# Patient Record
Sex: MALE | Race: WHITE | Employment: FULL TIME | URBAN - METROPOLITAN AREA
[De-identification: names, ages, dates, MRNs, and addresses within clinical notes are randomized per-mention and may not be internally consistent; named-entity substitution may affect disease eponyms.]

---

## 2017-10-04 ENCOUNTER — HOSPITAL ENCOUNTER (OUTPATIENT)
Dept: GENERAL RADIOLOGY | Age: 32
Discharge: HOME | End: 2017-10-04
Payer: COMMERCIAL

## 2017-10-04 ENCOUNTER — HOSPITAL ENCOUNTER (OUTPATIENT)
Age: 32
Discharge: HOME | End: 2017-10-04
Payer: COMMERCIAL

## 2017-10-04 DIAGNOSIS — M25.562 LEFT KNEE PAIN, UNSPECIFIED CHRONICITY: ICD-10-CM

## 2017-10-04 PROCEDURE — 73562 X-RAY EXAM OF KNEE 3: CPT

## 2018-04-06 ENCOUNTER — APPOINTMENT (OUTPATIENT)
Dept: GENERAL RADIOLOGY | Age: 33
End: 2018-04-06
Payer: MEDICARE

## 2018-04-06 ENCOUNTER — HOSPITAL ENCOUNTER (EMERGENCY)
Age: 33
Discharge: HOME | End: 2018-04-06
Attending: EMERGENCY MEDICINE
Payer: MEDICARE

## 2018-04-06 VITALS
HEIGHT: 68 IN | WEIGHT: 180 LBS | TEMPERATURE: 97.7 F | OXYGEN SATURATION: 95 % | RESPIRATION RATE: 18 BRPM | SYSTOLIC BLOOD PRESSURE: 160 MMHG | BODY MASS INDEX: 27.28 KG/M2 | DIASTOLIC BLOOD PRESSURE: 99 MMHG | HEART RATE: 95 BPM

## 2018-04-06 DIAGNOSIS — J06.9 VIRAL URI: Primary | ICD-10-CM

## 2018-04-06 LAB
EKG ATRIAL RATE: 84 BPM
EKG P AXIS: 48 DEGREES
EKG P-R INTERVAL: 122 MS
EKG Q-T INTERVAL: 326 MS
EKG QRS DURATION: 90 MS
EKG QTC CALCULATION (BAZETT): 385 MS
EKG R AXIS: 25 DEGREES
EKG T AXIS: 59 DEGREES
EKG VENTRICULAR RATE: 84 BPM

## 2018-04-06 PROCEDURE — 93005 ELECTROCARDIOGRAM TRACING: CPT

## 2018-04-06 PROCEDURE — 99285 EMERGENCY DEPT VISIT HI MDM: CPT

## 2018-04-06 PROCEDURE — 71046 X-RAY EXAM CHEST 2 VIEWS: CPT

## 2018-04-06 PROCEDURE — 6370000000 HC RX 637 (ALT 250 FOR IP): Performed by: EMERGENCY MEDICINE

## 2018-04-06 RX ORDER — GUAIFENESIN DEXTROMETHORPHAN HYDROBROMIDE ORAL SOLUTION 10; 100 MG/5ML; MG/5ML
10 SOLUTION ORAL EVERY 4 HOURS PRN
Status: DISCONTINUED | OUTPATIENT
Start: 2018-04-06 | End: 2018-04-06 | Stop reason: HOSPADM

## 2018-04-06 RX ORDER — BENZONATATE 100 MG/1
100 CAPSULE ORAL 3 TIMES DAILY PRN
Qty: 30 CAPSULE | Refills: 0 | Status: SHIPPED | OUTPATIENT
Start: 2018-04-06 | End: 2018-04-13

## 2018-04-06 RX ORDER — GUAIFENESIN/DEXTROMETHORPHAN 100-10MG/5
5 SYRUP ORAL 3 TIMES DAILY PRN
Qty: 120 ML | Refills: 0 | Status: SHIPPED | OUTPATIENT
Start: 2018-04-06 | End: 2018-04-16

## 2018-04-06 RX ORDER — BENZONATATE 100 MG/1
100 CAPSULE ORAL ONCE
Status: COMPLETED | OUTPATIENT
Start: 2018-04-06 | End: 2018-04-06

## 2018-04-06 RX ADMIN — BENZOCAINE, MENTHOL 1 LOZENGE: 15; 3.6 LOZENGE ORAL at 12:23

## 2018-04-06 RX ADMIN — BENZONATATE 100 MG: 100 CAPSULE ORAL at 12:23

## 2018-04-06 RX ADMIN — Medication 10 ML: at 12:24

## 2018-04-06 ASSESSMENT — ENCOUNTER SYMPTOMS
EYE DISCHARGE: 0
SORE THROAT: 1
VOMITING: 0
SINUS PRESSURE: 1
ABDOMINAL PAIN: 0
COUGH: 1
DIARRHEA: 0
SHORTNESS OF BREATH: 1
NAUSEA: 0
EYE PAIN: 0

## 2018-05-07 ENCOUNTER — APPOINTMENT (OUTPATIENT)
Dept: GENERAL RADIOLOGY | Age: 33
End: 2018-05-07
Payer: MEDICARE

## 2018-05-07 ENCOUNTER — HOSPITAL ENCOUNTER (EMERGENCY)
Age: 33
Discharge: HOME | End: 2018-05-07
Attending: EMERGENCY MEDICINE
Payer: MEDICARE

## 2018-05-07 VITALS
HEIGHT: 68 IN | OXYGEN SATURATION: 99 % | TEMPERATURE: 97.5 F | BODY MASS INDEX: 27.28 KG/M2 | DIASTOLIC BLOOD PRESSURE: 99 MMHG | RESPIRATION RATE: 16 BRPM | SYSTOLIC BLOOD PRESSURE: 152 MMHG | WEIGHT: 180 LBS | HEART RATE: 89 BPM

## 2018-05-07 DIAGNOSIS — K92.0 HEMATEMESIS WITHOUT NAUSEA: Primary | ICD-10-CM

## 2018-05-07 LAB
ABSOLUTE EOS #: 0.19 K/UL (ref 0–0.44)
ABSOLUTE IMMATURE GRANULOCYTE: 0.03 K/UL (ref 0–0.3)
ABSOLUTE LYMPH #: 1.27 K/UL (ref 1.1–3.7)
ABSOLUTE MONO #: 1.09 K/UL (ref 0.1–1.2)
ALBUMIN SERPL-MCNC: 4.1 G/DL (ref 3.5–5.2)
ALBUMIN/GLOBULIN RATIO: 0.9 (ref 1–2.5)
ALP BLD-CCNC: 83 U/L (ref 40–129)
ALT SERPL-CCNC: 23 U/L (ref 5–41)
ANION GAP SERPL CALCULATED.3IONS-SCNC: 11 MMOL/L (ref 9–17)
AST SERPL-CCNC: 37 U/L
BASOPHILS # BLD: 1 % (ref 0–2)
BASOPHILS ABSOLUTE: 0.11 K/UL (ref 0–0.2)
BILIRUB SERPL-MCNC: 0.36 MG/DL (ref 0.3–1.2)
BUN BLDV-MCNC: 12 MG/DL (ref 6–20)
BUN/CREAT BLD: ABNORMAL (ref 9–20)
CALCIUM SERPL-MCNC: 9 MG/DL (ref 8.6–10.4)
CHLORIDE BLD-SCNC: 104 MMOL/L (ref 98–107)
CO2: 25 MMOL/L (ref 20–31)
CREAT SERPL-MCNC: 0.68 MG/DL (ref 0.7–1.2)
DIFFERENTIAL TYPE: ABNORMAL
EOSINOPHILS RELATIVE PERCENT: 2 % (ref 1–4)
ETHANOL PERCENT: <0.01 %
ETHANOL: <10 MG/DL
GFR AFRICAN AMERICAN: >60 ML/MIN
GFR NON-AFRICAN AMERICAN: >60 ML/MIN
GFR SERPL CREATININE-BSD FRML MDRD: ABNORMAL ML/MIN/{1.73_M2}
GFR SERPL CREATININE-BSD FRML MDRD: ABNORMAL ML/MIN/{1.73_M2}
GLUCOSE BLD-MCNC: 89 MG/DL (ref 70–99)
HCT VFR BLD CALC: 45.1 % (ref 40.7–50.3)
HEMOGLOBIN: 14.8 G/DL (ref 13–17)
IMMATURE GRANULOCYTES: 0 %
INR BLD: 0.9
LIPASE: 37 U/L (ref 13–60)
LYMPHOCYTES # BLD: 16 % (ref 24–43)
MCH RBC QN AUTO: 33.8 PG (ref 25.2–33.5)
MCHC RBC AUTO-ENTMCNC: 32.8 G/DL (ref 28.4–34.8)
MCV RBC AUTO: 103 FL (ref 82.6–102.9)
MONOCYTES # BLD: 13 % (ref 3–12)
NRBC AUTOMATED: 0 PER 100 WBC
PARTIAL THROMBOPLASTIN TIME: 25.9 SEC (ref 20.5–30.5)
PDW BLD-RTO: 13.7 % (ref 11.8–14.4)
PLATELET # BLD: 253 K/UL (ref 138–453)
PLATELET ESTIMATE: ABNORMAL
PMV BLD AUTO: 10.2 FL (ref 8.1–13.5)
POTASSIUM SERPL-SCNC: 4.1 MMOL/L (ref 3.7–5.3)
PROTHROMBIN TIME: 9.7 SEC (ref 9–12)
RBC # BLD: 4.38 M/UL (ref 4.21–5.77)
RBC # BLD: ABNORMAL 10*6/UL
SEG NEUTROPHILS: 68 % (ref 36–65)
SEGMENTED NEUTROPHILS ABSOLUTE COUNT: 5.43 K/UL (ref 1.5–8.1)
SODIUM BLD-SCNC: 140 MMOL/L (ref 135–144)
TOTAL PROTEIN: 8.7 G/DL (ref 6.4–8.3)
WBC # BLD: 8.1 K/UL (ref 3.5–11.3)
WBC # BLD: ABNORMAL 10*3/UL

## 2018-05-07 PROCEDURE — 99284 EMERGENCY DEPT VISIT MOD MDM: CPT

## 2018-05-07 PROCEDURE — G0480 DRUG TEST DEF 1-7 CLASSES: HCPCS

## 2018-05-07 PROCEDURE — 2580000003 HC RX 258: Performed by: EMERGENCY MEDICINE

## 2018-05-07 PROCEDURE — 80053 COMPREHEN METABOLIC PANEL: CPT

## 2018-05-07 PROCEDURE — 85610 PROTHROMBIN TIME: CPT

## 2018-05-07 PROCEDURE — 85730 THROMBOPLASTIN TIME PARTIAL: CPT

## 2018-05-07 PROCEDURE — 6360000002 HC RX W HCPCS: Performed by: EMERGENCY MEDICINE

## 2018-05-07 PROCEDURE — 71046 X-RAY EXAM CHEST 2 VIEWS: CPT

## 2018-05-07 PROCEDURE — 85025 COMPLETE CBC W/AUTO DIFF WBC: CPT

## 2018-05-07 PROCEDURE — 83690 ASSAY OF LIPASE: CPT

## 2018-05-07 RX ORDER — 0.9 % SODIUM CHLORIDE 0.9 %
1000 INTRAVENOUS SOLUTION INTRAVENOUS ONCE
Status: COMPLETED | OUTPATIENT
Start: 2018-05-07 | End: 2018-05-07

## 2018-05-07 RX ORDER — ONDANSETRON 4 MG/1
4 TABLET, ORALLY DISINTEGRATING ORAL ONCE
Status: COMPLETED | OUTPATIENT
Start: 2018-05-07 | End: 2018-05-07

## 2018-05-07 RX ORDER — PANTOPRAZOLE SODIUM 20 MG/1
40 TABLET, DELAYED RELEASE ORAL DAILY
Qty: 30 TABLET | Refills: 0 | Status: SHIPPED | OUTPATIENT
Start: 2018-05-07 | End: 2020-04-30

## 2018-05-07 RX ORDER — ACETAMINOPHEN 500 MG
1000 TABLET ORAL EVERY 8 HOURS PRN
Qty: 30 TABLET | Refills: 0 | Status: SHIPPED | OUTPATIENT
Start: 2018-05-07 | End: 2020-05-26

## 2018-05-07 RX ORDER — ONDANSETRON 4 MG/1
4 TABLET, ORALLY DISINTEGRATING ORAL EVERY 8 HOURS PRN
Qty: 20 TABLET | Refills: 0 | Status: SHIPPED | OUTPATIENT
Start: 2018-05-07 | End: 2018-08-27

## 2018-05-07 RX ADMIN — SODIUM CHLORIDE 1000 ML: 9 INJECTION, SOLUTION INTRAVENOUS at 07:53

## 2018-05-07 RX ADMIN — ONDANSETRON 4 MG: 4 TABLET, ORALLY DISINTEGRATING ORAL at 07:53

## 2018-05-07 ASSESSMENT — ENCOUNTER SYMPTOMS
SINUS PRESSURE: 0
ABDOMINAL PAIN: 0
STRIDOR: 0
RHINORRHEA: 0
WHEEZING: 0
PHOTOPHOBIA: 0
SHORTNESS OF BREATH: 0
SINUS PAIN: 0
COUGH: 0
VOMITING: 1

## 2018-08-27 ENCOUNTER — HOSPITAL ENCOUNTER (EMERGENCY)
Age: 33
Discharge: HOME | End: 2018-08-27
Attending: EMERGENCY MEDICINE
Payer: MEDICARE

## 2018-08-27 ENCOUNTER — APPOINTMENT (OUTPATIENT)
Dept: GENERAL RADIOLOGY | Age: 33
End: 2018-08-27
Payer: MEDICARE

## 2018-08-27 VITALS
WEIGHT: 190 LBS | HEIGHT: 68 IN | SYSTOLIC BLOOD PRESSURE: 148 MMHG | OXYGEN SATURATION: 98 % | BODY MASS INDEX: 28.79 KG/M2 | DIASTOLIC BLOOD PRESSURE: 92 MMHG | RESPIRATION RATE: 16 BRPM | TEMPERATURE: 97.2 F | HEART RATE: 99 BPM

## 2018-08-27 DIAGNOSIS — M62.838 SPASM OF MUSCLE: Primary | ICD-10-CM

## 2018-08-27 PROCEDURE — 6360000002 HC RX W HCPCS: Performed by: PHYSICIAN ASSISTANT

## 2018-08-27 PROCEDURE — 99283 EMERGENCY DEPT VISIT LOW MDM: CPT

## 2018-08-27 PROCEDURE — 72100 X-RAY EXAM L-S SPINE 2/3 VWS: CPT

## 2018-08-27 PROCEDURE — 96372 THER/PROPH/DIAG INJ SC/IM: CPT

## 2018-08-27 RX ORDER — KETOROLAC TROMETHAMINE 30 MG/ML
30 INJECTION, SOLUTION INTRAMUSCULAR; INTRAVENOUS ONCE
Status: COMPLETED | OUTPATIENT
Start: 2018-08-27 | End: 2018-08-27

## 2018-08-27 RX ORDER — IBUPROFEN 800 MG/1
800 TABLET ORAL EVERY 8 HOURS PRN
Qty: 20 TABLET | Refills: 0 | Status: SHIPPED | OUTPATIENT
Start: 2018-08-27 | End: 2020-05-26 | Stop reason: ALTCHOICE

## 2018-08-27 RX ORDER — DIAZEPAM 5 MG/1
5 TABLET ORAL EVERY 8 HOURS PRN
Qty: 5 TABLET | Refills: 0 | Status: SHIPPED | OUTPATIENT
Start: 2018-08-27 | End: 2018-08-30

## 2018-08-27 RX ADMIN — KETOROLAC TROMETHAMINE 30 MG: 30 INJECTION, SOLUTION INTRAMUSCULAR at 14:47

## 2018-08-27 ASSESSMENT — ENCOUNTER SYMPTOMS
SHORTNESS OF BREATH: 0
VOMITING: 0
BACK PAIN: 1
DIARRHEA: 0
NAUSEA: 0
ABDOMINAL PAIN: 0
COLOR CHANGE: 0
SORE THROAT: 0

## 2018-08-27 ASSESSMENT — PAIN DESCRIPTION - LOCATION: LOCATION: BACK

## 2018-08-27 ASSESSMENT — PAIN DESCRIPTION - DESCRIPTORS: DESCRIPTORS: ACHING;SHOOTING

## 2018-08-27 ASSESSMENT — PAIN DESCRIPTION - FREQUENCY: FREQUENCY: CONTINUOUS

## 2018-08-27 ASSESSMENT — PAIN SCALES - GENERAL
PAINLEVEL_OUTOF10: 8
PAINLEVEL_OUTOF10: 8

## 2018-08-27 ASSESSMENT — PAIN DESCRIPTION - ORIENTATION: ORIENTATION: LOWER

## 2018-08-27 NOTE — ED NOTES
rec'd report from [facility]. Pt resting on cot with no distress noted. Awaiting discharge papers.       Giovana Gilliland RN  08/27/18 2262

## 2018-08-27 NOTE — ED PROVIDER NOTES
For allergic reaction/itching skin. EPINEPHRINE (EPIPEN 2-LIZETTE) 0.3 MG/0.3ML SOAJ INJECTION    Inject 0.3 mLs into the muscle once for 1 dose Use as directed for allergic reaction    FAMOTIDINE (PEPCID) 20 MG TABLET    Take 1 tablet by mouth 2 times daily    PANTOPRAZOLE (PROTONIX) 20 MG TABLET    Take 2 tablets by mouth daily       ALLERGIES     Pcn [penicillins]  Reviewed  FAMILY HISTORY     History reviewed. No pertinent family history. No family status information on file. SOCIAL HISTORY      reports that he has been smoking Cigarettes. He has a 10.00 pack-year smoking history. He has never used smokeless tobacco. He reports that he drinks alcohol. He reports that he does not use drugs. PHYSICAL EXAM    (up to 7 for level 4, 8 or more for level 5)     Vitals:    08/27/18 1400   BP: (!) 148/92   Pulse: 99   Resp: 16   Temp: 97.2 °F (36.2 °C)   TempSrc: Oral   SpO2: 98%   Weight: 190 lb (86.2 kg)   Height: 5' 8\" (1.727 m)       Physical Exam   Constitutional: He is oriented to person, place, and time. He appears well-developed and well-nourished. No distress. HENT:   Head: Normocephalic and atraumatic. Eyes: Pupils are equal, round, and reactive to light. Conjunctivae and EOM are normal.   Neck: Normal range of motion. Neck supple. Cardiovascular: Normal rate, regular rhythm, normal heart sounds and intact distal pulses. Exam reveals no gallop and no friction rub. No murmur heard. Pulmonary/Chest: Effort normal and breath sounds normal. No respiratory distress. He has no wheezes. He has no rales. Abdominal: Soft. Bowel sounds are normal. He exhibits no distension and no mass. There is no tenderness. There is no rebound and no guarding. Musculoskeletal: Normal range of motion. Back:    No saddle anesthesia. No bowel or bladder dysfunction. No urinary retention. No IV drug use. Low suspicion for cauda equina syndrome or epidural abscess.  Motor function is 5 out of 5 bilaterally in all extremities. Light sensation is intact. Proprioception is within normal limits. Cap refills less than 2 seconds in all extremities. All compartments are soft compressible. No septic joints noted. Distal pulses and deep tendon reflexes are within normal limits. Patient is ambulatory. No ataxia. Neurological: He is alert and oriented to person, place, and time. He has normal reflexes. No cranial nerve deficit. Skin: Skin is warm and dry. He is not diaphoretic. Psychiatric: He has a normal mood and affect. His behavior is normal. Judgment and thought content normal.         DIAGNOSTIC RESULTS       RADIOLOGY:   Non-plain film images such as CT, Ultrasound and MRI are read by the radiologist. Plain radiographic images are visualized and preliminarily interpreted by Ana Hector PA-C with the below findings:    See below. Interpretation per the Radiologist below, if available at the time of this note:    XR LUMBAR SPINE (2-3 VIEWS)   Final Result   No acute osseous abnormality identified. Slight lumbar curvature to the   right is noted which may be positional or related to muscle spasm. LABS:  Labs Reviewed - No data to display        EMERGENCY DEPARTMENT COURSE and DIFFERENTIAL DIAGNOSIS/MDM:   Vitals:    Vitals:    08/27/18 1400   BP: (!) 148/92   Pulse: 99   Resp: 16   Temp: 97.2 °F (36.2 °C)   TempSrc: Oral   SpO2: 98%   Weight: 190 lb (86.2 kg)   Height: 5' 8\" (1.727 m)       This is a 81-year-old male presenting to the emergency department after falling playing with his children yesterday complaining of right-sided low back pain. We will obtain imaging of his lumbar spine although he has no midline lumbar tenderness to rule out fracture although my suspicion is low for this. We will give him it an injection of Toradol here in the ED for pain control. He is again ambulatory and the x-rays are unremarkable.  I do believe this to be a muscle spasm at this time and we will send him home on Valium and ibuprofen to take as needed. Patient was told to follow-up with his primary care physician in 3 days. Patient understood and will comply. Patient is satisfied. All questions answered. Attending physician, myself, and patient agree no further workup is necessary at this time. Patient was given very strict return protocols and is completely agreeable with this plan. This patient was seen by the attending physician and they agreed with the assessment and plan. CONSULTS:  None    PROCEDURES:  Procedures    FINAL IMPRESSION      1. Spasm of muscle          DISPOSITION/PLAN   DISPOSITION Decision To Discharge 08/27/2018 03:05:20 PM      PATIENT REFERRED TO:  [facility]  [address] [facility] [address]  [phone]  Go to   As needed, If symptoms worsen    [address]  [phone]  Schedule an appointment as soon as possible for a visit in 3 days  For Re-check      DISCHARGE MEDICATIONS:  New Prescriptions    DIAZEPAM (VALIUM) 5 MG TABLET    Take 1 tablet by mouth every 8 hours as needed for Anxiety (Pain/Spasm) for up to 3 doses. .       (Please note that portions of this note were completed with a voice recognition program.  Efforts were made to edit the dictations but occasionally words are mis-transcribed.)    Marilu Cho 1137, TERENCE Flanagan PA-C  08/27/18 4255

## 2018-09-11 ENCOUNTER — HOSPITAL ENCOUNTER (EMERGENCY)
Age: 33
Discharge: HOME | End: 2018-09-11
Attending: EMERGENCY MEDICINE
Payer: MEDICARE

## 2018-09-11 VITALS
RESPIRATION RATE: 18 BRPM | HEART RATE: 84 BPM | DIASTOLIC BLOOD PRESSURE: 87 MMHG | OXYGEN SATURATION: 100 % | SYSTOLIC BLOOD PRESSURE: 149 MMHG | TEMPERATURE: 98.4 F

## 2018-09-11 DIAGNOSIS — R21 RASH AND OTHER NONSPECIFIC SKIN ERUPTION: Primary | ICD-10-CM

## 2018-09-11 PROCEDURE — 99283 EMERGENCY DEPT VISIT LOW MDM: CPT

## 2018-09-11 PROCEDURE — 6370000000 HC RX 637 (ALT 250 FOR IP): Performed by: EMERGENCY MEDICINE

## 2018-09-11 RX ORDER — FAMOTIDINE 20 MG/1
20 TABLET, FILM COATED ORAL 2 TIMES DAILY
Qty: 12 TABLET | Refills: 0 | Status: SHIPPED | OUTPATIENT
Start: 2018-09-11 | End: 2020-04-30

## 2018-09-11 RX ORDER — PREDNISONE 20 MG/1
60 TABLET ORAL ONCE
Status: COMPLETED | OUTPATIENT
Start: 2018-09-11 | End: 2018-09-11

## 2018-09-11 RX ORDER — PREDNISONE 50 MG/1
50 TABLET ORAL DAILY
Qty: 4 TABLET | Refills: 0 | Status: SHIPPED | OUTPATIENT
Start: 2018-09-11 | End: 2020-04-30

## 2018-09-11 RX ORDER — DIPHENHYDRAMINE HCL 25 MG
25 CAPSULE ORAL EVERY 4 HOURS PRN
Qty: 8 CAPSULE | Refills: 0 | Status: SHIPPED | OUTPATIENT
Start: 2018-09-11 | End: 2018-09-21

## 2018-09-11 RX ORDER — DIPHENHYDRAMINE HCL 25 MG
25 TABLET ORAL ONCE
Status: COMPLETED | OUTPATIENT
Start: 2018-09-11 | End: 2018-09-11

## 2018-09-11 RX ORDER — FAMOTIDINE 20 MG/1
20 TABLET, FILM COATED ORAL ONCE
Status: COMPLETED | OUTPATIENT
Start: 2018-09-11 | End: 2018-09-11

## 2018-09-11 RX ADMIN — DIPHENHYDRAMINE HCL 25 MG: 25 TABLET ORAL at 22:01

## 2018-09-11 RX ADMIN — FAMOTIDINE 20 MG: 20 TABLET, FILM COATED ORAL at 22:01

## 2018-09-11 RX ADMIN — PREDNISONE 60 MG: 20 TABLET ORAL at 22:01

## 2018-09-12 ASSESSMENT — ENCOUNTER SYMPTOMS
ABDOMINAL PAIN: 0
WHEEZING: 0
VOMITING: 0
BACK PAIN: 0
SORE THROAT: 0
SHORTNESS OF BREATH: 0

## 2018-09-12 NOTE — ED PROVIDER NOTES
Authorizing Provider   diphenhydrAMINE (BENADRYL) 25 MG capsule Take 1 capsule by mouth every 4 hours as needed for Itching 9/11/18 9/21/18 Yes Gayle Alicia MD   famotidine (PEPCID) 20 MG tablet Take 1 tablet by mouth 2 times daily 9/11/18  Yes Gayle Alicia MD   predniSONE (DELTASONE) 50 MG tablet Take 1 tablet by mouth daily 9/11/18  Yes Gayle Alicia MD   cetirizine (ZYRTEC ALLERGY) 10 MG tablet Take 1 tablet by mouth daily For allergic reaction/itching skin. 11/12/16  Yes Citlalli Dotson PA-C   ibuprofen (ADVIL;MOTRIN) 800 MG tablet Take 1 tablet by mouth every 8 hours as needed for Pain 8/27/18   BENJY Soares PA-C   acetaminophen (APAP EXTRA STRENGTH) 500 MG tablet Take 2 tablets by mouth every 8 hours as needed for Pain 5/7/18   Lakia Burnett MD   pantoprazole (PROTONIX) 20 MG tablet Take 2 tablets by mouth daily 5/7/18   Lakia Burnett MD   famotidine (PEPCID) 20 MG tablet Take 1 tablet by mouth 2 times daily 11/17/16   Darrel Palacios MD   EPINEPHrine (EPIPEN 2-LIZETTE) 0.3 MG/0.3ML SOAJ injection Inject 0.3 mLs into the muscle once for 1 dose Use as directed for allergic reaction 11/17/16 11/17/16  Darrel Palacios MD       REVIEW OF SYSTEMS    (2-9 systems for level 4, 10 or more for level 5)      Review of Systems   Constitutional: Negative for chills and fever. HENT: Negative for sore throat. Eyes: Negative for visual disturbance. Respiratory: Negative for shortness of breath and wheezing. Cardiovascular: Negative for chest pain. Gastrointestinal: Negative for abdominal pain and vomiting. Genitourinary: Negative for dysuria. Musculoskeletal: Negative for back pain. Skin: Positive for rash. Neurological: Negative for headaches. Psychiatric/Behavioral: Negative for confusion.        PHYSICAL EXAM   (up to 7 for level 4, 8 or more for level 5)      INITIAL VITALS:   BP (!) 149/87   Pulse 84   Temp 98.4 °F (36.9 °C) (Oral)   Resp 18   SpO2 100%     Physical Exam

## 2018-09-12 NOTE — ED NOTES
Pt to the ED with complaints of right elbow swelling. He was outside with his dog and came in when he noticed that his elbow was hot. He has swelling on the skin of the right elbow that is slightly red and warm.  Pt has severe skin allergies so this type of reaction is not new     Jacinta Whitmore RN  09/11/18 2370

## 2018-09-12 NOTE — ED PROVIDER NOTES
I performed a history and physical examination of the patient and discussed management with the resident. I reviewed the residents note and agree with the documented findings and plan of care. Any areas of disagreement are noted on the chart. I was personally present for the key portions of any procedures. I have documented in the chart those procedures where I was not present during the key portions. I have reviewed the emergency nurses triage note. I agree with the chief complaint, past medical history, past surgical history, allergies, medications, social and family history as documented unless otherwise noted below. Documentation of the HPI, Physical Exam and Medical Decision Making performed by medical students or scribes is based on my personal performance of the HPI, PE and MDM. For Phys Assistant/ Nurse Practitioner cases/documentation I have personally evaluated this patient and have completed at least one if not all key elements of the E/M (history, physical exam, and MDM). I find the patient's history and physical exam are consistent with the NP/PA documentation. I agree with the care provided, treatment rendered, disposition and followup plan. Additional findings are as noted. Davion Waters. Sweta Dixon MD  Attending Emergency  Physician    C/O [address] OF RIGHT ELBOW SHORTLY AFTER BRUSHING UP AGAINST [employer]GRE[employer] AT HOME. REPORTS HX OF PREV CONTACT WITH SAME PLANT WITHOUT REACTION. NO DIFF BREATHING/SPEAKING/SWALLOWING, ABD PAIN, NAUSEA, VOMITING. AWAKE, ALERT, COOP, RESP. LUNGS CLEAR ADAL. NO RALES, RHONCHI, WHEEZES, STRIDOR, RETRACTIONS. CARDIAC-S1S2, RRR, NO MRG. ABD SOFT, NONDISTENDED, NONTENDER. NORMAL BOWEL SOUNDS. RIGHT ELBOW-MILD ERYTHEMATOUS PATCH OVER OLECRANON. NO JOINT EFFUSION, SWELLING, WARMTH. NORMAL AROM. NO LYMPHANGITIS OR LYMPHADENOPATHY. IMP-RASH RIGHT ELBOW. PLAN-DISCHARGE, TOPICAL STEROIDS, ANTIPRURITIC RX'S. RETURN IF SX WORSEN OR PROGRESS.             [facility] Victorina Louie MD  09/11/18 2493

## 2018-09-12 NOTE — ED PROVIDER NOTES
HPI 28 yo presents with burning, itching, swelling, redness, and warmth to his right elbow and swelling in his digits after rubbing up against a yew plant in his backyard. He is unsure if he is allergic to evergreens, or if he was bitten by an insect. He denies any shortness of breath,wheezing, drooling, difficulty swallowing, or hives. He has an extensive history of allergic reactions and has been on multiple antihistamines and steroids in the past, and is prescribed an epi pen for anaphylaxis     Review of Systems    Physical Exam    Procedures    MDM         Labs      Radiology      EKG Interpretation.

## 2020-04-30 ENCOUNTER — HOSPITAL ENCOUNTER (EMERGENCY)
Age: 35
Discharge: HOME | End: 2020-04-30
Attending: EMERGENCY MEDICINE

## 2020-04-30 ENCOUNTER — APPOINTMENT (OUTPATIENT)
Dept: GENERAL RADIOLOGY | Age: 35
End: 2020-04-30

## 2020-04-30 VITALS
HEIGHT: 68 IN | HEART RATE: 102 BPM | RESPIRATION RATE: 16 BRPM | SYSTOLIC BLOOD PRESSURE: 134 MMHG | WEIGHT: 185 LBS | TEMPERATURE: 97.9 F | BODY MASS INDEX: 28.04 KG/M2 | DIASTOLIC BLOOD PRESSURE: 94 MMHG | OXYGEN SATURATION: 97 %

## 2020-04-30 PROCEDURE — 6370000000 HC RX 637 (ALT 250 FOR IP): Performed by: EMERGENCY MEDICINE

## 2020-04-30 PROCEDURE — 99283 EMERGENCY DEPT VISIT LOW MDM: CPT

## 2020-04-30 PROCEDURE — 73110 X-RAY EXAM OF WRIST: CPT

## 2020-04-30 RX ORDER — HYDROCODONE BITARTRATE AND ACETAMINOPHEN 5; 325 MG/1; MG/1
2 TABLET ORAL ONCE
Status: COMPLETED | OUTPATIENT
Start: 2020-04-30 | End: 2020-04-30

## 2020-04-30 RX ORDER — NAPROXEN 375 MG/1
375 TABLET ORAL 2 TIMES DAILY WITH MEALS
Qty: 20 TABLET | Refills: 0 | Status: SHIPPED | OUTPATIENT
Start: 2020-04-30 | End: 2020-05-26

## 2020-04-30 RX ADMIN — HYDROCODONE BITARTRATE AND ACETAMINOPHEN 2 TABLET: 5; 325 TABLET ORAL at 05:55

## 2020-04-30 ASSESSMENT — PAIN SCALES - GENERAL
PAINLEVEL_OUTOF10: 10
PAINLEVEL_OUTOF10: 10
PAINLEVEL_OUTOF10: 8

## 2020-04-30 ASSESSMENT — ENCOUNTER SYMPTOMS
SHORTNESS OF BREATH: 0
VOMITING: 0
DIARRHEA: 0
BACK PAIN: 0
ABDOMINAL PAIN: 0
COUGH: 0

## 2020-04-30 ASSESSMENT — PAIN DESCRIPTION - LOCATION
LOCATION: WRIST
LOCATION: WRIST

## 2020-04-30 ASSESSMENT — PAIN DESCRIPTION - ORIENTATION
ORIENTATION: RIGHT
ORIENTATION: RIGHT

## 2020-04-30 ASSESSMENT — PAIN DESCRIPTION - PAIN TYPE: TYPE: ACUTE PAIN

## 2020-04-30 ASSESSMENT — PAIN DESCRIPTION - FREQUENCY: FREQUENCY: CONTINUOUS

## 2020-04-30 NOTE — ED PROVIDER NOTES
radiologist, see reports below, unless otherwisenoted in MDM or here. XR WRIST RIGHT (MIN 3 VIEWS)   Final Result   Addendum 1 of 1   ADDENDUM:   The carpal bones all appear intact. There is no evidence for fracture   dislocation. The soft tissues are diffusely edematous over the dorsal   aspect. .  The joint spaces are well preserved. There is no evidence for   foreign body. Final        LABS: All lab results were reviewed by myself, and all abnormals are listed below. Labs Reviewed - No data to display    EMERGENCY DEPARTMENTCOURSE:   Differential diagnosis includes fracture, dislocation, ligamentous injury    6:49 AM EDT  X-rays negative for fracture dislocation. Suspect sprain. The patient will be placed in a removable volar wrist splint with outpatient follow-up. Vitals:    Vitals:    04/30/20 0535   BP: (!) 134/94   Pulse: 102   Resp: 16   Temp: 97.9 °F (36.6 °C)   TempSrc: Oral   SpO2: 97%   Weight: 185 lb (83.9 kg)   Height: 5' 8\" (1.727 m)       The patient was given the following medications while in the emergency department:  Orders Placed This Encounter   Medications    HYDROcodone-acetaminophen (NORCO) 5-325 MG per tablet 2 tablet    naproxen (NAPROSYN) 375 MG tablet     Sig: Take 1 tablet by mouth 2 times daily (with meals)     Dispense:  20 tablet     Refill:  0         FINAL IMPRESSION      1. Right wrist pain    2.  Fall, initial encounter         DISPOSITION/PLAN   DISPOSITION Decision To Discharge 04/30/2020 06:49:30 AM      PATIENT REFERRED TO:  Sarbjit Stevenson MD  [place] [address], [address] [place] [address]  [phone]    In 1 week      DISCHARGE MEDICATIONS:  New Prescriptions    NAPROXEN (NAPROSYN) 375 MG TABLET    Take 1 tablet by mouth 2 times daily (with meals)     Parris Fofana MD  Attending Emergency Physician    This charting supersedes any ED resident or staff charting and was written using speech recognition software        Parris Fofana

## 2020-05-16 ENCOUNTER — APPOINTMENT (OUTPATIENT)
Dept: GENERAL RADIOLOGY | Age: 35
DRG: 286 | End: 2020-05-16

## 2020-05-16 ENCOUNTER — APPOINTMENT (OUTPATIENT)
Dept: CT IMAGING | Age: 35
DRG: 286 | End: 2020-05-16

## 2020-05-16 ENCOUNTER — HOSPITAL ENCOUNTER (INPATIENT)
Age: 35
LOS: 4 days | Discharge: HOME | DRG: 286 | End: 2020-05-20
Attending: EMERGENCY MEDICINE | Admitting: INTERNAL MEDICINE

## 2020-05-16 PROBLEM — I26.99 PULMONARY EMBOLISM ON RIGHT (HCC): Status: ACTIVE | Noted: 2020-05-16

## 2020-05-16 LAB
ABSOLUTE EOS #: 0.1 K/UL (ref 0–0.4)
ABSOLUTE IMMATURE GRANULOCYTE: ABNORMAL K/UL (ref 0–0.3)
ABSOLUTE LYMPH #: 1.1 K/UL (ref 1–4.8)
ABSOLUTE MONO #: 0.5 K/UL (ref 0.1–1.3)
ALBUMIN SERPL-MCNC: 4.4 G/DL (ref 3.5–5.2)
ALBUMIN/GLOBULIN RATIO: ABNORMAL (ref 1–2.5)
ALP BLD-CCNC: 93 U/L (ref 40–129)
ALT SERPL-CCNC: 134 U/L (ref 5–41)
ANION GAP SERPL CALCULATED.3IONS-SCNC: 17 MMOL/L (ref 9–17)
ANTI-XA UNFRAC HEPARIN: 0.53 IU/L (ref 0.3–0.7)
AST SERPL-CCNC: 231 U/L
BASOPHILS # BLD: 2 % (ref 0–2)
BASOPHILS ABSOLUTE: 0.1 K/UL (ref 0–0.2)
BILIRUB SERPL-MCNC: 0.43 MG/DL (ref 0.3–1.2)
BUN BLDV-MCNC: 16 MG/DL (ref 6–20)
BUN/CREAT BLD: ABNORMAL (ref 9–20)
CALCIUM SERPL-MCNC: 9.1 MG/DL (ref 8.6–10.4)
CHLORIDE BLD-SCNC: 100 MMOL/L (ref 98–107)
CO2: 23 MMOL/L (ref 20–31)
CREAT SERPL-MCNC: 0.57 MG/DL (ref 0.7–1.2)
D-DIMER QUANTITATIVE: 0.88 MG/L FEU (ref 0–0.59)
DIFFERENTIAL TYPE: ABNORMAL
EOSINOPHILS RELATIVE PERCENT: 3 % (ref 0–4)
GFR AFRICAN AMERICAN: >60 ML/MIN
GFR NON-AFRICAN AMERICAN: >60 ML/MIN
GFR SERPL CREATININE-BSD FRML MDRD: ABNORMAL ML/MIN/{1.73_M2}
GFR SERPL CREATININE-BSD FRML MDRD: ABNORMAL ML/MIN/{1.73_M2}
GLUCOSE BLD-MCNC: 96 MG/DL (ref 70–99)
HCT VFR BLD CALC: 43.2 % (ref 41–53)
HEMOGLOBIN: 14.7 G/DL (ref 13.5–17.5)
IMMATURE GRANULOCYTES: ABNORMAL %
LYMPHOCYTES # BLD: 24 % (ref 24–44)
MCH RBC QN AUTO: 35.7 PG (ref 26–34)
MCHC RBC AUTO-ENTMCNC: 34 G/DL (ref 31–37)
MCV RBC AUTO: 104.8 FL (ref 80–100)
MONOCYTES # BLD: 12 % (ref 1–7)
NRBC AUTOMATED: ABNORMAL PER 100 WBC
PARTIAL THROMBOPLASTIN TIME: 26.2 SEC (ref 24–36)
PDW BLD-RTO: 14.1 % (ref 11.5–14.9)
PLATELET # BLD: 157 K/UL (ref 150–450)
PLATELET ESTIMATE: ABNORMAL
PMV BLD AUTO: 8.6 FL (ref 6–12)
POTASSIUM SERPL-SCNC: 3.7 MMOL/L (ref 3.7–5.3)
RBC # BLD: 4.12 M/UL (ref 4.5–5.9)
RBC # BLD: ABNORMAL 10*6/UL
SEG NEUTROPHILS: 59 % (ref 36–66)
SEGMENTED NEUTROPHILS ABSOLUTE COUNT: 2.6 K/UL (ref 1.3–9.1)
SODIUM BLD-SCNC: 140 MMOL/L (ref 135–144)
TOTAL PROTEIN: 8.8 G/DL (ref 6.4–8.3)
TROPONIN INTERP: NORMAL
TROPONIN INTERP: NORMAL
TROPONIN T: NORMAL NG/ML
TROPONIN T: NORMAL NG/ML
TROPONIN, HIGH SENSITIVITY: 10 NG/L (ref 0–22)
TROPONIN, HIGH SENSITIVITY: 11 NG/L (ref 0–22)
WBC # BLD: 4.5 K/UL (ref 3.5–11)
WBC # BLD: ABNORMAL 10*3/UL

## 2020-05-16 PROCEDURE — 85379 FIBRIN DEGRADATION QUANT: CPT

## 2020-05-16 PROCEDURE — 80053 COMPREHEN METABOLIC PANEL: CPT

## 2020-05-16 PROCEDURE — 84484 ASSAY OF TROPONIN QUANT: CPT

## 2020-05-16 PROCEDURE — 2060000000 HC ICU INTERMEDIATE R&B

## 2020-05-16 PROCEDURE — 93005 ELECTROCARDIOGRAM TRACING: CPT | Performed by: EMERGENCY MEDICINE

## 2020-05-16 PROCEDURE — 96375 TX/PRO/DX INJ NEW DRUG ADDON: CPT

## 2020-05-16 PROCEDURE — 85025 COMPLETE CBC W/AUTO DIFF WBC: CPT

## 2020-05-16 PROCEDURE — 71260 CT THORAX DX C+: CPT

## 2020-05-16 PROCEDURE — 6360000002 HC RX W HCPCS: Performed by: EMERGENCY MEDICINE

## 2020-05-16 PROCEDURE — 85520 HEPARIN ASSAY: CPT

## 2020-05-16 PROCEDURE — 96374 THER/PROPH/DIAG INJ IV PUSH: CPT

## 2020-05-16 PROCEDURE — 36415 COLL VENOUS BLD VENIPUNCTURE: CPT

## 2020-05-16 PROCEDURE — 71045 X-RAY EXAM CHEST 1 VIEW: CPT

## 2020-05-16 PROCEDURE — 2580000003 HC RX 258: Performed by: EMERGENCY MEDICINE

## 2020-05-16 PROCEDURE — 6360000004 HC RX CONTRAST MEDICATION: Performed by: EMERGENCY MEDICINE

## 2020-05-16 PROCEDURE — 85730 THROMBOPLASTIN TIME PARTIAL: CPT

## 2020-05-16 PROCEDURE — 99223 1ST HOSP IP/OBS HIGH 75: CPT | Performed by: INTERNAL MEDICINE

## 2020-05-16 PROCEDURE — 6370000000 HC RX 637 (ALT 250 FOR IP): Performed by: STUDENT IN AN ORGANIZED HEALTH CARE EDUCATION/TRAINING PROGRAM

## 2020-05-16 PROCEDURE — 99285 EMERGENCY DEPT VISIT HI MDM: CPT

## 2020-05-16 RX ORDER — LORAZEPAM 1 MG/1
2 TABLET ORAL
Status: DISCONTINUED | OUTPATIENT
Start: 2020-05-16 | End: 2020-05-20 | Stop reason: HOSPADM

## 2020-05-16 RX ORDER — HEPARIN SODIUM 5000 [USP'U]/ML
40 INJECTION, SOLUTION INTRAVENOUS; SUBCUTANEOUS PRN
Status: DISCONTINUED | OUTPATIENT
Start: 2020-05-16 | End: 2020-05-16 | Stop reason: SDUPTHER

## 2020-05-16 RX ORDER — HEPARIN SODIUM 10000 [USP'U]/100ML
18 INJECTION, SOLUTION INTRAVENOUS CONTINUOUS
Status: DISCONTINUED | OUTPATIENT
Start: 2020-05-16 | End: 2020-05-17

## 2020-05-16 RX ORDER — SODIUM CHLORIDE 0.9 % (FLUSH) 0.9 %
10 SYRINGE (ML) INJECTION ONCE
Status: COMPLETED | OUTPATIENT
Start: 2020-05-16 | End: 2020-05-16

## 2020-05-16 RX ORDER — HEPARIN SODIUM 5000 [USP'U]/ML
40 INJECTION, SOLUTION INTRAVENOUS; SUBCUTANEOUS PRN
Status: DISCONTINUED | OUTPATIENT
Start: 2020-05-16 | End: 2020-05-17

## 2020-05-16 RX ORDER — ACETAMINOPHEN 650 MG/1
650 SUPPOSITORY RECTAL EVERY 6 HOURS PRN
Status: DISCONTINUED | OUTPATIENT
Start: 2020-05-16 | End: 2020-05-20 | Stop reason: HOSPADM

## 2020-05-16 RX ORDER — LORAZEPAM 2 MG/ML
1 INJECTION INTRAMUSCULAR
Status: DISCONTINUED | OUTPATIENT
Start: 2020-05-16 | End: 2020-05-20 | Stop reason: HOSPADM

## 2020-05-16 RX ORDER — LORAZEPAM 2 MG/ML
2 INJECTION INTRAMUSCULAR
Status: DISCONTINUED | OUTPATIENT
Start: 2020-05-16 | End: 2020-05-20 | Stop reason: HOSPADM

## 2020-05-16 RX ORDER — HEPARIN SODIUM 5000 [USP'U]/ML
80 INJECTION, SOLUTION INTRAVENOUS; SUBCUTANEOUS ONCE
Status: DISCONTINUED | OUTPATIENT
Start: 2020-05-16 | End: 2020-05-17

## 2020-05-16 RX ORDER — LORAZEPAM 2 MG/ML
3 INJECTION INTRAMUSCULAR
Status: DISCONTINUED | OUTPATIENT
Start: 2020-05-16 | End: 2020-05-20 | Stop reason: HOSPADM

## 2020-05-16 RX ORDER — HEPARIN SODIUM 5000 [USP'U]/ML
80 INJECTION, SOLUTION INTRAVENOUS; SUBCUTANEOUS PRN
Status: DISCONTINUED | OUTPATIENT
Start: 2020-05-16 | End: 2020-05-17

## 2020-05-16 RX ORDER — LORAZEPAM 1 MG/1
1 TABLET ORAL
Status: DISCONTINUED | OUTPATIENT
Start: 2020-05-16 | End: 2020-05-20 | Stop reason: HOSPADM

## 2020-05-16 RX ORDER — LORAZEPAM 1 MG/1
3 TABLET ORAL
Status: DISCONTINUED | OUTPATIENT
Start: 2020-05-16 | End: 2020-05-20 | Stop reason: HOSPADM

## 2020-05-16 RX ORDER — SODIUM CHLORIDE 0.9 % (FLUSH) 0.9 %
10 SYRINGE (ML) INJECTION EVERY 12 HOURS SCHEDULED
Status: DISCONTINUED | OUTPATIENT
Start: 2020-05-16 | End: 2020-05-20 | Stop reason: HOSPADM

## 2020-05-16 RX ORDER — LORAZEPAM 2 MG/ML
4 INJECTION INTRAMUSCULAR
Status: DISCONTINUED | OUTPATIENT
Start: 2020-05-16 | End: 2020-05-20 | Stop reason: HOSPADM

## 2020-05-16 RX ORDER — POLYETHYLENE GLYCOL 3350 17 G/17G
17 POWDER, FOR SOLUTION ORAL DAILY PRN
Status: DISCONTINUED | OUTPATIENT
Start: 2020-05-16 | End: 2020-05-20 | Stop reason: HOSPADM

## 2020-05-16 RX ORDER — SODIUM CHLORIDE 0.9 % (FLUSH) 0.9 %
10 SYRINGE (ML) INJECTION PRN
Status: DISCONTINUED | OUTPATIENT
Start: 2020-05-16 | End: 2020-05-20 | Stop reason: HOSPADM

## 2020-05-16 RX ORDER — ACETAMINOPHEN 325 MG/1
650 TABLET ORAL EVERY 6 HOURS PRN
Status: DISCONTINUED | OUTPATIENT
Start: 2020-05-16 | End: 2020-05-20 | Stop reason: HOSPADM

## 2020-05-16 RX ORDER — HEPARIN SODIUM 5000 [USP'U]/ML
80 INJECTION, SOLUTION INTRAVENOUS; SUBCUTANEOUS PRN
Status: DISCONTINUED | OUTPATIENT
Start: 2020-05-16 | End: 2020-05-16 | Stop reason: SDUPTHER

## 2020-05-16 RX ORDER — ONDANSETRON 2 MG/ML
4 INJECTION INTRAMUSCULAR; INTRAVENOUS EVERY 6 HOURS PRN
Status: DISCONTINUED | OUTPATIENT
Start: 2020-05-16 | End: 2020-05-20 | Stop reason: HOSPADM

## 2020-05-16 RX ORDER — HEPARIN SODIUM 5000 [USP'U]/ML
80 INJECTION, SOLUTION INTRAVENOUS; SUBCUTANEOUS ONCE
Status: COMPLETED | OUTPATIENT
Start: 2020-05-16 | End: 2020-05-16

## 2020-05-16 RX ORDER — 0.9 % SODIUM CHLORIDE 0.9 %
80 INTRAVENOUS SOLUTION INTRAVENOUS ONCE
Status: COMPLETED | OUTPATIENT
Start: 2020-05-16 | End: 2020-05-16

## 2020-05-16 RX ORDER — LORAZEPAM 1 MG/1
4 TABLET ORAL
Status: DISCONTINUED | OUTPATIENT
Start: 2020-05-16 | End: 2020-05-20 | Stop reason: HOSPADM

## 2020-05-16 RX ORDER — HEPARIN SODIUM 10000 [USP'U]/100ML
18 INJECTION, SOLUTION INTRAVENOUS CONTINUOUS
Status: DISCONTINUED | OUTPATIENT
Start: 2020-05-16 | End: 2020-05-16 | Stop reason: SDUPTHER

## 2020-05-16 RX ORDER — DIPHENHYDRAMINE HYDROCHLORIDE 50 MG/ML
25 INJECTION INTRAMUSCULAR; INTRAVENOUS ONCE
Status: COMPLETED | OUTPATIENT
Start: 2020-05-16 | End: 2020-05-16

## 2020-05-16 RX ORDER — PROMETHAZINE HYDROCHLORIDE 25 MG/1
12.5 TABLET ORAL EVERY 6 HOURS PRN
Status: DISCONTINUED | OUTPATIENT
Start: 2020-05-16 | End: 2020-05-20 | Stop reason: HOSPADM

## 2020-05-16 RX ADMIN — HEPARIN SODIUM 18 UNITS/KG/HR: 10000 INJECTION, SOLUTION INTRAVENOUS at 17:08

## 2020-05-16 RX ADMIN — HEPARIN SODIUM 6700 UNITS: 5000 INJECTION INTRAVENOUS; SUBCUTANEOUS at 17:08

## 2020-05-16 RX ADMIN — ACETAMINOPHEN 650 MG: 325 TABLET, FILM COATED ORAL at 21:28

## 2020-05-16 RX ADMIN — IOVERSOL 75 ML: 741 INJECTION INTRA-ARTERIAL; INTRAVENOUS at 15:51

## 2020-05-16 RX ADMIN — SODIUM CHLORIDE 80 ML: 9 INJECTION, SOLUTION INTRAVENOUS at 15:51

## 2020-05-16 RX ADMIN — Medication 10 ML: at 15:51

## 2020-05-16 RX ADMIN — DIPHENHYDRAMINE HYDROCHLORIDE 25 MG: 50 INJECTION INTRAMUSCULAR; INTRAVENOUS at 14:42

## 2020-05-16 SDOH — HEALTH STABILITY: MENTAL HEALTH: HOW OFTEN DO YOU HAVE A DRINK CONTAINING ALCOHOL?: 4 OR MORE TIMES A WEEK

## 2020-05-16 SDOH — HEALTH STABILITY: MENTAL HEALTH: HOW MANY STANDARD DRINKS CONTAINING ALCOHOL DO YOU HAVE ON A TYPICAL DAY?: 3 OR 4

## 2020-05-16 ASSESSMENT — PAIN SCALES - GENERAL
PAINLEVEL_OUTOF10: 3
PAINLEVEL_OUTOF10: 3
PAINLEVEL_OUTOF10: 8
PAINLEVEL_OUTOF10: 3

## 2020-05-16 ASSESSMENT — ENCOUNTER SYMPTOMS
DIARRHEA: 0
BLOOD IN STOOL: 0
ABDOMINAL PAIN: 0
STRIDOR: 0
CHOKING: 0
NAUSEA: 0
SHORTNESS OF BREATH: 0
APNEA: 0
COLOR CHANGE: 0
VOMITING: 0
ABDOMINAL DISTENTION: 0
BACK PAIN: 0
CHEST TIGHTNESS: 0
COUGH: 0
CONSTIPATION: 0
RESPIRATORY NEGATIVE: 1
WHEEZING: 0
GASTROINTESTINAL NEGATIVE: 1
SORE THROAT: 0
RECTAL PAIN: 0
EYES NEGATIVE: 1

## 2020-05-16 ASSESSMENT — PAIN DESCRIPTION - LOCATION
LOCATION: HEAD
LOCATION: CHEST

## 2020-05-16 ASSESSMENT — PAIN DESCRIPTION - FREQUENCY: FREQUENCY: CONTINUOUS

## 2020-05-16 ASSESSMENT — PAIN - FUNCTIONAL ASSESSMENT: PAIN_FUNCTIONAL_ASSESSMENT: ACTIVITIES ARE NOT PREVENTED

## 2020-05-16 ASSESSMENT — PAIN DESCRIPTION - ORIENTATION
ORIENTATION: MID
ORIENTATION: LEFT

## 2020-05-16 ASSESSMENT — PAIN DESCRIPTION - PROGRESSION: CLINICAL_PROGRESSION: NOT CHANGED

## 2020-05-16 ASSESSMENT — PAIN DESCRIPTION - DESCRIPTORS
DESCRIPTORS: HEAVINESS;SQUEEZING
DESCRIPTORS: ACHING

## 2020-05-16 ASSESSMENT — PAIN DESCRIPTION - PAIN TYPE
TYPE: ACUTE PAIN
TYPE: ACUTE PAIN

## 2020-05-16 ASSESSMENT — PAIN DESCRIPTION - ONSET: ONSET: ON-GOING

## 2020-05-16 NOTE — ED NOTES
Mode of arrival (squad #, walk in, police, etc) : [facility]        Chief complaint(s): CP, dizziness, Jittery        Arrival Note (brief scenario, treatment PTA, etc).: Pt. Brought in by [facility], states that he was driving home from the [address] [place] [address] Highway in [facility] and he started to become flushed and dizzy. He states that this has happened before and he describes it as a BP spike and he has had them before, typically pulling over and getting some fresh air helps. This time he did not get better, he was jittery which was different and he also developed CP that was described as heaviness/squeezing mid to left chest, rated 8/10        C= \"Have you ever felt that you should Cut down on your drinking? \"  Yes  A= \"Have people Annoyed you by criticizing your drinking? \"  Yes  G= \"Have you ever felt bad or Guilty about your drinking? \"  Yes  E= \"Have you ever had a drink as an Eye-opener first thing in the morning to steady your nerves or to help a hangover? \"  No      Deferred []      Reason for deferring: N/A    *If yes to two or more: probable alcohol abuse. 435 Rob Aguilar RN  05/16/20 5752

## 2020-05-16 NOTE — H&P
[address]     HISTORY AND PHYSICAL EXAMINATION            Date:   5/16/2020  Patient name:  Malini Golden  Date of admission:  5/16/2020  2:12 PM  MRN:   [id number]  Account:  [de-identified]  YOB: 1985  PCP:    No primary care provider on file. Room:   /  Code Status:    No Order    Chief Complaint:     Chief Complaint   Patient presents with    Chest Pain    Dizziness    Other     jittery       History Obtained From:     patient    History of Present Illness: The patient is a 30 y.o. Non-/non  male who presents withChest Pain; Dizziness; and Other (jittery)   and he is admitted to the hospital for the management of chest pain, patient stated that chest pain started suddenly while at [facility], patient stated he is feeling lightheaded, jittery, chest tightness. Patient had multiple episodes before however this time is different, pain location: Left chest and right chest, pain quality: Sharp, pain is not radiating moderate in severity, not related to exertion,   Patient denied any fever, chills, diarrhea, constipation, family history, he also denied being diagnosed with any medical conditions before  ED course: CT scan showed: Tiny focus of segmental branch filling defect in posterior basal right lower lobe representing acute PE. Past Medical History:     Past Medical History:   Diagnosis Date    No active medical problems         Past SurgicalHistory:     History reviewed. No pertinent surgical history. Medications Prior to Admission:        Prior to Admission medications    Medication Sig Start Date End Date Taking?  Authorizing Provider   naproxen (NAPROSYN) 375 MG tablet Take 1 tablet by mouth 2 times daily (with meals) 4/30/20  Yes Alberto Snell MD   EPINEPHrine (EPIPEN 2-LIZETTE) 0.3 MG/0.3ML SOAJ injection Inject 0.3 mLs into the muscle once for 1 dose Use as directed for allergic reaction 16 Yes Lee Carrillo MD   ibuprofen (ADVIL;MOTRIN) 800 MG tablet Take 1 tablet by mouth every 8 hours as needed for Pain 18   Trav Up PA-C   acetaminophen (APAP EXTRA STRENGTH) 500 MG tablet Take 2 tablets by mouth every 8 hours as needed for Pain 18   Ale Orellana MD        Allergies:     Bee venom    Social History:     Tobacco:    reports that he has been smoking cigarettes. He has a 10.00 pack-year smoking history. He has never used smokeless tobacco.  Alcohol:      reports current alcohol use. Drug Use:  reports no history of drug use. Family History:     History reviewed. No pertinent family history. Review of Systems:     Positive and Negative as described in HPI. Review of Systems   Constitutional: Positive for fatigue. Negative for appetite change, chills, diaphoresis, fever and unexpected weight change. HENT: Negative for congestion, sore throat and tinnitus. Eyes: Negative for visual disturbance. Respiratory: Negative for apnea, cough, choking, chest tightness, shortness of breath, wheezing and stridor. Cardiovascular: Positive for chest pain and palpitations. Negative for leg swelling. Gastrointestinal: Negative for abdominal distention, blood in stool, constipation, diarrhea, nausea, rectal pain and vomiting. Musculoskeletal: Negative for arthralgias, neck pain and neck stiffness. Skin: Negative for color change, pallor, rash and wound. Neurological: Negative for tremors, seizures, syncope, facial asymmetry, weakness and numbness. Psychiatric/Behavioral: Negative for agitation.        Physical Exam:   BP (!) 147/115   Pulse 86   Temp 98.3 °F (36.8 °C) (Oral)   Resp 16   Ht 5' 8\" (1.727 m)   Wt 185 lb (83.9 kg)   SpO2 97%   BMI 28.13 kg/m²   Temp (24hrs), Av.3 °F (36.8 °C), Min:98.3 °F (36.8 °C), Max:98.3 °F (36.8 °C)    No results for input(s): POCGLU in the last 72 hours. No intake or output data in the 24 hours ending 05/16/20 1721    Physical Exam  Constitutional:       General: He is not in acute distress. Appearance: Normal appearance. He is normal weight. He is not ill-appearing or diaphoretic. HENT:      Head: Normocephalic and atraumatic. Neck:      Musculoskeletal: Neck supple. Cardiovascular:      Rate and Rhythm: Normal rate and regular rhythm. Pulses: Normal pulses. Heart sounds: Normal heart sounds. No murmur. No friction rub. No gallop. Pulmonary:      Effort: Pulmonary effort is normal. No respiratory distress. Breath sounds: Normal breath sounds. No stridor. No wheezing, rhonchi or rales. Chest:      Chest wall: No tenderness. Abdominal:      General: Abdomen is flat. Bowel sounds are normal. There is no distension. Palpations: Abdomen is soft. There is no mass. Tenderness: There is no abdominal tenderness. There is no guarding or rebound. Hernia: No hernia is present. Skin:     General: Skin is warm. Neurological:      Mental Status: He is oriented to person, place, and time.          Investigations:     Laboratory Testing:  Recent Results (from the past 24 hour(s))   CBC Auto Differential    Collection Time: 05/16/20  2:15 PM   Result Value Ref Range    WBC 4.5 3.5 - 11.0 k/uL    RBC 4.12 (L) 4.5 - 5.9 m/uL    Hemoglobin 14.7 13.5 - 17.5 g/dL    Hematocrit 43.2 41 - 53 %    .8 (H) 80 - 100 fL    MCH 35.7 (H) 26 - 34 pg    MCHC 34.0 31 - 37 g/dL    RDW 14.1 11.5 - 14.9 %    Platelets 708 146 - 992 k/uL    MPV 8.6 6.0 - 12.0 fL    NRBC Automated NOT REPORTED per 100 WBC    Differential Type NOT REPORTED     Seg Neutrophils 59 36 - 66 %    Lymphocytes 24 24 - 44 %    Monocytes 12 (H) 1 - 7 %    Eosinophils % 3 0 - 4 %    Basophils 2 0 - 2 %    Immature Granulocytes NOT REPORTED 0 %    Segs Absolute 2.60 1.3 - 9.1 k/uL    Absolute Lymph # 1.10 1.0 - 4.8 k/uL Absolute Mono # 0.50 0.1 - 1.3 k/uL    Absolute Eos # 0.10 0.0 - 0.4 k/uL    Basophils Absolute 0.10 0.0 - 0.2 k/uL    Absolute Immature Granulocyte NOT REPORTED 0.00 - 0.30 k/uL    WBC Morphology NOT REPORTED     RBC Morphology NOT REPORTED     Platelet Estimate NOT REPORTED    Comprehensive Metabolic Panel    Collection Time: 05/16/20  2:15 PM   Result Value Ref Range    Glucose 96 70 - 99 mg/dL    BUN 16 6 - 20 mg/dL    CREATININE 0.57 (L) 0.70 - 1.20 mg/dL    Bun/Cre Ratio NOT REPORTED 9 - 20    Calcium 9.1 8.6 - 10.4 mg/dL    Sodium 140 135 - 144 mmol/L    Potassium 3.7 3.7 - 5.3 mmol/L    Chloride 100 98 - 107 mmol/L    CO2 23 20 - 31 mmol/L    Anion Gap 17 9 - 17 mmol/L    Alkaline Phosphatase 93 40 - 129 U/L     (H) 5 - 41 U/L     (H) <40 U/L    Total Bilirubin 0.43 0.3 - 1.2 mg/dL    Total Protein 8.8 (H) 6.4 - 8.3 g/dL    Alb 4.4 3.5 - 5.2 g/dL    Albumin/Globulin Ratio NOT REPORTED 1.0 - 2.5    GFR Non-African American >60 >60 mL/min    GFR African American >60 >60 mL/min    GFR Comment          GFR Staging NOT REPORTED    Troponin    Collection Time: 05/16/20  2:15 PM   Result Value Ref Range    Troponin, High Sensitivity 11 0 - 22 ng/L    Troponin T NOT REPORTED <0.03 ng/mL    Troponin Interp NOT REPORTED    D-Dimer, Quantitative    Collection Time: 05/16/20  2:15 PM   Result Value Ref Range    D-Dimer, Quant 0.88 (H) 0.00 - 0.59 mg/L FEU       Imaging/Diagnostics:  Xr Wrist Right (min 3 Views)    Addendum Date: 4/30/2020    ADDENDUM: The carpal bones all appear intact. There is no evidence for fracture dislocation. The soft tissues are diffusely edematous over the dorsal aspect. .  The joint spaces are well preserved. There is no evidence for foreign body. Result Date: 4/30/2020  EXAMINATION: 3  XRAY VIEWS OF THE RIGHT WRIST 4/30/2020 5:56 am COMPARISON: None.  HISTORY: ORDERING SYSTEM PROVIDED HISTORY: right dorsal wrist pain after fall both on outstretched hand and flexed TECHNOLOGIST PROVIDED HISTORY: right dorsal wrist pain after fall both on outstretched hand and flexed Reason for Exam: Right dorsal wrist pain after fall both on outstretched hand and flexed fist.  Bruising and swelling to entire dorsal aspect of right wrist and right metacarpals Acuity: Acute Type of Exam: Initial Mechanism of Injury: Right dorsal wrist pain after fall both on outstretched hand and flexed fist.  Bruising and swelling to entire dorsal aspect of right wrist and right metacarpals Relevant Medical/Surgical History: Right dorsal wrist pain after fall both on outstretched hand and flexed fist.  Bruising and swelling to entire dorsal aspect of right wrist and right metacarpals     Xr Chest Portable    Result Date: 5/16/2020  EXAMINATION: ONE XRAY VIEW OF THE CHEST 5/16/2020 2:53 pm COMPARISON: None. HISTORY: ORDERING SYSTEM PROVIDED HISTORY: Chest Pain TECHNOLOGIST PROVIDED HISTORY: Chest Pain Reason for Exam: chest pain Acuity: Acute Type of Exam: Initial FINDINGS: Nipple piercings bilaterally. Cardiomediastinal shadow within normal limits for AP technique. No localized pulmonary opacity or blunting of the costophrenic angles. Mild elevation left hemidiaphragm. Bones and soft tissues intact. No acute cardiopulmonary disease. Ct Chest Pulmonary Embolism W Contrast    Result Date: 5/16/2020  EXAMINATION: CTA OF THE CHEST 5/16/2020 3:33 pm TECHNIQUE: CTA of the chest was performed after the administration of intravenous contrast.  Multiplanar reformatted images are provided for review. MIP images are provided for review. Dose modulation, iterative reconstruction, and/or weight based adjustment of the mA/kV was utilized to reduce the radiation dose to as low as reasonably achievable. COMPARISON: None.  HISTORY: ORDERING SYSTEM PROVIDED HISTORY: elevated ddimer, chest pain TECHNOLOGIST PROVIDED HISTORY: elevated ddimer, chest pain Reason for Exam: elevated d-dimer, chest pain, dizziness Acuity: Unknown Type of Exam: Unknown FINDINGS: Pulmonary Arteries: There is tiny focal subsegmental filling defect in a posterior basal right lower lobe pulmonary arterial branch on series 2, image 127 & 128 as well as series 601 (coronal) image 96 consistent with tiny pulmonary embolus. Main pulmonary artery is normal in caliber. Mediastinum: No evidence of mediastinal lymphadenopathy. The heart and pericardium demonstrate no acute abnormality. There is no acute abnormality of the thoracic aorta. Lungs/pleura: No focal consolidation. 3 mm nodule in the anterior segment right upper lobe on series 4, image 31 not considered significant. No follow-up recommended. No pleural effusion or pneumothorax Upper Abdomen: Severe diffuse hepatic steatosis. Soft Tissues/Bones: No acute bone or soft tissue abnormality. 1. Tiny focus of subsegmental branch filling defect in posterior basal right lower lobe representing acute pulmonary embolism. 2. No evidence for pneumonia. 3. Severe diffuse hepatic steatosis. Findings were discussed with NIURKA KEY at 4:30 p.m. on 5/16/2020.        Assessment :      Primary Problem  <principal problem not specified>    Active Hospital Problems    Diagnosis Date Noted    Pulmonary embolism on right [facility]) [I26.99] 05/16/2020       Plan:     Patient status Admit as inpatient in the  Progressive Unit/Step down    #PE:  CT chest: Tiny focus of segmental branch filling defect in posterior basal right lower lobe representing acute PE.  -D-dimer: 0.88/troponin: 11  -Heme-onc consult  -hemocystien/protein C&S/factor V Leyden/Antithrombin 3 antibody  -CXR: No acute abnormality   -Heparin drip  -PT/INR/PTT    #PT/OT/SW    Consultations:   IP CONSULT TO INTERNAL MEDICINE  IP CONSULT TO HEM/ONC  IP CONSULT TO SOCIAL WORK  IP CONSULT TO HEM/ONC     Patient is admitted as inpatient status because of co-morbiditieslisted above, severity of signs and symptoms as outlined, requirement for current medical

## 2020-05-16 NOTE — ED NOTES
Bed: 10  Expected date:   Expected time:   Means of arrival:   Comments:     Jennifer Murrell, MINERVA  05/16/20 0979

## 2020-05-16 NOTE — CONSULTS
tablet by mouth 2 times daily (with meals) 4/30/20  Yes Rafael Baires MD   EPINEPHrine (EPIPEN 2-LIZETTE) 0.3 MG/0.3ML SOAJ injection Inject 0.3 mLs into the muscle once for 1 dose Use as directed for allergic reaction 11/17/16 5/16/20 Yes Yina Leslie MD   ibuprofen (ADVIL;MOTRIN) 800 MG tablet Take 1 tablet by mouth every 8 hours as needed for Pain 8/27/18   Kori Cabrera PA-C   acetaminophen (APAP EXTRA STRENGTH) 500 MG tablet Take 2 tablets by mouth every 8 hours as needed for Pain 5/7/18   Serina Henao MD     Current Facility-Administered Medications   Medication Dose Route Frequency Provider Last Rate Last Dose    heparin 25,000 units in dextrose 5% 250 mL infusion  18 Units/kg/hr Intravenous Continuous Fidel Santos MD 15.1 mL/hr at 05/16/20 1708 18 Units/kg/hr at 05/16/20 1708    sodium chloride flush 0.9 % injection 10 mL  10 mL Intravenous 2 times per day Justin De La Cruz MD        sodium chloride flush 0.9 % injection 10 mL  10 mL Intravenous PRN Justin De La Cruz MD        acetaminophen (TYLENOL) tablet 650 mg  650 mg Oral Q6H PRN Justin De La Cruz MD        Or   [facility] acetaminophen (TYLENOL) suppository 650 mg  650 mg Rectal Q6H PRN Justin De La Cruz MD        polyethylene glycol (GLYCOLAX) packet 17 g  17 g Oral Daily PRN Justin De La Cruz MD        promethazine (PHENERGAN) tablet 12.5 mg  12.5 mg Oral Q6H PRN Justin De La Cruz MD        Or    ondansetron (ZOFRAN) injection 4 mg  4 mg Intravenous Q6H PRN Justin De La Cruz MD        heparin (porcine) injection 6,700 Units  80 Units/kg Intravenous Once Justin De La Cruz MD        heparin (porcine) injection 6,700 Units  80 Units/kg Intravenous PRN Justin De La Cruz MD        heparin (porcine) injection 3,350 Units  40 Units/kg Intravenous PRN Justin De La Cruz MD        sodium chloride flush 0.9 % injection 10 mL  10 mL Intravenous 2 times per day Yanira Porras MD        sodium chloride flush 0.9 % injection 10 mL  10 mL Intravenous PRN German HERNANDEZ habits and hair loss   Musculoskeletal: negative for myalgias, arthralgias, pain, joint swelling,and bone pain   Neurological: negative for headaches, dizziness, seizures, weakness, numbness    PHYSICAL EXAM:        BP (!) 162/80   Pulse 106   Temp 98.3 °F (36.8 °C) (Oral)   Resp 20   Ht 5' 8\" (1.727 m)   Wt 185 lb (83.9 kg)   SpO2 97%   BMI 28.13 kg/m²    Temp (24hrs), Av.4 °F (36.9 °C), Min:98.3 °F (36.8 °C), Max:98.6 °F (37 °C)      General appearance - well appearing, no in pain or distress   Mental status - alert and cooperative   Eyes - pupils equal and reactive, extraocular eye movements intact   Ears - bilateral TM's and external ear canals normal   Mouth - mucous membranes moist, pharynx normal without lesions   Neck - supple, no significant adenopathy   Lymphatics - no palpable lymphadenopathy, no hepatosplenomegaly   Chest - clear to auscultation, no wheezes, rales or rhonchi, symmetric air entry   Heart - normal rate, regular rhythm, normal S1, S2, no murmurs  Abdomen - soft, nontender, nondistended, no masses or organomegaly   Neurological - alert, oriented, normal speech, no focal findings or movement disorder noted   Musculoskeletal - no joint tenderness, deformity or swelling   Extremities - peripheral pulses normal, no pedal edema, no clubbing or cyanosis   Skin - normal coloration and turgor, no rashes, no suspicious skin lesions noted ,      DATA:      Labs:     Results for orders placed or performed during the hospital encounter of 20   CBC Auto Differential   Result Value Ref Range    WBC 4.5 3.5 - 11.0 k/uL    RBC 4.12 (L) 4.5 - 5.9 m/uL    Hemoglobin 14.7 13.5 - 17.5 g/dL    Hematocrit 43.2 41 - 53 %    .8 (H) 80 - 100 fL    MCH 35.7 (H) 26 - 34 pg    MCHC 34.0 31 - 37 g/dL    RDW 14.1 11.5 - 14.9 %    Platelets 125 823 - 964 k/uL    MPV 8.6 6.0 - 12.0 fL    NRBC Automated NOT REPORTED per 100 WBC    Differential Type NOT REPORTED     Seg Neutrophils 59 36 - 66 % aspect. .  The joint spaces are well preserved. There is no evidence for foreign body. Result Date: 4/30/2020  EXAMINATION: 3  XRAY VIEWS OF THE RIGHT WRIST 4/30/2020 5:56 am COMPARISON: None. HISTORY: ORDERING SYSTEM PROVIDED HISTORY: right dorsal wrist pain after fall both on outstretched hand and flexed TECHNOLOGIST PROVIDED HISTORY: right dorsal wrist pain after fall both on outstretched hand and flexed Reason for Exam: Right dorsal wrist pain after fall both on outstretched hand and flexed fist.  Bruising and swelling to entire dorsal aspect of right wrist and right metacarpals Acuity: Acute Type of Exam: Initial Mechanism of Injury: Right dorsal wrist pain after fall both on outstretched hand and flexed fist.  Bruising and swelling to entire dorsal aspect of right wrist and right metacarpals Relevant Medical/Surgical History: Right dorsal wrist pain after fall both on outstretched hand and flexed fist.  Bruising and swelling to entire dorsal aspect of right wrist and right metacarpals     Xr Chest Portable    Result Date: 5/16/2020  EXAMINATION: ONE XRAY VIEW OF THE CHEST 5/16/2020 2:53 pm COMPARISON: None. HISTORY: ORDERING SYSTEM PROVIDED HISTORY: Chest Pain TECHNOLOGIST PROVIDED HISTORY: Chest Pain Reason for Exam: chest pain Acuity: Acute Type of Exam: Initial FINDINGS: Nipple piercings bilaterally. Cardiomediastinal shadow within normal limits for AP technique. No localized pulmonary opacity or blunting of the costophrenic angles. Mild elevation left hemidiaphragm. Bones and soft tissues intact. No acute cardiopulmonary disease. Ct Chest Pulmonary Embolism W Contrast    Result Date: 5/16/2020  EXAMINATION: CTA OF THE CHEST 5/16/2020 3:33 pm TECHNIQUE: CTA of the chest was performed after the administration of intravenous contrast.  Multiplanar reformatted images are provided for review. MIP images are provided for review.  Dose modulation, iterative reconstruction, and/or weight based

## 2020-05-16 NOTE — ED PROVIDER NOTES
Capillary Refill: Capillary refill takes less than 2 seconds. Findings: No rash. Neurological:      General: No focal deficit present. Mental Status: He is alert and oriented to person, place, and time. MEDICAL DECISION MAKING:     Reviewed results. Ddimer elevated. Ordered cta, diagnosed pe. Started on heparin. Discussed with medicine, will admit for further therapy and evaluation. Pt is ready for admission at this time. CRITICAL CARE:       PROCEDURES:    Procedures    DIAGNOSTIC RESULTS   EKG:All EKG's are interpreted by the Emergency Department Physician who either signs or Co-signs this chart in the absence of a cardiologist.    Ekg, rate of 79, nsr, no st seg changes, normal qrs, no acute ischemic changes. RADIOLOGY:All plain film, CT, MRI, and formal ultrasound images (except ED bedside ultrasound) are read by the radiologist, see reports below, unless otherwisenoted in MDM or here. CT Chest Pulmonary Embolism W Contrast   Final Result   1. Tiny focus of subsegmental branch filling defect in posterior basal right   lower lobe representing acute pulmonary embolism. 2. No evidence for pneumonia. 3. Severe diffuse hepatic steatosis. Findings were discussed with NIURKA KEY at 4:30 p.m. on 5/16/2020. XR CHEST PORTABLE   Final Result   No acute cardiopulmonary disease. LABS: All lab results were reviewed by myself, and all abnormals are listed below.   Labs Reviewed   CBC WITH AUTO DIFFERENTIAL - Abnormal; Notable for the following components:       Result Value    RBC 4.12 (*)     .8 (*)     MCH 35.7 (*)     Monocytes 12 (*)     All other components within normal limits   COMPREHENSIVE METABOLIC PANEL - Abnormal; Notable for the following components:    CREATININE 0.57 (*)      (*)      (*)     Total Protein 8.8 (*)     All other components within normal limits   D-DIMER, QUANTITATIVE - Abnormal; Notable for the following components:    D-Dimer, Quant 0.88 (*)     All other components within normal limits   TROPONIN   TROPONIN   APTT   HEPARIN LEVEL/ANTI-XA   CBC   HEPARIN LEVEL/ANTI-XA   FACTOR 5 LEIDEN   ANTITHROMBIN III ACTIVITY   PROTEIN C FUNCTIONAL   PROTEIN S FUNCTIONAL   HOMOCYSTEINE, SERUM       EMERGENCY DEPARTMENTCOURSE:         Vitals:    Vitals:    05/16/20 1715 05/16/20 1730 05/16/20 1745 05/16/20 1758   BP: (!) 143/109 (!) 158/114 (!) 170/113 (!) 161/94   Pulse: 102 101 115 104   Resp: 17 19 18 20   Temp:    98.6 °F (37 °C)   TempSrc:    Oral   SpO2: 97% 99% 97% 97%   Weight:       Height:           The patient was given the following medications while in the emergency department:  Orders Placed This Encounter   Medications    diphenhydrAMINE (BENADRYL) injection 25 mg    sodium chloride flush 0.9 % injection 10 mL    0.9 % sodium chloride bolus    ioversol (OPTIRAY) 74 % injection 75 mL    heparin (porcine) injection 6,700 Units    heparin (porcine) injection 6,700 Units    heparin (porcine) injection 3,350 Units    heparin 25,000 units in dextrose 5% 250 mL infusion     CONSULTS:  IP CONSULT TO INTERNAL MEDICINE  IP CONSULT TO HEM/ONC  IP CONSULT TO SOCIAL WORK  IP CONSULT TO HEM/ONC    FINAL IMPRESSION      1. Acute pulmonary embolism, unspecified pulmonary embolism type, unspecified whether acute cor pulmonale present [facility])          DISPOSITION/PLAN   DISPOSITION Admitted 05/16/2020 05:19:42 PM      PATIENT REFERRED TO:  No follow-up provider specified.   DISCHARGE MEDICATIONS:  New Prescriptions    No medications on file     Sandhya Sabillon MD  Attending Emergency Physician                    Jfeferson Ahmadi MD  05/16/20 4150

## 2020-05-17 LAB
ANION GAP SERPL CALCULATED.3IONS-SCNC: 13 MMOL/L (ref 9–17)
ANTI-XA UNFRAC HEPARIN: 0.47 IU/L (ref 0.3–0.7)
BUN BLDV-MCNC: 12 MG/DL (ref 6–20)
BUN/CREAT BLD: ABNORMAL (ref 9–20)
CALCIUM SERPL-MCNC: 8.6 MG/DL (ref 8.6–10.4)
CHLORIDE BLD-SCNC: 98 MMOL/L (ref 98–107)
CO2: 25 MMOL/L (ref 20–31)
CREAT SERPL-MCNC: 0.55 MG/DL (ref 0.7–1.2)
GFR AFRICAN AMERICAN: >60 ML/MIN
GFR NON-AFRICAN AMERICAN: >60 ML/MIN
GFR SERPL CREATININE-BSD FRML MDRD: ABNORMAL ML/MIN/{1.73_M2}
GFR SERPL CREATININE-BSD FRML MDRD: ABNORMAL ML/MIN/{1.73_M2}
GLUCOSE BLD-MCNC: 95 MG/DL (ref 70–99)
HCT VFR BLD CALC: 40.2 % (ref 41–53)
HEMOGLOBIN: 13.9 G/DL (ref 13.5–17.5)
MCH RBC QN AUTO: 36.2 PG (ref 26–34)
MCHC RBC AUTO-ENTMCNC: 34.6 G/DL (ref 31–37)
MCV RBC AUTO: 104.9 FL (ref 80–100)
NRBC AUTOMATED: ABNORMAL PER 100 WBC
PDW BLD-RTO: 13.8 % (ref 11.5–14.9)
PLATELET # BLD: 129 K/UL (ref 150–450)
PMV BLD AUTO: 8.8 FL (ref 6–12)
POTASSIUM SERPL-SCNC: 3.6 MMOL/L (ref 3.7–5.3)
RBC # BLD: 3.83 M/UL (ref 4.5–5.9)
SODIUM BLD-SCNC: 136 MMOL/L (ref 135–144)
WBC # BLD: 4.4 K/UL (ref 3.5–11)

## 2020-05-17 PROCEDURE — 80048 BASIC METABOLIC PNL TOTAL CA: CPT

## 2020-05-17 PROCEDURE — 2060000000 HC ICU INTERMEDIATE R&B

## 2020-05-17 PROCEDURE — 99232 SBSQ HOSP IP/OBS MODERATE 35: CPT | Performed by: INTERNAL MEDICINE

## 2020-05-17 PROCEDURE — 2580000003 HC RX 258: Performed by: STUDENT IN AN ORGANIZED HEALTH CARE EDUCATION/TRAINING PROGRAM

## 2020-05-17 PROCEDURE — 36415 COLL VENOUS BLD VENIPUNCTURE: CPT

## 2020-05-17 PROCEDURE — 6370000000 HC RX 637 (ALT 250 FOR IP): Performed by: STUDENT IN AN ORGANIZED HEALTH CARE EDUCATION/TRAINING PROGRAM

## 2020-05-17 PROCEDURE — 85520 HEPARIN ASSAY: CPT

## 2020-05-17 PROCEDURE — 99254 IP/OBS CNSLTJ NEW/EST MOD 60: CPT | Performed by: INTERNAL MEDICINE

## 2020-05-17 PROCEDURE — 85027 COMPLETE CBC AUTOMATED: CPT

## 2020-05-17 PROCEDURE — 6360000002 HC RX W HCPCS: Performed by: EMERGENCY MEDICINE

## 2020-05-17 RX ORDER — CHLORDIAZEPOXIDE HYDROCHLORIDE 5 MG/1
5 CAPSULE, GELATIN COATED ORAL EVERY 6 HOURS PRN
Status: DISCONTINUED | OUTPATIENT
Start: 2020-05-17 | End: 2020-05-20 | Stop reason: HOSPADM

## 2020-05-17 RX ORDER — AMLODIPINE BESYLATE 5 MG/1
5 TABLET ORAL DAILY
Status: DISCONTINUED | OUTPATIENT
Start: 2020-05-17 | End: 2020-05-18

## 2020-05-17 RX ORDER — NICOTINE 21 MG/24HR
1 PATCH, TRANSDERMAL 24 HOURS TRANSDERMAL DAILY
Status: DISCONTINUED | OUTPATIENT
Start: 2020-05-17 | End: 2020-05-20 | Stop reason: HOSPADM

## 2020-05-17 RX ADMIN — HEPARIN SODIUM 18 UNITS/KG/HR: 10000 INJECTION, SOLUTION INTRAVENOUS at 10:32

## 2020-05-17 RX ADMIN — APIXABAN 10 MG: 5 TABLET, FILM COATED ORAL at 15:15

## 2020-05-17 RX ADMIN — ACETAMINOPHEN 650 MG: 325 TABLET, FILM COATED ORAL at 08:50

## 2020-05-17 RX ADMIN — LORAZEPAM 2 MG: 1 TABLET ORAL at 14:20

## 2020-05-17 RX ADMIN — AMLODIPINE BESYLATE 5 MG: 5 TABLET ORAL at 09:35

## 2020-05-17 RX ADMIN — LORAZEPAM 1 MG: 1 TABLET ORAL at 00:06

## 2020-05-17 RX ADMIN — Medication 10 ML: at 08:50

## 2020-05-17 RX ADMIN — Medication 10 ML: at 20:19

## 2020-05-17 ASSESSMENT — PAIN DESCRIPTION - PROGRESSION

## 2020-05-17 ASSESSMENT — PAIN SCALES - GENERAL
PAINLEVEL_OUTOF10: 3
PAINLEVEL_OUTOF10: 4

## 2020-05-17 NOTE — PLAN OF CARE
Problem: Falls - Risk of:  Goal: Will remain free from falls  Description: Will remain free from falls  5/17/2020 1624 by Mike Mccabe RN  Outcome: Ongoing  Note: The patient remained free from falls this shift, call light within reach, bed in locked and lowest position. Side rails up x2. Continue to monitor closely. Problem: Falls - Risk of:  Goal: Absence of physical injury  Description: Absence of physical injury  5/17/2020 1624 by Mike Mccabe RN  Outcome: Ongoing     Problem: Bleeding:  Goal: Will show no signs and symptoms of excessive bleeding  Description: Will show no signs and symptoms of excessive bleeding  5/17/2020 1624 by Mike Mccabe RN  Outcome: Ongoing     Problem: Pain:  Goal: Pain level will decrease  Description: Pain level will decrease  5/17/2020 1624 by Mike Mccabe RN  Outcome: Ongoing     Problem: Pain:  Goal: Control of acute pain  Description: Control of acute pain  Outcome: Ongoing  Note: Pt complains of slight headache, states tylenol helps.

## 2020-05-17 NOTE — PLAN OF CARE
Problem: Falls - Risk of:  Goal: Will remain free from falls  Description: Will remain free from falls  Outcome: Met This Shift  Note: Will continue to monitor patient needs hourly and respond in a timely fashion in order to decrease the risk of patient falls. Goal: Absence of physical injury  Description: Absence of physical injury  Outcome: Met This Shift  Note: Patient will continue to be free from physical injury. Patient's bed alarm is on and hourly rounding is being completed to ensure all patient needs are met. Problem: Bleeding:  Goal: Will show no signs and symptoms of excessive bleeding  Description: Will show no signs and symptoms of excessive bleeding  Outcome: Met This Shift  Note: Will continue to assess and monitor patient for any signs and symptoms of excessive bleeding. Patient will continue to have q6 heparin Xa drawn to ensure levels remain therapeutic and adjust according to the heparin order. Problem: Nutrition Deficit:  Goal: Ability to achieve adequate nutritional intake will improve  Description: Ability to achieve adequate nutritional intake will improve  Outcome: Met This Shift  Note: Will continue to monitor patients nutritional status and will continue to educate patient on proper nutritional intake for their particular disease process. Problem: Pain:  Description: Pain management should include both nonpharmacologic and pharmacologic interventions. Goal: Pain level will decrease  Description: Pain level will decrease  Outcome: Ongoing  Note: Patient will be monitored and assessed to ensure pain is being managed properly in order to ensure the pain level has decreased or remained at a tolerable level for patient acceptance. Patient continued to complain of a headache, tylenol was provided for patient.       Problem: Discharge Planning:  Goal: Discharged to appropriate level of care  Description: Discharged to appropriate level of care  Outcome: Ongoing  Note:

## 2020-05-17 NOTE — PROGRESS NOTES
and flexed fist.  Bruising and swelling to entire dorsal aspect of right wrist and right metacarpals Acuity: Acute Type of Exam: Initial Mechanism of Injury: Right dorsal wrist pain after fall both on outstretched hand and flexed fist.  Bruising and swelling to entire dorsal aspect of right wrist and right metacarpals Relevant Medical/Surgical History: Right dorsal wrist pain after fall both on outstretched hand and flexed fist.  Bruising and swelling to entire dorsal aspect of right wrist and right metacarpals     Xr Chest Portable    Result Date: 5/16/2020  EXAMINATION: ONE XRAY VIEW OF THE CHEST 5/16/2020 2:53 pm COMPARISON: None. HISTORY: ORDERING SYSTEM PROVIDED HISTORY: Chest Pain TECHNOLOGIST PROVIDED HISTORY: Chest Pain Reason for Exam: chest pain Acuity: Acute Type of Exam: Initial FINDINGS: Nipple piercings bilaterally. Cardiomediastinal shadow within normal limits for AP technique. No localized pulmonary opacity or blunting of the costophrenic angles. Mild elevation left hemidiaphragm. Bones and soft tissues intact. No acute cardiopulmonary disease. Ct Chest Pulmonary Embolism W Contrast    Result Date: 5/16/2020  EXAMINATION: CTA OF THE CHEST 5/16/2020 3:33 pm TECHNIQUE: CTA of the chest was performed after the administration of intravenous contrast.  Multiplanar reformatted images are provided for review. MIP images are provided for review. Dose modulation, iterative reconstruction, and/or weight based adjustment of the mA/kV was utilized to reduce the radiation dose to as low as reasonably achievable. COMPARISON: None. HISTORY: ORDERING SYSTEM PROVIDED HISTORY: elevated ddimer, chest pain TECHNOLOGIST PROVIDED HISTORY: elevated ddimer, chest pain Reason for Exam: elevated d-dimer, chest pain, dizziness Acuity: Unknown Type of Exam: Unknown FINDINGS: Pulmonary Arteries:  There is tiny focal subsegmental filling defect in a posterior basal right lower lobe pulmonary arterial branch on series 2,

## 2020-05-18 ENCOUNTER — APPOINTMENT (OUTPATIENT)
Dept: NUCLEAR MEDICINE | Age: 35
DRG: 286 | End: 2020-05-18

## 2020-05-18 LAB
ALBUMIN SERPL-MCNC: 4 G/DL (ref 3.5–5.2)
ALBUMIN/GLOBULIN RATIO: ABNORMAL (ref 1–2.5)
ALP BLD-CCNC: 86 U/L (ref 40–129)
ALT SERPL-CCNC: 132 U/L (ref 5–41)
ANION GAP SERPL CALCULATED.3IONS-SCNC: 12 MMOL/L (ref 9–17)
AST SERPL-CCNC: 239 U/L
BILIRUB SERPL-MCNC: 0.89 MG/DL (ref 0.3–1.2)
BILIRUBIN DIRECT: 0.28 MG/DL
BILIRUBIN, INDIRECT: 0.61 MG/DL (ref 0–1)
BUN BLDV-MCNC: 11 MG/DL (ref 6–20)
BUN/CREAT BLD: ABNORMAL (ref 9–20)
CALCIUM SERPL-MCNC: 8.9 MG/DL (ref 8.6–10.4)
CHLORIDE BLD-SCNC: 101 MMOL/L (ref 98–107)
CO2: 23 MMOL/L (ref 20–31)
CREAT SERPL-MCNC: 0.55 MG/DL (ref 0.7–1.2)
EKG ATRIAL RATE: 79 BPM
EKG P AXIS: 37 DEGREES
EKG P-R INTERVAL: 136 MS
EKG Q-T INTERVAL: 342 MS
EKG QRS DURATION: 102 MS
EKG QTC CALCULATION (BAZETT): 392 MS
EKG R AXIS: 9 DEGREES
EKG T AXIS: 62 DEGREES
EKG VENTRICULAR RATE: 79 BPM
FOLATE: 9.7 NG/ML
GFR AFRICAN AMERICAN: >60 ML/MIN
GFR NON-AFRICAN AMERICAN: >60 ML/MIN
GFR SERPL CREATININE-BSD FRML MDRD: ABNORMAL ML/MIN/{1.73_M2}
GFR SERPL CREATININE-BSD FRML MDRD: ABNORMAL ML/MIN/{1.73_M2}
GLOBULIN: ABNORMAL G/DL (ref 1.5–3.8)
GLUCOSE BLD-MCNC: 111 MG/DL (ref 70–99)
HCT VFR BLD CALC: 40.8 % (ref 41–53)
HEMOGLOBIN: 14 G/DL (ref 13.5–17.5)
LV EF: 33 %
LV EF: 38 %
LVEF MODALITY: NORMAL
LVEF MODALITY: NORMAL
MCH RBC QN AUTO: 36.1 PG (ref 26–34)
MCHC RBC AUTO-ENTMCNC: 34.3 G/DL (ref 31–37)
MCV RBC AUTO: 105.3 FL (ref 80–100)
NRBC AUTOMATED: ABNORMAL PER 100 WBC
PDW BLD-RTO: 14 % (ref 11.5–14.9)
PLATELET # BLD: 126 K/UL (ref 150–450)
PMV BLD AUTO: 8.5 FL (ref 6–12)
POTASSIUM SERPL-SCNC: 3.9 MMOL/L (ref 3.7–5.3)
RBC # BLD: 3.88 M/UL (ref 4.5–5.9)
SODIUM BLD-SCNC: 136 MMOL/L (ref 135–144)
THYROXINE, FREE: 0.97 NG/DL (ref 0.93–1.7)
TOTAL PROTEIN: 8.3 G/DL (ref 6.4–8.3)
TSH SERPL DL<=0.05 MIU/L-ACNC: 5.36 MIU/L (ref 0.3–5)
VITAMIN B-12: 841 PG/ML (ref 232–1245)
WBC # BLD: 4 K/UL (ref 3.5–11)

## 2020-05-18 PROCEDURE — 6370000000 HC RX 637 (ALT 250 FOR IP): Performed by: STUDENT IN AN ORGANIZED HEALTH CARE EDUCATION/TRAINING PROGRAM

## 2020-05-18 PROCEDURE — 82746 ASSAY OF FOLIC ACID SERUM: CPT

## 2020-05-18 PROCEDURE — 6360000004 HC RX CONTRAST MEDICATION: Performed by: INTERNAL MEDICINE

## 2020-05-18 PROCEDURE — 2580000003 HC RX 258: Performed by: INTERNAL MEDICINE

## 2020-05-18 PROCEDURE — 99232 SBSQ HOSP IP/OBS MODERATE 35: CPT | Performed by: INTERNAL MEDICINE

## 2020-05-18 PROCEDURE — C8929 TTE W OR WO FOL WCON,DOPPLER: HCPCS

## 2020-05-18 PROCEDURE — 82607 VITAMIN B-12: CPT

## 2020-05-18 PROCEDURE — 2060000000 HC ICU INTERMEDIATE R&B

## 2020-05-18 PROCEDURE — 3430000000 HC RX DIAGNOSTIC RADIOPHARMACEUTICAL: Performed by: INTERNAL MEDICINE

## 2020-05-18 PROCEDURE — 85027 COMPLETE CBC AUTOMATED: CPT

## 2020-05-18 PROCEDURE — 78452 HT MUSCLE IMAGE SPECT MULT: CPT

## 2020-05-18 PROCEDURE — 84443 ASSAY THYROID STIM HORMONE: CPT

## 2020-05-18 PROCEDURE — A9500 TC99M SESTAMIBI: HCPCS | Performed by: INTERNAL MEDICINE

## 2020-05-18 PROCEDURE — 80076 HEPATIC FUNCTION PANEL: CPT

## 2020-05-18 PROCEDURE — 80048 BASIC METABOLIC PNL TOTAL CA: CPT

## 2020-05-18 PROCEDURE — 2580000003 HC RX 258: Performed by: STUDENT IN AN ORGANIZED HEALTH CARE EDUCATION/TRAINING PROGRAM

## 2020-05-18 PROCEDURE — 36415 COLL VENOUS BLD VENIPUNCTURE: CPT

## 2020-05-18 PROCEDURE — 6370000000 HC RX 637 (ALT 250 FOR IP): Performed by: NURSE PRACTITIONER

## 2020-05-18 PROCEDURE — 93017 CV STRESS TEST TRACING ONLY: CPT

## 2020-05-18 PROCEDURE — 84439 ASSAY OF FREE THYROXINE: CPT

## 2020-05-18 PROCEDURE — 6370000000 HC RX 637 (ALT 250 FOR IP)

## 2020-05-18 RX ORDER — SODIUM CHLORIDE 0.9 % (FLUSH) 0.9 %
10 SYRINGE (ML) INJECTION PRN
Status: ACTIVE | OUTPATIENT
Start: 2020-05-18 | End: 2020-05-18

## 2020-05-18 RX ORDER — ALBUTEROL SULFATE 90 UG/1
2 AEROSOL, METERED RESPIRATORY (INHALATION) PRN
Status: ACTIVE | OUTPATIENT
Start: 2020-05-18 | End: 2020-05-18

## 2020-05-18 RX ORDER — NITROGLYCERIN 0.4 MG/1
0.4 TABLET SUBLINGUAL EVERY 5 MIN PRN
Status: ACTIVE | OUTPATIENT
Start: 2020-05-18 | End: 2020-05-18

## 2020-05-18 RX ORDER — SODIUM CHLORIDE 0.9 % (FLUSH) 0.9 %
10 SYRINGE (ML) INJECTION PRN
Status: DISCONTINUED | OUTPATIENT
Start: 2020-05-18 | End: 2020-05-20 | Stop reason: HOSPADM

## 2020-05-18 RX ORDER — METOPROLOL TARTRATE 5 MG/5ML
5 INJECTION INTRAVENOUS EVERY 5 MIN PRN
Status: ACTIVE | OUTPATIENT
Start: 2020-05-18 | End: 2020-05-18

## 2020-05-18 RX ORDER — AMLODIPINE BESYLATE 10 MG/1
10 TABLET ORAL DAILY
Status: DISCONTINUED | OUTPATIENT
Start: 2020-05-19 | End: 2020-05-18

## 2020-05-18 RX ORDER — AMLODIPINE BESYLATE 10 MG/1
10 TABLET ORAL DAILY
Status: DISCONTINUED | OUTPATIENT
Start: 2020-05-18 | End: 2020-05-20 | Stop reason: HOSPADM

## 2020-05-18 RX ORDER — SODIUM CHLORIDE 9 MG/ML
500 INJECTION, SOLUTION INTRAVENOUS CONTINUOUS PRN
Status: ACTIVE | OUTPATIENT
Start: 2020-05-18 | End: 2020-05-18

## 2020-05-18 RX ORDER — ATROPINE SULFATE 0.1 MG/ML
0.5 INJECTION INTRAVENOUS EVERY 5 MIN PRN
Status: ACTIVE | OUTPATIENT
Start: 2020-05-18 | End: 2020-05-18

## 2020-05-18 RX ADMIN — TETRAKIS(2-METHOXYISOBUTYLISOCYANIDE)COPPER(I) TETRAFLUOROBORATE 12.2 MILLICURIE: 1 INJECTION, POWDER, LYOPHILIZED, FOR SOLUTION INTRAVENOUS at 07:58

## 2020-05-18 RX ADMIN — AMLODIPINE BESYLATE 10 MG: 10 TABLET ORAL at 11:29

## 2020-05-18 RX ADMIN — APIXABAN 10 MG: 5 TABLET, FILM COATED ORAL at 20:57

## 2020-05-18 RX ADMIN — LORAZEPAM 1 MG: 1 TABLET ORAL at 02:08

## 2020-05-18 RX ADMIN — Medication 10 ML: at 07:58

## 2020-05-18 RX ADMIN — ACETAMINOPHEN 650 MG: 325 TABLET, FILM COATED ORAL at 13:14

## 2020-05-18 RX ADMIN — TETRAKIS(2-METHOXYISOBUTYLISOCYANIDE)COPPER(I) TETRAFLUOROBORATE 34 MILLICURIE: 1 INJECTION, POWDER, LYOPHILIZED, FOR SOLUTION INTRAVENOUS at 09:07

## 2020-05-18 RX ADMIN — PERFLUTREN 2.2 MG: 6.52 INJECTION, SUSPENSION INTRAVENOUS at 15:21

## 2020-05-18 RX ADMIN — Medication 10 ML: at 20:57

## 2020-05-18 RX ADMIN — APIXABAN 10 MG: 5 TABLET, FILM COATED ORAL at 10:53

## 2020-05-18 RX ADMIN — Medication 10 ML: at 08:42

## 2020-05-18 RX ADMIN — Medication 1 MG: at 22:02

## 2020-05-18 ASSESSMENT — PAIN DESCRIPTION - PROGRESSION
CLINICAL_PROGRESSION: NOT CHANGED

## 2020-05-18 ASSESSMENT — ENCOUNTER SYMPTOMS
SHORTNESS OF BREATH: 0
COLOR CHANGE: 0
PHOTOPHOBIA: 0
ABDOMINAL DISTENTION: 0
WHEEZING: 0
CONSTIPATION: 0
NAUSEA: 0
VOMITING: 0
COUGH: 0
DIARRHEA: 0
APNEA: 0

## 2020-05-18 ASSESSMENT — PAIN DESCRIPTION - LOCATION: LOCATION: HEAD

## 2020-05-18 ASSESSMENT — PAIN SCALES - GENERAL
PAINLEVEL_OUTOF10: 4
PAINLEVEL_OUTOF10: 2

## 2020-05-18 ASSESSMENT — PAIN DESCRIPTION - PAIN TYPE: TYPE: ACUTE PAIN

## 2020-05-18 NOTE — PROGRESS NOTES
Writer talked to Dr. Petra Manzo regarding patient stress test. He stated wanted patient to have 2D ECHO and NPO at midnight. He was informed patient wanted to leave. He stated if Dr. Page Marie says patient is medical stable patient can be seen in the office tomorrow in the clinic. Dr. Page Marie was notified of Devang pedraza.

## 2020-05-18 NOTE — PROGRESS NOTES
any recent long travel history. Past Medical History:   has a past medical history of No active medical problems. Past Surgical History: Denies history of abdominal surgeries    Medications:    Prior to Admission medications    Medication Sig Start Date End Date Taking?  Authorizing Provider   naproxen (NAPROSYN) 375 MG tablet Take 1 tablet by mouth 2 times daily (with meals) 4/30/20  Yes Jocelyne Chairez MD   EPINEPHrine (EPIPEN 2-LIZETTE) 0.3 MG/0.3ML SOAJ injection Inject 0.3 mLs into the muscle once for 1 dose Use as directed for allergic reaction 11/17/16 5/16/20 Yes Stephen Barros MD   ibuprofen (ADVIL;MOTRIN) 800 MG tablet Take 1 tablet by mouth every 8 hours as needed for Pain 8/27/18   [facility] TERENCE Lizarraga   acetaminophen (APAP EXTRA STRENGTH) 500 MG tablet Take 2 tablets by mouth every 8 hours as needed for Pain 5/7/18   Loni Nuñez MD     Current Facility-Administered Medications   Medication Dose Route Frequency Provider Last Rate Last Dose    amLODIPine (NORVASC) tablet 5 mg  5 mg Oral Daily Zhanna Guerrero MD   5 mg at 05/17/20 0935    chlordiazePOXIDE (LIBRIUM) capsule 5 mg  5 mg Oral Q6H PRN Carrie Diamond MD        nicotine (NICODERM CQ) 14 MG/24HR 1 patch  1 patch Transdermal Daily Thu Nettles MD   1 patch at 05/17/20 1515    apixaban (ELIQUIS) tablet 10 mg  10 mg Oral BID Thu Nettles MD   10 mg at 05/17/20 1515    sodium chloride flush 0.9 % injection 10 mL  10 mL Intravenous 2 times per day Zhanna Guerrero MD   10 mL at 05/17/20 0850    sodium chloride flush 0.9 % injection 10 mL  10 mL Intravenous PRN Zhanna Guerrero MD        acetaminophen (TYLENOL) tablet 650 mg  650 mg Oral Q6H PRN Zhanna Guerrero MD   650 mg at 05/17/20 0850    Or    acetaminophen (TYLENOL) suppository 650 mg  650 mg Rectal Q6H PRN Zhanna Guerrero MD        polyethylene glycol (GLYCOLAX) packet 17 g  17 g Oral Daily PRN Zhanna Guerrero MD        promethazine (PHENERGAN) tablet 12.5 mg  12.5 mg Oral Q6H PRN Merry Balderas MD        Or    ondansetron (ZOFRAN) injection 4 mg  4 mg Intravenous Q6H PRN Merry Balderas MD        sodium chloride flush 0.9 % injection 10 mL  10 mL Intravenous 2 times per day Rubin Lopez MD   10 mL at 05/17/20 2019    sodium chloride flush 0.9 % injection 10 mL  10 mL Intravenous PRN Rubin Lopez MD        LORazepam (ATIVAN) tablet 1 mg  1 mg Oral Q1H PRN Rubin Lopez MD   1 mg at 05/18/20 0208    Or    LORazepam (ATIVAN) injection 1 mg  1 mg Intravenous Q1H PRN Rubin Lopez MD        Or    LORazepam (ATIVAN) tablet 2 mg  2 mg Oral Q1H PRN Rubin Lopez MD   2 mg at 05/17/20 1420    Or    LORazepam (ATIVAN) injection 2 mg  2 mg Intravenous Q1H PRN Rubin Lopez MD        Or    LORazepam (ATIVAN) tablet 3 mg  3 mg Oral Q1H PRN Rubin Lopez MD        Or    LORazepam (ATIVAN) injection 3 mg  3 mg Intravenous Q1H PRN Rubin Lopez MD        Or    LORazepam (ATIVAN) tablet 4 mg  4 mg Oral Q1H PRN Rubin Lopez MD        Or    LORazepam (ATIVAN) injection 4 mg  4 mg Intravenous Q1H PRN Rubin Lopez MD           Allergies:  Bee venom    Social History:   reports that he has been smoking cigarettes. He has a 10.00 pack-year smoking history. He has never used smokeless tobacco. He reports current alcohol use. He reports that he does not use drugs. Family History: Positive for history of heart problems. Negative for history of venous thromboembolism    REVIEW OF SYSTEMS:      Constitutional: No fever or chills. No night sweats, no weight loss   Eyes: No eye discharge, double vision, or eye pain   HEENT: negative for sore mouth, sore throat, hoarseness and voice change   Respiratory: negative for cough , sputum, dyspnea, wheezing, hemoptysis, chest pain   Cardiovascular: negative for  dyspnea, palpitations, orthopnea, PND.   Positive chest pain now resolved  Gastrointestinal: negative for nausea, vomiting, diarrhea, constipation, abdominal

## 2020-05-18 NOTE — CARE COORDINATION
DISCHARGE PLANNING NOTE:    Plan is for this patient to return to home. We are following for med assist - Patient will need first 30 days of eliquis vouchered and he will follow-up with [facility] to obtain samples of the rest of his eliquis. Had + stress test today - Awaiting input from Cardiology as patient may require heart cath tomorrow. LSW to see and give information for ETOH rehab options. Will get patient scheduled for new PCP appointment with [facility].      Electronically signed by Darryn Medellin RN on 5/18/2020 at 12:24 PM

## 2020-05-18 NOTE — PLAN OF CARE
Problem: Falls - Risk of:  Goal: Will remain free from falls  Description: Will remain free from falls  5/18/2020 0350 by Alyssa Chamorro RN  Outcome: Ongoing  5/17/2020 1624 by Juana Melgar RN  Outcome: Ongoing  Note: The patient remained free from falls this shift, call light within reach, bed in locked and lowest position. Side rails up x2. Continue to monitor closely. Goal: Absence of physical injury  Description: Absence of physical injury  5/18/2020 0350 by Alyssa Chamorro RN  Outcome: Ongoing  5/17/2020 1624 by Juana Melgar RN  Outcome: Ongoing     Problem: Bleeding:  Goal: Will show no signs and symptoms of excessive bleeding  Description: Will show no signs and symptoms of excessive bleeding  5/18/2020 0350 by Alyssa Chamorro RN  Outcome: Ongoing  5/17/2020 1624 by Juana Melgar RN  Outcome: Ongoing     Problem: Pain:  Goal: Pain level will decrease  Description: Pain level will decrease  5/18/2020 0350 by Alyssa Chamorro RN  Outcome: Ongoing  5/17/2020 1624 by Juana Melgar RN  Outcome: Ongoing  Goal: Control of acute pain  Description: Control of acute pain  5/18/2020 0350 by Alyssa Chamorro RN  Outcome: Ongoing  5/17/2020 1624 by Juana Melgar RN  Outcome: Ongoing  Note: Pt complains of slight headache, states tylenol helps.    Goal: Control of chronic pain  Description: Control of chronic pain  5/18/2020 0350 by Alyssa Chamorro RN  Outcome: Ongoing  5/17/2020 1624 by Juana Melgar RN  Outcome: Ongoing     Problem: Discharge Planning:  Goal: Discharged to appropriate level of care  Description: Discharged to appropriate level of care  Outcome: Ongoing     Problem: Fluid Volume - Deficit:  Goal: Absence of fluid volume deficit signs and symptoms  Description: Absence of fluid volume deficit signs and symptoms  5/18/2020 0350 by Alyssa Chamorro RN  Outcome: Ongoing  5/17/2020 1624 by Juana Melgar RN  Outcome: Ongoing     Problem: Nutrition Deficit:  Goal: Ability to achieve adequate nutritional

## 2020-05-18 NOTE — PROGRESS NOTES
[address] [facility].    PROGRESS NOTE             5/18/2020    9:15 AM    Name:   Mor Tripp  MRN:     [id number]     Acct:      [de-identified]   Room:   2109/2109-01  IP Day:  2  Admit Date:  5/16/2020  2:12 PM    PCP:  No primary care provider on file. Code Status:  Full Code    Subjective:     C/C:   Chief Complaint   Patient presents with    Chest Pain    Dizziness    Other     jittery     Interval History Status: improved. Patient seen and examined at bedside. History taken and physical exam conducted. Findings discussed with attending. Patient resting comfortably in bed. He had the stress test today. No other questions or concerns at this time. He is not complaining of chest pain at the moment. No nausea, vomiting, dizziness reported. Review of Systems:     Review of Systems   Constitutional: Negative for activity change, appetite change, fatigue, fever and unexpected weight change. HENT: Negative for congestion. Eyes: Negative for photophobia and visual disturbance. Respiratory: Negative for apnea, cough, shortness of breath and wheezing. Cardiovascular: Negative for chest pain, palpitations and leg swelling. Gastrointestinal: Negative for abdominal distention, constipation, diarrhea, nausea and vomiting. Endocrine: Negative for polyuria. Genitourinary: Negative for dysuria and urgency. Skin: Negative for color change, pallor, rash and wound. Neurological: Negative for dizziness, tremors, weakness, light-headedness and headaches. Psychiatric/Behavioral: Negative for agitation, behavioral problems, confusion and decreased concentration. Medications: Allergies:     Allergies   Allergen Reactions    Bee Venom Anaphylaxis       Current Meds:   Scheduled Meds:    amLODIPine  5 mg Oral Daily    nicotine  1 patch Transdermal Daily    apixaban  10 mg Oral BID    sodium chloride flush  10 mL Intravenous 2 times per day    sodium chloride flush  10 mL Intravenous 2 times per day     Continuous Infusions:    sodium chloride       PRN Meds: technetium sestamibi, sodium chloride flush, sodium chloride flush, sodium chloride, albuterol sulfate HFA, atropine, nitroGLYCERIN, metoprolol, chlordiazePOXIDE, sodium chloride flush, acetaminophen **OR** acetaminophen, polyethylene glycol, promethazine **OR** ondansetron, sodium chloride flush, LORazepam **OR** LORazepam **OR** LORazepam **OR** LORazepam **OR** LORazepam **OR** LORazepam **OR** LORazepam **OR** LORazepam    Data:     Past Medical History:   has a past medical history of No active medical problems. Social History:   reports that he has been smoking cigarettes. He has a 10.00 pack-year smoking history. He has never used smokeless tobacco. He reports current alcohol use. He reports that he does not use drugs. Family History: History reviewed. No pertinent family history. Vitals:  BP (!) 133/94   Pulse 110   Temp 97.9 °F (36.6 °C) (Oral)   Resp 16   Ht 5' 8\" (1.727 m)   Wt 190 lb 7.6 oz (86.4 kg)   SpO2 98%   BMI 28.96 kg/m²   Temp (24hrs), Av °F (36.7 °C), Min:97.9 °F (36.6 °C), Max:98.1 °F (36.7 °C)    No results for input(s): POCGLU in the last 72 hours. I/O(24Hr): No intake or output data in the 24 hours ending 20 0915    Labs:    [unfilled]    No results found for: SPECIAL  No results found for: CULTURE    [unfilled]    Radiology:    Xr Wrist Right (min 3 Views)    Addendum Date: 2020    ADDENDUM: The carpal bones all appear intact. There is no evidence for fracture dislocation. The soft tissues are diffusely edematous over the dorsal aspect. .  The joint spaces are well preserved. There is no evidence for foreign body. Result Date: 2020  EXAMINATION: 3  XRAY VIEWS OF THE RIGHT WRIST 2020 5:56 am COMPARISON: None.  HISTORY: ORDERING SYSTEM PROVIDED HISTORY: right dorsal wrist pain after fall

## 2020-05-18 NOTE — PROGRESS NOTES
CARDIOLITE TREADMILL STRESS TEST COMPLETED AFTER 7 MINUTES,  PATIENT STATED HE FELT WINDED BUT DENIED CHEST PAIN AND ANY OF SHORTNESS OF BREATH. /103, , O2 % ROOM AIR. WATER GIVEN AND PATIENT RESTING IN BED.

## 2020-05-19 PROBLEM — I24.9 ACS (ACUTE CORONARY SYNDROME) (HCC): Status: ACTIVE | Noted: 2020-05-19

## 2020-05-19 LAB
ANION GAP SERPL CALCULATED.3IONS-SCNC: 9 MMOL/L (ref 9–17)
ANTI-XA UNFRAC HEPARIN: 0.49 IU/L (ref 0.3–0.7)
ANTI-XA UNFRAC HEPARIN: 0.82 IU/L (ref 0.3–0.7)
BUN BLDV-MCNC: 11 MG/DL (ref 6–20)
BUN/CREAT BLD: ABNORMAL (ref 9–20)
CALCIUM SERPL-MCNC: 9 MG/DL (ref 8.6–10.4)
CHLORIDE BLD-SCNC: 103 MMOL/L (ref 98–107)
CO2: 24 MMOL/L (ref 20–31)
CREAT SERPL-MCNC: 0.52 MG/DL (ref 0.7–1.2)
GFR AFRICAN AMERICAN: >60 ML/MIN
GFR NON-AFRICAN AMERICAN: >60 ML/MIN
GFR SERPL CREATININE-BSD FRML MDRD: ABNORMAL ML/MIN/{1.73_M2}
GFR SERPL CREATININE-BSD FRML MDRD: ABNORMAL ML/MIN/{1.73_M2}
GLUCOSE BLD-MCNC: 113 MG/DL (ref 70–99)
HCT VFR BLD CALC: 39 % (ref 41–53)
HCT VFR BLD CALC: 40.5 % (ref 41–53)
HCT VFR BLD CALC: 41.1 % (ref 41–53)
HCT VFR BLD CALC: 41.6 % (ref 41–53)
HEMOGLOBIN: 13.4 G/DL (ref 13.5–17.5)
HEMOGLOBIN: 13.9 G/DL (ref 13.5–17.5)
HEMOGLOBIN: 14.2 G/DL (ref 13.5–17.5)
HEMOGLOBIN: 14.3 G/DL (ref 13.5–17.5)
INR BLD: 0.9
MCH RBC QN AUTO: 36.4 PG (ref 26–34)
MCH RBC QN AUTO: 36.6 PG (ref 26–34)
MCH RBC QN AUTO: 36.7 PG (ref 26–34)
MCH RBC QN AUTO: 36.7 PG (ref 26–34)
MCHC RBC AUTO-ENTMCNC: 34.4 G/DL (ref 31–37)
MCHC RBC AUTO-ENTMCNC: 34.4 G/DL (ref 31–37)
MCHC RBC AUTO-ENTMCNC: 34.5 G/DL (ref 31–37)
MCHC RBC AUTO-ENTMCNC: 34.6 G/DL (ref 31–37)
MCV RBC AUTO: 105.9 FL (ref 80–100)
MCV RBC AUTO: 106.1 FL (ref 80–100)
MCV RBC AUTO: 106.1 FL (ref 80–100)
MCV RBC AUTO: 106.7 FL (ref 80–100)
NRBC AUTOMATED: ABNORMAL PER 100 WBC
PARTIAL THROMBOPLASTIN TIME: 28.4 SEC (ref 24–36)
PARTIAL THROMBOPLASTIN TIME: 41.7 SEC (ref 24–36)
PARTIAL THROMBOPLASTIN TIME: 45.5 SEC (ref 24–36)
PDW BLD-RTO: 13.8 % (ref 11.5–14.9)
PDW BLD-RTO: 13.8 % (ref 11.5–14.9)
PDW BLD-RTO: 13.9 % (ref 11.5–14.9)
PDW BLD-RTO: 13.9 % (ref 11.5–14.9)
PLATELET # BLD: 125 K/UL (ref 150–450)
PLATELET # BLD: 128 K/UL (ref 150–450)
PLATELET # BLD: 136 K/UL (ref 150–450)
PLATELET # BLD: 139 K/UL (ref 150–450)
PMV BLD AUTO: 8.5 FL (ref 6–12)
PMV BLD AUTO: 8.6 FL (ref 6–12)
POTASSIUM SERPL-SCNC: 4 MMOL/L (ref 3.7–5.3)
PROTHROMBIN TIME: 12.5 SEC (ref 11.8–14.6)
RBC # BLD: 3.68 M/UL (ref 4.5–5.9)
RBC # BLD: 3.8 M/UL (ref 4.5–5.9)
RBC # BLD: 3.88 M/UL (ref 4.5–5.9)
RBC # BLD: 3.92 M/UL (ref 4.5–5.9)
SODIUM BLD-SCNC: 136 MMOL/L (ref 135–144)
TROPONIN INTERP: NORMAL
TROPONIN T: NORMAL NG/ML
TROPONIN, HIGH SENSITIVITY: 8 NG/L (ref 0–22)
WBC # BLD: 4.3 K/UL (ref 3.5–11)
WBC # BLD: 4.3 K/UL (ref 3.5–11)
WBC # BLD: 4.9 K/UL (ref 3.5–11)
WBC # BLD: 4.9 K/UL (ref 3.5–11)

## 2020-05-19 PROCEDURE — 2060000000 HC ICU INTERMEDIATE R&B

## 2020-05-19 PROCEDURE — 6370000000 HC RX 637 (ALT 250 FOR IP): Performed by: NURSE PRACTITIONER

## 2020-05-19 PROCEDURE — 85730 THROMBOPLASTIN TIME PARTIAL: CPT

## 2020-05-19 PROCEDURE — 80048 BASIC METABOLIC PNL TOTAL CA: CPT

## 2020-05-19 PROCEDURE — 6370000000 HC RX 637 (ALT 250 FOR IP)

## 2020-05-19 PROCEDURE — U0003 INFECTIOUS AGENT DETECTION BY NUCLEIC ACID (DNA OR RNA); SEVERE ACUTE RESPIRATORY SYNDROME CORONAVIRUS 2 (SARS-COV-2) (CORONAVIRUS DISEASE [COVID-19]), AMPLIFIED PROBE TECHNIQUE, MAKING USE OF HIGH THROUGHPUT TECHNOLOGIES AS DESCRIBED BY CMS-2020-01-R: HCPCS

## 2020-05-19 PROCEDURE — 6370000000 HC RX 637 (ALT 250 FOR IP): Performed by: STUDENT IN AN ORGANIZED HEALTH CARE EDUCATION/TRAINING PROGRAM

## 2020-05-19 PROCEDURE — 2580000003 HC RX 258: Performed by: STUDENT IN AN ORGANIZED HEALTH CARE EDUCATION/TRAINING PROGRAM

## 2020-05-19 PROCEDURE — 93970 EXTREMITY STUDY: CPT

## 2020-05-19 PROCEDURE — 84484 ASSAY OF TROPONIN QUANT: CPT

## 2020-05-19 PROCEDURE — 99232 SBSQ HOSP IP/OBS MODERATE 35: CPT | Performed by: INTERNAL MEDICINE

## 2020-05-19 PROCEDURE — 6360000002 HC RX W HCPCS: Performed by: INTERNAL MEDICINE

## 2020-05-19 PROCEDURE — 85610 PROTHROMBIN TIME: CPT

## 2020-05-19 PROCEDURE — 85520 HEPARIN ASSAY: CPT

## 2020-05-19 PROCEDURE — 85027 COMPLETE CBC AUTOMATED: CPT

## 2020-05-19 PROCEDURE — 36415 COLL VENOUS BLD VENIPUNCTURE: CPT

## 2020-05-19 RX ORDER — HEPARIN SODIUM 5000 [USP'U]/ML
4000 INJECTION, SOLUTION INTRAVENOUS; SUBCUTANEOUS PRN
Status: DISCONTINUED | OUTPATIENT
Start: 2020-05-19 | End: 2020-05-20 | Stop reason: HOSPADM

## 2020-05-19 RX ORDER — HEPARIN SODIUM 5000 [USP'U]/ML
4000 INJECTION, SOLUTION INTRAVENOUS; SUBCUTANEOUS ONCE
Status: COMPLETED | OUTPATIENT
Start: 2020-05-19 | End: 2020-05-19

## 2020-05-19 RX ORDER — HEPARIN SODIUM 10000 [USP'U]/100ML
12 INJECTION, SOLUTION INTRAVENOUS CONTINUOUS
Status: DISCONTINUED | OUTPATIENT
Start: 2020-05-19 | End: 2020-05-20 | Stop reason: HOSPADM

## 2020-05-19 RX ORDER — HEPARIN SODIUM 5000 [USP'U]/ML
2000 INJECTION, SOLUTION INTRAVENOUS; SUBCUTANEOUS PRN
Status: DISCONTINUED | OUTPATIENT
Start: 2020-05-19 | End: 2020-05-20 | Stop reason: HOSPADM

## 2020-05-19 RX ADMIN — LORAZEPAM 1 MG: 1 TABLET ORAL at 17:56

## 2020-05-19 RX ADMIN — Medication 10 ML: at 20:17

## 2020-05-19 RX ADMIN — ACETAMINOPHEN 650 MG: 325 TABLET, FILM COATED ORAL at 17:56

## 2020-05-19 RX ADMIN — AMLODIPINE BESYLATE 10 MG: 10 TABLET ORAL at 07:58

## 2020-05-19 RX ADMIN — Medication 1 MG: at 23:24

## 2020-05-19 RX ADMIN — HEPARIN SODIUM 12 UNITS/KG/HR: 10000 INJECTION, SOLUTION INTRAVENOUS at 11:14

## 2020-05-19 RX ADMIN — HEPARIN SODIUM 2000 UNITS: 5000 INJECTION INTRAVENOUS; SUBCUTANEOUS at 17:36

## 2020-05-19 RX ADMIN — LORAZEPAM 1 MG: 1 TABLET ORAL at 09:23

## 2020-05-19 RX ADMIN — HEPARIN SODIUM 4000 UNITS: 5000 INJECTION INTRAVENOUS; SUBCUTANEOUS at 11:14

## 2020-05-19 RX ADMIN — Medication 10 ML: at 07:59

## 2020-05-19 ASSESSMENT — ENCOUNTER SYMPTOMS
COLOR CHANGE: 0
SHORTNESS OF BREATH: 0
VOMITING: 0
CONSTIPATION: 0
NAUSEA: 0
PHOTOPHOBIA: 0
DIARRHEA: 0
COUGH: 0
ABDOMINAL DISTENTION: 0
APNEA: 0
WHEEZING: 0

## 2020-05-19 ASSESSMENT — PAIN SCALES - GENERAL
PAINLEVEL_OUTOF10: 0
PAINLEVEL_OUTOF10: 5
PAINLEVEL_OUTOF10: 4

## 2020-05-19 NOTE — CONSULTS
Pain 5/7/18   Trae Ralph MD       Allergies:  Bee venom    Social History:   reports that he has been smoking cigarettes. He has a 10.00 pack-year smoking history. He has never used smokeless tobacco. He reports current alcohol use. He reports that he does not use drugs. Family History:   + for early CAD    REVIEW OF SYSTEMS:    · Constitutional: there has been no unanticipated weight loss. There's been No change in energy level, No change in activity level. · Eyes: No visual changes or diplopia. No scleral icterus. · ENT: No Headaches, hearing loss or vertigo. No mouth sores or sore throat. · Cardiovascular: As HPI  · Respiratory: As HPI  · Gastrointestinal: No abdominal pain, appetite loss, blood in stools. No change in bowel or bladder habits. · Genitourinary: No dysuria, trouble voiding, or hematuria. · Musculoskeletal:  No gait disturbance, No weakness or joint complaints. · Integumentary: No rash or pruritis. · Neurological: No headache, diplopia, change in muscle strength, numbness or tingling. No change in gait, balance, coordination, mood, affect, memory, mentation, behavior. · Psychiatric: No anxiety, or depression. · Endocrine: No temperature intolerance. No excessive thirst, fluid intake, or urination. No tremor. · Hematologic/Lymphatic: No abnormal bruising or bleeding, blood clots or swollen lymph nodes. · Allergic/Immunologic: No nasal congestion or hives. PHYSICAL EXAM:    Physical Examination:    /75   Pulse 83   Temp 98.1 °F (36.7 °C) (Oral)   Resp 16   Ht 5' 8\" (1.727 m)   Wt 190 lb 7.6 oz (86.4 kg)   SpO2 98%   BMI 28.96 kg/m²    Constitutional and General Appearance: alert, cooperative, no distress and appears stated age  HEENT: PERRL, no cervical lymphadenopathy. No masses palpable. Normal oral mucosa  Respiratory:  · Normal excursion and expansion without use of accessory muscles  · Resp Auscultation: Good respiratory effort.  No for increased work of associated ejection fraction of   33%.  Given the patient's age, underlying cardiomyopathy cannot be excluded. Risk stratification:  High         Labs:     CBC:   Recent Labs     05/18/20  0520 05/19/20  0541   WBC 4.0 4.3   HGB 14.0 13.9   HCT 40.8* 40.5*   * 125*     BMP:   Recent Labs     05/18/20  0520 05/19/20  0541    136   K 3.9 4.0   CO2 23 24   BUN 11 11   CREATININE 0.55* 0.52*   LABGLOM >60 >60   GLUCOSE 111* 113*     BNP: No results for input(s): BNP in the last 72 hours. PT/INR: No results for input(s): PROTIME, INR in the last 72 hours. 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 Unfortunately already on eliquis, and therefore cath cannot be done until 2 days from now. - given possible RA mass- will check LE venous dopplers to rule out DVT (thrombus in transit), if LE venous are negative will follow RA mass as oupatient, if LE venous is positive plan for VIANEY tomorrow during his cath. - stopping eliquis today (didn't get todays dose), starting heparin drip, plan for Cath tomorrow via radial approach. Discussed with patient and nursing.     Thank you for allowing me to participate in the care of this patient, please do not hesitate to call if you have any

## 2020-05-19 NOTE — PROGRESS NOTES
[facility] [facility]    PROGRESS NOTE             5/19/2020    9:37 AM    Name:   Gregorio Merchant  MRN:     [id number]     Acct:      [de-identified]   Room:   [facility] Day:  3  Admit Date:  5/16/2020  2:12 PM    PCP:  Piotr Ngo PA-C  Code Status:  Full Code    Subjective:     C/C:   Chief Complaint   Patient presents with    Chest Pain    Dizziness    Other     jittery     Interval History Status: improved. Patient seen and examined at bedside. History taken and physical exam conducted. Findings discussed with attending. No acute events overnight patient resting comfortably in the bed he does not complain of any chest pain shortness of breath dizziness or any other associated symptoms. Review of Systems:     Review of Systems   Constitutional: Negative for activity change, appetite change, fatigue, fever and unexpected weight change. HENT: Negative for congestion. Eyes: Negative for photophobia and visual disturbance. Respiratory: Negative for apnea, cough, shortness of breath and wheezing. Cardiovascular: Negative for chest pain, palpitations and leg swelling. Gastrointestinal: Negative for abdominal distention, constipation, diarrhea, nausea and vomiting. Endocrine: Negative for polyuria. Genitourinary: Negative for dysuria and urgency. Skin: Negative for color change, pallor, rash and wound. Neurological: Negative for dizziness, tremors, weakness, light-headedness and headaches. Psychiatric/Behavioral: Negative for agitation, behavioral problems, confusion and decreased concentration. Medications: Allergies:     Allergies   Allergen Reactions    Bee Venom Anaphylaxis       Current Meds:   Scheduled Meds:    amLODIPine  10 mg Oral Daily    nicotine  1 patch Transdermal Daily    apixaban  10 mg Oral BID    sodium chloride flush  10 mL Intravenous 2 times per day    sodium chloride flush  10 mL Intravenous 2 times per day     Continuous Infusions:   PRN Meds: sodium chloride flush, melatonin ER, chlordiazePOXIDE, sodium chloride flush, acetaminophen **OR** acetaminophen, polyethylene glycol, promethazine **OR** ondansetron, sodium chloride flush, LORazepam **OR** LORazepam **OR** LORazepam **OR** LORazepam **OR** LORazepam **OR** LORazepam **OR** LORazepam **OR** LORazepam    Data:     Past Medical History:   has a past medical history of No active medical problems. Social History:   reports that he has been smoking cigarettes. He has a 10.00 pack-year smoking history. He has never used smokeless tobacco. He reports current alcohol use. He reports that he does not use drugs. Family History: History reviewed. No pertinent family history. Vitals:  /75   Pulse 86   Temp 98.1 °F (36.7 °C) (Oral)   Resp 16   Ht 5' 8\" (1.727 m)   Wt 190 lb 7.6 oz (86.4 kg)   SpO2 98%   BMI 28.96 kg/m²   Temp (24hrs), Av.3 °F (36.8 °C), Min:98.1 °F (36.7 °C), Max:98.5 °F (36.9 °C)    No results for input(s): POCGLU in the last 72 hours. I/O(24Hr): Intake/Output Summary (Last 24 hours) at 2020 0937  Last data filed at 2020 1754  Gross per 24 hour   Intake 720 ml   Output --   Net 720 ml       Labs:    [unfilled]    No results found for: SPECIAL  No results found for: CULTURE    [unfilled]    Radiology:    Xr Wrist Right (min 3 Views)    Addendum Date: 2020    ADDENDUM: The carpal bones all appear intact. There is no evidence for fracture dislocation. The soft tissues are diffusely edematous over the dorsal aspect. .  The joint spaces are well preserved. There is no evidence for foreign body. Result Date: 2020  EXAMINATION: 3  XRAY VIEWS OF THE RIGHT WRIST 2020 5:56 am COMPARISON: None.  HISTORY: ORDERING SYSTEM PROVIDED HISTORY: right dorsal wrist pain after fall both on outstretched hand and flexed TECHNOLOGIST PROVIDED HISTORY: right dorsal wrist pain antibody pending       HTN  Norvasc 5 mg po daily     PT/OT    Rabia Langston MD  PGY I Family Medicine Resident  5/19/2020 9:37 AM     Attending Physician Statement  I have discussed the care of [facility] and I have examined the patient myselft and taken ros and hpi , including pertinent history and exam findings,  with the resident. I have reviewed the key elements of all parts of the encounter with the resident. I agree with the assessment, plan and orders as documented by the resident.     Plan for cath in am  Echo noted atrial mass  New diagnosis of chf 33 perent  Rule otu ischemic  Reversible ischemia noted    Electronically signed by Evangelista Emery MD

## 2020-05-19 NOTE — PROGRESS NOTES
Makenzie Lundberg MD        acetaminophen (TYLENOL) tablet 650 mg  650 mg Oral Q6H PRN Makenzie Lundberg MD   650 mg at 05/18/20 1314    Or    acetaminophen (TYLENOL) suppository 650 mg  650 mg Rectal Q6H PRN Makenzie Lundberg MD        polyethylene glycol (GLYCOLAX) packet 17 g  17 g Oral Daily PRN Makenzie Lundberg MD        promethazine (PHENERGAN) tablet 12.5 mg  12.5 mg Oral Q6H PRN Makenzie Lundberg MD        Or    ondansetron (ZOFRAN) injection 4 mg  4 mg Intravenous Q6H PRN Makenzie Lundberg MD        sodium chloride flush 0.9 % injection 10 mL  10 mL Intravenous 2 times per day Peggy Howell MD   10 mL at 05/18/20 2057    sodium chloride flush 0.9 % injection 10 mL  10 mL Intravenous PRN Peggy Howell MD        LORazepam (ATIVAN) tablet 1 mg  1 mg Oral Q1H PRROZ Howell MD   1 mg at 05/19/20 8314    Or    LORazepam (ATIVAN) injection 1 mg  1 mg Intravenous Q1H PRROZ Howell MD        Or    LORazepam (ATIVAN) tablet 2 mg  2 mg Oral Q1H MELISSA Howell MD   2 mg at 05/17/20 1420    Or    LORazepam (ATIVAN) injection 2 mg  2 mg Intravenous Q1H PRROZ Howell MD        Or    LORazepam (ATIVAN) tablet 3 mg  3 mg Oral Q1H PRROZ Howell MD        Or    LORazepam (ATIVAN) injection 3 mg  3 mg Intravenous Q1H PRROZ Howell MD        Or    LORazepam (ATIVAN) tablet 4 mg  4 mg Oral Q1H PRROZ Howell MD        Or    LORazepam (ATIVAN) injection 4 mg  4 mg Intravenous Q1H MELISSA Howell MD           Allergies:  Bee venom    Social History:   reports that he has been smoking cigarettes. He has a 10.00 pack-year smoking history. He has never used smokeless tobacco. He reports current alcohol use. He reports that he does not use drugs. Family History: Positive for history of heart problems. Negative for history of venous thromboembolism    REVIEW OF SYSTEMS:      Constitutional: No fever or chills.  No night sweats, no weight loss   Eyes: No eye discharge, mL/min    GFR Comment          GFR Staging NOT REPORTED    Troponin   Result Value Ref Range    Troponin, High Sensitivity 11 0 - 22 ng/L    Troponin T NOT REPORTED <0.03 ng/mL    Troponin Interp NOT REPORTED    Troponin   Result Value Ref Range    Troponin, High Sensitivity 10 0 - 22 ng/L    Troponin T NOT REPORTED <0.03 ng/mL    Troponin Interp NOT REPORTED    D-Dimer, Quantitative   Result Value Ref Range    D-Dimer, Quant 0.88 (H) 0.00 - 0.59 mg/L FEU   APTT   Result Value Ref Range    PTT 26.2 24.0 - 36.0 sec   Basic Metabolic Panel w/ Reflex to MG   Result Value Ref Range    Glucose 95 70 - 99 mg/dL    BUN 12 6 - 20 mg/dL    CREATININE 0.55 (L) 0.70 - 1.20 mg/dL    Bun/Cre Ratio NOT REPORTED 9 - 20    Calcium 8.6 8.6 - 10.4 mg/dL    Sodium 136 135 - 144 mmol/L    Potassium 3.6 (L) 3.7 - 5.3 mmol/L    Chloride 98 98 - 107 mmol/L    CO2 25 20 - 31 mmol/L    Anion Gap 13 9 - 17 mmol/L    GFR Non-African American >60 >60 mL/min    GFR African American >60 >60 mL/min    GFR Comment          GFR Staging NOT REPORTED    CBC   Result Value Ref Range    WBC 4.4 3.5 - 11.0 k/uL    RBC 3.83 (L) 4.5 - 5.9 m/uL    Hemoglobin 13.9 13.5 - 17.5 g/dL    Hematocrit 40.2 (L) 41 - 53 %    .9 (H) 80 - 100 fL    MCH 36.2 (H) 26 - 34 pg    MCHC 34.6 31 - 37 g/dL    RDW 13.8 11.5 - 14.9 %    Platelets 056 (L) 164 - 450 k/uL    MPV 8.8 6.0 - 12.0 fL    NRBC Automated NOT REPORTED per 100 WBC   HEPARIN LEVEL/ANTI-XA   Result Value Ref Range    Anti-XA Unfrac Heparin 0.53 0.30 - 0.70 IU/L   HEPARIN LEVEL/ANTI-XA   Result Value Ref Range    Anti-XA Unfrac Heparin 0.47 0.30 - 0.70 IU/L   Basic Metabolic Panel w/ Reflex to MG   Result Value Ref Range    Glucose 111 (H) 70 - 99 mg/dL    BUN 11 6 - 20 mg/dL    CREATININE 0.55 (L) 0.70 - 1.20 mg/dL    Bun/Cre Ratio NOT REPORTED 9 - 20    Calcium 8.9 8.6 - 10.4 mg/dL    Sodium 136 135 - 144 mmol/L    Potassium 3.9 3.7 - 5.3 mmol/L    Chloride 101 98 - 107 mmol/L    CO2 23 20 - 31 mmol/L Mediastinum: No evidence of mediastinal lymphadenopathy. The heart and pericardium demonstrate no acute abnormality. There is no acute abnormality of the thoracic aorta. Lungs/pleura: No focal consolidation. 3 mm nodule in the anterior segment right upper lobe on series 4, image 31 not considered significant. No follow-up recommended. No pleural effusion or pneumothorax Upper Abdomen: Severe diffuse hepatic steatosis. Soft Tissues/Bones: No acute bone or soft tissue abnormality. 1. Tiny focus of subsegmental branch filling defect in posterior basal right lower lobe representing acute pulmonary embolism. 2. No evidence for pneumonia. 3. Severe diffuse hepatic steatosis. Findings were discussed with NUIRKA KEY at 4:30 p.m. on 5/16/2020. IMPRESSION:   Primary Problem  ACS (acute coronary syndrome) [facility])    Active Hospital Problems    Diagnosis Date Noted    ACS (acute coronary syndrome) ([facility] [address] [I24.9] 05/19/2020    Acute pulmonary embolism ([facility] [address] [I26.99] 05/16/2020     Acute pulmonary embolism, segmental, right lower lobe  Tobacco dependence  Alcohol dependence  Hepatic steatosis  Macrocytosis. RECOMMENDATIONS:  1. I personally reviewed results of lab work-up imaging studies and other relevant clinical data. 2. Continue anticoagulation. 3. Discussed results of echocardiogram which shows possible mass versus thickened valve leaflet. Cardiology on board. 4. Even though patient has risk factor including smoking however given patient's young age male sex and index event of pulmonary embolism I believe patient will need work-up for thrombophilia to determine duration of anticoagulation. This can be done as an outpatient. 5. Agree with heart catheterization  6. Patient counseled on tobacco cessation. 7. Patient counseled on decreasing alcohol consumption. 8. Patient had multiple questions which answered to the best my ability. Discussed with patient and Nurse.       Thank you for asking us to see this patient. Wilfredo Corona MD          This note is created with the assistance of a speech recognition program.  While intending to generate a document that actually reflects the content of the visit, the document can still have some errors including those of syntax and sound a like substitutions which may escape proof reading. It such instances, actual meaning can be extrapolated by contextual diversion.

## 2020-05-19 NOTE — FLOWSHEET NOTE
05/19/20 1140   Encounter Summary   Services provided to: Patient   Referral/Consult From: [facility]   Continue Visiting   (5/19/20)   Complexity of Encounter Low   Length of Encounter 15 minutes   Spiritual Assessment Completed Yes   Spiritual/Islam   Type Spiritual support   Assessment Calm; Approachable   Intervention Active listening;Provided reading materials/devotional materials   Outcome Receptive; Expressed gratitude

## 2020-05-19 NOTE — PLAN OF CARE
Problem: Falls - Risk of:  Goal: Will remain free from falls  Description: Will remain free from falls  5/19/2020 0506 by Jens Sosa RN  Outcome: Ongoing     Problem: Falls - Risk of:  Goal: Absence of physical injury  Description: Absence of physical injury  5/19/2020 0506 by Jens Sosa RN  Outcome: Ongoing     Problem: Bleeding:  Goal: Will show no signs and symptoms of excessive bleeding  Description: Will show no signs and symptoms of excessive bleeding  5/19/2020 0506 by Jens Sosa RN  Outcome: Ongoing     Problem: Pain:  Goal: Pain level will decrease  Description: Pain level will decrease  5/19/2020 0506 by Jens Sosa RN  Outcome: Ongoing     Problem: Pain:  Goal: Control of acute pain  Description: Control of acute pain  Outcome: Ongoing     Problem: Discharge Planning:  Goal: Discharged to appropriate level of care  Description: Discharged to appropriate level of care  5/19/2020 0506 by Jens Sosa RN  Outcome: Ongoing     Problem: Fluid Volume - Deficit:  Goal: Absence of fluid volume deficit signs and symptoms  Description: Absence of fluid volume deficit signs and symptoms  5/19/2020 0506 by Jens Sosa RN  Outcome: Ongoing     Problem: Nutrition Deficit:  Goal: Ability to achieve adequate nutritional intake will improve  Description: Ability to achieve adequate nutritional intake will improve  5/19/2020 0506 by Jens Sosa RN  Outcome: Ongoing

## 2020-05-20 ENCOUNTER — HOSPITAL ENCOUNTER (OUTPATIENT)
Dept: CARDIAC CATH/INVASIVE PROCEDURES | Age: 35
Discharge: HOME | End: 2020-05-20

## 2020-05-20 VITALS
WEIGHT: 190.48 LBS | TEMPERATURE: 98 F | RESPIRATION RATE: 16 BRPM | BODY MASS INDEX: 28.87 KG/M2 | DIASTOLIC BLOOD PRESSURE: 89 MMHG | SYSTOLIC BLOOD PRESSURE: 126 MMHG | HEART RATE: 82 BPM | OXYGEN SATURATION: 97 % | HEIGHT: 68 IN

## 2020-05-20 VITALS
HEART RATE: 86 BPM | RESPIRATION RATE: 14 BRPM | OXYGEN SATURATION: 99 % | SYSTOLIC BLOOD PRESSURE: 151 MMHG | DIASTOLIC BLOOD PRESSURE: 98 MMHG

## 2020-05-20 PROBLEM — I50.22 CHRONIC SYSTOLIC CONGESTIVE HEART FAILURE (HCC): Status: ACTIVE | Noted: 2020-05-20

## 2020-05-20 LAB
ANION GAP SERPL CALCULATED.3IONS-SCNC: 12 MMOL/L (ref 9–17)
ANTI-XA UNFRAC HEPARIN: 0.65 IU/L (ref 0.3–0.7)
BUN BLDV-MCNC: 12 MG/DL (ref 6–20)
BUN/CREAT BLD: ABNORMAL (ref 9–20)
CALCIUM SERPL-MCNC: 9.2 MG/DL (ref 8.6–10.4)
CHLORIDE BLD-SCNC: 104 MMOL/L (ref 98–107)
CO2: 23 MMOL/L (ref 20–31)
CREAT SERPL-MCNC: 0.56 MG/DL (ref 0.7–1.2)
GFR AFRICAN AMERICAN: >60 ML/MIN
GFR NON-AFRICAN AMERICAN: >60 ML/MIN
GFR SERPL CREATININE-BSD FRML MDRD: ABNORMAL ML/MIN/{1.73_M2}
GFR SERPL CREATININE-BSD FRML MDRD: ABNORMAL ML/MIN/{1.73_M2}
GLUCOSE BLD-MCNC: 118 MG/DL (ref 70–99)
HCT VFR BLD CALC: 40.3 % (ref 41–53)
HEMOGLOBIN: 13.9 G/DL (ref 13.5–17.5)
MCH RBC QN AUTO: 37 PG (ref 26–34)
MCHC RBC AUTO-ENTMCNC: 34.6 G/DL (ref 31–37)
MCV RBC AUTO: 106.8 FL (ref 80–100)
NRBC AUTOMATED: ABNORMAL PER 100 WBC
PDW BLD-RTO: 13.7 % (ref 11.5–14.9)
PLATELET # BLD: 138 K/UL (ref 150–450)
PMV BLD AUTO: 8.5 FL (ref 6–12)
POTASSIUM SERPL-SCNC: 4.3 MMOL/L (ref 3.7–5.3)
RBC # BLD: 3.77 M/UL (ref 4.5–5.9)
SARS-COV-2, PCR: NORMAL
SARS-COV-2, RAPID: NORMAL
SARS-COV-2: NOT DETECTED
SODIUM BLD-SCNC: 139 MMOL/L (ref 135–144)
SOURCE: NORMAL
WBC # BLD: 4.9 K/UL (ref 3.5–11)

## 2020-05-20 PROCEDURE — 6370000000 HC RX 637 (ALT 250 FOR IP): Performed by: STUDENT IN AN ORGANIZED HEALTH CARE EDUCATION/TRAINING PROGRAM

## 2020-05-20 PROCEDURE — 99232 SBSQ HOSP IP/OBS MODERATE 35: CPT | Performed by: INTERNAL MEDICINE

## 2020-05-20 PROCEDURE — 85520 HEPARIN ASSAY: CPT

## 2020-05-20 PROCEDURE — 7100000000 HC PACU RECOVERY - FIRST 15 MIN

## 2020-05-20 PROCEDURE — 6360000004 HC RX CONTRAST MEDICATION

## 2020-05-20 PROCEDURE — 80048 BASIC METABOLIC PNL TOTAL CA: CPT

## 2020-05-20 PROCEDURE — 2709999900 HC NON-CHARGEABLE SUPPLY

## 2020-05-20 PROCEDURE — 36415 COLL VENOUS BLD VENIPUNCTURE: CPT

## 2020-05-20 PROCEDURE — 93454 CORONARY ARTERY ANGIO S&I: CPT | Performed by: INTERNAL MEDICINE

## 2020-05-20 PROCEDURE — 2500000003 HC RX 250 WO HCPCS

## 2020-05-20 PROCEDURE — B2161ZZ FLUOROSCOPY OF RIGHT AND LEFT HEART USING LOW OSMOLAR CONTRAST: ICD-10-PCS | Performed by: INTERNAL MEDICINE

## 2020-05-20 PROCEDURE — 6360000002 HC RX W HCPCS

## 2020-05-20 PROCEDURE — 6360000002 HC RX W HCPCS: Performed by: INTERNAL MEDICINE

## 2020-05-20 PROCEDURE — C1894 INTRO/SHEATH, NON-LASER: HCPCS

## 2020-05-20 PROCEDURE — 7100000001 HC PACU RECOVERY - ADDTL 15 MIN

## 2020-05-20 PROCEDURE — C1769 GUIDE WIRE: HCPCS

## 2020-05-20 PROCEDURE — 85027 COMPLETE CBC AUTOMATED: CPT

## 2020-05-20 PROCEDURE — C1725 CATH, TRANSLUMIN NON-LASER: HCPCS

## 2020-05-20 PROCEDURE — 2580000003 HC RX 258: Performed by: STUDENT IN AN ORGANIZED HEALTH CARE EDUCATION/TRAINING PROGRAM

## 2020-05-20 RX ORDER — AMLODIPINE BESYLATE 10 MG/1
10 TABLET ORAL DAILY
Qty: 30 TABLET | Refills: 3 | Status: SHIPPED | OUTPATIENT
Start: 2020-05-21 | End: 2021-03-16 | Stop reason: SDUPTHER

## 2020-05-20 RX ORDER — SODIUM CHLORIDE 0.9 % (FLUSH) 0.9 %
10 SYRINGE (ML) INJECTION EVERY 12 HOURS SCHEDULED
Status: DISCONTINUED | OUTPATIENT
Start: 2020-05-20 | End: 2020-05-21 | Stop reason: HOSPADM

## 2020-05-20 RX ORDER — NICOTINE 21 MG/24HR
1 PATCH, TRANSDERMAL 24 HOURS TRANSDERMAL DAILY
Qty: 30 PATCH | Refills: 3 | Status: SHIPPED | OUTPATIENT
Start: 2020-05-21 | End: 2020-05-26

## 2020-05-20 RX ORDER — SODIUM CHLORIDE 0.9 % (FLUSH) 0.9 %
10 SYRINGE (ML) INJECTION PRN
Status: DISCONTINUED | OUTPATIENT
Start: 2020-05-20 | End: 2020-05-21 | Stop reason: HOSPADM

## 2020-05-20 RX ORDER — ACETAMINOPHEN 325 MG/1
650 TABLET ORAL EVERY 4 HOURS PRN
Status: DISCONTINUED | OUTPATIENT
Start: 2020-05-20 | End: 2020-05-21 | Stop reason: HOSPADM

## 2020-05-20 RX ADMIN — HEPARIN SODIUM 2000 UNITS: 5000 INJECTION INTRAVENOUS; SUBCUTANEOUS at 02:06

## 2020-05-20 RX ADMIN — Medication 10 ML: at 09:07

## 2020-05-20 RX ADMIN — HEPARIN SODIUM 16 UNITS/KG/HR: 10000 INJECTION, SOLUTION INTRAVENOUS at 06:57

## 2020-05-20 RX ADMIN — Medication 10 ML: at 09:08

## 2020-05-20 ASSESSMENT — ENCOUNTER SYMPTOMS
DIARRHEA: 0
WHEEZING: 0
APNEA: 0
VOMITING: 0
SHORTNESS OF BREATH: 0
PHOTOPHOBIA: 0
ABDOMINAL DISTENTION: 0
NAUSEA: 0
COUGH: 0
CONSTIPATION: 0
COLOR CHANGE: 0

## 2020-05-20 NOTE — DISCHARGE SUMMARY
symptoms. Patient also had high blood pressure and was treated with Norvasc. Significant therapeutic interventions: Heparin high dose bolus and drip, Apixaban, Norvasc. CTPE, ECHO, Cardiac cath. Significant Diagnostic Studies:   Labs / Micro:  CBC:   Lab Results   Component Value Date    WBC 4.9 05/20/2020    RBC 3.77 05/20/2020    HGB 13.9 05/20/2020    HCT 40.3 05/20/2020    .8 05/20/2020    MCH 37.0 05/20/2020    MCHC 34.6 05/20/2020    RDW 13.7 05/20/2020     05/20/2020     BMP:    Lab Results   Component Value Date    GLUCOSE 118 05/20/2020     05/20/2020    K 4.3 05/20/2020     05/20/2020    CO2 23 05/20/2020    ANIONGAP 12 05/20/2020    BUN 12 05/20/2020    CREATININE 0.56 05/20/2020    BUNCRER NOT REPORTED 05/20/2020    CALCIUM 9.2 05/20/2020    LABGLOM >60 05/20/2020    GFRAA >60 05/20/2020    GFR      05/20/2020    GFR NOT REPORTED 05/20/2020       Radiology:  Xr Wrist Right (min 3 Views)    Addendum Date: 4/30/2020    ADDENDUM: The carpal bones all appear intact. There is no evidence for fracture dislocation. The soft tissues are diffusely edematous over the dorsal aspect. .  The joint spaces are well preserved. There is no evidence for foreign body. Result Date: 4/30/2020  EXAMINATION: 3  XRAY VIEWS OF THE RIGHT WRIST 4/30/2020 5:56 am COMPARISON: None.  HISTORY: ORDERING SYSTEM PROVIDED HISTORY: right dorsal wrist pain after fall both on outstretched hand and flexed TECHNOLOGIST PROVIDED HISTORY: right dorsal wrist pain after fall both on outstretched hand and flexed Reason for Exam: Right dorsal wrist pain after fall both on outstretched hand and flexed fist.  Bruising and swelling to entire dorsal aspect of right wrist and right metacarpals Acuity: Acute Type of Exam: Initial Mechanism of Injury: Right dorsal wrist pain after fall both on outstretched hand and flexed fist.  Bruising and swelling to entire dorsal aspect of right wrist and right metacarpals Relevant Medical/Surgical History: Right dorsal wrist pain after fall both on outstretched hand and flexed fist.  Bruising and swelling to entire dorsal aspect of right wrist and right metacarpals     Xr Chest Portable    Result Date: 5/16/2020  EXAMINATION: ONE XRAY VIEW OF THE CHEST 5/16/2020 2:53 pm COMPARISON: None. HISTORY: ORDERING SYSTEM PROVIDED HISTORY: Chest Pain TECHNOLOGIST PROVIDED HISTORY: Chest Pain Reason for Exam: chest pain Acuity: Acute Type of Exam: Initial FINDINGS: Nipple piercings bilaterally. Cardiomediastinal shadow within normal limits for AP technique. No localized pulmonary opacity or blunting of the costophrenic angles. Mild elevation left hemidiaphragm. Bones and soft tissues intact. No acute cardiopulmonary disease. Ct Chest Pulmonary Embolism W Contrast    Result Date: 5/16/2020  EXAMINATION: CTA OF THE CHEST 5/16/2020 3:33 pm TECHNIQUE: CTA of the chest was performed after the administration of intravenous contrast.  Multiplanar reformatted images are provided for review. MIP images are provided for review. Dose modulation, iterative reconstruction, and/or weight based adjustment of the mA/kV was utilized to reduce the radiation dose to as low as reasonably achievable. COMPARISON: None. HISTORY: ORDERING SYSTEM PROVIDED HISTORY: elevated ddimer, chest pain TECHNOLOGIST PROVIDED HISTORY: elevated ddimer, chest pain Reason for Exam: elevated d-dimer, chest pain, dizziness Acuity: Unknown Type of Exam: Unknown FINDINGS: Pulmonary Arteries: There is tiny focal subsegmental filling defect in a posterior basal right lower lobe pulmonary arterial branch on series 2, image 127 & 128 as well as series 601 (coronal) image 96 consistent with tiny pulmonary embolus. Main pulmonary artery is normal in caliber. Mediastinum: No evidence of mediastinal lymphadenopathy. The heart and pericardium demonstrate no acute abnormality. There is no acute abnormality of the thoracic aorta. Lungs/pleura: No focal consolidation. 3 mm nodule in the anterior segment right upper lobe on series 4, image 31 not considered significant. No follow-up recommended. No pleural effusion or pneumothorax Upper Abdomen: Severe diffuse hepatic steatosis. Soft Tissues/Bones: No acute bone or soft tissue abnormality. 1. Tiny focus of subsegmental branch filling defect in posterior basal right lower lobe representing acute pulmonary embolism. 2. No evidence for pneumonia. 3. Severe diffuse hepatic steatosis. Findings were discussed with NIURKA KEY at 4:30 p.m. on 5/16/2020. Vl Dup Lower Extremity Venous Bilateral    Result Date: 5/19/2020    [facility]  Vascular Lower Extremities DVT Study Procedure   Patient Name   [facility]    Date of Study           05/19/2020                 Carmen Camronon   Date of Birth  1985  Gender                  Male   Age            30 year(s)  Race                       Room Number    2109        Height:                 67.72 inch, 172 cm   Corporate ID # [id number]    Weight:                 190 pounds, 86 kg   Patient Acct # [de-identified]   BSA:        1.99 m^2    BMI:      29.07 kg/m^2   MR #           [id number]      Sonographer             Brenda Romero, T   Accession #    [id number]   Interpreting Physician  Georgia Quinones   Referring                  Referring Physician     [address] [place] [address]  Nurse  Practitioner  Procedure Type of Study:   Veins: Lower Extremities DVT Study, Venous Scan Lower Bilateral.  Patient Status: In Patient. Technical Quality:Limited visualization. Comments:Indication: Possible cardiac thrombus  Conclusions   Summary   Simultaneous real time imaging utilizing B-Mode, color doppler and  spectral waveform analysis was performed on the bilateral lower  extremities for venous examination of the deep and superficial systems. Findings are:   Right:  No evidence of deep or superficial venous thrombosis.    Left:  No evidence of deep or superficial venous thrombosis. Signature   ----------------------------------------------------------------  Electronically signed by Tish Bobby RVT(Sonographer) on  05/19/2020 02:19 PM  ----------------------------------------------------------------   ----------------------------------------------------------------  Electronically signed by Ihsan Alves(Interpreting  physician) on 05/19/2020 11:03 PM  ----------------------------------------------------------------  Findings:   Right Impression:                    Left Impression:  The common femoral, femoral,         The common femoral, femoral,  popliteal and tibial veins           popliteal and tibial veins  demonstrate normal compressibility   demonstrate normal compressibility  and augmentation. and augmentation. Normal compressibility of the great  Limited visualization of the  saphenous vein. posterior tibial and peroneal veins. Normal compressibility of the small  Normal compressibility of the great  saphenous vein. saphenous vein. Normal compressibility of the small                                       saphenous vein. Velocities are measured in cm/s ; Diameters are measured in cm Right Lower Extremities DVT Study Measurements Right 2D Measurements +------------------------------------+----------+---------------+----------+ ! Location                            ! Visualized! Compressibility! Thrombosis! +------------------------------------+----------+---------------+----------+ ! Common Femoral                      !Yes       ! Yes            ! None      ! +------------------------------------+----------+---------------+----------+ ! Prox Femoral                        !Yes       ! Yes            ! None      ! +------------------------------------+----------+---------------+----------+ ! Mid Femoral                         !Yes       ! Yes            ! None      !

## 2020-05-20 NOTE — PROGRESS NOTES
[facility]        Date:   5/20/2020  Patient name: James Barakat  Date of admission:  5/16/2020  2:12 PM  MRN:   [id number]  YOB: 1985  PCP: Flavia Espinal PA-C    Reason for Consult:       Subjective: No new cardiac issues. No overnight events. Chart reviewed. Physical Exam:   Vitals: /89   Pulse 82   Temp 98 °F (36.7 °C) (Oral)   Resp 16   Ht 5' 8\" (1.727 m)   Wt 190 lb 7.6 oz (86.4 kg)   SpO2 97%   BMI 28.96 kg/m²   General appearance: alert and cooperative with exam  HEENT: Head: Normocephalic, no lesions, without obvious abnormality. Neck: no adenopathy, no carotid bruit, no JVD, supple, symmetrical, trachea midline and thyroid not enlarged, symmetric, no tenderness/mass/nodules  Lungs: clear to auscultation bilaterally  Heart: regular rate and rhythm, S1, S2 normal, no murmur, click, rub or gallop  Abdomen: soft, non-tender; bowel sounds normal; no masses,  no organomegaly  Extremities: extremities normal, atraumatic, no cyanosis or edema  Neurologic: Mental status: Alert, oriented, thought content appropriate      Lab work, imaging, nursing, and chart reviewed extensively.     Medications:   Scheduled Meds:   amLODIPine  10 mg Oral Daily    nicotine  1 patch Transdermal Daily    sodium chloride flush  10 mL Intravenous 2 times per day    sodium chloride flush  10 mL Intravenous 2 times per day     Continuous Infusions:   heparin (porcine) Stopped (05/20/20 0910)         Labs:     CBC:   Recent Labs     05/19/20  1627 05/19/20  2310 05/20/20  0729   WBC 4.9 4.9 4.9   HGB 14.2 13.4* 13.9   * 128* 138*     BMP:    Recent Labs     05/18/20  0520 05/19/20  0541 05/20/20  0729    136 139   K 3.9 4.0 4.3    103 104   CO2 23 24 23   BUN 11 11 12   CREATININE 0.55* 0.52* 0.56*   GLUCOSE 111* 113* 118*     Hepatic:   Recent Labs     05/18/20  0520   *   *   BILITOT 0.89   ALKPHOS 86     Troponin: No results for input(s): 5/20/2020 at 2:29 PM    Feliciano Ruiz, [id number] [facility]  [facility] [employer]  [phone]

## 2020-05-20 NOTE — PROGRESS NOTES
Pt IV removed and dressing taken off from cardiac cath. Pt observed for bleeding. Medications brought to pt from out patient pharmacy. Discharge instructions explained to pt and his wife at length, stated understanding. Pt discharged with wife.

## 2020-05-20 NOTE — PROGRESS NOTES
[address]    PROGRESS NOTE             5/20/2020    8:10 AM    Name:   Barry Musa  MRN:     [id number]     Acct:      [de-identified]   Room:   [facility]/[address] [place] [address] Day:  4  Admit Date:  5/16/2020  2:12 PM    PCP:  Satinder Medina PA-C  Code Status:  Full Code    Subjective:     C/C:   Chief Complaint   Patient presents with    Chest Pain    Dizziness    Other     jittery     Interval History Status: improved. Patient seen and examined at bedside. History taken and physical exam conducted. Findings discussed with attending. No acute events overnight patient is resting comfortably in the bed he denies chest pain shortness of breath nausea vomiting diarrhea dizziness headaches blurry vision. No palpitations reported patient will likely go for his cardiac cath today no other questions or concerns at this time thrombophilia work-up still pending. Lower leg Doppler was negative for clots      Review of Systems:     Review of Systems   Constitutional: Negative for activity change, appetite change, fatigue, fever and unexpected weight change. HENT: Negative for congestion. Eyes: Negative for photophobia and visual disturbance. Respiratory: Negative for apnea, cough, shortness of breath and wheezing. Cardiovascular: Negative for chest pain, palpitations and leg swelling. Gastrointestinal: Negative for abdominal distention, constipation, diarrhea, nausea and vomiting. Endocrine: Negative for polyuria. Genitourinary: Negative for dysuria and urgency. Skin: Negative for color change, pallor, rash and wound. Neurological: Negative for dizziness, tremors, weakness, light-headedness and headaches. Psychiatric/Behavioral: Negative for agitation, behavioral problems, confusion and decreased concentration. Medications: Allergies:     Allergies   Allergen Reactions    Bee Venom Anaphylaxis       Current Meds: HISTORY: ORDERING SYSTEM PROVIDED HISTORY: right dorsal wrist pain after fall both on outstretched hand and flexed TECHNOLOGIST PROVIDED HISTORY: right dorsal wrist pain after fall both on outstretched hand and flexed Reason for Exam: Right dorsal wrist pain after fall both on outstretched hand and flexed fist.  Bruising and swelling to entire dorsal aspect of right wrist and right metacarpals Acuity: Acute Type of Exam: Initial Mechanism of Injury: Right dorsal wrist pain after fall both on outstretched hand and flexed fist.  Bruising and swelling to entire dorsal aspect of right wrist and right metacarpals Relevant Medical/Surgical History: Right dorsal wrist pain after fall both on outstretched hand and flexed fist.  Bruising and swelling to entire dorsal aspect of right wrist and right metacarpals     Xr Chest Portable    Result Date: 5/16/2020  EXAMINATION: ONE XRAY VIEW OF THE CHEST 5/16/2020 2:53 pm COMPARISON: None. HISTORY: ORDERING SYSTEM PROVIDED HISTORY: Chest Pain TECHNOLOGIST PROVIDED HISTORY: Chest Pain Reason for Exam: chest pain Acuity: Acute Type of Exam: Initial FINDINGS: Nipple piercings bilaterally. Cardiomediastinal shadow within normal limits for AP technique. No localized pulmonary opacity or blunting of the costophrenic angles. Mild elevation left hemidiaphragm. Bones and soft tissues intact. No acute cardiopulmonary disease. Ct Chest Pulmonary Embolism W Contrast    Result Date: 5/16/2020  EXAMINATION: CTA OF THE CHEST 5/16/2020 3:33 pm TECHNIQUE: CTA of the chest was performed after the administration of intravenous contrast.  Multiplanar reformatted images are provided for review. MIP images are provided for review. Dose modulation, iterative reconstruction, and/or weight based adjustment of the mA/kV was utilized to reduce the radiation dose to as low as reasonably achievable. COMPARISON: None.  HISTORY: ORDERING SYSTEM PROVIDED HISTORY: elevated ddimer, chest pain TECHNOLOGIST PROVIDED HISTORY: elevated ddimer, chest pain Reason for Exam: elevated d-dimer, chest pain, dizziness Acuity: Unknown Type of Exam: Unknown FINDINGS: Pulmonary Arteries: There is tiny focal subsegmental filling defect in a posterior basal right lower lobe pulmonary arterial branch on series 2, image 127 & 128 as well as series 601 (coronal) image 96 consistent with tiny pulmonary embolus. Main pulmonary artery is normal in caliber. Mediastinum: No evidence of mediastinal lymphadenopathy. The heart and pericardium demonstrate no acute abnormality. There is no acute abnormality of the thoracic aorta. Lungs/pleura: No focal consolidation. 3 mm nodule in the anterior segment right upper lobe on series 4, image 31 not considered significant. No follow-up recommended. No pleural effusion or pneumothorax Upper Abdomen: Severe diffuse hepatic steatosis. Soft Tissues/Bones: No acute bone or soft tissue abnormality. 1. Tiny focus of subsegmental branch filling defect in posterior basal right lower lobe representing acute pulmonary embolism. 2. No evidence for pneumonia. 3. Severe diffuse hepatic steatosis. Findings were discussed with NIURKA KEY at 4:30 p.m. on 5/16/2020.      Vl Dup Lower Extremity Venous Bilateral    Result Date: 5/19/2020    [facility]  Vascular Lower Extremities DVT Study Procedure   Patient Name   [facility]    Date of Study           05/19/2020                 Alfonso Stevensonn   Date of Birth  1985  Gender                  Male   Age            30 year(s)  Race                       Room Number    2109        Height:                 67.72 inch, 172 cm   Corporate ID # [id number]    Weight:                 190 pounds, 86 kg   Patient Acct # [de-identified]   BSA:        1.99 m^2    BMI:      29.07 kg/m^2   MR #           [id number]      Sonographer             Elizabeth Chatman, T   Accession #    [id number]   Interpreting Physician  Oli Fall   Referring !Yes       !Yes            ! None      ! +------------------------------------+----------+---------------+----------+ ! GSV Ankle                           ! Yes       ! Yes            ! None      ! +------------------------------------+----------+---------------+----------+ ! SSV                                 ! Yes       ! Yes            ! None      ! +------------------------------------+----------+---------------+----------+ Left Lower Extremities DVT Study Measurements Left 2D Measurements +------------------------------------+----------+---------------+----------+ ! Location                            ! Visualized! Compressibility! Thrombosis! +------------------------------------+----------+---------------+----------+ ! Common Femoral                      !Yes       ! Yes            ! None      ! +------------------------------------+----------+---------------+----------+ ! Prox Femoral                        !Yes       ! Yes            ! None      ! +------------------------------------+----------+---------------+----------+ ! Mid Femoral                         !Yes       ! Yes            ! None      ! +------------------------------------+----------+---------------+----------+ ! Dist Femoral                        !Yes       ! Yes            ! None      ! +------------------------------------+----------+---------------+----------+ ! Deep Femoral                        !Yes       ! Yes            ! None      ! +------------------------------------+----------+---------------+----------+ ! Popliteal                           !Yes       ! Yes            ! None      ! +------------------------------------+----------+---------------+----------+ ! Sapheno Femoral Junction            ! Yes       ! Yes            ! None      ! +------------------------------------+----------+---------------+----------+ ! PTV                                 ! Partial   !Yes            ! None      ! Eyes:      General: No scleral icterus. Right eye: No discharge. Left eye: No discharge. Extraocular Movements: Extraocular movements intact. Conjunctiva/sclera: Conjunctivae normal.      Pupils: Pupils are equal, round, and reactive to light. Cardiovascular:      Rate and Rhythm: Normal rate and regular rhythm. Pulses: Normal pulses. Heart sounds: No murmur. Pulmonary:      Effort: Pulmonary effort is normal.      Breath sounds: Normal breath sounds. No wheezing. Abdominal:      General: There is no distension. Tenderness: There is no abdominal tenderness. Musculoskeletal: Normal range of motion. General: No swelling or tenderness. Right lower leg: No edema. Left lower leg: No edema. Skin:     General: Skin is warm. Coloration: Skin is not jaundiced. Findings: No erythema. Neurological:      Mental Status: He is alert and oriented to person, place, and time. Motor: No weakness. Psychiatric:         Mood and Affect: Mood normal.         Behavior: Behavior normal.         Thought Content:  Thought content normal.         Judgment: Judgment normal.           Assessment:        Primary Problem  ACS (acute coronary syndrome) [facility])    Active Hospital Problems    Diagnosis Date Noted    ACS (acute coronary syndrome) ([facility] [address] [I24.9] 05/19/2020    [facility] [I26.99] 05/16/2020       Plan:          ACS  Troponin 8<--11  Stress test positive for ejection fraction 33% and global ventricular dyskinesia  EKG normal  Cardiology consulted  Echo EF 35-40%, LA echogenicity   Cardiac cath today  Chest x-ray no acute abnormality     PE unprovoked   CT chest shows tiny focus of segmental branch filling defect in posterior basal right lower lobe  Started on heparin high-dose in the ED switched to Eliquis   Eliquis 10 mg po daily (ON HOLD)  Patient back on Heparin gtt for cardiac cath   Heme-Onc on board   D-dimer

## 2020-05-20 NOTE — FLOWSHEET NOTE
05/20/20 1558   Provider Notification   Reason for Communication Evaluate  (plan?)   Provider Name Dr. Natali Kimball   Provider Notification Physician   Method of Communication Page   Response Waiting for response   Rn spoke with Dr. Natali Kimball about the plan for the pt since his cath was negative. He stated to monitor the pt for bleeding for a couple hours and the pt can be discharged home, and to follow up with cardiology in 1-2 weeks. Clin roel Trujillo, updated the residents on this.

## 2020-05-20 NOTE — PROGRESS NOTES
Constitutional: No fever or chills. No night sweats, no weight loss   Eyes: No eye discharge, double vision, or eye pain   HEENT: negative for sore mouth, sore throat, hoarseness and voice change   Respiratory: negative for cough , sputum, dyspnea, wheezing, hemoptysis, chest pain   Cardiovascular: negative for  dyspnea, palpitations, orthopnea, PND. Positive chest pain now resolved  Gastrointestinal: negative for nausea, vomiting, diarrhea, constipation, abdominal pain, Dysphagia, hematemesis and hematochezia   Genitourinary: negative for frequency, dysuria, nocturia, urinary incontinence, and hematuria   Integument: negative for rash, skin lesions, bruises.    Hematologic/Lymphatic: negative for easy bruising, bleeding, lymphadenopathy, or petechiae   Endocrine: negative for heat or cold intolerance,weight changes, change in bowel habits and hair loss   Musculoskeletal: negative for myalgias, arthralgias, pain, joint swelling,and bone pain   Neurological: negative for headaches, dizziness, seizures, weakness, numbness    PHYSICAL EXAM:        /89   Pulse 82   Temp 98 °F (36.7 °C) (Oral)   Resp 16   Ht 5' 8\" (1.727 m)   Wt 190 lb 7.6 oz (86.4 kg)   SpO2 97%   BMI 28.96 kg/m²    Temp (24hrs), Av °F (36.7 °C), Min:97.8 °F (36.6 °C), Max:98.3 °F (36.8 °C)      General appearance - well appearing, no in pain or distress   Mental status - alert and cooperative   Eyes - pupils equal and reactive, extraocular eye movements intact   Ears - bilateral TM's and external ear canals normal   Mouth - mucous membranes moist, pharynx normal without lesions   Neck - supple, no significant adenopathy   Lymphatics - no palpable lymphadenopathy, no hepatosplenomegaly   Chest - clear to auscultation, no wheezes, rales or rhonchi, symmetric air entry   Heart - normal rate, regular rhythm, normal S1, S2, no murmurs  Abdomen - soft, nontender, nondistended, no masses or organomegaly   Neurological - alert, oriented, Hematocrit 40.5 (L) 41 - 53 %    .7 (H) 80 - 100 fL    MCH 36.7 (H) 26 - 34 pg    MCHC 34.4 31 - 37 g/dL    RDW 13.9 11.5 - 14.9 %    Platelets 247 (L) 699 - 450 k/uL    MPV 8.6 6.0 - 12.0 fL    NRBC Automated NOT REPORTED per 100 WBC   CBC   Result Value Ref Range    WBC 4.9 3.5 - 11.0 k/uL    RBC 3.88 (L) 4.5 - 5.9 m/uL    Hemoglobin 14.2 13.5 - 17.5 g/dL    Hematocrit 41.1 41 - 53 %    .1 (H) 80 - 100 fL    MCH 36.7 (H) 26 - 34 pg    MCHC 34.6 31 - 37 g/dL    RDW 13.8 11.5 - 14.9 %    Platelets 076 (L) 737 - 450 k/uL    MPV 8.5 6.0 - 12.0 fL    NRBC Automated NOT REPORTED per 100 WBC   Troponin   Result Value Ref Range    Troponin, High Sensitivity 8 0 - 22 ng/L    Troponin T NOT REPORTED <0.03 ng/mL    Troponin Interp NOT REPORTED    CBC   Result Value Ref Range    WBC 4.3 3.5 - 11.0 k/uL    RBC 3.92 (L) 4.5 - 5.9 m/uL    Hemoglobin 14.3 13.5 - 17.5 g/dL    Hematocrit 41.6 41 - 53 %    .1 (H) 80 - 100 fL    MCH 36.6 (H) 26 - 34 pg    MCHC 34.5 31 - 37 g/dL    RDW 13.9 11.5 - 14.9 %    Platelets 915 (L) 611 - 450 k/uL    MPV 8.5 6.0 - 12.0 fL    NRBC Automated NOT REPORTED per 100 WBC   APTT   Result Value Ref Range    PTT 28.4 24.0 - 36.0 sec   Protime-INR   Result Value Ref Range    Protime 12.5 11.8 - 14.6 sec    INR 0.9    HEPARIN LEVEL/ANTI-XA   Result Value Ref Range    Anti-XA Unfrac Heparin 0.82 (HH) 0.30 - 0.70 IU/L   HEPARIN LEVEL/ANTI-XA   Result Value Ref Range    Anti-XA Unfrac Heparin 0.49 0.30 - 0.70 IU/L   APTT   Result Value Ref Range    PTT 41.7 (H) 24.0 - 36.0 sec   CBC   Result Value Ref Range    WBC 4.9 3.5 - 11.0 k/uL    RBC 3.68 (L) 4.5 - 5.9 m/uL    Hemoglobin 13.4 (L) 13.5 - 17.5 g/dL    Hematocrit 39.0 (L) 41 - 53 %    .9 (H) 80 - 100 fL    MCH 36.4 (H) 26 - 34 pg    MCHC 34.4 31 - 37 g/dL    RDW 13.8 11.5 - 14.9 %    Platelets 474 (L) 383 - 450 k/uL    MPV 8.5 6.0 - 12.0 fL    NRBC Automated NOT REPORTED per 100 WBC   APTT   Result Value Ref Range    PTT pain after fall both on outstretched hand and flexed fist.  Bruising and swelling to entire dorsal aspect of right wrist and right metacarpals Acuity: Acute Type of Exam: Initial Mechanism of Injury: Right dorsal wrist pain after fall both on outstretched hand and flexed fist.  Bruising and swelling to entire dorsal aspect of right wrist and right metacarpals Relevant Medical/Surgical History: Right dorsal wrist pain after fall both on outstretched hand and flexed fist.  Bruising and swelling to entire dorsal aspect of right wrist and right metacarpals     Xr Chest Portable    Result Date: 5/16/2020  EXAMINATION: ONE XRAY VIEW OF THE CHEST 5/16/2020 2:53 pm COMPARISON: None. HISTORY: ORDERING SYSTEM PROVIDED HISTORY: Chest Pain TECHNOLOGIST PROVIDED HISTORY: Chest Pain Reason for Exam: chest pain Acuity: Acute Type of Exam: Initial FINDINGS: Nipple piercings bilaterally. Cardiomediastinal shadow within normal limits for AP technique. No localized pulmonary opacity or blunting of the costophrenic angles. Mild elevation left hemidiaphragm. Bones and soft tissues intact. No acute cardiopulmonary disease. Ct Chest Pulmonary Embolism W Contrast    Result Date: 5/16/2020  EXAMINATION: CTA OF THE CHEST 5/16/2020 3:33 pm TECHNIQUE: CTA of the chest was performed after the administration of intravenous contrast.  Multiplanar reformatted images are provided for review. MIP images are provided for review. Dose modulation, iterative reconstruction, and/or weight based adjustment of the mA/kV was utilized to reduce the radiation dose to as low as reasonably achievable. COMPARISON: None. HISTORY: ORDERING SYSTEM PROVIDED HISTORY: elevated ddimer, chest pain TECHNOLOGIST PROVIDED HISTORY: elevated ddimer, chest pain Reason for Exam: elevated d-dimer, chest pain, dizziness Acuity: Unknown Type of Exam: Unknown FINDINGS: Pulmonary Arteries:  There is tiny focal subsegmental filling defect in a posterior basal right lower

## 2020-05-20 NOTE — PLAN OF CARE
Problem: Falls - Risk of:  Goal: Will remain free from falls  Description: Will remain free from falls  5/20/2020 0331 by Shania Key RN  Outcome: Met This Shift  5/20/2020 0331 by Shania Key RN  Outcome: Ongoing  5/19/2020 1831 by Acosta Barber RN  Outcome: Ongoing  Goal: Absence of physical injury  Description: Absence of physical injury  5/20/2020 0331 by Shania Key RN  Outcome: Met This Shift  5/20/2020 0331 by Shania Key RN  Outcome: Ongoing  5/19/2020 1831 by Acosta Barber RN  Outcome: Ongoing     Problem: Bleeding:  Goal: Will show no signs and symptoms of excessive bleeding  Description: Will show no signs and symptoms of excessive bleeding  5/20/2020 0331 by Shania Key RN  Outcome: Met This Shift  Note: Patient on heparin drip, not within therapeutic range this shift. No signs of bleeding, hematomas, or drainage. Mild bruising on arms r/t lab draws. Will continue to monitor. 5/20/2020 0331 by Shania Key RN  Outcome: Ongoing  5/19/2020 1831 by Acosta Barber RN  Outcome: Ongoing     Problem: Pain:  Goal: Pain level will decrease  Description: Pain level will decrease  5/20/2020 0331 by Shania Key RN  Outcome: Met This Shift  Note: Denies pain this shift.    5/20/2020 0331 by Shania Key RN  Outcome: Ongoing  5/19/2020 1831 by Acosta Barber RN  Outcome: Ongoing  Goal: Control of acute pain  Description: Control of acute pain  5/20/2020 0331 by Shania Key RN  Outcome: Met This Shift  5/20/2020 0331 by Shania Key RN  Outcome: Ongoing  5/19/2020 1831 by Acosta Barber RN  Outcome: Ongoing  Goal: Control of chronic pain  Description: Control of chronic pain  5/20/2020 0331 by Shania Key RN  Outcome: Met This Shift  5/20/2020 0331 by Shania Key RN  Outcome: Ongoing  5/19/2020 1831 by Acosta Barber RN  Outcome: Ongoing     Problem: Pain:  Goal: Control of acute pain  Description: Control of acute pain  5/20/2020 0331 by Shania Key RN  Outcome: Met This Shift  5/20/2020 0331 by Valentina Jovel RN  Outcome: Ongoing  5/19/2020 1831 by Jarvis Wong RN  Outcome: Ongoing     Problem: Pain:  Goal: Control of chronic pain  Description: Control of chronic pain  5/20/2020 0331 by Valentina Jovel RN  Outcome: Met This Shift  5/20/2020 0331 by Valentina Jovel RN  Outcome: Ongoing  5/19/2020 1831 by Jarvis Wong RN  Outcome: Ongoing     Problem: Discharge Planning:  Goal: Discharged to appropriate level of care  Description: Discharged to appropriate level of care  5/20/2020 0331 by Valentina Jovel RN  Outcome: Ongoing  5/20/2020 0331 by Valentina Jovel RN  Outcome: Ongoing  5/19/2020 1831 by Jarvis Wong RN  Outcome: Ongoing     Problem: Fluid Volume - Deficit:  Goal: Absence of fluid volume deficit signs and symptoms  Description: Absence of fluid volume deficit signs and symptoms  5/20/2020 0331 by Vlaentina Jovel RN  Outcome: Ongoing  5/20/2020 0331 by Valentina Jovel RN  Outcome: Ongoing  5/19/2020 1831 by Jarvis Wong RN  Outcome: Ongoing     Problem: Nutrition Deficit:  Goal: Ability to achieve adequate nutritional intake will improve  Description: Ability to achieve adequate nutritional intake will improve  5/20/2020 0331 by Valentina Jovel RN  Outcome: Ongoing  5/20/2020 0331 by Valentina Jovel RN  Outcome: Ongoing  5/19/2020 1831 by Jarvis Wong RN  Outcome: Ongoing

## 2020-05-21 ENCOUNTER — CARE COORDINATION (OUTPATIENT)
Dept: CASE MANAGEMENT | Age: 35
End: 2020-05-21

## 2020-05-26 ENCOUNTER — OFFICE VISIT (OUTPATIENT)
Dept: INTERNAL MEDICINE CLINIC | Age: 35
End: 2020-05-26

## 2020-05-26 VITALS
SYSTOLIC BLOOD PRESSURE: 136 MMHG | HEIGHT: 68 IN | OXYGEN SATURATION: 96 % | HEART RATE: 101 BPM | WEIGHT: 192.6 LBS | DIASTOLIC BLOOD PRESSURE: 84 MMHG | TEMPERATURE: 98.9 F | BODY MASS INDEX: 29.19 KG/M2

## 2020-05-26 PROBLEM — I10 ESSENTIAL HYPERTENSION: Status: ACTIVE | Noted: 2020-05-26

## 2020-05-26 PROCEDURE — 99406 BEHAV CHNG SMOKING 3-10 MIN: CPT | Performed by: PHYSICIAN ASSISTANT

## 2020-05-26 PROCEDURE — 1111F DSCHRG MED/CURRENT MED MERGE: CPT | Performed by: PHYSICIAN ASSISTANT

## 2020-05-26 PROCEDURE — 99496 TRANSJ CARE MGMT HIGH F2F 7D: CPT | Performed by: PHYSICIAN ASSISTANT

## 2020-05-26 RX ORDER — AMOXICILLIN 875 MG/1
875 TABLET, COATED ORAL 2 TIMES DAILY
COMMUNITY
Start: 2019-02-28 | End: 2020-05-29 | Stop reason: ALTCHOICE

## 2020-05-26 RX ORDER — CHLORDIAZEPOXIDE HYDROCHLORIDE 5 MG/1
5 CAPSULE, GELATIN COATED ORAL
COMMUNITY
Start: 2020-05-17 | End: 2020-05-26 | Stop reason: ALTCHOICE

## 2020-05-26 RX ORDER — THIAMINE MONONITRATE (VIT B1) 100 MG
100 TABLET ORAL DAILY
Qty: 30 TABLET | Refills: 3 | Status: SHIPPED | OUTPATIENT
Start: 2020-05-26 | End: 2020-06-02 | Stop reason: SDUPTHER

## 2020-05-26 RX ORDER — POLYETHYLENE GLYCOL 3350 17 G/17G
17 POWDER, FOR SOLUTION ORAL
COMMUNITY
Start: 2020-05-16 | End: 2020-05-26 | Stop reason: ALTCHOICE

## 2020-05-26 ASSESSMENT — PATIENT HEALTH QUESTIONNAIRE - PHQ9
SUM OF ALL RESPONSES TO PHQ9 QUESTIONS 1 & 2: 0
1. LITTLE INTEREST OR PLEASURE IN DOING THINGS: 0
2. FEELING DOWN, DEPRESSED OR HOPELESS: 0
SUM OF ALL RESPONSES TO PHQ QUESTIONS 1-9: 0
SUM OF ALL RESPONSES TO PHQ QUESTIONS 1-9: 0

## 2020-05-26 NOTE — PROGRESS NOTES
[facility] due to pulmonary embolism. He underwent a nuclear stress test that revealed a low LVEF with inferior wall ischemia. Echocardiogram confirmed moderately reduced LV function with Estimated LV EF 35-40 %. Status post cardiac cath which revealed no stenosis coronary arteries. Patient started on Eliquis, has follow up with hematologist to complete hypercoag workup. Patient started on Norvasc due to uncontrolled hypertension. Continues to experience intermittent chest pressure, dyspnea with exertion. Episode of vomiting this morning he attributes to spoiled food, has resolved, no abdominal pain. No pain/swelling in lower extremities. Currently without chest pain, dyspnea. Patient does report significant history of alcohol abuse. Daily drinking greater than a pint of liquor daily for many years. Tobacco abuse, smoking 1.0 ppd x 10 years. A comprehensive review of systems was negative except for what was noted in the HPI. Vitals:    05/26/20 1033   BP: 136/84   Pulse: 101   Temp: 98.9 °F (37.2 °C)   SpO2: 96%   Weight: 192 lb 9.6 oz (87.4 kg)   Height: 5' 7.99\" (1.727 m)     Body mass index is 29.29 kg/m².    Wt Readings from Last 3 Encounters:   05/26/20 192 lb 9.6 oz (87.4 kg)   05/17/20 190 lb 7.6 oz (86.4 kg)   04/30/20 185 lb (83.9 kg)     BP Readings from Last 3 Encounters:   05/26/20 136/84   05/20/20 (!) 151/98   05/20/20 126/89      Physical Exam:  General - alert, well appearing, and in no distress  Skin - normal coloration and turgor, no rash, non diaphoretic  Eyes - pupils equal and reactive, extraocular eye movements intact, no nystagmus, no icterus   Ears - bilateral TM's and external ear canals normal  Nose - normal and patent, no erythema, discharge or polyps  Mouth - mucous membranes moist, pharynx normal without lesions  Neck - supple, nosignificant adenopathy, no palpable masses, no carotid bruit bilaterally   Lymphatics - no palpable lymphadenopathy, no hepatosplenomegaly  Chest - clear to auscultation, no wheezes, rales or rhonchi, symmetric air entry  Heart - normal rate, regular rhythm, no murmurs, rubs, clicks or gallops  Abdomen - soft, nontender, nondistended, no masses or organomegaly  Back - full range of motion, no tenderness, palpable spasm or pain on motion  Neurological -alert, oriented, normal speech, no focal sensory or motor deficit, cranial nerve exam without deficit  Musculoskeletal - no joint tenderness, deformity or swelling, strength 5/5 in upper and lower extremities   Extremities - peripheral pulses normal, no pedal edema    Assessment/Plan:  1. Encounter to establish care  2. Hospital discharge follow-up  [facility] discharge summary reviewed, medications reconciled   - NH DISCHARGE MEDS RECONCILED W/ CURRENT OUTPATIENT MED LIST    3. Essential hypertension  - Controlled, on Norvasc, continue present management    4. Cardiomyopathy, unspecified type ([facility] [address]  5. 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 Pulmonary embolism on right ([facility] [address]  - Stable, continue Kp Harper MD, Hematology/Oncology, [place]    7. Uncomplicated alcohol dependence ([facility] [address]  - Advised to quit drinking alcohol, risks of continued use, advised to gradually discontinue use to avoid withdrawal  - vitamin B-1 (THIAMINE) 100 MG tablet; Take 1 tablet by mouth daily  Dispense: 30 tablet; Refill: 3    8.  Tobacco abuse  - Greater than 5 minutes spent discussing smoking cessation  - Discussed importance of cessation, risks of continued use  - Discussed previous attempts to quit, methods and skills for cessation, medication management  - Patient not ready to make attempt at this time  - Continue to assess at each visit    Medical

## 2020-05-29 ENCOUNTER — TELEPHONE (OUTPATIENT)
Dept: ONCOLOGY | Age: 35
End: 2020-05-29

## 2020-05-29 ENCOUNTER — OFFICE VISIT (OUTPATIENT)
Dept: ONCOLOGY | Age: 35
End: 2020-05-29

## 2020-05-29 ENCOUNTER — HOSPITAL ENCOUNTER (OUTPATIENT)
Age: 35
Discharge: HOME | End: 2020-05-29

## 2020-05-29 VITALS
BODY MASS INDEX: 29.19 KG/M2 | HEIGHT: 68 IN | SYSTOLIC BLOOD PRESSURE: 135 MMHG | DIASTOLIC BLOOD PRESSURE: 94 MMHG | TEMPERATURE: 98.5 F | WEIGHT: 192.6 LBS | HEART RATE: 56 BPM

## 2020-05-29 PROCEDURE — 99214 OFFICE O/P EST MOD 30 MIN: CPT | Performed by: INTERNAL MEDICINE

## 2020-05-29 PROCEDURE — 99211 OFF/OP EST MAY X REQ PHY/QHP: CPT | Performed by: INTERNAL MEDICINE

## 2020-05-29 RX ORDER — NICOTINE 21-14-7MG
14 KIT TRANSDERMAL NIGHTLY
COMMUNITY

## 2020-05-29 NOTE — PROGRESS NOTES
Neutrophils 59 36 - 66 %    Lymphocytes 24 24 - 44 %    Monocytes 12 (H) 1 - 7 %    Eosinophils % 3 0 - 4 %    Basophils 2 0 - 2 %    Immature Granulocytes NOT REPORTED 0 %    Segs Absolute 2.60 1.3 - 9.1 k/uL    Absolute Lymph # 1.10 1.0 - 4.8 k/uL    Absolute Mono # 0.50 0.1 - 1.3 k/uL    Absolute Eos # 0.10 0.0 - 0.4 k/uL    Basophils Absolute 0.10 0.0 - 0.2 k/uL    Absolute Immature Granulocyte NOT REPORTED 0.00 - 0.30 k/uL    WBC Morphology NOT REPORTED     RBC Morphology NOT REPORTED     Platelet Estimate NOT REPORTED    Comprehensive Metabolic Panel   Result Value Ref Range    Glucose 96 70 - 99 mg/dL    BUN 16 6 - 20 mg/dL    CREATININE 0.57 (L) 0.70 - 1.20 mg/dL    Bun/Cre Ratio NOT REPORTED 9 - 20    Calcium 9.1 8.6 - 10.4 mg/dL    Sodium 140 135 - 144 mmol/L    Potassium 3.7 3.7 - 5.3 mmol/L    Chloride 100 98 - 107 mmol/L    CO2 23 20 - 31 mmol/L    Anion Gap 17 9 - 17 mmol/L    Alkaline Phosphatase 93 40 - 129 U/L     (H) 5 - 41 U/L     (H) <40 U/L    Total Bilirubin 0.43 0.3 - 1.2 mg/dL    Total Protein 8.8 (H) 6.4 - 8.3 g/dL    Alb 4.4 3.5 - 5.2 g/dL    Albumin/Globulin Ratio NOT REPORTED 1.0 - 2.5    GFR Non-African American >60 >60 mL/min    GFR African American >60 >60 mL/min    GFR Comment          GFR Staging NOT REPORTED    Troponin   Result Value Ref Range    Troponin, High Sensitivity 11 0 - 22 ng/L    Troponin T NOT REPORTED <0.03 ng/mL    Troponin Interp NOT REPORTED    Troponin   Result Value Ref Range    Troponin, High Sensitivity 10 0 - 22 ng/L    Troponin T NOT REPORTED <0.03 ng/mL    Troponin Interp NOT REPORTED    D-Dimer, Quantitative   Result Value Ref Range    D-Dimer, Quant 0.88 (H) 0.00 - 0.59 mg/L FEU   APTT   Result Value Ref Range    PTT 26.2 24.0 - 36.0 sec   Basic Metabolic Panel w/ Reflex to MG   Result Value Ref Range    Glucose 95 70 - 99 mg/dL    BUN 12 6 - 20 mg/dL    CREATININE 0.55 (L) 0.70 - 1.20 mg/dL    Bun/Cre Ratio NOT REPORTED 9 - 20    Calcium 8.6 8.6 - 10.4 mg/dL    Sodium 136 135 - 144 mmol/L    Potassium 3.6 (L) 3.7 - 5.3 mmol/L    Chloride 98 98 - 107 mmol/L    CO2 25 20 - 31 mmol/L    Anion Gap 13 9 - 17 mmol/L    GFR Non-African American >60 >60 mL/min    GFR African American >60 >60 mL/min    GFR Comment          GFR Staging NOT REPORTED    CBC   Result Value Ref Range    WBC 4.4 3.5 - 11.0 k/uL    RBC 3.83 (L) 4.5 - 5.9 m/uL    Hemoglobin 13.9 13.5 - 17.5 g/dL    Hematocrit 40.2 (L) 41 - 53 %    .9 (H) 80 - 100 fL    MCH 36.2 (H) 26 - 34 pg    MCHC 34.6 31 - 37 g/dL    RDW 13.8 11.5 - 14.9 %    Platelets 532 (L) 894 - 450 k/uL    MPV 8.8 6.0 - 12.0 fL    NRBC Automated NOT REPORTED per 100 WBC   HEPARIN LEVEL/ANTI-XA   Result Value Ref Range    Anti-XA Unfrac Heparin 0.53 0.30 - 0.70 IU/L   HEPARIN LEVEL/ANTI-XA   Result Value Ref Range    Anti-XA Unfrac Heparin 0.47 0.30 - 0.70 IU/L   Basic Metabolic Panel w/ Reflex to MG   Result Value Ref Range    Glucose 111 (H) 70 - 99 mg/dL    BUN 11 6 - 20 mg/dL    CREATININE 0.55 (L) 0.70 - 1.20 mg/dL    Bun/Cre Ratio NOT REPORTED 9 - 20    Calcium 8.9 8.6 - 10.4 mg/dL    Sodium 136 135 - 144 mmol/L    Potassium 3.9 3.7 - 5.3 mmol/L    Chloride 101 98 - 107 mmol/L    CO2 23 20 - 31 mmol/L    Anion Gap 12 9 - 17 mmol/L    GFR Non-African American >60 >60 mL/min    GFR African American >60 >60 mL/min    GFR Comment          GFR Staging NOT REPORTED    CBC   Result Value Ref Range    WBC 4.0 3.5 - 11.0 k/uL    RBC 3.88 (L) 4.5 - 5.9 m/uL    Hemoglobin 14.0 13.5 - 17.5 g/dL    Hematocrit 40.8 (L) 41 - 53 %    .3 (H) 80 - 100 fL    MCH 36.1 (H) 26 - 34 pg    MCHC 34.3 31 - 37 g/dL    RDW 14.0 11.5 - 14.9 %    Platelets 709 (L) 790 - 450 k/uL    MPV 8.5 6.0 - 12.0 fL    NRBC Automated NOT REPORTED per 100 WBC   Vitamin B12 & Folate   Result Value Ref Range    Vitamin B-12 841 232 - 1245 pg/mL    Folate 9.7 >4.8 ng/mL   TSH WITH REFLEX   Result Value Ref Range    TSH 5.36 (H) 0.30 - 5.00 mIU/L   T4, 5/16/2020  EXAMINATION: ONE XRAY VIEW OF THE CHEST 5/16/2020 2:53 pm COMPARISON: None. HISTORY: ORDERING SYSTEM PROVIDED HISTORY: Chest Pain TECHNOLOGIST PROVIDED HISTORY: Chest Pain Reason for Exam: chest pain Acuity: Acute Type of Exam: Initial FINDINGS: Nipple piercings bilaterally. Cardiomediastinal shadow within normal limits for AP technique. No localized pulmonary opacity or blunting of the costophrenic angles. Mild elevation left hemidiaphragm. Bones and soft tissues intact. No acute cardiopulmonary disease. Ct Chest Pulmonary Embolism W Contrast    Result Date: 5/16/2020  EXAMINATION: CTA OF THE CHEST 5/16/2020 3:33 pm TECHNIQUE: CTA of the chest was performed after the administration of intravenous contrast.  Multiplanar reformatted images are provided for review. MIP images are provided for review. Dose modulation, iterative reconstruction, and/or weight based adjustment of the mA/kV was utilized to reduce the radiation dose to as low as reasonably achievable. COMPARISON: None. HISTORY: ORDERING SYSTEM PROVIDED HISTORY: elevated ddimer, chest pain TECHNOLOGIST PROVIDED HISTORY: elevated ddimer, chest pain Reason for Exam: elevated d-dimer, chest pain, dizziness Acuity: Unknown Type of Exam: Unknown FINDINGS: Pulmonary Arteries: There is tiny focal subsegmental filling defect in a posterior basal right lower lobe pulmonary arterial branch on series 2, image 127 & 128 as well as series 601 (coronal) image 96 consistent with tiny pulmonary embolus. Main pulmonary artery is normal in caliber. Mediastinum: No evidence of mediastinal lymphadenopathy. The heart and pericardium demonstrate no acute abnormality. There is no acute abnormality of the thoracic aorta. Lungs/pleura: No focal consolidation. 3 mm nodule in the anterior segment right upper lobe on series 4, image 31 not considered significant. No follow-up recommended.   No pleural effusion or pneumothorax Upper Abdomen: Severe diffuse !Yes       !Yes            ! None      ! +------------------------------------+----------+---------------+----------+ ! Prox Femoral                        !Yes       ! Yes            ! None      ! +------------------------------------+----------+---------------+----------+ ! Mid Femoral                         !Yes       ! Yes            ! None      ! +------------------------------------+----------+---------------+----------+ ! Dist Femoral                        !Yes       ! Yes            ! None      ! +------------------------------------+----------+---------------+----------+ ! Deep Femoral                        !Yes       ! Yes            ! None      ! +------------------------------------+----------+---------------+----------+ ! Popliteal                           !Yes       ! Yes            ! None      ! +------------------------------------+----------+---------------+----------+ ! Sapheno Femoral Junction            ! Yes       ! Yes            ! None      ! +------------------------------------+----------+---------------+----------+ ! PTV                                 ! Partial   !Yes            ! None      ! +------------------------------------+----------+---------------+----------+ ! Peroneal                            !Partial   !Yes            ! None      ! +------------------------------------+----------+---------------+----------+ ! Gastroc                             ! Yes       ! Yes            ! None      ! +------------------------------------+----------+---------------+----------+ ! GSV Thigh                           ! Yes       ! Yes            ! None      ! +------------------------------------+----------+---------------+----------+ ! GSV Knee                            ! Yes       ! Yes            ! None      ! +------------------------------------+----------+---------------+----------+ ! GSV Ankle                           ! Yes       ! Yes            ! None      !

## 2020-06-02 ENCOUNTER — HOSPITAL ENCOUNTER (EMERGENCY)
Age: 35
Discharge: LEFT BEFORE DISCHARGE | End: 2020-06-02
Attending: EMERGENCY MEDICINE

## 2020-06-02 ENCOUNTER — APPOINTMENT (OUTPATIENT)
Dept: GENERAL RADIOLOGY | Age: 35
End: 2020-06-02

## 2020-06-02 VITALS
OXYGEN SATURATION: 98 % | WEIGHT: 193 LBS | DIASTOLIC BLOOD PRESSURE: 96 MMHG | RESPIRATION RATE: 13 BRPM | HEART RATE: 105 BPM | BODY MASS INDEX: 29.35 KG/M2 | SYSTOLIC BLOOD PRESSURE: 140 MMHG | TEMPERATURE: 98.4 F

## 2020-06-02 LAB
ABSOLUTE EOS #: 0.1 K/UL (ref 0–0.4)
ABSOLUTE IMMATURE GRANULOCYTE: ABNORMAL K/UL (ref 0–0.3)
ABSOLUTE LYMPH #: 1.1 K/UL (ref 1–4.8)
ABSOLUTE MONO #: 0.6 K/UL (ref 0.1–1.3)
ACETAMINOPHEN LEVEL: <5 UG/ML (ref 10–30)
ALBUMIN SERPL-MCNC: 4.4 G/DL (ref 3.5–5.2)
ALBUMIN/GLOBULIN RATIO: ABNORMAL (ref 1–2.5)
ALP BLD-CCNC: 108 U/L (ref 40–129)
ALT SERPL-CCNC: 129 U/L (ref 5–41)
ANION GAP SERPL CALCULATED.3IONS-SCNC: 18 MMOL/L (ref 9–17)
AST SERPL-CCNC: 160 U/L
BASOPHILS # BLD: 1 % (ref 0–2)
BASOPHILS ABSOLUTE: 0.1 K/UL (ref 0–0.2)
BILIRUB SERPL-MCNC: 0.66 MG/DL (ref 0.3–1.2)
BUN BLDV-MCNC: 18 MG/DL (ref 6–20)
BUN/CREAT BLD: ABNORMAL (ref 9–20)
CALCIUM SERPL-MCNC: 9 MG/DL (ref 8.6–10.4)
CHLORIDE BLD-SCNC: 98 MMOL/L (ref 98–107)
CO2: 21 MMOL/L (ref 20–31)
CREAT SERPL-MCNC: 0.66 MG/DL (ref 0.7–1.2)
DIFFERENTIAL TYPE: ABNORMAL
EOSINOPHILS RELATIVE PERCENT: 1 % (ref 0–4)
ETHANOL PERCENT: 0.03 %
ETHANOL: 30 MG/DL
GFR AFRICAN AMERICAN: >60 ML/MIN
GFR NON-AFRICAN AMERICAN: >60 ML/MIN
GFR SERPL CREATININE-BSD FRML MDRD: ABNORMAL ML/MIN/{1.73_M2}
GFR SERPL CREATININE-BSD FRML MDRD: ABNORMAL ML/MIN/{1.73_M2}
GLUCOSE BLD-MCNC: 102 MG/DL (ref 70–99)
HCT VFR BLD CALC: 44.1 % (ref 41–53)
HEMOGLOBIN: 15.1 G/DL (ref 13.5–17.5)
IMMATURE GRANULOCYTES: ABNORMAL %
LYMPHOCYTES # BLD: 16 % (ref 24–44)
MAGNESIUM: 1.7 MG/DL (ref 1.6–2.6)
MCH RBC QN AUTO: 36.1 PG (ref 26–34)
MCHC RBC AUTO-ENTMCNC: 34.3 G/DL (ref 31–37)
MCV RBC AUTO: 105 FL (ref 80–100)
MONOCYTES # BLD: 9 % (ref 1–7)
NRBC AUTOMATED: ABNORMAL PER 100 WBC
PDW BLD-RTO: 13.2 % (ref 11.5–14.9)
PLATELET # BLD: 203 K/UL (ref 150–450)
PLATELET ESTIMATE: ABNORMAL
PMV BLD AUTO: 7.7 FL (ref 6–12)
POTASSIUM SERPL-SCNC: 3.7 MMOL/L (ref 3.7–5.3)
RBC # BLD: 4.2 M/UL (ref 4.5–5.9)
RBC # BLD: ABNORMAL 10*6/UL
SALICYLATE LEVEL: <1 MG/DL (ref 3–10)
SEG NEUTROPHILS: 73 % (ref 36–66)
SEGMENTED NEUTROPHILS ABSOLUTE COUNT: 5.3 K/UL (ref 1.3–9.1)
SODIUM BLD-SCNC: 137 MMOL/L (ref 135–144)
TOTAL PROTEIN: 8.9 G/DL (ref 6.4–8.3)
TOXIC TRICYCLIC SC,BLOOD: ABNORMAL
TROPONIN INTERP: NORMAL
TROPONIN INTERP: NORMAL
TROPONIN T: NORMAL NG/ML
TROPONIN T: NORMAL NG/ML
TROPONIN, HIGH SENSITIVITY: 10 NG/L (ref 0–22)
TROPONIN, HIGH SENSITIVITY: 10 NG/L (ref 0–22)
TSH SERPL DL<=0.05 MIU/L-ACNC: 2.89 MIU/L (ref 0.3–5)
WBC # BLD: 7.2 K/UL (ref 3.5–11)
WBC # BLD: ABNORMAL 10*3/UL

## 2020-06-02 PROCEDURE — G0480 DRUG TEST DEF 1-7 CLASSES: HCPCS

## 2020-06-02 PROCEDURE — 80053 COMPREHEN METABOLIC PANEL: CPT

## 2020-06-02 PROCEDURE — 84484 ASSAY OF TROPONIN QUANT: CPT

## 2020-06-02 PROCEDURE — 93005 ELECTROCARDIOGRAM TRACING: CPT | Performed by: STUDENT IN AN ORGANIZED HEALTH CARE EDUCATION/TRAINING PROGRAM

## 2020-06-02 PROCEDURE — 36415 COLL VENOUS BLD VENIPUNCTURE: CPT

## 2020-06-02 PROCEDURE — 84443 ASSAY THYROID STIM HORMONE: CPT

## 2020-06-02 PROCEDURE — 83735 ASSAY OF MAGNESIUM: CPT

## 2020-06-02 PROCEDURE — 99285 EMERGENCY DEPT VISIT HI MDM: CPT

## 2020-06-02 PROCEDURE — 71045 X-RAY EXAM CHEST 1 VIEW: CPT

## 2020-06-02 PROCEDURE — 2580000003 HC RX 258: Performed by: STUDENT IN AN ORGANIZED HEALTH CARE EDUCATION/TRAINING PROGRAM

## 2020-06-02 PROCEDURE — 2500000003 HC RX 250 WO HCPCS: Performed by: STUDENT IN AN ORGANIZED HEALTH CARE EDUCATION/TRAINING PROGRAM

## 2020-06-02 PROCEDURE — 96375 TX/PRO/DX INJ NEW DRUG ADDON: CPT

## 2020-06-02 PROCEDURE — 80307 DRUG TEST PRSMV CHEM ANLYZR: CPT

## 2020-06-02 PROCEDURE — 6360000002 HC RX W HCPCS: Performed by: STUDENT IN AN ORGANIZED HEALTH CARE EDUCATION/TRAINING PROGRAM

## 2020-06-02 PROCEDURE — 85025 COMPLETE CBC W/AUTO DIFF WBC: CPT

## 2020-06-02 PROCEDURE — 6370000000 HC RX 637 (ALT 250 FOR IP): Performed by: STUDENT IN AN ORGANIZED HEALTH CARE EDUCATION/TRAINING PROGRAM

## 2020-06-02 PROCEDURE — 96365 THER/PROPH/DIAG IV INF INIT: CPT

## 2020-06-02 RX ORDER — 0.9 % SODIUM CHLORIDE 0.9 %
1000 INTRAVENOUS SOLUTION INTRAVENOUS ONCE
Status: COMPLETED | OUTPATIENT
Start: 2020-06-02 | End: 2020-06-02

## 2020-06-02 RX ORDER — ONDANSETRON 2 MG/ML
4 INJECTION INTRAMUSCULAR; INTRAVENOUS ONCE
Status: COMPLETED | OUTPATIENT
Start: 2020-06-02 | End: 2020-06-02

## 2020-06-02 RX ORDER — FOLIC ACID 1 MG/1
1 TABLET ORAL DAILY
Qty: 30 TABLET | Refills: 3 | Status: SHIPPED | OUTPATIENT
Start: 2020-06-02 | End: 2021-10-14

## 2020-06-02 RX ORDER — LORAZEPAM 1 MG/1
1 TABLET ORAL ONCE
Status: COMPLETED | OUTPATIENT
Start: 2020-06-02 | End: 2020-06-02

## 2020-06-02 RX ORDER — LORAZEPAM 2 MG/ML
1 INJECTION INTRAMUSCULAR ONCE
Status: COMPLETED | OUTPATIENT
Start: 2020-06-02 | End: 2020-06-02

## 2020-06-02 RX ORDER — THIAMINE MONONITRATE (VIT B1) 100 MG
100 TABLET ORAL DAILY
Qty: 30 TABLET | Refills: 3 | Status: SHIPPED | OUTPATIENT
Start: 2020-06-02 | End: 2021-10-14

## 2020-06-02 RX ADMIN — FOLIC ACID: 5 INJECTION, SOLUTION INTRAMUSCULAR; INTRAVENOUS; SUBCUTANEOUS at 18:22

## 2020-06-02 RX ADMIN — ONDANSETRON 4 MG: 2 INJECTION INTRAMUSCULAR; INTRAVENOUS at 17:18

## 2020-06-02 RX ADMIN — LORAZEPAM 1 MG: 1 TABLET ORAL at 18:33

## 2020-06-02 RX ADMIN — SODIUM CHLORIDE 1000 ML: 9 INJECTION, SOLUTION INTRAVENOUS at 17:02

## 2020-06-02 RX ADMIN — LORAZEPAM 1 MG: 2 INJECTION INTRAMUSCULAR; INTRAVENOUS at 17:19

## 2020-06-02 NOTE — PROGRESS NOTES
Medication History completed:    No changes to medication list at this encounter. Medications confirmed with AVS from 5/20/20.      Thank you,  Remy Serrano, PharmD, BCPS  [phone]

## 2020-06-02 NOTE — ED NOTES
Report given to Americo Madden from [address]. Report method in person   The following was reviewed with receiving RN:   Current vital signs:  BP (!) 155/90   Pulse 94   Temp 98.4 °F (36.9 °C) (Oral)   Resp 14   Wt 193 lb (87.5 kg)   SpO2 98%   BMI 29.35 kg/m²                      Any medication or safety alerts were reviewed. Any pending diagnostics and notifications were also reviewed, as well as any safety concerns or issues, abnormal labs, abnormal imaging, and abnormal assessment findings. Questions were answered.             Sue Vera RN  06/02/20 9387

## 2020-06-03 LAB
EKG ATRIAL RATE: 117 BPM
EKG P AXIS: 67 DEGREES
EKG P-R INTERVAL: 130 MS
EKG Q-T INTERVAL: 304 MS
EKG QRS DURATION: 84 MS
EKG QTC CALCULATION (BAZETT): 424 MS
EKG R AXIS: 26 DEGREES
EKG T AXIS: 74 DEGREES
EKG VENTRICULAR RATE: 117 BPM

## 2020-06-03 PROCEDURE — 93010 ELECTROCARDIOGRAM REPORT: CPT | Performed by: INTERNAL MEDICINE

## 2020-06-03 ASSESSMENT — ENCOUNTER SYMPTOMS
CHEST TIGHTNESS: 1
EYE DISCHARGE: 0
SHORTNESS OF BREATH: 1
ABDOMINAL PAIN: 0
EYE REDNESS: 0

## 2020-06-03 NOTE — ED PROVIDER NOTES
Review of Systems   Constitutional: Negative for chills and fever. Eyes: Negative for discharge and redness. Respiratory: Positive for chest tightness and shortness of breath. Cardiovascular: Negative for chest pain. Gastrointestinal: Negative for abdominal pain. Genitourinary: Negative for dysuria and flank pain. Musculoskeletal: Negative for myalgias. Skin: Negative for rash. Allergic/Immunologic: Negative for environmental allergies. Neurological: Positive for tremors. Negative for headaches. Psychiatric/Behavioral: Negative for agitation and confusion. The patient is nervous/anxious and is hyperactive. PHYSICAL EXAM   (up to 7 for level 4, 8 or more for level 5)     INITIAL VITALS:    weight is 193 lb (87.5 kg). His oral temperature is 98.4 °F (36.9 °C). His blood pressure is 140/96 (abnormal) and his pulse is 105. His respiration is 13 and oxygen saturation is 98%. Physical Exam  Vitals signs and nursing note reviewed. Constitutional:       Appearance: He is well-developed. HENT:      Head: Normocephalic and atraumatic. Nose: Nose normal.      Mouth/Throat:      Mouth: Mucous membranes are moist.   Eyes:      General: No scleral icterus. Conjunctiva/sclera: Conjunctivae normal.      Pupils: Pupils are equal, round, and reactive to light. Neck:      Musculoskeletal: Neck supple. Trachea: No tracheal deviation. Cardiovascular:      Rate and Rhythm: Regular rhythm. Tachycardia present. Heart sounds: Normal heart sounds. No murmur. No friction rub. No gallop. Pulmonary:      Effort: Pulmonary effort is normal. No respiratory distress. Breath sounds: Normal breath sounds. No wheezing or rales. Abdominal:      General: Bowel sounds are normal. There is no distension. Palpations: Abdomen is soft. There is no mass. Tenderness: There is no abdominal tenderness. There is no guarding or rebound. Musculoskeletal: Normal range of motion. Skin:     General: Skin is warm and dry. Findings: No erythema or rash. Neurological:      Mental Status: He is alert and oriented to person, place, and time. Comments: No facial asymmetry, tongue midline on protrusion, no aphasia, no dysarthria, EOMI, PERRLA; 5/5 strength in upper and lower extremities b/l; no changes in sensation; very tremulous during exam.  No intention tremor. Tremulous even at rest.   Psychiatric:         Behavior: Behavior normal.         DIFFERENTIAL  DIAGNOSIS     PLAN (LABS / IMAGING / EKG):  Orders Placed This Encounter   Procedures    XR CHEST PORTABLE    CBC Auto Differential    Comprehensive Metabolic Panel    Magnesium    Troponin    TSH w/reflex to FT4    TOX SCR, BLD, ED    Inpatient consult to Primary Care Provider    EKG 12 Lead       MEDICATIONS ORDERED:  Orders Placed This Encounter   Medications    0.9 % sodium chloride bolus    LORazepam (ATIVAN) injection 1 mg    ondansetron (ZOFRAN) injection 4 mg    thiamine 838 mg, folic acid 1 mg in sodium chloride 0.9 % 50 mL infusion    LORazepam (ATIVAN) tablet 1 mg    vitamin B-1 (THIAMINE) 100 MG tablet     Sig: Take 1 tablet by mouth daily     Dispense:  30 tablet     Refill:  3    folic acid (FOLVITE) 1 MG tablet     Sig: Take 1 tablet by mouth daily     Dispense:  30 tablet     Refill:  3       DDX: Alcohol withdrawal versus worsening PE versus electrolyte abnormality versus Wernicke's encephalopathy    Initial MDM/Plan: 30 y.o. male who presents with known PE on Eliquis. Patient states he is compliant with his medication. Concern is high for alcohol withdrawal.  Will give Ativan and get basic laboratory studies. Anticipate admission.     DIAGNOSTIC RESULTS / EMERGENCY DEPARTMENT COURSE / MDM     LABS:  Labs Reviewed   CBC WITH AUTO DIFFERENTIAL - Abnormal; Notable for the following components:       Result Value    RBC 4.20 (*)     .0 (*)     MCH 36.1 (*)     Seg Neutrophils 73 (*) Ethanol level is 0.03. Patient feeling much improved after Ativan. Will give oral dose of Ativan. Labs negative including troponin. Giving dose of IV folic acid and thiamine. Recommended patient be admitted for alcohol withdrawal however patient does not feel ready to stop drinking. Requesting to leave.   had extensive discussion with patient that alcohol withdrawal can be lethal and cause serious disability however patient understands the risks and is alert and oriented clinically sober still requesting to leave. Encouraged him to return to emergency department if he changes his mind. PROCEDURES:  None    CONSULTS:  IP CONSULT TO PRIMARY CARE PROVIDER    CRITICAL CARE:  Please see attending note    FINAL IMPRESSION      1. Alcohol withdrawal syndrome with complication ([facility]    2. Pulmonary embolism, other, unspecified chronicity, unspecified whether acute [facility] pul[facility]ale present [facility]    3.  Uncomplicated alcohol dependence ([facility]          DISPOSITION / PLAN     DISPOSITION [place] 06/02/2020 07:26:09 PM        PATIENTREFERRED TO:  TERENCE [address] [place] [phone]    Schedule an appointment as soon as possible for a visit         DISCHARGE MEDICATIONS:  Discharge Medication List as of 6/2/2020  7:32 PM      START taking these medications    Details   folic acid (FOLVITE) 1 MG tablet Take 1 tablet by mouth daily, Disp-30 tablet, R-3Print             Silas Carcamo DO  EmergencyMedicine Resident    (Please note that portions of this note were completed with a voice recognition program.  Efforts were made to edit the dictations but occasionally words are mis-transcribed.)       Silas Carcamo DO  Resident  06/03/20 4165

## 2020-06-04 ENCOUNTER — APPOINTMENT (OUTPATIENT)
Dept: GENERAL RADIOLOGY | Age: 35
End: 2020-06-04

## 2020-06-04 ENCOUNTER — TELEPHONE (OUTPATIENT)
Dept: OTHER | Facility: CLINIC | Age: 35
End: 2020-06-04

## 2020-06-04 ENCOUNTER — HOSPITAL ENCOUNTER (EMERGENCY)
Age: 35
Discharge: HOME | End: 2020-06-04
Attending: EMERGENCY MEDICINE

## 2020-06-04 VITALS
OXYGEN SATURATION: 98 % | WEIGHT: 190 LBS | DIASTOLIC BLOOD PRESSURE: 99 MMHG | HEART RATE: 75 BPM | HEIGHT: 68 IN | TEMPERATURE: 98.6 F | RESPIRATION RATE: 16 BRPM | BODY MASS INDEX: 28.79 KG/M2 | SYSTOLIC BLOOD PRESSURE: 139 MMHG

## 2020-06-04 LAB
ABSOLUTE EOS #: 0.2 K/UL (ref 0–0.4)
ABSOLUTE IMMATURE GRANULOCYTE: ABNORMAL K/UL (ref 0–0.3)
ABSOLUTE LYMPH #: 1.1 K/UL (ref 1–4.8)
ABSOLUTE MONO #: 0.7 K/UL (ref 0.1–1.3)
ACETAMINOPHEN LEVEL: <5 UG/ML (ref 10–30)
ALBUMIN SERPL-MCNC: 4.1 G/DL (ref 3.5–5.2)
ALBUMIN/GLOBULIN RATIO: ABNORMAL (ref 1–2.5)
ALP BLD-CCNC: 98 U/L (ref 40–129)
ALT SERPL-CCNC: 120 U/L (ref 5–41)
AMPHETAMINE SCREEN URINE: NEGATIVE
ANION GAP SERPL CALCULATED.3IONS-SCNC: 15 MMOL/L (ref 9–17)
AST SERPL-CCNC: 203 U/L
BARBITURATE SCREEN URINE: NEGATIVE
BASOPHILS # BLD: 2 % (ref 0–2)
BASOPHILS ABSOLUTE: 0.1 K/UL (ref 0–0.2)
BENZODIAZEPINE SCREEN, URINE: NEGATIVE
BILIRUB SERPL-MCNC: 0.59 MG/DL (ref 0.3–1.2)
BILIRUBIN URINE: NEGATIVE
BUN BLDV-MCNC: 14 MG/DL (ref 6–20)
BUN/CREAT BLD: ABNORMAL (ref 9–20)
BUPRENORPHINE URINE: NORMAL
CALCIUM SERPL-MCNC: 9 MG/DL (ref 8.6–10.4)
CANNABINOID SCREEN URINE: NEGATIVE
CHLORIDE BLD-SCNC: 101 MMOL/L (ref 98–107)
CO2: 23 MMOL/L (ref 20–31)
COCAINE METABOLITE, URINE: NEGATIVE
COLOR: YELLOW
COMMENT UA: NORMAL
CREAT SERPL-MCNC: 0.74 MG/DL (ref 0.7–1.2)
DIFFERENTIAL TYPE: ABNORMAL
EOSINOPHILS RELATIVE PERCENT: 3 % (ref 0–4)
ETHANOL PERCENT: 0.26 %
ETHANOL: 256 MG/DL
GFR AFRICAN AMERICAN: >60 ML/MIN
GFR NON-AFRICAN AMERICAN: >60 ML/MIN
GFR SERPL CREATININE-BSD FRML MDRD: ABNORMAL ML/MIN/{1.73_M2}
GFR SERPL CREATININE-BSD FRML MDRD: ABNORMAL ML/MIN/{1.73_M2}
GLUCOSE BLD-MCNC: 82 MG/DL (ref 70–99)
GLUCOSE URINE: NEGATIVE
HCT VFR BLD CALC: 40.7 % (ref 41–53)
HEMOGLOBIN: 13.9 G/DL (ref 13.5–17.5)
IMMATURE GRANULOCYTES: ABNORMAL %
KETONES, URINE: NEGATIVE
LEUKOCYTE ESTERASE, URINE: NEGATIVE
LYMPHOCYTES # BLD: 22 % (ref 24–44)
MCH RBC QN AUTO: 35.9 PG (ref 26–34)
MCHC RBC AUTO-ENTMCNC: 34.1 G/DL (ref 31–37)
MCV RBC AUTO: 105.1 FL (ref 80–100)
MDMA URINE: NORMAL
METHADONE SCREEN, URINE: NEGATIVE
METHAMPHETAMINE, URINE: NORMAL
MONOCYTES # BLD: 13 % (ref 1–7)
NITRITE, URINE: NEGATIVE
NRBC AUTOMATED: ABNORMAL PER 100 WBC
OPIATES, URINE: NEGATIVE
OXYCODONE SCREEN URINE: NEGATIVE
PDW BLD-RTO: 13.2 % (ref 11.5–14.9)
PH UA: 6 (ref 5–8)
PHENCYCLIDINE, URINE: NEGATIVE
PLATELET # BLD: 194 K/UL (ref 150–450)
PLATELET ESTIMATE: ABNORMAL
PMV BLD AUTO: 7.5 FL (ref 6–12)
POTASSIUM SERPL-SCNC: 3.8 MMOL/L (ref 3.7–5.3)
PROPOXYPHENE, URINE: NORMAL
PROTEIN UA: NEGATIVE
RBC # BLD: 3.87 M/UL (ref 4.5–5.9)
RBC # BLD: ABNORMAL 10*6/UL
SALICYLATE LEVEL: <1 MG/DL (ref 3–10)
SEG NEUTROPHILS: 60 % (ref 36–66)
SEGMENTED NEUTROPHILS ABSOLUTE COUNT: 3 K/UL (ref 1.3–9.1)
SODIUM BLD-SCNC: 139 MMOL/L (ref 135–144)
SPECIFIC GRAVITY UA: 1 (ref 1–1.03)
TEST INFORMATION: NORMAL
TOTAL PROTEIN: 8.3 G/DL (ref 6.4–8.3)
TOXIC TRICYCLIC SC,BLOOD: ABNORMAL
TRICYCLIC ANTIDEP,URINE: NEGATIVE
TRICYCLIC ANTIDEPRESSANTS, UR: NORMAL
TROPONIN INTERP: NORMAL
TROPONIN INTERP: NORMAL
TROPONIN T: NORMAL NG/ML
TROPONIN T: NORMAL NG/ML
TROPONIN, HIGH SENSITIVITY: 10 NG/L (ref 0–22)
TROPONIN, HIGH SENSITIVITY: 10 NG/L (ref 0–22)
TURBIDITY: CLEAR
URINE HGB: NEGATIVE
UROBILINOGEN, URINE: NORMAL
WBC # BLD: 5 K/UL (ref 3.5–11)
WBC # BLD: ABNORMAL 10*3/UL

## 2020-06-04 PROCEDURE — G0480 DRUG TEST DEF 1-7 CLASSES: HCPCS

## 2020-06-04 PROCEDURE — 96374 THER/PROPH/DIAG INJ IV PUSH: CPT

## 2020-06-04 PROCEDURE — 84484 ASSAY OF TROPONIN QUANT: CPT

## 2020-06-04 PROCEDURE — 36415 COLL VENOUS BLD VENIPUNCTURE: CPT

## 2020-06-04 PROCEDURE — 80307 DRUG TEST PRSMV CHEM ANLYZR: CPT

## 2020-06-04 PROCEDURE — 6360000002 HC RX W HCPCS: Performed by: EMERGENCY MEDICINE

## 2020-06-04 PROCEDURE — 2580000003 HC RX 258: Performed by: EMERGENCY MEDICINE

## 2020-06-04 PROCEDURE — 81003 URINALYSIS AUTO W/O SCOPE: CPT

## 2020-06-04 PROCEDURE — 99285 EMERGENCY DEPT VISIT HI MDM: CPT

## 2020-06-04 PROCEDURE — 85025 COMPLETE CBC W/AUTO DIFF WBC: CPT

## 2020-06-04 PROCEDURE — 93005 ELECTROCARDIOGRAM TRACING: CPT | Performed by: EMERGENCY MEDICINE

## 2020-06-04 PROCEDURE — 71045 X-RAY EXAM CHEST 1 VIEW: CPT

## 2020-06-04 PROCEDURE — 80053 COMPREHEN METABOLIC PANEL: CPT

## 2020-06-04 RX ORDER — 0.9 % SODIUM CHLORIDE 0.9 %
1000 INTRAVENOUS SOLUTION INTRAVENOUS ONCE
Status: COMPLETED | OUTPATIENT
Start: 2020-06-04 | End: 2020-06-04

## 2020-06-04 RX ORDER — ONDANSETRON 2 MG/ML
4 INJECTION INTRAMUSCULAR; INTRAVENOUS ONCE
Status: COMPLETED | OUTPATIENT
Start: 2020-06-04 | End: 2020-06-04

## 2020-06-04 RX ORDER — LORAZEPAM 2 MG/ML
1 INJECTION INTRAMUSCULAR ONCE
Status: COMPLETED | OUTPATIENT
Start: 2020-06-04 | End: 2020-06-04

## 2020-06-04 RX ADMIN — SODIUM CHLORIDE 1000 ML: 9 INJECTION, SOLUTION INTRAVENOUS at 16:03

## 2020-06-04 RX ADMIN — LORAZEPAM 1 MG: 2 INJECTION INTRAMUSCULAR; INTRAVENOUS at 16:03

## 2020-06-04 RX ADMIN — ONDANSETRON 4 MG: 2 INJECTION INTRAMUSCULAR; INTRAVENOUS at 16:03

## 2020-06-04 ASSESSMENT — PAIN DESCRIPTION - LOCATION: LOCATION: CHEST

## 2020-06-04 ASSESSMENT — ENCOUNTER SYMPTOMS
SHORTNESS OF BREATH: 1
NAUSEA: 1
BLOOD IN STOOL: 0
TROUBLE SWALLOWING: 0
VOMITING: 0
ABDOMINAL PAIN: 0
COUGH: 0
SORE THROAT: 0
DIARRHEA: 0
BACK PAIN: 0
CONSTIPATION: 0
COLOR CHANGE: 0

## 2020-06-04 ASSESSMENT — PAIN DESCRIPTION - PAIN TYPE: TYPE: ACUTE PAIN

## 2020-06-04 ASSESSMENT — PAIN SCALES - GENERAL: PAINLEVEL_OUTOF10: 8

## 2020-06-04 ASSESSMENT — PAIN DESCRIPTION - DESCRIPTORS: DESCRIPTORS: SQUEEZING

## 2020-06-04 ASSESSMENT — PAIN DESCRIPTION - ONSET: ONSET: ON-GOING

## 2020-06-04 NOTE — ED PROVIDER NOTES
[address] ED  eMERGENCY dEPARTMENT eNCOUnter    Pt Name: Swati Hair  MRN: [id number]  YOB: 1985  Date of evaluation:6/4/20  PCP: Sharan Robles Dr       Chief Complaint   Patient presents with    Chest Pain    Alcohol Intoxication    Dizziness       HISTORY OF PRESENT ILLNESS    Swati Hair is a 30 y.o. male who presents with a chief complaint of chest pain, shortness of breath and problems with alcohol. Patient states he was here last month and diagnosed with a pulmonary embolism. He was admitted for 5 days. He was started on Eliquis. He states he is had chest pain ever since he was discharged from the hospital.  Is not new today. States he was drinking alcohol today. He drank about half of a pint of whiskey and then felt like his heart rate was slowing down and he was concerned that this meant that he was withdrawing from the alcohol. Denies any other drug use. No fevers, chills other illnesses. He does feel slightly nauseous but no vomiting. No abdominal pain. No changes in bowel or bladder habits. He states he was here several days ago when he was supposed to be admitted for alcohol withdrawal but he declined admission at that time. Symptoms are acute on chronic. Symptoms are moderate. Nothing make symptoms better or worse. Patient has no other complaints at this time. REVIEW OF SYSTEMS       Review of Systems   Constitutional: Negative for chills, fatigue and fever. HENT: Negative for congestion, ear pain, sore throat and trouble swallowing. Eyes: Negative for visual disturbance. Respiratory: Positive for shortness of breath. Negative for cough. Cardiovascular: Positive for chest pain and palpitations. Negative for leg swelling. Gastrointestinal: Positive for nausea. Negative for abdominal pain, blood in stool, constipation, diarrhea and vomiting. Genitourinary: Negative for dysuria and flank pain.    Musculoskeletal: Negative for arthralgias, back pain, myalgias and neck pain. Skin: Negative for color change, rash and wound. Neurological: Negative for dizziness, weakness, light-headedness, numbness and headaches. Psychiatric/Behavioral: Negative for confusion. All other systems reviewed and are negative. Negativein 10 essential Systems except as mentioned above and in the HPI. PAST MEDICAL HISTORY     Past Medical History:   Diagnosis Date    No active medical problems     Pulmonary embolism ([facility]          SURGICAL HISTORY      has no past surgical history on file. None    CURRENT MEDICATIONS       Current Discharge Medication List      CONTINUE these medications which have NOT CHANGED    Details   vitamin B-1 (THIAMINE) 100 MG tablet Take 1 tablet by mouth daily  Qty: 30 tablet, Refills: 3    Associated Diagnoses: Uncomplicated alcohol dependence (HCC)      folic acid (FOLVITE) 1 MG tablet Take 1 tablet by mouth daily  Qty: 30 tablet, Refills: 3      Nicotine 21-14-7 MG/24HR KIT Place onto the skin      amLODIPine (NORVASC) 10 MG tablet Take 1 tablet by mouth daily  Qty: 30 tablet, Refills: 3      apixaban (ELIQUIS DVT/PE STARTER PACK) 5 MG TABS tablet Take 10 mg (2 tablets) orally twice daily for 7 days, then take 5 mg (1 tablet) orally twice daily thereafter. Qty: 74 tablet, Refills: 0      EPINEPHrine (EPIPEN 2-LIZETTE) 0.3 MG/0.3ML SOAJ injection Inject 0.3 mLs into the muscle once for 1 dose Use as directed for allergic reaction  Qty: 2 each, Refills: 0             ALLERGIES     is allergic to bee venom. FAMILY HISTORY     He indicated that his mother is . He indicated that his father is alive. family history includes Heart Attack in his mother; Heart Disease in his father and mother. SOCIAL HISTORY      reports that he has been smoking cigarettes. He has a 10.00 pack-year smoking history. He has never used smokeless tobacco. He reports current alcohol use. He reports previous drug use.     PHYSICAL EXAM     INITIAL VITALS:  height is 5' 8\" (1.727 m) and weight is 190 lb (86.2 kg). His oral temperature is 98.6 °F (37 °C). His blood pressure is 139/99 (abnormal) and his pulse is 75. His respiration is 16 and oxygen saturation is 98%. Physical Exam  Vitals signs and nursing note reviewed. Constitutional:       General: He is not in acute distress. HENT:      Head: Normocephalic and atraumatic. Eyes:      Conjunctiva/sclera: Conjunctivae normal.      Pupils: Pupils are equal, round, and reactive to light. Neck:      Musculoskeletal: Neck supple. Cardiovascular:      Rate and Rhythm: Regular rhythm. Tachycardia present. Pulmonary:      Effort: Pulmonary effort is normal. No respiratory distress. Breath sounds: Normal breath sounds. Abdominal:      General: Bowel sounds are normal. There is no distension. Palpations: Abdomen is soft. Tenderness: There is no abdominal tenderness. Musculoskeletal:         General: No tenderness. Lymphadenopathy:      Cervical: No cervical adenopathy. Skin:     General: Skin is warm and dry. Findings: No rash. Neurological:      Mental Status: He is alert and oriented to person, place, and time. Psychiatric:         Judgment: Judgment normal.           DIFFERENTIAL DIAGNOSIS/MDM:   78-year-old male with history of alcohol abuse presents with continued chest pain, difficulty breathing and feeling like he is going into alcohol withdrawal.    He is afebrile, nontoxic, normal vital signs other than mild hypertension and tachycardia in the 120s. Admits drinking alcohol today however is clinically sober right now. He is alert and orient x4 answering all questions appropriately. We will get cardiac work-up, will give IV fluids, antiemetics, Ativan at this time.     DIAGNOSTIC RESULTS     EKG: All EKG's are interpreted by the Emergency Department Physician who either signs or Co-signs this chart in the absence of a cardiologist.    EKG Interpretation    Interpreted by me    Rhythm: normal sinus   Rate: Tachycardia 104  Axis: normal  Ectopy: none  Conduction: normal  ST Segments: no acute change  T Waves: no acute change  Q Waves: none    Clinical Impression: Nonspecific EKG, sinus tachycardia at a rate of 104    RADIOLOGY:   I directly visualized the following  images and reviewed the radiologist interpretations:  XR CHEST PORTABLE   Final Result   No acute process. ED BEDSIDE ULTRASOUND:      LABS:  Labs Reviewed   CBC WITH AUTO DIFFERENTIAL - Abnormal; Notable for the following components:       Result Value    RBC 3.87 (*)     Hematocrit 40.7 (*)     .1 (*)     MCH 35.9 (*)     Lymphocytes 22 (*)     Monocytes 13 (*)     All other components within normal limits   COMPREHENSIVE METABOLIC PANEL - Abnormal; Notable for the following components:     (*)      (*)     All other components within normal limits   TOX SCR, BLD, ED - Abnormal; Notable for the following components:    Acetaminophen Level <5 (*)     Ethanol 443 (*)     Salicylate Lvl <1 (*)     All other components within normal limits   TROPONIN   TROPONIN   URINE RT REFLEX TO CULTURE   URINE DRUG SCREEN   DRUG SCREEN TRICYCLIC URINE         EMERGENCY DEPARTMENT COURSE:   Vitals:    Vitals:    06/04/20 1509 06/04/20 1615 06/04/20 2010   BP: (!) 148/102 (!) 133/103 (!) 139/99   Pulse: 122 106 75   Resp: 16 11 16   Temp: 98.6 °F (37 °C)     TempSrc: Oral     SpO2: 94% 95% 98%   Weight: 190 lb (86.2 kg)     Height: 5' 8\" (1.727 m)       8:22 PM EDT  Work-up was unremarkable here. 2 cardiac enzymes are negative. His heart rate has improved to the 70s after IV fluids.  has evaluated the patient. Options are very limited for alcohol rehab because patient does not have insurance. Patient not willing to stay for possible admission to rescue crisis. He is asking to leave. He is alert and oriented x4 at this time. He has a GCS of 15.   He is ambulating with a steady gait. Clinically he is sober at this time. Family number has come to pick him up. As far as his alcohol intoxication I do not think he is going into withdrawal or DTs at this time. I strongly advised him to follow-up with alcohol rehab as an outpatient with the resources that were given to him. Advised him to keep taking his blood thinners for his PE. Advised return if any symptoms worsen. Patient agreeable plan will be discharged home today. CRITICALCARE:      CONSULTS:  None      PROCEDURES:      FINAL IMPRESSION      1.  Acute alcoholic intoxication without complication [facility])            DISPOSITION/PLAN   DISPOSITION Decision To Discharge 06/04/2020 07:49:18 PM        PATIENT [facility]ED [facility]:  [facility] [address] [place] [address]  [phone]    Schedule an appointment as soon as possible for a visit       [facility] ED  [facility] [address] [phone]    As needed, If symptoms worsen      DISCHARGE MEDICATIONS:  Current Discharge Medication List          (Please note that portions ofthis note were completed with a voice recognition program.  Efforts were made to edit the dictations but occasionally words are mis-transcribed.)    Clifford Walker DO  Attending Emergency Physician          Clifford Walker DO  06/04/20 2022

## 2020-06-05 LAB
EKG ATRIAL RATE: 104 BPM
EKG P AXIS: 54 DEGREES
EKG P-R INTERVAL: 136 MS
EKG Q-T INTERVAL: 314 MS
EKG QRS DURATION: 96 MS
EKG QTC CALCULATION (BAZETT): 412 MS
EKG R AXIS: 3 DEGREES
EKG T AXIS: 68 DEGREES
EKG VENTRICULAR RATE: 104 BPM

## 2020-06-05 PROCEDURE — 93010 ELECTROCARDIOGRAM REPORT: CPT | Performed by: INTERNAL MEDICINE

## 2020-07-07 ENCOUNTER — OFFICE VISIT (OUTPATIENT)
Dept: INTERNAL MEDICINE CLINIC | Age: 35
End: 2020-07-07

## 2020-07-07 VITALS
HEIGHT: 68 IN | DIASTOLIC BLOOD PRESSURE: 74 MMHG | WEIGHT: 196 LBS | OXYGEN SATURATION: 96 % | HEART RATE: 91 BPM | TEMPERATURE: 99.5 F | BODY MASS INDEX: 29.7 KG/M2 | SYSTOLIC BLOOD PRESSURE: 120 MMHG

## 2020-07-07 PROBLEM — K90.9 DIARRHEA DUE TO MALABSORPTION: Status: ACTIVE | Noted: 2020-07-07

## 2020-07-07 PROBLEM — F10.20 UNCOMPLICATED ALCOHOL DEPENDENCE (HCC): Status: ACTIVE | Noted: 2020-07-07

## 2020-07-07 PROBLEM — R19.7 DIARRHEA DUE TO MALABSORPTION: Status: ACTIVE | Noted: 2020-07-07

## 2020-07-07 PROBLEM — I42.6 ALCOHOLIC CARDIOMYOPATHY (HCC): Status: ACTIVE | Noted: 2020-07-07

## 2020-07-07 PROCEDURE — 99214 OFFICE O/P EST MOD 30 MIN: CPT | Performed by: INTERNAL MEDICINE

## 2020-07-07 RX ORDER — LOPERAMIDE HYDROCHLORIDE 2 MG/1
2 CAPSULE ORAL 4 TIMES DAILY PRN
Qty: 90 CAPSULE | Refills: 1 | Status: SHIPPED | OUTPATIENT
Start: 2020-07-07 | End: 2020-08-06

## 2020-07-07 ASSESSMENT — ENCOUNTER SYMPTOMS
COUGH: 0
WHEEZING: 0
EYE DISCHARGE: 0
ABDOMINAL DISTENTION: 0
COLOR CHANGE: 0
TROUBLE SWALLOWING: 0
EYE PAIN: 0
SHORTNESS OF BREATH: 0
DIARRHEA: 1
BLOOD IN STOOL: 0

## 2020-07-07 ASSESSMENT — PATIENT HEALTH QUESTIONNAIRE - PHQ9
1. LITTLE INTEREST OR PLEASURE IN DOING THINGS: 0
SUM OF ALL RESPONSES TO PHQ QUESTIONS 1-9: 0
SUM OF ALL RESPONSES TO PHQ9 QUESTIONS 1 & 2: 0
2. FEELING DOWN, DEPRESSED OR HOPELESS: 0
SUM OF ALL RESPONSES TO PHQ QUESTIONS 1-9: 0

## 2020-07-07 NOTE — PROGRESS NOTES
Visit Information    Have you changed or started any medications since your last visit including any over-the-counter medicines, vitamins, or herbal medicines? no   Are you having any side effects from any of your medications? -  no  Have you stopped taking any of your medications? Is so, why? -  no    Have you seen any other physician or provider since your last visit? Yes - Records Obtained  Have you had any other diagnostic tests since your last visit? No  Have you been seen in the emergency room and/or had an admission to a hospital since we last saw you? Yes - Records Obtained  Have you had your routine dental cleaning in the past 6 months? no    Have you activated your [employer] account? If not, what are your barriers?  Yes     Patient Care Team:  Haresh Mondragon PA-C as PCP - General (Physician Assistant)  Haresh Mondragon PA-C as PCP - [facility]    Medical History Review  Past Medical, Family, and Social History reviewed and does not contribute to the patient presenting condition    Health Maintenance   Topic Date Due    Varicella vaccine (1 of 2 - 2-dose childhood series) 01/06/1987    Pneumococcal 0-64 years Vaccine (1 of 1 - PPSV23) 01/06/1992    HIV screen  01/06/2001    DTaP/Tdap/Td vaccine (1 - Tdap) 01/06/2005    Flu vaccine (1) 09/01/2020    Potassium monitoring  06/04/2021    Creatinine monitoring  06/04/2021    Hepatitis A vaccine  Aged Out    Hepatitis B vaccine  Aged Out    Hib vaccine  Aged Out    Meningococcal (ACWY) vaccine  Aged Out

## 2020-07-07 NOTE — PROGRESS NOTES
[address] [address]  Dept: [phone]  Dept Fax: [phone]     Juanita Naik is a 30 y.o. male who presents today for his medical conditions/complaintsas noted below. Juanita Naik is c/o of   Chief Complaint   Patient presents with    Follow-up     cardiology and hematology, work note to go back to work     Emesis     in the morning    Nausea    Diarrhea         HPI:      Alcohol dependence  diarreha        No results found for: LABA1C          ( goal A1Cis < 7)   No results found for: LABMICR  No results found for: LDLCHOLESTEROL, [address]    (goal LDL is <100)   AST (U/L)   Date Value   06/04/2020 203 (H)     ALT (U/L)   Date Value   06/04/2020 120 (H)     BUN (mg/dL)   Date Value   06/04/2020 14     BP Readings from Last 3 Encounters:   07/07/20 120/74   06/04/20 (!) 139/99   06/02/20 (!) 140/96          (goal 120/80)    Past Medical History:   Diagnosis Date    No active medical problems     Pulmonary embolism ([facility] [address]       No past surgical history on file.     Family History   Problem Relation Age of Onset    Heart Disease Mother     Heart Attack Mother     Heart Disease Father           Social History     Tobacco Use    Smoking status: Current Every Day Smoker     Packs/day: 1.00     Years: 10.00     Pack years: 10.00     Types: Cigarettes    Smokeless tobacco: Never Used    Tobacco comment: using nicotine patches at night   Substance Use Topics    Alcohol use: Yes     Frequency: 4 or more times a week     Drinks per session: 3 or 4     Binge frequency: Daily or almost daily     Comment: 4 beers and pint of whiskey daily         Current Outpatient Medications   Medication Sig Dispense Refill    Multiple Vitamins-Minerals (MULTIVITAMIN ADULT PO) Take by mouth      loperamide (RA ANTI-DIARRHEAL) 2 MG capsule Take 1 capsule by mouth 4 times daily as needed for Diarrhea 90 capsule 1    Nicotine 21-14-7 MG/24HR KIT Place onto the skin      amLODIPine (NORVASC) 10 MG tablet Take 1 tablet by mouth daily 30 tablet 3    apixaban (ELIQUIS DVT/PE STARTER PACK) 5 MG TABS tablet Take 10 mg (2 tablets) orally twice daily for 7 days, then take 5 mg (1 tablet) orally twice daily thereafter. 74 tablet 0    vitamin B-1 (THIAMINE) 100 MG tablet Take 1 tablet by mouth daily (Patient not taking: Reported on 7/7/2020) 30 tablet 3    folic acid (FOLVITE) 1 MG tablet Take 1 tablet by mouth daily (Patient not taking: Reported on 7/7/2020) 30 tablet 3    EPINEPHrine (EPIPEN 2-LIZETTE) 0.3 MG/0.3ML SOAJ injection Inject 0.3 mLs into the muscle once for 1 dose Use as directed for allergic reaction 2 each 0     No current facility-administered medications for this visit. Allergies   Allergen Reactions    Bee Venom Anaphylaxis       Health Maintenance   Topic Date Due    Varicella vaccine (1 of 2 - 2-dose childhood series) 01/06/1987    Pneumococcal 0-64 years Vaccine (1 of 1 - PPSV23) 01/06/1992    HIV screen  01/06/2001    DTaP/Tdap/Td vaccine (1 - Tdap) 01/06/2005    Flu vaccine (1) 09/01/2020    Potassium monitoring  06/04/2021    Creatinine monitoring  06/04/2021    Hepatitis A vaccine  Aged Out    Hepatitis B vaccine  Aged Out    Hib vaccine  Aged Out    Meningococcal (ACWY) vaccine  Aged Out       Subjective:     Review of Systems   Constitutional: Negative for appetite change, diaphoresis and fatigue. HENT: Negative for ear discharge and trouble swallowing. Eyes: Negative for pain and discharge. Respiratory: Negative for cough, shortness of breath and wheezing. Cardiovascular: Negative for chest pain and palpitations. Gastrointestinal: Positive for diarrhea. Negative for abdominal distention and blood in stool. Endocrine: Negative for polydipsia and polyphagia. Genitourinary: Negative for difficulty urinating and frequency. Musculoskeletal: Negative for gait problem, myalgias and neck pain.    Skin: Negative for color change and rash. Allergic/Immunologic: Negative for environmental allergies and food allergies. Neurological: Negative for dizziness and headaches. Hematological: Negative for adenopathy. Does not bruise/bleed easily. Psychiatric/Behavioral: Negative for behavioral problems and sleep disturbance. The patient is nervous/anxious. Objective:     Physical Exam  Constitutional:       Appearance: He is well-developed. He is not diaphoretic. HENT:      Head: Normocephalic and atraumatic. Eyes:      General:         Right eye: No discharge. Left eye: No discharge. Extraocular Movements:      Right eye: Normal extraocular motion. Left eye: Normal extraocular motion. Conjunctiva/sclera: Conjunctivae normal.      Right eye: Right conjunctiva is not injected. Left eye: Left conjunctiva is not injected. Neck:      Musculoskeletal: Normal range of motion and neck supple. No edema or erythema. Thyroid: No thyroid mass or thyromegaly. Vascular: No JVD. Cardiovascular:      Rate and Rhythm: Normal rate and regular rhythm. Heart sounds: No murmur. No friction rub. Pulmonary:      Effort: Pulmonary effort is normal. No tachypnea, bradypnea, accessory muscle usage or respiratory distress. Breath sounds: Normal breath sounds. No wheezing or rales. Abdominal:      General: Bowel sounds are normal. There is no distension. Palpations: Abdomen is soft. Tenderness: There is no abdominal tenderness. There is no rebound. Musculoskeletal: Normal range of motion. General: No tenderness. Lymphadenopathy:      Head:      Right side of head: No submental or submandibular adenopathy. Left side of head: No submental or submandibular adenopathy. Cervical: No cervical adenopathy. Skin:     General: Skin is warm. Coloration: Skin is not pale. Findings: No bruising, ecchymosis or rash.    Neurological:      Mental Status: He is alert and oriented to person, place, and time. Cranial Nerves: No cranial nerve deficit. Sensory: No sensory deficit. Motor: No atrophy or abnormal muscle tone. Coordination: Coordination normal.   Psychiatric:         Mood and Affect: Mood is not anxious. Affect is not angry. Speech: Speech is not slurred. Behavior: Behavior normal. Behavior is not aggressive. Thought Content: Thought content does not include homicidal ideation. Cognition and Memory: Memory is not impaired. multiple tattoos    /74   Pulse 91   Temp 99.5 °F (37.5 °C)   Ht 5' 7.99\" (1.727 m)   Wt 196 lb (88.9 kg)   SpO2 96%   BMI 29.81 kg/m²     Assessment:          Diagnosis Orders   1. Acute pulmonary embolism without acute cor pulmonale, unspecified pulmonary embolism type ([facility] [address]     2. Chronic systolic congestive heart failure ([facility] [address]     3. Essential hypertension     4. Uncomplicated alcohol dependence ([facility] [address]     5. Alcoholic cardiomyopathy ([facility] [address]     6. Diarrhea due to malabsorption               Plan:      No follow-ups on file. No orders of the defined types were placed in this encounter. Orders Placed This Encounter   Medications    loperamide (RA ANTI-DIARRHEAL) 2 MG capsule     Sig: Take 1 capsule by mouth 4 times daily as needed for Diarrhea     Dispense:  90 capsule     Refill:  1    stil driking alchol but only few  counceld to quit  Cath n egative  Ef is  35 to 40  Controlled chf systolic  Seen cardio     Patient given educational materials - see patient instructions. Discussed use, benefit, and side effects of prescribed medications. All patientquestions answered. Pt voiced understanding. Reviewed health maintenance. Instructedto continue current medications, diet and exercise. Patient agreed with treatmentplan. Follow up as directed.      Electronically signed by Sal Koch MD on 7/7/2020 at 9:32 AM

## 2020-08-19 ENCOUNTER — TELEPHONE (OUTPATIENT)
Dept: ONCOLOGY | Age: 35
End: 2020-08-19

## 2020-08-19 NOTE — TELEPHONE ENCOUNTER
Tried calling patient and wife's  Number, either number does not work. If patient comes in phone numbers to be updated.

## 2020-08-26 ENCOUNTER — HOSPITAL ENCOUNTER (OUTPATIENT)
Age: 35
Discharge: HOME | End: 2020-08-26

## 2020-09-22 ENCOUNTER — HOSPITAL ENCOUNTER (EMERGENCY)
Age: 35
Discharge: HOME | End: 2020-09-22
Attending: EMERGENCY MEDICINE

## 2020-09-22 VITALS
DIASTOLIC BLOOD PRESSURE: 97 MMHG | WEIGHT: 200 LBS | BODY MASS INDEX: 30.31 KG/M2 | HEART RATE: 102 BPM | SYSTOLIC BLOOD PRESSURE: 145 MMHG | HEIGHT: 68 IN | OXYGEN SATURATION: 96 % | RESPIRATION RATE: 17 BRPM | TEMPERATURE: 98.3 F

## 2020-09-22 PROCEDURE — 99282 EMERGENCY DEPT VISIT SF MDM: CPT

## 2020-09-22 PROCEDURE — 6370000000 HC RX 637 (ALT 250 FOR IP): Performed by: EMERGENCY MEDICINE

## 2020-09-22 RX ORDER — HYDROCODONE BITARTRATE AND ACETAMINOPHEN 5; 325 MG/1; MG/1
1 TABLET ORAL ONCE
Status: COMPLETED | OUTPATIENT
Start: 2020-09-22 | End: 2020-09-22

## 2020-09-22 RX ORDER — PENICILLIN V POTASSIUM 500 MG/1
500 TABLET ORAL 4 TIMES DAILY
Qty: 40 TABLET | Refills: 0 | Status: SHIPPED | OUTPATIENT
Start: 2020-09-22 | End: 2020-10-02

## 2020-09-22 RX ORDER — PENICILLIN V POTASSIUM 250 MG/1
500 TABLET ORAL ONCE
Status: COMPLETED | OUTPATIENT
Start: 2020-09-22 | End: 2020-09-22

## 2020-09-22 RX ADMIN — HYDROCODONE BITARTRATE AND ACETAMINOPHEN 1 TABLET: 5; 325 TABLET ORAL at 01:41

## 2020-09-22 RX ADMIN — PENICILLIN V POTASIUM 500 MG: 250 TABLET ORAL at 01:42

## 2020-09-22 ASSESSMENT — PAIN SCALES - GENERAL
PAINLEVEL_OUTOF10: 10
PAINLEVEL_OUTOF10: 10

## 2020-09-22 ASSESSMENT — ENCOUNTER SYMPTOMS
DIARRHEA: 0
ABDOMINAL PAIN: 0
COUGH: 0
VOMITING: 0
SHORTNESS OF BREATH: 0
BACK PAIN: 0

## 2020-10-07 ENCOUNTER — TELEPHONE (OUTPATIENT)
Dept: INTERNAL MEDICINE CLINIC | Age: 35
End: 2020-10-07

## 2020-10-14 ENCOUNTER — APPOINTMENT (OUTPATIENT)
Dept: GENERAL RADIOLOGY | Age: 35
End: 2020-10-14

## 2020-10-14 ENCOUNTER — APPOINTMENT (OUTPATIENT)
Dept: CT IMAGING | Age: 35
End: 2020-10-14

## 2020-10-14 ENCOUNTER — HOSPITAL ENCOUNTER (EMERGENCY)
Age: 35
Discharge: HOME | End: 2020-10-14
Attending: EMERGENCY MEDICINE

## 2020-10-14 VITALS
WEIGHT: 200 LBS | TEMPERATURE: 97.6 F | HEIGHT: 68 IN | DIASTOLIC BLOOD PRESSURE: 109 MMHG | RESPIRATION RATE: 17 BRPM | OXYGEN SATURATION: 98 % | BODY MASS INDEX: 30.31 KG/M2 | HEART RATE: 98 BPM | SYSTOLIC BLOOD PRESSURE: 136 MMHG

## 2020-10-14 LAB
ABSOLUTE EOS #: 0.1 K/UL (ref 0–0.4)
ABSOLUTE IMMATURE GRANULOCYTE: ABNORMAL K/UL (ref 0–0.3)
ABSOLUTE LYMPH #: 1.3 K/UL (ref 1–4.8)
ABSOLUTE MONO #: 0.7 K/UL (ref 0.1–1.3)
ANION GAP SERPL CALCULATED.3IONS-SCNC: 17 MMOL/L (ref 9–17)
BASOPHILS # BLD: 1 % (ref 0–2)
BASOPHILS ABSOLUTE: 0.1 K/UL (ref 0–0.2)
BNP INTERPRETATION: NORMAL
BUN BLDV-MCNC: 17 MG/DL (ref 6–20)
BUN/CREAT BLD: ABNORMAL (ref 9–20)
CALCIUM SERPL-MCNC: 8.9 MG/DL (ref 8.6–10.4)
CHLORIDE BLD-SCNC: 94 MMOL/L (ref 98–107)
CO2: 21 MMOL/L (ref 20–31)
CREAT SERPL-MCNC: 0.68 MG/DL (ref 0.7–1.2)
DIFFERENTIAL TYPE: ABNORMAL
EOSINOPHILS RELATIVE PERCENT: 1 % (ref 0–4)
GFR AFRICAN AMERICAN: >60 ML/MIN
GFR NON-AFRICAN AMERICAN: >60 ML/MIN
GFR SERPL CREATININE-BSD FRML MDRD: ABNORMAL ML/MIN/{1.73_M2}
GFR SERPL CREATININE-BSD FRML MDRD: ABNORMAL ML/MIN/{1.73_M2}
GLUCOSE BLD-MCNC: 108 MG/DL (ref 70–99)
HCT VFR BLD CALC: 45.5 % (ref 41–53)
HEMOGLOBIN: 16.2 G/DL (ref 13.5–17.5)
IMMATURE GRANULOCYTES: ABNORMAL %
LIPASE: 32 U/L (ref 13–60)
LYMPHOCYTES # BLD: 20 % (ref 24–44)
MCH RBC QN AUTO: 36.2 PG (ref 26–34)
MCHC RBC AUTO-ENTMCNC: 35.5 G/DL (ref 31–37)
MCV RBC AUTO: 101.8 FL (ref 80–100)
MONOCYTES # BLD: 12 % (ref 1–7)
NRBC AUTOMATED: ABNORMAL PER 100 WBC
PDW BLD-RTO: 13.6 % (ref 11.5–14.9)
PLATELET # BLD: 240 K/UL (ref 150–450)
PLATELET ESTIMATE: ABNORMAL
PMV BLD AUTO: 8.4 FL (ref 6–12)
POTASSIUM SERPL-SCNC: 4 MMOL/L (ref 3.7–5.3)
PRO-BNP: 40 PG/ML
RBC # BLD: 4.47 M/UL (ref 4.5–5.9)
RBC # BLD: ABNORMAL 10*6/UL
SEG NEUTROPHILS: 66 % (ref 36–66)
SEGMENTED NEUTROPHILS ABSOLUTE COUNT: 4.3 K/UL (ref 1.3–9.1)
SODIUM BLD-SCNC: 132 MMOL/L (ref 135–144)
TROPONIN INTERP: NORMAL
TROPONIN INTERP: NORMAL
TROPONIN T: NORMAL NG/ML
TROPONIN T: NORMAL NG/ML
TROPONIN, HIGH SENSITIVITY: 10 NG/L (ref 0–22)
TROPONIN, HIGH SENSITIVITY: 11 NG/L (ref 0–22)
WBC # BLD: 6.5 K/UL (ref 3.5–11)
WBC # BLD: ABNORMAL 10*3/UL

## 2020-10-14 PROCEDURE — 85025 COMPLETE CBC W/AUTO DIFF WBC: CPT

## 2020-10-14 PROCEDURE — 83690 ASSAY OF LIPASE: CPT

## 2020-10-14 PROCEDURE — 84484 ASSAY OF TROPONIN QUANT: CPT

## 2020-10-14 PROCEDURE — 2580000003 HC RX 258: Performed by: STUDENT IN AN ORGANIZED HEALTH CARE EDUCATION/TRAINING PROGRAM

## 2020-10-14 PROCEDURE — 71260 CT THORAX DX C+: CPT

## 2020-10-14 PROCEDURE — 36415 COLL VENOUS BLD VENIPUNCTURE: CPT

## 2020-10-14 PROCEDURE — 93005 ELECTROCARDIOGRAM TRACING: CPT

## 2020-10-14 PROCEDURE — 99283 EMERGENCY DEPT VISIT LOW MDM: CPT

## 2020-10-14 PROCEDURE — 96374 THER/PROPH/DIAG INJ IV PUSH: CPT

## 2020-10-14 PROCEDURE — 71045 X-RAY EXAM CHEST 1 VIEW: CPT

## 2020-10-14 PROCEDURE — 83880 ASSAY OF NATRIURETIC PEPTIDE: CPT

## 2020-10-14 PROCEDURE — 80048 BASIC METABOLIC PNL TOTAL CA: CPT

## 2020-10-14 PROCEDURE — 99284 EMERGENCY DEPT VISIT MOD MDM: CPT

## 2020-10-14 PROCEDURE — 6360000004 HC RX CONTRAST MEDICATION: Performed by: STUDENT IN AN ORGANIZED HEALTH CARE EDUCATION/TRAINING PROGRAM

## 2020-10-14 RX ORDER — AZITHROMYCIN 250 MG/1
TABLET, FILM COATED ORAL
Qty: 1 PACKET | Refills: 0 | Status: SHIPPED | OUTPATIENT
Start: 2020-10-14 | End: 2020-10-18

## 2020-10-14 RX ORDER — SODIUM CHLORIDE 0.9 % (FLUSH) 0.9 %
10 SYRINGE (ML) INJECTION PRN
Status: DISCONTINUED | OUTPATIENT
Start: 2020-10-14 | End: 2020-10-14 | Stop reason: HOSPADM

## 2020-10-14 RX ORDER — 0.9 % SODIUM CHLORIDE 0.9 %
80 INTRAVENOUS SOLUTION INTRAVENOUS ONCE
Status: COMPLETED | OUTPATIENT
Start: 2020-10-14 | End: 2020-10-14

## 2020-10-14 RX ADMIN — Medication 10 ML: at 20:12

## 2020-10-14 RX ADMIN — IOVERSOL 75 ML: 741 INJECTION INTRA-ARTERIAL; INTRAVENOUS at 20:12

## 2020-10-14 RX ADMIN — SODIUM CHLORIDE 80 ML: 9 INJECTION, SOLUTION INTRAVENOUS at 20:12

## 2020-10-14 ASSESSMENT — PAIN DESCRIPTION - PAIN TYPE: TYPE: ACUTE PAIN

## 2020-10-14 ASSESSMENT — PAIN DESCRIPTION - LOCATION: LOCATION: CHEST

## 2020-10-14 ASSESSMENT — PAIN SCALES - GENERAL: PAINLEVEL_OUTOF10: 8

## 2020-10-14 ASSESSMENT — PAIN DESCRIPTION - ORIENTATION: ORIENTATION: LEFT

## 2020-10-14 NOTE — ED NOTES
Mode of arrival (squad #, walk in, police, etc) : walk in        Chief complaint(s): CP/blood in stool        Arrival Note (brief scenario, treatment PTA, etc). : Pt reports onset of symptoms 2 hours PTA. Pt c/o left sided CP, blood in stool. Pt AOx4. RR easy, unlabored        C= \"Have you ever felt that you should Cut down on your drinking? \"  No  A= \"Have people Annoyed you by criticizing your drinking? \"  No  G= \"Have you ever felt bad or Guilty about your drinking? \"  No  E= \"Have you ever had a drink as an Eye-opener first thing in the morning to steady your nerves or to help a hangover? \"  No      Deferred []      Reason for deferring: N/A    *If yes to two or more: probable alcohol abuse. Kyler Bradford RN  10/14/20 [id number]

## 2020-10-14 NOTE — ED PROVIDER NOTES
[address] ED  eMERGENCY dEPARTMENT eNCOUnter   Attending Attestation     Pt Name: Gerardo Johnson  MRN: [id number]  [place] 1985  Date of evaluation: 10/14/20       Gerardo Johnson is a 30 y.o. male who presents with Chest Pain and Rectal Bleeding      History:   Patient is complaining of chest pain that feels similar to when he had his PE. Patient stopped taking his medication about a month and a half ago. Exam: Vitals:   Vitals:    10/14/20 1759 10/14/20 1900   BP: (!) 151/106 (!) 136/101   Pulse: 110 113   Resp: 18 15   Temp: 97.6 °F (36.4 °C)    TempSrc: Oral    SpO2: 98% 98%   Weight: 200 lb (90.7 kg)    Height: 5' 8\" (1.727 m)      Heart tachycardic but regular no murmurs. Lungs clear to auscultation bilaterally. I performed a history and physical examination of the patient and discussed management with the resident. I reviewed the residents note and agree with the documented findings and plan of care. Any areas of disagreement are noted on the chart. I was personally present for the key portions of any procedures. I have documented in the chart those procedures where I was not present during the key portions. I have personally reviewed all images and agree with the resident's interpretation. I have reviewed the emergency nurses triage note. I agree with the chief complaint, past medical history, past surgical history, allergies, medications, social and family history as documented unless otherwise noted below. Documentation of the HPI, Physical Exam and Medical Decision Making performed by medical students or scribes is based on my personal performance of the HPI, PE and MDM. I personally evaluated and examined the patient in conjunction with the APC and agree with the assessment, treatment plan, and disposition of the patient as recorded by the APC. Additional findings are as noted.     Obi Epps MD  Attending Emergency  Physician             Karyle Smalls, MD  10/14/20 0670

## 2020-10-14 NOTE — ED NOTES
Pt states that he has had chest pain for longer than today. Pt admits to having abdominal pain, nausea, emesis, and SOB recently. Pt states that previous symptoms are similar to how he felt when he had a PE in May. Pt states that he has been off eliquis since September d/t unable to afford medication. Pt does have visible tremors in BUE. Pt states that he has had some light blood after BM only after cleaning on tissue. Pt states that bleeding today has been greater and in the toilet. Pt is A&Ox4 and in no visible distress at this time.        Messi Proctor, RN  10/14/20 7433

## 2020-10-15 ASSESSMENT — ENCOUNTER SYMPTOMS
NAUSEA: 0
SHORTNESS OF BREATH: 1
BLOOD IN STOOL: 1
BACK PAIN: 0
SORE THROAT: 0
COUGH: 1
ABDOMINAL PAIN: 0
VOMITING: 0

## 2020-10-15 NOTE — ED PROVIDER NOTES
[address]  Emergency Department Encounter  EmergencyMedicine Resident     Pt Manuela Nicholson  MRN: [id number]  [place] 1985  Date of evaluation: 10/15/20  PCP:  Serena Brunner PA-C    CHIEF COMPLAINT       Chief Complaint   Patient presents with    Chest Pain    Rectal Bleeding       HISTORY OF PRESENT ILLNESS  (Location/Symptom, Timing/Onset, Context/Setting, Quality, Duration, Modifying Factors, Severity.)      Breanna Osborne is a 30 y.o. male who presents with multiple complaints. Patient had several days of helping cough, chest pain, shortness of breath. Patient has a past medical history of pulmonary embolism that was diagnosed in May due to unclear reasons. Patient was placed on Eliquis as an outpatient for a 3-month course howeve he was unable to complete his Eliquis. Currently he states his symptoms feel like his previous pulmonary embolism. Chest pain is nonradiating, no associated vomiting, no associated diaphoresis. Patient has a cardiac history of dilated cardiomyopathy secondary to alcoholism. Patient denies any drinking since May. He denies any fevers, chills, sick contacts, anosmia, diarrhea. He also has a secondary complaint of a couple days of bright red blood per rectum, painful hard bowel movements. No previous history of hemorrhoids. No black or tarry stools. PAST MEDICAL / SURGICAL / SOCIAL / FAMILY HISTORY      has a past medical history of No active medical problems and Pulmonary embolism ([facility] [address] has no past surgical history on file.     Social History     Socioeconomic History    Marital status:      Spouse name: Not on file    Number of children: Not on file    Years of education: Not on file    Highest education level: Not on file   Occupational History    Not on file   Social Needs    Financial resource strain: Not on file    Food insecurity     Worry: Not on file     Inability: Not on file    Transportation needs     Medical: Not on file     Non-medical: Not on file   Tobacco Use    Smoking status: Current Every Day Smoker     Packs/day: 1.00     Years: 10.00     Pack years: 10.00     Types: Cigarettes    Smokeless tobacco: Never Used    Tobacco comment: using nicotine patches at night   Substance and Sexual Activity    Alcohol use: Yes     Frequency: 4 or more times a week     Drinks per session: 3 or 4     Binge frequency: Daily or almost daily     Comment: occ    Drug use: Not Currently     Comment: used cocaine in early 19's    Sexual activity: Not on file   Lifestyle    Physical activity     Days per week: Not on file     Minutes per session: Not on file    Stress: Not on file   Relationships    Social connections     Talks on phone: Not on file     Gets together: Not on file     Attends Restoration service: Not on file     Active member of club or organization: Not on file     Attends meetings of clubs or organizations: Not on file     Relationship status: Not on file    Intimate partner violence     Fear of current or ex partner: Not on file     Emotionally abused: Not on file     Physically abused: Not on file     Forced sexual activity: Not on file   Other Topics Concern    Not on file   Social History Narrative    Not on file       Family History   Problem Relation Age of Onset    Heart Disease Mother     Heart Attack Mother     Heart Disease Father        Allergies:  Bee venom    Home Medications:  Prior to Admission medications    Medication Sig Start Date End Date Taking?  Authorizing Provider   azithromycin (ZITHROMAX Z-LIZETTE) 250 MG tablet Take 2 tablets (500 mg) on Day 1, and then take 1 tablet (250 mg) on days 2 through 5. 10/14/20 10/18/20 Yes Abdi Basilio,    metoprolol succinate (TOPROL XL) 25 MG extended release tablet take 1 tablet by mouth EVERY NIGHT 8/14/20   Historical Provider, MD   Multiple Vitamins-Minerals (MULTIVITAMIN ADULT PO) Take by mouth    Historical Provider, MD   vitamin B-1 (THIAMINE) 100 MG tablet Take 1 tablet by mouth daily 6/2/20   Sandi Dixon DO   folic acid (FOLVITE) 1 MG tablet Take 1 tablet by mouth daily 6/2/20   Sandi Dixon DO   Nicotine 21-14-7 MG/24HR KIT Place onto the skin    Historical Provider, MD   amLODIPine (NORVASC) 10 MG tablet Take 1 tablet by mouth daily 5/21/20   Lor Silva MD   apixaban Niya Mince DVT/PE STARTER PACK) 5 MG TABS tablet Take 10 mg (2 tablets) orally twice daily for 7 days, then take 5 mg (1 tablet) orally twice daily thereafter. 5/20/20   Lor Silva MD   EPINEPHrine (EPIPEN 2-LIZETTE) 0.3 MG/0.3ML SOAJ injection Inject 0.3 mLs into the muscle once for 1 dose Use as directed for allergic reaction 11/17/16 6/2/20  Lola Sawyer MD       REVIEW OF SYSTEMS    (2-9 systems for level 4, 10 or more for level 5)      Review of Systems   Constitutional: Negative for chills and fever. HENT: Negative for congestion and sore throat. Respiratory: Positive for cough and shortness of breath. Cardiovascular: Positive for chest pain. Negative for leg swelling. Gastrointestinal: Positive for blood in stool. Negative for abdominal pain, nausea and vomiting. Genitourinary: Negative for dysuria and frequency. Musculoskeletal: Negative for back pain and neck stiffness. Skin: Negative for rash. Allergic/Immunologic: Negative for immunocompromised state. Neurological: Negative for numbness and headaches. PHYSICAL EXAM   (up to 7 for level 4, 8 or more for level 5)      INITIAL VITALS:   BP (!) 136/109   Pulse 98   Temp 97.6 °F (36.4 °C) (Oral)   Resp 17   Ht 5' 8\" (1.727 m)   Wt 200 lb (90.7 kg)   SpO2 98%   BMI 30.41 kg/m²      Vitals:    10/14/20 1900 10/14/20 1915 10/14/20 1930 10/14/20 2100   BP: (!) 136/101 (!) 138/110 (!) 140/103 (!) 136/109   Pulse: 113 101 105 98   Resp: 15 19 19 17   Temp:       TempSrc:       SpO2: 98%   98%   Weight:       Height:            Physical Exam  Vitals signs reviewed.    Constitutional: General: He is not in acute distress. Appearance: Normal appearance. He is well-developed. He is not ill-appearing or toxic-appearing. HENT:      Head: Normocephalic and atraumatic. Eyes:      Extraocular Movements: Extraocular movements intact. Pupils: Pupils are equal, round, and reactive to light. Neck:      Musculoskeletal: Normal range of motion and neck supple. Cardiovascular:      Rate and Rhythm: Regular rhythm. Tachycardia present. Pulses: Normal pulses. Pulmonary:      Effort: Pulmonary effort is normal.      Breath sounds: Normal breath sounds. No wheezing or rhonchi. Abdominal:      General: There is no distension. Palpations: Abdomen is soft. Tenderness: There is no abdominal tenderness. Genitourinary:     Comments: Anal fissure at 12 o'clock position. Nurse at bedside for chaperone. Musculoskeletal: Normal range of motion. Skin:     General: Skin is warm and dry. Neurological:      General: No focal deficit present. Mental Status: He is alert and oriented to person, place, and time. DIFFERENTIAL  DIAGNOSIS     PLAN (LABS / IMAGING / EKG):  Orders Placed This Encounter   Procedures    XR CHEST PORTABLE    CT CHEST PULMONARY EMBOLISM W CONTRAST    CBC Auto Differential    Basic Metabolic Panel w/ Reflex to MG    Troponin    Brain Natriuretic Peptide    Lipase    EKG 12 Lead       MEDICATIONS ORDERED:  Orders Placed This Encounter   Medications    0.9 % sodium chloride bolus    DISCONTD: sodium chloride flush 0.9 % injection 10 mL    ioversol (OPTIRAY) 74 % injection 75 mL    azithromycin (ZITHROMAX Z-LIZETTE) 250 MG tablet     Sig: Take 2 tablets (500 mg) on Day 1, and then take 1 tablet (250 mg) on days 2 through 5.      Dispense:  1 packet     Refill:  0       DIAGNOSTIC RESULTS / EMERGENCY DEPARTMENT COURSE / MDM   LAB RESULTS:  Results for orders placed or performed during the hospital encounter of 10/14/20   CBC Auto Differential Result Value Ref Range    WBC 6.5 3.5 - 11.0 k/uL    RBC 4.47 (L) 4.5 - 5.9 m/uL    Hemoglobin 16.2 13.5 - 17.5 g/dL    Hematocrit 45.5 41 - 53 %    .8 (H) 80 - 100 fL    MCH 36.2 (H) 26 - 34 pg    MCHC 35.5 31 - 37 g/dL    RDW 13.6 11.5 - 14.9 %    Platelets 727 813 - 141 k/uL    MPV 8.4 6.0 - 12.0 fL    NRBC Automated NOT REPORTED per 100 WBC    Differential Type NOT REPORTED     Immature Granulocytes NOT REPORTED 0 %    Absolute Immature Granulocyte NOT REPORTED 0.00 - 0.30 k/uL    WBC Morphology NOT REPORTED     RBC Morphology NOT REPORTED     Platelet Estimate NOT REPORTED     Seg Neutrophils 66 36 - 66 %    Lymphocytes 20 (L) 24 - 44 %    Monocytes 12 (H) 1 - 7 %    Eosinophils % 1 0 - 4 %    Basophils 1 0 - 2 %    Segs Absolute 4.30 1.3 - 9.1 k/uL    Absolute Lymph # 1.30 1.0 - 4.8 k/uL    Absolute Mono # 0.70 0.1 - 1.3 k/uL    Absolute Eos # 0.10 0.0 - 0.4 k/uL    Basophils Absolute 0.10 0.0 - 0.2 k/uL   Basic Metabolic Panel w/ Reflex to MG   Result Value Ref Range    Glucose 108 (H) 70 - 99 mg/dL    BUN 17 6 - 20 mg/dL    CREATININE 0.68 (L) 0.70 - 1.20 mg/dL    Bun/Cre Ratio NOT REPORTED 9 - 20    Calcium 8.9 8.6 - 10.4 mg/dL    Sodium 132 (L) 135 - 144 mmol/L    Potassium 4.0 3.7 - 5.3 mmol/L    Chloride 94 (L) 98 - 107 mmol/L    CO2 21 20 - 31 mmol/L    Anion Gap 17 9 - 17 mmol/L    GFR Non-African American >60 >60 mL/min    GFR African American >60 >60 mL/min    GFR Comment          GFR Staging NOT REPORTED    Troponin   Result Value Ref Range    Troponin, High Sensitivity 11 0 - 22 ng/L    Troponin T NOT REPORTED <0.03 ng/mL    Troponin Interp NOT REPORTED    Troponin   Result Value Ref Range    Troponin, High Sensitivity 10 0 - 22 ng/L    Troponin T NOT REPORTED <0.03 ng/mL    Troponin Interp NOT REPORTED    Brain Natriuretic Peptide   Result Value Ref Range    Pro-BNP 40 <300 pg/mL    BNP Interpretation Pro-BNP Reference Range:    Lipase   Result Value Ref Range    Lipase 32 13 - 60 U/L IMPRESSION: Chest pain, shortness of breath, pneumonia    RADIOLOGY:  CT CHEST PULMONARY EMBOLISM W CONTRAST   Final Result   1. No evidence of pulmonary embolus. The previously noted right lower lobe   embolus has resolved   2. Faint opacity now seen within the right middle lobe, and may represent   atelectasis versus early pneumonia   3. Hepatic steatosis         XR CHEST PORTABLE   Final Result   Unremarkable chest.              EKG  Sinus tachycardia, no ST elevation or ST depression. All EKG's are interpreted by the Emergency Department Physician who either signs or Co-signs this chart in the absence of a cardiologist.    [facility]:    Patient arrives for evaluation of chest pain and rectal pain. He is diagnosed with anal fissure instructed to increase fiber in diet. He is sitting in bed, appears anxious, shaky, able to speak in full sentences, normal SPO2 on room air, physical exam is otherwise unremarkable. Plan for cardiac work-up as well as CT PE study as we cannot PERC him out and he was unable to afford his Eliquis and therefore did not complete his therapy for pulmonary embolism earlier this year. ED Course as of Oct 15 0125   Wed Oct 14, 2020   2012 Troponin, High Sensitivity: 11 [CS]   2012 Pro-BNP: 40 [CS]   2012 WBC: 6.5 [CS]   2047 IMPRESSION:  1. No evidence of pulmonary embolus. The previously noted right lower lobe  embolus has resolved  2. Faint opacity now seen within the right middle lobe, and may represent  atelectasis versus early pneumonia  3. Hepatic steatosis   CT CHEST PULMONARY EMBOLISM W CONTRAST [CS]   2134 Patient was instructed that he may have a pneumonia although this is a soft call. He was given a prescription of azithromycin and instructed to fill it tomorrow or the next day if he is still not feeling well. Follow-up to PCP in 3 days. ACS, pulmonary embolism and life-threatening disease, infection have essentially been ruled out. [CS]      ED Course User Index  [CS] Vita Lee DO       PROCEDURES:    CONSULTS:  None    CRITICAL CARE:  Please see attending note    FINAL IMPRESSION      1. Chest pain, unspecified type    2. Anal fissure    3.  Cough          DISPOSITION / PLAN     DISPOSITION Decision To Discharge 10/14/2020 09:30:29 PM      PATIENT REFERRED TO:  [address] [place] [phone]    In 3 days        DISCHARGE MEDICATIONS:  Discharge Medication List as of 10/14/2020  9:34 PM      START taking these medications    Details   azithromycin (ZITHROMAX Z-LIZETTE) 250 MG tablet Take 2 tablets (500 mg) on Day 1, and then take 1 tablet (250 mg) on days 2 through 5., [address] [place] [address],   Emergency Medicine Resident    (Please note that portions of thisnote were completed with a voice recognition program.  Efforts were made to edit the dictations but occasionally words are mis-transcribed.)        Vita Lee DO  Resident  10/15/20 [id number]

## 2020-10-18 LAB
EKG ATRIAL RATE: 108 BPM
EKG P AXIS: 54 DEGREES
EKG P-R INTERVAL: 130 MS
EKG Q-T INTERVAL: 314 MS
EKG QRS DURATION: 90 MS
EKG QTC CALCULATION (BAZETT): 420 MS
EKG R AXIS: 16 DEGREES
EKG T AXIS: 66 DEGREES
EKG VENTRICULAR RATE: 108 BPM

## 2021-01-15 ENCOUNTER — APPOINTMENT (OUTPATIENT)
Dept: GENERAL RADIOLOGY | Age: 36
End: 2021-01-15
Payer: COMMERCIAL

## 2021-01-15 ENCOUNTER — HOSPITAL ENCOUNTER (EMERGENCY)
Age: 36
Discharge: HOME | End: 2021-01-15
Attending: EMERGENCY MEDICINE
Payer: COMMERCIAL

## 2021-01-15 VITALS
RESPIRATION RATE: 14 BRPM | OXYGEN SATURATION: 95 % | WEIGHT: 200 LBS | HEIGHT: 68 IN | SYSTOLIC BLOOD PRESSURE: 156 MMHG | HEART RATE: 109 BPM | TEMPERATURE: 98.2 F | DIASTOLIC BLOOD PRESSURE: 112 MMHG | BODY MASS INDEX: 30.31 KG/M2

## 2021-01-15 DIAGNOSIS — S39.012A BACK STRAIN, INITIAL ENCOUNTER: Primary | ICD-10-CM

## 2021-01-15 PROCEDURE — 99284 EMERGENCY DEPT VISIT MOD MDM: CPT

## 2021-01-15 PROCEDURE — 6370000000 HC RX 637 (ALT 250 FOR IP): Performed by: STUDENT IN AN ORGANIZED HEALTH CARE EDUCATION/TRAINING PROGRAM

## 2021-01-15 PROCEDURE — 72100 X-RAY EXAM L-S SPINE 2/3 VWS: CPT

## 2021-01-15 RX ORDER — ORPHENADRINE CITRATE 100 MG/1
100 TABLET, EXTENDED RELEASE ORAL 2 TIMES DAILY
Qty: 20 TABLET | Refills: 0 | Status: SHIPPED | OUTPATIENT
Start: 2021-01-15 | End: 2021-01-25

## 2021-01-15 RX ORDER — IBUPROFEN 800 MG/1
800 TABLET ORAL ONCE
Status: COMPLETED | OUTPATIENT
Start: 2021-01-15 | End: 2021-01-15

## 2021-01-15 RX ORDER — NAPROXEN 500 MG/1
500 TABLET ORAL 2 TIMES DAILY PRN
Qty: 20 TABLET | Refills: 0 | Status: SHIPPED | OUTPATIENT
Start: 2021-01-15 | End: 2021-06-02

## 2021-01-15 RX ORDER — ACETAMINOPHEN 500 MG
1000 TABLET ORAL ONCE
Status: COMPLETED | OUTPATIENT
Start: 2021-01-15 | End: 2021-01-15

## 2021-01-15 RX ORDER — CYCLOBENZAPRINE HCL 10 MG
10 TABLET ORAL ONCE
Status: COMPLETED | OUTPATIENT
Start: 2021-01-15 | End: 2021-01-15

## 2021-01-15 RX ORDER — LIDOCAINE 4 G/G
1 PATCH TOPICAL ONCE
Status: DISCONTINUED | OUTPATIENT
Start: 2021-01-15 | End: 2021-01-15 | Stop reason: HOSPADM

## 2021-01-15 RX ORDER — PREDNISONE 20 MG/1
20 TABLET ORAL ONCE
Status: COMPLETED | OUTPATIENT
Start: 2021-01-15 | End: 2021-01-15

## 2021-01-15 RX ADMIN — PREDNISONE 20 MG: 20 TABLET ORAL at 16:05

## 2021-01-15 RX ADMIN — CYCLOBENZAPRINE 10 MG: 10 TABLET, FILM COATED ORAL at 16:05

## 2021-01-15 RX ADMIN — ACETAMINOPHEN 1000 MG: 500 TABLET ORAL at 16:05

## 2021-01-15 RX ADMIN — IBUPROFEN 800 MG: 800 TABLET, FILM COATED ORAL at 16:05

## 2021-01-15 ASSESSMENT — PAIN SCALES - GENERAL: PAINLEVEL_OUTOF10: 8

## 2021-01-15 ASSESSMENT — PAIN DESCRIPTION - LOCATION: LOCATION: BACK

## 2021-01-15 NOTE — ED PROVIDER NOTES
[address]  Emergency Department Encounter  EmergencyMedicine Resident     Pt Nicholas Vanegas  MRN: [id number]  [place] 1985  Date of evaluation: 1/15/21  PCP:  Pascual Dukes PA-C    CHIEF COMPLAINT       Chief Complaint   Patient presents with    Back Pain     post lifting recliner       HISTORY OF PRESENT ILLNESS  (Location/Symptom, Timing/Onset, Context/Setting, Quality, Duration, Modifying Factors, Severity.)      Twyla Ko is a 29 y.o. male who presents with chief complaint low back pain, sharp radiating down the left leg to his heel. He was sitting in a recliner smoking cigarette, dropped cigarette and then got up to lift recliner when he lifted the recliner he felt a pop and had sudden onset of symptoms. Took ibuprofen at home, continuation of symptoms persist try to rest a few hours and then with no change in symptom control decided come to emergency department for evaluation. Patient denies previous traumatic injury, MSK surgery, broken bone. Has had similar symptoms 2 years ago under similar circumstances and again even earlier in his history. Denies taking any other medications, no allergies. Smokes about a pack a day, drinks 6 pack of beer per day, denies street drugs    PAST MEDICAL / SURGICAL / SOCIAL / FAMILY HISTORY      has a past medical history of No active medical problems and Pulmonary embolism ([facility] [address] has no past surgical history on file.   Denies    Social History     Socioeconomic History    Marital status:      Spouse name: Not on file    Number of children: Not on file    Years of education: Not on file    Highest education level: Not on file   Occupational History    Not on file   Social Needs    Financial resource strain: Not on file    Food insecurity     Worry: Not on file     Inability: Not on file    Transportation needs     Medical: Not on file     Non-medical: Not on file   Tobacco Use    Smoking status: Current Every Day Smoker     Packs/day: 1.00     Years: 10.00     Pack years: 10.00     Types: Cigarettes    Smokeless tobacco: Never Used    Tobacco comment: using nicotine patches at night   Substance and Sexual Activity    Alcohol use: Yes     Frequency: 4 or more times a week     Drinks per session: 3 or 4     Binge frequency: Daily or almost daily     Comment: occ    Drug use: Not Currently     Comment: used cocaine in early 20's    Sexual activity: Not on file   Lifestyle    Physical activity     Days per week: Not on file     Minutes per session: Not on file    Stress: Not on file   Relationships    Social connections     Talks on phone: Not on file     Gets together: Not on file     Attends Judaism service: Not on file     Active member of club or organization: Not on file     Attends meetings of clubs or organizations: Not on file     Relationship status: Not on file    Intimate partner violence     Fear of current or ex partner: Not on file     Emotionally abused: Not on file     Physically abused: Not on file     Forced sexual activity: Not on file   Other Topics Concern    Not on file   Social History Narrative    Not on file       Family History   Problem Relation Age of Onset    Heart Disease Mother     Heart Attack Mother     Heart Disease Father        Allergies:  Bee venom    Home Medications:  Prior to Admission medications    Medication Sig Start Date End Date Taking?  Authorizing Provider   naproxen (NAPROSYN) 500 MG tablet Take 1 tablet by mouth 2 times daily as needed for Pain 1/15/21  Yes Roge Hatfield DO   orphenadrine (NORFLEX) 100 MG extended release tablet Take 1 tablet by mouth 2 times daily for 10 days 1/15/21 1/25/21 Yes Roge Hatfield DO   metoprolol succinate (TOPROL XL) 25 MG extended release tablet take 1 tablet by mouth EVERY NIGHT 8/14/20   Historical Provider, MD   Multiple Vitamins-Minerals (MULTIVITAMIN ADULT PO) Take by mouth    Historical Provider, MD   vitamin B-1 (THIAMINE) 100 MG tablet Take 1 tablet by mouth daily 6/2/20   Rosie Channel, DO   folic acid (FOLVITE) 1 MG tablet Take 1 tablet by mouth daily 6/2/20   Rosie Channel, DO   Nicotine 21-14-7 MG/24HR KIT Place onto the skin    Historical Provider, MD   amLODIPine (NORVASC) 10 MG tablet Take 1 tablet by mouth daily 5/21/20   Hollie House MD   apixaban Rowe Sport DVT/PE STARTER PACK) 5 MG TABS tablet Take 10 mg (2 tablets) orally twice daily for 7 days, then take 5 mg (1 tablet) orally twice daily thereafter. 5/20/20   Hollie House MD   EPINEPHrine (EPIPEN 2-LIZETTE) 0.3 MG/0.3ML SOAJ injection Inject 0.3 mLs into the muscle once for 1 dose Use as directed for allergic reaction 11/17/16 6/2/20  Romario Miles MD       REVIEW OF SYSTEMS    (2-9 systems for level 4, 10 or more for level 5)      Review of Systems   Constitutional: Negative for fever. HENT: Negative for congestion. Eyes: Negative for photophobia. Respiratory: Negative for shortness of breath. Cardiovascular: Negative for chest pain. Gastrointestinal: Negative for abdominal pain and vomiting. Endocrine: Negative for polyuria. Genitourinary: Negative for dysuria. Musculoskeletal: Positive for back pain. Skin: Negative for color change. Allergic/Immunologic: Negative for immunocompromised state. Neurological: Negative for dizziness. Hematological: Does not bruise/bleed easily. Psychiatric/Behavioral: Negative for agitation. PHYSICAL EXAM   (up to 7 for level 4, 8 or more for level 5)      INITIAL VITALS:   BP (!) 156/112   Pulse 109   Temp 98.2 °F (36.8 °C) (Oral)   Resp 14   Ht 5' 8\" (1.727 m)   Wt 200 lb (90.7 kg)   SpO2 95%   BMI 30.41 kg/m²     Physical Exam  Constitutional:       General: He/She is not in acute distress. Appearance: Normal appearance. He/She is normal weight. He/She is not toxic-appearing. HENT:      Head: Normocephalic and atraumatic.       Nose: Nose normal.      Mouth/Throat: Mucous membranes are moist.  Uvula midline no oropharyngeal edema. Pharynx: Oropharynx is clear. Eyes:      Extraocular Movements: Extraocular movements intact. Conjunctiva/sclera: Conjunctivae normal.      Pupils: Pupils are equal, round, and reactive to light. Neck:      Musculoskeletal: Normal range of motion and neck supple. No neck rigidity. Cardiovascular:      Rate and Rhythm: Normal rate and regular rhythm. Pulses: Normal pulses. Heart sounds: Normal heart sounds. No murmur. Pulmonary:      Effort: Pulmonary effort is normal.      Breath sounds: Normal breath sounds. No wheezing. Abdominal:      General: Abdomen is flat. Bowel sounds are normal.      Tenderness: There is no abdominal tenderness. Musculoskeletal: Normal range of motion. General: Lumbar spine tenderness at midline and left of midline. No deformity, step-off, swelling  Skin:     General: Skin is warm. Capillary Refill: Capillary refill takes less than 2 seconds. Coloration: Skin is not jaundiced. Neurological:      General: No focal deficit present. Mental Status: He is alert and oriented to person, place, and time. Mental status is at baseline. Strength 5/5, sensation grossly intact, proprioception intact. Reflexes normal.     Motor: No weakness.        DIFFERENTIAL  DIAGNOSIS     PLAN (LABS / IMAGING / EKG):  Orders Placed This Encounter   Procedures    XR LUMBAR SPINE (2-3 VIEWS)       MEDICATIONS ORDERED:  Orders Placed This Encounter   Medications    ibuprofen (ADVIL;MOTRIN) tablet 800 mg    acetaminophen (TYLENOL) tablet 1,000 mg    predniSONE (DELTASONE) tablet 20 mg    lidocaine 4 % external patch 1 patch    cyclobenzaprine (FLEXERIL) tablet 10 mg    naproxen (NAPROSYN) 500 MG tablet     Sig: Take 1 tablet by mouth 2 times daily as needed for Pain     Dispense:  20 tablet     Refill:  0    orphenadrine (NORFLEX) 100 MG extended release tablet     Sig: Take 1 tablet by mouth 2 times daily for 10 days     Dispense:  20 tablet     Refill:  0           DIAGNOSTIC RESULTS / EMERGENCY DEPARTMENT COURSE / [facility]     LABS:  No results found for this visit on 01/15/21. RADIOLOGY:  Xr Lumbar Spine (2-3 Views)    Result Date: 1/15/2021  EXAMINATION: THREE XRAY VIEWS OF THE LUMBAR SPINE 1/15/2021 1:30 pm COMPARISON: 08/27/2018 HISTORY: ORDERING SYSTEM PROVIDED HISTORY: midline ttp and left of midline after lifting a recliner, heard/felt a pop TECHNOLOGIST PROVIDED HISTORY: midline ttp and left of midline after lifting a recliner, heard/felt a pop Reason for Exam: PT states felt pop in lower back and shooting pain on left side. Acuity: Unknown Type of Exam: Unknown Additional signs and symptoms: PT states felt pop in lower back and shooting pain on left side. FINDINGS: Lumbar vertebral bodies are normal in height . No evidence of fracture. Visualized sacrum is unremarkable. Stable mild levoscoliosis of the thoracolumbar junction. No significant degenerative changes. No acute bony abnormalities are noted       EKG  None    All EKG's are interpreted by the Emergency Department Physician who either signs or Co-signs this chart in the absence of a cardiologist.    EMERGENCY DEPARTMENT COURSE:  Patient is alert and oriented x3, breathing quietly and unlabored on room air. Speech is normal and speaking in full sentences without requiring to pause to take a breath. Patient appears mildly uncomfortable in bed, no alarm symptoms, denies loss of bowel or bladder, perianal anesthesia. Physical exam showed reflexes and strength intact throughout, proprioception normal.  Some tenderness to palpation left of midline and on midline. Due to midline tenderness we will do plain film. Treat his symptoms with anti-inflammatories ibuprofen, Tylenol, Lidoderm patch, prednisone, Flexeril. Presents with significant other, she is driving today.     PROCEDURES:  None    CONSULTS:  None    CRITICAL CARE:  None    FINAL IMPRESSION      1.  Back strain, initial encounter          DISPOSITION / PLAN     DISPOSITION Decision To Discharge 01/15/2021 05:30:01 PM      PATIENT REFERRED TO:  Sara Tiwari [address] [place] [address]  [phone]    Schedule an appointment as soon as possible for a visit       [facility] ED  [place] [id number] [phone]    As needed, If symptoms worsen      DISCHARGE MEDICATIONS:  New Prescriptions    NAPROXEN (NAPROSYN) 500 MG TABLET    Take 1 tablet by mouth 2 times daily as needed for Pain    ORPHENADRINE (NORFLEX) 100 MG EXTENDED RELEASE TABLET    Take 1 tablet by mouth 2 times daily for 10 days       Michael Merritt MD  Emergency Medicine Resident    (Please note that portions of thisnote were completed with a voice recognition program.  Efforts were made to edit the dictations but occasionally words are mis-transcribed.)       Michael Merritt MD  Resident  01/15/21 0994

## 2021-01-15 NOTE — ED PROVIDER NOTES
EMERGENCY DEPARTMENT ENCOUNTER   ATTENDING ATTESTATION     Pt Name: Elizabeth Her  MRN: [id number]  [place] 1985  Date of evaluation: 1/15/21       Elizabeth Her is a 29 y.o. male who presents with Back Pain (post lifting recliner)      MDM: 55-year-old male presents with complaint of low back pain. Patient states he lifted up a reclining chair earlier today to pick something up underneath it and felt a pop in his left lower back. Patient states he been having pain since then. Patient states pain is worse with movement, states he took Tylenol for pain without any relief. Patient denies any fevers, chills, chest pain or shortness of breath, denies any numbness or tingling in the lower extremities, denies any bowel or bladder incontinence, denies any recent steroid use, injection drug use, or malignancy. On initial exam patient in no acute distress noted to be mildly hypertensive, does have history of hypertension, states his blood pressure is usually high due to pain. Patient with tenderness in the left paraspinal muscles, neurovascularly intact, 5 out of 5 strength in the lower extremities. Will obtain plain film provide patient with pain medication and reevaluate    X-ray was negative for acute process, discussed results with the patient, discussed continued pain medication and supportive care. Discussed need for follow-up with PCP. Discussed return precautions. Patient voiced understanding is comfortable discharge home. Patient/Guardian was informed of their diagnosis and told to follow up with PCP in 1-3 days. Patient demonstrates understanding and agreement with the plan. They were given the opportunity to ask questions and those questions were answered to the best of our ability with the available information. Patient/Guardian told to return to the ED for any new, worsening, changing or persistent symptoms. This dictation was prepared using [employer] voice recognition software.  As a result, errors may have occurred. When identified, these errors have been corrected. While every attempt is made to correct errors in dictation, errors may still exist.       Vitals:   Vitals:    01/15/21 1532 01/15/21 1535   BP:  (!) 156/112   Pulse: 109    Resp: 14    Temp:  98.2 °F (36.8 °C)   TempSrc: Oral Oral   SpO2: 95%    Weight: 200 lb (90.7 kg)    Height: 5' 8\" (1.727 m)          I personally evaluated and examined the patient in conjunction with the resident and agree with the assessment, treatment plan, and disposition of the patient as recorded by the resident. I performed a history and physical examination of the patient and discussed management with the resident. I reviewed the residents note and agree with the documented findings and plan of care. Any areas of disagreement are noted on the chart. I was personally present for the key portions of any procedures. I have documented in the chart those procedures where I was not present during the key portions. I have personally reviewed all images and agree with the resident's interpretation. I have reviewed the emergency nurses triage note. I agree with the chief complaint, past medical history, past surgical history, allergies, medications, social and family history as documented unless otherwise noted.     Gayle Adams DO  Attending Emergency Physician          Gayle Adams DO  01/15/21 2025

## 2021-02-27 ENCOUNTER — APPOINTMENT (OUTPATIENT)
Dept: CT IMAGING | Age: 36
End: 2021-02-27
Payer: COMMERCIAL

## 2021-02-27 ENCOUNTER — HOSPITAL ENCOUNTER (EMERGENCY)
Age: 36
Discharge: HOME | End: 2021-02-27
Attending: EMERGENCY MEDICINE
Payer: COMMERCIAL

## 2021-02-27 VITALS
BODY MASS INDEX: 30.31 KG/M2 | DIASTOLIC BLOOD PRESSURE: 97 MMHG | WEIGHT: 200 LBS | OXYGEN SATURATION: 96 % | SYSTOLIC BLOOD PRESSURE: 135 MMHG | HEART RATE: 79 BPM | TEMPERATURE: 98.4 F | HEIGHT: 68 IN | RESPIRATION RATE: 15 BRPM

## 2021-02-27 DIAGNOSIS — R07.9 CHEST PAIN, UNSPECIFIED TYPE: Primary | ICD-10-CM

## 2021-02-27 LAB
ABSOLUTE EOS #: 0.2 K/UL (ref 0–0.4)
ABSOLUTE IMMATURE GRANULOCYTE: ABNORMAL K/UL (ref 0–0.3)
ABSOLUTE LYMPH #: 1.4 K/UL (ref 1–4.8)
ABSOLUTE MONO #: 0.7 K/UL (ref 0.1–1.3)
ANION GAP SERPL CALCULATED.3IONS-SCNC: 11 MMOL/L (ref 9–17)
BASOPHILS # BLD: 1 % (ref 0–2)
BASOPHILS ABSOLUTE: 0.1 K/UL (ref 0–0.2)
BUN BLDV-MCNC: 13 MG/DL (ref 6–20)
BUN/CREAT BLD: ABNORMAL (ref 9–20)
CALCIUM SERPL-MCNC: 9.7 MG/DL (ref 8.6–10.4)
CHLORIDE BLD-SCNC: 99 MMOL/L (ref 98–107)
CO2: 27 MMOL/L (ref 20–31)
CREAT SERPL-MCNC: 0.67 MG/DL (ref 0.7–1.2)
DIFFERENTIAL TYPE: ABNORMAL
EOSINOPHILS RELATIVE PERCENT: 2 % (ref 0–4)
GFR AFRICAN AMERICAN: >60 ML/MIN
GFR NON-AFRICAN AMERICAN: >60 ML/MIN
GFR SERPL CREATININE-BSD FRML MDRD: ABNORMAL ML/MIN/{1.73_M2}
GFR SERPL CREATININE-BSD FRML MDRD: ABNORMAL ML/MIN/{1.73_M2}
GLUCOSE BLD-MCNC: 102 MG/DL (ref 70–99)
HCT VFR BLD CALC: 45.4 % (ref 41–53)
HEMOGLOBIN: 15.7 G/DL (ref 13.5–17.5)
IMMATURE GRANULOCYTES: ABNORMAL %
INR BLD: 0.9
LYMPHOCYTES # BLD: 21 % (ref 24–44)
MCH RBC QN AUTO: 36.2 PG (ref 26–34)
MCHC RBC AUTO-ENTMCNC: 34.5 G/DL (ref 31–37)
MCV RBC AUTO: 104.8 FL (ref 80–100)
MONOCYTES # BLD: 10 % (ref 1–7)
NRBC AUTOMATED: ABNORMAL PER 100 WBC
PARTIAL THROMBOPLASTIN TIME: 28 SEC (ref 24–36)
PDW BLD-RTO: 13.8 % (ref 11.5–14.9)
PLATELET # BLD: 196 K/UL (ref 150–450)
PLATELET ESTIMATE: ABNORMAL
PMV BLD AUTO: 8.2 FL (ref 6–12)
POTASSIUM SERPL-SCNC: 3.9 MMOL/L (ref 3.7–5.3)
PROTHROMBIN TIME: 12.3 SEC (ref 11.8–14.6)
RBC # BLD: 4.33 M/UL (ref 4.5–5.9)
RBC # BLD: ABNORMAL 10*6/UL
SEG NEUTROPHILS: 66 % (ref 36–66)
SEGMENTED NEUTROPHILS ABSOLUTE COUNT: 4.6 K/UL (ref 1.3–9.1)
SODIUM BLD-SCNC: 137 MMOL/L (ref 135–144)
TROPONIN INTERP: NORMAL
TROPONIN INTERP: NORMAL
TROPONIN T: NORMAL NG/ML
TROPONIN T: NORMAL NG/ML
TROPONIN, HIGH SENSITIVITY: 11 NG/L (ref 0–22)
TROPONIN, HIGH SENSITIVITY: 11 NG/L (ref 0–22)
WBC # BLD: 7.1 K/UL (ref 3.5–11)
WBC # BLD: ABNORMAL 10*3/UL

## 2021-02-27 PROCEDURE — 71260 CT THORAX DX C+: CPT

## 2021-02-27 PROCEDURE — 2580000003 HC RX 258: Performed by: EMERGENCY MEDICINE

## 2021-02-27 PROCEDURE — 36415 COLL VENOUS BLD VENIPUNCTURE: CPT

## 2021-02-27 PROCEDURE — 99285 EMERGENCY DEPT VISIT HI MDM: CPT

## 2021-02-27 PROCEDURE — 6360000004 HC RX CONTRAST MEDICATION: Performed by: EMERGENCY MEDICINE

## 2021-02-27 PROCEDURE — 85610 PROTHROMBIN TIME: CPT

## 2021-02-27 PROCEDURE — 84484 ASSAY OF TROPONIN QUANT: CPT

## 2021-02-27 PROCEDURE — 85730 THROMBOPLASTIN TIME PARTIAL: CPT

## 2021-02-27 PROCEDURE — 93005 ELECTROCARDIOGRAM TRACING: CPT | Performed by: EMERGENCY MEDICINE

## 2021-02-27 PROCEDURE — 85025 COMPLETE CBC W/AUTO DIFF WBC: CPT

## 2021-02-27 PROCEDURE — 80048 BASIC METABOLIC PNL TOTAL CA: CPT

## 2021-02-27 RX ORDER — 0.9 % SODIUM CHLORIDE 0.9 %
80 INTRAVENOUS SOLUTION INTRAVENOUS ONCE
Status: COMPLETED | OUTPATIENT
Start: 2021-02-27 | End: 2021-02-27

## 2021-02-27 RX ORDER — SODIUM CHLORIDE 0.9 % (FLUSH) 0.9 %
10 SYRINGE (ML) INJECTION PRN
Status: DISCONTINUED | OUTPATIENT
Start: 2021-02-27 | End: 2021-02-27 | Stop reason: HOSPADM

## 2021-02-27 RX ADMIN — SODIUM CHLORIDE 80 ML: 9 INJECTION, SOLUTION INTRAVENOUS at 02:30

## 2021-02-27 RX ADMIN — Medication 10 ML: at 02:30

## 2021-02-27 RX ADMIN — IOPAMIDOL 75 ML: 755 INJECTION, SOLUTION INTRAVENOUS at 02:30

## 2021-02-27 ASSESSMENT — ENCOUNTER SYMPTOMS
BACK PAIN: 0
VOMITING: 0
TROUBLE SWALLOWING: 0
ABDOMINAL PAIN: 0
NAUSEA: 0
COUGH: 1
SHORTNESS OF BREATH: 1
BLOOD IN STOOL: 0
CONSTIPATION: 0
SORE THROAT: 0
DIARRHEA: 0
COLOR CHANGE: 0

## 2021-02-27 ASSESSMENT — PAIN SCALES - GENERAL: PAINLEVEL_OUTOF10: 3

## 2021-02-27 ASSESSMENT — PAIN DESCRIPTION - PAIN TYPE: TYPE: ACUTE PAIN

## 2021-02-27 ASSESSMENT — PAIN DESCRIPTION - LOCATION: LOCATION: CHEST

## 2021-02-27 ASSESSMENT — PAIN DESCRIPTION - FREQUENCY: FREQUENCY: CONTINUOUS

## 2021-02-27 NOTE — ED NOTES
Mode of arrival (squad #, walk in, police, etc) : pt arrived by [facility] complaint(s): mid sternal chest pressure and tightness        Arrival Note (brief scenario, treatment PTA, etc). : pt treated by squad with 325 of ASA, and 2 nitro tabs prior to arrival        C= \"Have you ever felt that you should Cut down on your drinking? \"  No  A= \"Have people Annoyed you by criticizing your drinking? \"  No  G= \"Have you ever felt bad or Guilty about your drinking? \"  No  E= \"Have you ever had a drink as an Eye-opener first thing in the morning to steady your nerves or to help a hangover? \"  No      Deferred []      Reason for deferring: N/A    *If yes to two or more: probable alcohol abuse. Nenita Jackson RN  02/27/21 0815

## 2021-02-27 NOTE — ED PROVIDER NOTES
[address]  EMERGENCY DEPARTMENT ENCOUNTER    Pt Name: Olayinka Harp  MRN: [id number]  YOB: 1985  Date of evaluation:2/27/21  PCP: No primary care provider on file. CHIEF COMPLAINT       Chief Complaint   Patient presents with    Chest Pain       HISTORY OF PRESENT ILLNESS    Olayinka Harp is a 29 y.o. male who presents with a chief complaint of chest pain. Patient states this evening at work he developed chest pain. Pain is mostly in the center of his chest.  It feels like a tightening. He states he recently had a pulmonary embolism less than a year ago. He was on 6 months of Eliquis but is not on blood thinners right now. He states that he deals with some chronic pain on the left side of his chest but this is worse and more in the middle of his chest.  It sharp and does not radiate. Nothing makes it better but inspiration makes it worse. Reports some mild shortness of breath right now as well. Also initially felt like he was going to pass out. No recent fevers, chills other illnesses. He is complained of a cough but states this is chronic for him. Symptoms are acute. Symptoms are moderate. Nothing else make symptoms better or worse. Patient has no other complaints at this time. REVIEW OF SYSTEMS       Review of Systems   Constitutional: Negative for chills, fatigue and fever. HENT: Negative for congestion, ear pain, sore throat and trouble swallowing. Eyes: Negative for visual disturbance. Respiratory: Positive for cough and shortness of breath. Cardiovascular: Positive for chest pain. Negative for palpitations and leg swelling. Gastrointestinal: Negative for abdominal pain, blood in stool, constipation, diarrhea, nausea and vomiting. Genitourinary: Negative for dysuria and flank pain. Musculoskeletal: Negative for arthralgias, back pain, myalgias and neck pain. Skin: Negative for color change, rash and wound.    Neurological: Positive for dizziness and light-headedness. Negative for weakness, numbness and headaches. Psychiatric/Behavioral: Negative for confusion. All other systems reviewed and are negative. Negative in 10 essential Systems except as mentioned above and in the HPI. PAST MEDICAL HISTORY     Past Medical History:   Diagnosis Date    No active medical problems     Pulmonary embolism ([facility] [address]          SURGICAL HISTORY      has no past surgical history on file. None    CURRENT MEDICATIONS       Current Discharge Medication List      CONTINUE these medications which have NOT CHANGED    Details   naproxen (NAPROSYN) 500 MG tablet Take 1 tablet by mouth 2 times daily as needed for Pain  Qty: 20 tablet, Refills: 0      metoprolol succinate (TOPROL XL) 25 MG extended release tablet take 1 tablet by mouth EVERY NIGHT      Multiple Vitamins-Minerals (MULTIVITAMIN ADULT PO) Take by mouth      vitamin B-1 (THIAMINE) 100 MG tablet Take 1 tablet by mouth daily  Qty: 30 tablet, Refills: 3    Associated Diagnoses: Uncomplicated alcohol dependence (HCC)      folic acid (FOLVITE) 1 MG tablet Take 1 tablet by mouth daily  Qty: 30 tablet, Refills: 3      Nicotine 21-14-7 MG/24HR KIT Place onto the skin      amLODIPine (NORVASC) 10 MG tablet Take 1 tablet by mouth daily  Qty: 30 tablet, Refills: 3      apixaban (ELIQUIS DVT/PE STARTER PACK) 5 MG TABS tablet Take 10 mg (2 tablets) orally twice daily for 7 days, then take 5 mg (1 tablet) orally twice daily thereafter. Qty: 74 tablet, Refills: 0      EPINEPHrine (EPIPEN 2-LIZETTE) 0.3 MG/0.3ML SOAJ injection Inject 0.3 mLs into the muscle once for 1 dose Use as directed for allergic reaction  Qty: 2 each, Refills: 0             ALLERGIES     is allergic to bee venom. FAMILY HISTORY     He indicated that his mother is . He indicated that his father is alive. family history includes Heart Attack in his mother; Heart Disease in his father and mother.     SOCIAL HISTORY      reports that he has been smoking cigarettes. He has a 10.00 pack-year smoking history. He has never used smokeless tobacco. He reports current alcohol use. He reports previous drug use. PHYSICAL EXAM     INITIAL VITALS:  height is 5' 8\" (1.727 m) and weight is 200 lb (90.7 kg). His temperature is 98.4 °F (36.9 °C). His blood pressure is 141/94 (abnormal) and his pulse is 90. His respiration is 16 and oxygen saturation is 97%. Physical Exam  Vitals signs and nursing note reviewed. Constitutional:       General: He is not in acute distress. HENT:      Head: Normocephalic and atraumatic. Eyes:      Conjunctiva/sclera: Conjunctivae normal.      Pupils: Pupils are equal, round, and reactive to light. Neck:      Musculoskeletal: Neck supple. Cardiovascular:      Rate and Rhythm: Normal rate and regular rhythm. Heart sounds: Normal heart sounds. No murmur. Pulmonary:      Effort: Pulmonary effort is normal. No respiratory distress. Breath sounds: Normal breath sounds. Abdominal:      General: Bowel sounds are normal. There is no distension. Palpations: Abdomen is soft. Tenderness: There is no abdominal tenderness. Musculoskeletal:         General: No tenderness. Lymphadenopathy:      Cervical: No cervical adenopathy. Skin:     General: Skin is warm and dry. Findings: No rash. Neurological:      Mental Status: He is alert and oriented to person, place, and time. Psychiatric:         Judgment: Judgment normal.           DIFFERENTIAL DIAGNOSIS/MDM:   41-year-old male with history of pulmonary embolism presents with chest pain difficulty breathing and presyncope. He is afebrile, nontoxic, mildly hypertensive but otherwise vital signs normal.  No acute distress. Differential includes ACS, stable present and available angina, PE, pneumonia, pneumothorax, dissection.     We will get cardiac work-up, and a CT scan of the chest.    DIAGNOSTIC RESULTS     EKG: All EKG's are interpreted by the [facility] 469  [phone]    As needed, If symptoms worsen      DISCHARGE MEDICATIONS:  Current Discharge Medication List          (Please note that portions ofthis note were completed with a voice recognition program.  Efforts were made to edit the dictations but occasionally words are mis-transcribed.)    Brandon Alejandro DO  Attending Emergency Physician          Brandon Alejandro DO  02/27/21 9307

## 2021-02-28 LAB
EKG ATRIAL RATE: 87 BPM
EKG P AXIS: 43 DEGREES
EKG P-R INTERVAL: 134 MS
EKG Q-T INTERVAL: 340 MS
EKG QRS DURATION: 92 MS
EKG QTC CALCULATION (BAZETT): 409 MS
EKG R AXIS: 3 DEGREES
EKG T AXIS: 49 DEGREES
EKG VENTRICULAR RATE: 87 BPM

## 2021-02-28 PROCEDURE — 93010 ELECTROCARDIOGRAM REPORT: CPT | Performed by: INTERNAL MEDICINE

## 2021-03-04 ENCOUNTER — APPOINTMENT (OUTPATIENT)
Dept: GENERAL RADIOLOGY | Age: 36
End: 2021-03-04
Payer: COMMERCIAL

## 2021-03-04 ENCOUNTER — HOSPITAL ENCOUNTER (EMERGENCY)
Age: 36
Discharge: HOME | End: 2021-03-04
Attending: EMERGENCY MEDICINE
Payer: COMMERCIAL

## 2021-03-04 VITALS
SYSTOLIC BLOOD PRESSURE: 156 MMHG | TEMPERATURE: 98.3 F | HEART RATE: 86 BPM | DIASTOLIC BLOOD PRESSURE: 106 MMHG | RESPIRATION RATE: 16 BRPM | BODY MASS INDEX: 30.31 KG/M2 | HEIGHT: 68 IN | WEIGHT: 200 LBS | OXYGEN SATURATION: 97 %

## 2021-03-04 DIAGNOSIS — R07.9 CHEST PAIN, UNSPECIFIED TYPE: Primary | ICD-10-CM

## 2021-03-04 LAB
ABSOLUTE EOS #: 0.1 K/UL (ref 0–0.4)
ABSOLUTE IMMATURE GRANULOCYTE: ABNORMAL K/UL (ref 0–0.3)
ABSOLUTE LYMPH #: 1.4 K/UL (ref 1–4.8)
ABSOLUTE MONO #: 0.6 K/UL (ref 0.1–1.3)
ALBUMIN SERPL-MCNC: 4 G/DL (ref 3.5–5.2)
ALBUMIN/GLOBULIN RATIO: ABNORMAL (ref 1–2.5)
ALP BLD-CCNC: 94 U/L (ref 40–129)
ALT SERPL-CCNC: 44 U/L (ref 5–41)
ANION GAP SERPL CALCULATED.3IONS-SCNC: 15 MMOL/L (ref 9–17)
AST SERPL-CCNC: 61 U/L
BASOPHILS # BLD: 1 % (ref 0–2)
BASOPHILS ABSOLUTE: 0.1 K/UL (ref 0–0.2)
BILIRUB SERPL-MCNC: 0.31 MG/DL (ref 0.3–1.2)
BUN BLDV-MCNC: 12 MG/DL (ref 6–20)
BUN/CREAT BLD: ABNORMAL (ref 9–20)
CALCIUM SERPL-MCNC: 9.2 MG/DL (ref 8.6–10.4)
CHLORIDE BLD-SCNC: 97 MMOL/L (ref 98–107)
CO2: 26 MMOL/L (ref 20–31)
CREAT SERPL-MCNC: 0.61 MG/DL (ref 0.7–1.2)
DIFFERENTIAL TYPE: ABNORMAL
EOSINOPHILS RELATIVE PERCENT: 2 % (ref 0–4)
GFR AFRICAN AMERICAN: >60 ML/MIN
GFR NON-AFRICAN AMERICAN: >60 ML/MIN
GFR SERPL CREATININE-BSD FRML MDRD: ABNORMAL ML/MIN/{1.73_M2}
GFR SERPL CREATININE-BSD FRML MDRD: ABNORMAL ML/MIN/{1.73_M2}
GLUCOSE BLD-MCNC: 111 MG/DL (ref 70–99)
HCT VFR BLD CALC: 43.6 % (ref 41–53)
HEMOGLOBIN: 15.1 G/DL (ref 13.5–17.5)
IMMATURE GRANULOCYTES: ABNORMAL %
INR BLD: 0.9
LYMPHOCYTES # BLD: 20 % (ref 24–44)
MAGNESIUM: 1.6 MG/DL (ref 1.6–2.6)
MCH RBC QN AUTO: 36.2 PG (ref 26–34)
MCHC RBC AUTO-ENTMCNC: 34.6 G/DL (ref 31–37)
MCV RBC AUTO: 104.7 FL (ref 80–100)
MONOCYTES # BLD: 9 % (ref 1–7)
NRBC AUTOMATED: ABNORMAL PER 100 WBC
PDW BLD-RTO: 13.9 % (ref 11.5–14.9)
PLATELET # BLD: 214 K/UL (ref 150–450)
PLATELET ESTIMATE: ABNORMAL
PMV BLD AUTO: 8.3 FL (ref 6–12)
POTASSIUM SERPL-SCNC: 3.4 MMOL/L (ref 3.7–5.3)
PROTHROMBIN TIME: 12.2 SEC (ref 11.8–14.6)
RBC # BLD: 4.16 M/UL (ref 4.5–5.9)
RBC # BLD: ABNORMAL 10*6/UL
SARS-COV-2, RAPID: NOT DETECTED
SEG NEUTROPHILS: 68 % (ref 36–66)
SEGMENTED NEUTROPHILS ABSOLUTE COUNT: 4.8 K/UL (ref 1.3–9.1)
SODIUM BLD-SCNC: 138 MMOL/L (ref 135–144)
SPECIMEN DESCRIPTION: NORMAL
TOTAL PROTEIN: 8.1 G/DL (ref 6.4–8.3)
TROPONIN INTERP: NORMAL
TROPONIN T: NORMAL NG/ML
TROPONIN, HIGH SENSITIVITY: 11 NG/L (ref 0–22)
WBC # BLD: 7.1 K/UL (ref 3.5–11)
WBC # BLD: ABNORMAL 10*3/UL

## 2021-03-04 PROCEDURE — 36415 COLL VENOUS BLD VENIPUNCTURE: CPT

## 2021-03-04 PROCEDURE — 71045 X-RAY EXAM CHEST 1 VIEW: CPT

## 2021-03-04 PROCEDURE — 83735 ASSAY OF MAGNESIUM: CPT

## 2021-03-04 PROCEDURE — 93005 ELECTROCARDIOGRAM TRACING: CPT | Performed by: EMERGENCY MEDICINE

## 2021-03-04 PROCEDURE — 96374 THER/PROPH/DIAG INJ IV PUSH: CPT

## 2021-03-04 PROCEDURE — U0002 COVID-19 LAB TEST NON-CDC: HCPCS

## 2021-03-04 PROCEDURE — 6360000002 HC RX W HCPCS: Performed by: EMERGENCY MEDICINE

## 2021-03-04 PROCEDURE — 85025 COMPLETE CBC W/AUTO DIFF WBC: CPT

## 2021-03-04 PROCEDURE — 80053 COMPREHEN METABOLIC PANEL: CPT

## 2021-03-04 PROCEDURE — 6370000000 HC RX 637 (ALT 250 FOR IP): Performed by: EMERGENCY MEDICINE

## 2021-03-04 PROCEDURE — 85610 PROTHROMBIN TIME: CPT

## 2021-03-04 PROCEDURE — 96375 TX/PRO/DX INJ NEW DRUG ADDON: CPT

## 2021-03-04 PROCEDURE — 99285 EMERGENCY DEPT VISIT HI MDM: CPT

## 2021-03-04 PROCEDURE — 84484 ASSAY OF TROPONIN QUANT: CPT

## 2021-03-04 RX ORDER — MORPHINE SULFATE 4 MG/ML
4 INJECTION, SOLUTION INTRAMUSCULAR; INTRAVENOUS ONCE
Status: COMPLETED | OUTPATIENT
Start: 2021-03-04 | End: 2021-03-04

## 2021-03-04 RX ORDER — ONDANSETRON 2 MG/ML
8 INJECTION INTRAMUSCULAR; INTRAVENOUS ONCE
Status: COMPLETED | OUTPATIENT
Start: 2021-03-04 | End: 2021-03-04

## 2021-03-04 RX ORDER — GUAIFENESIN/DEXTROMETHORPHAN 100-10MG/5
5 SYRUP ORAL 3 TIMES DAILY PRN
Qty: 120 ML | Refills: 0 | Status: SHIPPED | OUTPATIENT
Start: 2021-03-04 | End: 2021-03-14

## 2021-03-04 RX ORDER — ACETAMINOPHEN 500 MG
1000 TABLET ORAL ONCE
Status: COMPLETED | OUTPATIENT
Start: 2021-03-04 | End: 2021-03-04

## 2021-03-04 RX ORDER — ACETAMINOPHEN 500 MG
1000 TABLET ORAL EVERY 6 HOURS PRN
Qty: 60 TABLET | Refills: 0 | Status: SHIPPED | OUTPATIENT
Start: 2021-03-04 | End: 2021-08-13

## 2021-03-04 RX ADMIN — MORPHINE SULFATE 4 MG: 4 INJECTION, SOLUTION INTRAMUSCULAR; INTRAVENOUS at 20:01

## 2021-03-04 RX ADMIN — ONDANSETRON 8 MG: 2 INJECTION INTRAMUSCULAR; INTRAVENOUS at 20:01

## 2021-03-04 RX ADMIN — ACETAMINOPHEN 1000 MG: 500 TABLET ORAL at 20:01

## 2021-03-04 ASSESSMENT — PAIN DESCRIPTION - DESCRIPTORS: DESCRIPTORS: PRESSURE

## 2021-03-04 ASSESSMENT — ENCOUNTER SYMPTOMS: COUGH: 1

## 2021-03-04 ASSESSMENT — PAIN DESCRIPTION - LOCATION: LOCATION: CHEST

## 2021-03-05 LAB
EKG ATRIAL RATE: 102 BPM
EKG P AXIS: 47 DEGREES
EKG P-R INTERVAL: 140 MS
EKG Q-T INTERVAL: 318 MS
EKG QRS DURATION: 90 MS
EKG QTC CALCULATION (BAZETT): 414 MS
EKG R AXIS: 15 DEGREES
EKG T AXIS: 58 DEGREES
EKG VENTRICULAR RATE: 102 BPM

## 2021-03-05 NOTE — ED NOTES
Mode of arrival (squad #, walk in, police, etc) : walk in        Chief complaint(s): c/o chest pain and cough        Arrival Note (brief scenario, treatment PTA, etc). : Pt presents to ED from home c/o left sided chest pain radiating into the mid sternal chest starting today at 3pm. Pt stated he has had a productive cough x 3 weeks producing green/brown thick sputum. Pt stated he has hx of blood clots in his lungs. Lung sounds are clear. Pt stated he gets weekly covid tests at work and all have been negative. Pt is A&Ox4, eupneic, PWD. GCS=15. Call light in reach. C= \"Have you ever felt that you should Cut down on your drinking? \"  No  A= \"Have people Annoyed you by criticizing your drinking? \"  No  G= \"Have you ever felt bad or Guilty about your drinking? \"  No  E= \"Have you ever had a drink as an Eye-opener first thing in the morning to steady your nerves or to help a hangover? \"  No      Deferred []      Reason for deferring: N/A    *If yes to two or more: probable alcohol abuse. Lakesha Orozco RN  03/04/21 2024

## 2021-03-05 NOTE — ED PROVIDER NOTES
I did not see or evaluate this patient chart entered in error they were being seen by another provider.     Sharon Horta MS, RD  PGY-2 Emergency Medicine           Lata Nagy,   Resident  03/04/21 5134

## 2021-03-05 NOTE — ED PROVIDER NOTES
EMERGENCY DEPARTMENT ENCOUNTER    Pt Name: Allean Schirmer  MRN: [id number]  [place] 1985  Date of evaluation: 3/4/21  CHIEF COMPLAINT       Chief Complaint   Patient presents with    Chest Pain     since 3pm today    Cough     x 3 weeks     HISTORY OF PRESENT ILLNESS     Chest Pain  Pain location:  Substernal area  Pain quality: pressure    Pain radiates to:  Does not radiate  Pain severity:  Moderate  Onset quality:  Gradual  Duration:  1 week  Timing:  Constant  Progression:  Unchanged  Chronicity:  New  Relieved by:  Nothing  Worsened by:  Nothing  Ineffective treatments:  None tried  Associated symptoms: cough    Associated symptoms comment:  Yellow phelgm  Risk factors: smoking      Was here last Friday, had ct chest, not feeling better  Works at [employer]  Not exertional  No NV  Had cardiac cath in past    REVIEW OF SYSTEMS     Review of Systems   Respiratory: Positive for cough. Cardiovascular: Positive for chest pain. All other systems reviewed and are negative. 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 No pertinent surgical history.   CURRENT MEDICATIONS       Discharge Medication List as of 3/4/2021  8:50 PM      CONTINUE these medications which have NOT CHANGED    Details   naproxen (NAPROSYN) 500 MG tablet Take 1 tablet by mouth 2 times daily as needed for Pain, Disp-20 tablet, R-0Print      metoprolol succinate (TOPROL XL) 25 MG extended release tablet take 1 tablet by mouth EVERY NIGHTHistorical Med      Multiple Vitamins-Minerals (MULTIVITAMIN ADULT PO) Take by mouthHistorical Med      vitamin B-1 (THIAMINE) 100 MG tablet Take 1 tablet by mouth daily, Disp-30 tablet, U-2SDONR      folic acid (FOLVITE) 1 MG tablet Take 1 tablet by mouth daily, Disp-30 tablet, R-3Print      Nicotine 21-14-7 MG/24HR KIT Place onto the skinHistorical Med      amLODIPine (NORVASC) 10 MG tablet Take 1 tablet by mouth daily, Disp-30 tablet, R-3Normal      EPINEPHrine (EPIPEN 2-LIZETTE) 0.3 MG/0.3ML SOAJ injection Inject 0.3 mLs into the muscle once for 1 dose Use as directed for allergic reaction, Disp-2 each, R-0Print           ALLERGIES     is allergic to bee venom. FAMILY HISTORY     He indicated that his mother is . He indicated that his father is alive. SOCIAL HISTORY       Social History     Tobacco Use    Smoking status: Current Every Day Smoker     Packs/day: 1.00     Years: 10.00     Pack years: 10.00     Types: Cigarettes    Smokeless tobacco: Never Used    Tobacco comment: using nicotine patches at night   Substance Use Topics    Alcohol use: Yes     Frequency: 4 or more times a week     Drinks per session: 3 or 4     Binge frequency: Daily or almost daily     Comment: occ    Drug use: Not Currently     Comment: used cocaine in early 20's     PHYSICAL EXAM     INITIAL VITALS: BP (!) 156/106   Pulse 86   Temp 98.3 °F (36.8 °C) (Oral)   Resp 16   Ht 5' 8\" (1.727 m)   Wt 200 lb (90.7 kg)   SpO2 97%   BMI 30.41 kg/m²    Physical Exam  Constitutional:       General: He is not in acute distress. Appearance: Normal appearance. He is well-developed. He is not diaphoretic. HENT:      Head: Normocephalic and atraumatic. Right Ear: External ear normal.      Left Ear: External ear normal.      Nose: Nose normal. No congestion. Mouth/Throat:      Mouth: Mucous membranes are moist.      Pharynx: Oropharynx is clear. Eyes:      General:         Right eye: No discharge. Left eye: No discharge. Conjunctiva/sclera: Conjunctivae normal.      Pupils: Pupils are equal, round, and reactive to light. Neck:      Musculoskeletal: Normal range of motion and neck supple. Trachea: No tracheal deviation. Cardiovascular:      Rate and Rhythm: Normal rate and regular rhythm. Pulses: Normal pulses. Heart sounds: Normal heart sounds. Comments: Radial pulses and dp 2+ BL  Pulmonary:      Effort: Pulmonary effort is normal. No respiratory distress. Breath sounds: Normal breath sounds. No stridor. No wheezing or rales. Chest:      Chest wall: Tenderness present. Abdominal:      Palpations: Abdomen is soft. Tenderness: There is no abdominal tenderness. There is no guarding or rebound. Musculoskeletal: Normal range of motion. General: No tenderness or deformity. Right lower leg: No edema. Left lower leg: No edema. Comments: No calf tenderness   Skin:     General: Skin is warm and dry. Capillary Refill: Capillary refill takes less than 2 seconds. Findings: No erythema or rash. Neurological:      General: No focal deficit present. Mental Status: He is alert and oriented to person, place, and time. Cranial Nerves: No cranial nerve deficit. Coordination: Coordination normal.   Psychiatric:         Mood and Affect: Mood normal.         Behavior: Behavior normal.         Thought Content: Thought content normal.         Judgment: Judgment normal.         MEDICAL DECISION MAKING:   Reviewed past med records    ED Course as of Mar 04 2112   Thu Mar 04, 2021   1952 Cardiac cath done in 2020:    on 05/20/2020 15:52   ----------------------------------------------------------------      Angiographic Findings      Cardiac Arteries and Lesion Findings     LMCA: Normal 0% stenosis.     LAD: Normal 0% stenosis.     LCx: Normal 0% stenosis.     RCA: Normal 0% stenosis.     [WM]      ED Course User Index  [WM] Shelly Coates MD       Had recent ct chest, no PE  Do not suspect AD or PE or AMI  Suspect chest wall pain and pleurisy  rx tylenol and cough medicine  Discussed with patient anticipatory guidance, discharge instructions, follow up [facility] PCP 24 hours'  HEART score 2    Procedures    DIAGNOSTIC RESULTS   EKG:All EKG's are interpreted by the Emergency Department Physician who either signs or Co-signs this chart in the absence of a cardiologist.  ST, nonspecific changes, some artifact, no acute ischemic changes on ST segments, no change compared to old, normal intervals        RADIOLOGY:All plain film, CT, MRI, and formal ultrasound images (except ED bedside ultrasound) are read by the radiologist, see reports below, unless otherwisenoted in MDM or here. XR CHEST PORTABLE   Final Result   Clear lungs. No acute cardiopulmonary abnormality. LABS: All lab results were reviewed by myself, and all abnormals are listed below.   Labs Reviewed   CBC WITH AUTO DIFFERENTIAL - Abnormal; Notable for the following components:       Result Value    RBC 4.16 (*)     .7 (*)     MCH 36.2 (*)     Seg Neutrophils 68 (*)     Lymphocytes 20 (*)     Monocytes 9 (*)     All other components within normal limits   COMPREHENSIVE METABOLIC PANEL - Abnormal; Notable for the following components:    Glucose 111 (*)     CREATININE 0.61 (*)     Potassium 3.4 (*)     Chloride 97 (*)     ALT 44 (*)     AST 61 (*)     All other components within normal limits   COVID-19, RAPID   TROPONIN   MAGNESIUM   PROTIME-INR       EMERGENCY DEPARTMENTCOURSE:         Vitals:    Vitals:    03/04/21 1942 03/04/21 2015 03/04/21 2030 03/04/21 2045   BP: (!) 156/106      Pulse: 110 104 105 86   Resp: 17 16 15 16   Temp: 98.3 °F (36.8 °C)      TempSrc: Oral      SpO2: 97%      Weight: 200 lb (90.7 kg)      Height: 5' 8\" (1.727 m)          The patient was given the following medications while in the emergency department:  Orders Placed This Encounter   Medications    acetaminophen (TYLENOL) tablet 1,000 mg    morphine sulfate (PF) injection 4 mg    ondansetron (ZOFRAN) injection 8 mg    famotidine (PEPCID) injection 20 mg    acetaminophen (TYLENOL) 500 MG tablet     Sig: Take 2 tablets by mouth every 6 hours as needed for Pain     Dispense:  60 tablet     Refill:  0    guaiFENesin-dextromethorphan (ROBITUSSIN DM) 100-10 MG/5ML syrup     Sig: Take 5 mLs by mouth 3 times daily as needed for Cough     Dispense:  120 mL     Refill:  0     FINAL IMPRESSION      1.  Chest pain, unspecified type          DISPOSITION/PLAN   DISPOSITION        PATIENT REFERRED TO:  [facility]  [id number] [id number]  Schedule an appointment as soon as possible for a visit in 1 day      DISCHARGE MEDICATIONS:  Discharge Medication List as of 3/4/2021  8:50 PM      START taking these medications    Details   acetaminophen (TYLENOL) 500 MG tablet Take 2 tablets by mouth every 6 hours as needed for Pain, Disp-60 tablet, R-0Print      guaiFENesin-dextromethorphan (ROBITUSSIN DM) 100-10 MG/5ML syrup Take 5 mLs by mouth 3 times daily as needed for Cough, Disp-120 mL, R-0Print           Trinidad Stevens MD  Attending Emergency Physician                    Trinidad Stevens MD  03/04/21 5561

## 2021-03-16 ENCOUNTER — OFFICE VISIT (OUTPATIENT)
Dept: INTERNAL MEDICINE CLINIC | Age: 36
End: 2021-03-16
Payer: COMMERCIAL

## 2021-03-16 VITALS
RESPIRATION RATE: 14 BRPM | TEMPERATURE: 99 F | HEIGHT: 68 IN | DIASTOLIC BLOOD PRESSURE: 110 MMHG | WEIGHT: 208 LBS | BODY MASS INDEX: 31.52 KG/M2 | SYSTOLIC BLOOD PRESSURE: 150 MMHG

## 2021-03-16 DIAGNOSIS — Z00.00 HEALTHCARE MAINTENANCE: ICD-10-CM

## 2021-03-16 DIAGNOSIS — F10.20 UNCOMPLICATED ALCOHOL DEPENDENCE (HCC): ICD-10-CM

## 2021-03-16 DIAGNOSIS — I26.99 ACUTE PULMONARY EMBOLISM, UNSPECIFIED PULMONARY EMBOLISM TYPE, UNSPECIFIED WHETHER ACUTE COR PULMONALE PRESENT (HCC): ICD-10-CM

## 2021-03-16 DIAGNOSIS — R05.3 CHRONIC COUGH: ICD-10-CM

## 2021-03-16 DIAGNOSIS — I50.22 CHRONIC SYSTOLIC HEART FAILURE (HCC): ICD-10-CM

## 2021-03-16 DIAGNOSIS — I10 ESSENTIAL HYPERTENSION: Primary | ICD-10-CM

## 2021-03-16 DIAGNOSIS — I42.6 ALCOHOLIC CARDIOMYOPATHY (HCC): ICD-10-CM

## 2021-03-16 PROCEDURE — 99214 OFFICE O/P EST MOD 30 MIN: CPT | Performed by: INTERNAL MEDICINE

## 2021-03-16 RX ORDER — AMLODIPINE BESYLATE 10 MG/1
10 TABLET ORAL DAILY
Qty: 30 TABLET | Refills: 3 | Status: SHIPPED | OUTPATIENT
Start: 2021-03-16 | End: 2021-06-02 | Stop reason: ALTCHOICE

## 2021-03-16 RX ORDER — FLUTICASONE PROPIONATE 50 MCG
1 SPRAY, SUSPENSION (ML) NASAL DAILY
Qty: 2 BOTTLE | Refills: 1 | Status: SHIPPED | OUTPATIENT
Start: 2021-03-16 | End: 2021-05-10

## 2021-03-16 RX ORDER — METOPROLOL SUCCINATE 25 MG/1
25 TABLET, EXTENDED RELEASE ORAL DAILY
Qty: 30 TABLET | Refills: 2 | Status: SHIPPED | OUTPATIENT
Start: 2021-03-16 | End: 2021-06-02 | Stop reason: SDUPTHER

## 2021-03-16 ASSESSMENT — PATIENT HEALTH QUESTIONNAIRE - PHQ9
SUM OF ALL RESPONSES TO PHQ QUESTIONS 1-9: 0
SUM OF ALL RESPONSES TO PHQ QUESTIONS 1-9: 0
SUM OF ALL RESPONSES TO PHQ9 QUESTIONS 1 & 2: 0
2. FEELING DOWN, DEPRESSED OR HOPELESS: 0

## 2021-03-16 NOTE — PROGRESS NOTES
Visit Information    Have you changed or started any medications since your last visit including any over-the-counter medicines, vitamins, or herbal medicines? no   Are you having any side effects from any of your medications? -  no  Have you stopped taking any of your medications? Is so, why? -  no    Have you seen any other physician or provider since your last visit? No  Have you had any other diagnostic tests since your last visit? No  Have you been seen in the emergency room and/or had an admission to a hospital since we last saw you? Yes - Records Obtained  Have you had your routine dental cleaning in the past 6 months? no    Have you activated your [employer] account? If not, what are your barriers? Yes     Patient Care Team:  Malaika Sam MD as PCP - General (Internal Medicine)    Medical History Review  Past Medical, Family, and Social History reviewed and does contribute to the patient presenting condition    Health Maintenance   Topic Date Due    Hepatitis C screen  Never done    Varicella vaccine (1 of 2 - 2-dose childhood series) Never done    Pneumococcal 0-64 years Vaccine (1 of 1 - PPSV23) Never done    HIV screen  Never done    DTaP/Tdap/Td vaccine (1 - Tdap) Never done    Flu vaccine (1) Never done    Potassium monitoring  03/04/2022    Creatinine monitoring  03/04/2022    Hepatitis A vaccine  Aged Out    Hepatitis B vaccine  Aged Out    Hib vaccine  Aged Out    Meningococcal (ACWY) vaccine  Aged Out     Chief Complaint   Patient presents with    Chest Pain     Pt was in [facility] ER for chest pain and diagnosed with unspacific chest pain. Pt states when he went back to work is when he gets chest pain and dizziness. SUBJECTIVE:  More Lu is a 29 y.o. male patient who  comes for complaints of   Chief Complaint   Patient presents with    Chest Pain     Pt was in [facility] ER for chest pain and diagnosed with unspacific chest pain.  Pt states when he went back to work is when he gets chest pain and dizziness. Chest pain- seen in ER 2/27  Ct chest neg for PE at the time, trops negative  Noted to have systolic BP of >922 by EMS  Reports most CP at work  Job is strenous  Gets dizzy and elevated blood pressure within couple hr of being at work  S/p cardiac cath 5/2020- unremarkable  ECHO 2020- EF 35-40%- thought to be alcohholic cardiomyopathy      Since 3/5, he is getting palpitations and chest pain in evenings  Taking toprol XL 25 daily- ran out of meds- not taken anyneds last 3meds.      Chronic cough  Few mths  CT chest unremarkabel  Robitussin did not help  Feels right sided sinusitis    Alcohol/smoking- 5beers one day over weekend  Smoker- 1/2 PPD   Quit drinking redbull (used to drink 2 /dy)    REVIEW OF SYSTEMS (except Subjective (HPI))  GENERAL: No fevers / chills  RESPIRATORY: Negative for cough, wheezing or shortness of breath  CARDIOVASCULAR: +ve for chest pain and palpitatins  GI: no nausea, vomiting, or diarrhea  NEURO: No history of headaches    Past Medical History:   Diagnosis Date    No active medical problems     Pulmonary embolism (HCC)        SOCIAL HISTORY:  Social History     Socioeconomic History    Marital status:      Spouse name: Not on file    Number of children: Not on file    Years of education: Not on file    Highest education level: Not on file   Occupational History    Not on file   Social Needs    Financial resource strain: Not on file    Food insecurity     Worry: Not on file     Inability: Not on file    Transportation needs     Medical: Not on file     Non-medical: Not on file   Tobacco Use    Smoking status: Current Every Day Smoker     Packs/day: 1.00     Years: 10.00     Pack years: 10.00     Types: Cigarettes    Smokeless tobacco: Never Used    Tobacco comment: using nicotine patches at night   Substance and Sexual Activity    Alcohol use: Yes     Frequency: 4 or more times a week     Drinks per session: 3 or 4 Binge frequency: Daily or almost daily     Comment: occ    Drug use: Not Currently     Comment: used cocaine in early 20's    Sexual activity: Not on file   Lifestyle    Physical activity     Days per week: Not on file     Minutes per session: Not on file    Stress: Not on file   Relationships    Social connections     Talks on phone: Not on file     Gets together: Not on file     Attends Yazidism service: Not on file     Active member of club or organization: Not on file     Attends meetings of clubs or organizations: Not on file     Relationship status: Not on file    Intimate partner violence     Fear of current or ex partner: Not on file     Emotionally abused: Not on file     Physically abused: Not on file     Forced sexual activity: Not on file   Other Topics Concern    Not on file   Social History Narrative    Not on file           CURRENT MEDICATIONS:  Current Outpatient Medications   Medication Sig Dispense Refill    acetaminophen (TYLENOL) 500 MG tablet Take 2 tablets by mouth every 6 hours as needed for Pain 60 tablet 0    folic acid (FOLVITE) 1 MG tablet Take 1 tablet by mouth daily 30 tablet 3    naproxen (NAPROSYN) 500 MG tablet Take 1 tablet by mouth 2 times daily as needed for Pain (Patient not taking: Reported on 3/16/2021) 20 tablet 0    metoprolol succinate (TOPROL XL) 25 MG extended release tablet take 1 tablet by mouth EVERY NIGHT      Multiple Vitamins-Minerals (MULTIVITAMIN ADULT PO) Take by mouth      vitamin B-1 (THIAMINE) 100 MG tablet Take 1 tablet by mouth daily (Patient not taking: Reported on 3/16/2021) 30 tablet 3    Nicotine 21-14-7 MG/24HR KIT Place onto the skin      amLODIPine (NORVASC) 10 MG tablet Take 1 tablet by mouth daily (Patient not taking: Reported on 3/16/2021) 30 tablet 3    EPINEPHrine (EPIPEN 2-LIZETTE) 0.3 MG/0.3ML SOAJ injection Inject 0.3 mLs into the muscle once for 1 dose Use as directed for allergic reaction 2 each 0     No current facility-administered medications for this visit. OBJECTIVE:  Vitals:    03/16/21 1530   BP: (!) 150/110   Resp:    Temp:      Body mass index is 31.63 kg/m². General exam (except above):  General appearance - well appearing, alert, in no acute distress  Head - Atraumatic, normocephalic  Eyes - EOMI, no jaundice or pallor  Lungs - Lungs clear to auscultation. No wheezing, rhonchi, rales  Heart - RRR without murmur, gallop, or rubs. No ectopy  Abdomen - Abdomen soft, non-tender. Bowel sounds normal. No masses, organomegaly  Extremities -No significant edema, or skin discoloration. Good capillary refill. Neuro - Pt Alert, awake and oriented x 3. No gross focal neurological deficits    ASSESSMENT AND PLAN (MEDICAL DECISION MAKING):   Bao Ag was seen today for chest pain. Diagnoses and all orders for this visit:    Essential hypertension  -     amLODIPine (NORVASC) 10 MG tablet; Take 1 tablet by mouth daily  -     metoprolol succinate (TOPROL XL) 25 MG extended release tablet; Take 1 tablet by mouth daily  -     05652 - NE Duplex Abd/Pel Vasc Study, Complete  -     TSH; Future    Acute pulmonary embolism, unspecified pulmonary embolism type, unspecified whether acute cor pulmonale present (HCC)  Comments:  was taken off Eliquis    Chronic systolic heart failure ([address]    Healthcare maintenance  Comments:  Due flu,datp and oneumonia vaccines.     Uncomplicated alcohol dependence ([address]    Alcoholic cardiomyopathy ([address]  Comments:  still drinking 5beer one day over weekend  advised to reduce    Chronic cough  -     fluticasone (FLONASE) 50 MCG/ACT nasal spray; 1 spray by Each Nostril route daily         Needs forms filled for work      Follow up in: 3 weeks      Brandan Suggs MD

## 2021-03-23 ENCOUNTER — HOSPITAL ENCOUNTER (OUTPATIENT)
Age: 36
Setting detail: SPECIMEN
Discharge: HOME | End: 2021-03-23
Payer: COMMERCIAL

## 2021-03-23 DIAGNOSIS — I10 ESSENTIAL HYPERTENSION: ICD-10-CM

## 2021-03-23 LAB — TSH SERPL DL<=0.05 MIU/L-ACNC: 1.98 MIU/L (ref 0.3–5)

## 2021-03-24 DIAGNOSIS — I10 ESSENTIAL HYPERTENSION: Primary | ICD-10-CM

## 2021-03-30 ENCOUNTER — HOSPITAL ENCOUNTER (OUTPATIENT)
Dept: VASCULAR LAB | Age: 36
Discharge: HOME | End: 2021-03-30
Payer: COMMERCIAL

## 2021-03-30 PROCEDURE — 93978 VASCULAR STUDY: CPT

## 2021-04-07 ENCOUNTER — OFFICE VISIT (OUTPATIENT)
Dept: INTERNAL MEDICINE CLINIC | Age: 36
End: 2021-04-07
Payer: COMMERCIAL

## 2021-04-07 VITALS
WEIGHT: 208 LBS | SYSTOLIC BLOOD PRESSURE: 124 MMHG | DIASTOLIC BLOOD PRESSURE: 88 MMHG | RESPIRATION RATE: 14 BRPM | TEMPERATURE: 98.8 F | BODY MASS INDEX: 31.52 KG/M2 | HEIGHT: 68 IN | HEART RATE: 76 BPM

## 2021-04-07 DIAGNOSIS — Z00.00 HEALTHCARE MAINTENANCE: ICD-10-CM

## 2021-04-07 DIAGNOSIS — I24.9 ACUTE CORONARY SYNDROME (HCC): ICD-10-CM

## 2021-04-07 DIAGNOSIS — R12 HEARTBURN: ICD-10-CM

## 2021-04-07 DIAGNOSIS — I10 ESSENTIAL HYPERTENSION: Primary | ICD-10-CM

## 2021-04-07 DIAGNOSIS — I50.22 CHRONIC SYSTOLIC CONGESTIVE HEART FAILURE (HCC): ICD-10-CM

## 2021-04-07 PROCEDURE — 99214 OFFICE O/P EST MOD 30 MIN: CPT | Performed by: INTERNAL MEDICINE

## 2021-04-07 RX ORDER — PANTOPRAZOLE SODIUM 40 MG/1
40 TABLET, DELAYED RELEASE ORAL
Qty: 30 TABLET | Refills: 5 | Status: SHIPPED | OUTPATIENT
Start: 2021-04-07 | End: 2021-10-14

## 2021-04-07 ASSESSMENT — PATIENT HEALTH QUESTIONNAIRE - PHQ9
SUM OF ALL RESPONSES TO PHQ QUESTIONS 1-9: 0
SUM OF ALL RESPONSES TO PHQ9 QUESTIONS 1 & 2: 0
SUM OF ALL RESPONSES TO PHQ QUESTIONS 1-9: 0
1. LITTLE INTEREST OR PLEASURE IN DOING THINGS: 0

## 2021-04-07 NOTE — PROGRESS NOTES
Visit Information    Have you changed or started any medications since your last visit including any over-the-counter medicines, vitamins, or herbal medicines? no   Are you having any side effects from any of your medications? -  no  Have you stopped taking any of your medications? Is so, why? -  no    Have you seen any other physician or provider since your last visit? No  Have you had any other diagnostic tests since your last visit? No  Have you been seen in the emergency room and/or had an admission to a hospital since we last saw you? No  Have you had your routine dental cleaning in the past 6 months? no    Have you activated your [employer] account? If not, what are your barriers? yes    Patient Care Team:  Delisa Manriquez MD as PCP - General (Internal Medicine)  Delisa Manriquez MD as PCP - [facility] Provider    Medical History Review  Past Medical, Family, and Social History reviewed and does contribute to the patient presenting condition    Health Maintenance   Topic Date Due    Hepatitis C screen  Never done    Varicella vaccine (1 of 2 - 2-dose childhood series) Never done    Pneumococcal 0-64 years Vaccine (1 of 1 - PPSV23) Never done    HIV screen  Never done    DTaP/Tdap/Td vaccine (1 - Tdap) Never done    COVID-19 Vaccine (2 - Moderna 2-dose series) 04/22/2021    Flu vaccine (Season Ended) 09/01/2021    Potassium monitoring  03/04/2022    Creatinine monitoring  03/04/2022    Hepatitis A vaccine  Aged Out    Hepatitis B vaccine  Aged Out    Hib vaccine  Aged Out    Meningococcal (ACWY) vaccine  Aged Out     Chief Complaint   Patient presents with    Blood Pressure Check     Pt is here following up on bloood pressure after re starting amlodipine and  metoprolol. Pt is tolerating the new mediation well without complaints.      SUBJECTIVE:  Isael Fowler is a 29 y.o. male patient who  comes for complaints of   Chief Complaint   Patient presents with    Blood Pressure Check     Pt is children: Not on file    Years of education: Not on file    Highest education level: Not on file   Occupational History    Not on file   Social Needs    Financial resource strain: Not on file    Food insecurity     Worry: Not on file     Inability: Not on file    Transportation needs     Medical: Not on file     Non-medical: Not on file   Tobacco Use    Smoking status: Current Every Day Smoker     Packs/day: 1.00     Years: 10.00     Pack years: 10.00     Types: Cigarettes    Smokeless tobacco: Never Used    Tobacco comment: using nicotine patches at night   Substance and Sexual Activity    Alcohol use: Yes     Frequency: 4 or more times a week     Drinks per session: 3 or 4     Binge frequency: Daily or almost daily     Comment: occ    Drug use: Not Currently     Comment: used cocaine in early 20's    Sexual activity: Not on file   Lifestyle    Physical activity     Days per week: Not on file     Minutes per session: Not on file    Stress: Not on file   Relationships    Social connections     Talks on phone: Not on file     Gets together: Not on file     Attends Pentecostal service: Not on file     Active member of club or organization: Not on file     Attends meetings of clubs or organizations: Not on file     Relationship status: Not on file    Intimate partner violence     Fear of current or ex partner: Not on file     Emotionally abused: Not on file     Physically abused: Not on file     Forced sexual activity: Not on file   Other Topics Concern    Not on file   Social History Narrative    Not on file           CURRENT MEDICATIONS:  Current Outpatient Medications   Medication Sig Dispense Refill    amLODIPine (NORVASC) 10 MG tablet Take 1 tablet by mouth daily 30 tablet 3    metoprolol succinate (TOPROL XL) 25 MG extended release tablet Take 1 tablet by mouth daily 30 tablet 2    fluticasone (FLONASE) 50 MCG/ACT nasal spray 1 spray by Each Nostril route daily 2 Bottle 1    acetaminophen (TYLENOL) 500 MG tablet Take 2 tablets by mouth every 6 hours as needed for Pain 60 tablet 0    Multiple Vitamins-Minerals (MULTIVITAMIN ADULT PO) Take by mouth      vitamin B-1 (THIAMINE) 100 MG tablet Take 1 tablet by mouth daily 30 tablet 3    folic acid (FOLVITE) 1 MG tablet Take 1 tablet by mouth daily 30 tablet 3    Nicotine 21-14-7 MG/24HR KIT Place onto the skin      naproxen (NAPROSYN) 500 MG tablet Take 1 tablet by mouth 2 times daily as needed for Pain (Patient not taking: Reported on 4/7/2021) 20 tablet 0    EPINEPHrine (EPIPEN 2-LIZETTE) 0.3 MG/0.3ML SOAJ injection Inject 0.3 mLs into the muscle once for 1 dose Use as directed for allergic reaction 2 each 0     No current facility-administered medications for this visit. OBJECTIVE:  Vitals:    04/07/21 1505   BP: 124/88   Pulse: 76   Resp: 14   Temp: 98.8 °F (37.1 °C)     Body mass index is 31.63 kg/m². General exam (except above):  General appearance - well appearing, alert, in no acute distress  Head - Atraumatic, normocephalic  Eyes - EOMI, no jaundice or pallor  Lungs - Lungs clear to auscultation. No wheezing, rhonchi, rales  Heart - RRR without murmur, gallop, or rubs. No ectopy  Abdomen - Abdomen soft, has epigastric tenderness. Bowel sounds normal. No masses, organomegaly  Extremities -No significant edema, or skin discoloration. Good capillary refill. Neuro - Pt Alert, awake and oriented x 3. No gross focal neurological deficits    ASSESSMENT AND PLAN (MEDICAL DECISION MAKING):     Ciro Gustafson was seen today for blood pressure check. Diagnoses and all orders for this visit:    Essential hypertension  -     VL RENAL ARTERIAL DUPLEX COMPLETE; Future    Heartburn  Comments:  try protonix  r/v in 2 wk  will consider PPI for 3mth, possible EGD after depending on response  Orders:  -     pantoprazole (PROTONIX) 40 MG tablet;  Take 1 tablet by mouth every morning (before breakfast)    Acute coronary syndrome (HCC)    Chronic systolic congestive heart failure ([facility] [address]    Healthcare maintenance  Comments:  Due dtap, and pneumonia vaccines,the hep c screening    Other orders  -     Blood Pressure Monitoring (BP MONITOR-STETHOSCOPE) KIT; 1 each by Does not apply route daily           Follow up in: 2wk      Marybeth Rojas MD

## 2021-04-15 ENCOUNTER — HOSPITAL ENCOUNTER (OUTPATIENT)
Dept: VASCULAR LAB | Age: 36
Discharge: HOME | End: 2021-04-15
Payer: COMMERCIAL

## 2021-04-15 DIAGNOSIS — I10 ESSENTIAL HYPERTENSION: ICD-10-CM

## 2021-04-15 PROCEDURE — 93975 VASCULAR STUDY: CPT

## 2021-04-21 ENCOUNTER — OFFICE VISIT (OUTPATIENT)
Dept: INTERNAL MEDICINE CLINIC | Age: 36
End: 2021-04-21
Payer: COMMERCIAL

## 2021-04-21 VITALS
HEART RATE: 80 BPM | TEMPERATURE: 98.7 F | DIASTOLIC BLOOD PRESSURE: 80 MMHG | WEIGHT: 215 LBS | RESPIRATION RATE: 14 BRPM | SYSTOLIC BLOOD PRESSURE: 120 MMHG | BODY MASS INDEX: 32.58 KG/M2 | HEIGHT: 68 IN

## 2021-04-21 DIAGNOSIS — J02.9 SORE THROAT: ICD-10-CM

## 2021-04-21 DIAGNOSIS — J01.10 SUBACUTE FRONTAL SINUSITIS: ICD-10-CM

## 2021-04-21 DIAGNOSIS — I10 ESSENTIAL HYPERTENSION: Primary | ICD-10-CM

## 2021-04-21 DIAGNOSIS — I26.99 ACUTE PULMONARY EMBOLISM, UNSPECIFIED PULMONARY EMBOLISM TYPE, UNSPECIFIED WHETHER ACUTE COR PULMONALE PRESENT (HCC): ICD-10-CM

## 2021-04-21 DIAGNOSIS — Z00.00 HEALTHCARE MAINTENANCE: ICD-10-CM

## 2021-04-21 DIAGNOSIS — I77.1 CELIAC ARTERY STENOSIS (HCC): ICD-10-CM

## 2021-04-21 DIAGNOSIS — I24.9 ACUTE CORONARY SYNDROME (HCC): ICD-10-CM

## 2021-04-21 DIAGNOSIS — R12 HEARTBURN: ICD-10-CM

## 2021-04-21 PROCEDURE — 99214 OFFICE O/P EST MOD 30 MIN: CPT | Performed by: INTERNAL MEDICINE

## 2021-04-21 RX ORDER — MOMETASONE FUROATE 50 UG/1
2 SPRAY, METERED NASAL DAILY
Qty: 1 INHALER | Refills: 3 | Status: SHIPPED | OUTPATIENT
Start: 2021-04-21 | End: 2021-05-10

## 2021-04-21 RX ORDER — AMOXICILLIN AND CLAVULANATE POTASSIUM 875; 125 MG/1; MG/1
1 TABLET, FILM COATED ORAL 2 TIMES DAILY
Qty: 14 TABLET | Refills: 0 | Status: SHIPPED | OUTPATIENT
Start: 2021-04-21 | End: 2021-04-28

## 2021-04-21 ASSESSMENT — PATIENT HEALTH QUESTIONNAIRE - PHQ9
SUM OF ALL RESPONSES TO PHQ9 QUESTIONS 1 & 2: 0
SUM OF ALL RESPONSES TO PHQ QUESTIONS 1-9: 0
SUM OF ALL RESPONSES TO PHQ QUESTIONS 1-9: 0
2. FEELING DOWN, DEPRESSED OR HOPELESS: 0
SUM OF ALL RESPONSES TO PHQ QUESTIONS 1-9: 0
1. LITTLE INTEREST OR PLEASURE IN DOING THINGS: 0

## 2021-04-21 NOTE — PROGRESS NOTES
head  Lasts all day  Like pressure, with light and sound sensitivity  Denies nausea/vomitng  Tylenol helps  Denies neck pain, endorses more dinancial stress lately  Denies caffeine intake, has stopped all caffeinated drinks. - 2-3mths       Hypertension   Patient indicates that he is feeling well and denies any symptoms referable to elevated blood pressure. - Specifically denies headache, chest pain, palpitations, dyspnea and peripheral edema.  - Patient denies any side effects of their medication(s) and is compliant with their regimen.    -he does not check BP's generally. -Does regular aerobic exercise. - Watches his diet for sodium, low fat and low cholesterol most of the time. Last 3 Encounter BP Readings  BP Readings from Last 3 Encounters:   04/21/21 120/80   04/07/21 124/88   03/16/21 (!) 150/110      On amlodipine and metroprolol  Renal artery duplex WNL    Heartburn  Resolved with protonix- has been on it 2 weeks. Recurs if forgets to take it. Recommend take for 3mth, then review        Alcohol/smoking-    REVIEW OF SYSTEMS (except Subjective (HPI))  GENERAL: No fevers / chills  RESPIRATORY: Negative for cough, wheezing or shortness of breath  CARDIOVASCULAR: Negative for chest pain or palpitations.   GI: no nausea, vomiting, or diarrhea  NEURO: No history of headaches    Past Medical History:   Diagnosis Date    No active medical problems     Pulmonary embolism (HCC)        SOCIAL HISTORY:  Social History     Socioeconomic History    Marital status:      Spouse name: Not on file    Number of children: Not on file    Years of education: Not on file    Highest education level: Not on file   Occupational History    Not on file   Social Needs    Financial resource strain: Not on file    Food insecurity     Worry: Not on file     Inability: Not on file    Transportation needs     Medical: Not on file     Non-medical: Not on file   Tobacco Use    Smoking status: Current Every Day Smoker     Packs/day: 1.00     Years: 10.00     Pack years: 10.00     Types: Cigarettes    Smokeless tobacco: Never Used    Tobacco comment: using nicotine patches at night   Substance and Sexual Activity    Alcohol use: Yes     Frequency: 4 or more times a week     Drinks per session: 3 or 4     Binge frequency: Daily or almost daily     Comment: occ    Drug use: Not Currently     Comment: used cocaine in early 20's    Sexual activity: Not on file   Lifestyle    Physical activity     Days per week: Not on file     Minutes per session: Not on file    Stress: Not on file   Relationships    Social connections     Talks on phone: Not on file     Gets together: Not on file     Attends Yazidism service: Not on file     Active member of club or organization: Not on file     Attends meetings of clubs or organizations: Not on file     Relationship status: Not on file    Intimate partner violence     Fear of current or ex partner: Not on file     Emotionally abused: Not on file     Physically abused: Not on file     Forced sexual activity: Not on file   Other Topics Concern    Not on file   Social History Narrative    Not on file           CURRENT MEDICATIONS:  Current Outpatient Medications   Medication Sig Dispense Refill    Blood Pressure Monitoring (BP MONITOR-STETHOSCOPE) KIT 1 each by Does not apply route daily 1 kit 0    pantoprazole (PROTONIX) 40 MG tablet Take 1 tablet by mouth every morning (before breakfast) 30 tablet 5    amLODIPine (NORVASC) 10 MG tablet Take 1 tablet by mouth daily 30 tablet 3    metoprolol succinate (TOPROL XL) 25 MG extended release tablet Take 1 tablet by mouth daily 30 tablet 2    fluticasone (FLONASE) 50 MCG/ACT nasal spray 1 spray by Each Nostril route daily 2 Bottle 1    acetaminophen (TYLENOL) 500 MG tablet Take 2 tablets by mouth every 6 hours as needed for Pain 60 tablet 0    Multiple Vitamins-Minerals (MULTIVITAMIN ADULT PO) Take by mouth      vitamin B-1 (THIAMINE) 100 MG tablet Take 1 tablet by mouth daily 30 tablet 3    folic acid (FOLVITE) 1 MG tablet Take 1 tablet by mouth daily 30 tablet 3    Nicotine 21-14-7 MG/24HR KIT Place onto the skin      naproxen (NAPROSYN) 500 MG tablet Take 1 tablet by mouth 2 times daily as needed for Pain (Patient not taking: Reported on 4/21/2021) 20 tablet 0    EPINEPHrine (EPIPEN 2-LIZETTE) 0.3 MG/0.3ML SOAJ injection Inject 0.3 mLs into the muscle once for 1 dose Use as directed for allergic reaction 2 each 0     No current facility-administered medications for this visit. OBJECTIVE:  Vitals:    04/21/21 0940   BP: 120/80   Pulse: 80   Resp: 14   Temp: 98.7 °F (37.1 °C)     Body mass index is 32.69 kg/m². Focal exam:    General exam (except above):  General appearance - well appearing, alert, in no acute distress  Head - Atraumatic, normocephalic  Eyes - EOMI, no jaundice or pallor  Lungs - Lungs clear to auscultation. No wheezing, rhonchi, rales  Heart - RRR without murmur, gallop, or rubs. No ectopy  Abdomen - Abdomen soft, non-tender. Bowel sounds normal. No masses, organomegaly  Extremities -No significant edema, or skin discoloration. Good capillary refill. Neuro - Pt Alert, awake and oriented x 3.  No gross focal neurological deficits    ASSESSMENT AND PLAN (MEDICAL DECISION MAKING):       Follow up in:       Shamika Sanchez MD

## 2021-04-23 ENCOUNTER — HOSPITAL ENCOUNTER (EMERGENCY)
Age: 36
Discharge: HOME | End: 2021-04-23
Attending: EMERGENCY MEDICINE
Payer: COMMERCIAL

## 2021-04-23 VITALS
BODY MASS INDEX: 31.07 KG/M2 | RESPIRATION RATE: 16 BRPM | WEIGHT: 205 LBS | SYSTOLIC BLOOD PRESSURE: 134 MMHG | TEMPERATURE: 97.8 F | HEIGHT: 68 IN | HEART RATE: 109 BPM | OXYGEN SATURATION: 98 % | DIASTOLIC BLOOD PRESSURE: 98 MMHG

## 2021-04-23 DIAGNOSIS — L23.9 ALLERGIC DERMATITIS: Primary | ICD-10-CM

## 2021-04-23 PROCEDURE — 99284 EMERGENCY DEPT VISIT MOD MDM: CPT

## 2021-04-23 PROCEDURE — 6370000000 HC RX 637 (ALT 250 FOR IP): Performed by: EMERGENCY MEDICINE

## 2021-04-23 RX ORDER — DIPHENHYDRAMINE HCL 25 MG
25 TABLET ORAL ONCE
Status: COMPLETED | OUTPATIENT
Start: 2021-04-23 | End: 2021-04-23

## 2021-04-23 RX ORDER — PREDNISONE 20 MG/1
60 TABLET ORAL ONCE
Status: COMPLETED | OUTPATIENT
Start: 2021-04-23 | End: 2021-04-23

## 2021-04-23 RX ORDER — PREDNISONE 50 MG/1
50 TABLET ORAL DAILY
Qty: 4 TABLET | Refills: 0 | Status: SHIPPED | OUTPATIENT
Start: 2021-04-23 | End: 2021-04-27

## 2021-04-23 RX ORDER — FAMOTIDINE 20 MG/1
20 TABLET, FILM COATED ORAL ONCE
Status: COMPLETED | OUTPATIENT
Start: 2021-04-23 | End: 2021-04-23

## 2021-04-23 RX ORDER — FAMOTIDINE 20 MG/1
20 TABLET, FILM COATED ORAL 2 TIMES DAILY
Qty: 10 TABLET | Refills: 0 | Status: SHIPPED | OUTPATIENT
Start: 2021-04-23 | End: 2021-10-14

## 2021-04-23 RX ADMIN — DIPHENHYDRAMINE HCL 25 MG: 25 TABLET ORAL at 05:06

## 2021-04-23 RX ADMIN — FAMOTIDINE 20 MG: 20 TABLET ORAL at 03:58

## 2021-04-23 RX ADMIN — DIPHENHYDRAMINE HCL 25 MG: 25 TABLET ORAL at 03:58

## 2021-04-23 RX ADMIN — PREDNISONE 60 MG: 20 TABLET ORAL at 03:58

## 2021-04-23 ASSESSMENT — ENCOUNTER SYMPTOMS
FACIAL SWELLING: 0
SORE THROAT: 0
EYE PAIN: 0
BLOOD IN STOOL: 0
TROUBLE SWALLOWING: 1
COUGH: 0
EYE DISCHARGE: 0
RHINORRHEA: 0
CHEST TIGHTNESS: 0
NAUSEA: 0
VOMITING: 0
CONSTIPATION: 0
DIARRHEA: 0
COLOR CHANGE: 0
EYE REDNESS: 0
ABDOMINAL PAIN: 0
SINUS PRESSURE: 0
SHORTNESS OF BREATH: 0
BACK PAIN: 0
WHEEZING: 0

## 2021-04-23 NOTE — ED PROVIDER NOTES
[address]  eMERGENCY dEPARTMENT eNCOUnter      Pt Name: Joie Casanova  MRN: [id number]  [place] 1985  Date of evaluation: 4/23/21      CHIEF COMPLAINT       Chief Complaint   Patient presents with    Allergic Reaction     BURNING RED RASH, DIFFICULTY SWALLOWING. HISTORY OF PRESENT ILLNESS    Joie Casanova is a 29 y.o. male who presents complaining of rash. Patient states that about an hour ago he all of a sudden started feeling very itchy and noticed redness everywhere feels like it is a burning rash. Patient states he also took a drink water and felt like it was a little difficult for him to swallow. Patient states that he did get his second shot of Covid vaccine today. Patient also started this evening Augmentin for some type of possible sinus infection. Patient states he is taken Augmentin and penicillins multiple times in the past and never had any issues. Patient states that with the first shot he had some body aches and fatigue but no kind of allergic reaction. REVIEW OF SYSTEMS       Review of Systems   Constitutional: Negative for activity change, appetite change, chills, diaphoresis and fever. HENT: Positive for trouble swallowing. Negative for congestion, ear pain, facial swelling, nosebleeds, rhinorrhea, sinus pressure and sore throat. Eyes: Negative for pain, discharge and redness. Respiratory: Negative for cough, chest tightness, shortness of breath and wheezing. Cardiovascular: Negative for chest pain, palpitations and leg swelling. Gastrointestinal: Negative for abdominal pain, blood in stool, constipation, diarrhea, nausea and vomiting. Genitourinary: Negative for difficulty urinating, dysuria, flank pain, frequency, genital sores and hematuria. Musculoskeletal: Negative for arthralgias, back pain, gait problem, joint swelling, myalgias and neck pain. Skin: Positive for rash. Negative for color change, pallor and wound.    Neurological: Negative for dizziness, tremors, seizures, syncope, speech difficulty, weakness, numbness and headaches. Psychiatric/Behavioral: Negative for confusion, decreased concentration, hallucinations, self-injury, sleep disturbance and suicidal ideas. PAST MEDICAL HISTORY     Past Medical History:   Diagnosis Date    No active medical problems     Pulmonary embolism ([facility] [address]        SURGICAL HISTORY     History reviewed. No pertinent surgical history. CURRENT MEDICATIONS       Previous Medications    ACETAMINOPHEN (TYLENOL) 500 MG TABLET    Take 2 tablets by mouth every 6 hours as needed for Pain    AMLODIPINE (NORVASC) 10 MG TABLET    Take 1 tablet by mouth daily    AMOXICILLIN-CLAVULANATE (AUGMENTIN) 875-125 MG PER TABLET    Take 1 tablet by mouth 2 times daily for 7 days    BLOOD PRESSURE MONITORING (BP MONITOR-STETHOSCOPE) KIT    1 each by Does not apply route daily    EPINEPHRINE (EPIPEN 2-LIZETTE) 0.3 MG/0.3ML SOAJ INJECTION    Inject 0.3 mLs into the muscle once for 1 dose Use as directed for allergic reaction    FLUTICASONE (FLONASE) 50 MCG/ACT NASAL SPRAY    1 spray by Each Nostril route daily    FOLIC ACID (FOLVITE) 1 MG TABLET    Take 1 tablet by mouth daily    METOPROLOL SUCCINATE (TOPROL XL) 25 MG EXTENDED RELEASE TABLET    Take 1 tablet by mouth daily    MOMETASONE (NASONEX) 50 MCG/ACT NASAL SPRAY    2 sprays by Nasal route daily    MULTIPLE VITAMINS-MINERALS (MULTIVITAMIN ADULT PO)    Take by mouth    NAPROXEN (NAPROSYN) 500 MG TABLET    Take 1 tablet by mouth 2 times daily as needed for Pain    NICOTINE 21-14-7 MG/24HR KIT    Place onto the skin    PANTOPRAZOLE (PROTONIX) 40 MG TABLET    Take 1 tablet by mouth every morning (before breakfast)    VITAMIN B-1 (THIAMINE) 100 MG TABLET    Take 1 tablet by mouth daily       ALLERGIES     is allergic to bee venom. SOCIAL HISTORY      reports that he has been smoking cigarettes. He has a 10.00 pack-year smoking history.  He has never used smokeless tobacco. He reports current alcohol use. He reports previous drug use. PHYSICAL EXAM     INITIAL VITALS: BP (!) 134/98   Pulse 109   Temp 97.8 °F (36.6 °C) (Oral)   Resp 16   Ht 5' 8\" (1.727 m)   Wt 205 lb (93 kg)   SpO2 98%   BMI 31.17 kg/m²      Physical Exam  Vitals signs and nursing note reviewed. Constitutional:       General: He is not in acute distress. Appearance: He is well-developed. He is not diaphoretic. HENT:      Head: Normocephalic and atraumatic. Eyes:      General: No scleral icterus. Right eye: No discharge. Left eye: No discharge. Conjunctiva/sclera: Conjunctivae normal.      Pupils: Pupils are equal, round, and reactive to light. Cardiovascular:      Rate and Rhythm: Normal rate and regular rhythm. Heart sounds: Normal heart sounds. No murmur. No friction rub. No gallop. Pulmonary:      Effort: Pulmonary effort is normal. No respiratory distress. Breath sounds: Normal breath sounds. No wheezing or rales. Chest:      Chest wall: No tenderness. Abdominal:      General: Bowel sounds are normal. There is no distension. Palpations: Abdomen is soft. There is no mass. Tenderness: There is no abdominal tenderness. There is no guarding or rebound. Musculoskeletal: Normal range of motion. General: No tenderness. Skin:     General: Skin is warm and dry. Coloration: Skin is not pale. Findings: Rash (Urticarial rash to both upper extremities and trunk) present. No erythema. Neurological:      Mental Status: He is alert and oriented to person, place, and time. Cranial Nerves: No cranial nerve deficit. Sensory: No sensory deficit. Motor: No abnormal muscle tone. Coordination: Coordination normal.      Deep Tendon Reflexes: Reflexes normal.   Psychiatric:         Behavior: Behavior normal.         Thought Content:  Thought content normal.         Judgment: Judgment normal.         DIAGNOSTIC RESULTS RADIOLOGY:All plain film, CT,MRI, and formal ultrasound images (except ED bedside ultrasound) are read by the radiologist and the interpretations are directly viewed by the emergency physician. LABS: All lab results were reviewed by myself, and all abnormals are listed below. Labs Reviewed - No data to display      MEDICAL DECISION MAKING:     I think most likely patient is having allergic reaction to the second Covid vaccine. It is possible that he has developed an allergic reaction to the Augmentin but he states he is taking it multiple times in the past so I think this is less likely. We will have him hold taking any more doses of it though until she is able to talk to the PCP. EMERGENCY DEPARTMENT COURSE:   Vitals:    Vitals:    04/23/21 0347   BP: (!) 134/98   Pulse: 109   Resp: 16   Temp: 97.8 °F (36.6 °C)   TempSrc: Oral   SpO2: 98%   Weight: 205 lb (93 kg)   Height: 5' 8\" (1.727 m)       The patient was given the following medications while in the emergency department:  Orders Placed This Encounter   Medications    diphenhydrAMINE (BENADRYL) tablet 25 mg    famotidine (PEPCID) tablet 20 mg    predniSONE (DELTASONE) tablet 60 mg    diphenhydrAMINE (BENADRYL) tablet 25 mg    predniSONE (DELTASONE) 50 MG tablet     Sig: Take 1 tablet by mouth daily for 4 days     Dispense:  4 tablet     Refill:  0    famotidine (PEPCID) 20 MG tablet     Sig: Take 1 tablet by mouth 2 times daily for 5 days     Dispense:  10 tablet     Refill:  0       -------------------------  5:05 AM EDT  Patient was reevaluated and states that the burning is gone but the rash has not gone away. We will give him another dose of Benadryl and then I think he is okay to be discharged home. CONSULTS:  None    PROCEDURES:  None    FINAL IMPRESSION      1.  Allergic dermatitis          DISPOSITION/PLAN   DISPOSITION Decision To Discharge 04/23/2021 05:00:14 AM      PATIENT REFERREDTO:  MD Rigo Mathew [facility] [id number]  [address] [place] [address]  [address]  [phone]    If symptoms worsen      DISCHARGEMEDICATIONS:  New Prescriptions    FAMOTIDINE (PEPCID) 20 MG TABLET    Take 1 tablet by mouth 2 times daily for 5 days    PREDNISONE (DELTASONE) 50 MG TABLET    Take 1 tablet by mouth daily for 4 days       (Please note that portions of this note were completed with a voice recognition program.  Efforts were made to edit thedictations but occasionally words are mis-transcribed.)    Brianne Roblero MD  Attending Emergency Physician                        Brianne Roblero MD  04/23/21 [id number]

## 2021-04-23 NOTE — ED TRIAGE NOTES
Pt came in from home due to complaints of a red, burning rash all over body that started this past night. Pt denies WENDY but states he has had some trouble swallowing and this is what concerned him to come into the hospital. Pt states yesterday evening he did get his second covid vaccine shot and also started taking AUOMENTIN. His first dose was yesterday but he states he did take this antibiotic before. Airway is patent. Cont pulse ox in place.  wctm

## 2021-05-04 RX ORDER — GUAIFENESIN/DEXTROMETHORPHAN 100-10MG/5
SYRUP ORAL
COMMUNITY
Start: 2021-03-05 | End: 2021-10-14

## 2021-05-05 ENCOUNTER — OFFICE VISIT (OUTPATIENT)
Dept: INTERNAL MEDICINE CLINIC | Age: 36
End: 2021-05-05
Payer: COMMERCIAL

## 2021-05-05 VITALS
WEIGHT: 211.4 LBS | DIASTOLIC BLOOD PRESSURE: 74 MMHG | SYSTOLIC BLOOD PRESSURE: 130 MMHG | BODY MASS INDEX: 32.04 KG/M2 | TEMPERATURE: 98.4 F | HEIGHT: 68 IN

## 2021-05-05 DIAGNOSIS — I50.22 CHRONIC SYSTOLIC CONGESTIVE HEART FAILURE (HCC): ICD-10-CM

## 2021-05-05 DIAGNOSIS — I42.6 ALCOHOLIC CARDIOMYOPATHY (HCC): ICD-10-CM

## 2021-05-05 DIAGNOSIS — I26.99 ACUTE PULMONARY EMBOLISM, UNSPECIFIED PULMONARY EMBOLISM TYPE, UNSPECIFIED WHETHER ACUTE COR PULMONALE PRESENT (HCC): ICD-10-CM

## 2021-05-05 DIAGNOSIS — M13.80 MIGRATORY POLYARTHRITIS: Primary | ICD-10-CM

## 2021-05-05 PROCEDURE — 99214 OFFICE O/P EST MOD 30 MIN: CPT | Performed by: INTERNAL MEDICINE

## 2021-05-05 ASSESSMENT — PATIENT HEALTH QUESTIONNAIRE - PHQ9
SUM OF ALL RESPONSES TO PHQ9 QUESTIONS 1 & 2: 0
SUM OF ALL RESPONSES TO PHQ QUESTIONS 1-9: 0
2. FEELING DOWN, DEPRESSED OR HOPELESS: 0

## 2021-05-05 NOTE — PROGRESS NOTES
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 Had had similar previously in wrist, also knees shoulders and elbows  Modifying factors-improves with NSAIDS  Denies any F/hx or personal h/o autoimmune conditions. 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 - Specifically denies headache, chest pain, palpitations, dyspnea and peripheral edema.  - Patient denies any side effects of their medication(s) and is compliant with their regimen. - Watches his diet for sodium, low fat and low cholesterol most of the time. Last 3 Encounter BP Readings  BP Readings from Last 3 Encounters:   05/05/21 130/74   04/23/21 (!) 134/98   04/21/21 120/80        REVIEW OF SYSTEMS (except Subjective (HPI))  GENERAL: No fevers / chills  RESPIRATORY: Negative for cough, wheezing or shortness of breath  CARDIOVASCULAR: Negative for chest pain or palpitations.   GI: no nausea, vomiting, or diarrhea  NEURO: No history of headaches    Past Medical History:   Diagnosis Date    No active medical problems     Pulmonary embolism (HCC)        SOCIAL HISTORY:  Social History     Socioeconomic History    Marital status:      Spouse name: Not on file    Number of children: Not on file    Years of education: Not on file    Highest education level: Not on file   Occupational History    Not on file   Social Needs    Financial resource strain: Not on file    Food insecurity     Worry: Not on file     Inability: Not on file   [employer] needs Medical: Not on file     Non-medical: Not on file   Tobacco Use    Smoking status: Current Every Day Smoker     Packs/day: 1.00     Years: 10.00     Pack years: 10.00     Types: Cigarettes    Smokeless tobacco: Never Used    Tobacco comment: using nicotine patches at night   Substance and Sexual Activity    Alcohol use: Yes     Frequency: 4 or more times a week     Drinks per session: 3 or 4     Binge frequency: Daily or almost daily     Comment: occ    Drug use: Not Currently     Comment: used cocaine in early 20's    Sexual activity: Not on file   Lifestyle    Physical activity     Days per week: Not on file     Minutes per session: Not on file    Stress: Not on file   Relationships    Social connections     Talks on phone: Not on file     Gets together: Not on file     Attends Hoah[employer]ism service: Not on file     Active member of club or organization: Not on file     Attends meetings of clubs or organizations: Not on file     Relationship status: Not on file    Intimate partner violence     Fear of current or ex partner: Not on file     Emotionally abused: Not on file     Physically abused: Not on file     Forced sexual activity: Not on file   Other Topics Concern    Not on file   Social History Narrative    Not on file           CURRENT MEDICATIONS:  Current Outpatient Medications   Medication Sig Dispense Refill    RA TUSSIN CGH/CHEST CONGEST -10 MG/5ML syrup take 5 milliliters three times a day if needed for cough      famotidine (PEPCID) 20 MG tablet Take 1 tablet by mouth 2 times daily for 5 days 10 tablet 0    mometasone (NASONEX) 50 MCG/ACT nasal spray 2 sprays by Nasal route daily 1 Inhaler 3    Blood Pressure Monitoring (BP MONITOR-STETHOSCOPE) KIT 1 each by Does not apply route daily 1 kit 0    pantoprazole (PROTONIX) 40 MG tablet Take 1 tablet by mouth every morning (before breakfast) 30 tablet 5    amLODIPine (NORVASC) 10 MG tablet Take 1 tablet by mouth daily 30 tablet 3    metoprolol succinate (TOPROL XL) 25 MG extended release tablet Take 1 tablet by mouth daily 30 tablet 2    fluticasone (FLONASE) 50 MCG/ACT nasal spray 1 spray by Each Nostril route daily 2 Bottle 1    acetaminophen (TYLENOL) 500 MG tablet Take 2 tablets by mouth every 6 hours as needed for Pain 60 tablet 0    Multiple Vitamins-Minerals (MULTIVITAMIN ADULT PO) Take by mouth      vitamin B-1 (THIAMINE) 100 MG tablet Take 1 tablet by mouth daily 30 tablet 3    folic acid (FOLVITE) 1 MG tablet Take 1 tablet by mouth daily 30 tablet 3    Nicotine 21-14-7 MG/24HR KIT Place onto the skin      EPINEPHrine (EPIPEN 2-LIZETTE) 0.3 MG/0.3ML SOAJ injection Inject 0.3 mLs into the muscle once for 1 dose Use as directed for allergic reaction 2 each 0    naproxen (NAPROSYN) 500 MG tablet Take 1 tablet by mouth 2 times daily as needed for Pain (Patient not taking: Reported on 4/21/2021) 20 tablet 0     No current facility-administered medications for this visit. OBJECTIVE:  Vitals:    05/05/21 1442   BP: 130/74   Temp: 98.4 °F (36.9 °C)     Body mass index is 32.14 kg/m². Focal exam:    tednder left shoulder- AC joint, upper border of trapezius  Left wrist tenderness- dorsal aspect      General exam (except above):  General appearance - well appearing, alert, in no acute distress  Head - Atraumatic, normocephalic  Eyes - EOMI, no jaundice or pallor  Lungs - Lungs clear to auscultation. No wheezing, rhonchi, rales  Heart - RRR without murmur, gallop, or rubs. No ectopy  Abdomen - Abdomen soft, non-tender. Bowel sounds normal. No masses, organomegaly  Extremities -No significant edema, or skin discoloration. Good capillary refill. Neuro - Pt Alert, awake and oriented x 3. No gross focal neurological deficits    ASSESSMENT AND PLAN (MEDICAL DECISION MAKING):     Nura Rosales was seen today for hypertension, ed follow-up and shoulder pain.     Diagnoses and all orders for this visit:    Migratory polyarthritis Comments:  large as well as small joints  denies prolonged am stiffness of small joints of hand  no rash  Orders:  -     Sedimentation Rate; Future  -     Rheumatoid Factor; Future  -     CCP Antibodies, IGG/IGA; Future  -     TIFFANIE Screen with Reflex; Future  -     Uric Acid; Future  -     C-Reactive Protein;  Future  -     AFL - Jerome Fowler MD, Rheumatology, [place]    Acute pulmonary embolism, unspecified pulmonary embolism type, unspecified whether acute cor pulmonale present ([facility] [address]  Comments:  pt given Eliquis for 6mth.   completed course    Chronic systolic congestive heart failure ([facility] [address]  Comments:  LV EF 35-40 %  pt saw cards last year  pt did have cath last year- no significant stenosis  Orders:  -     Karina Kaiser MD, Rheumatology, [place]    Alcoholic cardiomyopathy [facility])           Follow up in: Sandi Erickson MD

## 2021-05-06 ENCOUNTER — TELEPHONE (OUTPATIENT)
Dept: INTERNAL MEDICINE CLINIC | Age: 36
End: 2021-05-06

## 2021-05-06 NOTE — TELEPHONE ENCOUNTER
Tried to call patient but could not get through. To discuss circumstances of stopping Eliquis. He had told me earlier that a physician took him off Eliquis before he saw me, but in this visit, he seemed to say that he stopped after the CT chest did not show PE in ER visit. I checked the physician office visit he was referring to, and note that his Eliquis was actually refilled in that office visit. It appears he was supposed to see a Hematologist but never did. I would recommend he continue to take Eliquis unless he wants to set up office visit to discuss.      Nataliia Suarez

## 2021-05-06 NOTE — TELEPHONE ENCOUNTER
Attempted to contact patient again per Dr. Jabari San request, unable to reach pt.   Per Dr. Jabari San request I will take this information and put it in letter form to send to the patient

## 2021-05-09 DIAGNOSIS — R05.3 CHRONIC COUGH: ICD-10-CM

## 2021-05-10 RX ORDER — FLUTICASONE PROPIONATE 50 MCG
SPRAY, SUSPENSION (ML) NASAL
Qty: 16 G | Refills: 1 | Status: SHIPPED | OUTPATIENT
Start: 2021-05-10

## 2021-05-11 ENCOUNTER — OFFICE VISIT (OUTPATIENT)
Dept: INTERNAL MEDICINE CLINIC | Age: 36
End: 2021-05-11
Payer: COMMERCIAL

## 2021-05-11 VITALS
DIASTOLIC BLOOD PRESSURE: 68 MMHG | HEART RATE: 108 BPM | BODY MASS INDEX: 32.28 KG/M2 | HEIGHT: 68 IN | SYSTOLIC BLOOD PRESSURE: 124 MMHG | WEIGHT: 213 LBS | OXYGEN SATURATION: 96 % | TEMPERATURE: 99 F

## 2021-05-11 DIAGNOSIS — Z86.711 HISTORY OF PULMONARY EMBOLUS (PE): Primary | ICD-10-CM

## 2021-05-11 PROCEDURE — 99213 OFFICE O/P EST LOW 20 MIN: CPT | Performed by: INTERNAL MEDICINE

## 2021-05-11 ASSESSMENT — PATIENT HEALTH QUESTIONNAIRE - PHQ9
SUM OF ALL RESPONSES TO PHQ QUESTIONS 1-9: 0
SUM OF ALL RESPONSES TO PHQ QUESTIONS 1-9: 0
1. LITTLE INTEREST OR PLEASURE IN DOING THINGS: 0

## 2021-05-11 NOTE — PROGRESS NOTES
to smoke, which he is. PE was apparently unprovoked per chart, but today pt reports multiple 2-3hr drives every week for work in the months preceding the PE. Pt not keen to resume Eliquis due to GI s/e but agreeable to following up with hematology, which he previously could not due to insurance lapse. Otherwise feels well today  Has appt set up with rheum- no changes to pattern of arthritis symptoms    REVIEW OF SYSTEMS (except Subjective (HPI))  GENERAL: No fevers / chills  RESPIRATORY: Negative for cough, wheezing or shortness of breath  CARDIOVASCULAR: Negative for chest pain or palpitations.   GI: no nausea, vomiting, or diarrhea  NEURO: No history of headaches    Past Medical History:   Diagnosis Date    No active medical problems     Pulmonary embolism (HCC)        SOCIAL HISTORY:  Social History     Socioeconomic History    Marital status:      Spouse name: Not on file    Number of children: Not on file    Years of education: Not on file    Highest education level: Not on file   Occupational History    Not on file   Social Needs    Financial resource strain: Not on file    Food insecurity     Worry: Not on file     Inability: Not on file    Transportation needs     Medical: Not on file     Non-medical: Not on file   Tobacco Use    Smoking status: Current Every Day Smoker     Packs/day: 1.00     Years: 10.00     Pack years: 10.00     Types: Cigarettes    Smokeless tobacco: Never Used    Tobacco comment: using nicotine patches at night   Substance and Sexual Activity    Alcohol use: Yes     Frequency: 4 or more times a week     Drinks per session: 3 or 4     Binge frequency: Daily or almost daily     Comment: occ    Drug use: Not Currently     Comment: used cocaine in early 20's    Sexual activity: Not on file   Lifestyle    Physical activity     Days per week: Not on file     Minutes per session: Not on file    Stress: Not on file   Relationships    Social connections Talks on phone: Not on file     Gets together: Not on file     Attends [employer] service: Not on file     Active member of club or organization: Not on file     Attends meetings of clubs or organizations: Not on file     Relationship status: Not on file    Intimate partner violence     Fear of current or ex partner: Not on file     Emotionally abused: Not on file     Physically abused: Not on file     Forced sexual activity: Not on file   Other Topics Concern    Not on file   Social History Narrative    Not on file           CURRENT MEDICATIONS:  Current Outpatient Medications   Medication Sig Dispense Refill    fluticasone (FLONASE) 50 MCG/ACT nasal spray instill 1 spray into each nostril once daily 16 g 1    RA TUSSIN CGH/CHEST CONGEST -10 MG/5ML syrup take 5 milliliters three times a day if needed for cough      Blood Pressure Monitoring (BP MONITOR-STETHOSCOPE) KIT 1 each by Does not apply route daily 1 kit 0    pantoprazole (PROTONIX) 40 MG tablet Take 1 tablet by mouth every morning (before breakfast) 30 tablet 5    amLODIPine (NORVASC) 10 MG tablet Take 1 tablet by mouth daily 30 tablet 3    metoprolol succinate (TOPROL XL) 25 MG extended release tablet Take 1 tablet by mouth daily 30 tablet 2    acetaminophen (TYLENOL) 500 MG tablet Take 2 tablets by mouth every 6 hours as needed for Pain 60 tablet 0    naproxen (NAPROSYN) 500 MG tablet Take 1 tablet by mouth 2 times daily as needed for Pain 20 tablet 0    Multiple Vitamins-Minerals (MULTIVITAMIN ADULT PO) Take by mouth      vitamin B-1 (THIAMINE) 100 MG tablet Take 1 tablet by mouth daily 30 tablet 3    folic acid (FOLVITE) 1 MG tablet Take 1 tablet by mouth daily 30 tablet 3    Nicotine 21-14-7 MG/24HR KIT Place onto the skin      famotidine (PEPCID) 20 MG tablet Take 1 tablet by mouth 2 times daily for 5 days 10 tablet 0    EPINEPHrine (EPIPEN 2-LIZETTE) 0.3 MG/0.3ML SOAJ injection Inject 0.3 mLs into the muscle once for 1 dose Use as directed for allergic reaction 2 each 0     No current facility-administered medications for this visit. OBJECTIVE:  Vitals:    05/11/21 1625   BP: 124/68   Pulse: 108   Temp: 99 °F (37.2 °C)   SpO2: 96%     Body mass index is 32.39 kg/m². Focal exam:    General exam (except above):  General appearance - well appearing, alert, in no acute distress  Head - Atraumatic, normocephalic  Eyes - EOMI, no jaundice or pallor  Extremities -No significant edema, or skin discoloration. Good capillary refill. Neuro - Pt Alert, awake and oriented x 3. No gross focal neurological deficits    ASSESSMENT AND PLAN (MEDICAL DECISION MAKING):   Brenda Watts was seen today for medication check.     Diagnoses and all orders for this visit:    History of pulmonary embolus (PE)  -     Yaquelin Brumfield MD, Hematology/Oncology, [place]     detailed discussion as above      Follow up in:       Karen Lester MD

## 2021-05-17 ENCOUNTER — HOSPITAL ENCOUNTER (EMERGENCY)
Age: 36
Discharge: HOME | End: 2021-05-17
Attending: EMERGENCY MEDICINE
Payer: COMMERCIAL

## 2021-05-17 ENCOUNTER — APPOINTMENT (OUTPATIENT)
Dept: GENERAL RADIOLOGY | Age: 36
End: 2021-05-17
Payer: COMMERCIAL

## 2021-05-17 VITALS
SYSTOLIC BLOOD PRESSURE: 142 MMHG | DIASTOLIC BLOOD PRESSURE: 102 MMHG | OXYGEN SATURATION: 96 % | HEART RATE: 80 BPM | TEMPERATURE: 98.4 F | RESPIRATION RATE: 18 BRPM

## 2021-05-17 DIAGNOSIS — R07.9 CHEST PAIN, UNSPECIFIED TYPE: Primary | ICD-10-CM

## 2021-05-17 LAB
ABSOLUTE EOS #: 0.33 K/UL (ref 0–0.4)
ABSOLUTE IMMATURE GRANULOCYTE: 0 K/UL (ref 0–0.3)
ABSOLUTE LYMPH #: 2.97 K/UL (ref 1–4.8)
ABSOLUTE MONO #: 0.33 K/UL (ref 0.1–0.8)
ANION GAP SERPL CALCULATED.3IONS-SCNC: 13 MMOL/L (ref 9–17)
BASOPHILS # BLD: 3 % (ref 0–2)
BASOPHILS ABSOLUTE: 0.2 K/UL (ref 0–0.2)
BNP INTERPRETATION: NORMAL
BUN BLDV-MCNC: 14 MG/DL (ref 6–20)
BUN/CREAT BLD: ABNORMAL (ref 9–20)
CALCIUM SERPL-MCNC: 8.5 MG/DL (ref 8.6–10.4)
CELLS COUNTED: 200
CHLORIDE BLD-SCNC: 104 MMOL/L (ref 98–107)
CO2: 24 MMOL/L (ref 20–31)
CREAT SERPL-MCNC: 0.76 MG/DL (ref 0.7–1.2)
D-DIMER QUANTITATIVE: 0.49 MG/L FEU
DIFFERENTIAL TYPE: ABNORMAL
EOSINOPHILS RELATIVE PERCENT: 5 % (ref 1–4)
GFR AFRICAN AMERICAN: >60 ML/MIN
GFR NON-AFRICAN AMERICAN: >60 ML/MIN
GFR SERPL CREATININE-BSD FRML MDRD: ABNORMAL ML/MIN/{1.73_M2}
GFR SERPL CREATININE-BSD FRML MDRD: ABNORMAL ML/MIN/{1.73_M2}
GLUCOSE BLD-MCNC: 94 MG/DL (ref 70–99)
HCT VFR BLD CALC: 43.2 % (ref 40.7–50.3)
HEMOGLOBIN: 14.7 G/DL (ref 13–17)
IMMATURE GRANULOCYTES: 0 %
LYMPHOCYTES # BLD: 45 % (ref 24–44)
MCH RBC QN AUTO: 36.4 PG (ref 25.2–33.5)
MCHC RBC AUTO-ENTMCNC: 34 G/DL (ref 28.4–34.8)
MCV RBC AUTO: 106.9 FL (ref 82.6–102.9)
MONOCYTES # BLD: 5 % (ref 1–7)
MORPHOLOGY: NORMAL
NRBC AUTOMATED: 0 PER 100 WBC
NUCLEATED RED BLOOD CELLS: 1 PER 100 WBC
PDW BLD-RTO: 13.7 % (ref 11.8–14.4)
PLATELET # BLD: 313 K/UL (ref 138–453)
PLATELET ESTIMATE: ABNORMAL
PMV BLD AUTO: 9.6 FL (ref 8.1–13.5)
POTASSIUM SERPL-SCNC: 4.7 MMOL/L (ref 3.7–5.3)
PRO-BNP: 40 PG/ML
RBC # BLD: 4.04 M/UL (ref 4.21–5.77)
RBC # BLD: ABNORMAL 10*6/UL
SEG NEUTROPHILS: 42 % (ref 36–66)
SEGMENTED NEUTROPHILS ABSOLUTE COUNT: 2.77 K/UL (ref 1.8–7.7)
SODIUM BLD-SCNC: 141 MMOL/L (ref 135–144)
TROPONIN INTERP: NORMAL
TROPONIN INTERP: NORMAL
TROPONIN T: NORMAL NG/ML
TROPONIN T: NORMAL NG/ML
TROPONIN, HIGH SENSITIVITY: 10 NG/L (ref 0–22)
TROPONIN, HIGH SENSITIVITY: 10 NG/L (ref 0–22)
WBC # BLD: 6.6 K/UL (ref 3.5–11.3)
WBC # BLD: ABNORMAL 10*3/UL

## 2021-05-17 PROCEDURE — 83880 ASSAY OF NATRIURETIC PEPTIDE: CPT

## 2021-05-17 PROCEDURE — 84484 ASSAY OF TROPONIN QUANT: CPT

## 2021-05-17 PROCEDURE — 6370000000 HC RX 637 (ALT 250 FOR IP): Performed by: STUDENT IN AN ORGANIZED HEALTH CARE EDUCATION/TRAINING PROGRAM

## 2021-05-17 PROCEDURE — 99284 EMERGENCY DEPT VISIT MOD MDM: CPT

## 2021-05-17 PROCEDURE — 71046 X-RAY EXAM CHEST 2 VIEWS: CPT

## 2021-05-17 PROCEDURE — 85025 COMPLETE CBC W/AUTO DIFF WBC: CPT

## 2021-05-17 PROCEDURE — 93005 ELECTROCARDIOGRAM TRACING: CPT | Performed by: STUDENT IN AN ORGANIZED HEALTH CARE EDUCATION/TRAINING PROGRAM

## 2021-05-17 PROCEDURE — 85379 FIBRIN DEGRADATION QUANT: CPT

## 2021-05-17 PROCEDURE — 80048 BASIC METABOLIC PNL TOTAL CA: CPT

## 2021-05-17 RX ORDER — ACETAMINOPHEN 500 MG
1000 TABLET ORAL ONCE
Status: COMPLETED | OUTPATIENT
Start: 2021-05-17 | End: 2021-05-17

## 2021-05-17 RX ADMIN — ACETAMINOPHEN 1000 MG: 500 TABLET ORAL at 14:26

## 2021-05-17 ASSESSMENT — ENCOUNTER SYMPTOMS
VOMITING: 0
CONSTIPATION: 0
NAUSEA: 0
SHORTNESS OF BREATH: 0
DIARRHEA: 0
COUGH: 0
ABDOMINAL PAIN: 0
BACK PAIN: 0

## 2021-05-17 ASSESSMENT — PAIN SCALES - GENERAL
PAINLEVEL_OUTOF10: 3
PAINLEVEL_OUTOF10: 3

## 2021-05-17 NOTE — ED PROVIDER NOTES
[facility]  Emergency Department  Faculty Attestation     I performed a history and physical examination of the patient and discussed management with the resident. I reviewed the residents note and agree with the documented findings and plan of care. Any areas of disagreement are noted on the chart. I was personally present for the key portions of any procedures. I have documented in the chart those procedures where I was not present during the key portions. I have reviewed the emergency nurses triage note. I agree with the chief complaint, past medical history, past surgical history, allergies, medications, social and family history as documented unless otherwise noted below. For Physician Assistant/ Nurse Practitioner cases/documentation I have personally evaluated this patient and have completed at least one if not all key elements of the E/M (history, physical exam, and MDM). Additional findings are as noted. Primary Care Physician:  Donnetta Ahumada, MD    Screenings:  [unfilled]    CHIEF COMPLAINT       Chief Complaint   Patient presents with    Chest Pain       RECENT VITALS:   Temp: 98.4 °F (36.9 °C),  Pulse: 80, Resp: 18,      LABS:  Labs Reviewed - No data to display    Radiology  No orders to display           EKG:   EKG Interpretation    Interpreted by me    Rhythm: normal sinus   Rate: normal  Axis: normal  Ectopy: none  Conduction: normal  ST Segments: Scooping depression in L  T Waves: no acute change  Q Waves: none    Clinical Impression: Nonspecific ST change    Attending Physician Additional  Notes    [place] and developed his left pectoral chest discomfort without radiation. He felt swelling in his hands and his throat as if it was closing. No stridor. He has been having a cough but no sputum or hemoptysis. He is no longer on Eliquis but had a pulmonary embolism last April.   He was told by his hematologist that he will require lifelong anticoagulation. He states that when he gets his chest pain which is been regular almost daily for the past several weeks his feet occasionally get heavy weak and numb with some tingling in his fingers. No history of aortic dissection. He has a history of hypertension and had a recent ultrasound that may have shown renal artery stenosis. No fevers myalgias or Covid symptoms. No esophageal symptoms. Today with the swelling there is no itching hives noted. He does have a history of bee allergies but does not believe he was stung. He was brought by EMS and states that after nitroglycerin this resolved his chest discomfort. On exam he appears slightly anxious, is hypertensive, afebrile no respiratory distress. Normal speech. No hoarseness or stridor. Lungs are clear. No angioedema noted. Trace edema in the fingers with tight rings bilaterally. No edema in his legs. Normal pulses. No cords or Homans or calf tenderness. Chest nontender. Cardiac exam benign. Abdomen is benign. Impression is atypical chest pain, consider anxiety, hyperventilation, ACS, less likely PE or aortic dissection, consider heartburn. Plan is cardiac monitor, EKG, troponins, chest x-ray, D-dimer, chemistries, reassess. Armaan Servin.  Jose Armando Taylor MD, [address] [employer] [facility]  Attending Emergency  Physician               Florian Barrett MD  05/17/21 1300

## 2021-05-17 NOTE — ED PROVIDER NOTES
[address]  ED  Emergency Department Encounter  Emergency Medicine Resident     Pt Name: Jennifer Pena  MRN: [id number]  [place] 1985  Date of evaluation: 5/17/21  PCP:  Kera Green MD    [address] [place] [address]       Chief Complaint   Patient presents with    Chest Pain       HISTORY [facility]  (Location/Symptom, Timing/Onset, Context/Setting, Quality, Duration, Modifying Bao Delacruz.)      Jennifer Pena is a 29 y.o. male who presents with complaints of left-sided chest pain without radiation that began [place] he was [place] the zoo. Patient states he felt like he was having swelling of his hands, his throat was closing. He has had a nonproductive cough. He does have a history of PE and was on Eliquis but is no longer taking Eliquis. He was told by his PCP that he would require long-term anticoagulation and has a follow-up with hematologist in June. He states that he gets chest pain almost daily for the past several weeks and feels like he gets some numbness and heaviness in his lower extremities as well tingling in his fingers. He denies fevers, difficulty swallowing, myalgias, shortness of breath, lightheadedness, dizziness, abdominal pain, nausea, vomiting, diaphoresis. PAST MEDICAL / SURGICAL / SOCIAL / FAMILY HISTORY      has a past medical history of No active medical problems and Pulmonary embolism ([address] has no past surgical history on file.      Social History     Socioeconomic History    Marital status:      Spouse name: Not on file    Number of children: Not on file    Years of education: Not on file    Highest education level: Not on file   Occupational History    Not on file   Tobacco Use    Smoking status: Current Every Day Smoker     Packs/day: 1.00     Years: 10.00     Pack years: 10.00     Types: Cigarettes    Smokeless tobacco: Never Used    Tobacco comment: using nicotine patches at night   Vaping Use    Vaping Use: Never used Substance and Sexual Activity    Alcohol use: Yes     Comment: occ    Drug use: Not Currently     Comment: used cocaine in early 20's    Sexual activity: Not on file   Other Topics Concern    Not on file   Social History Narrative    Not on file     Social Determinants of Health     Financial Resource Strain:     Difficulty of Paying Living Expenses:    Food Insecurity:     Worried About Running Out of Food in the Last Year:     [address] in the Last Year:    Transportation Needs:     Lack of Transportation (Medical):  Lack of Transportation (Non-Medical):    Physical Activity:     Days of Exercise per Week:     Minutes of Exercise per Session:    Stress:     Feeling of Stress :    Social Connections:     Frequency of Communication with Friends and Family:     Frequency of Social Gatherings with Friends and Family:     Attends [employer] Services:     Active Member of Clubs or Organizations:     Attends Club or Organization Meetings:     Marital Status:    Intimate Partner Violence:     Fear of Current or Ex-Partner:     Emotionally Abused:     Physically Abused:     Sexually Abused:        Family History   Problem Relation Age of Onset    Heart Disease Mother     Heart Attack Mother     Heart Disease Father        Allergies:  Bee venom    Home Medications:  Prior to Admission medications    Medication Sig Start Date End Date Taking?  Authorizing Provider   fluticasone (FLONASE) 50 MCG/ACT nasal spray instill 1 spray into each nostril once daily 5/10/21   Twyla Tucker MD   RA RAZSSIN CGH/CHEST CONGEST -10 MG/5ML syrup take 5 milliliters three times a day if needed for cough 3/5/21   Historical Provider, MD   famotidine (PEPCID) 20 MG tablet Take 1 tablet by mouth 2 times daily for 5 days 4/23/21 5/5/21  Brian Burk MD   Blood Pressure Monitoring (BP MONITOR-STETHOSCOPE) KIT 1 each by Does not apply route daily 4/7/21   Twyla Tucker MD   pantoprazole (PROTONIX) 40 MG tablet Take 1 tablet by mouth every morning (before breakfast) 4/7/21   Jamshid Chase MD   amLODIPine (NORVASC) 10 MG tablet Take 1 tablet by mouth daily 3/16/21   Jamshid Chase MD   metoprolol succinate (TOPROL XL) 25 MG extended release tablet Take 1 tablet by mouth daily 3/16/21   Jamshid Chase MD   acetaminophen (TYLENOL) 500 MG tablet Take 2 tablets by mouth every 6 hours as needed for Pain 3/4/21   Marie Long MD   naproxen (NAPROSYN) 500 MG tablet Take 1 tablet by mouth 2 times daily as needed for Pain 1/15/21   Anastacio Hatfield, DO   Multiple Vitamins-Minerals (MULTIVITAMIN ADULT PO) Take by mouth    Historical Provider, MD   vitamin B-1 (THIAMINE) 100 MG tablet Take 1 tablet by mouth daily 6/2/20   [place] Hoose, DO   folic acid (FOLVITE) 1 MG tablet Take 1 tablet by mouth daily 6/2/20   [place] Hoose, DO   Nicotine 21-14-7 MG/24HR KIT Place onto the skin    Historical Provider, MD   EPINEPHrine (EPIPEN 2-LIZETTE) 0.3 MG/0.3ML SOAJ injection Inject 0.3 mLs into the muscle once for 1 dose Use as directed for allergic reaction 11/17/16 5/5/21  Hany Hooker MD       REVIEW OFSYSTEMS    (2-9 systems for level 4, 10 or more for level 5)      Review of Systems   Constitutional: Negative for activity change, appetite change, chills, fatigue and fever. Eyes: Negative for visual disturbance. Respiratory: Negative for cough and shortness of breath. Cardiovascular: Positive for chest pain. Negative for leg swelling. Gastrointestinal: Negative for abdominal pain, constipation, diarrhea, nausea and vomiting. Musculoskeletal: Negative for back pain. Skin: Negative for rash and wound. Neurological: Positive for weakness and numbness. Negative for dizziness, seizures, syncope, facial asymmetry, speech difficulty, light-headedness and headaches. Psychiatric/Behavioral: Negative for confusion.        PHYSICAL EXAM   (up to 7 for level 4, 8 or more forlevel 5)      INITIAL VITALS:   ED Triage Vitals   BP Temp Temp src Pulse Resp SpO2 Height Weight   05/17/21 1327 05/17/21 1244 -- 05/17/21 1244 05/17/21 1244 05/17/21 1244 -- --   (!) 142/102 98.4 °F (36.9 °C)  80 18 96 %         Physical Exam  Vitals and nursing note reviewed. Constitutional:       General: He is not in acute distress. Appearance: Normal appearance. He is well-developed. He is not diaphoretic. HENT:      Head: Normocephalic and atraumatic. Nose: Nose normal.   Eyes:      Conjunctiva/sclera: Conjunctivae normal.   Cardiovascular:      Rate and Rhythm: Normal rate and regular rhythm. Heart sounds: Normal heart sounds. Comments: 2+ radial, DP and PT pulses bilaterally  Pulmonary:      Effort: Pulmonary effort is normal. No respiratory distress. Breath sounds: Normal breath sounds. No wheezing or rales. Chest:      Chest wall: No tenderness. Abdominal:      General: There is no distension. Palpations: Abdomen is soft. Tenderness: There is no abdominal tenderness. There is no guarding. Musculoskeletal:         General: No swelling or tenderness. Normal range of motion. Cervical back: Normal range of motion and neck supple. Skin:     General: Skin is warm and dry. Findings: No rash. Neurological:      Mental Status: He is alert. Mental status is at baseline. Sensory: No sensory deficit. Motor: No weakness. Psychiatric:         Behavior: Behavior normal.         Thought Content:  Thought content normal.         DIFFERENTIAL  DIAGNOSIS     PLAN (LABS / IMAGING / EKG):  Orders Placed This Encounter   Procedures    XR CHEST (2 VW)    CBC WITH AUTO DIFFERENTIAL    BASIC METABOLIC PANEL    Troponin    Brain Natriuretic Peptide    D-DIMER, QUANTITATIVE    Troponin    EKG 12 Lead       MEDICATIONS ORDERED:  Orders Placed This Encounter   Medications    acetaminophen (TYLENOL) tablet 1,000 mg         Initial MDM/Plan/ED COURSE:    29 y.o. male who presents with complaints of chest pain, nonspecific bilateral lower extremity tingling, heaviness, tingling of hands which he currently denies at this time. On exam patient is well-appearing, nontoxic. Vital signs are within normal limits For mild hypertension with systolic in the 169K. On physical exam abdomen is soft, nontender, nondistended. Lungs clear to auscultation bilaterally. Cardiac regular rate and rhythm. 2+ radial, DP and PT pulses, normal strength and sensation bilateral upper and lower extremities. No lower extremity swelling or calf tenderness. We will plan for cardiac work-up including D-dimer. Patient low risk Timoteo Diaz despite history of PE and not on anticoagulation. If work-up unremarkable anticipate discharge home with consideration for possible anxiety given numbness, tingling of bilateral hands, feet. Low suspicion for aortic dissection as patient states that chest pain is daily, does not radiate to his back, is not described as ripping or tearing with equal pulses and neurologically intact. Work-up unremarkable, patient reevaluated and updated on results of labs and imaging. Girlfriend at bedside who states that patient does seem to have a history of anxiety, panic attacks. We will plan to discharge patient home with PCP follow-up at this time. Patient instructed the importance of following up with his hematologist in June at his scheduled appointment to discuss his anticoagulation, he was instructed to follow-up with his PCP next week for reevaluation establishment of care. Patient was given strict return precautions. Patient clear for discharge at this time, all questions answered. Patient discharged home in stable condition.     ED Course as of May 17 1559   Mon May 17, 2021   1323 D-Dimer, Quant: 0.49 [LW]      ED Course User Index  [LW] Estefany Colunga DO         DIAGNOSTIC RESULTS / EMERGENCYDEPARTMENT COURSE / MDM     LABS:  Labs Reviewed   CBC WITH AUTO DIFFERENTIAL - Abnormal; Notable for the following components:       Result Value    RBC 4.04 (*)     .9 (*)     MCH 36.4 (*)     Lymphocytes 45 (*)     Eosinophils % 5 (*)     Basophils 3 (*)     nRBC 1 (*)     All other components within normal limits   BASIC METABOLIC PANEL - Abnormal; Notable for the following components:    Calcium 8.5 (*)     All other components within normal limits   TROPONIN   BRAIN NATRIURETIC PEPTIDE   D-DIMER, QUANTITATIVE   TROPONIN           XR CHEST (2 VW)    Result Date: 5/17/2021  EXAMINATION: TWO XRAY VIEWS OF THE CHEST 5/17/2021 1:18 pm COMPARISON: 03/04/2021 HISTORY: ORDERING SYSTEM PROVIDED HISTORY: Chest pain TECHNOLOGIST PROVIDED HISTORY: Chest pain Reason for Exam: CP FINDINGS: Frontal and lateral views of the chest are submitted for review. The cardiac silhouette is normal in size. Lung parenchyma is clear without focal airspace consolidation, sizeable pleural effusion, or pneumothorax. Trachea is midline. Osseous structures and soft tissues are grossly intact. No acute cardiopulmonary pathology. PROCEDURES:  None    CONSULTS:  None    CRITICAL CARE:  Please see attending note    FINAL IMPRESSION      1.  Chest pain, unspecified type          DISPOSITION / PLAN     DISPOSITION Decision To Discharge 05/17/2021 02:45:25 PM      PATIENT REFERRED TO:  [facility] ED  [address]  [phone]  Go to   If symptoms worsen    Ruddy Villareal MD  [address] [place] [address] [address] [phone]    In 3 days        DISCHARGE MEDICATIONS:  Discharge Medication List as of 5/17/2021  3:13 PM          Emmanuel Barnes DO  Emergency Medicine Resident    (Please note that portions of this note were completed with a voice recognition program.Efforts were made to edit the dictations but occasionally words are mis-transcribed.)        Emmanuel Barnes DO  Resident  05/17/21 0776

## 2021-05-18 LAB
EKG ATRIAL RATE: 74 BPM
EKG P AXIS: 20 DEGREES
EKG P-R INTERVAL: 130 MS
EKG Q-T INTERVAL: 364 MS
EKG QRS DURATION: 100 MS
EKG QTC CALCULATION (BAZETT): 404 MS
EKG R AXIS: 15 DEGREES
EKG T AXIS: 72 DEGREES
EKG VENTRICULAR RATE: 74 BPM

## 2021-05-18 PROCEDURE — 93010 ELECTROCARDIOGRAM REPORT: CPT | Performed by: INTERNAL MEDICINE

## 2021-05-21 ENCOUNTER — OFFICE VISIT (OUTPATIENT)
Dept: INTERNAL MEDICINE CLINIC | Age: 36
End: 2021-05-21
Payer: COMMERCIAL

## 2021-05-21 ENCOUNTER — TELEPHONE (OUTPATIENT)
Dept: ADMINISTRATIVE | Age: 36
End: 2021-05-21

## 2021-05-21 ENCOUNTER — NURSE TRIAGE (OUTPATIENT)
Dept: OTHER | Facility: CLINIC | Age: 36
End: 2021-05-21

## 2021-05-21 VITALS
WEIGHT: 206.6 LBS | HEIGHT: 68 IN | TEMPERATURE: 99.9 F | BODY MASS INDEX: 31.31 KG/M2 | DIASTOLIC BLOOD PRESSURE: 100 MMHG | SYSTOLIC BLOOD PRESSURE: 142 MMHG

## 2021-05-21 DIAGNOSIS — J20.9 ACUTE BRONCHITIS, UNSPECIFIED ORGANISM: ICD-10-CM

## 2021-05-21 DIAGNOSIS — I10 ESSENTIAL HYPERTENSION: Primary | ICD-10-CM

## 2021-05-21 PROCEDURE — 99214 OFFICE O/P EST MOD 30 MIN: CPT | Performed by: INTERNAL MEDICINE

## 2021-05-21 RX ORDER — LOSARTAN POTASSIUM 50 MG/1
50 TABLET ORAL DAILY
Qty: 30 TABLET | Refills: 5 | Status: SHIPPED | OUTPATIENT
Start: 2021-05-21 | End: 2021-11-16

## 2021-05-21 RX ORDER — AZITHROMYCIN 250 MG/1
250 TABLET, FILM COATED ORAL SEE ADMIN INSTRUCTIONS
Qty: 6 TABLET | Refills: 0 | Status: SHIPPED | OUTPATIENT
Start: 2021-05-21 | End: 2021-05-26

## 2021-05-21 RX ORDER — LORATADINE 10 MG/1
10 TABLET ORAL DAILY
Qty: 10 TABLET | Refills: 0 | Status: SHIPPED | OUTPATIENT
Start: 2021-05-21 | End: 2021-10-14

## 2021-05-21 RX ORDER — MOMETASONE FUROATE 50 UG/1
SPRAY, METERED NASAL
COMMUNITY
Start: 2021-05-19 | End: 2021-10-14

## 2021-05-21 RX ORDER — PREDNISONE 20 MG/1
40 TABLET ORAL DAILY
Qty: 10 TABLET | Refills: 0 | Status: SHIPPED | OUTPATIENT
Start: 2021-05-21 | End: 2021-05-26

## 2021-05-21 ASSESSMENT — PATIENT HEALTH QUESTIONNAIRE - PHQ9
1. LITTLE INTEREST OR PLEASURE IN DOING THINGS: 0
SUM OF ALL RESPONSES TO PHQ QUESTIONS 1-9: 0
SUM OF ALL RESPONSES TO PHQ QUESTIONS 1-9: 0

## 2021-05-21 NOTE — TELEPHONE ENCOUNTER
Received call from [place] at [facility]) [place] with [employer] Complaint. Brief description of triage: heart palpitations, left sided chest pain on going and nothing worsening he advised is PCP is aware of this and advised by ED twice in the last two days to go see PCP he did agree to call 911 if any symptoms worsen in any way due to his history     Triage indicates for patient to go to office now    Care advice provided, patient verbalizes understanding; denies any other questions or concerns; instructed to call back for any new or worsening symptoms. Writer provided warm transfer to Jaswinder Monroy at [facility] for appointment scheduling. Attention Provider: Thank you for allowing me to participate in the care of your patient. The patient was connected to triage in response to information provided to the [facility]. Please do not respond through this encounter as the response is not directed to a shared pool. Reason for Disposition   Chest pain(s) lasting a few seconds persists > 3 days    Answer Assessment - Initial Assessment Questions  1. LOCATION: \"Where does it hurt? \"        Left side chest pain and noted Saturday with a severe cough and body aches flu like symptoms and [place] he [place] and noted chest pain and short of breath and went to ED for evaluation nothing different from this no worsening symptoms at all     2. RADIATION: \"Does the pain go anywhere else? \" (e.g., into neck, jaw, arms, back)      He went to ED twice for this and advised to follow up with physician cant tell on this question      3. ONSET: \"When did the chest pain begin? \" (Minutes, hours or days)       Last week     4. PATTERN \"Does the pain come and go, or has it been constant since it started? \"  \"Does it get worse with exertion? \"       Constant     5. DURATION: \"How long does it last\" (e.g., seconds, minutes, hours)      Ongoing     6. SEVERITY: \"How bad is the pain? \"  (e.g., Scale 1-10; mild, moderate, or severe)     - MILD (1-3): doesn't interfere with normal activities      - MODERATE (4-7): interferes with normal activities or awakens from sleep     - SEVERE (8-10): excruciating pain, unable to do any normal activities        2 out of 10 a poke in the chest he did see MD on this     7. CARDIAC RISK FACTORS: \"Do you have any history of heart problems or risk factors for heart disease? \" (e.g., angina, prior heart attack; diabetes, high blood pressure, high cholesterol, smoker, or strong family history of heart disease)    HTN, PE last summer, no heart attack has happened like this in [facility] and again Monday   Went to ED twice int he past week and spoke to MD about this already and states no changes he is taking his BP mediations and wants to get put on blood thinners   8. PULMONARY RISK FACTORS: \"Do you have any history of lung disease? \"  (e.g., blood clots in lung, asthma, emphysema, birth control pills)      P.E.     9. CAUSE: \"What do you think is causing the chest pain? \"      March there was a narrowed artery and has an appointment on the 6th of next month to see pulmonary specialist     10. OTHER SYMPTOMS: \"Do you have any other symptoms? \" (e.g., dizziness, nausea, vomiting, sweating, fever, difficulty breathing, cough)        Cough,     11. PREGNANCY: \"Is there any chance you are pregnant? \" \"When was your last menstrual period? \"        N/A    Protocols used: CHEST PAIN-ADULT-[place]

## 2021-05-21 NOTE — PROGRESS NOTES
Subjective:      Patient ID: Briana Donald is a 29 y.o. male. HPI       HPI   1) Location/Symptom - Chest Pain   2) Timing/Onset: - 4 days   3) Context/Setting: No H/o Trauma/Injury/fall   4) Quality: Dull aching   5) Duration: initially intermittent, Now continuous for last few days   6) Modifying Factors: Associated with Cough, sneezing , PND < noticed wheezing in Lung First thing in morning   7) Severity: moderate , has Chest wall Tenderness   Was in ED on 5/17 , had CXR , which was Normal , has H/o PE was on [facility], [place], operated by [facility] , D-Dimer was Normal , Troponin was Ok . ,EKG was OK   Seen by DR Corona in 5/21  CT Chest in 2/21, no PE   Complaining of Headache , affecting whole head , Started Saturday , Constant in nature , No Head trauma   No H/o Migraine   No Recent stress IN life     Review of Systems   Constitutional: Negative for activity change, appetite change, chills and diaphoresis. HENT: Positive for postnasal drip and sneezing. Negative for congestion, dental problem, ear discharge, facial swelling and hearing loss. Respiratory: Positive for cough. Negative for apnea, chest tightness, shortness of breath and wheezing. Cardiovascular: Positive for chest pain. Negative for leg swelling. Gastrointestinal: Negative for abdominal distention, abdominal pain, constipation and diarrhea. Genitourinary: Negative for difficulty urinating, dysuria, enuresis, flank pain and frequency. Musculoskeletal: Negative for arthralgias, back pain, gait problem and joint swelling. Skin: Negative for color change, pallor and rash. Neurological: Negative for dizziness, seizures, facial asymmetry, light-headedness, numbness and headaches. Psychiatric/Behavioral: Negative for agitation, behavioral problems, confusion, decreased concentration and dysphoric mood. Objective:   Physical Exam  Vitals and nursing note reviewed. Constitutional:       General: He is not in acute distress.      Appearance: He is well-developed. He is not diaphoretic. HENT:      Head: Normocephalic and atraumatic. Mouth/Throat:      Pharynx: No oropharyngeal exudate. Eyes:      General: No scleral icterus. Right eye: No discharge. Left eye: No discharge. Conjunctiva/sclera: Conjunctivae normal.      Pupils: Pupils are equal, round, and reactive to light. Neck:      Thyroid: No thyromegaly. Vascular: No JVD. Trachea: No tracheal deviation. Cardiovascular:      Rate and Rhythm: Normal rate. Heart sounds: Normal heart sounds. No murmur heard. No gallop. Comments: Chest wall tenderness on right side. Pulmonary:      Effort: Pulmonary effort is normal. No respiratory distress. Breath sounds: Normal breath sounds. No stridor. No wheezing or rales. Abdominal:      General: Bowel sounds are normal. There is no distension. Palpations: Abdomen is soft. Tenderness: There is no abdominal tenderness. There is no guarding or rebound. Musculoskeletal:         General: No tenderness. Normal range of motion. Cervical back: Normal range of motion and neck supple. Skin:     General: Skin is warm and dry. Findings: No erythema or rash. Neurological:      Mental Status: He is alert and oriented to person, place, and time. Assessment / Plan:   1. Essential hypertension  Blood pressure uncontrolled  - losartan (COZAAR) 50 MG tablet; Take 1 tablet by mouth daily  Dispense: 30 tablet; Refill: 5    2. Acute bronchitis, unspecified organism  - azithromycin (ZITHROMAX) 250 MG tablet; Take 1 tablet by mouth See Admin Instructions for 5 days 500mg on day 1 followed by 250mg on days 2 - 5  Dispense: 6 tablet; Refill: 0  - predniSONE (DELTASONE) 20 MG tablet; Take 2 tablets by mouth daily for 5 days  Dispense: 10 tablet; Refill: 0  - loratadine (CLARITIN) 10 MG tablet; Take 1 tablet by mouth daily  Dispense: 10 tablet;  Refill: 0    I suspect, patient chest pain is due to coughing due to chronic bronchitis since he has chest wall tenderness  Given a course of antibiotics and steroids  We will reevaluate in 1 week, chest pain do not improve, will consider doing stress test  Explained to patient at length, he was understanding  Mentioned that if chest pain worsen he need to go to emergency room  · Return in about 1 week (around 5/28/2021). · Reviewed prior labs and health maintenance. · Discussed use, benefit, and side effects of prescribed medications. Barriers to medication compliance addressed. All patient questions answered. Pt voiced understanding. MD DINESH BanksCr[facility]  5/27/2021, 7:03 AM    Please note that this chart was generated using voice recognition Dragon dictation software. Although every effort was made to ensure the accuracy of this automated transcription, some errors in transcription may have occurred. Visit Information    Have you changed or started any medications since your last visit including any over-the-counter medicines, vitamins, or herbal medicines? no   Are you having any side effects from any of your medications? -  no  Have you stopped taking any of your medications? Is so, why? -  no    Have you seen any other physician or provider since your last visit? Yes - Records Requested  Have you had any other diagnostic tests since your last visit? Yes - Records Requested  Have you been seen in the emergency room and/or had an admission to a hospital since we last saw you? Yes - Records Requested  Have you had your routine dental cleaning in the past 6 months? no    Have you activated your [employer] account? If not, what are your barriers?  Yes     Patient Care Team:  Hai Carrera MD as PCP - General (Internal Medicine)  Hai Carrera MD as PCP - [facility] Provider    Medical History Review  Past Medical, Family, and Social History reviewed and does not contribute to the patient presenting condition    Health Maintenance   Topic Date Due    Hepatitis C screen  Never done    Varicella vaccine (1 of 2 - 2-dose childhood series) Never done    Pneumococcal 0-64 years Vaccine (1 of 2 - PPSV23) Never done    HIV screen  Never done    DTaP/Tdap/Td vaccine (1 - Tdap) Never done    Flu vaccine (Season Ended) 09/01/2021    Potassium monitoring  05/17/2022    Creatinine monitoring  05/17/2022    COVID-19 Vaccine  Completed    Hepatitis A vaccine  Aged Out    Hepatitis B vaccine  Aged Out    Hib vaccine  Aged Out    Meningococcal (ACWY) vaccine  Aged Out

## 2021-05-24 ENCOUNTER — INITIAL CONSULT (OUTPATIENT)
Dept: VASCULAR SURGERY | Age: 36
End: 2021-05-24
Payer: COMMERCIAL

## 2021-05-24 VITALS
TEMPERATURE: 97.1 F | WEIGHT: 206 LBS | DIASTOLIC BLOOD PRESSURE: 75 MMHG | HEART RATE: 76 BPM | OXYGEN SATURATION: 96 % | SYSTOLIC BLOOD PRESSURE: 134 MMHG | HEIGHT: 68 IN | BODY MASS INDEX: 31.22 KG/M2 | RESPIRATION RATE: 17 BRPM

## 2021-05-24 DIAGNOSIS — I77.1 STENOSIS OF CELIAC ARTERY (HCC): ICD-10-CM

## 2021-05-24 DIAGNOSIS — I77.4 MEDIAN ARCUATE LIGAMENT SYNDROME (HCC): ICD-10-CM

## 2021-05-24 DIAGNOSIS — I26.99 OTHER ACUTE PULMONARY EMBOLISM WITHOUT ACUTE COR PULMONALE (HCC): Primary | ICD-10-CM

## 2021-05-24 PROCEDURE — 99205 OFFICE O/P NEW HI 60 MIN: CPT | Performed by: SURGERY

## 2021-05-24 ASSESSMENT — ENCOUNTER SYMPTOMS
BLOOD IN STOOL: 0
DIARRHEA: 0
SHORTNESS OF BREATH: 1
NAUSEA: 1
ALLERGIC/IMMUNOLOGIC NEGATIVE: 1
COUGH: 1
ABDOMINAL PAIN: 1
CHEST TIGHTNESS: 1
COLOR CHANGE: 0

## 2021-05-24 NOTE — PROGRESS NOTES
Division of Vascular Surgery        New Consult      Physician Requesting Consult:  Ana Walker MD    Reason for Consult:  Mesenteric stenosis    Chief Complaint:     Chest pain    History of Present Illness:      [place] is a 29 y.o. 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 He denies specific abdominal complaints suggestive of chronic mesenteric ischemia. Denies post prandial abdominal pain, food fear or any significant unintentional weight loss. He was found to have pulmonary embolus last year, which really was a small sub segmental PE for which he was treated with anticoagulation for 6 months, likely un provoked. There was no source for the DVT (venous duplex was negative for thrombus in lower extremities), denies family history of DVT/PE or hypercoagulable disorders. He describes episodes of palpitations and hearing/feeling his heart race at times. He has had some coughing episodes which would lead to left upper quadrant and left upper chest pain. He underwent cardiac cath for abnormal nuclear stress test around time of his PE diagnosis, which essentially showed no significant heart disease. He does have family history of coronary artery disease in both his parents; with an aunt who  in her early 62s from it. He describes stress at work, [employer], which can be quite demanding. He does smoke daily. He was quite anxious last night over concerns about his appointment today, he started to have similar left sided chest pain along with feeling his heart race. He took all his medications early on an empty stomach, and then a little while later vomited it all up.     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, it is also not of any significant concern and likely incidental finding  · Overall asymptomatic without evidence of chronic mesenteric ischemia  · CT of chest reveals widely patent celiac and superior mesenteric arteries and I am not concerned regarding the compression from the median arcuate ligament  · We had a good discussion today about smoking cessation and ways to get him to completely stop  · Diet and exercise is an important method to stay healthy and improve his overall cardiovascular health  · He will follow up as needed, no need for further testing for his PE or celiac artery    Electronically signed by Judith Null MD on 5/24/21 at 10:44 AM EDT      [address] [facility]  Office: [phone]  Cell: [phone]  Email: [email or web address]

## 2021-05-26 ENCOUNTER — TELEPHONE (OUTPATIENT)
Dept: ONCOLOGY | Age: 36
End: 2021-05-26

## 2021-05-27 ASSESSMENT — ENCOUNTER SYMPTOMS
COUGH: 1
WHEEZING: 0
ABDOMINAL DISTENTION: 0
CHEST TIGHTNESS: 0
ABDOMINAL PAIN: 0
DIARRHEA: 0
APNEA: 0
SHORTNESS OF BREATH: 0
BACK PAIN: 0
CONSTIPATION: 0
FACIAL SWELLING: 0
COLOR CHANGE: 0

## 2021-06-02 ENCOUNTER — OFFICE VISIT (OUTPATIENT)
Dept: INTERNAL MEDICINE CLINIC | Age: 36
End: 2021-06-02
Payer: COMMERCIAL

## 2021-06-02 VITALS
OXYGEN SATURATION: 97 % | WEIGHT: 210 LBS | DIASTOLIC BLOOD PRESSURE: 84 MMHG | BODY MASS INDEX: 31.83 KG/M2 | HEIGHT: 68 IN | HEART RATE: 76 BPM | SYSTOLIC BLOOD PRESSURE: 138 MMHG

## 2021-06-02 DIAGNOSIS — F41.1 GENERALIZED ANXIETY DISORDER: Primary | ICD-10-CM

## 2021-06-02 DIAGNOSIS — I10 ESSENTIAL HYPERTENSION: ICD-10-CM

## 2021-06-02 PROCEDURE — 99214 OFFICE O/P EST MOD 30 MIN: CPT | Performed by: INTERNAL MEDICINE

## 2021-06-02 RX ORDER — SERTRALINE HYDROCHLORIDE 25 MG/1
25 TABLET, FILM COATED ORAL DAILY
Qty: 30 TABLET | Refills: 3 | Status: SHIPPED | OUTPATIENT
Start: 2021-06-02 | End: 2022-04-27

## 2021-06-02 RX ORDER — METOPROLOL SUCCINATE 25 MG/1
25 TABLET, EXTENDED RELEASE ORAL DAILY
Qty: 90 TABLET | Refills: 1 | Status: SHIPPED | OUTPATIENT
Start: 2021-06-02 | End: 2021-12-20

## 2021-06-02 SDOH — ECONOMIC STABILITY: FOOD INSECURITY: WITHIN THE PAST 12 MONTHS, THE FOOD YOU BOUGHT JUST DIDN'T LAST AND YOU DIDN'T HAVE MONEY TO GET MORE.: NEVER TRUE

## 2021-06-02 SDOH — ECONOMIC STABILITY: TRANSPORTATION INSECURITY
IN THE PAST 12 MONTHS, HAS LACK OF TRANSPORTATION KEPT YOU FROM MEETINGS, WORK, OR FROM GETTING THINGS NEEDED FOR DAILY LIVING?: NO

## 2021-06-02 SDOH — ECONOMIC STABILITY: FOOD INSECURITY: WITHIN THE PAST 12 MONTHS, YOU WORRIED THAT YOUR FOOD WOULD RUN OUT BEFORE YOU GOT MONEY TO BUY MORE.: NEVER TRUE

## 2021-06-02 SDOH — ECONOMIC STABILITY: TRANSPORTATION INSECURITY
IN THE PAST 12 MONTHS, HAS THE LACK OF TRANSPORTATION KEPT YOU FROM MEDICAL APPOINTMENTS OR FROM GETTING MEDICATIONS?: NO

## 2021-06-02 ASSESSMENT — SOCIAL DETERMINANTS OF HEALTH (SDOH): HOW HARD IS IT FOR YOU TO PAY FOR THE VERY BASICS LIKE FOOD, HOUSING, MEDICAL CARE, AND HEATING?: NOT HARD AT ALL

## 2021-06-02 ASSESSMENT — PATIENT HEALTH QUESTIONNAIRE - PHQ9
SUM OF ALL RESPONSES TO PHQ QUESTIONS 1-9: 0
SUM OF ALL RESPONSES TO PHQ QUESTIONS 1-9: 0
SUM OF ALL RESPONSES TO PHQ9 QUESTIONS 1 & 2: 0
1. LITTLE INTEREST OR PLEASURE IN DOING THINGS: 0
2. FEELING DOWN, DEPRESSED OR HOPELESS: 0
SUM OF ALL RESPONSES TO PHQ QUESTIONS 1-9: 0

## 2021-06-02 NOTE — PROGRESS NOTES
Visit Information    Have you changed or started any medications since your last visit including any over-the-counter medicines, vitamins, or herbal medicines? yes -    Are you having any side effects from any of your medications? -  no  Have you stopped taking any of your medications? Is so, why? -  no    Have you seen any other physician or provider since your last visit? Yes - Records Obtained  Have you had any other diagnostic tests since your last visit? No  Have you been seen in the emergency room and/or had an admission to a hospital since we last saw you? No  Have you had your routine dental cleaning in the past 6 months? no    Have you activated your [employer] account? If not, what are your barriers? Yes     Patient Care Team:  Tabby Ramirez MD as PCP - General (Internal Medicine)  Tabby Ramirez MD as PCP - [facility]    Medical History Review  Past Medical, Family, and Social History reviewed and does not contribute to the patient presenting condition    Health Maintenance   Topic Date Due    Hepatitis C screen  Never done    Varicella vaccine (1 of 2 - 2-dose childhood series) Never done    Pneumococcal 0-64 years Vaccine (1 of 2 - PPSV23) Never done    HIV screen  Never done    DTaP/Tdap/Td vaccine (1 - Tdap) Never done    Flu vaccine (Season Ended) 09/01/2021    Potassium monitoring  05/17/2022    Creatinine monitoring  05/17/2022    COVID-19 Vaccine  Completed    Hepatitis A vaccine  Aged Out    Hepatitis B vaccine  Aged Out    Hib vaccine  Aged Out    Meningococcal (ACWY) vaccine  Aged Out     SUBJECTIVE:  uRchi Menezes is a 29 y.o. male patient who  comes for complaints of   Chief Complaint   Patient presents with    Hypertension     4 week fu     Generalized anxiety disorder  Was previously on prozac for anxiety 15-20yr ago, but has not taken it since  Denies HI/SI    Context- seen in ER on 5/17 for chest pain- cardiac w/up and d-dimer negative.  Seen in office afterward- treated for bronchitis  Pt endorses a lot of anxiwty from work  Pt quit work 2 week ago- no chest pain/SOB since    Occasional alcohol  3/4 PPD smoker        Hypertension   Patient indicates that s/he is feeling well and denies any symptoms referable to elevated blood pressure. - Specifically denies headache, chest pain, palpitations, dyspnea and peripheral edema.  - Patient denies any side effects of their medication(s) and is compliant with their regimen.    -S/he does/not check BP's generally. -Does/ denies regular aerobic exercise. - Watches his/her diet for sodium, low fat and low cholesterol most of the time. Last 3 Encounter BP Readings  BP Readings from Last 3 Encounters:   06/02/21 138/84   05/24/21 134/75   05/21/21 (!) 142/100      On cozaar 50daily, and metoprolol 25daily        REVIEW OF SYSTEMS (except Subjective (HPI))  GENERAL: No fevers / chills  RESPIRATORY: Negative for cough, wheezing or shortness of breath  CARDIOVASCULAR: Negative for chest pain or palpitations.   GI: no nausea, vomiting, or diarrhea  NEURO: No history of headaches    Past Medical History:   Diagnosis Date    No active medical problems     Pulmonary embolism (HCC)        SOCIAL HISTORY:  Social History     Socioeconomic History    Marital status:      Spouse name: Not on file    Number of children: Not on file    Years of education: Not on file    Highest education level: Not on file   Occupational History    Not on file   Tobacco Use    Smoking status: Current Every Day Smoker     Packs/day: 1.00     Years: 10.00     Pack years: 10.00     Types: Cigarettes    Smokeless tobacco: Never Used    Tobacco comment: using nicotine patches at night   Vaping Use    Vaping Use: Never used   Substance and Sexual Activity    Alcohol use: Yes     Comment: occ    Drug use: Not Currently     Comment: used cocaine in early 20's    Sexual activity: Not on file   Other Topics Concern    Not on file   Social History Narrative    Not on file     Social Determinants of Health     Financial Resource Strain: Low Risk     Difficulty of Paying Living Expenses: Not hard at all   Food Insecurity: No Food Insecurity    Worried About Running Out of Food in the Last Year: Never true    [address] in the Last Year: Never true   Transportation Needs: No Transportation Needs    Lack of Transportation (Medical): No    Lack of Transportation (Non-Medical):  No   Physical Activity:     Days of Exercise per Week:     Minutes of Exercise per Session:    Stress:     Feeling of Stress :    Social Connections:     Frequency of Communication with Friends and Family:     Frequency of Social Gatherings with Friends and Family:     Attends Denominational Services:     Active Member of Clubs or Organizations:     Attends Club or Organization Meetings:     Marital Status:    Intimate Partner Violence:     Fear of Current or Ex-Partner:     Emotionally Abused:     Physically Abused:     Sexually Abused:            CURRENT MEDICATIONS:  Current Outpatient Medications   Medication Sig Dispense Refill    mometasone (NASONEX) 50 MCG/ACT nasal spray       losartan (COZAAR) 50 MG tablet Take 1 tablet by mouth daily 30 tablet 5    loratadine (CLARITIN) 10 MG tablet Take 1 tablet by mouth daily 10 tablet 0    fluticasone (FLONASE) 50 MCG/ACT nasal spray instill 1 spray into each nostril once daily 16 g 1    RA TUSSIN CGH/CHEST CONGEST -10 MG/5ML syrup take 5 milliliters three times a day if needed for cough      Blood Pressure Monitoring (BP MONITOR-STETHOSCOPE) KIT 1 each by Does not apply route daily 1 kit 0    pantoprazole (PROTONIX) 40 MG tablet Take 1 tablet by mouth every morning (before breakfast) 30 tablet 5    metoprolol succinate (TOPROL XL) 25 MG extended release tablet Take 1 tablet by mouth daily 30 tablet 2    acetaminophen (TYLENOL) 500 MG tablet Take 2 tablets by mouth every 6 hours as needed for Pain 60 tablet 0    naproxen (NAPROSYN) 500 MG tablet Take 1 tablet by mouth 2 times daily as needed for Pain 20 tablet 0    Multiple Vitamins-Minerals (MULTIVITAMIN ADULT PO) Take by mouth      vitamin B-1 (THIAMINE) 100 MG tablet Take 1 tablet by mouth daily 30 tablet 3    folic acid (FOLVITE) 1 MG tablet Take 1 tablet by mouth daily 30 tablet 3    Nicotine 21-14-7 MG/24HR KIT Place onto the skin      famotidine (PEPCID) 20 MG tablet Take 1 tablet by mouth 2 times daily for 5 days 10 tablet 0    amLODIPine (NORVASC) 10 MG tablet Take 1 tablet by mouth daily (Patient not taking: Reported on 6/2/2021) 30 tablet 3    EPINEPHrine (EPIPEN 2-LIZETTE) 0.3 MG/0.3ML SOAJ injection Inject 0.3 mLs into the muscle once for 1 dose Use as directed for allergic reaction 2 each 0     No current facility-administered medications for this visit. OBJECTIVE:  Vitals:    06/02/21 1435   BP: 138/84   Pulse: 76   SpO2: 97%     Body mass index is 31.93 kg/m². General exam (except above):  General appearance - well appearing, alert, in no acute distress  Head - Atraumatic, normocephalic  Eyes - EOMI, no jaundice or pallor  Lungs - Lungs clear to auscultation. No wheezing, rhonchi, rales  Heart - RRR without murmur, gallop, or rubs. No ectopy  Abdomen - Abdomen soft, non-tender. Bowel sounds normal. No masses, organomegaly  Extremities -No significant edema, or skin discoloration. Good capillary refill. Neuro - Pt Alert, awake and oriented x 3. No gross focal neurological deficits    ASSESSMENT AND PLAN (MEDICAL DECISION MAKING):   Urmila Bergeron was seen today for hypertension. Diagnoses and all orders for this visit:    Generalized anxiety disorder  -     sertraline (ZOLOFT) 25 MG tablet; Take 1 tablet by mouth daily    Essential hypertension  -     metoprolol succinate (TOPROL XL) 25 MG extended release tablet;  Take 1 tablet by mouth daily           Follow up in: Sandi Erickson MD

## 2021-06-20 ENCOUNTER — APPOINTMENT (OUTPATIENT)
Dept: CT IMAGING | Age: 36
End: 2021-06-20
Payer: COMMERCIAL

## 2021-06-20 ENCOUNTER — HOSPITAL ENCOUNTER (EMERGENCY)
Age: 36
Discharge: HOME | End: 2021-06-20
Attending: EMERGENCY MEDICINE
Payer: COMMERCIAL

## 2021-06-20 VITALS
BODY MASS INDEX: 31.83 KG/M2 | TEMPERATURE: 98.7 F | RESPIRATION RATE: 16 BRPM | WEIGHT: 210 LBS | SYSTOLIC BLOOD PRESSURE: 128 MMHG | HEART RATE: 92 BPM | OXYGEN SATURATION: 95 % | DIASTOLIC BLOOD PRESSURE: 105 MMHG | HEIGHT: 68 IN

## 2021-06-20 DIAGNOSIS — I72.6 VERTEBRAL ARTERY ANEURYSM (HCC): ICD-10-CM

## 2021-06-20 DIAGNOSIS — R07.89 ATYPICAL CHEST PAIN: ICD-10-CM

## 2021-06-20 DIAGNOSIS — G43.409 HEMIPLEGIC MIGRAINE WITHOUT STATUS MIGRAINOSUS, NOT INTRACTABLE: Primary | ICD-10-CM

## 2021-06-20 LAB
ABSOLUTE EOS #: 0.1 K/UL (ref 0–0.4)
ABSOLUTE IMMATURE GRANULOCYTE: ABNORMAL K/UL (ref 0–0.3)
ABSOLUTE LYMPH #: 1.1 K/UL (ref 1–4.8)
ABSOLUTE MONO #: 0.5 K/UL (ref 0.1–1.3)
ANION GAP SERPL CALCULATED.3IONS-SCNC: 14 MMOL/L (ref 9–17)
BASOPHILS # BLD: 1 % (ref 0–2)
BASOPHILS ABSOLUTE: 0 K/UL (ref 0–0.2)
BUN BLDV-MCNC: 9 MG/DL (ref 6–20)
BUN/CREAT BLD: ABNORMAL (ref 9–20)
CALCIUM SERPL-MCNC: 9.1 MG/DL (ref 8.6–10.4)
CHLORIDE BLD-SCNC: 99 MMOL/L (ref 98–107)
CO2: 25 MMOL/L (ref 20–31)
CREAT SERPL-MCNC: 0.65 MG/DL (ref 0.7–1.2)
DIFFERENTIAL TYPE: ABNORMAL
EOSINOPHILS RELATIVE PERCENT: 2 % (ref 0–4)
GFR AFRICAN AMERICAN: >60 ML/MIN
GFR NON-AFRICAN AMERICAN: >60 ML/MIN
GFR SERPL CREATININE-BSD FRML MDRD: ABNORMAL ML/MIN/{1.73_M2}
GFR SERPL CREATININE-BSD FRML MDRD: ABNORMAL ML/MIN/{1.73_M2}
GLUCOSE BLD-MCNC: 117 MG/DL (ref 70–99)
GLUCOSE BLD-MCNC: 118 MG/DL (ref 75–110)
HCT VFR BLD CALC: 42.9 % (ref 41–53)
HEMOGLOBIN: 14.7 G/DL (ref 13.5–17.5)
IMMATURE GRANULOCYTES: ABNORMAL %
INR BLD: 0.9
LACTIC ACID: 1 MMOL/L (ref 0.5–2.2)
LYMPHOCYTES # BLD: 17 % (ref 24–44)
MCH RBC QN AUTO: 36.4 PG (ref 26–34)
MCHC RBC AUTO-ENTMCNC: 34.3 G/DL (ref 31–37)
MCV RBC AUTO: 106 FL (ref 80–100)
MONOCYTES # BLD: 8 % (ref 1–7)
NRBC AUTOMATED: ABNORMAL PER 100 WBC
PARTIAL THROMBOPLASTIN TIME: 26.4 SEC (ref 24–36)
PDW BLD-RTO: 14.2 % (ref 11.5–14.9)
PLATELET # BLD: 155 K/UL (ref 150–450)
PLATELET ESTIMATE: ABNORMAL
PMV BLD AUTO: 8.3 FL (ref 6–12)
POTASSIUM SERPL-SCNC: 3.8 MMOL/L (ref 3.7–5.3)
PROTHROMBIN TIME: 12.2 SEC (ref 11.8–14.6)
RBC # BLD: 4.04 M/UL (ref 4.5–5.9)
RBC # BLD: ABNORMAL 10*6/UL
SEG NEUTROPHILS: 72 % (ref 36–66)
SEGMENTED NEUTROPHILS ABSOLUTE COUNT: 4.4 K/UL (ref 1.3–9.1)
SODIUM BLD-SCNC: 138 MMOL/L (ref 135–144)
TROPONIN INTERP: NORMAL
TROPONIN INTERP: NORMAL
TROPONIN T: NORMAL NG/ML
TROPONIN T: NORMAL NG/ML
TROPONIN, HIGH SENSITIVITY: 10 NG/L (ref 0–22)
TROPONIN, HIGH SENSITIVITY: 10 NG/L (ref 0–22)
WBC # BLD: 6.1 K/UL (ref 3.5–11)
WBC # BLD: ABNORMAL 10*3/UL

## 2021-06-20 PROCEDURE — 96374 THER/PROPH/DIAG INJ IV PUSH: CPT

## 2021-06-20 PROCEDURE — 83605 ASSAY OF LACTIC ACID: CPT

## 2021-06-20 PROCEDURE — 99285 EMERGENCY DEPT VISIT HI MDM: CPT

## 2021-06-20 PROCEDURE — 85610 PROTHROMBIN TIME: CPT

## 2021-06-20 PROCEDURE — 82947 ASSAY GLUCOSE BLOOD QUANT: CPT

## 2021-06-20 PROCEDURE — 74174 CTA ABD&PLVS W/CONTRAST: CPT

## 2021-06-20 PROCEDURE — 2580000003 HC RX 258: Performed by: STUDENT IN AN ORGANIZED HEALTH CARE EDUCATION/TRAINING PROGRAM

## 2021-06-20 PROCEDURE — 96375 TX/PRO/DX INJ NEW DRUG ADDON: CPT

## 2021-06-20 PROCEDURE — 6360000002 HC RX W HCPCS: Performed by: STUDENT IN AN ORGANIZED HEALTH CARE EDUCATION/TRAINING PROGRAM

## 2021-06-20 PROCEDURE — 84484 ASSAY OF TROPONIN QUANT: CPT

## 2021-06-20 PROCEDURE — 85025 COMPLETE CBC W/AUTO DIFF WBC: CPT

## 2021-06-20 PROCEDURE — 93005 ELECTROCARDIOGRAM TRACING: CPT | Performed by: STUDENT IN AN ORGANIZED HEALTH CARE EDUCATION/TRAINING PROGRAM

## 2021-06-20 PROCEDURE — 70498 CT ANGIOGRAPHY NECK: CPT

## 2021-06-20 PROCEDURE — 80048 BASIC METABOLIC PNL TOTAL CA: CPT

## 2021-06-20 PROCEDURE — 85730 THROMBOPLASTIN TIME PARTIAL: CPT

## 2021-06-20 PROCEDURE — 36415 COLL VENOUS BLD VENIPUNCTURE: CPT

## 2021-06-20 PROCEDURE — 70450 CT HEAD/BRAIN W/O DYE: CPT

## 2021-06-20 PROCEDURE — 6360000004 HC RX CONTRAST MEDICATION: Performed by: STUDENT IN AN ORGANIZED HEALTH CARE EDUCATION/TRAINING PROGRAM

## 2021-06-20 RX ORDER — PROCHLORPERAZINE EDISYLATE 5 MG/ML
10 INJECTION INTRAMUSCULAR; INTRAVENOUS ONCE
Status: COMPLETED | OUTPATIENT
Start: 2021-06-20 | End: 2021-06-20

## 2021-06-20 RX ORDER — DIPHENHYDRAMINE HYDROCHLORIDE 50 MG/ML
25 INJECTION INTRAMUSCULAR; INTRAVENOUS ONCE
Status: COMPLETED | OUTPATIENT
Start: 2021-06-20 | End: 2021-06-20

## 2021-06-20 RX ORDER — SODIUM CHLORIDE 0.9 % (FLUSH) 0.9 %
10 SYRINGE (ML) INJECTION PRN
Status: DISCONTINUED | OUTPATIENT
Start: 2021-06-20 | End: 2021-06-21 | Stop reason: HOSPADM

## 2021-06-20 RX ORDER — 0.9 % SODIUM CHLORIDE 0.9 %
80 INTRAVENOUS SOLUTION INTRAVENOUS ONCE
Status: COMPLETED | OUTPATIENT
Start: 2021-06-20 | End: 2021-06-20

## 2021-06-20 RX ADMIN — SODIUM CHLORIDE, PRESERVATIVE FREE 10 ML: 5 INJECTION INTRAVENOUS at 20:06

## 2021-06-20 RX ADMIN — PROCHLORPERAZINE EDISYLATE 10 MG: 5 INJECTION INTRAMUSCULAR; INTRAVENOUS at 19:13

## 2021-06-20 RX ADMIN — SODIUM CHLORIDE 80 ML: 9 INJECTION, SOLUTION INTRAVENOUS at 20:04

## 2021-06-20 RX ADMIN — IOPAMIDOL 75 ML: 755 INJECTION, SOLUTION INTRAVENOUS at 20:06

## 2021-06-20 RX ADMIN — IOPAMIDOL 75 ML: 755 INJECTION, SOLUTION INTRAVENOUS at 20:04

## 2021-06-20 RX ADMIN — DIPHENHYDRAMINE HYDROCHLORIDE 25 MG: 50 INJECTION INTRAMUSCULAR; INTRAVENOUS at 19:13

## 2021-06-20 RX ADMIN — SODIUM CHLORIDE 80 ML: 9 INJECTION, SOLUTION INTRAVENOUS at 20:06

## 2021-06-20 RX ADMIN — SODIUM CHLORIDE, PRESERVATIVE FREE 10 ML: 5 INJECTION INTRAVENOUS at 20:04

## 2021-06-20 ASSESSMENT — ENCOUNTER SYMPTOMS
PHOTOPHOBIA: 0
DIARRHEA: 0
VOMITING: 1
CONSTIPATION: 0
COUGH: 0
BACK PAIN: 0
SHORTNESS OF BREATH: 0
ABDOMINAL PAIN: 0
NAUSEA: 1
SORE THROAT: 0
ANAL BLEEDING: 0
BLOOD IN STOOL: 0

## 2021-06-20 ASSESSMENT — PAIN SCALES - GENERAL: PAINLEVEL_OUTOF10: 6

## 2021-06-20 ASSESSMENT — PAIN DESCRIPTION - LOCATION: LOCATION: CHEST

## 2021-06-20 ASSESSMENT — PAIN DESCRIPTION - PAIN TYPE: TYPE: ACUTE PAIN

## 2021-06-20 ASSESSMENT — HEART SCORE: ECG: 0

## 2021-06-20 NOTE — ED NOTES
Report given to Jose Vang RN from [address] [address] in person   The following was reviewed with receiving RN:   Current vital signs:  BP (!) 146/106   Pulse 121   Temp 98.1 °F (36.7 °C) (Oral)   Resp 20   Ht 5' 8\" (1.727 m)   Wt 210 lb (95.3 kg)   SpO2 97%   BMI 31.93 kg/m²                MEWS Score: 3     Any medication or safety alerts were reviewed. Any pending diagnostics and notifications were also reviewed, as well as any safety concerns or issues, abnormal labs, abnormal imaging, and abnormal assessment findings. Questions were answered.           Stephany Kelly RN  06/20/21 2487

## 2021-06-20 NOTE — ED PROVIDER NOTES
[address]  Emergency Department Encounter  EmergencyMedicine Resident     Pt Name:Sam Waller  MRN: [id number]  [place] 1985  Date of evaluation: 6/20/21  PCP:  Twyla Tucker MD    [address] [place] [address]       Chief Complaint   Patient presents with    Chest Pain    Nausea & Vomiting    Numbness       HISTORY OF PRESENT ILLNESS  (Location/Symptom, Timing/Onset, Context/Setting, Quality, Duration, Modifying Factors, Severity.)      Rony Cyr is a 29 y.o. male who presents with chest pain, headache, and decussation of this of his face. 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 Patient notes that today he woke up with nausea and at noon had an episode of emesis. While taking a shower he did have an episode of lightheadedness that has since resolved. Approximately 3:30 PM while at rest he started having dull squeezing chest pain that was nonradiating with exertional exacerbation but denied any chest pain, diaphoresis this pain or new onset nausea and vomiting. At the same time patient did notice having a throbbing progressively worsening headache that is not the worst leg of his life. Patient is also noticing that there is a throbbing pressure on the left side of his face causing have decreased sensation. He notes that he has been having this intermittent decree sensation of his face that last approximately 2 to 3 hours for the past 4 days does not a clear onset and when this started to occur in order cervical any clear propagating cause of this symptom. He denies taking any anticoagulation. Patient denies any fevers, chills, hemoptysis, change in vision, weakness, difficulty walking, abdominal pain, diarrhea/constipation, melena, medic easier, hematuria, dysuria, or abnormal bruises.     PAST MEDICAL / SURGICAL / SOCIAL / FAMILY HISTORY      has a past medical history of No active medical problems and Pulmonary embolism ([facility] [address] has no past surgical history on file. Social History     Socioeconomic History    Marital status:      Spouse name: Not on file    Number of children: Not on file    Years of education: Not on file    Highest education level: Not on file   Occupational History    Not on file   Tobacco Use    Smoking status: Current Every Day Smoker     Packs/day: 1.00     Years: 10.00     Pack years: 10.00     Types: Cigarettes    Smokeless tobacco: Never Used    Tobacco comment: using nicotine patches at night   Vaping Use    Vaping Use: Never used   Substance and Sexual Activity    Alcohol use: Yes     Comment: occ    Drug use: Not Currently     Comment: used cocaine in early 19's    Sexual activity: Not on file   Other Topics Concern    Not on file   Social History Narrative    Not on file     Social Determinants of Health     Financial Resource Strain: Low Risk     Difficulty of Paying Living Expenses: Not hard at all   Food Insecurity: No Food Insecurity    Worried About Running Out of Food in the Last Year: Never true    Sheri of Food in the Last Year: Never true   Transportation Needs: No Transportation Needs    Lack of Transportation (Medical): No    Lack of Transportation (Non-Medical):  No   Physical Activity:     Days of Exercise per Week:     Minutes of Exercise per Session:    Stress:     Feeling of Stress :    Social Connections:     Frequency of Communication with Friends and Family:     Frequency of Social Gatherings with Friends and Family:     Attends Mandaeism Services:     Active Member of Clubs or Organizations:     Attends Club or Organization Meetings:     Marital Status:    Intimate Partner Violence:     Fear of Current or Ex-Partner:     Emotionally Abused:     Physically Abused:     Sexually Abused:        Family History   Problem Relation Age of Onset    Heart Disease Mother  Heart Attack Mother     Heart Disease Father        Allergies:  Bee venom    Home Medications:  Prior to Admission medications    Medication Sig Start Date End Date Taking?  Authorizing Provider   metoprolol succinate (TOPROL XL) 25 MG extended release tablet Take 1 tablet by mouth daily 6/2/21   Donnetta Ahumada, MD   sertraline (ZOLOFT) 25 MG tablet Take 1 tablet by mouth daily 6/2/21   Donnetta Ahumada, MD   mometasone (NASONEX) 50 MCG/ACT nasal spray  5/19/21   Historical Provider, MD   losartan (COZAAR) 50 MG tablet Take 1 tablet by mouth daily 5/21/21   Rosa Lockwood MD   loratadine (CLARITIN) 10 MG tablet Take 1 tablet by mouth daily 5/21/21   Rosa Lockwood MD   fluticasone (FLONASE) 50 MCG/ACT nasal spray instill 1 spray into each nostril once daily 5/10/21   Donnetta Ahumada, MD   RA TUSSIN [facility]/CHEST CONGEST -10 MG/5ML syrup take 5 milliliters three times a day if needed for cough 3/5/21   Historical Provider, MD   famotidine (PEPCID) 20 MG tablet Take 1 tablet by mouth 2 times daily for 5 days 4/23/21 5/24/21  Christina Armanod MD   Blood Pressure Monitoring (BP MONITOR-STETHOSCOPE) KIT 1 each by Does not apply route daily 4/7/21   Donnetta Ahumada, MD   pantoprazole (PROTONIX) 40 MG tablet Take 1 tablet by mouth every morning (before breakfast) 4/7/21   Donnetta Ahumada, MD   acetaminophen (TYLENOL) 500 MG tablet Take 2 tablets by mouth every 6 hours as needed for Pain 3/4/21   Alexa Christy MD   Multiple Vitamins-Minerals (MULTIVITAMIN ADULT PO) Take by mouth    Historical Provider, MD   vitamin B-1 (THIAMINE) 100 MG tablet Take 1 tablet by mouth daily 6/2/20   Charity Flores DO   folic acid (FOLVITE) 1 MG tablet Take 1 tablet by mouth daily 6/2/20   Charity Flores DO   Nicotine 21-14-7 MG/24HR KIT Place onto the skin    Historical Provider, MD   EPINEPHrine (EPIPEN 2-LIZETTE) 0.3 MG/0.3ML SOAJ injection Inject 0.3 mLs into the muscle once for 1 dose Use as directed for allergic reaction 11/17/16 5/24/21  Jeffy Watkins MD       REVIEW OF SYSTEMS    (2-9 systems for level 4, 10 or more for level 5)      Review of Systems   Constitutional: Negative for chills, diaphoresis, fatigue and fever. HENT: Negative for congestion and sore throat. Eyes: Negative for photophobia and visual disturbance. Respiratory: Negative for cough and shortness of breath. Cardiovascular: Negative for chest pain, palpitations and leg swelling. Gastrointestinal: Positive for nausea and vomiting. Negative for abdominal pain, anal bleeding, blood in stool, constipation and diarrhea. Genitourinary: Negative for dysuria and hematuria. Musculoskeletal: Negative for arthralgias, back pain, myalgias, neck pain and neck stiffness. Skin: Negative for rash and wound. Neurological: Positive for tremors, light-headedness (Resolved), numbness (Left side the face.) and headaches. Negative for dizziness, facial asymmetry, speech difficulty and weakness. PHYSICAL EXAM   (up to 7 for level 4, 8 or more for level 5)      INITIAL VITALS:   BP (!) 146/106   Pulse 121   Temp 98.1 °F (36.7 °C) (Oral)   Resp 20   Ht 5' 8\" (1.727 m)   Wt 210 lb (95.3 kg)   SpO2 97%   BMI 31.93 kg/m²     Physical Exam  Vitals and nursing note reviewed. Constitutional:       General: He is not in acute distress. Appearance: Normal appearance. He is well-developed and normal weight. He is not toxic-appearing or diaphoretic. HENT:      Head: Normocephalic and atraumatic. Right Ear: Tympanic membrane, ear canal and external ear normal.      Left Ear: Tympanic membrane, ear canal and external ear normal.      Nose: Nose normal.      Mouth/Throat:      Mouth: Mucous membranes are moist.      Pharynx: Oropharynx is clear. Eyes:      General: No scleral icterus. Extraocular Movements: Extraocular movements intact. Conjunctiva/sclera: Conjunctivae normal.      Pupils: Pupils are equal, round, and reactive to light.    Neck: ORDERED:  Orders Placed This Encounter   Medications    prochlorperazine (COMPAZINE) injection 10 mg    diphenhydrAMINE (BENADRYL) injection 25 mg    iopamidol (ISOVUE-370) 76 % injection 75 mL    sodium chloride flush 0.9 % injection 10 mL    0.9 % sodium chloride bolus    iopamidol (ISOVUE-370) 76 % injection 75 mL    sodium chloride flush 0.9 % injection 10 mL    0.9 % sodium chloride bolus       DDX: Intracranial hemorrhage, CVA, arterial occlusion, mesenteric ischemia, ACS/MI, arrhythmia, electrolyte abnormality, dehydration, migraine.     DIAGNOSTIC RESULTS / EMERGENCY DEPARTMENT COURSE / MDM   LAB RESULTS:  Results for orders placed or performed during the hospital encounter of 06/20/21   CBC Auto Differential   Result Value Ref Range    WBC 6.1 3.5 - 11.0 k/uL    RBC 4.04 (L) 4.5 - 5.9 m/uL    Hemoglobin 14.7 13.5 - 17.5 g/dL    Hematocrit 42.9 41 - 53 %    .0 (H) 80 - 100 fL    MCH 36.4 (H) 26 - 34 pg    MCHC 34.3 31 - 37 g/dL    RDW 14.2 11.5 - 14.9 %    Platelets 635 984 - 301 k/uL    MPV 8.3 6.0 - 12.0 fL    NRBC Automated NOT REPORTED per 100 WBC    Differential Type NOT REPORTED     Seg Neutrophils 72 (H) 36 - 66 %    Lymphocytes 17 (L) 24 - 44 %    Monocytes 8 (H) 1 - 7 %    Eosinophils % 2 0 - 4 %    Basophils 1 0 - 2 %    Immature Granulocytes NOT REPORTED 0 %    Segs Absolute 4.40 1.3 - 9.1 k/uL    Absolute Lymph # 1.10 1.0 - 4.8 k/uL    Absolute Mono # 0.50 0.1 - 1.3 k/uL    Absolute Eos # 0.10 0.0 - 0.4 k/uL    Basophils Absolute 0.00 0.0 - 0.2 k/uL    Absolute Immature Granulocyte NOT REPORTED 0.00 - 0.30 k/uL    WBC Morphology NOT REPORTED     RBC Morphology NOT REPORTED     Platelet Estimate NOT REPORTED    Basic Metabolic Panel w/ Reflex to MG   Result Value Ref Range    Glucose 117 (H) 70 - 99 mg/dL    BUN 9 6 - 20 mg/dL    CREATININE 0.65 (L) 0.70 - 1.20 mg/dL    Bun/Cre Ratio NOT REPORTED 9 - 20    Calcium 9.1 8.6 - 10.4 mg/dL    Sodium 138 135 - 144 mmol/L    Potassium 3.8 3.7 - 5.3 mmol/L    Chloride 99 98 - 107 mmol/L    CO2 25 20 - 31 mmol/L    Anion Gap 14 9 - 17 mmol/L    GFR Non-African American >60 >60 mL/min    GFR African American >60 >60 mL/min    GFR Comment          GFR Staging NOT REPORTED    Troponin   Result Value Ref Range    Troponin, High Sensitivity 10 0 - 22 ng/L    Troponin T NOT REPORTED <0.03 ng/mL    Troponin Interp NOT REPORTED    Protime-INR   Result Value Ref Range    Protime 12.2 11.8 - 14.6 sec    INR 0.9    APTT   Result Value Ref Range    PTT 26.4 24.0 - 36.0 sec   Lactic Acid   Result Value Ref Range    Lactic Acid 1.0 0.5 - 2.2 mmol/L   Troponin   Result Value Ref Range    Troponin, High Sensitivity 10 0 - 22 ng/L    Troponin T NOT REPORTED <0.03 ng/mL    Troponin Interp NOT REPORTED    POC Glucose Fingerstick   Result Value Ref Range    POC Glucose 118 (H) 75 - 110 mg/dL       IMPRESSION: 36-year male presents for chest pain, with slight facial weakness, and headache. Patient appears to be in mild stress but nontoxic-appearing. Patient's initial vitals were concerning for tachycardia of 131 and hypertension of 146/106 below stable nonconcerning. No signs of respiratory distress on exam.  Patient was hemodynamically stable to cap refill less than 2 seconds. Patient was tachycardic during my exam however was in the low 110s when I was examining him without any otologic interventions. No focal neuro deficits noted on exam with an NIH stroke scale of 0. However on exam patient notes that he is still having subjective paresthesias only when I do not touch his face. As patient has been having the symptoms on and off for the past 4 days, having a negative stroke scale of 0, and unable to rule out any other concerning pathology, patient is not a TPA candidate. Concern for above differential diagnosis. Will order CT head, CTA head and neck, and CT a chest abdomen pelvis for evaluation of possible stenosis or ischemia.   We will also order troponins, visualized skull or soft tissues. No acute intracranial abnormality. CTA CHEST ABDOMEN PELVIS W CONTRAST    Result Date: 6/20/2021  EXAMINATION: CTA OF THE CHEST, ABDOMEN AND PELVIS WITH CONTRAST 6/20/2021 7:31 pm: TECHNIQUE: CTA of the chest, abdomen and pelvis was performed after the administration of intravenous contrast.  Multiplanar reformatted images are provided for review. MIP images are provided for review. Dose modulation, iterative reconstruction, and/or weight based adjustment of the mA/kV was utilized to reduce the radiation dose to as low as reasonably achievable. COMPARISON: None. HISTORY: ORDERING SYSTEM PROVIDED HISTORY: chest pain, nausea and vomiting,  history of celiac stenosis TECHNOLOGIST PROVIDED HISTORY: chest pain, nausea and vomiting,  history of celiac stenosis Decision Support Exception - unselect if not a suspected or confirmed emergency medical condition->Emergency Medical Condition (MA) Reason for Exam: patient c/o chest pain for today Additional signs and symptoms: patient c/o chest pain for today FINDINGS: CTA CHEST: Thoracic aorta: No evidence of thoracic aortic aneurysm or dissection. No acute abnormality of the aorta. Proximal great vessels unremarkable. Mediastinum: No evidence of mediastinal lymphadenopathy. The heart and pericardium demonstrate no acute abnormality. Lungs/Pleura: The lungs are without acute process. No focal consolidation or pulmonary edema. No evidence of pleural effusion or pneumothorax. Soft Tissues/Bones: No acute bone or soft tissue abnormality. CTA ABDOMEN: Abdominal aorta/Branches: The abdominal aorta is normal in caliber with homogeneous enhancement. Abdominal aortic branch vessels are patent. The proximal celiac artery is slightly tortuous with no significant stenosis. There is no atherosclerotic disease. Organs: Diffuse fatty infiltration of the liver. There are no calcified gallstones. No pericholecystic fluid.   The spleen, pancreas, adrenal glands and kidneys are normal. GI/Bowel: There is a normal retrocecal appendix. Unopacified bowel loops are unremarkable. No bowel obstruction. Peritoneum/Retroperitoneum: There is no adenopathy, free air or free fluid. Bones/Soft Tissues: No acute bone or soft tissue abnormality. CTA PELVIS: Aorta/Iliacs: The iliac and femoral arteries are normal with no evidence of aneurysm dissection or significant stenosis. There is minimal calcified plaque in the right common iliac artery. Other: Urinary bladder was empty and therefore not visualized. There is no free air or free fluid in the pelvis. No adenopathy. 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 No acute bone finding. No spondylolisthesis. Unremarkable CTA of the chest, abdomen and pelvis. The proximal celiac artery is mildly tortuous with no evidence of a significant stenosis. There is no significant atherosclerotic disease. There is minimal/mild calcified plaque in the right common iliac artery. No acute finding in the chest, abdomen or pelvis. Fatty infiltration of the liver. CTA HEAD NECK W CONTRAST    Result Date: 6/20/2021  EXAMINATION: CTA OF THE HEAD AND NECK WITH CONTRAST 6/20/2021 7:31 pm: TECHNIQUE: CTA of the head and neck was performed with the administration of intravenous contrast. Multiplanar reformatted images are provided for review. MIP images are provided for review. Stenosis of the internal carotid arteries measured using NASCET criteria. Dose modulation, iterative reconstruction, and/or weight based adjustment of the mA/kV was utilized to reduce the radiation dose to as low as reasonably achievable. COMPARISON: Noncontrast CT head done same day.  HISTORY: ORDERING SYSTEM PROVIDED HISTORY: headache, with left sided subjective paresthesia TECHNOLOGIST PROVIDED HISTORY: headache, with left sided subjective paresthesia Decision Support Exception - unselect if not a associated luminal narrowing or dissection flap. EKG  EKG Interpretation    Interpreted by emergency department physician    Rhythm: sinus tachycardia  Rate: normal  Axis: PACs  Ectopy: none  Conduction: normal  ST Segments: Depression and inferior leads  T Waves: normal  Q Waves: none    Clinical Impression: non-specific EKG and sinus tachycardia with new ST depression in the inferior leads from prior EKG on 5/17/2021. Marco Lerner,       All EKG's are interpreted by the Emergency Department Physician who either signs or Co-signs this chart in the absence of a cardiologist.    EMERGENCY DEPARTMENT COURSE:  ED Course as of Jun 20 2204   Sun Jun 20, 2021   1828 N/V at noon, H/A     [CS]   1854 CBC is nonconcerning.    [CS]   1904 BMP is nonconcerning.    [CS]   1941 Lactic Acid: 1.0 [CS]   1941 Will repeat. Troponin, High Sensitivity: 10 [CS]   2022 Evaluated patient at bedside. Patient notes that after receiving a migraine cocktail is headache has significantly improved and he also notes that the numbness of the left side of his face also has significantly improved. Patient reports complete resolution of his chest pain and nausea vomiting. Of note patient now reports that he has noticed headache prior to all of his left-sided paresthesias in the past 4 days. Patient also notes that when his headache resolved so did his paresthesias. Suspect the patient is really having a complex migraine given improvement of headache and notes tingling with migraine cocktail and all of his paresthesias have been associated with headaches. If CT is negative, plan to discharge patient home and follow-up with neurology outpatient. [CS]   2042 CT of the head reveals no acute cranial abnormalities. [CS]   2115 CTA of the chest on pelvis with IV contrast reveals  Unremarkable CTA of the chest, abdomen and pelvis.   The proximal celiac  artery is mildly tortuous with no evidence of a significant stenosis.     There is

## 2021-06-20 NOTE — ED PROVIDER NOTES
EMERGENCY DEPARTMENT ENCOUNTER   ATTENDING ATTESTATION     Pt Name: Patti Schuler  MRN: [id number]  [place] 1985  Date of evaluation: 6/20/21       Patti Schuler is a 29 y.o. male who presents with Chest Pain, Nausea & Vomiting, and Numbness      MDM: 31-year-old male presents for complaint of chest pain, headache, and nausea. On initial exam patient in no acute distress, is notably tachycardic, does have history of prior PE, will check labs, given his history of PE, will check CTA chest as well    Labs are reviewed and unremarkable    Imaging reviewed and negative for acute process, patient reevaluated reports he is feeling much better      Discussed results with patient, discussed need for follow-up with PCP and neurology, discussed return precautions, patient voiced understanding is comfortable with discharge home    Patient/Guardian was informed of their diagnosis and told to follow up with PCP & neurology in 1-3 days. Patient demonstrates understanding and agreement with the plan. They were given the opportunity to ask questions and those questions were answered to the best of our ability with the available information. Patient/Guardian told to return to the ED for any new, worsening, changing or persistent symptoms. This dictation was prepared using [employer] voice recognition software. Vitals:   Vitals:    06/20/21 1814 06/20/21 2143   BP: (!) 146/106 (!) 128/105   Pulse: 121 92   Resp: 20 16   Temp: 98.1 °F (36.7 °C) 98.7 °F (37.1 °C)   TempSrc: Oral Oral   SpO2: 97% 95%   Weight: 210 lb (95.3 kg)    Height: 5' 8\" (1.727 m)          I personally evaluated and examined the patient in conjunction with the resident and agree with the assessment, treatment plan, and disposition of the patient as recorded by the resident. I performed a history and physical examination of the patient and discussed management with the resident.  I reviewed the residents note and agree with the documented findings and plan of care. Any areas of disagreement are noted on the chart. I was personally present for the key portions of any procedures. I have documented in the chart those procedures where I was not present during the key portions. I have personally reviewed all images and agree with the resident's interpretation. I have reviewed the emergency nurses triage note. I agree with the chief complaint, past medical history, past surgical history, allergies, medications, social and family history as documented unless otherwise noted.     Cuong Peña DO  Attending Emergency Physician          Cuong Peña DO  06/20/21 [id number]

## 2021-06-20 NOTE — ED NOTES
Pt reports to Dr. Gaye Huang that he woke up around 1030 this morning and had nausea, vomiting, and throbbing headache. Patient reports that he has had facial numbness and tingling for over 4 days. Patient reports starting to experience chest pain around 330pm this afternoon with cough. Patient alert and oriented x4 and respirations even non labored. Patient NIH scale 0.       Jr Mackenzie RN  06/20/21 1479

## 2021-06-21 LAB
EKG ATRIAL RATE: 117 BPM
EKG P AXIS: 42 DEGREES
EKG P-R INTERVAL: 128 MS
EKG Q-T INTERVAL: 310 MS
EKG QRS DURATION: 88 MS
EKG QTC CALCULATION (BAZETT): 432 MS
EKG R AXIS: 9 DEGREES
EKG T AXIS: 79 DEGREES
EKG VENTRICULAR RATE: 117 BPM

## 2021-06-21 PROCEDURE — 93010 ELECTROCARDIOGRAM REPORT: CPT | Performed by: INTERNAL MEDICINE

## 2021-07-01 ENCOUNTER — TELEPHONE (OUTPATIENT)
Dept: INTERNAL MEDICINE CLINIC | Age: 36
End: 2021-07-01

## 2021-07-01 NOTE — LETTER
[address]          07/01/2021    Dear Matthew Haines:     You recently missed a scheduled appointment with Dr Emily Boyd on 06/30/2021. This is the 2nd scheduledappointment that you have missed within the last twelve months. If you miss 1 more appointment, you may be dismissed from the practice. It is your responsibility to arrive to your appointment. Please call our office as soon as possible so that we may reschedule your appointment, because your health and follow-up medical care are important to us. For future appointments that you are unable to keep, please call the office at [phone] at least 24 hours in advance to cancel and reschedule. If a traditional appointment is difficult for you to make, please keep in mind, we offer E-Visits for several diagnoses as well as virtual visits. We also have primary care walk-in clinics available. For more information on these options, please contact our office.    Sincerely,        [address]  [facility]

## 2021-07-01 NOTE — TELEPHONE ENCOUNTER
Mailed patient no show appointment letter for the date of 06/30/2021 scheduled with Dr Balaji Geronimo. Attempted to contact patient to inform of missed appointment but # provided is not taking calls at this time.

## 2021-08-13 ENCOUNTER — HOSPITAL ENCOUNTER (EMERGENCY)
Age: 36
Discharge: HOME | End: 2021-08-13
Attending: EMERGENCY MEDICINE

## 2021-08-13 ENCOUNTER — APPOINTMENT (OUTPATIENT)
Dept: GENERAL RADIOLOGY | Age: 36
End: 2021-08-13

## 2021-08-13 VITALS
WEIGHT: 200 LBS | HEIGHT: 68 IN | BODY MASS INDEX: 30.31 KG/M2 | OXYGEN SATURATION: 96 % | DIASTOLIC BLOOD PRESSURE: 103 MMHG | TEMPERATURE: 97.8 F | HEART RATE: 107 BPM | RESPIRATION RATE: 16 BRPM | SYSTOLIC BLOOD PRESSURE: 149 MMHG

## 2021-08-13 DIAGNOSIS — S86.911A STRAIN OF RIGHT KNEE, INITIAL ENCOUNTER: Primary | ICD-10-CM

## 2021-08-13 PROCEDURE — 99284 EMERGENCY DEPT VISIT MOD MDM: CPT

## 2021-08-13 PROCEDURE — 73562 X-RAY EXAM OF KNEE 3: CPT

## 2021-08-13 RX ORDER — ACETAMINOPHEN 500 MG
1000 TABLET ORAL EVERY 6 HOURS PRN
Qty: 60 TABLET | Refills: 0 | Status: ON HOLD | OUTPATIENT
Start: 2021-08-13 | End: 2021-10-15 | Stop reason: HOSPADM

## 2021-08-13 RX ORDER — NAPROXEN 500 MG/1
500 TABLET ORAL 2 TIMES DAILY
Qty: 20 TABLET | Refills: 0 | Status: SHIPPED | OUTPATIENT
Start: 2021-08-13 | End: 2022-03-28

## 2021-08-13 RX ORDER — IBUPROFEN 800 MG/1
800 TABLET ORAL ONCE
Status: DISCONTINUED | OUTPATIENT
Start: 2021-08-13 | End: 2021-08-13 | Stop reason: HOSPADM

## 2021-08-13 ASSESSMENT — PAIN DESCRIPTION - ORIENTATION: ORIENTATION: LEFT

## 2021-08-13 ASSESSMENT — PAIN DESCRIPTION - PAIN TYPE: TYPE: ACUTE PAIN

## 2021-08-13 ASSESSMENT — PAIN DESCRIPTION - LOCATION: LOCATION: KNEE

## 2021-08-13 ASSESSMENT — PAIN SCALES - GENERAL: PAINLEVEL_OUTOF10: 8

## 2021-08-13 NOTE — ED PROVIDER NOTES
EMERGENCY DEPARTMENT ENCOUNTER    Pt Name: Bruno Foley  MRN: [id number]  [place] 1985  Date of evaluation: 8/13/21  CHIEF COMPLAINT       Chief Complaint   Patient presents with    Knee Pain     HISTORY OF PRESENT ILLNESS     Knee Problem  Location:  Knee  Time since incident:  1 hour  Injury: yes    Mechanism of injury comment:  Knee twisted stepping out of truck this morning, also twisted it last night  Knee location:  L knee  Pain details:     Quality:  Aching    Radiates to:  Does not radiate    Onset quality:  Sudden    Duration:  1 hour    Timing:  Constant    Progression:  Unchanged  Relieved by:  Rest  Worsened by: Activity  Ineffective treatments:  Acetaminophen (put a knee brace on)  Associated symptoms: decreased ROM      Has previously injured his left knee      REVIEW OF SYSTEMS     Review of Systems   All other systems reviewed and are negative. PASTMEDICAL HISTORY     Past Medical History:   Diagnosis Date    No active medical problems     Pulmonary embolism (HCC)      Past Problem List  Patient Active Problem List   Diagnosis Code    Acute pulmonary embolism (HCC) I26.99    ACS (acute coronary syndrome) (HCC) I24.9    Chronic systolic congestive heart failure (HCC) I50.22    Essential hypertension S68    Uncomplicated alcohol dependence ([facility] [address] L63.94    Alcoholic cardiomyopathy ([facility] [address] I42.6    Diarrhea due to malabsorption K90.9, R19.7    Heartburn R12    Generalized anxiety disorder F41.1     SURGICAL HISTORY     History reviewed. No pertinent surgical history.   CURRENT MEDICATIONS       Previous Medications    BLOOD PRESSURE MONITORING (BP MONITOR-STETHOSCOPE) KIT    1 each by Does not apply route daily    EPINEPHRINE (EPIPEN 2-LIZETTE) 0.3 MG/0.3ML SOAJ INJECTION    Inject 0.3 mLs into the muscle once for 1 dose Use as directed for allergic reaction    FAMOTIDINE (PEPCID) 20 MG TABLET    Take 1 tablet by mouth 2 times daily for 5 days    FLUTICASONE (FLONASE) 50 MCG/ACT NASAL SPRAY    instill 1 spray into each nostril once daily    FOLIC ACID (FOLVITE) 1 MG TABLET    Take 1 tablet by mouth daily    LORATADINE (CLARITIN) 10 MG TABLET    Take 1 tablet by mouth daily    LOSARTAN (COZAAR) 50 MG TABLET    Take 1 tablet by mouth daily    METOPROLOL SUCCINATE (TOPROL XL) 25 MG EXTENDED RELEASE TABLET    Take 1 tablet by mouth daily    MOMETASONE (NASONEX) 50 MCG/ACT NASAL SPRAY        MULTIPLE VITAMINS-MINERALS (MULTIVITAMIN ADULT PO)    Take by mouth    NICOTINE 21-14-7 MG/24HR KIT    Place onto the skin    PANTOPRAZOLE (PROTONIX) 40 MG TABLET    Take 1 tablet by mouth every morning (before breakfast)    RA TUSSIN CGH/CHEST CONGEST -10 MG/5ML SYRUP    take 5 milliliters three times a day if needed for cough    SERTRALINE (ZOLOFT) 25 MG TABLET    Take 1 tablet by mouth daily    VITAMIN B-1 (THIAMINE) 100 MG TABLET    Take 1 tablet by mouth daily     ALLERGIES     is allergic to bee venom. FAMILY HISTORY     He indicated that his mother is . He indicated that his father is alive. SOCIAL HISTORY       Social History     Tobacco Use    Smoking status: Current Every Day Smoker     Packs/day: 1.00     Years: 10.00     Pack years: 10.00     Types: Cigarettes    Smokeless tobacco: Never Used    Tobacco comment: using nicotine patches at night   Vaping Use    Vaping Use: Never used   Substance Use Topics    Alcohol use: Yes     Comment: occ    Drug use: Not Currently     Comment: used cocaine in early 20's     PHYSICAL EXAM     INITIAL VITALS: BP (!) 149/103   Pulse 107   Temp 97.8 °F (36.6 °C)   Resp 16   Ht 5' 8\" (1.727 m)   Wt 200 lb (90.7 kg)   SpO2 96%   BMI 30.41 kg/m²    Physical Exam  Constitutional:       General: He is not in acute distress. Appearance: Normal appearance. He is well-developed. He is not diaphoretic. HENT:      Head: Normocephalic and atraumatic.       Right Ear: External ear normal.      Left Ear: External ear normal.   Eyes:      General: Right eye: No discharge. Left eye: No discharge. Conjunctiva/sclera: Conjunctivae normal.   Neck:      Trachea: No tracheal deviation. Cardiovascular:      Rate and Rhythm: Normal rate and regular rhythm. Pulses: Normal pulses. Heart sounds: Normal heart sounds. Comments: Dp/pt 2+ left foot,  Radial pulses 2+  Pulmonary:      Effort: Pulmonary effort is normal. No respiratory distress. Breath sounds: Normal breath sounds. No stridor. No wheezing or rales. Abdominal:      Palpations: Abdomen is soft. Tenderness: There is no abdominal tenderness. There is no guarding or rebound. Musculoskeletal:         General: No swelling or deformity. Right lower leg: No edema. Left lower leg: No edema. Comments: Pain with rom left knee, tenderness medial left knee  Full rom in left ankle   Skin:     General: Skin is warm and dry. Capillary Refill: Capillary refill takes less than 2 seconds. Findings: No erythema or rash. Neurological:      General: No focal deficit present. Mental Status: He is alert and oriented to person, place, and time. Cranial Nerves: No cranial nerve deficit. Coordination: Coordination normal.   Psychiatric:         Mood and Affect: Mood normal.         Behavior: Behavior normal.         Thought Content: Thought content normal.         Judgment: Judgment normal.         MEDICAL DECISION MAKING:            Procedures    DIAGNOSTIC RESULTS       RADIOLOGY:All plain film, CT, MRI, and formal ultrasound images (except ED bedside ultrasound) are read by the radiologist, see reports below, unless otherwisenoted in MDM or here. XR KNEE LEFT (3 VIEWS)   Final Result   No acute osseous abnormality identified. EMERGENCY DEPARTMENTCOURSE:   Xray neg for fx or dislocation  rx tylenol and naprosyn short course  Ice  Crutches  KI  [facility] ortho Monday.   Discussed with patient anticipatory guidance, discharge instructions, follow up PCP and ortho Monday  Work note    Vitals:    Vitals:    08/13/21 0725   BP: (!) 149/103   Pulse: 107   Resp: 16   Temp: 97.8 °F (36.6 °C)   SpO2: 96%   Weight: 200 lb (90.7 kg)   Height: 5' 8\" (1.727 m)       The patient was given the following medications while in the emergency department:  Orders Placed This Encounter   Medications    ibuprofen (ADVIL;MOTRIN) tablet 800 mg    acetaminophen (TYLENOL) 500 MG tablet     Sig: Take 2 tablets by mouth every 6 hours as needed for Pain     Dispense:  60 tablet     Refill:  0    naproxen (NAPROSYN) 500 MG tablet     Sig: Take 1 tablet by mouth 2 times daily     Dispense:  20 tablet     Refill:  0       FINAL IMPRESSION      1.  Strain of right knee, initial encounter          DISPOSITION/PLAN   DISPOSITION Decision To Discharge 08/13/2021 08:49:00 AM      PATIENT REFERRED TO:  Sandi Delgado MD  [address] [place] [address] [address] [phone]    Schedule an appointment as soon as possible for a visit in 2 days      Yair Mcdermott MD  [address] [place] [id number]  [phone]    Schedule an appointment as soon as possible for a visit in 3 days      DISCHARGE MEDICATIONS:  New Prescriptions    ACETAMINOPHEN (TYLENOL) 500 MG TABLET    Take 2 tablets by mouth every 6 hours as needed for Pain    NAPROXEN (NAPROSYN) 500 MG TABLET    Take 1 tablet by mouth 2 times daily     Amirah Guillen MD  Attending Emergency Physician                    Amirah Guillen MD  08/13/21 3130

## 2021-10-14 ENCOUNTER — HOSPITAL ENCOUNTER (INPATIENT)
Age: 36
LOS: 1 days | Discharge: HOME | DRG: 641 | End: 2021-10-15
Attending: EMERGENCY MEDICINE | Admitting: INTERNAL MEDICINE

## 2021-10-14 ENCOUNTER — APPOINTMENT (OUTPATIENT)
Dept: CT IMAGING | Age: 36
DRG: 641 | End: 2021-10-14

## 2021-10-14 DIAGNOSIS — R79.89 ELEVATED LFTS: ICD-10-CM

## 2021-10-14 DIAGNOSIS — F10.20 UNCOMPLICATED ALCOHOL DEPENDENCE (HCC): ICD-10-CM

## 2021-10-14 DIAGNOSIS — E86.0 DEHYDRATION: ICD-10-CM

## 2021-10-14 DIAGNOSIS — R11.2 NAUSEA VOMITING AND DIARRHEA: ICD-10-CM

## 2021-10-14 DIAGNOSIS — E87.6 HYPOKALEMIA: ICD-10-CM

## 2021-10-14 DIAGNOSIS — E83.42 HYPOMAGNESEMIA SYNDROME: Primary | ICD-10-CM

## 2021-10-14 DIAGNOSIS — R19.7 NAUSEA VOMITING AND DIARRHEA: ICD-10-CM

## 2021-10-14 LAB
ABSOLUTE EOS #: 0.06 K/UL (ref 0–0.4)
ABSOLUTE IMMATURE GRANULOCYTE: ABNORMAL K/UL (ref 0–0.3)
ABSOLUTE LYMPH #: 1.12 K/UL (ref 1–4.8)
ABSOLUTE MONO #: 0.56 K/UL (ref 0.1–1.3)
ABSOLUTE RETIC #: 0.05 M/UL (ref 0.02–0.1)
ALBUMIN SERPL-MCNC: 4.3 G/DL (ref 3.5–5.2)
ALBUMIN/GLOBULIN RATIO: ABNORMAL (ref 1–2.5)
ALP BLD-CCNC: 144 U/L (ref 40–129)
ALT SERPL-CCNC: 111 U/L (ref 5–41)
ANION GAP SERPL CALCULATED.3IONS-SCNC: 15 MMOL/L (ref 9–17)
AST SERPL-CCNC: 500 U/L
BASOPHILS # BLD: 2 % (ref 0–2)
BASOPHILS ABSOLUTE: 0.12 K/UL (ref 0–0.2)
BILIRUB SERPL-MCNC: 1.54 MG/DL (ref 0.3–1.2)
BUN BLDV-MCNC: 4 MG/DL (ref 6–20)
BUN/CREAT BLD: ABNORMAL (ref 9–20)
CALCIUM SERPL-MCNC: 9.2 MG/DL (ref 8.6–10.4)
CHLORIDE BLD-SCNC: 96 MMOL/L (ref 98–107)
CO2: 25 MMOL/L (ref 20–31)
CREAT SERPL-MCNC: 0.66 MG/DL (ref 0.7–1.2)
DIFFERENTIAL TYPE: ABNORMAL
EOSINOPHILS RELATIVE PERCENT: 1 % (ref 0–4)
GFR AFRICAN AMERICAN: >60 ML/MIN
GFR NON-AFRICAN AMERICAN: >60 ML/MIN
GFR SERPL CREATININE-BSD FRML MDRD: ABNORMAL ML/MIN/{1.73_M2}
GFR SERPL CREATININE-BSD FRML MDRD: ABNORMAL ML/MIN/{1.73_M2}
GLUCOSE BLD-MCNC: 111 MG/DL (ref 70–99)
HAV IGM SER IA-ACNC: NONREACTIVE
HCT VFR BLD CALC: 39.2 % (ref 41–53)
HEMOGLOBIN: 13.6 G/DL (ref 13.5–17.5)
HEPATITIS B CORE IGM ANTIBODY: NONREACTIVE
HEPATITIS B SURFACE ANTIGEN: NONREACTIVE
HEPATITIS C ANTIBODY: NONREACTIVE
IMMATURE GRANULOCYTES: ABNORMAL %
IMMATURE RETIC FRACT: NORMAL %
LIPASE: 44 U/L (ref 13–60)
LYMPHOCYTES # BLD: 18 % (ref 24–44)
MAGNESIUM: 0.9 MG/DL (ref 1.6–2.6)
MCH RBC QN AUTO: 38.7 PG (ref 26–34)
MCHC RBC AUTO-ENTMCNC: 34.6 G/DL (ref 31–37)
MCV RBC AUTO: 111.6 FL (ref 80–100)
MONOCYTES # BLD: 9 % (ref 1–7)
MORPHOLOGY: ABNORMAL
MORPHOLOGY: ABNORMAL
NRBC AUTOMATED: ABNORMAL PER 100 WBC
PDW BLD-RTO: 16.9 % (ref 11.5–14.9)
PLATELET # BLD: 150 K/UL (ref 150–450)
PLATELET ESTIMATE: ABNORMAL
PMV BLD AUTO: 8.5 FL (ref 6–12)
POTASSIUM SERPL-SCNC: 3.2 MMOL/L (ref 3.7–5.3)
RBC # BLD: 3.52 M/UL (ref 4.5–5.9)
RBC # BLD: ABNORMAL 10*6/UL
RETIC %: 1.6 % (ref 0.5–2)
RETIC HEMOGLOBIN: NORMAL PG (ref 28.2–35.7)
SARS-COV-2, RAPID: NOT DETECTED
SEG NEUTROPHILS: 70 % (ref 36–66)
SEGMENTED NEUTROPHILS ABSOLUTE COUNT: 4.34 K/UL (ref 1.3–9.1)
SODIUM BLD-SCNC: 136 MMOL/L (ref 135–144)
SPECIMEN DESCRIPTION: NORMAL
TOTAL PROTEIN: 9.1 G/DL (ref 6.4–8.3)
WBC # BLD: 6.2 K/UL (ref 3.5–11)
WBC # BLD: ABNORMAL 10*3/UL

## 2021-10-14 PROCEDURE — 6360000004 HC RX CONTRAST MEDICATION: Performed by: EMERGENCY MEDICINE

## 2021-10-14 PROCEDURE — 80053 COMPREHEN METABOLIC PANEL: CPT

## 2021-10-14 PROCEDURE — 2500000003 HC RX 250 WO HCPCS: Performed by: EMERGENCY MEDICINE

## 2021-10-14 PROCEDURE — 84132 ASSAY OF SERUM POTASSIUM: CPT

## 2021-10-14 PROCEDURE — 2580000003 HC RX 258: Performed by: INTERNAL MEDICINE

## 2021-10-14 PROCEDURE — 6360000002 HC RX W HCPCS: Performed by: EMERGENCY MEDICINE

## 2021-10-14 PROCEDURE — 36415 COLL VENOUS BLD VENIPUNCTURE: CPT

## 2021-10-14 PROCEDURE — 99223 1ST HOSP IP/OBS HIGH 75: CPT | Performed by: INTERNAL MEDICINE

## 2021-10-14 PROCEDURE — 74177 CT ABD & PELVIS W/CONTRAST: CPT

## 2021-10-14 PROCEDURE — 2580000003 HC RX 258: Performed by: EMERGENCY MEDICINE

## 2021-10-14 PROCEDURE — 1200000000 HC SEMI PRIVATE

## 2021-10-14 PROCEDURE — 83735 ASSAY OF MAGNESIUM: CPT

## 2021-10-14 PROCEDURE — 99285 EMERGENCY DEPT VISIT HI MDM: CPT

## 2021-10-14 PROCEDURE — 96374 THER/PROPH/DIAG INJ IV PUSH: CPT

## 2021-10-14 PROCEDURE — 6370000000 HC RX 637 (ALT 250 FOR IP): Performed by: NURSE PRACTITIONER

## 2021-10-14 PROCEDURE — 87635 SARS-COV-2 COVID-19 AMP PRB: CPT

## 2021-10-14 PROCEDURE — 80074 ACUTE HEPATITIS PANEL: CPT

## 2021-10-14 PROCEDURE — 83690 ASSAY OF LIPASE: CPT

## 2021-10-14 PROCEDURE — 85025 COMPLETE CBC W/AUTO DIFF WBC: CPT

## 2021-10-14 PROCEDURE — 85045 AUTOMATED RETICULOCYTE COUNT: CPT

## 2021-10-14 PROCEDURE — 6370000000 HC RX 637 (ALT 250 FOR IP): Performed by: EMERGENCY MEDICINE

## 2021-10-14 RX ORDER — POTASSIUM CHLORIDE 20 MEQ/1
40 TABLET, EXTENDED RELEASE ORAL ONCE
Status: COMPLETED | OUTPATIENT
Start: 2021-10-14 | End: 2021-10-14

## 2021-10-14 RX ORDER — 0.9 % SODIUM CHLORIDE 0.9 %
1000 INTRAVENOUS SOLUTION INTRAVENOUS ONCE
Status: COMPLETED | OUTPATIENT
Start: 2021-10-14 | End: 2021-10-14

## 2021-10-14 RX ORDER — SODIUM CHLORIDE 9 MG/ML
25 INJECTION, SOLUTION INTRAVENOUS PRN
Status: DISCONTINUED | OUTPATIENT
Start: 2021-10-14 | End: 2021-10-15 | Stop reason: HOSPADM

## 2021-10-14 RX ORDER — 0.9 % SODIUM CHLORIDE 0.9 %
80 INTRAVENOUS SOLUTION INTRAVENOUS ONCE
Status: COMPLETED | OUTPATIENT
Start: 2021-10-14 | End: 2021-10-14

## 2021-10-14 RX ORDER — CHLORDIAZEPOXIDE HYDROCHLORIDE 25 MG/1
25 CAPSULE, GELATIN COATED ORAL 4 TIMES DAILY
Status: DISCONTINUED | OUTPATIENT
Start: 2021-10-14 | End: 2021-10-15

## 2021-10-14 RX ORDER — NAPROXEN 500 MG/1
500 TABLET ORAL 2 TIMES DAILY PRN
Status: DISCONTINUED | OUTPATIENT
Start: 2021-10-14 | End: 2021-10-15 | Stop reason: HOSPADM

## 2021-10-14 RX ORDER — LOSARTAN POTASSIUM 50 MG/1
50 TABLET ORAL DAILY
Status: DISCONTINUED | OUTPATIENT
Start: 2021-10-15 | End: 2021-10-15 | Stop reason: HOSPADM

## 2021-10-14 RX ORDER — MAGNESIUM SULFATE 1 G/100ML
1000 INJECTION INTRAVENOUS PRN
Status: DISCONTINUED | OUTPATIENT
Start: 2021-10-14 | End: 2021-10-15 | Stop reason: HOSPADM

## 2021-10-14 RX ORDER — METOPROLOL SUCCINATE 25 MG/1
25 TABLET, EXTENDED RELEASE ORAL DAILY
Status: DISCONTINUED | OUTPATIENT
Start: 2021-10-15 | End: 2021-10-15 | Stop reason: HOSPADM

## 2021-10-14 RX ORDER — SODIUM CHLORIDE 0.9 % (FLUSH) 0.9 %
5-40 SYRINGE (ML) INJECTION EVERY 12 HOURS SCHEDULED
Status: DISCONTINUED | OUTPATIENT
Start: 2021-10-14 | End: 2021-10-15 | Stop reason: HOSPADM

## 2021-10-14 RX ORDER — SODIUM CHLORIDE 0.9 % (FLUSH) 0.9 %
5-40 SYRINGE (ML) INJECTION PRN
Status: DISCONTINUED | OUTPATIENT
Start: 2021-10-14 | End: 2021-10-15 | Stop reason: HOSPADM

## 2021-10-14 RX ORDER — ONDANSETRON 4 MG/1
4 TABLET, ORALLY DISINTEGRATING ORAL EVERY 8 HOURS PRN
Status: DISCONTINUED | OUTPATIENT
Start: 2021-10-14 | End: 2021-10-15 | Stop reason: HOSPADM

## 2021-10-14 RX ORDER — ACETAMINOPHEN 500 MG
1000 TABLET ORAL ONCE
Status: COMPLETED | OUTPATIENT
Start: 2021-10-14 | End: 2021-10-14

## 2021-10-14 RX ORDER — ONDANSETRON 2 MG/ML
4 INJECTION INTRAMUSCULAR; INTRAVENOUS EVERY 6 HOURS PRN
Status: DISCONTINUED | OUTPATIENT
Start: 2021-10-14 | End: 2021-10-15 | Stop reason: HOSPADM

## 2021-10-14 RX ORDER — SODIUM CHLORIDE 9 MG/ML
INJECTION, SOLUTION INTRAVENOUS CONTINUOUS
Status: DISCONTINUED | OUTPATIENT
Start: 2021-10-14 | End: 2021-10-15 | Stop reason: HOSPADM

## 2021-10-14 RX ORDER — SODIUM CHLORIDE 0.9 % (FLUSH) 0.9 %
10 SYRINGE (ML) INJECTION PRN
Status: DISCONTINUED | OUTPATIENT
Start: 2021-10-14 | End: 2021-10-15 | Stop reason: HOSPADM

## 2021-10-14 RX ORDER — FLUTICASONE PROPIONATE 50 MCG
2 SPRAY, SUSPENSION (ML) NASAL DAILY
Status: DISCONTINUED | OUTPATIENT
Start: 2021-10-15 | End: 2021-10-15 | Stop reason: HOSPADM

## 2021-10-14 RX ORDER — NICOTINE 21 MG/24HR
1 PATCH, TRANSDERMAL 24 HOURS TRANSDERMAL NIGHTLY
Status: DISCONTINUED | OUTPATIENT
Start: 2021-10-14 | End: 2021-10-15 | Stop reason: HOSPADM

## 2021-10-14 RX ORDER — POTASSIUM CHLORIDE 7.45 MG/ML
10 INJECTION INTRAVENOUS PRN
Status: DISCONTINUED | OUTPATIENT
Start: 2021-10-14 | End: 2021-10-15 | Stop reason: HOSPADM

## 2021-10-14 RX ORDER — POTASSIUM CHLORIDE 20 MEQ/1
40 TABLET, EXTENDED RELEASE ORAL PRN
Status: DISCONTINUED | OUTPATIENT
Start: 2021-10-14 | End: 2021-10-15 | Stop reason: HOSPADM

## 2021-10-14 RX ORDER — ONDANSETRON 4 MG/1
4 TABLET, ORALLY DISINTEGRATING ORAL ONCE
Status: COMPLETED | OUTPATIENT
Start: 2021-10-14 | End: 2021-10-14

## 2021-10-14 RX ORDER — M-VIT,TX,IRON,MINS/CALC/FOLIC 27MG-0.4MG
1 TABLET ORAL DAILY
Status: DISCONTINUED | OUTPATIENT
Start: 2021-10-15 | End: 2021-10-15 | Stop reason: HOSPADM

## 2021-10-14 RX ADMIN — SODIUM CHLORIDE 1000 ML: 9 INJECTION, SOLUTION INTRAVENOUS at 17:20

## 2021-10-14 RX ADMIN — ACETAMINOPHEN 1000 MG: 500 TABLET, FILM COATED ORAL at 17:26

## 2021-10-14 RX ADMIN — MAGNESIUM SULFATE HEPTAHYDRATE 2000 MG: 500 INJECTION, SOLUTION INTRAMUSCULAR; INTRAVENOUS at 18:01

## 2021-10-14 RX ADMIN — SODIUM CHLORIDE 80 ML: 9 INJECTION, SOLUTION INTRAVENOUS at 18:38

## 2021-10-14 RX ADMIN — POTASSIUM CHLORIDE 40 MEQ: 1500 TABLET, EXTENDED RELEASE ORAL at 17:23

## 2021-10-14 RX ADMIN — ONDANSETRON 4 MG: 4 TABLET, ORALLY DISINTEGRATING ORAL at 16:33

## 2021-10-14 RX ADMIN — FAMOTIDINE 20 MG: 10 INJECTION, SOLUTION INTRAVENOUS at 17:20

## 2021-10-14 RX ADMIN — SODIUM CHLORIDE, PRESERVATIVE FREE 10 ML: 5 INJECTION INTRAVENOUS at 18:38

## 2021-10-14 RX ADMIN — SODIUM CHLORIDE, PRESERVATIVE FREE 10 ML: 5 INJECTION INTRAVENOUS at 22:43

## 2021-10-14 RX ADMIN — IOPAMIDOL 75 ML: 755 INJECTION, SOLUTION INTRAVENOUS at 18:38

## 2021-10-14 RX ADMIN — CHLORDIAZEPOXIDE HYDROCHLORIDE 25 MG: 25 CAPSULE ORAL at 22:43

## 2021-10-14 RX ADMIN — SODIUM CHLORIDE: 9 INJECTION, SOLUTION INTRAVENOUS at 22:43

## 2021-10-14 RX ADMIN — SERTRALINE 25 MG: 50 TABLET, FILM COATED ORAL at 22:43

## 2021-10-14 ASSESSMENT — PAIN SCALES - GENERAL
PAINLEVEL_OUTOF10: 8
PAINLEVEL_OUTOF10: 6

## 2021-10-14 ASSESSMENT — ENCOUNTER SYMPTOMS
ABDOMINAL PAIN: 0
DIARRHEA: 1
NAUSEA: 1

## 2021-10-14 NOTE — ED PROVIDER NOTES
EMERGENCY DEPARTMENT ENCOUNTER    Pt Name: Colby Carrillo  MRN: [id number]  [place] 1985  Date of evaluation: 10/14/21  CHIEF COMPLAINT       Chief Complaint   Patient presents with    Abdominal Pain    Nausea & Vomiting    Diarrhea     HISTORY OF PRESENT ILLNESS     Nausea & Vomiting  Severity:  Moderate  Duration:  5 days  Timing:  Constant  Progression:  Unchanged  Chronicity:  New  Recent urination:  Normal  Relieved by:  Nothing  Worsened by:  Nothing  Associated symptoms: diarrhea    Associated symptoms: no abdominal pain      Lost 10 pounds in 5 days        REVIEW OF SYSTEMS     Review of Systems   Gastrointestinal: Positive for diarrhea and nausea. Negative for abdominal pain. All other systems reviewed and are negative. PASTMEDICAL HISTORY     Past Medical History:   Diagnosis Date    No active medical problems     Pulmonary embolism (HCC)      Past Problem List  Patient Active Problem List   Diagnosis Code    Acute pulmonary embolism (HCC) I26.99    ACS (acute coronary syndrome) (HCC) I24.9    Chronic systolic congestive heart failure (HCC) I50.22    Essential hypertension D82    Uncomplicated alcohol dependence ([facility] [address] D52.52    Alcoholic cardiomyopathy ([facility] [address] I42.6    Diarrhea due to malabsorption K90.9, R19.7    Heartburn R12    Generalized anxiety disorder F41.1    Hypomagnesemia E83.42     SURGICAL HISTORY     History reviewed. No pertinent surgical history.   CURRENT MEDICATIONS       Previous Medications    ACETAMINOPHEN (TYLENOL) 500 MG TABLET    Take 2 tablets by mouth every 6 hours as needed for Pain    BLOOD PRESSURE MONITORING (BP MONITOR-STETHOSCOPE) KIT    1 each by Does not apply route daily    EPINEPHRINE (EPIPEN 2-LIZETTE) 0.3 MG/0.3ML SOAJ INJECTION    Inject 0.3 mLs into the muscle once for 1 dose Use as directed for allergic reaction    FAMOTIDINE (PEPCID) 20 MG TABLET    Take 1 tablet by mouth 2 times daily for 5 days    FLUTICASONE (FLONASE) 50 MCG/ACT NASAL SPRAY instill 1 spray into each nostril once daily    FOLIC ACID (FOLVITE) 1 MG TABLET    Take 1 tablet by mouth daily    LORATADINE (CLARITIN) 10 MG TABLET    Take 1 tablet by mouth daily    LOSARTAN (COZAAR) 50 MG TABLET    Take 1 tablet by mouth daily    METOPROLOL SUCCINATE (TOPROL XL) 25 MG EXTENDED RELEASE TABLET    Take 1 tablet by mouth daily    MOMETASONE (NASONEX) 50 MCG/ACT NASAL SPRAY        MULTIPLE VITAMINS-MINERALS (MULTIVITAMIN ADULT PO)    Take by mouth    NAPROXEN (NAPROSYN) 500 MG TABLET    Take 1 tablet by mouth 2 times daily    NICOTINE 21-14-7 MG/24HR KIT    Place onto the skin    PANTOPRAZOLE (PROTONIX) 40 MG TABLET    Take 1 tablet by mouth every morning (before breakfast)    RA TUSSIN CGH/CHEST CONGEST -10 MG/5ML SYRUP    take 5 milliliters three times a day if needed for cough    SERTRALINE (ZOLOFT) 25 MG TABLET    Take 1 tablet by mouth daily    VITAMIN B-1 (THIAMINE) 100 MG TABLET    Take 1 tablet by mouth daily     ALLERGIES     is allergic to bee venom. FAMILY HISTORY     He indicated that his mother is . He indicated that his father is alive. SOCIAL HISTORY       Social History     Tobacco Use    Smoking status: Current Every Day Smoker     Packs/day: 1.00     Years: 10.00     Pack years: 10.00     Types: Cigarettes    Smokeless tobacco: Never Used    Tobacco comment: using nicotine patches at night   Vaping Use    Vaping Use: Never used   Substance Use Topics    Alcohol use: Yes     Comment: occ    Drug use: Not Currently     Comment: used cocaine in early 20's     PHYSICAL EXAM     INITIAL VITALS: /79   Pulse 98   Temp 98.4 °F (36.9 °C) (Oral)   Resp 16   SpO2 96%    Physical Exam  Constitutional:       General: He is not in acute distress. Appearance: Normal appearance. He is well-developed. He is not diaphoretic. HENT:      Head: Normocephalic and atraumatic.       Right Ear: External ear normal.      Left Ear: External ear normal.   Eyes: General:         Right eye: No discharge. Left eye: No discharge. Conjunctiva/sclera: Conjunctivae normal.      Pupils: Pupils are equal, round, and reactive to light. Neck:      Trachea: No tracheal deviation. Cardiovascular:      Rate and Rhythm: Normal rate and regular rhythm. Pulses: Normal pulses. Heart sounds: Normal heart sounds. Pulmonary:      Effort: Pulmonary effort is normal. No respiratory distress. Breath sounds: Normal breath sounds. No stridor. No wheezing or rales. Abdominal:      Palpations: Abdomen is soft. Tenderness: There is no abdominal tenderness. There is no guarding or rebound. Comments: nontender   Musculoskeletal:         General: No tenderness or deformity. Normal range of motion. Cervical back: Normal range of motion and neck supple. Skin:     General: Skin is warm and dry. Capillary Refill: Capillary refill takes less than 2 seconds. Findings: No erythema or rash. Neurological:      General: No focal deficit present. Mental Status: He is alert and oriented to person, place, and time. Cranial Nerves: No cranial nerve deficit. Coordination: Coordination normal.   Psychiatric:         Mood and Affect: Mood normal.         Behavior: Behavior normal.         Thought Content:  Thought content normal.         Judgment: Judgment normal.         MEDICAL DECISION MAKING:       ED Course as of Oct 14 2029   Thu Oct 14, 2021   1535 Patient still in waiting room, ordering labs to help expedite eval    [WM]   1719 Replacing Mg and K    [WM]   1740 Elev LFTs  Added on hepatitis panel and ct abd  Ivf , mg replacement, K replacement    [WM]   [address] Jas Crane for admit, short orders written    [WM]      ED Course User Index  [WM] Sudheer Cervantes MD       Procedures    DIAGNOSTIC RESULTS     RADIOLOGY:All plain film, CT, MRI, and formal ultrasound images (except ED bedside ultrasound) are read by the radiologist, see reports below, unless otherwisenoted in MDM or here. CT ABDOMEN PELVIS W IV CONTRAST Additional Contrast? None   Final Result   1. No acute abnormality is seen in the abdomen or the pelvis. 2. Mild hepatomegaly with prominent steatosis. 3. Small, shallow, fat containing left inguinal hernia. No evidence of fat   strangulation. LABS: All lab results were reviewed by myself, and all abnormals are listed below.   Labs Reviewed   CBC WITH AUTO DIFFERENTIAL - Abnormal; Notable for the following components:       Result Value    RBC 3.52 (*)     Hematocrit 39.2 (*)     .6 (*)     MCH 38.7 (*)     RDW 16.9 (*)     Seg Neutrophils 70 (*)     Lymphocytes 18 (*)     Monocytes 9 (*)     All other components within normal limits   COMPREHENSIVE METABOLIC PANEL - Abnormal; Notable for the following components:    Glucose 111 (*)     BUN 4 (*)     CREATININE 0.66 (*)     Potassium 3.2 (*)     Chloride 96 (*)     Alkaline Phosphatase 144 (*)      (*)      (*)     Total Bilirubin 1.54 (*)     Total Protein 9.1 (*)     All other components within normal limits   MAGNESIUM - Abnormal; Notable for the following components:    Magnesium 0.9 (*)     All other components within normal limits   COVID-19, RAPID   LIPASE   RETICULOCYTES   PERIPHERAL BLOOD SMEAR, PATH REVIEW   HEPATITIS PANEL, ACUTE       EMERGENCY DEPARTMENTCOURSE:         Vitals:    Vitals:    10/14/21 1430 10/14/21 1809   BP: 131/80 132/79   Pulse: 114 98   Resp: 16 16   Temp: 98.4 °F (36.9 °C)    TempSrc: Oral    SpO2: 96% 96%       The patient was given the following medications while in the emergency department:  Orders Placed This Encounter   Medications    ondansetron (ZOFRAN-ODT) disintegrating tablet 4 mg    0.9 % sodium chloride bolus    famotidine (PEPCID) injection 20 mg    magnesium sulfate 2,000 mg in dextrose 5 % 100 mL IVPB    potassium chloride (KLOR-CON M) extended release tablet 40 mEq    acetaminophen (TYLENOL) tablet 1,000 mg    0.9 % sodium chloride bolus    sodium chloride flush 0.9 % injection 10 mL    iopamidol (ISOVUE-370) 76 % injection 75 mL     CONSULTS:  IP CONSULT TO INTERNAL MEDICINE    FINAL IMPRESSION      1. Hypomagnesemia syndrome    2. Nausea vomiting and diarrhea    3. Dehydration    4. Elevated LFTs          DISPOSITION/PLAN   DISPOSITION Admitted 10/14/2021 05:52:34 PM      PATIENT REFERRED TO:  No follow-up provider specified. DISCHARGE MEDICATIONS:  New Prescriptions    No medications on file     The care is provided during an unprecedented national emergency due to the novel coronavirus, COVID 19.   Ruth Downey MD  Attending Emergency Physician                    Ruth Downey MD  10/14/21 2030

## 2021-10-14 NOTE — LETTER
[address] [address]  Phone: [phone]             October 15, 2021    Patient: Cassandra Duong   YOB: 1985   Date of Visit: 10/14/2021       To Whom It May Concern:    Kim Nicolas was seen and treated in our facility  beginning 10/14/2021 through 10/15/2021 .        Sincerely,       Cheyenne Jansen RN         Signature:__________________________________

## 2021-10-14 NOTE — ED TRIAGE NOTES
Mode of arrival (squad #, walk in, police, etc) : walk in        Chief complaint(s): ABD pain, NVD        Arrival Note (brief scenario, treatment PTA, etc). : Pt states he has been sick for the last 4 days with no relief from OTC medications. C= \"Have you ever felt that you should Cut down on your drinking? \"  No  A= \"Have people Annoyed you by criticizing your drinking? \"  No  G= \"Have you ever felt bad or Guilty about your drinking? \"  No  E= \"Have you ever had a drink as an Eye-opener first thing in the morning to steady your nerves or to help a hangover? \"  No      Deferred []      Reason for deferring: N/A    *If yes to two or more: probable alcohol abuse. *

## 2021-10-15 VITALS
OXYGEN SATURATION: 94 % | TEMPERATURE: 98.6 F | DIASTOLIC BLOOD PRESSURE: 83 MMHG | HEART RATE: 77 BPM | SYSTOLIC BLOOD PRESSURE: 128 MMHG | BODY MASS INDEX: 30.61 KG/M2 | WEIGHT: 201.94 LBS | HEIGHT: 68 IN | RESPIRATION RATE: 14 BRPM

## 2021-10-15 PROBLEM — E87.6 HYPOKALEMIA: Status: ACTIVE | Noted: 2021-10-15

## 2021-10-15 PROBLEM — R74.01 TRANSAMINITIS: Status: ACTIVE | Noted: 2021-10-15

## 2021-10-15 LAB
-: NORMAL
ANION GAP SERPL CALCULATED.3IONS-SCNC: 11 MMOL/L (ref 9–17)
BUN BLDV-MCNC: 5 MG/DL (ref 6–20)
BUN/CREAT BLD: ABNORMAL (ref 9–20)
C DIFF AG + TOXIN: NEGATIVE
CALCIUM SERPL-MCNC: 7.6 MG/DL (ref 8.6–10.4)
CHLORIDE BLD-SCNC: 100 MMOL/L (ref 98–107)
CO2: 25 MMOL/L (ref 20–31)
CREAT SERPL-MCNC: 0.65 MG/DL (ref 0.7–1.2)
FERRITIN: 2344 UG/L (ref 30–400)
FOLATE: 5.9 NG/ML
GFR AFRICAN AMERICAN: >60 ML/MIN
GFR NON-AFRICAN AMERICAN: >60 ML/MIN
GFR SERPL CREATININE-BSD FRML MDRD: ABNORMAL ML/MIN/{1.73_M2}
GFR SERPL CREATININE-BSD FRML MDRD: ABNORMAL ML/MIN/{1.73_M2}
GLUCOSE BLD-MCNC: 106 MG/DL (ref 70–99)
HCT VFR BLD CALC: 33.1 % (ref 41–53)
HEMOGLOBIN: 11.4 G/DL (ref 13.5–17.5)
INR BLD: 0.9
LACTOFERRIN, QUAL: NORMAL
MAGNESIUM: 1.6 MG/DL (ref 1.6–2.6)
MAGNESIUM: 1.9 MG/DL (ref 1.6–2.6)
MCH RBC QN AUTO: 39 PG (ref 26–34)
MCHC RBC AUTO-ENTMCNC: 34.6 G/DL (ref 31–37)
MCV RBC AUTO: 112.7 FL (ref 80–100)
NRBC AUTOMATED: ABNORMAL PER 100 WBC
PATHOLOGIST REVIEW: NORMAL
PDW BLD-RTO: 17 % (ref 11.5–14.9)
PLATELET # BLD: 123 K/UL (ref 150–450)
PMV BLD AUTO: 8.7 FL (ref 6–12)
POTASSIUM SERPL-SCNC: 3.3 MMOL/L (ref 3.7–5.3)
POTASSIUM SERPL-SCNC: 3.3 MMOL/L (ref 3.7–5.3)
POTASSIUM SERPL-SCNC: 3.5 MMOL/L (ref 3.7–5.3)
PROTHROMBIN TIME: 12.5 SEC (ref 11.8–14.6)
RBC # BLD: 2.93 M/UL (ref 4.5–5.9)
REASON FOR REJECTION: NORMAL
SODIUM BLD-SCNC: 136 MMOL/L (ref 135–144)
SPECIMEN DESCRIPTION: NORMAL
SURGICAL PATHOLOGY REPORT: NORMAL
TSH SERPL DL<=0.05 MIU/L-ACNC: 3.61 MIU/L (ref 0.3–5)
VITAMIN B-12: 801 PG/ML (ref 232–1245)
WBC # BLD: 5.3 K/UL (ref 3.5–11)
ZZ NTE CLEAN UP: ORDERED TEST: NORMAL
ZZ NTE WITH NAME CLEAN UP: SPECIMEN SOURCE: NORMAL

## 2021-10-15 PROCEDURE — 6370000000 HC RX 637 (ALT 250 FOR IP): Performed by: INTERNAL MEDICINE

## 2021-10-15 PROCEDURE — 82607 VITAMIN B-12: CPT

## 2021-10-15 PROCEDURE — 84132 ASSAY OF SERUM POTASSIUM: CPT

## 2021-10-15 PROCEDURE — 82746 ASSAY OF FOLIC ACID SERUM: CPT

## 2021-10-15 PROCEDURE — 83630 LACTOFERRIN FECAL (QUAL): CPT

## 2021-10-15 PROCEDURE — 84443 ASSAY THYROID STIM HORMONE: CPT

## 2021-10-15 PROCEDURE — 80048 BASIC METABOLIC PNL TOTAL CA: CPT

## 2021-10-15 PROCEDURE — 36415 COLL VENOUS BLD VENIPUNCTURE: CPT

## 2021-10-15 PROCEDURE — 2580000003 HC RX 258: Performed by: INTERNAL MEDICINE

## 2021-10-15 PROCEDURE — 6360000002 HC RX W HCPCS: Performed by: NURSE PRACTITIONER

## 2021-10-15 PROCEDURE — 87449 NOS EACH ORGANISM AG IA: CPT

## 2021-10-15 PROCEDURE — 83735 ASSAY OF MAGNESIUM: CPT

## 2021-10-15 PROCEDURE — 6370000000 HC RX 637 (ALT 250 FOR IP): Performed by: NURSE PRACTITIONER

## 2021-10-15 PROCEDURE — 6360000002 HC RX W HCPCS: Performed by: INTERNAL MEDICINE

## 2021-10-15 PROCEDURE — 87329 GIARDIA AG IA: CPT

## 2021-10-15 PROCEDURE — 85610 PROTHROMBIN TIME: CPT

## 2021-10-15 PROCEDURE — 85027 COMPLETE CBC AUTOMATED: CPT

## 2021-10-15 PROCEDURE — 87324 CLOSTRIDIUM AG IA: CPT

## 2021-10-15 PROCEDURE — 82728 ASSAY OF FERRITIN: CPT

## 2021-10-15 RX ORDER — LORAZEPAM 1 MG/1
2 TABLET ORAL
Status: DISCONTINUED | OUTPATIENT
Start: 2021-10-15 | End: 2021-10-15 | Stop reason: HOSPADM

## 2021-10-15 RX ORDER — SODIUM CHLORIDE 0.9 % (FLUSH) 0.9 %
5-40 SYRINGE (ML) INJECTION PRN
Status: DISCONTINUED | OUTPATIENT
Start: 2021-10-15 | End: 2021-10-15 | Stop reason: HOSPADM

## 2021-10-15 RX ORDER — LORAZEPAM 2 MG/ML
2 INJECTION INTRAMUSCULAR
Status: DISCONTINUED | OUTPATIENT
Start: 2021-10-15 | End: 2021-10-15 | Stop reason: HOSPADM

## 2021-10-15 RX ORDER — LORAZEPAM 2 MG/ML
1 INJECTION INTRAMUSCULAR
Status: DISCONTINUED | OUTPATIENT
Start: 2021-10-15 | End: 2021-10-15 | Stop reason: HOSPADM

## 2021-10-15 RX ORDER — LORAZEPAM 2 MG/ML
4 INJECTION INTRAMUSCULAR
Status: DISCONTINUED | OUTPATIENT
Start: 2021-10-15 | End: 2021-10-15 | Stop reason: HOSPADM

## 2021-10-15 RX ORDER — SODIUM CHLORIDE 0.9 % (FLUSH) 0.9 %
5-40 SYRINGE (ML) INJECTION EVERY 12 HOURS SCHEDULED
Status: DISCONTINUED | OUTPATIENT
Start: 2021-10-15 | End: 2021-10-15 | Stop reason: HOSPADM

## 2021-10-15 RX ORDER — GAUZE BANDAGE 2" X 2"
100 BANDAGE TOPICAL DAILY
Status: DISCONTINUED | OUTPATIENT
Start: 2021-10-15 | End: 2021-10-15 | Stop reason: HOSPADM

## 2021-10-15 RX ORDER — LORAZEPAM 1 MG/1
4 TABLET ORAL
Status: DISCONTINUED | OUTPATIENT
Start: 2021-10-15 | End: 2021-10-15 | Stop reason: HOSPADM

## 2021-10-15 RX ORDER — SODIUM CHLORIDE 9 MG/ML
25 INJECTION, SOLUTION INTRAVENOUS PRN
Status: DISCONTINUED | OUTPATIENT
Start: 2021-10-15 | End: 2021-10-15 | Stop reason: HOSPADM

## 2021-10-15 RX ORDER — LORAZEPAM 1 MG/1
3 TABLET ORAL
Status: DISCONTINUED | OUTPATIENT
Start: 2021-10-15 | End: 2021-10-15 | Stop reason: HOSPADM

## 2021-10-15 RX ORDER — LORAZEPAM 2 MG/ML
3 INJECTION INTRAMUSCULAR
Status: DISCONTINUED | OUTPATIENT
Start: 2021-10-15 | End: 2021-10-15 | Stop reason: HOSPADM

## 2021-10-15 RX ORDER — LORAZEPAM 1 MG/1
1 TABLET ORAL
Status: DISCONTINUED | OUTPATIENT
Start: 2021-10-15 | End: 2021-10-15 | Stop reason: HOSPADM

## 2021-10-15 RX ADMIN — POTASSIUM CHLORIDE 40 MEQ: 1500 TABLET, EXTENDED RELEASE ORAL at 09:16

## 2021-10-15 RX ADMIN — THIAMINE HCL TAB 100 MG 100 MG: 100 TAB at 14:44

## 2021-10-15 RX ADMIN — MAGNESIUM SULFATE HEPTAHYDRATE 1000 MG: 1 INJECTION, SOLUTION INTRAVENOUS at 01:44

## 2021-10-15 RX ADMIN — FLUTICASONE PROPIONATE 2 SPRAY: 50 SPRAY, METERED NASAL at 13:15

## 2021-10-15 RX ADMIN — POTASSIUM CHLORIDE 40 MEQ: 1500 TABLET, EXTENDED RELEASE ORAL at 14:43

## 2021-10-15 RX ADMIN — CHLORDIAZEPOXIDE HYDROCHLORIDE 25 MG: 25 CAPSULE ORAL at 09:16

## 2021-10-15 RX ADMIN — MULTIPLE VITAMINS W/ MINERALS TAB 1 TABLET: TAB at 09:16

## 2021-10-15 RX ADMIN — METOPROLOL SUCCINATE 25 MG: 25 TABLET, EXTENDED RELEASE ORAL at 09:16

## 2021-10-15 RX ADMIN — POTASSIUM CHLORIDE 40 MEQ: 1500 TABLET, EXTENDED RELEASE ORAL at 01:44

## 2021-10-15 RX ADMIN — ENOXAPARIN SODIUM 40 MG: 40 INJECTION SUBCUTANEOUS at 09:16

## 2021-10-15 RX ADMIN — LOSARTAN POTASSIUM 50 MG: 50 TABLET, FILM COATED ORAL at 09:16

## 2021-10-15 RX ADMIN — MAGNESIUM SULFATE HEPTAHYDRATE 1000 MG: 1 INJECTION, SOLUTION INTRAVENOUS at 02:52

## 2021-10-15 RX ADMIN — SODIUM CHLORIDE: 9 INJECTION, SOLUTION INTRAVENOUS at 09:17

## 2021-10-15 ASSESSMENT — ENCOUNTER SYMPTOMS
BACK PAIN: 0
SORE THROAT: 0
SHORTNESS OF BREATH: 0
NAUSEA: 1
ABDOMINAL PAIN: 0
CONSTIPATION: 0
COUGH: 0
VOMITING: 1
WHEEZING: 0
DIARRHEA: 1

## 2021-10-15 NOTE — H&P
[address] [facility]– [place] 2[place]     HISTORY AND PHYSICAL EXAMINATION            Date:   10/15/2021  Patientname:  Janet Mendez  Date of admission:  10/14/2021  4:50 PM  MRN:   [id number]  Account:  [de-identified]  YOB: 1985  PCP:    Milton Dunbar MD  Room:   2125/2125-01  Code Status:    Full Code    CHIEF COMPLAINT     Chief Complaint   Patient presents with    Abdominal Pain    Nausea & Vomiting    Diarrhea       HISTORY OF PRESENT ILLNESS  (Character, Onset, Location, Duration,  Exacerbating/RelievingFactors, Radiation,   Associated Symptoms, Severity )      The patient is a 29 y.o.  male, with a history of alcoholic cardiomyopathy, anxiety, CHF, HTN, pulmonary embolism, and uncomplicated alcohol dependence, who presents with abdominal pain, nausea and vomiting, and diarrhea. According to patient, symptoms have been ongoing for the past 4 days. Reports that he is having difficulty keeping down any p.o. intake and states that when he is able to tolerate fluids, he immediately loses it in liquid stool. Patient reports that he has had similar episodes in the past but they have never lasted this long. Additionally, patient reports that his mother had a similar condition prior to her death, stating that when her electrolytes went low she would have liquid diarrhea until it resolved. Symptoms are associated with fatigue, tremors, lightheadedness, and leg cramps. Denies fever, chills, chest pain, cough,  and urinary symptoms. There are no aggravating or alleviating factors. Symptoms are reported as constant and severe; improved since arrival to ED. PAST MEDICAL HISTORY   Patient  has a past medical history of No active medical problems and Pulmonary embolism ([address] PAST SURGICAL HISTORY    Patient  has no past surgical history on file. FAMILY HISTORY    Patient family history includes Heart Attack in his mother; Heart Disease in his father and mother.     SOCIAL HISTORY    Patient  reports that he has been smoking cigarettes. He has a 10.00 pack-year smoking history. He has never used smokeless tobacco. He reports current alcohol use. He reports previous drug use. HOME MEDICATIONS        Prior to Admission medications    Medication Sig Start Date End Date Taking? Authorizing Provider   metoprolol succinate (TOPROL XL) 25 MG extended release tablet Take 1 tablet by mouth daily 6/2/21  Yes Terry Barth MD   sertraline (ZOLOFT) 25 MG tablet Take 1 tablet by mouth daily  Patient taking differently: Take 25 mg by mouth nightly  6/2/21  Yes Terry Barth MD   losartan (COZAAR) 50 MG tablet Take 1 tablet by mouth daily 5/21/21  Yes Veronica So MD   fluticasone (FLONASE) 50 MCG/ACT nasal spray instill 1 spray into each nostril once daily 5/10/21  Yes Terry Barth MD   Multiple Vitamins-Minerals (MULTIVITAMIN ADULT PO) Take by mouth   Yes Historical Provider, MD   Nicotine 21-14-7 MG/24HR KIT Place 14 mg onto the skin nightly    Yes Historical Provider, MD   acetaminophen (TYLENOL) 500 MG tablet Take 2 tablets by mouth every 6 hours as needed for Pain 8/13/21   Zaida Collazo MD   naproxen (NAPROSYN) 500 MG tablet Take 1 tablet by mouth 2 times daily  Patient taking differently: Take 500 mg by mouth 2 times daily as needed  8/13/21   Ziada Collazo MD   Blood Pressure Monitoring (BP MONITOR-STETHOSCOPE) KIT 1 each by Does not apply route daily 4/7/21   Terry Barth MD   EPINEPHrine (EPIPEN 2-LIZETTE) 0.3 MG/0.3ML SOAJ injection Inject 0.3 mLs into the muscle once for 1 dose Use as directed for allergic reaction 11/17/16 5/24/21  Bob Wilkins MD       ALLERGIES      Bee venom    REVIEW OF SYSTEMS     Review of Systems   Constitutional: Positive for fatigue. Negative for chills, diaphoresis and fever. HENT: Negative for congestion and sore throat. Respiratory: Negative for cough, shortness of breath and wheezing.     Cardiovascular: Negative for chest pain, palpitations and leg swelling. Gastrointestinal: Positive for diarrhea, nausea and vomiting. Negative for abdominal pain and constipation. Genitourinary: Negative for dysuria, frequency and urgency. Musculoskeletal: Positive for myalgias (leg cramps). Negative for back pain. Skin: Negative for rash. Neurological: Positive for light-headedness. Negative for dizziness, weakness and headaches. Psychiatric/Behavioral: The patient is not nervous/anxious. PHYSICAL EXAM      /81   Pulse 94   Temp 98.6 °F (37 °C) (Oral)   Resp 16   Ht 5' 8\" (1.727 m)   SpO2 96%   BMI 30.41 kg/m²  Body mass index is 30.41 kg/m². Physical Exam  Constitutional:       General: He is not in acute distress. Appearance: He is well-developed. He is not diaphoretic. HENT:      Head: Normocephalic and atraumatic. Mouth/Throat:      Mouth: Mucous membranes are dry. Eyes:      Conjunctiva/sclera: Conjunctivae normal.      Pupils: Pupils are equal, round, and reactive to light. Neck:      Trachea: No tracheal deviation. Cardiovascular:      Rate and Rhythm: Normal rate and regular rhythm. Heart sounds: Normal heart sounds. No murmur heard. No friction rub. No gallop. Pulmonary:      Effort: Pulmonary effort is normal. No respiratory distress. Breath sounds: Normal breath sounds. No wheezing or rales. Chest:      Chest wall: No tenderness. Abdominal:      General: Bowel sounds are normal. There is no distension. Palpations: Abdomen is soft. Tenderness: There is no abdominal tenderness. There is no guarding. Musculoskeletal:         General: No tenderness. Normal range of motion. Cervical back: Normal range of motion and neck supple. Lymphadenopathy:      Cervical: No cervical adenopathy. Skin:     General: Skin is warm and dry. Coloration: Skin is not pale. Findings: No erythema or rash.    Neurological:      Mental Status: He is alert and oriented to and pelvis was performed with the administration of intravenous contrast. Multiplanar reformatted images are provided for review. Dose modulation, iterative reconstruction, and/or weight based adjustment of the mA/kV was utilized to reduce the radiation dose to as low as reasonably achievable. COMPARISON: CT abdomen and pelvis, 06/20/2021. HISTORY: ORDERING SYSTEM PROVIDED HISTORY: abd pain, NVD, elevated LFTs TECHNOLOGIST PROVIDED HISTORY: abd pain, NVD, elevated LFTs Decision Support Exception - unselect if not a suspected or confirmed emergency medical condition->Emergency Medical Condition (MA) Reason for Exam: abd pain, NVD for 4 days, elevated LFTs. Acuity: Unknown Type of Exam: Unknown FINDINGS: Lower Chest: Minimal scarring in the lingula and middle lobe. The lung bases are otherwise clear. The heart is normal in size. No pleural or pericardial effusion noted. Organs: Liver: Mildly enlarged, measuring 19.0 cm in length. Prominent diffuse steatosis. No focal lesion or ductal dilatation. Gallbladder: Unremarkable. Pancreas: Unremarkable. Spleen:  Unremarkable. Adrenals: Unremarkable. Kidneys: Unremarkable. GI/Bowel: No bowel wall thickening or obstruction. Normal appendix. Pelvis: The urinary bladder and the prostate are grossly unremarkable. Small, shallow fat containing left inguinal hernia. Peritoneum/Retroperitoneum: No lymphadenopathy. No free air or free fluid is seen. The abdominal aorta and pelvic arteries are unremarkable. Bones/Soft Tissues: The visualized bones are intact without fracture or focal lesion. 1.  No acute abnormality is seen in the abdomen or the pelvis. 2. Mild hepatomegaly with prominent steatosis. 3. Small, shallow, fat containing left inguinal hernia. No evidence of fat strangulation.      ASSESSMENT  and  PLAN     Principal Problem:    Hypomagnesemia  Active Problems:    Essential hypertension    Uncomplicated alcohol dependence ([address]  Resolved Problems:    * No dehydration no renal injury, -stool studies ordered and pending, saline hydration CT abdomen negative Covid swab negative. 2. Acute hepatitis-likely alcoholic hepatitis, hepatitis panel negative,  CT abdomen shows hepatomegaly and steatosis otherwise unremarkable  3. Hypomagnesemia-replacement per protocol  4. Hypokalemia replace per protocol. 5. Microcytic anemia hemoglobin 11.4 today baseline 14.7 June 2021, likely secondary to chronic alcohol use, B12 folate, TSH, iron studies ordered  6. Alcohol abuse-[facility] protocol Librium discontinued due to hepatitis picture, social work consult for alcohol rehab declined by patient  7. Class I ObesityBody mass index is 30.71 kg/m².      10/15  Patient is tolerating diet fluids passing urine not in any discomfort mobilizing   Will recheck electrolytes, discharge later today once electrolytes normalized      DVT prophylaxis-Lovenox    Electronically signed by Talya Brown MD

## 2021-10-15 NOTE — ED NOTES
Patient arrived to room 2125 via wheelchair. Handoff given to PCU unit clerkSamy Gomez  10/14/21 2131

## 2021-10-15 NOTE — PROGRESS NOTES
Nurse reviewed discharge instructions with patient. Patient verbalizes understanding of out patient labs and follow up appointment.

## 2021-10-15 NOTE — ED NOTES
Report given to Kwadwo Parks RN from U. Report method by phone   The following was reviewed with receiving RN:   Current vital signs:  /79   Pulse 98   Temp 98.4 °F (36.9 °C) (Oral)   Resp 16   SpO2 96%                MEWS Score: 3     Any medication or safety alerts were reviewed. Any pending diagnostics and notifications were also reviewed, as well as any safety concerns or issues, abnormal labs, abnormal imaging, and abnormal assessment findings. Questions were answered.             Jovanna Quevedo RN  10/14/21 4380

## 2021-10-15 NOTE — CARE COORDINATION
CASE MANAGEMENT NOTE:    Admission Date:  10/14/2021 Caleb Jackson is a 29 y.o.  male    Admitted for : Dehydration [E86.0]  Hypomagnesemia [E83.42]  Hypomagnesemia syndrome [E83.42]  Elevated LFTs [R79.89]  Nausea vomiting and diarrhea [R11.2, R19.7]    Met with:  Patient    PCP:  Dr. Graciela Bledsoe:  Self Pay      Is patient alert and oriented at time of discussion:  Yes    Current Residence/ Living Arrangements:  independently at home             Current Services PTA:  No    Does patient go to outpatient dialysis: No  If yes, location and chair time:     Is patient agreeable to VNS: No    Freedom of choice provided:  No    List of [address] provided: No    VNS chosen:  No    DME:  none    Home Oxygen: No    Nebulizer: No    CPAP/BIPAP: No    Supplier: N/A    Potential Assistance Needed: No    SNF needed: No    Freedom of choice and list provided: NA    Pharmacy:  [facility] [address]        Does Patient want to use MEDS to BEDS? No    Is patient currently receiving oral anticoagulation therapy? No    Is the Patient an [facility] with Readmission Risk Score greater than 14%? No  If yes, pt needs a follow up appointment made within 7 days. Family Members/Caregivers that pt would like involved in their care:    Yes    If yes, list name here:  Ailyn Ornelas    Transportation Provider:  Family             Discharge Plan:  10/15/21  Self Pay Patient is from home in a two story home he uses no dme and denies need for vns plan is to discharge to home with no needs will continue to follow for needs . //tv                     Electronically signed by:  Gumaro Vaughan RN on 10/15/2021 at 12:27 PM     \

## 2021-10-18 LAB
DIRECT EXAM: NORMAL
Lab: NORMAL
SPECIMEN DESCRIPTION: NORMAL

## 2021-10-19 ENCOUNTER — TELEPHONE (OUTPATIENT)
Dept: INTERNAL MEDICINE CLINIC | Age: 36
End: 2021-10-19

## 2021-10-19 NOTE — TELEPHONE ENCOUNTER
----- Message from Wade Marte sent at 10/19/2021 11:14 AM EDT -----  Subject: Appointment Request    Reason for Call: Routine Hospital Follow Up    QUESTIONS  Type of Appointment? Established Patient  Reason for appointment request? No appointments available during search  Additional Information for Provider? Pt needs [facility] [place]   10/15/21, dx low electrolytes. Please call to schedule.   ---------------------------------------------------------------------------  --------------  CALL BACK INFO  What is the best way for the office to contact you? OK to leave message on   voicemail  Preferred Call Back Phone Number? [phone]  ---------------------------------------------------------------------------  --------------  SCRIPT ANSWERS  Relationship to Patient? Self  (Patient needs follow up visit after hospital discharge) Book first   available appointment within 7 days OF DISCHARGE, if no appt, proceed to   book the next available time slot within 14 days OF DISCHARGE AND Send   Message to Provider. [address] Follow Up appointment cannot be booked   beyond 14 Days and should result in a Message to Provider. ? Yes   Have you been diagnosed with, awaiting test results for, or told that you   are suspected of having COVID-19 (Coronavirus)? (If patient has tested   negative or was tested as a requirement for work, school, or travel and   not based on symptoms, answer no)? No  Within the past two weeks have you developed any of the following symptoms   (answer no if symptoms have been present longer than 2 weeks or began   more than 2 weeks ago)? Fever or Chills, Cough, Shortness of breath or   difficulty breathing, Loss of taste or smell, Sore throat, Nasal   congestion, Sneezing or runny nose, Fatigue or generalized body aches   (answer no if pain is specific to a body part e.g. back pain), Diarrhea,   Headache? No  Have you had close contact with someone with COVID-19 in the last 14 days? No  (Service Expert  click yes below to proceed with [employer] As Usual   Scheduling)?  Yes

## 2021-10-20 NOTE — PROGRESS NOTES
Physician Progress Note      PATIENT:               Salvatore Barber  CSN #:                  [id number]  :                       1985  ADMIT DATE:       10/14/2021 4:50 PM  [address] [place] DATE:        10/15/2021 6:45 PM  RESPONDING  PROVIDER #:        Daniel Edmond MD          QUERY TEXT:    Pt admitted with Dehydration. and has CHF documented. If possible, please   document in progress notes and discharge summary further specificity regarding   the type and acuity of CHF:    The medical record reflects the following:  Risk Factors: alcohol abuse, HTN, Hyperlipidemia. hx of PE, Alcohol induced CM  Clinical Indicators: Stress 20[de-identified] Global lv hypokinesis, ef 33%;  ECHO   2020: LVEF 35-40%, LVH. 20 Dr Duncan Poll notes \"HFrEF\". 2020: Cardiac Cath   reports: systolic HF  Treatment: medical treatment per HF core measures:  metoprolol succinate,   losartan, Education on risks of chronic alcoholism. Options provided:  -- Chronic Systolic CHF/HFrEF  -- Chronic Diastolic CHF/HFpEF  -- Chronic Systolic and Diastolic CHF  -- Other - I will add my own diagnosis  -- Disagree - Not applicable / Not valid  -- Disagree - Clinically unable to determine / Unknown  -- Refer to Clinical Documentation Reviewer    PROVIDER RESPONSE TEXT:    This patient has chronic systolic and diastolic CHF.     Query created by: Aleksandr Medrano on 10/15/2021 3:53 PM      Electronically signed by:  Daniel Edmond MD 10/20/2021 9:21 AM

## 2021-10-22 ENCOUNTER — HOSPITAL ENCOUNTER (OUTPATIENT)
Age: 36
Setting detail: SPECIMEN
Discharge: HOME | End: 2021-10-22

## 2021-10-22 DIAGNOSIS — F10.20 UNCOMPLICATED ALCOHOL DEPENDENCE (HCC): ICD-10-CM

## 2021-10-22 DIAGNOSIS — R79.89 ELEVATED LFTS: ICD-10-CM

## 2021-10-22 DIAGNOSIS — E83.42 HYPOMAGNESEMIA SYNDROME: ICD-10-CM

## 2021-10-22 DIAGNOSIS — E87.6 HYPOKALEMIA: ICD-10-CM

## 2021-10-22 LAB
ALBUMIN SERPL-MCNC: 4.2 G/DL (ref 3.5–5.2)
ALBUMIN/GLOBULIN RATIO: 0.9 (ref 1–2.5)
ALP BLD-CCNC: 181 U/L (ref 40–129)
ALT SERPL-CCNC: 119 U/L (ref 5–41)
ANION GAP SERPL CALCULATED.3IONS-SCNC: 18 MMOL/L (ref 9–17)
AST SERPL-CCNC: 313 U/L
BILIRUB SERPL-MCNC: 0.66 MG/DL (ref 0.3–1.2)
BUN BLDV-MCNC: 6 MG/DL (ref 6–20)
BUN/CREAT BLD: ABNORMAL (ref 9–20)
CALCIUM SERPL-MCNC: 9.3 MG/DL (ref 8.6–10.4)
CHLORIDE BLD-SCNC: 102 MMOL/L (ref 98–107)
CO2: 24 MMOL/L (ref 20–31)
CREAT SERPL-MCNC: 0.59 MG/DL (ref 0.7–1.2)
GFR AFRICAN AMERICAN: >60 ML/MIN
GFR NON-AFRICAN AMERICAN: >60 ML/MIN
GFR SERPL CREATININE-BSD FRML MDRD: ABNORMAL ML/MIN/{1.73_M2}
GFR SERPL CREATININE-BSD FRML MDRD: ABNORMAL ML/MIN/{1.73_M2}
GLUCOSE BLD-MCNC: 96 MG/DL (ref 70–99)
MAGNESIUM: 1.8 MG/DL (ref 1.6–2.6)
POTASSIUM SERPL-SCNC: 3.7 MMOL/L (ref 3.7–5.3)
SODIUM BLD-SCNC: 144 MMOL/L (ref 135–144)
TOTAL PROTEIN: 8.9 G/DL (ref 6.4–8.3)

## 2021-10-26 ENCOUNTER — HOSPITAL ENCOUNTER (OUTPATIENT)
Facility: CLINIC | Age: 36
Discharge: HOME | End: 2021-10-28

## 2021-10-26 ENCOUNTER — HOSPITAL ENCOUNTER (OUTPATIENT)
Dept: GENERAL RADIOLOGY | Facility: CLINIC | Age: 36
Discharge: HOME | End: 2021-10-28

## 2021-10-26 ENCOUNTER — HOSPITAL ENCOUNTER (OUTPATIENT)
Age: 36
Setting detail: SPECIMEN
Discharge: HOME | End: 2021-10-26

## 2021-10-26 ENCOUNTER — OFFICE VISIT (OUTPATIENT)
Dept: INTERNAL MEDICINE CLINIC | Age: 36
End: 2021-10-26

## 2021-10-26 VITALS
WEIGHT: 200 LBS | DIASTOLIC BLOOD PRESSURE: 88 MMHG | SYSTOLIC BLOOD PRESSURE: 138 MMHG | BODY MASS INDEX: 30.31 KG/M2 | HEART RATE: 99 BPM | HEIGHT: 68 IN | OXYGEN SATURATION: 96 %

## 2021-10-26 DIAGNOSIS — K70.10 ALCOHOLIC HEPATITIS WITHOUT ASCITES: ICD-10-CM

## 2021-10-26 DIAGNOSIS — D53.9 MACROCYTIC ANEMIA: ICD-10-CM

## 2021-10-26 DIAGNOSIS — I26.99 OTHER ACUTE PULMONARY EMBOLISM WITHOUT ACUTE COR PULMONALE (HCC): ICD-10-CM

## 2021-10-26 DIAGNOSIS — M79.605 LEFT LEG PAIN: ICD-10-CM

## 2021-10-26 DIAGNOSIS — I50.22 CHRONIC SYSTOLIC CONGESTIVE HEART FAILURE (HCC): ICD-10-CM

## 2021-10-26 DIAGNOSIS — E83.42 HYPOMAGNESEMIA: ICD-10-CM

## 2021-10-26 DIAGNOSIS — R05.9 COUGH: ICD-10-CM

## 2021-10-26 DIAGNOSIS — R19.7 DIARRHEA DUE TO MALABSORPTION: ICD-10-CM

## 2021-10-26 DIAGNOSIS — R06.2 WHEEZING: ICD-10-CM

## 2021-10-26 DIAGNOSIS — K90.9 DIARRHEA DUE TO MALABSORPTION: ICD-10-CM

## 2021-10-26 DIAGNOSIS — F10.20 UNCOMPLICATED ALCOHOL DEPENDENCE (HCC): ICD-10-CM

## 2021-10-26 LAB
ALBUMIN SERPL-MCNC: 4 G/DL (ref 3.5–5.2)
ALBUMIN/GLOBULIN RATIO: 0.9 (ref 1–2.5)
ALP BLD-CCNC: 176 U/L (ref 40–129)
ALT SERPL-CCNC: 85 U/L (ref 5–41)
ANION GAP SERPL CALCULATED.3IONS-SCNC: 20 MMOL/L (ref 9–17)
AST SERPL-CCNC: 317 U/L
BILIRUB SERPL-MCNC: 1.11 MG/DL (ref 0.3–1.2)
BUN BLDV-MCNC: 7 MG/DL (ref 6–20)
BUN/CREAT BLD: ABNORMAL (ref 9–20)
CALCIUM SERPL-MCNC: 8.6 MG/DL (ref 8.6–10.4)
CHLORIDE BLD-SCNC: 99 MMOL/L (ref 98–107)
CO2: 20 MMOL/L (ref 20–31)
CREAT SERPL-MCNC: 0.57 MG/DL (ref 0.7–1.2)
D-DIMER QUANTITATIVE: 0.38 MG/L FEU
GFR AFRICAN AMERICAN: >60 ML/MIN
GFR NON-AFRICAN AMERICAN: >60 ML/MIN
GFR SERPL CREATININE-BSD FRML MDRD: ABNORMAL ML/MIN/{1.73_M2}
GFR SERPL CREATININE-BSD FRML MDRD: ABNORMAL ML/MIN/{1.73_M2}
GLUCOSE BLD-MCNC: 113 MG/DL (ref 70–99)
HCT VFR BLD CALC: 43 % (ref 40.7–50.3)
HEMOGLOBIN: 14.6 G/DL (ref 13–17)
MAGNESIUM: 1.4 MG/DL (ref 1.6–2.6)
MCH RBC QN AUTO: 38.7 PG (ref 25.2–33.5)
MCHC RBC AUTO-ENTMCNC: 34 G/DL (ref 28.4–34.8)
MCV RBC AUTO: 114.1 FL (ref 82.6–102.9)
NRBC AUTOMATED: 0 PER 100 WBC
PDW BLD-RTO: 15.8 % (ref 11.8–14.4)
PLATELET # BLD: 240 K/UL (ref 138–453)
PMV BLD AUTO: 11.5 FL (ref 8.1–13.5)
POTASSIUM SERPL-SCNC: 3.3 MMOL/L (ref 3.7–5.3)
RBC # BLD: 3.77 M/UL (ref 4.21–5.77)
SODIUM BLD-SCNC: 139 MMOL/L (ref 135–144)
TOTAL PROTEIN: 8.6 G/DL (ref 6.4–8.3)
WBC # BLD: 7.6 K/UL (ref 3.5–11.3)

## 2021-10-26 PROCEDURE — 1111F DSCHRG MED/CURRENT MED MERGE: CPT | Performed by: INTERNAL MEDICINE

## 2021-10-26 PROCEDURE — 99214 OFFICE O/P EST MOD 30 MIN: CPT | Performed by: INTERNAL MEDICINE

## 2021-10-26 PROCEDURE — 71046 X-RAY EXAM CHEST 2 VIEWS: CPT

## 2021-10-26 RX ORDER — DEXTROMETHORPHAN POLISTIREX 30 MG/5ML
60 SUSPENSION ORAL 2 TIMES DAILY PRN
Qty: 148 ML | Refills: 0 | Status: SHIPPED | OUTPATIENT
Start: 2021-10-26 | End: 2021-11-05

## 2021-10-26 RX ORDER — GREEN TEA/HOODIA GORDONII 315-12.5MG
1 CAPSULE ORAL 2 TIMES DAILY
Qty: 60 TABLET | Refills: 0 | Status: SHIPPED | OUTPATIENT
Start: 2021-10-26 | End: 2021-11-25

## 2021-10-26 RX ORDER — ALBUTEROL SULFATE 90 UG/1
2 AEROSOL, METERED RESPIRATORY (INHALATION) 4 TIMES DAILY PRN
Qty: 54 G | Refills: 1 | Status: SHIPPED | OUTPATIENT
Start: 2021-10-26 | End: 2022-04-27

## 2021-10-26 NOTE — PROGRESS NOTES
Visit Information    Have you changed or started any medications since your last visit including any over-the-counter medicines, vitamins, or herbal medicines? no   Are you having any side effects from any of your medications? -  no  Have you stopped taking any of your medications? Is so, why? -  no    Have you seen any other physician or provider since your last visit? Yes - Records Obtained  Have you had any other diagnostic tests since your last visit? Yes - Records Obtained  Have you been seen in the emergency room and/or had an admission to a hospital since we last saw you? Yes - Records Obtained  Have you had your routine dental cleaning in the past 6 months? yes -     Have you activated your [email or web address] account? If not, what are your barriers? Yes     Patient Care Team:  Luis F Dye MD as PCP - General (Internal Medicine)  Luis F Dye MD as PCP - [facility] Provider    Medical History Review  Past Medical, Family, and Social History reviewed and does contribute to the patient presenting condition    Health Maintenance   Topic Date Due    Varicella vaccine (1 of 2 - 2-dose childhood series) Never done    Pneumococcal 0-64 years Vaccine (1 of 2 - PPSV23) Never done    HIV screen  Never done    DTaP/Tdap/Td vaccine (1 - Tdap) Never done    Flu vaccine (1) Never done    Potassium monitoring  10/22/2022    Creatinine monitoring  10/22/2022    COVID-19 Vaccine  Completed    Hepatitis C screen  Completed    Hepatitis A vaccine  Aged Out    Hepatitis B vaccine  Aged Out    Hib vaccine  Aged Out    Meningococcal (ACWY) vaccine  Aged Out      Post-Discharge Transitional Care Management Services or Hospital Follow Up      Venita Fonseca   YOB: 1985    Date of Office Visit:  10/26/2021  Date of Hospital Admission: 10/14/21  Date of Hospital Discharge: 10/15/21  Risk of hospital readmission (high >=14%.  Medium >=10%) :Readmission Risk Score: 16      Care management risk score Rising risk (score 2-5) and Complex Care (Scores >=6): 1     Non face to face  following discharge, date last encounter closed (first attempt may have been earlier): *No documented post hospital discharge outreach found in the last 14 days    Call initiated 2 business days of discharge: *No response recorded in the last 14 days    Patient Active Problem List   Diagnosis    [facility] ([facility]    ACS (acute coronary syndrome) ([facility] [facility]    Chronic systolic congestive heart failure ([facility] [address] [facility]    Essential hypertension    Uncomplicated alcohol dependence ([facility] [facility]    Alcoholic cardiomyopathy ([facility] [address] [facility]    Diarrhea due to malabsorption    Heartburn    Generalized anxiety disorder    Hypomagnesemia    Hypokalemia    Transaminitis       Allergies   Allergen Reactions    Bee Venom Anaphylaxis       Medications listed as ordered at the time of discharge from [facility] Medication Instructions ISABEL:    Printed on:10/26/21 8456   Medication Information                      Blood Pressure Monitoring (BP MONITOR-STETHOSCOPE) KIT  1 each by Does not apply route daily             EPINEPHrine (EPIPEN 2-LIZETTE) 0.3 MG/0.3ML SOAJ injection  Inject 0.3 mLs into the muscle once for 1 dose Use as directed for allergic reaction             fluticasone (FLONASE) 50 MCG/ACT nasal spray  instill 1 spray into each nostril once daily             losartan (COZAAR) 50 MG tablet  Take 1 tablet by mouth daily             metoprolol succinate (TOPROL XL) 25 MG extended release tablet  Take 1 tablet by mouth daily             Multiple Vitamins-Minerals (MULTIVITAMIN ADULT PO)  Take by mouth             naproxen (NAPROSYN) 500 MG tablet  Take 1 tablet by mouth 2 times daily             Nicotine 21-14-7 MG/24HR KIT  Place 14 mg onto the skin nightly              sertraline (ZOLOFT) 25 MG tablet  Take 1 tablet by mouth daily                   Medications marked \"taking\" at this time  Outpatient Medications Marked as Taking for the 10/26/21 encounter (Office Visit) with Stephanie Landry MD   Medication Sig Dispense Refill    naproxen (NAPROSYN) 500 MG tablet Take 1 tablet by mouth 2 times daily (Patient taking differently: Take 500 mg by mouth 2 times daily as needed ) 20 tablet 0    metoprolol succinate (TOPROL XL) 25 MG extended release tablet Take 1 tablet by mouth daily 90 tablet 1    sertraline (ZOLOFT) 25 MG tablet Take 1 tablet by mouth daily (Patient taking differently: Take 25 mg by mouth nightly ) 30 tablet 3    losartan (COZAAR) 50 MG tablet Take 1 tablet by mouth daily 30 tablet 5    fluticasone (FLONASE) 50 MCG/ACT nasal spray instill 1 spray into each nostril once daily 16 g 1    Blood Pressure Monitoring (BP MONITOR-STETHOSCOPE) KIT 1 each by Does not apply route daily 1 kit 0    Multiple Vitamins-Minerals (MULTIVITAMIN ADULT PO) Take by mouth      Nicotine 21-14-7 MG/24HR KIT Place 14 mg onto the skin nightly           Medications patient taking as of now reconciled against medications ordered at time of hospital discharge: Yes    Chief Complaint   Patient presents with    Follow-Up from [address] [place] [address]     10/14/21 ST    Cough       History of Present illness - Follow up of Hospital diagnosis(es):     Active Hospital Problems     Diagnosis Date Noted    Hypokalemia [E87.6] 10/15/2021    Transaminitis [R74.01] 10/15/2021    Hypomagnesemia [E83.42] 99/38/8416    Uncomplicated alcohol dependence ([facility] [address] [F10.20] 07/07/2020    Diarrhea due to malabsorption [K90.9, R19.7] 07/07/2020    Essential hypertension [I10] 05/26/2020       Inpatient course: Discharge summary reviewed- see chart. Interval history/Current status:     Diarrhea and vomiting resolved  Did have some diarrhea yesterday but tolerating diet.      cough  5-6days  Mucinex, OTC cough drops not helping  Dry cough  Feels wheezing,   No h/o asthma  Denies fever  repors some left leg pain, but none right now    Alcohol abuse  Has come down from fifth of hard liquor to 6pack beer daily  Encouraged to quit      Review of systems  Positive for cough and SOB  Denies chest pain or palpitations  Denies abdominal pain, diarrhea vomiting  Denies any new numbness tremors or weakness. No urinary frequency, or urethral discharge        Vitals:    10/26/21 1415   BP: 138/88   Pulse: 99   SpO2: 96%   Weight: 200 lb (90.7 kg)   Height: 5' 8\" (1.727 m)     Body mass index is 30.41 kg/m². Wt Readings from Last 3 Encounters:   10/26/21 200 lb (90.7 kg)   10/14/21 201 lb 15.1 oz (91.6 kg)   08/13/21 200 lb (90.7 kg)     BP Readings from Last 3 Encounters:   10/26/21 138/88   10/15/21 128/83   08/13/21 (!) 149/103        Physical Exam:  General appearance:  alert, cooperative and no distress  Eyes: Anicteric sclera. Pupils are equally round and reactive to light. Extraocular movements are intact. Lungs:  Scattered inspiratory wheeze, more on the right, , normal effort  Heart:  regular rate and rhythm, no murmur  Abdomen:  soft, upper abdominal tenderness, nondistended, normal bowel sounds, no masses,   Extremities:  no edema, redness, tenderness in the calves today on palpation  Skin:  no gross lesions, rashes, induration  Neuro:  Alert, oriented X 3, Gait normal. Non-focal.      Assessment/Plan:  Kendrick Meza was seen today for follow-up from hospital and cough. Diagnoses and all orders for this visit:    Other acute pulmonary embolism without acute cor pulmonale (HCC)  Comments:  Eliquis for 3mth has been completed 1yr ago    Chronic systolic congestive heart failure ([facility] [address]  Comments:  alcoholic cardiomyopathy    Diarrhea due to malabsorption  Comments:  recommend prociotics  Orders:  -     Probiotic Acidophilus (FLORANEX) TABS; Take 1 tablet by mouth 2 times daily    Uncomplicated alcohol dependence ([address]  Comments:  still drinking 6pack per day  did not enroll in alcohol rehab    Alcoholic hepatitis without ascites  -     Comprehensive Metabolic Panel;  Future    Hypomagnesemia  - Magnesium; Future    Macrocytic anemia  -     CBC; Future    Wheezing  -     albuterol sulfate HFA (VENTOLIN HFA) 108 (90 Base) MCG/ACT inhaler; Inhale 2 puffs into the lungs 4 times daily as needed for Wheezing  -     D-Dimer, Quantitative; Future  -     dextromethorphan (DELSYM) 30 MG/5ML extended release liquid; Take 10 mLs by mouth 2 times daily as needed for Cough  -     XR CHEST STANDARD (2 VW); Future    Cough  -     dextromethorphan (DELSYM) 30 MG/5ML extended release liquid; Take 10 mLs by mouth 2 times daily as needed for Cough  -     XR CHEST STANDARD (2 VW); Future    Left leg pain  -     US DUP LOWER EXTREMITY LEFT SURAJ;  Future             Medical Decision Making: high complexity

## 2021-10-26 NOTE — DISCHARGE SUMMARY
[place] [address] Internal Medicine    Discharge Summary     Patient ID: Colby Carrillo  :  1985   MRN: [id number]     ACCOUNT:  [de-identified]   Patient's PCP: Teri Dumont MD  Admit Date: 10/14/2021   Discharge Date: 10/15/2021  Length of Stay: 1  Code Status:  Prior  Admitting Physician: Teri Dumont MD  Discharge Physician: Teri Dumont MD     Active Discharge Diagnoses:     Primary Problem  Hypomagnesemia      [place] Problems    Diagnosis Date Noted    Hypokalemia [E87.6] 10/15/2021    Transaminitis [R74.01] 10/15/2021    Hypomagnesemia [E83.42]     Uncomplicated alcohol dependence ([facility] [address] [F10.20] 2020    Diarrhea due to malabsorption [K90.9, R19.7] 2020    Essential hypertension [I10] 2020       Admission Condition:  fair     Discharged Condition: fair    Hospital Stay:     Hospital Course:  Colby Carrillo is a 29 y.o. male who was admitted for the management of Hypomagnesemia , presented to ER with Abdominal Pain, Nausea & Vomiting, and Diarrhea    29 y.o.  male, with a history of alcoholic cardiomyopathy, anxiety, CHF, HTN, pulmonary embolism, and uncomplicated alcohol dependence, who presents with abdominal pain, nausea and vomiting, and diarrhea for 4 days. 1. Diarrhea with dehydration no renal injury, -stool studies ordered and pending, saline hydration CT abdomen negative Covid swab negative. 2. Acute hepatitis-likely alcoholic hepatitis, hepatitis panel negative,  CT abdomen shows hepatomegaly and steatosis otherwise unremarkable  3. Hypomagnesemia-replacement per protocol  4. Hypokalemia replace per protocol. 5. Microcytic anemia hemoglobin 11.4 today baseline 14.2021, likely secondary to chronic alcohol use, B12 folate, TSH, iron studies ordered  6.  Alcohol abuse-CIWA protocol Librium discontinued due to hepatitis picture, social work consult for alcohol rehab declined by patient  7. Class I ObesityBody mass index is 30.71 kg/m². Significant therapeutic interventions: As above    Significant Diagnostic Studies:   Labs / Micro:    Lab Results   Component Value Date    WBC 5.3 10/15/2021    HGB 11.4 (L) 10/15/2021    HCT 33.1 (L) 10/15/2021    .7 (H) 10/15/2021     (L) 10/15/2021          Lab Results   Component Value Date     10/22/2021    K 3.7 10/22/2021     10/22/2021    CO2 24 10/22/2021    BUN 6 10/22/2021    CREATININE 0.59 10/22/2021    GLUCOSE 96 10/22/2021    CALCIUM 9.3 10/22/2021          Radiology:    CT ABDOMEN PELVIS W IV CONTRAST Additional Contrast? None    Result Date: 10/14/2021  EXAMINATION: CT OF THE ABDOMEN AND PELVIS WITH CONTRAST 10/14/2021 6:34 pm TECHNIQUE: CT of the abdomen and pelvis was performed with the administration of intravenous contrast. Multiplanar reformatted images are provided for review. Dose modulation, iterative reconstruction, and/or weight based adjustment of the mA/kV was utilized to reduce the radiation dose to as low as reasonably achievable. COMPARISON: CT abdomen and pelvis, 06/20/2021. HISTORY: ORDERING SYSTEM PROVIDED HISTORY: abd pain, NVD, elevated LFTs TECHNOLOGIST PROVIDED HISTORY: abd pain, NVD, elevated LFTs Decision Support Exception - unselect if not a suspected or confirmed emergency medical condition->Emergency Medical Condition (MA) Reason for Exam: abd pain, NVD for 4 days, elevated LFTs. Acuity: Unknown Type of Exam: Unknown FINDINGS: Lower Chest: Minimal scarring in the lingula and middle lobe. The lung bases are otherwise clear. The heart is normal in size. No pleural or pericardial effusion noted. Organs: Liver: Mildly enlarged, measuring 19.0 cm in length. Prominent diffuse steatosis. No focal lesion or ductal dilatation. Gallbladder: Unremarkable. Pancreas: Unremarkable. Spleen:  Unremarkable. Adrenals: Unremarkable. Kidneys: Unremarkable.  GI/Bowel: No bowel wall thickening or obstruction. Normal appendix. Pelvis: The urinary bladder and the prostate are grossly unremarkable. Small, shallow fat containing left inguinal hernia. Peritoneum/Retroperitoneum: No lymphadenopathy. No free air or free fluid is seen. The abdominal aorta and pelvic arteries are unremarkable. Bones/Soft Tissues: The visualized bones are intact without fracture or focal lesion. 1.  No acute abnormality is seen in the abdomen or the pelvis. 2. Mild hepatomegaly with prominent steatosis. 3. Small, shallow, fat containing left inguinal hernia. No evidence of fat strangulation. Consultations:    Consults:     Final Specialist Recommendations/Findings:   IP CONSULT TO INTERNAL MEDICINE  IP CONSULT TO SOCIAL WORK      The patient was seen and examined on day of discharge and this discharge summary is in conjunction with any daily progress note from day of discharge.     Discharge plan:     Disposition: Home    Instructions to Patient: Keep follow-up appointments      Requiring Further Evaluation/Follow Up POST HOSPITALIZATION/Incidental Findings:    Physician Follow Up:     MD Yanelis Leon [facility] [address] [phone]    In 1 week  call to make hospital follow up        Diet: Stop alcohol    Activity: As tolerated    Discharge Medications:      Medication List      CHANGE how you take these medications    naproxen 500 MG tablet  Commonly known as: Naprosyn  Take 1 tablet by mouth 2 times daily  What changed:   · when to take this  · reasons to take this     sertraline 25 MG tablet  Commonly known as: Zoloft  Take 1 tablet by mouth daily  What changed: when to take this        CONTINUE taking these medications    BP Monitor-Stethoscope Kit  1 each by Does not apply route daily     EPINEPHrine 0.3 MG/0.3ML Soaj injection  Commonly known as: EpiPen 2-Rafat  Inject 0.3 mLs into the muscle once for 1 dose Use as directed for allergic reaction     fluticasone 50 MCG/ACT nasal spray  Commonly known as: FLONASE  instill 1 spray into each nostril once daily     losartan 50 MG tablet  Commonly known as: COZAAR  Take 1 tablet by mouth daily     metoprolol succinate 25 MG extended release tablet  Commonly known as: TOPROL XL  Take 1 tablet by mouth daily     MULTIVITAMIN ADULT PO     Nicotine 21-14-7 MG/24HR Kit        STOP taking these medications    acetaminophen 500 MG tablet  Commonly known as: TYLENOL            Time Spent on discharge is  35 mins in patient examination, evaluation, counseling as well as medication reconciliation, prescriptions for required medications, discharge plan and follow up. Electronically signed by   Keshia Arana MD  10/26/2021  2:23 PM      Thank you Dr. Keshia Arana MD for the opportunity to be involved in this patient's care.

## 2021-10-27 ENCOUNTER — TELEPHONE (OUTPATIENT)
Dept: INTERNAL MEDICINE CLINIC | Age: 36
End: 2021-10-27

## 2021-10-27 DIAGNOSIS — E83.42 HYPOMAGNESEMIA: ICD-10-CM

## 2021-10-27 DIAGNOSIS — F10.10 ALCOHOL ABUSE: ICD-10-CM

## 2021-10-27 DIAGNOSIS — E87.6 HYPOKALEMIA: Primary | ICD-10-CM

## 2021-10-27 RX ORDER — LANOLIN ALCOHOL/MO/W.PET/CERES
400 CREAM (GRAM) TOPICAL DAILY
Qty: 30 TABLET | Refills: 0 | Status: SHIPPED | OUTPATIENT
Start: 2021-10-27 | End: 2022-04-27

## 2021-10-27 RX ORDER — POTASSIUM CHLORIDE 750 MG/1
10 TABLET, EXTENDED RELEASE ORAL DAILY
Qty: 30 TABLET | Refills: 0 | Status: SHIPPED | OUTPATIENT
Start: 2021-10-27 | End: 2022-04-27

## 2021-10-27 NOTE — TELEPHONE ENCOUNTER
Please inform patient lab results show nutritional deficiencies again secondary to alcohol intake.   We will recommend potassium and magnesium replacement  Recheck labs next week    Test for blood clot was negative, no need to get the leg ultrasound that I ordered yesterday    Anu Michaud MD

## 2021-11-03 ENCOUNTER — TELEPHONE (OUTPATIENT)
Dept: INTERNAL MEDICINE CLINIC | Age: 36
End: 2021-11-03

## 2021-11-03 NOTE — LETTER
[address]          11/03/2021    Dear Hans Beckham:     You recently missed a scheduled appointment with Dr Eliana Irwin on 11/3/2021. This is the 2nd scheduledappointment that you have missed within the last twelve months. If you miss 1more appointment, you may be dismissed from the practice. It is your responsibility to arrive to your appointment. Please call our office as soon as possible so that we may reschedule your appointment, because your health and follow-up medical care are important to us. For future appointments that you are unable to keep, please call the office at [phone] at least 24 hours in advance to cancel and reschedule. If a traditional appointment is difficult for you to make, please keep in mind, we offer E-Visits for several diagnoses as well as virtual visits. We also have primary care walk-in clinics available. For more information on these options, please contact our office.    Sincerely,        [address]  [address] [facility]

## 2021-11-03 NOTE — LETTER
[address]          11/03/2021    Dear Leonor Felty:     You recently missed a scheduled appointment with Dr Ender Mcgovern  on 11/2/2021. This is the 2nd scheduledappointment that you have missed within the last twelve months. If you miss 1 more appointment, you may be dismissed from the practice. It is your responsibility to arrive to your appointment. Please call our office as soon as possible so that we may reschedule your appointment, because your health and follow-up medical care are important to us. For future appointments that you are unable to keep, please call the office at [phone] at least 24 hours in advance to cancel and reschedule. If a traditional appointment is difficult for you to make, please keep in mind, we offer E-Visits for several diagnoses as well as virtual visits. We also have primary care walk-in clinics available. For more information on these options, please contact our office.    Sincerely,        [address]  [address] [facility]

## 2021-11-03 NOTE — TELEPHONE ENCOUNTER
Mailed patient no show appointment letter #2 for the date of 11/3/2021 scheduled with Dr Marychuy Trinh.

## 2021-11-16 DIAGNOSIS — I10 ESSENTIAL HYPERTENSION: ICD-10-CM

## 2021-11-16 RX ORDER — LOSARTAN POTASSIUM 50 MG/1
TABLET ORAL
Qty: 30 TABLET | Refills: 5 | Status: SHIPPED | OUTPATIENT
Start: 2021-11-16

## 2021-12-05 ENCOUNTER — HOSPITAL ENCOUNTER (EMERGENCY)
Age: 36
Discharge: LEFT BEFORE DISCHARGE | End: 2021-12-05

## 2021-12-05 VITALS
TEMPERATURE: 99.3 F | RESPIRATION RATE: 18 BRPM | HEART RATE: 99 BPM | DIASTOLIC BLOOD PRESSURE: 86 MMHG | HEIGHT: 68 IN | SYSTOLIC BLOOD PRESSURE: 129 MMHG | OXYGEN SATURATION: 96 % | BODY MASS INDEX: 30.31 KG/M2 | WEIGHT: 200 LBS

## 2021-12-05 ASSESSMENT — PAIN SCALES - GENERAL: PAINLEVEL_OUTOF10: 9

## 2021-12-05 ASSESSMENT — PAIN DESCRIPTION - ORIENTATION: ORIENTATION: LEFT;RIGHT;LOWER

## 2021-12-05 ASSESSMENT — PAIN DESCRIPTION - PAIN TYPE: TYPE: ACUTE PAIN

## 2021-12-20 DIAGNOSIS — I10 ESSENTIAL HYPERTENSION: ICD-10-CM

## 2021-12-20 RX ORDER — METOPROLOL SUCCINATE 25 MG/1
TABLET, EXTENDED RELEASE ORAL
Qty: 90 TABLET | Refills: 1 | Status: ON HOLD | OUTPATIENT
Start: 2021-12-20 | End: 2022-05-03 | Stop reason: HOSPADM

## 2022-01-30 ENCOUNTER — APPOINTMENT (OUTPATIENT)
Dept: GENERAL RADIOLOGY | Age: 37
End: 2022-01-30

## 2022-01-30 ENCOUNTER — HOSPITAL ENCOUNTER (EMERGENCY)
Age: 37
Discharge: HOME | End: 2022-01-30
Attending: EMERGENCY MEDICINE

## 2022-01-30 ENCOUNTER — HOSPITAL ENCOUNTER (OUTPATIENT)
Dept: VASCULAR LAB | Age: 37
Discharge: HOME | End: 2022-01-30

## 2022-01-30 ENCOUNTER — APPOINTMENT (OUTPATIENT)
Dept: VASCULAR LAB | Age: 37
End: 2022-01-30

## 2022-01-30 VITALS
HEART RATE: 96 BPM | RESPIRATION RATE: 18 BRPM | OXYGEN SATURATION: 96 % | DIASTOLIC BLOOD PRESSURE: 79 MMHG | SYSTOLIC BLOOD PRESSURE: 106 MMHG | TEMPERATURE: 99 F

## 2022-01-30 DIAGNOSIS — U07.1 COVID-19: Primary | ICD-10-CM

## 2022-01-30 LAB
ABSOLUTE EOS #: 0.08 K/UL (ref 0–0.4)
ABSOLUTE IMMATURE GRANULOCYTE: ABNORMAL K/UL (ref 0–0.3)
ABSOLUTE LYMPH #: 1 K/UL (ref 1–4.8)
ABSOLUTE MONO #: 0.54 K/UL (ref 0.1–1.3)
ANION GAP SERPL CALCULATED.3IONS-SCNC: 15 MMOL/L (ref 9–17)
BASOPHILS # BLD: 1 % (ref 0–2)
BASOPHILS ABSOLUTE: 0.08 K/UL (ref 0–0.2)
BUN BLDV-MCNC: 7 MG/DL (ref 6–20)
BUN/CREAT BLD: ABNORMAL (ref 9–20)
CALCIUM SERPL-MCNC: 9.1 MG/DL (ref 8.6–10.4)
CHLORIDE BLD-SCNC: 96 MMOL/L (ref 98–107)
CO2: 23 MMOL/L (ref 20–31)
CREAT SERPL-MCNC: 1.02 MG/DL (ref 0.7–1.2)
DIFFERENTIAL TYPE: ABNORMAL
EOSINOPHILS RELATIVE PERCENT: 1 % (ref 0–4)
GFR AFRICAN AMERICAN: >60 ML/MIN
GFR NON-AFRICAN AMERICAN: >60 ML/MIN
GFR SERPL CREATININE-BSD FRML MDRD: ABNORMAL ML/MIN/{1.73_M2}
GFR SERPL CREATININE-BSD FRML MDRD: ABNORMAL ML/MIN/{1.73_M2}
GLUCOSE BLD-MCNC: 106 MG/DL (ref 70–99)
HCT VFR BLD CALC: 48.5 % (ref 41–53)
HEMOGLOBIN: 16.6 G/DL (ref 13.5–17.5)
IMMATURE GRANULOCYTES: ABNORMAL %
INFLUENZA A: NOT DETECTED
INFLUENZA B: NOT DETECTED
INR BLD: 1
LYMPHOCYTES # BLD: 13 % (ref 24–44)
MCH RBC QN AUTO: 40.5 PG (ref 26–34)
MCHC RBC AUTO-ENTMCNC: 34.3 G/DL (ref 31–37)
MCV RBC AUTO: 118.2 FL (ref 80–100)
MONOCYTES # BLD: 7 % (ref 1–7)
MORPHOLOGY: ABNORMAL
MORPHOLOGY: ABNORMAL
NRBC AUTOMATED: ABNORMAL PER 100 WBC
PARTIAL THROMBOPLASTIN TIME: 28.2 SEC (ref 24–36)
PDW BLD-RTO: 18.4 % (ref 11.5–14.9)
PLATELET # BLD: 88 K/UL (ref 150–450)
PLATELET ESTIMATE: ABNORMAL
PMV BLD AUTO: 10.5 FL (ref 6–12)
POTASSIUM SERPL-SCNC: 3.6 MMOL/L (ref 3.7–5.3)
PROTHROMBIN TIME: 12.8 SEC (ref 11.8–14.6)
RBC # BLD: 4.1 M/UL (ref 4.5–5.9)
RBC # BLD: ABNORMAL 10*6/UL
SARS-COV-2 RNA, RT PCR: DETECTED
SEG NEUTROPHILS: 78 % (ref 36–66)
SEGMENTED NEUTROPHILS ABSOLUTE COUNT: 6 K/UL (ref 1.3–9.1)
SODIUM BLD-SCNC: 134 MMOL/L (ref 135–144)
SOURCE: ABNORMAL
SPECIMEN DESCRIPTION: ABNORMAL
TROPONIN INTERP: NORMAL
TROPONIN INTERP: NORMAL
TROPONIN T: NORMAL NG/ML
TROPONIN T: NORMAL NG/ML
TROPONIN, HIGH SENSITIVITY: 12 NG/L (ref 0–22)
TROPONIN, HIGH SENSITIVITY: 15 NG/L (ref 0–22)
WBC # BLD: 7.7 K/UL (ref 3.5–11)
WBC # BLD: ABNORMAL 10*3/UL

## 2022-01-30 PROCEDURE — 85025 COMPLETE CBC W/AUTO DIFF WBC: CPT

## 2022-01-30 PROCEDURE — 85730 THROMBOPLASTIN TIME PARTIAL: CPT

## 2022-01-30 PROCEDURE — 80048 BASIC METABOLIC PNL TOTAL CA: CPT

## 2022-01-30 PROCEDURE — 93971 EXTREMITY STUDY: CPT

## 2022-01-30 PROCEDURE — 84484 ASSAY OF TROPONIN QUANT: CPT

## 2022-01-30 PROCEDURE — 36415 COLL VENOUS BLD VENIPUNCTURE: CPT

## 2022-01-30 PROCEDURE — 99284 EMERGENCY DEPT VISIT MOD MDM: CPT

## 2022-01-30 PROCEDURE — 2580000003 HC RX 258: Performed by: EMERGENCY MEDICINE

## 2022-01-30 PROCEDURE — 93005 ELECTROCARDIOGRAM TRACING: CPT | Performed by: EMERGENCY MEDICINE

## 2022-01-30 PROCEDURE — 71045 X-RAY EXAM CHEST 1 VIEW: CPT

## 2022-01-30 PROCEDURE — 87636 SARSCOV2 & INF A&B AMP PRB: CPT

## 2022-01-30 PROCEDURE — 85610 PROTHROMBIN TIME: CPT

## 2022-01-30 RX ORDER — 0.9 % SODIUM CHLORIDE 0.9 %
1000 INTRAVENOUS SOLUTION INTRAVENOUS ONCE
Status: COMPLETED | OUTPATIENT
Start: 2022-01-30 | End: 2022-01-30

## 2022-01-30 RX ADMIN — SODIUM CHLORIDE 1000 ML: 9 INJECTION, SOLUTION INTRAVENOUS at 14:49

## 2022-01-30 ASSESSMENT — ENCOUNTER SYMPTOMS
COLOR CHANGE: 0
ABDOMINAL PAIN: 0
COUGH: 1
EYE PAIN: 0
BACK PAIN: 0
SHORTNESS OF BREATH: 0

## 2022-01-30 NOTE — ED PROVIDER NOTES
EMERGENCY DEPARTMENT ENCOUNTER    Pt Name: Patti Schuler  MRN: [id number]  [place] 1985  Date of evaluation: 1/30/22  CHIEF COMPLAINT       Chief Complaint   Patient presents with    Cough    Leg Pain     HISTORY OF PRESENT ILLNESS   72-year-old male presents for complaint of cough, fatigue and left leg pain. Patient reports that symptoms started approximately 1 week ago. Patient reports that in the last couple days has been feeling worse, more fatigued and tired, states he been coughing a little bit more and also having some shortness of breath. Patient reports that he is also noticed that he had pain in his left lower extremity for the last couple days, reports he has a history of prior PE and is concerned that he has a blood clot. Patient reports he has been laying in bed for the last 3 days, currently denies any chest pain, currently denying any shortness of breath nausea or vomiting, reports he has had fevers at home, denies any known sick contacts, reports that he did get his Covid shots. The history is provided by the patient. REVIEW OF SYSTEMS     Review of Systems   Constitutional: Positive for fatigue and fever. Negative for chills. HENT: Negative for congestion and ear pain. Eyes: Negative for pain. Respiratory: Positive for cough. Negative for shortness of breath. Cardiovascular: Negative for chest pain, palpitations and leg swelling. Gastrointestinal: Negative for abdominal pain. Genitourinary: Negative for dysuria and flank pain. Musculoskeletal: Negative for back pain. Left leg pain   Skin: Negative for color change. Neurological: Negative for numbness and headaches. Psychiatric/Behavioral: Negative for confusion. All other systems reviewed and are negative.     PASTMEDICAL HISTORY     Past Medical History:   Diagnosis Date    No active medical problems     Pulmonary embolism [facility])      Past Problem List  Patient Active Problem List   Diagnosis Code    Acute pulmonary embolism (HCC) I26.99    ACS (acute coronary syndrome) (HCC) I24.9    Chronic systolic congestive heart failure (HCC) I50.22    Essential hypertension P58    Uncomplicated alcohol dependence ([facility] [address] R71.41    Alcoholic cardiomyopathy ([facility] [address] I42.6    Diarrhea due to malabsorption K90.9, R19.7    Heartburn R12    Generalized anxiety disorder F41.1    Hypomagnesemia E83.42    Hypokalemia E87.6    Transaminitis O72.78    Alcoholic hepatitis without ascites K70.10    Macrocytic anemia D53.9     SURGICAL HISTORY     History reviewed. No pertinent surgical history.   CURRENT MEDICATIONS       Discharge Medication List as of 1/30/2022  5:24 PM      CONTINUE these medications which have NOT CHANGED    Details   metoprolol succinate (TOPROL XL) 25 MG extended release tablet take 1 tablet by mouth once daily, Disp-90 tablet, R-1Normal      losartan (COZAAR) 50 MG tablet take 1 tablet by mouth once daily, Disp-30 tablet, R-5Normal      Lactobacillus Acid-Pectin (ACIDOPHILUS/CITRUS PECTIN) TABS take 1 tablet by mouth twice a dayHistorical Med      potassium chloride (KLOR-CON M) 10 MEQ extended release tablet Take 1 tablet by mouth daily for 10 days, Disp-30 tablet, R-0Normal      magnesium oxide (MAG-OX) 400 (240 Mg) MG tablet Take 1 tablet by mouth daily, Disp-30 tablet, R-0Normal      albuterol sulfate HFA (VENTOLIN HFA) 108 (90 Base) MCG/ACT inhaler Inhale 2 puffs into the lungs 4 times daily as needed for Wheezing, Disp-54 g, R-1Normal      naproxen (NAPROSYN) 500 MG tablet Take 1 tablet by mouth 2 times daily, Disp-20 tablet, R-0Normal      sertraline (ZOLOFT) 25 MG tablet Take 1 tablet by mouth daily, Disp-30 tablet, R-3Normal      fluticasone (FLONASE) 50 MCG/ACT nasal spray instill 1 spray into each nostril once daily, Disp-16 g, R-1Normal      Blood Pressure Monitoring (BP MONITOR-STETHOSCOPE) KIT DAILY Starting Wed 4/7/2021, Disp-1 kit, R-0, Normal      Multiple Vitamins-Minerals Capillary Refill: Capillary refill takes less than 2 seconds. Neurological:      General: No focal deficit present. Mental Status: He is alert and oriented to person, place, and time. Psychiatric:         Behavior: Behavior normal.         MEDICAL DECISION MAKIN-year-old male presents for complaint of cough, fatigue and leg pain. On initial exam patient in no acute distress vitals are stable, given patient's history of prior PE, concern for possible DVT, will check labs, will check Doppler to rule out DVT, symptoms also concerning for possible cold or flu infection will check rapid testing    Doppler was negative for DVT, labs reviewed patient was found to be Covid positive, remaining other labs were unremarkable, chest x-ray was negative    Patient was reevaluated reports he is feeling little bit better    Discussed with patient, discussed results, discussed obtaining a CT chest versus close follow-up, patient denies any shortness of breath or chest pain at this time, he does not want a CT chest performed to evaluate for any PE, discussed return precautions and need for follow-up with his PCP, patient voiced understanding is comfortable with plan and discharge home    Patient/Guardian was informed of their diagnosis and told to follow up with PCP  in 1-3 days. Patient demonstrates understanding and agreement with the plan. They were given the opportunity to ask questions and those questions were answered to the best of our ability with the available information. Patient/Guardian told to return to the ED for any new, worsening, changing or persistent symptoms. This dictation was prepared using [employer] voice recognition software.          CRITICAL CARE:       PROCEDURES:    Procedures    DIAGNOSTIC RESULTS   EKG:All EKG's are interpreted by the Emergency Department Physician who either signs or Co-signs this chart in the absence of a cardiologist.    Sinus rhythm rate of 85, normal axis, normal intervals, no ST segment elevation or depression, no T wave changes    RADIOLOGY:All plain film, CT, MRI, and formal ultrasound images (except ED bedside ultrasound) are read by the radiologist, see reports below, unless otherwisenoted in MDM or here. VL Lower Extremity Venous Left   Final Result      XR CHEST PORTABLE   Final Result   No acute process. LABS: All lab results were reviewed by myself, and all abnormals are listed below.   Labs Reviewed   COVID-19 & INFLUENZA COMBO - Abnormal; Notable for the following components:       Result Value    SARS-CoV-2 RNA, RT PCR DETECTED (*)     All other components within normal limits   CBC WITH AUTO DIFFERENTIAL - Abnormal; Notable for the following components:    RBC 4.10 (*)     .2 (*)     MCH 40.5 (*)     RDW 18.4 (*)     Platelets 88 (*)     Seg Neutrophils 78 (*)     Lymphocytes 13 (*)     All other components within normal limits   BASIC METABOLIC PANEL W/ REFLEX TO MG FOR LOW K - Abnormal; Notable for the following components:    Glucose 106 (*)     Sodium 134 (*)     Potassium 3.6 (*)     Chloride 96 (*)     All other components within normal limits   TROPONIN   PROTIME-INR   APTT   TROPONIN       EMERGENCY DEPARTMENTCOURSE:         Vitals:    Vitals:    01/30/22 1322   BP: 106/79   Pulse: 96   Resp: 18   Temp: 99 °F (37.2 °C)   SpO2: 96%       The patient was given the following medications while in the emergency department:  Orders Placed This Encounter   Medications    0.9 % sodium chloride bolus     CONSULTS:  None    FINAL IMPRESSION      1. COVID-19          DISPOSITION/PLAN   DISPOSITION Decision To Discharge 01/30/2022 05:23:49 PM      PATIENT REFERRED TO:  MD Yanelis Pedroza [address] [place], [place] [phone]    Schedule an appointment as soon as possible for a visit       [address] [place] [address]  [phone]    As needed, If symptoms worsen    DISCHARGE MEDICATIONS:  Discharge Medication List as of 1/30/2022  5:24 PM        The care is provided during an unprecedented national emergency due to the novel coronavirus, COVID 19.   [address] [facility], DO                    23 [facility], DO  01/30/22 2017

## 2022-01-30 NOTE — Clinical Note
Navid Hyde was seen and treated in our emergency department on 1/30/2022. COVID19 virus is suspected. Per the CDC guidelines we recommend home isolation until the following conditions are all met:    1. At least five days have passed since symptoms first appeared and/or had a close exposure,   2. After home isolation for five days, wearing a mask around others for the next five days,  3. At least 24 have passed since last fever without the use of fever-reducing medications and  4. Symptoms (eg cough, shortness of breath) have improved    If you have any questions or concerns, please don't hesitate to call.     He may return to work/school on 02/04/2022        Yohannes Harvey, DO

## 2022-01-30 NOTE — ED NOTES
Patient to emergency department with complaints of productive cough and sore throat since Sunday. Pt states he has been laying in bed due to being fatigued, pt states he noticed tenderness/ \"lump\" to left inner thigh.       Stephanie bIanez RN  01/30/22 0866

## 2022-01-31 ENCOUNTER — CARE COORDINATION (OUTPATIENT)
Dept: CARE COORDINATION | Age: 37
End: 2022-01-31

## 2022-01-31 LAB
EKG ATRIAL RATE: 85 BPM
EKG P AXIS: 54 DEGREES
EKG P-R INTERVAL: 132 MS
EKG Q-T INTERVAL: 346 MS
EKG QRS DURATION: 88 MS
EKG QTC CALCULATION (BAZETT): 411 MS
EKG R AXIS: 31 DEGREES
EKG T AXIS: 54 DEGREES
EKG VENTRICULAR RATE: 85 BPM

## 2022-02-01 ENCOUNTER — CARE COORDINATION (OUTPATIENT)
Dept: CARE COORDINATION | Age: 37
End: 2022-02-01

## 2022-03-28 ENCOUNTER — HOSPITAL ENCOUNTER (EMERGENCY)
Age: 37
Discharge: HOME | End: 2022-03-28
Attending: EMERGENCY MEDICINE

## 2022-03-28 ENCOUNTER — APPOINTMENT (OUTPATIENT)
Dept: CT IMAGING | Age: 37
End: 2022-03-28

## 2022-03-28 VITALS
HEART RATE: 85 BPM | SYSTOLIC BLOOD PRESSURE: 124 MMHG | RESPIRATION RATE: 13 BRPM | BODY MASS INDEX: 28.79 KG/M2 | OXYGEN SATURATION: 97 % | HEIGHT: 68 IN | DIASTOLIC BLOOD PRESSURE: 92 MMHG | TEMPERATURE: 98.1 F | WEIGHT: 190 LBS

## 2022-03-28 DIAGNOSIS — R10.13 ABDOMINAL PAIN, EPIGASTRIC: Primary | ICD-10-CM

## 2022-03-28 DIAGNOSIS — F10.10 ALCOHOL ABUSE: ICD-10-CM

## 2022-03-28 DIAGNOSIS — K29.20 ALCOHOLIC GASTRITIS, PRESENCE OF BLEEDING UNSPECIFIED, UNSPECIFIED CHRONICITY: ICD-10-CM

## 2022-03-28 LAB
ABO/RH: NORMAL
ABSOLUTE EOS #: 0.16 K/UL (ref 0–0.4)
ABSOLUTE LYMPH #: 1.78 K/UL (ref 1–4.8)
ABSOLUTE MONO #: 0.81 K/UL (ref 0.1–1.3)
ALBUMIN SERPL-MCNC: 3.8 G/DL (ref 3.5–5.2)
ALP BLD-CCNC: 306 U/L (ref 40–129)
ALT SERPL-CCNC: 83 U/L (ref 5–41)
ANION GAP SERPL CALCULATED.3IONS-SCNC: 16 MMOL/L (ref 9–17)
ANTIBODY SCREEN: NEGATIVE
ARM BAND NUMBER: NORMAL
AST SERPL-CCNC: 429 U/L
BASOPHILS # BLD: 1 % (ref 0–2)
BASOPHILS ABSOLUTE: 0.08 K/UL (ref 0–0.2)
BILIRUB SERPL-MCNC: 1.42 MG/DL (ref 0.3–1.2)
BLOOD BANK COMMENT: NORMAL
BUN BLDV-MCNC: 7 MG/DL (ref 6–20)
CALCIUM SERPL-MCNC: 9.1 MG/DL (ref 8.6–10.4)
CHLORIDE BLD-SCNC: 99 MMOL/L (ref 98–107)
CO2: 21 MMOL/L (ref 20–31)
CREAT SERPL-MCNC: 0.47 MG/DL (ref 0.7–1.2)
EKG ATRIAL RATE: 83 BPM
EKG P AXIS: 54 DEGREES
EKG P-R INTERVAL: 148 MS
EKG Q-T INTERVAL: 380 MS
EKG QRS DURATION: 104 MS
EKG QTC CALCULATION (BAZETT): 446 MS
EKG R AXIS: 27 DEGREES
EKG T AXIS: 77 DEGREES
EKG VENTRICULAR RATE: 83 BPM
EOSINOPHILS RELATIVE PERCENT: 2 % (ref 0–4)
EXPIRATION DATE: NORMAL
GFR AFRICAN AMERICAN: >60 ML/MIN
GFR NON-AFRICAN AMERICAN: >60 ML/MIN
GFR SERPL CREATININE-BSD FRML MDRD: ABNORMAL ML/MIN/{1.73_M2}
GLUCOSE BLD-MCNC: 105 MG/DL (ref 70–99)
HCT VFR BLD CALC: 37.1 % (ref 41–53)
HEMOGLOBIN: 12.9 G/DL (ref 13.5–17.5)
INR BLD: 1
LIPASE: 102 U/L (ref 13–60)
LYMPHOCYTES # BLD: 22 % (ref 24–44)
MAGNESIUM: 1.8 MG/DL (ref 1.6–2.6)
MCH RBC QN AUTO: 41.6 PG (ref 26–34)
MCHC RBC AUTO-ENTMCNC: 34.7 G/DL (ref 31–37)
MCV RBC AUTO: 119.7 FL (ref 80–100)
MONOCYTES # BLD: 10 % (ref 1–7)
MORPHOLOGY: ABNORMAL
MORPHOLOGY: ABNORMAL
PDW BLD-RTO: 16.2 % (ref 11.5–14.9)
PLATELET # BLD: 114 K/UL (ref 150–450)
PMV BLD AUTO: 8.9 FL (ref 6–12)
POTASSIUM SERPL-SCNC: 3.3 MMOL/L (ref 3.7–5.3)
PROTHROMBIN TIME: 12.8 SEC (ref 11.8–14.6)
RBC # BLD: 3.1 M/UL (ref 4.5–5.9)
SEG NEUTROPHILS: 65 % (ref 36–66)
SEGMENTED NEUTROPHILS ABSOLUTE COUNT: 5.27 K/UL (ref 1.3–9.1)
SODIUM BLD-SCNC: 136 MMOL/L (ref 135–144)
TOTAL PROTEIN: 9 G/DL (ref 6.4–8.3)
TROPONIN, HIGH SENSITIVITY: 10 NG/L (ref 0–22)
TROPONIN, HIGH SENSITIVITY: 11 NG/L (ref 0–22)
WBC # BLD: 8.1 K/UL (ref 3.5–11)

## 2022-03-28 PROCEDURE — 86850 RBC ANTIBODY SCREEN: CPT

## 2022-03-28 PROCEDURE — 83690 ASSAY OF LIPASE: CPT

## 2022-03-28 PROCEDURE — 86900 BLOOD TYPING SEROLOGIC ABO: CPT

## 2022-03-28 PROCEDURE — 2500000003 HC RX 250 WO HCPCS: Performed by: EMERGENCY MEDICINE

## 2022-03-28 PROCEDURE — 2580000003 HC RX 258: Performed by: EMERGENCY MEDICINE

## 2022-03-28 PROCEDURE — 86901 BLOOD TYPING SEROLOGIC RH(D): CPT

## 2022-03-28 PROCEDURE — 83735 ASSAY OF MAGNESIUM: CPT

## 2022-03-28 PROCEDURE — 85610 PROTHROMBIN TIME: CPT

## 2022-03-28 PROCEDURE — 84484 ASSAY OF TROPONIN QUANT: CPT

## 2022-03-28 PROCEDURE — 96374 THER/PROPH/DIAG INJ IV PUSH: CPT

## 2022-03-28 PROCEDURE — 85025 COMPLETE CBC W/AUTO DIFF WBC: CPT

## 2022-03-28 PROCEDURE — A4216 STERILE WATER/SALINE, 10 ML: HCPCS | Performed by: EMERGENCY MEDICINE

## 2022-03-28 PROCEDURE — 80053 COMPREHEN METABOLIC PANEL: CPT

## 2022-03-28 PROCEDURE — 96375 TX/PRO/DX INJ NEW DRUG ADDON: CPT

## 2022-03-28 PROCEDURE — 36415 COLL VENOUS BLD VENIPUNCTURE: CPT

## 2022-03-28 PROCEDURE — 99285 EMERGENCY DEPT VISIT HI MDM: CPT

## 2022-03-28 PROCEDURE — 93005 ELECTROCARDIOGRAM TRACING: CPT | Performed by: EMERGENCY MEDICINE

## 2022-03-28 PROCEDURE — 6360000002 HC RX W HCPCS: Performed by: EMERGENCY MEDICINE

## 2022-03-28 PROCEDURE — 74177 CT ABD & PELVIS W/CONTRAST: CPT

## 2022-03-28 PROCEDURE — 6360000004 HC RX CONTRAST MEDICATION: Performed by: EMERGENCY MEDICINE

## 2022-03-28 RX ORDER — 0.9 % SODIUM CHLORIDE 0.9 %
80 INTRAVENOUS SOLUTION INTRAVENOUS ONCE
Status: COMPLETED | OUTPATIENT
Start: 2022-03-28 | End: 2022-03-28

## 2022-03-28 RX ORDER — ONDANSETRON 2 MG/ML
8 INJECTION INTRAMUSCULAR; INTRAVENOUS ONCE
Status: COMPLETED | OUTPATIENT
Start: 2022-03-28 | End: 2022-03-28

## 2022-03-28 RX ORDER — SODIUM CHLORIDE 0.9 % (FLUSH) 0.9 %
10 SYRINGE (ML) INJECTION PRN
Status: DISCONTINUED | OUTPATIENT
Start: 2022-03-28 | End: 2022-03-28 | Stop reason: HOSPADM

## 2022-03-28 RX ORDER — 0.9 % SODIUM CHLORIDE 0.9 %
1000 INTRAVENOUS SOLUTION INTRAVENOUS ONCE
Status: COMPLETED | OUTPATIENT
Start: 2022-03-28 | End: 2022-03-28

## 2022-03-28 RX ORDER — FAMOTIDINE 20 MG/1
20 TABLET, FILM COATED ORAL 2 TIMES DAILY
Qty: 60 TABLET | Refills: 0 | Status: SHIPPED | OUTPATIENT
Start: 2022-03-28 | End: 2022-04-15

## 2022-03-28 RX ADMIN — SODIUM CHLORIDE, PRESERVATIVE FREE 10 ML: 5 INJECTION INTRAVENOUS at 07:03

## 2022-03-28 RX ADMIN — ONDANSETRON 8 MG: 2 INJECTION INTRAMUSCULAR; INTRAVENOUS at 06:23

## 2022-03-28 RX ADMIN — IOPAMIDOL 75 ML: 755 INJECTION, SOLUTION INTRAVENOUS at 07:03

## 2022-03-28 RX ADMIN — SODIUM CHLORIDE 80 ML: 9 INJECTION, SOLUTION INTRAVENOUS at 07:03

## 2022-03-28 RX ADMIN — FAMOTIDINE 20 MG: 10 INJECTION, SOLUTION INTRAVENOUS at 06:23

## 2022-03-28 RX ADMIN — SODIUM CHLORIDE 1000 ML: 9 INJECTION, SOLUTION INTRAVENOUS at 06:27

## 2022-03-28 ASSESSMENT — ENCOUNTER SYMPTOMS
NAUSEA: 1
ABDOMINAL PAIN: 1
VOMITING: 1

## 2022-03-28 NOTE — ED PROVIDER NOTES
EMERGENCY DEPARTMENT ENCOUNTER    Pt Name: Carlos Luz  MRN: [id number]  [place] 1985  Date of evaluation: 3/28/22  CHIEF COMPLAINT       Chief Complaint   Patient presents with    Abdominal Pain    Rectal Bleeding     HISTORY OF PRESENT ILLNESS     Abdominal Pain  Pain location:  Epigastric and RUQ  Pain quality: aching    Pain radiates to:  Does not radiate  Pain severity:  Severe  Onset quality:  Gradual  Duration:  1 hour  Timing:  Constant  Progression:  Unchanged  Chronicity:  New  Context: alcohol use    Relieved by:  Nothing  Worsened by:  Nothing  Ineffective treatments:  None tried  Associated symptoms: nausea and vomiting    Associated symptoms: no fever      Blood in stools for past three days  Daily drinker  Woke up with ruq and epigastric pain today  Has had vomiting past few days  Hard stools      REVIEW OF SYSTEMS     Review of Systems   Constitutional: Negative for fever. Gastrointestinal: Positive for abdominal pain, nausea and vomiting. All other systems reviewed and are negative. PASTMEDICAL HISTORY     Past Medical History:   Diagnosis Date    No active medical problems     Pulmonary embolism (HCC)      Past Problem List  Patient Active Problem List   Diagnosis Code    Acute pulmonary embolism (HCC) I26.99    ACS (acute coronary syndrome) (HCC) I24.9    Chronic systolic congestive heart failure (HCC) I50.22    Essential hypertension G08    Uncomplicated alcohol dependence ([address] L52.17    Alcoholic cardiomyopathy ([address] I42.6    Diarrhea due to malabsorption K90.9, R19.7    Heartburn R12    Generalized anxiety disorder F41.1    Hypomagnesemia E83.42    Hypokalemia E87.6    Transaminitis R02.68    Alcoholic hepatitis without ascites K70.10    Macrocytic anemia D53.9     SURGICAL HISTORY     History reviewed. No pertinent surgical history.   CURRENT MEDICATIONS       Previous Medications    ALBUTEROL SULFATE HFA (VENTOLIN HFA) 108 (90 BASE) MCG/ACT INHALER Inhale 2 puffs into the lungs 4 times daily as needed for Wheezing    BLOOD PRESSURE MONITORING (BP MONITOR-STETHOSCOPE) KIT    1 each by Does not apply route daily    EPINEPHRINE (EPIPEN 2-LIZETTE) 0.3 MG/0.3ML SOAJ INJECTION    Inject 0.3 mLs into the muscle once for 1 dose Use as directed for allergic reaction    FLUTICASONE (FLONASE) 50 MCG/ACT NASAL SPRAY    instill 1 spray into each nostril once daily    LACTOBACILLUS ACID-PECTIN (ACIDOPHILUS/CITRUS PECTIN) TABS    take 1 tablet by mouth twice a day    LOSARTAN (COZAAR) 50 MG TABLET    take 1 tablet by mouth once daily    MAGNESIUM OXIDE (MAG-OX) 400 (240 MG) MG TABLET    Take 1 tablet by mouth daily    METOPROLOL SUCCINATE (TOPROL XL) 25 MG EXTENDED RELEASE TABLET    take 1 tablet by mouth once daily    MULTIPLE VITAMINS-MINERALS (MULTIVITAMIN ADULT PO)    Take by mouth    NAPROXEN (NAPROSYN) 500 MG TABLET    Take 1 tablet by mouth 2 times daily    NICOTINE 21-14-7 MG/24HR KIT    Place 14 mg onto the skin nightly     POTASSIUM CHLORIDE (KLOR-CON M) 10 MEQ EXTENDED RELEASE TABLET    Take 1 tablet by mouth daily for 10 days    SERTRALINE (ZOLOFT) 25 MG TABLET    Take 1 tablet by mouth daily     ALLERGIES     is allergic to bee venom. FAMILY HISTORY     He indicated that his mother is . He indicated that his father is alive.      SOCIAL HISTORY       Social History     Tobacco Use    Smoking status: Current Every Day Smoker     Packs/day: 1.00     Years: 10.00     Pack years: 10.00     Types: Cigarettes    Smokeless tobacco: Never Used    Tobacco comment: using nicotine patches at night   Vaping Use    Vaping Use: Never used   Substance Use Topics    Alcohol use: Yes     Comment: daily     Drug use: Not Currently     Comment: used cocaine in early 20's     PHYSICAL EXAM     INITIAL VITALS: BP (!) 153/100   Pulse 88   Temp 98.1 °F (36.7 °C) (Oral)   Resp 16   Ht 5' 8\" (1.727 m)   Wt 190 lb (86.2 kg)   SpO2 97%   BMI 28.89 kg/m²    Physical Exam  Constitutional:       General: He is not in acute distress. Appearance: Normal appearance. He is well-developed. He is not diaphoretic. HENT:      Head: Normocephalic and atraumatic. Right Ear: External ear normal.      Left Ear: External ear normal.      Nose: Nose normal. No congestion. Mouth/Throat:      Mouth: Mucous membranes are moist.      Pharynx: Oropharynx is clear. Eyes:      General:         Right eye: No discharge. Left eye: No discharge. Conjunctiva/sclera: Conjunctivae normal.      Pupils: Pupils are equal, round, and reactive to light. Neck:      Trachea: No tracheal deviation. Cardiovascular:      Rate and Rhythm: Normal rate and regular rhythm. Pulses: Normal pulses. Heart sounds: Normal heart sounds. Pulmonary:      Effort: Pulmonary effort is normal. No respiratory distress. Breath sounds: Normal breath sounds. No stridor. No wheezing or rales. Abdominal:      Palpations: Abdomen is soft. Tenderness: There is abdominal tenderness. There is no guarding or rebound. Comments: RUQ tenderness and epigastric tenderness   Musculoskeletal:         General: No tenderness or deformity. Normal range of motion. Cervical back: Normal range of motion and neck supple. Skin:     General: Skin is warm and dry. Capillary Refill: Capillary refill takes less than 2 seconds. Findings: No erythema or rash. Neurological:      General: No focal deficit present. Mental Status: He is alert and oriented to person, place, and time. Coordination: Coordination normal.   Psychiatric:         Mood and Affect: Mood normal.         Behavior: Behavior normal.         Thought Content:  Thought content normal.         Judgment: Judgment normal.         MEDICAL DECISION MAKING:   Ct abd pelvis pending  7:17 AM EDT  Patient signed out to Dr Priscila Garcia           Procedures    DIAGNOSTIC RESULTS   EKG:All EKG's are interpreted by the Emergency Department Physician who either signs or Co-signs this chart in the absence of a cardiologist.  NSR, nonspecific changes, no acute ischemic changes on ST segments, no change compared to old, normal rate and normal intervals        RADIOLOGY:All plain film, CT, MRI, and formal ultrasound images (except ED bedside ultrasound) are read by the radiologist, see reports below, unless otherwisenoted in MDM or here. CT ABDOMEN PELVIS W IV CONTRAST Additional Contrast? None    (Results Pending)     LABS: All lab results were reviewed by myself, and all abnormals are listed below.   Labs Reviewed   CBC WITH AUTO DIFFERENTIAL - Abnormal; Notable for the following components:       Result Value    RBC 3.10 (*)     Hemoglobin 12.9 (*)     Hematocrit 37.1 (*)     .7 (*)     MCH 41.6 (*)     RDW 16.2 (*)     Platelets 926 (*)     All other components within normal limits   COMPREHENSIVE METABOLIC PANEL - Abnormal; Notable for the following components:    Glucose 105 (*)     CREATININE 0.47 (*)     Potassium 3.3 (*)     Alkaline Phosphatase 306 (*)     ALT 83 (*)      (*)     Total Bilirubin 1.42 (*)     Total Protein 9.0 (*)     All other components within normal limits   LIPASE - Abnormal; Notable for the following components:    Lipase 102 (*)     All other components within normal limits   MAGNESIUM   TROPONIN   PROTIME-INR   TROPONIN   PERIPHERAL BLOOD SMEAR, PATH REVIEW   TYPE AND SCREEN       EMERGENCY DEPARTMENTCOURSE:         Vitals:    Vitals:    03/28/22 0552   BP: (!) 153/100   Pulse: 88   Resp: 16   Temp: 98.1 °F (36.7 °C)   TempSrc: Oral   SpO2: 97%   Weight: 190 lb (86.2 kg)   Height: 5' 8\" (1.727 m)       The patient was given the following medications while in the emergency department:  Orders Placed This Encounter   Medications    0.9 % sodium chloride bolus    ondansetron (ZOFRAN) injection 8 mg    famotidine (PEPCID) 20 mg in sodium chloride (PF) 10 mL injection    iopamidol (ISOVUE-370) 76 % injection 75 mL    0.9 % sodium chloride bolus    sodium chloride flush 0.9 % injection 10 mL     CONSULTS:  None    FINAL IMPRESSION      1. Abdominal pain, epigastric          DISPOSITION/PLAN   DISPOSITION        PATIENT REFERRED TO:  No follow-up provider specified. DISCHARGE MEDICATIONS:  New Prescriptions    No medications on file     The care is provided during an unprecedented national emergency due to the novel coronavirus, COVID 19.   MD Cesia Montenegro MD  03/28/22 4542

## 2022-03-28 NOTE — ED PROVIDER NOTES
Received pt in signout:     The patient's initial evaluation and plan have been discussed with the prior provider, nursing Notes, Past Medical Hx, Past Surgical Hx, Social Hx, Allergies, and Family Hx were all reviewed. I performed a repeat evaluation of the patient and reviewed tests completed so far. CT scan unremarkable. D/w pt the results, treatment plan, warning precautions for prompt ED return and importance of close OP FU, he verbalizes understanding and agrees with the treatment plan. 1. Abdominal pain, epigastric    2. Alcohol abuse    3.  Alcoholic gastritis, presence of bleeding unspecified, unspecified chronicity        Mery Mishra MD  03/29/22 7144       Mery Mishra MD  03/29/22 1536

## 2022-03-28 NOTE — ED NOTES
Mode of arrival (squad #, walk in, police, etc) : walk in         Chief complaint(s): RUQ pain, bloody stool, alcohol problem         Arrival Note (brief scenario, treatment PTA, etc). : pt reports he woke up out of his sleep with RUQ pain that he describes as someone \"shoving a hot dagger into my stomach\". When asked, pt does admit to bloody stool x3 days. Pt admits to heavy daily ETOH use, including PTA. Pt denies seizures from withdrawal. GCS 15        C= \"Have you ever felt that you should Cut down on your drinking? \"  Yes  A= \"Have people Annoyed you by criticizing your drinking? \"  Yes  G= \"Have you ever felt bad or Guilty about your drinking? \"  Yes  E= \"Have you ever had a drink as an Eye-opener first thing in the morning to steady your nerves or to help a hangover? \"  Yes      Deferred []      Reason for deferring: N/A    *If yes to two or more: probable alcohol abuse. Johnathon Foley RN  03/28/22 2369

## 2022-04-15 ENCOUNTER — APPOINTMENT (OUTPATIENT)
Dept: GENERAL RADIOLOGY | Age: 37
End: 2022-04-15
Payer: MEDICAID

## 2022-04-15 ENCOUNTER — APPOINTMENT (OUTPATIENT)
Dept: CT IMAGING | Age: 37
End: 2022-04-15
Payer: MEDICAID

## 2022-04-15 ENCOUNTER — HOSPITAL ENCOUNTER (EMERGENCY)
Age: 37
Discharge: HOME | End: 2022-04-15
Attending: EMERGENCY MEDICINE
Payer: MEDICAID

## 2022-04-15 VITALS
SYSTOLIC BLOOD PRESSURE: 118 MMHG | OXYGEN SATURATION: 93 % | DIASTOLIC BLOOD PRESSURE: 77 MMHG | RESPIRATION RATE: 14 BRPM | TEMPERATURE: 98 F | HEART RATE: 98 BPM

## 2022-04-15 DIAGNOSIS — R07.9 CHEST PAIN, UNSPECIFIED TYPE: ICD-10-CM

## 2022-04-15 DIAGNOSIS — D53.9 MACROCYTIC ANEMIA: Primary | ICD-10-CM

## 2022-04-15 DIAGNOSIS — R19.5 GUAIAC POSITIVE STOOLS: ICD-10-CM

## 2022-04-15 DIAGNOSIS — R06.02 SHORTNESS OF BREATH: ICD-10-CM

## 2022-04-15 DIAGNOSIS — R10.9 ABDOMINAL PAIN, UNSPECIFIED ABDOMINAL LOCATION: ICD-10-CM

## 2022-04-15 LAB
ABSOLUTE EOS #: 0.33 K/UL (ref 0–0.4)
ABSOLUTE IMMATURE GRANULOCYTE: 0.11 K/UL (ref 0–0.3)
ABSOLUTE LYMPH #: 1.74 K/UL (ref 1–4.8)
ABSOLUTE MONO #: 0.55 K/UL (ref 0.1–0.8)
ALBUMIN SERPL-MCNC: 3.5 G/DL (ref 3.5–5.2)
ALBUMIN/GLOBULIN RATIO: 0.6 (ref 1–2.5)
ALP BLD-CCNC: 302 U/L (ref 40–129)
ALT SERPL-CCNC: 67 U/L (ref 5–41)
ANION GAP SERPL CALCULATED.3IONS-SCNC: 20 MMOL/L (ref 9–17)
AST SERPL-CCNC: 394 U/L
BASOPHILS # BLD: 0 % (ref 0–2)
BASOPHILS ABSOLUTE: 0 K/UL (ref 0–0.2)
BILIRUB SERPL-MCNC: 3.17 MG/DL (ref 0.3–1.2)
BUN BLDV-MCNC: 11 MG/DL (ref 6–20)
CALCIUM SERPL-MCNC: 9 MG/DL (ref 8.6–10.4)
CHLORIDE BLD-SCNC: 94 MMOL/L (ref 98–107)
CO2: 21 MMOL/L (ref 20–31)
CREAT SERPL-MCNC: 0.81 MG/DL (ref 0.7–1.2)
D-DIMER QUANTITATIVE: 1.09 MG/L FEU
EOSINOPHILS RELATIVE PERCENT: 3 % (ref 1–4)
GFR AFRICAN AMERICAN: >60 ML/MIN
GFR NON-AFRICAN AMERICAN: >60 ML/MIN
GFR SERPL CREATININE-BSD FRML MDRD: ABNORMAL ML/MIN/{1.73_M2}
GLUCOSE BLD-MCNC: 121 MG/DL (ref 70–99)
HCT VFR BLD CALC: 32.6 % (ref 40.7–50.3)
HEMOGLOBIN: 11.7 G/DL (ref 13–17)
IMMATURE GRANULOCYTES: 1 %
INR BLD: 1
LIPASE: 45 U/L (ref 13–60)
LYMPHOCYTES # BLD: 16 % (ref 24–44)
MCH RBC QN AUTO: 43.2 PG (ref 25.2–33.5)
MCHC RBC AUTO-ENTMCNC: 35.9 G/DL (ref 28.4–34.8)
MCV RBC AUTO: 120.3 FL (ref 82.6–102.9)
MONOCYTES # BLD: 5 % (ref 1–7)
MORPHOLOGY: ABNORMAL
MORPHOLOGY: ABNORMAL
NRBC AUTOMATED: 0 PER 100 WBC
PDW BLD-RTO: 16.3 % (ref 11.8–14.4)
PLATELET # BLD: ABNORMAL K/UL (ref 138–453)
PLATELET, FLUORESCENCE: 143 K/UL (ref 138–453)
PLATELET, IMMATURE FRACTION: 10 % (ref 1.1–10.3)
POTASSIUM SERPL-SCNC: 3.6 MMOL/L (ref 3.7–5.3)
PRO-BNP: 54 PG/ML
PROTHROMBIN TIME: 10.9 SEC (ref 9.1–12.3)
RBC # BLD: 2.71 M/UL (ref 4.21–5.77)
SEG NEUTROPHILS: 75 % (ref 36–66)
SEGMENTED NEUTROPHILS ABSOLUTE COUNT: 8.17 K/UL (ref 1.8–7.7)
SODIUM BLD-SCNC: 135 MMOL/L (ref 135–144)
TOTAL PROTEIN: 8.9 G/DL (ref 6.4–8.3)
TROPONIN, HIGH SENSITIVITY: 13 NG/L (ref 0–22)
TROPONIN, HIGH SENSITIVITY: 15 NG/L (ref 0–22)
WBC # BLD: 10.9 K/UL (ref 3.5–11.3)

## 2022-04-15 PROCEDURE — 71260 CT THORAX DX C+: CPT

## 2022-04-15 PROCEDURE — 85055 RETICULATED PLATELET ASSAY: CPT

## 2022-04-15 PROCEDURE — H0049 ALCOHOL/DRUG SCREENING: HCPCS | Performed by: SOCIAL WORKER

## 2022-04-15 PROCEDURE — A4216 STERILE WATER/SALINE, 10 ML: HCPCS | Performed by: STUDENT IN AN ORGANIZED HEALTH CARE EDUCATION/TRAINING PROGRAM

## 2022-04-15 PROCEDURE — 83690 ASSAY OF LIPASE: CPT

## 2022-04-15 PROCEDURE — 6360000002 HC RX W HCPCS: Performed by: STUDENT IN AN ORGANIZED HEALTH CARE EDUCATION/TRAINING PROGRAM

## 2022-04-15 PROCEDURE — 80053 COMPREHEN METABOLIC PANEL: CPT

## 2022-04-15 PROCEDURE — 6360000004 HC RX CONTRAST MEDICATION: Performed by: STUDENT IN AN ORGANIZED HEALTH CARE EDUCATION/TRAINING PROGRAM

## 2022-04-15 PROCEDURE — 99284 EMERGENCY DEPT VISIT MOD MDM: CPT

## 2022-04-15 PROCEDURE — 2580000003 HC RX 258: Performed by: STUDENT IN AN ORGANIZED HEALTH CARE EDUCATION/TRAINING PROGRAM

## 2022-04-15 PROCEDURE — 96375 TX/PRO/DX INJ NEW DRUG ADDON: CPT

## 2022-04-15 PROCEDURE — 85610 PROTHROMBIN TIME: CPT

## 2022-04-15 PROCEDURE — 93005 ELECTROCARDIOGRAM TRACING: CPT | Performed by: STUDENT IN AN ORGANIZED HEALTH CARE EDUCATION/TRAINING PROGRAM

## 2022-04-15 PROCEDURE — 85379 FIBRIN DEGRADATION QUANT: CPT

## 2022-04-15 PROCEDURE — 71046 X-RAY EXAM CHEST 2 VIEWS: CPT

## 2022-04-15 PROCEDURE — 85025 COMPLETE CBC W/AUTO DIFF WBC: CPT

## 2022-04-15 PROCEDURE — 83880 ASSAY OF NATRIURETIC PEPTIDE: CPT

## 2022-04-15 PROCEDURE — 6370000000 HC RX 637 (ALT 250 FOR IP): Performed by: STUDENT IN AN ORGANIZED HEALTH CARE EDUCATION/TRAINING PROGRAM

## 2022-04-15 PROCEDURE — 2500000003 HC RX 250 WO HCPCS: Performed by: STUDENT IN AN ORGANIZED HEALTH CARE EDUCATION/TRAINING PROGRAM

## 2022-04-15 PROCEDURE — C9113 INJ PANTOPRAZOLE SODIUM, VIA: HCPCS | Performed by: STUDENT IN AN ORGANIZED HEALTH CARE EDUCATION/TRAINING PROGRAM

## 2022-04-15 PROCEDURE — 96365 THER/PROPH/DIAG IV INF INIT: CPT

## 2022-04-15 PROCEDURE — 84484 ASSAY OF TROPONIN QUANT: CPT

## 2022-04-15 RX ORDER — UBIDECARENONE 75 MG
50 CAPSULE ORAL DAILY
Status: DISCONTINUED | OUTPATIENT
Start: 2022-04-15 | End: 2022-04-15 | Stop reason: HOSPADM

## 2022-04-15 RX ORDER — 0.9 % SODIUM CHLORIDE 0.9 %
1000 INTRAVENOUS SOLUTION INTRAVENOUS ONCE
Status: COMPLETED | OUTPATIENT
Start: 2022-04-15 | End: 2022-04-15

## 2022-04-15 RX ORDER — PANTOPRAZOLE SODIUM 20 MG/1
40 TABLET, DELAYED RELEASE ORAL DAILY
Qty: 30 TABLET | Refills: 0 | Status: ON HOLD | OUTPATIENT
Start: 2022-04-15 | End: 2022-11-01 | Stop reason: HOSPADM

## 2022-04-15 RX ORDER — FOLIC ACID 5 MG/ML
1 INJECTION, SOLUTION INTRAMUSCULAR; INTRAVENOUS; SUBCUTANEOUS DAILY
Status: DISCONTINUED | OUTPATIENT
Start: 2022-04-15 | End: 2022-04-15

## 2022-04-15 RX ADMIN — FOLIC ACID: 5 INJECTION, SOLUTION INTRAMUSCULAR; INTRAVENOUS; SUBCUTANEOUS at 16:20

## 2022-04-15 RX ADMIN — VITAM B12 50 MCG: 100 TAB at 16:19

## 2022-04-15 RX ADMIN — SODIUM CHLORIDE 1000 ML: 9 INJECTION, SOLUTION INTRAVENOUS at 14:18

## 2022-04-15 RX ADMIN — IOPAMIDOL 75 ML: 755 INJECTION, SOLUTION INTRAVENOUS at 16:10

## 2022-04-15 RX ADMIN — SODIUM CHLORIDE 40 MG: 9 INJECTION, SOLUTION INTRAMUSCULAR; INTRAVENOUS; SUBCUTANEOUS at 15:38

## 2022-04-15 ASSESSMENT — ENCOUNTER SYMPTOMS
NAUSEA: 1
ANAL BLEEDING: 1
ABDOMINAL PAIN: 1
DIARRHEA: 0
COUGH: 0
BACK PAIN: 0
SORE THROAT: 0
SHORTNESS OF BREATH: 0
CONSTIPATION: 0
VOMITING: 1

## 2022-04-15 ASSESSMENT — HEART SCORE: ECG: 1

## 2022-04-15 NOTE — ED PROVIDER NOTES
[facility]  ED  Emergency Department Encounter  EmergencyMedicine Resident     Pt Name:Sam Sampson  MRN: [id number]  [place] 1985  Date of evaluation: 4/15/22  PCP:  Kera Holliday MD    [address] [place] [address]       Chief Complaint   Patient presents with    Fatigue    Cough    Dizziness    Chest Pain    Abdominal Pain       HISTORY OF PRESENT ILLNESS  (Location/Symptom, Timing/Onset, Context/Setting, Quality, Duration, Modifying Factors, Severity.)      Genevieve Avila is a 40 y.o. male who presents with multiple complaints. Patient states that he was driving today when he felt lightheaded and like he was about to pass out. He reports acute on chronic chest pain described as a pressure, which is worse in the morning when he wakes up, he states his symptoms he has palpitations 2 and complains of near syncope while having these. He does have a history of alcoholism, relapsed about 7 or 8 months ago and is drinking 1/5 a day. Subsequently was diagnosed last year with alcoholic cardiomyopathy. alcoholic cardiomyopathy, unprovoked subsegmental PE in May 2021, heartburn, hypertension. Patient did have a cardiac catheterization in 2020 that revealed 0% stenosis. Also complains of vomiting for the last 2 weeks, does have blood speckles in the vomit but no gross hematemesis. Does complain that he had rectal bleeding a couple weeks ago. Does have a history of hemorrhoids and anal fissures. No black stools but has dark brown stools. Had a CT scan for abdominal pain nausea vomiting approximately 20 days ago which showed hepatic steatosis but otherwise unremarkable. CTA chest abdomen pelvis 6 months ago negative for any vascular abnormality. PAST MEDICAL / SURGICAL / SOCIAL / FAMILY HISTORY      has a past medical history of No active medical problems and Pulmonary embolism ([address] has no past surgical history on file.     Social History     Socioeconomic History    Marital Last Year: Not on file    Unstable Housing in the Last Year: Not on file       Family History   Problem Relation Age of Onset    Heart Disease Mother     Heart Attack Mother     Heart Disease Father        Allergies:  Bee venom    Home Medications:  Prior to Admission medications    Medication Sig Start Date End Date Taking?  Authorizing Provider   metoprolol succinate (TOPROL XL) 25 MG extended release tablet take 1 tablet by mouth once daily 12/20/21   Gilford Meth, MD   famotidine (PEPCID) 20 MG tablet Take 1 tablet by mouth 2 times daily 3/28/22   Olaf Hyde MD   losartan (COZAAR) 50 MG tablet take 1 tablet by mouth once daily 11/16/21   Gilford Meth, MD   Lactobacillus Acid-Pectin (ACIDOPHILUS/CITRUS PECTIN) TABS take 1 tablet by mouth twice a day  Patient not taking: Reported on 4/15/2022 10/26/21   Historical Provider, MD   potassium chloride (KLOR-CON M) 10 MEQ extended release tablet Take 1 tablet by mouth daily for 10 days 10/27/21 11/6/21  Gilford Meth, MD   magnesium oxide (MAG-OX) 400 (240 Mg) MG tablet Take 1 tablet by mouth daily  Patient not taking: Reported on 4/15/2022 10/27/21   Gilford Meth, MD   albuterol sulfate HFA (VENTOLIN HFA) 108 (90 Base) MCG/ACT inhaler Inhale 2 puffs into the lungs 4 times daily as needed for Wheezing  Patient not taking: Reported on 4/15/2022 10/26/21   Gilford Meth, MD   naproxen (NAPROSYN) 500 MG tablet Take 1 tablet by mouth 2 times daily  Patient taking differently: Take 500 mg by mouth 2 times daily as needed  8/13/21 3/28/22  Harlan Aguayo MD   sertraline (ZOLOFT) 25 MG tablet Take 1 tablet by mouth daily  Patient taking differently: Take 25 mg by mouth nightly  6/2/21   Gilford Meth, MD   fluticasone (FLONASE) 50 MCG/ACT nasal spray instill 1 spray into each nostril once daily 5/10/21   Gilford Meth, MD   Blood Pressure Monitoring (BP MONITOR-STETHOSCOPE) KIT 1 each by Does not apply route daily 4/7/21   Gilford Meth, MD Multiple Vitamins-Minerals (MULTIVITAMIN ADULT PO) Take by mouth    Historical Provider, MD   Nicotine 21-14-7 MG/24HR KIT Place 14 mg onto the skin nightly     Historical Provider, MD   EPINEPHrine (EPIPEN 2-LIZETTE) 0.3 MG/0.3ML SOAJ injection Inject 0.3 mLs into the muscle once for 1 dose Use as directed for allergic reaction 11/17/16 5/24/21  Laura Patel MD       REVIEW OF SYSTEMS    (2-9 systems for level 4, 10 or more for level 5)      Review of Systems   Constitutional: Negative for chills and fever. HENT: Negative for congestion and sore throat. Eyes: Negative for visual disturbance. Respiratory: Negative for cough and shortness of breath. Cardiovascular: Positive for chest pain and palpitations. Negative for leg swelling. Gastrointestinal: Positive for abdominal pain, anal bleeding, nausea and vomiting. Negative for constipation and diarrhea. Genitourinary: Negative for dysuria and frequency. Musculoskeletal: Negative for back pain and neck stiffness. Skin: Negative for rash. Neurological: Positive for light-headedness and headaches. Negative for dizziness, facial asymmetry and weakness. PHYSICAL EXAM   (up to 7 for level 4, 8 or more for level 5)      INITIAL VITALS:   /76   Pulse 98   Temp 98 °F (36.7 °C) (Oral)   Resp 14   SpO2 92%      Vitals:    04/15/22 1344 04/15/22 1431 04/15/22 1541   BP: 122/84 117/76    Pulse: 102 94 98   Resp: 13 14    Temp: 98 °F (36.7 °C)     TempSrc: Oral     SpO2: 93% 92% 92%        Physical Exam  Vitals reviewed. Constitutional:       General: He is not in acute distress. Appearance: He is well-developed. He is not ill-appearing. HENT:      Head: Normocephalic and atraumatic. Eyes:      Pupils: Pupils are equal, round, and reactive to light. Cardiovascular:      Rate and Rhythm: Normal rate and regular rhythm. Pulmonary:      Effort: Pulmonary effort is normal.      Breath sounds: Normal breath sounds.    Abdominal: General: There is no distension. Palpations: Abdomen is soft. Tenderness: There is abdominal tenderness in the left lower quadrant. There is no guarding or rebound. Comments: Enlarged liver edge. Musculoskeletal:         General: Normal range of motion. Cervical back: Normal range of motion and neck supple. Right lower leg: No edema. Left lower leg: No edema. Skin:     General: Skin is warm and dry. Neurological:      General: No focal deficit present. Mental Status: He is alert and oriented to person, place, and time.          DIFFERENTIAL  DIAGNOSIS     PLAN (LABS / IMAGING / EKG):  Orders Placed This Encounter   Procedures    XR CHEST (2 VW)    CBC with Auto Differential    Comprehensive Metabolic Panel w/ Reflex to MG    Lipase    Troponin    Brain Natriuretic Peptide    D-Dimer, Quantitative    Protime-INR    Troponin    Immature Platelet Fraction    EKG 12 Lead       MEDICATIONS ORDERED:  Orders Placed This Encounter   Medications    0.9 % sodium chloride bolus    vitamin B-12 (CYANOCOBALAMIN) tablet 50 mcg    DISCONTD: folic acid injection 1 mg    DISCONTD: thiamine (B-1) 300 mg in sodium chloride 0.9 % 637 mL IVPB    folic acid 1 mg, thiamine (B-1) 300 mg in dextrose 5 % 50 mL IVPB    pantoprazole (PROTONIX) 40 mg in sodium chloride (PF) 10 mL injection       DIAGNOSTIC RESULTS / EMERGENCY DEPARTMENT COURSE / MDM   LAB RESULTS:  Results for orders placed or performed during the hospital encounter of 04/15/22   CBC with Auto Differential   Result Value Ref Range    WBC 10.9 3.5 - 11.3 k/uL    RBC 2.71 (L) 4.21 - 5.77 m/uL    Hemoglobin 11.7 (L) 13.0 - 17.0 g/dL    Hematocrit 32.6 (L) 40.7 - 50.3 %    .3 (H) 82.6 - 102.9 fL    MCH 43.2 (H) 25.2 - 33.5 pg    MCHC 35.9 (H) 28.4 - 34.8 g/dL    RDW 16.3 (H) 11.8 - 14.4 %    Platelets See Reflexed IPF Result 138 - 453 k/uL    NRBC Automated 0.0 0.0 per 100 WBC    Immature Granulocytes 1 (H) 0 %    Seg Neutrophils 75 (H) 36 - 66 %    Lymphocytes 16 (L) 24 - 44 %    Monocytes 5 1 - 7 %    Eosinophils % 3 1 - 4 %    Basophils 0 0 - 2 %    Absolute Immature Granulocyte 0.11 0.00 - 0.30 k/uL    Segs Absolute 8.17 (H) 1.8 - 7.7 k/uL    Absolute Lymph # 1.74 1.0 - 4.8 k/uL    Absolute Mono # 0.55 0.1 - 0.8 k/uL    Absolute Eos # 0.33 0.0 - 0.4 k/uL    Basophils Absolute 0.00 0.0 - 0.2 k/uL    Morphology ANISOCYTOSIS PRESENT     Morphology MACROCYTOSIS PRESENT    Comprehensive Metabolic Panel w/ Reflex to MG   Result Value Ref Range    Glucose 121 (H) 70 - 99 mg/dL    BUN 11 6 - 20 mg/dL    CREATININE 0.81 0.70 - 1.20 mg/dL    Calcium 9.0 8.6 - 10.4 mg/dL    Sodium 135 135 - 144 mmol/L    Potassium 3.6 (L) 3.7 - 5.3 mmol/L    Chloride 94 (L) 98 - 107 mmol/L    CO2 21 20 - 31 mmol/L    Anion Gap 20 (H) 9 - 17 mmol/L    Alkaline Phosphatase 302 (H) 40 - 129 U/L    ALT 67 (H) 5 - 41 U/L     (H) <40 U/L    Total Bilirubin 3.17 (H) 0.3 - 1.2 mg/dL    Total Protein 8.9 (H) 6.4 - 8.3 g/dL    Albumin 3.5 3.5 - 5.2 g/dL    Albumin/Globulin Ratio 0.6 (L) 1.0 - 2.5    GFR Non-African American >60 >60 mL/min    GFR African American >60 >60 mL/min    GFR Comment         Lipase   Result Value Ref Range    Lipase 45 13 - 60 U/L   Troponin   Result Value Ref Range    Troponin, High Sensitivity 15 0 - 22 ng/L   Brain Natriuretic Peptide   Result Value Ref Range    Pro-BNP 54 <300 pg/mL   D-Dimer, Quantitative   Result Value Ref Range    D-Dimer, Quant 1.09 mg/L FEU   Protime-INR   Result Value Ref Range    Protime 10.9 9.1 - 12.3 sec    INR 1.0    Immature Platelet Fraction   Result Value Ref Range    Platelet, Immature Fraction 10.0 1.1 - 10.3 %    Platelet, Fluorescence 143 138 - 453 k/uL         RADIOLOGY:  XR CHEST (2 VW)   Final Result      No acute intrathoracic pathology.               EKG  EKG Interpretation    Interpreted by me    Rhythm: normal sinus   Rate: normal  Axis: normal  Ectopy: none  Conduction: normal  ST Segments: Nonspecific mild depression in aVL  T Waves: no acute change  Q Waves: none    Clinical Impression: Nonspecific EKG, no significant changes from 1/30/2022    All EKG's are interpreted by the Emergency Department Physician who either signs or Co-signs this chart in the absence of a cardiologist.        Debi:    Patient here with multiple complaints. Complaints change for different providers. On examination he appears well nontoxic, vital signs are within normal limits. Although listed in the chart as being hypoxic, he was not hypoxic during my evaluation. Heart, lungs, extremity, neurological exam is normal.  Do not think acute stroke. Abdomen shows enlarged liver edge. Previous CT scan showing hepatic steatosis. Warned the patient that all the symptoms can be explained by alcoholism and urged him to seek treatment. Patient does not seem interested at this time. Patient has had a recent normal cardiac catheterization, cardiac work-up here is unremarkable. CBC showing acute on chronic macrocytic anemia, 1 g hemoglobin drop from 2 weeks ago. He does have a history of hemorrhoids and did have a few episodes of rectal bleeding a couple weeks ago. He is Hemoccult positive here, stools not grossly positive. Vital signs are normal.  Do not think esophageal varices or severe upper GI bleed. Patient was given Protonix 4 mg IV push. For the microcytic anemia was given folate, B12, thiamine. Patient signed out to Dwayne Martinez [id number] while were eating further work-up, medications to be administered and  evaluation. If work-up negative discharged home to GI and PCP. Placed on Protonix. Strict return precautions.     ED Course as of 04/15/22 1552   Fri Apr 15, 2022   1346 Pulse: 102 [CS]   1346 SpO2: 93 % [CS]   1445 MCV(!): 120.3 [CS]   1446 Hemoglobin Quant(!): 11.7  1g drop [CS]   9689 Consistent with alcohol induced anemia [CS]   1455 Hemoccult positive [CS] ED Course User Index  [CS] Ned Callahan DO         PROCEDURES:      CONSULTS:  None    CRITICAL CARE:  Please see attending note    FINAL IMPRESSION      1. Macrocytic anemia          DISPOSITION / PLAN     DISPOSITION        PATIENT REFERRED TO:  No follow-up provider specified.     DISCHARGE MEDICATIONS:  New Prescriptions    No medications on file       Ned Callahan DO  Emergency Medicine Resident    (Please note that portions of thisnote were completed with a voice recognition program.  Efforts were made to edit the dictations but occasionally words are mis-transcribed.)        Ned Callahan DO  Resident  04/15/22 2001

## 2022-04-15 NOTE — ED NOTES
Writer at bedside to chaperone rectal exam preformed by Dr. Fortino Raymond. POS for occult blood. Pt updated on plan of care.       Army Kehinde RN  04/15/22 2064

## 2022-04-15 NOTE — ED PROVIDER NOTES
[facility] ED  Emergency Department  Emergency Medicine Resident Sign-out     Care of Katie Matos was assumed from Dr. Narendra Hagan and is being seen for Fatigue, Cough, Dizziness, Chest Pain, and Abdominal Pain  . The patient's initial evaluation and plan have been discussed with the prior provider who initially evaluated the patient.      EMERGENCY DEPARTMENT COURSE / MEDICAL DECISION MAKING:       MEDICATIONS GIVEN:  Orders Placed This Encounter   Medications    0.9 % sodium chloride bolus    DISCONTD: vitamin B-12 (CYANOCOBALAMIN) tablet 50 mcg    DISCONTD: folic acid injection 1 mg    DISCONTD: thiamine (B-1) 300 mg in sodium chloride 0.9 % 786 mL IVPB    folic acid 1 mg, thiamine (B-1) 300 mg in dextrose 5 % 50 mL IVPB    DISCONTD: pantoprazole (PROTONIX) 40 mg in sodium chloride (PF) 10 mL injection    iopamidol (ISOVUE-370) 76 % injection 75 mL    pantoprazole (PROTONIX) 20 MG tablet     Sig: Take 2 tablets by mouth daily     Dispense:  30 tablet     Refill:  0       LABS / RADIOLOGY:     Labs Reviewed   CBC WITH AUTO DIFFERENTIAL - Abnormal; Notable for the following components:       Result Value    RBC 2.71 (*)     Hemoglobin 11.7 (*)     Hematocrit 32.6 (*)     .3 (*)     MCH 43.2 (*)     MCHC 35.9 (*)     RDW 16.3 (*)     Immature Granulocytes 1 (*)     Seg Neutrophils 75 (*)     Lymphocytes 16 (*)     Segs Absolute 8.17 (*)     All other components within normal limits   COMPREHENSIVE METABOLIC PANEL W/ REFLEX TO MG FOR LOW K - Abnormal; Notable for the following components:    Glucose 121 (*)     Potassium 3.6 (*)     Chloride 94 (*)     Anion Gap 20 (*)     Alkaline Phosphatase 302 (*)     ALT 67 (*)      (*)     Total Bilirubin 3.17 (*)     Total Protein 8.9 (*)     Albumin/Globulin Ratio 0.6 (*)     All other components within normal limits   LIPASE   TROPONIN   BRAIN NATRIURETIC PEPTIDE   D-DIMER, QUANTITATIVE   PROTIME-INR   TROPONIN   IMMATURE PLATELET FRACTION       XR CHEST (2 VW)    Result Date: 4/15/2022  EXAMINATION: TWO XRAY VIEWS OF THE CHEST 4/15/2022 2:40 pm COMPARISON: 01/30/2022 HISTORY: ORDERING SYSTEM PROVIDED HISTORY: CP, lightheadness TECHNOLOGIST PROVIDED HISTORY: CP, lightheadwesley FINDINGS: Chest radiograph: The cardiomediastinal silhouette and hilar contours are normal. The lungs are clear with no focal consolidation, pleural effusion or pneumothorax. The overlying soft tissue and osseous structures do not demonstrate acute abnormality. No acute intrathoracic pathology. CT ABDOMEN PELVIS W IV CONTRAST Additional Contrast? None    Result Date: 3/28/2022  EXAMINATION: CT OF THE ABDOMEN AND PELVIS WITH CONTRAST 3/28/2022 6:52 am TECHNIQUE: CT of the abdomen and pelvis was performed with the administration of intravenous contrast. Multiplanar reformatted images are provided for review. Dose modulation, iterative reconstruction, and/or weight based adjustment of the mA/kV was utilized to reduce the radiation dose to as low as reasonably achievable. COMPARISON: 10/14/2021 HISTORY: ORDERING SYSTEM PROVIDED HISTORY: RUQ and epigastric pain TECHNOLOGIST PROVIDED HISTORY: RUQ and epigastric pain Decision Support Exception - unselect if not a suspected or confirmed emergency medical condition->Emergency Medical Condition (MA) Reason for Exam: RUQ and epigastric pain Additional signs and symptoms: Pt c/o RUQ pain and nausea, vomiting, and diarrhea for 2 hours. FINDINGS: Lower Chest: The visualized heart and lungs show no acute abnormalities. Organs: Hepatomegaly and diffuse hepatic steatosis unchanged. Liver measures 20 cm cranial caudal.  No focal liver lesion. Spleen, pancreas, adrenal glands, kidneys and gallbladder show no significant abnormalities. GI/Bowel: There is limited evaluation due to absence of oral contrast. The stomach shows no focal lesions. Small bowel loops normal in caliber showing no focal abnormalities. Normal appendix. Evaluation of the colon shows no acute process. Pelvis: The urinary bladder is unremarkable. No suspicious pelvic mass. Peritoneum/Retroperitoneum: No free intraperitoneal fluid or significant lymphadenopathy. Vascular structures enhance satisfactorily. Bones/Soft Tissues: No acute abnormality of the bones. The superficial soft tissues show no significant abnormalities. 1. Stable hepatomegaly and diffuse hepatic steatosis. 2. No acute infective or inflammatory process. 3. No bowel obstruction. RECENT VITALS:     Temp: 98 °F (36.7 °C),  Pulse: 98, Resp: 14, BP: 118/77, SpO2: 93 %    This patient is a 40 y.o. Male with multiple complaints including chest pain, shortness of breath, lightheadedness, abdominal pain, nausea and vomiting, and now with bloody stools. History of EtOH abuse at a [employer] of liquor a day. Cardiac workup is non-concerning with negative cardiac cath in the past 2 years. Positive hemoccult, but no gross blood. Await d-dimer and repeat troponin. Plan for discharge home on protonix with PCP and GI.      XR CHEST (2 VW)    Result Date: 4/15/2022  EXAMINATION: TWO XRAY VIEWS OF THE CHEST 4/15/2022 2:40 pm COMPARISON: 01/30/2022 HISTORY: ORDERING SYSTEM PROVIDED HISTORY: CP, lightheadness TECHNOLOGIST PROVIDED HISTORY: CP, lightheadwesley FINDINGS: Chest radiograph: The cardiomediastinal silhouette and hilar contours are normal. The lungs are clear with no focal consolidation, pleural effusion or pneumothorax. The overlying soft tissue and osseous structures do not demonstrate acute abnormality. No acute intrathoracic pathology. CT CHEST PULMONARY EMBOLISM W CONTRAST    Result Date: 4/15/2022  EXAMINATION: CTA OF THE CHEST 4/15/2022 1:00 pm TECHNIQUE: CTA of the chest was performed after the administration of intravenous contrast.  Multiplanar reformatted images are provided for review. MIP images are provided for review.  Dose modulation, iterative reconstruction, and/or weight based adjustment of the mA/kV was utilized to reduce the radiation dose to as low as reasonably achievable. COMPARISON: Chest x-ray dated April 15, 2022 prior CT chest dated June 20, 2021 HISTORY: ORDERING SYSTEM PROVIDED HISTORY: shortness of breath, chest pain, elevated d-dimer TECHNOLOGIST PROVIDED HISTORY: shortness of breath, chest pain, elevated d-dimer Decision Support Exception - unselect if not a suspected or confirmed emergency medical condition->Emergency Medical Condition (MA) Reason for Exam: sob, chest pain, dizziness per pt FINDINGS: Pulmonary Arteries: Pulmonary arteries are suboptimally opacified for evaluation. No definite evidence of intraluminal filling defect to suggest pulmonary embolism. Main pulmonary artery is normal in caliber. Mediastinum: No evidence of mediastinal lymphadenopathy. The heart and pericardium demonstrate no acute abnormality. There is no acute abnormality of the thoracic aorta. Lungs/pleura: Linear opacities are present within the right middle lobe, lingula, and periphery of the left lower lobe, consistent with atelectasis. No focal area of consolidation, pleural effusion, or pneumothorax is present. Upper Abdomen: Images through the upper abdomen demonstrate hepatomegaly and diffuse hepatic steatosis. Soft Tissues/Bones: Gynecomastia is present bilaterally. No acute osseous abnormality is present. 1. No evidence of pulmonary embolism 2. Linear areas of atelectasis within the right middle lobe, lingula, left lower lobe 3. Hepatomegaly and diffuse hepatic steatosis     Patient updated but negative work-up. Patient was agreeable discharge plan. He was educated return precautions. Patient ambulated out of the ER without incident. OUTSTANDING TASKS / RECOMMENDATIONS:    1. Follow-up lab work  2. Reevaluate and likely discharge     FINAL IMPRESSION:     1. Macrocytic anemia    2. Guaiac positive stools    3. Chest pain, unspecified type    4.  Shortness of breath 5. Abdominal pain, unspecified abdominal location        DISPOSITION:         DISPOSITION:  [x]  Discharge   []  Transfer -    []  Admission -     []  Against Medical Advice   []  Eloped   FOLLOW-UP: Leni Castro, [address]  [phone]    Schedule an appointment as soon as possible for a visit in 3 days  for reevaluation regarding this visit    [facility] ED  [address] [place] [address]  [phone]  Go to   If symptoms worsen    [address] [id number]  In 1 week  for reevaluation regarding this visit     DISCHARGE MEDICATIONS: Discharge Medication List as of 4/15/2022  4:46 PM      START taking these medications    Details   pantoprazole (PROTONIX) 20 MG tablet Take 2 tablets by mouth daily, Disp-30 tablet, R-0Normal                 Jessie Miranda DO  Emergency Medicine Resident  [address]         Jessie Miranda, [place]  Resident  04/16/22 [id number]

## 2022-04-15 NOTE — ED NOTES
Patient provided verbal permission for writer to discuss JV tx in front of guest. Patient stated he began drinking heavily daily in Jan 2022. Patient stated he drinks a fifth of whiskey daily with his last 2-3 shots being around 2:30 this morning to help him go to sleep. Patient confirmed current withdrawal symptoms of n/v, shakes, headache, stomach ache. Patient denied hx withdrawal seizures. Patient denied drug abuse. Patient denied hx JV tx. Patient denied hx or current mental health concerns. Patient stated he's trying to get a new job so is interested in outpatient ETOH tx resources. Writer provided list of agencies, patient stated intent to contact preferred agency. Patient denied additional needs/questions.       MARTÍN Bang  04/15/22 [id number]

## 2022-04-15 NOTE — ED NOTES
Pt to ed with family from home. Pt states for the past week he has not had much of an appetite, is not eating of drinking well. Pt is a daily drinker, drinks 1/5 th of liquor daily, last drink was last night. Pt reports abdominal pain, nausea, vomiting, fatigue, chest pain. Pt has chronic cough. Pt states today he started feeling as if he was going to pass out. Pt rates pain 6/10.       Elyssa Query, MIN[place]A  04/15/22 3731

## 2022-04-15 NOTE — ED PROVIDER NOTES
[facility]  Emergency Department  Faculty Attestation     I performed a history and physical examination of the patient and discussed management with the resident. I reviewed the residents note and agree with the documented findings and plan of care. Any areas of disagreement are noted on the chart. I was personally present for the key portions of any procedures. I have documented in the chart those procedures where I was not present during the key portions. I have reviewed the emergency nurses triage note. I agree with the chief complaint, past medical history, past surgical history, allergies, medications, social and family history as documented unless otherwise noted below. For Physician Assistant/ Nurse Practitioner cases/documentation I have personally evaluated this patient and have completed at least one if not all key elements of the E/M (history, physical exam, and MDM). Additional findings are as noted. Primary Care Physician:  Camron Myles MD    Screenings:  [unfilled]    CHIEF COMPLAINT       Chief Complaint   Patient presents with    Fatigue    Cough    Dizziness    Chest Pain    Abdominal Pain       RECENT VITALS:   Temp: 98 °F (36.7 °C),  Pulse: 102, Resp: 13, BP: 122/84    LABS:  Labs Reviewed - No data to display    Radiology  No orders to display       EKG:   EKG Interpretation    Interpreted by me    Rhythm: normal sinus   Rate: normal  Axis: normal  Ectopy: none  Conduction: normal  ST Segments: Subtle depression lateral leads  T Waves: no acute change  Q Waves: none    Clinical Impression: Nonspecific ST change    Attending Physician Additional  Notes    Patient was walking down the street and felt lightheaded and presyncopal and decided to come in to get checked out. Has been having nonradiating substernal heaviness on and off both at rest and with exertion of the past several days. No sweats or nausea.   He has cough with clear foamy sputum production. He has nausea typically in the morning with several episodes of bilious vomiting. He has constipation. No leg pain calf swelling mobility. He has a history of pulmonary embolism and is no longer anticoagulated. He states that he is limping with his right lower extremity but does not know why since he has no pain. No strokelike symptoms. He has had prior cardiac work-up including cardiac catheterization 2 years ago during his pulmonary embolism. No coronary disease at that time. Wife states that he has anxiety, hypertension, chronic alcohol use. Recent visit to [facility] for gastritis and is taking Pepcid without improvement of his abdominal symptoms. On exam he is tachycardic, slightly anxious, other vital signs are normal including saturation. He is on room air. Lungs are clear. Chest nontender. Card exam is benign. Abdomen is soft and nontender. Slight hepatomegaly noted but no Goyal sign. No edema cords Homans or calf tenderness. Full range and over the right lower extremity. GCS 15. Normal speech and mentation. Impression is atypical chest pain consider vasospasm, rule out pulm embolism, gastritis rule out pancreatitis, consider dehydration. Plan is IV access, antiemetics, consider anxiolytics, antacids, chest x-ray, D-dimer, laboratory studies, CTA if D-dimer elevated, reassess. Laron Dickerson.  Gretchen Trinh MD, [address] [facility]  Attending Emergency  Physician               Martin Dorman MD  04/15/22 4032

## 2022-04-15 NOTE — ED NOTES
Writer met with patient at bedside to complete the SBIRT assessment. The results can be located in the ED navigator under screening questions.      Patient scored as following:    AUDIT (Alcohol Screening Questionnaire): 33  DAST (Drug Screening Questionnaire): 0  PHQ-9 (Depression Screening Questionnaire): 0      The patient was provided with the following resources/interventions: JV tx facility list    Start Time 16:20  End Time 16:30  Diagnosis Z71.41       Rhett Comp, LISW  04/15/22 1658

## 2022-04-15 NOTE — ED NOTES
Pt resting comfortably on cot at this time, eyes closed, rr even and non labored, updated on plan of care, awaiting medications from pharmacy.       Oswaldo Handley RN  04/15/22 [id number]

## 2022-04-16 LAB
EKG ATRIAL RATE: 100 BPM
EKG P AXIS: 44 DEGREES
EKG P-R INTERVAL: 134 MS
EKG Q-T INTERVAL: 338 MS
EKG QRS DURATION: 94 MS
EKG QTC CALCULATION (BAZETT): 436 MS
EKG R AXIS: 21 DEGREES
EKG T AXIS: 79 DEGREES
EKG VENTRICULAR RATE: 100 BPM

## 2022-04-16 PROCEDURE — 93010 ELECTROCARDIOGRAM REPORT: CPT | Performed by: INTERNAL MEDICINE

## 2022-04-27 ENCOUNTER — APPOINTMENT (OUTPATIENT)
Dept: CT IMAGING | Age: 37
DRG: 720 | End: 2022-04-27
Payer: MEDICAID

## 2022-04-27 ENCOUNTER — APPOINTMENT (OUTPATIENT)
Dept: GENERAL RADIOLOGY | Age: 37
DRG: 720 | End: 2022-04-27
Payer: MEDICAID

## 2022-04-27 ENCOUNTER — HOSPITAL ENCOUNTER (INPATIENT)
Age: 37
LOS: 6 days | Discharge: HOME | DRG: 720 | End: 2022-05-03
Attending: EMERGENCY MEDICINE | Admitting: INTERNAL MEDICINE
Payer: MEDICAID

## 2022-04-27 DIAGNOSIS — K72.00 ACUTE LIVER FAILURE WITHOUT HEPATIC COMA: ICD-10-CM

## 2022-04-27 DIAGNOSIS — R06.2 WHEEZING: ICD-10-CM

## 2022-04-27 DIAGNOSIS — J18.9 PNEUMONIA OF LEFT LOWER LOBE DUE TO INFECTIOUS ORGANISM: Primary | ICD-10-CM

## 2022-04-27 LAB
ABSOLUTE BANDS #: 0.67 K/UL (ref 0–1)
ABSOLUTE EOS #: 0.17 K/UL (ref 0–0.4)
ABSOLUTE LYMPH #: 2.02 K/UL (ref 1–4.8)
ABSOLUTE MONO #: 2.02 K/UL (ref 0.1–1.3)
ALBUMIN SERPL-MCNC: 2.7 G/DL (ref 3.5–5.2)
ALBUMIN SERPL-MCNC: 2.9 G/DL (ref 3.5–5.2)
ALP BLD-CCNC: 240 U/L (ref 40–129)
ALP BLD-CCNC: 275 U/L (ref 40–129)
ALT SERPL-CCNC: 34 U/L (ref 5–41)
ALT SERPL-CCNC: 39 U/L (ref 5–41)
ANION GAP SERPL CALCULATED.3IONS-SCNC: 12 MMOL/L (ref 9–17)
ANION GAP SERPL CALCULATED.3IONS-SCNC: 15 MMOL/L (ref 9–17)
AST SERPL-CCNC: 212 U/L
AST SERPL-CCNC: 244 U/L
BANDS: 4 % (ref 0–10)
BASOPHILS # BLD: 0 % (ref 0–2)
BASOPHILS ABSOLUTE: 0 K/UL (ref 0–0.2)
BILIRUB SERPL-MCNC: 5.39 MG/DL (ref 0.3–1.2)
BILIRUB SERPL-MCNC: 5.86 MG/DL (ref 0.3–1.2)
BILIRUBIN DIRECT: 4.24 MG/DL
BILIRUBIN, INDIRECT: 1.62 MG/DL (ref 0–1)
BUN BLDV-MCNC: 8 MG/DL (ref 6–20)
BUN BLDV-MCNC: 9 MG/DL (ref 6–20)
CALCIUM SERPL-MCNC: 7.4 MG/DL (ref 8.6–10.4)
CALCIUM SERPL-MCNC: 8.1 MG/DL (ref 8.6–10.4)
CHLORIDE BLD-SCNC: 94 MMOL/L (ref 98–107)
CHLORIDE BLD-SCNC: 96 MMOL/L (ref 98–107)
CO2: 22 MMOL/L (ref 20–31)
CO2: 22 MMOL/L (ref 20–31)
CREAT SERPL-MCNC: 0.44 MG/DL (ref 0.7–1.2)
CREAT SERPL-MCNC: 0.45 MG/DL (ref 0.7–1.2)
EOSINOPHILS RELATIVE PERCENT: 1 % (ref 0–4)
GFR AFRICAN AMERICAN: >60 ML/MIN
GFR AFRICAN AMERICAN: >60 ML/MIN
GFR NON-AFRICAN AMERICAN: >60 ML/MIN
GFR NON-AFRICAN AMERICAN: >60 ML/MIN
GFR SERPL CREATININE-BSD FRML MDRD: ABNORMAL ML/MIN/{1.73_M2}
GFR SERPL CREATININE-BSD FRML MDRD: ABNORMAL ML/MIN/{1.73_M2}
GLUCOSE BLD-MCNC: 101 MG/DL (ref 70–99)
GLUCOSE BLD-MCNC: 111 MG/DL (ref 70–99)
HCT VFR BLD CALC: 28.3 % (ref 41–53)
HEMOGLOBIN: 9.8 G/DL (ref 13.5–17.5)
INFLUENZA A: NOT DETECTED
INFLUENZA B: NOT DETECTED
INR BLD: 1.2
INR BLD: 1.2
LACTIC ACID, SEPSIS: 2.4 MMOL/L (ref 0.5–1.9)
LACTIC ACID, SEPSIS: 2.5 MMOL/L (ref 0.5–1.9)
LACTIC ACID, SEPSIS: 3.5 MMOL/L (ref 0.5–1.9)
LIPASE: 54 U/L (ref 13–60)
LYMPHOCYTES # BLD: 12 % (ref 24–44)
MAGNESIUM: 1.5 MG/DL (ref 1.6–2.6)
MAGNESIUM: 1.7 MG/DL (ref 1.6–2.6)
MCH RBC QN AUTO: 43.6 PG (ref 26–34)
MCHC RBC AUTO-ENTMCNC: 34.6 G/DL (ref 31–37)
MCV RBC AUTO: 126.1 FL (ref 80–100)
MONOCYTES # BLD: 12 % (ref 1–7)
MORPHOLOGY: ABNORMAL
PARTIAL THROMBOPLASTIN TIME: 40.5 SEC (ref 24–36)
PDW BLD-RTO: 19.1 % (ref 11.5–14.9)
PLATELET # BLD: 240 K/UL (ref 150–450)
PMV BLD AUTO: 8.8 FL (ref 6–12)
POTASSIUM SERPL-SCNC: 3.5 MMOL/L (ref 3.7–5.3)
POTASSIUM SERPL-SCNC: 3.6 MMOL/L (ref 3.7–5.3)
PROCALCITONIN: 0.35 NG/ML
PROTHROMBIN TIME: 14.7 SEC (ref 11.8–14.6)
PROTHROMBIN TIME: 15.4 SEC (ref 11.8–14.6)
RBC # BLD: 2.25 M/UL (ref 4.5–5.9)
SARS-COV-2 RNA, RT PCR: NOT DETECTED
SEG NEUTROPHILS: 71 % (ref 36–66)
SEGMENTED NEUTROPHILS ABSOLUTE COUNT: 11.92 K/UL (ref 1.3–9.1)
SODIUM BLD-SCNC: 130 MMOL/L (ref 135–144)
SODIUM BLD-SCNC: 131 MMOL/L (ref 135–144)
SOURCE: NORMAL
SPECIMEN DESCRIPTION: NORMAL
TOTAL PROTEIN: 6.8 G/DL (ref 6.4–8.3)
TOTAL PROTEIN: 7.7 G/DL (ref 6.4–8.3)
TROPONIN, HIGH SENSITIVITY: 11 NG/L (ref 0–22)
WBC # BLD: 16.8 K/UL (ref 3.5–11)

## 2022-04-27 PROCEDURE — 80048 BASIC METABOLIC PNL TOTAL CA: CPT

## 2022-04-27 PROCEDURE — 71045 X-RAY EXAM CHEST 1 VIEW: CPT

## 2022-04-27 PROCEDURE — 82800 BLOOD PH: CPT

## 2022-04-27 PROCEDURE — 93005 ELECTROCARDIOGRAM TRACING: CPT | Performed by: STUDENT IN AN ORGANIZED HEALTH CARE EDUCATION/TRAINING PROGRAM

## 2022-04-27 PROCEDURE — 83690 ASSAY OF LIPASE: CPT

## 2022-04-27 PROCEDURE — 2060000000 HC ICU INTERMEDIATE R&B

## 2022-04-27 PROCEDURE — 82805 BLOOD GASES W/O2 SATURATION: CPT

## 2022-04-27 PROCEDURE — 80053 COMPREHEN METABOLIC PANEL: CPT

## 2022-04-27 PROCEDURE — 2580000003 HC RX 258: Performed by: NURSE PRACTITIONER

## 2022-04-27 PROCEDURE — 6360000002 HC RX W HCPCS: Performed by: STUDENT IN AN ORGANIZED HEALTH CARE EDUCATION/TRAINING PROGRAM

## 2022-04-27 PROCEDURE — 2580000003 HC RX 258: Performed by: STUDENT IN AN ORGANIZED HEALTH CARE EDUCATION/TRAINING PROGRAM

## 2022-04-27 PROCEDURE — 99285 EMERGENCY DEPT VISIT HI MDM: CPT

## 2022-04-27 PROCEDURE — 36415 COLL VENOUS BLD VENIPUNCTURE: CPT

## 2022-04-27 PROCEDURE — 2500000003 HC RX 250 WO HCPCS: Performed by: STUDENT IN AN ORGANIZED HEALTH CARE EDUCATION/TRAINING PROGRAM

## 2022-04-27 PROCEDURE — 6360000004 HC RX CONTRAST MEDICATION: Performed by: STUDENT IN AN ORGANIZED HEALTH CARE EDUCATION/TRAINING PROGRAM

## 2022-04-27 PROCEDURE — 84145 PROCALCITONIN (PCT): CPT

## 2022-04-27 PROCEDURE — 80076 HEPATIC FUNCTION PANEL: CPT

## 2022-04-27 PROCEDURE — 6370000000 HC RX 637 (ALT 250 FOR IP): Performed by: STUDENT IN AN ORGANIZED HEALTH CARE EDUCATION/TRAINING PROGRAM

## 2022-04-27 PROCEDURE — 85025 COMPLETE CBC W/AUTO DIFF WBC: CPT

## 2022-04-27 PROCEDURE — 85730 THROMBOPLASTIN TIME PARTIAL: CPT

## 2022-04-27 PROCEDURE — 85610 PROTHROMBIN TIME: CPT

## 2022-04-27 PROCEDURE — 83605 ASSAY OF LACTIC ACID: CPT

## 2022-04-27 PROCEDURE — 87040 BLOOD CULTURE FOR BACTERIA: CPT

## 2022-04-27 PROCEDURE — 84484 ASSAY OF TROPONIN QUANT: CPT

## 2022-04-27 PROCEDURE — 74177 CT ABD & PELVIS W/CONTRAST: CPT

## 2022-04-27 PROCEDURE — 99233 SBSQ HOSP IP/OBS HIGH 50: CPT | Performed by: INTERNAL MEDICINE

## 2022-04-27 PROCEDURE — 87636 SARSCOV2 & INF A&B AMP PRB: CPT

## 2022-04-27 PROCEDURE — 6360000002 HC RX W HCPCS: Performed by: NURSE PRACTITIONER

## 2022-04-27 PROCEDURE — 83735 ASSAY OF MAGNESIUM: CPT

## 2022-04-27 RX ORDER — 0.9 % SODIUM CHLORIDE 0.9 %
80 INTRAVENOUS SOLUTION INTRAVENOUS ONCE
Status: COMPLETED | OUTPATIENT
Start: 2022-04-27 | End: 2022-04-27

## 2022-04-27 RX ORDER — 0.9 % SODIUM CHLORIDE 0.9 %
30 INTRAVENOUS SOLUTION INTRAVENOUS ONCE
Status: COMPLETED | OUTPATIENT
Start: 2022-04-27 | End: 2022-04-27

## 2022-04-27 RX ORDER — SODIUM CHLORIDE 0.9 % (FLUSH) 0.9 %
5-40 SYRINGE (ML) INJECTION PRN
Status: DISCONTINUED | OUTPATIENT
Start: 2022-04-27 | End: 2022-05-03 | Stop reason: HOSPADM

## 2022-04-27 RX ORDER — ENOXAPARIN SODIUM 100 MG/ML
40 INJECTION SUBCUTANEOUS DAILY
Status: DISCONTINUED | OUTPATIENT
Start: 2022-04-27 | End: 2022-05-03 | Stop reason: HOSPADM

## 2022-04-27 RX ORDER — ACETAMINOPHEN 325 MG/1
650 TABLET ORAL EVERY 6 HOURS PRN
Status: DISCONTINUED | OUTPATIENT
Start: 2022-04-27 | End: 2022-05-03 | Stop reason: HOSPADM

## 2022-04-27 RX ORDER — SODIUM CHLORIDE 9 MG/ML
INJECTION, SOLUTION INTRAVENOUS CONTINUOUS
Status: DISCONTINUED | OUTPATIENT
Start: 2022-04-27 | End: 2022-04-30

## 2022-04-27 RX ORDER — LORAZEPAM 2 MG/ML
1 INJECTION INTRAMUSCULAR
Status: DISCONTINUED | OUTPATIENT
Start: 2022-04-27 | End: 2022-05-03 | Stop reason: HOSPADM

## 2022-04-27 RX ORDER — SODIUM CHLORIDE 9 MG/ML
INJECTION, SOLUTION INTRAVENOUS PRN
Status: DISCONTINUED | OUTPATIENT
Start: 2022-04-27 | End: 2022-05-03 | Stop reason: HOSPADM

## 2022-04-27 RX ORDER — ACETAMINOPHEN 650 MG/1
650 SUPPOSITORY RECTAL EVERY 6 HOURS PRN
Status: DISCONTINUED | OUTPATIENT
Start: 2022-04-27 | End: 2022-05-03 | Stop reason: HOSPADM

## 2022-04-27 RX ORDER — THIAMINE HYDROCHLORIDE 100 MG/ML
100 INJECTION, SOLUTION INTRAMUSCULAR; INTRAVENOUS DAILY
Status: DISCONTINUED | OUTPATIENT
Start: 2022-04-27 | End: 2022-04-30

## 2022-04-27 RX ORDER — ANALGESIC BALM 1.74; 4.06 G/29G; G/29G
OINTMENT TOPICAL PRN
COMMUNITY

## 2022-04-27 RX ORDER — SODIUM CHLORIDE 0.9 % (FLUSH) 0.9 %
5-40 SYRINGE (ML) INJECTION EVERY 12 HOURS SCHEDULED
Status: DISCONTINUED | OUTPATIENT
Start: 2022-04-27 | End: 2022-05-03 | Stop reason: HOSPADM

## 2022-04-27 RX ORDER — IBUPROFEN 800 MG/1
800 TABLET ORAL ONCE
Status: COMPLETED | OUTPATIENT
Start: 2022-04-27 | End: 2022-04-27

## 2022-04-27 RX ORDER — LORAZEPAM 1 MG/1
1 TABLET ORAL
Status: DISCONTINUED | OUTPATIENT
Start: 2022-04-27 | End: 2022-05-03 | Stop reason: HOSPADM

## 2022-04-27 RX ORDER — MAGNESIUM SULFATE HEPTAHYDRATE 40 MG/ML
2000 INJECTION, SOLUTION INTRAVENOUS ONCE
Status: COMPLETED | OUTPATIENT
Start: 2022-04-27 | End: 2022-04-27

## 2022-04-27 RX ORDER — SODIUM CHLORIDE 0.9 % (FLUSH) 0.9 %
10 SYRINGE (ML) INJECTION PRN
Status: DISCONTINUED | OUTPATIENT
Start: 2022-04-27 | End: 2022-05-03 | Stop reason: HOSPADM

## 2022-04-27 RX ADMIN — VANCOMYCIN HYDROCHLORIDE 2000 MG: 1 INJECTION, POWDER, LYOPHILIZED, FOR SOLUTION INTRAVENOUS at 23:26

## 2022-04-27 RX ADMIN — LORAZEPAM 1 MG: 2 INJECTION INTRAMUSCULAR; INTRAVENOUS at 22:45

## 2022-04-27 RX ADMIN — IBUPROFEN 800 MG: 800 TABLET, FILM COATED ORAL at 20:27

## 2022-04-27 RX ADMIN — SODIUM CHLORIDE, PRESERVATIVE FREE 10 ML: 5 INJECTION INTRAVENOUS at 19:18

## 2022-04-27 RX ADMIN — SODIUM CHLORIDE, PRESERVATIVE FREE 10 ML: 5 INJECTION INTRAVENOUS at 22:26

## 2022-04-27 RX ADMIN — THIAMINE HYDROCHLORIDE 100 MG: 100 INJECTION, SOLUTION INTRAMUSCULAR; INTRAVENOUS at 20:27

## 2022-04-27 RX ADMIN — SODIUM CHLORIDE 80 ML: 9 INJECTION, SOLUTION INTRAVENOUS at 19:18

## 2022-04-27 RX ADMIN — SODIUM CHLORIDE: 9 INJECTION, SOLUTION INTRAVENOUS at 22:17

## 2022-04-27 RX ADMIN — IOPAMIDOL 75 ML: 755 INJECTION, SOLUTION INTRAVENOUS at 19:17

## 2022-04-27 RX ADMIN — ENOXAPARIN SODIUM 40 MG: 40 INJECTION SUBCUTANEOUS at 20:27

## 2022-04-27 RX ADMIN — CEFTRIAXONE SODIUM 2000 MG: 2 INJECTION, POWDER, FOR SOLUTION INTRAMUSCULAR; INTRAVENOUS at 22:24

## 2022-04-27 RX ADMIN — SODIUM CHLORIDE: 9 INJECTION, SOLUTION INTRAVENOUS at 20:25

## 2022-04-27 RX ADMIN — MAGNESIUM SULFATE HEPTAHYDRATE 2000 MG: 40 INJECTION, SOLUTION INTRAVENOUS at 20:26

## 2022-04-27 RX ADMIN — SODIUM CHLORIDE, PRESERVATIVE FREE 10 ML: 5 INJECTION INTRAVENOUS at 22:25

## 2022-04-27 RX ADMIN — SODIUM CHLORIDE 2517 ML: 9 INJECTION, SOLUTION INTRAVENOUS at 18:23

## 2022-04-27 ASSESSMENT — ENCOUNTER SYMPTOMS
RHINORRHEA: 1
DIARRHEA: 0
BLOOD IN STOOL: 1
NAUSEA: 1
CONSTIPATION: 0
COUGH: 1
CHEST TIGHTNESS: 0
COLOR CHANGE: 1
TROUBLE SWALLOWING: 0
SHORTNESS OF BREATH: 1
SINUS PRESSURE: 0
ABDOMINAL DISTENTION: 1
ABDOMINAL PAIN: 1
FACIAL SWELLING: 0
WHEEZING: 0
VOMITING: 0
SORE THROAT: 1
SINUS PAIN: 0

## 2022-04-27 ASSESSMENT — PAIN SCALES - GENERAL: PAINLEVEL_OUTOF10: 6

## 2022-04-27 ASSESSMENT — PAIN - FUNCTIONAL ASSESSMENT: PAIN_FUNCTIONAL_ASSESSMENT: 0-10

## 2022-04-27 NOTE — PROGRESS NOTES
Vancomycin Dosing by Pharmacy - Initial Note   TODAY'S DATE:  4/27/2022  Patient name, age:  Ricardo Ordaz, 40 y.o. Indication: Sepsis of unknown origin, empiric  Additional antimicrobials:  ceftriaxone    Allergies:  Bee venom   Actual Weight:    Wt Readings from Last 1 Encounters:   04/27/22 185 lb (83.9 kg)     Labs/Ancillary Data  Estimated Creatinine Clearance: 239 mL/min (A) (based on SCr of 0.45 mg/dL (L)). Recent Labs     04/27/22 1815   CREATININE 0.45*   BUN 9   WBC 16.8*     No results found for: PROCAL  No intake or output data in the 24 hours ending 04/27/22 1915  Temp: 100.5    Recent vancomycin administrations        No vancomycin IV orders with administrations found. Orders not given:            vancomycin (VANCOCIN) 2,000 mg in dextrose 5 % 500 mL IVPB    vancomycin (VANCOCIN) intermittent dosing (placeholder)                  Culture Date / Source  /  Results  See micro    MRSA Nasal Swab: was ordered by provider, awaiting results. Carolina Weems PLAN   Initial loading dose of 25mg/kg (max of 2,500 mg) = 2000  mg  x 1, then  vancomycin 53902 mg IV every 12 hours. Ensured BUN/sCr ordered at baseline and every 48 hours x at least 3 levels, then at least weekly. Vancomycin level ordered for 4/29 @ 0600. Will use bayesian method for dosing. This level will not be a trough. Target AUC/HORTENCIA: 400-600. Vancomycin Target Concentration Parameters  Treatment  Population Target AUC/HORTENCIA Target Trough   Invasive MRSA Infection (bacteremia, pneumonia, meningitis, endocarditis, osteomyelitis)  Sepsis (undifferentiated) 400-600 N/A   Infection due to non-MRSA pathogen  Empiric treatment of non-invasive MRSA infection  (SSTI, UTI) <500 10-15 mg/L   CrCl < 29 mL/min  Rapidly fluctuating serum creatinine   OSCAR N/A < 15 mg/L     Renal replacement therapy is dosed by levels, per hospital protocol. Abbreviations  * Pauc: probability that AUC is >400 (efficacy);  Pconc: probability that Ctrough is above 20 ?g/mL (toxicity); Tox: Probability of nephrotoxicity, based on Zoie et al. Clin Infect Dis 2009. Loading dose: 2000 mg at 21:00 04/27/2022. Regimen: 1250 mg IV every 12 hours. Start time: 19:18 on 04/27/2022  Exposure target: AUC24 (range)400-600 mg/L.hr   AUC24,ss: 463 mg/L.hr  Probability of AUC24 > 400: 65 %  Ctrough,ss: 13.5 mg/L  Probability of Ctrough,ss > 20: 22 %  Probability of nephrotoxicity (Lodise PENNIE 2009): 9 %      Thank you for the consult. Pharmacy will continue to follow.    Francy Bae [facility]  4/27/2022  7:18 PM

## 2022-04-27 NOTE — PROGRESS NOTES
Medication History completed:    New medications: centrum one-a-day, menthol-methyl salicylate    Medications discontinued: sertraline, potassium chloride, naproxen, multivitamin, magnesium oxide, lactobacillus supplement, albuterol sulfate    Changes to dosing: none    Stated allergies: As listed    Other pertinent information: Medications confirmed with patient and [facility]. Of note, he wears nicotine patches overnight and smokes during the day.      Thank you,   Josephine Orta  PharmD Candidate 2022

## 2022-04-27 NOTE — H&P
[address] [facility] [place]     HISTORY AND PHYSICAL EXAMINATION            Date:   4/27/2022  Patientname:  Al File  Date of admission:  4/27/2022  5:50 PM  MRN:   [id number]  Account:  [de-identified]  YOB: 1985  PCP:    Yan Hernandez MD  Room:   11/11  Code Status:    Prior    CHIEF COMPLAINT     Chief Complaint   Patient presents with    Cough    Fatigue    Nasal Congestion    Shortness of Breath       HISTORY OF PRESENT ILLNESS  (Character, Onset, Location, Duration,  Exacerbating/RelievingFactors, Radiation,   Associated Symptoms, Severity )      The patient is a 40 y.o.  male, with a history of pulmonary embolism, hypertension, recent blood in stool  and recent newly found anemia who presents with the concern of a cough and exertional shortness of breath however was found to be jaundice with elevated liver function. According to patient, he was recently evaluated at [facility] ER for the complaint of near syncope,  blood in her stool, chest pain, shortness of breath. Patient was  found to have occult positive stool, but no gross bloody stools and negative cardiac work-up, therefore was discharged home on Protonix and instructions to follow with primary care and GI. However patient was unable to do this given the fact that he lost his insurance when he lost his job. Patient now returns with progressive exertional shortness of breath and dry cough. Symptoms of shortness of breath and cough are associated with any activity  Denies fever, chills, chest pain (not associated with coughing), abdominal pain, nausea, vomiting, diarrhea, and urinary symptoms. There are no  alleviating factors. In addition patient was noted to be jaundice and then states that his wife was growing concern of the color of his skin over the past 3 to 4 days. Work up in the emergency room      Vitals:    [address] HR.       RR.      BP.         SPO2. Laboratories:   Metabolic panel. -Sodium 214, potassium 3.6, chloride 96, Co2 22, BUN 8, creatinine 0.44, magnesium 1.7, uric acid 2.4,   Liver function: Alk phos 240, ALT 34, , bilirubin 5.39, bilirubin direct 4.24, indirect bilirubin 1.62, lipase 54  Hematology. -WBC 16.8, hemoglobin 9.8, hematocrit 23.3, platelet 947, .2, RDW 19.1  Coagulation-PT 14.7, INR 1.2, PTT 40.5  Cardiac Markers-   EKG- Sinus tachycardia Nonspecific ST and T wave abnormality (per ER EKG)  Rapid influenza AMB negative  Rapid COVID-negative      Imaging and Diagnostics:     XR CHEST PORTABLE    Result Date: 4/27/2022    Left lower lobe infiltrate and or small effusion. Heart and mediastinum normal.    Bony thorax intact. Nipple rings noted incidentally. New left effusion and or infiltrate       PAST MEDICAL HISTORY   Patient  has a past medical history of Alcoholism ([facility] [address] Anemia, No active medical problems, and Pulmonary embolism ([facility] [address] PAST SURGICAL HISTORY    Patient  has no past surgical history on file. FAMILY HISTORY    Patient family history includes Heart Attack in his mother; Heart Disease in his father and mother. SOCIAL HISTORY    Patient  reports that he has been smoking cigarettes. He has a 21.00 pack-year smoking history. He has never used smokeless tobacco. He reports previous alcohol use. He reports previous drug use. HOME MEDICATIONS        Prior to Admission medications    Medication Sig Start Date End Date Taking?  Authorizing Provider   Multiple Vitamins-Minerals (CENTRUM MEN) TABS Take 1 tablet by mouth daily   Yes Historical Provider, MD   Menthol-Methyl Salicylate (ANALGESIC OINTMENT) OINT ointment Apply topically as needed Indications: on back   Yes Historical Provider, MD   metoprolol succinate (TOPROL XL) 25 MG extended release tablet take 1 tablet by mouth once daily 12/20/21   Mendel Mcwilliams MD   pantoprazole (PROTONIX) 20 MG tablet Take 2 tablets by mouth daily 4/15/22 Rom Gutierrez DO   losartan (COZAAR) 50 MG tablet take 1 tablet by mouth once daily 11/16/21   Cherylene Patten, MD   naproxen (NAPROSYN) 500 MG tablet Take 1 tablet by mouth 2 times daily  Patient taking differently: Take 500 mg by mouth 2 times daily as needed  8/13/21 3/28/22  Tasneem Elmore MD   fluticasone [facility]) 50 MCG/ACT nasal spray instill 1 spray into each nostril once daily 5/10/21   Cherylene Patten, MD   Blood Pressure Monitoring (BP MONITOR-STETHOSCOPE) KIT 1 each by Does not apply route daily 4/7/21   Cherylene Patten, MD   Nicotine 21-14-7 MG/24HR KIT Place 14 mg onto the skin nightly     Historical Provider, MD   EPINEPHrine (EPIPEN 2-LIZETTE) 0.3 MG/0.3ML SOAJ injection Inject 0.3 mLs into the muscle once for 1 dose Use as directed for allergic reaction 11/17/16 5/24/21  Ehsan Lock MD       ALLERGIES      Bee venom    REVIEW OF SYSTEMS     Review of Systems   Reason unable to perform ROS: Multiple complaints, pertinent positives are marked were noted to be recently increased in duration or quality or intensity. Constitutional: Positive for fatigue. Negative for chills, diaphoresis and fever. HENT: Positive for rhinorrhea. Negative for congestion and hearing loss. Respiratory: Positive for cough (Nonproductive), chest tightness and shortness of breath. Negative for wheezing and stridor. Cardiovascular: Negative for chest pain, palpitations and leg swelling. Gastrointestinal: Negative for abdominal pain, blood in stool, constipation, diarrhea, nausea and vomiting. Genitourinary: Negative for dysuria and frequency. Musculoskeletal: Positive for arthralgias and myalgias. Skin: Positive for color change. Negative for rash. Neurological: Positive for weakness (Generalized nonfocal), light-headedness and headaches. Negative for dizziness and seizures. Psychiatric/Behavioral: The patient is not nervous/anxious.       PHYSICAL EXAM      /64   Pulse 112   Temp 100.5 °F (38.1 °C) (Oral)   Resp 29   Ht 5' 8\" (1.727 m)   Wt 185 lb (83.9 kg)   SpO2 94%   BMI 28.13 kg/m²  Body mass index is 28.13 kg/m². Physical Exam  Vitals and nursing note reviewed. Constitutional:       General: He is not in acute distress. Appearance: He is well-developed. He is ill-appearing. He is not diaphoretic. Comments: Significant jaundice noted   HENT:      Head: Normocephalic and atraumatic. Right Ear: Hearing normal.      Left Ear: Hearing normal.      Nose: Nose normal. No rhinorrhea. Eyes:      General: Lids are normal. Scleral icterus present. Extraocular Movements:      Right eye: Normal extraocular motion. Left eye: Normal extraocular motion. Conjunctiva/sclera: Conjunctivae normal.      Right eye: Right conjunctiva is not injected. Left eye: Left conjunctiva is not injected. Pupils: Pupils are equal, round, and reactive to light. Pupils are equal.      Right eye: Pupil is reactive. Left eye: Pupil is reactive. Neck:      Thyroid: No thyromegaly. Vascular: No carotid bruit. Trachea: Trachea and phonation normal. No tracheal deviation. Cardiovascular:      Rate and Rhythm: Normal rate and regular rhythm. Pulses: Normal pulses. Heart sounds: Normal heart sounds. No murmur heard. Pulmonary:      Effort: Pulmonary effort is normal. No respiratory distress. Breath sounds: No stridor. Examination of the right-lower field reveals decreased breath sounds. Examination of the left-lower field reveals decreased breath sounds. Decreased breath sounds and rhonchi (Scattered throughout posterior) present. Abdominal:      General: Bowel sounds are normal. There is no distension. Palpations: Abdomen is soft. There is no mass. Tenderness: There is no abdominal tenderness. There is no guarding. Musculoskeletal:         General: No tenderness. Cervical back: Neck supple. Skin:     General: Skin is warm and dry. Findings: No erythema, lesion or rash. Neurological:      General: No focal deficit present. Mental Status: He is alert and oriented to person, place, and time. He is not disoriented. GCS: GCS eye subscore is 4. GCS verbal subscore is 5. GCS motor subscore is 6. Cranial Nerves: No cranial nerve deficit. Sensory: No sensory deficit. Psychiatric:         Attention and Perception: Attention normal.         Mood and Affect: Mood normal.         Speech: Speech normal.         Behavior: Behavior normal. Behavior is cooperative. DIAGNOSTICS      EKG: ekgNo results found for this or any previous visit (from the past 4464 hour(s)). Labs:  CBC:   Recent Labs     04/27/22 1815   WBC 16.8*   HGB 9.8*        BMP:    Recent Labs     04/27/22 1815   *   K 3.5*   CL 94*   CO2 22   BUN 9   CREATININE 0.45*   GLUCOSE 111*     S. Calcium:  Recent Labs     04/27/22 1815   CALCIUM 8.1*     S. Ionized Calcium:No results for input(s): IONCA in the last 72 hours. S. Magnesium:  Recent Labs     04/27/22  1815   MG 1.5*     S. Phosphorus:No results for input(s): PHOS in the last 72 hours. S. Glucose:No results for input(s): POCGLU in the last 72 hours. Glycosylated hemoglobin A1C: No results found for: LABA1C  Hepatic:   Recent Labs     04/27/22 1815   *   ALT 39   ALKPHOS 275*     CARDIAC ENZY: No results for input(s): CKTOTAL, CKMB, CKMBINDEX, TROPHS, MYOGLOBIN in the last 72 hours. INR:   Recent Labs     04/27/22  1815   INR 1.2     BNP: No results for input(s): PROBNP in the last 72 hours. ABGs: No results for input(s): PH, PCO2, PO2, HCO3, O2SAT in the last 72 hours. Lipids: No results for input(s): CHOL, TRIG, HDL, LDL, LDLCALC in the last 72 hours. Pancreatic functions:  Recent Labs     04/27/22 1815   LIPASE 54     S. LacticAcid: No results for input(s): LACTA in the last 72 hours.   Thyroid functions:   Lab Results   Component Value Date    TSH 3.61 10/15/2021 U/A:No results for input(s): NITRITE, COLORU, WBCUA, RBCUA, MUCUS, BACTERIA, CLARITYU, SPECGRAV, LEUKOCYTESUR, BLOODU, GLUCOSEU, AMORPHOUS in the last 72 hours. Invalid input(s): Derrill Nissen  Recent Labs     04/27/22  [address] Not Detected     Imaging/Diagonstics:     XR CHEST (2 VW)    Result Date: 4/15/2022  EXAMINATION: TWO XRAY VIEWS OF THE CHEST 4/15/2022 2:40 pm COMPARISON: 01/30/2022 HISTORY: ORDERING SYSTEM PROVIDED HISTORY: CP, lightheadness TECHNOLOGIST PROVIDED HISTORY: CP, lightheadness FINDINGS: Chest radiograph: The cardiomediastinal silhouette and hilar contours are normal. The lungs are clear with no focal consolidation, pleural effusion or pneumothorax. The overlying soft tissue and osseous structures do not demonstrate acute abnormality. No acute intrathoracic pathology. XR CHEST PORTABLE    Result Date: 4/27/2022  EXAMINATION: ONE XRAY VIEW OF THE CHEST 4/27/2022 6:11 pm COMPARISON: April 15, 2022 HISTORY: ORDERING SYSTEM PROVIDED HISTORY: SOB TECHNOLOGIST PROVIDED HISTORY: SOB Reason for Exam: PT CO SOB with cough, fatigue, congestion, and jaundice of the skin at this time. PT states symptoms X 1 week. FINDINGS: Left lower lobe infiltrate and or small effusion. Heart and mediastinum normal.  Bony thorax intact. Nipple rings noted incidentally. New left effusion and or infiltrate     CT CHEST PULMONARY EMBOLISM W CONTRAST    Result Date: 4/15/2022  EXAMINATION: CTA OF THE CHEST 4/15/2022 1:00 pm TECHNIQUE: CTA of the chest was performed after the administration of intravenous contrast.  Multiplanar reformatted images are provided for review. MIP images are provided for review. Dose modulation, iterative reconstruction, and/or weight based adjustment of the mA/kV was utilized to reduce the radiation dose to as low as reasonably achievable.  COMPARISON: Chest x-ray dated April 15, 2022 prior CT chest dated June 20, 2021 HISTORY: ORDERING SYSTEM PROVIDED HISTORY: shortness of breath, chest pain, elevated d-dimer TECHNOLOGIST PROVIDED HISTORY: shortness of breath, chest pain, elevated d-dimer Decision Support Exception - unselect if not a suspected or confirmed emergency medical condition->Emergency Medical Condition (MA) Reason for Exam: sob, chest pain, dizziness per pt FINDINGS: Pulmonary Arteries: Pulmonary arteries are suboptimally opacified for evaluation. No definite evidence of intraluminal filling defect to suggest pulmonary embolism. Main pulmonary artery is normal in caliber. Mediastinum: No evidence of mediastinal lymphadenopathy. The heart and pericardium demonstrate no acute abnormality. There is no acute abnormality of the thoracic aorta. Lungs/pleura: Linear opacities are present within the right middle lobe, lingula, and periphery of the left lower lobe, consistent with atelectasis. No focal area of consolidation, pleural effusion, or pneumothorax is present. Upper Abdomen: Images through the upper abdomen demonstrate hepatomegaly and diffuse hepatic steatosis. Soft Tissues/Bones: Gynecomastia is present bilaterally. No acute osseous abnormality is present. 1. No evidence of pulmonary embolism 2. Linear areas of atelectasis within the right middle lobe, lingula, left lower lobe 3. Hepatomegaly and diffuse hepatic steatosis     ASSESSMENT  and  PLAN     Principal Problem:    Sepsis ([address]  Active Problems:    Left lower lobe pneumonia    Melena    Acute blood loss anemia    Transaminitis  Resolved Problems:    * No resolved hospital problems.  *    Plan:    Sepsis  -Fluid bolus administered in ED  -IV antibiotics initiated rocephin and vanco  -Blood culture x2  -Lactate  3.5 High   2.5 High  2.4 High      Pneumonia    -IV antibiotic initiated in ED Rocephin and vancomycin  -Continue on admission  -Pharmacy to dose Vancomycin  -Sputum C&S pending      Elevated Liver Function  -Last two recent bilirubins elevated 1.42 (3/28) 3.17 (4//22)  -AST ALT at or near baseline  -continue to trend LFT  -Known ETOH cirrhosis  -check hepatitis panel      Acute blood loss anemia/Melena  -Hgb 9.8 on admission, 11.7 -10 days ago  -Still with a complaint of melena and bright red blood TX  -Type and Cross  --transfuse 1 unit PRBC for hemoglobin less than 7  -monitor H&H   -monitor for active signs of bleeding  -iron studies ordered  -B12 and folate  -Hold NSAIDs-was given a dose in the ER    Alcohol Abuse/Use  -IV NS @ 150 ml/hr  -Multivitamin, thiamine and folic acid daily  -Ativan per CIWA scale  -Seizure precautions  -Inpatient psych/Social service consult for help with substance abuse  -B12 and folate                          IVF:  Normal saline at 150 an hour  Diet:  N.p.o. except ice chips and meds  GI ppx:  PPI therapy  DVT ppx: Holding secondary to acute blood loss anemia  Code status: Full code  Upcoming Procedure: Pending per GI  Dispo:  Home    Consultations:     PHARMACY TO DOSE VANCOMYCIN  IP CONSULT TO SOCIAL WORK  IP CONSULT TO INTERNAL MEDICINE  IP CONSULT TO GI      María Mena, NICK, AGACNP, FNP-BC   4/27/2022  7:40 PM    [facility]: FARIBA VASQUEZ  [address], [address] [place] [address]. Phone [phone]     Attending Physician Statement  I have discussed the care of Genevieve Avila and I have examined the patient myselft and taken ros and hpi , including pertinent history and exam findings,  with the NP.  I have reviewed the key elements of all parts of the encounter with the NP  I agree with the assessment, plan and orders as documented by the NP    81-year-old woman with a history of alcoholism and anxiety and depression admitted with pneumonia, started on broad-spectrum antibiotics, cultures pending,  Continue to monitor  Possible GI bleed, keep an eye on the hemoglobin,  Electronically signed by Cathy Tovar MD

## 2022-04-27 NOTE — ED TRIAGE NOTES
C= \"Have you ever felt that you should Cut down on your drinking? \"  Yes  A= \"Have people Annoyed you by criticizing your drinking? \"  Yes  G= \"Have you ever felt bad or Guilty about your drinking? \"  No  E= \"Have you ever had a drink as an Eye-opener first thing in the morning to steady your nerves or to help a hangover? \"  Yes      Deferred []      Reason for deferring: pt now sober    *If yes to two or more: probable alcohol abuse. *

## 2022-04-27 NOTE — ED PROVIDER NOTES
[address] [facility] ED     Emergency Department     Faculty Attestation        I performed a history and physical examination of the patient and discussed management with the resident. I reviewed the residents note and agree with the documented findings and plan of care. Any areas of disagreement are noted on the chart. I was personally present for the key portions of any procedures. I have documented in the chart those procedures where I was not present during the key portions. I have reviewed the emergency nurses triage note. I agree with the chief complaint, past medical history, past surgical history, allergies, medications, social and family history as documented unless otherwise noted below. Documentation of the HPI, Physical Exam and Medical Decision Making performed by medical students or scribes is based on my personal performance of the HPI, PE and MDM. For Physician Assistant/ Nurse Practitioner cases/documentation I have have had a face to face evaluation with this patient and have completed at least one if not all key elements of the E/M (history, physical exam, and MDM). Additional findings are as noted. Pertinent Comments     Primary Care Physician: Uriel Jorge MD    History: This is a 40 y.o. male who presents to the Emergency Department with complaint of cough, congestion, abdominal pain and distention. Used to drink alcohol daily but quit within the past week. Fever started today as well. The rest of his symptoms have been ongoing for least past several days. Physical:     height is 5' 8\" (1.727 m) and weight is 185 lb (83.9 kg). His oral temperature is 100.5 °F (38.1 °C). His blood pressure is 111/69 and his pulse is 134. His respiration is 16 and oxygen saturation is 97%.    40 y.o. male     Febrile, tachycardic, nontoxic but ill in appearance. On exam abdomen is protuberant, hepatomegaly noted.   No peritoneal signs on exam.    Skin does have a yellow tinge concerning for jaundice, concerning for liver failure, sepsis, septic shock, SBP, pneumonia, viral syndrome, dehydration, metabolic abnormality. Impression: Sepsis    Plan: Broad septic work-up      CRITICAL CARE: There was a high probability of clinically significant/life threatening deterioration in this patient's condition which required my urgent intervention. Total critical care time was 35 minutes. This excludes any time for separately reportable procedures. The care is provided during an unprecedented national emergency due to the novel coronavirus, COVID-19.     (Please note that portions of this note were completed with a voice recognition program.  Efforts were made to edit the dictations but occasionally words are mis-transcribed.)    Uriel Glass DO  Attending Emergency Physician         Uriel Glass,   04/27/22 8701 DO [address]  04/27/22 [id number]

## 2022-04-27 NOTE — ED PROVIDER NOTES
[facility] ED  Emergency Department Encounter  Emergency Medicine Resident     Pt Name: Atiya Bradford  MRN: [id number]  [place] 1985  Date of evaluation: 4/27/22  PCP:  MD Elfego Durant       Chief Complaint   Patient presents with    Cough    Fatigue    Nasal Congestion    Shortness of Breath       HISTORY OFPRESENT ILLNESS  (Location/Symptom, Timing/Onset, Context/Setting, Quality, Duration, Modifying Factors,Severity.)      Atiya Bradford is a 40 y. o.yo male who presents with concern for pneumonia. Patient states roughly 10 days ago he went to [facility]. [facility] after he had a presyncopal episode. He states at that time he was told that he had some blood in his stool and they did a CT scan of his chest due to concerns for a PE. He states his CT scan was negative and was told to stop taking naproxen and started on Protonix. Patient states since then he has essentially been bedbound unable to get up due to severe abdominal pain. Patient states since he went to [address] [place] [address]. [facility] he has noted abdominal distention and severe right upper quadrant pain. Patient states that he has been very fatigued and weak. He is also been complaining of shortness of breath and a nonproductive cough. Patient states that he was concerned that he developed pneumonia secondary from lying in bed for the last 10 days. Patient states that his wife has pointed out to him that his skin appears yellow. Patient does admit to significant alcohol history states that he drinks 1/5 of liquor a day but has not been able to for the last week secondary to feeling weak and fatigued. Patient is complaining of diffuse body aches, abdominal pain and distention, shortness of breath and cough. Patient also was noted to be febrile at home today which is why he decided to return to the hospital this afternoon.   Patient does state that he has had bright red mucus coming from his rectum for the last 10 days    PAST MEDICAL / SURGICAL / SOCIAL / FAMILY HISTORY      has a past medical history of Alcoholism ([facility]), Anemia, No active medical problems, and Pulmonary embolism ([facility]). has no past surgical history on file. Social History     Socioeconomic History    Marital status: Unknown     Spouse name: Not on file    Number of children: Not on file    Years of education: Not on file    Highest education level: Not on file   Occupational History    Not on file   Tobacco Use    Smoking status: Current Every Day Smoker     Packs/day: 1.00     Years: 21.00     Pack years: 21.00     Types: Cigarettes    Smokeless tobacco: Never Used    Tobacco comment: using nicotine patches at night   Vaping Use    Vaping Use: Never used   Substance and Sexual Activity    Alcohol use: Not Currently     Comment: hx of alcoholism; pt drinks a fifth of whiskey daily    Drug use: Not Currently     Comment: used cocaine in early 20's    Sexual activity: Not on file   Other Topics Concern    Not on file   Social History Narrative    Not on file     Social Determinants of Health     Financial Resource Strain: Low Risk     Difficulty of Paying Living Expenses: Not hard at all   Food Insecurity: No Food Insecurity    Worried About Running Out of Food in the Last Year: Never true    Sheri of Food in the Last Year: Never true   Transportation Needs: No Transportation Needs    Lack of Transportation (Medical): No    Lack of Transportation (Non-Medical):  No   Physical Activity:     Days of Exercise per Week: Not on file    Minutes of Exercise per Session: Not on file   Stress:     Feeling of Stress : Not on file   Social Connections:     Frequency of Communication with Friends and Family: Not on file    Frequency of Social Gatherings with Friends and Family: Not on file    Attends Baptism Services: Not on file    Active Member of Clubs or Organizations: Not on file    Attends Club or Organization Meetings: Not on file    Marital Status: Not on file   Intimate Partner Violence:     Fear of Current or Ex-Partner: Not on file    Emotionally Abused: Not on file    Physically Abused: Not on file    Sexually Abused: Not on file   Housing Stability:     Unable to Pay for Housing in the Last Year: Not on file    Number of [place] in the Last Year: Not on file    Unstable Housing in the Last Year: Not on file       Family History   Problem Relation Age of Onset    Heart Disease Mother     Heart Attack Mother     Heart Disease Father         Allergies:  Bee venom    Home Medications:  Prior to Admission medications    Medication Sig Start Date End Date Taking?  Authorizing Provider   Multiple Vitamins-Minerals (CENTRUM MEN) TABS Take 1 tablet by mouth daily   Yes Historical Provider, MD   Menthol-Methyl Salicylate (ANALGESIC OINTMENT) OINT ointment Apply topically as needed Indications: on back   Yes Historical Provider, MD   metoprolol succinate (TOPROL XL) 25 MG extended release tablet take 1 tablet by mouth once daily 12/20/21   Maicol Charles MD   pantoprazole (PROTONIX) 20 MG tablet Take 2 tablets by mouth daily 4/15/22   Dee Fontanez DO   losartan (COZAAR) 50 MG tablet take 1 tablet by mouth once daily 11/16/21   Maicol Charles MD   naproxen (NAPROSYN) 500 MG tablet Take 1 tablet by mouth 2 times daily  Patient taking differently: Take 500 mg by mouth 2 times daily as needed  8/13/21 3/28/22  Kallie Olivares MD   fluticasone Wendelyn Holding) 50 MCG/ACT nasal spray instill 1 spray into each nostril once daily 5/10/21   Maicol Charles MD   Blood Pressure Monitoring (BP MONITOR-STETHOSCOPE) KIT 1 each by Does not apply route daily 4/7/21   Maicol Charles MD   Nicotine 21-14-7 MG/24HR KIT Place 14 mg onto the skin nightly     Historical Provider, MD   EPINEPHrine (EPIPEN 2-LIZETTE) 0.3 MG/0.3ML SOAJ injection Inject 0.3 mLs into the muscle once for 1 dose Use as directed for allergic reaction 11/17/16 5/24/21 Kuldeep Stokes MD       REVIEW OFSYSTEMS    (2-9 systems for level 4, 10 or more for level 5)      Review of Systems   Constitutional: Positive for activity change, appetite change, chills, fatigue and fever. Negative for diaphoresis. HENT: Positive for rhinorrhea and sore throat. Negative for facial swelling, nosebleeds, sinus pressure, sinus pain and trouble swallowing. Respiratory: Positive for cough and shortness of breath. Negative for chest tightness and wheezing. Cardiovascular: Positive for chest pain. Negative for palpitations and leg swelling. Gastrointestinal: Positive for abdominal distention, abdominal pain, blood in stool and nausea. Negative for constipation, diarrhea and vomiting. Endocrine: Negative for polydipsia, polyphagia and polyuria. Genitourinary: Negative for dysuria, flank pain and hematuria. Musculoskeletal: Positive for arthralgias and myalgias. Negative for gait problem, neck pain and neck stiffness. Skin: Positive for color change. Negative for rash and wound. Neurological: Positive for tremors, weakness and headaches. Negative for dizziness, syncope and light-headedness. PHYSICAL EXAM   (up to 7 for level 4, 8 or more forlevel 5)      INITIAL VITALS:   ED Triage Vitals [04/27/22 1743]   BP Temp Temp Source Pulse Resp SpO2 Height Weight   111/69 100.5 °F (38.1 °C) Oral 134 16 97 % 5' 8\" (1.727 m) 185 lb (83.9 kg)       Physical Exam  Constitutional:       General: He is not in acute distress. Appearance: He is normal weight. He is ill-appearing and diaphoretic. HENT:      Head: Normocephalic and atraumatic. Right Ear: External ear normal.      Left Ear: External ear normal.      Nose: Nose normal.      Mouth/Throat:      Mouth: Mucous membranes are dry. Pharynx: Oropharynx is clear. No posterior oropharyngeal erythema. Comments: Dry tacky mucous membranes  Eyes:      General: Scleral icterus present.       Extraocular Movements: Extraocular movements intact. Pupils: Pupils are equal, round, and reactive to light. Comments: Significant scleral icterus noted bilaterally   Cardiovascular:      Rate and Rhythm: Regular rhythm. Tachycardia present. Pulses: Normal pulses. Heart sounds: Normal heart sounds. Pulmonary:      Effort: Pulmonary effort is normal. No respiratory distress. Breath sounds: Normal breath sounds. No wheezing. Abdominal:      General: Bowel sounds are normal. There is distension. Tenderness: There is abdominal tenderness. Comments: Fairly tense abdomen, nonperitoneal, is distended with palpable liver edge below the costal margin   Musculoskeletal:         General: Normal range of motion. Cervical back: Normal range of motion. No muscular tenderness. Skin:     General: Skin is warm. Capillary Refill: Capillary refill takes 2 to 3 seconds. Coloration: Skin is jaundiced and pale. Neurological:      General: No focal deficit present. Mental Status: He is alert and oriented to person, place, and time. Cranial Nerves: No cranial nerve deficit. Motor: Weakness (Generalized weakness) present.          DIFFERENTIAL  DIAGNOSIS     PLAN (LABS / IMAGING / EKG):  Orders Placed This Encounter   Procedures    Culture, Urine    Culture, Blood 1    Culture, Blood 2    COVID-19 & Influenza Combo    MRSA DNA Probe, Nasal    Culture, Blood 1    Sputum gram stain    Respiratory Culture    XR CHEST PORTABLE    CT ABDOMEN PELVIS W IV CONTRAST Additional Contrast? None    XR CHEST PORTABLE    CBC with Auto Differential    Basic Metabolic Panel w/ Reflex to MG    Hepatic Function Panel    Urinalysis    Lactate, Sepsis    APTT    Protime-INR    Lipase    Troponin    Occult Blood Screen    Magnesium    Vancomycin Level, Random    Lactate, Sepsis    Protime-INR    Comprehensive Metabolic Panel w/ Reflex to MG    Procalcitonin    Blood gas, venous    Magnesium    Diet NPO Exceptions are: Ice Chips    Notify physician    Vital signs per unit routine    Notify physician    Up as tolerated    Up with assistance    Daily weights    Intake and output    Telemetry monitoring - continuous duration    Full Code    Pharmacy to Dose: Vancomycin    Inpatient consult to Social Work    Inpatient consult to Internal Medicine    OT eval and treat    PT evaluation and treat    Initiate Oxygen Therapy Protocol    Initiate Oxygen Therapy Protocol    EKG 12 Lead    ADMIT TO INPATIENT    Alcohol and or drug assessment    Seizure precautions    Fall precautions       MEDICATIONS ORDERED:  Orders Placed This Encounter   Medications    0.9 % sodium chloride IV bolus 2,517 mL     Order Specific Question:   Was full 30mL/kg fluid bolus ordered? Answer:    Yes    DISCONTD: cefTRIAXone (ROCEPHIN) 1000 mg IVPB in 50 mL D5W minibag     Order Specific Question:   Antimicrobial Indications     Answer:   Sepsis of Unknown Etiology     Order Specific Question:   Suspected Organism(s)     Answer:   potential SBP    sodium chloride flush 0.9 % injection 5-40 mL    sodium chloride flush 0.9 % injection 5-40 mL    thiamine (B-1) injection 100 mg    OR Linked Order Group     LORazepam (ATIVAN) tablet 1 mg     LORazepam (ATIVAN) injection 1 mg    magnesium sulfate 2000 mg in dextrose 50 mL IVPB    ibuprofen (ADVIL;MOTRIN) tablet 800 mg    vancomycin (VANCOCIN) 2,000 mg in dextrose 5 % 500 mL IVPB     Order Specific Question:   Antimicrobial Indications     Answer:   Sepsis of Unknown Etiology    vancomycin (VANCOCIN) intermittent dosing (placeholder)     Order Specific Question:   Antimicrobial Indications     Answer:   Sepsis of Unknown Etiology    0.9 % sodium chloride bolus    sodium chloride flush 0.9 % injection 10 mL    iopamidol (ISOVUE-370) 76 % injection 75 mL    vancomycin (VANCOCIN) 1,250 mg in dextrose 5 % 250 mL IVPB (ADDAVIAL)     Order Specific Question: Antimicrobial Indications     Answer:   Sepsis of Unknown Etiology    sodium chloride flush 0.9 % injection 5-40 mL    sodium chloride flush 0.9 % injection 5-40 mL    0.9 % sodium chloride infusion    enoxaparin (LOVENOX) injection 40 mg     Order Specific Question:   Indication of Use     Answer:   Prophylaxis-DVT/PE    OR Linked Order Group     acetaminophen (TYLENOL) tablet 650 mg     acetaminophen (TYLENOL) suppository 650 mg    0.9 % sodium chloride infusion    cefTRIAXone (ROCEPHIN) 2000 mg IVPB in D5W 50ml minibag     Order Specific Question:   Antimicrobial Indications     Answer:   Sepsis of Unknown Etiology     Order Specific Question:   Sepsis duration of therapy     Answer: Other     Comments:   ER order     Order Specific Question:   Suspected Organism(s)     Answer:   potential SBP       DDX: SBP, acute liver failure, ascites, anemia, electrolyte abnormality, GI bleed    Initial MDM/Plan: 40 y.o. male who presents with concerns for cough and abdominal distention. Patient was seen roughly 10 days ago had CT PE at that time was told he had a GI bleed and was put on Protonix. Patient states he has not been able to get out of bed secondary to fatigue and weakness. Patient is very ill-appearing on exam, tachycardic obviously jaundiced with scleral icterus. Heavy alcohol history concern for acute liver failure as well as SBP. Will get sepsis work-up.     DIAGNOSTIC RESULTS / EMERGENCYDEPARTMENT COURSE / MDM     LABS:  Labs Reviewed   CBC WITH AUTO DIFFERENTIAL - Abnormal; Notable for the following components:       Result Value    WBC 16.8 (*)     RBC 2.25 (*)     Hemoglobin 9.8 (*)     Hematocrit 28.3 (*)     .1 (*)     MCH 43.6 (*)     RDW 19.1 (*)     Seg Neutrophils 71 (*)     Lymphocytes 12 (*)     Monocytes 12 (*)     Segs Absolute 11.92 (*)     Absolute Mono # 2.02 (*)     All other components within normal limits   BASIC METABOLIC PANEL W/ REFLEX TO MG FOR LOW K - Abnormal; Notable for the following components:    Glucose 111 (*)     CREATININE 0.45 (*)     Calcium 8.1 (*)     Sodium 131 (*)     Potassium 3.5 (*)     Chloride 94 (*)     All other components within normal limits   HEPATIC FUNCTION PANEL - Abnormal; Notable for the following components:    Albumin 2.9 (*)     Alkaline Phosphatase 275 (*)      (*)     Total Bilirubin 5.86 (*)     Bilirubin, Direct 4.24 (*)     Bilirubin, Indirect 1.62 (*)     All other components within normal limits   LACTATE, SEPSIS - Abnormal; Notable for the following components:    Lactic Acid, Sepsis 3.5 (*)     All other components within normal limits   APTT - Abnormal; Notable for the following components:    PTT 40.5 (*)     All other components within normal limits   PROTIME-INR - Abnormal; Notable for the following components:    Protime 14.7 (*)     All other components within normal limits   MAGNESIUM - Abnormal; Notable for the following components:    Magnesium 1.5 (*)     All other components within normal limits   COVID-19 & INFLUENZA COMBO   CULTURE, URINE   CULTURE, BLOOD 1   CULTURE, BLOOD 2   MRSA DNA PROBE, NASAL   CULTURE, BLOOD 1   GRAM STAIN   CULTURE, RESPIRATORY   LIPASE   URINALYSIS   LACTATE, SEPSIS   TROPONIN   OCCULT BLOOD SCREEN   LACTATE, SEPSIS   LACTATE, SEPSIS   PROTIME-INR   COMPREHENSIVE METABOLIC PANEL W/ REFLEX TO MG FOR LOW K   COMPREHENSIVE METABOLIC PANEL W/ REFLEX TO MG FOR LOW K   PROCALCITONIN   BLOOD GAS, VENOUS   MAGNESIUM         RADIOLOGY:  XR CHEST PORTABLE    Result Date: 4/27/2022  EXAMINATION: ONE XRAY VIEW OF THE CHEST 4/27/2022 6:11 pm COMPARISON: April 15, 2022 HISTORY: ORDERING SYSTEM PROVIDED HISTORY: SOB TECHNOLOGIST PROVIDED HISTORY: SOB Reason for Exam: PT CO SOB with cough, fatigue, congestion, and jaundice of the skin at this time. PT states symptoms X 1 week. FINDINGS: Left lower lobe infiltrate and or small effusion. Heart and mediastinum normal.  Bony thorax intact.   Nipple rings noted incidentally. New left effusion and or infiltrate         EKG      All EKG's are interpreted by the Emergency Department Physicianwho either signs or Co-signs this chart in the absence of a cardiologist.    EMERGENCY DEPARTMENT COURSE:  ED Course as of 04/27/22 2002 Wed Apr 27, 2022   1807 Patient seen and evaluated. Does have jaundice noted. He is tachycardic, febrile. We will get a sepsis work-up [CD]   1808 Hemoglobin Quant(!): 9.8 [CD]   1834 Hemoglobin dropped from 11.7 to 9.8 from 12 days ago. [CD]   5651 WBC(!): 16.8 [CD]   1840 Patient does have elevated PT and PTT. [CD]   1843 Antibiotics ordered  [CD]   1850 Lactic Acid, Sepsis(!): 3.5 [CD]   1851 COVID and influenza negative [CD]   1851 EKG demonstrating sinus tachycardia [CD]   1856 Bilirubin(!): 5.86  Bilirubin significantly elevated from previous. [CD]   1857 Patient noted to be slightly hypocalcemic [CD]   1857 Patient also noted to be hypokalemic, low albumin elevated alk phos. [CD]   1904 Magnesium(!): 1.5 [CD]   1922 New left effusion or infiltrate noted on chest x-ray [CD]   1924 No obvious ascites on CT, hepatomegaly noted on CT  [CD]   1924 Spoke to [facility] from internal medicine for admission. [CD]      ED Course User Index  [CD] Suzanne Armstrong DO          PROCEDURES:  None    CONSULTS:  PHARMACY TO DOSE VANCOMYCIN  IP CONSULT TO SOCIAL WORK  IP CONSULT TO INTERNAL MEDICINE    CRITICAL CARE:  Please see attending documentation    FINAL IMPRESSION      1. Pneumonia of left lower lobe due to infectious organism    2. Acute liver failure without hepatic coma          DISPOSITION / PLAN     DISPOSITION    Decision to admit    PATIENT REFERRED TO:  No follow-up provider specified.     DISCHARGE MEDICATIONS:  New Prescriptions    No medications on file       Suzanne Armstrong DO  Emergency Medicine Resident    (Please note that portions of this note were completed with a voice recognition program.Efforts were made to edit the dictations but occasionally words are mis-transcribed.)        Lyda Mcardle, DO  Resident  04/27/22 2002

## 2022-04-27 NOTE — PROGRESS NOTES
Vancomycin 2000 mg loading dose tubed from pharmacy to ER 19:32  [facility], [address]  4/27/2022  7:32 PM

## 2022-04-28 ENCOUNTER — APPOINTMENT (OUTPATIENT)
Dept: GENERAL RADIOLOGY | Age: 37
DRG: 720 | End: 2022-04-28
Payer: MEDICAID

## 2022-04-28 PROBLEM — A41.9 SEPSIS (HCC): Status: ACTIVE | Noted: 2022-04-28

## 2022-04-28 PROBLEM — D62 ACUTE BLOOD LOSS ANEMIA: Status: ACTIVE | Noted: 2022-04-28

## 2022-04-28 PROBLEM — K92.1 MELENA: Status: ACTIVE | Noted: 2022-04-28

## 2022-04-28 LAB
-: ABNORMAL
AFP: 2.2 UG/L
ALBUMIN SERPL-MCNC: 2.2 G/DL (ref 3.5–5.2)
ALBUMIN SERPL-MCNC: 2.3 G/DL (ref 3.5–5.2)
ALBUMIN SERPL-MCNC: 2.7 G/DL (ref 3.5–5.2)
ALLEN TEST: ABNORMAL
ALP BLD-CCNC: 217 U/L (ref 40–129)
ALP BLD-CCNC: 227 U/L (ref 40–129)
ALP BLD-CCNC: 235 U/L (ref 40–129)
ALPHA-1 ANTITRYPSIN: 238 MG/DL (ref 90–200)
ALT SERPL-CCNC: 31 U/L (ref 5–41)
ALT SERPL-CCNC: 33 U/L (ref 5–41)
ALT SERPL-CCNC: 33 U/L (ref 5–41)
ANION GAP SERPL CALCULATED.3IONS-SCNC: 10 MMOL/L (ref 9–17)
ANION GAP SERPL CALCULATED.3IONS-SCNC: 14 MMOL/L (ref 9–17)
ANION GAP SERPL CALCULATED.3IONS-SCNC: 8 MMOL/L (ref 9–17)
AST SERPL-CCNC: 179 U/L
AST SERPL-CCNC: 199 U/L
AST SERPL-CCNC: 203 U/L
BACTERIA: ABNORMAL
BILIRUB SERPL-MCNC: 4.77 MG/DL (ref 0.3–1.2)
BILIRUB SERPL-MCNC: 5.02 MG/DL (ref 0.3–1.2)
BILIRUB SERPL-MCNC: 5.33 MG/DL (ref 0.3–1.2)
BILIRUBIN URINE: NEGATIVE
BUN BLDV-MCNC: 7 MG/DL (ref 6–20)
BUN BLDV-MCNC: 8 MG/DL (ref 6–20)
BUN BLDV-MCNC: 8 MG/DL (ref 6–20)
CALCIUM SERPL-MCNC: 7.2 MG/DL (ref 8.6–10.4)
CALCIUM SERPL-MCNC: 7.2 MG/DL (ref 8.6–10.4)
CALCIUM SERPL-MCNC: 7.4 MG/DL (ref 8.6–10.4)
CARBOXYHEMOGLOBIN: 4 % (ref 0–5)
CARBOXYHEMOGLOBIN: 4.5 % (ref 0–5)
CERULOPLASMIN: 30 MG/DL (ref 15–30)
CHLORIDE BLD-SCNC: 103 MMOL/L (ref 98–107)
CHLORIDE BLD-SCNC: 104 MMOL/L (ref 98–107)
CHLORIDE BLD-SCNC: 104 MMOL/L (ref 98–107)
CO2: 20 MMOL/L (ref 20–31)
CO2: 22 MMOL/L (ref 20–31)
CO2: 24 MMOL/L (ref 20–31)
COLOR: ABNORMAL
CREAT SERPL-MCNC: <0.4 MG/DL (ref 0.7–1.2)
DATE, STOOL #1: NORMAL
EKG ATRIAL RATE: 109 BPM
EKG P AXIS: 49 DEGREES
EKG P-R INTERVAL: 136 MS
EKG Q-T INTERVAL: 332 MS
EKG QRS DURATION: 88 MS
EKG QTC CALCULATION (BAZETT): 447 MS
EKG R AXIS: 9 DEGREES
EKG T AXIS: 103 DEGREES
EKG VENTRICULAR RATE: 109 BPM
EPITHELIAL CELLS UA: ABNORMAL /HPF
FERRITIN: 1202 NG/ML (ref 30–400)
FOLATE: 2 NG/ML
GFR AFRICAN AMERICAN: ABNORMAL ML/MIN
GFR NON-AFRICAN AMERICAN: ABNORMAL ML/MIN
GFR SERPL CREATININE-BSD FRML MDRD: ABNORMAL ML/MIN/{1.73_M2}
GLUCOSE BLD-MCNC: 102 MG/DL (ref 70–99)
GLUCOSE BLD-MCNC: 84 MG/DL (ref 70–99)
GLUCOSE BLD-MCNC: 88 MG/DL (ref 70–99)
GLUCOSE URINE: NEGATIVE
HAV IGM SER IA-ACNC: NONREACTIVE
HCO3 VENOUS: 24 MMOL/L (ref 24–30)
HCO3 VENOUS: 24.3 MMOL/L (ref 24–30)
HEMOCCULT SP1 STL QL: NEGATIVE
HEPATITIS B CORE IGM ANTIBODY: NONREACTIVE
HEPATITIS B SURFACE ANTIGEN: NONREACTIVE
HEPATITIS C ANTIBODY: NONREACTIVE
IGA: 656 MG/DL (ref 70–400)
IGM: 61 MG/DL (ref 40–230)
IRON SATURATION: 66 % (ref 20–55)
IRON: 65 UG/DL (ref 59–158)
KETONES, URINE: NEGATIVE
LACTIC ACID: 1.6 MMOL/L (ref 0.5–2.2)
LEUKOCYTE ESTERASE, URINE: NEGATIVE
METHEMOGLOBIN: 0.2 % (ref 0–1.9)
METHEMOGLOBIN: 0.6 % (ref 0–1.9)
NITRITE, URINE: NEGATIVE
O2 SAT, VEN: 94.5 % (ref 60–85)
O2 SAT, VEN: 95.3 % (ref 60–85)
PATIENT TEMP: 37
PATIENT TEMP: 37
PCO2, VEN: 27.7 (ref 39–55)
PCO2, VEN: 29.7 (ref 39–55)
PH UA: 7 (ref 5–8)
PH VENOUS: 7.52 (ref 7.32–7.42)
PH VENOUS: 7.54 (ref 7.32–7.42)
PO2, VEN: 134 (ref 30–50)
PO2, VEN: 190 (ref 30–50)
POSITIVE BASE EXCESS, VEN: 1.5 MMOL/L (ref 0–2)
POSITIVE BASE EXCESS, VEN: 1.5 MMOL/L (ref 0–2)
POTASSIUM SERPL-SCNC: 3.7 MMOL/L (ref 3.7–5.3)
POTASSIUM SERPL-SCNC: 3.7 MMOL/L (ref 3.7–5.3)
POTASSIUM SERPL-SCNC: 3.9 MMOL/L (ref 3.7–5.3)
PROTEIN UA: NEGATIVE
RBC UA: ABNORMAL /HPF
SODIUM BLD-SCNC: 135 MMOL/L (ref 135–144)
SODIUM BLD-SCNC: 136 MMOL/L (ref 135–144)
SODIUM BLD-SCNC: 138 MMOL/L (ref 135–144)
SPECIFIC GRAVITY UA: 1.01 (ref 1–1.03)
TEXT FOR RESPIRATORY: ABNORMAL
TIME, STOOL #1: NORMAL
TOTAL IRON BINDING CAPACITY: 99 UG/DL (ref 250–450)
TOTAL PROTEIN: 6.4 G/DL (ref 6.4–8.3)
TOTAL PROTEIN: 6.6 G/DL (ref 6.4–8.3)
TOTAL PROTEIN: 6.7 G/DL (ref 6.4–8.3)
TURBIDITY: CLEAR
UNSATURATED IRON BINDING CAPACITY: 34 UG/DL (ref 112–347)
URINE HGB: NEGATIVE
UROBILINOGEN, URINE: NORMAL
VITAMIN B-12: 843 PG/ML (ref 232–1245)
WBC UA: ABNORMAL /HPF

## 2022-04-28 PROCEDURE — 99254 IP/OBS CNSLTJ NEW/EST MOD 60: CPT | Performed by: INTERNAL MEDICINE

## 2022-04-28 PROCEDURE — 97162 PT EVAL MOD COMPLEX 30 MIN: CPT

## 2022-04-28 PROCEDURE — 86038 ANTINUCLEAR ANTIBODIES: CPT

## 2022-04-28 PROCEDURE — 93010 ELECTROCARDIOGRAM REPORT: CPT | Performed by: INTERNAL MEDICINE

## 2022-04-28 PROCEDURE — 2580000003 HC RX 258: Performed by: STUDENT IN AN ORGANIZED HEALTH CARE EDUCATION/TRAINING PROGRAM

## 2022-04-28 PROCEDURE — 86663 EPSTEIN-BARR ANTIBODY: CPT

## 2022-04-28 PROCEDURE — 82105 ALPHA-FETOPROTEIN SERUM: CPT

## 2022-04-28 PROCEDURE — 82390 ASSAY OF CERULOPLASMIN: CPT

## 2022-04-28 PROCEDURE — 82607 VITAMIN B-12: CPT

## 2022-04-28 PROCEDURE — 82800 BLOOD PH: CPT

## 2022-04-28 PROCEDURE — 80053 COMPREHEN METABOLIC PANEL: CPT

## 2022-04-28 PROCEDURE — 86645 CMV ANTIBODY IGM: CPT

## 2022-04-28 PROCEDURE — 6370000000 HC RX 637 (ALT 250 FOR IP): Performed by: NURSE PRACTITIONER

## 2022-04-28 PROCEDURE — 81001 URINALYSIS AUTO W/SCOPE: CPT

## 2022-04-28 PROCEDURE — 83550 IRON BINDING TEST: CPT

## 2022-04-28 PROCEDURE — 86665 EPSTEIN-BARR CAPSID VCA: CPT

## 2022-04-28 PROCEDURE — 83540 ASSAY OF IRON: CPT

## 2022-04-28 PROCEDURE — 82103 ALPHA-1-ANTITRYPSIN TOTAL: CPT

## 2022-04-28 PROCEDURE — 86376 MICROSOMAL ANTIBODY EACH: CPT

## 2022-04-28 PROCEDURE — 6360000002 HC RX W HCPCS: Performed by: STUDENT IN AN ORGANIZED HEALTH CARE EDUCATION/TRAINING PROGRAM

## 2022-04-28 PROCEDURE — 86644 CMV ANTIBODY: CPT

## 2022-04-28 PROCEDURE — 6360000002 HC RX W HCPCS: Performed by: NURSE PRACTITIONER

## 2022-04-28 PROCEDURE — 87086 URINE CULTURE/COLONY COUNT: CPT

## 2022-04-28 PROCEDURE — 86664 EPSTEIN-BARR NUCLEAR ANTIGEN: CPT

## 2022-04-28 PROCEDURE — A4216 STERILE WATER/SALINE, 10 ML: HCPCS | Performed by: NURSE PRACTITIONER

## 2022-04-28 PROCEDURE — 82805 BLOOD GASES W/O2 SATURATION: CPT

## 2022-04-28 PROCEDURE — 36415 COLL VENOUS BLD VENIPUNCTURE: CPT

## 2022-04-28 PROCEDURE — 83516 IMMUNOASSAY NONANTIBODY: CPT

## 2022-04-28 PROCEDURE — 97166 OT EVAL MOD COMPLEX 45 MIN: CPT

## 2022-04-28 PROCEDURE — 2060000000 HC ICU INTERMEDIATE R&B

## 2022-04-28 PROCEDURE — 71045 X-RAY EXAM CHEST 1 VIEW: CPT

## 2022-04-28 PROCEDURE — 2580000003 HC RX 258: Performed by: NURSE PRACTITIONER

## 2022-04-28 PROCEDURE — 80074 ACUTE HEPATITIS PANEL: CPT

## 2022-04-28 PROCEDURE — 85610 PROTHROMBIN TIME: CPT

## 2022-04-28 PROCEDURE — 86225 DNA ANTIBODY NATIVE: CPT

## 2022-04-28 PROCEDURE — 82746 ASSAY OF FOLIC ACID SERUM: CPT

## 2022-04-28 PROCEDURE — 82728 ASSAY OF FERRITIN: CPT

## 2022-04-28 PROCEDURE — C9113 INJ PANTOPRAZOLE SODIUM, VIA: HCPCS | Performed by: NURSE PRACTITIONER

## 2022-04-28 PROCEDURE — 82784 ASSAY IGA/IGD/IGG/IGM EACH: CPT

## 2022-04-28 PROCEDURE — 83605 ASSAY OF LACTIC ACID: CPT

## 2022-04-28 PROCEDURE — 6370000000 HC RX 637 (ALT 250 FOR IP): Performed by: STUDENT IN AN ORGANIZED HEALTH CARE EDUCATION/TRAINING PROGRAM

## 2022-04-28 PROCEDURE — 99223 1ST HOSP IP/OBS HIGH 75: CPT | Performed by: INTERNAL MEDICINE

## 2022-04-28 PROCEDURE — G0328 FECAL BLOOD SCRN IMMUNOASSAY: HCPCS

## 2022-04-28 RX ORDER — PANTOPRAZOLE SODIUM 40 MG/1
40 TABLET, DELAYED RELEASE ORAL DAILY
Status: DISCONTINUED | OUTPATIENT
Start: 2022-04-28 | End: 2022-04-28

## 2022-04-28 RX ORDER — LOSARTAN POTASSIUM 50 MG/1
50 TABLET ORAL DAILY
Status: DISCONTINUED | OUTPATIENT
Start: 2022-04-28 | End: 2022-05-03 | Stop reason: HOSPADM

## 2022-04-28 RX ADMIN — ACETAMINOPHEN 650 MG: 325 TABLET ORAL at 18:48

## 2022-04-28 RX ADMIN — SODIUM CHLORIDE: 9 INJECTION, SOLUTION INTRAVENOUS at 06:30

## 2022-04-28 RX ADMIN — SODIUM CHLORIDE, PRESERVATIVE FREE 40 MG: 5 INJECTION INTRAVENOUS at 20:02

## 2022-04-28 RX ADMIN — THIAMINE HYDROCHLORIDE 100 MG: 100 INJECTION, SOLUTION INTRAMUSCULAR; INTRAVENOUS at 10:53

## 2022-04-28 RX ADMIN — CEFTRIAXONE SODIUM 2000 MG: 2 INJECTION, POWDER, FOR SOLUTION INTRAMUSCULAR; INTRAVENOUS at 20:06

## 2022-04-28 RX ADMIN — SODIUM CHLORIDE, PRESERVATIVE FREE 10 ML: 5 INJECTION INTRAVENOUS at 10:58

## 2022-04-28 RX ADMIN — LOSARTAN POTASSIUM 50 MG: 50 TABLET, FILM COATED ORAL at 10:59

## 2022-04-28 RX ADMIN — LORAZEPAM 1 MG: 1 TABLET ORAL at 15:39

## 2022-04-28 RX ADMIN — SODIUM CHLORIDE, PRESERVATIVE FREE 10 ML: 5 INJECTION INTRAVENOUS at 10:54

## 2022-04-28 RX ADMIN — VANCOMYCIN HYDROCHLORIDE 1250 MG: 1.25 INJECTION, POWDER, LYOPHILIZED, FOR SOLUTION INTRAVENOUS at 11:08

## 2022-04-28 RX ADMIN — SODIUM CHLORIDE, PRESERVATIVE FREE 10 ML: 5 INJECTION INTRAVENOUS at 20:02

## 2022-04-28 RX ADMIN — SODIUM CHLORIDE, PRESERVATIVE FREE 40 MG: 5 INJECTION INTRAVENOUS at 10:58

## 2022-04-28 RX ADMIN — SODIUM CHLORIDE: 9 INJECTION, SOLUTION INTRAVENOUS at 20:51

## 2022-04-28 ASSESSMENT — ENCOUNTER SYMPTOMS
SHORTNESS OF BREATH: 1
RHINORRHEA: 1
NAUSEA: 0
CONSTIPATION: 0
WHEEZING: 0
BLOOD IN STOOL: 0
COLOR CHANGE: 1
DIARRHEA: 0
STRIDOR: 0
ABDOMINAL PAIN: 0
COUGH: 1
CHEST TIGHTNESS: 1
VOMITING: 0

## 2022-04-28 ASSESSMENT — PAIN SCALES - GENERAL: PAINLEVEL_OUTOF10: 2

## 2022-04-28 NOTE — PLAN OF CARE
Problem: Pain  Goal: Verbalizes/displays adequate comfort level or baseline comfort level  Outcome: Progressing  Note: Patient will be able to verbalize  his pain scores and have his pain well controlled throughout the shift     Problem: Potential for Alteration in Skin Integrity  Goal: Monitor skin for areas of alteration in skin integrity  Description: Patient/family/caregiver will demonstrate ability to care for patient's skin, monitor for areas of breakdown, and demonstrate methods to prevent breakdown during hospice care. Outcome: Progressing     Problem: Anxiety/Agitation/Restlessness  Goal: Verbalize or staff assess the ability to manage anxiety  Description: Patient  will demonstrate appropriate behavior as evidenced by less than two episodes of  each shift during the inpatient hospice stay.       Outcome: Progressing  Note: Will educate patient on how to cope with anxiety and verba amber when he does feel anxious

## 2022-04-28 NOTE — PLAN OF CARE
Problem: Discharge Planning  Goal: Discharge to home or other facility with appropriate resources  Outcome: Progressing     Problem: Pain  Goal: Verbalizes/displays adequate comfort level or baseline comfort level  4/28/2022 1735 by Preeti Juarez RN  Outcome: Progressing    No pain at this time. 0/10 pain scale       Problem: Potential for Alteration in Skin Integrity  Goal: Monitor skin for areas of alteration in skin integrity  4/28/2022 1735 by Preeti Juarez RN  Outcome: Progressing    Turning and repositioning per self,  heels elevated, encouraging activity status as ordered, continuing to monitor for skin impairment risk. Problem: Risk for Falls  Goal: Fall prevention  Outcome: Progressing    No falls noted this shift. Patient ambulates without difficulty. Bed kept in low position. Safe environment maintained. Bedside table & call light in reach. Uses call light appropriately when needing assistance.          Problem: Alteration in Mobility  Goal: Remain as independent as possible and remain safe in environment  Outcome: Progressing       Problem: Pain  Goal: Control of acute pain  4/28/2022 1735 by Preeti Juarez RN  Outcome: Progressing     Problem: Anxiety/Agitation/Restlessness  Goal: Verbalize or staff assess the ability to manage anxiety  4/28/2022 1735 by Preeti Juarez RN  Outcome: Progressing      CIWA scale to eval  and med given with good effect      Problem: Nausea/Vomiting (Adult)  Goal: Control of nausea/vomiting  Outcome: Progressing    No n/v reported this shift

## 2022-04-28 NOTE — PROGRESS NOTES
Physical Therapy  Facility/Department: [address] [place] [address] CARE  Physical Therapy Initial Assessment    Name: Helga Butler  : 1985  MRN: [id number]  Date of Service: 2022    Discharge Recommendations:  No therapy recommended at discharge   PT Equipment Recommendations  Equipment Needed: No      Patient Diagnosis(es): The primary encounter diagnosis was Pneumonia of left lower lobe due to infectious organism. A diagnosis of Acute liver failure without hepatic coma was also pertinent to this visit. Past Medical History:  has a past medical history of Alcoholism ([facility] [address] Anemia, and Pulmonary embolism ([facility] [address] Past Surgical History:  has no past surgical history on file. Assessment   Assessment: pt denies need for skilled PT. Pt refused to use the countertop for support while ascending/ descending 6\" platform step 5 x. LOB occurred one time w/ pt attributing it to wearing slipper socks and not wearing his regular shoes (hx right ankle injury/ surgery 8 years). Pt denies need for skilled care as he is up independently in his room and hallway. Treatment Diagnosis: pneumonia  Therapy Prognosis: Excellent  Decision Making: Medium Complexity  History: pt admitted due to pneumonia  Exam: ROM, MMT, balance and mobility assessments  Clinical Presentation: independent gait w/o ad 100' x 2, independent transfers, advanced to 6\" steps x 5 w/ supervision initially but CGA x 1 on 3rd step due to LOB. Pt reports that he has no issues w/ steps as long as he is wearing his regular shoes. Pt denies need for skilled PT  Barriers to Learning: none  No Skilled PT: Independent with functional mobility   Requires PT Follow-Up: No  Activity Tolerance  Activity Tolerance: Patient tolerated treatment well     Plan   Plan  Plan: Discharge with evaluation only  Safety Devices  Type of Devices:  All fall risk precautions in place,Bed alarm in place,Call light within reach,Left in bed,Nurse notified,Patient at risk for falls Restrictions  Restrictions/Precautions  Restrictions/Precautions: Fall Risk,Seizure,Up as Tolerated (peripheral IVs right antecubital and left hand, CIWA scale)  Required Braces or Orthoses?: No     Subjective   Pain: 5/10 abdominal pain  General  Patient assessed for rehabilitation services?: Yes  Response To Previous Treatment: Not applicable  Family / Caregiver Present: No  Referring Practitioner: Cindy Naqvi CNP  Referral Date : 04/27/22  Diagnosis: pneumonia  Follows Commands: Within Functional Limits  Other (Comment): OK per nurse Jessenia Guzman to proceed w/ PT evaluation  Subjective  Subjective: Pt admitted due to C/O cough, abdominal pain and distention.          Social/Functional History  Social/Functional History  Lives With: Family (spouse and 13year old stepson)  Type of Home: House  Home Layout: Two level,Performs ADL's on one level,Able to Live on Main level with bedroom/bathroom  Home Access: Stairs to enter without rails  Entrance Stairs - Number of Steps: 4 steps w/o rail to enter, 15 + 3 w/ 2 rails to 2nd story  Entrance Stairs - Rails: None  Bathroom Shower/Tub: Walk-in shower,Doors  Bathroom Toilet: Standard  Bathroom Equipment: Hand-held shower  Bathroom Accessibility: Walker accessible  Home Equipment:  (no DME for ambulation)  ADL Assistance: [address]: Needs assistance (share duties w/ spouse for cooking, claening and laundry, pt does the grocery shopping)  Homemaking Responsibilities: Yes (share duties w/ spouse for cooking, claening and laundry, pt does the grocery shopping)  Ambulation Assistance: Independent (no device)  Transfer Assistance: Independent  Active : Yes  Mode of Transportation: Truck  Occupation: Unemployed  IADL Comments: sleeps in a flat bed  Vision/Hearing  Vision Exceptions: Wears glasses at all times  Hearing: Within functional limits    Cognition   Orientation  Overall Orientation Status: Within Functional Limits  Orientation Level: Oriented X4  Cognition  Overall Cognitive Status: WFL     Objective   Pulse: 105  Heart Rate Source: Monitor  BP: 97/61  BP Location: Right upper arm  Patient Position: Supine  MAP (Calculated): 73  Resp: 19  SpO2: 99 %  O2 Device: None (Room air)     Observation/Palpation  Observation: peripheral IVs right antecubital and left hand  Gross Assessment  Sensation: Intact (denies)     AROM RLE (degrees)  RLE AROM: WFL  AROM LLE (degrees)  LLE AROM : WFL  AROM RUE (degrees)  RUE General AROM: see OT for UE assessment  AROM LUE (degrees)  LUE General AROM: see OT for UE assessment  Strength RLE  Strength RLE: WFL  Comment: 5/5  Strength LLE  Strength LLE: WFL  Comment: 5/5  Strength RUE  Comment: see OT for UE assessment  Strength LUE  Comment: see OT for UE assessment           Bed mobility  Supine to Sit: Independent  Sit to Supine: Independent  Scooting: Independent  Transfers  Sit to Stand: Independent  Stand to sit: Independent  Stand Pivot Transfers: Independent (no device)  Ambulation  Surface: level tile  Device: No Device  Assistance: Independent  Gait Deviations: None  Distance: 100' x 2  Comments: no LOB noted  Stairs/Curb  Stairs?: Yes  Stairs  # Steps : 5  Stairs Height: 6\"  Rails: None  Device: No Device  Assistance: Supervision;Contact guard assistance  Comment: Supervision initially used as pt refused to hold the countertop for balance, Pt had 1 LOB on the steps w/ CGA x 1 to correct w/ pt reporting that he would be independent if he was wearing his regular shoes due to hx of right ankle injury/ surgery 8 years ago.      Balance  Sitting - Static: Good  Sitting - Dynamic: Good  Standing - Static: Good (no device)  Standing - Dynamic: Good;- (no device)           OutComes Score                                                  AM-PAC Score  AM-PAC Inpatient Mobility Raw Score : 23 (04/28/22 1126)  AM-PAC Inpatient T-Scale Score : 56.93 (04/28/22 1126)  Mobility Inpatient CMS 0-100% Score: 11.2 (04/28/22 1126)  Mobility Inpatient CMS G-Code Modifier : CI (04/28/22 1126)          Goals  Short Term Goals  Time Frame for Short term goals: D/C PT  Patient Goals   Patient goals : return home w/ family       Therapy Time   Individual Concurrent Group Co-treatment   Time In 0061         Time Out 4326 [facility],

## 2022-04-28 NOTE — CARE COORDINATION
CASE MANAGEMENT NOTE:    Admission Date:  4/27/2022 Noah Jorge is a 40 y.o.  male    Admitted for : Pneumonia [J18.9]  Acute liver failure without hepatic coma [K72.00]  Pneumonia of left lower lobe due to infectious organism [J18.9]    Met with:  Patient    PCP:  Dr. Stevo Ingram:  [employer]      Is patient alert and oriented at time of discussion:  Yes    Current Residence/ Living Arrangements:  independently at home with wife and drives            Current Services PTA:  No    Does patient go to outpatient dialysis: No  If yes, location and chair time: n/a    Is patient agreeable to VNS: No    Freedom of choice provided:  Yes    List of [address] provided: No    VNS chosen:  NA    DME:  none    Home Oxygen: No    Nebulizer: No    CPAP/BIPAP: No    Supplier: N/A    Potential Assistance Needed: Discussed ETOH abuse information and pt declined. SNF needed: No    Freedom of choice and list provided: NA    Pharmacy:  [facility] [address]       Is patient currently receiving oral anticoagulation therapy? No    Is the Patient an [facility] with Readmission Risk Score greater than 14%? No  If yes, pt needs a follow up appointment made within 7 days. Family Members/Caregivers that pt would like involved in their care:    Yes    If yes, list name here:  Gregoria Louie    Transportation Provider:  Patient and Family             Discharge Plan:  Home without needs.                  Electronically signed by: Lexi Vanessa RN on 4/28/2022 at 1:57 PM

## 2022-04-28 NOTE — PROGRESS NOTES
Vancomycin Dosing by Pharmacy - Daily Note   Vancomycin Therapy Day:  2  Indication: Sepsis of unknown origin. Allergies:  Bee venom   Actual Weight:    Wt Readings from Last 1 Encounters:   04/28/22 205 lb 0.4 oz (93 kg)       Labs/Ancillary Data  Estimated Creatinine Clearance: 282 mL/min (based on SCr of 0.4 mg/dL). Recent Labs     04/27/22  1815 04/27/22  1815 04/27/22 2015 04/28/22  0215 04/28/22  0752   CREATININE 0.45*   < > 0.44* <0.40* <0.40*   BUN 9   < > 8 8 7   WBC 16.8*  --   --   --   --     < > = values in this interval not displayed. Procalcitonin   Date Value Ref Range Status   04/27/2022 0.35 (H) <0.09 ng/mL Final     Comment:           Suspected Sepsis:  <0.50 ng/mL     Low likelihood of sepsis. 0.50-2.00 ng/mL     Increased likelihood of sepsis. Antibiotics encouraged. >2.00 ng/mL     High risk of sepsis/shock. Antibiotics strongly encouraged. Suspected Lower Resp Tract Infections:  <0.24 ng/mL     Low likelihood of bacterial infection. >0.24 ng/mL     Increased likelihood of bacterial infection. Antibiotics encouraged. With successful antibiotic therapy, PCT levels should decrease rapidly. (Half-life of 24 to   36 hours.)        Procalcitonin values from samples collected within the first 6 hours of systemic infection   may still be low. Retesting may be indicated. Values from day 1 and day 4 can be entered into the Change in Procalcitonin Calculator   ([email or web address]-[email or web address] com) to determine the patient's Mortality Risk Prognosis        In healthy neonates, plasma Procalcitonin (PCT) concentrations increase gradually after   birth, reaching peak values at about 24 hours of age then decrease to normal values below   0.5 ng/mL by 48-72 hours of age.          Intake/Output Summary (Last 24 hours) at 4/28/2022 0849  Last data filed at 4/28/2022 0631  Gross per 24 hour   Intake 1000 ml   Output 1550 ml   Net -550 ml     Temp: 94.4 F    Culture Date / Maxwell Reveal  / Results  See micro. Recent vancomycin administrations                     vancomycin (VANCOCIN) 2,000 mg in dextrose 5 % 500 mL IVPB (mg) 2,000 mg New Bag 04/27/22 5743                    Vancomycin Concentrations:   TROUGH:  No results for input(s): VANCOTROUGH in the last 72 hours. RANDOM:  No results for input(s): VANCORANDOM in the last 72 hours. MRSA Nasal Swab: N/A. Non-respiratory infection. Charline [employer] PLAN     Continue current dose of 1250 mg q12h IV  Ensured BUN/sCr ordered at baseline and every 48 hours x at least 3 levels, then at least weekly. Repeat vancomycin concentration ordered for 04/29 @ 0600   Pharmacy will continue to monitor patient and adjust therapy as indicated      Vancomycin Target Concentration Parameters  Treatment  Population Target AUC/HORTENCIA Target Trough   Invasive MRSA Infection (bacteremia, pneumonia, meningitis, endocarditis, osteomyelitis)  Sepsis (undifferentiated) 400-600 N/A   Infection due to non-MRSA pathogen  Empiric treatment of non-invasive MRSA infection  (SSTI, UTI) <500 10-15 mg/L   CrCl < 29 mL/min  Rapidly fluctuating serum creatinine   OSCAR N/A < 15 mg/L     Renal replacement therapy is dosed by levels, per hospital protocol. Abbreviations  * Pauc: probability that AUC is >400 (efficacy); Pconc: probability that Ctrough is above 20 ?g/mL (toxicity); Tox: Probability of nephrotoxicity, based on Zoie et al. Clin Infect Dis 2009. Thank you for the consult. Pharmacy will continue to follow.   [facility]    4/28/2022   8:51 AM

## 2022-04-28 NOTE — PROGRESS NOTES
Occupational Therapy  Facility/Department: [address] [place] [address]  Occupational Therapy Initial Assessment    Name: Isai Hector  : 1985  MRN: [id number]  Date of Service: 2022    Discharge Recommendations:  Defer OT at this time          Patient Diagnosis(es): The primary encounter diagnosis was Pneumonia of left lower lobe due to infectious organism. A diagnosis of Acute liver failure without hepatic coma was also pertinent to this visit. Past Medical History:  has a past medical history of Alcoholism ([facility] [address] Anemia, and Pulmonary embolism ([facility] [address] Past Surgical History:  has no past surgical history on file. Assessment   Assessment: Pt reports independence with self-care tasks at this time and states no concern with completing self-care upon return home. Pt with no need for skilled OT at this time. Decision Making: Medium Complexity  No Skilled OT: Independent with functional mobility; Independent with ADL's;At baseline function; No OT goals identified  REQUIRES OT FOLLOW-UP: No  Activity Tolerance  Activity Tolerance: Patient Tolerated treatment well        Plan         Restrictions  Restrictions/Precautions  Restrictions/Precautions: Fall Risk,Seizure,Up as Tolerated (peripheral IVs right antecubital and left hand, CIWA scale)  Required Braces or Orthoses?: No    Subjective   General  Chart Reviewed: Yes  Patient assessed for rehabilitation services?: Yes  Family / Caregiver Present: No  Referring Practitioner: SILVA Palumbo CNP  Diagnosis: Pneumonia, acute liver failure without hepatic coma, pneumonia of left lower lobe due to infectious organism  Pain: 5/10 abdominal pain  Social/Functional History  Social/Functional History  Lives With: Family (spouse and 13year old stepson)  Type of Home: House  Home Layout: Two level,Performs ADL's on one level,Able to Live on Main level with bedroom/bathroom  Home Access: Stairs to enter without rails  Entrance Stairs - Number of Steps: 4 steps w/o rail to enter, 15 + 3 w/ 2 rails to 2nd story  Entrance Stairs - Rails: None  Bathroom Shower/Tub: Walk-in shower,Doors  Bathroom Toilet: Standard  Bathroom Equipment: Hand-held shower  Bathroom Accessibility: Walker accessible  Home Equipment:  (no DME for ambulation)  ADL Assistance: [address]: Needs assistance (share duties w/ spouse for cooking, claening and laundry, pt does the grocery shopping)  Homemaking Responsibilities: Yes (share duties w/ spouse for cooking, claening and laundry, pt does the grocery shopping)  Ambulation Assistance: Independent (no device)  Transfer Assistance: Independent  Active : Yes  Mode of Transportation: Truck  Occupation: Unemployed  IADL Comments: sleeps in a flat bed       Objective   Pulse: 97  Heart Rate Source: Monitor  BP: 114/70  BP Location: Left upper arm  Patient Position: Supine  MAP (Calculated): 84.67  Resp: 18  SpO2: 95 %  O2 Device: None (Room air)  Vision Exceptions: Wears glasses at all times  Hearing: Within functional limits       Observation/Palpation  Observation: peripheral IVs right antecubital and left hand  Safety Devices  Type of Devices: All fall risk precautions in place; Bed alarm in place;Call light within reach; Left in bed;Nurse notified; Patient at risk for falls        AROM: Within functional limits  Strength:  Within functional limits  Coordination: Within functional limits  Tone: Normal  Sensation: Intact  ADL  Feeding: Independent  Grooming: Independent  UE Bathing: Independent  LE Bathing: Independent  UE Dressing: Independent  LE Dressing: Independent  Toileting: Independent  Additional Comments: ADL scores based on skilled pt observation and clinical reasoning unless otherwise noted  Tone RUE  RUE Tone: Normotonic  Tone LUE  LUE Tone: Normotonic  Coordination  Movements Are Fluid And Coordinated: Yes     Bed mobility  Supine to Sit: Independent  Sit to Supine: Independent  Comment: Pt completed bed mobility with independence, bed lowered with HOB slightly raised  Transfers  Sit to stand: Independent  Stand to sit:  Independent  Transfer Comments: Pt completed transfers in and out of bed with independence, no device, HOB slightly elevated with bed lowered     Cognition  Overall Cognitive Status: WFL  Perception  Overall Perceptual Status: WFL     Sensation  Overall Sensation Status: WFL         Education Given To: Patient  Education Provided: Role of Therapy  Education Method: Verbal  Barriers to Learning: None  Education Outcome: Verbalized understanding  LUE AROM (degrees)  LUE AROM : WFL  RUE AROM (degrees)  RUE AROM : WFL  LUE Strength  Gross LUE Strength: WFL  L Hand General: 4+/5  RUE Strength  Gross RUE Strength: WFL  R Hand General: 4+/5             AM-PAC Score        AM-PAC Inpatient Daily Activity Raw Score: 24 (04/28/22 1236)  AM-PAC Inpatient ADL T-Scale Score : 57.54 (04/28/22 1236)  ADL Inpatient CMS 0-100% Score: 0 (04/28/22 1236)  ADL Inpatient CMS G-Code Modifier : CH (04/28/22 1236)    Goals          Therapy Time   Individual Concurrent Group Co-treatment   Time In 1126         Time Out 1140         Minutes 14                 [place]

## 2022-04-28 NOTE — CONSULTS
INITIAL CONSULTATION     HISTORY OF PRESENT ILLNESS: Al Culver is a 40 y.o. male admitted to the hospital on 4/27/2022 for rectal bleeding. Shortness of breath. Jaundice. He admits to heavy alcohol intake. He was recently at [facility] with syncope. No gross bleeding. He has anemia. He was found to have alcoholic hepatitis. He reports no prior admissions for alcoholic hepatitis. PAST MEDICAL HISTORY:     Past Medical History:   Diagnosis Date    Alcoholism ([address]     pt drinks a fifth of whiskey daily    Anemia     Pulmonary embolism (HCC)         History reviewed. No pertinent surgical history.        CURRENT MEDICATIONS:       Current Facility-Administered Medications:     losartan (COZAAR) tablet 50 mg, 50 mg, Oral, Daily, MAX Dunn, APRN - CNP, 50 mg at 04/28/22 1059    pantoprazole (PROTONIX) 40 mg in sodium chloride (PF) 10 mL injection, 40 mg, IntraVENous, Q12H, Annmarie Meeks, APRN - CNP, 40 mg at 04/28/22 1058    sodium chloride flush 0.9 % injection 5-40 mL, 5-40 mL, IntraVENous, 2 times per day, Renetta Simmers, DO, 10 mL at 04/28/22 1058    sodium chloride flush 0.9 % injection 5-40 mL, 5-40 mL, IntraVENous, PRN, Renetta Simmers, DO    thiamine (B-1) injection 100 mg, 100 mg, IntraVENous, Daily, Renetta Simmers, DO, 100 mg at 04/28/22 1053    LORazepam (ATIVAN) tablet 1 mg, 1 mg, Oral, Q1H PRN **OR** LORazepam (ATIVAN) injection 1 mg, 1 mg, IntraVENous, Q1H PRN, Renetta Simmers, DO, 1 mg at 04/27/22 2245    vancomycin (VANCOCIN) intermittent dosing (placeholder), , Other, RX Placeholder, Renetta Simmers, DO    sodium chloride flush 0.9 % injection 10 mL, 10 mL, IntraVENous, PRN, Renetta Simmers, DO, 10 mL at 04/27/22 1918    vancomycin (VANCOCIN) 1,250 mg in dextrose 5 % 250 mL IVPB (ADDAVIAL), 1,250 mg, IntraVENous, Q12H, Renetta Simmers, DO, Last Rate: 166.7 mL/hr at 04/28/22 1108, 1,250 mg at 04/28/22 1108    sodium chloride flush 0.9 % injection 5-40 mL, 5-40 mL, IntraVENous, 2 times per day, SILVA Zhang CNP, 10 mL at 04/28/22 1054    sodium chloride flush 0.9 % injection 5-40 mL, 5-40 mL, IntraVENous, PRN, SILVA Zhang CNP    0.9 % sodium chloride infusion, , IntraVENous, PRN, SILVA Zhang - CNP    [Held by provider] enoxaparin (LOVENOX) injection 40 mg, 40 mg, SubCUTAneous, Daily, SILVA Zhang - CNP, 40 mg at 04/27/22 2027    acetaminophen (TYLENOL) tablet 650 mg, 650 mg, Oral, Q6H PRN **OR** acetaminophen (TYLENOL) suppository 650 mg, 650 mg, Rectal, Q6H PRN, SILVA Gibson - CNP    0.9 % sodium chloride infusion, , IntraVENous, Continuous, Renee S MedSILVA martinez - CNP, Last Rate: 150 mL/hr at 04/28/22 0630, New Bag at 04/28/22 0630    cefTRIAXone (ROCEPHIN) 2000 mg IVPB in D5W 50ml minibag, 2,000 mg, IntraVENous, Q24H, Wing Wilfrido, DO, Stopped at 04/27/22 2255       ALLERGIES:   Allergies   Allergen Reactions    Bee Venom Anaphylaxis          FAMILY HISTORY: The patient's family history was reviewed.        SOCIAL HISTORY:   Social History     Socioeconomic History    Marital status: Unknown     Spouse name: Not on file    Number of children: Not on file    Years of education: Not on file    Highest education level: Not on file   Occupational History    Not on file   Tobacco Use    Smoking status: Current Every Day Smoker     Packs/day: 1.00     Years: 21.00     Pack years: 21.00     Types: Cigarettes    Smokeless tobacco: Never Used    Tobacco comment: using nicotine patches at night   Vaping Use    Vaping Use: Never used   Substance and Sexual Activity    Alcohol use: Not Currently     Comment: hx of alcoholism; pt drinks a fifth of whiskey daily    Drug use: Not Currently     Comment: used cocaine in early 20's    Sexual activity: Not on file   Other Topics Concern    Not on file   Social History Narrative    Not on file     Social Determinants of Health     Financial Resource Strain: Low Risk     Difficulty of Paying Living Expenses: Not hard at all   Food Insecurity: No Food Insecurity    Worried About Running Out of Food in the Last Year: Never true    Ran Out of Food in the Last Year: Never true   Transportation Needs: No Transportation Needs    Lack of Transportation (Medical): No    Lack of Transportation (Non-Medical): No   Physical Activity:     Days of Exercise per Week: Not on file    Minutes of Exercise per Session: Not on file   Stress:     Feeling of Stress : Not on file   Social Connections:     Frequency of Communication with Friends and Family: Not on file    Frequency of Social Gatherings with Friends and Family: Not on file    Attends Jehovah's witness Services: Not on file    Active Member of [address] [place] [address] [employer] or Organizations: Not on file    Attends Club or Organization Meetings: Not on file    Marital Status: Not on file   Intimate Partner Violence:     Fear of Current or Ex-Partner: Not on file    Emotionally Abused: Not on file    Physically Abused: Not on file    Sexually Abused: Not on file   Housing Stability:     Unable to Pay for Housing in the Last Year: Not on file    Number of [place] in the Last Year: Not on file    Unstable Housing in the Last Year: Not on file         REVIEW OF SYSTEMS: A 14-point review of systems was obtained and pertinent positives andnegatives were enumerated above in the history of present illness. All other reviewed systems / symptoms were negative. Review of Systems      PHYSICAL EXAMINATION: Vital signs reviewed per the nursing documentation. BP 97/61   Pulse 105   Temp 99 °F (37.2 °C) (Oral)   Resp 19   Ht 5' 8\" (1.727 m)   Wt 205 lb 0.4 oz (93 kg)   SpO2 99%   BMI 31.17 kg/m²    [unfilled]   Body mass index is 31.17 kg/m². General:  A O x 3 in NAD. icteric   Psych: . Normal affect. Mentation normal   HEENT: PERRLA. Clear conjunctivae and sclerae. Moist oral mucosae, no lesions orulcers.    The neck is supple, without lymphadenopathy or jugular venous distension. No masses. Normal thyroid. Cardiovascular: S1 S2 RRR no rubs or murmurs. Pulmonary: clear BL. No accessory muscle usage. Abd Exam: Soft, NT ND, no hepato or spleno megaly, +BS, no ascites. No groin masses or lymphadenopathy. Extremities: No edema. Skin: Warm skin. No skin rash. No spider nevi palmar erythema naildystrophy. Joint: No joint swelling or deformity. Neurological: intact sensory. DTR+. No asterixis     LABORATORY DATA: Reviewed   Lab Results   Component Value Date    WBC 16.8 (H) 04/27/2022    HGB 9.8 (L) 04/27/2022    HCT 28.3 (L) 04/27/2022    .1 (H) 04/27/2022     04/27/2022     04/28/2022    K 3.7 04/28/2022     04/28/2022    CO2 22 04/28/2022    BUN 7 04/28/2022    CREATININE <0.40 (L) 04/28/2022    LABALBU 2.2 (L) 04/28/2022    BILITOT 4.77 (H) 04/28/2022    ALKPHOS 217 (H) 04/28/2022     (H) 04/28/2022    ALT 31 04/28/2022    INR 1.2 04/28/2022      Lab Results   Component Value Date    RBC 2.25 (L) 04/27/2022    HGB 9.8 (L) 04/27/2022    .1 (H) 04/27/2022    MCH 43.6 (H) 04/27/2022    MCHC 34.6 04/27/2022    RDW 19.1 (H) 04/27/2022    MPV 8.8 04/27/2022    BASOPCT 0 04/27/2022    LYMPHSABS 2.02 04/27/2022    MONOSABS 2.02 (H) 04/27/2022    NEUTROABS 11.92 (H) 04/27/2022    EOSABS 0.17 04/27/2022    BASOSABS 0.00 04/27/2022          DIAGNOSTIC TESTING:   XR CHEST (2 VW)    Result Date: 4/15/2022  EXAMINATION: TWO XRAY VIEWS OF THE CHEST 4/15/2022 2:40 pm COMPARISON: 01/30/2022 HISTORY: ORDERING SYSTEM PROVIDED HISTORY: CP, lightheadwesley TECHNOLOGIST PROVIDED HISTORY: linda DIAMOND FINDINGS: Chest radiograph: The cardiomediastinal silhouette and hilar contours are normal. The lungs are clear with no focal consolidation, pleural effusion or pneumothorax. The overlying soft tissue and osseous structures do not demonstrate acute abnormality. No acute intrathoracic pathology.      CT ABDOMEN PELVIS W IV CONTRAST Additional Contrast? None    Result Date: 4/27/2022  EXAMINATION: CT OF THE ABDOMEN AND PELVIS WITH CONTRAST 4/27/2022 7:16 pm TECHNIQUE: CT of the abdomen and pelvis was performed with the administration of intravenous contrast. Multiplanar reformatted images are provided for review. Dose modulation, iterative reconstruction, and/or weight based adjustment of the mA/kV was utilized to reduce the radiation dose to as low as reasonably achievable. COMPARISON: 03/28/2022 HISTORY: ORDERING SYSTEM PROVIDED HISTORY: RUQ pain, jaundice TECHNOLOGIST PROVIDED HISTORY: RUQ pain, jaundice Decision Support Exception - unselect if not a suspected or confirmed emergency medical condition->Emergency Medical Condition (MA) Reason for Exam: RUQ pain for two weeks, jaundice FINDINGS: Lower Chest: Mild left and minimal right bibasilar atelectasis. The heart size is normal. Organs: The liver is enlarged. There is diffuse low attenuation of liver parenchyma. Bile ducts are not dilated. The spleen, pancreas, adrenal glands and kidneys are normal.  There are no calcified gallstones. No pericholecystic fluid. There is mild gallbladder wall thickening. GI/Bowel: Unopacified bowel loops are grossly normal.  No bowel obstruction. The appendix is normal. Pelvis: Urinary bladder is nearly empty and grossly normal. Peritoneum/Retroperitoneum: There is retroperitoneal fat stranding. There are mildly enlarged upper abdominal lymph nodes. Lymph node in the isai hepatis adjacent to the pancreatic head measures up to 2.5 cm (coronal image 50). There are mildly prominent retroperitoneal lymph nodes. There is trace free fluid in the pelvis. There is no free air. Bones/Soft Tissues: Bilateral gynecomastia and nipple piercings. There is a unilateral right-sided pars interarticularis defect at L5. No acute bone finding. The liver is enlarged and diffusely low in attenuation.   Diffuse nonspecific hepatocellular disease suspected. Bile ducts are not dilated in this patient with history of jaundice. There are no calcified gallstones. There is mild gallbladder wall thickening. Wall thickening may be reactive to adjacent liver disease. Mildly enlarged upper abdominal lymph nodes measuring up to 2.5 cm, likely reactive. Trace free fluid in the pelvis. Unilateral right-sided L5 pars interarticularis defect. RECOMMENDATIONS: Unavailable     XR CHEST PORTABLE    Result Date: 4/28/2022  EXAMINATION: ONE XRAY VIEW OF THE CHEST 4/28/2022 6:08 am COMPARISON: 04/27/2022 HISTORY: ORDERING SYSTEM PROVIDED HISTORY: pneumonia follow up TECHNOLOGIST PROVIDED HISTORY: pneumonia follow up Reason for Exam: Continued SOB and cough. FINDINGS: Small infiltrate is identified within the left costophrenic angle. The heart is not enlarged. No pneumothorax is identified. No acute osseous abnormality is identified. Stable small infiltrate within the left costophrenic angle which may represent small pneumonia versus pleural effusion. XR CHEST PORTABLE    Result Date: 4/27/2022  EXAMINATION: ONE XRAY VIEW OF THE CHEST 4/27/2022 6:11 pm COMPARISON: April 15, 2022 HISTORY: ORDERING SYSTEM PROVIDED HISTORY: SOB TECHNOLOGIST PROVIDED HISTORY: SOB Reason for Exam: PT CO SOB with cough, fatigue, congestion, and jaundice of the skin at this time. PT states symptoms X 1 week. FINDINGS: Left lower lobe infiltrate and or small effusion. Heart and mediastinum normal.  Bony thorax intact. Nipple rings noted incidentally. New left effusion and or infiltrate     CT CHEST PULMONARY EMBOLISM W CONTRAST    Result Date: 4/15/2022  EXAMINATION: CTA OF THE CHEST 4/15/2022 1:00 pm TECHNIQUE: CTA of the chest was performed after the administration of intravenous contrast.  Multiplanar reformatted images are provided for review. MIP images are provided for review.  Dose modulation, iterative reconstruction, and/or weight based adjustment of the mA/kV was utilized to reduce the radiation dose to as low as reasonably achievable. COMPARISON: Chest x-ray dated April 15, 2022 prior CT chest dated June 20, 2021 HISTORY: ORDERING SYSTEM PROVIDED HISTORY: shortness of breath, chest pain, elevated d-dimer TECHNOLOGIST PROVIDED HISTORY: shortness of breath, chest pain, elevated d-dimer Decision Support Exception - unselect if not a suspected or confirmed emergency medical condition->Emergency Medical Condition (MA) Reason for Exam: sob, chest pain, dizziness per pt FINDINGS: Pulmonary Arteries: Pulmonary arteries are suboptimally opacified for evaluation. No definite evidence of intraluminal filling defect to suggest pulmonary embolism. Main pulmonary artery is normal in caliber. Mediastinum: No evidence of mediastinal lymphadenopathy. The heart and pericardium demonstrate no acute abnormality. There is no acute abnormality of the thoracic aorta. Lungs/pleura: Linear opacities are present within the right middle lobe, lingula, and periphery of the left lower lobe, consistent with atelectasis. No focal area of consolidation, pleural effusion, or pneumothorax is present. Upper Abdomen: Images through the upper abdomen demonstrate hepatomegaly and diffuse hepatic steatosis. Soft Tissues/Bones: Gynecomastia is present bilaterally. No acute osseous abnormality is present. 1. No evidence of pulmonary embolism 2. Linear areas of atelectasis within the right middle lobe, lingula, left lower lobe 3. Hepatomegaly and diffuse hepatic steatosis        IMPRESSION: Mr. Valery Garnica is a 40 y.o. male with alcoholic hepatitis. Anemia. Possible DTs. Supportive care. Stay off alcohol completely. Monitor H&H closely. Transfuse as needed keep hemoglobin above 7. We will hold off on endoscopic evaluation at this time given pending DTs. Thank you for allowing me to participate in the care of Mr. Valery Garnica. For any further questions please do not hesitate to contact me.        Fatoumata MD Shelly Elizabeth

## 2022-04-28 NOTE — ED NOTES
Report given to Raul Rosa RN from [facility]. Report method by phone   The following was reviewed with receiving RN:   Current vital signs:  BP 98/68   Pulse 77   Temp 100.5 °F (38.1 °C) (Oral)   Resp 29   Ht 5' 8\" (1.727 m)   Wt 185 lb (83.9 kg)   SpO2 94%   BMI 28.13 kg/m²                MEWS Score: 4     Any medication or safety alerts were reviewed. Any pending diagnostics and notifications were also reviewed, as well as any safety concerns or issues, abnormal labs, abnormal imaging, and abnormal assessment findings. Questions were answered.             Clarisse Zavala RN  04/27/22 6309

## 2022-04-29 LAB
ABSOLUTE EOS #: 0.31 K/UL (ref 0–0.4)
ABSOLUTE LYMPH #: 1.26 K/UL (ref 1–4.8)
ABSOLUTE MONO #: 1.1 K/UL (ref 0.1–1.3)
ANTI DNA DOUBLE STRANDED: 3.8 IU/ML
ANTI-NUCLEAR ANTIBODY (ANA): POSITIVE
BASOPHILS # BLD: 1 % (ref 0–2)
BASOPHILS ABSOLUTE: 0.16 K/UL (ref 0–0.2)
CMV IGM: 0.1
CULTURE: NO GROWTH
CYTOMEGALOVIRUS IGG ANTIBODY: 0.3
ENA ANTIBODIES SCREEN: >32 U/ML
EOSINOPHILS RELATIVE PERCENT: 2 % (ref 0–4)
HCT VFR BLD CALC: 24.6 % (ref 41–53)
HCT VFR BLD CALC: 24.6 % (ref 41–53)
HEMOGLOBIN: 8.4 G/DL (ref 13.5–17.5)
HEMOGLOBIN: 8.5 G/DL (ref 13.5–17.5)
LYMPHOCYTES # BLD: 8 % (ref 24–44)
MCH RBC QN AUTO: 44.1 PG (ref 26–34)
MCHC RBC AUTO-ENTMCNC: 34.3 G/DL (ref 31–37)
MCV RBC AUTO: 128.8 FL (ref 80–100)
MITOCHONDRIAL ANTIBODY: 3.6 U/ML (ref 0–4)
MONOCYTES # BLD: 7 % (ref 1–7)
MORPHOLOGY: ABNORMAL
PDW BLD-RTO: 19.6 % (ref 11.5–14.9)
PLATELET # BLD: 244 K/UL (ref 150–450)
PMV BLD AUTO: 8.3 FL (ref 6–12)
RBC # BLD: 1.91 M/UL (ref 4.5–5.9)
SEG NEUTROPHILS: 82 % (ref 36–66)
SEGMENTED NEUTROPHILS ABSOLUTE COUNT: 12.87 K/UL (ref 1.3–9.1)
SPECIMEN DESCRIPTION: NORMAL
VANCOMYCIN RANDOM DATE LAST DOSE: NORMAL
VANCOMYCIN RANDOM DOSE AMOUNT: 1250
VANCOMYCIN RANDOM TIME LAST DOSE: 200
VANCOMYCIN RANDOM: 21.1 UG/ML
WBC # BLD: 15.7 K/UL (ref 3.5–11)

## 2022-04-29 PROCEDURE — 6360000002 HC RX W HCPCS: Performed by: NURSE PRACTITIONER

## 2022-04-29 PROCEDURE — 85018 HEMOGLOBIN: CPT

## 2022-04-29 PROCEDURE — 2500000003 HC RX 250 WO HCPCS: Performed by: INTERNAL MEDICINE

## 2022-04-29 PROCEDURE — 2580000003 HC RX 258: Performed by: STUDENT IN AN ORGANIZED HEALTH CARE EDUCATION/TRAINING PROGRAM

## 2022-04-29 PROCEDURE — 85014 HEMATOCRIT: CPT

## 2022-04-29 PROCEDURE — 80202 ASSAY OF VANCOMYCIN: CPT

## 2022-04-29 PROCEDURE — 6360000002 HC RX W HCPCS: Performed by: INTERNAL MEDICINE

## 2022-04-29 PROCEDURE — 2060000000 HC ICU INTERMEDIATE R&B

## 2022-04-29 PROCEDURE — A4216 STERILE WATER/SALINE, 10 ML: HCPCS | Performed by: NURSE PRACTITIONER

## 2022-04-29 PROCEDURE — 36415 COLL VENOUS BLD VENIPUNCTURE: CPT

## 2022-04-29 PROCEDURE — 2580000003 HC RX 258: Performed by: INTERNAL MEDICINE

## 2022-04-29 PROCEDURE — C9113 INJ PANTOPRAZOLE SODIUM, VIA: HCPCS | Performed by: NURSE PRACTITIONER

## 2022-04-29 PROCEDURE — 2580000003 HC RX 258: Performed by: NURSE PRACTITIONER

## 2022-04-29 PROCEDURE — 85025 COMPLETE CBC W/AUTO DIFF WBC: CPT

## 2022-04-29 PROCEDURE — 6370000000 HC RX 637 (ALT 250 FOR IP): Performed by: NURSE PRACTITIONER

## 2022-04-29 PROCEDURE — 6370000000 HC RX 637 (ALT 250 FOR IP): Performed by: STUDENT IN AN ORGANIZED HEALTH CARE EDUCATION/TRAINING PROGRAM

## 2022-04-29 PROCEDURE — 6360000002 HC RX W HCPCS: Performed by: STUDENT IN AN ORGANIZED HEALTH CARE EDUCATION/TRAINING PROGRAM

## 2022-04-29 PROCEDURE — 99232 SBSQ HOSP IP/OBS MODERATE 35: CPT | Performed by: INTERNAL MEDICINE

## 2022-04-29 PROCEDURE — APPSS30 APP SPLIT SHARED TIME 16-30 MINUTES: Performed by: NURSE PRACTITIONER

## 2022-04-29 RX ORDER — METRONIDAZOLE 500 MG/100ML
500 INJECTION, SOLUTION INTRAVENOUS EVERY 8 HOURS
Status: DISCONTINUED | OUTPATIENT
Start: 2022-04-29 | End: 2022-05-03 | Stop reason: HOSPADM

## 2022-04-29 RX ORDER — ONDANSETRON 2 MG/ML
4 INJECTION INTRAMUSCULAR; INTRAVENOUS EVERY 4 HOURS PRN
Status: DISCONTINUED | OUTPATIENT
Start: 2022-04-29 | End: 2022-05-03 | Stop reason: HOSPADM

## 2022-04-29 RX ADMIN — LORAZEPAM 1 MG: 1 TABLET ORAL at 16:23

## 2022-04-29 RX ADMIN — THIAMINE HYDROCHLORIDE 100 MG: 100 INJECTION, SOLUTION INTRAMUSCULAR; INTRAVENOUS at 08:12

## 2022-04-29 RX ADMIN — LORAZEPAM 1 MG: 2 INJECTION INTRAMUSCULAR; INTRAVENOUS at 18:26

## 2022-04-29 RX ADMIN — SODIUM CHLORIDE, PRESERVATIVE FREE 10 ML: 5 INJECTION INTRAVENOUS at 20:23

## 2022-04-29 RX ADMIN — SODIUM CHLORIDE, PRESERVATIVE FREE 40 MG: 5 INJECTION INTRAVENOUS at 08:12

## 2022-04-29 RX ADMIN — LOSARTAN POTASSIUM 50 MG: 50 TABLET, FILM COATED ORAL at 08:12

## 2022-04-29 RX ADMIN — SODIUM CHLORIDE, PRESERVATIVE FREE 40 MG: 5 INJECTION INTRAVENOUS at 20:14

## 2022-04-29 RX ADMIN — ONDANSETRON 4 MG: 2 INJECTION INTRAMUSCULAR; INTRAVENOUS at 18:26

## 2022-04-29 RX ADMIN — VANCOMYCIN HYDROCHLORIDE 1250 MG: 1.25 INJECTION, POWDER, LYOPHILIZED, FOR SOLUTION INTRAVENOUS at 02:02

## 2022-04-29 RX ADMIN — LORAZEPAM 1 MG: 1 TABLET ORAL at 08:12

## 2022-04-29 RX ADMIN — SODIUM CHLORIDE: 9 INJECTION, SOLUTION INTRAVENOUS at 18:34

## 2022-04-29 RX ADMIN — METRONIDAZOLE 500 MG: 500 INJECTION, SOLUTION INTRAVENOUS at 10:59

## 2022-04-29 RX ADMIN — ACETAMINOPHEN 650 MG: 325 TABLET ORAL at 11:03

## 2022-04-29 RX ADMIN — ACETAMINOPHEN 650 MG: 325 TABLET ORAL at 20:23

## 2022-04-29 RX ADMIN — METRONIDAZOLE 500 MG: 500 INJECTION, SOLUTION INTRAVENOUS at 18:33

## 2022-04-29 RX ADMIN — VANCOMYCIN HYDROCHLORIDE 1250 MG: 1.25 INJECTION, POWDER, LYOPHILIZED, FOR SOLUTION INTRAVENOUS at 14:13

## 2022-04-29 RX ADMIN — SODIUM CHLORIDE: 9 INJECTION, SOLUTION INTRAVENOUS at 04:39

## 2022-04-29 RX ADMIN — CEFEPIME HYDROCHLORIDE 2000 MG: 2 INJECTION, POWDER, FOR SOLUTION INTRAVENOUS at 20:19

## 2022-04-29 ASSESSMENT — PAIN SCALES - GENERAL
PAINLEVEL_OUTOF10: 6
PAINLEVEL_OUTOF10: 0

## 2022-04-29 ASSESSMENT — PAIN DESCRIPTION - LOCATION: LOCATION: BACK

## 2022-04-29 ASSESSMENT — PAIN DESCRIPTION - DESCRIPTORS: DESCRIPTORS: ACHING

## 2022-04-29 ASSESSMENT — PAIN DESCRIPTION - ORIENTATION: ORIENTATION: POSTERIOR;MID

## 2022-04-29 ASSESSMENT — PAIN - FUNCTIONAL ASSESSMENT: PAIN_FUNCTIONAL_ASSESSMENT: ACTIVITIES ARE NOT PREVENTED

## 2022-04-29 NOTE — PROGRESS NOTES
Vancomycin Dosing by Pharmacy - Daily Note   Vancomycin Therapy Day:  3  Indication: Sepsis and possible pneumonia. Allergies:  Bee venom   Actual Weight:    Wt Readings from Last 1 Encounters:   04/29/22 210 lb 5.1 oz (95.4 kg)       Labs/Ancillary Data  Estimated Creatinine Clearance: 286 mL/min (based on SCr of 0.4 mg/dL). Recent Labs     04/27/22  1815 04/27/22 2015 04/28/22  0215 04/28/22  0752 04/28/22  1350   CREATININE 0.45*   < > <0.40* <0.40* <0.40*   BUN 9   < > 8 7 8   WBC 16.8*  --   --   --   --     < > = values in this interval not displayed. Procalcitonin   Date Value Ref Range Status   04/27/2022 0.35 (H) <0.09 ng/mL Final     Comment:           Suspected Sepsis:  <0.50 ng/mL     Low likelihood of sepsis. 0.50-2.00 ng/mL     Increased likelihood of sepsis. Antibiotics encouraged. >2.00 ng/mL     High risk of sepsis/shock. Antibiotics strongly encouraged. Suspected Lower Resp Tract Infections:  <0.24 ng/mL     Low likelihood of bacterial infection. >0.24 ng/mL     Increased likelihood of bacterial infection. Antibiotics encouraged. With successful antibiotic therapy, PCT levels should decrease rapidly. (Half-life of 24 to   36 hours.)        Procalcitonin values from samples collected within the first 6 hours of systemic infection   may still be low. Retesting may be indicated. Values from day 1 and day 4 can be entered into the Change in Procalcitonin Calculator   ([email or web address] com) to determine the patient's Mortality Risk Prognosis        In healthy neonates, plasma Procalcitonin (PCT) concentrations increase gradually after   birth, reaching peak values at about 24 hours of age then decrease to normal values below   0.5 ng/mL by 48-72 hours of age.          Intake/Output Summary (Last 24 hours) at 4/29/2022 0755  Last data filed at 4/29/2022 0507  Gross per 24 hour   Intake 240 ml   Output --   Net 240 ml     Temp: 98.9 F    Culture Date / Mary Lou Acosta  / Results  See micro  Recent vancomycin administrations                     vancomycin (VANCOCIN) 1,250 mg in dextrose 5 % 250 mL IVPB (ADDAVIAL) (mg) 1,250 mg New Bag 04/29/22 0202     1,250 mg New Bag 04/28/22 1108    vancomycin (VANCOCIN) 2,000 mg in dextrose 5 % 500 mL IVPB (mg) 2,000 mg New Bag 04/27/22 2326                    Vancomycin Concentrations:   TROUGH:  No results for input(s): VANCOTROUGH in the last 72 hours. RANDOM:    Recent Labs     04/29/22  0530   VANCORANDOM 21.1       MRSA Nasal Swab: was ordered by provider, awaiting results. Sadiq Alejandre PLAN     Continue current dose of 1250 mg q12h IV  Ensured BUN/sCr ordered at baseline and every 48 hours x at least 3 levels, then at least weekly. Pharmacy will continue to monitor patient and adjust therapy as indicated      Vancomycin Target Concentration Parameters  Treatment  Population Target AUC/HORTENCIA Target Trough   Invasive MRSA Infection (bacteremia, pneumonia, meningitis, endocarditis, osteomyelitis)  Sepsis (undifferentiated) 400-600 N/A   Infection due to non-MRSA pathogen  Empiric treatment of non-invasive MRSA infection  (SSTI, UTI) <500 10-15 mg/L   CrCl < 29 mL/min  Rapidly fluctuating serum creatinine   OSCAR N/A < 15 mg/L     Renal replacement therapy is dosed by levels, per hospital protocol. Abbreviations  * Pauc: probability that AUC is >400 (efficacy); Pconc: probability that Ctrough is above 20 ?g/mL (toxicity); Tox: Probability of nephrotoxicity, based on Zoie et al. Clin Infect Dis 2009. Loading dose: N/A  Regimen: 1250 mg IV every 12 hours. Start time: 14:02 on 04/29/2022  Exposure target: AUC24 (range)400-600 mg/L.hr   AUC24,ss: 477 mg/L.hr  Probability of AUC24 > 400: 78 %  Ctrough,ss: 14.3 mg/L  Probability of Ctrough,ss > 20: 16 %  Probability of nephrotoxicity (Lodise PENNIE 2009): 9 %      Thank you for the consult. Pharmacy will continue to follow.   [facility]    4/29/2022   7:56 AM

## 2022-04-29 NOTE — CARE COORDINATION
ONGOING DISCHARGE PLAN:    Patient is alert and oriented x4. Spoke with patient regarding discharge plan and patient confirms that plan is still home without needs. Declined VNS. Also declined ETOH abuse information. Active order for IV Cefepime, IV Flagyl, and IV Vanco.    GI consulted. Per notes, holding off on GI work up d/t DT's, on [address] Will continue to follow for additional discharge needs.     Electronically signed by Maty Wing RN on 4/29/2022 at 11:48 AM

## 2022-04-29 NOTE — PLAN OF CARE
Problem: Risk for Falls  Goal: Fall prevention  Description: Patient  will remain free from falls as evidenced by no witnessed or reported falls each shift during the inpatient hospice stay. Patient  and or family/caregiver will receive education on fall prevention as evidenced by verbalizing recall of using the call lights system, fall prevention devices, and asking for help during the admission process and ongoing as needed during the inpatient hospice stay. 4/29/2022 0342 by Vesna Magana RN  Outcome: Progressing  Note: Pt free of falls     Problem: Nausea/Vomiting (Adult)  Goal: Control of nausea/vomiting  Description: Patient  will exhibit decreased or eliminated episodes of nausea/vomiting as evidenced by less than 2 PRN antiemetic medication administrations each shift during the inpatient hospice stay.     4/29/2022 0342 by Vesna Magana RN  Outcome: Progressing  Note: Pt denied any nausea

## 2022-04-29 NOTE — PROGRESS NOTES
[facility]    Gastroenterology Daily Progress Note      Patient:   Ehsan David   :    1985   Facility:   [facility]  Date:     2022  Consultant:   Daymon Aschoff, APRN - CNP, CNP      SUBJECTIVE  40 y.o. male admitted 2022 with Pneumonia [J18.9]  Acute liver failure without hepatic coma [K72.00]  Pneumonia of left lower lobe due to infectious organism [J18.9] and seen for alcoholic hepatitis and melena. The pt was seen and examined. hgb 8.4 no BM overnight, has mild tremors but no encephalopathy. Has mild RUQ pain. Brooks Salter LFT's remain elevated.          OBJECTIVE  Scheduled Meds:   metroNIDAZOLE  500 mg IntraVENous Q8H    losartan  50 mg Oral Daily    pantoprazole (PROTONIX) 40 mg injection  40 mg IntraVENous Q12H    sodium chloride flush  5-40 mL IntraVENous 2 times per day    thiamine  100 mg IntraVENous Daily    vancomycin (VANCOCIN) intermittent dosing (placeholder)   Other RX Placeholder    vancomycin  1,250 mg IntraVENous Q12H    sodium chloride flush  5-40 mL IntraVENous 2 times per day    [Held by provider] enoxaparin  40 mg SubCUTAneous Daily    cefTRIAXone (ROCEPHIN) IV  2,000 mg IntraVENous Q24H       Vital Signs:  /68   Pulse 107   Temp 99.1 °F (37.3 °C) (Oral)   Resp 18   Ht 5' 8\" (1.727 m)   Wt 210 lb 5.1 oz (95.4 kg)   SpO2 96%   BMI 31.98 kg/m²      Physical Exam:     General Appearance: alert and oriented to person, place and time, well-developed and well-nourished, in no acute distress  Skin: warm and dry, no rash or erythema  Head: normocephalic and atraumatic  Eyes: pupils equal, round, and reactive to light, extraocular eye movements intact, conjunctivae icteric  ENT: hearing grossly normal bilaterally  Neck: neck supple and non tender without mass, no thyromegaly or thyroid nodules, no cervical lymphadenopathy   Pulmonary/Chest: clear to auscultation bilaterally- no wheezes, rales or rhonchi, normal air movement, no respiratory distress  Cardiovascular: normal rate, regular rhythm, normal S1 and S2, no murmurs, rubs, clicks or gallops, distal pulses intact, no carotid bruits  Abdomen: soft,  Obese mild RUQ tenderness, non-distended, normal bowel sounds, no masses or organomegaly  Extremities: no cyanosis, clubbing or edema  Musculoskeletal: normal range of motion, no joint swelling, deformity or tenderness  Neurologic: has mild tremors, no cranial nerve deficit and muscle strength normal    Lab and Imaging Review     CBC  Recent Labs     04/27/22 1815 04/29/22  0910   WBC 16.8* 15.7*   HGB 9.8* 8.4*  8.5*   HCT 28.3* 24.6*  24.6*   .1* 128.8*    244       BMP  Recent Labs     04/28/22 0215 04/28/22 0752 04/28/22  1350    136 138   K 3.7 3.7 3.9    104 104   CO2 24 22 20   BUN 8 7 8   CREATININE <0.40* <0.40* <0.40*   GLUCOSE 102* 84 88   CALCIUM 7.4* 7.2* 7.2*       LFTS  Recent Labs     04/27/22 1815 04/27/22 2015 04/28/22 0215 04/28/22 0752 04/28/22  1350   ALKPHOS 275*   < > 235* 217* 227*   ALT 39   < > 33 31 33   *   < > 203* 179* 199*   PROT 7.7   < > 6.7 6.4 6.6   BILITOT 5.86*   < > 5.02* 4.77* 5.33*   BILIDIR 4.24*  --   --   --   --    LABALBU 2.9*   < > 2.3* 2.2* 2.7*    < > = values in this interval not displayed. AMYLASE/LIPASE/AMMONIA  Recent Labs     04/27/22 1815   LIPASE 54       PT/INR  Recent Labs     04/27/22 1815 04/28/22  0600   PROTIME 14.7* 15.4*   INR 1.2 1.2         ANEMIA STUDIES  Recent Labs     04/28/22 0752   LABIRON 66*   TIBC 99*   FERRITIN 1,202*   [id number]   FOLATE 2.0*     CT ABDOMEN PELVIS W IV CONTRAST Additional Contrast? None     Result Date: 4/27/2022  EXAMINATION: CT OF THE ABDOMEN AND PELVIS WITH CONTRAST 4/27/2022 7:16 pm  FINDINGS: Lower Chest: Mild left and minimal right bibasilar atelectasis. The heart size is normal. Organs: The liver is enlarged. There is diffuse low attenuation of liver parenchyma.   Bile ducts are not dilated. The spleen, pancreas, adrenal glands and kidneys are normal.  There are no calcified gallstones. No pericholecystic fluid. There is mild gallbladder wall thickening. GI/Bowel: Unopacified bowel loops are grossly normal.  No bowel obstruction. The appendix is normal. Pelvis: Urinary bladder is nearly empty and grossly normal. Peritoneum/Retroperitoneum: There is retroperitoneal fat stranding. There are mildly enlarged upper abdominal lymph nodes. Lymph node in the isai hepatis adjacent to the pancreatic head measures up to 2.5 cm (coronal image 50). There are mildly prominent retroperitoneal lymph nodes. There is trace free fluid in the pelvis. There is no free air. Bones/Soft Tissues: Bilateral gynecomastia and nipple piercings. There is a unilateral right-sided pars interarticularis defect at L5. No acute bone finding.      The liver is enlarged and diffusely low in attenuation. Diffuse nonspecific hepatocellular disease suspected. Bile ducts are not dilated in this patient with history of jaundice. There are no calcified gallstones. There is mild gallbladder wall thickening. Wall thickening may be reactive to adjacent liver disease. Mildly enlarged upper abdominal lymph nodes measuring up to 2.5 cm, likely reactive. Trace free fluid in the pelvis. Unilateral right-sided L5 pars interarticularis defect. RECOMMENDATIONS: Unavailable        ASSESSMENT/plan  1. Elevated lft's with alcoholic hepatitis   -trend lft  -ciwa protocol  -abx to decrease sbp discontinued  -stop alcohol    2.anemia with melena; no BM overnight  -needs egd when stable  -trend hh  -ppi    3.pneumonia    This plan was formulated in collaboration with Dr. Clary Stubbs  .     Electronically signed by: SILVA Crow - CNP on 4/29/2022 at 4:30 PM

## 2022-04-30 ENCOUNTER — APPOINTMENT (OUTPATIENT)
Dept: CT IMAGING | Age: 37
DRG: 720 | End: 2022-04-30
Payer: MEDICAID

## 2022-04-30 LAB
ABSOLUTE BANDS #: 1.46 K/UL (ref 0–1)
ABSOLUTE EOS #: 0 K/UL (ref 0–0.4)
ABSOLUTE LYMPH #: 1.3 K/UL (ref 1–4.8)
ABSOLUTE MONO #: 0.65 K/UL (ref 0.1–1.3)
ALBUMIN SERPL-MCNC: 2.1 G/DL (ref 3.5–5.2)
ALLEN TEST: ABNORMAL
ALP BLD-CCNC: 268 U/L (ref 40–129)
ALT SERPL-CCNC: 34 U/L (ref 5–41)
ANION GAP SERPL CALCULATED.3IONS-SCNC: 11 MMOL/L (ref 9–17)
AST SERPL-CCNC: 240 U/L
BANDS: 9 % (ref 0–10)
BASOPHILS # BLD: 1 % (ref 0–2)
BASOPHILS ABSOLUTE: 0.16 K/UL (ref 0–0.2)
BILIRUB SERPL-MCNC: 4.8 MG/DL (ref 0.3–1.2)
BUN BLDV-MCNC: 4 MG/DL (ref 6–20)
CALCIUM SERPL-MCNC: 6.8 MG/DL (ref 8.6–10.4)
CARBOXYHEMOGLOBIN: 0.7 % (ref 0–5)
CHLORIDE BLD-SCNC: 107 MMOL/L (ref 98–107)
CO2: 18 MMOL/L (ref 20–31)
CREAT SERPL-MCNC: 0.45 MG/DL (ref 0.7–1.2)
EOSINOPHILS RELATIVE PERCENT: 0 % (ref 0–4)
FIO2: ABNORMAL
GFR AFRICAN AMERICAN: >60 ML/MIN
GFR NON-AFRICAN AMERICAN: >60 ML/MIN
GFR SERPL CREATININE-BSD FRML MDRD: ABNORMAL ML/MIN/{1.73_M2}
GLUCOSE BLD-MCNC: 90 MG/DL (ref 70–99)
HCO3 ARTERIAL: 19.6 MMOL/L (ref 22–26)
HCT VFR BLD CALC: 26.2 % (ref 41–53)
HEMOGLOBIN: 8.8 G/DL (ref 13.5–17.5)
LACTIC ACID, SEPSIS: 2.6 MMOL/L (ref 0.5–1.9)
LACTIC ACID, SEPSIS: 3.6 MMOL/L (ref 0.5–1.9)
LEGIONELLA PNEUMOPHILIA AG, URINE: NEGATIVE
LIVER-KIDNEY MICROSOMAL AB: NORMAL
LYMPHOCYTES # BLD: 8 % (ref 24–44)
MCH RBC QN AUTO: 43.4 PG (ref 26–34)
MCHC RBC AUTO-ENTMCNC: 33.6 G/DL (ref 31–37)
MCV RBC AUTO: 129 FL (ref 80–100)
METHEMOGLOBIN: 1.2 % (ref 0–1.9)
MONOCYTES # BLD: 4 % (ref 1–7)
MORPHOLOGY: ABNORMAL
NEGATIVE BASE EXCESS, ART: 4.7 MMOL/L (ref 0–2)
O2 DEVICE/FLOW/%: ABNORMAL
O2 SAT, ARTERIAL: 93.1 % (ref 95–98)
PATIENT TEMP: 39.1
PCO2 ARTERIAL: 29.1 MMHG (ref 35–45)
PCO2, ART, TEMP ADJ: 31.9 (ref 35–45)
PDW BLD-RTO: 18.8 % (ref 11.5–14.9)
PH ARTERIAL: 7.44 (ref 7.35–7.45)
PH, ART, TEMP ADJ: 7.41 (ref 7.35–7.45)
PLATELET # BLD: 248 K/UL (ref 150–450)
PMV BLD AUTO: 8.5 FL (ref 6–12)
PO2 ARTERIAL: 69 MMHG (ref 80–100)
PO2, ART, TEMP ADJ: 78.8 MMHG (ref 80–100)
POTASSIUM SERPL-SCNC: 4.8 MMOL/L (ref 3.7–5.3)
PT. POSITION: ABNORMAL
RBC # BLD: 2.03 M/UL (ref 4.5–5.9)
RESPIRATORY RATE: 18
SAMPLE SITE: ABNORMAL
SEG NEUTROPHILS: 78 % (ref 36–66)
SEGMENTED NEUTROPHILS ABSOLUTE COUNT: 12.63 K/UL (ref 1.3–9.1)
SMOOTH MUSCLE ANTIBODY: 13 UNITS (ref 0–19)
SODIUM BLD-SCNC: 136 MMOL/L (ref 135–144)
TEXT FOR RESPIRATORY: ABNORMAL
TOTAL PROTEIN: 6.5 G/DL (ref 6.4–8.3)
WBC # BLD: 16.2 K/UL (ref 3.5–11)

## 2022-04-30 PROCEDURE — 82805 BLOOD GASES W/O2 SATURATION: CPT

## 2022-04-30 PROCEDURE — 2580000003 HC RX 258: Performed by: INTERNAL MEDICINE

## 2022-04-30 PROCEDURE — 83605 ASSAY OF LACTIC ACID: CPT

## 2022-04-30 PROCEDURE — 6360000002 HC RX W HCPCS: Performed by: NURSE PRACTITIONER

## 2022-04-30 PROCEDURE — 86738 MYCOPLASMA ANTIBODY: CPT

## 2022-04-30 PROCEDURE — 6370000000 HC RX 637 (ALT 250 FOR IP): Performed by: NURSE PRACTITIONER

## 2022-04-30 PROCEDURE — 87449 NOS EACH ORGANISM AG IA: CPT

## 2022-04-30 PROCEDURE — 6360000002 HC RX W HCPCS: Performed by: STUDENT IN AN ORGANIZED HEALTH CARE EDUCATION/TRAINING PROGRAM

## 2022-04-30 PROCEDURE — C9113 INJ PANTOPRAZOLE SODIUM, VIA: HCPCS | Performed by: NURSE PRACTITIONER

## 2022-04-30 PROCEDURE — 6360000002 HC RX W HCPCS: Performed by: INTERNAL MEDICINE

## 2022-04-30 PROCEDURE — 99233 SBSQ HOSP IP/OBS HIGH 50: CPT | Performed by: INTERNAL MEDICINE

## 2022-04-30 PROCEDURE — 85025 COMPLETE CBC W/AUTO DIFF WBC: CPT

## 2022-04-30 PROCEDURE — 80053 COMPREHEN METABOLIC PANEL: CPT

## 2022-04-30 PROCEDURE — 2060000000 HC ICU INTERMEDIATE R&B

## 2022-04-30 PROCEDURE — 2700000000 HC OXYGEN THERAPY PER DAY

## 2022-04-30 PROCEDURE — 36415 COLL VENOUS BLD VENIPUNCTURE: CPT

## 2022-04-30 PROCEDURE — 36600 WITHDRAWAL OF ARTERIAL BLOOD: CPT

## 2022-04-30 PROCEDURE — 6370000000 HC RX 637 (ALT 250 FOR IP): Performed by: INTERNAL MEDICINE

## 2022-04-30 PROCEDURE — 2580000003 HC RX 258: Performed by: NURSE PRACTITIONER

## 2022-04-30 PROCEDURE — 2500000003 HC RX 250 WO HCPCS: Performed by: INTERNAL MEDICINE

## 2022-04-30 PROCEDURE — APPSS30 APP SPLIT SHARED TIME 16-30 MINUTES: Performed by: NURSE PRACTITIONER

## 2022-04-30 PROCEDURE — 71250 CT THORAX DX C-: CPT

## 2022-04-30 PROCEDURE — 87641 MR-STAPH DNA AMP PROBE: CPT

## 2022-04-30 PROCEDURE — 99254 IP/OBS CNSLTJ NEW/EST MOD 60: CPT | Performed by: INTERNAL MEDICINE

## 2022-04-30 PROCEDURE — 99232 SBSQ HOSP IP/OBS MODERATE 35: CPT | Performed by: INTERNAL MEDICINE

## 2022-04-30 PROCEDURE — 6370000000 HC RX 637 (ALT 250 FOR IP): Performed by: STUDENT IN AN ORGANIZED HEALTH CARE EDUCATION/TRAINING PROGRAM

## 2022-04-30 PROCEDURE — 6370000000 HC RX 637 (ALT 250 FOR IP)

## 2022-04-30 PROCEDURE — 2580000003 HC RX 258: Performed by: STUDENT IN AN ORGANIZED HEALTH CARE EDUCATION/TRAINING PROGRAM

## 2022-04-30 RX ORDER — CHLORDIAZEPOXIDE HYDROCHLORIDE 10 MG/1
10 CAPSULE, GELATIN COATED ORAL 4 TIMES DAILY
Status: DISCONTINUED | OUTPATIENT
Start: 2022-04-30 | End: 2022-05-03 | Stop reason: HOSPADM

## 2022-04-30 RX ORDER — 0.9 % SODIUM CHLORIDE 0.9 %
1000 INTRAVENOUS SOLUTION INTRAVENOUS ONCE
Status: COMPLETED | OUTPATIENT
Start: 2022-04-30 | End: 2022-04-30

## 2022-04-30 RX ORDER — BENZONATATE 200 MG/1
200 CAPSULE ORAL 3 TIMES DAILY
Status: DISCONTINUED | OUTPATIENT
Start: 2022-04-30 | End: 2022-05-03 | Stop reason: HOSPADM

## 2022-04-30 RX ORDER — SODIUM CHLORIDE 9 MG/ML
INJECTION, SOLUTION INTRAVENOUS CONTINUOUS
Status: DISCONTINUED | OUTPATIENT
Start: 2022-04-30 | End: 2022-05-03 | Stop reason: HOSPADM

## 2022-04-30 RX ORDER — FUROSEMIDE 10 MG/ML
40 INJECTION INTRAMUSCULAR; INTRAVENOUS ONCE
Status: COMPLETED | OUTPATIENT
Start: 2022-04-30 | End: 2022-04-30

## 2022-04-30 RX ORDER — DEXTROMETHORPHAN POLISTIREX 30 MG/5ML
60 SUSPENSION ORAL EVERY 12 HOURS SCHEDULED
Status: DISCONTINUED | OUTPATIENT
Start: 2022-04-30 | End: 2022-05-03 | Stop reason: HOSPADM

## 2022-04-30 RX ORDER — NICOTINE 21 MG/24HR
1 PATCH, TRANSDERMAL 24 HOURS TRANSDERMAL DAILY
Status: DISCONTINUED | OUTPATIENT
Start: 2022-04-30 | End: 2022-05-03 | Stop reason: HOSPADM

## 2022-04-30 RX ORDER — M-VIT,TX,IRON,MINS/CALC/FOLIC 27MG-0.4MG
1 TABLET ORAL DAILY
Status: DISCONTINUED | OUTPATIENT
Start: 2022-04-30 | End: 2022-05-03 | Stop reason: HOSPADM

## 2022-04-30 RX ORDER — GAUZE BANDAGE 2" X 2"
100 BANDAGE TOPICAL DAILY
Status: DISCONTINUED | OUTPATIENT
Start: 2022-04-30 | End: 2022-05-03 | Stop reason: HOSPADM

## 2022-04-30 RX ORDER — FOLIC ACID 1 MG/1
1 TABLET ORAL DAILY
Status: DISCONTINUED | OUTPATIENT
Start: 2022-04-30 | End: 2022-05-03 | Stop reason: HOSPADM

## 2022-04-30 RX ORDER — DEXMEDETOMIDINE HYDROCHLORIDE 4 UG/ML
.1-1.5 INJECTION, SOLUTION INTRAVENOUS CONTINUOUS
Status: DISCONTINUED | OUTPATIENT
Start: 2022-04-30 | End: 2022-05-03 | Stop reason: HOSPADM

## 2022-04-30 RX ADMIN — SODIUM CHLORIDE, PRESERVATIVE FREE 40 MG: 5 INJECTION INTRAVENOUS at 19:54

## 2022-04-30 RX ADMIN — CEFEPIME HYDROCHLORIDE 2000 MG: 2 INJECTION, POWDER, FOR SOLUTION INTRAVENOUS at 19:59

## 2022-04-30 RX ADMIN — THIAMINE HCL TAB 100 MG 100 MG: 100 TAB at 15:23

## 2022-04-30 RX ADMIN — SODIUM CHLORIDE, PRESERVATIVE FREE 40 MG: 5 INJECTION INTRAVENOUS at 08:25

## 2022-04-30 RX ADMIN — CEFEPIME HYDROCHLORIDE 2000 MG: 2 INJECTION, POWDER, FOR SOLUTION INTRAVENOUS at 04:04

## 2022-04-30 RX ADMIN — SODIUM CHLORIDE, PRESERVATIVE FREE 10 ML: 5 INJECTION INTRAVENOUS at 19:55

## 2022-04-30 RX ADMIN — ACETAMINOPHEN 650 MG: 650 SUPPOSITORY RECTAL at 15:13

## 2022-04-30 RX ADMIN — ONDANSETRON 4 MG: 2 INJECTION INTRAMUSCULAR; INTRAVENOUS at 16:45

## 2022-04-30 RX ADMIN — METRONIDAZOLE 500 MG: 500 INJECTION, SOLUTION INTRAVENOUS at 18:16

## 2022-04-30 RX ADMIN — LORAZEPAM 1 MG: 2 INJECTION INTRAMUSCULAR; INTRAVENOUS at 11:42

## 2022-04-30 RX ADMIN — METRONIDAZOLE 500 MG: 500 INJECTION, SOLUTION INTRAVENOUS at 03:02

## 2022-04-30 RX ADMIN — SODIUM CHLORIDE: 9 INJECTION, SOLUTION INTRAVENOUS at 01:17

## 2022-04-30 RX ADMIN — SODIUM CHLORIDE 1000 ML: 9 INJECTION, SOLUTION INTRAVENOUS at 17:47

## 2022-04-30 RX ADMIN — LORAZEPAM 1 MG: 1 TABLET ORAL at 04:08

## 2022-04-30 RX ADMIN — ONDANSETRON 4 MG: 2 INJECTION INTRAMUSCULAR; INTRAVENOUS at 08:35

## 2022-04-30 RX ADMIN — VANCOMYCIN HYDROCHLORIDE 1250 MG: 1.25 INJECTION, POWDER, LYOPHILIZED, FOR SOLUTION INTRAVENOUS at 02:07

## 2022-04-30 RX ADMIN — ACETAMINOPHEN 650 MG: 325 TABLET ORAL at 22:25

## 2022-04-30 RX ADMIN — BENZONATATE 200 MG: 200 CAPSULE ORAL at 19:53

## 2022-04-30 RX ADMIN — METRONIDAZOLE 500 MG: 500 INJECTION, SOLUTION INTRAVENOUS at 10:22

## 2022-04-30 RX ADMIN — Medication 60 MG: at 19:54

## 2022-04-30 RX ADMIN — CHLORDIAZEPOXIDE HYDROCHLORIDE 10 MG: 10 CAPSULE ORAL at 17:49

## 2022-04-30 RX ADMIN — MULTIPLE VITAMINS W/ MINERALS TAB 1 TABLET: TAB at 15:23

## 2022-04-30 RX ADMIN — FOLIC ACID 1 MG: 1 TABLET ORAL at 15:23

## 2022-04-30 RX ADMIN — FUROSEMIDE 40 MG: 10 INJECTION, SOLUTION INTRAMUSCULAR; INTRAVENOUS at 15:30

## 2022-04-30 RX ADMIN — CEFEPIME HYDROCHLORIDE 2000 MG: 2 INJECTION, POWDER, FOR SOLUTION INTRAVENOUS at 10:29

## 2022-04-30 RX ADMIN — VANCOMYCIN HYDROCHLORIDE 1250 MG: 1.25 INJECTION, POWDER, LYOPHILIZED, FOR SOLUTION INTRAVENOUS at 14:40

## 2022-04-30 RX ADMIN — THIAMINE HYDROCHLORIDE 100 MG: 100 INJECTION, SOLUTION INTRAMUSCULAR; INTRAVENOUS at 08:24

## 2022-04-30 RX ADMIN — SODIUM CHLORIDE: 9 INJECTION, SOLUTION INTRAVENOUS at 18:15

## 2022-04-30 ASSESSMENT — ENCOUNTER SYMPTOMS
ABDOMINAL PAIN: 1
EYES NEGATIVE: 1
ANAL BLEEDING: 0
DIARRHEA: 0
WHEEZING: 1
VOMITING: 0
SHORTNESS OF BREATH: 1
CHEST TIGHTNESS: 1
NAUSEA: 1
COLOR CHANGE: 1
BLOOD IN STOOL: 0
CONSTIPATION: 0
COUGH: 1

## 2022-04-30 ASSESSMENT — PAIN SCALES - GENERAL: PAINLEVEL_OUTOF10: 2

## 2022-04-30 NOTE — CONSULTS
[facility]   CONSULT NOTE:      DATE OF CONSULT 4/30/2022    REASON FOR CONSULTATION:  Critical care management, elevated Mew score      PCP Cherylene Patten, MD     CHIEF COMPLAINT: Dyspnea cough    HISTORY OF PRESENT ILLNESS:     Abhinav Weathers is a 78-year-old white male who was initially admitted on April 27 with complaints of bilateral chest pain and pneumonia. He was admitted approximately 2 weeks ago to [place] [address] after having a presyncopal episode and was found to have blood in the stool. He had a CT at that time which was negative for pulmonary embolism. He has a history in the past in May 2020 of pulmonary embolism and a small subsegmental area of the right lower lobe. It is unclear to me how long he was treated. At any rate, the patient was discharged from [place] [address] started on Protonix and told not to take naproxen. Since that time, he has had increasing weakness and dyspnea. He also had a fever. He also complained of cough which for the most part was nonproductive and a sore throat. Has a significant history of alcohol use and drinks about 1/5 of liquor a day. His last drink was about a week ago he tells me. He also smokes about a pack a day. COVID in January 2022, but a repeat swab done on the day of admission showed no evidence of COVID. He recalls having pneumonia at age 12 for which she was hospitalized. But otherwise not have any major issues growing up. He was originally admitted to the stepdown area, but because he had an elevated Mew score of 7, he was transferred to the intermediate ICU.       ALLERGIES:  Allergies   Allergen Reactions    Bee Venom Anaphylaxis       HOME MEDICATIONS:  Medications Prior to Admission: Multiple Vitamins-Minerals (CENTRUM MEN) TABS, Take 1 tablet by mouth daily  Menthol-Methyl Salicylate (ANALGESIC OINTMENT) OINT ointment, Apply topically as needed Indications: on back  metoprolol succinate (TOPROL XL) 25 MG extended release tablet, take 1 tablet by mouth once daily  pantoprazole (PROTONIX) 20 MG tablet, Take 2 tablets by mouth daily  losartan (COZAAR) 50 MG tablet, take 1 tablet by mouth once daily  fluticasone (FLONASE) 50 MCG/ACT nasal spray, instill 1 spray into each nostril once daily  Blood Pressure Monitoring (BP MONITOR-STETHOSCOPE) KIT, 1 each by Does not apply route daily  Nicotine 21-14-7 MG/24HR KIT, Place 14 mg onto the skin nightly   EPINEPHrine (EPIPEN 2-LIZETTE) 0.3 MG/0.3ML SOAJ injection, Inject 0.3 mLs into the muscle once for 1 dose Use as directed for allergic reaction      PAST MEDICAL HISTORY:  Past Medical History:   Diagnosis Date    Alcoholism ([address]     pt drinks a fifth of whiskey daily    Anemia     Pulmonary embolism (HCC)        PAST SURGICAL HISTORY:  History reviewed. No pertinent surgical history.        SOCIAL HISTORY:  Social History     Socioeconomic History    Marital status: Unknown     Spouse name: Not on file    Number of children: Not on file    Years of education: Not on file    Highest education level: Not on file   Occupational History    Not on file   Tobacco Use    Smoking status: Current Every Day Smoker     Packs/day: 1.00     Years: 21.00     Pack years: 21.00     Types: Cigarettes    Smokeless tobacco: Never Used    Tobacco comment: using nicotine patches at night   Vaping Use    Vaping Use: Never used   Substance and Sexual Activity    Alcohol use: Not Currently     Comment: hx of alcoholism; pt drinks a fifth of whiskey daily    Drug use: Not Currently     Comment: used cocaine in early 20's    Sexual activity: Not on file   Other Topics Concern    Not on file   Social History Narrative    Not on file     Social Determinants of Health     Financial Resource Strain: Low Risk     Difficulty of Paying Living Expenses: Not hard at all   Food Insecurity: No Food Insecurity    Worried About Running Out of Food in the Last Year: Never true    [address] in the Last Year: Never true   Transportation Needs: No Transportation Needs    Lack of Transportation (Medical): No    Lack of Transportation (Non-Medical): No   Physical Activity:     Days of Exercise per Week: Not on file    Minutes of Exercise per Session: Not on file   Stress:     Feeling of Stress : Not on file   Social Connections:     Frequency of Communication with Friends and Family: Not on file    Frequency of Social Gatherings with Friends and Family: Not on file    Attends Church Services: Not on file    Active Member of [address] [place] [address] [employer] or Organizations: Not on file    Attends Club or Organization Meetings: Not on file    Marital Status: Not on file   Intimate Partner Violence:     Fear of Current or Ex-Partner: Not on file    Emotionally Abused: Not on file    Physically Abused: Not on file    Sexually Abused: Not on file   Housing Stability:     Unable to Pay for Housing in the Last Year: Not on file    Number of [place] in the Last Year: Not on file    Unstable Housing in the Last Year: Not on file       FAMILY HISTORY:  Family History   Problem Relation Age of Onset    Heart Disease Mother     Heart Attack Mother     Heart Disease Father        REVIEW OF SYSTEMS:  All other systems reviewed and are negative. PHYSICAL EXAM:  Vital Signs Blood pressure (!) 122/104, pulse 119, temperature 102.2 °F (39 °C), temperature source Oral, resp. rate 30, height 5' 8\" (1.727 m), weight 216 lb 11.4 oz (98.3 kg), SpO2 95 %. Oxygen Amount and Delivery: O2 Flow Rate (L/min): 3 L/min    Admission Weight Weight: 185 lb (83.9 kg)    General Appearance   Obese gentleman, looking fatigued and a little bit tremulous  Head  Normocephalic, without obvious abnormality, atraumatic    Eyes has scleral icterus. PERRL, EOM's intact.     ENT oral cavity is abnormal, with macroglossia and a low overhanging soft palate  Neck  no adenopathy, no carotid bruit, no JVD, supple, symmetrical, trachea midline and thyroid not enlarged, symmetric, no tenderness/mass/nodules  Lungs diminished, when he takes a deep breath, he starts having paroxysms of cough  Heart: Tachycardic rate and rhythm, S1, S2 normal, no murmur, click, rub or gallop  Abdomen  soft, non-tender; bowel sounds normal; no masses,  no organomegaly  Extremities  No peripheral  Skin  Skin color, texture, turgor normal. No rashes or lesions  Neurologic: Alert and oriented X 3, normal strength and tone. Imaging    CT of chest looks like multifocal pneumonia with small left pleural effusion          Lab Review  CBC     Lab Results   Component Value Date    WBC 16.2 04/30/2022    RBC 2.03 04/30/2022    HGB 8.8 04/30/2022    HCT 26.2 04/30/2022     04/30/2022    .0 04/30/2022    MCH 43.4 04/30/2022    MCHC 33.6 04/30/2022    RDW 18.8 04/30/2022    NRBC 1 05/17/2021    LYMPHOPCT 8 04/30/2022    MONOPCT 4 04/30/2022    BASOPCT 1 04/30/2022    MONOSABS 0.65 04/30/2022    LYMPHSABS 1.30 04/30/2022    EOSABS 0.00 04/30/2022    BASOSABS 0.16 04/30/2022    DIFFTYPE NOT REPORTED 01/30/2022       BMP   Lab Results   Component Value Date     04/30/2022    K 4.8 04/30/2022     04/30/2022    CO2 18 04/30/2022    BUN 4 04/30/2022    CREATININE 0.45 04/30/2022    GLUCOSE 90 04/30/2022    CALCIUM 6.8 04/30/2022       LFTS  Lab Results   Component Value Date    ALKPHOS 268 04/30/2022    ALT 34 04/30/2022     04/30/2022    PROT 6.5 04/30/2022    BILITOT 4.80 04/30/2022    BILIDIR 4.24 04/27/2022    IBILI 1.62 04/27/2022    LABALBU 2.1 04/30/2022       INR  Recent Labs     04/27/22  1815 04/28/22  0600   PROTIME 14.7* 15.4*   INR 1.2 1.2       APTT  Recent Labs     04/27/22  1815   APTT 40.5*       Lactic Acid  Lab Results   Component Value Date    LACTA 1.6 04/28/2022    LACTA 1.0 06/20/2021        PRO-BNP   No results for input(s): PROBNP in the last 72 hours.         ABGs:   Lab Results   Component Value Date    PHART 7.436 04/30/2022 PO2ART 69.0 04/30/2022    XTN3DPX 29.1 04/30/2022       No results found for: IFIO2, MODE, SETTIDVOL, SETPEEP      Impression    Multifocal pneumonia with hypoxemia  History of alcohol abuse with withdrawal  History of tobacco use with withdrawal  Elevated lactic acid level  Alcoholic liver cirrhosis  Probable SONJA  History of pulmonary embolism (small right lower lobe 5/2020)    Plan:      Transfer to ICU intermediate status with close monitoring  Start Precedex drip  Start Librium at 10 mg 4 times a day  Give 1 L fluid bolus normal saline start IV fluids at 100 an hour  Follow-up lactic acid level in the morning  Continue cefepime/Vanco/Flagyl  Check MRSA swab  Nicotine patch ordered  Placed on antitussives (scheduled)  Lynne with him in detail smoking cessation as well as alcohol cessation.   Wife was present in the room

## 2022-04-30 NOTE — FLOWSHEET NOTE
[phone] [id number] Resident to bedside, alerted resident of pt's vitals. [phone] Resident spoke with Dr Camille Gordillo confirmed waiting for CT and labs to result, possible transfer to ICU.

## 2022-04-30 NOTE — PROGRESS NOTES
[facility]HEL[facility]TA[facility]O [facility] Internal Medicine  Carmen Delatorre MD; Marilyn Viveros MD; Job Blackman MD; MD Moy Rajput MD; Monster Gilliland MD    [facility]   [address] [facility]     Progress Note    4/30/2022    1:50 PM    Name:   Fabiano Infante  MRN:     [id number]     Ginger [place]:      [de-identified]   Room:   2113/2113-01  IP Day:  3  Admit Date:  4/27/2022  5:50 PM    PCP:   Moy Kessler MD  Code Status:  Full Code    Subjective:     C/C:   Chief Complaint   Patient presents with    Cough    Fatigue    Nasal Congestion    Shortness of Breath     Interval History Status: not changed. Seen and examined   Still sob   Had fever   Added cefepime ,       Brief History:       The patient is a 40 y.o.  male, with a history of pulmonary embolism, hypertension, recent blood in stool  and recent newly found anemia who presents with the concern of a cough and exertional shortness of breath however was found to be jaundice with elevated liver function. According to patient, he was recently evaluated at [facility]. ER for the complaint of near syncope,  blood in her stool, chest pain, shortness of breath. Patient was  found to have occult positive stool, but no gross bloody stools and negative cardiac work-up, therefore was discharged home on Protonix and instructions to follow with primary care and GI. However patient was unable to do this given the fact that he lost his insurance when he lost his job. Patient now returns with progressive exertional shortness of breath and dry cough. Symptoms of shortness of breath and cough are associated with any activity  Denies fever, chills, chest pain (not associated with coughing), abdominal pain, nausea, vomiting, diarrhea, and urinary symptoms. There are no  alleviating factors.     In addition patient was noted to be jaundice and then states that his wife was growing concern of the color of his skin over the past 3 to 4 days. Review of Systems:     Constitutional:  negative for chills, fevers, sweats  Respiratory:  negative for cough, dyspnea on exertion, shortness of breath, wheezing  Cardiovascular:  negative for chest pain, chest pressure/discomfort, lower extremity edema, palpitations  Gastrointestinal:  negative for abdominal pain, constipation, diarrhea, nausea, vomiting  Neurological:  negative for dizziness, headache    Medications: Allergies: Allergies   Allergen Reactions    Bee Venom Anaphylaxis       Current Meds:   Scheduled Meds:    metroNIDAZOLE  500 mg IntraVENous Q8H    cefepime  2,000 mg IntraVENous Q8H    losartan  50 mg Oral Daily    pantoprazole (PROTONIX) 40 mg injection  40 mg IntraVENous Q12H    sodium chloride flush  5-40 mL IntraVENous 2 times per day    thiamine  100 mg IntraVENous Daily    vancomycin (VANCOCIN) intermittent dosing (placeholder)   Other RX Placeholder    vancomycin  1,250 mg IntraVENous Q12H    sodium chloride flush  5-40 mL IntraVENous 2 times per day    [Held by provider] enoxaparin  40 mg SubCUTAneous Daily     Continuous Infusions:    sodium chloride      sodium chloride 150 mL/hr at 04/30/22 0117     PRN Meds: ondansetron, sodium chloride flush, LORazepam **OR** LORazepam, sodium chloride flush, sodium chloride flush, sodium chloride, acetaminophen **OR** acetaminophen    Data:     Past Medical History:   has a past medical history of [address]ism [address] [address] Anemia, and Pulmonary embolism ([facility] [address] Social History:   reports that he has been smoking cigarettes. He has a 21.00 pack-year smoking history. He has never used smokeless tobacco. He reports previous alcohol use. He reports previous drug use.      Family History:   Family History   Problem Relation Age of Onset    Heart Disease Mother     Heart Attack Mother     Heart Disease Father        Vitals:  /68   Pulse 113   Temp 102.3 °F (39.1 °C)   Resp 28   Ht 5' 8\" (1.727 m)   Wt 216 lb 11.4 oz (98.3 kg)   SpO2 95%   BMI 32.95 kg/m²   Temp (24hrs), Av °F (38.3 °C), Min:98.7 °F (37.1 °C), Max:102.8 °F (39.3 °C)    No results for input(s): POCGLU in the last 72 hours. I/O (24Hr): Intake/Output Summary (Last 24 hours) at 2022 1350  Last data filed at 2022 0554  Gross per 24 hour   Intake 240 ml   Output 725 ml   Net -485 ml       Labs:  Hematology:  Recent Labs     22  0622  0910   WBC 16.8*  --  15.7*   RBC 2.25*  --  1.91*   HGB 9.8*  --  8.4*  8.5*   HCT 28.3*  --  24.6*  24.6*   .1*  --  128.8*   MCH 43.6*  --  44.1*   MCHC 34.6  --  34.3   RDW 19.1*  --  19.6*     --  244   MPV 8.8  --  8.3   INR 1.2 1.2  --      Chemistry:  Recent Labs     22  2147 22  0215 22  0752 22  1350   *   < > 130*   < >  --  135 136 138   K 3.5*   < > 3.6*   < >  --  3.7 3.7 3.9   CL 94*   < > 96*   < >  --  103 104 104   CO2 22   < > 22   < >  --  24 22 20   GLUCOSE 111*   < > 101*   < >  --  102* 84 88   BUN 9   < > 8   < >  --  8 7 8   CREATININE 0.45*   < > 0.44*   < >  --  <0.40* <0.40* <0.40*   MG 1.5*  --   --   --  1.7  --   --   --    ANIONGAP 15   < > 12   < >  --  8* 10 14   LABGLOM >60   < > >60   < >  --  Can not be calculated Can not be calculated Can not be calculated   GFRAA >60   < > >60   < >  --  Can not be calculated Can not be calculated Can not be calculated   CALCIUM 8.1*   < > 7.4*   < >  --  7.4* 7.2* 7.2*   TROPHS  --   --  11  --   --   --   --   --     < > = values in this interval not displayed.      Recent Labs     22  1815 22  0215 22  0752 22  1350   PROT 7.7   < > 6.7 6.4 6.6   LABALBU 2.9*   < > 2.3* 2.2* 2.7*   *   < > 203* 179* 199*   ALT 39   < > 33 31 33   ALKPHOS 275*   < > 235* 217* 227*   BILITOT 5.86*   < > 5.02* 4.77* 5.33*   BILIDIR 4.24*  --   --   -- --    LIPASE 54  --   --   --   --     < > = values in this interval not displayed. ABG:No results found for: POCPH, PHART, PH, POCPCO2, [id number], PCO2, POCPO2, PO2ART, PO2, POCHCO3, OMP8INQ, HCO3, NBEA, PBEA, BEART, BE, THGBART, THB, ICI9VMB, EGEH8ZMV, N9VUPTPE, O2SAT, FIO2  Lab Results   Component Value Date/Time    SPECIAL NOT REPORTED 10/15/2021 04:17 AM     Lab Results   Component Value Date/Time    CULTURE NO GROWTH 04/28/2022 03:14 AM       Radiology:  CT ABDOMEN PELVIS W IV CONTRAST Additional Contrast? None    Result Date: 4/27/2022  The liver is enlarged and diffusely low in attenuation. Diffuse nonspecific hepatocellular disease suspected. Bile ducts are not dilated in this patient with history of jaundice. There are no calcified gallstones. There is mild gallbladder wall thickening. Wall thickening may be reactive to adjacent liver disease. Mildly enlarged upper abdominal lymph nodes measuring up to 2.5 cm, likely reactive. Trace free fluid in the pelvis. Unilateral right-sided L5 pars interarticularis defect. RECOMMENDATIONS: Unavailable     XR CHEST PORTABLE    Result Date: 4/28/2022  Stable small infiltrate within the left costophrenic angle which may represent small pneumonia versus pleural effusion.      XR CHEST PORTABLE    Result Date: 4/27/2022  New left effusion and or infiltrate       Physical Examination:        General appearance:  alert, cooperative and no distress  Mental Status:  oriented to person, place and time and normal affect  Lungs:  clear to auscultation bilaterally, normal effort  Heart:  regular rate and rhythm, no murmur  Abdomen:  soft, nontender, nondistended, normal bowel sounds, no masses, hepatomegaly, splenomegaly  Extremities:  no edema, redness, tenderness in the calves  Skin:  no gross lesions, rashes, induration    Assessment:        Hospital Problems           Last Modified POA    * (Principal) Sepsis ([address] 4/28/2022 Yes    Left lower lobe pneumonia 4/28/2022 Yes Melena 4/28/2022 Yes    Acute blood loss anemia 4/28/2022 Yes    Transaminitis 4/28/2022 Yes          Plan:        1. Sepsis , 2/2 underlying pna, possible aspiration PNA, on bsa abx , cx pendin   2. Aspiration PNA, sec to alcohol intoxication, on abx   3. Abn LFTs sec to alcoholism   4. Ac blood loss anemia, monitor hb, possible GI bleed, GI on board    5. Alcoholism, monitor for any withdrawal  6. DVT Ppx , SCds,   7. Full code status     4/30  Sepsis, possible secondary aspiration pneumonia, on vancomycin, cefepime, Flagyl, infectious disease on board  Acute respiratory failure,  Severe alcoholism in the past, watch for withdrawal, on CIWA protocol  Abnormal liver functions,   Acute blood loss anemia, possible GI bleed, no active bleeding at this time, gastroenterology on board  Stat CT chest, ABG, CBC, CMP, lactic acid, pulmonary consult ordered    Sergio Garrido MD  4/30/2022  1:50 PM     Please note that this chart was generated using voice recognition Dragon dictation software. Although every effort was made to ensure the accuracy of this automated transcription, some errors in transcription may have occurred.

## 2022-04-30 NOTE — CONSULTS
[facility] -   Infectious diseases evaluation  admission date 4/27/2022    reason for consultation:   Pneumonia    Impression :   Current:  · Multifocal pneumonia  · Sepsis picture likely secondary to above  · Acute hypoxic respiratory failure  · Alcohol withdrawal  · Alcoholic liver cirrhosis  · History of PE      Recommendations   · Vancomycin, cefepime and Flagyl  · CT chest without contrast  · MRSA nasal swab pending  · Respiratory culture pending  · Follow blood cultures  · Mycoplasma serology  · Urine for Legionella and strep antigen  · Hepatitis panel in a.m. · Continue supportive care    Infection Control Recommendations   · [place] Precautions      History of Present Illness:   Initial history:  Quentin Hodges is a 40y.o.-year-old male presented to hospital with worsening right upper quadrant abdominal pain and distention for several days associated with generalized fatigue, shortness of breath and nonproductive cough. No alleviating or aggravating factor. Chest x-ray suggestive of multifocal pneumonia and small left pleural effusion  CT abdomen and pelvis 4/27/2022 showed enlarged liver, diffuse hepatocellular disease, mild gallbladder wall thickening with no gallstones. 4/28/2022 urinalysis unremarkable  The patient is alcoholic. The patient was recently seen at [place] [facility] for presyncopal episode found to be anemic.   Occult blood was positive was discharged home on Protonix    Interval changes  4/30/2022   The patient had a fever with a temperature max of 102.8 today ,has been on IV antibiotics for 4 days   He denied abdominal pain today  Blood cultures on 4/27/2022 no growth  COVID-19/influenza combo PCR was negative on 4/27/2022  Patient Vitals for the past 8 hrs:   BP Temp Temp src Pulse Resp SpO2   04/30/22 1015 117/79 101.7 °F (38.7 °C) Oral 118 18 95 %   04/30/22 0915 121/69 102.6 °F (39.2 °C) Oral 115 20 96 %   04/30/22 0715 95/69 100.4 °F (38 °C) Oral 105 20 96 %           I have personally reviewed the past medical history, past surgical history, medications, social history, and family history, and I haveupdated the database accordingly. Allergies:   Bee venom     Review of Systems:     Review of Systems  As per history of present illness, other than above 12 system review was negative  Physical Examination :       Physical Exam  Constitutional:       Appearance: He is ill-appearing. HENT:      Head: Normocephalic and atraumatic. Right Ear: External ear normal.      Left Ear: External ear normal.   Eyes:      General: Scleral icterus present. Pupils: Pupils are equal, round, and reactive to light. Cardiovascular:      Rate and Rhythm: Tachycardia present. Heart sounds: Normal heart sounds. No murmur heard. Pulmonary:      Effort: No respiratory distress. Breath sounds: No wheezing. Abdominal:      Palpations: Abdomen is soft. Tenderness: There is no abdominal tenderness. Musculoskeletal:      Right lower leg: No edema. Left lower leg: No edema. Skin:     Coloration: Skin is jaundiced. Findings: No erythema. Neurological:      General: No focal deficit present. Mental Status: He is alert and oriented to person, place, and time. Past Medical History:     Past Medical History:   Diagnosis Date    Alcoholism ([address]     pt drinks a fifth of whiskey daily    Anemia     Pulmonary embolism (HCC)        Past Surgical  History:   History reviewed. No pertinent surgical history.     Medications:      metroNIDAZOLE  500 mg IntraVENous Q8H    cefepime  2,000 mg IntraVENous Q8H    losartan  50 mg Oral Daily    pantoprazole (PROTONIX) 40 mg injection  40 mg IntraVENous Q12H    sodium chloride flush  5-40 mL IntraVENous 2 times per day    thiamine  100 mg IntraVENous Daily    vancomycin (VANCOCIN) intermittent dosing (placeholder)   Other RX Placeholder    vancomycin 1,250 mg IntraVENous Q12H    sodium chloride flush  5-40 mL IntraVENous 2 times per day    [Held by provider] enoxaparin  40 mg SubCUTAneous Daily       Social History:     Social History     Socioeconomic History    Marital status: Unknown     Spouse name: Not on file    Number of children: Not on file    Years of education: Not on file    Highest education level: Not on file   Occupational History    Not on file   Tobacco Use    Smoking status: Current Every Day Smoker     Packs/day: 1.00     Years: 21.00     Pack years: 21.00     Types: Cigarettes    Smokeless tobacco: Never Used    Tobacco comment: using nicotine patches at night   Vaping Use    Vaping Use: Never used   Substance and Sexual Activity    Alcohol use: Not Currently     Comment: hx of alcoholism; pt drinks a fifth of whiskey daily    Drug use: Not Currently     Comment: used cocaine in early 20's    Sexual activity: Not on file   Other Topics Concern    Not on file   Social History Narrative    Not on file     Social Determinants of Health     Financial Resource Strain: Low Risk     Difficulty of Paying Living Expenses: Not hard at all   Food Insecurity: No Food Insecurity    Worried About Running Out of Food in the Last Year: Never true    Sheri of Food in the Last Year: Never true   Transportation Needs: No Transportation Needs    Lack of Transportation (Medical): No    Lack of Transportation (Non-Medical):  No   Physical Activity:     Days of Exercise per Week: Not on file    Minutes of Exercise per Session: Not on file   Stress:     Feeling of Stress : Not on file   Social Connections:     Frequency of Communication with Friends and Family: Not on file    Frequency of Social Gatherings with Friends and Family: Not on file    Attends Jain Services: Not on file    Active Member of Clubs or Organizations: Not on file    Attends Club or Organization Meetings: Not on file    Marital Status: Not on file   Intimate Partner Violence:     Fear of Current or Ex-Partner: Not on file    Emotionally Abused: Not on file    Physically Abused: Not on file    Sexually Abused: Not on file   Housing Stability:     Unable to Pay for Housing in the Last Year: Not on file    Number of Places Lived in the Last Year: Not on file    Unstable Housing in the Last Year: Not on file       Family History:     Family History   Problem Relation Age of Onset    Heart Disease Mother     Heart Attack Mother     Heart Disease Father       Medical Decision Making:   I have independently reviewed/ordered the following labs:    CBC with Differential:   Recent Labs     04/27/22 1815 04/29/22  0910   WBC 16.8* 15.7*   HGB 9.8* 8.4*  8.5*   HCT 28.3* 24.6*  24.6*    244   LYMPHOPCT 12* 8*   MONOPCT 12* 7     BMP:  Recent Labs     04/27/22 1815 04/27/22 2015 04/27/22  2147 04/28/22 0215 04/28/22 0752 04/28/22  1350   *   < >  --    < > 136 138   K 3.5*   < >  --    < > 3.7 3.9   CL 94*   < >  --    < > 104 104   CO2 22   < >  --    < > 22 20   BUN 9   < >  --    < > 7 8   CREATININE 0.45*   < >  --    < > <0.40* <0.40*   MG 1.5*  --  1.7  --   --   --     < > = values in this interval not displayed. Hepatic Function Panel:   Recent Labs     04/27/22 1815 04/27/22 2015 04/28/22 0752 04/28/22  1350   PROT 7.7   < > 6.4 6.6   LABALBU 2.9*   < > 2.2* 2.7*   BILIDIR 4.24*  --   --   --    IBILI 1.62*  --   --   --    BILITOT 5.86*   < > 4.77* 5.33*   ALKPHOS 275*   < > 217* 227*   ALT 39   < > 31 33   *   < > 179* 199*    < > = values in this interval not displayed. No results for input(s): RPR in the last 72 hours. No results for input(s): HIV in the last 72 hours. No results for input(s): BC in the last 72 hours. Lab Results   Component Value Date    CREATININE <0.40 04/28/2022    GLUCOSE 88 04/28/2022       Detailed results: Thank you for allowing us to participate in the care of this patient. Please call with questions. This note is created with the assistance of a speech recognition program.  While intending to generate adocument that actually reflects the content of the visit, the document can still have some errors including those of syntax and sound a like substitutions which may escape proof reading. It such instances, actual meaningcan be extrapolated by contextual diversion.     Tone Pickens MD  Office: [phone]  Perfect serve / office [phone]

## 2022-04-30 NOTE — PLAN OF CARE
Problem: Risk for Falls  Goal: Fall prevention  Description: Patient  will remain free from falls as evidenced by no witnessed or reported falls each shift during the inpatient hospice stay. Patient  and or family/caregiver will receive education on fall prevention as evidenced by verbalizing recall of using the call lights system, fall prevention devices, and asking for help during the admission process and ongoing as needed during the inpatient hospice stay. Outcome: Progressing  Note: Pt free from falls      Problem: Alteration in Mobility  Goal: Remain as independent as possible and remain safe in environment  Description: Patient  will perform tasks or ADLs within their capabilities as often as they are able, as evidenced by remaining free of injury during the inpatient hospice stay. Outcome: Progressing  Note: Pt up independently      Problem: Nausea/Vomiting (Adult)  Goal: Control of nausea/vomiting  Description: Patient  will exhibit decreased or eliminated episodes of nausea/vomiting as evidenced by less than 2 PRN antiemetic medication administrations each shift during the inpatient hospice stay.     Outcome: Progressing  Note: Pt had no complaints of any nausea or vomiting

## 2022-04-30 NOTE — CARE COORDINATION
ONGOING DISCHARGE PLAN:    Patient is alert and oriented x4. Spoke with patient regarding discharge plan and patient confirms that plan is still home without needs. ON IV Cefepime, Flagyl, and Vanco - ID consulted, running a temp today highest was 102.8    Declines info on ETOH abuse. Will continue to follow for additional discharge needs.     Electronically signed by Hoang Sullivan RN on 4/30/2022 at 1:46 PM PATIENT NEEDS ASSISTANCE TO LEAVE RESIDENCE:

## 2022-04-30 NOTE — PROGRESS NOTES
[facility] [employer]    PROGRESS NOTE             4/30/2022    3:00 PM    Name:   Tracie Rasmussen  MRN:     [id number]     Mohanide:      [de-identified]   Room:   2113/2113-01  IP Day:  3  Admit Date:  4/27/2022  5:50 PM    PCP:  General Sandrita MD  Code Status:  Full Code    Subjective:     C/C:   Chief Complaint   Patient presents with    Cough    Fatigue    Nasal Congestion    Shortness of Breath     Interval History Status: worsened. Patient was seen and examined at bedside. Patient reportedly has worsened since morning, becoming increasingly febrile, tachypneic with shortness of breath. Since yesterday, patient is now requiring supplemental oxygen. Patient reports that he has a nonproductive cough, that is causing abdominal pain. He also reports that he has wheezing. Per the patient, the cough has been going on for many weeks prior to this incident. Patient is feeling feverish, and appears jaundiced. Stat CT was performed, awaiting results and pulmonary input. Will likely be escalating care to intermediate ICU.     Brief History:     The patient is a 37 y.o.  male, with a history of pulmonary embolism, hypertension, recent blood in stool  and recent newly found anemia who presents with the concern of a cough and exertional shortness of breath however was found to be jaundice with elevated liver function.    According to patient, he was recently evaluated at [facility] for the complaint of near syncope,  blood in his stool, chest pain, shortness of breath.  Patient was  found to have occult positive stool, but no gross bloody stools and negative cardiac work-up, therefore was discharged home on Protonix and instructions to follow with primary care and GI.  However patient was unable to do this given the fact that he lost his insurance when he lost his job.  Patient now returns with progressive exertional shortness of breath and dry cough. Symptoms of shortness of breath and cough are associated with any activity  Denies fever, chills, chest pain (not associated with coughing), abdominal pain, nausea, vomiting, diarrhea, and urinary symptoms. There are no  alleviating factors.    In addition patient was noted to be jaundice and then states that his wife was growing concern of the color of his skin over the past 3 to 4 days. 4/28: GI consulted for possible GI bleed; no intervention given patient possibly going through DTs    4/29: pt had SOB, fever; cefepime added to regimen    4/30: Likely transfer to ICU given worsening fever, tachycardia, respiratory status requiring supplemental O2     Review of Systems:     Review of Systems   Constitutional: Positive for chills, fatigue and fever. HENT: Negative. Eyes: Negative. Respiratory: Positive for cough (unproductive), chest tightness, shortness of breath and wheezing. Cardiovascular: Positive for chest pain (central, dull). Gastrointestinal: Positive for abdominal pain and nausea. Negative for anal bleeding, blood in stool, constipation, diarrhea and vomiting. Endocrine: Negative. Genitourinary: Negative. Musculoskeletal: Negative. Skin: Positive for color change. Medications: Allergies:     Allergies   Allergen Reactions    Bee Venom Anaphylaxis       Current Meds:   Scheduled Meds:    folic acid  1 mg Oral Daily    thiamine mononitrate  100 mg Oral Daily    multivitamin  1 tablet Oral Daily    metroNIDAZOLE  500 mg IntraVENous Q8H    cefepime  2,000 mg IntraVENous Q8H    losartan  50 mg Oral Daily    pantoprazole (PROTONIX) 40 mg injection  40 mg IntraVENous Q12H    sodium chloride flush  5-40 mL IntraVENous 2 times per day    vancomycin (VANCOCIN) intermittent dosing (placeholder)   Other RX Placeholder    vancomycin  1,250 mg IntraVENous Q12H    sodium chloride flush  5-40 mL IntraVENous 2 times per day    [Held by provider] enoxaparin  40 mg SubCUTAneous Daily     Continuous Infusions:    sodium chloride      sodium chloride 150 mL/hr at 22 0117     PRN Meds: ondansetron, sodium chloride flush, LORazepam **OR** LORazepam, sodium chloride flush, sodium chloride flush, sodium chloride, acetaminophen **OR** acetaminophen    Data:     Past Medical History:   has a past medical history of Alcoholism ([facility] [address] Anemia, and Pulmonary embolism ([facility] [address] Social History:   reports that he has been smoking cigarettes. He has a 21.00 pack-year smoking history. He has never used smokeless tobacco. He reports previous alcohol use. He reports previous drug use. Family History:   Family History   Problem Relation Age of Onset    Heart Disease Mother     Heart Attack Mother     Heart Disease Father        Vitals:  /75   Pulse 122   Temp 102.7 °F (39.3 °C) (Oral)   Resp 30   Ht 5' 8\" (1.727 m)   Wt 216 lb 11.4 oz (98.3 kg)   SpO2 95%   BMI 32.95 kg/m²   Temp (24hrs), Av.1 °F (38.4 °C), Min:98.7 °F (37.1 °C), Max:102.8 °F (39.3 °C)    No results for input(s): POCGLU in the last 72 hours. I/O(24Hr): Intake/Output Summary (Last 24 hours) at 2022 1500  Last data filed at 2022 0795  Gross per 24 hour   Intake 240 ml   Output 725 ml   Net -485 ml       Labs:  [unfilled]    Lab Results   Component Value Date/Time    SPECIAL NOT REPORTED 10/15/2021 04:17 AM     Lab Results   Component Value Date/Time    CULTURE NO GROWTH 2022 03:14 AM       [unfilled]    Radiology:    XR CHEST (2 VW)    Result Date: 4/15/2022  EXAMINATION: TWO XRAY VIEWS OF THE CHEST 4/15/2022 2:40 pm COMPARISON: 2022 HISTORY: ORDERING SYSTEM PROVIDED HISTORY: CP, lightheadness TECHNOLOGIST PROVIDED HISTORY: CP, lightheadness FINDINGS: Chest radiograph: The cardiomediastinal silhouette and hilar contours are normal. The lungs are clear with no focal consolidation, pleural effusion or pneumothorax.  The overlying soft tissue and osseous structures do not demonstrate acute abnormality. No acute intrathoracic pathology. CT ABDOMEN PELVIS W IV CONTRAST Additional Contrast? None    Result Date: 4/27/2022  EXAMINATION: CT OF THE ABDOMEN AND PELVIS WITH CONTRAST 4/27/2022 7:16 pm TECHNIQUE: CT of the abdomen and pelvis was performed with the administration of intravenous contrast. Multiplanar reformatted images are provided for review. Dose modulation, iterative reconstruction, and/or weight based adjustment of the mA/kV was utilized to reduce the radiation dose to as low as reasonably achievable. COMPARISON: 03/28/2022 HISTORY: ORDERING SYSTEM PROVIDED HISTORY: RUQ pain, jaundice TECHNOLOGIST PROVIDED HISTORY: RUQ pain, jaundice Decision Support Exception - unselect if not a suspected or confirmed emergency medical condition->Emergency Medical Condition (MA) Reason for Exam: RUQ pain for two weeks, jaundice FINDINGS: Lower Chest: Mild left and minimal right bibasilar atelectasis. The heart size is normal. Organs: The liver is enlarged. There is diffuse low attenuation of liver parenchyma. Bile ducts are not dilated. The spleen, pancreas, adrenal glands and kidneys are normal.  There are no calcified gallstones. No pericholecystic fluid. There is mild gallbladder wall thickening. GI/Bowel: Unopacified bowel loops are grossly normal.  No bowel obstruction. The appendix is normal. Pelvis: Urinary bladder is nearly empty and grossly normal. Peritoneum/Retroperitoneum: There is retroperitoneal fat stranding. There are mildly enlarged upper abdominal lymph nodes. Lymph node in the isai hepatis adjacent to the pancreatic head measures up to 2.5 cm (coronal image 50). There are mildly prominent retroperitoneal lymph nodes. There is trace free fluid in the pelvis. There is no free air. Bones/Soft Tissues: Bilateral gynecomastia and nipple piercings. There is a unilateral right-sided pars interarticularis defect at L5.   No acute bone finding. The liver is enlarged and diffusely low in attenuation. Diffuse nonspecific hepatocellular disease suspected. Bile ducts are not dilated in this patient with history of jaundice. There are no calcified gallstones. There is mild gallbladder wall thickening. Wall thickening may be reactive to adjacent liver disease. Mildly enlarged upper abdominal lymph nodes measuring up to 2.5 cm, likely reactive. Trace free fluid in the pelvis. Unilateral right-sided L5 pars interarticularis defect. RECOMMENDATIONS: Unavailable     XR CHEST PORTABLE    Result Date: 4/28/2022  EXAMINATION: ONE XRAY VIEW OF THE CHEST 4/28/2022 6:08 am COMPARISON: 04/27/2022 HISTORY: ORDERING SYSTEM PROVIDED HISTORY: pneumonia follow up TECHNOLOGIST PROVIDED HISTORY: pneumonia follow up Reason for Exam: Continued SOB and cough. FINDINGS: Small infiltrate is identified within the left costophrenic angle. The heart is not enlarged. No pneumothorax is identified. No acute osseous abnormality is identified. Stable small infiltrate within the left costophrenic angle which may represent small pneumonia versus pleural effusion. XR CHEST PORTABLE    Result Date: 4/27/2022  EXAMINATION: ONE XRAY VIEW OF THE CHEST 4/27/2022 6:11 pm COMPARISON: April 15, 2022 HISTORY: ORDERING SYSTEM PROVIDED HISTORY: SOB TECHNOLOGIST PROVIDED HISTORY: SOB Reason for Exam: PT CO SOB with cough, fatigue, congestion, and jaundice of the skin at this time. PT states symptoms X 1 week. FINDINGS: Left lower lobe infiltrate and or small effusion. Heart and mediastinum normal.  Bony thorax intact. Nipple rings noted incidentally. New left effusion and or infiltrate     CT CHEST PULMONARY EMBOLISM W CONTRAST    Result Date: 4/15/2022  EXAMINATION: CTA OF THE CHEST 4/15/2022 1:00 pm TECHNIQUE: CTA of the chest was performed after the administration of intravenous contrast.  Multiplanar reformatted images are provided for review.   MIP images are provided for review. Dose modulation, iterative reconstruction, and/or weight based adjustment of the mA/kV was utilized to reduce the radiation dose to as low as reasonably achievable. COMPARISON: Chest x-ray dated April 15, 2022 prior CT chest dated June 20, 2021 HISTORY: ORDERING SYSTEM PROVIDED HISTORY: shortness of breath, chest pain, elevated d-dimer TECHNOLOGIST PROVIDED HISTORY: shortness of breath, chest pain, elevated d-dimer Decision Support Exception - unselect if not a suspected or confirmed emergency medical condition->Emergency Medical Condition (MA) Reason for Exam: sob, chest pain, dizziness per pt FINDINGS: Pulmonary Arteries: Pulmonary arteries are suboptimally opacified for evaluation. No definite evidence of intraluminal filling defect to suggest pulmonary embolism. Main pulmonary artery is normal in caliber. Mediastinum: No evidence of mediastinal lymphadenopathy. The heart and pericardium demonstrate no acute abnormality. There is no acute abnormality of the thoracic aorta. Lungs/pleura: Linear opacities are present within the right middle lobe, lingula, and periphery of the left lower lobe, consistent with atelectasis. No focal area of consolidation, pleural effusion, or pneumothorax is present. Upper Abdomen: Images through the upper abdomen demonstrate hepatomegaly and diffuse hepatic steatosis. Soft Tissues/Bones: Gynecomastia is present bilaterally. No acute osseous abnormality is present. 1. No evidence of pulmonary embolism 2. Linear areas of atelectasis within the right middle lobe, lingula, left lower lobe 3. Hepatomegaly and diffuse hepatic steatosis         Physical Examination:        Physical Exam  Vitals and nursing note reviewed. Constitutional:       General: He is not in acute distress. Appearance: Normal appearance. He is obese. He is not ill-appearing, toxic-appearing or diaphoretic. HENT:      Head: Normocephalic and atraumatic.       Nose: Nose normal. Mouth/Throat:      Mouth: Mucous membranes are moist.   Eyes:      Extraocular Movements: Extraocular movements intact. Cardiovascular:      Rate and Rhythm: Regular rhythm. Tachycardia present. Heart sounds: Normal heart sounds. No murmur heard. No friction rub. No gallop. Pulmonary:      Effort: Respiratory distress (Requiring supplemental oxygen, tachypneic) present. Breath sounds: No stridor. Wheezing present. No rhonchi or rales. Chest:      Chest wall: No tenderness. Musculoskeletal:         General: No swelling. Cervical back: Normal range of motion. Right lower leg: Edema present. Left lower leg: Edema present. Skin:     General: Skin is warm and dry. Comments: Slightly jaundiced diffusely   Neurological:      Mental Status: He is alert.            Assessment:        Primary Problem  Sepsis [facility])    Active Hospital Problems    Diagnosis Date Noted    Sepsis ([facility] [address] [A41.9] 04/28/2022     Priority: Medium    Melena [K92.1] 04/28/2022     Priority: Medium    Acute blood loss anemia [D62] 04/28/2022     Priority: Medium    Left lower lobe pneumonia [J18.9] 04/27/2022     Priority: Medium    Transaminitis [R74.01] 10/15/2021       Plan:        Sepsis secondary to aspiration pneumonia in setting of alcohol abuse  -T-max 102.8  -Currently on cefepime, Flagyl, vancomycin  -Infectious disease consulted  -Initial lactic acid 2.4> 1.6>2.6  -Legionella, strep pneumo, mycoplasma pneumonia, respiratory culture MRSA nasal swab pending  -Pro-Asaf 0.35  -Urinalysis unremarkable, urine culture no growth  -Hepatitis panel negative  -WBCs 15.7, down trended from 16.8 on admission  -Stat CT chest, ABG, CBC, CMP, lactic acid, pulmonary consult    Elevated liver enzymes secondary to alcoholism  -, ALT 33  -Continue to trend  -GI ordered smooth muscle antibody, AFP tumor marker, anti-SOLA KID HORTENCIA AB, mitochondrial antibodies, TIFFANIE, alpha-1 antitrypsin, ceruloplasmin  -CMV negative for IgG and IgM  -IgA 656, IgM 61    Macrocytic anemia secondary to folate deficiency secondary to alcohol abuse  -Hemoglobin 8.5  -.8  -Patient started on folic acid, thiamine, multivitamin  -Vitamin B12 843, within normal limits, folate 2.0, below reference range  -Iron studies show iron 65, TIBC 99, iron saturation 66  -Ferritin 1202  -Fecal occult blood negative    Possible GI bleed  -Fecal occult blood negative  -GI on board    Alcohol abuse  -Folic acid, thiamine, multivitamin  -[facility] protocol  -Seizure precautions    Hypertension  -Cozaar 50 mg p.o. daily    Diet  -Regular adult diet    DVT prophylaxis  -Lovenox 40 mg subcu daily, held due to anemia possible GI bleed    GI prophylaxis  -Protonix      Jaden Lance MD  4/30/2022  3:00 PM

## 2022-04-30 NOTE — PROGRESS NOTES
GI Progress notes    4/30/2022   2:58 PM    Name:  Ángel Rivera  MRN:    [id number]     Acct:     [de-identified]   Room:  2113/2113-01   Day: 3     Admit Date: 4/27/2022  5:50 PM  PCP: Leni Castro MD    Subjective:     C/C:   Chief Complaint   Patient presents with    Cough    Fatigue    Nasal Congestion    Shortness of Breath       Interval History: Status: not changed. Patient seen and examined. No acute events overnight. Hgb stable  No overt GI bleeding  FOBT negative x 1  Has SOB  Had fever    ROS:  Constitutional: negative for chills, and sweats  Gastrointestinal: negative for abdominal pain, constipation, diarrhea, nausea and vomiting  Neurological: negative for dizziness and headaches    Medications: Allergies:    Allergies   Allergen Reactions    Bee Venom Anaphylaxis       Current Meds: folic acid (FOLVITE) tablet 1 mg, Daily  thiamine mononitrate tablet 100 mg, Daily  therapeutic multivitamin-minerals 1 tablet, Daily  metronidazole (FLAGYL) 500 mg in 0.9% NaCl 100 mL IVPB premix, Q8H  ondansetron (ZOFRAN) injection 4 mg, Q4H PRN  cefepime (MAXIPIME) 2000 mg IVPB minibag, Q8H  losartan (COZAAR) tablet 50 mg, Daily  pantoprazole (PROTONIX) 40 mg in sodium chloride (PF) 10 mL injection, Q12H  sodium chloride flush 0.9 % injection 5-40 mL, 2 times per day  sodium chloride flush 0.9 % injection 5-40 mL, PRN  LORazepam (ATIVAN) tablet 1 mg, Q1H PRN   Or  LORazepam (ATIVAN) injection 1 mg, Q1H PRN  vancomycin (VANCOCIN) intermittent dosing (placeholder), RX Placeholder  sodium chloride flush 0.9 % injection 10 mL, PRN  vancomycin (VANCOCIN) 1,250 mg in dextrose 5 % 250 mL IVPB (ADDAVIAL), Q12H  sodium chloride flush 0.9 % injection 5-40 mL, 2 times per day  sodium chloride flush 0.9 % injection 5-40 mL, PRN  0.9 % sodium chloride infusion, PRN  [Held by provider] enoxaparin (LOVENOX) injection 40 mg, Daily  acetaminophen (TYLENOL) tablet 650 mg, Q6H PRN   Or  acetaminophen (TYLENOL) suppository 650 mg, Q6H PRN  0.9 % sodium chloride infusion, Continuous        Data:     Code Status:  Full Code    Family History   Problem Relation Age of Onset    Heart Disease Mother     Heart Attack Mother     Heart Disease Father        Social History     Socioeconomic History    Marital status: Unknown     Spouse name: Not on file    Number of children: Not on file    Years of education: Not on file    Highest education level: Not on file   Occupational History    Not on file   Tobacco Use    Smoking status: Current Every Day Smoker     Packs/day: 1.00     Years: 21.00     Pack years: 21.00     Types: Cigarettes    Smokeless tobacco: Never Used    Tobacco comment: using nicotine patches at night   Vaping Use    Vaping Use: Never used   Substance and Sexual Activity    Alcohol use: Not Currently     Comment: hx of alcoholism; pt drinks a fifth of whiskey daily    Drug use: Not Currently     Comment: used cocaine in early 20's    Sexual activity: Not on file   Other Topics Concern    Not on file   Social History Narrative    Not on file     Social Determinants of Health     Financial Resource Strain: Low Risk     Difficulty of Paying Living Expenses: Not hard at all   Food Insecurity: No Food Insecurity    Worried About Running Out of Food in the Last Year: Never true    Sheri of Food in the Last Year: Never true   Transportation Needs: No Transportation Needs    Lack of Transportation (Medical): No    Lack of Transportation (Non-Medical):  No   Physical Activity:     Days of Exercise per Week: Not on file    Minutes of Exercise per Session: Not on file   Stress:     Feeling of Stress : Not on file   Social Connections:     Frequency of Communication with Friends and Family: Not on file    Frequency of Social Gatherings with Friends and Family: Not on file    Attends Latter-day Services: Not on file    Active Member of Clubs or Organizations: Not on file    Attends Club or Organization Meetings: Not on file    Marital Status: Not on file   Intimate Partner Violence:     Fear of Current or Ex-Partner: Not on file    Emotionally Abused: Not on file    Physically Abused: Not on file    Sexually Abused: Not on file   Housing Stability:     Unable to Pay for Housing in the Last Year: Not on file    Number of [place] in the Last Year: Not on file    Unstable Housing in the Last Year: Not on file       Vitals:  /75   Pulse 122   Temp 102.7 °F (39.3 °C) (Oral)   Resp 30   Ht 5' 8\" (1.727 m)   Wt 216 lb 11.4 oz (98.3 kg)   SpO2 95%   BMI 32.95 kg/m²   Temp (24hrs), Av.1 °F (38.4 °C), Min:98.7 °F (37.1 °C), Max:102.8 °F (39.3 °C)    No results for input(s): POCGLU in the last 72 hours. I/O (24Hr):     Intake/Output Summary (Last 24 hours) at 2022 1458  Last data filed at 2022 1968  Gross per 24 hour   Intake 240 ml   Output 725 ml   Net -485 ml       Labs:      CBC:   Lab Results   Component Value Date    WBC 16.2 2022    RBC 2.03 2022    HGB 8.8 2022    HCT 26.2 2022    .0 2022    MCH 43.4 2022    MCHC 33.6 2022    RDW 18.8 2022     2022    MPV 8.5 2022     CBC with Differential:    Lab Results   Component Value Date    WBC 16.2 2022    RBC 2.03 2022    HGB 8.8 2022    HCT 26.2 2022     2022    .0 2022    MCH 43.4 2022    MCHC 33.6 2022    RDW 18.8 2022    NRBC 1 2021    LYMPHOPCT PENDING 2022    MONOPCT PENDING 2022    BASOPCT PENDING 2022    MONOSABS PENDING 2022    LYMPHSABS PENDING 2022    EOSABS PENDING 2022    BASOSABS PENDING 2022    DIFFTYPE NOT REPORTED 2022     Hemoglobin/Hematocrit:    Lab Results   Component Value Date    HGB 8.8 2022    HCT 26.2 2022     CMP:    Lab Results   Component Value Date     2022    K 4.8 04/30/2022     04/30/2022    CO2 18 04/30/2022    BUN 4 04/30/2022    CREATININE 0.45 04/30/2022    GFRAA >60 04/30/2022    LABGLOM >60 04/30/2022    GLUCOSE 90 04/30/2022    PROT 6.5 04/30/2022    LABALBU 2.1 04/30/2022    CALCIUM 6.8 04/30/2022    BILITOT 4.80 04/30/2022    ALKPHOS 268 04/30/2022     04/30/2022    ALT 34 04/30/2022     BMP:    Lab Results   Component Value Date     04/30/2022    K 4.8 04/30/2022     04/30/2022    CO2 18 04/30/2022    BUN 4 04/30/2022    LABALBU 2.1 04/30/2022    CREATININE 0.45 04/30/2022    CALCIUM 6.8 04/30/2022    GFRAA >60 04/30/2022    LABGLOM >60 04/30/2022    GLUCOSE 90 04/30/2022     PT/INR:    Lab Results   Component Value Date    PROTIME 15.4 04/28/2022    INR 1.2 04/28/2022     PTT:    Lab Results   Component Value Date    APTT 40.5 04/27/2022   [APTT}    Physical Examination:        General appearance: alert, cooperative and no distress  Mental Status: oriented to person, place and time and normal affect  Abdomen: soft, nontender, nondistended, bowel sounds present a  Extremities: no edema, redness or tenderness in the calves  Skin: no gross lesions, rashes, or induration    Assessment:        Primary Problem  Sepsis [facility])     Active Hospital Problems    Diagnosis Date Noted    Sepsis ([facility] [address] [A41.9] 04/28/2022     Priority: Medium    Melena [K92.1] 04/28/2022     Priority: Medium    Acute blood loss anemia [D62] 04/28/2022     Priority: Medium    Left lower lobe pneumonia [J18.9] 04/27/2022     Priority: Medium    Transaminitis [R74.01] 10/15/2021     Past Medical History:   Diagnosis Date    Alcoholism ([facility] [address]     pt drinks a fifth of whiskey daily    Anemia     Pulmonary embolism ([address]         Plan:        1. Elevated LFTs, alcoholic hepatitis  1. Continue to trend LFTs  2. CIWA protocol  3. EtOH rehab  2. Anemia with ? Melena  1. FOBT negative times 1, repeat  2. Trend H&H  3. Transfuse for hgb < 7  4. PPI  5.  If stable, will plan EGD tomorrow  6.  NPO after midnight      Explained to the patient and d/W Nursing Staff  Will F/U with you  Please call or Page for any issues or change in status  Thanks    Electronically signed by SILVA Reilly NP on 4/30/2022 at 2:58 PM

## 2022-04-30 NOTE — FLOWSHEET NOTE
Page out for Dr. Dennise Wolfe and Dr. Boaz Foss. Spoke with Dr. Boaz Foss will be here to see the patient soon.

## 2022-04-30 NOTE — FLOWSHEET NOTE
Spoke with Dr Iban Dallas aware of pt's vitals, per Dr Iban Dallas waiting for CT and labs to result.

## 2022-04-30 NOTE — PROGRESS NOTES
[address] [facility] Internal Medicine  Nikko Willis MD; Shayy Hinds MD; Dinora Joyner MD; MD Mahendra Bland MD; MD DINESH [facility]   [address] [facility]     Progress Note    4/29/2022    11:54 PM    Name:   Tory Olvera  MRN:     [id number]     [place]:      [de-identified]   Room:   2113/2113-01  IP Day:  2  Admit Date:  4/27/2022  5:50 PM    PCP:   Mahendra Samaniego MD  Code Status:  Full Code    Subjective:     C/C:   Chief Complaint   Patient presents with    Cough    Fatigue    Nasal Congestion    Shortness of Breath     Interval History Status: not changed. Seen and examined   Still sob   Had fever   Added cefepime ,       Brief History:       The patient is a 40 y.o.  male, with a history of pulmonary embolism, hypertension, recent blood in stool  and recent newly found anemia who presents with the concern of a cough and exertional shortness of breath however was found to be jaundice with elevated liver function. According to patient, he was recently evaluated at [place] [address] ER for the complaint of near syncope,  blood in her stool, chest pain, shortness of breath. Patient was  found to have occult positive stool, but no gross bloody stools and negative cardiac work-up, therefore was discharged home on Protonix and instructions to follow with primary care and GI. However patient was unable to do this given the fact that he lost his insurance when he lost his job. Patient now returns with progressive exertional shortness of breath and dry cough. Symptoms of shortness of breath and cough are associated with any activity  Denies fever, chills, chest pain (not associated with coughing), abdominal pain, nausea, vomiting, diarrhea, and urinary symptoms. There are no  alleviating factors.     In addition patient was noted to be jaundice and then states that his wife was growing concern of the color of his skin over the past 3 to 4 days. Review of Systems:     Constitutional:  negative for chills, fevers, sweats  Respiratory:  negative for cough, dyspnea on exertion, shortness of breath, wheezing  Cardiovascular:  negative for chest pain, chest pressure/discomfort, lower extremity edema, palpitations  Gastrointestinal:  negative for abdominal pain, constipation, diarrhea, nausea, vomiting  Neurological:  negative for dizziness, headache    Medications: Allergies: Allergies   Allergen Reactions    Bee Venom Anaphylaxis       Current Meds:   Scheduled Meds:    metroNIDAZOLE  500 mg IntraVENous Q8H    [START ON 4/30/2022] cefepime  2,000 mg IntraVENous Q8H    losartan  50 mg Oral Daily    pantoprazole (PROTONIX) 40 mg injection  40 mg IntraVENous Q12H    sodium chloride flush  5-40 mL IntraVENous 2 times per day    thiamine  100 mg IntraVENous Daily    vancomycin (VANCOCIN) intermittent dosing (placeholder)   Other RX Placeholder    vancomycin  1,250 mg IntraVENous Q12H    sodium chloride flush  5-40 mL IntraVENous 2 times per day    [Held by provider] enoxaparin  40 mg SubCUTAneous Daily     Continuous Infusions:    sodium chloride      sodium chloride 150 mL/hr at 04/29/22 1834     PRN Meds: ondansetron, sodium chloride flush, LORazepam **OR** LORazepam, sodium chloride flush, sodium chloride flush, sodium chloride, acetaminophen **OR** acetaminophen    Data:     Past Medical History:   has a past medical history of Alcoholism ([facility] [address] Anemia, and Pulmonary embolism ([facility] [address] Social History:   reports that he has been smoking cigarettes. He has a 21.00 pack-year smoking history. He has never used smokeless tobacco. He reports previous alcohol use. He reports previous drug use.      Family History:   Family History   Problem Relation Age of Onset    Heart Disease Mother     Heart Attack Mother     Heart Disease Father        Vitals:  /65   Pulse 110   Temp 99.9 °F (37.7 °C) (Oral) Resp 18   Ht 5' 8\" (1.727 m)   Wt 210 lb 5.1 oz (95.4 kg)   SpO2 97%   BMI 31.98 kg/m²   Temp (24hrs), Av.6 °F (37.6 °C), Min:98.2 °F (36.8 °C), Max:101.1 °F (38.4 °C)    No results for input(s): POCGLU in the last 72 hours. I/O (24Hr): Intake/Output Summary (Last 24 hours) at 2022 2354  Last data filed at 2022 1527  Gross per 24 hour   Intake 6195.56 ml   Output 925 ml   Net 5270.56 ml       Labs:  Hematology:  Recent Labs     22  0600 22  0910   WBC 16.8*  --  15.7*   RBC 2.25*  --  1.91*   HGB 9.8*  --  8.4*  8.5*   HCT 28.3*  --  24.6*  24.6*   .1*  --  128.8*   MCH 43.6*  --  44.1*   MCHC 34.6  --  34.3   RDW 19.1*  --  19.6*     --  244   MPV 8.8  --  8.3   INR 1.2 1.2  --      Chemistry:  Recent Labs     22  2147 22  0215 22  0752 22  1350   *   < > 130*   < >  --  135 136 138   K 3.5*   < > 3.6*   < >  --  3.7 3.7 3.9   CL 94*   < > 96*   < >  --  103 104 104   CO2 22   < > 22   < >  --  24 22 20   GLUCOSE 111*   < > 101*   < >  --  102* 84 88   BUN 9   < > 8   < >  --  8 7 8   CREATININE 0.45*   < > 0.44*   < >  --  <0.40* <0.40* <0.40*   MG 1.5*  --   --   --  1.7  --   --   --    ANIONGAP 15   < > 12   < >  --  8* 10 14   LABGLOM >60   < > >60   < >  --  Can not be calculated Can not be calculated Can not be calculated   GFRAA >60   < > >60   < >  --  Can not be calculated Can not be calculated Can not be calculated   CALCIUM 8.1*   < > 7.4*   < >  --  7.4* 7.2* 7.2*   TROPHS  --   --  11  --   --   --   --   --     < > = values in this interval not displayed.      Recent Labs     22  1815 22  0215 22  0752 22  1350   PROT 7.7   < > 6.7 6.4 6.6   LABALBU 2.9*   < > 2.3* 2.2* 2.7*   *   < > 203* 179* 199*   ALT 39   < > 33 31 33   ALKPHOS 275*   < > 235* 217* 227*   BILITOT 5.86*   < > 5.02* 4.77* 5.33* BILIDIR 4.24*  --   --   --   --    LIPASE 54  --   --   --   --     < > = values in this interval not displayed. ABG:No results found for: POCPH, PHART, PH, POCPCO2, AQZ8SRW, PCO2, POCPO2, PO2ART, PO2, POCHCO3, JPL3VPW, HCO3, NBEA, PBEA, BEART, BE, THGBART, THB, SDP4YNL, HPLO9CJJ, N5GMRFRD, O2SAT, FIO2  Lab Results   Component Value Date/Time    SPECIAL NOT REPORTED 10/15/2021 04:17 AM     Lab Results   Component Value Date/Time    CULTURE NO GROWTH 04/28/2022 03:14 AM       Radiology:  CT ABDOMEN PELVIS W IV CONTRAST Additional Contrast? None    Result Date: 4/27/2022  The liver is enlarged and diffusely low in attenuation. Diffuse nonspecific hepatocellular disease suspected. Bile ducts are not dilated in this patient with history of jaundice. There are no calcified gallstones. There is mild gallbladder wall thickening. Wall thickening may be reactive to adjacent liver disease. Mildly enlarged upper abdominal lymph nodes measuring up to 2.5 cm, likely reactive. Trace free fluid in the pelvis. Unilateral right-sided L5 pars interarticularis defect. RECOMMENDATIONS: Unavailable     XR CHEST PORTABLE    Result Date: 4/28/2022  Stable small infiltrate within the left costophrenic angle which may represent small pneumonia versus pleural effusion.      XR CHEST PORTABLE    Result Date: 4/27/2022  New left effusion and or infiltrate       Physical Examination:        General appearance:  alert, cooperative and no distress  Mental Status:  oriented to person, place and time and normal affect  Lungs:  clear to auscultation bilaterally, normal effort  Heart:  regular rate and rhythm, no murmur  Abdomen:  soft, nontender, nondistended, normal bowel sounds, no masses, hepatomegaly, splenomegaly  Extremities:  no edema, redness, tenderness in the calves  Skin:  no gross lesions, rashes, induration    Assessment:        Hospital Problems           Last Modified POA    * (Principal) Sepsis ([address] 4/28/2022 Yes    Left lower lobe pneumonia 4/28/2022 Yes    Melena 4/28/2022 Yes    Acute blood loss anemia 4/28/2022 Yes    Transaminitis 4/28/2022 Yes          Plan:        1. Sepsis , 2/2 underlying pna, possible aspiration PNA, on bsa abx , cx pendin   2. Aspiration PNA, sec to alcohol intoxication, on abx   3. Abn LFTs sec to alcoholism   4. Ac blood loss anemia, monitor hb, possible GI bleed, GI on board    5. Alcoholism, monitor for any withdrawal  6. DVT Ppx , SCds,   7. Full code status     Kranthi Chairez MD  4/29/2022  11:54 PM     Please note that this chart was generated using voice recognition Dragon dictation software. Although every effort was made to ensure the accuracy of this automated transcription, some errors in transcription may have occurred.

## 2022-04-30 NOTE — FLOWSHEET NOTE
11:20 Page out for Dr Bob Crouch    11:45 Vitals retaken    11:58 Spoke with Dr Bob Crouch, updated on pt's vitals,  he is on his way to see the pt, no orders at this time.

## 2022-05-01 ENCOUNTER — ANESTHESIA EVENT (OUTPATIENT)
Dept: ENDOSCOPY | Age: 37
DRG: 720 | End: 2022-05-01
Payer: MEDICAID

## 2022-05-01 ENCOUNTER — ANESTHESIA (OUTPATIENT)
Dept: ENDOSCOPY | Age: 37
DRG: 720 | End: 2022-05-01
Payer: MEDICAID

## 2022-05-01 VITALS — DIASTOLIC BLOOD PRESSURE: 55 MMHG | SYSTOLIC BLOOD PRESSURE: 99 MMHG | OXYGEN SATURATION: 95 %

## 2022-05-01 LAB
ABSOLUTE BANDS #: 1.76 K/UL (ref 0–1)
ABSOLUTE EOS #: 0 K/UL (ref 0–0.4)
ABSOLUTE LYMPH #: 1.06 K/UL (ref 1–4.8)
ABSOLUTE MONO #: 1.23 K/UL (ref 0.1–1.3)
ALBUMIN SERPL-MCNC: 2.2 G/DL (ref 3.5–5.2)
ALP BLD-CCNC: 237 U/L (ref 40–129)
ALT SERPL-CCNC: 31 U/L (ref 5–41)
ANION GAP SERPL CALCULATED.3IONS-SCNC: 11 MMOL/L (ref 9–17)
AST SERPL-CCNC: 206 U/L
BANDS: 10 % (ref 0–10)
BASOPHILS # BLD: 0 % (ref 0–2)
BASOPHILS ABSOLUTE: 0 K/UL (ref 0–0.2)
BILIRUB SERPL-MCNC: 4.86 MG/DL (ref 0.3–1.2)
BUN BLDV-MCNC: 4 MG/DL (ref 6–20)
CALCIUM SERPL-MCNC: 6.7 MG/DL (ref 8.6–10.4)
CHLORIDE BLD-SCNC: 107 MMOL/L (ref 98–107)
CO2: 19 MMOL/L (ref 20–31)
CREAT SERPL-MCNC: 0.48 MG/DL (ref 0.7–1.2)
EOSINOPHILS RELATIVE PERCENT: 0 % (ref 0–4)
GFR AFRICAN AMERICAN: >60 ML/MIN
GFR NON-AFRICAN AMERICAN: >60 ML/MIN
GFR SERPL CREATININE-BSD FRML MDRD: ABNORMAL ML/MIN/{1.73_M2}
GLUCOSE BLD-MCNC: 96 MG/DL (ref 70–99)
HCT VFR BLD CALC: 27.2 % (ref 41–53)
HEMOGLOBIN: 9.3 G/DL (ref 13.5–17.5)
LACTIC ACID: 2.1 MMOL/L (ref 0.5–2.2)
LYMPHOCYTES # BLD: 6 % (ref 24–44)
MCH RBC QN AUTO: 44.3 PG (ref 26–34)
MCHC RBC AUTO-ENTMCNC: 34.3 G/DL (ref 31–37)
MCV RBC AUTO: 129 FL (ref 80–100)
METAMYELOCYTES ABSOLUTE COUNT: 0.18 K/UL
METAMYELOCYTES: 1 %
MONOCYTES # BLD: 7 % (ref 1–7)
MORPHOLOGY: ABNORMAL
MRSA, DNA, NASAL: NEGATIVE
PDW BLD-RTO: 18.8 % (ref 11.5–14.9)
PLATELET # BLD: 242 K/UL (ref 150–450)
PMV BLD AUTO: 8.8 FL (ref 6–12)
POTASSIUM SERPL-SCNC: 3.7 MMOL/L (ref 3.7–5.3)
RBC # BLD: 2.1 M/UL (ref 4.5–5.9)
SEG NEUTROPHILS: 76 % (ref 36–66)
SEGMENTED NEUTROPHILS ABSOLUTE COUNT: 13.37 K/UL (ref 1.3–9.1)
SODIUM BLD-SCNC: 137 MMOL/L (ref 135–144)
SPECIMEN DESCRIPTION: NORMAL
THYROXINE, FREE: 0.82 NG/DL (ref 0.93–1.7)
TOTAL PROTEIN: 6.4 G/DL (ref 6.4–8.3)
TSH SERPL DL<=0.05 MIU/L-ACNC: 27.63 UIU/ML (ref 0.3–5)
WBC # BLD: 17.6 K/UL (ref 3.5–11)

## 2022-05-01 PROCEDURE — 6370000000 HC RX 637 (ALT 250 FOR IP): Performed by: INTERNAL MEDICINE

## 2022-05-01 PROCEDURE — 2580000003 HC RX 258: Performed by: INTERNAL MEDICINE

## 2022-05-01 PROCEDURE — 2500000003 HC RX 250 WO HCPCS: Performed by: INTERNAL MEDICINE

## 2022-05-01 PROCEDURE — 2580000003 HC RX 258: Performed by: NURSE PRACTITIONER

## 2022-05-01 PROCEDURE — 36415 COLL VENOUS BLD VENIPUNCTURE: CPT

## 2022-05-01 PROCEDURE — 80053 COMPREHEN METABOLIC PANEL: CPT

## 2022-05-01 PROCEDURE — 99233 SBSQ HOSP IP/OBS HIGH 50: CPT | Performed by: INTERNAL MEDICINE

## 2022-05-01 PROCEDURE — 2060000000 HC ICU INTERMEDIATE R&B

## 2022-05-01 PROCEDURE — 6360000002 HC RX W HCPCS: Performed by: ANESTHESIOLOGY

## 2022-05-01 PROCEDURE — 3609017100 HC EGD: Performed by: INTERNAL MEDICINE

## 2022-05-01 PROCEDURE — C9113 INJ PANTOPRAZOLE SODIUM, VIA: HCPCS | Performed by: INTERNAL MEDICINE

## 2022-05-01 PROCEDURE — 7100000000 HC PACU RECOVERY - FIRST 15 MIN: Performed by: INTERNAL MEDICINE

## 2022-05-01 PROCEDURE — 6360000002 HC RX W HCPCS: Performed by: INTERNAL MEDICINE

## 2022-05-01 PROCEDURE — 84439 ASSAY OF FREE THYROXINE: CPT

## 2022-05-01 PROCEDURE — 2709999900 HC NON-CHARGEABLE SUPPLY: Performed by: INTERNAL MEDICINE

## 2022-05-01 PROCEDURE — 0DB58ZX EXCISION OF ESOPHAGUS, VIA NATURAL OR ARTIFICIAL OPENING ENDOSCOPIC, DIAGNOSTIC: ICD-10-PCS | Performed by: INTERNAL MEDICINE

## 2022-05-01 PROCEDURE — 83605 ASSAY OF LACTIC ACID: CPT

## 2022-05-01 PROCEDURE — 84443 ASSAY THYROID STIM HORMONE: CPT

## 2022-05-01 PROCEDURE — 2580000003 HC RX 258: Performed by: STUDENT IN AN ORGANIZED HEALTH CARE EDUCATION/TRAINING PROGRAM

## 2022-05-01 PROCEDURE — 2580000003 HC RX 258: Performed by: ANESTHESIOLOGY

## 2022-05-01 PROCEDURE — 43235 EGD DIAGNOSTIC BRUSH WASH: CPT | Performed by: INTERNAL MEDICINE

## 2022-05-01 PROCEDURE — A4216 STERILE WATER/SALINE, 10 ML: HCPCS | Performed by: NURSE PRACTITIONER

## 2022-05-01 PROCEDURE — 2500000003 HC RX 250 WO HCPCS: Performed by: ANESTHESIOLOGY

## 2022-05-01 PROCEDURE — 6360000002 HC RX W HCPCS: Performed by: STUDENT IN AN ORGANIZED HEALTH CARE EDUCATION/TRAINING PROGRAM

## 2022-05-01 PROCEDURE — 6360000002 HC RX W HCPCS: Performed by: NURSE PRACTITIONER

## 2022-05-01 PROCEDURE — 85025 COMPLETE CBC W/AUTO DIFF WBC: CPT

## 2022-05-01 PROCEDURE — 7100000001 HC PACU RECOVERY - ADDTL 15 MIN: Performed by: INTERNAL MEDICINE

## 2022-05-01 PROCEDURE — C9113 INJ PANTOPRAZOLE SODIUM, VIA: HCPCS | Performed by: NURSE PRACTITIONER

## 2022-05-01 PROCEDURE — 3700000000 HC ANESTHESIA ATTENDED CARE: Performed by: INTERNAL MEDICINE

## 2022-05-01 RX ORDER — TRAMADOL HYDROCHLORIDE 50 MG/1
50 TABLET ORAL EVERY 6 HOURS PRN
Status: DISCONTINUED | OUTPATIENT
Start: 2022-05-01 | End: 2022-05-03 | Stop reason: HOSPADM

## 2022-05-01 RX ORDER — PROPOFOL 10 MG/ML
INJECTION, EMULSION INTRAVENOUS PRN
Status: DISCONTINUED | OUTPATIENT
Start: 2022-05-01 | End: 2022-05-01 | Stop reason: SDUPTHER

## 2022-05-01 RX ORDER — MEPERIDINE HYDROCHLORIDE 25 MG/ML
12.5 INJECTION INTRAMUSCULAR; INTRAVENOUS; SUBCUTANEOUS EVERY 5 MIN PRN
Status: DISCONTINUED | OUTPATIENT
Start: 2022-05-01 | End: 2022-05-01 | Stop reason: HOSPADM

## 2022-05-01 RX ORDER — SODIUM CHLORIDE 0.9 % (FLUSH) 0.9 %
5-40 SYRINGE (ML) INJECTION PRN
Status: DISCONTINUED | OUTPATIENT
Start: 2022-05-01 | End: 2022-05-01 | Stop reason: HOSPADM

## 2022-05-01 RX ORDER — SODIUM CHLORIDE 9 MG/ML
INJECTION, SOLUTION INTRAVENOUS PRN
Status: DISCONTINUED | OUTPATIENT
Start: 2022-05-01 | End: 2022-05-01 | Stop reason: HOSPADM

## 2022-05-01 RX ORDER — MIDAZOLAM HYDROCHLORIDE 1 MG/ML
INJECTION INTRAMUSCULAR; INTRAVENOUS PRN
Status: DISCONTINUED | OUTPATIENT
Start: 2022-05-01 | End: 2022-05-01 | Stop reason: SDUPTHER

## 2022-05-01 RX ORDER — LIDOCAINE HYDROCHLORIDE 10 MG/ML
INJECTION, SOLUTION EPIDURAL; INFILTRATION; INTRACAUDAL; PERINEURAL PRN
Status: DISCONTINUED | OUTPATIENT
Start: 2022-05-01 | End: 2022-05-01 | Stop reason: SDUPTHER

## 2022-05-01 RX ORDER — FENTANYL CITRATE 50 UG/ML
25 INJECTION, SOLUTION INTRAMUSCULAR; INTRAVENOUS EVERY 5 MIN PRN
Status: DISCONTINUED | OUTPATIENT
Start: 2022-05-01 | End: 2022-05-01 | Stop reason: HOSPADM

## 2022-05-01 RX ORDER — METOCLOPRAMIDE HYDROCHLORIDE 5 MG/ML
10 INJECTION INTRAMUSCULAR; INTRAVENOUS
Status: DISCONTINUED | OUTPATIENT
Start: 2022-05-01 | End: 2022-05-01 | Stop reason: HOSPADM

## 2022-05-01 RX ORDER — ONDANSETRON 2 MG/ML
4 INJECTION INTRAMUSCULAR; INTRAVENOUS
Status: DISCONTINUED | OUTPATIENT
Start: 2022-05-01 | End: 2022-05-01 | Stop reason: HOSPADM

## 2022-05-01 RX ORDER — HYDRALAZINE HYDROCHLORIDE 20 MG/ML
10 INJECTION INTRAMUSCULAR; INTRAVENOUS
Status: DISCONTINUED | OUTPATIENT
Start: 2022-05-01 | End: 2022-05-01 | Stop reason: HOSPADM

## 2022-05-01 RX ORDER — LABETALOL HYDROCHLORIDE 5 MG/ML
10 INJECTION, SOLUTION INTRAVENOUS
Status: DISCONTINUED | OUTPATIENT
Start: 2022-05-01 | End: 2022-05-01 | Stop reason: HOSPADM

## 2022-05-01 RX ORDER — DIPHENHYDRAMINE HYDROCHLORIDE 50 MG/ML
12.5 INJECTION INTRAMUSCULAR; INTRAVENOUS
Status: DISCONTINUED | OUTPATIENT
Start: 2022-05-01 | End: 2022-05-01 | Stop reason: HOSPADM

## 2022-05-01 RX ORDER — HYDROCODONE BITARTRATE AND HOMATROPINE METHYLBROMIDE ORAL SOLUTION 5; 1.5 MG/5ML; MG/5ML
5 LIQUID ORAL EVERY 4 HOURS PRN
Status: DISCONTINUED | OUTPATIENT
Start: 2022-05-01 | End: 2022-05-03 | Stop reason: HOSPADM

## 2022-05-01 RX ORDER — SODIUM CHLORIDE 9 MG/ML
INJECTION, SOLUTION INTRAVENOUS CONTINUOUS PRN
Status: DISCONTINUED | OUTPATIENT
Start: 2022-05-01 | End: 2022-05-01 | Stop reason: SDUPTHER

## 2022-05-01 RX ORDER — CARVEDILOL 3.12 MG/1
3.12 TABLET ORAL 2 TIMES DAILY WITH MEALS
Status: DISCONTINUED | OUTPATIENT
Start: 2022-05-01 | End: 2022-05-03 | Stop reason: HOSPADM

## 2022-05-01 RX ORDER — SODIUM CHLORIDE 0.9 % (FLUSH) 0.9 %
5-40 SYRINGE (ML) INJECTION EVERY 12 HOURS SCHEDULED
Status: DISCONTINUED | OUTPATIENT
Start: 2022-05-01 | End: 2022-05-01 | Stop reason: HOSPADM

## 2022-05-01 RX ADMIN — BENZONATATE 200 MG: 200 CAPSULE ORAL at 10:27

## 2022-05-01 RX ADMIN — CEFEPIME HYDROCHLORIDE 2000 MG: 2 INJECTION, POWDER, FOR SOLUTION INTRAVENOUS at 03:20

## 2022-05-01 RX ADMIN — MIDAZOLAM 2 MG: 1 INJECTION INTRAMUSCULAR; INTRAVENOUS at 13:54

## 2022-05-01 RX ADMIN — SODIUM CHLORIDE: 9 INJECTION, SOLUTION INTRAVENOUS at 13:54

## 2022-05-01 RX ADMIN — METRONIDAZOLE 500 MG: 500 INJECTION, SOLUTION INTRAVENOUS at 01:51

## 2022-05-01 RX ADMIN — SODIUM CHLORIDE, PRESERVATIVE FREE 10 ML: 5 INJECTION INTRAVENOUS at 10:27

## 2022-05-01 RX ADMIN — VANCOMYCIN HYDROCHLORIDE 1250 MG: 1.25 INJECTION, POWDER, LYOPHILIZED, FOR SOLUTION INTRAVENOUS at 15:18

## 2022-05-01 RX ADMIN — TRAMADOL HYDROCHLORIDE 50 MG: 50 TABLET, COATED ORAL at 16:13

## 2022-05-01 RX ADMIN — TRAMADOL HYDROCHLORIDE 50 MG: 50 TABLET, COATED ORAL at 23:23

## 2022-05-01 RX ADMIN — SODIUM CHLORIDE, PRESERVATIVE FREE 40 MG: 5 INJECTION INTRAVENOUS at 08:00

## 2022-05-01 RX ADMIN — CHLORDIAZEPOXIDE HYDROCHLORIDE 10 MG: 10 CAPSULE ORAL at 19:51

## 2022-05-01 RX ADMIN — CEFEPIME HYDROCHLORIDE 2000 MG: 2 INJECTION, POWDER, FOR SOLUTION INTRAVENOUS at 20:59

## 2022-05-01 RX ADMIN — METRONIDAZOLE 500 MG: 500 INJECTION, SOLUTION INTRAVENOUS at 10:35

## 2022-05-01 RX ADMIN — SODIUM CHLORIDE, PRESERVATIVE FREE 10 ML: 5 INJECTION INTRAVENOUS at 19:55

## 2022-05-01 RX ADMIN — CEFEPIME HYDROCHLORIDE 2000 MG: 2 INJECTION, POWDER, FOR SOLUTION INTRAVENOUS at 10:36

## 2022-05-01 RX ADMIN — HYDROCODONE BITARTRATE AND HOMATROPINE METHYLBROMIDE 5 ML: 5; 1.5 SOLUTION ORAL at 17:20

## 2022-05-01 RX ADMIN — SODIUM CHLORIDE, PRESERVATIVE FREE 40 MG: 5 INJECTION INTRAVENOUS at 19:51

## 2022-05-01 RX ADMIN — Medication 60 MG: at 10:27

## 2022-05-01 RX ADMIN — PROPOFOL 100 MG: 10 INJECTION, EMULSION INTRAVENOUS at 13:55

## 2022-05-01 RX ADMIN — BENZONATATE 200 MG: 200 CAPSULE ORAL at 19:51

## 2022-05-01 RX ADMIN — CARVEDILOL 3.12 MG: 3.12 TABLET, FILM COATED ORAL at 17:21

## 2022-05-01 RX ADMIN — VANCOMYCIN HYDROCHLORIDE 1250 MG: 1.25 INJECTION, POWDER, LYOPHILIZED, FOR SOLUTION INTRAVENOUS at 01:52

## 2022-05-01 RX ADMIN — Medication 10 ML: at 11:30

## 2022-05-01 RX ADMIN — LIDOCAINE HYDROCHLORIDE 40 MG: 10 INJECTION, SOLUTION EPIDURAL; INFILTRATION; INTRACAUDAL; PERINEURAL at 13:58

## 2022-05-01 RX ADMIN — Medication 60 MG: at 19:51

## 2022-05-01 RX ADMIN — CHLORDIAZEPOXIDE HYDROCHLORIDE 10 MG: 10 CAPSULE ORAL at 17:20

## 2022-05-01 RX ADMIN — METRONIDAZOLE 500 MG: 500 INJECTION, SOLUTION INTRAVENOUS at 19:07

## 2022-05-01 RX ADMIN — BENZONATATE 200 MG: 200 CAPSULE ORAL at 15:15

## 2022-05-01 ASSESSMENT — PULMONARY FUNCTION TESTS
PIF_VALUE: 0
PIF_VALUE: 1
PIF_VALUE: 0
PIF_VALUE: 1
PIF_VALUE: 1
PIF_VALUE: 0
PIF_VALUE: 1
PIF_VALUE: 0
PIF_VALUE: 1

## 2022-05-01 ASSESSMENT — ENCOUNTER SYMPTOMS
VOMITING: 0
DIARRHEA: 1
SHORTNESS OF BREATH: 0
ABDOMINAL PAIN: 1
COLOR CHANGE: 1
BLOOD IN STOOL: 1
STRIDOR: 0
NAUSEA: 0
SHORTNESS OF BREATH: 1
SORE THROAT: 0
COUGH: 1

## 2022-05-01 ASSESSMENT — PAIN SCALES - GENERAL
PAINLEVEL_OUTOF10: 0
PAINLEVEL_OUTOF10: 8
PAINLEVEL_OUTOF10: 2

## 2022-05-01 ASSESSMENT — PAIN DESCRIPTION - ORIENTATION
ORIENTATION: OTHER (COMMENT)
ORIENTATION: OTHER (COMMENT)

## 2022-05-01 ASSESSMENT — PAIN - FUNCTIONAL ASSESSMENT: PAIN_FUNCTIONAL_ASSESSMENT: ACTIVITIES ARE NOT PREVENTED

## 2022-05-01 ASSESSMENT — LIFESTYLE VARIABLES: SMOKING_STATUS: 0

## 2022-05-01 ASSESSMENT — PAIN DESCRIPTION - DESCRIPTORS
DESCRIPTORS: ACHING
DESCRIPTORS: ACHING;DISCOMFORT;PRESSURE

## 2022-05-01 ASSESSMENT — PAIN DESCRIPTION - LOCATION
LOCATION: ABDOMEN
LOCATION: ABDOMEN;HEAD

## 2022-05-01 NOTE — CARE COORDINATION
ONGOING DISCHARGE PLAN:    Patient is off the floor for procedure, EGD with GI today. Spoke with patient regarding discharge plan and patient confirms that plan is still home with no needs. IV cefepime, flagyl and vanco, ID following. PO librium and precedex gtt started due to ETOH withdrawal.    Will continue to follow for additional discharge needs.     Electronically signed by Roseanna Bhakta RN on 5/1/2022 at 3:16 PM

## 2022-05-01 NOTE — ANESTHESIA PRE PROCEDURE
Department of Anesthesiology  Preprocedure Note       Name:  Ehsan David   Age:  40 y.o.  :  1985                                          MRN:  [id number]         Date:  2022      Surgeon: Lorenzo Chávez):  Scooby Almaguer MD    Procedure: Procedure(s):  EGD ESOPHAGOGASTRODUODENOSCOPY    Medications prior to admission:   Prior to Admission medications    Medication Sig Start Date End Date Taking?  Authorizing Provider   Multiple Vitamins-Minerals (CENTRUM MEN) TABS Take 1 tablet by mouth daily   Yes Historical Provider, MD   Menthol-Methyl Salicylate (ANALGESIC OINTMENT) OINT ointment Apply topically as needed Indications: on back   Yes Historical Provider, MD   metoprolol succinate (TOPROL XL) 25 MG extended release tablet take 1 tablet by mouth once daily 21   Ashley Nuñez MD   pantoprazole (PROTONIX) 20 MG tablet Take 2 tablets by mouth daily 4/15/22   Gorge Perry DO   losartan (COZAAR) 50 MG tablet take 1 tablet by mouth once daily 21   Ashley Nuñez MD   naproxen (NAPROSYN) 500 MG tablet Take 1 tablet by mouth 2 times daily  Patient taking differently: Take 500 mg by mouth 2 times daily as needed  8/13/21 3/28/22  Ila Beck MD   fluticasone [facility]) 50 MCG/ACT nasal spray instill 1 spray into each nostril once daily 5/10/21   Ashley Nuñez MD   Blood Pressure Monitoring (BP MONITOR-STETHOSCOPE) KIT 1 each by Does not apply route daily 21   Ashley Nuñez MD   Nicotine 21-14-7 MG/24HR KIT Place 14 mg onto the skin nightly     Historical Provider, MD   EPINEPHrine (EPIPEN 2-LIZETTE) 0.3 MG/0.3ML SOAJ injection Inject 0.3 mLs into the muscle once for 1 dose Use as directed for allergic reaction 16  Dorota Butler MD       Current medications:    Current Facility-Administered Medications   Medication Dose Route Frequency Provider Last Rate Last Admin    folic acid (FOLVITE) tablet 1 mg  1 mg Oral Daily Claudean Sines, MD   1 mg at 22 1523    thiamine mononitrate tablet 100 mg  100 mg Oral Daily Cj Jordan MD   100 mg at 04/30/22 1523    therapeutic multivitamin-minerals 1 tablet  1 tablet Oral Daily Cj Jordan MD   1 tablet at 04/30/22 1523    nicotine (NICODERM CQ) 21 MG/24HR 1 patch  1 patch TransDERmal Daily Kushal Bowen MD   1 patch at 04/30/22 1747    chlordiazePOXIDE (LIBRIUM) capsule 10 mg  10 mg Oral 4x Daily Kushal Bowen MD   10 mg at 04/30/22 1749    dexmedetomidine (PRECEDEX) 400 mcg in sodium chloride 0.9 % 100 mL infusion  0.1-1.5 mcg/kg/hr IntraVENous Continuous Kushal Bowen MD   Held at 04/30/22 2011    0.9 % sodium chloride infusion   IntraVENous Continuous Kushal Bowen  mL/hr at 04/30/22 1815 New Bag at 04/30/22 1815    dextromethorphan (DELSYM) 30 MG/5ML extended release liquid 60 mg  60 mg Oral 2 times per day Kushal Bowen MD   60 mg at 04/30/22 1954    benzonatate (TESSALON) capsule 200 mg  200 mg Oral TID Kushal Bowen MD   200 mg at 04/30/22 1953    metronidazole (FLAGYL) 500 mg in 0.9% NaCl 100 mL IVPB premix  500 mg IntraVENous Q8H Sanchez Tenorio MD   Stopped at 05/01/22 0251    ondansetron (ZOFRAN) injection 4 mg  4 mg IntraVENous Q4H PRN Sanchez Tenorio MD   4 mg at 04/30/22 1645    cefepime (MAXIPIME) 2000 mg IVPB minibag  2,000 mg IntraVENous Q8H Sanchez Tenorio MD 12.5 mL/hr at 05/01/22 0320 2,000 mg at 05/01/22 0320    losartan (COZAAR) tablet 50 mg  50 mg Oral Daily SILVA Odom CNP   50 mg at 04/29/22 0947    pantoprazole (PROTONIX) 40 mg in sodium chloride (PF) 10 mL injection  40 mg IntraVENous Q12H SILVA Wheeler - CNP   40 mg at 04/30/22 1954    sodium chloride flush 0.9 % injection 5-40 mL  5-40 mL IntraVENous 2 times per day Rosalia Acosta DO   10 mL at 04/28/22 1058    sodium chloride flush 0.9 % injection 5-40 mL  5-40 mL IntraVENous PRN Rosalia Acosta DO        LORazepam (ATIVAN) tablet 1 mg  1 mg Oral Q1H PRN Rosalia Acosta DO   1 mg at 04/30/22 0408    Or    LORazepam (ATIVAN) injection 1 mg  1 mg IntraVENous Q1H PRN Ruddy Loss, DO   1 mg at 04/30/22 1142    vancomycin (VANCOCIN) intermittent dosing (placeholder)   Other RX Placeholder Ruddy Loss, DO        sodium chloride flush 0.9 % injection 10 mL  10 mL IntraVENous PRN Ruddy Loss, DO   10 mL at 04/27/22 1918    vancomycin (VANCOCIN) 1,250 mg in dextrose 5 % 250 mL IVPB (ADDAVIAL)  1,250 mg IntraVENous Q12H Ruddy Loss, DO   Stopped at 05/01/22 0322    sodium chloride flush 0.9 % injection 5-40 mL  5-40 mL IntraVENous 2 times per day Fareed Ganga, APRN - CNP   10 mL at 04/30/22 1955    sodium chloride flush 0.9 % injection 5-40 mL  5-40 mL IntraVENous PRN Fareed Ganga, APRN - CNP        0.9 % sodium chloride infusion   IntraVENous PRN Fareed Ganga, APRN - CNP        [Held by provider] enoxaparin (LOVENOX) injection 40 mg  40 mg SubCUTAneous Daily Fareed Ganga, APRN - CNP   40 mg at 04/27/22 2027    acetaminophen (TYLENOL) tablet 650 mg  650 mg Oral Q6H PRN Fareed Ganga, APRN - CNP   650 mg at 04/30/22 2225    Or    acetaminophen (TYLENOL) suppository 650 mg  650 mg Rectal Q6H PRN Afreed Ganga, APRN - CNP   650 mg at 04/30/22 1513       Allergies:     Allergies   Allergen Reactions    Bee Venom Anaphylaxis       Problem List:    Patient Active Problem List   Diagnosis Code    Acute pulmonary embolism (HCC) I26.99    ACS (acute coronary syndrome) (HCC) I24.9    Chronic systolic congestive heart failure (HCC) I50.22    Essential hypertension K80    Uncomplicated alcohol dependence ([address] Z82.83    Alcoholic cardiomyopathy ([address] I42.6    Diarrhea due to malabsorption K90.9, R19.7    Heartburn R12    Generalized anxiety disorder F41.1    Hypomagnesemia E83.42    Hypokalemia E87.6    Transaminitis F94.90    Alcoholic hepatitis without ascites K70.10    Macrocytic anemia D53.9    Left lower lobe pneumonia J18.9    Sepsis (HCC) A41.9    Melena K92.1  Acute blood loss anemia D62       Past Medical History:        Diagnosis Date    Alcoholism ([address]     pt drinks a fifth of whiskey daily    Anemia     Pulmonary embolism (HCC)        Past Surgical History:  History reviewed. No pertinent surgical history. Social History:    Social History     Tobacco Use    Smoking status: Current Every Day Smoker     Packs/day: 1.00     Years: 21.00     Pack years: 21.00     Types: Cigarettes    Smokeless tobacco: Never Used    Tobacco comment: using nicotine patches at night   Substance Use Topics    Alcohol use: Not Currently     Comment: hx of alcoholism; pt drinks a fifth of whiskey daily                                Ready to quit: Not Answered  Counseling given: Not Answered  Comment: using nicotine patches at night      Vital Signs (Current):   Vitals:    05/01/22 0600 05/01/22 0630 05/01/22 0645 05/01/22 0700   BP: 122/77 128/74  123/82   Pulse: 102 103 113 105   Resp: 21 28 25 17   Temp:       TempSrc:       SpO2: 95% 96% 95% 98%   Weight:       Height:                                                  BP Readings from Last 3 Encounters:   05/01/22 123/82   04/15/22 118/77   03/28/22 (!) 124/92       NPO Status:                                                                                 BMI:   Wt Readings from Last 3 Encounters:   04/30/22 216 lb 11.4 oz (98.3 kg)   03/28/22 190 lb (86.2 kg)   10/26/21 200 lb (90.7 kg)     Body mass index is 32.95 kg/m².     CBC:   Lab Results   Component Value Date    WBC 17.6 05/01/2022    RBC 2.10 05/01/2022    HGB 9.3 05/01/2022    HCT 27.2 05/01/2022    .0 05/01/2022    RDW 18.8 05/01/2022     05/01/2022       CMP:   Lab Results   Component Value Date     05/01/2022    K 3.7 05/01/2022     05/01/2022    CO2 19 05/01/2022    BUN 4 05/01/2022    CREATININE 0.48 05/01/2022    GFRAA >60 05/01/2022    LABGLOM >60 05/01/2022    GLUCOSE 96 05/01/2022    PROT 6.4 05/01/2022    CALCIUM 6.7 05/01/2022 BILITOT 4.86 05/01/2022    ALKPHOS 237 05/01/2022     05/01/2022    ALT 31 05/01/2022       POC Tests: No results for input(s): POCGLU, POCNA, POCK, POCCL, POCBUN, POCHEMO, POCHCT in the last 72 hours. Coags:   Lab Results   Component Value Date    PROTIME 15.4 04/28/2022    INR 1.2 04/28/2022    APTT 40.5 04/27/2022       HCG (If Applicable): No results found for: PREGTESTUR, PREGSERUM, HCG, HCGQUANT     ABGs:   Lab Results   Component Value Date    PHART 7.436 04/30/2022    PO2ART 69.0 04/30/2022    AZA9FVS 29.1 04/30/2022    YQU6LXR 19.6 04/30/2022    X5XAUZJS 93.1 04/30/2022        Type & Screen (If Applicable):  No results found for: LABABO, LABRH    Drug/Infectious Status (If Applicable):  Lab Results   Component Value Date    HEPCAB NONREACTIVE 04/28/2022       COVID-19 Screening (If Applicable):   Lab Results   Component Value Date    COVID19 Not Detected 04/27/2022           Anesthesia Evaluation  Patient summary reviewed and Nursing notes reviewed no history of anesthetic complications:   Airway: Mallampati: III  TM distance: >3 FB   Neck ROM: full  Mouth opening: > = 3 FB Dental:    (+) poor dentition      Pulmonary:normal exam  breath sounds clear to auscultation  (+) pneumonia: no interval change,      (-) COPD, asthma, shortness of breath, recent URI, sleep apnea, rhonchi, wheezes, rales, stridor, not a current smoker and no decreased breath sounds          Patient did not smoke on day of surgery.                  Cardiovascular:  Exercise tolerance: good (>4 METS),   (+) hypertension: no interval change, CHF: no interval change,     (-) pacemaker, valvular problems/murmurs, past MI, CAD, CABG/stent, dysrhythmias,  angina, orthopnea, PND,  WATSON, murmur, weak pulses,  friction rub, systolic click, carotid bruit,  JVD, peripheral edema, no pulmonary hypertension and no hyperlipidemia    ECG reviewed  Rhythm: regular  Rate: normal  Echocardiogram reviewed         Beta Blocker:  Dose within 24 Hrs         Neuro/Psych:   (+) psychiatric history: stable with treatmentdepression/anxiety    (-) seizures, neuromuscular disease, TIA, CVA and headaches           GI/Hepatic/Renal:   (+) liver disease:,      (-) hiatal hernia, GERD, PUD, hepatitis, no renal disease, bowel prep and no morbid obesity       Endo/Other: Negative Endo/Other ROS       (-) diabetes mellitus, hypothyroidism, hyperthyroidism, blood dyscrasia, arthritis, no electrolyte abnormalities               Abdominal:             Vascular: negative vascular ROS. - PVD, DVT and PE. Other Findings:           Anesthesia Plan      general     ASA 3       Induction: intravenous. MIPS: Postoperative opioids intended and Prophylactic antiemetics administered. Anesthetic plan and risks discussed with patient.                       Eliseo Zhu MD   5/1/2022

## 2022-05-01 NOTE — PROGRESS NOTES
[id number]    PROGRESS NOTE             5/1/2022    9:49 AM    Name:   Ilya Padgett  MRN:     [id number]     [place]:      [de-identified]   Room:   2011/2011-01  IP Day:  4  Admit Date:  4/27/2022  5:50 PM    PCP:  Mendel Mcwilliams MD  Code Status:  Full Code    Subjective:     C/C:   Chief Complaint   Patient presents with    Cough    Fatigue    Nasal Congestion    Shortness of Breath     Interval History Status: Not changed. Patient seen and examined today at bedside. Continues to complain of cough and shortness of breath. Complaining of abdominal pain as well. Afebrile overnight. Patient mentions that he had 2 runny and bloody bowel movements today morning. No nausea or vomiting. Mentions poor appetite yesterday. For EGD today. Brief History:     The patient is a 37 y.o.  male, with a history of pulmonary embolism, hypertension, recent blood in stool  and recent newly found anemia who presents with the concern of a cough and exertional shortness of breath however was found to be jaundice with elevated liver function.    According to patient, he was recently evaluated at [place] [address] ER for the complaint of near syncope,  blood in her stool, chest pain, shortness of breath.  Patient was  found to have occult positive stool, but no gross bloody stools and negative cardiac work-up, therefore was discharged home on Protonix and instructions to follow with primary care and GI.  However patient was unable to do this given the fact that he lost his insurance when he lost his job.  Patient now returns with progressive exertional shortness of breath and dry cough. Symptoms of shortness of breath and cough are associated with any activity  Denies fever, chills, chest pain (not associated with coughing), abdominal pain, nausea, vomiting, diarrhea, and urinary symptoms.  There are no  alleviating factors.    In addition patient was noted to be jaundice and then states that his wife was growing concern of the color of his skin over the past 3 to 4 days. 04/30: Continue cefepime, Flagyl and and vancomycin. Seen by GI and recommend EGD tomorrow. Was placed on Precedex drip and Librium 10 mg 4 times daily by pulmonology. Review of Systems:     Review of Systems   Constitutional: Positive for appetite change (Decreased). Negative for fever. HENT: Negative for congestion and sore throat. Respiratory: Positive for cough and shortness of breath. Cardiovascular: Negative for chest pain and leg swelling. Gastrointestinal: Positive for abdominal pain (LLQ), blood in stool and diarrhea. Negative for nausea and vomiting. Genitourinary: Negative for difficulty urinating and dysuria. Skin: Positive for color change. Neurological: Negative for syncope and headaches. Psychiatric/Behavioral: Negative for agitation, behavioral problems and confusion. Medications: Allergies:     Allergies   Allergen Reactions    Bee Venom Anaphylaxis       Current Meds:   Scheduled Meds:    folic acid  1 mg Oral Daily    thiamine mononitrate  100 mg Oral Daily    multivitamin  1 tablet Oral Daily    nicotine  1 patch TransDERmal Daily    chlordiazePOXIDE  10 mg Oral 4x Daily    dextromethorphan  60 mg Oral 2 times per day    benzonatate  200 mg Oral TID    metroNIDAZOLE  500 mg IntraVENous Q8H    cefepime  2,000 mg IntraVENous Q8H    losartan  50 mg Oral Daily    pantoprazole (PROTONIX) 40 mg injection  40 mg IntraVENous Q12H    sodium chloride flush  5-40 mL IntraVENous 2 times per day    vancomycin (VANCOCIN) intermittent dosing (placeholder)   Other RX Placeholder    vancomycin  1,250 mg IntraVENous Q12H    sodium chloride flush  5-40 mL IntraVENous 2 times per day    [Held by provider] enoxaparin  40 mg SubCUTAneous Daily     Continuous Infusions:    dexmedetomidine Stopped (04/30/22 2011)    sodium chloride 100 mL/hr at 22 1815    sodium chloride       PRN Meds: ondansetron, sodium chloride flush, LORazepam **OR** LORazepam, sodium chloride flush, sodium chloride flush, sodium chloride, acetaminophen **OR** acetaminophen    Data:     Past Medical History:   has a past medical history of Alcoholism ([facility] [address] Anemia, and Pulmonary embolism ([facility] [address] Social History:   reports that he has been smoking cigarettes. He has a 21.00 pack-year smoking history. He has never used smokeless tobacco. He reports previous alcohol use. He reports previous drug use. Family History:   Family History   Problem Relation Age of Onset    Heart Disease Mother     Heart Attack Mother     Heart Disease Father        Vitals:  /82   Pulse 105   Temp 98.2 °F (36.8 °C) (Oral)   Resp 17   Ht 5' 8\" (1.727 m)   Wt 216 lb 11.4 oz (98.3 kg)   SpO2 98%   BMI 32.95 kg/m²   Temp (24hrs), Av.8 °F (38.2 °C), Min:98.2 °F (36.8 °C), Max:102.8 °F (39.3 °C)    No results for input(s): POCGLU in the last 72 hours. I/O(24Hr): Intake/Output Summary (Last 24 hours) at 2022 0949  Last data filed at 2022 0715  Gross per 24 hour   Intake 2895.62 ml   Output 1875 ml   Net 1020.62 ml       Labs:  [unfilled]    Lab Results   Component Value Date/Time    SPECIAL NOT REPORTED 10/15/2021 04:17 AM     Lab Results   Component Value Date/Time    CULTURE NO GROWTH 2022 03:14 AM       [unfilled]    Radiology:    XR CHEST (2 VW)    Result Date: 4/15/2022  EXAMINATION: TWO XRAY VIEWS OF THE CHEST 4/15/2022 2:40 pm COMPARISON: 2022 HISTORY: ORDERING SYSTEM PROVIDED HISTORY: CP, lightheadness TECHNOLOGIST PROVIDED HISTORY: CP, lightheadness FINDINGS: Chest radiograph: The cardiomediastinal silhouette and hilar contours are normal. The lungs are clear with no focal consolidation, pleural effusion or pneumothorax. The overlying soft tissue and osseous structures do not demonstrate acute abnormality.      No acute intrathoracic pathology. CT CHEST WO CONTRAST    Result Date: 4/30/2022  EXAMINATION: CT OF THE CHEST WITHOUT CONTRAST 4/30/2022 12:56 pm TECHNIQUE: CT of the chest was performed without the administration of intravenous contrast. Multiplanar reformatted images are provided for review. Dose modulation, iterative reconstruction, and/or weight based adjustment of the mA/kV was utilized to reduce the radiation dose to as low as reasonably achievable. COMPARISON: 04/15/2022 HISTORY: ORDERING SYSTEM PROVIDED HISTORY: hypoxia TECHNOLOGIST PROVIDED HISTORY: hypoxia Reason for Exam: Cough; Fatigue; Nasal Congestion; Shortness of Breath Additional signs and symptoms: PT states cough and SOB. PT on oxygen but not wearing it FINDINGS: Mediastinum: No evidence of mediastinal, hilar or axillary lymphadenopathy. The central airways are clear. Thyroid is grossly unremarkable within the limits of CT. Lungs/pleura: Since prior examination there has been development of mild interstitial prominence likely related to mild interstitial pulmonary edema. No evidence of acute dense focal consolidation. No pneumothorax. There is subtle patchy opacity in the right middle lobe best seen on image 54 through 58 that may represent focal atelectasis or pneumonia. Upper Abdomen: Limited images of the upper abdomen demonstrate no acute abnormality. Diffuse hepatic steatosis with hepatomegaly. Soft Tissues/Bones: No acute abnormality of the visualized osseous structures. Interval development of a mild to moderate left pleural effusion with interstitial pulmonary edema. New subtle patchy opacity in the central right middle lobe may represent focal atelectasis from low lung volumes or may represent developing pneumonia. Redemonstration of hepatomegaly and hepatic steatosis.      CT ABDOMEN PELVIS W IV CONTRAST Additional Contrast? None    Result Date: 4/27/2022  EXAMINATION: CT OF THE ABDOMEN AND PELVIS WITH CONTRAST 4/27/2022 7:16 pm TECHNIQUE: CT of the abdomen and pelvis was performed with the administration of intravenous contrast. Multiplanar reformatted images are provided for review. Dose modulation, iterative reconstruction, and/or weight based adjustment of the mA/kV was utilized to reduce the radiation dose to as low as reasonably achievable. COMPARISON: 03/28/2022 HISTORY: ORDERING SYSTEM PROVIDED HISTORY: RUQ pain, jaundice TECHNOLOGIST PROVIDED HISTORY: RUQ pain, jaundice Decision Support Exception - unselect if not a suspected or confirmed emergency medical condition->Emergency Medical Condition (MA) Reason for Exam: RUQ pain for two weeks, jaundice FINDINGS: Lower Chest: Mild left and minimal right bibasilar atelectasis. The heart size is normal. Organs: The liver is enlarged. There is diffuse low attenuation of liver parenchyma. Bile ducts are not dilated. The spleen, pancreas, adrenal glands and kidneys are normal.  There are no calcified gallstones. No pericholecystic fluid. There is mild gallbladder wall thickening. GI/Bowel: Unopacified bowel loops are grossly normal.  No bowel obstruction. The appendix is normal. Pelvis: Urinary bladder is nearly empty and grossly normal. Peritoneum/Retroperitoneum: There is retroperitoneal fat stranding. There are mildly enlarged upper abdominal lymph nodes. Lymph node in the isai hepatis adjacent to the pancreatic head measures up to 2.5 cm (coronal image 50). There are mildly prominent retroperitoneal lymph nodes. There is trace free fluid in the pelvis. There is no free air. Bones/Soft Tissues: Bilateral gynecomastia and nipple piercings. There is a unilateral right-sided pars interarticularis defect at L5. No acute bone finding. The liver is enlarged and diffusely low in attenuation. Diffuse nonspecific hepatocellular disease suspected. Bile ducts are not dilated in this patient with history of jaundice. There are no calcified gallstones.   There is mild gallbladder wall thickening. Wall thickening may be reactive to adjacent liver disease. Mildly enlarged upper abdominal lymph nodes measuring up to 2.5 cm, likely reactive. Trace free fluid in the pelvis. Unilateral right-sided L5 pars interarticularis defect. RECOMMENDATIONS: Unavailable     XR CHEST PORTABLE    Result Date: 4/28/2022  EXAMINATION: ONE XRAY VIEW OF THE CHEST 4/28/2022 6:08 am COMPARISON: 04/27/2022 HISTORY: ORDERING SYSTEM PROVIDED HISTORY: pneumonia follow up TECHNOLOGIST PROVIDED HISTORY: pneumonia follow up Reason for Exam: Continued SOB and cough. FINDINGS: Small infiltrate is identified within the left costophrenic angle. The heart is not enlarged. No pneumothorax is identified. No acute osseous abnormality is identified. Stable small infiltrate within the left costophrenic angle which may represent small pneumonia versus pleural effusion. XR CHEST PORTABLE    Result Date: 4/27/2022  EXAMINATION: ONE XRAY VIEW OF THE CHEST 4/27/2022 6:11 pm COMPARISON: April 15, 2022 HISTORY: ORDERING SYSTEM PROVIDED HISTORY: SOB TECHNOLOGIST PROVIDED HISTORY: SOB Reason for Exam: PT CO SOB with cough, fatigue, congestion, and jaundice of the skin at this time. PT states symptoms X 1 week. FINDINGS: Left lower lobe infiltrate and or small effusion. Heart and mediastinum normal.  Bony thorax intact. Nipple rings noted incidentally. New left effusion and or infiltrate     CT CHEST PULMONARY EMBOLISM W CONTRAST    Result Date: 4/15/2022  EXAMINATION: CTA OF THE CHEST 4/15/2022 1:00 pm TECHNIQUE: CTA of the chest was performed after the administration of intravenous contrast.  Multiplanar reformatted images are provided for review. MIP images are provided for review. Dose modulation, iterative reconstruction, and/or weight based adjustment of the mA/kV was utilized to reduce the radiation dose to as low as reasonably achievable.  COMPARISON: Chest x-ray dated April 15, 2022 prior CT chest dated June 20, 2021 HISTORY: ORDERING SYSTEM PROVIDED HISTORY: shortness of breath, chest pain, elevated d-dimer TECHNOLOGIST PROVIDED HISTORY: shortness of breath, chest pain, elevated d-dimer Decision Support Exception - unselect if not a suspected or confirmed emergency medical condition->Emergency Medical Condition (MA) Reason for Exam: sob, chest pain, dizziness per pt FINDINGS: Pulmonary Arteries: Pulmonary arteries are suboptimally opacified for evaluation. No definite evidence of intraluminal filling defect to suggest pulmonary embolism. Main pulmonary artery is normal in caliber. Mediastinum: No evidence of mediastinal lymphadenopathy. The heart and pericardium demonstrate no acute abnormality. There is no acute abnormality of the thoracic aorta. Lungs/pleura: Linear opacities are present within the right middle lobe, lingula, and periphery of the left lower lobe, consistent with atelectasis. No focal area of consolidation, pleural effusion, or pneumothorax is present. Upper Abdomen: Images through the upper abdomen demonstrate hepatomegaly and diffuse hepatic steatosis. Soft Tissues/Bones: Gynecomastia is present bilaterally. No acute osseous abnormality is present. 1. No evidence of pulmonary embolism 2. Linear areas of atelectasis within the right middle lobe, lingula, left lower lobe 3. Hepatomegaly and diffuse hepatic steatosis     Physical Examination:        Physical Exam  Vitals and nursing note reviewed. Constitutional:       General: He is not in acute distress. Appearance: He is ill-appearing. Cardiovascular:      Rate and Rhythm: Normal rate and regular rhythm. Heart sounds: No murmur heard. Pulmonary:      Effort: No respiratory distress. Breath sounds: Normal breath sounds. No wheezing. Abdominal:      Palpations: Abdomen is soft. Tenderness: There is abdominal tenderness (Mild LLQ). There is no guarding or rebound.    Musculoskeletal:      Right lower le+ Pitting Edema present. Left lower le+ Pitting Edema present. Skin:     Coloration: Skin is jaundiced. Neurological:      Mental Status: He is alert. Assessment:        Primary Problem  Sepsis [facility])    Active Hospital Problems    Diagnosis Date Noted    Sepsis ([facility] [address] [A41.9] 2022     Priority: Medium    Melena [K92.1] 2022     Priority: Medium    Acute blood loss anemia [D62] 2022     Priority: Medium    Left lower lobe pneumonia [J18.9] 2022     Priority: Medium    Transaminitis [R74.01] 10/15/2021     Plan:         Sepsis secondary to aspiration pneumonia in setting of alcohol abuse  -Afebrile overnight.   -WBC 16.8>15.7>16.2>17.6  -Currently on cefepime, Flagyl, vancomycin  -Infectious disease following   Continue current abx regimen  -Initial lactic acid 2.4> 1.6>2.6>2.1  -Legionella, strep pneumo, mycoplasma pneumonia, respiratory culture MRSA nasal swab pending  -Pro-Asaf 0.35  -Urinalysis unremarkable, urine culture no growth  -Hepatitis panel negative    Possible GI bleed r/o variceal bleed  -Fecal occult blood negative  -GI on board   Possible EGD today     Elevated liver enzymes secondary to alcoholism  ->206, ALT 31  -Continue to trend  -GI ordered smooth muscle antibody, AFP tumor marker, anti-SOLA KID HORTENCIA AB, mitochondrial antibodies, TIFFANIE, alpha-1 antitrypsin, ceruloplasmin  -CMV negative for IgG and IgM  -IgA 656, IgM 61     Macrocytic anemia 2/2 folate deficiency 2/2 alcohol abuse  -Hemoglobin 8.5>9.3  -  -Patient started on folic acid, thiamine, multivitamin  -Vitamin B12 843, within normal limits, folate 2.0, below reference range  -Iron studies show iron 65, TIBC 99, iron saturation 66  -Ferritin 1202  -Fecal occult blood negative    Alcohol abuse  -Folic acid, thiamine, multivitamin  -CIWA protocol  -Seizure precautions     Hypertension  -Cozaar 50 mg p.o. daily    Hold DVT prophylaxis for suspected GI bleed    Renee Mason MD  2022  9:49 AM Attending Physician Statement  I have discussed the care of Jessica Otoole and I have examined the patient myselft and taken ros and hpi , including pertinent history and exam findings,  with the resident. I have reviewed the key elements of all parts of the encounter with the resident. I agree with the assessment, plan and orders as documented by the resident. Spent 35 minutes in reviewing data/medicines/talking to patient/family,  explaining and answering all the questions.         Electronically signed by Bianca Scott MD

## 2022-05-01 NOTE — PROGRESS NOTES
Vancomycin Dosing by Pharmacy - Daily Note   Vancomycin Therapy Day:  4  Indication: Sepsis    Allergies:  Bee venom   Actual Weight:    Wt Readings from Last 1 Encounters:   04/30/22 216 lb 11.4 oz (98.3 kg)       Labs/Ancillary Data  Estimated Creatinine Clearance: 242 mL/min (A) (based on SCr of 0.48 mg/dL (L)). Recent Labs     04/29/22  0910 04/30/22  1407 05/01/22  0450   CREATININE  --  0.45* 0.48*   BUN  --  4* 4*   WBC 15.7* 16.2* 17.6*     Procalcitonin   Date Value Ref Range Status   04/27/2022 0.35 (H) <0.09 ng/mL Final     Comment:           Suspected Sepsis:  <0.50 ng/mL     Low likelihood of sepsis. 0.50-2.00 ng/mL     Increased likelihood of sepsis. Antibiotics encouraged. >2.00 ng/mL     High risk of sepsis/shock. Antibiotics strongly encouraged. Suspected Lower Resp Tract Infections:  <0.24 ng/mL     Low likelihood of bacterial infection. >0.24 ng/mL     Increased likelihood of bacterial infection. Antibiotics encouraged. With successful antibiotic therapy, PCT levels should decrease rapidly. (Half-life of 24 to   36 hours.)        Procalcitonin values from samples collected within the first 6 hours of systemic infection   may still be low. Retesting may be indicated. Values from day 1 and day 4 can be entered into the Change in Procalcitonin Calculator   ([email or web address].gis.tos-pct-calculator. [employer]) to determine the patient's Mortality Risk Prognosis        In healthy neonates, plasma Procalcitonin (PCT) concentrations increase gradually after   birth, reaching peak values at about 24 hours of age then decrease to normal values below   0.5 ng/mL by 48-72 hours of age.          Intake/Output Summary (Last 24 hours) at 5/1/2022 1604  Last data filed at 5/1/2022 1436  Gross per 24 hour   Intake 3311.02 ml   Output 1750 ml   Net 1561.02 ml     Temp: 100.9    Culture Date / Source  /  Results  4/30              Urine          neg  4/30              MRSA nasal  Neg  4/30              Blood Pending  4/30              Sputum         Pending    Recent vancomycin administrations                     vancomycin (VANCOCIN) 1,250 mg in dextrose 5 % 250 mL IVPB (ADDAVIAL) (mg) 1,250 mg New Bag 05/01/22 1518     1,250 mg New Bag  0152     1,250 mg New Bag 04/30/22 1440     1,250 mg New Bag  0207     1,250 mg New Bag 04/29/22 1413     1,250 mg New Bag  0202                    Vancomycin Concentrations:   TROUGH:  No results for input(s): VANCOTROUGH in the last 72 hours. RANDOM:    Recent Labs     04/29/22  0530   VANCORANDOM 21.1       MRSA Nasal Swab: N/A. Non-respiratory infection. Charline [employer] PLAN     Continue current dose of 1250 mg q12h IV  Ensured BUN/sCr ordered at baseline and every 48 hours x at least 3 levels, then at least weekly. Pharmacy will continue to monitor patient and adjust therapy as indicated      Vancomycin Target Concentration Parameters  Treatment  Population Target AUC/HORTENCIA Target Trough   Invasive MRSA Infection (bacteremia, pneumonia, meningitis, endocarditis, osteomyelitis)  Sepsis (undifferentiated) 400-600 N/A   Infection due to non-MRSA pathogen  Empiric treatment of non-invasive MRSA infection  (SSTI, UTI) <500 10-15 mg/L   CrCl < 29 mL/min  Rapidly fluctuating serum creatinine   OSCAR N/A < 15 mg/L     Renal replacement therapy is dosed by levels, per hospital protocol. Abbreviations  * Pauc: probability that AUC is >400 (efficacy); Pconc: probability that Ctrough is above 20 ?g/mL (toxicity); Tox: Probability of nephrotoxicity, based on Zoie et al. Clin Infect Dis 2009.       [address], [address], [address]   5/1/2022  4:08 PM

## 2022-05-01 NOTE — PROGRESS NOTES
[facility]   Infectious diseases evaluation  admission date 4/27/2022    reason for consultation:   Pneumonia    Impression :   Current:  · Multifocal pneumonia  · Sepsis picture likely secondary to above  · Acute hypoxic respiratory failure  · Alcohol withdrawal  · Alcoholic liver cirrhosis  · History of PE      Recommendations   · Continue IV cefepime and Flagyl  · Discontinue vancomycin  · Plan for EGD today  · CT chest without contrast from yesterday reviewed  · MRSA nasal swab negative  · Follow blood cultures, no growth to date  · Mycoplasma serology pending  · Urine for Legionella antigen negative and strep antigen negative  · Hepatitis panel pending  · Continue supportive care    Infection Control Recommendations   · Mission Precautions      History of Present Illness:   Initial history:  Noah Jorge is a 40y.o.-year-old male presented to hospital with worsening right upper quadrant abdominal pain and distention for several days associated with generalized fatigue, shortness of breath and nonproductive cough. No alleviating or aggravating factor. Chest x-ray suggestive of multifocal pneumonia and small left pleural effusion  CT abdomen and pelvis 4/27/2022 showed enlarged liver, diffuse hepatocellular disease, mild gallbladder wall thickening with no gallstones. 4/28/2022 urinalysis unremarkable  The patient is alcoholic. The patient was recently seen at [facility] ER for presyncopal episode found to be anemic. Occult blood was positive was discharged home on Protonix    Interval changes  5/1/2022   The patient had a fever with a temperature max of 102.8 yesterday, was tachycardic transferred to ICU. He is complaining of cough, denied shortness of breath, on oxygen via nasal cannula, on Librium.   Afebrile today  Blood cultures on 4/27/2022 no growth  COVID-19/influenza combo PCR was negative on 4/27/2022  Patient Vitals for the past 8 hrs:   BP Temp Temp src Pulse Resp SpO2   05/01/22 1100 133/70 -- -- 109 29 95 %   05/01/22 1000 122/82 -- -- 106 20 96 %   05/01/22 0900 126/85 -- -- 113 18 92 %   05/01/22 0800 (!) 118/59 98.5 °F (36.9 °C) Oral 103 23 97 %   05/01/22 0700 123/82 -- -- 105 17 98 %   05/01/22 0645 -- -- -- 113 25 95 %   05/01/22 0630 128/74 -- -- 103 28 96 %   05/01/22 0600 122/77 -- -- 102 21 95 %           I have personally reviewed the past medical history, past surgical history, medications, social history, and family history, and I haveupdated the database accordingly. Allergies:   Bee venom     Review of Systems:     Review of Systems  As per history of present illness, other than above 12 system review was negative  Physical Examination :       Physical Exam  Constitutional:       Appearance: He is ill-appearing. HENT:      Head: Normocephalic and atraumatic. Right Ear: External ear normal.      Left Ear: External ear normal.   Eyes:      General: Scleral icterus present. Pupils: Pupils are equal, round, and reactive to light. Cardiovascular:      Heart sounds: Normal heart sounds. No murmur heard. Pulmonary:      Effort: No respiratory distress. Breath sounds: No wheezing. Abdominal:      Palpations: Abdomen is soft. Tenderness: There is no abdominal tenderness. Musculoskeletal:      Right lower leg: No edema. Left lower leg: No edema. Skin:     Coloration: Skin is jaundiced. Findings: No erythema. Neurological:      General: No focal deficit present. Mental Status: He is alert and oriented to person, place, and time. Past Medical History:     Past Medical History:   Diagnosis Date    Alcoholism ([address]     pt drinks a fifth of whiskey daily    Anemia     Pulmonary embolism (HCC)        Past Surgical  History:   History reviewed. No pertinent surgical history.     Medications:      [MAR Hold] sodium chloride flush  5-40 mL IntraVENous 2 times per day  [MAR Hold] carvedilol  3.125 mg Oral BID WC    [MAR Hold] folic acid  1 mg Oral Daily    [MAR Hold] thiamine mononitrate  100 mg Oral Daily    [MAR Hold] multivitamin  1 tablet Oral Daily    [MAR Hold] nicotine  1 patch TransDERmal Daily    [MAR Hold] chlordiazePOXIDE  10 mg Oral 4x Daily    [MAR Hold] dextromethorphan  60 mg Oral 2 times per day    [MAR Hold] benzonatate  200 mg Oral TID    [MAR Hold] metroNIDAZOLE  500 mg IntraVENous Q8H    [MAR Hold] cefepime  2,000 mg IntraVENous Q8H    [MAR Hold] losartan  50 mg Oral Daily    [MAR Hold] pantoprazole (PROTONIX) 40 mg injection  40 mg IntraVENous Q12H    [MAR Hold] sodium chloride flush  5-40 mL IntraVENous 2 times per day    [MAR Hold] vancomycin (VANCOCIN) intermittent dosing (placeholder)   Other RX Placeholder    [MAR Hold] vancomycin  1,250 mg IntraVENous Q12H    [MAR Hold] sodium chloride flush  5-40 mL IntraVENous 2 times per day    [Held by provider] enoxaparin  40 mg SubCUTAneous Daily       Social History:     Social History     Socioeconomic History    Marital status: Unknown     Spouse name: Not on file    Number of children: Not on file    Years of education: Not on file    Highest education level: Not on file   Occupational History    Not on file   Tobacco Use    Smoking status: Current Every Day Smoker     Packs/day: 1.00     Years: 21.00     Pack years: 21.00     Types: Cigarettes    Smokeless tobacco: Never Used    Tobacco comment: using nicotine patches at night   Vaping Use    Vaping Use: Never used   Substance and Sexual Activity    Alcohol use: Not Currently     Comment: hx of alcoholism; pt drinks a fifth of whiskey daily    Drug use: Not Currently     Comment: used cocaine in early 20's    Sexual activity: Not on file   Other Topics Concern    Not on file   Social History Narrative    Not on file     Social Determinants of Health     Financial Resource Strain: Low Risk     Difficulty of Paying Living Expenses: Not hard at all   Food Insecurity: No Food Insecurity    Worried About [address] in the Last Year: Never true    Sheri of Food in the Last Year: Never true   Transportation Needs: No Transportation Needs    Lack of Transportation (Medical): No    Lack of Transportation (Non-Medical):  No   Physical Activity:     Days of Exercise per Week: Not on file    Minutes of Exercise per Session: Not on file   Stress:     Feeling of Stress : Not on file   Social Connections:     Frequency of Communication with Friends and Family: Not on file    Frequency of Social Gatherings with Friends and Family: Not on file    Attends Holiness Services: Not on file    Active Member of Clubs or Organizations: Not on file    Attends Club or Organization Meetings: Not on file    Marital Status: Not on file   Intimate Partner Violence:     Fear of Current or Ex-Partner: Not on file    Emotionally Abused: Not on file    Physically Abused: Not on file    Sexually Abused: Not on file   Housing Stability:     Unable to Pay for Housing in the Last Year: Not on file    Number of [place] in the Last Year: Not on file    Unstable Housing in the Last Year: Not on file       Family History:     Family History   Problem Relation Age of Onset    Heart Disease Mother     Heart Attack Mother     Heart Disease Father       Medical Decision Making:   I have independently reviewed/ordered the following labs:    CBC with Differential:   Recent Labs     04/30/22  1407 05/01/22  0450   WBC 16.2* 17.6*   HGB 8.8* 9.3*   HCT 26.2* 27.2*    242   LYMPHOPCT 8* 6*   MONOPCT 4 7     BMP:  Recent Labs     04/30/22  1407 05/01/22  0450    137   K 4.8 3.7    107   CO2 18* 19*   BUN 4* 4*   CREATININE 0.45* 0.48*     Hepatic Function Panel:   Recent Labs     04/30/22  1407 05/01/22  0450   PROT 6.5 6.4   LABALBU 2.1* 2.2*   BILITOT 4.80* 4.86*   ALKPHOS 268* 237*   ALT 34 31   * 206*     No results for input(s): RPR in the last 72 hours. No results for input(s): HIV in the last 72 hours. No results for input(s): BC in the last 72 hours. Lab Results   Component Value Date    CREATININE 0.48 05/01/2022    GLUCOSE 96 05/01/2022       Detailed results: Thank you for allowing us to participate in the care of this patient. Please call with questions. This note is created with the assistance of a speech recognition program.  While intending to generate adocument that actually reflects the content of the visit, the document can still have some errors including those of syntax and sound a like substitutions which may escape proof reading. It such instances, actual meaningcan be extrapolated by contextual diversion.     Anh Terry MD  Office: [phone]  Perfect serve / office [phone]

## 2022-05-01 NOTE — ANESTHESIA POSTPROCEDURE EVALUATION
POST- ANESTHESIA EVALUATION       Pt Name: Arnaldo Prince  MRN: [id number]  YOB: 1985  Date of evaluation: 5/1/2022  Time:  2:08 PM      /70   Pulse 109   Temp 98.5 °F (36.9 °C) (Oral)   Resp 29   Ht 5' 8\" (1.727 m)   Wt 216 lb 11.4 oz (98.3 kg)   SpO2 95%   BMI 32.95 kg/m²      Consciousness Level  Awake  Cardiopulmonary Status  Stable  Pain Adequately Treated YES  Nausea / Vomiting  NO  Adequate Hydration  YES  Anesthesia Related Complications NONE      Electronically signed by Tk Whitman MD on 5/1/2022 at 2:08 PM       Department of Anesthesiology  Postprocedure Note    Patient: Arnaldo Prince  MRN: [id number]  YOB: 1985  Date of evaluation: 5/1/2022  Time:  2:07 PM     Procedure Summary     Date: 05/01/22 Room / Location: [address] [place] [address]: [facility]    Anesthesia Start: 5526 Anesthesia Stop: 5150    Procedure: EGD ESOPHAGOGASTRODUODENOSCOPY (N/A Esophagus) Diagnosis: (anemia)    Surgeons: Nadja Curry MD Responsible Provider: Tk Whitman MD    Anesthesia Type: general ASA Status: 3          Anesthesia Type: general    Antoinette Phase I:      Antoinette Phase II:      Last vitals: Reviewed and per EMR flowsheets.        Anesthesia Post Evaluation

## 2022-05-01 NOTE — PLAN OF CARE
Problem: Discharge Planning  Goal: Discharge to home or other facility with appropriate resources  5/1/2022 0812 by Harleen Walker RN  Outcome: Progressing  5/1/2022 0329 by Sherry Wolff RN  Outcome: Progressing  Flowsheets (Taken 4/30/2022 1645 by Estella Mustafa RN)  Discharge to home or other facility with appropriate resources:   Identify barriers to discharge with patient and caregiver   Arrange for needed discharge resources and transportation as appropriate   Identify discharge learning needs (meds, wound care, etc)     Problem: Pain  Goal: Verbalizes/displays adequate comfort level or baseline comfort level  5/1/2022 0812 by Harleen Walker RN  Outcome: Progressing  5/1/2022 0329 by Sherry Wolff RN  Outcome: Progressing  Flowsheets (Taken 5/1/2022 0329)  Verbalizes/displays adequate comfort level or baseline comfort level:   Encourage patient to monitor pain and request assistance   Assess pain using appropriate pain scale   Implement non-pharmacological measures as appropriate and evaluate response     Problem: Potential for Alteration in Skin Integrity  Goal: Monitor skin for areas of alteration in skin integrity  Description: Patient/family/caregiver will demonstrate ability to care for patient's skin, monitor for areas of breakdown, and demonstrate methods to prevent breakdown during hospice care. 5/1/2022 3665 by Harleen Walker RN  Outcome: Progressing  5/1/2022 0329 by Sherry Wolff RN  Outcome: Progressing     Problem: Risk for Falls  Goal: Fall prevention  Description: Patient  will remain free from falls as evidenced by no witnessed or reported falls each shift during the inpatient hospice stay. Patient  and or family/caregiver will receive education on fall prevention as evidenced by verbalizing recall of using the call lights system, fall prevention devices, and asking for help during the admission process and ongoing as needed during the inpatient hospice stay.     5/1/2022 5370 by Teodora You RN  Outcome: Progressing  5/1/2022 0329 by Lenny Deutsch RN  Outcome: Progressing  Intervention: Keyonna Friend on fall prevention  5/1/2022 0329 by Lenny Deutsch RN  Note: Pt remains free from falls this shift. Bed in low position with wheels locked and siderails x2. Bed alarm activated. Call light and bedside table within reach. Hourly rounding to ensure pt safety. Problem: Alteration in Mobility  Goal: Remain as independent as possible and remain safe in environment  Description: Patient  will perform tasks or ADLs within their capabilities as often as they are able, as evidenced by remaining free of injury during the inpatient hospice stay. 5/1/2022 4524 by Teodora You RN  Outcome: Progressing  5/1/2022 0329 by Lenny Deutsch RN  Outcome: Progressing     Problem: Pain  Goal: Control of acute pain  Description: Patient  will exhibit a decrease in pain as evidenced by a pain rating less than 5 on the 0-10 scale within 1 hour of receiving pain medication during the inpatient hospice stay. 5/1/2022 2061 by Teodora You RN  Outcome: Progressing  5/1/2022 0329 by Lenny Deutsch RN  Outcome: Progressing     Problem: Anxiety/Agitation/Restlessness  Goal: Verbalize or staff assess the ability to manage anxiety  Description: Patient  will demonstrate appropriate behavior as evidenced by less than two episodes of verbalized anxiety each shift during the inpatient hospice stay. 5/1/2022 6301 by Teodora You RN  Outcome: Progressing  5/1/2022 0329 by Lenny Deutsch RN  Outcome: Progressing     Problem: Nausea/Vomiting (Adult)  Goal: Control of nausea/vomiting  Description: Patient  will exhibit decreased or eliminated episodes of nausea/vomiting as evidenced by less than 2 PRN antiemetic medication administrations each shift during the inpatient hospice stay.     5/1/2022 8243 by Teodora You RN  Outcome: Progressing  5/1/2022 0329 by Lenny Deutsch RN  Outcome: Progressing Problem: Chronic Conditions and Co-morbidities  Goal: Patient's chronic conditions and co-morbidity symptoms are monitored and maintained or improved  5/1/2022 0812 by Phil Homans, RN  Outcome: Progressing  5/1/2022 0329 by Marco Woodall RN  Outcome: Progressing     Problem: Safety - Adult  Goal: Free from fall injury  5/1/2022 0812 by Phil Homans, RN  Outcome: Progressing  5/1/2022 0329 by Marco Woodall RN  Outcome: Progressing  Flowsheets (Taken 5/1/2022 0329)  Free From Fall Injury: Instruct family/caregiver on patient safety     Problem: ABCDS Injury Assessment  Goal: Absence of physical injury  5/1/2022 0812 by Phil Homans, RN  Outcome: Progressing  5/1/2022 0329 by Marco Woodall RN  Outcome: Progressing     Problem: Safety - Adult  Goal: Free from fall injury  5/1/2022 0812 by Phil Homans, RN  Outcome: Progressing  5/1/2022 0329 by Marco Woodall RN  Outcome: Progressing  Flowsheets (Taken 5/1/2022 0329)  Free From Fall Injury: Instruct family/caregiver on patient safety     Problem: ABCDS Injury Assessment  Goal: Absence of physical injury  5/1/2022 0812 by Phil Homans, RN  Outcome: Progressing  5/1/2022 0329 by Marco Woodall RN  Outcome: Progressing     Problem: Safety - Adult  Goal: Free from fall injury  5/1/2022 0813 by Phil Homans, RN  Outcome: Completed  Flowsheets (Taken 5/1/2022 0813)  Free From Fall Injury: Instruct family/caregiver on patient safety  5/1/2022 0812 by Phil Homans, RN  Outcome: Progressing  5/1/2022 0329 by Marco Woodall RN  Outcome: Progressing  Flowsheets (Taken 5/1/2022 0329)  Free From Fall Injury: Instruct family/caregiver on patient safety     Problem: ABCDS Injury Assessment  Goal: Absence of physical injury  5/1/2022 0813 by Phil Homans, RN  Outcome: Completed  Note: Patient has experienced no injuries or falls during this admission. Writer will continue to monitor closely and assist with patient's needs.   5/1/2022 8303 by Phil Homans, RN  Outcome: Progressing  5/1/2022 0329 by Aries Willett RN  Outcome: Progressing

## 2022-05-01 NOTE — PROGRESS NOTES
ICU Progress Note (Vent)   [facility]    Patient - Genevieve Avila,  Age - 40 y.o.    - 1985      Room Number -    MRN -  [id number]   Acct # - [de-identified]  Date of Admission -  2022  5:50 PM    Events of Past 24 Hours   Did not have to use Precedex drip, seems to be calmer today although has severe cough  Seen after EGD, no active bleeding noted    Vitals    height is 5' 8\" (1.727 m) and weight is 216 lb 11.4 oz (98.3 kg). His oral temperature is 100.9 °F (38.3 °C). His blood pressure is 131/76 and his pulse is 98. His respiration is 18 and oxygen saturation is 95%. Temperature Range: Temp: 100.9 °F (38.3 °C) Temp  Av.3 °F (37.4 °C)  Min: 97.7 °F (36.5 °C)  Max: 102.6 °F (39.2 °C)  BP Range:  Systolic (96EBB), PVS:047 , Min:80 , PAW:613     Diastolic (30RAZ), RJM:26, Min:45, Max:85    Pulse Range: Pulse  Av.1  Min: 94  Max: 121  Respiration Range: Resp  Av.6  Min: 0  Max: 35  Current Pulse Ox[de-identified]  SpO2: 95 %  24HR Pulse Ox Range:  SpO2  Av.7 %  Min: 90 %  Max: 98 %  Oxygen Amount and Delivery: O2 Flow Rate (L/min): 2 L/min      Wt Readings from Last 3 Encounters:   22 216 lb 11.4 oz (98.3 kg)   22 190 lb (86.2 kg)   10/26/21 200 lb (90.7 kg)     I/O       Intake/Output Summary (Last 24 hours) at 2022 1535  Last data filed at 2022 1436  Gross per 24 hour   Intake 3311.02 ml   Output 1750 ml   Net 1561.02 ml     I/O last 3 completed shifts:   In: 240 [P.O.:240]  Out: 2860 [Urine:2275]     DRAIN/TUBE OUTPUT:     Invasive Lines       ICP PRESSURE RANGE:  No data recorded  CVP PRESSURE RANGE:  No data recorded  Mechanical Ventilation Data   SETTINGS (Comprehensive)     Additional Respiratory Assessments  Pulse: 98  Resp: 18  SpO2: 95 %       ABGs:   Lab Results   Component Value Date    PHART 7.436 2022    PO2ART 69.0 2022    UBB0HFB 29.1 2022       No results found for: IFIO2, MODE, SETTIDVOL, SETPEEP      Medications   IV   dexmedetomidine Stopped (04/30/22 2011)    sodium chloride 100 mL/hr at 04/30/22 1815    sodium chloride        carvedilol  3.125 mg Oral BID WC    folic acid  1 mg Oral Daily    thiamine mononitrate  100 mg Oral Daily    multivitamin  1 tablet Oral Daily    nicotine  1 patch TransDERmal Daily    chlordiazePOXIDE  10 mg Oral 4x Daily    dextromethorphan  60 mg Oral 2 times per day    benzonatate  200 mg Oral TID    metroNIDAZOLE  500 mg IntraVENous Q8H    cefepime  2,000 mg IntraVENous Q8H    losartan  50 mg Oral Daily    pantoprazole (PROTONIX) 40 mg injection  40 mg IntraVENous Q12H    sodium chloride flush  5-40 mL IntraVENous 2 times per day    vancomycin (VANCOCIN) intermittent dosing (placeholder)   Other RX Placeholder    vancomycin  1,250 mg IntraVENous Q12H    sodium chloride flush  5-40 mL IntraVENous 2 times per day    [Held by provider] enoxaparin  40 mg SubCUTAneous Daily       Diet/Nutrition   ADULT DIET; Full Liquid    Exam   VITALS    height is 5' 8\" (1.727 m) and weight is 216 lb 11.4 oz (98.3 kg). His oral temperature is 100.9 °F (38.3 °C). His blood pressure is 131/76 and his pulse is 98. His respiration is 18 and oxygen saturation is 95%. Constitutional -alert, oriented  General Appearance  well developed, well nourished obese  HEENT -macroglossia normocephalic, atraumatic. PERRLA  Lungs - Chest expands equally, no wheezes, rales or rhonchi. Cardiovascular - Heart sounds are normal.  normal rate and rhythm regular, no murmur, gallop or rub. Abdomen - soft, nontender, nondistended, no masses or organomegaly  Neurologic - CN II-XII are grossly intact.  There are no focal motor deficits  Skin - no bruising or bleeding  Extremities - no cyanosis, clubbing or edema    Lab Results   CBC     Lab Results   Component Value Date    WBC 17.6 05/01/2022    RBC 2.10 05/01/2022    HGB 9.3 05/01/2022    HCT 27.2 05/01/2022     05/01/2022 .0 05/01/2022    MCH 44.3 05/01/2022    MCHC 34.3 05/01/2022    RDW 18.8 05/01/2022    NRBC 1 05/17/2021    METASPCT 1 05/01/2022    LYMPHOPCT 6 05/01/2022    MONOPCT 7 05/01/2022    BASOPCT 0 05/01/2022    MONOSABS 1.23 05/01/2022    LYMPHSABS 1.06 05/01/2022    EOSABS 0.00 05/01/2022    BASOSABS 0.00 05/01/2022    DIFFTYPE NOT REPORTED 01/30/2022       BMP   Lab Results   Component Value Date     05/01/2022    K 3.7 05/01/2022     05/01/2022    CO2 19 05/01/2022    BUN 4 05/01/2022    CREATININE 0.48 05/01/2022    GLUCOSE 96 05/01/2022    CALCIUM 6.7 05/01/2022       LFTS  Lab Results   Component Value Date    ALKPHOS 237 05/01/2022    ALT 31 05/01/2022     05/01/2022    PROT 6.4 05/01/2022    BILITOT 4.86 05/01/2022    BILIDIR 4.24 04/27/2022    IBILI 1.62 04/27/2022    LABALBU 2.2 05/01/2022       INR  No results for input(s): PROTIME, INR in the last 72 hours. APTT  No results for input(s): APTT in the last 72 hours. Lactic Acid  Lab Results   Component Value Date    LACTA 2.1 05/01/2022    LACTA 1.6 04/28/2022    LACTA 1.0 06/20/2021        BNP   No results for input(s): BNP in the last 72 hours.      Cultures       Radiology     Plain Films         CT Scans    See actual reports for details    SYSTEM ASSESSMENT      Multifocal pneumonia with hypoxemia  History of alcohol abuse with withdrawal  History of tobacco use with withdrawal  Elevated lactic acid level  Alcoholic liver cirrhosis  Probable SONJA  History of pulmonary embolism (small right lower lobe 5/2020)  EGD done on 5/1 showed no active bleeding, just gastritis (patient stated that he had some bloody bowel movements\"      Continue to monitor for worsening alcohol withdrawal 1 more day in the intermediate ICU  Precedex if needed  Continue Librium  Decrease IV fluids to 75 mL/h  Continue cefepime/Flagyl  Discontinue vancomycin MRSA swab is negative  Continue Tessalon Perles, Delsym cough syrup (both are scheduled) and will add Hycodan cough syrup as needed  Tramadol as needed for pain/headache  Continue to monitor for oversedation given probable SONJA  Continue nicotine patch  Discussed with patient's wife and patient  Critical Care Time   0 min    Electronically signed by Kushal Bowen MD on 5/1/2022 at 3:35 PM

## 2022-05-01 NOTE — OP NOTE
PROCEDURE NOTE    DATE OF PROCEDURE: 5/1/2022     SURGEON: Ry Juarez MD    ASSISTANT: None    PREOPERATIVE DIAGNOSIS: GI BLEEDING  ANEMIA  ETOH ABUSE    POSTOPERATIVE DIAGNOSIS: As described below    OPERATION: Upper GI endoscopy with Biopsy    ANESTHESIA: MAC PER ANESTHESIA     ESTIMATED BLOOD LOSS: Less than 50 ml    COMPLICATIONS: None. SPECIMENS:  Was Not Obtained    HISTORY: The patient is a 40y.o. year old male with history of above preop diagnosis. I recommended esophagogastroduodenoscopy with possible biopsy and I explained the risk, benefits, expected outcome, and alternatives to the procedure. Risks included but are not limited to bleeding, infection, respiratory distress, hypotension, and perforation of the esophagus, stomach, or duodenum. Patient understands and is in agreement. PROCEDURE: The patient was given IV conscious sedation. The patient's SPO2 remained above 90% throughout the procedure. The gastroscope was inserted orally and advanced under direct vision through the esophagus, through the stomach, through the pylorus, and into the descending duodenum. Findings:    Retropharyngeal area was grossly normal appearing    Esophagus: abnormal: MILD IRREGULAR SCJ WAS NOTED BIOPSIES WERE NOT TAKEN AT THIS TIME    Stomach:    Fundus: normal    Body: normal    Antrum: abnormal: MILD DIFFUSE GASTRITIS NO BLEEDING OR ANY ULCERS WERE SEEN    Duodenum:     Descending: normal    Bulb: normal    The scope was removed and the patient tolerated the procedure well. Recommendations/Plan:   1. F/U HGB  2. DT PROPHYLAXIS  3. PPI  4. MAY NEED COLON AT SOME POINT  5. DW FAMILY   6.  Post sedation patient was stable with stable vital signs and stable O2 saturations    Electronically signed by Ry Juarez MD  on 5/1/2022 at 1:59 PM

## 2022-05-01 NOTE — PROGRESS NOTES
Pt up to bathroom to have bowel movement and notified RN of blood in stool. Small amount of bright red blood noted in toilet. Will continue to monitor.

## 2022-05-01 NOTE — PLAN OF CARE
Problem: Discharge Planning  Goal: Discharge to home or other facility with appropriate resources  5/1/2022 0329 by Troy Remy RN  Outcome: Progressing  Flowsheets (Taken 4/30/2022 1645 by Elefgo Dyson RN)  Discharge to home or other facility with appropriate resources:   Identify barriers to discharge with patient and caregiver   Arrange for needed discharge resources and transportation as appropriate   Identify discharge learning needs (meds, wound care, etc)  4/30/2022 1728 by Elfego Dyson RN  Outcome: Progressing  4/30/2022 1727 by Elfego Dyson RN  Outcome: Progressing     Problem: Pain  Goal: Verbalizes/displays adequate comfort level or baseline comfort level  5/1/2022 0329 by Troy Remy RN  Outcome: Progressing  Flowsheets (Taken 5/1/2022 0329)  Verbalizes/displays adequate comfort level or baseline comfort level:   Encourage patient to monitor pain and request assistance   Assess pain using appropriate pain scale   Implement non-pharmacological measures as appropriate and evaluate response  4/30/2022 1728 by Elfego Dyson RN  Outcome: Progressing  4/30/2022 1727 by Elfego Dyson RN  Outcome: Progressing     Problem: Potential for Alteration in Skin Integrity  Goal: Monitor skin for areas of alteration in skin integrity  5/1/2022 0329 by Troy Remy RN  Outcome: Progressing  4/30/2022 1728 by Elfego Dyson RN  Outcome: Progressing  4/30/2022 1727 by Elfego Dyson RN  Outcome: Progressing     Problem: Risk for Falls  Goal: Fall prevention  5/1/2022 0329 by Troy Remy RN  Outcome: Progressing  4/30/2022 1728 by Elfego Dyson RN  Outcome: Progressing  4/30/2022 1727 by Elfego Dyson RN  Outcome: Progressing  Intervention: Durel Glassing on fall prevention  Note: Pt remains free from falls this shift. Bed in low position with wheels locked and siderails x2. Bed alarm activated.  Call light and bedside table within reach. Hourly rounding to ensure pt safety.       Problem: Alteration in Mobility  Goal: Remain as independent as possible and remain safe in environment  5/1/2022 0329 by Javier Villanueva RN  Outcome: Progressing  4/30/2022 1728 by Kaitlin Ritchie RN  Outcome: Progressing  4/30/2022 1727 by Kaitlin Ritchie RN  Outcome: Progressing     Problem: Pain  Goal: Control of acute pain  5/1/2022 0329 by Javier Villanueva RN  Outcome: Progressing  4/30/2022 1728 by Kaitlin Ritchie RN  Outcome: Progressing  4/30/2022 1727 by Kaitlin Ritchie RN  Outcome: Progressing     Problem: Anxiety/Agitation/Restlessness  Goal: Verbalize or staff assess the ability to manage anxiety  5/1/2022 0329 by Javier Villanueva RN  Outcome: Progressing  4/30/2022 1728 by Kaitlin Ritchie RN  Outcome: Progressing  4/30/2022 1727 by Kaitlin Ritchie RN  Outcome: Progressing     Problem: Nausea/Vomiting (Adult)  Goal: Control of nausea/vomiting  5/1/2022 0329 by Javier Villanueva RN  Outcome: Progressing  4/30/2022 1728 by Kaitlin Ritchie RN  Outcome: Progressing  4/30/2022 1727 by Kaitlin Ritchie RN  Outcome: Progressing     Problem: Chronic Conditions and Co-morbidities  Goal: Patient's chronic conditions and co-morbidity symptoms are monitored and maintained or improved  5/1/2022 0329 by Javier Villanueva RN  Outcome: Progressing  4/30/2022 1728 by Kaitlin Ritchie RN  Outcome: Progressing  4/30/2022 1727 by Kaitlin Ritchie RN  Outcome: Progressing     Problem: Safety - Adult  Goal: Free from fall injury  5/1/2022 0329 by Javier Villanueva RN  Outcome: Progressing  Flowsheets (Taken 5/1/2022 0329)  Free From Fall Injury: Instruct family/caregiver on patient safety  4/30/2022 1728 by Kaitlin Ritchie RN  Outcome: Progressing  4/30/2022 1727 by Kaitlin Ritchie RN  Outcome: Progressing     Problem: ABCDS Injury Assessment  Goal: Absence of physical injury  5/1/2022 0329 by Mike Leonard RN  Outcome: Progressing  4/30/2022 1728 by Charli Henderson RN  Outcome: Progressing  4/30/2022 1727 by Charli Henderson RN  Outcome: Progressing

## 2022-05-02 ENCOUNTER — APPOINTMENT (OUTPATIENT)
Dept: GENERAL RADIOLOGY | Age: 37
DRG: 720 | End: 2022-05-02
Payer: MEDICAID

## 2022-05-02 LAB
ABSOLUTE BANDS #: 3.02 K/UL (ref 0–1)
ABSOLUTE EOS #: 0 K/UL (ref 0–0.4)
ABSOLUTE LYMPH #: 0.32 K/UL (ref 1–4.8)
ABSOLUTE MONO #: 0.48 K/UL (ref 0.1–1.3)
ANION GAP SERPL CALCULATED.3IONS-SCNC: 8 MMOL/L (ref 9–17)
BANDS: 19 % (ref 0–10)
BASOPHILS # BLD: 0 % (ref 0–2)
BASOPHILS ABSOLUTE: 0 K/UL (ref 0–0.2)
BUN BLDV-MCNC: 5 MG/DL (ref 6–20)
CALCIUM SERPL-MCNC: 6.6 MG/DL (ref 8.6–10.4)
CHLORIDE BLD-SCNC: 107 MMOL/L (ref 98–107)
CO2: 20 MMOL/L (ref 20–31)
CREAT SERPL-MCNC: <0.4 MG/DL (ref 0.7–1.2)
CULTURE: NORMAL
CULTURE: NORMAL
EBV EARLY ANTIGEN IGG: 20 U/ML
EBV INTERPRETATION: ABNORMAL
EBV NUCLEAR AG AB: 621 U/ML
EOSINOPHILS RELATIVE PERCENT: 0 % (ref 0–4)
EPSTEIN-BARR VCA IGG: 740 U/ML
EPSTEIN-BARR VCA IGM: 1018 U/ML
GFR AFRICAN AMERICAN: ABNORMAL ML/MIN
GFR NON-AFRICAN AMERICAN: ABNORMAL ML/MIN
GFR SERPL CREATININE-BSD FRML MDRD: ABNORMAL ML/MIN/{1.73_M2}
GLUCOSE BLD-MCNC: 91 MG/DL (ref 70–99)
HAV IGM SER IA-ACNC: NONREACTIVE
HCT VFR BLD CALC: 25.5 % (ref 41–53)
HEMOGLOBIN: 8.9 G/DL (ref 13.5–17.5)
HEPATITIS B CORE IGM ANTIBODY: NONREACTIVE
HEPATITIS B SURFACE ANTIGEN: NONREACTIVE
HEPATITIS C ANTIBODY: NONREACTIVE
LYMPHOCYTES # BLD: 2 % (ref 24–44)
MCH RBC QN AUTO: 43.7 PG (ref 26–34)
MCHC RBC AUTO-ENTMCNC: 34.7 G/DL (ref 31–37)
MCV RBC AUTO: 125.9 FL (ref 80–100)
MONOCYTES # BLD: 3 % (ref 1–7)
MORPHOLOGY: ABNORMAL
MYCOPLASMA PNEUMONIAE IGM: 0.11
PDW BLD-RTO: 18.1 % (ref 11.5–14.9)
PLATELET # BLD: 232 K/UL (ref 150–450)
PMV BLD AUTO: 9.1 FL (ref 6–12)
POTASSIUM SERPL-SCNC: 3.9 MMOL/L (ref 3.7–5.3)
PRO-BNP: 772 PG/ML
RBC # BLD: 2.03 M/UL (ref 4.5–5.9)
SEG NEUTROPHILS: 76 % (ref 36–66)
SEGMENTED NEUTROPHILS ABSOLUTE COUNT: 12.08 K/UL (ref 1.3–9.1)
SODIUM BLD-SCNC: 135 MMOL/L (ref 135–144)
SPECIMEN DESCRIPTION: NORMAL
SPECIMEN DESCRIPTION: NORMAL
WBC # BLD: 15.9 K/UL (ref 3.5–11)

## 2022-05-02 PROCEDURE — 6360000002 HC RX W HCPCS: Performed by: INTERNAL MEDICINE

## 2022-05-02 PROCEDURE — 2580000003 HC RX 258: Performed by: INTERNAL MEDICINE

## 2022-05-02 PROCEDURE — 80048 BASIC METABOLIC PNL TOTAL CA: CPT

## 2022-05-02 PROCEDURE — 94664 DEMO&/EVAL PT USE INHALER: CPT

## 2022-05-02 PROCEDURE — 99232 SBSQ HOSP IP/OBS MODERATE 35: CPT | Performed by: INTERNAL MEDICINE

## 2022-05-02 PROCEDURE — 80074 ACUTE HEPATITIS PANEL: CPT

## 2022-05-02 PROCEDURE — 6370000000 HC RX 637 (ALT 250 FOR IP): Performed by: INTERNAL MEDICINE

## 2022-05-02 PROCEDURE — C9113 INJ PANTOPRAZOLE SODIUM, VIA: HCPCS | Performed by: INTERNAL MEDICINE

## 2022-05-02 PROCEDURE — 94761 N-INVAS EAR/PLS OXIMETRY MLT: CPT

## 2022-05-02 PROCEDURE — A4216 STERILE WATER/SALINE, 10 ML: HCPCS | Performed by: INTERNAL MEDICINE

## 2022-05-02 PROCEDURE — 85025 COMPLETE CBC W/AUTO DIFF WBC: CPT

## 2022-05-02 PROCEDURE — 2060000000 HC ICU INTERMEDIATE R&B

## 2022-05-02 PROCEDURE — 36415 COLL VENOUS BLD VENIPUNCTURE: CPT

## 2022-05-02 PROCEDURE — 2700000000 HC OXYGEN THERAPY PER DAY

## 2022-05-02 PROCEDURE — APPSS30 APP SPLIT SHARED TIME 16-30 MINUTES: Performed by: NURSE PRACTITIONER

## 2022-05-02 PROCEDURE — 71045 X-RAY EXAM CHEST 1 VIEW: CPT

## 2022-05-02 PROCEDURE — 6360000002 HC RX W HCPCS

## 2022-05-02 PROCEDURE — 94640 AIRWAY INHALATION TREATMENT: CPT

## 2022-05-02 PROCEDURE — 2500000003 HC RX 250 WO HCPCS: Performed by: INTERNAL MEDICINE

## 2022-05-02 PROCEDURE — 99233 SBSQ HOSP IP/OBS HIGH 50: CPT | Performed by: INTERNAL MEDICINE

## 2022-05-02 PROCEDURE — 83880 ASSAY OF NATRIURETIC PEPTIDE: CPT

## 2022-05-02 RX ORDER — ALBUTEROL SULFATE 2.5 MG/3ML
2.5 SOLUTION RESPIRATORY (INHALATION)
Status: DISCONTINUED | OUTPATIENT
Start: 2022-05-02 | End: 2022-05-03 | Stop reason: HOSPADM

## 2022-05-02 RX ORDER — METHYLPREDNISOLONE SODIUM SUCCINATE 40 MG/ML
40 INJECTION, POWDER, LYOPHILIZED, FOR SOLUTION INTRAMUSCULAR; INTRAVENOUS EVERY 6 HOURS
Status: DISCONTINUED | OUTPATIENT
Start: 2022-05-02 | End: 2022-05-02

## 2022-05-02 RX ORDER — METHYLPREDNISOLONE SODIUM SUCCINATE 125 MG/2ML
125 INJECTION, POWDER, LYOPHILIZED, FOR SOLUTION INTRAMUSCULAR; INTRAVENOUS ONCE
Status: COMPLETED | OUTPATIENT
Start: 2022-05-02 | End: 2022-05-02

## 2022-05-02 RX ADMIN — BENZONATATE 200 MG: 200 CAPSULE ORAL at 20:46

## 2022-05-02 RX ADMIN — CEFEPIME HYDROCHLORIDE 2000 MG: 2 INJECTION, POWDER, FOR SOLUTION INTRAVENOUS at 20:59

## 2022-05-02 RX ADMIN — LOSARTAN POTASSIUM 50 MG: 50 TABLET, FILM COATED ORAL at 07:41

## 2022-05-02 RX ADMIN — BENZONATATE 200 MG: 200 CAPSULE ORAL at 07:41

## 2022-05-02 RX ADMIN — THIAMINE HCL TAB 100 MG 100 MG: 100 TAB at 07:41

## 2022-05-02 RX ADMIN — ALBUTEROL SULFATE 2.5 MG: 2.5 SOLUTION RESPIRATORY (INHALATION) at 11:20

## 2022-05-02 RX ADMIN — HYDROCODONE BITARTRATE AND HOMATROPINE METHYLBROMIDE 5 ML: 5; 1.5 SOLUTION ORAL at 00:04

## 2022-05-02 RX ADMIN — METRONIDAZOLE 500 MG: 500 INJECTION, SOLUTION INTRAVENOUS at 01:56

## 2022-05-02 RX ADMIN — CARVEDILOL 3.12 MG: 3.12 TABLET, FILM COATED ORAL at 07:41

## 2022-05-02 RX ADMIN — SODIUM CHLORIDE, PRESERVATIVE FREE 40 MG: 5 INJECTION INTRAVENOUS at 07:42

## 2022-05-02 RX ADMIN — CHLORDIAZEPOXIDE HYDROCHLORIDE 10 MG: 10 CAPSULE ORAL at 07:41

## 2022-05-02 RX ADMIN — BENZONATATE 200 MG: 200 CAPSULE ORAL at 15:26

## 2022-05-02 RX ADMIN — TRAMADOL HYDROCHLORIDE 50 MG: 50 TABLET, COATED ORAL at 07:41

## 2022-05-02 RX ADMIN — METRONIDAZOLE 500 MG: 500 INJECTION, SOLUTION INTRAVENOUS at 10:28

## 2022-05-02 RX ADMIN — ALBUTEROL SULFATE 2.5 MG: 2.5 SOLUTION RESPIRATORY (INHALATION) at 21:16

## 2022-05-02 RX ADMIN — SODIUM CHLORIDE, PRESERVATIVE FREE 10 ML: 5 INJECTION INTRAVENOUS at 07:45

## 2022-05-02 RX ADMIN — CHLORDIAZEPOXIDE HYDROCHLORIDE 10 MG: 10 CAPSULE ORAL at 12:21

## 2022-05-02 RX ADMIN — FOLIC ACID 1 MG: 1 TABLET ORAL at 07:41

## 2022-05-02 RX ADMIN — ALBUTEROL SULFATE 2.5 MG: 2.5 SOLUTION RESPIRATORY (INHALATION) at 16:20

## 2022-05-02 RX ADMIN — MULTIPLE VITAMINS W/ MINERALS TAB 1 TABLET: TAB at 07:41

## 2022-05-02 RX ADMIN — SODIUM CHLORIDE: 9 INJECTION, SOLUTION INTRAVENOUS at 19:17

## 2022-05-02 RX ADMIN — HYDROCODONE BITARTRATE AND HOMATROPINE METHYLBROMIDE 5 ML: 5; 1.5 SOLUTION ORAL at 14:51

## 2022-05-02 RX ADMIN — METRONIDAZOLE 500 MG: 500 INJECTION, SOLUTION INTRAVENOUS at 19:20

## 2022-05-02 RX ADMIN — CHLORDIAZEPOXIDE HYDROCHLORIDE 10 MG: 10 CAPSULE ORAL at 17:02

## 2022-05-02 RX ADMIN — Medication 60 MG: at 07:42

## 2022-05-02 RX ADMIN — CEFEPIME HYDROCHLORIDE 2000 MG: 2 INJECTION, POWDER, FOR SOLUTION INTRAVENOUS at 12:23

## 2022-05-02 RX ADMIN — SODIUM CHLORIDE, PRESERVATIVE FREE 5 ML: 5 INJECTION INTRAVENOUS at 20:59

## 2022-05-02 RX ADMIN — CARVEDILOL 3.12 MG: 3.12 TABLET, FILM COATED ORAL at 17:02

## 2022-05-02 RX ADMIN — SODIUM CHLORIDE: 9 INJECTION, SOLUTION INTRAVENOUS at 05:02

## 2022-05-02 RX ADMIN — METHYLPREDNISOLONE SODIUM SUCCINATE 125 MG: 125 INJECTION, POWDER, FOR SOLUTION INTRAMUSCULAR; INTRAVENOUS at 10:29

## 2022-05-02 RX ADMIN — TRAMADOL HYDROCHLORIDE 50 MG: 50 TABLET, COATED ORAL at 23:14

## 2022-05-02 RX ADMIN — CHLORDIAZEPOXIDE HYDROCHLORIDE 10 MG: 10 CAPSULE ORAL at 20:46

## 2022-05-02 RX ADMIN — CEFEPIME HYDROCHLORIDE 2000 MG: 2 INJECTION, POWDER, FOR SOLUTION INTRAVENOUS at 05:19

## 2022-05-02 RX ADMIN — Medication 60 MG: at 20:46

## 2022-05-02 RX ADMIN — SODIUM CHLORIDE, PRESERVATIVE FREE 40 MG: 5 INJECTION INTRAVENOUS at 20:46

## 2022-05-02 ASSESSMENT — PAIN DESCRIPTION - LOCATION
LOCATION: ABDOMEN
LOCATION: HAND;ABDOMEN

## 2022-05-02 ASSESSMENT — ENCOUNTER SYMPTOMS
VOMITING: 0
COUGH: 1
ABDOMINAL PAIN: 0
NAUSEA: 0
SORE THROAT: 0
SHORTNESS OF BREATH: 1

## 2022-05-02 ASSESSMENT — PAIN SCALES - GENERAL
PAINLEVEL_OUTOF10: 8
PAINLEVEL_OUTOF10: 8

## 2022-05-02 ASSESSMENT — PAIN DESCRIPTION - DESCRIPTORS
DESCRIPTORS: ACHING
DESCRIPTORS: ACHING;THROBBING;SHARP

## 2022-05-02 ASSESSMENT — PAIN - FUNCTIONAL ASSESSMENT: PAIN_FUNCTIONAL_ASSESSMENT: PREVENTS OR INTERFERES SOME ACTIVE ACTIVITIES AND ADLS

## 2022-05-02 NOTE — ANESTHESIA POSTPROCEDURE EVALUATION
Department of Anesthesiology  Postprocedure Note    Patient: Ilya Padgett  MRN: [id number]  YOB: 1985  Date of evaluation: 5/2/2022  Time:  1:04 PM     Procedure Summary     Date: 05/01/22 Room / Location: [address] [place] [address] 04 / [facility] ENDO    Anesthesia Start: 6841 Anesthesia Stop: 6971    Procedure: EGD ESOPHAGOGASTRODUODENOSCOPY (N/A Esophagus) Diagnosis: (anemia)    Surgeons: Shon Fisher MD Responsible Provider: Rosette Torres MD    Anesthesia Type: general ASA Status: 3          Anesthesia Type: general    Antoinette Phase I: Antoinette Score: 9    Antoinette Phase II:      Last vitals: Reviewed and per EMR flowsheets. Anesthesia Post Evaluation    Comments: POD #1. Patient seen at bedside. No anesthesia complications reported.

## 2022-05-02 NOTE — CARE COORDINATION
ONGOING DISCHARGE PLAN:    Patient is alert and oriented x4. Spoke with patient regarding discharge plan and patient confirms that plan is still home with no needs. New c/o wheezing overnight. IV solumedrol 125 mg x1 dose today. STAT CXR, aerosols, IV ATB Cefepime, flagul , PO librium. Will continue to follow for additional discharge needs.     Electronically signed by Maxwell Mera RN on 5/2/2022 at 11:07 AM

## 2022-05-02 NOTE — PROGRESS NOTES
Pulmonary Progress Note  [facility]      Patient - Katie Matos,  Age - 40 y.o.    - 1985      Room Number -    MRN -  [id number]   [facility]  Date of Admission -  2022  5:50 PM    Consulting Kali Purvis MD  Primary Care Physician - Yandel Vu MD     SUBJECTIVE   Patient appears to be in no distress although he mentions that he is wheezing. OBJECTIVE   VITALS    height is 5' 8\" (1.727 m) and weight is 213 lb 6.5 oz (96.8 kg). His oral temperature is 98.9 °F (37.2 °C). His blood pressure is 123/78 and his pulse is 102. His respiration is 20 and oxygen saturation is 96%. Body mass index is 32.45 kg/m². Temperature Range: Temp: 98.9 °F (37.2 °C) Temp  Av.2 °F (37.3 °C)  Min: 97.7 °F (36.5 °C)  Max: 100.9 °F (38.3 °C)  BP Range:  Systolic (08DTA), FPL:700 , Min:92 , NWZ:228     Diastolic (05LGM), ABH:43, Min:45, Max:84    Pulse Range: Pulse  Av.7  Min: 93  Max: 115  Respiration Range: Resp  Av.5  Min: 0  Max: 33  Current Pulse Ox[de-identified]  SpO2: 96 %  24HR Pulse Ox Range:  SpO2  Av.9 %  Min: 88 %  Max: 98 %  Oxygen Amount and Delivery: O2 Flow Rate (L/min): 2 L/min    Wt Readings from Last 3 Encounters:   22 213 lb 6.5 oz (96.8 kg)   22 190 lb (86.2 kg)   10/26/21 200 lb (90.7 kg)       I/O (24 Hours)    Intake/Output Summary (Last 24 hours) at 2022 1041  Last data filed at 2022 0520  Gross per 24 hour   Intake 1761.92 ml   Output 750 ml   Net 1011.92 ml       EXAM     General Appearance  Awake, alert, oriented, in no acute distress  HEENT - normocephalic, atraumatic. Neck - Supple,  trachea midline   Lungs -mid pitched wheezing but no crackles  Heart Exam:PMI normal. No lifts, heaves, or thrills. RRR. No murmurs, clicks, gallops, or rubs  Abdomen Exam: Abdomen soft, non-tender.  BS normal. No masses  Extremity Exam: No signs of cyanosis    MEDS      albuterol  2.5 mg Nebulization Q4H WA    methylPREDNISolone  40 mg IntraVENous Q6H    carvedilol  3.125 mg Oral BID     folic acid  1 mg Oral Daily    thiamine mononitrate  100 mg Oral Daily    multivitamin  1 tablet Oral Daily    nicotine  1 patch TransDERmal Daily    chlordiazePOXIDE  10 mg Oral 4x Daily    dextromethorphan  60 mg Oral 2 times per day    benzonatate  200 mg Oral TID    metroNIDAZOLE  500 mg IntraVENous Q8H    cefepime  2,000 mg IntraVENous Q8H    losartan  50 mg Oral Daily    pantoprazole (PROTONIX) 40 mg injection  40 mg IntraVENous Q12H    sodium chloride flush  5-40 mL IntraVENous 2 times per day    sodium chloride flush  5-40 mL IntraVENous 2 times per day    [Held by provider] enoxaparin  40 mg SubCUTAneous Daily      dexmedetomidine Stopped (04/30/22 2011)    sodium chloride 75 mL/hr at 05/02/22 0502    sodium chloride       traMADol, HYDROcodone homatropine, ondansetron, sodium chloride flush, LORazepam **OR** LORazepam, sodium chloride flush, sodium chloride flush, sodium chloride, acetaminophen **OR** acetaminophen    LABS   CBC   Recent Labs     05/02/22  0428   WBC 15.9*   HGB 8.9*   HCT 25.5*   .9*        BMP:   Lab Results   Component Value Date     05/02/2022    K 3.9 05/02/2022     05/02/2022    CO2 20 05/02/2022    BUN 5 05/02/2022    LABALBU 2.2 05/01/2022    CREATININE <0.40 05/02/2022    CALCIUM 6.6 05/02/2022    GFRAA Can not be calculated 05/02/2022    LABGLOM Can not be calculated 05/02/2022     ABGs:  Lab Results   Component Value Date    PHART 7.436 04/30/2022    PO2ART 69.0 04/30/2022    XFF2BST 29.1 04/30/2022    No results found for: IFIO2, MODE, SETTIDVOL, SETPEEP  Ionized Calcium:  No results found for: IONCA  Magnesium:    Lab Results   Component Value Date    MG 1.7 04/27/2022     Phosphorus:  No results found for: PHOS     LIVER PROFILE   Recent Labs     05/01/22  0450   *   ALT 31   BILITOT 4.86*   ALKPHOS 237*     INR No results for input(s): INR in the last 72 hours. PTT   Lab Results   Component Value Date    APTT 40.5 (H) 04/27/2022         RADIOLOGY     (See actual reports for details)    ASSESSMENT/PLAN     Patient Active Problem List   Diagnosis    Acute pulmonary embolism ([address]    ACS (acute coronary syndrome) ([address]    Chronic systolic congestive heart failure ([address]    Essential hypertension    Uncomplicated alcohol dependence ([address]    Alcoholic cardiomyopathy ([address]    Diarrhea due to malabsorption    Heartburn    Generalized anxiety disorder    Hypomagnesemia    Hypokalemia    Transaminitis    Alcoholic hepatitis without ascites    Macrocytic anemia    Left lower lobe pneumonia    Sepsis ([address]    Melena    Acute blood loss anemia    Acute liver failure without hepatic coma     Multifocal pneumonia with hypoxemia  History of alcohol abuse with withdrawal  History of tobacco use with withdrawal  Elevated lactic acid level  Alcoholic liver cirrhosis  Probable SONJA  History of pulmonary embolism (small right lower lobe 5/2020)  EGD done on 5/1 showed no active bleeding, just gastritis (patient stated that he had some bloody bowel movements\"    Started Proventil nebulizer treatments. Maxipime and Flagyl blood cultures negative to date.   Start a course of steroids for the wheezing  Check chest x-ray  On Librium  Precedex not started at this time         electronically signed by Emily Perry MD on 5/2/2022 at 10:41 AM

## 2022-05-02 NOTE — PROGRESS NOTES
GI Progress notes    5/2/2022   3:25 PM    Name:  Helga Butler  MRN:    [id number]     [place]:     [de-identified]   Room:  2011/2011-01  IP Day: 5     Admit Date: 4/27/2022  5:50 PM  PCP: Gilford Meth, MD    Subjective:     C/C:   Chief Complaint   Patient presents with    Cough    Fatigue    Nasal Congestion    Shortness of Breath       Interval History: Status: not changed. Patient seen and examined. No acute events overnight. Has wheezing  SOB during examination. Had bloody bowel movement x 2 yesterday. EGD with no active bleeding, no EV. Jaundiced  Acute hep negative  Macrocytosis  Hgb relatively stable    ROS:  Constitutional: negative for chills, fevers and sweats  Gastrointestinal: negative for abdominal pain, constipation, diarrhea, nausea and vomiting  Neurological: negative for dizziness and headaches    Medications: Allergies:    Allergies   Allergen Reactions    Bee Venom Anaphylaxis       Current Meds: albuterol (PROVENTIL) nebulizer solution 2.5 mg, Q4H WA  carvedilol (COREG) tablet 3.125 mg, BID WC  traMADol (ULTRAM) tablet 50 mg, Q6H PRN  HYDROcodone homatropine (HYCODAN) 5-1.5 MG/5ML solution 5 mL, T8J PRN  folic acid (FOLVITE) tablet 1 mg, Daily  thiamine mononitrate tablet 100 mg, Daily  therapeutic multivitamin-minerals 1 tablet, Daily  nicotine (NICODERM CQ) 21 MG/24HR 1 patch, Daily  chlordiazePOXIDE (LIBRIUM) capsule 10 mg, 4x Daily  dexmedetomidine (PRECEDEX) 400 mcg in sodium chloride 0.9 % 100 mL infusion, Continuous  0.9 % sodium chloride infusion, Continuous  dextromethorphan (DELSYM) 30 MG/5ML extended release liquid 60 mg, 2 times per day  benzonatate (TESSALON) capsule 200 mg, TID  metronidazole (FLAGYL) 500 mg in 0.9% NaCl 100 mL IVPB premix, Q8H  ondansetron (ZOFRAN) injection 4 mg, Q4H PRN  cefepime (MAXIPIME) 2000 mg IVPB minibag, Q8H  losartan (COZAAR) tablet 50 mg, Daily  pantoprazole (PROTONIX) 40 mg in sodium chloride (PF) 10 mL injection, Q12H  sodium chloride flush 0.9 % injection 5-40 mL, 2 times per day  sodium chloride flush 0.9 % injection 5-40 mL, PRN  LORazepam (ATIVAN) tablet 1 mg, Q1H PRN   Or  LORazepam (ATIVAN) injection 1 mg, Q1H PRN  sodium chloride flush 0.9 % injection 10 mL, PRN  sodium chloride flush 0.9 % injection 5-40 mL, 2 times per day  sodium chloride flush 0.9 % injection 5-40 mL, PRN  0.9 % sodium chloride infusion, PRN  [Held by provider] enoxaparin (LOVENOX) injection 40 mg, Daily  acetaminophen (TYLENOL) tablet 650 mg, Q6H PRN   Or  acetaminophen (TYLENOL) suppository 650 mg, Q6H PRN        Data:     Code Status:  Full Code    Family History   Problem Relation Age of Onset    Heart Disease Mother     Heart Attack Mother     Heart Disease Father        Social History     Socioeconomic History    Marital status: Unknown     Spouse name: Not on file    Number of children: Not on file    Years of education: Not on file    Highest education level: Not on file   Occupational History    Not on file   Tobacco Use    Smoking status: Current Every Day Smoker     Packs/day: 1.00     Years: 21.00     Pack years: 21.00     Types: Cigarettes    Smokeless tobacco: Never Used    Tobacco comment: using nicotine patches at night   Vaping Use    Vaping Use: Never used   Substance and Sexual Activity    Alcohol use: Not Currently     Comment: hx of alcoholism; pt drinks a fifth of whiskey daily    Drug use: Not Currently     Comment: used cocaine in early 20's    Sexual activity: Not on file   Other Topics Concern    Not on file   Social History Narrative    Not on file     Social Determinants of Health     Financial Resource Strain: Low Risk     Difficulty of Paying Living Expenses: Not hard at all   Food Insecurity: No Food Insecurity    Worried About Running Out of Food in the Last Year: Never true    Sheri of Food in the Last Year: Never true   Transportation Needs: No Transportation Needs    Lack of Transportation (Medical):  No  Lack of Transportation (Non-Medical): No   Physical Activity:     Days of Exercise per Week: Not on file    Minutes of Exercise per Session: Not on file   Stress:     Feeling of Stress : Not on file   Social Connections:     Frequency of Communication with Friends and Family: Not on file    Frequency of Social Gatherings with Friends and Family: Not on file    Attends [employer] Services: Not on file    Active Member of [address] [place] [address] or Organizations: Not on file    Attends Club or Organization Meetings: Not on file    Marital Status: Not on file   Intimate Partner Violence:     Fear of Current or Ex-Partner: Not on file    Emotionally Abused: Not on file    Physically Abused: Not on file    Sexually Abused: Not on file   Housing Stability:     Unable to Pay for Housing in the Last Year: Not on file    Number of [place] in the Last Year: Not on file    Unstable Housing in the Last Year: Not on file       Vitals:  /80   Pulse 104   Temp 98.9 °F (37.2 °C) (Oral)   Resp (!) 34   Ht 5' 8\" (1.727 m)   Wt 213 lb 6.5 oz (96.8 kg)   SpO2 94%   BMI 32.45 kg/m²   Temp (24hrs), Av.3 °F (37.4 °C), Min:98.9 °F (37.2 °C), Max:100 °F (37.8 °C)    No results for input(s): POCGLU in the last 72 hours. I/O (24Hr):     Intake/Output Summary (Last 24 hours) at 2022 1525  Last data filed at 2022 0520  Gross per 24 hour   Intake 1346.52 ml   Output 750 ml   Net 596.52 ml       Labs:      CBC:   Lab Results   Component Value Date    WBC 15.9 2022    RBC 2.03 2022    HGB 8.9 2022    HCT 25.5 2022    .9 2022    MCH 43.7 2022    MCHC 34.7 2022    RDW 18.1 2022     2022    MPV 9.1 2022     CBC with Differential:    Lab Results   Component Value Date    WBC 15.9 2022    RBC 2.03 2022    HGB 8.9 2022    HCT 25.5 2022     2022    .9 2022    MCH 43.7 2022    MCHC 34.7 05/02/2022    RDW 18.1 05/02/2022    NRBC 1 05/17/2021    METASPCT 1 05/01/2022    LYMPHOPCT 2 05/02/2022    MONOPCT 3 05/02/2022    BASOPCT 0 05/02/2022    MONOSABS 0.48 05/02/2022    LYMPHSABS 0.32 05/02/2022    EOSABS 0.00 05/02/2022    BASOSABS 0.00 05/02/2022    DIFFTYPE NOT REPORTED 01/30/2022     Hemoglobin/Hematocrit:    Lab Results   Component Value Date    HGB 8.9 05/02/2022    HCT 25.5 05/02/2022     CMP:    Lab Results   Component Value Date     05/02/2022    K 3.9 05/02/2022     05/02/2022    CO2 20 05/02/2022    BUN 5 05/02/2022    CREATININE <0.40 05/02/2022    GFRAA Can not be calculated 05/02/2022    LABGLOM Can not be calculated 05/02/2022    GLUCOSE 91 05/02/2022    PROT 6.4 05/01/2022    LABALBU 2.2 05/01/2022    CALCIUM 6.6 05/02/2022    BILITOT 4.86 05/01/2022    ALKPHOS 237 05/01/2022     05/01/2022    ALT 31 05/01/2022     BMP:    Lab Results   Component Value Date     05/02/2022    K 3.9 05/02/2022     05/02/2022    CO2 20 05/02/2022    BUN 5 05/02/2022    LABALBU 2.2 05/01/2022    CREATININE <0.40 05/02/2022    CALCIUM 6.6 05/02/2022    GFRAA Can not be calculated 05/02/2022    LABGLOM Can not be calculated 05/02/2022    GLUCOSE 91 05/02/2022     PT/INR:    Lab Results   Component Value Date    PROTIME 15.4 04/28/2022    INR 1.2 04/28/2022     PTT:    Lab Results   Component Value Date    APTT 40.5 04/27/2022   [APTT}    Physical Examination:        General appearance: alert, cooperative and no distress  Mental Status: oriented to person, place and time and normal affect  Abdomen: soft, nontender, nondistended, bowel sounds present  Extremities: no edema, redness or tenderness in the calves  Skin: no gross lesions, rashes, or induration    Assessment:        Primary Problem  Sepsis [facility])     Active Hospital Problems    Diagnosis Date Noted    Acute liver failure without hepatic coma [K72.00]      Priority: Medium    Sepsis ([facility] [A41.9] 04/28/2022 Priority: Medium    Melena [K92.1] 04/28/2022     Priority: Medium    Acute blood loss anemia [D62] 04/28/2022     Priority: Medium    Left lower lobe pneumonia [J18.9] 04/27/2022     Priority: Medium    Transaminitis [R74.01] 10/15/2021     Past Medical History:   Diagnosis Date    Alcoholism ([address]     pt drinks a fifth of whiskey daily    Anemia     Pulmonary embolism (HCC)         Plan:        1. Elevated LFTs, alcoholic hepatitis  1. Continue to trend LFTs  2. CIWA protocol  3. EtOH rehab  4. Acute hep negative  2. Anemia with ? Melena  1. FOBT negative times 1  2. Had bloody bowel movement yesterday  3. Hgb relatively stable  4. Will need colonoscopy once respiratory status improves  5. Trend H&H  6. Transfuse for hgb < 7  7. PPI  8. Liquid diet as tolerated.       Explained to the patient and d/W Nursing Staff  Will F/U with you  Please call or Page for any issues or change in status  Thanks    Electronically signed by SILVA Munoz NP on 5/2/2022 at 3:25 PM

## 2022-05-02 NOTE — PROGRESS NOTES
[employer]    PROGRESS NOTE             5/2/2022    9:08 AM    Name:   Delsie Harada  MRN:     [id number]     [place]:      [de-identified]   Room:   2011/2011-01  IP Day:  5  Admit Date:  4/27/2022  5:50 PM    PCP:  Danie Yusuf MD  Code Status:  Full Code    Subjective:     C/C:   Chief Complaint   Patient presents with    Cough    Fatigue    Nasal Congestion    Shortness of Breath     Interval History Status: not changed. Patient seen and examined today bedside. No significant change in condition. Continues to complain of cough. On multiple cough medication at this time. Afebrile overnight. Patient saturates well on room air overnight. RN mentions that she provides nasal cannula at times for patient comfort. Patient mentions that he had another bowel movement following EGD showed some some blood. No other acute concerns at this time. Brief History:     The patient is a 37 y.o.  male, with a history of pulmonary embolism, hypertension, recent blood in stool  and recent newly found anemia who presents with the concern of a cough and exertional shortness of breath however was found to be jaundice with elevated liver function.    According to patient, he was recently evaluated at [facility] for the complaint of near syncope,  blood in her stool, chest pain, shortness of breath.  Patient was  found to have occult positive stool, but no gross bloody stools and negative cardiac work-up, therefore was discharged home on Protonix and instructions to follow with primary care and GI.  However patient was unable to do this given the fact that he lost his insurance when he lost his job.  Patient now returns with progressive exertional shortness of breath and dry cough.   Symptoms of shortness of breath and cough are associated with any activity  Denies fever, chills, chest pain (not associated with coughing), abdominal pain, nausea, vomiting, diarrhea, and urinary symptoms. There are no  alleviating factors.    In addition patient was noted to be jaundice and then states that his wife was growing concern of the color of his skin over the past 3 to 4 days.     04/30: Continue cefepime, Flagyl and and vancomycin. Seen by GI and recommend EGD tomorrow. Was placed on Precedex drip and Librium 10 mg 4 times daily by pulmonology. 05/01: EGD done and unremarkable. Review of Systems:     Review of Systems   Constitutional: Negative for activity change and fever. HENT: Negative for congestion and sore throat. Respiratory: Positive for cough and shortness of breath. Cardiovascular: Positive for leg swelling. Negative for chest pain. Gastrointestinal: Negative for abdominal pain, nausea and vomiting. Genitourinary: Negative for difficulty urinating and dysuria. Neurological: Negative for syncope and headaches. Psychiatric/Behavioral: Negative for agitation, behavioral problems and confusion. Medications: Allergies:     Allergies   Allergen Reactions    Bee Venom Anaphylaxis     Current Meds:   Scheduled Meds:    carvedilol  3.125 mg Oral BID WC    folic acid  1 mg Oral Daily    thiamine mononitrate  100 mg Oral Daily    multivitamin  1 tablet Oral Daily    nicotine  1 patch TransDERmal Daily    chlordiazePOXIDE  10 mg Oral 4x Daily    dextromethorphan  60 mg Oral 2 times per day    benzonatate  200 mg Oral TID    metroNIDAZOLE  500 mg IntraVENous Q8H    cefepime  2,000 mg IntraVENous Q8H    losartan  50 mg Oral Daily    pantoprazole (PROTONIX) 40 mg injection  40 mg IntraVENous Q12H    sodium chloride flush  5-40 mL IntraVENous 2 times per day    sodium chloride flush  5-40 mL IntraVENous 2 times per day    [Held by provider] enoxaparin  40 mg SubCUTAneous Daily     Continuous Infusions:    dexmedetomidine Stopped (04/30/22 2011)    sodium chloride 75 mL/hr at 05/02/22 0502    sodium chloride       PRN Meds: traMADol, HYDROcodone homatropine, ondansetron, sodium chloride flush, LORazepam **OR** LORazepam, sodium chloride flush, sodium chloride flush, sodium chloride, acetaminophen **OR** acetaminophen    Data:     Past Medical History:   has a past medical history of Alcoholism ([facility] [address] Anemia, and Pulmonary embolism ([facility] [address] Social History:   reports that he has been smoking cigarettes. He has a 21.00 pack-year smoking history. He has never used smokeless tobacco. He reports previous alcohol use. He reports previous drug use. Family History:   Family History   Problem Relation Age of Onset    Heart Disease Mother     Heart Attack Mother     Heart Disease Father      Vitals:  /78   Pulse 102   Temp 98.9 °F (37.2 °C) (Oral)   Resp 20   Ht 5' 8\" (1.727 m)   Wt 213 lb 6.5 oz (96.8 kg)   SpO2 96%   BMI 32.45 kg/m²   Temp (24hrs), Av.2 °F (37.3 °C), Min:97.7 °F (36.5 °C), Max:100.9 °F (38.3 °C)    No results for input(s): POCGLU in the last 72 hours. I/O(24Hr): Intake/Output Summary (Last 24 hours) at 2022 0908  Last data filed at 2022 0520  Gross per 24 hour   Intake 1761.92 ml   Output 750 ml   Net 1011.92 ml     Labs:    Recent Results (from the past 48 hour(s))   Arterial Blood Gases    Collection Time: 22  1:43 PM   Result Value Ref Range    pH, Arterial 7.436 7.350 - 7.450    pCO2, Arterial 29.1 (L) 35.0 - 45.0 mmHg    pO2, Arterial 69.0 (L) 80.0 - 100.0 mmHg    HCO3, Arterial 19.6 (L) 22.0 - 26.0 mmol/L    Negative Base Excess, Art 4.7 (H) 0.0 - 2.0 mmol/L    O2 Sat, Arterial 93.1 (L) 95 - 98 %    Carboxyhemoglobin 0.7 0 - 5 %    Methemoglobin 1.2 0.0 - 1.9 %    Pt Temp 39.1     pH, Art, Temp Adj 7.405 7.350 - 7.450    pCO2, Art, Temp Adj 31.9 (L) 35.0 - 45.0    pO2, Art, Temp Adj 78.8 (L) 80.0 - 100.0 mmHg    O2 Device/Flow/% Cannula     Respiratory Rate 18     Fili Test PASS     Sample Site Right Radial Artery     Pt.  Position SEMI-FOWLERS     FIO2 2 L N/C     Text for Respiratory RESULTS TO RN    CBC with Auto Differential    Collection Time: 04/30/22  2:07 PM   Result Value Ref Range    WBC 16.2 (H) 3.5 - 11.0 k/uL    RBC 2.03 (L) 4.5 - 5.9 m/uL    Hemoglobin 8.8 (L) 13.5 - 17.5 g/dL    Hematocrit 26.2 (L) 41 - 53 %    .0 (H) 80 - 100 fL    MCH 43.4 (H) 26 - 34 pg    MCHC 33.6 31 - 37 g/dL    RDW 18.8 (H) 11.5 - 14.9 %    Platelets 277 406 - 706 k/uL    MPV 8.5 6.0 - 12.0 fL    Seg Neutrophils 78 (H) 36 - 66 %    Lymphocytes 8 (L) 24 - 44 %    Monocytes 4 1 - 7 %    Eosinophils % 0 0 - 4 %    Basophils 1 0 - 2 %    Bands 9 0 - 10 %    Segs Absolute 12.63 (H) 1.3 - 9.1 k/uL    Absolute Lymph # 1.30 1.0 - 4.8 k/uL    Absolute Mono # 0.65 0.1 - 1.3 k/uL    Absolute Eos # 0.00 0.0 - 0.4 k/uL    Basophils Absolute 0.16 0.0 - 0.2 k/uL    Absolute Bands # 1.46 (H) 0.0 - 1.0 k/uL    Morphology ANISOCYTOSIS PRESENT     Morphology HYPOCHROMIA PRESENT     Morphology 1+ TARGET CELLS     Morphology 1+ POLYCHROMASIA     Morphology 1+ TEARDROPS     Morphology 1+ ELLIPTOCYTES     Morphology MACROCYTOSIS PRESENT    Comprehensive Metabolic Panel    Collection Time: 04/30/22  2:07 PM   Result Value Ref Range    Glucose 90 70 - 99 mg/dL    BUN 4 (L) 6 - 20 mg/dL    CREATININE 0.45 (L) 0.70 - 1.20 mg/dL    Calcium 6.8 (L) 8.6 - 10.4 mg/dL    Sodium 136 135 - 144 mmol/L    Potassium 4.8 3.7 - 5.3 mmol/L    Chloride 107 98 - 107 mmol/L    CO2 18 (L) 20 - 31 mmol/L    Anion Gap 11 9 - 17 mmol/L    Alkaline Phosphatase 268 (H) 40 - 129 U/L    ALT 34 5 - 41 U/L     (H) <40 U/L    Total Bilirubin 4.80 (H) 0.3 - 1.2 mg/dL    Total Protein 6.5 6.4 - 8.3 g/dL    Albumin 2.1 (L) 3.5 - 5.2 g/dL    GFR Non-African American >60 >60 mL/min    GFR African American >60 >60 mL/min    GFR Comment         Lactate, Sepsis    Collection Time: 04/30/22  2:07 PM   Result Value Ref Range    Lactic Acid, Sepsis 2.6 (H) 0.5 - 1.9 mmol/L   LEGIONELLA ANTIGEN, URINE Collection Time: 04/30/22  3:20 PM    Specimen: Urine   Result Value Ref Range    Legionella Pneumophilia Ag, Urine NEGATIVE NEGATIVE   Lactate, Sepsis    Collection Time: 04/30/22  3:50 PM   Result Value Ref Range    Lactic Acid, Sepsis 3.6 (H) 0.5 - 1.9 mmol/L   MRSA DNA Probe, Nasal    Collection Time: 04/30/22  7:02 PM    Specimen: Nasal   Result Value Ref Range    Specimen Description . NASAL SWAB     MRSA, DNA, Nasal NEGATIVE NEGATIVE   CBC with Auto Differential    Collection Time: 05/01/22  4:50 AM   Result Value Ref Range    WBC 17.6 (H) 3.5 - 11.0 k/uL    RBC 2.10 (L) 4.5 - 5.9 m/uL    Hemoglobin 9.3 (L) 13.5 - 17.5 g/dL    Hematocrit 27.2 (L) 41 - 53 %    .0 (H) 80 - 100 fL    MCH 44.3 (H) 26 - 34 pg    MCHC 34.3 31 - 37 g/dL    RDW 18.8 (H) 11.5 - 14.9 %    Platelets 862 796 - 144 k/uL    MPV 8.8 6.0 - 12.0 fL    Seg Neutrophils 76 (H) 36 - 66 %    Lymphocytes 6 (L) 24 - 44 %    Monocytes 7 1 - 7 %    Eosinophils % 0 0 - 4 %    Basophils 0 0 - 2 %    Bands 10 0 - 10 %    Metamyelocytes 1 (H) 0 %    Segs Absolute 13.37 (H) 1.3 - 9.1 k/uL    Absolute Lymph # 1.06 1.0 - 4.8 k/uL    Absolute Mono # 1.23 0.1 - 1.3 k/uL    Absolute Eos # 0.00 0.0 - 0.4 k/uL    Basophils Absolute 0.00 0.0 - 0.2 k/uL    Absolute Bands # 1.76 (H) 0.0 - 1.0 k/uL    Metamyelocytes Absolute 0.18 (H) 0 k/uL    Morphology ANISOCYTOSIS PRESENT     Morphology HYPOCHROMIA PRESENT     Morphology MACROCYTOSIS PRESENT     Morphology 1+ POLYCHROMASIA     Morphology 1+ TARGET CELLS    Comprehensive Metabolic Panel w/ Reflex to MG    Collection Time: 05/01/22  4:50 AM   Result Value Ref Range    Glucose 96 70 - 99 mg/dL    BUN 4 (L) 6 - 20 mg/dL    CREATININE 0.48 (L) 0.70 - 1.20 mg/dL    Calcium 6.7 (L) 8.6 - 10.4 mg/dL    Sodium 137 135 - 144 mmol/L    Potassium 3.7 3.7 - 5.3 mmol/L    Chloride 107 98 - 107 mmol/L    CO2 19 (L) 20 - 31 mmol/L    Anion Gap 11 9 - 17 mmol/L    Alkaline Phosphatase 237 (H) 40 - 129 U/L    ALT 31 5 - 41 U/L     (H) <40 U/L    Total Bilirubin 4.86 (H) 0.3 - 1.2 mg/dL    Total Protein 6.4 6.4 - 8.3 g/dL    Albumin 2.2 (L) 3.5 - 5.2 g/dL    GFR Non-African American >60 >60 mL/min    GFR African American >60 >60 mL/min    GFR Comment         TSH with Reflex    Collection Time: 05/01/22  4:50 AM   Result Value Ref Range    TSH 27.63 (H) 0.30 - 5.00 uIU/mL   T4, Free    Collection Time: 05/01/22  4:50 AM   Result Value Ref Range    Thyroxine, Free 0.82 (L) 0.93 - 1.70 ng/dL   Lactic Acid    Collection Time: 05/01/22  6:13 AM   Result Value Ref Range    Lactic Acid 2.1 0.5 - 2.2 mmol/L   CBC with Auto Differential    Collection Time: 05/02/22  4:28 AM   Result Value Ref Range    WBC 15.9 (H) 3.5 - 11.0 k/uL    RBC 2.03 (L) 4.5 - 5.9 m/uL    Hemoglobin 8.9 (L) 13.5 - 17.5 g/dL    Hematocrit 25.5 (L) 41 - 53 %    .9 (H) 80 - 100 fL    MCH 43.7 (H) 26 - 34 pg    MCHC 34.7 31 - 37 g/dL    RDW 18.1 (H) 11.5 - 14.9 %    Platelets 528 887 - 626 k/uL    MPV 9.1 6.0 - 12.0 fL    Seg Neutrophils 76 (H) 36 - 66 %    Lymphocytes 2 (L) 24 - 44 %    Monocytes 3 1 - 7 %    Eosinophils % 0 0 - 4 %    Basophils 0 0 - 2 %    Bands 19 (H) 0 - 10 %    Segs Absolute 12.08 (H) 1.3 - 9.1 k/uL    Absolute Lymph # 0.32 (L) 1.0 - 4.8 k/uL    Absolute Mono # 0.48 0.1 - 1.3 k/uL    Absolute Eos # 0.00 0.0 - 0.4 k/uL    Basophils Absolute 0.00 0.0 - 0.2 k/uL    Absolute Bands # 3.02 (H) 0.0 - 1.0 k/uL    Morphology ANISOCYTOSIS PRESENT     Morphology MACROCYTOSIS PRESENT     Morphology 1+ TARGET CELLS     Morphology FEW POLYCHROMASIA     Morphology FEW TEARDROPS    Hepatitis Panel, Acute    Collection Time: 05/02/22  4:28 AM   Result Value Ref Range    Hepatitis B Surface Ag NONREACTIVE NONREACTIVE    Hepatitis C Ab NONREACTIVE NONREACTIVE    Hep B Core Ab, IgM NONREACTIVE NONREACTIVE    Hep A IgM NONREACTIVE NONREACTIVE   Basic Metabolic Panel    Collection Time: 05/02/22  4:28 AM   Result Value Ref Range    Glucose 91 70 - 99 mg/dL BUN 5 (L) 6 - 20 mg/dL    CREATININE <0.40 (L) 0.70 - 1.20 mg/dL    Calcium 6.6 (L) 8.6 - 10.4 mg/dL    Sodium 135 135 - 144 mmol/L    Potassium 3.9 3.7 - 5.3 mmol/L    Chloride 107 98 - 107 mmol/L    CO2 20 20 - 31 mmol/L    Anion Gap 8 (L) 9 - 17 mmol/L    GFR Non-African American Can not be calculated >60 mL/min    GFR  Can not be calculated >60 mL/min    GFR Comment             Lab Results   Component Value Date/Time    SPECIAL NOT REPORTED 10/15/2021 04:17 AM     Lab Results   Component Value Date/Time    CULTURE NO GROWTH 04/28/2022 03:14 AM     Radiology:    CT CHEST WO CONTRAST  Result Date: 4/30/2022     Interval development of a mild to moderate left pleural effusion with interstitial pulmonary edema. New subtle patchy opacity in the central right middle lobe may represent focal atelectasis from low lung volumes or may represent developing pneumonia. Redemonstration of hepatomegaly and hepatic steatosis. Physical Examination:        Physical Exam  Vitals and nursing note reviewed. Constitutional:       Appearance: He is ill-appearing. He is not toxic-appearing. Cardiovascular:      Rate and Rhythm: Normal rate and regular rhythm. Heart sounds: No murmur heard. Pulmonary:      Effort: Respiratory distress (Mild tachypnea) present. Breath sounds: Wheezing (Diffuse bilateral expiratory) present. Abdominal:      Palpations: Abdomen is soft. Tenderness: There is no abdominal tenderness. There is no guarding or rebound. Musculoskeletal:         General: No tenderness. Right lower leg: Edema present. Left lower leg: Edema present. Neurological:      Mental Status: He is alert.    Psychiatric:         Mood and Affect: Mood normal.         Behavior: Behavior normal.       Assessment:        Primary Problem  Sepsis [facility])    Active Hospital Problems    Diagnosis Date Noted    Acute liver failure without hepatic coma [K72.00]      Priority: Medium    Sepsis ([facility] [A41.9] 04/28/2022     Priority: Medium    Melena [K92.1] 04/28/2022     Priority: Medium    Acute blood loss anemia [D62] 04/28/2022     Priority: Medium    Left lower lobe pneumonia [J18.9] 04/27/2022     Priority: Medium    Transaminitis [R74.01] 10/15/2021     Plan:        Sepsis secondary to aspiration pneumonia in setting of alcohol abuse  -Afebrile overnight. -WBC 16.8>15.7>16.2>17.6  -Currently on cefepime, Flagyl  -Infectious disease following              Continue current abx regimen  -Initial lactic acid 2.4> 1.6>2.6>2.1  -Legionella negative, MRSA negative, strep pneumo no result, mycoplasma pneumonia, respiratory culture no result thus far  -Pro-Asaf 0.35  -Urinalysis unremarkable, urine culture no growth  -Hepatitis panel negative  -Will provide one dose of solu medrol 125 mg IV     Possible GI bleed r/o variceal bleed  -Fecal occult blood negative  -GI on board   EGD unremarkable. May need colonoscopy     Elevated liver enzymes secondary to alcoholism  ->206, ALT 31  -Continue to trend  -GI ordered smooth muscle antibody, AFP tumor marker, anti-SOLA KID HORTENCIA AB, mitochondrial antibodies, TIFFANIE, alpha-1 antitrypsin, ceruloplasmin   Smooth muscle antibody unremarkable   AFP unremarkable   Anti liver kidney microsomal antibody unremarkable.    Anti mitochondrial unremarkable   TIFFANIE elevated   Ceruloplasmin unremarkable  -CMV negative for IgG and IgM  -IgA 656, IgM 61     Macrocytic anemia 2/2 folate deficiency 2/2 alcohol abuse  -Hemoglobin 8.5>9.3  -  -Patient started on folic acid, thiamine, multivitamin  -Vitamin B12 843, within normal limits, folate 2.0, below reference range  -Iron studies show iron 65, TIBC 99, iron saturation 66  -Ferritin 1202  -Fecal occult blood negative     Alcohol abuse  -Folic acid, thiamine, multivitamin  -WA protocol  -Seizure precautions     Hold DVT prophylaxis for suspected GI bleed    Gabriel Burgos MD  5/2/2022  9:08 AM   Attending Physician Statement  I have discussed the care of Jorge Herrera and I have examined the patient myselft and taken ros and hpi , including pertinent history and exam findings,  with the resident. I have reviewed the key elements of all parts of the encounter with the resident. I agree with the assessment, plan and orders as documented by the resident.   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

## 2022-05-02 NOTE — PLAN OF CARE
Problem: Discharge Planning  Goal: Discharge to home or other facility with appropriate resources  Outcome: Progressing  Flowsheets (Taken 5/1/2022 0800 by Kely Burk RN)  Discharge to home or other facility with appropriate resources:   Identify barriers to discharge with patient and caregiver   Identify discharge learning needs (meds, wound care, etc)     Problem: Pain  Goal: Verbalizes/displays adequate comfort level or baseline comfort level  Outcome: Progressing  Flowsheets (Taken 5/1/2022 0329)  Verbalizes/displays adequate comfort level or baseline comfort level:   Encourage patient to monitor pain and request assistance   Assess pain using appropriate pain scale   Implement non-pharmacological measures as appropriate and evaluate response     Problem: Potential for Alteration in Skin Integrity  Goal: Monitor skin for areas of alteration in skin integrity  Outcome: Progressing     Problem: Risk for Falls  Goal: Fall prevention  Outcome: Progressing     Problem: Alteration in Mobility  Goal: Remain as independent as possible and remain safe in environment  Outcome: Progressing     Problem: Pain  Goal: Control of acute pain  Outcome: Progressing     Problem: Anxiety/Agitation/Restlessness  Goal: Verbalize or staff assess the ability to manage anxiety  Outcome: Progressing     Problem: Nausea/Vomiting (Adult)  Goal: Control of nausea/vomiting  Outcome: Progressing     Problem: Chronic Conditions and Co-morbidities  Goal: Patient's chronic conditions and co-morbidity symptoms are monitored and maintained or improved  Outcome: Progressing

## 2022-05-03 VITALS
BODY MASS INDEX: 33.18 KG/M2 | WEIGHT: 218.9 LBS | OXYGEN SATURATION: 93 % | HEIGHT: 68 IN | DIASTOLIC BLOOD PRESSURE: 74 MMHG | TEMPERATURE: 97.8 F | HEART RATE: 94 BPM | RESPIRATION RATE: 18 BRPM | SYSTOLIC BLOOD PRESSURE: 98 MMHG

## 2022-05-03 LAB
ABSOLUTE BANDS #: 4.46 K/UL (ref 0–1)
ABSOLUTE EOS #: 0 K/UL (ref 0–0.4)
ABSOLUTE LYMPH #: 0.37 K/UL (ref 1–4.8)
ABSOLUTE MONO #: 0.37 K/UL (ref 0.1–1.3)
ALBUMIN SERPL-MCNC: 2.2 G/DL (ref 3.5–5.2)
ALP BLD-CCNC: 224 U/L (ref 40–129)
ALT SERPL-CCNC: 30 U/L (ref 5–41)
ANION GAP SERPL CALCULATED.3IONS-SCNC: 8 MMOL/L (ref 9–17)
AST SERPL-CCNC: 208 U/L
BANDS: 24 % (ref 0–10)
BASOPHILS # BLD: 0 % (ref 0–2)
BASOPHILS ABSOLUTE: 0 K/UL (ref 0–0.2)
BILIRUB SERPL-MCNC: 3.98 MG/DL (ref 0.3–1.2)
BILIRUBIN DIRECT: 2.88 MG/DL
BILIRUBIN, INDIRECT: 1.1 MG/DL (ref 0–1)
BUN BLDV-MCNC: 7 MG/DL (ref 6–20)
CALCIUM SERPL-MCNC: 6.7 MG/DL (ref 8.6–10.4)
CHLORIDE BLD-SCNC: 107 MMOL/L (ref 98–107)
CO2: 20 MMOL/L (ref 20–31)
CREAT SERPL-MCNC: <0.4 MG/DL (ref 0.7–1.2)
CULTURE: NORMAL
EOSINOPHILS RELATIVE PERCENT: 0 % (ref 0–4)
GFR AFRICAN AMERICAN: ABNORMAL ML/MIN
GFR NON-AFRICAN AMERICAN: ABNORMAL ML/MIN
GFR SERPL CREATININE-BSD FRML MDRD: ABNORMAL ML/MIN/{1.73_M2}
GLUCOSE BLD-MCNC: 128 MG/DL (ref 70–99)
HCT VFR BLD CALC: 25.2 % (ref 41–53)
HEMOGLOBIN: 8.7 G/DL (ref 13.5–17.5)
LYMPHOCYTES # BLD: 2 % (ref 24–44)
MCH RBC QN AUTO: 43.4 PG (ref 26–34)
MCHC RBC AUTO-ENTMCNC: 34.4 G/DL (ref 31–37)
MCV RBC AUTO: 126 FL (ref 80–100)
MONOCYTES # BLD: 2 % (ref 1–7)
MORPHOLOGY: ABNORMAL
PDW BLD-RTO: 18 % (ref 11.5–14.9)
PLATELET # BLD: 255 K/UL (ref 150–450)
PMV BLD AUTO: 8.8 FL (ref 6–12)
POTASSIUM SERPL-SCNC: 4.3 MMOL/L (ref 3.7–5.3)
RBC # BLD: 2 M/UL (ref 4.5–5.9)
SEG NEUTROPHILS: 72 % (ref 36–66)
SEGMENTED NEUTROPHILS ABSOLUTE COUNT: 13.4 K/UL (ref 1.3–9.1)
SODIUM BLD-SCNC: 135 MMOL/L (ref 135–144)
SPECIMEN DESCRIPTION: NORMAL
TOTAL PROTEIN: 6.3 G/DL (ref 6.4–8.3)
WBC # BLD: 18.6 K/UL (ref 3.5–11)

## 2022-05-03 PROCEDURE — 2580000003 HC RX 258: Performed by: INTERNAL MEDICINE

## 2022-05-03 PROCEDURE — 80076 HEPATIC FUNCTION PANEL: CPT

## 2022-05-03 PROCEDURE — 6360000002 HC RX W HCPCS: Performed by: INTERNAL MEDICINE

## 2022-05-03 PROCEDURE — 6370000000 HC RX 637 (ALT 250 FOR IP): Performed by: INTERNAL MEDICINE

## 2022-05-03 PROCEDURE — 99233 SBSQ HOSP IP/OBS HIGH 50: CPT | Performed by: INTERNAL MEDICINE

## 2022-05-03 PROCEDURE — 80048 BASIC METABOLIC PNL TOTAL CA: CPT

## 2022-05-03 PROCEDURE — 2500000003 HC RX 250 WO HCPCS: Performed by: INTERNAL MEDICINE

## 2022-05-03 PROCEDURE — 36415 COLL VENOUS BLD VENIPUNCTURE: CPT

## 2022-05-03 PROCEDURE — 94761 N-INVAS EAR/PLS OXIMETRY MLT: CPT

## 2022-05-03 PROCEDURE — 94640 AIRWAY INHALATION TREATMENT: CPT

## 2022-05-03 PROCEDURE — 2700000000 HC OXYGEN THERAPY PER DAY

## 2022-05-03 PROCEDURE — 85025 COMPLETE CBC W/AUTO DIFF WBC: CPT

## 2022-05-03 PROCEDURE — 6360000002 HC RX W HCPCS

## 2022-05-03 PROCEDURE — 99239 HOSP IP/OBS DSCHRG MGMT >30: CPT | Performed by: INTERNAL MEDICINE

## 2022-05-03 PROCEDURE — C9113 INJ PANTOPRAZOLE SODIUM, VIA: HCPCS | Performed by: INTERNAL MEDICINE

## 2022-05-03 PROCEDURE — 99232 SBSQ HOSP IP/OBS MODERATE 35: CPT | Performed by: INTERNAL MEDICINE

## 2022-05-03 PROCEDURE — A4216 STERILE WATER/SALINE, 10 ML: HCPCS | Performed by: INTERNAL MEDICINE

## 2022-05-03 RX ORDER — CARVEDILOL 3.12 MG/1
3.12 TABLET ORAL 2 TIMES DAILY WITH MEALS
Qty: 60 TABLET | Refills: 3 | Status: ON HOLD | OUTPATIENT
Start: 2022-05-03 | End: 2022-05-26 | Stop reason: HOSPADM

## 2022-05-03 RX ORDER — FUROSEMIDE 10 MG/ML
20 INJECTION INTRAMUSCULAR; INTRAVENOUS ONCE
Status: COMPLETED | OUTPATIENT
Start: 2022-05-03 | End: 2022-05-03

## 2022-05-03 RX ORDER — FOLIC ACID 1 MG/1
1 TABLET ORAL DAILY
Qty: 30 TABLET | Refills: 3 | Status: SHIPPED | OUTPATIENT
Start: 2022-05-04 | End: 2022-05-03

## 2022-05-03 RX ORDER — ALBUTEROL SULFATE 90 UG/1
2 AEROSOL, METERED RESPIRATORY (INHALATION) 4 TIMES DAILY PRN
Qty: 18 G | Refills: 0 | Status: SHIPPED | OUTPATIENT
Start: 2022-05-03

## 2022-05-03 RX ORDER — FUROSEMIDE 20 MG/1
20 TABLET ORAL DAILY
Qty: 7 TABLET | Refills: 0 | Status: SHIPPED | OUTPATIENT
Start: 2022-05-03 | End: 2022-05-03 | Stop reason: SDUPTHER

## 2022-05-03 RX ORDER — THIAMINE MONONITRATE (VIT B1) 100 MG
100 TABLET ORAL DAILY
Qty: 60 TABLET | Refills: 1 | Status: SHIPPED | OUTPATIENT
Start: 2022-05-04

## 2022-05-03 RX ORDER — CARVEDILOL 3.12 MG/1
3.12 TABLET ORAL 2 TIMES DAILY WITH MEALS
Qty: 60 TABLET | Refills: 3 | Status: SHIPPED | OUTPATIENT
Start: 2022-05-03 | End: 2022-05-03

## 2022-05-03 RX ORDER — ALBUTEROL SULFATE 90 UG/1
2 AEROSOL, METERED RESPIRATORY (INHALATION) 4 TIMES DAILY PRN
Qty: 54 G | Refills: 1 | Status: SHIPPED | OUTPATIENT
Start: 2022-05-03 | End: 2022-05-03

## 2022-05-03 RX ORDER — FUROSEMIDE 20 MG/1
20 TABLET ORAL DAILY
Qty: 7 TABLET | Refills: 0 | Status: SHIPPED | OUTPATIENT
Start: 2022-05-03 | End: 2022-05-13 | Stop reason: SDUPTHER

## 2022-05-03 RX ORDER — ALBUTEROL SULFATE 90 UG/1
2 AEROSOL, METERED RESPIRATORY (INHALATION) 4 TIMES DAILY PRN
Qty: 18 G | Refills: 0 | Status: SHIPPED | OUTPATIENT
Start: 2022-05-03 | End: 2022-05-03 | Stop reason: SDUPTHER

## 2022-05-03 RX ORDER — BENZONATATE 200 MG/1
200 CAPSULE ORAL 3 TIMES DAILY
Qty: 21 CAPSULE | Refills: 0 | Status: SHIPPED | OUTPATIENT
Start: 2022-05-03 | End: 2022-05-03

## 2022-05-03 RX ORDER — PREDNISONE 10 MG/1
TABLET ORAL
Qty: 18 TABLET | Refills: 0 | Status: SHIPPED | OUTPATIENT
Start: 2022-05-03 | End: 2022-05-18

## 2022-05-03 RX ORDER — BENZONATATE 200 MG/1
200 CAPSULE ORAL 3 TIMES DAILY
Qty: 21 CAPSULE | Refills: 0 | Status: SHIPPED | OUTPATIENT
Start: 2022-05-03 | End: 2022-05-10

## 2022-05-03 RX ORDER — FOLIC ACID 1 MG/1
1 TABLET ORAL DAILY
Qty: 30 TABLET | Refills: 3 | Status: SHIPPED | OUTPATIENT
Start: 2022-05-04

## 2022-05-03 RX ORDER — THIAMINE MONONITRATE (VIT B1) 100 MG
100 TABLET ORAL DAILY
Qty: 60 TABLET | Refills: 1 | Status: SHIPPED | OUTPATIENT
Start: 2022-05-04 | End: 2022-05-03

## 2022-05-03 RX ORDER — ALBUTEROL SULFATE 90 UG/1
2 AEROSOL, METERED RESPIRATORY (INHALATION) 4 TIMES DAILY PRN
Qty: 54 G | Refills: 1 | Status: ON HOLD | OUTPATIENT
Start: 2022-05-03 | End: 2022-11-01 | Stop reason: HOSPADM

## 2022-05-03 RX ADMIN — SODIUM CHLORIDE: 9 INJECTION, SOLUTION INTRAVENOUS at 05:18

## 2022-05-03 RX ADMIN — CHLORDIAZEPOXIDE HYDROCHLORIDE 10 MG: 10 CAPSULE ORAL at 08:44

## 2022-05-03 RX ADMIN — ALBUTEROL SULFATE 2.5 MG: 2.5 SOLUTION RESPIRATORY (INHALATION) at 07:46

## 2022-05-03 RX ADMIN — LOSARTAN POTASSIUM 50 MG: 50 TABLET, FILM COATED ORAL at 08:44

## 2022-05-03 RX ADMIN — FOLIC ACID 1 MG: 1 TABLET ORAL at 08:44

## 2022-05-03 RX ADMIN — BENZONATATE 200 MG: 200 CAPSULE ORAL at 08:44

## 2022-05-03 RX ADMIN — HYDROCODONE BITARTRATE AND HOMATROPINE METHYLBROMIDE 5 ML: 5; 1.5 SOLUTION ORAL at 15:09

## 2022-05-03 RX ADMIN — CARVEDILOL 3.12 MG: 3.12 TABLET, FILM COATED ORAL at 18:23

## 2022-05-03 RX ADMIN — MULTIPLE VITAMINS W/ MINERALS TAB 1 TABLET: TAB at 08:44

## 2022-05-03 RX ADMIN — CEFEPIME HYDROCHLORIDE 2000 MG: 2 INJECTION, POWDER, FOR SOLUTION INTRAVENOUS at 14:51

## 2022-05-03 RX ADMIN — CEFEPIME HYDROCHLORIDE 2000 MG: 2 INJECTION, POWDER, FOR SOLUTION INTRAVENOUS at 05:16

## 2022-05-03 RX ADMIN — FUROSEMIDE 20 MG: 10 INJECTION, SOLUTION INTRAMUSCULAR; INTRAVENOUS at 12:26

## 2022-05-03 RX ADMIN — ALBUTEROL SULFATE 2.5 MG: 2.5 SOLUTION RESPIRATORY (INHALATION) at 15:01

## 2022-05-03 RX ADMIN — METRONIDAZOLE 500 MG: 500 INJECTION, SOLUTION INTRAVENOUS at 02:49

## 2022-05-03 RX ADMIN — CHLORDIAZEPOXIDE HYDROCHLORIDE 10 MG: 10 CAPSULE ORAL at 18:22

## 2022-05-03 RX ADMIN — BENZONATATE 200 MG: 200 CAPSULE ORAL at 14:47

## 2022-05-03 RX ADMIN — THIAMINE HCL TAB 100 MG 100 MG: 100 TAB at 08:44

## 2022-05-03 RX ADMIN — METRONIDAZOLE 500 MG: 500 INJECTION, SOLUTION INTRAVENOUS at 12:33

## 2022-05-03 RX ADMIN — SODIUM CHLORIDE, PRESERVATIVE FREE 40 MG: 5 INJECTION INTRAVENOUS at 08:44

## 2022-05-03 RX ADMIN — HYDROCODONE BITARTRATE AND HOMATROPINE METHYLBROMIDE 5 ML: 5; 1.5 SOLUTION ORAL at 03:17

## 2022-05-03 RX ADMIN — CHLORDIAZEPOXIDE HYDROCHLORIDE 10 MG: 10 CAPSULE ORAL at 14:46

## 2022-05-03 RX ADMIN — ALBUTEROL SULFATE 2.5 MG: 2.5 SOLUTION RESPIRATORY (INHALATION) at 11:28

## 2022-05-03 RX ADMIN — Medication 60 MG: at 08:58

## 2022-05-03 RX ADMIN — CARVEDILOL 3.12 MG: 3.12 TABLET, FILM COATED ORAL at 08:44

## 2022-05-03 ASSESSMENT — ENCOUNTER SYMPTOMS
SHORTNESS OF BREATH: 0
NAUSEA: 0
BLOOD IN STOOL: 1
COUGH: 1
ABDOMINAL PAIN: 0
VOMITING: 0

## 2022-05-03 ASSESSMENT — PAIN SCALES - GENERAL: PAINLEVEL_OUTOF10: 6

## 2022-05-03 NOTE — DISCHARGE INSTR - COC
Continuity of Care Form    Patient Name: Chioma Gerard   :  1985  MRN:  [id number]    Admit date:  2022  Discharge date:  ***    Code Status Order: Full Code   Advance Directives:      Admitting Physician:  Tasneem Guido MD  PCP: Sandi Delgado MD    Discharging Nurse: [facility] Unit/Room#: 2120/2120-01  Discharging Unit Phone Number: ***    Emergency Contact:   Extended Emergency Contact Information  Primary Emergency Contact: Renée Moran  Address: [address] [place] [address] [address]           [facility] [address] [place] [address] [address] Phone: [phone]  Mobile Phone: [phone]  Relation: Domestic Partner    Past Surgical History:  Past Surgical History:   Procedure Laterality Date    UPPER GASTROINTESTINAL ENDOSCOPY N/A 2022    EGD ESOPHAGOGASTRODUODENOSCOPY performed by Yifan Nino MD at [place] [address]       Immunization History:   Immunization History   Administered Date(s) Administered    [facility] or Immunocompromised, PF, 100mcg/0.5mL 2021, 2021    MMR 1998       Active Problems:  Patient Active Problem List   Diagnosis Code    Acute pulmonary embolism ([facility] I26.99    ACS (acute coronary syndrome) (HCC) I24.9    Chronic systolic congestive heart failure ([facility] [address] I50.22    Essential hypertension X54    Uncomplicated alcohol dependence ([facility] [address] T96.69    Alcoholic cardiomyopathy ([facility] [address] I42.6    Diarrhea due to malabsorption K90.9, R19.7    Heartburn R12    Generalized anxiety disorder F41.1    Hypomagnesemia E83.42    Hypokalemia E87.6    Transaminitis A23.65    Alcoholic hepatitis without ascites K70.10    Macrocytic anemia D53.9    Left lower lobe pneumonia J18.9    Sepsis (HCC) A41.9    Melena K92.1    Acute blood loss anemia D62    Acute liver failure without hepatic coma K72.00       Isolation/Infection:   Isolation            No Isolation          Patient Infection Status       Infection Onset Added Last Indicated Last Indicated By Review Planned Expiration Resolved Resolved By    None active    Resolved    COVID-19 (Rule Out) 22 COVID-19 & Influenza Combo (Ordered)   22 Rule-Out Test Resulted    COVID-19 22 COVID-19 & Influenza Combo   22     COVID-19 (Rule Out) 22 COVID-19 & Influenza Combo (Ordered)   22 Rule-Out Test Resulted    C-diff Rule Out 10/15/21 10/15/21 10/15/21 C DIFF TOXIN/ANTIGEN (Ordered)   10/15/21 Rule-Out Test Resulted    COVID-19 (Rule Out) 10/14/21 10/14/21 10/14/21 COVID-19, Rapid (Ordered)   10/14/21 Rule-Out Test Resulted    COVID-19 (Rule Out) 21 COVID-19, Rapid (Ordered)   21 Rule-Out Test Resulted    COVID-19 (Rule Out) 20 COVID-19 (Ordered)   20 Rule-Out Test Resulted            Nurse Assessment:  Last Vital Signs: BP 98/74   Pulse 94   Temp 97.8 °F (36.6 °C) (Oral)   Resp 18   Ht 5' 8\" (1.727 m)   Wt 218 lb 14.4 oz (99.3 kg)   SpO2 93%   BMI 33.28 kg/m²     Last documented pain score (0-10 scale): Pain Level: 6  Last Weight:   Wt Readings from Last 1 Encounters:   22 218 lb 14.4 oz (99.3 kg)     Mental Status:  {IP PT MENTAL STATUS:88972}    IV Access:  {[facility] IV ACCESS:921481307}    Nursing Mobility/ADLs:  Walking   {CHP DME CGHB:870554106}  Transfer  {CHP DME IHFX:715321146}  Bathing  {CHP DME KUHW:767415334}  Dressing  {CHP DME CRTP:279483343}  Toileting  {CHP DME HHAI:960838074}  Feeding  {CHP DME IVPC:096088019}  Med Admin  {CHP DME MHOT:372489181}  Med Delivery   {[facility] MED Delivery:114702647}    Wound Care Documentation and Therapy:        Elimination:  Continence:    Bowel: {YES / SC:55660}  Bladder: {YES / PD:37987}  Urinary Catheter: {Urinary Catheter:289108408}   Colostomy/Ileostomy/Ileal Conduit: {YES / QS:60423}       Date of Last BM: ***    Intake/Output Summary (Last 24 hours) at 5/3/2022 1559  Last data filed at 5/3/2022 1354  Gross per 24 hour   Intake 658 ml   Output 500 ml   Net 158 ml     I/O last 3 completed shifts: In: 1646.5 [P.O.:300;  I.V.:1098; IV Piggyback:248.5]  Out: 250 [Urine:250]    Safety Concerns:     [address] Safety Concerns:366828807}    Impairments/Disabilities:      [address] [facility] Impairments/Disabilities:521310761}    Nutrition Therapy:  Current Nutrition Therapy:   [address] [facility] Diet List:956551175}    Routes of Feeding: {CHP DME Other Feedings:583249644}  Liquids: {Slp liquid thickness:92600}  Daily Fluid Restriction: {CHP DME Yes amt example:746491728}  Last Modified Barium Swallow with Video (Video Swallowing Test): {Done Not Done BTBY:661788259}    Treatments at the Time of Hospital Discharge:   Respiratory Treatments: ***  Oxygen Therapy:  {Therapy; copd oxygen:81070}  Ventilator:    { CC Vent YLOC:701457291}    Rehab Therapies: {THERAPEUTIC INTERVENTION:9526509522}  Weight Bearing Status/Restrictions: [address] [facility] Weight Bearin}  Other Medical Equipment (for information only, NOT a DME order):  {EQUIPMENT:913079736}  Other Treatments: ***    Patient's personal belongings (please select all that are sent with patient):  {[facility] DME Belongings:840000384}    RN SIGNATURE:  {Esignature:712661560}    CASE MANAGEMENT/SOCIAL WORK SECTION    Inpatient Status Date: ***    Readmission Risk Assessment Score:  Readmission Risk              Risk of Unplanned Readmission:  28           Discharging to Facility/ Agency   Name:   Address:  Phone:  Fax:    Dialysis Facility (if applicable)   Name:  Address:  Dialysis Schedule:  Phone:  Fax:    / signature: {Esignature:578810599}    PHYSICIAN SECTION    Prognosis: {Prognosis:8466093574}    Condition at Discharge: [address] Patient Condition:862787377}    Rehab Potential (if transferring to Rehab): {Prognosis:5437054551}    Recommended Labs or Other Treatments After Discharge: ***    Physician Certification: I certify the above information and transfer of Molina Gutierrez  is necessary for the continuing treatment of the diagnosis listed and that he requires {Admit to Appropriate Level of Care:11646} for {GREATER/LESS:933262601} 30 days.      Update Admission H&P: {CHP DME Changes in CIDR}    PHYSICIAN SIGNATURE:  {Esignature:599648141}

## 2022-05-03 NOTE — PROGRESS NOTES
[facility]    PROGRESS NOTE             5/3/2022    9:45 AM    Name:   Catie Patton  MRN:     [id number]     Acct:      [de-identified]   Room:   2120/2120-01   Day:  6  Admit Date:  4/27/2022  5:50 PM    PCP:  Lisa Mccabe MD  Code Status:  Full Code    Subjective:     C/C:   Chief Complaint   Patient presents with    Cough    Fatigue    Nasal Congestion    Shortness of Breath     Interval History Status: not changed. Patient seen and examined today bedside. No acute events overnight. Continues to complain of cough. Remained afebrile. Patient denies shortness of breath and mentions that he only uses oxygen when he has a coughing spell. Mentions that otherwise he does not really need oxygen. Patient does mention that he noticed blood in his stool in the morning again. No other acute concerns at this time. Tolerating oral intake well. Brief History:     The patient is a 37 y.o.  male, with a history of pulmonary embolism, hypertension, recent blood in stool  and recent newly found anemia who presents with the concern of a cough and exertional shortness of breath however was found to be jaundice with elevated liver function.    According to patient, he was recently evaluated at [facility] for the complaint of near syncope,  blood in her stool, chest pain, shortness of breath.  Patient was  found to have occult positive stool, but no gross bloody stools and negative cardiac work-up, therefore was discharged home on Protonix and instructions to follow with primary care and GI.  However patient was unable to do this given the fact that he lost his insurance when he lost his job.  Patient now returns with progressive exertional shortness of breath and dry cough.   Symptoms of shortness of breath and cough are associated with any activity  Denies fever, chills, chest pain (not associated with coughing), abdominal pain, nausea, vomiting, diarrhea, and urinary symptoms. There are no  alleviating factors.    In addition patient was noted to be jaundice and then states that his wife was growing concern of the color of his skin over the past 3 to 4 days.     04/30: Continue cefepime, Flagyl and and vancomycin.  Seen by GI and recommend EGD tomorrow. Dierdre Ouch placed on Precedex drip and Librium 10 mg 4 times daily by pulmonology.     05/01: EGD done and unremarkable. 05/02: May need outpatient colonoscopy. Was given dose of solu-medrol 125 mg for wheezing. Wheezing likely CHF related however. Review of Systems:     Review of Systems   Constitutional: Negative for fever. HENT: Negative for congestion. Respiratory: Positive for cough. Negative for shortness of breath. Cardiovascular: Positive for leg swelling. Negative for chest pain. Gastrointestinal: Positive for blood in stool. Negative for abdominal pain, nausea and vomiting. Genitourinary: Negative for difficulty urinating, dysuria and hematuria. Neurological: Negative for syncope and headaches. Psychiatric/Behavioral: Negative for agitation, behavioral problems and confusion. Medications: Allergies:     Allergies   Allergen Reactions    Bee Venom Anaphylaxis     Current Meds:   Scheduled Meds:    albuterol  2.5 mg Nebulization Q4H WA    carvedilol  3.125 mg Oral BID WC    folic acid  1 mg Oral Daily    thiamine mononitrate  100 mg Oral Daily    multivitamin  1 tablet Oral Daily    nicotine  1 patch TransDERmal Daily    chlordiazePOXIDE  10 mg Oral 4x Daily    dextromethorphan  60 mg Oral 2 times per day    benzonatate  200 mg Oral TID    metroNIDAZOLE  500 mg IntraVENous Q8H    cefepime  2,000 mg IntraVENous Q8H    losartan  50 mg Oral Daily    pantoprazole (PROTONIX) 40 mg injection  40 mg IntraVENous Q12H    sodium chloride flush  5-40 mL IntraVENous 2 times per day    sodium chloride flush  5-40 mL IntraVENous 2 times per day   [facility] by provider] enoxaparin  40 mg SubCUTAneous Daily     Continuous Infusions:    dexmedetomidine Stopped (22)    sodium chloride 75 mL/hr at 22 0518    sodium chloride       PRN Meds: traMADol, HYDROcodone homatropine, ondansetron, sodium chloride flush, LORazepam **OR** LORazepam, sodium chloride flush, sodium chloride flush, sodium chloride, acetaminophen **OR** acetaminophen    Data:     Past Medical History:   has a past medical history of Alcoholism ([facility] [address] Anemia, and Pulmonary embolism ([facility] [address] Social History:   reports that he has been smoking cigarettes. He has a 21.00 pack-year smoking history. He has never used smokeless tobacco. He reports previous alcohol use. He reports previous drug use. Family History:   Family History   Problem Relation Age of Onset    Heart Disease Mother     Heart Attack Mother     Heart Disease Father      Vitals:  /79   Pulse 95   Temp 98.6 °F (37 °C) (Oral)   Resp 18   Ht 5' 8\" (1.727 m)   Wt 218 lb 14.4 oz (99.3 kg)   SpO2 91%   BMI 33.28 kg/m²   Temp (24hrs), Av.6 °F (37 °C), Min:98.2 °F (36.8 °C), Max:98.9 °F (37.2 °C)    No results for input(s): POCGLU in the last 72 hours. I/O(24Hr):     Intake/Output Summary (Last 24 hours) at 5/3/2022 0945  Last data filed at 5/3/2022 5217  Gross per 24 hour   Intake 418 ml   Output 250 ml   Net 168 ml     Labs:    Recent Results (from the past 48 hour(s))   CBC with Auto Differential    Collection Time: 22  4:28 AM   Result Value Ref Range    WBC 15.9 (H) 3.5 - 11.0 k/uL    RBC 2.03 (L) 4.5 - 5.9 m/uL    Hemoglobin 8.9 (L) 13.5 - 17.5 g/dL    Hematocrit 25.5 (L) 41 - 53 %    .9 (H) 80 - 100 fL    MCH 43.7 (H) 26 - 34 pg    MCHC 34.7 31 - 37 g/dL    RDW 18.1 (H) 11.5 - 14.9 %    Platelets 016 628 - 627 k/uL    MPV 9.1 6.0 - 12.0 fL    Seg Neutrophils 76 (H) 36 - 66 %    Lymphocytes 2 (L) 24 - 44 %    Monocytes 3 1 - 7 %    Eosinophils % 0 0 - 4 % Basophils 0 0 - 2 %    Bands 19 (H) 0 - 10 %    Segs Absolute 12.08 (H) 1.3 - 9.1 k/uL    Absolute Lymph # 0.32 (L) 1.0 - 4.8 k/uL    Absolute Mono # 0.48 0.1 - 1.3 k/uL    Absolute Eos # 0.00 0.0 - 0.4 k/uL    Basophils Absolute 0.00 0.0 - 0.2 k/uL    Absolute Bands # 3.02 (H) 0.0 - 1.0 k/uL    Morphology ANISOCYTOSIS PRESENT     Morphology MACROCYTOSIS PRESENT     Morphology 1+ TARGET CELLS     Morphology FEW POLYCHROMASIA     Morphology FEW TEARDROPS    Hepatitis Panel, Acute    Collection Time: 05/02/22  4:28 AM   Result Value Ref Range    Hepatitis B Surface Ag NONREACTIVE NONREACTIVE    Hepatitis C Ab NONREACTIVE NONREACTIVE    Hep B Core Ab, IgM NONREACTIVE NONREACTIVE    Hep A IgM NONREACTIVE NONREACTIVE   Basic Metabolic Panel    Collection Time: 05/02/22  4:28 AM   Result Value Ref Range    Glucose 91 70 - 99 mg/dL    BUN 5 (L) 6 - 20 mg/dL    CREATININE <0.40 (L) 0.70 - 1.20 mg/dL    Calcium 6.6 (L) 8.6 - 10.4 mg/dL    Sodium 135 135 - 144 mmol/L    Potassium 3.9 3.7 - 5.3 mmol/L    Chloride 107 98 - 107 mmol/L    CO2 20 20 - 31 mmol/L    Anion Gap 8 (L) 9 - 17 mmol/L    GFR Non-African American Can not be calculated >60 mL/min    GFR  Can not be calculated >60 mL/min    GFR Comment         Brain Natriuretic Peptide    Collection Time: 05/02/22  4:28 AM   Result Value Ref Range    Pro- (H) <300 pg/mL   CBC with Auto Differential    Collection Time: 05/03/22  5:19 AM   Result Value Ref Range    WBC 18.6 (H) 3.5 - 11.0 k/uL    RBC 2.00 (L) 4.5 - 5.9 m/uL    Hemoglobin 8.7 (L) 13.5 - 17.5 g/dL    Hematocrit 25.2 (L) 41 - 53 %    .0 (H) 80 - 100 fL    MCH 43.4 (H) 26 - 34 pg    MCHC 34.4 31 - 37 g/dL    RDW 18.0 (H) 11.5 - 14.9 %    Platelets 575 951 - 731 k/uL    MPV 8.8 6.0 - 12.0 fL    Seg Neutrophils 72 (H) 36 - 66 %    Lymphocytes 2 (L) 24 - 44 %    Monocytes 2 1 - 7 %    Eosinophils % 0 0 - 4 %    Basophils 0 0 - 2 %    Bands 24 (H) 0 - 10 %    Segs Absolute 13.40 (H) 1.3 - 9.1 k/uL    Absolute Lymph # 0.37 (L) 1.0 - 4.8 k/uL    Absolute Mono # 0.37 0.1 - 1.3 k/uL    Absolute Eos # 0.00 0.0 - 0.4 k/uL    Basophils Absolute 0.00 0.0 - 0.2 k/uL    Absolute Bands # 4.46 (H) 0.0 - 1.0 k/uL    Morphology ANISOCYTOSIS PRESENT     Morphology MACROCYTOSIS PRESENT     Morphology HYPOCHROMIA PRESENT     Morphology 1+ TARGET CELLS     Morphology FEW TEARDROPS    Basic Metabolic Panel    Collection Time: 05/03/22  5:19 AM   Result Value Ref Range    Glucose 128 (H) 70 - 99 mg/dL    BUN 7 6 - 20 mg/dL    CREATININE <0.40 (L) 0.70 - 1.20 mg/dL    Calcium 6.7 (L) 8.6 - 10.4 mg/dL    Sodium 135 135 - 144 mmol/L    Potassium 4.3 3.7 - 5.3 mmol/L    Chloride 107 98 - 107 mmol/L    CO2 20 20 - 31 mmol/L    Anion Gap 8 (L) 9 - 17 mmol/L    GFR Non-African American Can not be calculated >60 mL/min    GFR  Can not be calculated >60 mL/min    GFR Comment             Lab Results   Component Value Date/Time    SPECIAL NOT REPORTED 10/15/2021 04:17 AM     Lab Results   Component Value Date/Time    CULTURE NO GROWTH 04/28/2022 03:14 AM     Radiology:    XR CHEST PORTABLE  Result Date: 5/2/2022    Increasing patchy right perihilar and left basilar airspace disease accentuated by somewhat low lung volumes     Physical Examination:        Physical Exam  Vitals and nursing note reviewed. Constitutional:       General: He is not in acute distress. Appearance: He is not toxic-appearing. Cardiovascular:      Rate and Rhythm: Normal rate and regular rhythm. Heart sounds: No murmur heard. Pulmonary:      Breath sounds: Wheezing (Improved. Expiratory bilateral.) present. Abdominal:      Palpations: Abdomen is soft. Tenderness: There is no abdominal tenderness. There is no guarding or rebound. Musculoskeletal:         General: No tenderness. Right lower leg: Edema present. Left lower leg: Edema present. Neurological:      General: No focal deficit present.       Mental Status: He is alert and oriented to person, place, and time. Psychiatric:         Mood and Affect: Mood normal.         Behavior: Behavior normal.       Assessment:        Primary Problem  Sepsis [facility])    Active Hospital Problems    Diagnosis Date Noted    Acute liver failure without hepatic coma [K72.00]      Priority: Medium    Sepsis ([address] [A41.9] 04/28/2022     Priority: Medium    Melena [K92.1] 04/28/2022     Priority: Medium    Acute blood loss anemia [D62] 04/28/2022     Priority: Medium    Left lower lobe pneumonia [J18.9] 04/27/2022     Priority: Medium    Transaminitis [R74.01] 10/15/2021     Plan:        Sepsis secondary to aspiration pneumonia in setting of alcohol abuse - improving  -Afebrile overnight.  -Currently on cefepime, Flagyl  -Infectious disease following              Continue current abx regimen  -Legionella negative, MRSA negative, strep pneumo no result, mycoplasma pneumonia, respiratory culture no result thus far  -Urinalysis unremarkable, urine culture no growth  -Hepatitis panel negative  -S/p one dose of solu medrol 125 mg IV  -Will provide one dose of lasix 20 mg IV     Possible GI bleed r/o variceal bleed  -Fecal occult blood negative  -GI on board              EGD unremarkable. May need colonoscopy     Elevated liver enzymes 2/2 alcoholism  ->206, ALT 31  -Continue to trend  -GI ordered smooth muscle antibody, AFP tumor marker, anti-SOLA KID HORTENCIA AB, mitochondrial antibodies, TIFFANIE, alpha-1 antitrypsin, ceruloplasmin              Smooth muscle antibody unremarkable              AFP unremarkable              Anti liver kidney microsomal antibody unremarkable.               Anti mitochondrial unremarkable              TIFFANIE elevated              Ceruloplasmin unremarkable  -CMV negative for IgG and IgM  -IgA 656, IgM 61     Macrocytic anemia 2/2 folate deficiency 2/2 alcohol abuse  -Hemoglobin 8.5>9.3>8.7  -MCV 747R  -Patient on folic acid, thiamine, multivitamin  -Vitamin B12 843, within normal limits, folate 2.0, below reference range  -Iron studies show iron 65, TIBC 99, iron saturation 66  -Ferritin 1202  -Fecal occult blood negative     Alcohol abuse  -Folic acid, thiamine, multivitamin  -[facility] protocol  -Seizure precautions     Holding chemical DVT prophylaxis for suspected GI bleed    May possibly discharge today on oral antibiotic, rescue inhaler, diuretic, outpatient pulmonary function test, and close outpatient clinic follow-up. Will discuss with attending. Dinora Smith MD  5/3/2022  9:45 AM   Attending Physician Statement  I have discussed the care of Fabiano Infante and I have examined the patient myselft and taken ros and hpi , including pertinent history and exam findings,  with the resident. I have reviewed the key elements of all parts of the encounter with the resident. I agree with the assessment, plan and orders as documented by the resident.   [employer] signed by Maximo Walter MD

## 2022-05-03 NOTE — PLAN OF CARE
Problem: Pain  Goal: Verbalizes/displays adequate comfort level or baseline comfort level  Outcome: Progressing     Pain rated as a 8/10 located in the abdomen. PRN pain medication given for pain control. Problem: Risk for Falls  Goal: Fall prevention  Outcome: Progressing     No falls noted this shift. Patient ambulates with a standby staff assistance without difficulty. Bed kept in low position. Safe environment maintained. Bedside table & call light in reach. Uses call light appropriately when needing assistance. Problem: Anxiety/Agitation/Restlessness  Goal: Verbalize or staff assess the ability to manage anxiety  Outcome: Progressing       Problem: Nausea/Vomiting (Adult)  Goal: Control of nausea/vomiting  Outcome: Progressing     No reports of N/V this shift.     Problem: Discharge Planning  Goal: Discharge to home or other facility with appropriate resources  Outcome: Progressing

## 2022-05-03 NOTE — CARE COORDINATION
Pt. Qualified, for Home Oxygen. Writer faxed Home O2 Eval, Face Sheet, DME orders for Oxygen, F2F notes, to [facility]. Informed, Bela Culp, from [facility] [place] to follow. HOME OXYGEN:    Portable 02 tank delivered per [facility]. 02 tank turned on and noted to be full. Patient understands to call [phone], immediately upon arrival home to have 02 concentrator delivered.     Electronically signed by Nata Dan RN on 5/3/2022 at 3:56 PM

## 2022-05-03 NOTE — CARE COORDINATION
ONGOING DISCHARGE PLAN:    Patient is alert and oriented x4. Spoke with patient regarding discharge plan and patient confirms that plan is still to return to home w/ Wife. Denies VNS. Remains on IV Cefepime/Flagyl. ID following. Remains on PO Librium. Sating 93% on RA. Home O2 Eval, will follow if needed. Will continue to follow for additional discharge needs.     Electronically signed by Jonathon Christiansen RN on 5/3/2022 at 11:58 AM

## 2022-05-03 NOTE — PROGRESS NOTES
CLINICAL PHARMACY NOTE: MEDS TO BEDS    Total # of Prescriptions Filled: 6   The following medications were delivered to the patient:  · Prednisone 10mg  · Furosemide 20mg  · Carvedilol 3.125mg  · Benzonatate 809HZ  · Folic Acid 1mg    Additional Documentation:  Delivered Medication to Patients Room

## 2022-05-03 NOTE — PROGRESS NOTES
Home Oxygen Evaluation    Room air SpO2 at Rest =93%    Room air with exercise/exertion =88%    SpO2 on prescribed O2 level at 2   LPM  at rest =   94%   with exercise/exertion =91%    Nocturnal Oximetry with patient on room air recommended if the resting SpO2 is 89% to 95%.  (Requires additional order)

## 2022-05-03 NOTE — PROGRESS NOTES
[facility] -   Infectious diseases evaluation  admission date 4/27/2022    reason for consultation:   Pneumonia    Impression :   Current:  · Multifocal pneumonia  · Sepsis picture likely secondary to above  · Acute hypoxic respiratory failure  · Alcohol withdrawal  · Alcoholic hepatitis  · Elevated liver enzymes  · Possible acute EBV infection  · History of PE      Recommendations   · Continue IV cefepime and Flagyl day 5  · He received IV vancomycin 4/27/2022 through 5/1/2022  · He received ceftriaxone 4/27/2022 through 4/28/2022  · IV vancomycin discontinued 5/1/2022  · Chest x-ray in a.m. · MRSA nasal swab negative  · Follow blood cultures, no growth to date  · Mycoplasma IgM negative  · Urine for Legionella antigen negative and strep antigen negative  · Hepatitis panel negative  · Continue supportive care    Infection Control Recommendations   · [place] Precautions      History of Present Illness:   Initial history:  Josh Barajas is a 40y.o.-year-old male presented to hospital with worsening right upper quadrant abdominal pain and distention for several days associated with generalized fatigue, shortness of breath and nonproductive cough. No alleviating or aggravating factor. Chest x-ray suggestive of multifocal pneumonia and small left pleural effusion  CT abdomen and pelvis 4/27/2022 showed enlarged liver, diffuse hepatocellular disease, mild gallbladder wall thickening with no gallstones. 4/28/2022 urinalysis unremarkable  The patient is alcoholic. The patient was recently seen at [place] [facility] for presyncopal episode found to be anemic. Occult blood was positive was discharged home on Protonix    Interval changes  5/3/2022   The patient had a fever with a temperature max of 102.8 on 4/30/2022, was tachycardic transferred to ICU. He is complaining of cough, denied shortness of breath, afebrile more than 24 hours.   EBV VCA IgM was elevated at 1018, IgG 740, EBV nuclear antigen antibody elevated at 621  Chest x-ray from yesterday showed increased patchy right perihilar and left basilar airspace disease  Blood cultures on 4/27/2022 no growth  COVID-19/influenza combo PCR was negative on 4/27/2022  Patient Vitals for the past 8 hrs:   BP Temp Temp src Pulse Resp SpO2 Weight   05/03/22 0746 -- -- -- -- 18 91 % --   05/03/22 0730 117/79 98.6 °F (37 °C) Oral 95 18 92 % --   05/03/22 0530 -- -- -- -- -- -- 218 lb 14.4 oz (99.3 kg)           I have personally reviewed the past medical history, past surgical history, medications, social history, and family history, and I haveupdated the database accordingly. Allergies:   Bee venom     Review of Systems:     Review of Systems  As per history of present illness, other than above 12 system review was negative  Physical Examination :       Physical Exam  Constitutional:       Appearance: He is ill-appearing. HENT:      Head: Normocephalic and atraumatic. Right Ear: External ear normal.      Left Ear: External ear normal.   Eyes:      General: Scleral icterus present. Pupils: Pupils are equal, round, and reactive to light. Cardiovascular:      Heart sounds: Normal heart sounds. No murmur heard. Pulmonary:      Effort: No respiratory distress. Breath sounds: No wheezing. Abdominal:      Palpations: Abdomen is soft. Tenderness: There is no abdominal tenderness. Musculoskeletal:      Right lower leg: No edema. Left lower leg: No edema. Skin:     Coloration: Skin is jaundiced. Findings: No erythema. Neurological:      General: No focal deficit present. Mental Status: He is alert and oriented to person, place, and time.          Past Medical History:     Past Medical History:   Diagnosis Date    Alcoholism ([address]     pt drinks a fifth of whiskey daily    Anemia     Pulmonary embolism (HCC)        Past Surgical  History:     Past Surgical History: Procedure Laterality Date    UPPER GASTROINTESTINAL ENDOSCOPY N/A 5/1/2022    EGD ESOPHAGOGASTRODUODENOSCOPY performed by Alex Vieira MD at [address] [place] [address]       Medications:      furosemide  20 mg IntraVENous Once    albuterol  2.5 mg Nebulization Q4H WA    carvedilol  3.125 mg Oral BID WC    folic acid  1 mg Oral Daily    thiamine mononitrate  100 mg Oral Daily    multivitamin  1 tablet Oral Daily    nicotine  1 patch TransDERmal Daily    chlordiazePOXIDE  10 mg Oral 4x Daily    dextromethorphan  60 mg Oral 2 times per day    benzonatate  200 mg Oral TID    metroNIDAZOLE  500 mg IntraVENous Q8H    cefepime  2,000 mg IntraVENous Q8H    losartan  50 mg Oral Daily    pantoprazole (PROTONIX) 40 mg injection  40 mg IntraVENous Q12H    sodium chloride flush  5-40 mL IntraVENous 2 times per day    sodium chloride flush  5-40 mL IntraVENous 2 times per day    [Held by provider] enoxaparin  40 mg SubCUTAneous Daily       Social History:     Social History     Socioeconomic History    Marital status: Unknown     Spouse name: Not on file    Number of children: Not on file    Years of education: Not on file    Highest education level: Not on file   Occupational History    Not on file   Tobacco Use    Smoking status: Current Every Day Smoker     Packs/day: 1.00     Years: 21.00     Pack years: 21.00     Types: Cigarettes    Smokeless tobacco: Never Used    Tobacco comment: using nicotine patches at night   Vaping Use    Vaping Use: Never used   Substance and Sexual Activity    Alcohol use: Not Currently     Comment: hx of alcoholism; pt drinks a fifth of whiskey daily    Drug use: Not Currently     Comment: used cocaine in early 20's    Sexual activity: Not on file   Other Topics Concern    Not on file   Social History Narrative    Not on file     Social Determinants of Health     Financial Resource Strain: Low Risk     Difficulty of Paying Living Expenses: Not hard at all   Food Insecurity: No Food Insecurity    Worried About Running Out of Food in the Last Year: Never true    Sheri of Food in the Last Year: Never true   Transportation Needs: No Transportation Needs    Lack of Transportation (Medical): No    Lack of Transportation (Non-Medical): No   Physical Activity:     Days of Exercise per Week: Not on file    Minutes of Exercise per Session: Not on file   Stress:     Feeling of Stress : Not on file   Social Connections:     Frequency of Communication with Friends and Family: Not on file    Frequency of Social Gatherings with Friends and Family: Not on file    Attends Yarsanism Services: Not on file    Active Member of [address] [place] [address] [employer] or Organizations: Not on file    Attends Club or Organization Meetings: Not on file    Marital Status: Not on file   Intimate Partner Violence:     Fear of Current or Ex-Partner: Not on file    Emotionally Abused: Not on file    Physically Abused: Not on file    Sexually Abused: Not on file   Housing Stability:     Unable to Pay for Housing in the Last Year: Not on file    Number of [place] in the Last Year: Not on file    Unstable Housing in the Last Year: Not on file       Family History:     Family History   Problem Relation Age of Onset    Heart Disease Mother     Heart Attack Mother     Heart Disease Father       Medical Decision Making:   I have independently reviewed/ordered the following labs:    CBC with Differential:   Recent Labs     05/02/22  0428 05/03/22  0519   WBC 15.9* 18.6*   HGB 8.9* 8.7*   HCT 25.5* 25.2*    255   LYMPHOPCT 2* 2*   MONOPCT 3 2     BMP:  Recent Labs     05/02/22  0428 05/03/22  0519    135   K 3.9 4.3    107   CO2 20 20   BUN 5* 7   CREATININE <0.40* <0.40*     Hepatic Function Panel:   Recent Labs     04/30/22  1407 05/01/22  0450   PROT 6.5 6.4   LABALBU 2.1* 2.2*   BILITOT 4.80* 4.86*   ALKPHOS 268* 237*   ALT 34 31   * 206*     No results for input(s): RPR in the last 72 hours.   No results for input(s): HIV in the last 72 hours. No results for input(s): BC in the last 72 hours. Lab Results   Component Value Date    CREATININE <0.40 05/03/2022    GLUCOSE 128 05/03/2022       Detailed results: Thank you for allowing us to participate in the care of this patient. Please call with questions. This note is created with the assistance of a speech recognition program.  While intending to generate adocument that actually reflects the content of the visit, the document can still have some errors including those of syntax and sound a like substitutions which may escape proof reading. It such instances, actual meaningcan be extrapolated by contextual diversion.     Millie Calhoun MD  Office: [phone]  Perfect serve / office [phone]

## 2022-05-03 NOTE — PROGRESS NOTES
Prior to me seeing the patient, I was informed by bedside nursing that the patient has officially been discharged    Discharge plans per admitting service.   We will sign off    Deepika Leblanc MD

## 2022-05-03 NOTE — PROGRESS NOTES
GI Progress notes    5/3/2022   1:51 PM    Name:  Josh Barajas  MRN:    [id number]     Mohanide:     [de-identified]   Room:  2120/2120-01   Day: 6     Admit Date: 4/27/2022  5:50 PM  PCP: Dipika Chiu MD    Subjective:     C/C:   Chief Complaint   Patient presents with    Cough    Fatigue    Nasal Congestion    Shortness of Breath       Interval History: Status: improved. Overall seems to be doing better out of ICU  Still has some breathing issues however better  Rectal bleeding has improved  Complain of some abdominal bloating and gas symptoms  No overt signs of withdrawal from alcohol  Has elevated LFTs  Has some nausea but no vomiting    ROS:  Constitutional: negative for chills, fevers and sweats    Gastrointestinal positive abdominal pain, constipation, positive diarrhea, nausea and vomiting  Neurological: negative for dizziness and headaches    Medications: Allergies:    Allergies   Allergen Reactions    Bee Venom Anaphylaxis       Current Meds: albuterol (PROVENTIL) nebulizer solution 2.5 mg, Q4H WA  carvedilol (COREG) tablet 3.125 mg, BID WC  traMADol (ULTRAM) tablet 50 mg, Q6H PRN  HYDROcodone homatropine (HYCODAN) 5-1.5 MG/5ML solution 5 mL, M9F PRN  folic acid (FOLVITE) tablet 1 mg, Daily  thiamine mononitrate tablet 100 mg, Daily  therapeutic multivitamin-minerals 1 tablet, Daily  nicotine (NICODERM CQ) 21 MG/24HR 1 patch, Daily  chlordiazePOXIDE (LIBRIUM) capsule 10 mg, 4x Daily  dexmedetomidine (PRECEDEX) 400 mcg in sodium chloride 0.9 % 100 mL infusion, Continuous  0.9 % sodium chloride infusion, Continuous  dextromethorphan (DELSYM) 30 MG/5ML extended release liquid 60 mg, 2 times per day  benzonatate (TESSALON) capsule 200 mg, TID  metronidazole (FLAGYL) 500 mg in 0.9% NaCl 100 mL IVPB premix, Q8H  ondansetron (ZOFRAN) injection 4 mg, Q4H PRN  cefepime (MAXIPIME) 2000 mg IVPB minibag, Q8H  losartan (COZAAR) tablet 50 mg, Daily  pantoprazole (PROTONIX) 40 mg in sodium chloride (PF) 10 mL injection, Q12H  sodium chloride flush 0.9 % injection 5-40 mL, 2 times per day  sodium chloride flush 0.9 % injection 5-40 mL, PRN  LORazepam (ATIVAN) tablet 1 mg, Q1H PRN   Or  LORazepam (ATIVAN) injection 1 mg, Q1H PRN  sodium chloride flush 0.9 % injection 10 mL, PRN  sodium chloride flush 0.9 % injection 5-40 mL, 2 times per day  sodium chloride flush 0.9 % injection 5-40 mL, PRN  0.9 % sodium chloride infusion, PRN  [Held by provider] enoxaparin (LOVENOX) injection 40 mg, Daily  acetaminophen (TYLENOL) tablet 650 mg, Q6H PRN   Or  acetaminophen (TYLENOL) suppository 650 mg, Q6H PRN        Data:     Code Status:  Full Code    Family History   Problem Relation Age of Onset    Heart Disease Mother     Heart Attack Mother     Heart Disease Father        Social History     Socioeconomic History    Marital status: Unknown     Spouse name: Not on file    Number of children: Not on file    Years of education: Not on file    Highest education level: Not on file   Occupational History    Not on file   Tobacco Use    Smoking status: Current Every Day Smoker     Packs/day: 1.00     Years: 21.00     Pack years: 21.00     Types: Cigarettes    Smokeless tobacco: Never Used    Tobacco comment: using nicotine patches at night   Vaping Use    Vaping Use: Never used   Substance and Sexual Activity    Alcohol use: Not Currently     Comment: hx of alcoholism; pt drinks a fifth of whiskey daily    Drug use: Not Currently     Comment: used cocaine in early 20's    Sexual activity: Not on file   Other Topics Concern    Not on file   Social History Narrative    Not on file     Social Determinants of Health     Financial Resource Strain: Low Risk     Difficulty of Paying Living Expenses: Not hard at all   Food Insecurity: No Food Insecurity    Worried About Running Out of Food in the Last Year: Never true    Sheri of Food in the Last Year: Never true   Transportation Needs: No Transportation Needs    Lack of Transportation (Medical): No    Lack of Transportation (Non-Medical): No   Physical Activity:     Days of Exercise per Week: Not on file    Minutes of Exercise per Session: Not on file   Stress:     Feeling of Stress : Not on file   Social Connections:     Frequency of Communication with Friends and Family: Not on file    Frequency of Social Gatherings with Friends and Family: Not on file    Attends Alevism Services: Not on file    Active Member of [address] [place] [address] [employer] or Organizations: Not on file    Attends Club or Organization Meetings: Not on file    Marital Status: Not on file   Intimate Partner Violence:     Fear of Current or Ex-Partner: Not on file    Emotionally Abused: Not on file    Physically Abused: Not on file    Sexually Abused: Not on file   Housing Stability:     Unable to Pay for Housing in the Last Year: Not on file    Number of [place] in the Last Year: Not on file    Unstable Housing in the Last Year: Not on file       Vitals:  BP 98/74   Pulse 94   Temp 97.8 °F (36.6 °C) (Oral)   Resp 18   Ht 5' 8\" (1.727 m)   Wt 218 lb 14.4 oz (99.3 kg)   SpO2 91%   BMI 33.28 kg/m²   Temp (24hrs), Av.4 °F (36.9 °C), Min:97.8 °F (36.6 °C), Max:98.9 °F (37.2 °C)    No results for input(s): POCGLU in the last 72 hours. I/O (24Hr):     Intake/Output Summary (Last 24 hours) at 5/3/2022 1351  Last data filed at 5/3/2022 1255  Gross per 24 hour   Intake 418 ml   Output 500 ml   Net -82 ml       Labs:      CBC:   Lab Results   Component Value Date    WBC 18.6 2022    RBC 2.00 2022    HGB 8.7 2022    HCT 25.2 2022    .0 2022    MCH 43.4 2022    MCHC 34.4 2022    RDW 18.0 2022     2022    MPV 8.8 2022     CBC with Differential:    Lab Results   Component Value Date    WBC 18.6 2022    RBC 2.00 2022    HGB 8.7 2022    HCT 25.2 2022     2022    .0 2022    MCH 43.4 2022 MCHC 34.4 05/03/2022    RDW 18.0 05/03/2022    NRBC 1 05/17/2021    METASPCT 1 05/01/2022    LYMPHOPCT 2 05/03/2022    MONOPCT 2 05/03/2022    BASOPCT 0 05/03/2022    MONOSABS 0.37 05/03/2022    LYMPHSABS 0.37 05/03/2022    EOSABS 0.00 05/03/2022    BASOSABS 0.00 05/03/2022    DIFFTYPE NOT REPORTED 01/30/2022     Hemoglobin/Hematocrit:    Lab Results   Component Value Date    HGB 8.7 05/03/2022    HCT 25.2 05/03/2022     CMP:    Lab Results   Component Value Date     05/03/2022    K 4.3 05/03/2022     05/03/2022    CO2 20 05/03/2022    BUN 7 05/03/2022    CREATININE <0.40 05/03/2022    GFRAA Can not be calculated 05/03/2022    LABGLOM Can not be calculated 05/03/2022    GLUCOSE 128 05/03/2022    PROT 6.4 05/01/2022    LABALBU 2.2 05/01/2022    CALCIUM 6.7 05/03/2022    BILITOT 4.86 05/01/2022    ALKPHOS 237 05/01/2022     05/01/2022    ALT 31 05/01/2022     BMP:    Lab Results   Component Value Date     05/03/2022    K 4.3 05/03/2022     05/03/2022    CO2 20 05/03/2022    BUN 7 05/03/2022    LABALBU 2.2 05/01/2022    CREATININE <0.40 05/03/2022    CALCIUM 6.7 05/03/2022    GFRAA Can not be calculated 05/03/2022    LABGLOM Can not be calculated 05/03/2022    GLUCOSE 128 05/03/2022     PT/INR:    Lab Results   Component Value Date    PROTIME 15.4 04/28/2022    INR 1.2 04/28/2022     PTT:    Lab Results   Component Value Date    APTT 40.5 04/27/2022   [APTT}    Physical Examination:        General appearance: alert, cooperative and no distress  Mental Status: oriented to person, place and time and normal affect    Abdomen: soft, bloated mild diffuse tender, nondistended, bowel sounds present     Assessment:        Primary Problem  Sepsis [facility])     Active Hospital Problems    Diagnosis Date Noted    Acute liver failure without hepatic coma [K72.00]      Priority: Medium    Sepsis ([address] [A41.9] 04/28/2022     Priority: Medium    Melena [K92.1] 04/28/2022     Priority: Medium    Acute blood loss anemia [D62] 04/28/2022     Priority: Medium    Left lower lobe pneumonia [J18.9] 04/27/2022     Priority: Medium    Transaminitis [R74.01] 10/15/2021     Past Medical History:   Diagnosis Date    [address]     pt drinks a fifth of whiskey daily    Anemia     Pulmonary embolism (HCC)         Plan:        1. Continue PPI therapy  2. Continue treatment for breathing issues   3. follow-up hemoglobin hematocrit  4.  Plan colonoscopy in versus outpatient    Explained to the patient and d/W Nursing Staff  Will F/U with you  Please call or Page for any issues or change in status  Thanks    Electronically signed by Dylon Gordillo MD on 5/3/2022 at 1:51 PM

## 2022-05-03 NOTE — PROGRESS NOTES
Pt has a hacking cough. Pt also producing sputum which he expelled into the trash can. Lungs have inspiratory and expiratory wheezing throughout all fields. RT arrives to do duonebs. Pt looks SOB and expresses same.

## 2022-05-03 NOTE — PROGRESS NOTES
Patient was evaluated today for the diagnosis of CHF. I entered a DME order for home oxygen because the diagnosis and testing requires the patient to have supplemental oxygen. Condition will improve or be benefited by oxygen use. The patient is not able to perform good mobility in a home setting and therefore does require the use of a portable oxygen system. The need for this equipment was discussed with the patient and he understands and is in agreement.     Electronically signed by Chucky Eason MD on 5/3/2022 at 3:56 PM

## 2022-05-04 NOTE — DISCHARGE SUMMARY
[address]    Discharge Summary     Patient ID: Justino Lindsey  :  1985   MRN: [id number]     ACCOUNT:  [de-identified]   Patient's PCP: Maritza Luna MD  Admit Date: 2022   Discharge Date: 5/3/2022   Length of Stay: 6  Code Status:  Prior  Admitting Physician: Efe Christy MD  Discharge Physician: Breanne Guy MD     Active Discharge Diagnoses:     Primary Problem  Sepsis [facility])      [place] Problems    Diagnosis Date Noted    Acute liver failure without hepatic coma [K72.00]      Priority: Medium    Sepsis ([facility] [address] [A41.9] 2022     Priority: Medium    Melena [K92.1] 2022     Priority: Medium    Acute blood loss anemia [D62] 2022     Priority: Medium    Left lower lobe pneumonia [J18.9] 2022     Priority: Medium    Transaminitis [R74.01] 10/15/2021     Admission Condition:  poor     Discharged Condition: fair    Hospital Stay:     Hospital Course:  Justino Lindsey is a 40 y.o. male who was admitted for the management of   Sepsis ([facility] [address] , presented to ER with Cough, Fatigue, Nasal Congestion, and Shortness of Breath    The patient is a 40 y.o.  male, with a history of pulmonary embolism, hypertension, recent blood in stool  and recent newly found anemia who presents with the concern of a cough and exertional shortness of breath however was found to be jaundice with elevated liver function. Symptoms of shortness of breath and cough were associated with any activity  Denies fever, chills, chest pain (not associated with coughing), abdominal pain, nausea, vomiting, diarrhea, and urinary symptoms. Concern of the color of his skin over the past 3 to 4 days. Found to have multifocal pneumonia, acute hypoxic resp failure, alcoholic hepatomegaly.  Required Precedex drip for <24h for alcohol withdrawal. Was on librium as well for alcohol withdrawal.    Patient was evaluated by:  GI: EGD done. Negative. Outpatient colonoscopy  ID: IV vancomycin 04/27 - 05/01       Rocephin 04/27 - 04/28       IV cefepime and Flagyl 04/29 - 05/03  Pulmo: Alcohol withdrawal requiring some precedex. On librium. Had persistent cough on multiple cough medication. Received one dose of solu-medrol 125 mg. Patient wheezing more related to his CHF however. Patient discharged on prednisone taper with outpatient PFT. Provided furosemide. Advised to follow up with cardio, gastro, and primary care ASAP. Significant therapeutic interventions: <24h of precedex drip for alcohol withdrawal    Significant Diagnostic Studies: EGD. Unremarkable.     Labs / Micro:    CBC:   Lab Results   Component Value Date    WBC 18.6 05/03/2022    RBC 2.00 05/03/2022    HGB 8.7 05/03/2022    HCT 25.2 05/03/2022    .0 05/03/2022    MCH 43.4 05/03/2022    MCHC 34.4 05/03/2022    RDW 18.0 05/03/2022     05/03/2022     CMP:    Lab Results   Component Value Date    GLUCOSE 128 05/03/2022     05/03/2022    K 4.3 05/03/2022     05/03/2022    CO2 20 05/03/2022    BUN 7 05/03/2022    CREATININE <0.40 05/03/2022    ANIONGAP 8 05/03/2022    ALKPHOS 224 05/03/2022    ALT 30 05/03/2022     05/03/2022    BILITOT 3.98 05/03/2022    LABALBU 2.2 05/03/2022    ALBUMIN 0.6 04/15/2022    LABGLOM Can not be calculated 05/03/2022    GFRAA Can not be calculated 05/03/2022    GFR      05/03/2022    PROT 6.3 05/03/2022    CALCIUM 6.7 05/03/2022     PT/INR:    Lab Results   Component Value Date    PROTIME 15.4 04/28/2022    INR 1.2 04/28/2022     PTT:   Lab Results   Component Value Date    APTT 40.5 04/27/2022     U/A:    Lab Results   Component Value Date    COLORU Dark Yellow 04/28/2022    TURBIDITY Clear 04/28/2022    SPECGRAV 1.013 04/28/2022    HGBUR NEGATIVE 04/28/2022    PHUR 7.0 04/28/2022    PROTEINU NEGATIVE 04/28/2022    GLUCOSEU NEGATIVE 04/28/2022    KETUA NEGATIVE 04/28/2022    BILIRUBINUR NEGATIVE 04/28/2022 UROBILINOGEN Normal 04/28/2022    NITRU NEGATIVE 04/28/2022    LEUKOCYTESUR NEGATIVE 04/28/2022     TSH:    Lab Results   Component Value Date    TSH 27.63 05/01/2022     Radiology:    CT CHEST WO CONTRAST  Result Date: 4/30/2022    Interval development of a mild to moderate left pleural effusion with interstitial pulmonary edema. New subtle patchy opacity in the central right middle lobe may represent focal atelectasis from low lung volumes or may represent developing pneumonia. Redemonstration of hepatomegaly and hepatic steatosis. CT ABDOMEN PELVIS W IV CONTRAST Additional Contrast? None  Result Date: 4/27/2022     The liver is enlarged and diffusely low in attenuation. Diffuse nonspecific hepatocellular disease suspected. Bile ducts are not dilated in this patient with history of jaundice. There are no calcified gallstones. There is mild gallbladder wall thickening. Wall thickening may be reactive to adjacent liver disease. Mildly enlarged upper abdominal lymph nodes measuring up to 2.5 cm, likely reactive. Trace free fluid in the pelvis. Unilateral right-sided L5 pars interarticularis defect. RECOMMENDATIONS: Unavailable     XR CHEST PORTABLE  Result Date: 5/2/2022    Increasing patchy right perihilar and left basilar airspace disease accentuated by somewhat low lung volumes     XR CHEST PORTABLE  Result Date: 4/28/2022    Stable small infiltrate within the left costophrenic angle which may represent small pneumonia versus pleural effusion.      XR CHEST PORTABLE  Result Date: 4/27/2022     New left effusion and or infiltrate     Consultations:    Consults:     Final Specialist Recommendations/Findings:   PHARMACY TO DOSE VANCOMYCIN  IP CONSULT TO SOCIAL WORK  IP CONSULT TO INTERNAL MEDICINE  IP CONSULT TO GI  IP CONSULT TO INFECTIOUS DISEASES  IP CONSULT TO PULMONOLOGY  IP CONSULT TO CRITICAL CARE      The patient was seen and examined on day of discharge and this discharge summary is in conjunction with any daily progress note from day of discharge. Discharge plan:     Disposition: Home    Physician Follow Up:     Maicol Charles MD  [address] [facility]  [phone]    In 3 days  PCP. Post hospital stay    Rosalind Melara [address] [place] [address]  [phone]    Schedule an appointment as soon as possible for a visit  Cardiology. Alcoholic cardiomyopathy    Dorothy Koehler MD  [address]  [address] [facility]  [phone]    In 1 week  GI. Colonoscopy. [address] [facility]  [phone]      Call them when you get home so they can deliver the rest of your Oxygen equipment. If any problems call thomas Browne [phone]. Requiring Further Evaluation/Follow Up POST HOSPITALIZATION/Incidental Findings: Pulmonary Function Test    Diet: cardiac diet    Activity: As tolerated    Instructions to Patient: Take medications as prescribed. Follow up with outpatient appointments. Complete pulmonary function test. If symptoms recur or worsen, or new concerning signs or symptoms appear; return to the ER. Discharge Medications:      Medication List        START taking these medications      benzonatate 200 MG capsule  Commonly known as: TESSALON  Take 1 capsule by mouth 3 times daily for 7 days     carvedilol 3.125 MG tablet  Commonly known as: COREG  Take 1 tablet by mouth 2 times daily (with meals)     folic acid 1 MG tablet  Commonly known as: FOLVITE  Take 1 tablet by mouth daily     furosemide 20 MG tablet  Commonly known as: Lasix  Take 1 tablet by mouth daily     predniSONE 10 MG tablet  Commonly known as: DELTASONE  Take 2 tablets by mouth daily for 5 days, THEN 1 tablet daily for 5 days, THEN 0.5 tablets daily for 5 days. Start taking on:  May 3, 2022     vitamin B-1 100 MG tablet  Commonly known as: THIAMINE  Take 1 tablet by mouth daily            CHANGE how you take these medications      * albuterol sulfate  (90 Base) MCG/ACT inhaler  Commonly known as: Ventolin HFA  Inhale 2 puffs into the lungs 4 times daily as needed for Wheezing  What changed: You were already taking a medication with the same name, and this prescription was added. Make sure you understand how and when to take each. * albuterol sulfate  (90 Base) MCG/ACT inhaler  Commonly known as: Ventolin HFA  Inhale 2 puffs into the lungs 4 times daily as needed for Wheezing  What changed: Another medication with the same name was added. Make sure you understand how and when to take each. * This list has 2 medication(s) that are the same as other medications prescribed for you. Read the directions carefully, and ask your doctor or other care provider to review them with you.                 CONTINUE taking these medications      analgesic ointment Oint ointment     BP Monitor-Stethoscope Kit  1 each by Does not apply route daily     Centrum Men Tabs     EPINEPHrine 0.3 MG/0.3ML Soaj injection  Commonly known as: EpiPen 2-Rafat  Inject 0.3 mLs into the muscle once for 1 dose Use as directed for allergic reaction     fluticasone 50 MCG/ACT nasal spray  Commonly known as: FLONASE  instill 1 spray into each nostril once daily     losartan 50 MG tablet  Commonly known as: COZAAR  take 1 tablet by mouth once daily     Nicotine 21-14-7 MG/24HR Kit     pantoprazole 20 MG tablet  Commonly known as: Protonix  Take 2 tablets by mouth daily            STOP taking these medications      metoprolol succinate 25 MG extended release tablet  Commonly known as: TOPROL XL     MULTIVITAMIN ADULT PO     naproxen 500 MG tablet  Commonly known as: Naprosyn               Where to Get Your Medications        These medications were sent to [facility]  [address]  [address] [facility] [address]      Hours: [facility] (Mon-Fri 9AM-5:30PM) Phone: [phone] albuterol sulfate  (90 Base) MCG/ACT inhaler  albuterol sulfate  (90 Base) MCG/ACT inhaler  benzonatate 200 MG capsule  carvedilol 5.130 MG tablet  folic acid 1 MG tablet  furosemide 20 MG tablet  predniSONE 10 MG tablet  vitamin B-1 100 MG tablet       Time Spent on discharge is   40 min  in patient examination, evaluation, counseling as well as medication reconciliation, prescriptions for required medications, discharge plan and follow up. Electronically signed by   Renee Mason MD  5/4/2022  2:15 PM    Thank you Dr. Dipika Chiu MD for the opportunity to be involved in this patient's care. Attending Physician Statement  I have discussed the care of Josh Barajas and I have examined the patient myselft and taken ros and hpi , including pertinent history and exam findings,  with the resident. I have reviewed the key elements of all parts of the encounter with the resident. I agree with the assessment, plan and orders as documented by the resident. I spent over 35 mins in direct patient care as above and reviewing medications and counseling for discharge .         Electronically signed by Geovanny Eric MD

## 2022-05-04 NOTE — CARE COORDINATION
[address] Encounter Date/Time: 2022 [address] [facility] Account: [de-identified]    MRN: [id number]    Patient: Fabiano Infante    Contact Serial #: [id number]      ENCOUNTER          Patient Class: I Private Enc? No Unit RM BD: [facility] PRO    Hospital Service: MED   Encounter DX: Pneumonia [J18.9]   ADM Provider: Maximo Walter MD   Procedure:     ATT Provider:     REF Provider:        Admission DX: Pneumonia, Acute liver failure without hepatic coma, Pneumonia of left lower lobe due to infectious organism and DX codes: J18.9, K72.00, J18.9      PATIENT                 Name: Fabiano Infante : 1985 (37 yrs)   Address: [address] [place] [address] Sex: Male   City: [place] [address]         Marital Status: Unknown   Employer: [employer]         Alevism: None   Primary Care Provider: Moy Kessler MD         Primary Phone: [phone]   EMERGENCY CONTACT   Contact Name Legal Guardian? Relationship to Patient Home Phone Work Phone   1. CHRISTOPHER GROSSMAN  2. *No Contact Specified*      Domestic Partner    [phone]                 GUARANTOR            Guarantor: Fabiano Infante     : 1985   Address: [address] [facility] Sex: Male   [place] [address]     Relation to Patient: Self       Home Phone: [phone]   Guarantor ID: [id number]       Work Phone:     Guarantor Employer: [employer]         Status: FULL TIME      COVERAGE  PRIMARY INSURANCE   Payor: [place]ICA[place] Plan: [place]ICA[place] DEPT OF*   Payor Address: [address], [address]       Group Number:   Insurance Type: INDEMNITY   Subscriber Name: Keely Guy : 1985   Subscriber ID: [id number] Pat. Rel. to Sub: Self   SECONDARY INSURANCE   Payor:   Plan:     Payor Address:  ,           Group Number:   Insurance Type:     Subscriber Name:   Subscriber :     Subscriber ID:   Pat.  Rel. to Sub:             CSN: [id number]                    Purvi Naidu RCP   Respiratory Therapist Specialty:  Respiratory Therapy   Progress Notes      Signed   Date of Service:  5/3/2022 12:59 PM                 Signed              Show:Clear all  [x]Manual[x]Template[]Copied    Added by:  [x]Malena Joseph RCP      []Kim for details    Home Oxygen Evaluation     Room air SpO2 at Rest =93%     Room air with exercise/exertion =88%     SpO2 on prescribed O2 level at 2   LPM  at rest =   94%   with exercise/exertion =91%     Nocturnal Oximetry with patient on room air recommended if the resting SpO2 is 89% to 95%. (Requires additional order)            [id number]        [facility]                                    Req/Control # [Problem retrieving Specimen ID]                                   Order Date:  May 3, 2022  [id number]                                          Patient Information      Name:  Jessica Otoole  :  1985  Age:  40 y.o. Address:  [address] [place] [address]   Zip:  [address]  PCP: Christine Gallagher MD Sex:  M  SSN: [id number]  Home Phone: [phone]  Work Phone:    Patient MRN:  [id number]    Alt Patient ID:  [id number]  PCP Phone: [phone]       Authorizing Provider Information       AUTHORIZING PROVIDER: Aric Dickerson MD  Physician ID: [id number]  NPI:  [id number]  Site:   Address: [address]  Phone: [phone]  Fax: [phone]                EQUIPMENT ORDERED  DME Order for Home Oxygen as OP [[id number]] (ORD   #:   [id number]) Priority  Routine Class  Hospital Performed        Associated Diagnosis:          Comments:    You must complete the order parameters below and add the medical necessity documentation for this DME in a separate note.     Stationary Oxygen Concentrator at 2 lpm via Nasal Cannula     Stationary Prescribed at:  Continuous     Portable Oxygen Concentrator at 2 lpm via Nasal Cannula     Non Applicable     Diagnosis: PE, Pneumonia  Length of need: 12 Months            Scheduling Instructions:                                 Specimen Source             Collection Date    Collection Time    Order Status    Expected Date                  Electronically Signed By  Rocky Aguiar MD Date  May 3, 2022  3:45 PM              Responsible Party Suze Vidal-ActID   Relationship Account Type Home Phone   Barber Johnston - 10* [address] [facility] [address] Self P/F [phone]   Employer   Work Phone   [address] [facility]     Primary Insurance  Insurance/Subscriber ID:  [id number]  Subscriber Name:  Barber Johnston              Relationship to Patient: SelfSigned ABN: N    Payor Name:  [place]   Plan:  [address] [place] DEPT OF JOB   Group:   Worker's Comp Date of Injury:

## 2022-05-10 ENCOUNTER — TELEPHONE (OUTPATIENT)
Dept: INTERNAL MEDICINE CLINIC | Age: 37
End: 2022-05-10

## 2022-05-10 NOTE — TELEPHONE ENCOUNTER
Patient called in expressing that he was recently D/C from hospital with double pneumonia as Dx. Patient is on 2L of O2 currently and still having difficulty breathing on and off. Patient was advised to go back to ER with difficulty breathing/SOB. Voiced understanding and will keep office informed of updates.

## 2022-05-11 NOTE — TELEPHONE ENCOUNTER
Noted, thanks  On chart review I do not see an ER visit, does he need office visit appointment?   Please call patient and check  Thank you

## 2022-05-12 NOTE — TELEPHONE ENCOUNTER
Spoke with patient he states that he was out of his Lasix and that he is starting to retain water, he states that he will wake up in the middle of the night and very difficult episodes of breathing and he will have to put his oxygen on and then use his albuterol inhaler.  Please advise, laxis is pending on encounter

## 2022-05-13 RX ORDER — FUROSEMIDE 20 MG/1
20 TABLET ORAL DAILY
Qty: 7 TABLET | Refills: 0 | Status: SHIPPED | OUTPATIENT
Start: 2022-05-13

## 2022-05-21 ENCOUNTER — APPOINTMENT (OUTPATIENT)
Dept: CT IMAGING | Age: 37
DRG: 720 | End: 2022-05-21
Payer: MEDICAID

## 2022-05-21 ENCOUNTER — APPOINTMENT (OUTPATIENT)
Dept: GENERAL RADIOLOGY | Age: 37
DRG: 720 | End: 2022-05-21
Payer: MEDICAID

## 2022-05-21 ENCOUNTER — HOSPITAL ENCOUNTER (INPATIENT)
Age: 37
LOS: 5 days | Discharge: HOME | DRG: 720 | End: 2022-05-26
Attending: EMERGENCY MEDICINE | Admitting: INTERNAL MEDICINE
Payer: MEDICAID

## 2022-05-21 DIAGNOSIS — K62.5 RECTAL BLEEDING: ICD-10-CM

## 2022-05-21 DIAGNOSIS — A41.9 SEPTICEMIA (HCC): Primary | ICD-10-CM

## 2022-05-21 PROBLEM — D72.829 LEUKOCYTOSIS: Status: ACTIVE | Noted: 2022-05-21

## 2022-05-21 LAB
-: ABNORMAL
ABO/RH: NORMAL
ABSOLUTE EOS #: 0 K/UL (ref 0–0.4)
ABSOLUTE LYMPH #: 1.73 K/UL (ref 1–4.8)
ABSOLUTE MONO #: 1.94 K/UL (ref 0.1–1.3)
ALBUMIN SERPL-MCNC: 2.6 G/DL (ref 3.5–5.2)
ALP BLD-CCNC: 299 U/L (ref 40–129)
ALT SERPL-CCNC: 33 U/L (ref 5–41)
AMMONIA: 65 UMOL/L (ref 16–60)
ANION GAP SERPL CALCULATED.3IONS-SCNC: 13 MMOL/L (ref 9–17)
ANTIBODY SCREEN: NEGATIVE
ARM BAND NUMBER: NORMAL
AST SERPL-CCNC: 222 U/L
BACTERIA: ABNORMAL
BASOPHILS # BLD: 0 % (ref 0–2)
BASOPHILS ABSOLUTE: 0 K/UL (ref 0–0.2)
BILIRUB SERPL-MCNC: 5.28 MG/DL (ref 0.3–1.2)
BILIRUBIN URINE: ABNORMAL
BUN BLDV-MCNC: 10 MG/DL (ref 6–20)
CALCIUM SERPL-MCNC: 8.5 MG/DL (ref 8.6–10.4)
CHLORIDE BLD-SCNC: 103 MMOL/L (ref 98–107)
CO2: 23 MMOL/L (ref 20–31)
COLOR: ABNORMAL
CREAT SERPL-MCNC: 1.13 MG/DL (ref 0.7–1.2)
DATE, STOOL #1: NORMAL
EOSINOPHILS RELATIVE PERCENT: 0 % (ref 0–4)
EPITHELIAL CELLS UA: ABNORMAL /HPF
EXPIRATION DATE: NORMAL
GFR AFRICAN AMERICAN: >60 ML/MIN
GFR NON-AFRICAN AMERICAN: >60 ML/MIN
GFR SERPL CREATININE-BSD FRML MDRD: ABNORMAL ML/MIN/{1.73_M2}
GLUCOSE BLD-MCNC: 106 MG/DL (ref 70–99)
GLUCOSE URINE: NEGATIVE
HCT VFR BLD CALC: 26.3 % (ref 41–53)
HEMOCCULT SP1 STL QL: NEGATIVE
HEMOGLOBIN: 8.9 G/DL (ref 13.5–17.5)
INR BLD: 1.4
KETONES, URINE: NEGATIVE
LACTIC ACID, SEPSIS: 1.8 MMOL/L (ref 0.5–1.9)
LEUKOCYTE ESTERASE, URINE: ABNORMAL
LIPASE: 58 U/L (ref 13–60)
LYMPHOCYTES # BLD: 8 % (ref 24–44)
MAGNESIUM: 1.6 MG/DL (ref 1.6–2.6)
MCH RBC QN AUTO: 40.5 PG (ref 26–34)
MCHC RBC AUTO-ENTMCNC: 33.8 G/DL (ref 31–37)
MCV RBC AUTO: 119.9 FL (ref 80–100)
MONOCYTES # BLD: 9 % (ref 1–7)
MORPHOLOGY: ABNORMAL
NITRITE, URINE: NEGATIVE
PDW BLD-RTO: 15 % (ref 11.5–14.9)
PH UA: 7.5 (ref 5–8)
PLATELET # BLD: 422 K/UL (ref 150–450)
PMV BLD AUTO: 8.6 FL (ref 6–12)
POTASSIUM SERPL-SCNC: 3.7 MMOL/L (ref 3.7–5.3)
PROTEIN UA: ABNORMAL
PROTHROMBIN TIME: 16.8 SEC (ref 11.8–14.6)
RBC # BLD: 2.2 M/UL (ref 4.5–5.9)
RBC UA: ABNORMAL /HPF
SEG NEUTROPHILS: 83 % (ref 36–66)
SEGMENTED NEUTROPHILS ABSOLUTE COUNT: 17.93 K/UL (ref 1.3–9.1)
SODIUM BLD-SCNC: 139 MMOL/L (ref 135–144)
SPECIFIC GRAVITY UA: 1.02 (ref 1–1.03)
TIME, STOOL #1: NORMAL
TOTAL PROTEIN: 7.2 G/DL (ref 6.4–8.3)
TROPONIN, HIGH SENSITIVITY: 17 NG/L (ref 0–22)
TROPONIN, HIGH SENSITIVITY: 18 NG/L (ref 0–22)
TURBIDITY: CLEAR
URINE HGB: NEGATIVE
UROBILINOGEN, URINE: NORMAL
WBC # BLD: 21.6 K/UL (ref 3.5–11)
WBC UA: ABNORMAL /HPF

## 2022-05-21 PROCEDURE — 2580000003 HC RX 258: Performed by: EMERGENCY MEDICINE

## 2022-05-21 PROCEDURE — 87040 BLOOD CULTURE FOR BACTERIA: CPT

## 2022-05-21 PROCEDURE — 84484 ASSAY OF TROPONIN QUANT: CPT

## 2022-05-21 PROCEDURE — 81001 URINALYSIS AUTO W/SCOPE: CPT

## 2022-05-21 PROCEDURE — 86900 BLOOD TYPING SEROLOGIC ABO: CPT

## 2022-05-21 PROCEDURE — 82270 OCCULT BLOOD FECES: CPT

## 2022-05-21 PROCEDURE — 96368 THER/DIAG CONCURRENT INF: CPT

## 2022-05-21 PROCEDURE — 87086 URINE CULTURE/COLONY COUNT: CPT

## 2022-05-21 PROCEDURE — 87641 MR-STAPH DNA AMP PROBE: CPT

## 2022-05-21 PROCEDURE — 85025 COMPLETE CBC W/AUTO DIFF WBC: CPT

## 2022-05-21 PROCEDURE — 85610 PROTHROMBIN TIME: CPT

## 2022-05-21 PROCEDURE — 86901 BLOOD TYPING SEROLOGIC RH(D): CPT

## 2022-05-21 PROCEDURE — 2500000003 HC RX 250 WO HCPCS: Performed by: EMERGENCY MEDICINE

## 2022-05-21 PROCEDURE — 80053 COMPREHEN METABOLIC PANEL: CPT

## 2022-05-21 PROCEDURE — C9113 INJ PANTOPRAZOLE SODIUM, VIA: HCPCS | Performed by: EMERGENCY MEDICINE

## 2022-05-21 PROCEDURE — 86850 RBC ANTIBODY SCREEN: CPT

## 2022-05-21 PROCEDURE — 83735 ASSAY OF MAGNESIUM: CPT

## 2022-05-21 PROCEDURE — 36415 COLL VENOUS BLD VENIPUNCTURE: CPT

## 2022-05-21 PROCEDURE — 83690 ASSAY OF LIPASE: CPT

## 2022-05-21 PROCEDURE — 96366 THER/PROPH/DIAG IV INF ADDON: CPT

## 2022-05-21 PROCEDURE — 71045 X-RAY EXAM CHEST 1 VIEW: CPT

## 2022-05-21 PROCEDURE — 6360000004 HC RX CONTRAST MEDICATION: Performed by: EMERGENCY MEDICINE

## 2022-05-21 PROCEDURE — 71260 CT THORAX DX C+: CPT

## 2022-05-21 PROCEDURE — 82140 ASSAY OF AMMONIA: CPT

## 2022-05-21 PROCEDURE — 99285 EMERGENCY DEPT VISIT HI MDM: CPT

## 2022-05-21 PROCEDURE — 93005 ELECTROCARDIOGRAM TRACING: CPT | Performed by: EMERGENCY MEDICINE

## 2022-05-21 PROCEDURE — 96367 TX/PROPH/DG ADDL SEQ IV INF: CPT

## 2022-05-21 PROCEDURE — 83605 ASSAY OF LACTIC ACID: CPT

## 2022-05-21 PROCEDURE — 6360000002 HC RX W HCPCS: Performed by: EMERGENCY MEDICINE

## 2022-05-21 PROCEDURE — 2060000000 HC ICU INTERMEDIATE R&B

## 2022-05-21 PROCEDURE — 96365 THER/PROPH/DIAG IV INF INIT: CPT

## 2022-05-21 RX ORDER — SODIUM CHLORIDE 0.9 % (FLUSH) 0.9 %
10 SYRINGE (ML) INJECTION PRN
Status: DISCONTINUED | OUTPATIENT
Start: 2022-05-21 | End: 2022-05-26 | Stop reason: HOSPADM

## 2022-05-21 RX ORDER — 0.9 % SODIUM CHLORIDE 0.9 %
80 INTRAVENOUS SOLUTION INTRAVENOUS ONCE
Status: COMPLETED | OUTPATIENT
Start: 2022-05-21 | End: 2022-05-21

## 2022-05-21 RX ORDER — METRONIDAZOLE 500 MG/100ML
500 INJECTION, SOLUTION INTRAVENOUS ONCE
Status: COMPLETED | OUTPATIENT
Start: 2022-05-21 | End: 2022-05-21

## 2022-05-21 RX ORDER — 0.9 % SODIUM CHLORIDE 0.9 %
500 INTRAVENOUS SOLUTION INTRAVENOUS ONCE
Status: COMPLETED | OUTPATIENT
Start: 2022-05-21 | End: 2022-05-21

## 2022-05-21 RX ADMIN — IOPAMIDOL 75 ML: 755 INJECTION, SOLUTION INTRAVENOUS at 20:28

## 2022-05-21 RX ADMIN — SODIUM CHLORIDE, PRESERVATIVE FREE 10 ML: 5 INJECTION INTRAVENOUS at 20:29

## 2022-05-21 RX ADMIN — CEFEPIME HYDROCHLORIDE 2000 MG: 2 INJECTION, POWDER, FOR SOLUTION INTRAVENOUS at 21:14

## 2022-05-21 RX ADMIN — PANTOPRAZOLE SODIUM 80 MG: 40 INJECTION, POWDER, FOR SOLUTION INTRAVENOUS at 20:16

## 2022-05-21 RX ADMIN — METRONIDAZOLE 500 MG: 500 INJECTION, SOLUTION INTRAVENOUS at 21:14

## 2022-05-21 RX ADMIN — SODIUM CHLORIDE 80 ML: 9 INJECTION, SOLUTION INTRAVENOUS at 20:29

## 2022-05-21 RX ADMIN — VANCOMYCIN HYDROCHLORIDE 2000 MG: 1 INJECTION, POWDER, LYOPHILIZED, FOR SOLUTION INTRAVENOUS at 21:11

## 2022-05-21 RX ADMIN — SODIUM CHLORIDE 500 ML: 9 INJECTION, SOLUTION INTRAVENOUS at 21:20

## 2022-05-21 ASSESSMENT — ENCOUNTER SYMPTOMS
DIARRHEA: 0
NAUSEA: 0
BLOOD IN STOOL: 1
VOMITING: 0

## 2022-05-21 ASSESSMENT — PAIN DESCRIPTION - FREQUENCY
FREQUENCY: CONTINUOUS

## 2022-05-21 ASSESSMENT — PAIN SCALES - GENERAL
PAINLEVEL_OUTOF10: 5

## 2022-05-21 ASSESSMENT — PAIN DESCRIPTION - LOCATION
LOCATION: CHEST
LOCATION: HEAD;CHEST
LOCATION: CHEST

## 2022-05-21 ASSESSMENT — PAIN DESCRIPTION - DESCRIPTORS
DESCRIPTORS: ACHING
DESCRIPTORS: ACHING

## 2022-05-21 ASSESSMENT — PAIN - FUNCTIONAL ASSESSMENT
PAIN_FUNCTIONAL_ASSESSMENT: 0-10
PAIN_FUNCTIONAL_ASSESSMENT: NONE - DENIES PAIN

## 2022-05-21 ASSESSMENT — PAIN DESCRIPTION - PAIN TYPE: TYPE: ACUTE PAIN

## 2022-05-21 ASSESSMENT — PAIN DESCRIPTION - ONSET: ONSET: ON-GOING

## 2022-05-21 ASSESSMENT — LIFESTYLE VARIABLES: HOW OFTEN DO YOU HAVE A DRINK CONTAINING ALCOHOL: NEVER

## 2022-05-21 NOTE — ED PROVIDER NOTES
EMERGENCY DEPARTMENT ENCOUNTER    Pt Name: Molina Gutierrez  MRN: [id number]  [place] 1985  Date of evaluation: 5/21/22  CHIEF COMPLAINT       Chief Complaint   Patient presents with    Chest Pain     HISTORY OF PRESENT ILLNESS     Chest Pain  Pain location:  Substernal area  Pain quality: pressure    Pain radiates to:  Does not radiate  Pain severity:  Moderate  Onset quality:  Gradual  Duration:  2 days  Timing:  Constant  Progression:  Unchanged  Chronicity:  New  Context comment:  Out of protonix, blood in stools, clots yesterday  Relieved by:  Nothing  Worsened by:  Nothing  Associated symptoms: no nausea and no vomiting      Not drinking alcohol  Has liver cirrhosis      REVIEW OF SYSTEMS     Review of Systems   Cardiovascular: Positive for chest pain. Gastrointestinal: Positive for blood in stool. Negative for diarrhea, nausea and vomiting. All other systems reviewed and are negative.     PASTMEDICAL HISTORY     Past Medical History:   Diagnosis Date    Alcoholism ([facility]     pt drinks a fifth of whiskey daily    Anemia     Pulmonary embolism (HCC)      Past Problem List  Patient Active Problem List   Diagnosis Code    Acute pulmonary embolism (HCC) I26.99    ACS (acute coronary syndrome) (HCC) I24.9    Chronic systolic congestive heart failure (HCC) I50.22    Essential hypertension L55    Uncomplicated alcohol dependence ([address] J38.77    Alcoholic cardiomyopathy ([facility] [address] I42.6    Diarrhea due to malabsorption K90.9, R19.7    Heartburn R12    Generalized anxiety disorder F41.1    Hypomagnesemia E83.42    Hypokalemia E87.6    Transaminitis Y18.05    Alcoholic hepatitis without ascites K70.10    Macrocytic anemia D53.9    Left lower lobe pneumonia J18.9    Sepsis (HCC) A41.9    Melena K92.1    Acute blood loss anemia D62    Acute liver failure without hepatic coma K72.00     SURGICAL HISTORY       Past Surgical History:   Procedure Laterality Date    UPPER GASTROINTESTINAL ENDOSCOPY N/A 2022    EGD ESOPHAGOGASTRODUODENOSCOPY performed by Nadja Curry MD at [facility]       Previous Medications    ALBUTEROL SULFATE HFA (VENTOLIN HFA) 108 (90 BASE) MCG/ACT INHALER    Inhale 2 puffs into the lungs 4 times daily as needed for Wheezing    ALBUTEROL SULFATE HFA (VENTOLIN HFA) 108 (90 BASE) MCG/ACT INHALER    Inhale 2 puffs into the lungs 4 times daily as needed for Wheezing    BLOOD PRESSURE MONITORING (BP MONITOR-STETHOSCOPE) KIT    1 each by Does not apply route daily    CARVEDILOL (COREG) 3.125 MG TABLET    Take 1 tablet by mouth 2 times daily (with meals)    EPINEPHRINE (EPIPEN 2-LIZETTE) 0.3 MG/0.3ML SOAJ INJECTION    Inject 0.3 mLs into the muscle once for 1 dose Use as directed for allergic reaction    FLUTICASONE (FLONASE) 50 MCG/ACT NASAL SPRAY    instill 1 spray into each nostril once daily    FOLIC ACID (FOLVITE) 1 MG TABLET    Take 1 tablet by mouth daily    FUROSEMIDE (LASIX) 20 MG TABLET    Take 1 tablet by mouth daily    LOSARTAN (COZAAR) 50 MG TABLET    take 1 tablet by mouth once daily    MENTHOL-METHYL SALICYLATE (ANALGESIC OINTMENT) OINT OINTMENT    Apply topically as needed Indications: on back    MULTIPLE VITAMINS-MINERALS (CENTRUM MEN) TABS    Take 1 tablet by mouth daily    NICOTINE 21-14-7 MG/24HR KIT    Place 14 mg onto the skin nightly     PANTOPRAZOLE (PROTONIX) 20 MG TABLET    Take 2 tablets by mouth daily    VITAMIN B-1 (THIAMINE) 100 MG TABLET    Take 1 tablet by mouth daily     ALLERGIES     is allergic to bee venom. FAMILY HISTORY     He indicated that his mother is . He indicated that his father is alive.      SOCIAL HISTORY       Social History     Tobacco Use    Smoking status: Current Every Day Smoker     Packs/day: 1.00     Years: 21.00     Pack years: 21.00     Types: Cigarettes    Smokeless tobacco: Never Used    Tobacco comment: using nicotine patches at night   Vaping Use    Vaping Use: Never used   Substance Use Topics    Alcohol use: Not Currently     Comment: hx of alcoholism; pt drinks a fifth of whiskey daily    Drug use: Not Currently     Comment: used cocaine in early 20's     PHYSICAL EXAM     INITIAL VITALS: BP (!) 94/57   Pulse 90   Temp 97.9 °F (36.6 °C) (Tympanic)   Resp 23   Ht 5' 8\" (1.727 m)   Wt 185 lb (83.9 kg)   SpO2 95%   BMI 28.13 kg/m²    Physical Exam  Constitutional:       General: He is not in acute distress. Appearance: Normal appearance. He is well-developed. He is not diaphoretic. HENT:      Head: Normocephalic and atraumatic. Right Ear: External ear normal.      Left Ear: External ear normal.      Nose: Nose normal. No congestion. Mouth/Throat:      Mouth: Mucous membranes are moist.      Pharynx: Oropharynx is clear. Eyes:      General: Scleral icterus present. Right eye: No discharge. Left eye: No discharge. Conjunctiva/sclera: Conjunctivae normal.      Pupils: Pupils are equal, round, and reactive to light. Neck:      Trachea: No tracheal deviation. Cardiovascular:      Rate and Rhythm: Regular rhythm. Tachycardia present. Pulses: Normal pulses. Heart sounds: Normal heart sounds. Pulmonary:      Effort: Pulmonary effort is normal. No respiratory distress. Breath sounds: Normal breath sounds. No stridor. No wheezing or rales. Abdominal:      Palpations: Abdomen is soft. Tenderness: There is no abdominal tenderness. There is no guarding or rebound. Genitourinary:     Comments: KRISTIE completed with RN chaperone, no bleeding, brown stool  Musculoskeletal:         General: No tenderness or deformity. Normal range of motion. Cervical back: Normal range of motion and neck supple. Skin:     General: Skin is warm and dry. Capillary Refill: Capillary refill takes less than 2 seconds. Coloration: Skin is jaundiced. Findings: No erythema or rash. Neurological:      General: No focal deficit present.       Mental Status: He is alert and oriented to person, place, and time. Coordination: Coordination normal.   Psychiatric:         Mood and Affect: Mood normal.         Behavior: Behavior normal.         Thought Content: Thought content normal.         Judgment: Judgment normal.         MEDICAL DECISION MAKING:       ED Course as of 05/21/22 2032   Sat May 21, 2022   8451 Card cath 2 years ago:  Cardiac Arteries and Lesion Findings     LMCA: Normal 0% stenosis.     LAD: Normal 0% stenosis.     LCx: Normal 0% stenosis.     RCA: Normal 0% stenosis. [WM]   1918 ECHO 2 years ago:    Echo contrast utilized on this technically difficult study. Left ventricle is normal in size. Moderately reduced LV function with Estimated LV EF 35-40 %. [WM]      ED Course User Index  [WM] Creasie Homans, MD       Code sepsis  Iv abx  Gentle iv fluids    Because of concerns of fluid overload, only giving 500 ml fluids, he has known CHF.    8:33 PM EDT  Patient signed out to Dr Shadia Mcclellan, ct chest pending      Procedures    DIAGNOSTIC RESULTS   EKG:All EKG's are interpreted by the Emergency Department Physician who either signs or Co-signs this chart in the absence of a cardiologist.  NSR, nonspecific changes, no acute ischemic changes on ST segments, no change compared to old, normal rate and normal intervals        RADIOLOGY:All plain film, CT, MRI, and formal ultrasound images (except ED bedside ultrasound) are read by the radiologist, see reports below, unless otherwisenoted in MDM or here. XR CHEST PORTABLE   Final Result   No acute process. CT CHEST PULMONARY EMBOLISM W CONTRAST    (Results Pending)     LABS: All lab results were reviewed by myself, and all abnormals are listed below.   Labs Reviewed   CBC WITH AUTO DIFFERENTIAL - Abnormal; Notable for the following components:       Result Value    WBC 21.6 (*)     RBC 2.20 (*)     Hemoglobin 8.9 (*)     Hematocrit 26.3 (*)     .9 (*)     MCH 40.5 (*)     RDW 15.0 (*)     Seg Neutrophils 83 (*)     Lymphocytes 8 (*)     Monocytes 9 (*)     Segs Absolute 17.93 (*)     Absolute Mono # 1.94 (*)     All other components within normal limits   COMPREHENSIVE METABOLIC PANEL - Abnormal; Notable for the following components:    Glucose 106 (*)     Calcium 8.5 (*)     Alkaline Phosphatase 299 (*)      (*)     Total Bilirubin 5.28 (*)     Albumin 2.6 (*)     All other components within normal limits   AMMONIA - Abnormal; Notable for the following components:    Ammonia 65 (*)     All other components within normal limits   PROTIME-INR - Abnormal; Notable for the following components:    Protime 16.8 (*)     All other components within normal limits   CULTURE, BLOOD 1   CULTURE, BLOOD 1   MRSA DNA PROBE, NASAL   CULTURE, URINE   LIPASE   MAGNESIUM   TROPONIN   OCCULT BLOOD SCREEN   TROPONIN   LACTATE, SEPSIS   LACTATE, SEPSIS   VANCOMYCIN LEVEL, RANDOM   URINALYSIS WITH REFLEX TO CULTURE   TYPE AND SCREEN       EMERGENCY DEPARTMENTCOURSE:         Vitals:    Vitals:    05/21/22 1902 05/21/22 2010   BP: (!) 93/56 (!) 94/57   Pulse: 111 90   Resp: 17 23   Temp: 97.9 °F (36.6 °C)    TempSrc: Tympanic    SpO2: 98% 95%   Weight: 185 lb (83.9 kg)    Height: 5' 8\" (1.727 m)        The patient was given the following medications while in the emergency department:  Orders Placed This Encounter   Medications    pantoprazole (PROTONIX) 80 mg in sodium chloride 0.9 % 50 mL bolus    cefepime (MAXIPIME) 2000 mg IVPB minibag in NS     Order Specific Question:   Antimicrobial Indications     Answer:   Pneumonia (HAP)    0.9 % sodium chloride bolus    iopamidol (ISOVUE-370) 76 % injection 75 mL    sodium chloride flush 0.9 % injection 10 mL    0.9 % sodium chloride bolus    vancomycin (VANCOCIN) 2,000 mg in dextrose 5 % 500 mL IVPB     Order Specific Question:   Antimicrobial Indications     Answer:   Pneumonia (HAP)    vancomycin 1000 mg IVPB in 250 mL D5W addavial     Order Specific Question: Antimicrobial Indications     Answer:   Pneumonia (HAP)     Order Specific Question:   HAP duration of therapy     Answer:   7 days    vancomycin (VANCOCIN) intermittent dosing (placeholder)     Order Specific Question:   Antimicrobial Indications     Answer:   Pneumonia (HAP)     Order Specific Question:   HAP duration of therapy     Answer:   7 days    metronidazole (FLAGYL) 500 mg in 0.9% NaCl 100 mL IVPB premix     Order Specific Question:   Antimicrobial Indications     Answer:   Sepsis of Unknown Etiology     CONSULTS:  PHARMACY TO DOSE VANCOMYCIN    FINAL IMPRESSION      1. Septicemia ([address]    2. Rectal bleeding          DISPOSITION/PLAN   DISPOSITION        PATIENT REFERRED TO:  No follow-up provider specified. DISCHARGE MEDICATIONS:  New Prescriptions    No medications on file     The care is provided during an unprecedented national emergency due to the novel coronavirus, COVID 19.   MD Alberto Gray MD  05/21/22 2033

## 2022-05-21 NOTE — ED TRIAGE NOTES
Pt states he was dc'd from here on May 3rd for alcoholic cirrhosis and pneumonia. Pt states he is feeling bad again for 4 days now. Pt has not followed up with his pcp, GI or cardiology.
Normal

## 2022-05-22 LAB
CULTURE: NO GROWTH
MRSA, DNA, NASAL: NEGATIVE
SPECIMEN DESCRIPTION: NORMAL
SPECIMEN DESCRIPTION: NORMAL

## 2022-05-22 PROCEDURE — 2580000003 HC RX 258: Performed by: INTERNAL MEDICINE

## 2022-05-22 PROCEDURE — 6360000002 HC RX W HCPCS: Performed by: EMERGENCY MEDICINE

## 2022-05-22 PROCEDURE — 6370000000 HC RX 637 (ALT 250 FOR IP): Performed by: EMERGENCY MEDICINE

## 2022-05-22 PROCEDURE — 6360000002 HC RX W HCPCS: Performed by: INTERNAL MEDICINE

## 2022-05-22 PROCEDURE — 2060000000 HC ICU INTERMEDIATE R&B

## 2022-05-22 PROCEDURE — 6370000000 HC RX 637 (ALT 250 FOR IP): Performed by: INTERNAL MEDICINE

## 2022-05-22 PROCEDURE — 2580000003 HC RX 258: Performed by: EMERGENCY MEDICINE

## 2022-05-22 PROCEDURE — 99254 IP/OBS CNSLTJ NEW/EST MOD 60: CPT | Performed by: NURSE PRACTITIONER

## 2022-05-22 PROCEDURE — 99223 1ST HOSP IP/OBS HIGH 75: CPT | Performed by: INTERNAL MEDICINE

## 2022-05-22 RX ORDER — SODIUM CHLORIDE 0.9 % (FLUSH) 0.9 %
5-40 SYRINGE (ML) INJECTION PRN
Status: DISCONTINUED | OUTPATIENT
Start: 2022-05-22 | End: 2022-05-26 | Stop reason: HOSPADM

## 2022-05-22 RX ORDER — NICOTINE 21 MG/24HR
1 PATCH, TRANSDERMAL 24 HOURS TRANSDERMAL NIGHTLY
Status: DISCONTINUED | OUTPATIENT
Start: 2022-05-22 | End: 2022-05-26 | Stop reason: HOSPADM

## 2022-05-22 RX ORDER — GAUZE BANDAGE 2" X 2"
100 BANDAGE TOPICAL DAILY
Status: DISCONTINUED | OUTPATIENT
Start: 2022-05-22 | End: 2022-05-26 | Stop reason: HOSPADM

## 2022-05-22 RX ORDER — FOLIC ACID 1 MG/1
1 TABLET ORAL DAILY
Status: DISCONTINUED | OUTPATIENT
Start: 2022-05-22 | End: 2022-05-26 | Stop reason: HOSPADM

## 2022-05-22 RX ORDER — ENOXAPARIN SODIUM 100 MG/ML
40 INJECTION SUBCUTANEOUS DAILY
Status: DISCONTINUED | OUTPATIENT
Start: 2022-05-22 | End: 2022-05-26 | Stop reason: HOSPADM

## 2022-05-22 RX ORDER — ONDANSETRON 4 MG/1
4 TABLET, ORALLY DISINTEGRATING ORAL EVERY 8 HOURS PRN
Status: DISCONTINUED | OUTPATIENT
Start: 2022-05-22 | End: 2022-05-26 | Stop reason: HOSPADM

## 2022-05-22 RX ORDER — SODIUM CHLORIDE 0.9 % (FLUSH) 0.9 %
5-40 SYRINGE (ML) INJECTION EVERY 12 HOURS SCHEDULED
Status: DISCONTINUED | OUTPATIENT
Start: 2022-05-22 | End: 2022-05-26 | Stop reason: HOSPADM

## 2022-05-22 RX ORDER — SODIUM CHLORIDE 9 MG/ML
INJECTION, SOLUTION INTRAVENOUS PRN
Status: DISCONTINUED | OUTPATIENT
Start: 2022-05-22 | End: 2022-05-26 | Stop reason: HOSPADM

## 2022-05-22 RX ORDER — PANTOPRAZOLE SODIUM 40 MG/1
40 TABLET, DELAYED RELEASE ORAL DAILY
Status: DISCONTINUED | OUTPATIENT
Start: 2022-05-22 | End: 2022-05-26 | Stop reason: HOSPADM

## 2022-05-22 RX ORDER — ONDANSETRON 2 MG/ML
4 INJECTION INTRAMUSCULAR; INTRAVENOUS EVERY 6 HOURS PRN
Status: DISCONTINUED | OUTPATIENT
Start: 2022-05-22 | End: 2022-05-26 | Stop reason: HOSPADM

## 2022-05-22 RX ORDER — KETOROLAC TROMETHAMINE 30 MG/ML
15 INJECTION, SOLUTION INTRAMUSCULAR; INTRAVENOUS ONCE
Status: COMPLETED | OUTPATIENT
Start: 2022-05-22 | End: 2022-05-22

## 2022-05-22 RX ADMIN — PIPERACILLIN AND TAZOBACTAM 3375 MG: 3; .375 INJECTION, POWDER, FOR SOLUTION INTRAVENOUS at 17:45

## 2022-05-22 RX ADMIN — FOLIC ACID 1 MG: 1 TABLET ORAL at 08:53

## 2022-05-22 RX ADMIN — PIPERACILLIN AND TAZOBACTAM 4500 MG: 4; .5 INJECTION, POWDER, LYOPHILIZED, FOR SOLUTION INTRAVENOUS; PARENTERAL at 11:16

## 2022-05-22 RX ADMIN — THIAMINE HCL TAB 100 MG 100 MG: 100 TAB at 08:53

## 2022-05-22 RX ADMIN — PANTOPRAZOLE SODIUM 40 MG: 40 TABLET, DELAYED RELEASE ORAL at 08:53

## 2022-05-22 RX ADMIN — VANCOMYCIN HYDROCHLORIDE 1000 MG: 1 INJECTION, POWDER, LYOPHILIZED, FOR SOLUTION INTRAVENOUS at 23:03

## 2022-05-22 RX ADMIN — ONDANSETRON 4 MG: 4 TABLET, ORALLY DISINTEGRATING ORAL at 23:04

## 2022-05-22 RX ADMIN — ENOXAPARIN SODIUM 40 MG: 100 INJECTION SUBCUTANEOUS at 08:53

## 2022-05-22 RX ADMIN — KETOROLAC TROMETHAMINE 15 MG: 30 INJECTION, SOLUTION INTRAMUSCULAR at 01:50

## 2022-05-22 RX ADMIN — VANCOMYCIN HYDROCHLORIDE 1000 MG: 1 INJECTION, POWDER, LYOPHILIZED, FOR SOLUTION INTRAVENOUS at 12:00

## 2022-05-22 RX ADMIN — SODIUM CHLORIDE, PRESERVATIVE FREE 10 ML: 5 INJECTION INTRAVENOUS at 20:56

## 2022-05-22 ASSESSMENT — ENCOUNTER SYMPTOMS
EYES NEGATIVE: 1
GASTROINTESTINAL NEGATIVE: 1
RESPIRATORY NEGATIVE: 1

## 2022-05-22 ASSESSMENT — PAIN SCALES - GENERAL: PAINLEVEL_OUTOF10: 6

## 2022-05-22 ASSESSMENT — PAIN DESCRIPTION - LOCATION: LOCATION: ABDOMEN;HEAD

## 2022-05-22 NOTE — ED PROVIDER NOTES
9:59 PM EDT  I received signout on this patient is a 44-year-old male with a history of liver cirrhosis secondary to alcohol use who was recently admitted for pneumonia who comes in today with chest pain and fatigue. Patient has a white count of 21,000 and he was originally given vancomycin and cefepime I did add Flagyl to cover anaerobes. CT scan is negative for PE but does have asymmetric effusions.   Patient will be admitted I did speak to Dr. Alexsander Joy who accepts admission      Prabha Pacheco MD  05/21/22 7447

## 2022-05-22 NOTE — CARE COORDINATION
CASE MANAGEMENT NOTE:    Admission Date:  5/21/2022 Gavi Fuentes is a 40 y.o.  male    Admitted for : Leukocytosis [D72.829]  Rectal bleeding [K62.5]  Septicemia ([address] [A41.9]    Met with:  Patient    PCP:  Rashaun Kan:  [place] [employer]      Is patient alert and oriented at time of discussion:  Yes    Current Residence/ Living Arrangements:  independently at home with EZIO Grimes            Current Services PTA:  No    Does patient go to outpatient dialysis: No  If yes, location and chair time: NA    Is patient agreeable to VNS: No    Freedom of choice provided:  No    List of [address] provided: No    VNS chosen:  No    DME:  none    Home Oxygen: Yes O2 per NC at 2lpm PRN     Nebulizer: No    CPAP/BIPAP: No    Supplier: [facility]    Potential Assistance Needed: No    SNF needed: No    Freedom of choice and list provided: No    Pharmacy:  [facility] [address]       Is patient currently receiving oral anticoagulation therapy? No    Is the Patient an [facility] with Readmission Risk Score greater than 14%? No  If yes, pt needs a follow up appointment made within 7 days. Family Members/Caregivers that pt would like involved in their care:    Yes    If yes, list name here:  Bijal Mahmood    Transportation Provider:  Family             Discharge Plan:  5/22 MEDICAID From home with SO . DME: O2 per NC 2lpm PRN thru [facility]. VNS: none . Pt needs help with PCP appt , has not seen PCP since being discharge over one month ago. IV Vanco and Zosyn, liver ultrasound, ID consult .  Following for needs//JF                Electronically signed by: Wendy Dale RN on 5/22/2022 at 9:25 AM

## 2022-05-22 NOTE — FLOWSHEET NOTE
05/22/22 0053   Treatment Team Notification   Reason for Communication Review case   Team Member Name Dr. Chastity Erickson Team Role Attending Provider   Method of Communication Call   Response See orders   Notification Time [id number]     Patient c/o of headache and bilateral abdominal pain, RUQ > LUQ. RN reached out to Attending; See [facility] for order. Electronically signed by Zenia Ha RN.

## 2022-05-22 NOTE — PLAN OF CARE
Problem: Discharge Planning  Goal: Discharge to home or other facility with appropriate resources  Outcome: Progressing  Flowsheets (Taken 5/22/2022 0012)  Discharge to home or other facility with appropriate resources:   Identify barriers to discharge with patient and caregiver   Arrange for needed discharge resources and transportation as appropriate   Refer to discharge planning if patient needs post-hospital services based on physician order or complex needs related to functional status, cognitive ability or social support system     Problem: Pain  Goal: Verbalizes/displays adequate comfort level or baseline comfort level  Outcome: Progressing  Flowsheets (Taken 5/22/2022 0327)  Verbalizes/displays adequate comfort level or baseline comfort level:   Encourage patient to monitor pain and request assistance   Assess pain using appropriate pain scale   Notify Licensed Independent Practitioner if interventions unsuccessful or patient reports new pain  Note: Patient verbalizes abdominal pain; RUQ > LUQ. One time dose pain medication given this shift. Patient requests counseling on which pain medications are appropriate to take with his current health conditions. Problem: ABCDS Injury Assessment  Goal: Absence of physical injury  Outcome: Progressing  Flowsheets (Taken 5/22/2022 0327)  Absence of Physical Injury: Implement safety measures based on patient assessment  Note: Patient ambulatory per self; Patient has remained free from injury and falls this shift.

## 2022-05-22 NOTE — ED NOTES
Admission Dx: Leukocytosis    Pts Chief Complaints on Arrival: sob, chest paim    ADL's - Partial assistance    Pending Diagnostics:  n/a    Residence PTA: single story home    Special Considerations/Circumstances:  n/a    Vitals: Current vital signs:  /67   Pulse 90   Temp 99.1 °F (37.3 °C) (Oral)   Resp 23   Ht 5' 8\" (1.727 m)   Wt 185 lb (83.9 kg)   SpO2 98%   BMI 28.13 kg/m²                MEWS Score: Bridgett Cantu 40, RN  05/21/22 3884

## 2022-05-22 NOTE — PROGRESS NOTES
Vancomycin Dosing by Pharmacy - Daily Note   Vancomycin Therapy Day:  2  Indication: HAP    Allergies:  Bee venom   Actual Weight:    Wt Readings from Last 1 Encounters:   05/22/22 206 lb 2.1 oz (93.5 kg)       Labs/Ancillary Data  Estimated Creatinine Clearance: 100 mL/min (based on SCr of 1.13 mg/dL). Recent Labs     05/21/22  1940   CREATININE 1.13   BUN 10   WBC 21.6*     Procalcitonin   Date Value Ref Range Status   04/27/2022 0.35 (H) <0.09 ng/mL Final     Comment:           Suspected Sepsis:  <0.50 ng/mL     Low likelihood of sepsis. 0.50-2.00 ng/mL     Increased likelihood of sepsis. Antibiotics encouraged. >2.00 ng/mL     High risk of sepsis/shock. Antibiotics strongly encouraged. Suspected Lower Resp Tract Infections:  <0.24 ng/mL     Low likelihood of bacterial infection. >0.24 ng/mL     Increased likelihood of bacterial infection. Antibiotics encouraged. With successful antibiotic therapy, PCT levels should decrease rapidly. (Half-life of 24 to   36 hours.)        Procalcitonin values from samples collected within the first 6 hours of systemic infection   may still be low. Retesting may be indicated. Values from day 1 and day 4 can be entered into the Change in Procalcitonin Calculator   (www.[facility]-pct-cal[facility]. [employer]) to determine the patient's Mortality Risk Prognosis        In healthy neonates, plasma Procalcitonin (PCT) concentrations increase gradually after   birth, reaching peak values at about 24 hours of age then decrease to normal values below   0.5 ng/mL by 48-72 hours of age.          Intake/Output Summary (Last 24 hours) at 5/22/2022 1555  Last data filed at 5/22/2022 7674  Gross per 24 hour   Intake --   Output 300 ml   Net -300 ml     Temp: 99.5 F    Culture Date / Source  /  Results  5/21 - urine  5/21 - blood x2  5/21 - MRSA probe     Recent vancomycin administrations                     vancomycin 1000 mg IVPB in 250 mL D5W addavial (mg) 1,000 mg New Bag 05/22/22 1200 vancomycin (VANCOCIN) 2,000 mg in dextrose 5 % 500 mL IVPB (mg) 2,000 mg New Bag 05/21/22 2111                    Vancomycin Concentrations:   TROUGH:  No results for input(s): VANCOTROUGH in the last 72 hours. RANDOM:  No results for input(s): VANCORANDOM in the last 72 hours. MRSA Nasal Swab: was ordered by provider, awaiting results. Evangelina Lopez PLAN     Continue current dose of 1000 mg q12h IV  Ensured BUN/sCr ordered at baseline and every 48 hours x at least 3 levels, then at least weekly. Repeat vancomycin concentration ordered for 5/23 @ 0600   Pharmacy will continue to monitor patient and adjust therapy as indicated      Vancomycin Target Concentration Parameters  Treatment  Population Target AUC/HORTENCIA Target Trough   Invasive MRSA Infection (bacteremia, pneumonia, meningitis, endocarditis, osteomyelitis)  Sepsis (undifferentiated) 400-600 N/A   Infection due to non-MRSA pathogen  Empiric treatment of non-invasive MRSA infection  (SSTI, UTI) <500 10-15 mg/L   CrCl < 29 mL/min  Rapidly fluctuating serum creatinine   OSCAR N/A < 15 mg/L     Renal replacement therapy is dosed by levels, per hospital protocol. Abbreviations  * Pauc: probability that AUC is >400 (efficacy); Pconc: probability that Ctrough is above 20 ?g/mL (toxicity); Tox: Probability of nephrotoxicity, based on Zoie et al. Clin Infect Dis 2009. Loading dose: 2000 mg at 22:00 05/21/2022. Regimen: 1000 mg IV every 12 hours. Start time: 06:48 on 05/23/2022  Exposure target: AUC24 (range)400-600 mg/L.hr   AUC24,ss: 442 mg/L.hr  Probability of AUC24 > 400: 60 %  Ctrough,ss: 14.0 mg/L  Probability of Ctrough,ss > 20: 22 %  Probability of nephrotoxicity (Zoie PENNIE 2009): 9 %      Thank you for the consult. Pharmacy will continue to follow.     Ben Wahl, [address],  5/22/2022, 3:57 PM

## 2022-05-22 NOTE — CONSULTS
[facility] -   Infectious diseases evaluation  admission date 5/21/2022    reason for consultation:   Recurrent Infection    Impression :   Current:  · LLL pneumonia  · Leukocytosis  · Acute pulmonary embolism  · CHF  · Alcoholic hepatitis without ascites    Other:  ·   Discussion / summary of stay / plan of care   ·   Recommendations   · Continue vancomycin, pharmacy to dose  · Continue Zosyn 3,375 mg IV q 8 hours  · Follow CBC, renal function  · Supportive care  · Discussed with Dr Verneda Cheadle    Infection Control Recommendations   · Universal Precautions  ·     Antimicrobial Stewardship Recommendations   · Simplification of therapy  · Targeted therapy  · IV to oral conversion  · PK dosing  · Restricted antimicrobial use  · Discontinuation of therapy    History of Present Illness:   Initial history:  Nubia Dent is a 40y.o.-year-old male presented to ED with chest pain, SOB, fever, chills weakness and headaches at night. Reports feeling slightly better today. WBC 21.6. Pt reports he completed course of antibiotics for diagnosis of pneumonia 5 days ago and since that time experienced increased fatigue, fever and chills. Patient had recent hospitalization 4/27/22 -5/3/22 where he was admitted for sepsis with multifocal pneumonia. PMH includes LL pneumonia, alcoholic hepatitis without ascites, CHF and pulmonary embolism (2020). WBC 21.6  CT pulmonary embolism w contrast - Study nondiagnostic for smaller more peripheral PE; no large or central PE suspected. .   Compared to 04/30/2022, smaller left pleural effusion, new trace right pleural effusion and persistent bilateral atelectatic appearing changes. Left cardiac enlargement, especially LV. Hepatomegaly, steatosis, small volume ascites  5/21 CXR - no acute process    Interval changes  5/22/2022   Afebrile  SpO2 95% room air  Eating, drinking. C/o slight nausea after lunch, denies vomiting.       Patient Vitals for the past 8 hrs:   BP Temp Temp src Pulse Resp SpO2 Weight   22 0630 107/64 98.3 °F (36.8 °C) Oral 83 18 95 % --   22 0515 -- -- -- -- -- -- 206 lb 2.1 oz (93.5 kg)       Summary of relevant labs:  Labs:  Creat 1.13  WBC 21.6    Micro:   Blood Cx x2 - no growth to date   MRSA nasal probe -  Negative   Urine Cx - pending    Imagin/21 CT CHEST PULMONARY EMBOLISM W CONTRAST   Study nondiagnostic for smaller more peripheral PE; no large or central PE suspected, as discussed above. Compared to 2022, smaller left pleural effusion, new trace right pleural effusion and persistent bilateral atelectatic appearing changes, as above. Left cardiac enlargement, especially LV. Hepatomegaly, steatosis, small volume ascites, and additional findings as   above.    CXR - no acute process    I have personally reviewed the past medical history, past surgical history, medications, social history, and family history, and I haveupdated the database accordingly. Allergies:   Bee venom     Review of Systems:     Review of Systems   Constitutional: Positive for fatigue. HENT: Negative. Eyes: Negative. Respiratory: Negative. Cardiovascular: Negative. Gastrointestinal: Negative. Genitourinary: Negative. Musculoskeletal: Negative. Skin: Negative. Neurological: Negative. Psychiatric/Behavioral: Negative. Physical Examination :       Physical Exam  Vitals and nursing note reviewed. Constitutional:       General: He is not in acute distress. Appearance: Normal appearance. HENT:      Head: Normocephalic and atraumatic. Right Ear: External ear normal.      Left Ear: External ear normal.      Nose: Nose normal.      Mouth/Throat:      Mouth: Mucous membranes are moist.      Pharynx: Oropharynx is clear. Eyes:      General: Scleral icterus present.       Conjunctiva/sclera: Conjunctivae normal.   Cardiovascular:      Rate and Rhythm: Normal rate and regular rhythm. Heart sounds: Normal heart sounds. Pulmonary:      Effort: Pulmonary effort is normal. No respiratory distress. Breath sounds: Normal breath sounds. Abdominal:      General: Bowel sounds are normal. There is no distension. Palpations: Abdomen is soft. Tenderness: There is no abdominal tenderness. Genitourinary:     Comments: No sal  Musculoskeletal:         General: Normal range of motion. Cervical back: Normal range of motion. Skin:     General: Skin is warm and dry. Coloration: Skin is jaundiced. Findings: No erythema or rash. Neurological:      Mental Status: He is alert and oriented to person, place, and time.    Psychiatric:         Mood and Affect: Mood normal.         Behavior: Behavior normal.         Past Medical History:     Past Medical History:   Diagnosis Date    Alcoholism ([address]     pt drinks a fifth of whiskey daily    Anemia     Pulmonary embolism (HCC)        Past Surgical  History:     Past Surgical History:   Procedure Laterality Date    UPPER GASTROINTESTINAL ENDOSCOPY N/A 5/1/2022    EGD ESOPHAGOGASTRODUODENOSCOPY performed by Hal Romberg, [facility] at [facility] ENDO       Medications:      sodium chloride flush  5-40 mL IntraVENous 2 times per day    enoxaparin  40 mg SubCUTAneous Daily    folic acid  1 mg Oral Daily    nicotine  1 patch TransDERmal Nightly    pantoprazole  40 mg Oral Daily    vitamin B-1  100 mg Oral Daily    piperacillin-tazobactam  3,375 mg IntraVENous Q8H    vancomycin  1,000 mg IntraVENous Q12H    vancomycin (VANCOCIN) intermittent dosing (placeholder)   Other RX Placeholder       Social History:     Social History     Socioeconomic History    Marital status: Single     Spouse name: Not on file    Number of children: Not on file    Years of education: Not on file    Highest education level: Not on file   Occupational History    Not on file   Tobacco Use    Smoking status: Current Every Day Smoker     Packs/day: 1.00     Years: 21.00     Pack years: 21.00     Types: Cigarettes    Smokeless tobacco: Never Used    Tobacco comment: using nicotine patches at night   Vaping Use    Vaping Use: Never used   Substance and Sexual Activity    Alcohol use: Not Currently     Comment: hx of alcoholism; pt drinks a fifth of whiskey daily    Drug use: Not Currently     Comment: used cocaine in early 20's    Sexual activity: Not on file   Other Topics Concern    Not on file   Social History Narrative    Not on file     Social Determinants of Health     Financial Resource Strain: Low Risk     Difficulty of Paying Living Expenses: Not hard at all   Food Insecurity: No Food Insecurity    Worried About Running Out of Food in the Last Year: Never true    Sheri of Food in the Last Year: Never true   Transportation Needs: No Transportation Needs    Lack of Transportation (Medical): No    Lack of Transportation (Non-Medical):  No   Physical Activity:     Days of Exercise per Week: Not on file    Minutes of Exercise per Session: Not on file   Stress:     Feeling of Stress : Not on file   Social Connections:     Frequency of Communication with Friends and Family: Not on file    Frequency of Social Gatherings with Friends and Family: Not on file    Attends Religion Services: Not on file    Active Member of [address] [place] [address] [employer] or Organizations: Not on file    Attends Club or Organization Meetings: Not on file    Marital Status: Not on file   Intimate Partner Violence:     Fear of Current or Ex-Partner: Not on file    Emotionally Abused: Not on file    Physically Abused: Not on file    Sexually Abused: Not on file   Housing Stability:     Unable to Pay for Housing in the Last Year: Not on file    Number of [place] in the Last Year: Not on file    Unstable Housing in the Last Year: Not on file       Family History:     Family History   Problem Relation Age of Onset    Heart Disease Mother     Heart Attack Mother     Heart Disease Father       Medical Decision Making:   I have independently reviewed/ordered the following labs:    CBC with Differential:   Recent Labs     05/21/22 1940   WBC 21.6*   HGB 8.9*   HCT 26.3*      LYMPHOPCT 8*   MONOPCT 9*     BMP:  Recent Labs     05/21/22 1940      K 3.7      CO2 23   BUN 10   CREATININE 1.13   MG 1.6     Hepatic Function Panel:   Recent Labs     05/21/22 1940   PROT 7.2   LABALBU 2.6*   BILITOT 5.28*   ALKPHOS 299*   ALT 33   *     No results for input(s): RPR in the last 72 hours. No results for input(s): HIV in the last 72 hours. No results for input(s): BC in the last 72 hours. Lab Results   Component Value Date    CREATININE 1.13 05/21/2022    GLUCOSE 106 05/21/2022       Detailed results: Thank you for allowing us to participate in the care of this patient. Please call with questions. This note is created with the assistance of a speech recognition program.  While intending to generate adocument that actually reflects the content of the visit, the document can still have some errors including those of syntax and sound a like substitutions which may escape proof reading. It such instances, actual meaningcan be extrapolated by contextual diversion.     SILVA Adler - EVELYN  Office: [phone]  Perfect serve / office [phone]

## 2022-05-22 NOTE — H&P
[facility] Internal Medicine  Porter Barros MD; Yung Ho MD; Ana Mata MD; MD Leni Aguilar MD; MD DINESH [facility] Internal Medicine   [facility]    HISTORY AND PHYSICAL EXAMINATION            Date:   5/22/2022  Patient name:  Ángel Rivera  Date of admission:  5/21/2022  7:07 PM  MRN:   [id number]  Account:  [de-identified]  YOB: 1985  PCP:    Leni Castro MD  Room:   2085/2085-01  Code Status:    Full Code    Chief Complaint:     Chief Complaint   Patient presents with    Chest Pain   dyspnea  abod pain  Dizzy      History Obtained From:     Patient medical record nursing staff    History of Present Illness:     Ángel Rivera is a 40 y.o. Non- / non  male who presents with Chest Pain   and is admitted to the hospital for the management of Leukocytosis. 36-year-old gentleman with history of alcohol abuse with alcoholic hepatitis without cirrhosis history of pulmonary embolism  Off anticoagulation  Daily habitual drinker recently discharged after treatment of pneumonia  5 days ago finished antibiotics complains of gradual decline in physical condition unable to sit up in her bathtub week not feeling well  Right upper quadrant pain  Fever and chills  White cell count elevated in 20,000 hypotensive tachycardic at 111      Past Medical History:     Past Medical History:   Diagnosis Date    Alcoholism ([address]     pt drinks a fifth of whiskey daily    Anemia     Pulmonary embolism (HCC)         Past Surgical History:     Past Surgical History:   Procedure Laterality Date    UPPER GASTROINTESTINAL ENDOSCOPY N/A 5/1/2022    EGD ESOPHAGOGASTRODUODENOSCOPY performed by Hal Romberg, MD at [facility]        Medications Prior to Admission:     Prior to Admission medications    Medication Sig Start Date End Date Taking?  Authorizing Provider   furosemide (LASIX) 20 MG tablet Take 1 tablet by mouth daily 5/13/22   Christine Gallagher MD   albuterol sulfate HFA (VENTOLIN HFA) 108 (90 Base) MCG/ACT inhaler Inhale 2 puffs into the lungs 4 times daily as needed for Wheezing 5/3/22   Jennifer Acevedo MD   albuterol sulfate HFA (VENTOLIN HFA) 108 (90 Base) MCG/ACT inhaler Inhale 2 puffs into the lungs 4 times daily as needed for Wheezing 5/3/22   Jennifer Acevedo MD   carvedilol (COREG) 3.125 MG tablet Take 1 tablet by mouth 2 times daily (with meals) 5/3/22   Jennifer Acevedo MD   folic acid (FOLVITE) 1 MG tablet Take 1 tablet by mouth daily 5/4/22   Jennifer Acevedo MD   vitamin B-1 (THIAMINE) 100 MG tablet Take 1 tablet by mouth daily 5/4/22   Jennifer Acevedo MD   Multiple Vitamins-Minerals (CENTRUM MEN) TABS Take 1 tablet by mouth daily    Historical Provider, MD   Menthol-Methyl Salicylate (ANALGESIC OINTMENT) OINT ointment Apply topically as needed Indications: on back    Historical Provider, MD   pantoprazole (PROTONIX) 20 MG tablet Take 2 tablets by mouth daily 4/15/22   Magdalena Grewal DO   losartan (COZAAR) 50 MG tablet take 1 tablet by mouth once daily 11/16/21   Christine Gallagher MD   naproxen (NAPROSYN) 500 MG tablet Take 1 tablet by mouth 2 times daily  Patient taking differently: Take 500 mg by mouth 2 times daily as needed  8/13/21 3/28/22  Maylin Gallagher MD   fluticasone [facility]) 50 MCG/ACT nasal spray instill 1 spray into each nostril once daily 5/10/21   Christine Gallagher MD   Blood Pressure Monitoring (BP MONITOR-STETHOSCOPE) KIT 1 each by Does not apply route daily 4/7/21   Christine Gallagher MD   Nicotine 21-14-7 MG/24HR KIT Place 14 mg onto the skin nightly     Historical Provider, MD   EPINEPHrine (EPIPEN 2-LIZETTE) 0.3 MG/0.3ML SOAJ injection Inject 0.3 mLs into the muscle once for 1 dose Use as directed for allergic reaction 11/17/16 5/24/21  Chance Norton MD        Allergies:     Bee venom    Social History:     Tobacco:    reports that he has been smoking cigarettes.  He has a 21.00 pack-year smoking history. He has never used smokeless tobacco.  Alcohol:      reports previous alcohol use. Drug Use:  reports previous drug use. Family History:     Family History   Problem Relation Age of Onset    Heart Disease Mother     Heart Attack Mother     Heart Disease Father        Review of Systems:     Positive and Negative as described in HPI. CONSTITUTIONAL: Positive for fever chills and 25 pound weight loss  HEENT:  negative for vision, hearing changes, runny nose, throat pain  RESPIRATORY: Cough and dyspnea g  CARDIOVASCULAR:  negative for chest pain, palpitations  GASTROINTESTINAL:  negative for nausea, vomiting, diarrhea, constipation, change in bowel habits, abdominal pain   GENITOURINARY:  negative for difficulty of urination, burning with urination, frequency   INTEGUMENT:  negative for rash, skin lesions, easy bruising   HEMATOLOGIC/LYMPHATIC:  negative for swelling/edema   ALLERGIC/IMMUNOLOGIC:  negative for urticaria , itching  ENDOCRINE:  negative increase in drinking, increase in urination, hot or cold intolerance  MUSCULOSKELETAL: Positive for generalized weakness NEUROLOGICAL:  negative for headaches, dizziness, lightheadedness, numbness, pain, tingling extremities  BEHAVIOR/PSYCH:  negative for depression, anxiety    Physical Exam:   /64   Pulse 83   Temp 98.3 °F (36.8 °C) (Oral)   Resp 18   Ht 5' 8\" (1.727 m)   Wt 206 lb 2.1 oz (93.5 kg)   SpO2 95%   BMI 31.34 kg/m²   Temp (24hrs), Av.7 °F (37.1 °C), Min:97.9 °F (36.6 °C), Max:99.5 °F (37.5 °C)    No results for input(s): POCGLU in the last 72 hours.     Intake/Output Summary (Last 24 hours) at 2022 0813  Last data filed at 2022 2212  Gross per 24 hour   Intake --   Output 300 ml   Net -300 ml       General Appearance: Appears tired looks pale  Mental status: oriented to person, place, and time  Head: normocephalic, atraumatic  Eye:  Icterus, no  redness, pupils equal and reactive, extraocular eye movements intact, conjunctiva clear  Ear: normal external ear, no discharge, hearing intact  Nose: no drainage noted  Mouth: mucous membranes moist  Neck: supple, no carotid bruits, thyroid not palpable  Lungs: Good air entry bibasal mild crackles  Cardiovascular: normal rate, regular rhythm, no murmur, gallop, rub  Abdomen: Soft, nontender, nondistended, normal bowel sounds, no hepatomegaly or splenomegaly  Neurologic: There are no new focal motor or sensory deficits, normal muscle tone and bulk, no abnormal sensation, normal speech, cranial nerves II through XII grossly intact  Skin: No gross lesions, rashes, bruising or bleeding on exposed skin area  Extremities: peripheral pulses palpable, no pedal edema or calf pain with palpation  Psych: Flat affect    Investigations:      Laboratory Testing:  Recent Results (from the past 24 hour(s))   Occult Blood Screen    Collection Time: 05/21/22  7:25 PM   Result Value Ref Range    Occult Blood, Stool #1 NEGATIVE NEGATIVE    Date, Stool #1 3,052,821     Time, Stool #1 UNKNOWN    CBC with Auto Differential    Collection Time: 05/21/22  7:40 PM   Result Value Ref Range    WBC 21.6 (H) 3.5 - 11.0 k/uL    RBC 2.20 (L) 4.5 - 5.9 m/uL    Hemoglobin 8.9 (L) 13.5 - 17.5 g/dL    Hematocrit 26.3 (L) 41 - 53 %    .9 (H) 80 - 100 fL    MCH 40.5 (H) 26 - 34 pg    MCHC 33.8 31 - 37 g/dL    RDW 15.0 (H) 11.5 - 14.9 %    Platelets 325 288 - 908 k/uL    MPV 8.6 6.0 - 12.0 fL    Seg Neutrophils 83 (H) 36 - 66 %    Lymphocytes 8 (L) 24 - 44 %    Monocytes 9 (H) 1 - 7 %    Eosinophils % 0 0 - 4 %    Basophils 0 0 - 2 %    Segs Absolute 17.93 (H) 1.3 - 9.1 k/uL    Absolute Lymph # 1.73 1.0 - 4.8 k/uL    Absolute Mono # 1.94 (H) 0.1 - 1.3 k/uL    Absolute Eos # 0.00 0.0 - 0.4 k/uL    Basophils Absolute 0.00 0.0 - 0.2 k/uL    Morphology ANISOCYTOSIS PRESENT     Morphology 1+ TARGET CELLS     Morphology MACROCYTOSIS PRESENT     Morphology HYPOCHROMIA PRESENT    Comprehensive Metabolic Panel Collection Time: 05/21/22  7:40 PM   Result Value Ref Range    Glucose 106 (H) 70 - 99 mg/dL    BUN 10 6 - 20 mg/dL    CREATININE 1.13 0.70 - 1.20 mg/dL    Calcium 8.5 (L) 8.6 - 10.4 mg/dL    Sodium 139 135 - 144 mmol/L    Potassium 3.7 3.7 - 5.3 mmol/L    Chloride 103 98 - 107 mmol/L    CO2 23 20 - 31 mmol/L    Anion Gap 13 9 - 17 mmol/L    Alkaline Phosphatase 299 (H) 40 - 129 U/L    ALT 33 5 - 41 U/L     (H) <40 U/L    Total Bilirubin 5.28 (H) 0.3 - 1.2 mg/dL    Total Protein 7.2 6.4 - 8.3 g/dL    Albumin 2.6 (L) 3.5 - 5.2 g/dL    GFR Non-African American >60 >60 mL/min    GFR African American >60 >60 mL/min    GFR Comment         Ammonia    Collection Time: 05/21/22  7:40 PM   Result Value Ref Range    Ammonia 65 (H) 16 - 60 umol/L   Lipase    Collection Time: 05/21/22  7:40 PM   Result Value Ref Range    Lipase 58 13 - 60 U/L   Magnesium    Collection Time: 05/21/22  7:40 PM   Result Value Ref Range    Magnesium 1.6 1.6 - 2.6 mg/dL   Troponin    Collection Time: 05/21/22  7:40 PM   Result Value Ref Range    Troponin, High Sensitivity 18 0 - 22 ng/L   Protime-INR    Collection Time: 05/21/22  7:40 PM   Result Value Ref Range    Protime 16.8 (H) 11.8 - 14.6 sec    INR 1.4    TYPE AND SCREEN    Collection Time: 05/21/22  7:40 PM   Result Value Ref Range    Expiration Date 05/24/2022,2359     Arm Band Number [id number]     ABO/Rh A POSITIVE     Antibody Screen NEGATIVE    Culture, Blood 1    Collection Time: 05/21/22  8:40 PM    Specimen: Blood   Result Value Ref Range    Specimen Description . BLOOD  RIGHT UPPER ARM, GRN 10ML, ORN 10ML     Culture NO GROWTH <24 HRS    Lactate, Sepsis    Collection Time: 05/21/22  8:40 PM   Result Value Ref Range    Lactic Acid, Sepsis 1.8 0.5 - 1.9 mmol/L   Culture, Blood 1    Collection Time: 05/21/22  8:55 PM    Specimen: Blood   Result Value Ref Range    Specimen Description . BLOOD  LEFT AC     Culture NO GROWTH <24 HRS    Urinalysis with Reflex to Culture    Collection Time: 05/21/22  9:57 PM    Specimen: Urine, clean catch   Result Value Ref Range    Color, UA Dark Yellow (A) Yellow    Turbidity UA Clear Clear    Glucose, Ur NEGATIVE NEGATIVE    Bilirubin Urine MOD (A) NEGATIVE    Ketones, Urine NEGATIVE NEGATIVE    Specific Gravity, UA 1.023 1.000 - 1.030    Urine Hgb NEGATIVE NEGATIVE    pH, UA 7.5 5.0 - 8.0    Protein, UA NEGATIVE  Verified by sulfosalicylic acid test. (A) NEGATIVE    Urobilinogen, Urine Normal Normal    Nitrite, Urine NEGATIVE NEGATIVE    Leukocyte Esterase, Urine TRACE (A) NEGATIVE   Microscopic Urinalysis    Collection Time: 05/21/22  9:57 PM   Result Value Ref Range    -          WBC, UA 3 to 5 /HPF    RBC, UA 0 TO 2 /HPF    Epithelial Cells UA 0 TO 2 /HPF    Bacteria, UA FEW (A) None   Troponin    Collection Time: 05/21/22 11:27 PM   Result Value Ref Range    Troponin, High Sensitivity 17 0 - 22 ng/L       Imaging/Diagnostics:  XR CHEST PORTABLE    Result Date: 5/21/2022  No acute process. CT CHEST PULMONARY EMBOLISM W CONTRAST    Result Date: 5/21/2022  Study nondiagnostic for smaller more peripheral PE; no large or central PE suspected, as discussed above. Compared to 04/30/2022, smaller left pleural effusion, new trace right pleural effusion and persistent bilateral atelectatic appearing changes, as above. Left cardiac enlargement, especially LV. Hepatomegaly, steatosis, small volume ascites, and additional findings as above.        Assessment :      Hospital Problems           Last Modified POA    * (Principal) Leukocytosis 5/21/2022 Yes    Left lower lobe pneumonia 5/22/2022 Yes    Acute pulmonary embolism ([address] 5/22/2022 Yes    Chronic systolic congestive heart failure ([address] 5/36/4891 Yes    Alcoholic cardiomyopathy ([address] 5/35/0791 Yes    Alcoholic hepatitis without ascites 5/22/2022 Yes          Plan:     Patient status inpatient in the Progressive Unit/Step down  78-year-old gentleman class I obese BMI 31  History of pulmonary embolism 2 years ago off anticoagulation after 6 months  Habitual alcohol abuse alcoholic hepatitis without cirrhosis  Quit on April 20   Recently discharged after treatment for IV antibiotics pneumonia  Readmitted with weak chills cough right upper quadrant pain  Hypotensive systolic in 62M tachycardic heart rate in 11 fever WBC count 20,000  Require inpatient admission for about reasons and also has alcoholic hepatitis  And failed IV antibiotics and treatment we will consult infectious disease IV Vanco and Zosyn  Rule out cholecystitis right upper quadrant ultrasound may need a HIDA scan  CT scan no pulmonary embolism  Chronic systolic congestive heart failure secondary to alcoholic cardiomyopathy ejection fraction 35 to 40%  Suspected healthcare pneumonia possible MRSA pneumonia possible gram-negative pneumonia  25 lbs wt loss in last one and a half month  Antibiotic infectious disease as above  dvt prophy      Consultations:   PHARMACY TO DOSE VANCOMYCIN  IP CONSULT TO FAMILY MEDICINE  IP CONSULT TO PHARMACY  IP CONSULT TO INFECTIOUS DISEASES     Patient is admitted as inpatient status because of co-morbidities listed above, severity of signs and symptoms as outlined, requirement for current medical therapies and most importantly because of direct risk to patient if care not provided in a hospital setting. Expected length of stay > 48 hours. Halie Castillo MD  5/22/2022  8:13 AM    Copy sent to Dr. Jovon Malave MD    Please note that this chart was generated using voice recognition Dragon dictation software. Although every effort was made to ensure the accuracy of this automated transcription, some errors in transcription may have occurred.

## 2022-05-23 ENCOUNTER — APPOINTMENT (OUTPATIENT)
Dept: NUCLEAR MEDICINE | Age: 37
DRG: 720 | End: 2022-05-23
Payer: MEDICAID

## 2022-05-23 ENCOUNTER — APPOINTMENT (OUTPATIENT)
Dept: ULTRASOUND IMAGING | Age: 37
DRG: 720 | End: 2022-05-23
Payer: MEDICAID

## 2022-05-23 LAB
ABSOLUTE BANDS #: 0.41 K/UL (ref 0–1)
ABSOLUTE EOS #: 0.62 K/UL (ref 0–0.4)
ABSOLUTE LYMPH #: 1.64 K/UL (ref 1–4.8)
ABSOLUTE MONO #: 1.03 K/UL (ref 0.1–1.3)
ALBUMIN SERPL-MCNC: 2.4 G/DL (ref 3.5–5.2)
ALP BLD-CCNC: 279 U/L (ref 40–129)
ALT SERPL-CCNC: 26 U/L (ref 5–41)
ANION GAP SERPL CALCULATED.3IONS-SCNC: 10 MMOL/L (ref 9–17)
AST SERPL-CCNC: 158 U/L
BANDS: 2 % (ref 0–10)
BASOPHILS # BLD: 0 % (ref 0–2)
BASOPHILS ABSOLUTE: 0 K/UL (ref 0–0.2)
BILIRUB SERPL-MCNC: 4.8 MG/DL (ref 0.3–1.2)
BILIRUBIN DIRECT: 3.09 MG/DL
BILIRUBIN, INDIRECT: 1.71 MG/DL (ref 0–1)
BUN BLDV-MCNC: 9 MG/DL (ref 6–20)
CALCIUM SERPL-MCNC: 8 MG/DL (ref 8.6–10.4)
CHLORIDE BLD-SCNC: 104 MMOL/L (ref 98–107)
CO2: 24 MMOL/L (ref 20–31)
CREAT SERPL-MCNC: 1.09 MG/DL (ref 0.7–1.2)
CREAT SERPL-MCNC: 1.1 MG/DL (ref 0.7–1.2)
EKG ATRIAL RATE: 98 BPM
EKG P AXIS: 28 DEGREES
EKG P-R INTERVAL: 116 MS
EKG Q-T INTERVAL: 356 MS
EKG QRS DURATION: 86 MS
EKG QTC CALCULATION (BAZETT): 454 MS
EKG R AXIS: -5 DEGREES
EKG T AXIS: 59 DEGREES
EKG VENTRICULAR RATE: 98 BPM
EOSINOPHILS RELATIVE PERCENT: 3 % (ref 0–4)
GFR AFRICAN AMERICAN: >60 ML/MIN
GFR AFRICAN AMERICAN: >60 ML/MIN
GFR NON-AFRICAN AMERICAN: >60 ML/MIN
GFR NON-AFRICAN AMERICAN: >60 ML/MIN
GFR SERPL CREATININE-BSD FRML MDRD: ABNORMAL ML/MIN/{1.73_M2}
GFR SERPL CREATININE-BSD FRML MDRD: NORMAL ML/MIN/{1.73_M2}
GLUCOSE BLD-MCNC: 99 MG/DL (ref 70–99)
HCT VFR BLD CALC: 24.5 % (ref 41–53)
HEMOGLOBIN: 8.1 G/DL (ref 13.5–17.5)
LIPASE: 50 U/L (ref 13–60)
LYMPHOCYTES # BLD: 8 % (ref 24–44)
MCH RBC QN AUTO: 40.1 PG (ref 26–34)
MCHC RBC AUTO-ENTMCNC: 33.1 G/DL (ref 31–37)
MCV RBC AUTO: 121.3 FL (ref 80–100)
MONOCYTES # BLD: 5 % (ref 1–7)
MORPHOLOGY: ABNORMAL
PDW BLD-RTO: 15.1 % (ref 11.5–14.9)
PLATELET # BLD: 390 K/UL (ref 150–450)
PMV BLD AUTO: 8.3 FL (ref 6–12)
POTASSIUM SERPL-SCNC: 4.1 MMOL/L (ref 3.7–5.3)
RBC # BLD: 2.02 M/UL (ref 4.5–5.9)
SEG NEUTROPHILS: 82 % (ref 36–66)
SEGMENTED NEUTROPHILS ABSOLUTE COUNT: 16.8 K/UL (ref 1.3–9.1)
SODIUM BLD-SCNC: 138 MMOL/L (ref 135–144)
TOTAL PROTEIN: 7 G/DL (ref 6.4–8.3)
VANCOMYCIN RANDOM DATE LAST DOSE: NORMAL
VANCOMYCIN RANDOM DOSE AMOUNT: NORMAL
VANCOMYCIN RANDOM TIME LAST DOSE: 2300
VANCOMYCIN RANDOM: 28 UG/ML
WBC # BLD: 20.5 K/UL (ref 3.5–11)

## 2022-05-23 PROCEDURE — 80076 HEPATIC FUNCTION PANEL: CPT

## 2022-05-23 PROCEDURE — 6370000000 HC RX 637 (ALT 250 FOR IP): Performed by: NURSE PRACTITIONER

## 2022-05-23 PROCEDURE — 6360000002 HC RX W HCPCS: Performed by: EMERGENCY MEDICINE

## 2022-05-23 PROCEDURE — 99232 SBSQ HOSP IP/OBS MODERATE 35: CPT | Performed by: INTERNAL MEDICINE

## 2022-05-23 PROCEDURE — 93010 ELECTROCARDIOGRAM REPORT: CPT | Performed by: INTERNAL MEDICINE

## 2022-05-23 PROCEDURE — 6370000000 HC RX 637 (ALT 250 FOR IP): Performed by: INTERNAL MEDICINE

## 2022-05-23 PROCEDURE — 2580000003 HC RX 258: Performed by: INTERNAL MEDICINE

## 2022-05-23 PROCEDURE — 99233 SBSQ HOSP IP/OBS HIGH 50: CPT | Performed by: INTERNAL MEDICINE

## 2022-05-23 PROCEDURE — 80202 ASSAY OF VANCOMYCIN: CPT

## 2022-05-23 PROCEDURE — 76705 ECHO EXAM OF ABDOMEN: CPT

## 2022-05-23 PROCEDURE — 85025 COMPLETE CBC W/AUTO DIFF WBC: CPT

## 2022-05-23 PROCEDURE — 80048 BASIC METABOLIC PNL TOTAL CA: CPT

## 2022-05-23 PROCEDURE — 2060000000 HC ICU INTERMEDIATE R&B

## 2022-05-23 PROCEDURE — 2580000003 HC RX 258: Performed by: EMERGENCY MEDICINE

## 2022-05-23 PROCEDURE — 36415 COLL VENOUS BLD VENIPUNCTURE: CPT

## 2022-05-23 PROCEDURE — 83690 ASSAY OF LIPASE: CPT

## 2022-05-23 PROCEDURE — 6360000002 HC RX W HCPCS: Performed by: INTERNAL MEDICINE

## 2022-05-23 PROCEDURE — 82565 ASSAY OF CREATININE: CPT

## 2022-05-23 RX ORDER — IBUPROFEN 600 MG/1
600 TABLET ORAL EVERY 6 HOURS PRN
Status: DISCONTINUED | OUTPATIENT
Start: 2022-05-23 | End: 2022-05-26 | Stop reason: HOSPADM

## 2022-05-23 RX ORDER — SODIUM CHLORIDE 0.9 % (FLUSH) 0.9 %
10 SYRINGE (ML) INJECTION PRN
Status: DISCONTINUED | OUTPATIENT
Start: 2022-05-23 | End: 2022-05-26 | Stop reason: HOSPADM

## 2022-05-23 RX ADMIN — SODIUM CHLORIDE, PRESERVATIVE FREE 10 ML: 5 INJECTION INTRAVENOUS at 08:36

## 2022-05-23 RX ADMIN — PANTOPRAZOLE SODIUM 40 MG: 40 TABLET, DELAYED RELEASE ORAL at 08:32

## 2022-05-23 RX ADMIN — THIAMINE HCL TAB 100 MG 100 MG: 100 TAB at 08:32

## 2022-05-23 RX ADMIN — IBUPROFEN 600 MG: 600 TABLET ORAL at 20:36

## 2022-05-23 RX ADMIN — IBUPROFEN 600 MG: 600 TABLET ORAL at 08:32

## 2022-05-23 RX ADMIN — IBUPROFEN 600 MG: 600 TABLET ORAL at 01:54

## 2022-05-23 RX ADMIN — PIPERACILLIN AND TAZOBACTAM 3375 MG: 3; .375 INJECTION, POWDER, FOR SOLUTION INTRAVENOUS at 01:56

## 2022-05-23 RX ADMIN — PIPERACILLIN AND TAZOBACTAM 3375 MG: 3; .375 INJECTION, POWDER, FOR SOLUTION INTRAVENOUS at 08:34

## 2022-05-23 RX ADMIN — ENOXAPARIN SODIUM 40 MG: 100 INJECTION SUBCUTANEOUS at 08:32

## 2022-05-23 RX ADMIN — PIPERACILLIN AND TAZOBACTAM 3375 MG: 3; .375 INJECTION, POWDER, FOR SOLUTION INTRAVENOUS at 18:03

## 2022-05-23 RX ADMIN — FOLIC ACID 1 MG: 1 TABLET ORAL at 08:32

## 2022-05-23 ASSESSMENT — PAIN SCALES - GENERAL
PAINLEVEL_OUTOF10: 9
PAINLEVEL_OUTOF10: 7
PAINLEVEL_OUTOF10: 7

## 2022-05-23 ASSESSMENT — PAIN DESCRIPTION - ORIENTATION: ORIENTATION: RIGHT;ANTERIOR

## 2022-05-23 ASSESSMENT — PAIN DESCRIPTION - DESCRIPTORS: DESCRIPTORS: POUNDING;PRESSURE

## 2022-05-23 ASSESSMENT — ENCOUNTER SYMPTOMS
EYES NEGATIVE: 1
GASTROINTESTINAL NEGATIVE: 1
RESPIRATORY NEGATIVE: 1

## 2022-05-23 ASSESSMENT — PAIN DESCRIPTION - LOCATION
LOCATION: ABDOMEN
LOCATION: HEAD

## 2022-05-23 NOTE — PLAN OF CARE
Problem: Discharge Planning  Goal: Discharge to home or other facility with appropriate resources  5/23/2022 1614 by Yamilka Guzman RN  Outcome: Progressing     Problem: Pain  Goal: Verbalizes/displays adequate comfort level or baseline comfort level  5/23/2022 1614 by Yamilka Guzman RN  Outcome: Progressing     Problem: ABCDS Injury Assessment  Goal: Absence of physical injury  5/23/2022 1614 by Yamilka Guzman RN  Outcome: Progressing     Problem: Chronic Conditions and Co-morbidities  Goal: Patient's chronic conditions and co-morbidity symptoms are monitored and maintained or improved  5/23/2022 1614 by Yamilka Guzman RN  Outcome: Progressing

## 2022-05-23 NOTE — PROGRESS NOTES
[facility] -   Infectious diseases evaluation  admission date 5/21/2022    reason for consultation:   Recurrent Infection    Impression :   Current:    · Systemic inflammatory response syndrome /leukocytosis/fever and tachycardia , rule out biliary source of infection  · Alcoholic hepatitis  · History of  pulmonary embolism  · And recent pneumonia  · CHF  · Alcohol abuse    Recommendations   · Discontinue IV vancomycin  · HIDA scan and liver ultrasound pending  · Follow blood cultures  · Continue Zosyn 3,375 mg IV q 8 hours  · Nasal swab for MRSA negative on 5/21/2022  · Follow CBC, renal function  · Supportive care    Infection Control Recommendations   · [place] Precautions    Antimicrobial Stewardship Recommendations   · Simplification of therapy  · Targeted therapy      History of Present Illness:   Initial history:  Fabiano Infante is a 40y.o.-year-old male presented to ED with chest pain, SOB, fever, chills weakness and headaches at night. Reports feeling slightly better today. WBC 21.6. Pt reports he completed course of antibiotics for diagnosis of pneumonia 5 days ago and since that time experienced increased fatigue, fever and chills. Patient had recent hospitalization 4/27/22 -5/3/22 where he was admitted for sepsis with multifocal pneumonia. PMH includes LL pneumonia, alcoholic hepatitis without ascites, CHF and pulmonary embolism (2020). WBC 21.6  CT pulmonary embolism w contrast - Study nondiagnostic for smaller more peripheral PE; no large or central PE suspected. .   Compared to 04/30/2022, smaller left pleural effusion, new trace right pleural effusion and persistent bilateral atelectatic appearing changes. Left cardiac enlargement, especially LV.  Hepatomegaly, steatosis, small volume ascites  5/21 CXR - no acute process    Interval changes  5/23/2022   Afebrile  He is feeling better, complaining of right upper quadrant abdominal discomfort and nausea, denied fever or chills, no other complaints. Patient Vitals for the past 8 hrs:   BP Temp Temp src Pulse Resp SpO2   22 0645 101/62 98.1 °F (36.7 °C) Oral 81 16 96 %       Summary of relevant labs:  Labs:  Creat 1.13  WBC 21.6    Micro:   Blood Cx x2 - no growth to date   MRSA nasal probe -  Negative   Urine Cx - pending    Imagin/21 CT CHEST PULMONARY EMBOLISM W CONTRAST   Study nondiagnostic for smaller more peripheral PE; no large or central PE suspected, as discussed above. Compared to 2022, smaller left pleural effusion, new trace right pleural effusion and persistent bilateral atelectatic appearing changes, as above. Left cardiac enlargement, especially LV. Hepatomegaly, steatosis, small volume ascites, and additional findings as   above.    CXR - no acute process    I have personally reviewed the past medical history, past surgical history, medications, social history, and family history, and I haveupdated the database accordingly. Allergies:   Bee venom     Review of Systems:     Review of Systems   Constitutional: Positive for fatigue. HENT: Negative. Eyes: Negative. Respiratory: Negative. Cardiovascular: Negative. Gastrointestinal: Negative. Genitourinary: Negative. Musculoskeletal: Negative. Skin: Negative. Neurological: Negative. Psychiatric/Behavioral: Negative. Physical Examination :       Physical Exam  Vitals and nursing note reviewed. Constitutional:       General: He is not in acute distress. Appearance: Normal appearance. HENT:      Head: Normocephalic and atraumatic. Right Ear: External ear normal.      Left Ear: External ear normal.      Nose: Nose normal.      Mouth/Throat:      Mouth: Mucous membranes are moist.      Pharynx: Oropharynx is clear. Eyes:      General: Scleral icterus present.       Conjunctiva/sclera: Conjunctivae normal.   Cardiovascular:      Rate and Rhythm: Normal rate History    Not on file   Tobacco Use    Smoking status: Current Every Day Smoker     Packs/day: 1.00     Years: 21.00     Pack years: 21.00     Types: Cigarettes    Smokeless tobacco: Never Used    Tobacco comment: using nicotine patches at night   Vaping Use    Vaping Use: Never used   Substance and Sexual Activity    Alcohol use: Not Currently     Comment: hx of alcoholism; pt drinks a fifth of whiskey daily    Drug use: Not Currently     Comment: used cocaine in early 20's    Sexual activity: Not on file   Other Topics Concern    Not on file   Social History Narrative    Not on file     Social Determinants of Health     Financial Resource Strain: Low Risk     Difficulty of Paying Living Expenses: Not hard at all   Food Insecurity: No Food Insecurity    Worried About Running Out of Food in the Last Year: Never true    Sheri of Food in the Last Year: Never true   Transportation Needs: No Transportation Needs    Lack of Transportation (Medical): No    Lack of Transportation (Non-Medical):  No   Physical Activity:     Days of Exercise per Week: Not on file    Minutes of Exercise per Session: Not on file   Stress:     Feeling of Stress : Not on file   Social Connections:     Frequency of Communication with Friends and Family: Not on file    Frequency of Social Gatherings with Friends and Family: Not on file    Attends Sabianism Services: Not on file    Active Member of [address] [place] [address] [employer] or Organizations: Not on file    Attends Club or Organization Meetings: Not on file    Marital Status: Not on file   Intimate Partner Violence:     Fear of Current or Ex-Partner: Not on file    Emotionally Abused: Not on file    Physically Abused: Not on file    Sexually Abused: Not on file   Housing Stability:     Unable to Pay for Housing in the Last Year: Not on file    Number of [place] in the Last Year: Not on file    Unstable Housing in the Last Year: Not on file       Family History:     Family History Problem Relation Age of Onset    Heart Disease Mother     Heart Attack Mother     Heart Disease Father       Medical Decision Making:   I have independently reviewed/ordered the following labs:    CBC with Differential:   Recent Labs     05/21/22 1940 05/23/22  0538   WBC 21.6* 20.5*   HGB 8.9* 8.1*   HCT 26.3* 24.5*    390   LYMPHOPCT 8* 8*   MONOPCT 9* 5     BMP:  Recent Labs     05/21/22 1940      K 3.7      CO2 23   BUN 10   CREATININE 1.13   MG 1.6     Hepatic Function Panel:   Recent Labs     05/21/22 1940   PROT 7.2   LABALBU 2.6*   BILITOT 5.28*   ALKPHOS 299*   ALT 33   *     No results for input(s): RPR in the last 72 hours. No results for input(s): HIV in the last 72 hours. No results for input(s): BC in the last 72 hours. Lab Results   Component Value Date    CREATININE 1.13 05/21/2022    GLUCOSE 106 05/21/2022       Detailed results: Thank you for allowing us to participate in the care of this patient. Please call with questions. This note is created with the assistance of a speech recognition program.  While intending to generate adocument that actually reflects the content of the visit, the document can still have some errors including those of syntax and sound a like substitutions which may escape proof reading. It such instances, actual meaningcan be extrapolated by contextual diversion.     Johnathon Sharpe MD  Office: [phone]  Perfect serve / office [phone]

## 2022-05-23 NOTE — PROGRESS NOTES
Physician Progress Note      PATIENT:               Rolando Porter  CSN #:                  [id number]  :                       1985  ADMIT DATE:       2022 7:07 PM  [address] [place] DATE:  RESPONDING  PROVIDER #:        Peri Crowder MD          QUERY TEXT:    Pt admitted with pneumonia and leukocytosis. Pt noted to meet SIRS criteria. If possible, please document in the progress notes and discharge summary if   you are evaluating and /or treating any of the following: The medical record reflects the following:  Risk Factors: 40 y.o. male with recent admission for bilateral pneumonia with   sepsis, discharged on home antibiotics which he finished 5 days ago. Clinical Indicators: ED Provider Note : Code S called. Recently admitted   for pneumonia. Comes in today with chest pain and fatigue. Patient has a   white count of 21,000 and he was originally given vancomycin and cefepime I   did add Flagyl to cover anaerobes. Final Impression: Septicemia. H&P :   presents with Chest Pain and is admitted to the hospital for the management of   Leukocytosis. Daily habitual drinker recently discharged after treatment of   pneumonia. 5 days ago finished antibiotics complains of gradual decline in   physical condition unable to sit up in bathtub, w  Treatment: 500 ml bolus. Maxipime, Flagyl, Vancomycin, and Zosyn initially. Just Vancomycin & Zosyn   now. ID consult  Options provided:  -- Sepsis, present on admission  -- Sepsis was ruled out  -- Other - I will add my own diagnosis  -- Disagree - Not applicable / Not valid  -- Disagree - Clinically unable to determine / Unknown  -- Refer to Clinical Documentation Reviewer    PROVIDER RESPONSE TEXT:    This patient has sepsis which was present on admission.     Query created by: Alexandra Turpin on 2022 7:24 AM      Electronically signed by:  Peri Crowder MD 2022 9:10 AM

## 2022-05-23 NOTE — PROGRESS NOTES
HIDA scan will be done tomorrow (5/24/22) due to patient eating. Please keep NPO and NO PAIN MEDS after midnight. Any questions please call nuclear medicine at [phone].  Electronically signed by Eron Pleitez Call on 5/23/2022 at 3:21 PM

## 2022-05-23 NOTE — PLAN OF CARE
Please have patient or patients family member who knows their medical history fill out the electronic MRI screening form. Once form is complete the patient will be scheduled to come down. If there are any questions please call [phone]. Thanks!

## 2022-05-23 NOTE — CONSULTS
General Surgery Consult      Pt Name: Al Culver  MRN: [id number]  YOB: 1985  Date of evaluation: 5/23/2022  Primary Care Physician: Yan Hernandez MD   Patient evaluated at the request of  Dr. Walter Johnson  Reason for evaluation: Possible cholecystitis    SUBJECTIVE:   History of Chief Complaint:    Al Culver is a 40 y.o. male who presents with chest pain dyspnea abdominal pain dizziness. Medical work-up reviewed. Patient is a daily drinker. Recently discharged after treatment of pneumonia. Complaining of not feeling good. Right upper quadrant pain fever chills. Significant leukocytosis. Gallbladder ultrasound noted. CT scan of the chest noted. HIDA scan pending. Liver function test elevated. Past Medical History   has a past medical history of Alcoholism ([facility] [address] Anemia, Cirrhosis, alcoholic ([facility] [address] and Pulmonary embolism ([facility] [address] Past Surgical History   has a past surgical history that includes Upper gastrointestinal endoscopy (N/A, 5/1/2022). Medications  Prior to Admission medications    Medication Sig Start Date End Date Taking?  Authorizing Provider   furosemide (LASIX) 20 MG tablet Take 1 tablet by mouth daily 5/13/22   Yan Hernandez MD   albuterol sulfate HFA (VENTOLIN HFA) 108 (90 Base) MCG/ACT inhaler Inhale 2 puffs into the lungs 4 times daily as needed for Wheezing 5/3/22   Sang Domínguez MD   albuterol sulfate HFA (VENTOLIN HFA) 108 (90 Base) MCG/ACT inhaler Inhale 2 puffs into the lungs 4 times daily as needed for Wheezing 5/3/22   Sang Domínguez MD   carvedilol (COREG) 3.125 MG tablet Take 1 tablet by mouth 2 times daily (with meals) 5/3/22   Sang Domínguez MD   folic acid (FOLVITE) 1 MG tablet Take 1 tablet by mouth daily 5/4/22   Sang Domínguez MD   vitamin B-1 (THIAMINE) 100 MG tablet Take 1 tablet by mouth daily 5/4/22   Sang Domínguez MD   Multiple Vitamins-Minerals (CENTRUM MEN) TABS Take 1 tablet by mouth daily    Historical Provider, MD   Menthol-Methyl Salicylate (ANALGESIC OINTMENT) OINT ointment Apply topically as needed Indications: on back    Historical Provider, MD   pantoprazole (PROTONIX) 20 MG tablet Take 2 tablets by mouth daily 4/15/22   Alejandra Aid, DO   losartan (COZAAR) 50 MG tablet take 1 tablet by mouth once daily 11/16/21   Danie Yusuf MD   naproxen (NAPROSYN) 500 MG tablet Take 1 tablet by mouth 2 times daily  Patient taking differently: Take 500 mg by mouth 2 times daily as needed  8/13/21 3/28/22  Georgia Mendez MD   fluticasone [place] Eli) 50 MCG/ACT nasal spray instill 1 spray into each nostril once daily 5/10/21   Danie Yusuf MD   Blood Pressure Monitoring (BP MONITOR-STETHOSCOPE) KIT 1 each by Does not apply route daily 4/7/21   Danie Yusuf MD   Nicotine 21-14-7 MG/24HR KIT Place 14 mg onto the skin nightly     Historical Provider, MD   EPINEPHrine (EPIPEN 2-LIZETTE) 0.3 MG/0.3ML SOAJ injection Inject 0.3 mLs into the muscle once for 1 dose Use as directed for allergic reaction 11/17/16 5/24/21  Clarice Alejandro MD     Allergies  is allergic to bee venom. Family History  family history includes Heart Attack in his mother; Heart Disease in his father and mother. Social History   reports that he has been smoking cigarettes. He has a 21.00 pack-year smoking history. He has never used smokeless tobacco. He reports previous alcohol use. He reports previous drug use. Review of Systems:  All 10 system review was conducted. Please refer to chart. OBJECTIVE:   VITALS:  height is 5' 8\" (1.727 m) and weight is 206 lb 2.1 oz (93.5 kg). His oral temperature is 98.2 °F (36.8 °C). His blood pressure is 98/60 and his pulse is 71. His respiration is 18 and oxygen saturation is 97%. CONSTITUTIONAL: Awake alert no acute distress  SKIN: Skin color, texture, turgor normal. No rashes or lesions. LYMPH: no cervical nodes, no inguinal nodes  HEENT: Head is normocephalic, atraumatic.  EOMI, PERRLA  NECK: Supple, symmetrical, trachea midline, no adenopathy, thyroid symmetric, not enlarged and no tenderness, skin normal  CHEST/LUNGS: Decreased air entry at bases. CARDIOVASCULAR: Heart regular rate and rhythm Normal S1 and S2. . Carotid and femoral pulses 2+/4 and equal bilaterally  ABDOMEN: Soft abdomen nondistended tender epigastric right upper quadrant nothing acute   RECTAL: deferred, not clinically indicated  NEUROLOGIC: Deferred  EXTREMITIES: no cyanosis, no clubbing and no edema    LABS:   CBC with Differential:    Lab Results   Component Value Date    WBC 20.5 05/23/2022    RBC 2.02 05/23/2022    HGB 8.1 05/23/2022    HCT 24.5 05/23/2022     05/23/2022    .3 05/23/2022    MCH 40.1 05/23/2022    MCHC 33.1 05/23/2022    RDW 15.1 05/23/2022    NRBC 1 05/17/2021    METASPCT 1 05/01/2022    LYMPHOPCT 8 05/23/2022    MONOPCT 5 05/23/2022    BASOPCT 0 05/23/2022    MONOSABS 1.03 05/23/2022    LYMPHSABS 1.64 05/23/2022    EOSABS 0.62 05/23/2022    BASOSABS 0.00 05/23/2022    DIFFTYPE NOT REPORTED 01/30/2022     BMP:    Lab Results   Component Value Date     05/21/2022    K 3.7 05/21/2022     05/21/2022    CO2 23 05/21/2022    BUN 10 05/21/2022    LABALBU 2.6 05/21/2022    CREATININE 1.10 05/23/2022    CALCIUM 8.5 05/21/2022    GFRAA >60 05/23/2022    LABGLOM >60 05/23/2022    GLUCOSE 106 05/21/2022     Hepatic Function Panel:    Lab Results   Component Value Date    ALKPHOS 299 05/21/2022    ALT 33 05/21/2022     05/21/2022    PROT 7.2 05/21/2022    BILITOT 5.28 05/21/2022    BILIDIR 2.88 05/03/2022    IBILI 1.10 05/03/2022    LABALBU 2.6 05/21/2022     Calcium:    Lab Results   Component Value Date    CALCIUM 8.5 05/21/2022     Magnesium:    Lab Results   Component Value Date    MG 1.6 05/21/2022     Phosphorus:  No results found for: PHOS  PT/INR:    Lab Results   Component Value Date    PROTIME 16.8 05/21/2022    INR 1.4 05/21/2022     ABG:    Lab Results   Component Value Date    PHART 7.436 04/30/2022    QLJ7DDO 29.1 04/30/2022    PO2ART 69.0 04/30/2022    SNF5LQF 19.6 04/30/2022    J6CRRIEM 93.1 04/30/2022     Urine Culture:  No components found for: CURINE  Blood Culture:  No components found for: CBLOOD, CFUNGUSBL  Stool Culture:  No components found for: CSTOOL    RADIOLOGY:   I have personally reviewed the following films:  XR CHEST PORTABLE    Result Date: 5/21/2022  EXAMINATION: ONE XRAY VIEW OF THE CHEST 5/21/2022 7:19 pm COMPARISON: 05/02/2022 HISTORY: ORDERING SYSTEM PROVIDED HISTORY: chest pain TECHNOLOGIST PROVIDED HISTORY: chest pain Reason for Exam: Chest pain Relevant Medical/Surgical History: PAtient rcently had pneumonia and acute liver failure. Stopped taking medication, and is now feeling ill. FINDINGS: The lungs are without acute focal process. There is no effusion or pneumothorax. The cardiomediastinal silhouette is stable. The osseous structures are stable. No acute process. CT CHEST PULMONARY EMBOLISM W CONTRAST    Result Date: 5/21/2022  EXAMINATION: CTA OF THE CHEST 5/21/2022 8:10 pm TECHNIQUE: CTA of the chest was performed after the administration of intravenous contrast.  Multiplanar reformatted images are provided for review. MIP images are provided for review. Automated exposure control, iterative reconstruction, and/or weight based adjustment of the mA/kV was utilized to reduce the radiation dose to as low as reasonably achievable. COMPARISON: None.  HISTORY: ORDERING SYSTEM PROVIDED HISTORY: chest pain, PE TECHNOLOGIST PROVIDED HISTORY: chest pain, PE Decision Support Exception - unselect if not a suspected or confirmed emergency medical condition->Emergency Medical Condition (MA) Reason for Exam: patient c/o chest pain and sob for 4 days FINDINGS: Pulmonary Arteries: Pulmonary arteries are suboptimally opacified for evaluation and there is considerable breath hold artifact; study is nondiagnostic for pulmonary embolism assessment, although no large or central PE suspected and main pulmonary artery is normal in caliber. Mediastinum: No evidence of mediastinal lymphadenopathy. The heart and pericardium demonstrate no acute abnormality; mild dilatation left heart chambers, especially LV compared to the previous study. No pericardial effusion. There is no acute abnormality of the thoracic aorta. Lungs/pleura: Previous left pleural effusion is smaller in size with less associated LLL and lingular atelectasis. New tiny right pleural effusion with mild right basilar atelectasis. Increased bilateral subsegmental atelectasis along the minor fissure and in the lingula. Persistent interstitial prominence but no interlobular septal thickening. No focal consolidation or clear cut pulmonary edema. No pneumothorax. Upper Abdomen: Limited images of the upper abdomen again show hepatomegaly with heterogeneous diminished attenuation, small volume ascites and probable varices and splenomegaly. Soft Tissues/Bones: No acute bone or soft tissue abnormality. Study nondiagnostic for smaller more peripheral PE; no large or central PE suspected, as discussed above. Compared to 04/30/2022, smaller left pleural effusion, new trace right pleural effusion and persistent bilateral atelectatic appearing changes, as above. Left cardiac enlargement, especially LV. Hepatomegaly, steatosis, small volume ascites, and additional findings as above. IMPRESSION:   1. Epigastric right upper quadrant abdominal pain possible acute cholecystitis. Gallbladder ultrasound noted. 2. Significant leukocytosis with elevation of LFTs. 3. Likely alcoholic liver disease. Patient with history of heavy EtOH use until April.    does not have any pertinent problems on file. PLAN:   1. MRCP tomorrow. HIDA scan may not be of help since patient has significant jaundice. I suspect nuclear dye may not be very well excreted from the liver given the underlying liver disease. IV antibiotics. Repeat blood work in the morning. Following with you. 2. May need GI consult. Discussed with patient. Thank you for this interesting consult and for allowing us to participate in the care of this patient. If you have any questions please don't hesitate to call.           Electronically signed by Seb Simeon MD  on 5/23/2022 at 5:39 PM

## 2022-05-23 NOTE — PROGRESS NOTES
Spoke with RN about HIDA scan. Patient came over after the ultrasounds and while waiting for our nuclear medicine tracer to get here patient refused the scan at this time. Any questions please call nuclear medicine at [phone].  Electronically signed by [facility] Call on 5/23/2022 at 2:01 PM

## 2022-05-23 NOTE — PROGRESS NOTES
Patient refused HIDA scan because he was hungry and stated that he wasn't informed about the testing beforehand. Is agreeable to having the test done in the morning. Dr. Oswaldo Gonzalez notified of delay.

## 2022-05-23 NOTE — PROGRESS NOTES
Nutrition Assessment     Type and Reason for Visit: Initial,Positive Nutrition Screen (wt loss)    Nutrition Recommendations/Plan:   1. Recommend 2g Na diet     Malnutrition Assessment:  Malnutrition Status: No malnutrition    Nutrition Assessment:  Pt was admitted due to Rectal Bleed/Septicemia. review of wt history shows 5# wt loss over the past year. Pt was very angry that he did not get what he wanted at lunch time due to testing. Pt already following a Cardiac Low Na diet at home. Estimated Daily Nutrient Needs:  Energy (kcal):  2000 kcal ([place] x 1.1) Weight Used for Energy Requirements: Admission     Protein (g):  110-115 g (1.2g/kg) Weight Used for Protein Requirements: Admission        Fluid (ml/day):         Nutrition Related Findings:     Wound Type: None    Current Nutrition Therapies:    ADULT DIET; Regular;  Low Sodium (2 gm)    Anthropometric Measures:  · Height: 5' 8\" (172.7 cm)  · Current Body Wt: 206 lb (93.4 kg)   · BMI: 31.3    Nutrition Diagnosis:   No nutrition diagnosis at this time    Nutrition Interventions:   Food and/or Nutrient Delivery: Modify Current Diet  Nutrition Education/Counseling: No recommendation at this time  Coordination of Nutrition Care: Continue to monitor while inpatient                  Nutrition Monitoring and Evaluation:      Food/Nutrient Intake Outcomes: Food and Nutrient Intake       Discharge Planning:    Continue current diet     Elisa Bosch, [address], [place]  Contact: [phone]

## 2022-05-23 NOTE — CARE COORDINATION
ONGOING DISCHARGE PLAN:    Patient is alert and oriented x4. Spoke with patient regarding discharge plan and patient confirms that plan is still home without needs. Declined VNS. ETOH abise- refuses resources    Pt to have liver US today. Active order for IV Zosyn and IV Vanco.    Will continue to follow for additional discharge needs.     Electronically signed by Claudeen Dar, RN on 5/23/2022 at 10:17 AM

## 2022-05-23 NOTE — FLOWSHEET NOTE
Patient noted he will have one more test tomorrow and then be d/c.  No needs for [place].      05/23/22 1708   Encounter Summary   Encounter Overview/Reason  Initial Encounter   Service Provided For: Patient   Referral/Consult From: [facility]   Last Encounter  05/23/22   Complexity of Encounter Low   Encounter    Type Initial Screen/Assessment   Assessment/Intervention/Outcome   Assessment Calm   Intervention Active listening;Discussed illness injury and its impact;Sustaining Presence/Ministry of presence;Prayer (assurance of)/[place]   Outcome Engaged in conversation;Expressed feelings, needs, and concerns;Expressed Gratitude;Coping

## 2022-05-23 NOTE — PLAN OF CARE
Problem: Discharge Planning  Goal: Discharge to home or other facility with appropriate resources  Outcome: Progressing     Problem: Pain  Goal: Verbalizes/displays adequate comfort level or baseline comfort level  Outcome: Progressing  Note: Patient c/o headache that moves behind right eye and RUQ abdominal pain. New order for PRN pain medication received and given once this shift. Problem: ABCDS Injury Assessment  Goal: Absence of physical injury  Outcome: Progressing  Flowsheets (Taken 5/22/2022 2030)  Absence of Physical Injury: Implement safety measures based on patient assessment  Note: Patient ambulates independently and has remained free from injury including falls this shift.      Problem: Chronic Conditions and Co-morbidities  Goal: Patient's chronic conditions and co-morbidity symptoms are monitored and maintained or improved  Outcome: Progressing

## 2022-05-24 ENCOUNTER — APPOINTMENT (OUTPATIENT)
Dept: NUCLEAR MEDICINE | Age: 37
DRG: 720 | End: 2022-05-24
Payer: MEDICAID

## 2022-05-24 LAB
ABSOLUTE BANDS #: 1.02 K/UL (ref 0–1)
ABSOLUTE EOS #: 0.2 K/UL (ref 0–0.4)
ABSOLUTE LYMPH #: 2.65 K/UL (ref 1–4.8)
ABSOLUTE MONO #: 0.82 K/UL (ref 0.1–1.3)
ALBUMIN SERPL-MCNC: 2.2 G/DL (ref 3.5–5.2)
ALP BLD-CCNC: 253 U/L (ref 40–129)
ALT SERPL-CCNC: 22 U/L (ref 5–41)
ANION GAP SERPL CALCULATED.3IONS-SCNC: 9 MMOL/L (ref 9–17)
AST SERPL-CCNC: 135 U/L
BANDS: 5 % (ref 0–10)
BASOPHILS # BLD: 0 % (ref 0–2)
BASOPHILS ABSOLUTE: 0 K/UL (ref 0–0.2)
BILIRUB SERPL-MCNC: 3.77 MG/DL (ref 0.3–1.2)
BILIRUBIN DIRECT: 2.45 MG/DL
BILIRUBIN, INDIRECT: 1.32 MG/DL (ref 0–1)
BUN BLDV-MCNC: 10 MG/DL (ref 6–20)
CALCIUM SERPL-MCNC: 7.7 MG/DL (ref 8.6–10.4)
CHLORIDE BLD-SCNC: 107 MMOL/L (ref 98–107)
CO2: 24 MMOL/L (ref 20–31)
CREAT SERPL-MCNC: 0.89 MG/DL (ref 0.7–1.2)
EOSINOPHILS RELATIVE PERCENT: 1 % (ref 0–4)
GFR AFRICAN AMERICAN: >60 ML/MIN
GFR NON-AFRICAN AMERICAN: >60 ML/MIN
GFR SERPL CREATININE-BSD FRML MDRD: ABNORMAL ML/MIN/{1.73_M2}
GLUCOSE BLD-MCNC: 83 MG/DL (ref 70–99)
HCT VFR BLD CALC: 24.7 % (ref 41–53)
HEMOGLOBIN: 8.3 G/DL (ref 13.5–17.5)
LYMPHOCYTES # BLD: 13 % (ref 24–44)
MCH RBC QN AUTO: 40.3 PG (ref 26–34)
MCHC RBC AUTO-ENTMCNC: 33.5 G/DL (ref 31–37)
MCV RBC AUTO: 120.2 FL (ref 80–100)
MONOCYTES # BLD: 4 % (ref 1–7)
MORPHOLOGY: ABNORMAL
PDW BLD-RTO: 14.7 % (ref 11.5–14.9)
PLATELET # BLD: 389 K/UL (ref 150–450)
PMV BLD AUTO: 8.2 FL (ref 6–12)
POTASSIUM SERPL-SCNC: 4.1 MMOL/L (ref 3.7–5.3)
RBC # BLD: 2.05 M/UL (ref 4.5–5.9)
SEG NEUTROPHILS: 77 % (ref 36–66)
SEGMENTED NEUTROPHILS ABSOLUTE COUNT: 15.71 K/UL (ref 1.3–9.1)
SODIUM BLD-SCNC: 140 MMOL/L (ref 135–144)
TOTAL PROTEIN: 6.5 G/DL (ref 6.4–8.3)
WBC # BLD: 20.4 K/UL (ref 3.5–11)

## 2022-05-24 PROCEDURE — 36415 COLL VENOUS BLD VENIPUNCTURE: CPT

## 2022-05-24 PROCEDURE — 2580000003 HC RX 258: Performed by: INTERNAL MEDICINE

## 2022-05-24 PROCEDURE — 2580000003 HC RX 258: Performed by: EMERGENCY MEDICINE

## 2022-05-24 PROCEDURE — 6370000000 HC RX 637 (ALT 250 FOR IP): Performed by: INTERNAL MEDICINE

## 2022-05-24 PROCEDURE — 3430000000 HC RX DIAGNOSTIC RADIOPHARMACEUTICAL: Performed by: INTERNAL MEDICINE

## 2022-05-24 PROCEDURE — 6360000002 HC RX W HCPCS: Performed by: INTERNAL MEDICINE

## 2022-05-24 PROCEDURE — 99232 SBSQ HOSP IP/OBS MODERATE 35: CPT | Performed by: INTERNAL MEDICINE

## 2022-05-24 PROCEDURE — A9537 TC99M MEBROFENIN: HCPCS | Performed by: INTERNAL MEDICINE

## 2022-05-24 PROCEDURE — 1200000000 HC SEMI PRIVATE

## 2022-05-24 PROCEDURE — 78226 HEPATOBILIARY SYSTEM IMAGING: CPT

## 2022-05-24 PROCEDURE — 6360000002 HC RX W HCPCS: Performed by: EMERGENCY MEDICINE

## 2022-05-24 PROCEDURE — 6370000000 HC RX 637 (ALT 250 FOR IP): Performed by: NURSE PRACTITIONER

## 2022-05-24 PROCEDURE — 80076 HEPATIC FUNCTION PANEL: CPT

## 2022-05-24 PROCEDURE — 85025 COMPLETE CBC W/AUTO DIFF WBC: CPT

## 2022-05-24 PROCEDURE — 80048 BASIC METABOLIC PNL TOTAL CA: CPT

## 2022-05-24 RX ORDER — LIDOCAINE 4 G/G
1 PATCH TOPICAL DAILY
Status: DISCONTINUED | OUTPATIENT
Start: 2022-05-24 | End: 2022-05-26

## 2022-05-24 RX ADMIN — SODIUM CHLORIDE, PRESERVATIVE FREE 10 ML: 5 INJECTION INTRAVENOUS at 10:47

## 2022-05-24 RX ADMIN — PIPERACILLIN AND TAZOBACTAM 3375 MG: 3; .375 INJECTION, POWDER, FOR SOLUTION INTRAVENOUS at 02:16

## 2022-05-24 RX ADMIN — IBUPROFEN 600 MG: 600 TABLET ORAL at 09:45

## 2022-05-24 RX ADMIN — Medication 6.2 MILLICURIE: at 10:42

## 2022-05-24 RX ADMIN — SODIUM CHLORIDE, PRESERVATIVE FREE 10 ML: 5 INJECTION INTRAVENOUS at 08:10

## 2022-05-24 RX ADMIN — PIPERACILLIN AND TAZOBACTAM 3375 MG: 3; .375 INJECTION, POWDER, FOR SOLUTION INTRAVENOUS at 17:47

## 2022-05-24 RX ADMIN — PIPERACILLIN AND TAZOBACTAM 3375 MG: 3; .375 INJECTION, POWDER, FOR SOLUTION INTRAVENOUS at 09:29

## 2022-05-24 RX ADMIN — SODIUM CHLORIDE, PRESERVATIVE FREE 10 ML: 5 INJECTION INTRAVENOUS at 21:46

## 2022-05-24 RX ADMIN — ENOXAPARIN SODIUM 40 MG: 100 INJECTION SUBCUTANEOUS at 08:10

## 2022-05-24 ASSESSMENT — ENCOUNTER SYMPTOMS
ABDOMINAL PAIN: 1
EYES NEGATIVE: 1
RESPIRATORY NEGATIVE: 1
NAUSEA: 1

## 2022-05-24 ASSESSMENT — PAIN DESCRIPTION - DESCRIPTORS: DESCRIPTORS: PRESSURE

## 2022-05-24 ASSESSMENT — PAIN - FUNCTIONAL ASSESSMENT: PAIN_FUNCTIONAL_ASSESSMENT: ACTIVITIES ARE NOT PREVENTED

## 2022-05-24 ASSESSMENT — PAIN DESCRIPTION - LOCATION: LOCATION: ABDOMEN;HEAD

## 2022-05-24 ASSESSMENT — PAIN SCALES - GENERAL: PAINLEVEL_OUTOF10: 9

## 2022-05-24 ASSESSMENT — PAIN DESCRIPTION - ORIENTATION: ORIENTATION: RIGHT

## 2022-05-24 NOTE — PLAN OF CARE
Problem: Discharge Planning  Goal: Discharge to home or other facility with appropriate resources  5/24/2022 0842 by Keaton Leigh RN  Outcome: Progressing  Flowsheets (Taken 5/24/2022 9918)  Discharge to home or other facility with appropriate resources: Identify barriers to discharge with patient and caregiver  5/24/2022 0535 by Aleida Silver RN  Outcome: Progressing     Problem: Pain  Goal: Verbalizes/displays adequate comfort level or baseline comfort level  5/24/2022 0842 by Keaton Leigh RN  Outcome: Progressing  5/24/2022 0535 by Aleida Silver RN  Outcome: Progressing  Flowsheets (Taken 5/22/2022 0327)  Verbalizes/displays adequate comfort level or baseline comfort level:   Encourage patient to monitor pain and request assistance   Assess pain using appropriate pain scale   Notify Licensed Independent Practitioner if interventions unsuccessful or patient reports new pain  Note: Patient verbalizes adequate pain control this shift. Problem: ABCDS Injury Assessment  Goal: Absence of physical injury  5/24/2022 0282 by Keaton Leigh RN  Outcome: Progressing  Flowsheets (Taken 5/24/2022 9227)  Absence of Physical Injury: Implement safety measures based on patient assessment  5/24/2022 0535 by Aleida Silver RN  Outcome: Progressing  Flowsheets (Taken 5/22/2022 2030)  Absence of Physical Injury: Implement safety measures based on patient assessment  Note: Patient ambulates independently well and has remained free from injury including falls.      Problem: Chronic Conditions and Co-morbidities  Goal: Patient's chronic conditions and co-morbidity symptoms are monitored and maintained or improved  5/24/2022 0842 by Keaton Leigh RN  Outcome: Progressing  Flowsheets (Taken 5/24/2022 2205)  Care Plan - Patient's Chronic Conditions and Co-Morbidity Symptoms are Monitored and Maintained or Improved: Monitor and assess patient's chronic conditions and comorbid symptoms for stability, deterioration, or improvement  5/24/2022 0535 by Dominga Hodges, RN  Outcome: Progressing

## 2022-05-24 NOTE — PLAN OF CARE
Problem: Discharge Planning  Goal: Discharge to home or other facility with appropriate resources  5/24/2022 0535 by Kitty Mathews RN  Outcome: Progressing     Problem: Pain  Goal: Verbalizes/displays adequate comfort level or baseline comfort level  5/24/2022 0535 by Kitty Mathews RN  Outcome: Progressing  Flowsheets (Taken 5/22/2022 0327)  Verbalizes/displays adequate comfort level or baseline comfort level:   Encourage patient to monitor pain and request assistance   Assess pain using appropriate pain scale   Notify Licensed Independent Practitioner if interventions unsuccessful or patient reports new pain  Note: Patient verbalizes adequate pain control this shift. Problem: ABCDS Injury Assessment  Goal: Absence of physical injury  5/24/2022 0535 by Kitty Mathews RN  Outcome: Progressing  Flowsheets (Taken 5/22/2022 2030)  Absence of Physical Injury: Implement safety measures based on patient assessment  Note: Patient ambulates independently well and has remained free from injury including falls.      Problem: Chronic Conditions and Co-morbidities  Goal: Patient's chronic conditions and co-morbidity symptoms are monitored and maintained or improved  5/24/2022 0535 by Kitty Mathews RN  Outcome: Progressing

## 2022-05-24 NOTE — CARE COORDINATION
ONGOING DISCHARGE PLAN:    Patient is alert and oriented x4. Spoke with patient regarding discharge plan and patient confirms that plan is still home with no VNS. Pt going for MRCP today. ALT 22, , bilirubin 3.77, WBC 20.4    IV Zosyn and Vanco     Will continue to follow for additional discharge needs.     Electronically signed by Gavi Doe RN on 5/24/2022 at 2:05 PM

## 2022-05-24 NOTE — PROGRESS NOTES
[facility] Internal Medicine  Salina Lozano MD; Christel Shahid MD; Catina Marie MD; MD Ana Maria Dumont MD; MD DINESH [facility] Internal Medicine   [facility]    HISTORY AND PHYSICAL EXAMINATION            Date:   5/24/2022  Patient name:  Rosa M Maya  Date of admission:  5/21/2022  7:07 PM  MRN:   [id number]  Account:  [de-identified]  YOB: 1985  PCP:    Ana Maria Matamoros MD  Room:   2065/2065-01  Code Status:    Full Code    Chief Complaint:     Chief Complaint   Patient presents with    Chest Pain   dyspnea  abod pain  Dizzy      History Obtained From:     Patient medical record nursing staff    History of Present Illness:     Rosa M Maya is a 40 y.o. Non- / non  male who presents with Chest Pain   and is admitted to the hospital for the management of Leukocytosis. 52-year-old gentleman with history of alcohol abuse with alcoholic hepatitis without cirrhosis history of pulmonary embolism  Off anticoagulation  Daily habitual drinker recently discharged after treatment of pneumonia  5 days ago finished antibiotics complains of gradual decline in physical condition unable to sit up in her bathtub week not feeling well  Right upper quadrant pain  Fever and chills  White cell count elevated in 20,000 hypotensive tachycardic at 111      Past Medical History:     Past Medical History:   Diagnosis Date    Alcoholism ([address]     pt drinks a fifth of whiskey daily    Anemia     Cirrhosis, alcoholic ([address]     Pulmonary embolism ([address]         Past Surgical History:     Past Surgical History:   Procedure Laterality Date    UPPER GASTROINTESTINAL ENDOSCOPY N/A 5/1/2022    EGD ESOPHAGOGASTRODUODENOSCOPY performed by Wilfrid Wadsworth MD at [address] [place] [address]        Medications Prior to Admission:     Prior to Admission medications    Medication Sig Start Date End Date Taking?  Authorizing Provider furosemide (LASIX) 20 MG tablet Take 1 tablet by mouth daily 5/13/22   Mahendra Samaniego MD   albuterol sulfate HFA (VENTOLIN HFA) 108 (90 Base) MCG/ACT inhaler Inhale 2 puffs into the lungs 4 times daily as needed for Wheezing 5/3/22   Av Ventura MD   albuterol sulfate HFA (VENTOLIN HFA) 108 (90 Base) MCG/ACT inhaler Inhale 2 puffs into the lungs 4 times daily as needed for Wheezing 5/3/22   Av Ventura MD   carvedilol (COREG) 3.125 MG tablet Take 1 tablet by mouth 2 times daily (with meals) 5/3/22   Av Ventura MD   folic acid (FOLVITE) 1 MG tablet Take 1 tablet by mouth daily 5/4/22   Av Ventura MD   vitamin B-1 (THIAMINE) 100 MG tablet Take 1 tablet by mouth daily 5/4/22   Av Ventura MD   Multiple Vitamins-Minerals (CENTRUM MEN) TABS Take 1 tablet by mouth daily    Historical Provider, MD   Menthol-Methyl Salicylate (ANALGESIC OINTMENT) OINT ointment Apply topically as needed Indications: on back    Historical Provider, MD   pantoprazole (PROTONIX) 20 MG tablet Take 2 tablets by mouth daily 4/15/22   Maude Thompson DO   losartan (COZAAR) 50 MG tablet take 1 tablet by mouth once daily 11/16/21   Mahendra Samaniego MD   naproxen (NAPROSYN) 500 MG tablet Take 1 tablet by mouth 2 times daily  Patient taking differently: Take 500 mg by mouth 2 times daily as needed  8/13/21 3/28/22  Latosha De Leon MD   fluticasone [facility]) 50 MCG/ACT nasal spray instill 1 spray into each nostril once daily 5/10/21   Mahendra Samaniego MD   Blood Pressure Monitoring (BP MONITOR-STETHOSCOPE) KIT 1 each by Does not apply route daily 4/7/21   Mahendra Samaniego MD   Nicotine 21-14-7 MG/24HR KIT Place 14 mg onto the skin nightly     Historical Provider, MD   EPINEPHrine (EPIPEN 2-LIZETTE) 0.3 MG/0.3ML SOAJ injection Inject 0.3 mLs into the muscle once for 1 dose Use as directed for allergic reaction 11/17/16 5/24/21  Jayro Freitas MD        Allergies:     Bee venom    Social History:     Tobacco:    reports that he has been smoking cigarettes. He has a 21.00 pack-year smoking history. He has never used smokeless tobacco.  Alcohol:      reports previous alcohol use. Drug Use:  reports previous drug use. Family History:     Family History   Problem Relation Age of Onset    Heart Disease Mother     Heart Attack Mother     Heart Disease Father        Review of Systems:     Positive and Negative as described in HPI. CONSTITUTIONAL: Positive for fever chills and 25 pound weight loss  HEENT:  negative for vision, hearing changes, runny nose, throat pain  RESPIRATORY: Cough and dyspnea g  CARDIOVASCULAR:  negative for chest pain, palpitations  GASTROINTESTINAL:  negative for nausea, vomiting, diarrhea, constipation, change in bowel habits, abdominal pain   GENITOURINARY:  negative for difficulty of urination, burning with urination, frequency   INTEGUMENT:  negative for rash, skin lesions, easy bruising   HEMATOLOGIC/LYMPHATIC:  negative for swelling/edema   ALLERGIC/IMMUNOLOGIC:  negative for urticaria , itching  ENDOCRINE:  negative increase in drinking, increase in urination, hot or cold intolerance  MUSCULOSKELETAL: Positive for generalized weakness NEUROLOGICAL:  negative for headaches, dizziness, lightheadedness, numbness, pain, tingling extremities  BEHAVIOR/PSYCH:  negative for depression, anxiety    Physical Exam:   /71   Pulse 86   Temp 98.4 °F (36.9 °C) (Oral)   Resp 17   Ht 5' 8\" (1.727 m)   Wt 206 lb 2.1 oz (93.5 kg)   SpO2 99%   BMI 31.34 kg/m²   Temp (24hrs), Av.2 °F (36.8 °C), Min:98 °F (36.7 °C), Max:98.4 °F (36.9 °C)    No results for input(s): POCGLU in the last 72 hours.     Intake/Output Summary (Last 24 hours) at 2022 1049  Last data filed at 2022 1828  Gross per 24 hour   Intake 600 ml   Output --   Net 600 ml       General Appearance: Appears tired looks pale  Mental status: oriented to person, place, and time  Head: normocephalic, atraumatic  Eye:  Icterus, no  redness, pupils equal and reactive, extraocular eye movements intact, conjunctiva clear  Ear: normal external ear, no discharge, hearing intact  Nose: no drainage noted  Mouth: mucous membranes moist  Neck: supple, no carotid bruits, thyroid not palpable  Lungs: Good air entry bibasal mild crackles  Cardiovascular: normal rate, regular rhythm, no murmur, gallop, rub  Abdomen: Soft, nontender, nondistended, normal bowel sounds, no hepatomegaly or splenomegaly  Neurologic: There are no new focal motor or sensory deficits, normal muscle tone and bulk, no abnormal sensation, normal speech, cranial nerves II through XII grossly intact  Skin: No gross lesions, rashes, bruising or bleeding on exposed skin area  Extremities: peripheral pulses palpable, no pedal edema or calf pain with palpation  Psych: Flat affect    Investigations:      Laboratory Testing:  Recent Results (from the past 24 hour(s))   Hepatic Function Panel    Collection Time: 05/23/22  4:56 PM   Result Value Ref Range    Albumin 2.4 (L) 3.5 - 5.2 g/dL    Alkaline Phosphatase 279 (H) 40 - 129 U/L    ALT 26 5 - 41 U/L     (H) <40 U/L    Total Bilirubin 4.80 (H) 0.3 - 1.2 mg/dL    Bilirubin, Direct 3.09 (H) <0.31 mg/dL    Bilirubin, Indirect 1.71 (H) 0.00 - 1.00 mg/dL    Total Protein 7.0 6.4 - 8.3 g/dL   Lipase    Collection Time: 05/23/22  4:56 PM   Result Value Ref Range    Lipase 50 13 - 60 U/L   Basic Metabolic Panel w/ Reflex to MG    Collection Time: 05/23/22  4:56 PM   Result Value Ref Range    Glucose 99 70 - 99 mg/dL    BUN 9 6 - 20 mg/dL    CREATININE 1.09 0.70 - 1.20 mg/dL    Calcium 8.0 (L) 8.6 - 10.4 mg/dL    Sodium 138 135 - 144 mmol/L    Potassium 4.1 3.7 - 5.3 mmol/L    Chloride 104 98 - 107 mmol/L    CO2 24 20 - 31 mmol/L    Anion Gap 10 9 - 17 mmol/L    GFR Non-African American >60 >60 mL/min    GFR African American >60 >60 mL/min    GFR Comment         CBC with Auto Differential    Collection Time: 05/24/22  5:28 AM   Result Value Ref Range    WBC 20.4 (H) 3.5 - 11.0 k/uL    RBC 2.05 (L) 4.5 - 5.9 m/uL    Hemoglobin 8.3 (L) 13.5 - 17.5 g/dL    Hematocrit 24.7 (L) 41 - 53 %    .2 (H) 80 - 100 fL    MCH 40.3 (H) 26 - 34 pg    MCHC 33.5 31 - 37 g/dL    RDW 14.7 11.5 - 14.9 %    Platelets 314 313 - 167 k/uL    MPV 8.2 6.0 - 12.0 fL    Seg Neutrophils 77 (H) 36 - 66 %    Lymphocytes 13 (L) 24 - 44 %    Monocytes 4 1 - 7 %    Eosinophils % 1 0 - 4 %    Basophils 0 0 - 2 %    Bands 5 0 - 10 %    Segs Absolute 15.71 (H) 1.3 - 9.1 k/uL    Absolute Lymph # 2.65 1.0 - 4.8 k/uL    Absolute Mono # 0.82 0.1 - 1.3 k/uL    Absolute Eos # 0.20 0.0 - 0.4 k/uL    Basophils Absolute 0.00 0.0 - 0.2 k/uL    Absolute Bands # 1.02 (H) 0.0 - 1.0 k/uL    Morphology HYPERSEGMENTED NEUTROPHILS PRESENT     Morphology MACROCYTOSIS PRESENT     Morphology ANISOCYTOSIS PRESENT     Morphology HYPOCHROMIA PRESENT    Hepatic Function Panel    Collection Time: 05/24/22  5:28 AM   Result Value Ref Range    Albumin 2.2 (L) 3.5 - 5.2 g/dL    Alkaline Phosphatase 253 (H) 40 - 129 U/L    ALT 22 5 - 41 U/L     (H) <40 U/L    Total Bilirubin 3.77 (H) 0.3 - 1.2 mg/dL    Bilirubin, Direct 2.45 (H) <0.31 mg/dL    Bilirubin, Indirect 1.32 (H) 0.00 - 1.00 mg/dL    Total Protein 6.5 6.4 - 8.3 g/dL   Basic Metabolic Panel w/ Reflex to MG    Collection Time: 05/24/22  5:28 AM   Result Value Ref Range    Glucose 83 70 - 99 mg/dL    BUN 10 6 - 20 mg/dL    CREATININE 0.89 0.70 - 1.20 mg/dL    Calcium 7.7 (L) 8.6 - 10.4 mg/dL    Sodium 140 135 - 144 mmol/L    Potassium 4.1 3.7 - 5.3 mmol/L    Chloride 107 98 - 107 mmol/L    CO2 24 20 - 31 mmol/L    Anion Gap 9 9 - 17 mmol/L    GFR Non-African American >60 >60 mL/min    GFR African American >60 >60 mL/min    GFR Comment             Imaging/Diagnostics:  XR CHEST PORTABLE    Result Date: 5/21/2022  No acute process.      CT CHEST PULMONARY EMBOLISM W CONTRAST    Result Date: 5/21/2022  Study nondiagnostic for smaller more peripheral PE; no large or central PE suspected, as discussed above. Compared to 04/30/2022, smaller left pleural effusion, new trace right pleural effusion and persistent bilateral atelectatic appearing changes, as above. Left cardiac enlargement, especially LV. Hepatomegaly, steatosis, small volume ascites, and additional findings as above.        Assessment :      Hospital Problems           Last Modified POA    * (Principal) Leukocytosis 5/21/2022 Yes    Left lower lobe pneumonia 5/22/2022 Yes    Septicemia ([address] 5/23/2022 Yes    Acute pulmonary embolism ([address] 5/22/2022 Yes    Chronic systolic congestive heart failure ([address] 4/60/6819 Yes    Alcoholic cardiomyopathy ([address] 7/21/8917 Yes    Alcoholic hepatitis without ascites 5/22/2022 Yes          Plan:     Patient status inpatient in the Progressive Unit/Step down  41-year-old gentleman class I obese BMI 31  History of pulmonary embolism 2 years ago off anticoagulation after 6 months  Habitual alcohol abuse alcoholic hepatitis without cirrhosis  Quit on April 20   Recently discharged after treatment for IV antibiotics pneumonia  Readmitted with weak chills cough right upper quadrant pain  Hypotensive systolic in 04G tachycardic heart rate in 11 fever WBC count 20,000  Require inpatient admission for about reasons and also has alcoholic hepatitis  And failed IV antibiotics and treatment we will consult infectious disease IV Vanco and Zosyn  Rule out cholecystitis right upper quadrant ultrasound may need a HIDA scan  CT scan no pulmonary embolism  Chronic systolic congestive heart failure secondary to alcoholic cardiomyopathy ejection fraction 35 to 40%  Suspected healthcare pneumonia possible MRSA pneumonia possible gram-negative pneumonia  25 lbs wt loss in last one and a half month  Antibiotic infectious disease as above  dvt prophy    May 24  Acute cholecystitis  hida pending  mrcp pending  Case discussed with surg team      Consultations:   IP CONSULT TO FAMILY MEDICINE  IP CONSULT TO INFECTIOUS DISEASES  IP CONSULT TO GENERAL SURGERY     Patient is admitted as inpatient status because of co-morbidities listed above, severity of signs and symptoms as outlined, requirement for current medical therapies and most importantly because of direct risk to patient if care not provided in a hospital setting. Expected length of stay > 48 hours. Jimbo Mays MD  5/24/2022  10:49 AM    Copy sent to Dr. Christine Gallagher MD    Please note that this chart was generated using voice recognition Dragon dictation software. Although every effort was made to ensure the accuracy of this automated transcription, some errors in transcription may have occurred.

## 2022-05-24 NOTE — PROGRESS NOTES
[facility]                 PATIENT NAME: Gavi Fuentes     TODAY'S DATE: 5/24/2022, 9:44 AM    SUBJECTIVE:    Pt seen and examined. Afebrile, VSS. Leukocytosis the same, hemoglobin low but stable. LFT's slowly trending down. Patient c/o persistent RUQ pain. No N/V. MRCP and HIDA scan today. OBJECTIVE:   VITALS:  /71   Pulse 86   Temp 98.4 °F (36.9 °C) (Oral)   Resp 17   Ht 5' 8\" (1.727 m)   Wt 206 lb 2.1 oz (93.5 kg)   SpO2 99%   BMI 31.34 kg/m²      INTAKE/OUTPUT:      Intake/Output Summary (Last 24 hours) at 5/24/2022 0944  Last data filed at 5/23/2022 1828  Gross per 24 hour   Intake 600 ml   Output --   Net 600 ml                 CONSTITUTIONAL:  awake and alert. No acute distress. Jaundice and scleral icterus.    HEART:   RRR  LUNGS:   CTA  ABDOMEN:   Abdomen soft, RUQ/epigastric tenderness, non-distended  EXTREMITIES:   No pedal edema    Data:  CBC:   Lab Results   Component Value Date    WBC 20.4 05/24/2022    RBC 2.05 05/24/2022    HGB 8.3 05/24/2022    HCT 24.7 05/24/2022    .2 05/24/2022    MCH 40.3 05/24/2022    MCHC 33.5 05/24/2022    RDW 14.7 05/24/2022     05/24/2022    MPV 8.2 05/24/2022     BMP:    Lab Results   Component Value Date     05/24/2022    K 4.1 05/24/2022     05/24/2022    CO2 24 05/24/2022    BUN 10 05/24/2022    LABALBU 2.2 05/24/2022    CREATININE 0.89 05/24/2022    CALCIUM 7.7 05/24/2022    GFRAA >60 05/24/2022    LABGLOM >60 05/24/2022    GLUCOSE 83 05/24/2022     Hepatic Function Panel:    Lab Results   Component Value Date    ALKPHOS 253 05/24/2022    ALT 22 05/24/2022     05/24/2022    PROT 6.5 05/24/2022    BILITOT 3.77 05/24/2022    BILIDIR 2.45 05/24/2022    IBILI 1.32 05/24/2022    LABALBU 2.2 05/24/2022       Radiology Review:  No new images to review       ASSESSMENT     Principal Problem:    Leukocytosis  Active Problems:    Left lower lobe pneumonia    Septicemia (HCC)    Acute pulmonary embolism (HCC)    Chronic systolic congestive heart failure (HCC)    Alcoholic cardiomyopathy (HCC)    Alcoholic hepatitis without ascites  Resolved Problems:    * No resolved hospital problems. *      Plan  1. NPO for imaging   2. MRCP and HIDA scan today  3. Trend LFT's  4. Surgically stable  5. Continue medical management   6. HIDA scan shows no evidence of acute cholecystitis but biliary dyskinesia. MRCP pending. Liver function test little improved. 7. Recommend GI consult for alcoholic liver disease.   MRCP tomorrow      Electronically signed by Joaquín Jacobs PA-C  [id number] [address]

## 2022-05-24 NOTE — PROGRESS NOTES
MRI called and reports that MRI can not be done at this time due to patient having ensure drink as part of hida scan. MRI would then not be able to be done until late this evening and there are no open time slots and therefore MRI will need to be completed tomorrow and pt NPO at midnight.

## 2022-05-24 NOTE — PLAN OF CARE
Spoke with Candelario White,  to make a plan for this patient to have his MRI Abd wo/w contrast MRCP on Wednesday 2/245/2022. Patient will need to be completely NPO after 2am and will have needs for claustrophobia. Heavenly (MRI tech) will call in morning around 8am to confirm patient has been NPO and give a time for the MRI. Any questions, please call MRI @ [phone].  Thank you!!

## 2022-05-24 NOTE — PROGRESS NOTES
[facility] -   Infectious diseases evaluation  admission date 5/21/2022    reason for consultation:   Recurrent Infection    Impression :   Current:    · Systemic inflammatory response syndrome /leukocytosis/fever and tachycardia , rule out biliary source of infection  · Alcoholic hepatitis  · History of  pulmonary embolism  · And recent pneumonia  · CHF  · Alcohol abuse    Recommendations     · HIDA scan and MRCP pendig  · Follow blood cultures  · Continue Zosyn 3,375 mg IV q 8 hours  · Nasal swab for MRSA negative on 5/21/2022  · Follow CBC, renal function  · Supportive care    Infection Control Recommendations   · [place] Precautions    Antimicrobial Stewardship Recommendations   · Simplification of therapy  · Targeted therapy      History of Present Illness:   Initial history:  Molina Gutierrez is a 40y.o.-year-old male presented to ED with chest pain, SOB, fever, chills weakness and headaches at night. Reports feeling slightly better today. WBC 21.6. Pt reports he completed course of antibiotics for diagnosis of pneumonia 5 days ago and since that time experienced increased fatigue, fever and chills. Patient had recent hospitalization 4/27/22 -5/3/22 where he was admitted for sepsis with multifocal pneumonia. PMH includes LL pneumonia, alcoholic hepatitis without ascites, CHF and pulmonary embolism (2020). WBC 21.6  CT pulmonary embolism w contrast - Study nondiagnostic for smaller more peripheral PE; no large or central PE suspected. .   Compared to 04/30/2022, smaller left pleural effusion, new trace right pleural effusion and persistent bilateral atelectatic appearing changes. Left cardiac enlargement, especially LV.  Hepatomegaly, steatosis, small volume ascites  5/21 CXR - no acute process    Interval changes  5/24/2022   Afebrile  The patient is still complaining of right upper quadrant abdominal pain and nausea, occasional cough, denied vomiting or diarrhea, no other complaints. Patient Vitals for the past 8 hrs:   BP Temp Temp src Pulse Resp SpO2   22 1812 103/67 98.4 °F (36.9 °C) -- 88 20 97 %   22 1345 106/65 98.6 °F (37 °C) Oral 90 16 94 %       Summary of relevant labs:  Labs:  Creat 1.13  WBC 21.6    Micro:   Blood Cx x2 - no growth to date   MRSA nasal probe -  Negative   Urine Cx - pending    Imagin/21 CT CHEST PULMONARY EMBOLISM W CONTRAST   Study nondiagnostic for smaller more peripheral PE; no large or central PE suspected, as discussed above. Compared to 2022, smaller left pleural effusion, new trace right pleural effusion and persistent bilateral atelectatic appearing changes, as above. Left cardiac enlargement, especially LV. Hepatomegaly, steatosis, small volume ascites, and additional findings as   above.    CXR - no acute process    I have personally reviewed the past medical history, past surgical history, medications, social history, and family history, and I haveupdated the database accordingly. Allergies:   Bee venom     Review of Systems:     Review of Systems   Constitutional: Positive for fatigue. HENT: Negative. Eyes: Negative. Respiratory: Negative. Cardiovascular: Negative. Gastrointestinal: Positive for abdominal pain and nausea. Genitourinary: Negative. Musculoskeletal: Negative. Skin: Negative. Neurological: Negative. Psychiatric/Behavioral: Negative. Physical Examination :       Physical Exam  Vitals and nursing note reviewed. Constitutional:       General: He is not in acute distress. Appearance: Normal appearance. HENT:      Head: Normocephalic and atraumatic. Right Ear: External ear normal.      Left Ear: External ear normal.      Nose: Nose normal.      Mouth/Throat:      Mouth: Mucous membranes are moist.      Pharynx: Oropharynx is clear. Eyes:      General: Scleral icterus present.       Conjunctiva/sclera: Conjunctivae normal.   Cardiovascular:      Rate and Rhythm: Normal rate and regular rhythm. Heart sounds: Normal heart sounds. Pulmonary:      Effort: Pulmonary effort is normal. No respiratory distress. Breath sounds: Normal breath sounds. Abdominal:      General: Bowel sounds are normal. There is no distension. Palpations: Abdomen is soft. Tenderness: There is no abdominal tenderness. Comments: Right upper quadrant tenderness   Genitourinary:     Comments: No sal  Musculoskeletal:         General: Normal range of motion. Cervical back: Normal range of motion. Skin:     General: Skin is warm and dry. Coloration: Skin is jaundiced. Findings: No erythema or rash. Neurological:      Mental Status: He is alert and oriented to person, place, and time.    Psychiatric:         Mood and Affect: Mood normal.         Behavior: Behavior normal.         Past Medical History:     Past Medical History:   Diagnosis Date    Alcoholism ([facility]     pt drinks a fifth of whiskey daily    Anemia     Cirrhosis, alcoholic ([facility]     Pulmonary embolism (HCC)        Past Surgical  History:     Past Surgical History:   Procedure Laterality Date    UPPER GASTROINTESTINAL ENDOSCOPY N/A 5/1/2022    EGD ESOPHAGOGASTRODUODENOSCOPY performed by Soto Santiago MD at [facility]       Medications:      lidocaine  1 patch TransDERmal Daily    sodium chloride flush  5-40 mL IntraVENous 2 times per day    enoxaparin  40 mg SubCUTAneous Daily    folic acid  1 mg Oral Daily    nicotine  1 patch TransDERmal Nightly    pantoprazole  40 mg Oral Daily    vitamin B-1  100 mg Oral Daily    piperacillin-tazobactam  3,375 mg IntraVENous Q8H       Social History:     Social History     Socioeconomic History    Marital status: Single     Spouse name: Not on file    Number of children: Not on file    Years of education: Not on file    Highest education level: Not on file   Occupational History    Not on file Tobacco Use    Smoking status: Current Every Day Smoker     Packs/day: 1.00     Years: 21.00     Pack years: 21.00     Types: Cigarettes    Smokeless tobacco: Never Used    Tobacco comment: using nicotine patches at night   Vaping Use    Vaping Use: Never used   Substance and Sexual Activity    Alcohol use: Not Currently     Comment: hx of alcoholism; pt drinks a fifth of whiskey daily    Drug use: Not Currently     Comment: used cocaine in early 20's    Sexual activity: Not on file   Other Topics Concern    Not on file   Social History Narrative    Not on file     Social Determinants of Health     Financial Resource Strain: Low Risk     Difficulty of Paying Living Expenses: Not hard at all   Food Insecurity: No Food Insecurity    Worried About Running Out of Food in the Last Year: Never true    Sheri of Food in the Last Year: Never true   Transportation Needs: No Transportation Needs    Lack of Transportation (Medical): No    Lack of Transportation (Non-Medical):  No   Physical Activity:     Days of Exercise per Week: Not on file    Minutes of Exercise per Session: Not on file   Stress:     Feeling of Stress : Not on file   Social Connections:     Frequency of Communication with Friends and Family: Not on file    Frequency of Social Gatherings with Friends and Family: Not on file    Attends Religion Services: Not on file    Active Member of [address] [place] [address] [employer] or Organizations: Not on file    Attends Club or Organization Meetings: Not on file    Marital Status: Not on file   Intimate Partner Violence:     Fear of Current or Ex-Partner: Not on file    Emotionally Abused: Not on file    Physically Abused: Not on file    Sexually Abused: Not on file   Housing Stability:     Unable to Pay for Housing in the Last Year: Not on file    Number of [place] in the Last Year: Not on file    Unstable Housing in the Last Year: Not on file       Family History:     Family History   Problem Relation Age of Onset    Heart Disease Mother     Heart Attack Mother     Heart Disease Father       Medical Decision Making:   I have independently reviewed/ordered the following labs:    CBC with Differential:   Recent Labs     05/23/22  0538 05/24/22  0528   WBC 20.5* 20.4*   HGB 8.1* 8.3*   HCT 24.5* 24.7*    389   LYMPHOPCT 8* 13*   MONOPCT 5 4     BMP:  Recent Labs     05/23/22  1656 05/24/22  0528    140   K 4.1 4.1    107   CO2 24 24   BUN 9 10   CREATININE 1.09 0.89     Hepatic Function Panel:   Recent Labs     05/23/22  1656 05/24/22  0528   PROT 7.0 6.5   LABALBU 2.4* 2.2*   BILIDIR 3.09* 2.45*   IBILI 1.71* 1.32*   BILITOT 4.80* 3.77*   ALKPHOS 279* 253*   ALT 26 22   * 135*     No results for input(s): RPR in the last 72 hours. No results for input(s): HIV in the last 72 hours. No results for input(s): BC in the last 72 hours. Lab Results   Component Value Date    CREATININE 0.89 05/24/2022    GLUCOSE 83 05/24/2022       Detailed results: Thank you for allowing us to participate in the care of this patient. Please call with questions. This note is created with the assistance of a speech recognition program.  While intending to generate adocument that actually reflects the content of the visit, the document can still have some errors including those of syntax and sound a like substitutions which may escape proof reading. It such instances, actual meaningcan be extrapolated by contextual diversion.     Traci Arellano MD  Office: [phone]  Perfect serve / office [phone]

## 2022-05-25 ENCOUNTER — APPOINTMENT (OUTPATIENT)
Dept: MRI IMAGING | Age: 37
DRG: 720 | End: 2022-05-25
Payer: MEDICAID

## 2022-05-25 LAB
ABSOLUTE BANDS #: 1.33 K/UL (ref 0–1)
ABSOLUTE EOS #: 0.19 K/UL (ref 0–0.4)
ABSOLUTE LYMPH #: 1.71 K/UL (ref 1–4.8)
ABSOLUTE MONO #: 0.95 K/UL (ref 0.1–1.3)
ALBUMIN SERPL-MCNC: 2.2 G/DL (ref 3.5–5.2)
ALP BLD-CCNC: 250 U/L (ref 40–129)
ALT SERPL-CCNC: 22 U/L (ref 5–41)
ANION GAP SERPL CALCULATED.3IONS-SCNC: 9 MMOL/L (ref 9–17)
AST SERPL-CCNC: 141 U/L
BANDS: 7 % (ref 0–10)
BASOPHILS # BLD: 1 % (ref 0–2)
BASOPHILS ABSOLUTE: 0.19 K/UL (ref 0–0.2)
BILIRUB SERPL-MCNC: 3.49 MG/DL (ref 0.3–1.2)
BILIRUBIN DIRECT: 2.34 MG/DL
BILIRUBIN, INDIRECT: 1.15 MG/DL (ref 0–1)
BUN BLDV-MCNC: 7 MG/DL (ref 6–20)
CALCIUM SERPL-MCNC: 7.8 MG/DL (ref 8.6–10.4)
CHLORIDE BLD-SCNC: 107 MMOL/L (ref 98–107)
CO2: 23 MMOL/L (ref 20–31)
CREAT SERPL-MCNC: 0.62 MG/DL (ref 0.7–1.2)
EOSINOPHILS RELATIVE PERCENT: 1 % (ref 0–4)
GFR AFRICAN AMERICAN: >60 ML/MIN
GFR NON-AFRICAN AMERICAN: >60 ML/MIN
GFR SERPL CREATININE-BSD FRML MDRD: ABNORMAL ML/MIN/{1.73_M2}
GLUCOSE BLD-MCNC: 82 MG/DL (ref 70–99)
HCT VFR BLD CALC: 24.3 % (ref 41–53)
HEMOGLOBIN: 8.3 G/DL (ref 13.5–17.5)
LYMPHOCYTES # BLD: 9 % (ref 24–44)
MCH RBC QN AUTO: 40.8 PG (ref 26–34)
MCHC RBC AUTO-ENTMCNC: 34 G/DL (ref 31–37)
MCV RBC AUTO: 120.1 FL (ref 80–100)
MONOCYTES # BLD: 5 % (ref 1–7)
MORPHOLOGY: ABNORMAL
PDW BLD-RTO: 15 % (ref 11.5–14.9)
PLATELET # BLD: 367 K/UL (ref 150–450)
PMV BLD AUTO: 8.4 FL (ref 6–12)
POTASSIUM SERPL-SCNC: 3.7 MMOL/L (ref 3.7–5.3)
RBC # BLD: 2.03 M/UL (ref 4.5–5.9)
SEG NEUTROPHILS: 77 % (ref 36–66)
SEGMENTED NEUTROPHILS ABSOLUTE COUNT: 14.63 K/UL (ref 1.3–9.1)
SODIUM BLD-SCNC: 139 MMOL/L (ref 135–144)
TOTAL PROTEIN: 6.6 G/DL (ref 6.4–8.3)
WBC # BLD: 19 K/UL (ref 3.5–11)

## 2022-05-25 PROCEDURE — 99232 SBSQ HOSP IP/OBS MODERATE 35: CPT | Performed by: INTERNAL MEDICINE

## 2022-05-25 PROCEDURE — 2580000003 HC RX 258: Performed by: EMERGENCY MEDICINE

## 2022-05-25 PROCEDURE — 6360000002 HC RX W HCPCS: Performed by: INTERNAL MEDICINE

## 2022-05-25 PROCEDURE — 99254 IP/OBS CNSLTJ NEW/EST MOD 60: CPT | Performed by: INTERNAL MEDICINE

## 2022-05-25 PROCEDURE — A9579 GAD-BASE MR CONTRAST NOS,1ML: HCPCS | Performed by: PHYSICIAN ASSISTANT

## 2022-05-25 PROCEDURE — 2580000003 HC RX 258: Performed by: INTERNAL MEDICINE

## 2022-05-25 PROCEDURE — 76377 3D RENDER W/INTRP POSTPROCES: CPT

## 2022-05-25 PROCEDURE — 6370000000 HC RX 637 (ALT 250 FOR IP): Performed by: INTERNAL MEDICINE

## 2022-05-25 PROCEDURE — 85025 COMPLETE CBC W/AUTO DIFF WBC: CPT

## 2022-05-25 PROCEDURE — 6370000000 HC RX 637 (ALT 250 FOR IP): Performed by: NURSE PRACTITIONER

## 2022-05-25 PROCEDURE — 36415 COLL VENOUS BLD VENIPUNCTURE: CPT

## 2022-05-25 PROCEDURE — 80048 BASIC METABOLIC PNL TOTAL CA: CPT

## 2022-05-25 PROCEDURE — 80076 HEPATIC FUNCTION PANEL: CPT

## 2022-05-25 PROCEDURE — APPNB30 APP NON BILLABLE TIME 0-30 MINS: Performed by: NURSE PRACTITIONER

## 2022-05-25 PROCEDURE — 6360000004 HC RX CONTRAST MEDICATION: Performed by: PHYSICIAN ASSISTANT

## 2022-05-25 PROCEDURE — 1200000000 HC SEMI PRIVATE

## 2022-05-25 PROCEDURE — 2580000003 HC RX 258: Performed by: PHYSICIAN ASSISTANT

## 2022-05-25 PROCEDURE — 6360000002 HC RX W HCPCS: Performed by: PHYSICIAN ASSISTANT

## 2022-05-25 RX ORDER — LORAZEPAM 2 MG/ML
1 INJECTION INTRAMUSCULAR ONCE
Status: COMPLETED | OUTPATIENT
Start: 2022-05-25 | End: 2022-05-25

## 2022-05-25 RX ORDER — SODIUM CHLORIDE 0.9 % (FLUSH) 0.9 %
10 SYRINGE (ML) INJECTION PRN
Status: DISCONTINUED | OUTPATIENT
Start: 2022-05-25 | End: 2022-05-26 | Stop reason: HOSPADM

## 2022-05-25 RX ORDER — LIDOCAINE 4 G/G
1 PATCH TOPICAL ONCE
Status: COMPLETED | OUTPATIENT
Start: 2022-05-25 | End: 2022-05-26

## 2022-05-25 RX ORDER — 0.9 % SODIUM CHLORIDE 0.9 %
40 INTRAVENOUS SOLUTION INTRAVENOUS ONCE
Status: COMPLETED | OUTPATIENT
Start: 2022-05-25 | End: 2022-05-25

## 2022-05-25 RX ADMIN — PIPERACILLIN AND TAZOBACTAM 3375 MG: 3; .375 INJECTION, POWDER, FOR SOLUTION INTRAVENOUS at 02:39

## 2022-05-25 RX ADMIN — LORAZEPAM 1 MG: 2 INJECTION INTRAMUSCULAR; INTRAVENOUS at 11:57

## 2022-05-25 RX ADMIN — SODIUM CHLORIDE 40 ML: 9 INJECTION, SOLUTION INTRAVENOUS at 13:22

## 2022-05-25 RX ADMIN — SODIUM CHLORIDE, PRESERVATIVE FREE 10 ML: 5 INJECTION INTRAVENOUS at 10:10

## 2022-05-25 RX ADMIN — PIPERACILLIN AND TAZOBACTAM 3375 MG: 3; .375 INJECTION, POWDER, FOR SOLUTION INTRAVENOUS at 18:39

## 2022-05-25 RX ADMIN — PIPERACILLIN AND TAZOBACTAM 3375 MG: 3; .375 INJECTION, POWDER, FOR SOLUTION INTRAVENOUS at 10:15

## 2022-05-25 RX ADMIN — GADOTERIDOL 20 ML: 279.3 INJECTION, SOLUTION INTRAVENOUS at 13:22

## 2022-05-25 RX ADMIN — SODIUM CHLORIDE, PRESERVATIVE FREE 10 ML: 5 INJECTION INTRAVENOUS at 13:22

## 2022-05-25 RX ADMIN — IBUPROFEN 600 MG: 600 TABLET ORAL at 21:06

## 2022-05-25 ASSESSMENT — ENCOUNTER SYMPTOMS
RESPIRATORY NEGATIVE: 1
NAUSEA: 1
ABDOMINAL PAIN: 1
EYES NEGATIVE: 1

## 2022-05-25 ASSESSMENT — PAIN SCALES - GENERAL: PAINLEVEL_OUTOF10: 7

## 2022-05-25 NOTE — PLAN OF CARE
Problem: Discharge Planning  Goal: Discharge to home or other facility with appropriate resources  Outcome: Progressing  Flowsheets (Taken 5/24/2022 1930)  Discharge to home or other facility with appropriate resources: Identify barriers to discharge with patient and caregiver     Problem: Pain  Goal: Verbalizes/displays adequate comfort level or baseline comfort level  Outcome: Progressing     Problem: ABCDS Injury Assessment  Goal: Absence of physical injury  Outcome: Progressing  Flowsheets (Taken 5/24/2022 1930)  Absence of Physical Injury: Implement safety measures based on patient assessment     Problem: Chronic Conditions and Co-morbidities  Goal: Patient's chronic conditions and co-morbidity symptoms are monitored and maintained or improved  Outcome: Progressing  Flowsheets (Taken 5/24/2022 1930)  Care Plan - Patient's Chronic Conditions and Co-Morbidity Symptoms are Monitored and Maintained or Improved: Monitor and assess patient's chronic conditions and comorbid symptoms for stability, deterioration, or improvement

## 2022-05-25 NOTE — CONSULTS
INITIAL CONSULTATION     HISTORY OF PRESENT ILLNESS: Genevieve Avila is a 40 y.o. male admitted to the hospital on 5/21/2022 for chest pain or shortness of breath. He reports right upper quadrant abdominal pain. He has not been feeling well for the past month or so. He quit alcohol around 28 April. He quit alcohol as he was not feeling well and he was having upper abdominal pain. He reports constant pain in the upper abdomen. Some nausea. No vomiting. Subjective fevers. He reports some weight loss. Reports intermittent bright red blood per rectum. HIDA scan was suggestive of low ejection fraction. EGD earlier this month showed gastritis. No prior colonoscopy. No family history of GI pathology. He reports no smoking. Heavy alcohol intake for years. No drugs.      PAST MEDICAL HISTORY:     Past Medical History:   Diagnosis Date    Alcoholism ([facility] [address]     pt drinks a fifth of whiskey daily    Anemia     Cirrhosis, alcoholic ([facility] [address]     Pulmonary embolism (HCC)         Past Surgical History:   Procedure Laterality Date    UPPER GASTROINTESTINAL ENDOSCOPY N/A 5/1/2022    EGD ESOPHAGOGASTRODUODENOSCOPY performed by Olaf Oswald MD at [address] [place] [address] Dr:       Current Facility-Administered Medications:     0.9 % sodium chloride bolus, 40 mL, IntraVENous, Once, MAHESH Quan    sodium chloride flush 0.9 % injection 10 mL, 10 mL, IntraVENous, PRN, MAHESH Quan    gadoteridol (PROHANCE) injection 20 mL, 20 mL, IntraVENous, ONCE PRN, MAHESH Quan    lidocaine 4 % external patch 1 patch, 1 patch, TransDERmal, Daily, Jimena Godinez MD, 1 patch at 05/25/22 0838    ibuprofen (ADVIL;MOTRIN) tablet 600 mg, 600 mg, Oral, Q6H PRN, Anson Berumen, APRN - CNP, 600 mg at 05/24/22 0945    sodium chloride flush 0.9 % injection 10 mL, 10 mL, IntraVENous, PRN, Jimena Godinez MD, 10 mL at 05/24/22 1047    sodium chloride flush 0.9 % injection 5-40 mL, 5-40 mL, IntraVENous, 2 times per day, Eden Carrera MD, 10 mL at 05/25/22 1010    sodium chloride flush 0.9 % injection 5-40 mL, 5-40 mL, IntraVENous, PRN, Eden Carrera MD    0.9 % sodium chloride infusion, , IntraVENous, PRN, Eden Carrera MD    enoxaparin (LOVENOX) injection 40 mg, 40 mg, SubCUTAneous, Daily, Eden Carrera MD, 40 mg at 05/24/22 0810    ondansetron (ZOFRAN-ODT) disintegrating tablet 4 mg, 4 mg, Oral, Q8H PRN, 4 mg at 05/22/22 2304 **OR** ondansetron (ZOFRAN) injection 4 mg, 4 mg, IntraVENous, Q6H PRN, Eden Carrera MD    folic acid (FOLVITE) tablet 1 mg, 1 mg, Oral, Daily, Octavio Garcia MD, 1 mg at 05/23/22 0832    nicotine (NICODERM CQ) 14 MG/24HR 1 patch, 1 patch, TransDERmal, Nightly, Octavio Garcia MD, 1 patch at 05/24/22 2146    pantoprazole (PROTONIX) tablet 40 mg, 40 mg, Oral, Daily, Octavio Garcia MD, 40 mg at 05/23/22 1979    thiamine mononitrate tablet 100 mg, 100 mg, Oral, Daily, Octavio Garcia MD, 100 mg at 05/23/22 0832    [COMPLETED] piperacillin-tazobactam (ZOSYN) 4,500 mg in dextrose 5 % 100 mL IVPB (mini-bag), 4,500 mg, IntraVENous, Once, Stopped at 05/22/22 1247 **FOLLOWED BY** piperacillin-tazobactam (ZOSYN) 3,375 mg in sodium chloride 0.9 % 50 mL IVPB (mini-bag), 3,375 mg, IntraVENous, Q8H, Octavio Garcia MD, Last Rate: 12.5 mL/hr at 05/25/22 1015, 3,375 mg at 05/25/22 1015    sodium chloride flush 0.9 % injection 10 mL, 10 mL, IntraVENous, PRN, Lloyd Cristobal MD, 10 mL at 05/21/22 2029       ALLERGIES:   Allergies   Allergen Reactions    Bee Venom Anaphylaxis          FAMILY HISTORY: The patient's family history was reviewed.        SOCIAL HISTORY:   Social History     Socioeconomic History    Marital status: Single     Spouse name: Not on file    Number of children: Not on file    Years of education: Not on file    Highest education level: Not on file   Occupational History    Not on file   Tobacco Use    Smoking status: Current Every Day Smoker     Packs/day: 1.00     Years: 21.00     Pack years: 21.00     Types: Cigarettes    Smokeless tobacco: Never Used    Tobacco comment: using nicotine patches at night   Vaping Use    Vaping Use: Never used   Substance and Sexual Activity    Alcohol use: Not Currently     Comment: hx of alcoholism; pt drinks a fifth of whiskey daily    Drug use: Not Currently     Comment: used cocaine in early 20's    Sexual activity: Not on file   Other Topics Concern    Not on file   Social History Narrative    Not on file     Social Determinants of Health     Financial Resource Strain: Low Risk     Difficulty of Paying Living Expenses: Not hard at all   Food Insecurity: No Food Insecurity    Worried About Running Out of Food in the Last Year: Never true    Sheri of Food in the Last Year: Never true   Transportation Needs: No Transportation Needs    Lack of Transportation (Medical): No    Lack of Transportation (Non-Medical):  No   Physical Activity:     Days of Exercise per Week: Not on file    Minutes of Exercise per Session: Not on file   Stress:     Feeling of Stress : Not on file   Social Connections:     Frequency of Communication with Friends and Family: Not on file    Frequency of Social Gatherings with Friends and Family: Not on file    Attends [employer] Services: Not on file    Active Member of [address] [place] [address] or Organizations: Not on file    Attends Club or Organization Meetings: Not on file    Marital Status: Not on file   Intimate Partner Violence:     Fear of Current or Ex-Partner: Not on file    Emotionally Abused: Not on file    Physically Abused: Not on file    Sexually Abused: Not on file   Housing Stability:     Unable to Pay for Housing in the Last Year: Not on file    Number of [place] in the Last Year: Not on file    Unstable Housing in the Last Year: Not on file         REVIEW OF SYSTEMS: A 14-point review of systems was obtained and pertinent positives andnegatives were enumerated above in the history of present illness. All other reviewed systems / symptoms were negative. Review of Systems      PHYSICAL EXAMINATION: Vital signs reviewed per the nursing documentation. /68   Pulse 83   Temp 97.7 °F (36.5 °C)   Resp 18   Ht 5' 8\" (1.727 m)   Wt 206 lb 2.1 oz (93.5 kg)   SpO2 94%   BMI 31.34 kg/m²    [unfilled]   Body mass index is 31.34 kg/m². General:  A O x 3 in NAD   Psych: . Normal affect. Mentation normal   HEENT: PERRLA. Clear conjunctivae and sclerae. Moist oral mucosae, no lesions orulcers. The neck is supple, without lymphadenopathy or jugular venous distension. No masses. Normal thyroid. Cardiovascular: S1 S2 RRR no rubs or murmurs. Pulmonary: clear BL. No accessory muscle usage. Abd Exam: Soft, mild tenderness over liver ND, tender hepatomegaly no spleno megaly, +BS, no ascites. No groin masses or lymphadenopathy. Extremities: No edema. Skin: Warm skin. No skin rash. No spider nevi palmar erythema naildystrophy. Joint: No joint swelling or deformity. Neurological: intact sensory. DTR+.  No asterixis     LABORATORY DATA: Reviewed   Lab Results   Component Value Date    WBC 19.0 (H) 05/25/2022    HGB 8.3 (L) 05/25/2022    HCT 24.3 (L) 05/25/2022    .1 (H) 05/25/2022     05/25/2022     05/25/2022    K 3.7 05/25/2022     05/25/2022    CO2 23 05/25/2022    BUN 7 05/25/2022    CREATININE 0.62 (L) 05/25/2022    LABALBU 2.2 (L) 05/25/2022    BILITOT 3.49 (H) 05/25/2022    ALKPHOS 250 (H) 05/25/2022     (H) 05/25/2022    ALT 22 05/25/2022    INR 1.4 05/21/2022      Lab Results   Component Value Date    RBC 2.03 (L) 05/25/2022    HGB 8.3 (L) 05/25/2022    .1 (H) 05/25/2022    MCH 40.8 (H) 05/25/2022    MCHC 34.0 05/25/2022    RDW 15.0 (H) 05/25/2022    MPV 8.4 05/25/2022    BASOPCT 1 05/25/2022    LYMPHSABS 1.71 05/25/2022    MONOSABS 0.95 05/25/2022    NEUTROABS 14.63 (H) 05/25/2022    EOSABS 0.19 05/25/2022    BASOSABS 0.19 05/25/2022          DIAGNOSTIC TESTING:   CT CHEST WO CONTRAST    Result Date: 5/4/2022  EXAMINATION: CT OF THE CHEST WITHOUT CONTRAST 4/30/2022 12:56 pm TECHNIQUE: CT of the chest was performed without the administration of intravenous contrast. Multiplanar reformatted images are provided for review. Dose modulation, iterative reconstruction, and/or weight based adjustment of the mA/kV was utilized to reduce the radiation dose to as low as reasonably achievable. COMPARISON: 04/15/2022 HISTORY: ORDERING SYSTEM PROVIDED HISTORY: hypoxia TECHNOLOGIST PROVIDED HISTORY: hypoxia Reason for Exam: Cough; Fatigue; Nasal Congestion; Shortness of Breath Additional signs and symptoms: PT states cough and SOB. PT on oxygen but not wearing it FINDINGS: Mediastinum: No evidence of mediastinal, hilar or axillary lymphadenopathy. The central airways are clear. Thyroid is grossly unremarkable within the limits of CT. Lungs/pleura: Since prior examination there has been development of mild interstitial prominence likely related to mild interstitial pulmonary edema. No evidence of acute dense focal consolidation. No pneumothorax. There is subtle patchy opacity in the right middle lobe best seen on image 54 through 58 that may represent focal atelectasis or pneumonia. Upper Abdomen: Limited images of the upper abdomen demonstrate no acute abnormality. Diffuse hepatic steatosis with hepatomegaly. Soft Tissues/Bones: No acute abnormality of the visualized osseous structures. Interval development of a mild to moderate left pleural effusion with interstitial pulmonary edema. New subtle patchy opacity in the central right middle lobe may represent focal atelectasis from low lung volumes or may represent developing pneumonia. Redemonstration of hepatomegaly and hepatic steatosis.      NM HEPATOBILIARY    Result Date: 5/24/2022  EXAMINATION: NUCLEAR MEDICINE HEPATOBILIARY SCINTIGRAPHY (HIDA SCAN) WITH EJECTION FRACTION. TECHNIQUE: Approximately 6.2 millicuries MT86X Mebrofenin (Choletec) was administered IV. Then, dynamic images of the abdomen were obtained in the anterior projection for 60 mins. A right lateral view was also obtained at 60 mins. Due to a shortage/inavailability of CCK, one can (237 ml) Ensure plus was substitued orally. Images were obtained in the Mongolian projection and regions of interest were drawn around the gallbladder and ejection fraction was calculated. COMPARISON: No prior for comparison. HISTORY: ORDERING SYSTEM PROVIDED HISTORY: rule out cholecystits TECHNOLOGIST PROVIDED HISTORY: rule out cholecystits Reason for Exam: rule out cholecystits FINDINGS: Prompt, homogenous uptake by the liver is noted with normal appearance of radiotracer excretion into the biliary system. Clearance of bloodpool activity appears appropriate. Gallbladder and small bowel is visualized in appropriate sequence and time. Gallbladder ejection fraction measured 15%. Normal value is >33% for Ensure protocol. Note, Ensure normal range is based on a limited study. No acute cholecystitis. Gallbladder ejection fraction is low, which can reflect functional gallbladder disorder or incomplete absorption of the fatty meal. RECOMMENDATIONS: Unavailable     CT ABDOMEN PELVIS W IV CONTRAST Additional Contrast? None    Result Date: 4/27/2022  EXAMINATION: CT OF THE ABDOMEN AND PELVIS WITH CONTRAST 4/27/2022 7:16 pm TECHNIQUE: CT of the abdomen and pelvis was performed with the administration of intravenous contrast. Multiplanar reformatted images are provided for review. Dose modulation, iterative reconstruction, and/or weight based adjustment of the mA/kV was utilized to reduce the radiation dose to as low as reasonably achievable.  COMPARISON: 03/28/2022 HISTORY: ORDERING SYSTEM PROVIDED HISTORY: RUQ pain, jaundice TECHNOLOGIST PROVIDED HISTORY: RUQ pain, jaundice Decision Support Exception - unselect if not a suspected or confirmed emergency medical condition->Emergency Medical Condition (MA) Reason for Exam: RUQ pain for two weeks, jaundice FINDINGS: Lower Chest: Mild left and minimal right bibasilar atelectasis. The heart size is normal. Organs: The liver is enlarged. There is diffuse low attenuation of liver parenchyma. Bile ducts are not dilated. The spleen, pancreas, adrenal glands and kidneys are normal.  There are no calcified gallstones. No pericholecystic fluid. There is mild gallbladder wall thickening. GI/Bowel: Unopacified bowel loops are grossly normal.  No bowel obstruction. The appendix is normal. Pelvis: Urinary bladder is nearly empty and grossly normal. Peritoneum/Retroperitoneum: There is retroperitoneal fat stranding. There are mildly enlarged upper abdominal lymph nodes. Lymph node in the isai hepatis adjacent to the pancreatic head measures up to 2.5 cm (coronal image 50). There are mildly prominent retroperitoneal lymph nodes. There is trace free fluid in the pelvis. There is no free air. Bones/Soft Tissues: Bilateral gynecomastia and nipple piercings. There is a unilateral right-sided pars interarticularis defect at L5. No acute bone finding. The liver is enlarged and diffusely low in attenuation. Diffuse nonspecific hepatocellular disease suspected. Bile ducts are not dilated in this patient with history of jaundice. There are no calcified gallstones. There is mild gallbladder wall thickening. Wall thickening may be reactive to adjacent liver disease. Mildly enlarged upper abdominal lymph nodes measuring up to 2.5 cm, likely reactive. Trace free fluid in the pelvis. Unilateral right-sided L5 pars interarticularis defect. RECOMMENDATIONS: Unavailable     US LIVER    Result Date: 5/23/2022  EXAMINATION: RIGHT UPPER QUADRANT ULTRASOUND 5/23/2022 1:00 pm COMPARISON: None.  HISTORY: ORDERING SYSTEM PROVIDED HISTORY: rule otu cholecystits TECHNOLOGIST PROVIDED HISTORY: rule otu cholecystits FINDINGS: LIVER:  Liver shows increased echogenicity suggesting hepatic steatosis without focal lesion. Gallbladder 23 cm in length. Hepatopetal flow portal vein. BILIARY SYSTEM:  Gallstones the gallbladder with wall thickening at 4 mm. No pericholecystic fluid. Negative sonographic Goyal's sign. Common bile duct is within normal limits measuring 4.2 mm. RIGHT KIDNEY: The right kidney is grossly unremarkable without evidence of hydronephrosis. PANCREAS:  Visualized portions of the pancreas are unremarkable. OTHER: No evidence of right upper quadrant ascites. 1. Hepatomegaly and hepatic steatosis. No focal lesion. 2. Cholelithiasis/sludge with mild gallbladder wall thickening. Correlate clinically for possible cholecystitis. Consider HIDA follow-up imaging if indicated. RECOMMENDATIONS: Unavailable     XR CHEST PORTABLE    Result Date: 5/21/2022  EXAMINATION: ONE XRAY VIEW OF THE CHEST 5/21/2022 7:19 pm COMPARISON: 05/02/2022 HISTORY: ORDERING SYSTEM PROVIDED HISTORY: chest pain TECHNOLOGIST PROVIDED HISTORY: chest pain Reason for Exam: Chest pain Relevant Medical/Surgical History: PAtient rcently had pneumonia and acute liver failure. Stopped taking medication, and is now feeling ill. FINDINGS: The lungs are without acute focal process. There is no effusion or pneumothorax. The cardiomediastinal silhouette is stable. The osseous structures are stable. No acute process. XR CHEST PORTABLE    Result Date: 5/2/2022  EXAMINATION: ONE XRAY VIEW OF THE CHEST 5/2/2022 10:42 am COMPARISON: 04/28/2022 HISTORY: ORDERING SYSTEM PROVIDED HISTORY: Bronchospasm TECHNOLOGIST PROVIDED HISTORY: Bronchospasm Reason for Exam: Bronchospasm FINDINGS: Monitor leads overlie the chest.  Cardiac size and pulmonary vessels are within normal limits. Slight increase in patchy right perihilar and left basilar airspace disease which may be due to edema, atelectasis, or infection.   Lung parenchymal opacities are accentuated by somewhat low lung volumes. No significant pleural effusions. Metallic chest wall piercings noted. There is no acute osseous abnormality. Increasing patchy right perihilar and left basilar airspace disease accentuated by somewhat low lung volumes     XR CHEST PORTABLE    Result Date: 4/28/2022  EXAMINATION: ONE XRAY VIEW OF THE CHEST 4/28/2022 6:08 am COMPARISON: 04/27/2022 HISTORY: ORDERING SYSTEM PROVIDED HISTORY: pneumonia follow up TECHNOLOGIST PROVIDED HISTORY: pneumonia follow up Reason for Exam: Continued SOB and cough. FINDINGS: Small infiltrate is identified within the left costophrenic angle. The heart is not enlarged. No pneumothorax is identified. No acute osseous abnormality is identified. Stable small infiltrate within the left costophrenic angle which may represent small pneumonia versus pleural effusion. XR CHEST PORTABLE    Result Date: 4/27/2022  EXAMINATION: ONE XRAY VIEW OF THE CHEST 4/27/2022 6:11 pm COMPARISON: April 15, 2022 HISTORY: ORDERING SYSTEM PROVIDED HISTORY: SOB TECHNOLOGIST PROVIDED HISTORY: SOB Reason for Exam: PT CO SOB with cough, fatigue, congestion, and jaundice of the skin at this time. PT states symptoms X 1 week. FINDINGS: Left lower lobe infiltrate and or small effusion. Heart and mediastinum normal.  Bony thorax intact. Nipple rings noted incidentally. New left effusion and or infiltrate     CT CHEST PULMONARY EMBOLISM W CONTRAST    Result Date: 5/21/2022  EXAMINATION: CTA OF THE CHEST 5/21/2022 8:10 pm TECHNIQUE: CTA of the chest was performed after the administration of intravenous contrast.  Multiplanar reformatted images are provided for review. MIP images are provided for review. Automated exposure control, iterative reconstruction, and/or weight based adjustment of the mA/kV was utilized to reduce the radiation dose to as low as reasonably achievable. COMPARISON: None.  HISTORY: ORDERING SYSTEM PROVIDED HISTORY: chest pain, PE TECHNOLOGIST PROVIDED HISTORY: chest pain, PE Decision Support Exception - unselect if not a suspected or confirmed emergency medical condition->Emergency Medical Condition (MA) Reason for Exam: patient c/o chest pain and sob for 4 days FINDINGS: Pulmonary Arteries: Pulmonary arteries are suboptimally opacified for evaluation and there is considerable breath hold artifact; study is nondiagnostic for pulmonary embolism assessment, although no large or central PE suspected and main pulmonary artery is normal in caliber. Mediastinum: No evidence of mediastinal lymphadenopathy. The heart and pericardium demonstrate no acute abnormality; mild dilatation left heart chambers, especially LV compared to the previous study. No pericardial effusion. There is no acute abnormality of the thoracic aorta. Lungs/pleura: Previous left pleural effusion is smaller in size with less associated LLL and lingular atelectasis. New tiny right pleural effusion with mild right basilar atelectasis. Increased bilateral subsegmental atelectasis along the minor fissure and in the lingula. Persistent interstitial prominence but no interlobular septal thickening. No focal consolidation or clear cut pulmonary edema. No pneumothorax. Upper Abdomen: Limited images of the upper abdomen again show hepatomegaly with heterogeneous diminished attenuation, small volume ascites and probable varices and splenomegaly. Soft Tissues/Bones: No acute bone or soft tissue abnormality. Study nondiagnostic for smaller more peripheral PE; no large or central PE suspected, as discussed above. Compared to 04/30/2022, smaller left pleural effusion, new trace right pleural effusion and persistent bilateral atelectatic appearing changes, as above. Left cardiac enlargement, especially LV. Hepatomegaly, steatosis, small volume ascites, and additional findings as above. IMPRESSION: Mr. Amadou Dacosta is a 40 y.o. male with elevated LFTs. Upper abdominal pain. Alcoholic hepatitis. He is planned to get MRCP to exclude any biliary pathology. Monitor LFTs. Reported rectal bleeding. Eventually he would need colonoscopy on outpatient basis. Thank you for allowing me to participate in the care of Mr. Naomi Kaplan. For any further questions please do not hesitate to contact me.        MD Yunior Og Gave

## 2022-05-25 NOTE — PROGRESS NOTES
furosemide (LASIX) 20 MG tablet Take 1 tablet by mouth daily 5/13/22   Jian Banda MD   albuterol sulfate HFA (VENTOLIN HFA) 108 (90 Base) MCG/ACT inhaler Inhale 2 puffs into the lungs 4 times daily as needed for Wheezing 5/3/22   Clarisse Lopez MD   albuterol sulfate HFA (VENTOLIN HFA) 108 (90 Base) MCG/ACT inhaler Inhale 2 puffs into the lungs 4 times daily as needed for Wheezing 5/3/22   Clarisse Lopez MD   carvedilol (COREG) 3.125 MG tablet Take 1 tablet by mouth 2 times daily (with meals) 5/3/22   Clarisse Lopez MD   folic acid (FOLVITE) 1 MG tablet Take 1 tablet by mouth daily 5/4/22   Clarisse Lopez MD   vitamin B-1 (THIAMINE) 100 MG tablet Take 1 tablet by mouth daily 5/4/22   Clarisse Lopez MD   Multiple Vitamins-Minerals (CENTRUM MEN) TABS Take 1 tablet by mouth daily    Historical Provider, MD   Menthol-Methyl Salicylate (ANALGESIC OINTMENT) OINT ointment Apply topically as needed Indications: on back    Historical Provider, MD   pantoprazole (PROTONIX) 20 MG tablet Take 2 tablets by mouth daily 4/15/22   Lillie Riojas,    losartan (COZAAR) 50 MG tablet take 1 tablet by mouth once daily 11/16/21   Jian Banda MD   naproxen (NAPROSYN) 500 MG tablet Take 1 tablet by mouth 2 times daily  Patient taking differently: Take 500 mg by mouth 2 times daily as needed  8/13/21 3/28/22  Atif Canales MD   fluticasone [facility]) 50 MCG/ACT nasal spray instill 1 spray into each nostril once daily 5/10/21   Jian Banda MD   Blood Pressure Monitoring (BP MONITOR-STETHOSCOPE) KIT 1 each by Does not apply route daily 4/7/21   Jian Banda MD   Nicotine 21-14-7 MG/24HR KIT Place 14 mg onto the skin nightly     Historical Provider, MD   EPINEPHrine (EPIPEN 2-LIZETTE) 0.3 MG/0.3ML SOAJ injection Inject 0.3 mLs into the muscle once for 1 dose Use as directed for allergic reaction 11/17/16 5/24/21  Jamel Mayo MD        Allergies:     Bee venom    Social History:     Tobacco:    reports that he has been smoking cigarettes. He has a 21.00 pack-year smoking history. He has never used smokeless tobacco.  Alcohol:      reports previous alcohol use. Drug Use:  reports previous drug use. Family History:     Family History   Problem Relation Age of Onset    Heart Disease Mother     Heart Attack Mother     Heart Disease Father        Review of Systems:     Positive and Negative as described in HPI. CONSTITUTIONAL: Positive for fever chills and 25 pound weight loss  HEENT:  negative for vision, hearing changes, runny nose, throat pain  RESPIRATORY: Cough and dyspnea g  CARDIOVASCULAR:  negative for chest pain, palpitations  GASTROINTESTINAL:  negative for nausea, vomiting, diarrhea, constipation, change in bowel habits, abdominal pain   GENITOURINARY:  negative for difficulty of urination, burning with urination, frequency   INTEGUMENT:  negative for rash, skin lesions, easy bruising   HEMATOLOGIC/LYMPHATIC:  negative for swelling/edema   ALLERGIC/IMMUNOLOGIC:  negative for urticaria , itching  ENDOCRINE:  negative increase in drinking, increase in urination, hot or cold intolerance  MUSCULOSKELETAL: Positive for generalized weakness NEUROLOGICAL:  negative for headaches, dizziness, lightheadedness, numbness, pain, tingling extremities  BEHAVIOR/PSYCH:  negative for depression, anxiety    Physical Exam:   /68   Pulse 83   Temp 97.7 °F (36.5 °C)   Resp 18   Ht 5' 8\" (1.727 m)   Wt 206 lb 2.1 oz (93.5 kg)   SpO2 94%   BMI 31.34 kg/m²   Temp (24hrs), Av.2 °F (36.8 °C), Min:97.7 °F (36.5 °C), Max:98.6 °F (37 °C)    No results for input(s): POCGLU in the last 72 hours.     Intake/Output Summary (Last 24 hours) at 2022 1159  Last data filed at 2022 1010  Gross per 24 hour   Intake 579.58 ml   Output --   Net 579.58 ml       General Appearance: Appears tired looks pale  Mental status: oriented to person, place, and time  Head: normocephalic, atraumatic  Eye:  Icterus, no  redness, pupils equal and reactive, extraocular eye movements intact, conjunctiva clear  Ear: normal external ear, no discharge, hearing intact  Nose: no drainage noted  Mouth: mucous membranes moist  Neck: supple, no carotid bruits, thyroid not palpable  Lungs: Good air entry bibasal mild crackles  Cardiovascular: normal rate, regular rhythm, no murmur, gallop, rub  Abdomen: Soft, nontender, nondistended, normal bowel sounds, no hepatomegaly or splenomegaly  Neurologic: There are no new focal motor or sensory deficits, normal muscle tone and bulk, no abnormal sensation, normal speech, cranial nerves II through XII grossly intact  Skin: No gross lesions, rashes, bruising or bleeding on exposed skin area  Extremities: peripheral pulses palpable, no pedal edema or calf pain with palpation  Psych: Flat affect    Investigations:      Laboratory Testing:  Recent Results (from the past 24 hour(s))   CBC with Auto Differential    Collection Time: 05/25/22  5:15 AM   Result Value Ref Range    WBC 19.0 (H) 3.5 - 11.0 k/uL    RBC 2.03 (L) 4.5 - 5.9 m/uL    Hemoglobin 8.3 (L) 13.5 - 17.5 g/dL    Hematocrit 24.3 (L) 41 - 53 %    .1 (H) 80 - 100 fL    MCH 40.8 (H) 26 - 34 pg    MCHC 34.0 31 - 37 g/dL    RDW 15.0 (H) 11.5 - 14.9 %    Platelets 062 900 - 905 k/uL    MPV 8.4 6.0 - 12.0 fL    Seg Neutrophils 77 (H) 36 - 66 %    Lymphocytes 9 (L) 24 - 44 %    Monocytes 5 1 - 7 %    Eosinophils % 1 0 - 4 %    Basophils 1 0 - 2 %    Bands 7 0 - 10 %    Segs Absolute 14.63 (H) 1.3 - 9.1 k/uL    Absolute Lymph # 1.71 1.0 - 4.8 k/uL    Absolute Mono # 0.95 0.1 - 1.3 k/uL    Absolute Eos # 0.19 0.0 - 0.4 k/uL    Basophils Absolute 0.19 0.0 - 0.2 k/uL    Absolute Bands # 1.33 (H) 0.0 - 1.0 k/uL    Morphology ANISOCYTOSIS PRESENT     Morphology MACROCYTOSIS PRESENT     Morphology HYPOCHROMIA PRESENT     Morphology 1+ TARGET CELLS    Hepatic Function Panel    Collection Time: 05/25/22  5:15 AM   Result Value Ref Range    Albumin 2.2 (L) 3.5 - 5.2 g/dL Alkaline Phosphatase 250 (H) 40 - 129 U/L    ALT 22 5 - 41 U/L     (H) <40 U/L    Total Bilirubin 3.49 (H) 0.3 - 1.2 mg/dL    Bilirubin, Direct 2.34 (H) <0.31 mg/dL    Bilirubin, Indirect 1.15 (H) 0.00 - 1.00 mg/dL    Total Protein 6.6 6.4 - 8.3 g/dL   Basic Metabolic Panel w/ Reflex to MG    Collection Time: 05/25/22  5:15 AM   Result Value Ref Range    Glucose 82 70 - 99 mg/dL    BUN 7 6 - 20 mg/dL    CREATININE 0.62 (L) 0.70 - 1.20 mg/dL    Calcium 7.8 (L) 8.6 - 10.4 mg/dL    Sodium 139 135 - 144 mmol/L    Potassium 3.7 3.7 - 5.3 mmol/L    Chloride 107 98 - 107 mmol/L    CO2 23 20 - 31 mmol/L    Anion Gap 9 9 - 17 mmol/L    GFR Non-African American >60 >60 mL/min    GFR African American >60 >60 mL/min    GFR Comment             Imaging/Diagnostics:  XR CHEST PORTABLE    Result Date: 5/21/2022  No acute process. CT CHEST PULMONARY EMBOLISM W CONTRAST    Result Date: 5/21/2022  Study nondiagnostic for smaller more peripheral PE; no large or central PE suspected, as discussed above. Compared to 04/30/2022, smaller left pleural effusion, new trace right pleural effusion and persistent bilateral atelectatic appearing changes, as above. Left cardiac enlargement, especially LV. Hepatomegaly, steatosis, small volume ascites, and additional findings as above.        Assessment :      Hospital Problems           Last Modified POA    * (Principal) Leukocytosis 5/21/2022 Yes    Left lower lobe pneumonia 5/22/2022 Yes    Septicemia ([address] 5/23/2022 Yes    Right upper quadrant abdominal pain 5/25/2022 Yes    Acute pulmonary embolism ([address] 5/22/2022 Yes    Chronic systolic congestive heart failure ([address] 0/46/2458 Yes    Alcoholic cardiomyopathy ([address] 0/77/2623 Yes    Alcoholic hepatitis without ascites 5/22/2022 Yes          Plan:     Patient status inpatient in the Progressive Unit/Step down  81-year-old gentleman class I obese BMI 31  History of pulmonary embolism 2 years ago off anticoagulation after 6 months  Habitual alcohol abuse alcoholic hepatitis without cirrhosis  Quit on April 20   Recently discharged after treatment for IV antibiotics pneumonia  Readmitted with weak chills cough right upper quadrant pain  Hypotensive systolic in 17C tachycardic heart rate in 11 fever WBC count 20,000  Require inpatient admission for about reasons and also has alcoholic hepatitis  And failed IV antibiotics and treatment we will consult infectious disease IV Vanco and Zosyn  Rule out cholecystitis right upper quadrant ultrasound may need a HIDA scan  CT scan no pulmonary embolism  Chronic systolic congestive heart failure secondary to alcoholic cardiomyopathy ejection fraction 35 to 40%  Suspected healthcare pneumonia possible MRSA pneumonia possible gram-negative pneumonia  25 lbs wt loss in last one and a half month  Antibiotic infectious disease as above  dvt prophy    May 24  Acute cholecystitis  hida pending  mrcp pending  Case discussed with surg team  May 25  hida noted  mrcp pending      Consultations:   IP CONSULT TO FAMILY MEDICINE  IP CONSULT TO INFECTIOUS DISEASES  IP CONSULT TO GENERAL SURGERY  IP CONSULT TO GI     Patient is admitted as inpatient status because of co-morbidities listed above, severity of signs and symptoms as outlined, requirement for current medical therapies and most importantly because of direct risk to patient if care not provided in a hospital setting. Expected length of stay > 48 hours. Juan Carlos Doyle MD  5/25/2022  11:59 AM    Copy sent to Dr. Ryan Verdugo MD    Please note that this chart was generated using voice recognition Dragon dictation software. Although every effort was made to ensure the accuracy of this automated transcription, some errors in transcription may have occurred.

## 2022-05-25 NOTE — PROGRESS NOTES
[facility] [address]                 PATIENT NAME: Adam Gupta     TODAY'S DATE: 5/25/2022, 9:14 AM    SUBJECTIVE:    Pt seen and examined. Afebrile, VSS. Leukocytosis slightly improved, hemoglobin stable. LFT's remain elevated. Patient states abdominal pain is the same. No N/V. HIDA negative for acute cholecystitis but did show EF of 15%. MRCP today. OBJECTIVE:   VITALS:  /68   Pulse 83   Temp 97.7 °F (36.5 °C)   Resp 18   Ht 5' 8\" (1.727 m)   Wt 206 lb 2.1 oz (93.5 kg)   SpO2 94%   BMI 31.34 kg/m²      INTAKE/OUTPUT:      Intake/Output Summary (Last 24 hours) at 5/25/2022 0914  Last data filed at 5/24/2022 1712  Gross per 24 hour   Intake 569.58 ml   Output --   Net 569.58 ml                 CONSTITUTIONAL:  awake and alert.   No acute distress  HEART:   RRR  LUNGS:   CTA  ABDOMEN:   Abdomen soft, RUQ/epigastric tenderness, non-distended  EXTREMITIES:   No pedal edema    Data:  CBC:   Lab Results   Component Value Date    WBC 19.0 05/25/2022    RBC 2.03 05/25/2022    HGB 8.3 05/25/2022    HCT 24.3 05/25/2022    .1 05/25/2022    MCH 40.8 05/25/2022    MCHC 34.0 05/25/2022    RDW 15.0 05/25/2022     05/25/2022    MPV 8.4 05/25/2022     BMP:    Lab Results   Component Value Date     05/25/2022    K 3.7 05/25/2022     05/25/2022    CO2 23 05/25/2022    BUN 7 05/25/2022    LABALBU 2.2 05/25/2022    CREATININE 0.62 05/25/2022    CALCIUM 7.8 05/25/2022    GFRAA >60 05/25/2022    LABGLOM >60 05/25/2022    GLUCOSE 82 05/25/2022     Hepatic Function Panel:    Lab Results   Component Value Date    ALKPHOS 250 05/25/2022    ALT 22 05/25/2022     05/25/2022    PROT 6.6 05/25/2022    BILITOT 3.49 05/25/2022    BILIDIR 2.34 05/25/2022    IBILI 1.15 05/25/2022    LABALBU 2.2 05/25/2022       Radiology Review:      Mari Fernandez [[id number]] Collected: 05/24/22 1229      Order Status: Completed Updated: 05/24/22 1249     Narrative:       EXAMINATION:   NUCLEAR MEDICINE HEPATOBILIARY SCINTIGRAPHY (HIDA SCAN) WITH EJECTION   FRACTION. TECHNIQUE:   Approximately 6.2 millicuries RP43J Mebrofenin (Choletec) was administered   IV.  Then, dynamic images of the abdomen were obtained in the anterior   projection for 60 mins.  A right lateral view was also obtained at 60 mins. Due to a shortage/inavailability of CCK, one can (237 ml) Ensure plus was   substitued orally.  Images were obtained in the [place] projection and regions of   interest were drawn around the gallbladder and ejection fraction was   calculated. COMPARISON:   No prior for comparison. HISTORY:   ORDERING SYSTEM PROVIDED HISTORY: rule out cholecystits   TECHNOLOGIST PROVIDED HISTORY:   rule out cholecystits   Reason for Exam: rule out cholecystits     FINDINGS:   Prompt, homogenous uptake by the liver is noted with normal appearance of   radiotracer excretion into the biliary system.  Clearance of bloodpool   activity appears appropriate. Gallbladder and small bowel is visualized in appropriate sequence and time. Gallbladder ejection fraction measured 15%. Normal value is >33% for Ensure protocol.  Note, Ensure normal range is based   on a limited study.      Impression:       No acute cholecystitis. Gallbladder ejection fraction is low, which can reflect functional   gallbladder disorder or incomplete absorption of the fatty meal.      ASSESSMENT     Principal Problem:    Leukocytosis  Active Problems:    Left lower lobe pneumonia    Septicemia (HCC)    Acute pulmonary embolism (HCC)    Chronic systolic congestive heart failure (HCC)    Alcoholic cardiomyopathy (HCC)    Alcoholic hepatitis without ascites  Resolved Problems:    * No resolved hospital problems. *      Plan  1. NPO for imaging  2. MRCP today  3. Trend LFT's  4. Surgically stable  5. GI consulted  6. Continue medical management   7. GI input noted. Resume diet after MRCP.       Electronically signed by Caleb Hahn PA-C  [id number] [address]

## 2022-05-25 NOTE — PLAN OF CARE
PRE CONSULT ROUNDING NOTE  HPI  40year old male with pmh of alcohol abuse with alcoholic hepatitis, chf PE who presented to the ED for chest pain with shortness of breath, fevers and weakness. He is being treated for SIRS possible cholecystitis and alcoholic hepatitis. Our service is consulted for abdominal pain. He was recently discharged after being treated for pneumonia. Starting having  RUQ pain with nausea fevers and chills two days after discharge. He normally drinks 1/5 whiskey per day but states he has not had any alcohol since 4/28. Last abd ct on 4/27 shows mild gallbladder wall thickening with an enlarged liver trace free fluid in the pelvis and mildly enlarged upper abdominal lymph nodes. HIDA 5/24 showed a low gallbladder EF, liver us shows steatosis with hepatomegaly cholelithiasis /sludge with mild gallbladder wall thickening. MRI abd as been ordered but not completed yet. Wbc 19 hgb 8.3 anemia profile from April shows adequate iron stores  inr 1.4alk phos 250 ast 141 bili 3.49 with the direct at 2.34 lipase is normal. Acute hepatitis and liver autoimmune workup was negative except positive evan on 4/28/22. He reports weight loss but denies dysphagia melena hematemesis hematochezia change in the bowel habits rash.      Endoscopy 5/1/22 egd (f ahmad) diffuse gastritis irregular SCJ no colonoscopy     Family reports no hx of liver pancreatic stomach or colon cancer no uc/crohns  Social no smoking states he has not had  etoh since 4/28/22-was heavy drinker no illicit drugs   /55   Pulse 83   Temp 97.7 °F (36.5 °C)   Resp 18   Ht 5' 8\" (1.727 m)   Wt 206 lb 2.1 oz (93.5 kg)   SpO2 94%   BMI 31.34 kg/m²     ROS as above meds labs imaging and past medical records were reviewed    Exam  General Appearance: alert and oriented to person, place and time, well-developed and well-nourished, in no acute distress  Skin: warm and dry, no rash or erythema pt has tattoos  Head: normocephalic and atraumatic  Eyes: pupils equal, round, and reactive to light, extraocular eye movements intact, conjunctivae icteric  ENT: hearing grossly normal bilaterally  Neck: neck supple and non tender without mass, no thyromegaly or thyroid nodules, no cervical lymphadenopathy   Pulmonary/Chest: clear to auscultation bilaterally- no wheezes, rales or rhonchi, normal air movement, no respiratory distress  Cardiovascular: normal rate, regular rhythm, normal S1 and S2, no murmurs, rubs, clicks or gallops, distal pulses intact, no carotid bruits  Abdomen: soft, obese non tender, non-distended, normal bowel sounds, no masses hepatomegaly  no ascites  Extremities: no cyanosis, clubbing or edema  Musculoskeletal: normal range of motion, no joint swelling, deformity or tenderness  Neurologic: no cranial nerve deficit and muscle strength normal    Assessment  RUQ pain  Elevated lft with likely alcoholic hepatitis, steatosis and cholelithiasis  SIRS  Alcohol abuse  Anemia  Weight loss      Plan  Will d/w md, abdominal pain could be related to alcoholic hepatitis/liver disease  Await results of mrcp-may need ercp  Will eventually need colonoscopy to further eval anemia and weight loss  ID following  Trend hh and lft    Formal gi consult to follow  . Evangelina Conway, APRN - CNP

## 2022-05-25 NOTE — PROGRESS NOTES
[facility] -   Infectious diseases evaluation  admission date 5/21/2022    reason for consultation:   Recurrent Infection    Impression :   Current:    · Systemic inflammatory response syndrome /leukocytosis/fever and tachycardia , rule out biliary source of infection  · Alcoholic hepatitis  · History of  pulmonary embolism  · And recent pneumonia  · CHF  · Alcohol abuse    Recommendations     · HIDA scan was negative   ·  MRCP pendig  · Follow blood cultures  · Continue Zosyn 3,375 mg IV q 8 hours  · Nasal swab for MRSA negative on 5/21/2022  · Follow CBC, renal function  · Supportive care    Infection Control Recommendations   · Lavinia Precautions    Antimicrobial Stewardship Recommendations   · Simplification of therapy  · Targeted therapy      History of Present Illness:   Initial history:  Katie Matos is a 40y.o.-year-old male presented to ED with chest pain, SOB, fever, chills weakness and headaches at night. Reports feeling slightly better today. WBC 21.6. Pt reports he completed course of antibiotics for diagnosis of pneumonia 5 days ago and since that time experienced increased fatigue, fever and chills. Patient had recent hospitalization 4/27/22 -5/3/22 where he was admitted for sepsis with multifocal pneumonia. PMH includes LL pneumonia, alcoholic hepatitis without ascites, CHF and pulmonary embolism (2020). WBC 21.6  CT pulmonary embolism w contrast - Study nondiagnostic for smaller more peripheral PE; no large or central PE suspected. .   Compared to 04/30/2022, smaller left pleural effusion, new trace right pleural effusion and persistent bilateral atelectatic appearing changes. Left cardiac enlargement, especially LV. Hepatomegaly, steatosis, small volume ascites  5/21 CXR - no acute process    Interval changes  5/25/2022   The patient still complaining of abdominal pain, no fever or chills, no new complaints.   LFTs elevated. Afebrile    Patient Vitals for the past 8 hrs:   BP Temp Pulse Resp SpO2   22 0622 105/68 97.7 °F (36.5 °C) 83 18 94 %       Summary of relevant labs:  Labs:  Creat 1.13  WBC 21.6    Micro:   Blood Cx x2 - no growth to date   MRSA nasal probe -  Negative   Urine Cx - pending    Imagin/21 CT CHEST PULMONARY EMBOLISM W CONTRAST   Study nondiagnostic for smaller more peripheral PE; no large or central PE suspected, as discussed above. Compared to 2022, smaller left pleural effusion, new trace right pleural effusion and persistent bilateral atelectatic appearing changes, as above. Left cardiac enlargement, especially LV. Hepatomegaly, steatosis, small volume ascites, and additional findings as   above.    CXR - no acute process    I have personally reviewed the past medical history, past surgical history, medications, social history, and family history, and I haveupdated the database accordingly. Allergies:   Bee venom     Review of Systems:     Review of Systems   Constitutional: Positive for fatigue. HENT: Negative. Eyes: Negative. Respiratory: Negative. Cardiovascular: Negative. Gastrointestinal: Positive for abdominal pain and nausea. Genitourinary: Negative. Musculoskeletal: Negative. Skin: Negative. Neurological: Negative. Psychiatric/Behavioral: Negative. Physical Examination :       Physical Exam  Vitals and nursing note reviewed. Constitutional:       General: He is not in acute distress. Appearance: Normal appearance. HENT:      Head: Normocephalic and atraumatic. Right Ear: External ear normal.      Left Ear: External ear normal.      Nose: Nose normal.      Mouth/Throat:      Mouth: Mucous membranes are moist.      Pharynx: Oropharynx is clear. Eyes:      General: Scleral icterus present. Conjunctiva/sclera: Conjunctivae normal.   Cardiovascular:      Rate and Rhythm: Normal rate and regular rhythm. Heart sounds: Normal heart sounds. Pulmonary:      Effort: Pulmonary effort is normal. No respiratory distress. Breath sounds: Normal breath sounds. Abdominal:      General: Bowel sounds are normal. There is no distension. Palpations: Abdomen is soft. Tenderness: There is no abdominal tenderness. Comments: Right upper quadrant tenderness   Genitourinary:     Comments: No sal  Musculoskeletal:         General: Normal range of motion. Cervical back: Normal range of motion. Skin:     General: Skin is warm and dry. Coloration: Skin is jaundiced. Findings: No erythema or rash. Neurological:      Mental Status: He is alert and oriented to person, place, and time.    Psychiatric:         Mood and Affect: Mood normal.         Behavior: Behavior normal.         Past Medical History:     Past Medical History:   Diagnosis Date    Alcoholism ([facility] [address]     pt drinks a fifth of whiskey daily    Anemia     Cirrhosis, alcoholic ([facility]     Pulmonary embolism (HCC)        Past Surgical  History:     Past Surgical History:   Procedure Laterality Date    UPPER GASTROINTESTINAL ENDOSCOPY N/A 5/1/2022    EGD ESOPHAGOGASTRODUODENOSCOPY performed by Soto Santiago MD at [facility]       Medications:      lidocaine  1 patch TransDERmal Daily    sodium chloride flush  5-40 mL IntraVENous 2 times per day    enoxaparin  40 mg SubCUTAneous Daily    folic acid  1 mg Oral Daily    nicotine  1 patch TransDERmal Nightly    pantoprazole  40 mg Oral Daily    vitamin B-1  100 mg Oral Daily    piperacillin-tazobactam  3,375 mg IntraVENous Q8H       Social History:     Social History     Socioeconomic History    Marital status: Single     Spouse name: Not on file    Number of children: Not on file    Years of education: Not on file    Highest education level: Not on file   Occupational History    Not on file   Tobacco Use    Smoking status: Current Every Day Smoker     Packs/day: 1.00 Years: 21.00     Pack years: 21.00     Types: Cigarettes    Smokeless tobacco: Never Used    Tobacco comment: using nicotine patches at night   Vaping Use    Vaping Use: Never used   Substance and Sexual Activity    Alcohol use: Not Currently     Comment: hx of alcoholism; pt drinks a fifth of whiskey daily    Drug use: Not Currently     Comment: used cocaine in early 20's    Sexual activity: Not on file   Other Topics Concern    Not on file   Social History Narrative    Not on file     Social Determinants of Health     Financial Resource Strain: Low Risk     Difficulty of Paying Living Expenses: Not hard at all   Food Insecurity: No Food Insecurity    Worried About Running Out of Food in the Last Year: Never true    Sheri of Food in the Last Year: Never true   Transportation Needs: No Transportation Needs    Lack of Transportation (Medical): No    Lack of Transportation (Non-Medical):  No   Physical Activity:     Days of Exercise per Week: Not on file    Minutes of Exercise per Session: Not on file   Stress:     Feeling of Stress : Not on file   Social Connections:     Frequency of Communication with Friends and Family: Not on file    Frequency of Social Gatherings with Friends and Family: Not on file    Attends Evangelical Services: Not on file    Active Member of [address] [place] [address] [employer] or Organizations: Not on file    Attends Club or Organization Meetings: Not on file    Marital Status: Not on file   Intimate Partner Violence:     Fear of Current or Ex-Partner: Not on file    Emotionally Abused: Not on file    Physically Abused: Not on file    Sexually Abused: Not on file   Housing Stability:     Unable to Pay for Housing in the Last Year: Not on file    Number of [place] in the Last Year: Not on file    Unstable Housing in the Last Year: Not on file       Family History:     Family History   Problem Relation Age of Onset    Heart Disease Mother     Heart Attack Mother     Heart Disease Father Medical Decision Making:   I have independently reviewed/ordered the following labs:    CBC with Differential:   Recent Labs     05/24/22  0528 05/25/22 0515   WBC 20.4* 19.0*   HGB 8.3* 8.3*   HCT 24.7* 24.3*    367   LYMPHOPCT 13* 9*   MONOPCT 4 5     BMP:  Recent Labs     05/24/22  0528 05/25/22 0515    139   K 4.1 3.7    107   CO2 24 23   BUN 10 7   CREATININE 0.89 0.62*     Hepatic Function Panel:   Recent Labs     05/24/22  0528 05/25/22  0515   PROT 6.5 6.6   LABALBU 2.2* 2.2*   BILIDIR 2.45* 2.34*   IBILI 1.32* 1.15*   BILITOT 3.77* 3.49*   ALKPHOS 253* 250*   ALT 22 22   * 141*     No results for input(s): RPR in the last 72 hours. No results for input(s): HIV in the last 72 hours. No results for input(s): BC in the last 72 hours. Lab Results   Component Value Date    CREATININE 0.62 05/25/2022    GLUCOSE 82 05/25/2022       Detailed results: Thank you for allowing us to participate in the care of this patient. Please call with questions. This note is created with the assistance of a speech recognition program.  While intending to generate adocument that actually reflects the content of the visit, the document can still have some errors including those of syntax and sound a like substitutions which may escape proof reading. It such instances, actual meaningcan be extrapolated by contextual diversion.     Nelly Bermudez MD  Office: [phone]  Perfect serve / office [phone]

## 2022-05-25 NOTE — CARE COORDINATION
ONGOING DISCHARGE PLAN:    Patient is alert and oriented x4. Spoke with patient regarding discharge plan and patient confirms that plan is still home without needs. DME: 02 @ 2L NC PRN from [facility]. MRCP pending    On IV Zosyn - WBC 19    ID, Gen Surg, and GI consulted. Will continue to follow for additional discharge needs.     Electronically signed by Patricia Wolff RN on 5/25/2022 at 11:24 AM

## 2022-05-26 VITALS
SYSTOLIC BLOOD PRESSURE: 104 MMHG | TEMPERATURE: 98.6 F | HEART RATE: 76 BPM | DIASTOLIC BLOOD PRESSURE: 64 MMHG | WEIGHT: 206.13 LBS | RESPIRATION RATE: 20 BRPM | HEIGHT: 68 IN | BODY MASS INDEX: 31.24 KG/M2 | OXYGEN SATURATION: 95 %

## 2022-05-26 LAB
ABSOLUTE EOS #: 1.06 K/UL (ref 0–0.4)
ABSOLUTE LYMPH #: 2.54 K/UL (ref 1–4.8)
ABSOLUTE MONO #: 2.33 K/UL (ref 0.1–1.3)
ABSOLUTE RETIC #: 0.04 M/UL (ref 0.02–0.1)
ALBUMIN SERPL-MCNC: 2.3 G/DL (ref 3.5–5.2)
ALP BLD-CCNC: 260 U/L (ref 40–129)
ALT SERPL-CCNC: 24 U/L (ref 5–41)
ANION GAP SERPL CALCULATED.3IONS-SCNC: 9 MMOL/L (ref 9–17)
AST SERPL-CCNC: 165 U/L
BASOPHILS # BLD: 0 % (ref 0–2)
BASOPHILS ABSOLUTE: 0 K/UL (ref 0–0.2)
BILIRUB SERPL-MCNC: 3.39 MG/DL (ref 0.3–1.2)
BILIRUBIN DIRECT: 2.29 MG/DL
BILIRUBIN, INDIRECT: 1.1 MG/DL (ref 0–1)
BUN BLDV-MCNC: 7 MG/DL (ref 6–20)
CALCIUM SERPL-MCNC: 7.9 MG/DL (ref 8.6–10.4)
CHLORIDE BLD-SCNC: 108 MMOL/L (ref 98–107)
CO2: 23 MMOL/L (ref 20–31)
CREAT SERPL-MCNC: 0.9 MG/DL (ref 0.7–1.2)
CULTURE: NORMAL
CULTURE: NORMAL
EOSINOPHILS RELATIVE PERCENT: 5 % (ref 0–4)
GFR AFRICAN AMERICAN: >60 ML/MIN
GFR NON-AFRICAN AMERICAN: >60 ML/MIN
GFR SERPL CREATININE-BSD FRML MDRD: ABNORMAL ML/MIN/{1.73_M2}
GLUCOSE BLD-MCNC: 106 MG/DL (ref 70–99)
HAPTOGLOBIN: 198 MG/DL (ref 30–200)
HCT VFR BLD CALC: 25.1 % (ref 41–53)
HEMOGLOBIN: 8.4 G/DL (ref 13.5–17.5)
LACTATE DEHYDROGENASE: 166 U/L (ref 135–225)
LYMPHOCYTES # BLD: 12 % (ref 24–44)
MCH RBC QN AUTO: 40.4 PG (ref 26–34)
MCHC RBC AUTO-ENTMCNC: 33.4 G/DL (ref 31–37)
MCV RBC AUTO: 120.8 FL (ref 80–100)
MONOCYTES # BLD: 11 % (ref 1–7)
MORPHOLOGY: ABNORMAL
MORPHOLOGY: ABNORMAL
PDW BLD-RTO: 14.6 % (ref 11.5–14.9)
PLATELET # BLD: 379 K/UL (ref 150–450)
PMV BLD AUTO: 8.1 FL (ref 6–12)
POTASSIUM SERPL-SCNC: 4 MMOL/L (ref 3.7–5.3)
RBC # BLD: 2.08 M/UL (ref 4.5–5.9)
RETIC %: 2.2 % (ref 0.5–2)
SEG NEUTROPHILS: 72 % (ref 36–66)
SEGMENTED NEUTROPHILS ABSOLUTE COUNT: 15.27 K/UL (ref 1.3–9.1)
SODIUM BLD-SCNC: 140 MMOL/L (ref 135–144)
SPECIMEN DESCRIPTION: NORMAL
SPECIMEN DESCRIPTION: NORMAL
TOTAL PROTEIN: 6.8 G/DL (ref 6.4–8.3)
WBC # BLD: 21.2 K/UL (ref 3.5–11)

## 2022-05-26 PROCEDURE — 99254 IP/OBS CNSLTJ NEW/EST MOD 60: CPT | Performed by: INTERNAL MEDICINE

## 2022-05-26 PROCEDURE — 88237 TISSUE CULTURE BONE MARROW: CPT

## 2022-05-26 PROCEDURE — 86664 EPSTEIN-BARR NUCLEAR ANTIGEN: CPT

## 2022-05-26 PROCEDURE — 86663 EPSTEIN-BARR ANTIBODY: CPT

## 2022-05-26 PROCEDURE — 81270 JAK2 GENE: CPT

## 2022-05-26 PROCEDURE — 6370000000 HC RX 637 (ALT 250 FOR IP): Performed by: INTERNAL MEDICINE

## 2022-05-26 PROCEDURE — 80048 BASIC METABOLIC PNL TOTAL CA: CPT

## 2022-05-26 PROCEDURE — 85045 AUTOMATED RETICULOCYTE COUNT: CPT

## 2022-05-26 PROCEDURE — 88185 FLOWCYTOMETRY/TC ADD-ON: CPT

## 2022-05-26 PROCEDURE — 2580000003 HC RX 258: Performed by: INTERNAL MEDICINE

## 2022-05-26 PROCEDURE — APPSS30 APP SPLIT SHARED TIME 16-30 MINUTES: Performed by: NURSE PRACTITIONER

## 2022-05-26 PROCEDURE — 81256 HFE GENE: CPT

## 2022-05-26 PROCEDURE — 6360000002 HC RX W HCPCS: Performed by: EMERGENCY MEDICINE

## 2022-05-26 PROCEDURE — 2580000003 HC RX 258: Performed by: EMERGENCY MEDICINE

## 2022-05-26 PROCEDURE — 85025 COMPLETE CBC W/AUTO DIFF WBC: CPT

## 2022-05-26 PROCEDURE — 99239 HOSP IP/OBS DSCHRG MGMT >30: CPT | Performed by: INTERNAL MEDICINE

## 2022-05-26 PROCEDURE — 36415 COLL VENOUS BLD VENIPUNCTURE: CPT

## 2022-05-26 PROCEDURE — 88275 CYTOGENETICS 100-300: CPT

## 2022-05-26 PROCEDURE — 80076 HEPATIC FUNCTION PANEL: CPT

## 2022-05-26 PROCEDURE — 83615 LACTATE (LD) (LDH) ENZYME: CPT

## 2022-05-26 PROCEDURE — 88184 FLOWCYTOMETRY/ TC 1 MARKER: CPT

## 2022-05-26 PROCEDURE — 6360000002 HC RX W HCPCS: Performed by: INTERNAL MEDICINE

## 2022-05-26 PROCEDURE — 83010 ASSAY OF HAPTOGLOBIN QUANT: CPT

## 2022-05-26 PROCEDURE — 88271 CYTOGENETICS DNA PROBE: CPT

## 2022-05-26 PROCEDURE — 86665 EPSTEIN-BARR CAPSID VCA: CPT

## 2022-05-26 PROCEDURE — 99232 SBSQ HOSP IP/OBS MODERATE 35: CPT | Performed by: NURSE PRACTITIONER

## 2022-05-26 RX ORDER — LIDOCAINE 4 G/G
1 PATCH TOPICAL ONCE
Status: DISCONTINUED | OUTPATIENT
Start: 2022-05-26 | End: 2022-05-26 | Stop reason: CLARIF

## 2022-05-26 RX ORDER — LIDOCAINE 4 G/G
1 PATCH TOPICAL EVERY 24 HOURS
Status: DISCONTINUED | OUTPATIENT
Start: 2022-05-26 | End: 2022-05-26 | Stop reason: HOSPADM

## 2022-05-26 RX ADMIN — FOLIC ACID 1 MG: 1 TABLET ORAL at 09:08

## 2022-05-26 RX ADMIN — PANTOPRAZOLE SODIUM 40 MG: 40 TABLET, DELAYED RELEASE ORAL at 09:08

## 2022-05-26 RX ADMIN — THIAMINE HCL TAB 100 MG 100 MG: 100 TAB at 09:08

## 2022-05-26 RX ADMIN — PIPERACILLIN AND TAZOBACTAM 3375 MG: 3; .375 INJECTION, POWDER, FOR SOLUTION INTRAVENOUS at 09:33

## 2022-05-26 RX ADMIN — SODIUM CHLORIDE: 9 INJECTION, SOLUTION INTRAVENOUS at 01:56

## 2022-05-26 RX ADMIN — ENOXAPARIN SODIUM 40 MG: 100 INJECTION SUBCUTANEOUS at 09:08

## 2022-05-26 RX ADMIN — PIPERACILLIN AND TAZOBACTAM 3375 MG: 3; .375 INJECTION, POWDER, FOR SOLUTION INTRAVENOUS at 01:58

## 2022-05-26 RX ADMIN — SODIUM CHLORIDE, PRESERVATIVE FREE 10 ML: 5 INJECTION INTRAVENOUS at 09:31

## 2022-05-26 NOTE — DISCHARGE INSTR - DIET
Resume home diet as tolerated. Good nutrition is important when healing from an illness, injury, or surgery. Follow any nutrition recommendations given to you during your hospital stay. If you were given an oral nutrition supplement while in the hospital, continue to take this supplement at home. You can take it with meals, in-between meals, and/or before bedtime. These supplements can be purchased at most local grocery stores, pharmacies, and chain [email or web address]-stores. If you have any questions about your diet or nutrition, call the hospital and ask for the dietitian.

## 2022-05-26 NOTE — PLAN OF CARE
Problem: Pain  Goal: Verbalizes/displays adequate comfort level or baseline comfort level  5/26/2022 1533 by Theodore Kinsey RN  Outcome: Adequate for Discharge, Pt educated on non-pharmacological pain interventions. PRN pain medications administered. Problem: Safety - Adult  Goal: Free from fall injury  5/26/2022 1533 by Theodore Kinsey RN  Outcome: Adequate for Discharge, Patient remains free of incidence/ injury. Bed remains in low position. Side rails up x2.

## 2022-05-26 NOTE — PROGRESS NOTES
[facility]    Gastroenterology Daily Progress Note      Patient:   Isai Hector   :    1985   Facility:   [facility]  Date:     2022  Consultant:   SILVA Perez CNP, CNP      SUBJECTIVE  40 y.o. male admitted 2022 with Leukocytosis [D72.829]  Rectal bleeding [K62.5]  Septicemia ([address] [A41.9] and seen for abdominal pain. The pt was seen and examined. Pt continues to have RUQ pain, no emesis. Tolerating clear diet. Reports non bloody BM overnight. lft's remain elevated. Mri abd shows liver enlargement with  mild ascites and cholelithiasis.          OBJECTIVE  Scheduled Meds:   lidocaine  1 patch TransDERmal Q24H    lidocaine  1 patch TransDERmal Once    sodium chloride flush  5-40 mL IntraVENous 2 times per day    enoxaparin  40 mg SubCUTAneous Daily    folic acid  1 mg Oral Daily    nicotine  1 patch TransDERmal Nightly    pantoprazole  40 mg Oral Daily    vitamin B-1  100 mg Oral Daily    piperacillin-tazobactam  3,375 mg IntraVENous Q8H       Vital Signs:  /68   Pulse 85   Temp 98.2 °F (36.8 °C)   Resp 18   Ht 5' 8\" (1.727 m)   Wt 206 lb 2.1 oz (93.5 kg)   SpO2 92%   BMI 31.34 kg/m²      Physical Exam:     General Appearance: alert and oriented to person, place and time, well-developed and well-nourished, in no acute distress  Skin: warm and dry, no rash or erythema  Head: normocephalic and atraumatic  Eyes: pupils equal, round, and reactive to light, extraocular eye movements intact, conjunctivae normal  ENT: hearing grossly normal bilaterally  Neck: neck supple and non tender without mass, no thyromegaly or thyroid nodules, no cervical lymphadenopathy   Pulmonary/Chest: clear to auscultation bilaterally- no wheezes, rales or rhonchi, normal air movement, no respiratory distress  Cardiovascular: normal rate, regular rhythm, normal S1 and S2, no murmurs, rubs, clicks or gallops, distal pulses intact, no carotid bruits  Abdomen: soft, ruq is -tender, non-distended, normal bowel sounds, no masses liver enlargement  Extremities: no cyanosis, clubbing or edema  Musculoskeletal: normal range of motion, no joint swelling, deformity or tenderness  Neurologic: no cranial nerve deficit and muscle strength normal    Lab and Imaging Review     CBC  Recent Labs     05/24/22 0528 05/25/22  0515 05/26/22  0520   WBC 20.4* 19.0* 21.2*   HGB 8.3* 8.3* 8.4*   HCT 24.7* 24.3* 25.1*   .2* 120.1* 120.8*    367 379       BMP  Recent Labs     05/24/22  0528 05/25/22  0515 05/26/22  0520    139 140   K 4.1 3.7 4.0    107 108*   CO2 24 23 23   BUN 10 7 7   CREATININE 0.89 0.62* 0.90   GLUCOSE 83 82 106*   CALCIUM 7.7* 7.8* 7.9*       LFTS  Recent Labs     05/24/22 0528 05/25/22  0515 05/26/22  0520   ALKPHOS 253* 250* 260*   ALT 22 22 24   * 141* 165*   PROT 6.5 6.6 6.8   BILITOT 3.77* 3.49* 3.39*   BILIDIR 2.45* 2.34* 2.29*   LABALBU 2.2* 2.2* 2.3*       AMYLASE/LIPASE/AMMONIA  Recent Labs     05/23/22  1656   LIPASE 50     NM HEPATOBILIARY     Result Date: 5/24/2022  EXAMINATION: NUCLEAR MEDICINE HEPATOBILIARY SCINTIGRAPHY (HIDA SCAN) WITH EJECTION FRACTION. FINDINGS: Prompt, homogenous uptake by the liver is noted with normal appearance of radiotracer excretion into the biliary system. Clearance of bloodpool activity appears appropriate. Gallbladder and small bowel is visualized in appropriate sequence and time. Gallbladder ejection fraction measured 15%. Normal value is >33% for Ensure protocol. Note, Ensure normal range is based on a limited study.      No acute cholecystitis.  Gallbladder ejection fraction is low, which can reflect functional gallbladder disorder or incomplete absorption of the fatty meal. RECOMMENDATIONS: Unavailable      CT ABDOMEN PELVIS W IV CONTRAST Additional Contrast? None     Result Date: 4/27/2022  EXAMINATION: CT OF THE ABDOMEN AND PELVIS WITH CONTRAST 4/27/2022 7:16 pm FINDINGS: Lower Chest: Mild left and minimal right bibasilar atelectasis. The heart size is normal. Organs: The liver is enlarged. There is diffuse low attenuation of liver parenchyma. Bile ducts are not dilated. The spleen, pancreas, adrenal glands and kidneys are normal.  There are no calcified gallstones. No pericholecystic fluid. There is mild gallbladder wall thickening. GI/Bowel: Unopacified bowel loops are grossly normal.  No bowel obstruction. The appendix is normal. Pelvis: Urinary bladder is nearly empty and grossly normal. Peritoneum/Retroperitoneum: There is retroperitoneal fat stranding. There are mildly enlarged upper abdominal lymph nodes. Lymph node in the isai hepatis adjacent to the pancreatic head measures up to 2.5 cm (coronal image 50). There are mildly prominent retroperitoneal lymph nodes. There is trace free fluid in the pelvis. There is no free air. Bones/Soft Tissues: Bilateral gynecomastia and nipple piercings. There is a unilateral right-sided pars interarticularis defect at L5. No acute bone finding.      The liver is enlarged and diffusely low in attenuation. Diffuse nonspecific hepatocellular disease suspected. Bile ducts are not dilated in this patient with history of jaundice. There are no calcified gallstones. There is mild gallbladder wall thickening. Wall thickening may be reactive to adjacent liver disease. Mildly enlarged upper abdominal lymph nodes measuring up to 2.5 cm, likely reactive. Trace free fluid in the pelvis. Unilateral right-sided L5 pars interarticularis defect. RECOMMENDATIONS: Unavailable      US LIVER     Result Date: 5/23/2022  EXAMINATION: RIGHT UPPER QUADRANT ULTRASOUND 5/23/2022 1:00 pm  FINDINGS: LIVER:  Liver shows increased echogenicity suggesting hepatic steatosis without focal lesion. Gallbladder 23 cm in length. Hepatopetal flow portal vein. BILIARY SYSTEM:  Gallstones the gallbladder with wall thickening at 4 mm. No pericholecystic fluid.   Negative sonographic Goyal's sign. Common bile duct is within normal limits measuring 4.2 mm. RIGHT KIDNEY: The right kidney is grossly unremarkable without evidence of hydronephrosis. PANCREAS:  Visualized portions of the pancreas are unremarkable. OTHER: No evidence of right upper quadrant ascites.      1. Hepatomegaly and hepatic steatosis. No focal lesion. 2. Cholelithiasis/sludge with mild gallbladder wall thickening. Correlate clinically for possible cholecystitis. Consider HIDA follow-up imaging if indicated. RECOMMENDATIONS: Unavailable      FINDINGS:mri abd 5/25/22   Image quality degraded by motion artifact.       Cholelithiasis.  No gallbladder wall thickening or pericholecystic edema.  No   biliary or pancreatic ductal dilatation.  No intraductal filling defect.  No   pancreas divisum.       Marked hepatomegaly measuring 28.7 cm without significant steatosis.  No   liver lesion seen.  Hepatic vasculature is patent.       Spleen, pancreas, and adrenals are unremarkable.  Kidneys demonstrate   symmetric enhancement without hydronephrosis.  Mild ascites.  Prominent lymph   nodes noted on prior CT are unchanged and likely reactive.  Visualized   osseous structures and gastrointestinal tract are unremarkable.  Abdominal   aorta is normal caliber.       Minimal pleural effusions with adjacent atelectasis.  Mild gynecomastia.           Impression   1. Marked hepatomegaly.  While there appeared to be mild steatosis on   ultrasound and CT, no significant steatosis is seen on MRI. 2. Cholelithiasis without other findings to suggest acute cholecystitis. 3. No biliary duct dilatation or choledocholithiasis. 4. Mild ascites. ASSESSMENT/plan  1.  Abdominal pain with elevated lft's alcoholic hepatitis and liver enlargement, no ductal dilatation or choledocholithiasis on mri  -trend lft  -no ercp at this time  -avoid hepatotoxic meds    2.anemia with reported rectal bleeding  -outpt colonoscopy  -trend     D/w md rothman signing off f/u outpt    This plan was formulated in collaboration with Dr. Elvira Sibley .     Electronically signed by: SILVA Crowley - CNP on 5/26/2022 at 7:22 AM

## 2022-05-26 NOTE — PROGRESS NOTES
Ok to [employer]  Case discussed with dr T[facility] pt workup  Labs done by him  Out pt followup with him  Beny Douglas MD  5/26/2022

## 2022-05-26 NOTE — PROGRESS NOTES
[facility] Internal Medicine  Sharron Guzman MD; Tiffanie Chawla MD; Elisa Amaro MD; MD Lisa Lerma MD; MD DINESH Camacho [facility] Internal Medicine   [facility]    HISTORY AND PHYSICAL EXAMINATION            Date:   5/26/2022  Patient name:  Catie Ptaton  Date of admission:  5/21/2022  7:07 PM  MRN:   [id number]  Account:  [de-identified]  YOB: 1985  PCP:    Lisa Mccabe MD  Room:   2065/2065-01  Code Status:    Full Code    Chief Complaint:     Chief Complaint   Patient presents with    Chest Pain   dyspnea  abod pain  Dizzy      History Obtained From:     Patient medical record nursing staff    History of Present Illness:     Catie Patton is a 40 y.o. Non- / non  male who presents with Chest Pain   and is admitted to the hospital for the management of Leukocytosis. 60-year-old gentleman with history of alcohol abuse with alcoholic hepatitis without cirrhosis history of pulmonary embolism  Off anticoagulation  Daily habitual drinker recently discharged after treatment of pneumonia  5 days ago finished antibiotics complains of gradual decline in physical condition unable to sit up in her bathtub week not feeling well  Right upper quadrant pain  Fever and chills  White cell count elevated in 20,000 hypotensive tachycardic at 111      Past Medical History:     Past Medical History:   Diagnosis Date    Alcoholism ([address]     pt drinks a fifth of whiskey daily    Anemia     Cirrhosis, alcoholic ([address]     Pulmonary embolism ([address]         Past Surgical History:     Past Surgical History:   Procedure Laterality Date    UPPER GASTROINTESTINAL ENDOSCOPY N/A 5/1/2022    EGD ESOPHAGOGASTRODUODENOSCOPY performed by Alberto Butler MD at [address] [place] [address]        Medications Prior to Admission:     Prior to Admission medications    Medication Sig Start Date End Date Taking?  Authorizing Provider furosemide (LASIX) 20 MG tablet Take 1 tablet by mouth daily 5/13/22   Kera Holliday MD   albuterol sulfate HFA (VENTOLIN HFA) 108 (90 Base) MCG/ACT inhaler Inhale 2 puffs into the lungs 4 times daily as needed for Wheezing 5/3/22   Erik Copeland MD   albuterol sulfate HFA (VENTOLIN HFA) 108 (90 Base) MCG/ACT inhaler Inhale 2 puffs into the lungs 4 times daily as needed for Wheezing 5/3/22   Erik Copeland MD   carvedilol (COREG) 3.125 MG tablet Take 1 tablet by mouth 2 times daily (with meals) 5/3/22   Erik Copeland MD   folic acid (FOLVITE) 1 MG tablet Take 1 tablet by mouth daily 5/4/22   Erik Copeland MD   vitamin B-1 (THIAMINE) 100 MG tablet Take 1 tablet by mouth daily 5/4/22   Erik Copeland MD   Multiple Vitamins-Minerals (CENTRUM MEN) TABS Take 1 tablet by mouth daily    Historical Provider, MD   Menthol-Methyl Salicylate (ANALGESIC OINTMENT) OINT ointment Apply topically as needed Indications: on back    Historical Provider, MD   pantoprazole (PROTONIX) 20 MG tablet Take 2 tablets by mouth daily 4/15/22   Antoinette Meza DO   losartan (COZAAR) 50 MG tablet take 1 tablet by mouth once daily 11/16/21   Kera Holliday MD   naproxen (NAPROSYN) 500 MG tablet Take 1 tablet by mouth 2 times daily  Patient taking differently: Take 500 mg by mouth 2 times daily as needed  8/13/21 3/28/22  Gideon Blunt MD   fluticasone [facility]) 50 MCG/ACT nasal spray instill 1 spray into each nostril once daily 5/10/21   Kera Holliday MD   Blood Pressure Monitoring (BP MONITOR-STETHOSCOPE) KIT 1 each by Does not apply route daily 4/7/21   Kera Holliday MD   Nicotine 21-14-7 MG/24HR KIT Place 14 mg onto the skin nightly     Historical Provider, MD   EPINEPHrine (EPIPEN 2-LIZETTE) 0.3 MG/0.3ML SOAJ injection Inject 0.3 mLs into the muscle once for 1 dose Use as directed for allergic reaction 11/17/16 5/24/21  Chloe Sheehan MD        Allergies:     Bee venom    Social History:     Tobacco:    reports that he has been smoking cigarettes. He has a 21.00 pack-year smoking history. He has never used smokeless tobacco.  Alcohol:      reports previous alcohol use. Drug Use:  reports previous drug use. Family History:     Family History   Problem Relation Age of Onset    Heart Disease Mother     Heart Attack Mother     Heart Disease Father        Review of Systems:     Positive and Negative as described in HPI. CONSTITUTIONAL: Positive for fever chills and 25 pound weight loss  HEENT:  negative for vision, hearing changes, runny nose, throat pain  RESPIRATORY: Cough and dyspnea g  CARDIOVASCULAR:  negative for chest pain, palpitations  GASTROINTESTINAL:  negative for nausea, vomiting, diarrhea, constipation, change in bowel habits, abdominal pain   GENITOURINARY:  negative for difficulty of urination, burning with urination, frequency   INTEGUMENT:  negative for rash, skin lesions, easy bruising   HEMATOLOGIC/LYMPHATIC:  negative for swelling/edema   ALLERGIC/IMMUNOLOGIC:  negative for urticaria , itching  ENDOCRINE:  negative increase in drinking, increase in urination, hot or cold intolerance  MUSCULOSKELETAL: Positive for generalized weakness NEUROLOGICAL:  negative for headaches, dizziness, lightheadedness, numbness, pain, tingling extremities  BEHAVIOR/PSYCH:  negative for depression, anxiety    Physical Exam:   /68   Pulse 85   Temp 98.2 °F (36.8 °C)   Resp 18   Ht 5' 8\" (1.727 m)   Wt 206 lb 2.1 oz (93.5 kg)   SpO2 92%   BMI 31.34 kg/m²   Temp (24hrs), Av.5 °F (36.9 °C), Min:98.2 °F (36.8 °C), Max:98.8 °F (37.1 °C)    No results for input(s): POCGLU in the last 72 hours.     Intake/Output Summary (Last 24 hours) at 2022 1159  Last data filed at 2022 0931  Gross per 24 hour   Intake 241.81 ml   Output --   Net 241.81 ml       General Appearance: Appears tired looks pale  Mental status: oriented to person, place, and time  Head: normocephalic, atraumatic  Eye:  Icterus, no  redness, pupils equal - 1.2 mg/dL    Bilirubin, Direct 2.29 (H) <0.31 mg/dL    Bilirubin, Indirect 1.10 (H) 0.00 - 1.00 mg/dL    Total Protein 6.8 6.4 - 8.3 g/dL   Basic Metabolic Panel w/ Reflex to MG    Collection Time: 05/26/22  5:20 AM   Result Value Ref Range    Glucose 106 (H) 70 - 99 mg/dL    BUN 7 6 - 20 mg/dL    CREATININE 0.90 0.70 - 1.20 mg/dL    Calcium 7.9 (L) 8.6 - 10.4 mg/dL    Sodium 140 135 - 144 mmol/L    Potassium 4.0 3.7 - 5.3 mmol/L    Chloride 108 (H) 98 - 107 mmol/L    CO2 23 20 - 31 mmol/L    Anion Gap 9 9 - 17 mmol/L    GFR Non-African American >60 >60 mL/min    GFR African American >60 >60 mL/min    GFR Comment             Imaging/Diagnostics:  XR CHEST PORTABLE    Result Date: 5/21/2022  No acute process. CT CHEST PULMONARY EMBOLISM W CONTRAST    Result Date: 5/21/2022  Study nondiagnostic for smaller more peripheral PE; no large or central PE suspected, as discussed above. Compared to 04/30/2022, smaller left pleural effusion, new trace right pleural effusion and persistent bilateral atelectatic appearing changes, as above. Left cardiac enlargement, especially LV. Hepatomegaly, steatosis, small volume ascites, and additional findings as above.        Assessment :      Hospital Problems           Last Modified POA    * (Principal) Leukocytosis 5/21/2022 Yes    Left lower lobe pneumonia 5/22/2022 Yes    Septicemia ([address] 5/23/2022 Yes    Right upper quadrant abdominal pain 5/25/2022 Yes    HCAP (healthcare-associated pneumonia) 5/25/2022 Yes    Acute pulmonary embolism ([address] 5/22/2022 Yes    Chronic systolic congestive heart failure ([address] 0/49/4117 Yes    Alcoholic cardiomyopathy ([address] 4/81/5468 Yes    Alcoholic hepatitis without ascites 5/22/2022 Yes          Plan:     Patient status inpatient in the Progressive Unit/Step down  80-year-old gentleman class I obese BMI 31  History of pulmonary embolism 2 years ago off anticoagulation after 6 months  Habitual alcohol abuse alcoholic hepatitis without cirrhosis  Quit on April 20   Recently discharged after treatment for IV antibiotics pneumonia  Readmitted with weak chills cough right upper quadrant pain  Hypotensive systolic in 79O tachycardic heart rate in 11 fever WBC count 20,000  Require inpatient admission for about reasons and also has alcoholic hepatitis  And failed IV antibiotics and treatment we will consult infectious disease IV Vanco and Zosyn  Rule out cholecystitis right upper quadrant ultrasound may need a HIDA scan  CT scan no pulmonary embolism  Chronic systolic congestive heart failure secondary to alcoholic cardiomyopathy ejection fraction 35 to 40%  Suspected healthcare pneumonia possible MRSA pneumonia possible gram-negative pneumonia  25 lbs wt loss in last one and a half month  Antibiotic infectious disease as above  dvt prophy    May 26  Non surg per surg  Hem onc input for persistant leukocytosis  With no infection  mrcp and hida noted        Consultations:   IP CONSULT TO FAMILY MEDICINE  IP CONSULT TO INFECTIOUS DISEASES  IP CONSULT TO GENERAL SURGERY  IP CONSULT TO GI  IP CONSULT TO HEM/ONC     Patient is admitted as inpatient status because of co-morbidities listed above, severity of signs and symptoms as outlined, requirement for current medical therapies and most importantly because of direct risk to patient if care not provided in a hospital setting. Expected length of stay > 48 hours. Halie Castillo MD  5/26/2022  11:59 AM    Copy sent to Dr. Jovon Malave MD    Please note that this chart was generated using voice recognition Dragon dictation software. Although every effort was made to ensure the accuracy of this automated transcription, some errors in transcription may have occurred.

## 2022-05-26 NOTE — CONSULTS
Today's Date: 5/26/2022  Patient Name: Ángel Rivera  Date of admission: 5/21/2022  7:07 PM  Patient's age: 40 y.o., 1985  Admission Dx: Leukocytosis [D72.829]  Rectal bleeding [K62.5]  Septicemia ([facility] [A41.9]    Reason for Consult: Leukocytosis  Requesting Physician: Moise Emery MD    CHIEF COMPLAINT:    Chief Complaint   Patient presents with    Chest Pain       History Obtained From:  patient and chart    HISTORY OF PRESENT ILLNESS:      Ángel Rivera is a 40 y.o. male who is admitted to the hospital on 5/21/2022  for pain. Patient has history of alcohol abuse and alcoholic hepatitis and also history of pulmonary embolism in past.  Patient has been treated with right upper quadrant abdominal pain. He also reportedly had some fever and chills. On admission he was noted to be tachycardic and hypotensive. He also reported symptoms of rectal bleeding off and on. He had ultrasound of the liver which showed hepatomegaly and hepatic steatosis. Cholelithiasis sludge noted with mild gallbladder wall thickening. He had HIDA scan which was negative for acute cholecystitis. Patient has been evaluated by gastroenterology as well as general surgery. His CBC showed elevated WBCs and also anemia with hemoglobin around 8 and therefore hematology was consulted for evaluation of his leukocytosis and anemia. His ferritin is elevated at 1/2/2002, iron saturation 63% he has low folic acid level and B81 is within normal limit. Past Medical History:   has a past medical history of Alcoholism ([address] Anemia, Cirrhosis, alcoholic ([address] and Pulmonary embolism ([address] Past Surgical History:   has a past surgical history that includes Upper gastrointestinal endoscopy (N/A, 5/1/2022). Medications:    Prior to Admission medications    Medication Sig Start Date End Date Taking?  Authorizing Provider   furosemide (LASIX) 20 MG tablet Take 1 tablet by mouth daily 5/13/22   Leni Castro MD albuterol sulfate HFA (VENTOLIN HFA) 108 (90 Base) MCG/ACT inhaler Inhale 2 puffs into the lungs 4 times daily as needed for Wheezing 5/3/22   Jordyn Shen MD   albuterol sulfate HFA (VENTOLIN HFA) 108 (90 Base) MCG/ACT inhaler Inhale 2 puffs into the lungs 4 times daily as needed for Wheezing 5/3/22   Jordyn Shen MD   carvedilol (COREG) 3.125 MG tablet Take 1 tablet by mouth 2 times daily (with meals) 5/3/22   Jordyn Shen MD   folic acid (FOLVITE) 1 MG tablet Take 1 tablet by mouth daily 5/4/22   Jordyn Shen MD   vitamin B-1 (THIAMINE) 100 MG tablet Take 1 tablet by mouth daily 5/4/22   Jordyn Shen MD   Multiple Vitamins-Minerals (CENTRUM MEN) TABS Take 1 tablet by mouth daily    Historical Provider, MD   Menthol-Methyl Salicylate (ANALGESIC OINTMENT) OINT ointment Apply topically as needed Indications: on back    Historical Provider, MD   pantoprazole (PROTONIX) 20 MG tablet Take 2 tablets by mouth daily 4/15/22   Gorge Perry DO   losartan (COZAAR) 50 MG tablet take 1 tablet by mouth once daily 11/16/21   Ashley Nuñez MD   naproxen (NAPROSYN) 500 MG tablet Take 1 tablet by mouth 2 times daily  Patient taking differently: Take 500 mg by mouth 2 times daily as needed  8/13/21 3/28/22  Ila Beck MD   fluticasone [facility]) 50 MCG/ACT nasal spray instill 1 spray into each nostril once daily 5/10/21   Ashley Nuñez MD   Blood Pressure Monitoring (BP MONITOR-STETHOSCOPE) KIT 1 each by Does not apply route daily 4/7/21   Ashley Nuñez MD   Nicotine 21-14-7 MG/24HR KIT Place 14 mg onto the skin nightly     Historical Provider, MD   EPINEPHrine (EPIPEN 2-LIZETTE) 0.3 MG/0.3ML SOAJ injection Inject 0.3 mLs into the muscle once for 1 dose Use as directed for allergic reaction 11/17/16 5/24/21  Dorota Butler MD     Current Facility-Administered Medications   Medication Dose Route Frequency Provider Last Rate Last Admin    lidocaine 4 % external patch 1 patch  1 patch TransDERmal Q24H Rick S smokeless tobacco. He reports previous alcohol use. He reports previous drug use. Family History: family history includes Heart Attack in his mother; Heart Disease in his father and mother. REVIEW OF SYSTEMS:    Constitutional: No fever or chills. No night sweats, no weight loss   Eyes: No eye discharge, double vision, or eye pain   HEENT: negative for sore mouth, sore throat, hoarseness and voice change   Respiratory: negative for cough , sputum, dyspnea, wheezing, hemoptysis, chest pain   Cardiovascular: negative for chest pain, dyspnea, palpitations, orthopnea, PND   Gastrointestinal: negative for nausea, vomiting, diarrhea, constipation,++ abdominal pain, Dysphagia, hematemesis and +hematochezia   Genitourinary: negative for frequency, dysuria, nocturia, urinary incontinence, and hematuria   Integument: negative for rash, skin lesions, bruises.    Hematologic/Lymphatic: negative for easy bruising, bleeding, lymphadenopathy, or petechiae   Endocrine: negative for heat or cold intolerance,weight changes, change in bowel habits and hair loss   Musculoskeletal: negative for myalgias, arthralgias, pain, joint swelling,and bone pain   Neurological: negative for headaches, dizziness, seizures, weakness, numbness    PHYSICAL EXAM:      /68   Pulse 85   Temp 98.2 °F (36.8 °C)   Resp 18   Ht 5' 8\" (1.727 m)   Wt 206 lb 2.1 oz (93.5 kg)   SpO2 92%   BMI 31.34 kg/m²    Temp (24hrs), Av.5 °F (36.9 °C), Min:98.2 °F (36.8 °C), Max:98.8 °F (37.1 °C)    General appearance - well appearing, no in pain or distress   Mental status - alert and cooperative   Eyes - pupils equal and reactive, extraocular eye movements intact   Ears - bilateral TM's and external ear canals normal   Mouth - mucous membranes moist, pharynx normal without lesions   Neck - supple, no significant adenopathy   Lymphatics - no palpable lymphadenopathy, no hepatosplenomegaly   Chest - clear to auscultation, no wheezes, rales or rhonchi, symmetric air entry   Heart - normal rate, regular rhythm, normal S1, S2, no murmurs  Abdomen - soft, nontender, nondistended, no masses or organomegaly   Neurological - alert, oriented, normal speech, no focal findings or movement disorder noted   Musculoskeletal - no joint tenderness, deformity or swelling   Extremities - peripheral pulses normal, no pedal edema, no clubbing or cyanosis   Skin - normal coloration and turgor, no rashes, no suspicious skin lesions noted ,    DATA:    Labs:   CBC:   Recent Labs     05/25/22  0515 05/26/22  0520   WBC 19.0* 21.2*   HGB 8.3* 8.4*   HCT 24.3* 25.1*    379     BMP:   Recent Labs     05/25/22 0515 05/26/22  0520    140   K 3.7 4.0   CO2 23 23   BUN 7 7   CREATININE 0.62* 0.90   LABGLOM >60 >60   GLUCOSE 82 106*     PT/INR: No results for input(s): PROTIME, INR in the last 72 hours. IMAGING DATA:  MRI ABDOMEN W WO CONTRAST MRCP   Final Result   1. Marked hepatomegaly. While there appeared to be mild steatosis on   ultrasound and CT, no significant steatosis is seen on MRI. 2. Cholelithiasis without other findings to suggest acute cholecystitis. 3. No biliary duct dilatation or choledocholithiasis. 4. Mild ascites. NM HEPATOBILIARY   Final Result   No acute cholecystitis. Gallbladder ejection fraction is low, which can reflect functional   gallbladder disorder or incomplete absorption of the fatty meal.      RECOMMENDATIONS:   Unavailable         US LIVER   Final Result   1. Hepatomegaly and hepatic steatosis. No focal lesion. 2. Cholelithiasis/sludge with mild gallbladder wall thickening. Correlate   clinically for possible cholecystitis. Consider HIDA follow-up imaging if   indicated. RECOMMENDATIONS:   Unavailable         CT CHEST PULMONARY EMBOLISM W CONTRAST   Final Result   Study nondiagnostic for smaller more peripheral PE; no large or central PE   suspected, as discussed above.       Compared to 04/30/2022, smaller left pleural effusion, new trace right   pleural effusion and persistent bilateral atelectatic appearing changes, as   above. Left cardiac enlargement, especially LV. Hepatomegaly, steatosis, small volume ascites, and additional findings as   above. XR CHEST PORTABLE   Final Result   No acute process. Primary Problem  Leukocytosis    Active Hospital Problems    Diagnosis Date Noted    Right upper quadrant abdominal pain [R10.11]      Priority: Medium    HCAP (healthcare-associated pneumonia) [J18.9]      Priority: Medium    Septicemia ([address] [A41.9]      Priority: Medium    Leukocytosis [D72.829] 05/21/2022     Priority: Medium    Left lower lobe pneumonia [J18.9] 04/27/2022     Priority: Medium    Alcoholic hepatitis without ascites [K70.10] 30/69/9842    Alcoholic cardiomyopathy ([facility] [I42.6] 07/07/2020    Chronic systolic congestive heart failure ([facility] [I50.22] 05/20/2020    Acute pulmonary embolism ([facility] [I26.99] 05/16/2020         IMPRESSION:   1. Leukocytosis  2. Anemia  3. Iron overload with ferritin 1/2/2002 and iron saturation 66%  4. Abdominal pain  5. Alcohol abuse  6. History of PE  7. Alcoholic cardiomyopathy  8. Cholelithiasis    RECOMMENDATIONS:  1. I reviewed lab work, imaging studies, prior records, discussed diagnosis and treatment recommendations  2. His iron studies suggest iron overload with iron saturation 66% raises possibility of underlying hemochromatosis especially with his elevated ferritin level. I will check for hemochromatosis  3. Patient would be candidate for phlebotomies if his iron saturation is persistently elevated. This can be arranged as outpatient  4. Leukocytosis likely reactive from his acute issues and has predominantly neutrophilia but he has mild monocytosis  5. Check EBV, blood smear and flow cytometry. I will also order FISH and recommendation  6. Acute on chronic anemia could be from his rectal bleeding  7.  Continue other management as per primary team  8. Okay fro discharge' follow with oncology in 3-4 week    Discussed with patient and Nurse. Thank you for asking us to see this patient. Jeff Lopez MD  Hematologist/Medical Oncologist    Cell: [phone]    This note is created with the assistance of a speech recognition program.  While intending to generate a document that actually reflects the content of the visit, the document can still have some errors including those of syntax and sound a like substitutions which may escape proof reading. It such instances, actual meaning can be extrapolated by contextual diversion.

## 2022-05-26 NOTE — PROGRESS NOTES
[facility]                 PATIENT NAME: Rosa M Maya     TODAY'S DATE: 5/26/2022, 9:33 AM    SUBJECTIVE:    Pt seen and examined. Afebrile, VSS. Leukocytosis slightly increased today, hemoglobin stable. LFT's also slightly increased today, bilirubin still trending down. Patient states he feels the same today. Still c/o RUQ abdominal pain. Had a non-bloody BM. Tolerating diet, no N/V. MRCP negative for choledocholithiasis. OBJECTIVE:   VITALS:  /68   Pulse 85   Temp 98.2 °F (36.8 °C)   Resp 18   Ht 5' 8\" (1.727 m)   Wt 206 lb 2.1 oz (93.5 kg)   SpO2 92%   BMI 31.34 kg/m²      INTAKE/OUTPUT:      Intake/Output Summary (Last 24 hours) at 5/26/2022 0933  Last data filed at 5/26/2022 0931  Gross per 24 hour   Intake 251.81 ml   Output --   Net 251.81 ml                 CONSTITUTIONAL:  awake and alert.   No acute distress  HEART:   RRR  LUNGS:   CTA  ABDOMEN:   Abdomen soft, RUQ tender, non-distended  EXTREMITIES:   No pedal edema    Data:  CBC:   Lab Results   Component Value Date    WBC 21.2 05/26/2022    RBC 2.08 05/26/2022    HGB 8.4 05/26/2022    HCT 25.1 05/26/2022    .8 05/26/2022    MCH 40.4 05/26/2022    MCHC 33.4 05/26/2022    RDW 14.6 05/26/2022     05/26/2022    MPV 8.1 05/26/2022     BMP:    Lab Results   Component Value Date     05/26/2022    K 4.0 05/26/2022     05/26/2022    CO2 23 05/26/2022    BUN 7 05/26/2022    LABALBU 2.3 05/26/2022    CREATININE 0.90 05/26/2022    CALCIUM 7.9 05/26/2022    GFRAA >60 05/26/2022    LABGLOM >60 05/26/2022    GLUCOSE 106 05/26/2022     Hepatic Function Panel:    Lab Results   Component Value Date    ALKPHOS 260 05/26/2022    ALT 24 05/26/2022     05/26/2022    PROT 6.8 05/26/2022    BILITOT 3.39 05/26/2022    BILIDIR 2.29 05/26/2022    IBILI 1.10 05/26/2022    LABALBU 2.3 05/26/2022       Radiology Review:      MRI ABDOMEN W WO CONTRAST MRCP [[id number]] Collected: 05/25/22 1336      Order Status: Completed Updated: 05/25/22 2221     Narrative:       EXAMINATION:   MRI OF THE ABDOMEN WITH AND WITHOUT CONTRAST AND MRCP 5/25/2022 12:24 pm     TECHNIQUE:   Multiplanar multisequence MRI of the abdomen was performed without and the   administration of 20 mL ProHance intravenous contrast.  After initial T2   axial and coronal images, thick slab, thin slab and 3D coronal MRCP sequences   were obtained without the administration of intravenous contrast.  MIP images   are provided for review. COMPARISON:   Ultrasound on 05/23/2022, CT on 04/27/2020     HISTORY:   Hepatic steatosis.  Cholelithiasis. FINDINGS:   Image quality degraded by motion artifact. Cholelithiasis.  No gallbladder wall thickening or pericholecystic edema.  No   biliary or pancreatic ductal dilatation.  No intraductal filling defect.  No   pancreas divisum. Marked hepatomegaly measuring 28.7 cm without significant steatosis.  No   liver lesion seen.  Hepatic vasculature is patent. Spleen, pancreas, and adrenals are unremarkable. Linell Star demonstrate   symmetric enhancement without hydronephrosis.  Mild ascites.  Prominent lymph   nodes noted on prior CT are unchanged and likely reactive.  Visualized   osseous structures and gastrointestinal tract are unremarkable.  Abdominal   aorta is normal caliber. Minimal pleural effusions with adjacent atelectasis.  Mild gynecomastia.      Impression:       1. Marked hepatomegaly.  While there appeared to be mild steatosis on   ultrasound and CT, no significant steatosis is seen on MRI. 2. Cholelithiasis without other findings to suggest acute cholecystitis. 3. No biliary duct dilatation or choledocholithiasis. 4. Mild ascites.       ASSESSMENT     Principal Problem:    Leukocytosis  Active Problems:    Left lower lobe pneumonia    Septicemia (HCC)    Right upper quadrant abdominal pain    HCAP (healthcare-associated pneumonia)    Acute pulmonary embolism ([facility]    Chronic systolic congestive heart failure (HCC)    Alcoholic cardiomyopathy ([facility] [facility]    Alcoholic hepatitis without ascites  Resolved Problems:    * No resolved hospital problems. *      Plan  1. Diet as tolerated  2. Antibiotics per ID  3. No general surgery intervention at this time; stable for discharge  4. GI following  5. Continue medical management   6. WBC count persistently high. Recommend hematology evaluation. Discussed with charge nurse. I will defer hematology evaluation to admitting physician given persistently elevated WBC count.       Electronically signed by Greg Villanueva PA-C  [id number] [address]

## 2022-05-26 NOTE — PLAN OF CARE
Problem: Discharge Planning  Goal: Discharge to home or other facility with appropriate resources  5/26/2022 0335 by Tony Medeiros RN  Outcome: Progressing  Flowsheets (Taken 5/25/2022 2100)  Discharge to home or other facility with appropriate resources: Identify barriers to discharge with patient and caregiver  Note: Plan to discharge tomorrow     Problem: Pain  Goal: Verbalizes/displays adequate comfort level or baseline comfort level  5/26/2022 0335 by Tony Medeiros RN  Outcome: Progressing  Note: Patient expresses relief following administration of prn pain medication. Problem: ABCDS Injury Assessment  Goal: Absence of physical injury  5/26/2022 0335 by Tony Medeiros RN  Outcome: Progressing  Flowsheets (Taken 5/25/2022 2046)  Absence of Physical Injury: Implement safety measures based on patient assessment     Problem: Chronic Conditions and Co-morbidities  Goal: Patient's chronic conditions and co-morbidity symptoms are monitored and maintained or improved  5/26/2022 0335 by Tony Medeiros RN  Outcome: Progressing  Flowsheets (Taken 5/25/2022 2100)  Care Plan - Patient's Chronic Conditions and Co-Morbidity Symptoms are Monitored and Maintained or Improved: Monitor and assess patient's chronic conditions and comorbid symptoms for stability, deterioration, or improvement     Problem: Safety - Adult  Goal: Free from fall injury  5/26/2022 0335 by Tony Medeiros RN  Outcome: Progressing  Flowsheets (Taken 5/25/2022 2046)  Free From Fall Injury: Instruct family/caregiver on patient safety  Note: Patient remains free of incidence/ injury. Bed remains in low position. Call light within reach. Side rails up x2.

## 2022-05-29 LAB
MISCELLANEOUS LAB TEST RESULT: NORMAL
TEST NAME: NORMAL

## 2022-05-31 LAB
C282Y HEMOCHROMATOSIS MUT: NEGATIVE
EBV EARLY ANTIGEN IGG: 41 U/ML
EBV INTERPRETATION: ABNORMAL
EBV NUCLEAR AG AB: 680 U/ML
EPSTEIN-BARR VCA IGG: 794 U/ML
EPSTEIN-BARR VCA IGM: 28 U/ML
H63D HEMOCHROMATOSIS MUT: NEGATIVE
HEMOCHROMATOSIS MUTATION INT: NORMAL
HEMOCHROMATOSIS SPECIMEN: NORMAL
MOLECULAR CYTOGENIC FISH: NORMAL
S65C HEMOCHROMATOSIS MUT: NEGATIVE

## 2022-06-01 ENCOUNTER — TELEPHONE (OUTPATIENT)
Dept: INTERNAL MEDICINE CLINIC | Age: 37
End: 2022-06-01

## 2022-06-02 ENCOUNTER — TELEPHONE (OUTPATIENT)
Dept: ONCOLOGY | Age: 37
End: 2022-06-02

## 2022-06-02 NOTE — TELEPHONE ENCOUNTER
RECEIVED AN INBASKET MESSAGE FROM DR Gera Cohen STATING PT NEEDS A F/U IN 4-5 WEEKS. WRITER CALLED PT, NO ANSWER, LEFT VM.

## 2022-06-03 LAB — SURGICAL PATHOLOGY REPORT: NORMAL

## 2022-06-06 ENCOUNTER — TELEPHONE (OUTPATIENT)
Dept: ONCOLOGY | Age: 37
End: 2022-06-06

## 2022-06-06 NOTE — TELEPHONE ENCOUNTER
RECEIVED A MESSAGE STATING PT CALLED BACK AND NEEDS TO SET UP APPT. WRITER CALLED TO SCHEDULE APPT, NO ANSWER LEFT VM.

## 2022-06-08 LAB
JAK2 QNT, SOURCE: NORMAL
V617 MUTATION, PERCENT: 0 %
V617F MUTATION, QNT: NOT DETECTED

## 2022-06-15 NOTE — DISCHARGE SUMMARY
[place] [address] Internal Medicine    Discharge Summary     Patient ID: Tracie Rasmussen  :  1985   MRN: [id number]     ACCOUNT:  [de-identified]   Patient's PCP: No primary care provider on file.   Admit Date: 2022   Discharge Date: 6/15/2022    Length of Stay: 5  Code Status:  Prior  Admitting Physician: Sanna Riley MD  Discharge Physician: Sanna Riley MD     Active Discharge Diagnoses:     Primary Problem  Leukocytosis      Hospital Problems  Active Hospital Problems    Diagnosis Date Noted    Iron overload [E83.19]      Priority: Medium    Right upper quadrant abdominal pain [R10.11]      Priority: Medium    HCAP (healthcare-associated pneumonia) [J18.9]      Priority: Medium    Septicemia ([address] [A41.9]      Priority: Medium    Leukocytosis [D72.829] 2022     Priority: Medium    Left lower lobe pneumonia [J18.9] 2022     Priority: Medium    Alcoholic hepatitis without ascites [K70.10]     Alcoholic cardiomyopathy ([facility] [address] [I42.6] 2020    Chronic systolic congestive heart failure ([address] [I50.22] 2020    Acute pulmonary embolism ([facility] [address] [I26.99] 2020       Admission Condition:  fair     Discharged Condition: fair    Hospital Stay:     Hospital Course:  Tracie Rasmussen is a 40 y.o. male who was admitted for the management of Leukocytosis , presented to ER with Chest Pain  81-year-old gentleman class I obese BMI 31  History of pulmonary embolism 2 years ago off anticoagulation after 6 months  Habitual alcohol abuse alcoholic hepatitis without cirrhosis  Quit on    Recently discharged after treatment for IV antibiotics pneumonia  Readmitted with weak chills cough right upper quadrant pain  Hypotensive systolic in 65J tachycardic heart rate in 11 fever WBC count 20,000  Require inpatient admission for about reasons and also has alcoholic hepatitis  And failed IV antibiotics and treatment we will consult infectious disease IV Vanco and Zosyn  Rule out cholecystitis right upper quadrant ultrasound may need a HIDA scan  CT scan no pulmonary embolism  Chronic systolic congestive heart failure secondary to alcoholic cardiomyopathy ejection fraction 35 to 40%  Suspected healthcare pneumonia possible MRSA pneumonia possible gram-negative pneumonia  25 lbs wt loss in last one and a half month    HIDA low ef but no cholecystitis  Hem onc eval  Iron def  Out pt eval by hem onc      Significant therapeutic interventions:     Significant Diagnostic Studies:   Labs / Micro:        ,     Radiology:    NM HEPATOBILIARY    Result Date: 5/24/2022  EXAMINATION: NUCLEAR MEDICINE HEPATOBILIARY SCINTIGRAPHY (HIDA SCAN) WITH EJECTION FRACTION. TECHNIQUE: Approximately 6.2 millicuries SB95D Mebrofenin (Choletec) was administered IV. Then, dynamic images of the abdomen were obtained in the anterior projection for 60 mins. A right lateral view was also obtained at 60 mins. Due to a shortage/inavailability of CCK, one can (237 ml) Ensure plus was substitued orally. Images were obtained in the Wolof projection and regions of interest were drawn around the gallbladder and ejection fraction was calculated. COMPARISON: No prior for comparison. HISTORY: ORDERING SYSTEM PROVIDED HISTORY: rule out cholecystits TECHNOLOGIST PROVIDED HISTORY: rule out cholecystits Reason for Exam: rule out cholecystits FINDINGS: Prompt, homogenous uptake by the liver is noted with normal appearance of radiotracer excretion into the biliary system. Clearance of bloodpool activity appears appropriate. Gallbladder and small bowel is visualized in appropriate sequence and time. Gallbladder ejection fraction measured 15%. Normal value is >33% for Ensure protocol. Note, Ensure normal range is based on a limited study. No acute cholecystitis.  Gallbladder ejection fraction is low, which can reflect functional gallbladder disorder or incomplete absorption of the fatty meal. RECOMMENDATIONS: Unavailable     US LIVER    Result Date: 5/23/2022  EXAMINATION: RIGHT UPPER QUADRANT ULTRASOUND 5/23/2022 1:00 pm COMPARISON: None. HISTORY: ORDERING SYSTEM PROVIDED HISTORY: rule otu cholecystits TECHNOLOGIST PROVIDED HISTORY: rule otu cholecystits FINDINGS: LIVER:  Liver shows increased echogenicity suggesting hepatic steatosis without focal lesion. Gallbladder 23 cm in length. Hepatopetal flow portal vein. BILIARY SYSTEM:  Gallstones the gallbladder with wall thickening at 4 mm. No pericholecystic fluid. Negative sonographic Goyal's sign. Common bile duct is within normal limits measuring 4.2 mm. RIGHT KIDNEY: The right kidney is grossly unremarkable without evidence of hydronephrosis. PANCREAS:  Visualized portions of the pancreas are unremarkable. OTHER: No evidence of right upper quadrant ascites. 1. Hepatomegaly and hepatic steatosis. No focal lesion. 2. Cholelithiasis/sludge with mild gallbladder wall thickening. Correlate clinically for possible cholecystitis. Consider HIDA follow-up imaging if indicated. RECOMMENDATIONS: Unavailable     XR CHEST PORTABLE    Result Date: 5/21/2022  EXAMINATION: ONE XRAY VIEW OF THE CHEST 5/21/2022 7:19 pm COMPARISON: 05/02/2022 HISTORY: ORDERING SYSTEM PROVIDED HISTORY: chest pain TECHNOLOGIST PROVIDED HISTORY: chest pain Reason for Exam: Chest pain Relevant Medical/Surgical History: PAtient rcently had pneumonia and acute liver failure. Stopped taking medication, and is now feeling ill. FINDINGS: The lungs are without acute focal process. There is no effusion or pneumothorax. The cardiomediastinal silhouette is stable. The osseous structures are stable. No acute process. CT CHEST PULMONARY EMBOLISM W CONTRAST    Result Date: 5/21/2022  EXAMINATION: CTA OF THE CHEST 5/21/2022 8:10 pm TECHNIQUE: CTA of the chest was performed after the administration of intravenous contrast.  Multiplanar reformatted images are provided for review.   MIP images are provided for review. Automated exposure control, iterative reconstruction, and/or weight based adjustment of the mA/kV was utilized to reduce the radiation dose to as low as reasonably achievable. COMPARISON: None. HISTORY: ORDERING SYSTEM PROVIDED HISTORY: chest pain, PE TECHNOLOGIST PROVIDED HISTORY: chest pain, PE Decision Support Exception - unselect if not a suspected or confirmed emergency medical condition->Emergency Medical Condition (MA) Reason for Exam: patient c/o chest pain and sob for 4 days FINDINGS: Pulmonary Arteries: Pulmonary arteries are suboptimally opacified for evaluation and there is considerable breath hold artifact; study is nondiagnostic for pulmonary embolism assessment, although no large or central PE suspected and main pulmonary artery is normal in caliber. Mediastinum: No evidence of mediastinal lymphadenopathy. The heart and pericardium demonstrate no acute abnormality; mild dilatation left heart chambers, especially LV compared to the previous study. No pericardial effusion. There is no acute abnormality of the thoracic aorta. Lungs/pleura: Previous left pleural effusion is smaller in size with less associated LLL and lingular atelectasis. New tiny right pleural effusion with mild right basilar atelectasis. Increased bilateral subsegmental atelectasis along the minor fissure and in the lingula. Persistent interstitial prominence but no interlobular septal thickening. No focal consolidation or clear cut pulmonary edema. No pneumothorax. Upper Abdomen: Limited images of the upper abdomen again show hepatomegaly with heterogeneous diminished attenuation, small volume ascites and probable varices and splenomegaly. Soft Tissues/Bones: No acute bone or soft tissue abnormality. Study nondiagnostic for smaller more peripheral PE; no large or central PE suspected, as discussed above.  Compared to 04/30/2022, smaller left pleural effusion, new trace right pleural effusion and persistent bilateral atelectatic appearing changes, as above. Left cardiac enlargement, especially LV. Hepatomegaly, steatosis, small volume ascites, and additional findings as above. MRI ABDOMEN W WO CONTRAST MRCP    Result Date: 5/25/2022  EXAMINATION: MRI OF THE ABDOMEN WITH AND WITHOUT CONTRAST AND MRCP 5/25/2022 12:24 pm TECHNIQUE: Multiplanar multisequence MRI of the abdomen was performed without and the administration of 20 mL ProHance intravenous contrast.  After initial T2 axial and coronal images, thick slab, thin slab and 3D coronal MRCP sequences were obtained without the administration of intravenous contrast.  MIP images are provided for review. COMPARISON: Ultrasound on 05/23/2022, CT on 04/27/2020 HISTORY: Hepatic steatosis. Cholelithiasis. FINDINGS: Image quality degraded by motion artifact. Cholelithiasis. No gallbladder wall thickening or pericholecystic edema. No biliary or pancreatic ductal dilatation. No intraductal filling defect. No pancreas divisum. Marked hepatomegaly measuring 28.7 cm without significant steatosis. No liver lesion seen. Hepatic vasculature is patent. Spleen, pancreas, and adrenals are unremarkable. Kidneys demonstrate symmetric enhancement without hydronephrosis. Mild ascites. Prominent lymph nodes noted on prior CT are unchanged and likely reactive. Visualized osseous structures and gastrointestinal tract are unremarkable. Abdominal aorta is normal caliber. Minimal pleural effusions with adjacent atelectasis. Mild gynecomastia. 1. Marked hepatomegaly. While there appeared to be mild steatosis on ultrasound and CT, no significant steatosis is seen on MRI. 2. Cholelithiasis without other findings to suggest acute cholecystitis. 3. No biliary duct dilatation or choledocholithiasis. 4. Mild ascites.          Consultations:    Consults:     Final Specialist Recommendations/Findings:   IP CONSULT TO FAMILY MEDICINE  IP CONSULT TO INFECTIOUS DISEASES  IP CONSULT TO GENERAL SURGERY  IP CONSULT TO GI  IP CONSULT TO HEM/ONC      The patient was seen and examined on day of discharge and this discharge summary is in conjunction with any daily progress note from day of discharge.     Discharge plan:     Disposition: Home    Physician Follow Up:     Angelina Tang MD  [address] [facility]  [address] [place] [id number]  [phone]    Go on 6/1/2022  [facility] Follow Up @ 1:45pm    Sudhir Armenta MD  [address] [facility] [address] [facility]  [phone]    Schedule an appointment as soon as possible for a visit         Requiring Further Evaluation/Follow Up POST HOSPITALIZATION/Incidental Findings:    Diet: cardiac diet    Activity: As tolerated    Instructions to Patient:     Discharge Medications:      Medication List      CONTINUE taking these medications    * albuterol sulfate  (90 Base) MCG/ACT inhaler  Commonly known as: Ventolin HFA  Inhale 2 puffs into the lungs 4 times daily as needed for Wheezing     * albuterol sulfate  (90 Base) MCG/ACT inhaler  Commonly known as: Ventolin HFA  Inhale 2 puffs into the lungs 4 times daily as needed for Wheezing     analgesic ointment Oint ointment     BP Monitor-Stethoscope Kit  1 each by Does not apply route daily     Centrum Men Tabs     EPINEPHrine 0.3 MG/0.3ML Soaj injection  Commonly known as: EpiPen 2-Rafat  Inject 0.3 mLs into the muscle once for 1 dose Use as directed for allergic reaction     fluticasone 50 MCG/ACT nasal spray  Commonly known as: FLONASE  instill 1 spray into each nostril once daily     folic acid 1 MG tablet  Commonly known as: FOLVITE  Take 1 tablet by mouth daily     furosemide 20 MG tablet  Commonly known as: Lasix  Take 1 tablet by mouth daily     losartan 50 MG tablet  Commonly known as: COZAAR  take 1 tablet by mouth once daily     Nicotine 21-14-7 MG/24HR Kit     pantoprazole 20 MG tablet  Commonly known as: Protonix  Take 2 tablets by mouth daily     vitamin B-1 100 MG tablet  Commonly known as: THIAMINE  Take 1 tablet by mouth daily         * This list has 2 medication(s) that are the same as other medications prescribed for you. Read the directions carefully, and ask your doctor or other care provider to review them with you. STOP taking these medications    carvedilol 3.125 MG tablet  Commonly known as: COREG     naproxen 500 MG tablet  Commonly known as: Naprosyn            Time Spent on discharge is  35 mins in patient examination, evaluation, counseling as well as medication reconciliation, prescriptions for required medications, discharge plan and follow up. Electronically signed by   Elda Deshpande MD  6/15/2022  6:50 PM      Thank you Dr. Radames Brennan primary care provider on file. for the opportunity to be involved in this patient's care.

## 2022-07-07 ENCOUNTER — HOSPITAL ENCOUNTER (OUTPATIENT)
Facility: MEDICAL CENTER | Age: 37
End: 2022-07-07

## 2022-10-14 ENCOUNTER — HOSPITAL ENCOUNTER (EMERGENCY)
Age: 37
Discharge: HOME | End: 2022-10-15
Attending: STUDENT IN AN ORGANIZED HEALTH CARE EDUCATION/TRAINING PROGRAM
Payer: MEDICAID

## 2022-10-14 DIAGNOSIS — R11.2 NAUSEA AND VOMITING, UNSPECIFIED VOMITING TYPE: Primary | ICD-10-CM

## 2022-10-14 DIAGNOSIS — R07.9 CHEST PAIN, UNSPECIFIED TYPE: ICD-10-CM

## 2022-10-14 PROCEDURE — 93005 ELECTROCARDIOGRAM TRACING: CPT

## 2022-10-14 PROCEDURE — 96374 THER/PROPH/DIAG INJ IV PUSH: CPT

## 2022-10-14 PROCEDURE — 99285 EMERGENCY DEPT VISIT HI MDM: CPT

## 2022-10-14 ASSESSMENT — PAIN SCALES - GENERAL: PAINLEVEL_OUTOF10: 7

## 2022-10-14 ASSESSMENT — PAIN - FUNCTIONAL ASSESSMENT: PAIN_FUNCTIONAL_ASSESSMENT: 0-10

## 2022-10-15 ENCOUNTER — APPOINTMENT (OUTPATIENT)
Dept: CT IMAGING | Age: 37
End: 2022-10-15
Payer: MEDICAID

## 2022-10-15 ENCOUNTER — APPOINTMENT (OUTPATIENT)
Dept: GENERAL RADIOLOGY | Age: 37
End: 2022-10-15
Payer: MEDICAID

## 2022-10-15 VITALS
SYSTOLIC BLOOD PRESSURE: 120 MMHG | TEMPERATURE: 98.1 F | BODY MASS INDEX: 28.79 KG/M2 | HEIGHT: 68 IN | OXYGEN SATURATION: 99 % | RESPIRATION RATE: 16 BRPM | WEIGHT: 190 LBS | DIASTOLIC BLOOD PRESSURE: 81 MMHG | HEART RATE: 71 BPM

## 2022-10-15 LAB
ABSOLUTE EOS #: 0.2 K/UL (ref 0–0.4)
ABSOLUTE LYMPH #: 1.3 K/UL (ref 1–4.8)
ABSOLUTE MONO #: 0.7 K/UL (ref 0.1–1.3)
ALBUMIN SERPL-MCNC: 4.1 G/DL (ref 3.5–5.2)
ALP BLD-CCNC: 230 U/L (ref 40–129)
ALT SERPL-CCNC: 30 U/L (ref 5–41)
AMMONIA: 75 UMOL/L (ref 16–60)
ANION GAP SERPL CALCULATED.3IONS-SCNC: 15 MMOL/L (ref 9–17)
AST SERPL-CCNC: 100 U/L
BASOPHILS # BLD: 1 % (ref 0–2)
BASOPHILS ABSOLUTE: 0.1 K/UL (ref 0–0.2)
BILIRUB SERPL-MCNC: 2 MG/DL (ref 0.3–1.2)
BUN BLDV-MCNC: 10 MG/DL (ref 6–20)
CALCIUM SERPL-MCNC: 9.3 MG/DL (ref 8.6–10.4)
CHLORIDE BLD-SCNC: 102 MMOL/L (ref 98–107)
CO2: 20 MMOL/L (ref 20–31)
CREAT SERPL-MCNC: 0.62 MG/DL (ref 0.7–1.2)
D-DIMER QUANTITATIVE: 1.63 MG/L FEU (ref 0–0.59)
EOSINOPHILS RELATIVE PERCENT: 3 % (ref 0–4)
GFR SERPL CREATININE-BSD FRML MDRD: >60 ML/MIN/1.73M2
GLUCOSE BLD-MCNC: 90 MG/DL (ref 70–99)
HCT VFR BLD CALC: 41.3 % (ref 41–53)
HEMOGLOBIN: 13.9 G/DL (ref 13.5–17.5)
INR BLD: 1.2
LIPASE: 70 U/L (ref 13–60)
LYMPHOCYTES # BLD: 18 % (ref 24–44)
MAGNESIUM: 1.8 MG/DL (ref 1.6–2.6)
MCH RBC QN AUTO: 34.5 PG (ref 26–34)
MCHC RBC AUTO-ENTMCNC: 33.6 G/DL (ref 31–37)
MCV RBC AUTO: 102.6 FL (ref 80–100)
MONOCYTES # BLD: 9 % (ref 1–7)
PDW BLD-RTO: 16.8 % (ref 11.5–14.9)
PLATELET # BLD: 80 K/UL (ref 150–450)
PMV BLD AUTO: 8.8 FL (ref 6–12)
POTASSIUM SERPL-SCNC: 3.8 MMOL/L (ref 3.7–5.3)
PROTHROMBIN TIME: 15.2 SEC (ref 11.8–14.6)
RBC # BLD: 4.03 M/UL (ref 4.5–5.9)
SEG NEUTROPHILS: 69 % (ref 36–66)
SEGMENTED NEUTROPHILS ABSOLUTE COUNT: 5 K/UL (ref 1.3–9.1)
SODIUM BLD-SCNC: 137 MMOL/L (ref 135–144)
THYROXINE, FREE: 1.29 NG/DL (ref 0.93–1.7)
TOTAL PROTEIN: 9.4 G/DL (ref 6.4–8.3)
TROPONIN, HIGH SENSITIVITY: 10 NG/L (ref 0–22)
TSH SERPL DL<=0.05 MIU/L-ACNC: 3.42 UIU/ML (ref 0.3–5)
WBC # BLD: 7.3 K/UL (ref 3.5–11)

## 2022-10-15 PROCEDURE — 85379 FIBRIN DEGRADATION QUANT: CPT

## 2022-10-15 PROCEDURE — 93005 ELECTROCARDIOGRAM TRACING: CPT | Performed by: STUDENT IN AN ORGANIZED HEALTH CARE EDUCATION/TRAINING PROGRAM

## 2022-10-15 PROCEDURE — 36415 COLL VENOUS BLD VENIPUNCTURE: CPT

## 2022-10-15 PROCEDURE — 83735 ASSAY OF MAGNESIUM: CPT

## 2022-10-15 PROCEDURE — 2580000003 HC RX 258: Performed by: STUDENT IN AN ORGANIZED HEALTH CARE EDUCATION/TRAINING PROGRAM

## 2022-10-15 PROCEDURE — 85610 PROTHROMBIN TIME: CPT

## 2022-10-15 PROCEDURE — 84443 ASSAY THYROID STIM HORMONE: CPT

## 2022-10-15 PROCEDURE — 71260 CT THORAX DX C+: CPT | Performed by: STUDENT IN AN ORGANIZED HEALTH CARE EDUCATION/TRAINING PROGRAM

## 2022-10-15 PROCEDURE — 71045 X-RAY EXAM CHEST 1 VIEW: CPT

## 2022-10-15 PROCEDURE — 82140 ASSAY OF AMMONIA: CPT

## 2022-10-15 PROCEDURE — 74177 CT ABD & PELVIS W/CONTRAST: CPT

## 2022-10-15 PROCEDURE — 6360000002 HC RX W HCPCS: Performed by: STUDENT IN AN ORGANIZED HEALTH CARE EDUCATION/TRAINING PROGRAM

## 2022-10-15 PROCEDURE — 84439 ASSAY OF FREE THYROXINE: CPT

## 2022-10-15 PROCEDURE — 6360000004 HC RX CONTRAST MEDICATION: Performed by: STUDENT IN AN ORGANIZED HEALTH CARE EDUCATION/TRAINING PROGRAM

## 2022-10-15 PROCEDURE — 83690 ASSAY OF LIPASE: CPT

## 2022-10-15 PROCEDURE — 84484 ASSAY OF TROPONIN QUANT: CPT

## 2022-10-15 PROCEDURE — 80053 COMPREHEN METABOLIC PANEL: CPT

## 2022-10-15 PROCEDURE — 85025 COMPLETE CBC W/AUTO DIFF WBC: CPT

## 2022-10-15 RX ORDER — LORAZEPAM 2 MG/ML
2 INJECTION INTRAMUSCULAR ONCE
Status: COMPLETED | OUTPATIENT
Start: 2022-10-15 | End: 2022-10-15

## 2022-10-15 RX ORDER — 0.9 % SODIUM CHLORIDE 0.9 %
80 INTRAVENOUS SOLUTION INTRAVENOUS ONCE
Status: COMPLETED | OUTPATIENT
Start: 2022-10-15 | End: 2022-10-15

## 2022-10-15 RX ORDER — SODIUM CHLORIDE 0.9 % (FLUSH) 0.9 %
10 SYRINGE (ML) INJECTION PRN
Status: DISCONTINUED | OUTPATIENT
Start: 2022-10-15 | End: 2022-10-15 | Stop reason: HOSPADM

## 2022-10-15 RX ORDER — ONDANSETRON 4 MG/1
4 TABLET, ORALLY DISINTEGRATING ORAL 3 TIMES DAILY PRN
Qty: 21 TABLET | Refills: 0 | Status: SHIPPED | OUTPATIENT
Start: 2022-10-15

## 2022-10-15 RX ADMIN — LORAZEPAM 2 MG: 2 INJECTION INTRAMUSCULAR; INTRAVENOUS at 01:12

## 2022-10-15 RX ADMIN — SODIUM CHLORIDE 80 ML: 9 INJECTION, SOLUTION INTRAVENOUS at 01:46

## 2022-10-15 RX ADMIN — SODIUM CHLORIDE, PRESERVATIVE FREE 10 ML: 5 INJECTION INTRAVENOUS at 01:46

## 2022-10-15 RX ADMIN — IOPAMIDOL 100 ML: 755 INJECTION, SOLUTION INTRAVENOUS at 01:46

## 2022-10-15 ASSESSMENT — ENCOUNTER SYMPTOMS
NAUSEA: 1
EYE DISCHARGE: 0
CHEST TIGHTNESS: 0
VOMITING: 1
SHORTNESS OF BREATH: 0
EYE REDNESS: 0
ABDOMINAL PAIN: 1
RHINORRHEA: 0
DIARRHEA: 1
SORE THROAT: 0

## 2022-10-15 ASSESSMENT — HEART SCORE: ECG: 0

## 2022-10-15 NOTE — ED PROVIDER NOTES
EMERGENCY DEPARTMENT ENCOUNTER    Pt Name: Lawson Cushing  MRN: [id number]  [place] 1985  Date of evaluation: 10/15/22  CHIEF COMPLAINT       Chief Complaint   Patient presents with    Chest Pain    Alcohol Problem    Emesis    Headache     Pt TO ED with 3 hours of CP  Pt states about 5 hours ago he began to experience HA with N/V and chills  Then had onset of CP. Hx of alcoholic cirrhosis- quit drinking in may and then began again 2 weeks ago, last drink about 48 hours ago  PT works at [employer] and also concerned that this may be covid. HISTORY OF PRESENT ILLNESS   This is a 78-year-old male history of alcohol abuse previous episodes liver failure presents with chest pain    He states that he had quit alcohol after last hospitalization for liver issues    However over the last couple weeks drinking heavily again    Developed some nausea vomiting diarrhea chest pain some abdominal pain    So came here    Moderate. Aching. Not rating. Constant    Denying fevers or chills no dysuria hematuria frequency no alcohol for 2 days            REVIEW OF SYSTEMS     Review of Systems   Constitutional:  Negative for chills and fever. HENT:  Negative for rhinorrhea and sore throat. Eyes:  Negative for discharge and redness. Respiratory:  Negative for chest tightness and shortness of breath. Cardiovascular:  Positive for chest pain. Gastrointestinal:  Positive for abdominal pain, diarrhea, nausea and vomiting. Genitourinary:  Negative for dysuria and frequency. Musculoskeletal:  Negative for arthralgias and myalgias. Skin:  Negative for rash. Neurological:  Negative for weakness and numbness. Psychiatric/Behavioral:  Negative for suicidal ideas. All other systems reviewed and are negative.   PASTMEDICAL HISTORY     Past Medical History:   Diagnosis Date    Alcoholism ([address]     pt drinks a fifth of whiskey daily    Anemia     Cirrhosis, alcoholic ([address]     Pulmonary embolism (HCC)      Past Problem List  Patient Active Problem List   Diagnosis Code    Acute pulmonary embolism (HCC) I26.99    ACS (acute coronary syndrome) (HCC) I24.9    Chronic systolic congestive heart failure (HCC) I50.22    Essential hypertension J93    Uncomplicated alcohol dependence ([facility] I33.63    Alcoholic cardiomyopathy ([facility] I42.6    Diarrhea due to malabsorption K90.9, R19.7    Heartburn R12    Generalized anxiety disorder F41.1    Hypomagnesemia E83.42    Hypokalemia E87.6    Transaminitis B32.93    Alcoholic hepatitis without ascites K70.10    Macrocytic anemia D53.9    Left lower lobe pneumonia J18.9    Sepsis (HCC) A41.9    Melena K92.1    Acute blood loss anemia D62    Acute liver failure without hepatic coma K72.00    Leukocytosis D72.829    Septicemia (HCC) A41.9    Right upper quadrant abdominal pain R10.11    HCAP (healthcare-associated pneumonia) J18.9    Iron overload E83.19     SURGICAL HISTORY       Past Surgical History:   Procedure Laterality Date    UPPER GASTROINTESTINAL ENDOSCOPY N/A 5/1/2022    EGD ESOPHAGOGASTRODUODENOSCOPY performed by Zakiya Tadeo MD at [facility]       Previous Medications    ALBUTEROL SULFATE HFA (VENTOLIN HFA) 108 (90 BASE) MCG/ACT INHALER    Inhale 2 puffs into the lungs 4 times daily as needed for Wheezing    ALBUTEROL SULFATE HFA (VENTOLIN HFA) 108 (90 BASE) MCG/ACT INHALER    Inhale 2 puffs into the lungs 4 times daily as needed for Wheezing    BLOOD PRESSURE MONITORING (BP MONITOR-STETHOSCOPE) KIT    1 each by Does not apply route daily    EPINEPHRINE (EPIPEN 2-LIZETTE) 0.3 MG/0.3ML SOAJ INJECTION    Inject 0.3 mLs into the muscle once for 1 dose Use as directed for allergic reaction    FLUTICASONE (FLONASE) 50 MCG/ACT NASAL SPRAY    instill 1 spray into each nostril once daily    FOLIC ACID (FOLVITE) 1 MG TABLET    Take 1 tablet by mouth daily    FUROSEMIDE (LASIX) 20 MG TABLET    Take 1 tablet by mouth daily    LOSARTAN (COZAAR) 50 MG TABLET    take 1 tablet by mouth once daily    MENTHOL-METHYL SALICYLATE (ANALGESIC OINTMENT) OINT OINTMENT    Apply topically as needed Indications: on back    MULTIPLE VITAMINS-MINERALS (CENTRUM MEN) TABS    Take 1 tablet by mouth daily    NICOTINE 21-14-7 MG/24HR KIT    Place 14 mg onto the skin nightly     PANTOPRAZOLE (PROTONIX) 20 MG TABLET    Take 2 tablets by mouth daily    VITAMIN B-1 (THIAMINE) 100 MG TABLET    Take 1 tablet by mouth daily     ALLERGIES     is allergic to bee venom. FAMILY HISTORY     He indicated that his mother is . He indicated that his father is alive. SOCIAL HISTORY       Social History     Tobacco Use    Smoking status: Every Day     Packs/day: 1.00     Years: 21.00     Pack years: 21.00     Types: Cigarettes    Smokeless tobacco: Never    Tobacco comments:     using nicotine patches at night   Vaping Use    Vaping Use: Never used   Substance Use Topics    Alcohol use: Not Currently     Comment: hx of alcoholism; pt drinks a fifth of whiskey daily    Drug use: Not Currently     Comment: used cocaine in early 20's     PHYSICAL EXAM     INITIAL VITALS: /81   Pulse 71   Temp 98.1 °F (36.7 °C) (Oral)   Resp 16   Ht 5' 8\" (1.727 m)   Wt 190 lb (86.2 kg)   SpO2 99%   BMI 28.89 kg/m²    Physical Exam  Vitals and nursing note reviewed. Constitutional:       Appearance: Normal appearance. HENT:      Head: Normocephalic and atraumatic. Nose: Nose normal.      Mouth/Throat:      Mouth: Mucous membranes are moist.   Eyes:      Conjunctiva/sclera: Conjunctivae normal.      Pupils: Pupils are equal, round, and reactive to light. Cardiovascular:      Rate and Rhythm: Normal rate and regular rhythm. Pulses: Normal pulses. Heart sounds: Normal heart sounds. Pulmonary:      Effort: Pulmonary effort is normal.      Breath sounds: Normal breath sounds. Abdominal:      Palpations: Abdomen is soft. Tenderness: There is abdominal tenderness.    Musculoskeletal: General: No swelling or deformity. Cervical back: Normal range of motion. Skin:     General: Skin is warm. Findings: No rash. Neurological:      General: No focal deficit present. Mental Status: He is alert and oriented to person, place, and time.    Psychiatric:         Mood and Affect: Mood normal.       MEDICAL DECISION MAKIN-year-old presents with abdominal pain chest pain NuPrep differential PE, pneumonia, appendicitis, gastroenteritis, liver failure    Broad work-up including CTs  ED Course as of 10/15/22 0441   Sat Oct 15, 2022   0128 CBC with Auto Differential(!):    WBC 7.3   RBC 4.03(!)   Hemoglobin Quant 13.9   Hematocrit 41.3   .6(!)   MCH 34.5(!)   MCHC 33.6   RDW 16.8(!)   Platelet Count 80(!)   MPV 8.8   Seg Neutrophils 69(!)   Lymphocytes 18(!)   Monocytes 9(!)   Eosinophils % 3   Basophils 1   Segs Absolute 5.00   Absolute Lymph # 1.30   Absolute Mono # 0.70   Absolute Eos # 0.20   Basophils Absolute 0.10  Unremarkable [MARCELA]   0129 CMP(!):    Glucose, Random 90   BUN,BUNPL 10   Creatinine 0.62(!)   Est, Glom Filt Rate >60   CALCIUM, SERUM, 578401 9.3   Sodium 137   Potassium 3.8   Chloride 102   CO2 20   Anion Gap 15   Alk Phos 230(!)   ALT 30   (!)   Bilirubin 2.0(!)   Total Protein 9.4(!)   Albumin 4.1  Elevated LFTs otherwise unremarkable [MARCELA]   0129 Lipase(!):    Lipase 70(!)  Mild elevation [MARCELA]   0129 Magnesium:    Magnesium 1.8  Normal [MARCELA]   0129 TSH:    TSH 3.42  Normal [MARCELA]   0129 T4, Free:    Thyroxine, Free 1.29  Normal [MARCELA]   0129 D-Dimer, Quantitative(!):    D-Dimer, Quant 1.63(!)  Elevated CT PE pending [MARCELA]   0238 Troponin:    Troponin, High Sensitivity 10  Normal [MARCELA]   0356 XR CHEST PORTABLE  Normal [MARCELA]   0356 CT CHEST PULMONARY EMBOLISM W CONTRAST  Normal [MARCELA]   0356 CT ABDOMEN PELVIS W IV CONTRAST Additional Contrast? None  Unremarkable [MARCELA]      ED Course User Index  [MARCELA] Tee Leonard MD       CRITICAL CARE:       PROCEDURES:    EKG 12 Lead    Date/Time: 10/15/2022 12:23 AM  Performed by: Calvin Chisholm MD  Authorized by: Calvin Chisholm MD     ECG reviewed by ED Physician in the absence of a cardiologist: yes    Interpretation:     Interpretation: normal    Rate:     ECG rate:  76    ECG rate assessment: normal    Rhythm:     Rhythm: sinus rhythm    Ectopy:     Ectopy: none    QRS:     QRS axis:  Normal    QRS intervals:  Normal    QRS conduction: normal    ST segments:     ST segments:  Normal  T waves:     T waves: normal    Q waves:     Abnormal Q-waves: not present      DIAGNOSTIC RESULTS   EKG:All EKG's are interpreted by the Emergency Department Physician who either signs or Co-signs this chart in the absence of a cardiologist.        RADIOLOGY:All plain film, CT, MRI, and formal ultrasound images (except ED bedside ultrasound) are read by the radiologist, see reports below, unless otherwisenoted in MDM or here. CT ABDOMEN PELVIS W IV CONTRAST Additional Contrast? None   Final Result   Cholelithiasis without evidence of cholecystitis. Otherwise unremarkable   exam.         CT CHEST PULMONARY EMBOLISM W CONTRAST   Final Result   No evidence of pulmonary embolism or acute pulmonary abnormality. XR CHEST PORTABLE   Final Result   Normal examination. LABS: All lab results were reviewed by myself, and all abnormals are listed below.   Labs Reviewed   CBC WITH AUTO DIFFERENTIAL - Abnormal; Notable for the following components:       Result Value    RBC 4.03 (*)     .6 (*)     MCH 34.5 (*)     RDW 16.8 (*)     Platelets 80 (*)     Seg Neutrophils 69 (*)     Lymphocytes 18 (*)     Monocytes 9 (*)     All other components within normal limits   COMPREHENSIVE METABOLIC PANEL - Abnormal; Notable for the following components:    Creatinine 0.62 (*)     Alkaline Phosphatase 230 (*)      (*)     Total Bilirubin 2.0 (*)     Total Protein 9.4 (*)     All other components within normal limits   LIPASE - Abnormal; Notable for the following components:    Lipase 70 (*)     All other components within normal limits   PROTIME-INR - Abnormal; Notable for the following components:    Protime 15.2 (*)     All other components within normal limits   AMMONIA - Abnormal; Notable for the following components:    Ammonia 75 (*)     All other components within normal limits   D-DIMER, QUANTITATIVE - Abnormal; Notable for the following components:    D-Dimer, Quant 1.63 (*)     All other components within normal limits   MAGNESIUM   TSH   T4, FREE   TROPONIN   URINALYSIS WITH REFLEX TO CULTURE   URINE DRUG SCREEN       EMERGENCY DEPARTMENTCOURSE:     Patient was seen for some chest and abdominal pain    His work-up including blood work troponin and CT chest abdomen pelvis all unremarkable    He has some mild elevation in his LFTs but this is in the setting of alcohol abuse no evidence of acute liver failure    Improved from prior    He shows no evidence of significant withdrawal at this time    He has not had withdrawal in the past    We will discharge him with GI follow-up primary follow-up and return precautions    Heart score 0    Feeling much better    Vitals:    Vitals:    10/14/22 2231 10/15/22 0020 10/15/22 0029 10/15/22 0432   BP: (!) 151/95 137/81  120/81   Pulse: 75  72 71   Resp: 20  16    Temp: 98.1 °F (36.7 °C)      TempSrc: Oral      SpO2: 100%  99%    Weight: 190 lb (86.2 kg)      Height: 5' 8\" (1.727 m)          The patient was given the following medications while in the emergency department:  Orders Placed This Encounter   Medications    LORazepam (ATIVAN) injection 2 mg    0.9 % sodium chloride bolus    sodium chloride flush 0.9 % injection 10 mL    iopamidol (ISOVUE-370) 76 % injection 100 mL    ondansetron (ZOFRAN-ODT) 4 MG disintegrating tablet     Sig: Take 1 tablet by mouth 3 times daily as needed for Nausea or Vomiting     Dispense:  21 tablet     Refill:  0     CONSULTS:  None    FINAL IMPRESSION      1.  Nausea and vomiting, unspecified vomiting type    2. Chest pain, unspecified type          DISPOSITION/PLAN   DISPOSITION Decision To Discharge 10/15/2022 04:37:29 AM      PATIENT REFERRED TO:  Yair Dean MD  [address] [place] [address] [address] [address]      DISCHARGE MEDICATIONS:  New Prescriptions    ONDANSETRON (ZOFRAN-ODT) 4 MG DISINTEGRATING TABLET    Take 1 tablet by mouth 3 times daily as needed for Nausea or Vomiting     The care is provided during an unprecedented national emergency due to the novel coronavirus, COVID 19.   MD Roro Thorpe MD  10/15/22 2053

## 2022-10-15 NOTE — ED NOTES
Pt. To the ED c/o chest pain states this has been ongoing but got to a point that he couldn't wait anymore. Pt. States that he is worried that this has something to do w/ his liver as he is a drinker was clean but started drinking again. His last drink was 2 days ago. Pt. Is A&Ox4, RR even and non-labored, Placed on full cardiac monitor. Dr. Liliam Novak has been at bedside to assess.       Lexy Gonzalez RN  10/15/22 4610

## 2022-10-19 LAB
EKG ATRIAL RATE: 76 BPM
EKG P AXIS: 17 DEGREES
EKG P-R INTERVAL: 134 MS
EKG Q-T INTERVAL: 374 MS
EKG QRS DURATION: 90 MS
EKG QTC CALCULATION (BAZETT): 420 MS
EKG R AXIS: 2 DEGREES
EKG T AXIS: 43 DEGREES
EKG VENTRICULAR RATE: 76 BPM

## 2022-10-26 ENCOUNTER — TELEPHONE (OUTPATIENT)
Dept: GASTROENTEROLOGY | Age: 37
End: 2022-10-26

## 2022-10-29 ENCOUNTER — APPOINTMENT (OUTPATIENT)
Dept: CT IMAGING | Age: 37
DRG: 280 | End: 2022-10-29
Payer: MEDICAID

## 2022-10-29 ENCOUNTER — HOSPITAL ENCOUNTER (INPATIENT)
Age: 37
LOS: 3 days | Discharge: HOME | DRG: 280 | End: 2022-11-01
Attending: EMERGENCY MEDICINE | Admitting: INTERNAL MEDICINE
Payer: MEDICAID

## 2022-10-29 DIAGNOSIS — K74.60 CIRRHOSIS OF LIVER WITHOUT ASCITES, UNSPECIFIED HEPATIC CIRRHOSIS TYPE (HCC): ICD-10-CM

## 2022-10-29 DIAGNOSIS — K92.0 HEMATEMESIS WITH NAUSEA: Primary | ICD-10-CM

## 2022-10-29 DIAGNOSIS — K92.0 GASTROINTESTINAL HEMORRHAGE WITH HEMATEMESIS: ICD-10-CM

## 2022-10-29 PROBLEM — K92.2 GI BLEED: Status: ACTIVE | Noted: 2022-10-29

## 2022-10-29 LAB
ABSOLUTE EOS #: 0.1 K/UL (ref 0–0.4)
ABSOLUTE LYMPH #: 0.9 K/UL (ref 1–4.8)
ABSOLUTE MONO #: 0.6 K/UL (ref 0.1–1.3)
ALBUMIN SERPL-MCNC: 4 G/DL (ref 3.5–5.2)
ALP BLD-CCNC: 230 U/L (ref 40–129)
ALT SERPL-CCNC: 36 U/L (ref 5–41)
ANION GAP SERPL CALCULATED.3IONS-SCNC: 13 MMOL/L (ref 9–17)
AST SERPL-CCNC: 115 U/L
BASOPHILS # BLD: 1 % (ref 0–2)
BASOPHILS ABSOLUTE: 0.1 K/UL (ref 0–0.2)
BILIRUB SERPL-MCNC: 3 MG/DL (ref 0.3–1.2)
BUN BLDV-MCNC: 17 MG/DL (ref 6–20)
CALCIUM SERPL-MCNC: 9.4 MG/DL (ref 8.6–10.4)
CHLORIDE BLD-SCNC: 103 MMOL/L (ref 98–107)
CO2: 21 MMOL/L (ref 20–31)
CREAT SERPL-MCNC: 0.74 MG/DL (ref 0.7–1.2)
EOSINOPHILS RELATIVE PERCENT: 1 % (ref 0–4)
GFR SERPL CREATININE-BSD FRML MDRD: >60 ML/MIN/1.73M2
GLUCOSE BLD-MCNC: 97 MG/DL (ref 70–99)
HCT VFR BLD CALC: 37 % (ref 41–53)
HEMOGLOBIN: 12.7 G/DL (ref 13.5–17.5)
INR BLD: 1.3
LIPASE: 64 U/L (ref 13–60)
LYMPHOCYTES # BLD: 14 % (ref 24–44)
MAGNESIUM: 1.6 MG/DL (ref 1.6–2.6)
MCH RBC QN AUTO: 35 PG (ref 26–34)
MCHC RBC AUTO-ENTMCNC: 34.3 G/DL (ref 31–37)
MCV RBC AUTO: 102 FL (ref 80–100)
MONOCYTES # BLD: 10 % (ref 1–7)
PDW BLD-RTO: 16.5 % (ref 11.5–14.9)
PLATELET # BLD: 65 K/UL (ref 150–450)
PMV BLD AUTO: 8.5 FL (ref 6–12)
POTASSIUM SERPL-SCNC: 4.2 MMOL/L (ref 3.7–5.3)
PROTHROMBIN TIME: 16.1 SEC (ref 11.8–14.6)
RBC # BLD: 3.63 M/UL (ref 4.5–5.9)
SEG NEUTROPHILS: 74 % (ref 36–66)
SEGMENTED NEUTROPHILS ABSOLUTE COUNT: 4.5 K/UL (ref 1.3–9.1)
SODIUM BLD-SCNC: 137 MMOL/L (ref 135–144)
TOTAL PROTEIN: 8.6 G/DL (ref 6.4–8.3)
WBC # BLD: 6.1 K/UL (ref 3.5–11)

## 2022-10-29 PROCEDURE — 83690 ASSAY OF LIPASE: CPT

## 2022-10-29 PROCEDURE — 6360000004 HC RX CONTRAST MEDICATION: Performed by: EMERGENCY MEDICINE

## 2022-10-29 PROCEDURE — 86920 COMPATIBILITY TEST SPIN: CPT

## 2022-10-29 PROCEDURE — 86850 RBC ANTIBODY SCREEN: CPT

## 2022-10-29 PROCEDURE — 86901 BLOOD TYPING SEROLOGIC RH(D): CPT

## 2022-10-29 PROCEDURE — 6370000000 HC RX 637 (ALT 250 FOR IP): Performed by: INTERNAL MEDICINE

## 2022-10-29 PROCEDURE — C9113 INJ PANTOPRAZOLE SODIUM, VIA: HCPCS | Performed by: INTERNAL MEDICINE

## 2022-10-29 PROCEDURE — 83735 ASSAY OF MAGNESIUM: CPT

## 2022-10-29 PROCEDURE — 2060000000 HC ICU INTERMEDIATE R&B

## 2022-10-29 PROCEDURE — 96375 TX/PRO/DX INJ NEW DRUG ADDON: CPT

## 2022-10-29 PROCEDURE — 99285 EMERGENCY DEPT VISIT HI MDM: CPT

## 2022-10-29 PROCEDURE — 86900 BLOOD TYPING SEROLOGIC ABO: CPT

## 2022-10-29 PROCEDURE — 2580000003 HC RX 258: Performed by: INTERNAL MEDICINE

## 2022-10-29 PROCEDURE — 74174 CTA ABD&PLVS W/CONTRAST: CPT

## 2022-10-29 PROCEDURE — C9113 INJ PANTOPRAZOLE SODIUM, VIA: HCPCS | Performed by: EMERGENCY MEDICINE

## 2022-10-29 PROCEDURE — 6360000002 HC RX W HCPCS: Performed by: INTERNAL MEDICINE

## 2022-10-29 PROCEDURE — 2580000003 HC RX 258: Performed by: EMERGENCY MEDICINE

## 2022-10-29 PROCEDURE — 36415 COLL VENOUS BLD VENIPUNCTURE: CPT

## 2022-10-29 PROCEDURE — 80053 COMPREHEN METABOLIC PANEL: CPT

## 2022-10-29 PROCEDURE — 85025 COMPLETE CBC W/AUTO DIFF WBC: CPT

## 2022-10-29 PROCEDURE — 96374 THER/PROPH/DIAG INJ IV PUSH: CPT

## 2022-10-29 PROCEDURE — 6360000002 HC RX W HCPCS: Performed by: EMERGENCY MEDICINE

## 2022-10-29 PROCEDURE — 85610 PROTHROMBIN TIME: CPT

## 2022-10-29 RX ORDER — FLUTICASONE PROPIONATE 50 MCG
2 SPRAY, SUSPENSION (ML) NASAL DAILY
Status: DISCONTINUED | OUTPATIENT
Start: 2022-10-29 | End: 2022-11-01 | Stop reason: HOSPADM

## 2022-10-29 RX ORDER — 0.9 % SODIUM CHLORIDE 0.9 %
80 INTRAVENOUS SOLUTION INTRAVENOUS ONCE
Status: COMPLETED | OUTPATIENT
Start: 2022-10-29 | End: 2022-10-29

## 2022-10-29 RX ORDER — FOLIC ACID 1 MG/1
1 TABLET ORAL DAILY
Status: DISCONTINUED | OUTPATIENT
Start: 2022-10-29 | End: 2022-10-30

## 2022-10-29 RX ORDER — SODIUM CHLORIDE 9 MG/ML
INJECTION, SOLUTION INTRAVENOUS CONTINUOUS
Status: DISCONTINUED | OUTPATIENT
Start: 2022-10-29 | End: 2022-10-29 | Stop reason: SDUPTHER

## 2022-10-29 RX ORDER — 0.9 % SODIUM CHLORIDE 0.9 %
1000 INTRAVENOUS SOLUTION INTRAVENOUS ONCE
Status: DISCONTINUED | OUTPATIENT
Start: 2022-10-29 | End: 2022-10-29

## 2022-10-29 RX ORDER — NICOTINE 21 MG/24HR
1 PATCH, TRANSDERMAL 24 HOURS TRANSDERMAL NIGHTLY
Status: DISCONTINUED | OUTPATIENT
Start: 2022-10-29 | End: 2022-11-01 | Stop reason: HOSPADM

## 2022-10-29 RX ORDER — NICOTINE 21 MG/24HR
1 PATCH, TRANSDERMAL 24 HOURS TRANSDERMAL DAILY
Status: DISCONTINUED | OUTPATIENT
Start: 2022-10-30 | End: 2022-10-29

## 2022-10-29 RX ORDER — SODIUM CHLORIDE 9 MG/ML
INJECTION, SOLUTION INTRAVENOUS CONTINUOUS
Status: DISCONTINUED | OUTPATIENT
Start: 2022-10-29 | End: 2022-10-31

## 2022-10-29 RX ORDER — METOPROLOL TARTRATE 5 MG/5ML
5 INJECTION INTRAVENOUS EVERY 6 HOURS PRN
Status: DISCONTINUED | OUTPATIENT
Start: 2022-10-29 | End: 2022-11-01 | Stop reason: HOSPADM

## 2022-10-29 RX ORDER — ONDANSETRON 2 MG/ML
8 INJECTION INTRAMUSCULAR; INTRAVENOUS ONCE
Status: COMPLETED | OUTPATIENT
Start: 2022-10-29 | End: 2022-10-29

## 2022-10-29 RX ORDER — SODIUM CHLORIDE 0.9 % (FLUSH) 0.9 %
10 SYRINGE (ML) INJECTION 2 TIMES DAILY
Status: DISCONTINUED | OUTPATIENT
Start: 2022-10-29 | End: 2022-11-01 | Stop reason: HOSPADM

## 2022-10-29 RX ORDER — 0.9 % SODIUM CHLORIDE 0.9 %
1000 INTRAVENOUS SOLUTION INTRAVENOUS ONCE
Status: COMPLETED | OUTPATIENT
Start: 2022-10-29 | End: 2022-10-29

## 2022-10-29 RX ORDER — GAUZE BANDAGE 2" X 2"
100 BANDAGE TOPICAL DAILY
Status: DISCONTINUED | OUTPATIENT
Start: 2022-10-29 | End: 2022-10-30

## 2022-10-29 RX ORDER — ONDANSETRON 2 MG/ML
4 INJECTION INTRAMUSCULAR; INTRAVENOUS EVERY 6 HOURS PRN
Status: DISCONTINUED | OUTPATIENT
Start: 2022-10-29 | End: 2022-11-01 | Stop reason: HOSPADM

## 2022-10-29 RX ORDER — METOPROLOL SUCCINATE 25 MG/1
12.5 TABLET, EXTENDED RELEASE ORAL DAILY
Status: ON HOLD | COMMUNITY
Start: 2022-10-26 | End: 2022-11-01 | Stop reason: HOSPADM

## 2022-10-29 RX ORDER — ALBUTEROL SULFATE 90 UG/1
2 AEROSOL, METERED RESPIRATORY (INHALATION) 4 TIMES DAILY PRN
Status: DISCONTINUED | OUTPATIENT
Start: 2022-10-29 | End: 2022-11-01 | Stop reason: HOSPADM

## 2022-10-29 RX ADMIN — ONDANSETRON 4 MG: 2 INJECTION INTRAMUSCULAR; INTRAVENOUS at 23:05

## 2022-10-29 RX ADMIN — IOPAMIDOL 100 ML: 755 INJECTION, SOLUTION INTRAVENOUS at 19:49

## 2022-10-29 RX ADMIN — OCTREOTIDE ACETATE 50 MCG/HR: 1000 INJECTION, SOLUTION INTRAVENOUS; SUBCUTANEOUS at 20:02

## 2022-10-29 RX ADMIN — SODIUM CHLORIDE, PRESERVATIVE FREE 10 ML: 5 INJECTION INTRAVENOUS at 19:50

## 2022-10-29 RX ADMIN — SODIUM CHLORIDE 80 MG: 9 INJECTION, SOLUTION INTRAVENOUS at 18:46

## 2022-10-29 RX ADMIN — PANTOPRAZOLE SODIUM 8 MG/HR: 40 INJECTION, POWDER, FOR SOLUTION INTRAVENOUS at 21:26

## 2022-10-29 RX ADMIN — SODIUM CHLORIDE: 9 INJECTION, SOLUTION INTRAVENOUS at 20:02

## 2022-10-29 RX ADMIN — ONDANSETRON 8 MG: 2 INJECTION INTRAMUSCULAR; INTRAVENOUS at 18:44

## 2022-10-29 RX ADMIN — SODIUM CHLORIDE 1000 ML: 9 INJECTION, SOLUTION INTRAVENOUS at 18:49

## 2022-10-29 RX ADMIN — SODIUM CHLORIDE 80 ML: 9 INJECTION, SOLUTION INTRAVENOUS at 19:50

## 2022-10-29 ASSESSMENT — PAIN DESCRIPTION - DESCRIPTORS
DESCRIPTORS: ACHING
DESCRIPTORS: ACHING

## 2022-10-29 ASSESSMENT — PAIN DESCRIPTION - PAIN TYPE: TYPE: ACUTE PAIN

## 2022-10-29 ASSESSMENT — PAIN - FUNCTIONAL ASSESSMENT
PAIN_FUNCTIONAL_ASSESSMENT: 0-10
PAIN_FUNCTIONAL_ASSESSMENT: PREVENTS OR INTERFERES SOME ACTIVE ACTIVITIES AND ADLS
PAIN_FUNCTIONAL_ASSESSMENT: 0-10

## 2022-10-29 ASSESSMENT — PAIN SCALES - WONG BAKER: WONGBAKER_NUMERICALRESPONSE: 0

## 2022-10-29 ASSESSMENT — PAIN SCALES - GENERAL
PAINLEVEL_OUTOF10: 1
PAINLEVEL_OUTOF10: 1
PAINLEVEL_OUTOF10: 5
PAINLEVEL_OUTOF10: 5
PAINLEVEL_OUTOF10: 6

## 2022-10-29 ASSESSMENT — PAIN DESCRIPTION - LOCATION
LOCATION: ABDOMEN
LOCATION: ABDOMEN

## 2022-10-29 ASSESSMENT — PAIN DESCRIPTION - FREQUENCY: FREQUENCY: INTERMITTENT

## 2022-10-29 ASSESSMENT — PAIN DESCRIPTION - ORIENTATION
ORIENTATION: LEFT
ORIENTATION: LEFT;UPPER

## 2022-10-29 ASSESSMENT — PAIN DESCRIPTION - ONSET: ONSET: GRADUAL

## 2022-10-29 ASSESSMENT — ENCOUNTER SYMPTOMS: NAUSEA: 1

## 2022-10-29 NOTE — LETTER
11/1/2022        [address] [facility] [address]      EXCUSE FROM WORK OR SCHOOL    To Whom It May Concern:    Jewell Rodriguez, date of birth 1985, is currently under the care of [address] [place] [address]. For medical reasons, please excuse him from work or school for the following time period:    First day out: 10/29/2022   Return on: 11/02/2022   Restrictions upon return: Avoid strenuous activity for 1 week     Please have the patient contact our office if there are questions or concerns. Sincerely,      Maggy Garcia.  Rn

## 2022-10-29 NOTE — ED TRIAGE NOTES
Patient to emergency department with complaints of blood in emesis and stool, ongoing for 2 days. Reports having abdominal pain, and multiple episodes of bloody emesis.  Reports light headed with position changes, fatigue

## 2022-10-29 NOTE — ED PROVIDER NOTES
EMERGENCY DEPARTMENT ENCOUNTER    Pt Name: Delia Reynoso  MRN: [id number]  [place] 1985  Date of evaluation: 10/29/22  CHIEF COMPLAINT       Chief Complaint   Patient presents with    Hematemesis    Rectal Bleeding     HISTORY OF PRESENT ILLNESS     Nausea & Vomiting  Severity:  Severe  Duration:  2 days  Timing:  Constant  Progression:  Worsening  Chronicity:  New  Recent urination:  Normal  Vomiting blood  Blood in stools  Quit drinking 13 days ago        REVIEW OF SYSTEMS     Review of Systems   Gastrointestinal:  Positive for nausea. All other systems reviewed and are negative.   PASTMEDICAL HISTORY     Past Medical History:   Diagnosis Date    Alcoholism ([address]     pt drinks a fifth of whiskey daily    Anemia     Cirrhosis, alcoholic ([facility]     Pulmonary embolism [facility])      Past Problem List  Patient Active Problem List   Diagnosis Code    Acute pulmonary embolism (HCC) I26.99    ACS (acute coronary syndrome) (HCC) I24.9    Chronic systolic congestive heart failure (HCC) I50.22    Essential hypertension B20    Uncomplicated alcohol dependence ([facility] [address] H75.00    Alcoholic cardiomyopathy ([facility] [address] I42.6    Diarrhea due to malabsorption K90.9, R19.7    Heartburn R12    Generalized anxiety disorder F41.1    Hypomagnesemia E83.42    Hypokalemia E87.6    Transaminitis Q99.96    Alcoholic hepatitis without ascites K70.10    Macrocytic anemia D53.9    Left lower lobe pneumonia J18.9    Sepsis (HCC) A41.9    Melena K92.1    Acute blood loss anemia D62    Acute liver failure without hepatic coma K72.00    Leukocytosis D72.829    Septicemia (HCC) A41.9    Right upper quadrant abdominal pain R10.11    HCAP (healthcare-associated pneumonia) J18.9    Iron overload E83.19    GI bleed K92.2     SURGICAL HISTORY       Past Surgical History:   Procedure Laterality Date    UPPER GASTROINTESTINAL ENDOSCOPY N/A 5/1/2022    EGD ESOPHAGOGASTRODUODENOSCOPY performed by Matilde Stone MD at [address] [facility] Current Discharge Medication List        CONTINUE these medications which have NOT CHANGED    Details   metoprolol succinate (TOPROL XL) 25 MG extended release tablet Take 12.5 mg by mouth daily      ondansetron (ZOFRAN-ODT) 4 MG disintegrating tablet Take 1 tablet by mouth 3 times daily as needed for Nausea or Vomiting  Qty: 21 tablet, Refills: 0      furosemide (LASIX) 20 MG tablet Take 1 tablet by mouth daily  Qty: 7 tablet, Refills: 0      !! albuterol sulfate HFA (VENTOLIN HFA) 108 (90 Base) MCG/ACT inhaler Inhale 2 puffs into the lungs 4 times daily as needed for Wheezing  Qty: 18 g, Refills: 0      !! albuterol sulfate HFA (VENTOLIN HFA) 108 (90 Base) MCG/ACT inhaler Inhale 2 puffs into the lungs 4 times daily as needed for Wheezing  Qty: 54 g, Refills: 1    Associated Diagnoses: Wheezing      folic acid (FOLVITE) 1 MG tablet Take 1 tablet by mouth daily  Qty: 30 tablet, Refills: 3      vitamin B-1 (THIAMINE) 100 MG tablet Take 1 tablet by mouth daily  Qty: 60 tablet, Refills: 1      Multiple Vitamins-Minerals (CENTRUM MEN) TABS Take 1 tablet by mouth daily      Menthol-Methyl Salicylate (ANALGESIC OINTMENT) OINT ointment Apply topically as needed Indications: on back      pantoprazole (PROTONIX) 20 MG tablet Take 2 tablets by mouth daily  Qty: 30 tablet, Refills: 0      losartan (COZAAR) 50 MG tablet take 1 tablet by mouth once daily  Qty: 30 tablet, Refills: 5    Associated Diagnoses: Essential hypertension      fluticasone (FLONASE) 50 MCG/ACT nasal spray instill 1 spray into each nostril once daily  Qty: 16 g, Refills: 1    Associated Diagnoses: Chronic cough      Blood Pressure Monitoring (BP MONITOR-STETHOSCOPE) KIT 1 each by Does not apply route daily  Qty: 1 kit, Refills: 0      Nicotine 21-14-7 MG/24HR KIT Place 14 mg onto the skin nightly       EPINEPHrine (EPIPEN 2-LIZETTE) 0.3 MG/0.3ML SOAJ injection Inject 0.3 mLs into the muscle once for 1 dose Use as directed for allergic reaction  Qty: 2 each, Refills: 0       !! - Potential duplicate medications found. Please discuss with provider. ALLERGIES     is allergic to bee venom. FAMILY HISTORY     He indicated that his mother is . He indicated that his father is alive. SOCIAL HISTORY       Social History     Tobacco Use    Smoking status: Every Day     Packs/day: 1.00     Years: 21.00     Pack years: 21.00     Types: Cigarettes    Smokeless tobacco: Never    Tobacco comments:     using nicotine patches at night   Vaping Use    Vaping Use: Never used   Substance Use Topics    Alcohol use: Not Currently     Comment: hx of alcoholism; pt drinks a fifth of whiskey daily    Drug use: Not Currently     Comment: used cocaine in early 's     PHYSICAL EXAM     INITIAL VITALS: /63   Pulse 97   Temp 99 °F (37.2 °C) (Oral)   Resp 13   Ht 5' 8\" (1.727 m)   Wt 184 lb 1.4 oz (83.5 kg)   SpO2 97%   BMI 27.99 kg/m²    Physical Exam  Constitutional:       General: He is not in acute distress. Appearance: Normal appearance. He is well-developed. He is not diaphoretic. HENT:      Head: Normocephalic and atraumatic. Right Ear: External ear normal.      Left Ear: External ear normal.      Nose: Nose normal. No congestion. Mouth/Throat:      Mouth: Mucous membranes are moist.      Pharynx: Oropharynx is clear. Eyes:      General:         Right eye: No discharge. Left eye: No discharge. Conjunctiva/sclera: Conjunctivae normal.      Pupils: Pupils are equal, round, and reactive to light. Neck:      Trachea: No tracheal deviation. Cardiovascular:      Rate and Rhythm: Regular rhythm. Tachycardia present. Pulses: Normal pulses. Heart sounds: Normal heart sounds. Pulmonary:      Effort: Pulmonary effort is normal. No respiratory distress. Breath sounds: Normal breath sounds. No stridor. No wheezing or rales. Abdominal:      Palpations: Abdomen is soft. Tenderness: There is no abdominal tenderness. There is no guarding or rebound. Musculoskeletal:         General: No tenderness or deformity. Normal range of motion. Cervical back: Normal range of motion and neck supple. Skin:     General: Skin is warm and dry. Capillary Refill: Capillary refill takes less than 2 seconds. Findings: No erythema or rash. Neurological:      General: No focal deficit present. Mental Status: He is alert and oriented to person, place, and time. Coordination: Coordination normal.   Psychiatric:         Mood and Affect: Mood normal.         Behavior: Behavior normal.         Thought Content: Thought content normal.         Judgment: Judgment normal.       MEDICAL DECISION MAKING:       ED Course as of 10/29/22 2143   Sat Oct 29, 2022   [address] DW Dr Keara Lutz for admit to intermediate ICU [WM]   [address] Dw Dr Awa Quigley, we reviewed EGD from April this year, no varices then  Admitting  [WM]      ED Course User Index  [WM] Thomas Silva MD       CRITICAL CARE:     Due to the immediate potential for life-threatening deterioration due to severe GI bleed, I spent 30 minutes providing critical care. This time is excluding time spent performing procedures. PROCEDURES:    Procedures    DIAGNOSTIC RESULTS     RADIOLOGY:All plain film, CT, MRI, and formal ultrasound images (except ED bedside ultrasound) are read by the radiologist, see reports below, unless otherwisenoted in MDM or here. CTA ABDOMEN PELVIS W CONTRAST   Final Result   No evidence of active GI hemorrhage. Innumerable hypodensities throughout   the liver. This may represent hepatocellular disease. Metastatic disease   not excluded. Follow-up MRI may be helpful. Patient admitted and taken to intermediate icu before ct done    LABS: All lab results were reviewed by myself, and all abnormals are listed below.   Labs Reviewed   CBC WITH AUTO DIFFERENTIAL - Abnormal; Notable for the following components:       Result Value    RBC 3.63 (*) Hemoglobin 12.7 (*)     Hematocrit 37.0 (*)     .0 (*)     MCH 35.0 (*)     RDW 16.5 (*)     Platelets 65 (*)     Seg Neutrophils 74 (*)     Lymphocytes 14 (*)     Monocytes 10 (*)     Absolute Lymph # 0.90 (*)     All other components within normal limits   COMPREHENSIVE METABOLIC PANEL - Abnormal; Notable for the following components:    Alkaline Phosphatase 230 (*)      (*)     Total Bilirubin 3.0 (*)     Total Protein 8.6 (*)     All other components within normal limits   PROTIME-INR - Abnormal; Notable for the following components:    Protime 16.1 (*)     All other components within normal limits   LIPASE - Abnormal; Notable for the following components:    Lipase 64 (*)     All other components within normal limits   MAGNESIUM   HEMOGLOBIN AND HEMATOCRIT   HEMOGLOBIN AND HEMATOCRIT   TYPE AND SCREEN       EMERGENCY DEPARTMENTCOURSE:         Vitals:    Vitals:    10/29/22 1728 10/29/22 1937 10/29/22 2122 10/29/22 2123   BP: (!) 140/91 129/89  116/63   Pulse: (!) 137 (!) 116  97   Resp: 18 17  13   Temp: 98.4 °F (36.9 °C) 98.3 °F (36.8 °C)  99 °F (37.2 °C)   TempSrc:  Oral  Oral   SpO2: 97% 99%  97%   Weight: 190 lb (86.2 kg)  184 lb 1.4 oz (83.5 kg)    Height: 5' 8\" (1.727 m)          The patient was given the following medications while in the emergency department:  Orders Placed This Encounter   Medications    0.9 % sodium chloride bolus    pantoprazole (PROTONIX) 80 mg in sodium chloride 0.9 % 50 mL bolus    ondansetron (ZOFRAN) injection 8 mg    DISCONTD: 0.9 % sodium chloride bolus    octreotide (SANDOSTATIN) 500 mcg in sodium chloride 0.9 % 100 mL infusion    albuterol sulfate HFA (PROVENTIL;VENTOLIN;PROAIR) 108 (90 Base) MCG/ACT inhaler 2 puff     Order Specific Question:   Initiate RT Bronchodilator Protocol     Answer:   Yes - Inpatient Protocol    fluticasone (FLONASE) 50 MCG/ACT nasal spray 2 spray    folic acid (FOLVITE) tablet 1 mg    thiamine mononitrate tablet 100 mg    DISCONTD: 0.9 % sodium chloride infusion    pantoprazole (PROTONIX) 80 mg in sodium chloride 0.9 % 100 mL infusion    0.9 % sodium chloride infusion    iopamidol (ISOVUE-370) 76 % injection 100 mL    sodium chloride flush 0.9 % injection 10 mL    0.9 % sodium chloride bolus     CONSULTS:  IP CONSULT TO INTERNAL MEDICINE  IP CONSULT TO GI    FINAL IMPRESSION      1. Hematemesis with nausea    2. Cirrhosis of liver without ascites, unspecified hepatic cirrhosis type [facility])          DISPOSITION/PLAN   DISPOSITION Admitted 10/29/2022 07:06:50 PM      PATIENT REFERRED TO:  No follow-up provider specified. DISCHARGE MEDICATIONS:  Current Discharge Medication List        The care is provided during an unprecedented national emergency due to the novel coronavirus, COVID 19.   MD Summer Montenegro MD  10/29/22 9941

## 2022-10-29 NOTE — ED NOTES
Report given to [facility].       Orly Mcfadden RN  10/29/22 1920

## 2022-10-30 ENCOUNTER — ANESTHESIA (OUTPATIENT)
Dept: ENDOSCOPY | Age: 37
DRG: 280 | End: 2022-10-30
Payer: MEDICAID

## 2022-10-30 ENCOUNTER — ANESTHESIA EVENT (OUTPATIENT)
Dept: ENDOSCOPY | Age: 37
DRG: 280 | End: 2022-10-30
Payer: MEDICAID

## 2022-10-30 PROBLEM — K92.0 HEMATEMESIS WITH NAUSEA: Status: ACTIVE | Noted: 2022-10-30

## 2022-10-30 LAB
ABSOLUTE EOS #: 0.12 K/UL (ref 0–0.4)
ABSOLUTE LYMPH #: 1.2 K/UL (ref 1–4.8)
ABSOLUTE MONO #: 0.6 K/UL (ref 0.1–1.3)
ANION GAP SERPL CALCULATED.3IONS-SCNC: 11 MMOL/L (ref 9–17)
BASOPHILS # BLD: 1 % (ref 0–2)
BASOPHILS ABSOLUTE: 0.06 K/UL (ref 0–0.2)
BUN BLDV-MCNC: 24 MG/DL (ref 6–20)
CALCIUM SERPL-MCNC: 7.7 MG/DL (ref 8.6–10.4)
CHLORIDE BLD-SCNC: 104 MMOL/L (ref 98–107)
CO2: 20 MMOL/L (ref 20–31)
CREAT SERPL-MCNC: 0.82 MG/DL (ref 0.7–1.2)
EOSINOPHILS RELATIVE PERCENT: 2 % (ref 0–4)
GFR SERPL CREATININE-BSD FRML MDRD: >60 ML/MIN/1.73M2
GLUCOSE BLD-MCNC: 114 MG/DL (ref 70–99)
HCT VFR BLD CALC: 22.9 % (ref 41–53)
HCT VFR BLD CALC: 23.4 % (ref 41–53)
HCT VFR BLD CALC: 23.5 % (ref 41–53)
HCT VFR BLD CALC: 28.2 % (ref 41–53)
HEMOGLOBIN: 7.6 G/DL (ref 13.5–17.5)
HEMOGLOBIN: 7.9 G/DL (ref 13.5–17.5)
HEMOGLOBIN: 8 G/DL (ref 13.5–17.5)
HEMOGLOBIN: 9.2 G/DL (ref 13.5–17.5)
LYMPHOCYTES # BLD: 20 % (ref 24–44)
MCH RBC QN AUTO: 35.5 PG (ref 26–34)
MCHC RBC AUTO-ENTMCNC: 33.4 G/DL (ref 31–37)
MCV RBC AUTO: 106.4 FL (ref 80–100)
MONOCYTES # BLD: 10 % (ref 1–7)
MORPHOLOGY: ABNORMAL
MORPHOLOGY: ABNORMAL
PDW BLD-RTO: 16.5 % (ref 11.5–14.9)
PLATELET # BLD: 52 K/UL (ref 150–450)
PMV BLD AUTO: 9.2 FL (ref 6–12)
POTASSIUM SERPL-SCNC: 4.4 MMOL/L (ref 3.7–5.3)
RBC # BLD: 2.21 M/UL (ref 4.5–5.9)
SEG NEUTROPHILS: 67 % (ref 36–66)
SEGMENTED NEUTROPHILS ABSOLUTE COUNT: 4.02 K/UL (ref 1.3–9.1)
SODIUM BLD-SCNC: 135 MMOL/L (ref 135–144)
WBC # BLD: 6 K/UL (ref 3.5–11)

## 2022-10-30 PROCEDURE — 2720000010 HC SURG SUPPLY STERILE: Performed by: INTERNAL MEDICINE

## 2022-10-30 PROCEDURE — 2580000003 HC RX 258: Performed by: INTERNAL MEDICINE

## 2022-10-30 PROCEDURE — 85018 HEMOGLOBIN: CPT

## 2022-10-30 PROCEDURE — 3609012300 HC EGD BAND LIGATION ESOPHGEAL/GASTRIC VARICES: Performed by: INTERNAL MEDICINE

## 2022-10-30 PROCEDURE — 3700000000 HC ANESTHESIA ATTENDED CARE: Performed by: INTERNAL MEDICINE

## 2022-10-30 PROCEDURE — 06L38CZ OCCLUSION OF ESOPHAGEAL VEIN WITH EXTRALUMINAL DEVICE, VIA NATURAL OR ARTIFICIAL OPENING ENDOSCOPIC: ICD-10-PCS | Performed by: INTERNAL MEDICINE

## 2022-10-30 PROCEDURE — C9113 INJ PANTOPRAZOLE SODIUM, VIA: HCPCS | Performed by: INTERNAL MEDICINE

## 2022-10-30 PROCEDURE — 2500000003 HC RX 250 WO HCPCS: Performed by: ANESTHESIOLOGY

## 2022-10-30 PROCEDURE — P9016 RBC LEUKOCYTES REDUCED: HCPCS

## 2022-10-30 PROCEDURE — 2500000003 HC RX 250 WO HCPCS: Performed by: INTERNAL MEDICINE

## 2022-10-30 PROCEDURE — 36430 TRANSFUSION BLD/BLD COMPNT: CPT

## 2022-10-30 PROCEDURE — 2580000003 HC RX 258: Performed by: ANESTHESIOLOGY

## 2022-10-30 PROCEDURE — 99223 1ST HOSP IP/OBS HIGH 75: CPT | Performed by: INTERNAL MEDICINE

## 2022-10-30 PROCEDURE — 99291 CRITICAL CARE FIRST HOUR: CPT | Performed by: INTERNAL MEDICINE

## 2022-10-30 PROCEDURE — 3700000001 HC ADD 15 MINUTES (ANESTHESIA): Performed by: INTERNAL MEDICINE

## 2022-10-30 PROCEDURE — 6370000000 HC RX 637 (ALT 250 FOR IP): Performed by: INTERNAL MEDICINE

## 2022-10-30 PROCEDURE — 7100000001 HC PACU RECOVERY - ADDTL 15 MIN: Performed by: INTERNAL MEDICINE

## 2022-10-30 PROCEDURE — 7100000000 HC PACU RECOVERY - FIRST 15 MIN: Performed by: INTERNAL MEDICINE

## 2022-10-30 PROCEDURE — 2709999900 HC NON-CHARGEABLE SUPPLY: Performed by: INTERNAL MEDICINE

## 2022-10-30 PROCEDURE — 6360000002 HC RX W HCPCS: Performed by: INTERNAL MEDICINE

## 2022-10-30 PROCEDURE — 86900 BLOOD TYPING SEROLOGIC ABO: CPT

## 2022-10-30 PROCEDURE — 2000000000 HC ICU R&B

## 2022-10-30 PROCEDURE — 80048 BASIC METABOLIC PNL TOTAL CA: CPT

## 2022-10-30 PROCEDURE — 94761 N-INVAS EAR/PLS OXIMETRY MLT: CPT

## 2022-10-30 PROCEDURE — 36415 COLL VENOUS BLD VENIPUNCTURE: CPT

## 2022-10-30 PROCEDURE — 85025 COMPLETE CBC W/AUTO DIFF WBC: CPT

## 2022-10-30 PROCEDURE — 6360000002 HC RX W HCPCS: Performed by: ANESTHESIOLOGY

## 2022-10-30 PROCEDURE — 85014 HEMATOCRIT: CPT

## 2022-10-30 RX ORDER — MIDAZOLAM HYDROCHLORIDE 1 MG/ML
INJECTION INTRAMUSCULAR; INTRAVENOUS PRN
Status: DISCONTINUED | OUTPATIENT
Start: 2022-10-30 | End: 2022-10-30 | Stop reason: SDUPTHER

## 2022-10-30 RX ORDER — SODIUM CHLORIDE 0.9 % (FLUSH) 0.9 %
5-40 SYRINGE (ML) INJECTION EVERY 12 HOURS SCHEDULED
Status: DISCONTINUED | OUTPATIENT
Start: 2022-10-30 | End: 2022-10-30 | Stop reason: HOSPADM

## 2022-10-30 RX ORDER — SODIUM CHLORIDE 0.9 % (FLUSH) 0.9 %
5-40 SYRINGE (ML) INJECTION PRN
Status: DISCONTINUED | OUTPATIENT
Start: 2022-10-30 | End: 2022-10-30 | Stop reason: HOSPADM

## 2022-10-30 RX ORDER — FOLIC ACID 1 MG/1
1 TABLET ORAL DAILY
Status: DISCONTINUED | OUTPATIENT
Start: 2022-10-31 | End: 2022-11-01 | Stop reason: HOSPADM

## 2022-10-30 RX ORDER — SODIUM CHLORIDE 9 MG/ML
INJECTION, SOLUTION INTRAVENOUS PRN
Status: DISCONTINUED | OUTPATIENT
Start: 2022-10-30 | End: 2022-11-01 | Stop reason: SDUPTHER

## 2022-10-30 RX ORDER — FENTANYL CITRATE 50 UG/ML
25 INJECTION, SOLUTION INTRAMUSCULAR; INTRAVENOUS EVERY 5 MIN PRN
Status: DISCONTINUED | OUTPATIENT
Start: 2022-10-30 | End: 2022-10-30 | Stop reason: HOSPADM

## 2022-10-30 RX ORDER — METOCLOPRAMIDE HYDROCHLORIDE 5 MG/ML
5 INJECTION INTRAMUSCULAR; INTRAVENOUS EVERY 6 HOURS
Status: DISCONTINUED | OUTPATIENT
Start: 2022-10-30 | End: 2022-10-30

## 2022-10-30 RX ORDER — DIPHENHYDRAMINE HYDROCHLORIDE 50 MG/ML
12.5 INJECTION INTRAMUSCULAR; INTRAVENOUS
Status: DISCONTINUED | OUTPATIENT
Start: 2022-10-30 | End: 2022-10-30 | Stop reason: HOSPADM

## 2022-10-30 RX ORDER — SODIUM CHLORIDE 9 MG/ML
INJECTION, SOLUTION INTRAVENOUS PRN
Status: DISCONTINUED | OUTPATIENT
Start: 2022-10-30 | End: 2022-11-01 | Stop reason: HOSPADM

## 2022-10-30 RX ORDER — SODIUM CHLORIDE 9 MG/ML
INJECTION, SOLUTION INTRAVENOUS CONTINUOUS PRN
Status: DISCONTINUED | OUTPATIENT
Start: 2022-10-30 | End: 2022-10-30 | Stop reason: SDUPTHER

## 2022-10-30 RX ORDER — ONDANSETRON 2 MG/ML
4 INJECTION INTRAMUSCULAR; INTRAVENOUS
Status: DISCONTINUED | OUTPATIENT
Start: 2022-10-30 | End: 2022-10-30 | Stop reason: HOSPADM

## 2022-10-30 RX ORDER — METOCLOPRAMIDE HYDROCHLORIDE 5 MG/ML
10 INJECTION INTRAMUSCULAR; INTRAVENOUS
Status: DISCONTINUED | OUTPATIENT
Start: 2022-10-30 | End: 2022-10-30 | Stop reason: HOSPADM

## 2022-10-30 RX ORDER — GAUZE BANDAGE 2" X 2"
100 BANDAGE TOPICAL DAILY
Status: DISCONTINUED | OUTPATIENT
Start: 2022-10-31 | End: 2022-11-01 | Stop reason: HOSPADM

## 2022-10-30 RX ORDER — LIDOCAINE HYDROCHLORIDE 10 MG/ML
INJECTION, SOLUTION EPIDURAL; INFILTRATION; INTRACAUDAL; PERINEURAL PRN
Status: DISCONTINUED | OUTPATIENT
Start: 2022-10-30 | End: 2022-10-30 | Stop reason: SDUPTHER

## 2022-10-30 RX ORDER — PROPOFOL 10 MG/ML
INJECTION, EMULSION INTRAVENOUS PRN
Status: DISCONTINUED | OUTPATIENT
Start: 2022-10-30 | End: 2022-10-30 | Stop reason: SDUPTHER

## 2022-10-30 RX ORDER — SODIUM CHLORIDE 9 MG/ML
INJECTION, SOLUTION INTRAVENOUS PRN
Status: DISCONTINUED | OUTPATIENT
Start: 2022-10-30 | End: 2022-10-30 | Stop reason: HOSPADM

## 2022-10-30 RX ADMIN — PROPOFOL 150 MG: 10 INJECTION, EMULSION INTRAVENOUS at 11:54

## 2022-10-30 RX ADMIN — LIDOCAINE HYDROCHLORIDE 20 MG: 10 INJECTION, SOLUTION EPIDURAL; INFILTRATION; INTRACAUDAL; PERINEURAL at 11:54

## 2022-10-30 RX ADMIN — PANTOPRAZOLE SODIUM 8 MG/HR: 40 INJECTION, POWDER, FOR SOLUTION INTRAVENOUS at 15:00

## 2022-10-30 RX ADMIN — SODIUM CHLORIDE, PRESERVATIVE FREE 10 ML: 5 INJECTION INTRAVENOUS at 20:26

## 2022-10-30 RX ADMIN — SODIUM CHLORIDE: 9 INJECTION, SOLUTION INTRAVENOUS at 04:52

## 2022-10-30 RX ADMIN — SODIUM CHLORIDE: 9 INJECTION, SOLUTION INTRAVENOUS at 11:49

## 2022-10-30 RX ADMIN — PROPOFOL 25 MG: 10 INJECTION, EMULSION INTRAVENOUS at 11:59

## 2022-10-30 RX ADMIN — PROPOFOL 20 MG: 10 INJECTION, EMULSION INTRAVENOUS at 12:08

## 2022-10-30 RX ADMIN — PANTOPRAZOLE SODIUM 8 MG/HR: 40 INJECTION, POWDER, FOR SOLUTION INTRAVENOUS at 04:45

## 2022-10-30 RX ADMIN — PROPOFOL 25 MG: 10 INJECTION, EMULSION INTRAVENOUS at 12:03

## 2022-10-30 RX ADMIN — METOPROLOL TARTRATE 5 MG: 5 INJECTION, SOLUTION INTRAVENOUS at 00:18

## 2022-10-30 RX ADMIN — OCTREOTIDE ACETATE 50 MCG/HR: 1000 INJECTION, SOLUTION INTRAVENOUS; SUBCUTANEOUS at 15:53

## 2022-10-30 RX ADMIN — OCTREOTIDE ACETATE 50 MCG/HR: 1000 INJECTION, SOLUTION INTRAVENOUS; SUBCUTANEOUS at 04:44

## 2022-10-30 RX ADMIN — MIDAZOLAM 2 MG: 1 INJECTION INTRAMUSCULAR; INTRAVENOUS at 11:50

## 2022-10-30 ASSESSMENT — ENCOUNTER SYMPTOMS
CHOKING: 0
BACK PAIN: 0
VOICE CHANGE: 0
TROUBLE SWALLOWING: 0
APNEA: 0
WHEEZING: 0
ABDOMINAL PAIN: 0
BLOOD IN STOOL: 1
SHORTNESS OF BREATH: 0
COUGH: 0
NAUSEA: 1
SORE THROAT: 0
VOMITING: 1

## 2022-10-30 ASSESSMENT — PAIN SCALES - GENERAL
PAINLEVEL_OUTOF10: 2
PAINLEVEL_OUTOF10: 2

## 2022-10-30 ASSESSMENT — PAIN DESCRIPTION - ONSET
ONSET: ON-GOING
ONSET: ON-GOING

## 2022-10-30 ASSESSMENT — PAIN - FUNCTIONAL ASSESSMENT
PAIN_FUNCTIONAL_ASSESSMENT_SITE2: ACTIVITIES ARE NOT PREVENTED
PAIN_FUNCTIONAL_ASSESSMENT: PREVENTS OR INTERFERES SOME ACTIVE ACTIVITIES AND ADLS
PAIN_FUNCTIONAL_ASSESSMENT: PREVENTS OR INTERFERES SOME ACTIVE ACTIVITIES AND ADLS
PAIN_FUNCTIONAL_ASSESSMENT_SITE2: ACTIVITIES ARE NOT PREVENTED

## 2022-10-30 ASSESSMENT — PAIN DESCRIPTION - DESCRIPTORS
DESCRIPTORS: ACHING;CRAMPING
DESCRIPTORS_2: ACHING
DESCRIPTORS: ACHING;CRAMPING;DISCOMFORT
DESCRIPTORS_2: ACHING

## 2022-10-30 ASSESSMENT — PAIN DESCRIPTION - LOCATION
LOCATION: ABDOMEN
LOCATION_2: HEAD
LOCATION: ABDOMEN
LOCATION_2: HEAD

## 2022-10-30 ASSESSMENT — PAIN DESCRIPTION - INTENSITY
RATING_2: 2
RATING_2: 2

## 2022-10-30 ASSESSMENT — PAIN DESCRIPTION - PAIN TYPE
TYPE: ACUTE PAIN
TYPE: ACUTE PAIN

## 2022-10-30 ASSESSMENT — PAIN DESCRIPTION - FREQUENCY: FREQUENCY: INTERMITTENT

## 2022-10-30 ASSESSMENT — PAIN DESCRIPTION - ORIENTATION
ORIENTATION: LEFT;UPPER
ORIENTATION: LEFT;UPPER
ORIENTATION_2: MID
ORIENTATION_2: MID

## 2022-10-30 NOTE — CONSULTS
GI Consult Note:    Name: Sade Giles  MRN: [id number]     Acct: [de-identified]  Room: [facility] Date: 10/29/2022  PCP: No primary care provider on file. Physician Requesting Consult: Lenny Hawthorne MD     Reason for Consult: Hematemesis    Chief Complaint:     Chief Complaint   Patient presents with    Hematemesis    Rectal Bleeding       History Obtained From:     patient, spouse, electronic medical record    History of Present Illness:      Sade Giles is a  40 y.o.  male who presents with Hematemesis and Rectal Bleeding      Symptoms:  Patient seen at [facility]. Patient came to the emergency department last evening with a history of hematemesis. He stated that he had black liquid emesis at home. He did not have dry heaves and retching prior to that episode. Following the initial episode he had 2 other episodes of hematemesis. Came to the emergency room and while in the emergency room he had another episode of hematemesis. Yesterday he did not have bowel movements. The day before yesterday he had a brown-colored bowel movement. However last night he had several episodes of black-colored liquid bowel movements. This morning he does not have hematemesis. Last night on admission hemoglobin was 12.7, today 7.9. Platelet count is about 50,000. WBC count 6.0. Transaminases revealed  ALT 36 alkaline phosphatase 230 bilirubin 3.0. No prior history of significant hematemesis. Patient had stool positive for occult blood and had EGD done in April 2022 and at that time mildly irregular squamocolumnar junction was described. Also was found to have gastritis. Patient is a chronic alcoholic and not known to have alcoholic liver disease/cirrhosis. Last time he consumed alcohol was about 2 weeks ago. Patient also has a past history of pulmonary embolism. He denies taking NSAIDs. No history of anticoagulation therapy.     At present, coagulation studies within normal limits. Past Medical History:     Past Medical History:   Diagnosis Date    Alcoholism ([facility] [address]     pt drinks a fifth of whiskey daily    Anemia     Cirrhosis, alcoholic ([facility] [address]     Pulmonary embolism (HCC)         Past Surgical History:     Past Surgical History:   Procedure Laterality Date    UPPER GASTROINTESTINAL ENDOSCOPY N/A 5/1/2022    EGD ESOPHAGOGASTRODUODENOSCOPY performed by Maia Baum MD at [facility]        Medications Prior to Admission:       Prior to Admission medications    Medication Sig Start Date End Date Taking?  Authorizing Provider   metoprolol succinate (TOPROL XL) 25 MG extended release tablet Take 12.5 mg by mouth daily 10/26/22   Historical Provider, MD   ondansetron (ZOFRAN-ODT) 4 MG disintegrating tablet Take 1 tablet by mouth 3 times daily as needed for Nausea or Vomiting 10/15/22   Rafe Hatchet, MD   furosemide (LASIX) 20 MG tablet Take 1 tablet by mouth daily  Patient not taking: Reported on 10/29/2022 5/13/22   Gregory Barrera MD   albuterol sulfate HFA (VENTOLIN HFA) 108 (90 Base) MCG/ACT inhaler Inhale 2 puffs into the lungs 4 times daily as needed for Wheezing  Patient not taking: Reported on 10/29/2022 5/3/22   Nida Phan MD   albuterol sulfate HFA (VENTOLIN HFA) 108 (90 Base) MCG/ACT inhaler Inhale 2 puffs into the lungs 4 times daily as needed for Wheezing  Patient not taking: Reported on 10/29/2022 5/3/22   Nida Phan MD   folic acid (FOLVITE) 1 MG tablet Take 1 tablet by mouth daily 5/4/22   Nida Phan MD   vitamin B-1 (THIAMINE) 100 MG tablet Take 1 tablet by mouth daily 5/4/22   Nida Phan MD   Multiple Vitamins-Minerals (CENTRUM MEN) TABS Take 1 tablet by mouth daily    Historical Provider, MD   Menthol-Methyl Salicylate (ANALGESIC OINTMENT) OINT ointment Apply topically as needed Indications: on back  Patient not taking: Reported on 10/29/2022    Historical Provider, MD   pantoprazole (PROTONIX) 20 MG tablet Take 2 tablets by mouth daily  Patient not taking: Reported on 10/29/2022 4/15/22   Ganga Wick DO   losartan (COZAAR) 50 MG tablet take 1 tablet by mouth once daily  Patient not taking: Reported on 10/29/2022 11/16/21   Rony Calixto MD   naproxen (NAPROSYN) 500 MG tablet Take 1 tablet by mouth 2 times daily  Patient taking differently: Take 500 mg by mouth 2 times daily as needed  8/13/21 3/28/22  Loyda Hutton MD   fluticasone [facility]) 50 MCG/ACT nasal spray instill 1 spray into each nostril once daily 5/10/21   Rony Calixto MD   Blood Pressure Monitoring (BP MONITOR-STETHOSCOPE) KIT 1 each by Does not apply route daily  Patient not taking: Reported on 10/29/2022 4/7/21   Rony Calixto MD   Nicotine 21-14-7 MG/24HR KIT Place 14 mg onto the skin nightly     Historical Provider, MD   EPINEPHrine (EPIPEN 2-LIZETTE) 0.3 MG/0.3ML SOAJ injection Inject 0.3 mLs into the muscle once for 1 dose Use as directed for allergic reaction 11/17/16 5/24/21  Ashley Paiz MD        Allergies:       Bee venom    Social History:     Tobacco:    reports that he has been smoking cigarettes. He has a 21.00 pack-year smoking history. He has never used smokeless tobacco.  Alcohol:      reports that he does not currently use alcohol. Drug Use: reports that he does not currently use drugs. Family History:     Family History   Problem Relation Age of Onset    Heart Disease Mother     Heart Attack Mother     Heart Disease Father        Review of Systems:     Review of Systems   Constitutional:  Positive for appetite change. Negative for fatigue and fever. HENT:  Negative for mouth sores, sore throat, trouble swallowing and voice change. Eyes:         No ictirus   Respiratory:  Negative for apnea, cough, choking, shortness of breath and wheezing. Cardiovascular:  Negative for chest pain, palpitations and leg swelling. Gastrointestinal:  Positive for blood in stool, nausea and vomiting.  Negative for abdominal pain.   Endocrine: Negative for polydipsia, polyphagia and polyuria. Genitourinary:  Negative for frequency, hematuria and urgency. Musculoskeletal:  Negative for arthralgias, back pain, gait problem and joint swelling. Skin:  Negative for pallor and rash. Allergic/Immunologic: Negative for food allergies. Neurological:  Negative for dizziness, seizures, weakness and headaches. Hematological:  Negative for adenopathy. Does not bruise/bleed easily. Psychiatric/Behavioral:  Negative for sleep disturbance. The patient is not nervous/anxious. Code Status:  Full Code    Physical Exam:     Vitals:  /70   Pulse 94   Temp 98.9 °F (37.2 °C) (Infrared)   Resp (!) 9   Ht 5' 8\" (1.727 m)   Wt 184 lb 1.4 oz (83.5 kg)   SpO2 97%   BMI 27.99 kg/m²   Temp (24hrs), Av.7 °F (37.1 °C), Min:98 °F (36.7 °C), Max:99.1 °F (37.3 °C)      Physical Exam  Vitals and nursing note reviewed. Constitutional:       General: He is not in acute distress. Appearance: Normal appearance. He is well-developed. HENT:      Head: Normocephalic and atraumatic. Mouth/Throat:      Pharynx: No oropharyngeal exudate. Eyes:      General: No scleral icterus. Extraocular Movements: Extraocular movements intact. Conjunctiva/sclera: Conjunctivae normal.      Pupils: Pupils are equal, round, and reactive to light. Neck:      Thyroid: No thyromegaly. Trachea: No tracheal deviation. Cardiovascular:      Rate and Rhythm: Normal rate and regular rhythm. Heart sounds: Normal heart sounds. No murmur heard. Pulmonary:      Effort: Pulmonary effort is normal. No respiratory distress. Breath sounds: Normal breath sounds. No wheezing or rales. Abdominal:      General: Bowel sounds are normal. There is no distension. Palpations: Abdomen is soft. There is no hepatomegaly or mass. Tenderness: There is no abdominal tenderness. There is no guarding or rebound.       Hernia: No hernia is present. Genitourinary:     Rectum: Normal.   Musculoskeletal:      Cervical back: Normal range of motion and neck supple. Lymphadenopathy:      Cervical: No cervical adenopathy. Skin:     General: Skin is warm and dry. Findings: No erythema or rash. Neurological:      General: No focal deficit present. Mental Status: He is alert and oriented to person, place, and time. Cranial Nerves: No cranial nerve deficit. Psychiatric:         Mood and Affect: Mood normal.         Behavior: Behavior normal.         Thought Content:  Thought content normal.     Data:   CBC:   Lab Results   Component Value Date/Time    WBC 6.0 10/30/2022 06:03 AM    RBC 2.21 10/30/2022 06:03 AM    HGB 7.9 10/30/2022 06:03 AM    HCT 23.5 10/30/2022 06:03 AM    .4 10/30/2022 06:03 AM    MCH 35.5 10/30/2022 06:03 AM    MCHC 33.4 10/30/2022 06:03 AM    RDW 16.5 10/30/2022 06:03 AM    PLT 52 10/30/2022 06:03 AM    MPV 9.2 10/30/2022 06:03 AM     CBC with Differential:  Lab Results   Component Value Date/Time    WBC 6.0 10/30/2022 06:03 AM    RBC 2.21 10/30/2022 06:03 AM    HGB 7.9 10/30/2022 06:03 AM    HCT 23.5 10/30/2022 06:03 AM    PLT 52 10/30/2022 06:03 AM    .4 10/30/2022 06:03 AM    MCH 35.5 10/30/2022 06:03 AM    MCHC 33.4 10/30/2022 06:03 AM    RDW 16.5 10/30/2022 06:03 AM    NRBC 1 05/17/2021 01:01 PM    METASPCT 1 05/01/2022 04:50 AM    LYMPHOPCT 20 10/30/2022 06:03 AM    MONOPCT 10 10/30/2022 06:03 AM    BASOPCT 1 10/30/2022 06:03 AM    MONOSABS 0.60 10/30/2022 06:03 AM    LYMPHSABS 1.20 10/30/2022 06:03 AM    EOSABS 0.12 10/30/2022 06:03 AM    BASOSABS 0.06 10/30/2022 06:03 AM    DIFFTYPE NOT REPORTED 01/30/2022 02:20 PM     Hemoglobin/Hematocrit:  Lab Results   Component Value Date/Time    HGB 7.9 10/30/2022 06:03 AM    HCT 23.5 10/30/2022 06:03 AM     CMP:    Lab Results   Component Value Date/Time     10/30/2022 06:03 AM    K 4.4 10/30/2022 06:03 AM     10/30/2022 06:03 AM    CO2 20 10/30/2022 06:03 AM    BUN 24 10/30/2022 06:03 AM    CREATININE 0.82 10/30/2022 06:03 AM    GFRAA >60 05/26/2022 05:20 AM    LABGLOM >60 10/30/2022 06:03 AM    GLUCOSE 114 10/30/2022 06:03 AM    PROT 8.6 10/29/2022 06:36 PM    LABALBU 4.0 10/29/2022 06:36 PM    CALCIUM 7.7 10/30/2022 06:03 AM    BILITOT 3.0 10/29/2022 06:36 PM    ALKPHOS 230 10/29/2022 06:36 PM     10/29/2022 06:36 PM    ALT 36 10/29/2022 06:36 PM     BMP:  Lab Results   Component Value Date/Time     10/30/2022 06:03 AM    K 4.4 10/30/2022 06:03 AM     10/30/2022 06:03 AM    CO2 20 10/30/2022 06:03 AM    BUN 24 10/30/2022 06:03 AM    LABALBU 4.0 10/29/2022 06:36 PM    CREATININE 0.82 10/30/2022 06:03 AM    CALCIUM 7.7 10/30/2022 06:03 AM    GFRAA >60 05/26/2022 05:20 AM    LABGLOM >60 10/30/2022 06:03 AM    GLUCOSE 114 10/30/2022 06:03 AM     PT/INR:    Lab Results   Component Value Date/Time    PROTIME 16.1 10/29/2022 06:36 PM    INR 1.3 10/29/2022 06:36 PM     PTT:    Lab Results   Component Value Date/Time    APTT 40.5 04/27/2022 06:15 PM   [APTT}    Assesment:     Primary Problem  GI bleed    Active Hospital Problems    Diagnosis Date Noted    GI bleed [K92.2] 10/29/2022     Priority: Medium       Plan:     Patient had hematemesis. Cirrhosis of the liver. Need to evaluate upper GI source of bleeding. He needs EGD as this is scheduled. Basing on the results we will decide further management. Meanwhile we will continue octreotide and PPI therapy. Discussed with the patient regarding the procedure, risks and benefits, he understood and verbalized her consent. Thank you for allowing me to participate in the care of your patient. Please feel free to contact me with anyquestions or concerns. Electronically signed by Yair Dean MD on 10/30/2022 at 12:44 PM     Copy sent to Dr. Meyers primary care provider on file. Note is dictated utilizing voice recognition software.  Unfortunately this leads to occasional typographical errors. Please contact our office if you have any questions.

## 2022-10-30 NOTE — PROGRESS NOTES
Paged Dr. Mary Choudhury to update on patient status for advancing diet. Per patient he is hungry, thirst has no pain in his abdomen and only mild discomfort in his throat from the EGD scope. Per Dr. Mary Choudhury, ok to advance diet from NPO to Full liquid diet. Orders entered into epic.

## 2022-10-30 NOTE — H&P
[address] [facility] [facility] [address]      [address] [facility]     HISTORY AND PHYSICAL EXAMINATION            Date:   10/30/2022  Patient name:  Whitney Ashton  Date of admission:  10/29/2022  5:35 PM  MRN:   [id number]  Account:  [de-identified]  YOB: 1985  PCP:    No primary care provider on file. Room:   [facility] OR [place]  Code Status:    Full Code    Chief Complaint:     Chief Complaint   Patient presents with    Hematemesis    Rectal Bleeding       History Obtained From:     patient    History of Present Illness:     Is a 26-year-old male with past medical history of hypertension, pulmonary embolism in 2/5667, alcoholic hepatitis, and an EF of 38% in 2020, presented today with an episode of lightheadedness, nausea and severe vomiting. He describes the vomiting at first is tarry black which she had 2 episodes of. Then he experienced more reddish to bright red vomiting which she had around 5 episodes of 3 occurring outside the hospital that was in the ED then 1 occurring in the ICU were he mentions that he filled up a sample bottle. He mentions that he nearly fainted where he works in [address] [place] [address], and hit his shoulder but did not have loss of consciousness and did not hit his head. Patient also had around 5 episodes of black diarrhea, which he had with urgency. He also experienced episodes of palpitation where he said his heart was \"racing\". He also had abdominal cramps whenever he was vomiting and abdomen was tender to palpation. Patient has a past medical history of alcoholic hepatitis, which was diagnosed by CT versus possibility of cirrhosis. He has alcohol history where he used to drink 1 pint of vodka per day and quit about 14 days ago. Nurse estimates that he vomited around 650 mL in the ICU.   He takes Toprol, does not take Lasix, does not take Protonix, takes losartan, and does not take naproxen for headaches anymore. Patient has a family history notable for cirrhosis in his father and grandfather. He is also allergic to bee stings. Patient's hemoglobin dropped from 12-7.9, and he is due for blood transfusion. He was also given IV fluids and Zofran. Past Medical History:     Past Medical History:   Diagnosis Date    Alcoholism ([address]     pt drinks a fifth of whiskey daily    Anemia     Cirrhosis, alcoholic ([facility]     Pulmonary embolism (HCC)         Past SurgicalHistory:     Past Surgical History:   Procedure Laterality Date    UPPER GASTROINTESTINAL ENDOSCOPY N/A 5/1/2022    EGD ESOPHAGOGASTRODUODENOSCOPY performed by Kolton Ruiz MD at [facility] ENDO        Medications Prior to Admission:        Prior to Admission medications    Medication Sig Start Date End Date Taking?  Authorizing Provider   metoprolol succinate (TOPROL XL) 25 MG extended release tablet Take 12.5 mg by mouth daily 10/26/22   Historical Provider, MD   ondansetron (ZOFRAN-ODT) 4 MG disintegrating tablet Take 1 tablet by mouth 3 times daily as needed for Nausea or Vomiting 10/15/22   Holley Saenz MD   furosemide (LASIX) 20 MG tablet Take 1 tablet by mouth daily  Patient not taking: Reported on 10/29/2022 5/13/22   Mariajose Medina MD   albuterol sulfate HFA (VENTOLIN HFA) 108 (90 Base) MCG/ACT inhaler Inhale 2 puffs into the lungs 4 times daily as needed for Wheezing  Patient not taking: Reported on 10/29/2022 5/3/22   Delma Fuentes MD   albuterol sulfate HFA (VENTOLIN HFA) 108 (90 Base) MCG/ACT inhaler Inhale 2 puffs into the lungs 4 times daily as needed for Wheezing  Patient not taking: Reported on 10/29/2022 5/3/22   Delma Fuentes MD   folic acid (FOLVITE) 1 MG tablet Take 1 tablet by mouth daily 5/4/22   Delma Fuentes MD   vitamin B-1 (THIAMINE) 100 MG tablet Take 1 tablet by mouth daily 5/4/22   Delma Fuentes MD   Multiple Vitamins-Minerals (CENTRUM MEN) TABS Take 1 tablet by mouth daily Historical Provider, MD   Menthol-Methyl Salicylate (ANALGESIC OINTMENT) OINT ointment Apply topically as needed Indications: on back  Patient not taking: Reported on 10/29/2022    Historical Provider, MD   pantoprazole (PROTONIX) 20 MG tablet Take 2 tablets by mouth daily  Patient not taking: Reported on 10/29/2022 4/15/22   Verdia Kingsport, DO   losartan (COZAAR) 50 MG tablet take 1 tablet by mouth once daily  Patient not taking: Reported on 10/29/2022 11/16/21   Raji Ryder MD   naproxen (NAPROSYN) 500 MG tablet Take 1 tablet by mouth 2 times daily  Patient taking differently: Take 500 mg by mouth 2 times daily as needed  8/13/21 3/28/22  Karen Haley MD   fluticasone [facility]) 50 MCG/ACT nasal spray instill 1 spray into each nostril once daily 5/10/21   Raji Ryder MD   Blood Pressure Monitoring (BP MONITOR-STETHOSCOPE) KIT 1 each by Does not apply route daily  Patient not taking: Reported on 10/29/2022 4/7/21   Raji Ryder MD   Nicotine 21-14-7 MG/24HR KIT Place 14 mg onto the skin nightly     Historical Provider, MD   EPINEPHrine (EPIPEN 2-LIZETTE) 0.3 MG/0.3ML SOAJ injection Inject 0.3 mLs into the muscle once for 1 dose Use as directed for allergic reaction 11/17/16 5/24/21  Sky Snyder MD        Allergies:     Bee venom    Social History:     Tobacco:    reports that he has been smoking cigarettes. He has a 21.00 pack-year smoking history. He has never used smokeless tobacco.  Alcohol:      reports that he does not currently use alcohol. Drug Use:  reports that he does not currently use drugs. Family History:     Family History   Problem Relation Age of Onset    Heart Disease Mother     Heart Attack Mother     Heart Disease Father        Review of Systems:     Positive and Negative as described in HPI.     Review of Systems    Physical Exam:   BP (!) 109/58   Pulse 90   Temp 98.7 °F (37.1 °C) (Oral)   Resp 10   Ht 5' 8\" (1.727 m)   Wt 184 lb 1.4 oz (83.5 kg)   SpO2 98%   BMI 27.99 kg/m²   Temp (24hrs), Av.7 °F (37.1 °C), Min:98 °F (36.7 °C), Max:99.1 °F (37.3 °C)    No results for input(s): POCGLU in the last 72 hours. Intake/Output Summary (Last 24 hours) at 10/30/2022 1210  Last data filed at 10/30/2022 1115  Gross per 24 hour   Intake 1402.8 ml   Output 650 ml   Net 752.8 ml       Physical Exam  Constitutional:       General: He is not in acute distress. Appearance: He is not ill-appearing. HENT:      Head: Atraumatic. Nose: No congestion. Mouth/Throat:      Mouth: Mucous membranes are moist.   Cardiovascular:      Rate and Rhythm: Normal rate and regular rhythm. Heart sounds: No murmur heard. Pulmonary:      Effort: Pulmonary effort is normal. No respiratory distress. Breath sounds: Normal breath sounds. No wheezing. Chest:      Chest wall: No tenderness. Abdominal:      General: Abdomen is flat. Bowel sounds are normal.      Palpations: Abdomen is soft. There is no mass. Tenderness: There is abdominal tenderness. Hernia: No hernia is present. Comments: Liver palpable, and 20+ centimeters   Musculoskeletal:         General: Tenderness present. Cervical back: No rigidity or tenderness. Comments: Left shoulder tenderness due to fall   Skin:     Coloration: Skin is pale. Neurological:      General: No focal deficit present. Mental Status: He is alert and oriented to person, place, and time.    Psychiatric:         Mood and Affect: Mood normal.       Investigations:     Laboratory Testing:  Recent Results (from the past 24 hour(s))   Comprehensive Metabolic Panel    Collection Time: 10/29/22  6:36 PM   Result Value Ref Range    Glucose 97 70 - 99 mg/dL    BUN 17 6 - 20 mg/dL    Creatinine 0.74 0.70 - 1.20 mg/dL    Est, Glom Filt Rate >60 >60 mL/min/1.73m2    Calcium 9.4 8.6 - 10.4 mg/dL    Sodium 137 135 - 144 mmol/L    Potassium 4.2 3.7 - 5.3 mmol/L    Chloride 103 98 - 107 mmol/L    CO2 21 20 - 31 mmol/L    Anion Gap 13 9 - 17 mmol/L    Alkaline Phosphatase 230 (H) 40 - 129 U/L    ALT 36 5 - 41 U/L     (H) <40 U/L    Total Bilirubin 3.0 (H) 0.3 - 1.2 mg/dL    Total Protein 8.6 (H) 6.4 - 8.3 g/dL    Albumin 4.0 3.5 - 5.2 g/dL   Protime-INR    Collection Time: 10/29/22  6:36 PM   Result Value Ref Range    Protime 16.1 (H) 11.8 - 14.6 sec    INR 1.3    TYPE AND SCREEN    Collection Time: 10/29/22  6:36 PM   Result Value Ref Range    Expiration Date 11/01/2022,2359     Arm Band Number [id number]     ABO/Rh A POSITIVE     Antibody Screen NEGATIVE     Unit Number [id number]     Product Code Leukocyte Reduced Red Cell     Unit Divison 00     Dispense Status ALLOCATED     Transfusion Status OK TO TRANSFUSE     Crossmatch Result COMPATIBLE     Unit Number [id number]     Product Code Leukocyte Reduced Red Cell     Unit Divison 00     Dispense Status ISSUED     Unit Issue Date/Time [id number]     Product Code Blood Bank T2577V42     Blood Bank Unit Type and Rh A POS     Blood Bank ISBT Product Blood Type 6200     Blood Bank Blood Product Expiration Date 143407286578     Transfusion Status OK TO TRANSFUSE     Crossmatch Result COMPATIBLE    Lipase    Collection Time: 10/29/22  6:36 PM   Result Value Ref Range    Lipase 64 (H) 13 - 60 U/L   Magnesium    Collection Time: 10/29/22  6:36 PM   Result Value Ref Range    Magnesium 1.6 1.6 - 2.6 mg/dL   CBC with Auto Differential    Collection Time: 10/29/22  6:37 PM   Result Value Ref Range    WBC 6.1 3.5 - 11.0 k/uL    RBC 3.63 (L) 4.5 - 5.9 m/uL    Hemoglobin 12.7 (L) 13.5 - 17.5 g/dL    Hematocrit 37.0 (L) 41 - 53 %    .0 (H) 80 - 100 fL    MCH 35.0 (H) 26 - 34 pg    MCHC 34.3 31 - 37 g/dL    RDW 16.5 (H) 11.5 - 14.9 %    Platelets 65 (L) 544 - 450 k/uL    MPV 8.5 6.0 - 12.0 fL    Seg Neutrophils 74 (H) 36 - 66 %    Lymphocytes 14 (L) 24 - 44 %    Monocytes 10 (H) 1 - 7 %    Eosinophils % 1 0 - 4 %    Basophils 1 0 - 2 %    Segs Absolute 4.50 1.3 - 9.1 k/uL    Absolute Lymph # 0.90 (L) 1.0 - 4.8 k/uL    Absolute Mono # 0.60 0.1 - 1.3 k/uL    Absolute Eos # 0.10 0.0 - 0.4 k/uL    Basophils Absolute 0.10 0.0 - 0.2 k/uL   Hemoglobin and Hematocrit    Collection Time: 10/30/22  1:05 AM   Result Value Ref Range    Hemoglobin 9.2 (L) 13.5 - 17.5 g/dL    Hematocrit 28.2 (L) 41 - 53 %   Basic Metabolic Panel    Collection Time: 10/30/22  6:03 AM   Result Value Ref Range    Glucose 114 (H) 70 - 99 mg/dL    BUN 24 (H) 6 - 20 mg/dL    Creatinine 0.82 0.70 - 1.20 mg/dL    Est, Glom Filt Rate >60 >60 mL/min/1.73m2    Calcium 7.7 (L) 8.6 - 10.4 mg/dL    Sodium 135 135 - 144 mmol/L    Potassium 4.4 3.7 - 5.3 mmol/L    Chloride 104 98 - 107 mmol/L    CO2 20 20 - 31 mmol/L    Anion Gap 11 9 - 17 mmol/L   CBC with Auto Differential    Collection Time: 10/30/22  6:03 AM   Result Value Ref Range    WBC 6.0 3.5 - 11.0 k/uL    RBC 2.21 (L) 4.5 - 5.9 m/uL    Hemoglobin 7.9 (L) 13.5 - 17.5 g/dL    Hematocrit 23.5 (L) 41 - 53 %    .4 (H) 80 - 100 fL    MCH 35.5 (H) 26 - 34 pg    MCHC 33.4 31 - 37 g/dL    RDW 16.5 (H) 11.5 - 14.9 %    Platelets 52 (L) 616 - 450 k/uL    MPV 9.2 6.0 - 12.0 fL    Seg Neutrophils 67 (H) 36 - 66 %    Lymphocytes 20 (L) 24 - 44 %    Monocytes 10 (H) 1 - 7 %    Eosinophils % 2 0 - 4 %    Basophils 1 0 - 2 %    Segs Absolute 4.02 1.3 - 9.1 k/uL    Absolute Lymph # 1.20 1.0 - 4.8 k/uL    Absolute Mono # 0.60 0.1 - 1.3 k/uL    Absolute Eos # 0.12 0.0 - 0.4 k/uL    Basophils Absolute 0.06 0.0 - 0.2 k/uL    Morphology ANISOCYTOSIS PRESENT     Morphology MACROCYTOSIS PRESENT        Imaging/Diagnostics:  CT ABDOMEN PELVIS W IV CONTRAST Additional Contrast? None    Result Date: 10/15/2022  EXAMINATION: CT OF THE ABDOMEN AND PELVIS WITH CONTRAST 10/15/2022 1:31 am TECHNIQUE: CT of the abdomen and pelvis was performed with the administration of intravenous contrast. Multiplanar reformatted images are provided for review.  Automated exposure control, iterative reconstruction, and/or weight based adjustment of the mA/kV was utilized to reduce the radiation dose to as low as reasonably achievable. COMPARISON: None. HISTORY: ORDERING SYSTEM PROVIDED HISTORY: abd pain TECHNOLOGIST PROVIDED HISTORY: abd pain Decision Support Exception - unselect if not a suspected or confirmed emergency medical condition->Emergency Medical Condition (MA) Reason for Exam: chest pain with abdominal discomfort for one day FINDINGS: Lower Chest:  Visualized portion of the lower chest demonstrates no acute abnormality. Organs: The liver is unremarkable. The gallbladder contains 4 mm partially calcified gallstone. The gallbladder appears otherwise normal.  There is no intra or extrahepatic ductal dilatation. The pancreas, spleen, adrenal glands, kidneys and ureters are unremarkable. GI/Bowel: Stomach and duodenal sweep demonstrate no acute abnormality. There is no evidence of bowel obstruction. No evidence of abnormal bowel wall thickening or distension. The appendix is visualized and is unremarkable. No evidence of acute appendicitis. Pelvis: The bladder and reproductive organs are unremarkable. Peritoneum/Retroperitoneum: No evidence of ascites or free air. No evidence of lymphadenopathy. Aorta is normal in caliber. Bones/Soft Tissues: No acute bone or soft tissue abnormality evident     Cholelithiasis without evidence of cholecystitis. Otherwise unremarkable exam.     XR CHEST PORTABLE    Result Date: 10/15/2022  EXAMINATION: ONE XRAY VIEW OF THE CHEST 10/15/2022 12:39 am COMPARISON: CT PE dated 05/21/2022. HISTORY: ORDERING SYSTEM PROVIDED HISTORY: chest pain TECHNOLOGIST PROVIDED HISTORY: chest pain Reason for Exam: Squeezing chest pain Additional signs and symptoms: Squeezing chest pain Relevant Medical/Surgical History: Squeezing chest pain FINDINGS: Heart size and pulmonary vasculature are normal.  The lungs are clear and normally expanded. Surrounding osseous and soft tissue structures are unremarkable. Normal examination. CT CHEST PULMONARY EMBOLISM W CONTRAST    Result Date: 10/15/2022  EXAMINATION: CTA OF THE CHEST 10/15/2022 1:30 am TECHNIQUE: CTA of the chest was performed after the administration of intravenous contrast.  Multiplanar reformatted images are provided for review. MIP images are provided for review. Automated exposure control, iterative reconstruction, and/or weight based adjustment of the mA/kV was utilized to reduce the radiation dose to as low as reasonably achievable. COMPARISON: 05/21/2022 HISTORY: ORDERING SYSTEM PROVIDED HISTORY: chest pain TECHNOLOGIST PROVIDED HISTORY: chest pain Decision Support Exception - unselect if not a suspected or confirmed emergency medical condition->Emergency Medical Condition (MA) Reason for Exam: chest pain with abdominal discomfort for one day Additional signs and symptoms: elevated dimer FINDINGS: Pulmonary Arteries: Pulmonary arteries are adequately opacified for evaluation. No evidence of intraluminal filling defect to suggest pulmonary embolism. Main pulmonary artery is normal in caliber. Mediastinum: No evidence of mediastinal lymphadenopathy. The heart and pericardium demonstrate no acute abnormality. There is no acute abnormality of the thoracic aorta. Lungs/pleura: The lungs are without acute process. No focal consolidation or pulmonary edema. No evidence of pleural effusion or pneumothorax. Upper Abdomen: Limited images of the upper abdomen are unremarkable. Soft Tissues/Bones: No acute bone or soft tissue abnormality. No evidence of pulmonary embolism or acute pulmonary abnormality. CTA ABDOMEN PELVIS W CONTRAST    Result Date: 10/29/2022  EXAMINATION: CTA OF THE ABDOMEN AND PELVIS WITH CONTRAST 10/29/2022 7:44 pm: TECHNIQUE: CTA of the abdomen and pelvis was performed with the administration of intravenous contrast. Multiplanar reformatted images are provided for review. MIP images are provided for review.  Automated exposure control, iterative reconstruction, and/or weight based adjustment of the mA/kV was utilized to reduce the radiation dose to as low as reasonably achievable. COMPARISON: October 15, 2022 CT abdomen and pelvis. CT chest same day. HISTORY: ORDERING SYSTEM PROVIDED HISTORY: abd pain, gi bleed TECHNOLOGIST PROVIDED HISTORY: abd pain, gi bleed Reason for Exam: abd pain, gi bleed Additional signs and symptoms: Pt states that he has been having abdominal pain with rectal bleeding x 2 days. Today he started vomiting brownish red blood that has been getting brighter as the day/night goes on FINDINGS: CTA ABDOMEN: The liver is diffusely heterogeneous with multiple ill-defined hypodensities which appear new. Gallstone is noted. The solid and hollow viscous otherwise appears normal.  No active GI bleeding or evidence of ischemic bowel. The celiac trunk, SMA, bilateral renal arteries and iliac bifurcation appear normal as well as the abdominal aorta. CTA PELVIS: Normal.     No evidence of active GI hemorrhage. Innumerable hypodensities throughout the liver. This may represent hepatocellular disease. Metastatic disease not excluded. Follow-up MRI may be helpful. Assessment :      Primary Problem  GI bleed    Active Hospital Problems    Diagnosis Date Noted    GI bleed [K92.2] 10/29/2022     Priority: Medium       Plan:     Patient status Admit as inpatient in the ICU      Variceal GI bleed- confirmed with EGD- no active GI bleed   -Hemoglobin today 7.9, dropped from 12.9 on admission  Hypertension, recheck   -Patient loaded with IV fluids to maintain blood pressure, lowest blood pressure was 46Y systolic   -Given ondansetron   -Blood transfusion   -GI consulted, took patient for EGD. Report concludes that he has esophageal variceal bleed, but is not bleeding anymore.    -GI want to continue PPI therapy- Protonix 80mg, and repeat EGD in 3 to 4 weeks -GI want to keep patient n.p.o. as tacrolimus is able to       Hypertension   -Hold blood pressure medications due to hypotension    CHF   -Last echo done in 2020 showed ejection fraction of 38%  -Blood pressure medications blood pressure improved   -Consider starting medications for CHF on discharge   -Consider cardiology consult        Consultations:   Pineda Albanyane TO GI    Patient is admitted as inpatient status because of co-morbiditieslisted above, severity of signs and symptoms as outlined, requirement for current medical therapies and most importantly because of direct risk to patient if care not provided in a hospital setting. Jenny Alonso MD  10/30/2022  12:10 PM    I have discussed the care of Jovita Kanner , including pertinent history and exam findings,    today with the resident. I have seen and examined the patient and the key elements of all parts of the encounter have been performed by me . I agree with the assessment, plan and orders as documented by the resident. Principal Problem:    GI bleed  Resolved Problems:    * No resolved hospital problems. *        Overall  course ;                                   are improving over time. Admitted with hematemesis  Underwent EGD banding of esophageal varices done  Got 1 unit of packed cell transfusion  Will be on octreotide and Protonix drip  CT concerning for liver lesions, will do MRI liver protocols 1 patient will be more stable  Has CHF, compensated, outpatient cardiology follow-up  Will hold antihypertensives  Patient critically sick seen in ICU  Discussed case with GI physician  CC time 35 minutes           Electronically signed by Sadie Liz MD     Copy sent to Dr. Meyers primary care provider on file.

## 2022-10-30 NOTE — ANESTHESIA POSTPROCEDURE EVALUATION
POST- ANESTHESIA EVALUATION       Pt Name: Rosalie Pena  MRN: [id number]  YOB: 1985  Date of evaluation: 10/30/2022  Time:  2:14 PM      BP (!) 108/47   Pulse 94   Temp 99 °F (37.2 °C)   Resp 15   Ht 5' 8\" (1.727 m)   Wt 184 lb 1.4 oz (83.5 kg)   SpO2 96%   BMI 27.99 kg/m²      Consciousness Level  Awake  Cardiopulmonary Status  Stable  Pain Adequately Treated YES  Nausea / Vomiting  NO  Adequate Hydration  YES  Anesthesia Related Complications NONE      Electronically signed by Vania Isaac MD on 10/30/2022 at 2:14 PM       Department of Anesthesiology  Postprocedure Note    Patient: Rosalie Pena  MRN: [id number]  YOB: 1985  Date of evaluation: 10/30/2022      Procedure Summary     Date: 10/30/22 Room / Location: [address] [place] [address] Cristian Archer 01 / [facility]: [facility]    Anesthesia Start: 8160 Anesthesia Stop: 4480    Procedure: EGD BAND LIGATION (Esophagus) Diagnosis:       Hematemesis, unspecified whether nausea present      (Hematemesis, unspecified whether nausea present [K92.0])    Surgeons: He Morales MD Responsible Provider: Vania Isaac MD    Anesthesia Type: general ASA Status: 3          Anesthesia Type: No value filed.     Antoinette Phase I: Antoinette Score: 9    Antoinette Phase II:        Anesthesia Post Evaluation

## 2022-10-30 NOTE — PLAN OF CARE
Problem: Discharge Planning  Goal: Discharge to home or other facility with appropriate resources  Outcome: Progressing  Flowsheets (Taken 10/29/2022 7854)  Discharge to home or other facility with appropriate resources:   Identify barriers to discharge with patient and caregiver   Arrange for needed discharge resources and transportation as appropriate     Problem: Pain  Goal: Verbalizes/displays adequate comfort level or baseline comfort level  Outcome: Progressing     Problem: Safety - Adult  Goal: Free from fall injury  Outcome: Progressing     Problem: ABCDS Injury Assessment  Goal: Absence of physical injury  Outcome: Progressing     Problem: Neurosensory - Adult  Goal: Achieves stable or improved neurological status  Outcome: Progressing  Goal: Achieves maximal functionality and self care  Outcome: Progressing     Problem: Cardiovascular - Adult  Goal: Absence of cardiac dysrhythmias or at baseline  Outcome: Progressing     Problem: Skin/Tissue Integrity - Adult  Goal: Skin integrity remains intact  Outcome: Progressing  Goal: Oral mucous membranes remain intact  Outcome: Progressing     Problem: Musculoskeletal - Adult  Goal: Return mobility to safest level of function  Outcome: Progressing     Problem: Gastrointestinal - Adult  Goal: Minimal or absence of nausea and vomiting  Outcome: Progressing  Goal: Maintains or returns to baseline bowel function  Outcome: Progressing  Goal: Maintains adequate nutritional intake  Outcome: Progressing

## 2022-10-30 NOTE — CARE COORDINATION
CASE MANAGEMENT NOTE:    Admission Date:  10/29/2022 Ritchie Mccormack is a 40 y.o.  male    Admitted for : GI bleed [K92.2]  Cirrhosis of liver without ascites, unspecified hepatic cirrhosis type ([facility] [K74.60]  Hematemesis with nausea [K92.0]    Met with:  Patient    PCP:  [facility] on [facility]:  Medicaid      Is patient alert and oriented at time of discussion:  Yes    Current Residence/ Living Arrangements:  independently at home with significant other, Sydney, and tomy. Current Services PTA:  No    Does patient go to outpatient dialysis: No    Is patient agreeable to VNS: No    Freedom of choice provided:  Yes    List of [address] provided: No    VNS chosen:  NA    DME:  none    Home Oxygen: yes, 2L NC PRN (concentrator and portable tanks)    Nebulizer: No    CPAP/BIPAP: No    Supplier: [employer]    Potential Assistance Needed: No    SNF needed: No    Freedom of choice and list provided: NA    Pharmacy:  [address] [place] [address] on [address] [facility].       Is patient currently receiving oral anticoagulation therapy? No    Is the Patient an MAYLIN MOTT [facility] with Readmission Risk Score greater than 14%? No  If yes, pt needs a follow up appointment made within 7 days. Family Members/Caregivers that pt would like involved in their care:    Yes    If yes, list name here:  Yuri Morales    Transportation Provider:  Patient and Family             Discharge Plan:  home without needs.                  Electronically signed by: Rhonda Shrestha RN on 10/30/2022 at 5:37 PM

## 2022-10-30 NOTE — ANESTHESIA PRE PROCEDURE
Department of Anesthesiology  Preprocedure Note       Name:  Stanislav Roberts   Age:  40 y.o.  :  1985                                          MRN:  [id number]         Date:  10/30/2022      Surgeon: Jaiden Strickland):  Evelyn Faulkner MD    Procedure: Procedure(s):  EGD ESOPHAGOGASTRODUODENOSCOPY    Medications prior to admission:   Prior to Admission medications    Medication Sig Start Date End Date Taking?  Authorizing Provider   metoprolol succinate (TOPROL XL) 25 MG extended release tablet Take 12.5 mg by mouth daily 10/26/22   Historical Provider, MD   ondansetron (ZOFRAN-ODT) 4 MG disintegrating tablet Take 1 tablet by mouth 3 times daily as needed for Nausea or Vomiting 10/15/22   Krunal Chirinos MD   furosemide (LASIX) 20 MG tablet Take 1 tablet by mouth daily  Patient not taking: Reported on 10/29/2022 5/13/22   Antonia Coy MD   albuterol sulfate HFA (VENTOLIN HFA) 108 (90 Base) MCG/ACT inhaler Inhale 2 puffs into the lungs 4 times daily as needed for Wheezing  Patient not taking: Reported on 10/29/2022 5/3/22   Phil Santillan MD   albuterol sulfate HFA (VENTOLIN HFA) 108 (90 Base) MCG/ACT inhaler Inhale 2 puffs into the lungs 4 times daily as needed for Wheezing  Patient not taking: Reported on 10/29/2022 5/3/22   Phil Santillan MD   folic acid (FOLVITE) 1 MG tablet Take 1 tablet by mouth daily 22   Phil Santillan MD   vitamin B-1 (THIAMINE) 100 MG tablet Take 1 tablet by mouth daily 22   Phil Santillan MD   Multiple Vitamins-Minerals (CENTRUM MEN) TABS Take 1 tablet by mouth daily    Historical Provider, MD   Menthol-Methyl Salicylate (ANALGESIC OINTMENT) OINT ointment Apply topically as needed Indications: on back  Patient not taking: Reported on 10/29/2022    Historical Provider, MD   pantoprazole (PROTONIX) 20 MG tablet Take 2 tablets by mouth daily  Patient not taking: Reported on 10/29/2022 4/15/22   Levi Alvarez DO   losartan (COZAAR) 50 MG tablet take 1 tablet by mouth once daily  Patient not taking: Reported on 10/29/2022 11/16/21   Catie Vanessa MD   naproxen (NAPROSYN) 500 MG tablet Take 1 tablet by mouth 2 times daily  Patient taking differently: Take 500 mg by mouth 2 times daily as needed  8/13/21 3/28/22  Chitra Benton MD   fluticasone [facility]) 50 MCG/ACT nasal spray instill 1 spray into each nostril once daily 5/10/21   Catie Vanessa MD   Blood Pressure Monitoring (BP MONITOR-STETHOSCOPE) KIT 1 each by Does not apply route daily  Patient not taking: Reported on 10/29/2022 4/7/21   Catie Vanessa MD   Nicotine 21-14-7 MG/24HR KIT Place 14 mg onto the skin nightly     Historical Provider, MD   EPINEPHrine (EPIPEN 2-LIZETTE) 0.3 MG/0.3ML SOAJ injection Inject 0.3 mLs into the muscle once for 1 dose Use as directed for allergic reaction 11/17/16 5/24/21  Hai Pulido MD       Current medications:    Current Facility-Administered Medications   Medication Dose Route Frequency Provider Last Rate Last Admin    0.9 % sodium chloride infusion   IntraVENous PRN SILVA Dominguez - NP        0.9 % sodium chloride infusion   IntraVENous PRN SILVA Dominguez NP        metoclopramide (REGLAN) injection 5 mg  5 mg IntraMUSCular Q6H Kev Bah MD        octreotide (SANDOSTATIN) 500 mcg in sodium chloride 0.9 % 100 mL infusion  50 mcg/hr IntraVENous Continuous Hafsa Rankin MD 10 mL/hr at 10/30/22 0526 50 mcg/hr at 10/30/22 0526    albuterol sulfate HFA (PROVENTIL;VENTOLIN;PROAIR) 108 (90 Base) MCG/ACT inhaler 2 puff  2 puff Inhalation 4x Daily PRN Hafsa Rankin MD        fluticasone (FLONASE) 50 MCG/ACT nasal spray 2 spray  2 spray Each Nostril Daily Hafsa Rankin MD        [Held by provider] folic acid (FOLVITE) tablet 1 mg  1 mg Oral Daily Hafsa Rankin MD        [Held by provider] thiamine mononitrate tablet 100 mg  100 mg Oral Daily Hafsa Rankin MD        pantoprazole (PROTONIX) 80 mg in sodium chloride 0.9 % 100 mL infusion  8 mg/hr IntraVENous Continuous Gamal Mattson MD 10 mL/hr at 10/30/22 0526 8 mg/hr at 10/30/22 0526    0.9 % sodium chloride infusion   IntraVENous Continuous Gamal Mattson  mL/hr at 10/30/22 0452 New Bag at 10/30/22 0452    sodium chloride flush 0.9 % injection 10 mL  10 mL IntraVENous BID Vinay House MD   10 mL at 10/29/22 1950    metoprolol (LOPRESSOR) injection 5 mg  5 mg IntraVENous Q6H PRN Gamal Mattson MD   5 mg at 10/30/22 0018    ondansetron (ZOFRAN) injection 4 mg  4 mg IntraVENous Q6H PRN Gamal Mattson MD   4 mg at 10/29/22 2305    nicotine (NICODERM CQ) 21 MG/24HR 1 patch  1 patch TransDERmal Nightly Gamal Mattson MD   1 patch at 10/29/22 2257       Allergies:     Allergies   Allergen Reactions    Bee Venom Anaphylaxis       Problem List:    Patient Active Problem List   Diagnosis Code    Acute pulmonary embolism (HCC) I26.99    ACS (acute coronary syndrome) (HCC) I24.9    Chronic systolic congestive heart failure (HCC) I50.22    Essential hypertension C17    Uncomplicated alcohol dependence ([facility] [address] X91.43    Alcoholic cardiomyopathy ([facility] [address] I42.6    Diarrhea due to malabsorption K90.9, R19.7    Heartburn R12    Generalized anxiety disorder F41.1    Hypomagnesemia E83.42    Hypokalemia E87.6    Transaminitis Y17.95    Alcoholic hepatitis without ascites K70.10    Macrocytic anemia D53.9    Left lower lobe pneumonia J18.9    Sepsis (HCC) A41.9    Melena K92.1    Acute blood loss anemia D62    Acute liver failure without hepatic coma K72.00    Leukocytosis D72.829    Septicemia (HCC) A41.9    Right upper quadrant abdominal pain R10.11    HCAP (healthcare-associated pneumonia) J18.9    Iron overload E83.19    GI bleed K92.2       Past Medical History:        Diagnosis Date    Alcoholism ([address]     pt drinks a fifth of whiskey daily    Anemia     Cirrhosis, alcoholic ([facility] [address]     Pulmonary embolism (HCC)        Past Surgical History:        Procedure Laterality Date    UPPER GASTROINTESTINAL ENDOSCOPY N/A 5/1/2022    EGD ESOPHAGOGASTRODUODENOSCOPY performed by Jose Alfredo Wang MD at [facility] History:    Social History     Tobacco Use    Smoking status: Every Day     Packs/day: 1.00     Years: 21.00     Pack years: 21.00     Types: Cigarettes    Smokeless tobacco: Never    Tobacco comments:     using nicotine patches at night   Substance Use Topics    Alcohol use: Not Currently     Comment: hx of alcoholism; pt drinks a fifth of whiskey daily                                Ready to quit: Not Answered  Counseling given: Not Answered  Tobacco comments: using nicotine patches at night      Vital Signs (Current):   Vitals:    10/30/22 1010 10/30/22 1015 10/30/22 1020 10/30/22 1115   BP: 105/60 (!) 115/54 (!) 105/56 (!) 109/58   Pulse: 94 93 90    Resp: (!) 9 16 10    Temp: 98.5 °F (36.9 °C) 98.5 °F (36.9 °C) 99 °F (37.2 °C) 98.7 °F (37.1 °C)   TempSrc: Oral Oral Oral Oral   SpO2: 98% 100% 98%    Weight:       Height:                                                  BP Readings from Last 3 Encounters:   10/30/22 (!) 109/58   10/15/22 120/81   05/26/22 104/64       NPO Status:                                                                                 BMI:   Wt Readings from Last 3 Encounters:   10/29/22 184 lb 1.4 oz (83.5 kg)   10/14/22 190 lb (86.2 kg)   05/22/22 206 lb 2.1 oz (93.5 kg)     Body mass index is 27.99 kg/m².     CBC:   Lab Results   Component Value Date/Time    WBC 6.0 10/30/2022 06:03 AM    RBC 2.21 10/30/2022 06:03 AM    HGB 7.9 10/30/2022 06:03 AM    HCT 23.5 10/30/2022 06:03 AM    .4 10/30/2022 06:03 AM    RDW 16.5 10/30/2022 06:03 AM    PLT 52 10/30/2022 06:03 AM       CMP:   Lab Results   Component Value Date/Time     10/30/2022 06:03 AM    K 4.4 10/30/2022 06:03 AM     10/30/2022 06:03 AM    CO2 20 10/30/2022 06:03 AM    BUN 24 10/30/2022 06:03 AM    CREATININE 0.82 10/30/2022 06:03 AM    GFRAA >60 05/26/2022 05:20 AM    LABGLOM >60 10/30/2022 06:03 AM    GLUCOSE 114 10/30/2022 06:03 AM    PROT 8.6 10/29/2022 06:36 PM    CALCIUM 7.7 10/30/2022 06:03 AM    BILITOT 3.0 10/29/2022 06:36 PM    ALKPHOS 230 10/29/2022 06:36 PM     10/29/2022 06:36 PM    ALT 36 10/29/2022 06:36 PM       POC Tests: No results for input(s): POCGLU, POCNA, POCK, POCCL, POCBUN, POCHEMO, POCHCT in the last 72 hours. Coags:   Lab Results   Component Value Date/Time    PROTIME 16.1 10/29/2022 06:36 PM    INR 1.3 10/29/2022 06:36 PM    APTT 40.5 04/27/2022 06:15 PM       HCG (If Applicable): No results found for: PREGTESTUR, PREGSERUM, HCG, HCGQUANT     ABGs:   Lab Results   Component Value Date/Time    PHART 7.436 04/30/2022 01:43 PM    PO2ART 69.0 04/30/2022 01:43 PM    RGS6HXE 29.1 04/30/2022 01:43 PM    ZTP7BOF 19.6 04/30/2022 01:43 PM    T6FXTYQM 93.1 04/30/2022 01:43 PM        Type & Screen (If Applicable):  No results found for: LABABO, LABRH    Drug/Infectious Status (If Applicable):  Lab Results   Component Value Date/Time    HEPCAB NONREACTIVE 05/02/2022 04:28 AM       COVID-19 Screening (If Applicable):   Lab Results   Component Value Date/Time    COVID19 Not Detected 04/27/2022 06:22 PM           Anesthesia Evaluation  Patient summary reviewed and Nursing notes reviewed  Airway: Mallampati: III  TM distance: >3 FB   Neck ROM: full  Mouth opening: > = 3 FB   Dental:          Pulmonary:normal exam  breath sounds clear to auscultation                             Cardiovascular:    (+) hypertension:, CAD:, CHF:,       ECG reviewed  Rhythm: regular  Rate: normal  Echocardiogram reviewed               ROS comment: Echo contrast utilized on this technically difficult study. Left ventricle is normal in size. Moderately reduced LV function with Estimated LV EF 35-40 %. Mild left ventricular hypertrophy.      Neuro/Psych:   (+) psychiatric history:             ROS comment: Alcoholism GI/Hepatic/Renal:   (+) liver disease:,          ROS comment: Alcoholic hepatitis without ascites. Endo/Other:    (+) blood dyscrasia: anemia:., .                 Abdominal:             Vascular:   + PE. Other Findings:           Anesthesia Plan      general     ASA 3       Induction: intravenous. Anesthetic plan and risks discussed with patient.                         Jacey Trinh MD   10/30/2022

## 2022-10-30 NOTE — PROGRESS NOTES
Provided the patient with chicken broth, apple sauce, vanilla pudding, apple juice and water to eat.

## 2022-10-30 NOTE — PROGRESS NOTES
10/30/22 7:37 AM  [phone] Hospital or Facility: [facility] From: Gibran Aguilar RE: Maria Antonia Damon 1985 RM: 2012-01 HgB: 7.9 (prev. 9.2, 12.7) checking Q 6 hrs. next noon Platelets: 52 (prev. 65) Had blood emesis over night 650 mLs over night. Last BP: 114/58 (58) HR: 101 Lowest at 3 a.m. BP 85/56 (65) Do we want to order blood? Need Callback: NEEDS CALLBACK RADIOLOGY ROUTINE  Read 7:38 AM   10/30/22 7:42 AM   Order 1 unit of prbc Check PT-inr if not done Keep npo     Orders place in [facility].

## 2022-10-30 NOTE — PROGRESS NOTES
Pt arrived to unit from ED and walked to bed. Pt placed on bedside monitor, vitals taken. Pt oriented to room and call light given. Bed locked and in lowest position. Bed alarm on, safety measures in place.

## 2022-10-30 NOTE — ED NOTES
TRANSFER - OUT REPORT:    Verbal report given to Unique Feliciano RN on Swathi Hawkins  being transferred to ICU for routine progression of patient care       Report consisted of patient's Situation, Background, Assessment and   Recommendations(SBAR). Information from the following report(s) ED SBAR was reviewed with the receiving nurse. Lines:   Peripheral IV 10/29/22 Left Forearm (Active)   Site Assessment Clean, dry & intact 10/29/22 1844   Line Status Blood return noted; Flushed 10/29/22 1844   Line Care Connections checked and tightened 10/29/22 1844   Phlebitis Assessment No symptoms 10/29/22 1844   Infiltration Assessment 0 10/29/22 1844   Alcohol Cap Used No 10/29/22 1844   Dressing Status New dressing applied 10/29/22 1844   Dressing Type Transparent 10/29/22 1844   Dressing Intervention New 10/29/22 1844       Peripheral IV 10/29/22 Right Antecubital (Active)   Site Assessment Clean, dry & intact 10/29/22 1937   Line Status Blood return noted 10/29/22 1937   Phlebitis Assessment No symptoms 10/29/22 1937   Infiltration Assessment 0 10/29/22 1937   Dressing Status Clean, dry & intact 10/29/22 1937   Dressing Type Transparent 10/29/22 1937   Dressing Intervention New 10/29/22 1937        Opportunity for questions and clarification was provided.       Patient transported with:  Registered Nurse and [facility]  08/59/63 [id number]

## 2022-10-30 NOTE — CARE COORDINATION
Surgery scheduled per Dr Barak Posada for 1130 today. On call surgery team, including Dr Pieter Hammans, and radiology were notified. The OR nurse has not confirmed as of yet. The rest have confirmed.

## 2022-10-30 NOTE — OP NOTE
ESOPHAGOGASTRODUODENOSCOPY   ( EGD )  DATE OF PROCEDURE: 10/30/2022     SURGEON: Beena Kebede MD    ASSISTANT: None    PREOPERATIVE DIAGNOSIS: Patient has hematemesis. Positive drop of hemoglobin. Known to have alcoholic liver disease/cirrhosis. Procedure for prompt evaluate and manage possible upper GI source of bleeding. POSTOPERATIVE DIAGNOSIS: Large amount of fresh blood clots in the stomach. Also there is signs of fresh blood in the duodenum. Basing on the evaluation, patient had esophageal variceal bleeding. OPERATION: Upper GI endoscopy, esophageal variceal banding. ANESTHESIA: MAC    ESTIMATED BLOOD LOSS: None    COMPLICATIONS: None. SPECIMENS:  Was Not Obtained    HISTORY: The patient is a 40y.o. year old male with history of above preop diagnosis. I recommended esophagogastroduodenoscopy with possible biopsy and I explained the risk, benefits, expected outcome, and alternatives to the procedure. Risks included but are not limited to bleeding, infection, respiratory distress, hypotension, and perforation of the esophagus, stomach, or duodenum. Patient understands and is in agreement. PROCEDURE: The patient was given IV conscious sedation. The patient's SPO2 remained above 90% throughout the procedure. Cetacaine spray given. Patient placed in left lateral position. Olympus  videogastroscope was inserted orally under vision into the esophagus without difficulty and advanced into the stomach then through the pylorus up to the second part of duodenum. Findings:    Retropharyngeal area was grossly normal appearing    Esophagus: abnormal: Has esophageal varices, at present this had not large varices. No signs of fresh bleeding in the esophagus. No signs of Tania-Wing tear. Stomach:    Fundus and Cardia Examined in Retroflexed View: abnormal: Large amount of clots, fresh blood in the upper part of the stomach towards the fundus.     Body: Fresh blood coated  the mucosa in the body of the stomach. No lesions seen in the body of the stomach after clearing the blood. antrum: normal, mucosa is coated with fresh blood in the antrum. No lesions seen after washing this area. Duodenum:     Descending: normal, fresh blood seen but no lesions seen after clearing by washing    Bulb: normal, fresh blood noted however no lesions seen after washing the mucosa. As the upper part of the stomach and fundus was covered with clots and fresh blood, took long time to clear this area by aspirating and breaking the clots. After completely suctioning all of the blood and clots, no lesions seen in the upper part of the stomach and fundus and cardia. No varices seen. Polaroid pictures taken. We concluded that patient had recent esophageal variceal bleeding. The scope was withdrawn from the patient and loaded with banding device. The scope with the banding device was advanced into the esophagus without difficulty. 3 columns of varices noted but not large in the lower esophagus. When I did apply band to one of the varix, there was fresh blood noted from this varix. Also 2 other bands applied to the other 2 columns of varices. After applying this bands the scope was withdrawn from the patient              While withdrawing the scope the above findings were verified and the scope was removed. The patient has tolerated the procedure without unusual events. Recommendations/Plan: To keep on PPI therapy. May need repeat EGD in the next 3 to 4 weeks. To keep n.p.o. and start clear liquids this evening.     F/U In Office as instructed  Discussed with the family                   Electronically signed by Kristy Kinsey MD  on 10/30/2022 at 12:21 PM

## 2022-10-31 LAB
AFP: 1.7 UG/L
ALBUMIN SERPL-MCNC: 3 G/DL (ref 3.5–5.2)
ALP BLD-CCNC: 125 U/L (ref 40–129)
ALT SERPL-CCNC: 25 U/L (ref 5–41)
ANION GAP SERPL CALCULATED.3IONS-SCNC: 8 MMOL/L (ref 9–17)
AST SERPL-CCNC: 80 U/L
BILIRUB SERPL-MCNC: 2.8 MG/DL (ref 0.3–1.2)
BUN BLDV-MCNC: 16 MG/DL (ref 6–20)
CA 19-9: 31 U/ML (ref 0–35)
CALCIUM SERPL-MCNC: 8 MG/DL (ref 8.6–10.4)
CARCINOEMBRYONIC ANTIGEN: 2.5 NG/ML
CHLORIDE BLD-SCNC: 106 MMOL/L (ref 98–107)
CO2: 22 MMOL/L (ref 20–31)
CREAT SERPL-MCNC: 0.69 MG/DL (ref 0.7–1.2)
GFR SERPL CREATININE-BSD FRML MDRD: >60 ML/MIN/1.73M2
GLUCOSE BLD-MCNC: 108 MG/DL (ref 70–99)
HCT VFR BLD CALC: 21.4 % (ref 41–53)
HCT VFR BLD CALC: 22.4 % (ref 41–53)
HCT VFR BLD CALC: 22.6 % (ref 41–53)
HCT VFR BLD CALC: 22.6 % (ref 41–53)
HEMOGLOBIN: 7.5 G/DL (ref 13.5–17.5)
HEMOGLOBIN: 7.7 G/DL (ref 13.5–17.5)
HEMOGLOBIN: 7.7 G/DL (ref 13.5–17.5)
HEMOGLOBIN: 7.9 G/DL (ref 13.5–17.5)
POTASSIUM SERPL-SCNC: 3.7 MMOL/L (ref 3.7–5.3)
SODIUM BLD-SCNC: 136 MMOL/L (ref 135–144)
TOTAL PROTEIN: 6.3 G/DL (ref 6.4–8.3)
TROPONIN, HIGH SENSITIVITY: 12 NG/L (ref 0–22)

## 2022-10-31 PROCEDURE — 2580000003 HC RX 258: Performed by: INTERNAL MEDICINE

## 2022-10-31 PROCEDURE — 6370000000 HC RX 637 (ALT 250 FOR IP): Performed by: INTERNAL MEDICINE

## 2022-10-31 PROCEDURE — 6360000002 HC RX W HCPCS: Performed by: NURSE PRACTITIONER

## 2022-10-31 PROCEDURE — C9113 INJ PANTOPRAZOLE SODIUM, VIA: HCPCS | Performed by: INTERNAL MEDICINE

## 2022-10-31 PROCEDURE — 99233 SBSQ HOSP IP/OBS HIGH 50: CPT | Performed by: INTERNAL MEDICINE

## 2022-10-31 PROCEDURE — 85018 HEMOGLOBIN: CPT

## 2022-10-31 PROCEDURE — 82105 ALPHA-FETOPROTEIN SERUM: CPT

## 2022-10-31 PROCEDURE — 80053 COMPREHEN METABOLIC PANEL: CPT

## 2022-10-31 PROCEDURE — 36415 COLL VENOUS BLD VENIPUNCTURE: CPT

## 2022-10-31 PROCEDURE — 6360000002 HC RX W HCPCS: Performed by: INTERNAL MEDICINE

## 2022-10-31 PROCEDURE — 84484 ASSAY OF TROPONIN QUANT: CPT

## 2022-10-31 PROCEDURE — 2060000000 HC ICU INTERMEDIATE R&B

## 2022-10-31 PROCEDURE — 93005 ELECTROCARDIOGRAM TRACING: CPT | Performed by: STUDENT IN AN ORGANIZED HEALTH CARE EDUCATION/TRAINING PROGRAM

## 2022-10-31 PROCEDURE — 86301 IMMUNOASSAY TUMOR CA 19-9: CPT

## 2022-10-31 PROCEDURE — 2580000003 HC RX 258: Performed by: NURSE PRACTITIONER

## 2022-10-31 PROCEDURE — 85014 HEMATOCRIT: CPT

## 2022-10-31 PROCEDURE — 99232 SBSQ HOSP IP/OBS MODERATE 35: CPT | Performed by: INTERNAL MEDICINE

## 2022-10-31 PROCEDURE — 82378 CARCINOEMBRYONIC ANTIGEN: CPT

## 2022-10-31 RX ORDER — LOSARTAN POTASSIUM 25 MG/1
25 TABLET ORAL DAILY
Status: DISCONTINUED | OUTPATIENT
Start: 2022-10-31 | End: 2022-11-01 | Stop reason: HOSPADM

## 2022-10-31 RX ORDER — METOPROLOL SUCCINATE 25 MG/1
12.5 TABLET, EXTENDED RELEASE ORAL DAILY
Status: DISCONTINUED | OUTPATIENT
Start: 2022-10-31 | End: 2022-11-01 | Stop reason: HOSPADM

## 2022-10-31 RX ORDER — FUROSEMIDE 20 MG/1
20 TABLET ORAL DAILY
Status: DISCONTINUED | OUTPATIENT
Start: 2022-10-31 | End: 2022-11-01 | Stop reason: HOSPADM

## 2022-10-31 RX ADMIN — PANTOPRAZOLE SODIUM 8 MG/HR: 40 INJECTION, POWDER, FOR SOLUTION INTRAVENOUS at 11:04

## 2022-10-31 RX ADMIN — FOLIC ACID 1 MG: 1 TABLET ORAL at 07:49

## 2022-10-31 RX ADMIN — PANTOPRAZOLE SODIUM 8 MG/HR: 40 INJECTION, POWDER, FOR SOLUTION INTRAVENOUS at 00:50

## 2022-10-31 RX ADMIN — OCTREOTIDE ACETATE 25 MCG/HR: 1000 INJECTION, SOLUTION INTRAVENOUS; SUBCUTANEOUS at 14:29

## 2022-10-31 RX ADMIN — OCTREOTIDE ACETATE 50 MCG/HR: 1000 INJECTION, SOLUTION INTRAVENOUS; SUBCUTANEOUS at 02:35

## 2022-10-31 RX ADMIN — Medication 100 MG: at 07:49

## 2022-10-31 RX ADMIN — FLUTICASONE PROPIONATE 2 SPRAY: 50 SPRAY, METERED NASAL at 07:48

## 2022-10-31 ASSESSMENT — ENCOUNTER SYMPTOMS
CHEST TIGHTNESS: 0
BACK PAIN: 0
COUGH: 0
NAUSEA: 0
COLOR CHANGE: 0
DIARRHEA: 0
SORE THROAT: 0
SHORTNESS OF BREATH: 0
ABDOMINAL PAIN: 1
ABDOMINAL DISTENTION: 0

## 2022-10-31 NOTE — ANESTHESIA POSTPROCEDURE EVALUATION
Department of Anesthesiology  Postprocedure Note    Patient: Jewell Rodriguez  MRN: [id number]  YOB: 1985  Date of evaluation: 10/31/2022      Procedure Summary     Date: 10/30/22 Room / Location: [facility] ENDO 01 / [facility] ENDO    Anesthesia Start: 3741 Anesthesia Stop: 329    Procedure: EGD BAND LIGATION (Esophagus) Diagnosis:       Hematemesis, unspecified whether nausea present      (Hematemesis, unspecified whether nausea present [K92.0])    Surgeons: Evan Collado MD Responsible Provider: Reece Murrell MD    Anesthesia Type: general ASA Status: 3          Anesthesia Type: No value filed. Antoinette Phase I: Antoinette Score: 9    Antoinette Phase II:        Anesthesia Post Evaluation    Comments: POD #1 Patient seen fast asleep in bed. No anesthesia issues reported by floor nursing staff.

## 2022-10-31 NOTE — PROGRESS NOTES
Attending Physician Statement  I have discussed the care of Radha Chery with the resident team. I have examined the patient myself and taken ros and hpi , including pertinent history and exam findings,  with the resident. I have reviewed the key elements of all parts of the encounter with the resident. I agree with the assessment, plan and orders as documented by the resident. Principal Problem:    GI bleed  Active Problems:    Hematemesis with nausea  Resolved Problems:    * No resolved hospital problems. *    Upper GI bleed in the setting of esophageal varices secondary to alcohol liver disease, chronically elevated liver enzymes with hemodynamic instability at presentation tachycardic hypotensive, status post EGD 10/30 and variceal ligation, continues on PPI and octreotide infusions  Hemoglobin low but  stable since yesterday around 7.5  Patient not on any anticoagulants  Remained hemodynamically stable overnight, tolerating full liquids, advance diet today  Will complete 72 hours of octreotide anticipate discharge tomorrow if patient remains stable  Also h/o CHF EF 35-40%, currently cmopensated, BB and Ace-I resumed today  Stop iv fluids    Full note to follow.       Electronically signed by Carollynn Duane, MD

## 2022-10-31 NOTE — PROGRESS NOTES
[address] [facility] [employer]    PROGRESS NOTE             10/31/2022    1:31 PM    Name:   Amadou Bull  MRN:     [id number]     Acct:      [de-identified]   Room:   2087/2087-01  IP Day:  2  Admit Date:  10/29/2022  5:35 PM    PCP:  No primary care provider on file. Code Status:  Full Code    Subjective:     C/C:   Chief Complaint   Patient presents with    Hematemesis    Rectal Bleeding     Interval History Status: improved. Patient seen and examined at bedside  He is doing much better, hemoglobin today 7.7, GI recommends transfusion if drops below 7. Patient had chest pain but troponins came back 12, normal.  Likely from endoscopy  Patient moved out of ICU to progressive unit    Brief History:       Is a 60-year-old male with past medical history of hypertension, pulmonary embolism in 8/6736, alcoholic hepatitis, and an EF of 38% in 2020, presented today with an episode of lightheadedness, nausea and severe vomiting. He describes the vomiting at first is tarry black which she had 2 episodes of. Then he experienced more reddish to bright red vomiting which she had around 5 episodes of 3 occurring outside the hospital that was in the ED then 1 occurring in the ICU were he mentions that he filled up a sample bottle. He mentions that he nearly fainted where he works in [address] [place] [address], and hit his shoulder but did not have loss of consciousness and did not hit his head. Patient also had around 5 episodes of black diarrhea, which he had with urgency. He also experienced episodes of palpitation where he said his heart was \"racing\". He also had abdominal cramps whenever he was vomiting and abdomen was tender to palpation. Patient has a past medical history of alcoholic hepatitis, which was diagnosed by CT versus possibility of cirrhosis. He has alcohol history where he used to drink 1 pint of vodka per day and quit about 14 days ago.   Nurse estimates that he vomited around 650 mL in the ICU. He takes Toprol, does not take Lasix, does not take Protonix, takes losartan, and does not take naproxen for headaches anymore. Patient has a family history notable for cirrhosis in his father and grandfather. He is also allergic to bee stings. Patient's hemoglobin dropped from 12-7.9, and he is due for blood transfusion. He was also given IV fluids and Zofran. Review of Systems:     Review of Systems   Constitutional:  Negative for activity change, fatigue, fever and unexpected weight change. HENT:  Negative for congestion and sore throat. Respiratory:  Negative for cough, chest tightness and shortness of breath. Cardiovascular:  Positive for chest pain. Negative for leg swelling. Gastrointestinal:  Positive for abdominal pain. Negative for abdominal distention, diarrhea and nausea. Endocrine: Negative for polyuria. Genitourinary:  Negative for dysuria, frequency and urgency. Musculoskeletal:  Negative for arthralgias and back pain. Skin:  Negative for color change and pallor. Neurological:  Negative for dizziness, facial asymmetry, weakness, light-headedness, numbness and headaches. Psychiatric/Behavioral:  Negative for agitation and behavioral problems. Medications: Allergies:     Allergies   Allergen Reactions    Bee Venom Anaphylaxis       Current Meds:   Scheduled Meds:    losartan  25 mg Oral Daily    metoprolol succinate  12.5 mg Oral Daily    [Held by provider] furosemide  20 mg Oral Daily    folic acid  1 mg Oral Daily    vitamin B-1  100 mg Oral Daily    fluticasone  2 spray Each Nostril Daily    sodium chloride flush  10 mL IntraVENous BID    nicotine  1 patch TransDERmal Nightly     Continuous Infusions:    sodium chloride      sodium chloride      octreotide (SandoSTATIN) infusion 50 mcg/hr (10/31/22 4913)    pantoprazole 8 mg/hr (10/31/22 1155)     PRN Meds: sodium chloride, sodium chloride, albuterol sulfate HFA, metoprolol, ondansetron    Data:     Past Medical History:   has a past medical history of Alcoholism ([facility] [address] Anemia, Cirrhosis, alcoholic ([facility] [address] and Pulmonary embolism ([facility] [address] Social History:   reports that he has been smoking cigarettes. He has a 21.00 pack-year smoking history. He has never used smokeless tobacco. He reports that he does not currently use alcohol. He reports that he does not currently use drugs. Family History:   Family History   Problem Relation Age of Onset    Heart Disease Mother     Heart Attack Mother     Heart Disease Father        Vitals:  BP 94/60   Pulse 81   Temp 98.2 °F (36.8 °C) (Oral)   Resp 18   Ht 5' 8\" (1.727 m)   Wt 184 lb 1.4 oz (83.5 kg)   SpO2 99%   BMI 27.99 kg/m²   Temp (24hrs), Av.6 °F (37 °C), Min:97.8 °F (36.6 °C), Max:99 °F (37.2 °C)    No results for input(s): POCGLU in the last 72 hours. I/O(24Hr): Intake/Output Summary (Last 24 hours) at 10/31/2022 1331  Last data filed at 10/30/2022 1731  Gross per 24 hour   Intake 1335.94 ml   Output --   Net 1335.94 ml         Labs:  [unfilled]    Lab Results   Component Value Date/Time    SPECIAL NOT REPORTED 10/15/2021 04:17 AM     Lab Results   Component Value Date/Time    CULTURE NO GROWTH 2022 09:57 PM       [unfilled]    Radiology:    CT ABDOMEN PELVIS W IV CONTRAST Additional Contrast? None    Result Date: 10/15/2022  EXAMINATION: CT OF THE ABDOMEN AND PELVIS WITH CONTRAST 10/15/2022 1:31 am TECHNIQUE: CT of the abdomen and pelvis was performed with the administration of intravenous contrast. Multiplanar reformatted images are provided for review. Automated exposure control, iterative reconstruction, and/or weight based adjustment of the mA/kV was utilized to reduce the radiation dose to as low as reasonably achievable. COMPARISON: None.  HISTORY: ORDERING SYSTEM PROVIDED HISTORY: [facility] pain TECHNOLOGIST PROVIDED HISTORY: [facility] pain Decision Support Exception - unselect if not a suspected or confirmed emergency medical condition->Emergency Medical Condition (MA) Reason for Exam: chest pain with abdominal discomfort for one day FINDINGS: Lower Chest:  Visualized portion of the lower chest demonstrates no acute abnormality. Organs: The liver is unremarkable. The gallbladder contains 4 mm partially calcified gallstone. The gallbladder appears otherwise normal.  There is no intra or extrahepatic ductal dilatation. The pancreas, spleen, adrenal glands, kidneys and ureters are unremarkable. GI/Bowel: Stomach and duodenal sweep demonstrate no acute abnormality. There is no evidence of bowel obstruction. No evidence of abnormal bowel wall thickening or distension. The appendix is visualized and is unremarkable. No evidence of acute appendicitis. Pelvis: The bladder and reproductive organs are unremarkable. Peritoneum/Retroperitoneum: No evidence of ascites or free air. No evidence of lymphadenopathy. Aorta is normal in caliber. Bones/Soft Tissues: No acute bone or soft tissue abnormality evident     Cholelithiasis without evidence of cholecystitis. Otherwise unremarkable exam.     XR CHEST PORTABLE    Result Date: 10/15/2022  EXAMINATION: ONE XRAY VIEW OF THE CHEST 10/15/2022 12:39 am COMPARISON: CT PE dated 05/21/2022. HISTORY: ORDERING SYSTEM PROVIDED HISTORY: chest pain TECHNOLOGIST PROVIDED HISTORY: chest pain Reason for Exam: Squeezing chest pain Additional signs and symptoms: Squeezing chest pain Relevant Medical/Surgical History: Squeezing chest pain FINDINGS: Heart size and pulmonary vasculature are normal.  The lungs are clear and normally expanded. Surrounding osseous and soft tissue structures are unremarkable. Normal examination.      CT CHEST PULMONARY EMBOLISM W CONTRAST    Result Date: 10/15/2022  EXAMINATION: CTA OF THE CHEST 10/15/2022 1:30 am TECHNIQUE: CTA of the chest was performed after the administration of intravenous contrast.  Multiplanar reformatted images are provided for gi bleed TECHNOLOGIST PROVIDED HISTORY: abd pain, gi bleed Reason for Exam: abd pain, gi bleed Additional signs and symptoms: Pt states that he has been having abdominal pain with rectal bleeding x 2 days. Today he started vomiting brownish red blood that has been getting brighter as the day/night goes on FINDINGS: CTA ABDOMEN: The liver is diffusely heterogeneous with multiple ill-defined hypodensities which appear new. Gallstone is noted. The solid and hollow viscous otherwise appears normal.  No active GI bleeding or evidence of ischemic bowel. The celiac trunk, SMA, bilateral renal arteries and iliac bifurcation appear normal as well as the abdominal aorta. CTA PELVIS: Normal.     No evidence of active GI hemorrhage. Innumerable hypodensities throughout the liver. This may represent hepatocellular disease. Metastatic disease not excluded. Follow-up MRI may be helpful. Physical Examination:        Physical Exam  Constitutional:       General: He is not in acute distress. Appearance: He is not ill-appearing. HENT:      Head: Atraumatic. Nose: No congestion. Mouth/Throat:      Mouth: Mucous membranes are moist.   Cardiovascular:      Rate and Rhythm: Normal rate and regular rhythm. Heart sounds: No murmur heard. Pulmonary:      Effort: Pulmonary effort is normal. No respiratory distress. Breath sounds: Normal breath sounds. No wheezing. Chest:      Chest wall: No tenderness. Abdominal:      General: Abdomen is flat. Bowel sounds are normal. There is no distension. Palpations: Abdomen is soft. There is no mass. Tenderness: There is abdominal tenderness. Hernia: No hernia is present. Musculoskeletal:      Cervical back: No rigidity or tenderness. Neurological:      General: No focal deficit present. Mental Status: He is alert and oriented to person, place, and time.    Psychiatric:         Mood and Affect: Mood normal.         Assessment: Primary Problem  GI bleed    Active Hospital Problems    Diagnosis Date Noted    Hematemesis with nausea [K92.0] 10/30/2022     Priority: Medium    GI bleed [K92.2] 10/29/2022     Priority: Medium       Plan:        Variceal GI bleed- confirmed with EGD- no active GI bleed              -Hemoglobin today 7.7, dropped from 12.9 on admission  Hypertension, recheck              -Patient loaded with IV fluids to maintain blood pressure, lowest blood pressure was 01T systolic              -Given ondansetron              -Blood transfusion              -GI consulted, took patient for EGD. Report concludes that he has esophageal variceal bleed, but is not bleeding anymore.  Protonix 80 mg, and  octreotide and repeat EGD in 3 to 4 weeks    -Patient started on adult diet soft and bite sized today     Hypertension              -We will resume blood pressure medications as blood pressure stabilized     CHF              -Last echo done in 2020 showed ejection fraction of 38%  -Blood pressure medications blood pressure improved              -Consider starting medications for CHF on discharge               Surinder Candelario MD  10/31/2022  1:31 PM

## 2022-10-31 NOTE — PROGRESS NOTES
Dr Caity Jackson updated on patient's condition and Hgb of 7.6, told to transfuse if Hgb is under 7.0.

## 2022-10-31 NOTE — PROGRESS NOTES
[address] [facility].    PROGRESS NOTE             10/31/2022    1:12 PM    Name:   Geremias Lechuga  MRN:     [id number]     Acct:      [de-identified]   Room:   2087/2087-01  IP Day:  2  Admit Date:  10/29/2022  5:35 PM    PCP:  No primary care provider on file. Code Status:  Full Code    Subjective:     C/C:   Chief Complaint   Patient presents with    Hematemesis    Rectal Bleeding     Interval History Status: improved. Patient seen and examined at bedside  He is doing much better, hemoglobin today 7.7, GI recommends transfusion if drops below 7. Patient had chest pain but troponins came back 12, normal.  Likely from endoscopy  Patient moved out of ICU to progressive unit    Brief History:       Is a 50-year-old male with past medical history of hypertension, pulmonary embolism in 7/3077, alcoholic hepatitis, and an EF of 38% in 2020, presented today with an episode of lightheadedness, nausea and severe vomiting. He describes the vomiting at first is tarry black which she had 2 episodes of. Then he experienced more reddish to bright red vomiting which she had around 5 episodes of 3 occurring outside the hospital that was in the ED then 1 occurring in the ICU were he mentions that he filled up a sample bottle. He mentions that he nearly fainted where he works in [employer], and hit his shoulder but did not have loss of consciousness and did not hit his head. Patient also had around 5 episodes of black diarrhea, which he had with urgency. He also experienced episodes of palpitation where he said his heart was \"racing\". He also had abdominal cramps whenever he was vomiting and abdomen was tender to palpation. Patient has a past medical history of alcoholic hepatitis, which was diagnosed by CT versus possibility of cirrhosis. He has alcohol history where he used to drink 1 pint of vodka per day and quit about 14 days ago.   Nurse estimates that he vomited around 650 mL in the ICU. He takes Toprol, does not take Lasix, does not take Protonix, takes losartan, and does not take naproxen for headaches anymore. Patient has a family history notable for cirrhosis in his father and grandfather. He is also allergic to bee stings. Patient's hemoglobin dropped from 12-7.9, and he is due for blood transfusion. He was also given IV fluids and Zofran. Review of Systems:     Review of Systems   Constitutional:  Negative for activity change, fatigue, fever and unexpected weight change. HENT:  Negative for congestion and sore throat. Respiratory:  Negative for cough, chest tightness and shortness of breath. Cardiovascular:  Positive for chest pain. Negative for leg swelling. Gastrointestinal:  Positive for abdominal pain. Negative for abdominal distention, diarrhea and nausea. Endocrine: Negative for polyuria. Genitourinary:  Negative for dysuria, frequency and urgency. Musculoskeletal:  Negative for arthralgias and back pain. Skin:  Negative for color change and pallor. Neurological:  Negative for dizziness, facial asymmetry, weakness, light-headedness, numbness and headaches. Psychiatric/Behavioral:  Negative for agitation and behavioral problems. Medications: Allergies:     Allergies   Allergen Reactions    Bee Venom Anaphylaxis       Current Meds:   Scheduled Meds:    losartan  25 mg Oral Daily    metoprolol succinate  12.5 mg Oral Daily    [Held by provider] furosemide  20 mg Oral Daily    folic acid  1 mg Oral Daily    vitamin B-1  100 mg Oral Daily    fluticasone  2 spray Each Nostril Daily    sodium chloride flush  10 mL IntraVENous BID    nicotine  1 patch TransDERmal Nightly     Continuous Infusions:    sodium chloride      sodium chloride      octreotide (SandoSTATIN) infusion 50 mcg/hr (10/31/22 0279)    pantoprazole 8 mg/hr (10/31/22 0244)     PRN Meds: sodium chloride, sodium chloride, albuterol sulfate HFA, metoprolol, ondansetron    Data:     Past Medical History:   has a past medical history of Alcoholism ([facility] [address] Anemia, Cirrhosis, alcoholic ([facility] [address] and Pulmonary embolism ([facility] [address] Social History:   reports that he has been smoking cigarettes. He has a 21.00 pack-year smoking history. He has never used smokeless tobacco. He reports that he does not currently use alcohol. He reports that he does not currently use drugs. Family History:   Family History   Problem Relation Age of Onset    Heart Disease Mother     Heart Attack Mother     Heart Disease Father        Vitals:  BP 94/60   Pulse 81   Temp 98.2 °F (36.8 °C) (Oral)   Resp 18   Ht 5' 8\" (1.727 m)   Wt 184 lb 1.4 oz (83.5 kg)   SpO2 99%   BMI 27.99 kg/m²   Temp (24hrs), Av.6 °F (37 °C), Min:97.8 °F (36.6 °C), Max:99 °F (37.2 °C)    No results for input(s): POCGLU in the last 72 hours. I/O(24Hr): Intake/Output Summary (Last 24 hours) at 10/31/2022 1312  Last data filed at 10/30/2022 1731  Gross per 24 hour   Intake 1335.94 ml   Output --   Net 1335.94 ml       Labs:  [unfilled]    Lab Results   Component Value Date/Time    SPECIAL NOT REPORTED 10/15/2021 04:17 AM     Lab Results   Component Value Date/Time    CULTURE NO GROWTH 2022 09:57 PM       [unfilled]    Radiology:    CT ABDOMEN PELVIS W IV CONTRAST Additional Contrast? None    Result Date: 10/15/2022  EXAMINATION: CT OF THE ABDOMEN AND PELVIS WITH CONTRAST 10/15/2022 1:31 am TECHNIQUE: CT of the abdomen and pelvis was performed with the administration of intravenous contrast. Multiplanar reformatted images are provided for review. Automated exposure control, iterative reconstruction, and/or weight based adjustment of the mA/kV was utilized to reduce the radiation dose to as low as reasonably achievable. COMPARISON: None.  HISTORY: ORDERING SYSTEM PROVIDED HISTORY: [facility] pain TECHNOLOGIST PROVIDED HISTORY: [facility] pain Decision Support Exception - unselect if not a suspected or confirmed emergency medical condition->Emergency Medical Condition (MA) Reason for Exam: chest pain with abdominal discomfort for one day FINDINGS: Lower Chest:  Visualized portion of the lower chest demonstrates no acute abnormality. Organs: The liver is unremarkable. The gallbladder contains 4 mm partially calcified gallstone. The gallbladder appears otherwise normal.  There is no intra or extrahepatic ductal dilatation. The pancreas, spleen, adrenal glands, kidneys and ureters are unremarkable. GI/Bowel: Stomach and duodenal sweep demonstrate no acute abnormality. There is no evidence of bowel obstruction. No evidence of abnormal bowel wall thickening or distension. The appendix is visualized and is unremarkable. No evidence of acute appendicitis. Pelvis: The bladder and reproductive organs are unremarkable. Peritoneum/Retroperitoneum: No evidence of ascites or free air. No evidence of lymphadenopathy. Aorta is normal in caliber. Bones/Soft Tissues: No acute bone or soft tissue abnormality evident     Cholelithiasis without evidence of cholecystitis. Otherwise unremarkable exam.     XR CHEST PORTABLE    Result Date: 10/15/2022  EXAMINATION: ONE XRAY VIEW OF THE CHEST 10/15/2022 12:39 am COMPARISON: CT PE dated 05/21/2022. HISTORY: ORDERING SYSTEM PROVIDED HISTORY: chest pain TECHNOLOGIST PROVIDED HISTORY: chest pain Reason for Exam: Squeezing chest pain Additional signs and symptoms: Squeezing chest pain Relevant Medical/Surgical History: Squeezing chest pain FINDINGS: Heart size and pulmonary vasculature are normal.  The lungs are clear and normally expanded. Surrounding osseous and soft tissue structures are unremarkable. Normal examination.      CT CHEST PULMONARY EMBOLISM W CONTRAST    Result Date: 10/15/2022  EXAMINATION: CTA OF THE CHEST 10/15/2022 1:30 am TECHNIQUE: CTA of the chest was performed after the administration of intravenous contrast.  Multiplanar reformatted images are provided for review. MIP images are provided for review. Automated exposure control, iterative reconstruction, and/or weight based adjustment of the mA/kV was utilized to reduce the radiation dose to as low as reasonably achievable. COMPARISON: 05/21/2022 HISTORY: ORDERING SYSTEM PROVIDED HISTORY: chest pain TECHNOLOGIST PROVIDED HISTORY: chest pain Decision Support Exception - unselect if not a suspected or confirmed emergency medical condition->Emergency Medical Condition (MA) Reason for Exam: chest pain with abdominal discomfort for one day Additional signs and symptoms: elevated dimer FINDINGS: Pulmonary Arteries: Pulmonary arteries are adequately opacified for evaluation. No evidence of intraluminal filling defect to suggest pulmonary embolism. Main pulmonary artery is normal in caliber. Mediastinum: No evidence of mediastinal lymphadenopathy. The heart and pericardium demonstrate no acute abnormality. There is no acute abnormality of the thoracic aorta. Lungs/pleura: The lungs are without acute process. No focal consolidation or pulmonary edema. No evidence of pleural effusion or pneumothorax. Upper Abdomen: Limited images of the upper abdomen are unremarkable. Soft Tissues/Bones: No acute bone or soft tissue abnormality. No evidence of pulmonary embolism or acute pulmonary abnormality. CTA ABDOMEN PELVIS W CONTRAST    Result Date: 10/29/2022  EXAMINATION: CTA OF THE ABDOMEN AND PELVIS WITH CONTRAST 10/29/2022 7:44 pm: TECHNIQUE: CTA of the abdomen and pelvis was performed with the administration of intravenous contrast. Multiplanar reformatted images are provided for review. MIP images are provided for review. Automated exposure control, iterative reconstruction, and/or weight based adjustment of the mA/kV was utilized to reduce the radiation dose to as low as reasonably achievable. COMPARISON: October 15, 2022 CT abdomen and pelvis. CT chest same day.  HISTORY: ORDERING SYSTEM PROVIDED HISTORY: abd pain, gi bleed TECHNOLOGIST PROVIDED HISTORY: abd pain, gi bleed Reason for Exam: abd pain, gi bleed Additional signs and symptoms: Pt states that he has been having abdominal pain with rectal bleeding x 2 days. Today he started vomiting brownish red blood that has been getting brighter as the day/night goes on FINDINGS: CTA ABDOMEN: The liver is diffusely heterogeneous with multiple ill-defined hypodensities which appear new. Gallstone is noted. The solid and hollow viscous otherwise appears normal.  No active GI bleeding or evidence of ischemic bowel. The celiac trunk, SMA, bilateral renal arteries and iliac bifurcation appear normal as well as the abdominal aorta. CTA PELVIS: Normal.     No evidence of active GI hemorrhage. Innumerable hypodensities throughout the liver. This may represent hepatocellular disease. Metastatic disease not excluded. Follow-up MRI may be helpful. Physical Examination:        Physical Exam  Constitutional:       General: He is not in acute distress. Appearance: He is not ill-appearing. HENT:      Head: Atraumatic. Nose: No congestion. Mouth/Throat:      Mouth: Mucous membranes are moist.   Cardiovascular:      Rate and Rhythm: Normal rate and regular rhythm. Heart sounds: No murmur heard. Pulmonary:      Effort: Pulmonary effort is normal. No respiratory distress. Breath sounds: Normal breath sounds. No wheezing. Chest:      Chest wall: No tenderness. Abdominal:      General: Abdomen is flat. Bowel sounds are normal. There is no distension. Palpations: Abdomen is soft. There is no mass. Tenderness: There is abdominal tenderness. Hernia: No hernia is present. Musculoskeletal:      Cervical back: No rigidity or tenderness. Neurological:      General: No focal deficit present. Mental Status: He is alert and oriented to person, place, and time.    Psychiatric:         Mood and Affect: Mood normal.         Assessment: Primary Problem  GI bleed    Active Hospital Problems    Diagnosis Date Noted    Hematemesis with nausea [K92.0] 10/30/2022     Priority: Medium    GI bleed [K92.2] 10/29/2022     Priority: Medium       Plan:        Variceal GI bleed- confirmed with EGD- no active GI bleed              -Hemoglobin today 7.7, dropped from 12.9 on admission  Hypertension, recheck              -Patient loaded with IV fluids to maintain blood pressure, lowest blood pressure was 24M systolic              -Given ondansetron              -Blood transfusion              -GI consulted, took patient for EGD. Report concludes that he has esophageal variceal bleed, but is not bleeding anymore.  Protonix 80 mg, and  octreotide and repeat EGD in 3 to 4 weeks    -Patient started on adult diet soft and bite sized today     Hypertension              -We will resume blood pressure medications as blood pressure stabilized     CHF              -Last echo done in 2020 showed ejection fraction of 38%  -Blood pressure medications blood pressure improved              -Consider starting medications for CHF on discharge               Courtney Smart MD  10/31/2022  1:12 PM

## 2022-10-31 NOTE — PLAN OF CARE
Problem: Discharge Planning  Goal: Discharge to home or other facility with appropriate resources  Outcome: Progressing  Flowsheets (Taken 10/30/2022 0800 by Eyvonne Cheadle, RN)  Discharge to home or other facility with appropriate resources: Identify barriers to discharge with patient and caregiver     Problem: Pain  Goal: Verbalizes/displays adequate comfort level or baseline comfort level  Outcome: Progressing  Flowsheets (Taken 10/30/2022 0800 by Eyvonne Cheadle, RN)  Verbalizes/displays adequate comfort level or baseline comfort level:   Encourage patient to monitor pain and request assistance   Assess pain using appropriate pain scale   Implement non-pharmacological measures as appropriate and evaluate response   Notify Licensed Independent Practitioner if interventions unsuccessful or patient reports new pain     Problem: Safety - Adult  Goal: Free from fall injury  Outcome: Progressing  Flowsheets (Taken 10/30/2022 0800 by Eyvonne Cheadle, RN)  Free From Fall Injury: Instruct family/caregiver on patient safety     Problem: ABCDS Injury Assessment  Goal: Absence of physical injury  Outcome: Progressing  Flowsheets (Taken 10/30/2022 0800 by Eyvonne Cheadle, RN)  Absence of Physical Injury: Implement safety measures based on patient assessment     Problem: Neurosensory - Adult  Goal: Achieves stable or improved neurological status  Outcome: Progressing  Flowsheets (Taken 10/30/2022 0800 by Eyvonne Cheadle, RN)  Achieves stable or improved neurological status:   Assess for and report changes in neurological status   Maintain blood pressure and fluid volume within ordered parameters to optimize cerebral perfusion and minimize risk of hemorrhage   Monitor temperature, glucose, and sodium.  Initiate appropriate interventions as ordered  Goal: Achieves maximal functionality and self care  Outcome: Progressing     Problem: Cardiovascular - Adult  Goal: Absence of cardiac dysrhythmias or at baseline  Outcome: Progressing  Flowsheets (Taken 10/30/2022 0800 by Dipesh Henderson RN)  Absence of cardiac dysrhythmias or at baseline:   Monitor cardiac rate and rhythm   Assess for signs of decreased cardiac output     Problem: Skin/Tissue Integrity - Adult  Goal: Skin integrity remains intact  Outcome: Progressing  Goal: Oral mucous membranes remain intact  Outcome: Progressing     Problem: Musculoskeletal - Adult  Goal: Return mobility to safest level of function  Outcome: Progressing     Problem: Gastrointestinal - Adult  Goal: Minimal or absence of nausea and vomiting  Outcome: Progressing  Flowsheets (Taken 10/30/2022 0800 by Dipesh Henderson RN)  Minimal or absence of nausea and vomiting:   Administer IV fluids as ordered to ensure adequate hydration   Maintain NPO status until nausea and vomiting are resolved   Administer ordered antiemetic medications as needed   Provide nonpharmacologic comfort measures as appropriate  Goal: Maintains or returns to baseline bowel function  Outcome: Progressing  Goal: Maintains adequate nutritional intake  Outcome: Progressing

## 2022-10-31 NOTE — PLAN OF CARE
Problem: Discharge Planning  Goal: Discharge to home or other facility with appropriate resources  Outcome: Progressing     Problem: Pain  Goal: Verbalizes/displays adequate comfort level or baseline comfort level  Outcome: Progressing     Problem: Safety - Adult  Goal: Free from fall injury  Outcome: Progressing     Problem: ABCDS Injury Assessment  Goal: Absence of physical injury  Outcome: Progressing     Problem: Neurosensory - Adult  Goal: Achieves stable or improved neurological status  Outcome: Progressing  Goal: Achieves maximal functionality and self care  Outcome: Progressing     Problem: Cardiovascular - Adult  Goal: Absence of cardiac dysrhythmias or at baseline  Outcome: Progressing     Problem: Skin/Tissue Integrity - Adult  Goal: Skin integrity remains intact  Outcome: Progressing  Goal: Oral mucous membranes remain intact  Outcome: Progressing     Problem: Musculoskeletal - Adult  Goal: Return mobility to safest level of function  Outcome: Progressing     Problem: Gastrointestinal - Adult  Goal: Minimal or absence of nausea and vomiting  Outcome: Progressing  Goal: Maintains or returns to baseline bowel function  Outcome: Progressing  Goal: Maintains adequate nutritional intake  Outcome: Progressing     Problem: Chronic Conditions and Co-morbidities  Goal: Patient's chronic conditions and co-morbidity symptoms are monitored and maintained or improved  Outcome: Progressing

## 2022-10-31 NOTE — CARE COORDINATION
ONGOING DISCHARGE PLAN:    Patient is alert and oriented x4. Spoke with patient regarding discharge plan and patient confirms that plan is still home without needs. Declined VNS. Per GI, continue Octreotide for 24 hours then d/c. Soft diet. Anticipate d/c tomorrow. Will continue to follow for additional discharge needs.     Electronically signed by Zeke Gustafson RN on 10/31/2022 at 12:32 PM

## 2022-10-31 NOTE — PROGRESS NOTES
GI Progress notes    10/31/2022   12:00 PM    Name:  Rut Francis  MRN:    [id number]     Acct:     [de-identified]   Room:  2087/2087-01   Day: 2     Admit Date: 10/29/2022  5:35 PM  PCP: No primary care provider on file. Subjective:     C/C:   Chief Complaint   Patient presents with    Hematemesis    Rectal Bleeding       Interval History: Status: improved. Patietn seen and examined  No acute events overnight  S/p EGD with variceal banding  No reported BM today  Tolerating full liquid diet  Continues on Octreotide and PPI  Mild chest discomfort s/p banding    ROS:  Constitutional: negative for chills, fevers and sweats  Gastrointestinal: negative for abdominal pain, constipation, diarrhea, nausea and vomiting  Neurological: negative for dizziness and headaches    Medications: Allergies:    Allergies   Allergen Reactions    Bee Venom Anaphylaxis       Current Meds: losartan (COZAAR) tablet 25 mg, Daily  metoprolol succinate (TOPROL XL) extended release tablet 12.5 mg, Daily  [Held by provider] furosemide (LASIX) tablet 20 mg, Daily  0.9 % sodium chloride infusion, PRN  0.9 % sodium chloride infusion, PRN  folic acid (FOLVITE) tablet 1 mg, Daily  thiamine mononitrate tablet 100 mg, Daily  octreotide (SANDOSTATIN) 500 mcg in sodium chloride 0.9 % 100 mL infusion, Continuous  albuterol sulfate HFA (PROVENTIL;VENTOLIN;PROAIR) 108 (90 Base) MCG/ACT inhaler 2 puff, 4x Daily PRN  fluticasone (FLONASE) 50 MCG/ACT nasal spray 2 spray, Daily  pantoprazole (PROTONIX) 80 mg in sodium chloride 0.9 % 100 mL infusion, Continuous  sodium chloride flush 0.9 % injection 10 mL, BID  metoprolol (LOPRESSOR) injection 5 mg, Q6H PRN  ondansetron (ZOFRAN) injection 4 mg, Q6H PRN  nicotine (NICODERM CQ) 21 MG/24HR 1 patch, Nightly        Data:     Code Status:  Full Code    Family History   Problem Relation Age of Onset    Heart Disease Mother     Heart Attack Mother     Heart Disease Father        Social History Socioeconomic History    Marital status: Single     Spouse name: Not on file    Number of children: Not on file    Years of education: Not on file    Highest education level: Not on file   Occupational History    Not on file   Tobacco Use    Smoking status: Every Day     Packs/day: 1.00     Years: 21.00     Pack years: 21.00     Types: Cigarettes    Smokeless tobacco: Never    Tobacco comments:     using nicotine patches at night   Vaping Use    Vaping Use: Never used   Substance and Sexual Activity    Alcohol use: Not Currently     Comment: hx of alcoholism; pt drinks a fifth of whiskey daily    Drug use: Not Currently     Comment: used cocaine in early 20's    Sexual activity: Not on file   Other Topics Concern    Not on file   Social History Narrative    Not on file     Social Determinants of Health     Financial Resource Strain: Not on file   Food Insecurity: Not on file   Transportation Needs: Not on file   Physical Activity: Not on file   Stress: Not on file   Social Connections: Not on file   Intimate Partner Violence: Not on file   Housing Stability: Not on file       Vitals:  /84   Pulse 84   Temp 97.9 °F (36.6 °C) (Oral)   Resp 20   Ht 5' 8\" (1.727 m)   Wt 184 lb 1.4 oz (83.5 kg)   SpO2 99%   BMI 27.99 kg/m²   Temp (24hrs), Av.7 °F (37.1 °C), Min:97.8 °F (36.6 °C), Max:99.1 °F (37.3 °C)    No results for input(s): POCGLU in the last 72 hours. I/O (24Hr):     Intake/Output Summary (Last 24 hours) at 10/31/2022 1200  Last data filed at 10/30/2022 1731  Gross per 24 hour   Intake 1635.94 ml   Output --   Net 1635.94 ml       Labs:      CBC:   Lab Results   Component Value Date/Time    WBC 6.0 10/30/2022 06:03 AM    RBC 2.21 10/30/2022 06:03 AM    HGB 7.7 10/31/2022 06:59 AM    HCT 21.4 10/31/2022 06:59 AM    .4 10/30/2022 06:03 AM    MCH 35.5 10/30/2022 06:03 AM    MCHC 33.4 10/30/2022 06:03 AM    RDW 16.5 10/30/2022 06:03 AM    PLT 52 10/30/2022 06:03 AM    MPV 9.2 10/30/2022 06:03 AM     CBC with Differential:    Lab Results   Component Value Date/Time    WBC 6.0 10/30/2022 06:03 AM    RBC 2.21 10/30/2022 06:03 AM    HGB 7.7 10/31/2022 06:59 AM    HCT 21.4 10/31/2022 06:59 AM    PLT 52 10/30/2022 06:03 AM    .4 10/30/2022 06:03 AM    MCH 35.5 10/30/2022 06:03 AM    MCHC 33.4 10/30/2022 06:03 AM    RDW 16.5 10/30/2022 06:03 AM    NRBC 1 05/17/2021 01:01 PM    METASPCT 1 05/01/2022 04:50 AM    LYMPHOPCT 20 10/30/2022 06:03 AM    MONOPCT 10 10/30/2022 06:03 AM    BASOPCT 1 10/30/2022 06:03 AM    MONOSABS 0.60 10/30/2022 06:03 AM    LYMPHSABS 1.20 10/30/2022 06:03 AM    EOSABS 0.12 10/30/2022 06:03 AM    BASOSABS 0.06 10/30/2022 06:03 AM    DIFFTYPE NOT REPORTED 01/30/2022 02:20 PM     Hemoglobin/Hematocrit:    Lab Results   Component Value Date/Time    HGB 7.7 10/31/2022 06:59 AM    HCT 21.4 10/31/2022 06:59 AM     CMP:    Lab Results   Component Value Date/Time     10/31/2022 06:59 AM    K 3.7 10/31/2022 06:59 AM     10/31/2022 06:59 AM    CO2 22 10/31/2022 06:59 AM    BUN 16 10/31/2022 06:59 AM    CREATININE 0.69 10/31/2022 06:59 AM    GFRAA >60 05/26/2022 05:20 AM    LABGLOM >60 10/31/2022 06:59 AM    GLUCOSE 108 10/31/2022 06:59 AM    PROT 6.3 10/31/2022 06:59 AM    LABALBU 3.0 10/31/2022 06:59 AM    CALCIUM 8.0 10/31/2022 06:59 AM    BILITOT 2.8 10/31/2022 06:59 AM    ALKPHOS 125 10/31/2022 06:59 AM    AST 80 10/31/2022 06:59 AM    ALT 25 10/31/2022 06:59 AM     BMP:    Lab Results   Component Value Date/Time     10/31/2022 06:59 AM    K 3.7 10/31/2022 06:59 AM     10/31/2022 06:59 AM    CO2 22 10/31/2022 06:59 AM    BUN 16 10/31/2022 06:59 AM    LABALBU 3.0 10/31/2022 06:59 AM    CREATININE 0.69 10/31/2022 06:59 AM    CALCIUM 8.0 10/31/2022 06:59 AM    GFRAA >60 05/26/2022 05:20 AM    LABGLOM >60 10/31/2022 06:59 AM    GLUCOSE 108 10/31/2022 06:59 AM     PT/INR:    Lab Results   Component Value Date/Time    PROTIME 16.1 10/29/2022 06:36 PM    INR 1.3 10/29/2022 06:36 PM     PTT:    Lab Results   Component Value Date/Time    APTT 40.5 04/27/2022 06:15 PM   [APTT}    Physical Examination:        General appearance: alert, cooperative and no distress  Mental Status: oriented to person, place and time and normal affect  Abdomen: soft, nontender, nondistended, bowel sounds present   Extremities: no edema, redness or tenderness in the calves  Skin: no gross lesions, rashes, or induration    Assessment:        Primary Problem  GI bleed     Active Hospital Problems    Diagnosis Date Noted    Hematemesis with nausea [K92.0] 10/30/2022     Priority: Medium    GI bleed [K92.2] 10/29/2022     Priority: Medium     Past Medical History:   Diagnosis Date    Alcoholism ([facility] [address]     pt drinks a fifth of whiskey daily    Anemia     Cirrhosis, alcoholic ([facility] [address]     Pulmonary embolism ([facility])         Plan:        Hematemesis, melena, hx EtOH, cirrhosis s/p EGD with banding on 10/30/22  Continue PPI  Cut Octreotide to 25 mcg x 24 hours and d/c   Soft diet as tolerated  Anticipate d/c tomorrow if stable  Monitor for bleeding  Trend H&H  Transfuse for hgb < 7    Time spent reviewing the chart, seeing the patient, and discussing with the attending MD around 30 minutes. Explained to the patient and d/W Nursing Staff  Will F/U with you  Please call or Page for any issues or change in status  Thanks    Electronically signed by SILVA Patterson NP on 10/31/2022 at 12:00 PM       GI attending physician note. Patient seen with SILVA     I independently performed an evaluation on [facility]. I have reviewed the above documentation completed by the Nurse Practitioner and agree with the history, examination, and management plan. Recommendations discussed. Patient looks stable. No current signs of acute bleeding. Hemoglobin is around 7.7. Had mild substernal discomfort may be related to banding. Patient is on PPI therapy.     Abdominal examination benign. Patient is awake, alert and oriented. No signs of DTs. Last alcohol drink was about 2 weeks ago. Recommendations:    Soft diet. To decrease octreotide. If stable will discharge tomorrow. Discussed with the patient regarding cessation of alcohol.

## 2022-10-31 NOTE — PROGRESS NOTES
Patient transferred to room 2087. Connected to cardiac monitor and vital signs taken per CTP. Oriented to room and call light system.

## 2022-11-01 VITALS
HEART RATE: 81 BPM | BODY MASS INDEX: 27.9 KG/M2 | OXYGEN SATURATION: 97 % | TEMPERATURE: 98.1 F | DIASTOLIC BLOOD PRESSURE: 86 MMHG | SYSTOLIC BLOOD PRESSURE: 125 MMHG | WEIGHT: 184.08 LBS | HEIGHT: 68 IN | RESPIRATION RATE: 16 BRPM

## 2022-11-01 LAB
ALBUMIN SERPL-MCNC: 3.2 G/DL (ref 3.5–5.2)
ALP BLD-CCNC: 129 U/L (ref 40–129)
ALT SERPL-CCNC: 27 U/L (ref 5–41)
ANION GAP SERPL CALCULATED.3IONS-SCNC: 9 MMOL/L (ref 9–17)
AST SERPL-CCNC: 84 U/L
BILIRUB SERPL-MCNC: 2.3 MG/DL (ref 0.3–1.2)
BUN BLDV-MCNC: 8 MG/DL (ref 6–20)
CALCIUM SERPL-MCNC: 8.1 MG/DL (ref 8.6–10.4)
CHLORIDE BLD-SCNC: 104 MMOL/L (ref 98–107)
CO2: 23 MMOL/L (ref 20–31)
CREAT SERPL-MCNC: 0.61 MG/DL (ref 0.7–1.2)
EKG ATRIAL RATE: 75 BPM
EKG P AXIS: 53 DEGREES
EKG P-R INTERVAL: 142 MS
EKG Q-T INTERVAL: 374 MS
EKG QRS DURATION: 94 MS
EKG QTC CALCULATION (BAZETT): 417 MS
EKG R AXIS: 42 DEGREES
EKG T AXIS: 59 DEGREES
EKG VENTRICULAR RATE: 75 BPM
GFR SERPL CREATININE-BSD FRML MDRD: >60 ML/MIN/1.73M2
GLUCOSE BLD-MCNC: 115 MG/DL (ref 70–99)
HCT VFR BLD CALC: 22.7 % (ref 41–53)
HCT VFR BLD CALC: 22.9 % (ref 41–53)
HEMOGLOBIN: 7.5 G/DL (ref 13.5–17.5)
HEMOGLOBIN: 7.9 G/DL (ref 13.5–17.5)
POTASSIUM SERPL-SCNC: 3.7 MMOL/L (ref 3.7–5.3)
SODIUM BLD-SCNC: 136 MMOL/L (ref 135–144)
TOTAL PROTEIN: 6.8 G/DL (ref 6.4–8.3)

## 2022-11-01 PROCEDURE — 93010 ELECTROCARDIOGRAM REPORT: CPT | Performed by: INTERNAL MEDICINE

## 2022-11-01 PROCEDURE — 6360000002 HC RX W HCPCS: Performed by: NURSE PRACTITIONER

## 2022-11-01 PROCEDURE — 6360000002 HC RX W HCPCS: Performed by: INTERNAL MEDICINE

## 2022-11-01 PROCEDURE — 6370000000 HC RX 637 (ALT 250 FOR IP): Performed by: INTERNAL MEDICINE

## 2022-11-01 PROCEDURE — 85014 HEMATOCRIT: CPT

## 2022-11-01 PROCEDURE — 99232 SBSQ HOSP IP/OBS MODERATE 35: CPT | Performed by: INTERNAL MEDICINE

## 2022-11-01 PROCEDURE — 80053 COMPREHEN METABOLIC PANEL: CPT

## 2022-11-01 PROCEDURE — 2580000003 HC RX 258: Performed by: INTERNAL MEDICINE

## 2022-11-01 PROCEDURE — 85018 HEMOGLOBIN: CPT

## 2022-11-01 PROCEDURE — 36415 COLL VENOUS BLD VENIPUNCTURE: CPT

## 2022-11-01 PROCEDURE — 2580000003 HC RX 258: Performed by: NURSE PRACTITIONER

## 2022-11-01 PROCEDURE — 6370000000 HC RX 637 (ALT 250 FOR IP)

## 2022-11-01 PROCEDURE — 6370000000 HC RX 637 (ALT 250 FOR IP): Performed by: STUDENT IN AN ORGANIZED HEALTH CARE EDUCATION/TRAINING PROGRAM

## 2022-11-01 PROCEDURE — C9113 INJ PANTOPRAZOLE SODIUM, VIA: HCPCS | Performed by: INTERNAL MEDICINE

## 2022-11-01 RX ORDER — NADOLOL 40 MG/1
40 TABLET ORAL DAILY
Qty: 30 TABLET | Refills: 3 | Status: SHIPPED | OUTPATIENT
Start: 2022-11-01 | End: 2022-11-15 | Stop reason: SDUPTHER

## 2022-11-01 RX ORDER — PANTOPRAZOLE SODIUM 40 MG/1
40 TABLET, DELAYED RELEASE ORAL DAILY
Status: DISCONTINUED | OUTPATIENT
Start: 2022-11-02 | End: 2022-11-01 | Stop reason: HOSPADM

## 2022-11-01 RX ORDER — PANTOPRAZOLE SODIUM 40 MG/1
40 TABLET, DELAYED RELEASE ORAL DAILY
Qty: 30 TABLET | Refills: 3 | Status: SHIPPED | OUTPATIENT
Start: 2022-11-02 | End: 2022-11-29 | Stop reason: SDUPTHER

## 2022-11-01 RX ORDER — PANTOPRAZOLE SODIUM 40 MG/1
40 TABLET, DELAYED RELEASE ORAL 2 TIMES DAILY
Status: DISCONTINUED | OUTPATIENT
Start: 2022-11-01 | End: 2022-11-01

## 2022-11-01 RX ADMIN — METOPROLOL SUCCINATE 12.5 MG: 25 TABLET, FILM COATED, EXTENDED RELEASE ORAL at 08:56

## 2022-11-01 RX ADMIN — LOSARTAN POTASSIUM 25 MG: 25 TABLET, FILM COATED ORAL at 08:56

## 2022-11-01 RX ADMIN — PANTOPRAZOLE SODIUM 8 MG/HR: 40 INJECTION, POWDER, FOR SOLUTION INTRAVENOUS at 00:02

## 2022-11-01 RX ADMIN — Medication 100 MG: at 08:56

## 2022-11-01 RX ADMIN — OCTREOTIDE ACETATE 25 MCG/HR: 1000 INJECTION, SOLUTION INTRAVENOUS; SUBCUTANEOUS at 09:40

## 2022-11-01 RX ADMIN — FOLIC ACID 1 MG: 1 TABLET ORAL at 08:56

## 2022-11-01 RX ADMIN — PANTOPRAZOLE SODIUM 40 MG: 40 TABLET, DELAYED RELEASE ORAL at 11:32

## 2022-11-01 RX ADMIN — FLUTICASONE PROPIONATE 2 SPRAY: 50 SPRAY, METERED NASAL at 08:56

## 2022-11-01 ASSESSMENT — ENCOUNTER SYMPTOMS
COLOR CHANGE: 0
ABDOMINAL PAIN: 1
BACK PAIN: 0
COUGH: 0
DIARRHEA: 0
SHORTNESS OF BREATH: 0
CHEST TIGHTNESS: 0
ABDOMINAL DISTENTION: 0
SORE THROAT: 0
NAUSEA: 0

## 2022-11-01 NOTE — DISCHARGE INSTR - DIET
Good nutrition is important when healing from an illness, injury, or surgery. Follow any nutrition recommendations given to you during your hospital stay. If you were given an oral nutrition supplement while in the hospital, continue to take this supplement at home. You can take it with meals, in-between meals, and/or before bedtime. These supplements can be purchased at most local grocery stores, pharmacies, and chain [employer]-stores. If you have any questions about your diet or nutrition, call the hospital and ask for the dietitian.     Remain eating soft and bite sized diet until seeing GI

## 2022-11-01 NOTE — PROGRESS NOTES
Physical Therapy Cancel Note      DATE: 2022    NAME: Duane Francisco  MRN: [id number]   : 1985      Patient not seen this date for Physical Therapy due to:    Patient independent with functional mobility. Will defer PT evaluation at this time. Pt reports ambulating in room without difficulty and denies adverse symptoms while mobilizing. Please reorder PT if future needs arise.      Time attempted: [phone]    Electronically signed by Radha Dickerson PT on 2022 at 11:23 AM

## 2022-11-01 NOTE — PROGRESS NOTES
[id number] [employer]    PROGRESS NOTE             11/1/2022    9:49 AM    Name:   Rosalie Pena  MRN:     [id number]     Acct:      [de-identified]   Room:   2087/2087-01  IP Day:  3  Admit Date:  10/29/2022  5:35 PM    PCP:  No primary care provider on file. Code Status:  Full Code    Subjective:     C/C:   Chief Complaint   Patient presents with    Hematemesis    Rectal Bleeding     Interval History Status: improved. Patient was seen and examined at bedside  He is still doing better, and likely for discharge today  No new changes of vitals, and hemoglobin 7.5 today. Transfuse if drops <7 as per GI    Brief History:       Is a 76-year-old male with past medical history of hypertension, pulmonary embolism in 2/7589, alcoholic hepatitis, and an EF of 38% in 2020, presented today with an episode of lightheadedness, nausea and severe vomiting. He describes the vomiting at first is tarry black which she had 2 episodes of. Then he experienced more reddish to bright red vomiting which she had around 5 episodes of 3 occurring outside the hospital that was in the ED then 1 occurring in the ICU were he mentions that he filled up a sample bottle. He mentions that he nearly fainted where he works in [employer], and hit his shoulder but did not have loss of consciousness and did not hit his head. Patient also had around 5 episodes of black diarrhea, which he had with urgency. He also experienced episodes of palpitation where he said his heart was \"racing\". He also had abdominal cramps whenever he was vomiting and abdomen was tender to palpation. Patient has a past medical history of alcoholic hepatitis, which was diagnosed by CT versus possibility of cirrhosis. He has alcohol history where he used to drink 1 pint of vodka per day and quit about 14 days ago. Nurse estimates that he vomited around 650 mL in the ICU.   He takes Toprol, does not take Lasix, does not take Protonix, takes losartan, and does not take naproxen for headaches anymore. Patient has a family history notable for cirrhosis in his father and grandfather. He is also allergic to bee stings. Patient's hemoglobin dropped from 12-7.9, and he is due for blood transfusion. He was also given IV fluids and Zofran. Review of Systems:     Review of Systems   Constitutional:  Negative for activity change, fatigue, fever and unexpected weight change. HENT:  Negative for congestion and sore throat. Respiratory:  Negative for cough, chest tightness and shortness of breath. Cardiovascular:  Positive for chest pain. Negative for leg swelling. Gastrointestinal:  Positive for abdominal pain. Negative for abdominal distention, diarrhea and nausea. Endocrine: Negative for polyuria. Genitourinary:  Negative for dysuria, frequency and urgency. Musculoskeletal:  Negative for arthralgias and back pain. Skin:  Negative for color change and pallor. Neurological:  Negative for dizziness, facial asymmetry, weakness, light-headedness, numbness and headaches. Psychiatric/Behavioral:  Negative for agitation and behavioral problems. Medications: Allergies:     Allergies   Allergen Reactions    Bee Venom Anaphylaxis       Current Meds:   Scheduled Meds:    losartan  25 mg Oral Daily    metoprolol succinate  12.5 mg Oral Daily    [Held by provider] furosemide  20 mg Oral Daily    folic acid  1 mg Oral Daily    vitamin B-1  100 mg Oral Daily    fluticasone  2 spray Each Nostril Daily    sodium chloride flush  10 mL IntraVENous BID    nicotine  1 patch TransDERmal Nightly     Continuous Infusions:    sodium chloride      octreotide (SandoSTATIN) infusion 25 mcg/hr (11/01/22 0940)    pantoprazole 8 mg/hr (11/01/22 0002)     PRN Meds: sodium chloride, albuterol sulfate HFA, metoprolol, ondansetron    Data:     Past Medical History:   has a past medical history of Alcoholism ([facility] [address] Anemia, Cirrhosis, alcoholic ([facility] [address] and Pulmonary embolism ([facility] [address] Social History:   reports that he has been smoking cigarettes. He has a 21.00 pack-year smoking history. He has never used smokeless tobacco. He reports that he does not currently use alcohol. He reports that he does not currently use drugs. Family History:   Family History   Problem Relation Age of Onset    Heart Disease Mother     Heart Attack Mother     Heart Disease Father        Vitals:  /79   Pulse 80   Temp 98.3 °F (36.8 °C) (Oral)   Resp 18   Ht 5' 8\" (1.727 m)   Wt 184 lb 1.4 oz (83.5 kg)   SpO2 99%   BMI 27.99 kg/m²   Temp (24hrs), Av.4 °F (36.9 °C), Min:98.2 °F (36.8 °C), Max:98.6 °F (37 °C)    No results for input(s): POCGLU in the last 72 hours. I/O(24Hr): No intake or output data in the 24 hours ending 22 0949      Labs:  [unfilled]    Lab Results   Component Value Date/Time    SPECIAL NOT REPORTED 10/15/2021 04:17 AM     Lab Results   Component Value Date/Time    CULTURE NO GROWTH 2022 09:57 PM       [unfilled]    Radiology:    CT ABDOMEN PELVIS W IV CONTRAST Additional Contrast? None    Result Date: 10/15/2022  EXAMINATION: CT OF THE ABDOMEN AND PELVIS WITH CONTRAST 10/15/2022 1:31 am TECHNIQUE: CT of the abdomen and pelvis was performed with the administration of intravenous contrast. Multiplanar reformatted images are provided for review. Automated exposure control, iterative reconstruction, and/or weight based adjustment of the mA/kV was utilized to reduce the radiation dose to as low as reasonably achievable. COMPARISON: None.  HISTORY: ORDERING SYSTEM PROVIDED HISTORY: abd pain TECHNOLOGIST PROVIDED HISTORY: [facility] pain Decision Support Exception - unselect if not a suspected or confirmed emergency medical condition->Emergency Medical Condition ([place]) Reason for Exam: chest pain with abdominal discomfort for one day FINDINGS: Lower Chest:  Visualized portion of the lower chest demonstrates no acute abnormality. Organs: The liver is unremarkable. The gallbladder contains 4 mm partially calcified gallstone. The gallbladder appears otherwise normal.  There is no intra or extrahepatic ductal dilatation. The pancreas, spleen, adrenal glands, kidneys and ureters are unremarkable. GI/Bowel: Stomach and duodenal sweep demonstrate no acute abnormality. There is no evidence of bowel obstruction. No evidence of abnormal bowel wall thickening or distension. The appendix is visualized and is unremarkable. No evidence of acute appendicitis. Pelvis: The bladder and reproductive organs are unremarkable. Peritoneum/Retroperitoneum: No evidence of ascites or free air. No evidence of lymphadenopathy. Aorta is normal in caliber. Bones/Soft Tissues: No acute bone or soft tissue abnormality evident     Cholelithiasis without evidence of cholecystitis. Otherwise unremarkable exam.     XR CHEST PORTABLE    Result Date: 10/15/2022  EXAMINATION: ONE XRAY VIEW OF THE CHEST 10/15/2022 12:39 am COMPARISON: CT PE dated 05/21/2022. HISTORY: ORDERING SYSTEM PROVIDED HISTORY: chest pain TECHNOLOGIST PROVIDED HISTORY: chest pain Reason for Exam: Squeezing chest pain Additional signs and symptoms: Squeezing chest pain Relevant Medical/Surgical History: Squeezing chest pain FINDINGS: Heart size and pulmonary vasculature are normal.  The lungs are clear and normally expanded. Surrounding osseous and soft tissue structures are unremarkable. Normal examination. CT CHEST PULMONARY EMBOLISM W CONTRAST    Result Date: 10/15/2022  EXAMINATION: CTA OF THE CHEST 10/15/2022 1:30 am TECHNIQUE: CTA of the chest was performed after the administration of intravenous contrast.  Multiplanar reformatted images are provided for review. MIP images are provided for review.  Automated exposure control, iterative reconstruction, and/or weight based adjustment of the mA/kV was utilized to reduce the radiation dose to as low as reasonably achievable. COMPARISON: 05/21/2022 HISTORY: ORDERING SYSTEM PROVIDED HISTORY: chest pain TECHNOLOGIST PROVIDED HISTORY: chest pain Decision Support Exception - unselect if not a suspected or confirmed emergency medical condition->Emergency Medical Condition (MA) Reason for Exam: chest pain with abdominal discomfort for one day Additional signs and symptoms: elevated dimer FINDINGS: Pulmonary Arteries: Pulmonary arteries are adequately opacified for evaluation. No evidence of intraluminal filling defect to suggest pulmonary embolism. Main pulmonary artery is normal in caliber. Mediastinum: No evidence of mediastinal lymphadenopathy. The heart and pericardium demonstrate no acute abnormality. There is no acute abnormality of the thoracic aorta. Lungs/pleura: The lungs are without acute process. No focal consolidation or pulmonary edema. No evidence of pleural effusion or pneumothorax. Upper Abdomen: Limited images of the upper abdomen are unremarkable. Soft Tissues/Bones: No acute bone or soft tissue abnormality. No evidence of pulmonary embolism or acute pulmonary abnormality. CTA ABDOMEN PELVIS W CONTRAST    Result Date: 10/29/2022  EXAMINATION: CTA OF THE ABDOMEN AND PELVIS WITH CONTRAST 10/29/2022 7:44 pm: TECHNIQUE: CTA of the abdomen and pelvis was performed with the administration of intravenous contrast. Multiplanar reformatted images are provided for review. MIP images are provided for review. Automated exposure control, iterative reconstruction, and/or weight based adjustment of the mA/kV was utilized to reduce the radiation dose to as low as reasonably achievable. COMPARISON: October 15, 2022 CT abdomen and pelvis. CT chest same day. HISTORY: ORDERING SYSTEM PROVIDED HISTORY: abd pain, gi bleed TECHNOLOGIST PROVIDED HISTORY: abd pain, gi bleed Reason for Exam: abd pain, gi bleed Additional signs and symptoms: Pt states that he has been having abdominal pain with rectal bleeding x 2 days. Today he started vomiting brownish red blood that has been getting brighter as the day/night goes on FINDINGS: CTA ABDOMEN: The liver is diffusely heterogeneous with multiple ill-defined hypodensities which appear new. Gallstone is noted. The solid and hollow viscous otherwise appears normal.  No active GI bleeding or evidence of ischemic bowel. The celiac trunk, SMA, bilateral renal arteries and iliac bifurcation appear normal as well as the abdominal aorta. CTA PELVIS: Normal.     No evidence of active GI hemorrhage. Innumerable hypodensities throughout the liver. This may represent hepatocellular disease. Metastatic disease not excluded. Follow-up MRI may be helpful. Physical Examination:        Physical Exam  Constitutional:       General: He is not in acute distress. Appearance: He is not ill-appearing. HENT:      Head: Atraumatic. Nose: No congestion. Mouth/Throat:      Mouth: Mucous membranes are moist.   Cardiovascular:      Rate and Rhythm: Normal rate and regular rhythm. Heart sounds: No murmur heard. Pulmonary:      Effort: Pulmonary effort is normal. No respiratory distress. Breath sounds: Normal breath sounds. No wheezing. Chest:      Chest wall: No tenderness. Abdominal:      General: Abdomen is flat. Bowel sounds are normal. There is no distension. Palpations: Abdomen is soft. There is no mass. Tenderness: There is abdominal tenderness. Hernia: No hernia is present. Musculoskeletal:      Cervical back: No rigidity or tenderness. Neurological:      General: No focal deficit present. Mental Status: He is alert and oriented to person, place, and time.    Psychiatric:         Mood and Affect: Mood normal.         Assessment:        Primary Problem  GI bleed    Active Hospital Problems    Diagnosis Date Noted    Hematemesis with nausea [K92.0] 10/30/2022     Priority: Medium    GI bleed [K92.2] 10/29/2022     Priority: Medium       Plan: Variceal GI bleed- confirmed with EGD- no active GI bleed              -Hemoglobin today 7.7, dropped from 12.9 on admission  Hypertension, recheck              -Patient loaded with IV fluids to maintain blood pressure, lowest blood pressure was 60O systolic              -Given ondansetron              -Blood transfusions              -GI consulted, took patient for EGD. Report concludes that he has esophageal variceal bleed, but is not bleeding anymore. Protonix 80 mg, and  octreotide 500mcg and repeat EGD in 3 to 4 weeks    -Patient started on adult diet soft and bite sized today     Hypertension              -We will resume blood pressure medications as blood pressure stabilized     CHF              -Last echo done in 2020 showed ejection fraction of 38%  -Blood pressure medications blood pressure improved              -Consider starting medications for CHF on discharge   -Sergey Perry MD  11/1/2022  9:49 AM     Attending Physician Statement  I have discussed the care of Jewell Rodriguez with the resident team. I have examined the patient myself and taken ros and hpi , including pertinent history and exam findings,  with the resident. I have reviewed the key elements of all parts of the encounter with the resident. I agree with the assessment, plan and orders as documented by the resident. Principal Problem:    GI bleed  Active Problems:    Hematemesis with nausea  Resolved Problems:    * No resolved hospital problems.  *    Upper GI bleed in the setting of esophageal varices secondary to alcohol liver disease, chronically elevated liver enzymes with hemodynamic instability at presentation tachycardic hypotensive, status post EGD 10/30 and variceal ligation, continues on PPI and octreotide infusions  Hemoglobin low but  stable since yesterday around 7.5  Patient not on any anticoagulants    Hb stable, no further vomiting/bleeding  BM normal today  Octreotide stopped, PPI changed to PO  Tolerating normal diet  D/c today with f/up CBC in 1wk  Will switch from metoprolol to nadolol  Will add jardiance for CHF    Electronically signed by Doc Vick MD

## 2022-11-01 NOTE — PROGRESS NOTES
Physician Progress Note      PATIENT:               Madeline Mahajan  CSN #:                  [id number]  :                       1985  ADMIT DATE:       10/29/2022 5:35 PM  [address] [place] DATE:  RESPONDING  PROVIDER #:        Libia Madera          QUERY TEXT:    Pt admitted w/ GI bleed 2/2 esophageal varices & has drop in hgb from 12 to   7.9 w/ anemia documented. If possible, please document in PNs & D/C summary   further specificity regarding the acuity & type of anemia:    The medical record reflects the following:  Risk Factors: PMH of alcoholism, presented w/ GI bleeding. EGD showed   esophageal varices 2/2 alcohol liver disease. Clinical Indicators: ED Provider Note 10/29: Vomiting blood, Blood in stools. Quit drinking 13 days ago. H&P 10/30: He describes the vomiting at first is   tarry black x 2. Then he experienced more reddish to bright red vomiting   which she had around 5 episodes of 3 occurring outside the hospital that was   in the ED then 1 occurring in the ICU. Pt also had around 5 episodes of black   diarrhea, which he had w/ urgency. Nurse estimates that he vomited around 650   mL in the ICU. Pt's hgb dropped from 12-7.9, & he is due for blood   transfusion. Variceal GI bleed- confirmed w/ EGD- no   Treatment: 1L Bolus w/ IVFs at 75 ml/hr. 2 Units PRBCs. Options provided:  -- Anemia d/t acute blood loss  -- Anemia d/t acute on chronic blood loss  -- Other - I will add my own diagnosis  -- Disagree - Not applicable / Not valid  -- Disagree - Clinically unable to determine / Unknown  -- Refer to Clinical Documentation Reviewer    PROVIDER RESPONSE TEXT:    This pt has acute blood loss anemia.     Query created by: Rob Luong on 10/31/2022 4:39 PM      Electronically signed by:  Libia Madera 2022 7:53 AM

## 2022-11-01 NOTE — PLAN OF CARE
Problem: Discharge Planning  Goal: Discharge to home or other facility with appropriate resources  11/1/2022 0453 by Tracy Anton RN  Outcome: Progressing  10/31/2022 1739 by Martha Lawrence RN  Outcome: Progressing     Problem: Pain  Goal: Verbalizes/displays adequate comfort level or baseline comfort level  11/1/2022 0453 by Tracy Anton RN  Outcome: Progressing  10/31/2022 1739 by Martha Lawrence RN  Outcome: Progressing     Problem: Safety - Adult  Goal: Free from fall injury  11/1/2022 0453 by Tracy Anton RN  Outcome: Progressing  10/31/2022 1739 by Martha Lawrence RN  Outcome: Progressing     Problem: ABCDS Injury Assessment  Goal: Absence of physical injury  11/1/2022 0453 by Tracy Anton RN  Outcome: Progressing  10/31/2022 1739 by Martha Lawrence RN  Outcome: Progressing

## 2022-11-01 NOTE — PROGRESS NOTES
CLINICAL PHARMACY NOTE: MEDS TO BEDS    Total # of Prescriptions Filled: 1   The following medications were delivered to the patient:  Pantoprazole 40mg    Additional Documentation:  Delivered medications to patients room

## 2022-11-01 NOTE — ED NOTES
Bed: 19  Expected date:   Expected time:   Means of arrival:   Comments:  [facility]  05/17/21 1811
Blood work labeled and sent to lab      Kelsey Feldman RN  05/17/21 9143
Pt to ED with c/o left sided CP [place]. Pt is aox4, reports having a hx of blood clots and has been following up with his hematologist in order to find a clotting disorder that he may or may not have. Pt rates pain at a 3/10 non radiating, placed on full cardiac monitor, EKG complete, blood sent to lab.       [address] [facility], [address] [place] [address]  05/17/21 6566
Ambien 5 mg oral tablet: 1 tab(s) orally once a day (at bedtime)  aspirin 81 mg oral tablet, chewable: 1 tab(s) orally once a day  glipiZIDE 10 mg oral tablet: 1 tab(s) orally once a day  Lantus 100 units/mL subcutaneous solution: 14 unit(s) subcutaneous once a day (at bedtime)  levothyroxine 125 mcg (0.125 mg) oral tablet: 1 tab(s) orally once a day  metFORMIN 850 mg oral tablet: orally 2 times a day  NovoLOG 100 units/mL injectable solution: 10 unit(s) injectable 3 times a day  Ozempic 2 mg/1.5 mL (0.25 mg or 0.5 mg dose) subcutaneous solution:   simvastatin 10 mg oral tablet: 1 tab(s) orally once a day (at bedtime)

## 2022-11-01 NOTE — DISCHARGE INSTRUCTIONS
Call and schedule follow-up with GI Dr Joseph Castleman in one week. Plan for another EGD in 4 weeks. Refrain from consuming alcohol.

## 2022-11-01 NOTE — PROGRESS NOTES
Discussed medication(s) with the patient and all questions fully answered. Pt educated to importance of refraining from alcohol, diet and keeping follow-up appointments. Educated on when to follow up with PCP and GI. Pt left with [facility] in [place]. Note was given for work excuse. No concerns noted.

## 2022-11-01 NOTE — PROGRESS NOTES
GI Progress notes    11/1/2022   4:00 PM    Name:  Lidia Atwood  MRN:    [id number]     Acct:     [de-identified]   Room:  208/208701   Day: 3     Admit Date: 10/29/2022  5:35 PM  PCP: No primary care provider on file. Subjective:     C/C:   Chief Complaint   Patient presents with    Hematemesis    Rectal Bleeding       Interval History: Status: improved. Patient seen and examined  No acute events overnight  Tolerating diet  No epigastric pain  No nausea, vomiting  Reports normal colored bowel movement today. ROS:  Constitutional: negative for chills, fevers and sweats  Gastrointestinal: negative for abdominal pain, constipation, diarrhea, nausea and vomiting  Neurological: negative for dizziness and headaches    Medications: Allergies:    Allergies   Allergen Reactions    Bee Venom Anaphylaxis       Current Meds: [START ON 11/2/2022] pantoprazole (PROTONIX) tablet 40 mg, Daily  losartan (COZAAR) tablet 25 mg, Daily  metoprolol succinate (TOPROL XL) extended release tablet 12.5 mg, Daily  [Held by provider] furosemide (LASIX) tablet 20 mg, Daily  0.9 % sodium chloride infusion, PRN  folic acid (FOLVITE) tablet 1 mg, Daily  thiamine mononitrate tablet 100 mg, Daily  albuterol sulfate HFA (PROVENTIL;VENTOLIN;PROAIR) 108 (90 Base) MCG/ACT inhaler 2 puff, 4x Daily PRN  fluticasone (FLONASE) 50 MCG/ACT nasal spray 2 spray, Daily  sodium chloride flush 0.9 % injection 10 mL, BID  metoprolol (LOPRESSOR) injection 5 mg, Q6H PRN  ondansetron (ZOFRAN) injection 4 mg, Q6H PRN  nicotine (NICODERM CQ) 21 MG/24HR 1 patch, Nightly        Data:     Code Status:  Full Code    Family History   Problem Relation Age of Onset    Heart Disease Mother     Heart Attack Mother     Heart Disease Father        Social History     Socioeconomic History    Marital status: Single     Spouse name: Not on file    Number of children: Not on file    Years of education: Not on file    Highest education level: Not on file Occupational History    Not on file   Tobacco Use    Smoking status: Every Day     Packs/day: 1.00     Years: 21.00     Pack years: 21.00     Types: Cigarettes    Smokeless tobacco: Never    Tobacco comments:     using nicotine patches at night   Vaping Use    Vaping Use: Never used   Substance and Sexual Activity    Alcohol use: Not Currently     Comment: hx of alcoholism; pt drinks a fifth of whiskey daily    Drug use: Not Currently     Comment: used cocaine in early 20's    Sexual activity: Not on file   Other Topics Concern    Not on file   Social History Narrative    Not on file     Social Determinants of Health     Financial Resource Strain: Not on file   Food Insecurity: Not on file   Transportation Needs: Not on file   Physical Activity: Not on file   Stress: Not on file   Social Connections: Not on file   Intimate Partner Violence: Not on file   Housing Stability: Not on file       Vitals:  /86   Pulse 81   Temp 98.1 °F (36.7 °C)   Resp 16   Ht 5' 8\" (1.727 m)   Wt 184 lb 1.4 oz (83.5 kg)   SpO2 97%   BMI 27.99 kg/m²   Temp (24hrs), Av.3 °F (36.8 °C), Min:98.1 °F (36.7 °C), Max:98.6 °F (37 °C)    No results for input(s): POCGLU in the last 72 hours. I/O (24Hr):   No intake or output data in the 24 hours ending 22 1600    Labs:      CBC:   Lab Results   Component Value Date/Time    WBC 6.0 10/30/2022 06:03 AM    RBC 2.21 10/30/2022 06:03 AM    HGB 7.9 2022 09:28 AM    HCT 22.9 2022 09:28 AM    .4 10/30/2022 06:03 AM    MCH 35.5 10/30/2022 06:03 AM    MCHC 33.4 10/30/2022 06:03 AM    RDW 16.5 10/30/2022 06:03 AM    PLT 52 10/30/2022 06:03 AM    MPV 9.2 10/30/2022 06:03 AM     CBC with Differential:    Lab Results   Component Value Date/Time    WBC 6.0 10/30/2022 06:03 AM    RBC 2.21 10/30/2022 06:03 AM    HGB 7.9 2022 09:28 AM    HCT 22.9 2022 09:28 AM    PLT 52 10/30/2022 06:03 AM    .4 10/30/2022 06:03 AM    MCH 35.5 10/30/2022 06:03 AM nondistended, bowel sounds present   Extremities: no edema, redness or tenderness in the calves  Skin: no gross lesions, rashes, or induration    Assessment:        Primary Problem  GI bleed     Active Hospital Problems    Diagnosis Date Noted    Hematemesis with nausea [K92.0] 10/30/2022     Priority: Medium    GI bleed [K92.2] 10/29/2022     Priority: Medium     Past Medical History:   Diagnosis Date    Alcoholism ([facility] [address]     pt drinks a fifth of whiskey daily    Anemia     Cirrhosis, alcoholic (HCC)     Pulmonary embolism (HCC)         Plan:        Hematemesis, melena, hx EtOH, cirrhosis s/p EGD with banding on 10/30/22  Continue PPI BID at discharge  Was switched to Nadolol per admitting, agree  May d/c per GI  Repeat EGD next 3-4 weeks  Alcohol abstinence     Time spent reviewing the chart, seeing the patient, and discussing with the attending MD around 30 minutes. Explained to the patient and d/W Nursing Staff  Will F/U with you  Please call or Page for any issues or change in status  Thanks    Electronically signed by SILVA Gonzales NP on 11/1/2022 at 4:00 PM         GI attending physician note. Patient seen with SILVA     I independently performed an evaluation on Rut Francis. I have reviewed the above documentation completed by the Nurse Practitioner and agree with the history, examination, and management plan. Recommendations discussed. Hemoglobin has been stable. Tolerating diet. Substernal pain subsided. Discussed with the patient regarding esophageal varices and portal hypertension. Patient is on nadolol 20 mg a day and the dose of this medication need to be adjusted as an outpatient basing on his pulse and blood pressure. Advised to stay on PPI therapy. To see me in the office in the next 4 to 6 weeks and may need repeat EGD at that time. Explained regarding cessation of alcohol.

## 2022-11-01 NOTE — PROGRESS NOTES
[facility]   OCCUPATIONAL THERAPY MISSED TREATMENT NOTE   INPATIENT   Date: 22  Patient Name: Radha Chery       Room: 3292/7143-05  MRN: [id number]   Account #: [de-identified]    : 1985  (40 y.o.)  Gender: male                 REASON FOR MISSED TREATMENT:  Pt independent with all self care tasks and mobility. Pt denies concerns at this time. No further OT needed    -   OT being discontinued at this time.  Patient functioning at Premorbid Level  No further needs  [phone]        Nereyda Oliveros, OT

## 2022-11-01 NOTE — CARE COORDINATION
ONGOING DISCHARGE PLAN:    Patient is alert and oriented x4. Spoke with patient regarding discharge plan and patient confirms that plan is still home with no needs. On protonix drip  Octreotide drip  Hgb 7.5  Discharge today, pending GI    Will continue to follow for additional discharge needs.     Electronically signed by Jose Tabares RN on 11/1/2022 at 10:45 AM

## 2022-11-01 NOTE — PLAN OF CARE
Problem: Discharge Planning  Goal: Discharge to home or other facility with appropriate resources  11/1/2022 1314 by Davin Kay RN  Outcome: Adequate for Discharge     Problem: Pain  Goal: Verbalizes/displays adequate comfort level or baseline comfort level  11/1/2022 1314 by Davin Kay RN  Outcome: Adequate for Discharge     Problem: Safety - Adult  Goal: Free from fall injury  11/1/2022 1314 by Davin Kay RN  Outcome: Adequate for Discharge

## 2022-11-03 LAB
ABO/RH: NORMAL
ANTIBODY SCREEN: NEGATIVE
ARM BAND NUMBER: NORMAL
BLD PROD TYP BPU: NORMAL
BLD PROD TYP BPU: NORMAL
BLOOD BANK BLOOD PRODUCT EXPIRATION DATE: NORMAL
BLOOD BANK ISBT PRODUCT BLOOD TYPE: 6200
BLOOD BANK PRODUCT CODE: NORMAL
BLOOD BANK UNIT TYPE AND RH: NORMAL
BPU ID: NORMAL
BPU ID: NORMAL
CROSSMATCH RESULT: NORMAL
CROSSMATCH RESULT: NORMAL
DISPENSE STATUS BLOOD BANK: NORMAL
DISPENSE STATUS BLOOD BANK: NORMAL
EXPIRATION DATE: NORMAL
TRANSFUSION STATUS: NORMAL
TRANSFUSION STATUS: NORMAL
UNIT DIVISION: 0
UNIT DIVISION: 0
UNIT ISSUE DATE/TIME: NORMAL

## 2022-11-04 ENCOUNTER — TELEPHONE (OUTPATIENT)
Dept: INTERNAL MEDICINE CLINIC | Age: 37
End: 2022-11-04

## 2022-11-04 NOTE — TELEPHONE ENCOUNTER
[facility] called to schedule hospital follow up appointment and notification came up that patient has been discharged from our practice due to no show appointment history. Patient states that per Dr Charli Conde while in hospital stay informed that he is able to come back to the practice, please advise  before scheduling.

## 2022-11-08 ENCOUNTER — HOSPITAL ENCOUNTER (OUTPATIENT)
Age: 37
Setting detail: SPECIMEN
Discharge: HOME | End: 2022-11-08

## 2022-11-08 DIAGNOSIS — K92.0 GASTROINTESTINAL HEMORRHAGE WITH HEMATEMESIS: ICD-10-CM

## 2022-11-08 LAB
HCT VFR BLD CALC: 28.1 % (ref 40.7–50.3)
HEMOGLOBIN: 8.7 G/DL (ref 13–17)
MCH RBC QN AUTO: 34 PG (ref 25.2–33.5)
MCHC RBC AUTO-ENTMCNC: 31 G/DL (ref 28.4–34.8)
MCV RBC AUTO: 109.8 FL (ref 82.6–102.9)
NRBC AUTOMATED: 0 PER 100 WBC
PDW BLD-RTO: 17.4 % (ref 11.8–14.4)
PLATELET # BLD: 204 K/UL (ref 138–453)
PMV BLD AUTO: 11.1 FL (ref 8.1–13.5)
RBC # BLD: 2.56 M/UL (ref 4.21–5.77)
WBC # BLD: 7.8 K/UL (ref 3.5–11.3)

## 2022-11-15 ENCOUNTER — OFFICE VISIT (OUTPATIENT)
Dept: INTERNAL MEDICINE CLINIC | Age: 37
End: 2022-11-15
Payer: MEDICAID

## 2022-11-15 VITALS
HEIGHT: 68 IN | TEMPERATURE: 97.3 F | DIASTOLIC BLOOD PRESSURE: 70 MMHG | HEART RATE: 73 BPM | SYSTOLIC BLOOD PRESSURE: 118 MMHG | OXYGEN SATURATION: 98 % | WEIGHT: 186.2 LBS | BODY MASS INDEX: 28.22 KG/M2

## 2022-11-15 DIAGNOSIS — I10 ESSENTIAL HYPERTENSION: ICD-10-CM

## 2022-11-15 DIAGNOSIS — K92.0 GASTROINTESTINAL HEMORRHAGE WITH HEMATEMESIS: ICD-10-CM

## 2022-11-15 DIAGNOSIS — I50.22 CHRONIC SYSTOLIC CONGESTIVE HEART FAILURE (HCC): Primary | ICD-10-CM

## 2022-11-15 DIAGNOSIS — D50.0 IRON DEFICIENCY ANEMIA DUE TO CHRONIC BLOOD LOSS: ICD-10-CM

## 2022-11-15 DIAGNOSIS — I42.6 ALCOHOLIC CARDIOMYOPATHY (HCC): ICD-10-CM

## 2022-11-15 DIAGNOSIS — Z09 HOSPITAL DISCHARGE FOLLOW-UP: ICD-10-CM

## 2022-11-15 DIAGNOSIS — R74.01 TRANSAMINITIS: ICD-10-CM

## 2022-11-15 PROCEDURE — 99214 OFFICE O/P EST MOD 30 MIN: CPT | Performed by: INTERNAL MEDICINE

## 2022-11-15 PROCEDURE — 1111F DSCHRG MED/CURRENT MED MERGE: CPT | Performed by: INTERNAL MEDICINE

## 2022-11-15 RX ORDER — LOSARTAN POTASSIUM 50 MG/1
25 TABLET ORAL DAILY
Qty: 30 TABLET | Refills: 5 | Status: SHIPPED | OUTPATIENT
Start: 2022-11-15 | End: 2022-11-15 | Stop reason: DRUGHIGH

## 2022-11-15 RX ORDER — NADOLOL 20 MG/1
20 TABLET ORAL DAILY
Qty: 30 TABLET | Refills: 0 | Status: SHIPPED | OUTPATIENT
Start: 2022-11-15

## 2022-11-15 RX ORDER — LOSARTAN POTASSIUM 50 MG/1
50 TABLET ORAL DAILY
Qty: 30 TABLET | Refills: 5 | Status: SHIPPED | OUTPATIENT
Start: 2022-11-15

## 2022-11-15 SDOH — ECONOMIC STABILITY: FOOD INSECURITY: WITHIN THE PAST 12 MONTHS, THE FOOD YOU BOUGHT JUST DIDN'T LAST AND YOU DIDN'T HAVE MONEY TO GET MORE.: NEVER TRUE

## 2022-11-15 SDOH — ECONOMIC STABILITY: FOOD INSECURITY: WITHIN THE PAST 12 MONTHS, YOU WORRIED THAT YOUR FOOD WOULD RUN OUT BEFORE YOU GOT MONEY TO BUY MORE.: NEVER TRUE

## 2022-11-15 ASSESSMENT — PATIENT HEALTH QUESTIONNAIRE - PHQ9
SUM OF ALL RESPONSES TO PHQ QUESTIONS 1-9: 0
2. FEELING DOWN, DEPRESSED OR HOPELESS: 0
SUM OF ALL RESPONSES TO PHQ QUESTIONS 1-9: 0
SUM OF ALL RESPONSES TO PHQ QUESTIONS 1-9: 0
SUM OF ALL RESPONSES TO PHQ9 QUESTIONS 1 & 2: 0
1. LITTLE INTEREST OR PLEASURE IN DOING THINGS: 0
SUM OF ALL RESPONSES TO PHQ QUESTIONS 1-9: 0

## 2022-11-15 ASSESSMENT — SOCIAL DETERMINANTS OF HEALTH (SDOH): HOW HARD IS IT FOR YOU TO PAY FOR THE VERY BASICS LIKE FOOD, HOUSING, MEDICAL CARE, AND HEATING?: NOT VERY HARD

## 2022-11-15 NOTE — PROGRESS NOTES
Visit Information    Have you changed or started any medications since your last visit including any over-the-counter medicines, vitamins, or herbal medicines? no   Have you stopped taking any of your medications? Is so, why? -  no  Are you having any side effects from any of your medications? - no    Have you seen any other physician or provider since your last visit?  no   Have you had any other diagnostic tests since your last visit? CT scan 10/29/22 [facility]  Have you been seen in the emergency room and/or had an admission in a hospital since we last saw you? 10/29/22 [facility]  Have you had your routine dental cleaning in the past 6 months?  no     Do you have an active MyChart account? If no, what is the barrier?   Yes    No care team member to display    Medical History Review  Past Medical, Family, and Social History reviewed and does contribute to the patient presenting condition    Health Maintenance   Topic Date Due    Hepatitis A vaccine (1 of 2 - Risk 2-dose series) Never done    Varicella vaccine (1 of 2 - 2-dose childhood series) Never done    Pneumococcal 0-64 years Vaccine (1 - PCV) Never done    HIV screen  Never done    Hepatitis B vaccine (1 of 3 - Risk 3-dose series) Never done    DTaP/Tdap/Td vaccine (1 - Tdap) Never done    Diabetes screen  Never done    COVID-19 Vaccine (3 - Booster for Moderna series) 06/17/2021    Depression Screen  06/02/2022    Flu vaccine (1) Never done    Hepatitis C screen  Completed    Hib vaccine  Aged Out    Meningococcal (ACWY) vaccine  Aged Out

## 2022-11-15 NOTE — PROGRESS NOTES
Post-Discharge Transitional Care  Follow Up      Franklyn Musa   YOB: 1985    Date of Office Visit:  11/15/2022  Date of Hospital Admission: 10/29/22  Date of Hospital Discharge: 11/1/22  Risk of hospital readmission (high >=14%. Medium >=10%) :Readmission Risk Score: 20.1      Care management risk score Rising risk (score 2-5) and Complex Care (Scores >=6): No Risk Score On File     Non face to face  following discharge, date last encounter closed (first attempt may have been earlier): *No documented post hospital discharge outreach found in the last 14 days    Call initiated 2 business days of discharge: *No response recorded in the last 14 days    ASSESSMENT/PLAN:   Chronic systolic congestive heart failure (HCC)  -     Hemoglobin A1C; Future  -     Echocardiogram complete; Future  Essential hypertension  -     losartan (COZAAR) 50 MG tablet; Take 1 tablet by mouth daily, Disp-30 tablet, R-5Normal  Iron deficiency anemia due to chronic blood loss  -     CBC with Auto Differential; Future  Hospital discharge follow-up  -     MA DISCHARGE MEDS RECONCILED W/ CURRENT OUTPATIENT MED LIST  Alcoholic cardiomyopathy ([facility] [address]  Gastrointestinal hemorrhage with hematemesis  Transaminitis    Medical Decision Making: moderate complexity  No follow-ups on file. Decrease the dose of nadolol secondary to fatigue and hypertension  Patient did have side effect with metoprolol as well  Will check echocardiogram, last echocardiogram was done 2 years ago which showed ejection fraction of 35 to 40%  No sign of volume overload present currently  Will check CBC and HbA1c as well. Subjective:   HPI:  Follow up of Hospital problems/diagnosis(es):     Inpatient course: Discharge summary reviewed- see chart.   Patient was admitted with hematemesis, had EGD found to have esophageal varices secondary to alcoholic liver disease, does have chronically elevated liver enzymes  Patient had variceal ligation,  Also has history of CHF  Had echocardiogram done in 2020 showed extraction of 35 to 40%, had cardiac cath, which showed normal coronaries  Patient was switched to nadolol from metoprolol  And Jardiance was added      \interval history/Current status:   Patient states that he has been feeling very fatigued and foggy  Just had 1 episode of vomiting , does complain of some nausea, denies any abdominal pain  Patient stated that he checked his blood pressure yesterday and it was in the 90s  Patient advised to check his blood pressure daily  Patient states that he did have side effect from metoprolol but he continued to take that for his heart  Has stopped drinking alcohol  Patient was told that fatigue patient was told that he could be feeling fatigue secondary to his low blood pressure  Nadolol  Discussed decreasing the dose of nadolol, patient voiced understanding  No history of diabetes  Will check HbA1c  We will also check CBC  No sign of volume overload present. Patient Active Problem List   Diagnosis    Acute pulmonary embolism (HCC)    ACS (acute coronary syndrome) (HCC)    Chronic systolic congestive heart failure ([address]    Essential hypertension    Uncomplicated alcohol dependence ([address]    Alcoholic cardiomyopathy ([address]    Diarrhea due to malabsorption    Heartburn    Generalized anxiety disorder    Hypomagnesemia    Hypokalemia    Transaminitis    Alcoholic hepatitis without ascites    Macrocytic anemia    Left lower lobe pneumonia    Sepsis ([address]    Melena    Acute blood loss anemia    Acute liver failure without hepatic coma    Leukocytosis    Septicemia (HCC)    Right upper quadrant abdominal pain    HCAP (healthcare-associated pneumonia)    Iron overload    GI bleed    Hematemesis with nausea       Medications listed as ordered at the time of discharge from hospital     Medication List            Accurate as of November 15, 2022 10:09 AM. If you have any questions, ask your nurse or doctor.                 CONTINUE taking these medications      albuterol sulfate  (90 Base) MCG/ACT inhaler  Commonly known as: Ventolin HFA  Inhale 2 puffs into the lungs 4 times daily as needed for Wheezing     analgesic ointment Oint ointment     BP Monitor-Stethoscope Kit  1 each by Does not apply route daily     Centrum Men Tabs     empagliflozin 10 MG tablet  Commonly known as: Jardiance  Take 1 tablet by mouth daily     EPINEPHrine 0.3 MG/0.3ML Soaj injection  Commonly known as: EpiPen 2-Rafat  Inject 0.3 mLs into the muscle once for 1 dose Use as directed for allergic reaction     fluticasone 50 MCG/ACT nasal spray  Commonly known as: FLONASE  instill 1 spray into each nostril once daily     folic acid 1 MG tablet  Commonly known as: FOLVITE  Take 1 tablet by mouth daily     furosemide 20 MG tablet  Commonly known as: Lasix  Take 1 tablet by mouth daily     losartan 50 MG tablet  Commonly known as: COZAAR  take 1 tablet by mouth once daily     nadolol 40 MG tablet  Commonly known as: Corgard  Take 1 tablet by mouth daily     Nicotine 21-14-7 MG/24HR Kit     ondansetron 4 MG disintegrating tablet  Commonly known as: ZOFRAN-ODT  Take 1 tablet by mouth 3 times daily as needed for Nausea or Vomiting     pantoprazole 40 MG tablet  Commonly known as: PROTONIX  Take 1 tablet by mouth daily     vitamin B-1 100 MG tablet  Commonly known as: THIAMINE  Take 1 tablet by mouth daily                Medications marked \"taking\" at this time  Outpatient Medications Marked as Taking for the 11/15/22 encounter (Office Visit) with Aly Roca MD   Medication Sig Dispense Refill    pantoprazole (PROTONIX) 40 MG tablet Take 1 tablet by mouth daily 30 tablet 3    nadolol (CORGARD) 40 MG tablet Take 1 tablet by mouth daily 30 tablet 3    empagliflozin (JARDIANCE) 10 MG tablet Take 1 tablet by mouth daily 30 tablet 3    ondansetron (ZOFRAN-ODT) 4 MG disintegrating tablet Take 1 tablet by mouth 3 times daily as needed for Nausea or Vomiting 21 tablet 0 albuterol sulfate HFA (VENTOLIN HFA) 108 (90 Base) MCG/ACT inhaler Inhale 2 puffs into the lungs 4 times daily as needed for Wheezing 18 g 0    folic acid (FOLVITE) 1 MG tablet Take 1 tablet by mouth daily 30 tablet 3    vitamin B-1 (THIAMINE) 100 MG tablet Take 1 tablet by mouth daily 60 tablet 1    Multiple Vitamins-Minerals (CENTRUM MEN) TABS Take 1 tablet by mouth daily      Menthol-Methyl Salicylate (ANALGESIC OINTMENT) OINT ointment Apply topically as needed Indications: on back      losartan (COZAAR) 50 MG tablet take 1 tablet by mouth once daily 30 tablet 5    fluticasone (FLONASE) 50 MCG/ACT nasal spray instill 1 spray into each nostril once daily 16 g 1    Blood Pressure Monitoring (BP MONITOR-STETHOSCOPE) KIT 1 each by Does not apply route daily 1 kit 0    Nicotine 21-14-7 MG/24HR KIT Place 14 mg onto the skin nightly       EPINEPHrine (EPIPEN 2-LIZETTE) 0.3 MG/0.3ML SOAJ injection Inject 0.3 mLs into the muscle once for 1 dose Use as directed for allergic reaction 2 each 0        Medications patient taking as of now reconciled against medications ordered at time of hospital discharge: Yes    A comprehensive review of systems was negative except for what was noted in the HPI.     Objective:    /70 (Site: Left Upper Arm, Position: Sitting, Cuff Size: Large Adult)   Pulse 73   Temp 97.3 °F (36.3 °C) (Temporal)   Ht 5' 8\" (1.727 m)   Wt 186 lb 3.2 oz (84.5 kg)   SpO2 98%   BMI 28.31 kg/m²   General Appearance: alert and oriented to person, place and time, well developed and well- nourished, in no acute distress  Skin: warm and dry, no rash or erythema  Head: normocephalic and atraumatic  Eyes: pupils equal, round, and reactive to light, extraocular eye movements intact, conjunctivae normal  ENT: tympanic membrane, external ear and ear canal normal bilaterally, nose without deformity, nasal mucosa and turbinates normal without polyps  Neck: supple and non-tender without mass, no thyromegaly or thyroid nodules, no cervical lymphadenopathy  Pulmonary/Chest: clear to auscultation bilaterally- no wheezes, rales or rhonchi, normal air movement, no respiratory distress  Cardiovascular: normal rate, regular rhythm, normal S1 and S2, no murmurs, rubs, clicks, or gallops, distal pulses intact, no carotid bruits  Abdomen: soft, non-tender, non-distended, normal bowel sounds, no masses or organomegaly  Extremities: no cyanosis, clubbing or edema  Musculoskeletal: normal range of motion, no joint swelling, deformity or tenderness  Neurologic: reflexes normal and symmetric, no cranial nerve deficit, gait, coordination and speech normal      An electronic signature was used to authenticate this note.   --Lakia Thomas MD

## 2022-11-17 ENCOUNTER — HOSPITAL ENCOUNTER (OUTPATIENT)
Age: 37
Setting detail: SPECIMEN
Discharge: HOME | End: 2022-11-17

## 2022-11-17 DIAGNOSIS — I50.22 CHRONIC SYSTOLIC CONGESTIVE HEART FAILURE (HCC): ICD-10-CM

## 2022-11-17 DIAGNOSIS — D50.0 IRON DEFICIENCY ANEMIA DUE TO CHRONIC BLOOD LOSS: ICD-10-CM

## 2022-11-17 LAB
ABSOLUTE EOS #: 0.27 K/UL (ref 0–0.44)
ABSOLUTE IMMATURE GRANULOCYTE: <0.03 K/UL (ref 0–0.3)
ABSOLUTE LYMPH #: 2.03 K/UL (ref 1.1–3.7)
ABSOLUTE MONO #: 0.71 K/UL (ref 0.1–1.2)
BASOPHILS # BLD: 2 % (ref 0–2)
BASOPHILS ABSOLUTE: 0.12 K/UL (ref 0–0.2)
EOSINOPHILS RELATIVE PERCENT: 4 % (ref 1–4)
HCT VFR BLD CALC: 30.5 % (ref 40.7–50.3)
HEMOGLOBIN: 9.6 G/DL (ref 13–17)
IMMATURE GRANULOCYTES: 0 %
LYMPHOCYTES # BLD: 27 % (ref 24–43)
MCH RBC QN AUTO: 33.3 PG (ref 25.2–33.5)
MCHC RBC AUTO-ENTMCNC: 31.5 G/DL (ref 28.4–34.8)
MCV RBC AUTO: 105.9 FL (ref 82.6–102.9)
MONOCYTES # BLD: 9 % (ref 3–12)
NRBC AUTOMATED: 0 PER 100 WBC
PDW BLD-RTO: 16.8 % (ref 11.8–14.4)
PLATELET # BLD: 168 K/UL (ref 138–453)
PMV BLD AUTO: 10.6 FL (ref 8.1–13.5)
RBC # BLD: 2.88 M/UL (ref 4.21–5.77)
RBC # BLD: ABNORMAL 10*6/UL
SEG NEUTROPHILS: 58 % (ref 36–65)
SEGMENTED NEUTROPHILS ABSOLUTE COUNT: 4.47 K/UL (ref 1.5–8.1)
WBC # BLD: 7.6 K/UL (ref 3.5–11.3)

## 2022-11-17 NOTE — DISCHARGE SUMMARY
[address]    Discharge Summary     Patient ID: Citlalli Pickens  :  1985   MRN: [id number]     ACCOUNT:  [de-identified]   Patient's PCP: No primary care provider on file. Admit Date: 10/29/2022   Discharge Date: 2022     Length of Stay: 3  Code Status:  Prior  Admitting Physician: Iva Ortiz MD  Discharge Physician: Mitch Pagan MD     Active Discharge Diagnoses:       Primary Problem  GI bleed      Hospital Problems  Active Hospital Problems    Diagnosis Date Noted    Hematemesis with nausea [K92.0] 10/30/2022     Priority: Medium    GI bleed [K92.2] 10/29/2022     Priority: Medium       Admission Condition: poor     Discharged Condition: good    Hospital Stay:       Hospital Course:      44-year-old male with past medical history of hypertension, pulmonary embolism in 6033, alcoholic hepatitis in the EF of 38% and . Mr. Mitchell Pallas presented to ED with an episode of lightheadedness, nausea and severe vomiting. He describes the vomiting at first is tarry black which she had 2 episodes of. Then he experienced more reddish to bright red vomiting which she had around 5 episodes of 3 occurring outside the hospital that was in the ED then 1 occurring in the ICU were he mentions that he filled up a sample bottle. He mentions that he nearly fainted where he works in [employer], and hit his shoulder but did not have loss of consciousness and did not hit his head. Patient also had around 5 episodes of black diarrhea, which he had with urgency. He also experienced episodes of palpitation where he said his heart was \"racing\". He also had abdominal cramps whenever he was vomiting and abdomen was tender to palpation. Important to note that the patient has diagnosis of alcoholic hepatitis    Patient's hemoglobin dropped from 12.97.9 admission he was given unit of blood and he was given IV fluids and Zofran.     Patient was worked up and treated for variceal bleed, GI did an EGD on the patient and they found varices but was not bleeding anymore, we proceeded to band f the vessels, and patient was started on Protonix and octreotide and GI wanted a repeat EGD. 4 weeks    Significant therapeutic interventions: Blood transfusion, EGD, IV fluids    Significant Diagnostic Studies:   Labs / Micro:  CBC:   Lab Results   Component Value Date/Time    WBC 7.8 11/08/2022 09:42 AM    RBC 2.56 11/08/2022 09:42 AM    HGB 8.7 11/08/2022 09:42 AM    HCT 28.1 11/08/2022 09:42 AM    .8 11/08/2022 09:42 AM    MCH 34.0 11/08/2022 09:42 AM    MCHC 31.0 11/08/2022 09:42 AM    RDW 17.4 11/08/2022 09:42 AM     11/08/2022 09:42 AM     BMP:    Lab Results   Component Value Date/Time    GLUCOSE 115 11/01/2022 03:22 AM     11/01/2022 03:22 AM    K 3.7 11/01/2022 03:22 AM     11/01/2022 03:22 AM    CO2 23 11/01/2022 03:22 AM    ANIONGAP 9 11/01/2022 03:22 AM    BUN 8 11/01/2022 03:22 AM    CREATININE 0.61 11/01/2022 03:22 AM    BUNCRER NOT REPORTED 01/30/2022 02:20 PM    CALCIUM 8.1 11/01/2022 03:22 AM    LABGLOM >60 11/01/2022 03:22 AM    GFRAA >60 05/26/2022 05:20 AM    GFR      05/26/2022 05:20 AM        Radiology:    CTA ABDOMEN PELVIS W CONTRAST    Result Date: 10/29/2022  EXAMINATION: CTA OF THE ABDOMEN AND PELVIS WITH CONTRAST 10/29/2022 7:44 pm: TECHNIQUE: CTA of the abdomen and pelvis was performed with the administration of intravenous contrast. Multiplanar reformatted images are provided for review. MIP images are provided for review. Automated exposure control, iterative reconstruction, and/or weight based adjustment of the mA/kV was utilized to reduce the radiation dose to as low as reasonably achievable. COMPARISON: October 15, 2022 CT abdomen and pelvis. CT chest same day.  HISTORY: ORDERING SYSTEM PROVIDED HISTORY: abd pain, gi bleed TECHNOLOGIST PROVIDED HISTORY: abd pain, gi bleed Reason for Exam: abd pain, gi bleed Additional signs and symptoms: Pt states that he has been having abdominal pain with rectal bleeding x 2 days. Today he started vomiting brownish red blood that has been getting brighter as the day/night goes on FINDINGS: CTA ABDOMEN: The liver is diffusely heterogeneous with multiple ill-defined hypodensities which appear new. Gallstone is noted. The solid and hollow viscous otherwise appears normal.  No active GI bleeding or evidence of ischemic bowel. The celiac trunk, SMA, bilateral renal arteries and iliac bifurcation appear normal as well as the abdominal aorta. CTA PELVIS: Normal.     No evidence of active GI hemorrhage. Innumerable hypodensities throughout the liver. This may represent hepatocellular disease. Metastatic disease not excluded. Follow-up MRI may be helpful. Consultations:    Consults:     Final Specialist Recommendations/Findings:   IP CONSULT TO INTERNAL MEDICINE  IP CONSULT TO GI  IP CONSULT TO SOCIAL WORK      The patient was seen and examined on day of discharge and this discharge summary is in conjunction with any daily progress note from day of discharge.     Discharge plan:       Disposition: Home    Physician Follow Up:     Cindi Jacques MD  [address] [facility] [address] [facility] [address] [address]  [phone]    Schedule an appointment as soon as possible for a visit in 1 week(s)      Coy Eubanks MD  [address] [facility] [place] [address]  [address] [place] [address]  [phone]    Schedule an appointment as soon as possible for a visit in 1 week(s)         Requiring Further Evaluation/Follow Up POST HOSPITALIZATION/Incidental Findings:     Diet: regular diet    Activity: As tolerated    Instructions to Patient: Take all medications as advised    Discharge Medications:      Medication List        START taking these medications      analgesic ointment Oint ointment     empagliflozin 10 MG tablet  Commonly known as: Jardiance  Take 1 tablet by mouth daily     furosemide 20 MG tablet  Commonly known as: Lasix  Take 1 tablet by mouth daily            CHANGE how you take these medications      albuterol sulfate  (90 Base) MCG/ACT inhaler  Commonly known as: Ventolin HFA  Inhale 2 puffs into the lungs 4 times daily as needed for Wheezing  What changed: Another medication with the same name was removed. Continue taking this medication, and follow the directions you see here.      pantoprazole 40 MG tablet  Commonly known as: PROTONIX  Take 1 tablet by mouth daily  What changed: medication strength            CONTINUE taking these medications      BP Monitor-Stethoscope Kit  1 each by Does not apply route daily     Centrum Men Tabs     EPINEPHrine 0.3 MG/0.3ML Soaj injection  Commonly known as: EpiPen 2-Rafat  Inject 0.3 mLs into the muscle once for 1 dose Use as directed for allergic reaction     fluticasone 50 MCG/ACT nasal spray  Commonly known as: FLONASE  instill 1 spray into each nostril once daily     folic acid 1 MG tablet  Commonly known as: FOLVITE  Take 1 tablet by mouth daily     Nicotine 21-14-7 MG/24HR Kit     ondansetron 4 MG disintegrating tablet  Commonly known as: ZOFRAN-ODT  Take 1 tablet by mouth 3 times daily as needed for Nausea or Vomiting     vitamin B-1 100 MG tablet  Commonly known as: THIAMINE  Take 1 tablet by mouth daily            STOP taking these medications      losartan 50 MG tablet  Commonly known as: COZAAR     metoprolol succinate 25 MG extended release tablet  Commonly known as: TOPROL XL     naproxen 500 MG tablet  Commonly known as: Naprosyn               Where to Get Your Medications        These medications were sent to [facility] [address], [address] [facility] [address]      Phone: [phone]   empagliflozin 10 MG tablet  pantoprazole 40 MG tablet           Electronically signed by   Sally Strong MD  11/17/2022  4:27 PM      Thank you Dr. Barak Randall primary care provider on file. for the opportunity to be involved in this patient's care. Attending Physician Statement  I have discussed the care of Stanislav Roberts and I have examined the patient myselft and taken ros and hpi , including pertinent history and exam findings,  with the resident. I have reviewed the key elements of all parts of the encounter with the resident. I agree with the assessment, plan and orders as documented by the resident. I spent approx 35 mins in direct patient care as above and discussing discharge with patient, reviewing medications and counseling for discharge .     Electronically signed by Antonia Coy MD

## 2022-11-18 ENCOUNTER — TELEPHONE (OUTPATIENT)
Dept: INTERNAL MEDICINE CLINIC | Age: 37
End: 2022-11-18

## 2022-11-18 LAB
ESTIMATED AVERAGE GLUCOSE: 82 MG/DL
HBA1C MFR BLD: 4.5 % (ref 4–6)

## 2022-11-22 ENCOUNTER — HOSPITAL ENCOUNTER (OUTPATIENT)
Dept: NON INVASIVE DIAGNOSTICS | Age: 37
Discharge: HOME | End: 2022-11-22
Payer: MEDICAID

## 2022-11-22 DIAGNOSIS — I50.22 CHRONIC SYSTOLIC CONGESTIVE HEART FAILURE (HCC): ICD-10-CM

## 2022-11-22 PROCEDURE — 93306 TTE W/DOPPLER COMPLETE: CPT

## 2022-11-29 RX ORDER — PANTOPRAZOLE SODIUM 40 MG/1
40 TABLET, DELAYED RELEASE ORAL DAILY
Qty: 30 TABLET | Refills: 3 | Status: SHIPPED | OUTPATIENT
Start: 2022-11-29

## 2022-11-29 NOTE — RESULT ENCOUNTER NOTE
His heart function has improved. Patient needs to be seen to discuss medications and echocardiogram results. Please make appointment as soon as possible.

## 2022-11-29 NOTE — TELEPHONE ENCOUNTER
Patient transferred from [facility] ([facility]) for nurse triage for red flag of worsening abdominal pain. ECC also stated patient cancelled appointment for today. Spoke with patient who states he had to cancel his appointment for today due to transportation problems. Patient stated he is having symptoms of sore throat, n/v for 3 days. Advised patient to return call if he would like to schedule an appointment when he has transportation. Otherwise, recommended patient report to the  for eval and treatment. Patient did verbalize understanding and agreed. He is also requesting medications be sent to the [facility] on [address] as he was discharged with these and they were sent to the wrong pharmacy. Please add Dx before sending.

## 2022-12-07 ENCOUNTER — OFFICE VISIT (OUTPATIENT)
Dept: INTERNAL MEDICINE CLINIC | Age: 37
End: 2022-12-07
Payer: COMMERCIAL

## 2022-12-07 VITALS
WEIGHT: 187 LBS | SYSTOLIC BLOOD PRESSURE: 110 MMHG | OXYGEN SATURATION: 96 % | HEART RATE: 61 BPM | BODY MASS INDEX: 28.43 KG/M2 | DIASTOLIC BLOOD PRESSURE: 80 MMHG

## 2022-12-07 DIAGNOSIS — K21.9 GASTROESOPHAGEAL REFLUX DISEASE, UNSPECIFIED WHETHER ESOPHAGITIS PRESENT: ICD-10-CM

## 2022-12-07 DIAGNOSIS — I10 ESSENTIAL HYPERTENSION: Primary | ICD-10-CM

## 2022-12-07 DIAGNOSIS — F10.20 UNCOMPLICATED ALCOHOL DEPENDENCE (HCC): ICD-10-CM

## 2022-12-07 DIAGNOSIS — J02.9 PHARYNGITIS, UNSPECIFIED ETIOLOGY: ICD-10-CM

## 2022-12-07 DIAGNOSIS — A41.9 SEPTICEMIA (HCC): ICD-10-CM

## 2022-12-07 DIAGNOSIS — I42.6 ALCOHOLIC CARDIOMYOPATHY (HCC): ICD-10-CM

## 2022-12-07 PROCEDURE — 99214 OFFICE O/P EST MOD 30 MIN: CPT | Performed by: INTERNAL MEDICINE

## 2022-12-07 PROCEDURE — G8419 CALC BMI OUT NRM PARAM NOF/U: HCPCS | Performed by: INTERNAL MEDICINE

## 2022-12-07 PROCEDURE — G8484 FLU IMMUNIZE NO ADMIN: HCPCS | Performed by: INTERNAL MEDICINE

## 2022-12-07 PROCEDURE — G8427 DOCREV CUR MEDS BY ELIG CLIN: HCPCS | Performed by: INTERNAL MEDICINE

## 2022-12-07 PROCEDURE — 4004F PT TOBACCO SCREEN RCVD TLK: CPT | Performed by: INTERNAL MEDICINE

## 2022-12-07 PROCEDURE — 3074F SYST BP LT 130 MM HG: CPT | Performed by: INTERNAL MEDICINE

## 2022-12-07 PROCEDURE — 3078F DIAST BP <80 MM HG: CPT | Performed by: INTERNAL MEDICINE

## 2022-12-07 RX ORDER — PANTOPRAZOLE SODIUM 40 MG/1
40 TABLET, DELAYED RELEASE ORAL DAILY
Qty: 30 TABLET | Refills: 3 | Status: SHIPPED | OUTPATIENT
Start: 2022-12-07

## 2022-12-07 RX ORDER — AZITHROMYCIN 250 MG/1
250 TABLET, FILM COATED ORAL SEE ADMIN INSTRUCTIONS
Qty: 6 TABLET | Refills: 0 | Status: SHIPPED | OUTPATIENT
Start: 2022-12-07 | End: 2022-12-12

## 2022-12-07 RX ORDER — SUCRALFATE 1 G/1
1 TABLET ORAL 4 TIMES DAILY
Qty: 120 TABLET | Refills: 3 | Status: SHIPPED | OUTPATIENT
Start: 2022-12-07

## 2022-12-07 NOTE — PROGRESS NOTES
[address] [address]  Dept: [phone]  Dept Fax: [phone]    Office Progress/Follow Up Note  Date of patient's visit: 12/7/2022  Patient's Name:  Iftikhar Ratliff YOB: 1985            Patient Care Team:  Tuyet Rodriguez MD as PCP - General (Internal Medicine)  Tuyet Rodriguez MD as PCP - [facility] Empaneled Provider    REASON FOR VISIT: Routine outpatient follow up/  HISTORY OF PRESENT ILLNESS:      Chief Complaint   Patient presents with    Check-Up     Sore throat and cough for 2 weeks   Patient came for follow-up  Patient has history of hypertension  Had EGD in October found to have esophageal varices secondary to alcoholic liver disease  Chronically elevated liver enzymes  Has variceal ligation    Patient had echocardiogram done in 2020 which showed 30 to 40% ejection fraction  Had cardiac cath which showed normal coronaries    Echocardiogram repeated  Ejection fraction was significantly improved  No sign of volume overload present  Echocardiogram results discussed with the patient    Patient continues to have some nausea but stated slightly better  Also complaining of cough from last 2 weeks cough is dry  Also having some sore throat  No fever chills  Patient also states that when he lays down his cough get worse  Also having some burning sensation in the chest  Patient already on Protonix  Carafate added  Patient advised to make appointment with GI  Patient voiced understanding    History of hypertension  Patient states that he does check his blood pressure at home  Get better sugars too low he gets dizzy and lightheaded  Lowest blood pressure recorded 100/80  Highest 170  Patient states that his blood pressure runs high      Recent labs reviewed and discussed with the patient.       Patient Active Problem List   Diagnosis    Acute pulmonary embolism (HCC)    ACS (acute coronary syndrome) (HCC)    Chronic systolic congestive heart failure ([facility]    Essential hypertension    Uncomplicated alcohol dependence ([facility]    Alcoholic cardiomyopathy (HCC)    Diarrhea due to malabsorption    Heartburn    Generalized anxiety disorder    Hypomagnesemia    Hypokalemia    Transaminitis    Alcoholic hepatitis without ascites    Macrocytic anemia    Left lower lobe pneumonia    Sepsis (HCC)    Melena    Acute blood loss anemia    Acute liver failure without hepatic coma    Leukocytosis    Septicemia (HCC)    Right upper quadrant abdominal pain    HCAP (healthcare-associated pneumonia)    Iron overload    GI bleed    Hematemesis with nausea       Health Maintenance Due   Topic Date Due    Hepatitis A vaccine (1 of 2 - Risk 2-dose series) Never done    Varicella vaccine (1 of 2 - 2-dose childhood series) Never done    Pneumococcal 0-64 years Vaccine (1 - PCV) Never done    HIV screen  Never done    Hepatitis B vaccine (1 of 3 - Risk 3-dose series) Never done    DTaP/Tdap/Td vaccine (1 - Tdap) Never done    COVID-19 Vaccine (3 - Booster for Moderna series) 06/17/2021    Flu vaccine (1) Never done       Allergies   Allergen Reactions    Bee Venom Anaphylaxis         MEDICATIONS:     Current Outpatient Medications   Medication Sig Dispense Refill    empagliflozin (JARDIANCE) 10 MG tablet Take 1 tablet by mouth daily 30 tablet 3    pantoprazole (PROTONIX) 40 MG tablet Take 1 tablet by mouth daily 30 tablet 3    losartan (COZAAR) 50 MG tablet Take 1 tablet by mouth daily 30 tablet 5    nadolol (CORGARD) 20 MG tablet Take 1 tablet by mouth daily 30 tablet 0    ondansetron (ZOFRAN-ODT) 4 MG disintegrating tablet Take 1 tablet by mouth 3 times daily as needed for Nausea or Vomiting 21 tablet 0    furosemide (LASIX) 20 MG tablet Take 1 tablet by mouth daily 7 tablet 0    albuterol sulfate HFA (VENTOLIN HFA) 108 (90 Base) MCG/ACT inhaler Inhale 2 puffs into the lungs 4 times daily as needed for Wheezing 18 g 0    folic acid (FOLVITE) 1 MG tablet Take 1 tablet by mouth daily 30 tablet 3    vitamin B-1 (THIAMINE) 100 MG tablet Take 1 tablet by mouth daily 60 tablet 1    Multiple Vitamins-Minerals (CENTRUM MEN) TABS Take 1 tablet by mouth daily      Menthol-Methyl Salicylate (ANALGESIC OINTMENT) OINT ointment Apply topically as needed Indications: on back      fluticasone (FLONASE) 50 MCG/ACT nasal spray instill 1 spray into each nostril once daily 16 g 1    Blood Pressure Monitoring (BP MONITOR-STETHOSCOPE) KIT 1 each by Does not apply route daily 1 kit 0    Nicotine 21-14-7 MG/24HR KIT Place 14 mg onto the skin nightly       EPINEPHrine (EPIPEN 2-LIZETTE) 0.3 MG/0.3ML SOAJ injection Inject 0.3 mLs into the muscle once for 1 dose Use as directed for allergic reaction 2 each 0     No current facility-administered medications for this visit. SOCIAL HISTORY    Reviewed and no change from previous record. [facility]  reports that he has been smoking cigarettes. He has a 21.00 pack-year smoking history. He has never used smokeless tobacco.    FAMILY HISTORY:    Reviewed and No change from previous visit  family history includes Heart Attack in his mother; Heart Disease in his father and mother.     OF SYSTEMS:    General : Negative for fatigue, weight loss, appetite change  HEENT : Positive for congestion and cough  Respiratory : Negative for shortness of breath cough, congestion, wheezing  Cardiovascular: Negative for chest pain, palpitations, shortness of breath  GI: Negative for abdominal pain, \positive for nausea  Genitourinary : negative for dysuria, urinary frequency, urinary urgency, hematuria  Neurological : Negative for headache, dizziness, gait change,   Psych : Negative for depression, anxiety  Skin: negative musculoskeletal : Negative for joint pain, muscle pain      PHYSICAL EXAM:      Vitals:    12/07/22 1250   BP: 110/80   Site: Right Upper Arm   Position: Sitting   Pulse: 61   SpO2: 96%   Weight: 187 lb (84.8 kg)     BP Readings from Last 3 Encounters:   12/07/22 110/80 11/15/22 118/70   11/01/22 125/86        Physical Exam  HENT:      Head: Normocephalic. Nose: Nose normal.      Mouth/Throat:      Mouth: Mucous membranes are moist.   Eyes:      Pupils: Pupils are equal, round, and reactive to light. Cardiovascular:      Rate and Rhythm: Normal rate and regular rhythm. Pulses: Normal pulses. Heart sounds: Normal heart sounds. No murmur heard. Pulmonary:      Effort: Pulmonary effort is normal. No respiratory distress. Breath sounds: Normal breath sounds. Abdominal:      General: There is no distension. Palpations: Abdomen is soft. Tenderness: There is no abdominal tenderness. Musculoskeletal:         General: Normal range of motion. Cervical back: Normal range of motion. Right lower leg: No edema. Left lower leg: No edema. Skin:     General: Skin is warm. Capillary Refill: Capillary refill takes less than 2 seconds. Neurological:      General: No focal deficit present. Mental Status: He is alert and oriented to person, place, and time. Lab Results   Component Value Date    LABA1C 4.5 11/17/2022     Lab Results   Component Value Date    EAG 82 11/17/2022      LABORATORY FINDINGS:    CBC:  Lab Results   Component Value Date/Time    WBC 7.6 11/17/2022 10:00 AM    HGB 9.6 11/17/2022 10:00 AM     11/17/2022 10:00 AM       BMP:    Lab Results   Component Value Date/Time     11/01/2022 03:22 AM    K 3.7 11/01/2022 03:22 AM     11/01/2022 03:22 AM    CO2 23 11/01/2022 03:22 AM    BUN 8 11/01/2022 03:22 AM    CREATININE 0.61 11/01/2022 03:22 AM    GLUCOSE 115 11/01/2022 03:22 AM       HEMOGLOBIN A1C:   Lab Results   Component Value Date/Time    LABA1C 4.5 11/17/2022 10:00 AM       FASTING LIPID PANEL:No results found for: CHOL, HDL, TRIG    ASSESSMENT AND PLAN:      1.  Essential hypertension  Blood pressure is controlled patient was told to check blood pressure daily  Continue losartan and Adderall    2. Alcoholic cardiomyopathy ([facility]  Has improved patient has quit drinking  Recent echocardiogram result discussed with patient    3. Septicemia ([facility]      4. [facility]complicated [facility] dependence [facility]  Patient has quit alcohol    5. Pharyngitis, unspecified etiology    - azithromycin (ZITHROMAX) 250 MG tablet; Take 1 tablet by mouth See Admin Instructions for 5 days 500mg on day 1 followed by 250mg on days 2 - 5  Dispense: 6 tablet; Refill: 0    6. Gastroesophageal reflux disease, unspecified whether esophagitis present    - pantoprazole (PROTONIX) 40 MG tablet; Take 1 tablet by mouth daily  Dispense: 30 tablet; Refill: 3  - sucralfate (CARAFATE) 1 GM tablet; Take 1 tablet by mouth 4 times daily  Dispense: 120 tablet; Refill: 3    Continues to have intermittent nausea , and symptoms of GERD  On Protonix  Carafate added  Will DC Jardiance  Her ejection fraction is significantly improved  Patient advised to make appointment with GI for repeat scope. Patient with understanding. FOLLOW UP AND INSTRUCTIONS:   No follow-ups on file. Ida Sharp received counseling on the following healthy behaviors: tobacco cessation    Discussed use, benefit, and side effects of prescribed medications. Barriers to medication compliance addressed. All patient questions answered. Pt voiced understanding. Patient given educational materials - see patient instructions    MD DINESH BassCarondelet Health  12/7/2022, 1:04 PM    Please note that this chart was generated using voice recognition Dragon dictation software. Although every effort was made to ensure the accuracy of this automatedtranscription, some errors in transcription may have occurred.

## 2022-12-07 NOTE — PROGRESS NOTES
Visit Information    Have you changed or started any medications since your last visit including any over-the-counter medicines, vitamins, or herbal medicines? no   Are you having any side effects from any of your medications? -  no  Have you stopped taking any of your medications? Is so, why? -  no    Have you seen any other physician or provider since your last visit? No  Have you had any other diagnostic tests since your last visit? No  Have you been seen in the emergency room and/or had an admission to a hospital since we last saw you? No  Have you had your routine dental cleaning in the past 6 months? no    Have you activated your [employer] account? If not, what are your barriers?  Yes     Patient Care Team:  Mattie Diehl MD as PCP - General (Internal Medicine)  Mattie Diehl MD as PCP - [facility]    Medical History Review  Past Medical, Family, and Social History reviewed and does contribute to the patient presenting condition    Health Maintenance   Topic Date Due    Hepatitis A vaccine (1 of 2 - Risk 2-dose series) Never done    Varicella vaccine (1 of 2 - 2-dose childhood series) Never done    Pneumococcal 0-64 years Vaccine (1 - PCV) Never done    HIV screen  Never done    Hepatitis B vaccine (1 of 3 - Risk 3-dose series) Never done    DTaP/Tdap/Td vaccine (1 - Tdap) Never done    COVID-19 Vaccine (3 - Booster for Moderna series) 06/17/2021    Flu vaccine (1) Never done    Depression Screen  11/15/2023    Diabetes screen  11/17/2025    Hepatitis C screen  Completed    Hib vaccine  Aged Out    Meningococcal (ACWY) vaccine  Aged Out

## 2022-12-21 RX ORDER — NADOLOL 20 MG/1
TABLET ORAL
Qty: 30 TABLET | Refills: 0 | OUTPATIENT
Start: 2022-12-21

## 2022-12-27 ENCOUNTER — OFFICE VISIT (OUTPATIENT)
Dept: INTERNAL MEDICINE CLINIC | Age: 37
End: 2022-12-27
Payer: COMMERCIAL

## 2022-12-27 VITALS
HEIGHT: 68 IN | SYSTOLIC BLOOD PRESSURE: 120 MMHG | WEIGHT: 194 LBS | OXYGEN SATURATION: 98 % | HEART RATE: 75 BPM | BODY MASS INDEX: 29.4 KG/M2 | TEMPERATURE: 98.2 F | DIASTOLIC BLOOD PRESSURE: 76 MMHG

## 2022-12-27 DIAGNOSIS — G47.00 INSOMNIA, UNSPECIFIED TYPE: ICD-10-CM

## 2022-12-27 DIAGNOSIS — F41.9 ANXIETY: ICD-10-CM

## 2022-12-27 DIAGNOSIS — K92.0 HEMATEMESIS WITH NAUSEA: ICD-10-CM

## 2022-12-27 DIAGNOSIS — I10 ESSENTIAL HYPERTENSION: Primary | ICD-10-CM

## 2022-12-27 DIAGNOSIS — I42.6 ALCOHOLIC CARDIOMYOPATHY (HCC): ICD-10-CM

## 2022-12-27 PROBLEM — I26.99 ACUTE PULMONARY EMBOLISM (HCC): Status: RESOLVED | Noted: 2020-05-16 | Resolved: 2022-12-27

## 2022-12-27 PROBLEM — I50.22 CHRONIC SYSTOLIC CONGESTIVE HEART FAILURE (HCC): Status: RESOLVED | Noted: 2020-05-20 | Resolved: 2022-12-27

## 2022-12-27 PROCEDURE — G8484 FLU IMMUNIZE NO ADMIN: HCPCS | Performed by: INTERNAL MEDICINE

## 2022-12-27 PROCEDURE — 4004F PT TOBACCO SCREEN RCVD TLK: CPT | Performed by: INTERNAL MEDICINE

## 2022-12-27 PROCEDURE — 3078F DIAST BP <80 MM HG: CPT | Performed by: INTERNAL MEDICINE

## 2022-12-27 PROCEDURE — G8419 CALC BMI OUT NRM PARAM NOF/U: HCPCS | Performed by: INTERNAL MEDICINE

## 2022-12-27 PROCEDURE — 3074F SYST BP LT 130 MM HG: CPT | Performed by: INTERNAL MEDICINE

## 2022-12-27 PROCEDURE — G8427 DOCREV CUR MEDS BY ELIG CLIN: HCPCS | Performed by: INTERNAL MEDICINE

## 2022-12-27 PROCEDURE — 99214 OFFICE O/P EST MOD 30 MIN: CPT | Performed by: INTERNAL MEDICINE

## 2022-12-27 RX ORDER — TRAZODONE HYDROCHLORIDE 50 MG/1
50 TABLET ORAL NIGHTLY
Qty: 30 TABLET | Refills: 0 | Status: SHIPPED | OUTPATIENT
Start: 2022-12-27

## 2022-12-27 RX ORDER — NADOLOL 20 MG/1
20 TABLET ORAL DAILY
Qty: 30 TABLET | Refills: 0 | Status: SHIPPED | OUTPATIENT
Start: 2022-12-27 | End: 2022-12-27 | Stop reason: SDUPTHER

## 2022-12-27 RX ORDER — FLUTICASONE PROPIONATE 50 MCG
1 SPRAY, SUSPENSION (ML) NASAL DAILY
Qty: 32 G | Refills: 1 | Status: SHIPPED | OUTPATIENT
Start: 2022-12-27

## 2022-12-27 RX ORDER — NADOLOL 20 MG/1
20 TABLET ORAL DAILY
Qty: 30 TABLET | Refills: 0 | Status: SHIPPED | OUTPATIENT
Start: 2022-12-27

## 2022-12-27 RX ORDER — ONDANSETRON 4 MG/1
4 TABLET, ORALLY DISINTEGRATING ORAL 3 TIMES DAILY PRN
Qty: 21 TABLET | Refills: 0 | Status: SHIPPED | OUTPATIENT
Start: 2022-12-27

## 2022-12-27 ASSESSMENT — PATIENT HEALTH QUESTIONNAIRE - PHQ9
SUM OF ALL RESPONSES TO PHQ QUESTIONS 1-9: 0
SUM OF ALL RESPONSES TO PHQ9 QUESTIONS 1 & 2: 0
1. LITTLE INTEREST OR PLEASURE IN DOING THINGS: 0
SUM OF ALL RESPONSES TO PHQ QUESTIONS 1-9: 0
SUM OF ALL RESPONSES TO PHQ QUESTIONS 1-9: 0
2. FEELING DOWN, DEPRESSED OR HOPELESS: 0
SUM OF ALL RESPONSES TO PHQ QUESTIONS 1-9: 0

## 2022-12-27 NOTE — PROGRESS NOTES
[address] [address]  Dept: [phone]  Dept Fax: [phone]     Name: Sade Giles  : 1985           Chief Complaint   Patient presents with    Medication Refill    Hypertension    Nausea       History of Present Illness:    HPI  \Patient came for follow-up  Overall doing better  Blood pressure is controlled  Denies any headache dizziness chest pain shortness of breath    Complaining of insomnia  Tried up to 20 of melatonin no help  Also very anxious  No suicidal homicidal ideations  Discussed trazodone with side effects  Patient was understanding    Stop drinking alcohol    Continues to have nausea and reflux symptoms  Carafate was ordered last visit but patient has not received it yet  Has appointment with GI next month.   Past Medical History:   Diagnosis Date    Alcoholism ([address]     pt drinks a fifth of whiskey daily    Anemia     Cirrhosis, alcoholic ([facility]     Pulmonary embolism (HCC)       Reviewed all health maintenance requirements and ordered appropriate tests  Health Maintenance Due   Topic Date Due    Hepatitis A vaccine (1 of 2 - Risk 2-dose series) Never done    Varicella vaccine (1 of 2 - 2-dose childhood series) Never done    Pneumococcal 0-64 years Vaccine (1 - PCV) Never done    HIV screen  Never done    Hepatitis B vaccine (1 of 3 - Risk 3-dose series) Never done    DTaP/Tdap/Td vaccine (1 - Tdap) Never done    COVID-19 Vaccine (3 - Booster for Moderna series) 2021    Flu vaccine (1) Never done       Past Surgical History:    Past Surgical History:   Procedure Laterality Date    UPPER GASTROINTESTINAL ENDOSCOPY N/A 2022    EGD ESOPHAGOGASTRODUODENOSCOPY performed by Zakiya Tadeo MD at [address] [place] [address] N/A 10/30/2022    EGD BAND LIGATION performed by Mike Drummond MD at [facility]        Medications:      Current Outpatient Medications:     nadolol (CORGARD) 20 MG tablet, Take 1 tablet by mouth daily, Disp: 30 tablet, Rfl: 0    pantoprazole (PROTONIX) 40 MG tablet, Take 1 tablet by mouth daily, Disp: 30 tablet, Rfl: 3    sucralfate (CARAFATE) 1 GM tablet, Take 1 tablet by mouth 4 times daily, Disp: 120 tablet, Rfl: 3    losartan (COZAAR) 50 MG tablet, Take 1 tablet by mouth daily, Disp: 30 tablet, Rfl: 5    ondansetron (ZOFRAN-ODT) 4 MG disintegrating tablet, Take 1 tablet by mouth 3 times daily as needed for Nausea or Vomiting, Disp: 21 tablet, Rfl: 0    albuterol sulfate HFA (VENTOLIN HFA) 108 (90 Base) MCG/ACT inhaler, Inhale 2 puffs into the lungs 4 times daily as needed for Wheezing, Disp: 18 g, Rfl: 0    folic acid (FOLVITE) 1 MG tablet, Take 1 tablet by mouth daily, Disp: 30 tablet, Rfl: 3    vitamin B-1 (THIAMINE) 100 MG tablet, Take 1 tablet by mouth daily, Disp: 60 tablet, Rfl: 1    Multiple Vitamins-Minerals (CENTRUM MEN) TABS, Take 1 tablet by mouth daily, Disp: , Rfl:     Menthol-Methyl Salicylate (ANALGESIC OINTMENT) OINT ointment, Apply topically as needed Indications: on back, Disp: , Rfl:     fluticasone (FLONASE) 50 MCG/ACT nasal spray, instill 1 spray into each nostril once daily, Disp: 16 g, Rfl: 1    Blood Pressure Monitoring (BP MONITOR-STETHOSCOPE) KIT, 1 each by Does not apply route daily, Disp: 1 kit, Rfl: 0    Nicotine 21-14-7 MG/24HR KIT, Place 14 mg onto the skin nightly , Disp: , Rfl:     EPINEPHrine (EPIPEN 2-LIZETTE) 0.3 MG/0.3ML SOAJ injection, Inject 0.3 mLs into the muscle once for 1 dose Use as directed for allergic reaction, Disp: 2 each, Rfl: 0    Allergies:      Bee venom    Social History:    Tobacco:    reports that he has been smoking cigarettes. He has a 21.00 pack-year smoking history. He has never used smokeless tobacco.  Alcohol:      reports that he does not currently use alcohol. Drug Use:  reports that he does not currently use drugs.     Family History:    Family History   Problem Relation Age of Onset    Heart Disease Mother     Heart Attack Mother     Heart Disease Father        Review of Systems:    Positive and Negative as described in HPI    Constitutional:  negative for  fevers, chills, sweats, fatigue, and weight loss  HEENT:  negative for vision or hearing changes,   Respiratory:  negative for shortness of breath, cough, or congestion  Cardiovascular:  negative for  chest pain, palpitations  Gastrointestinal: Nausea genitourinary:  negative for frequency, dysuria  Integument/Breast:  negative for rash, skin lesions  Musculoskeletal:  negative for muscle aches or joint pain  Neurological:  negative for headaches, dizziness, lightheadedness, numbness, pain and tingling extrimities  Behavior/Psych: Positive for anxiety and insomnia positive for    Physical Exam:    Vitals:  /76 (Site: Left Upper Arm, Position: Sitting, Cuff Size: Medium Adult)   Pulse 75   Temp 98.2 °F (36.8 °C) (Temporal)   Ht 5' 8\" (1.727 m)   Wt 194 lb (88 kg)   SpO2 98%   BMI 29.50 kg/m²     General appearance - alert, well appearing, and in no acute distress  Mental status - oriented to person, place, and time with normal affect  Head - normocephalic and atraumatic  Eyes - pupils equal and reactive, extraocular eye movements intact, conjunctiva clear  Ears - hearing appears to be intact  Nose - no drainage noted  Mouth - mucous membranes moist  Neck - supple, no carotid bruits, thyroid not palpable  Chest - clear to auscultation, normal effort  Heart - normal rate, regular rhythm, no murmurs  Abdomen - soft, nontender, nondistended, bowel sounds present all four quadrants, no masses, hepatomegaly or splenomegaly  Neurological - normal speech, no focal findings or movement disorder noted, cranial nerves II through XII grossly intact  Extremities - peripheral pulses palpable, no pedal edema or calf pain with palpation  Skin - no gross lesions, rashes, or induration noted      Data:    Lab Results   Component Value Date/Time     11/01/2022 03:22 AM    K 3.7 11/01/2022 03:22 AM     11/01/2022 03:22 AM    CO2 23 11/01/2022 03:22 AM    BUN 8 11/01/2022 03:22 AM    CREATININE 0.61 11/01/2022 03:22 AM    GLUCOSE 115 11/01/2022 03:22 AM    PROT 6.8 11/01/2022 03:22 AM    LABALBU 3.2 11/01/2022 03:22 AM    BILITOT 2.3 11/01/2022 03:22 AM    ALKPHOS 129 11/01/2022 03:22 AM    AST 84 11/01/2022 03:22 AM    ALT 27 11/01/2022 03:22 AM     Lab Results   Component Value Date/Time    WBC 7.6 11/17/2022 10:00 AM    RBC 2.88 11/17/2022 10:00 AM    HGB 9.6 11/17/2022 10:00 AM    HCT 30.5 11/17/2022 10:00 AM    .9 11/17/2022 10:00 AM    MCH 33.3 11/17/2022 10:00 AM    MCHC 31.5 11/17/2022 10:00 AM    RDW 16.8 11/17/2022 10:00 AM     11/17/2022 10:00 AM    MPV 10.6 11/17/2022 10:00 AM     Lab Results   Component Value Date/Time    TSH 3.42 10/15/2022 12:36 AM     Lab Results   Component Value Date/Time    LABA1C 4.5 11/17/2022 10:00 AM          Assessment & Plan:     Diagnosis Orders   1. Essential hypertension  nadolol (CORGARD) 20 MG tablet      2. Alcoholic cardiomyopathy (HCC)  ondansetron (ZOFRAN-ODT) 4 MG disintegrating tablet      3. Hematemesis with nausea  ondansetron (ZOFRAN-ODT) 4 MG disintegrating tablet      4. Anxiety  traZODone (DESYREL) 50 MG tablet      5. Insomnia, unspecified type  traZODone (DESYREL) 50 MG tablet          1. Essential hypertension  -     nadolol (CORGARD) 20 MG tablet; Take 1 tablet by mouth daily, Disp-30 tablet, R-0Normal  2. Alcoholic cardiomyopathy (HCC)  -     ondansetron (ZOFRAN-ODT) 4 MG disintegrating tablet; Take 1 tablet by mouth 3 times daily as needed for Nausea or Vomiting, Disp-21 tablet, R-0Normal  3. Hematemesis with nausea  -     ondansetron (ZOFRAN-ODT) 4 MG disintegrating tablet; Take 1 tablet by mouth 3 times daily as needed for Nausea or Vomiting, Disp-21 tablet, R-0Normal  4. Anxiety  -     traZODone (DESYREL) 50 MG tablet; Take 1 tablet by mouth nightly, Disp-30 tablet, R-0Normal  5.  Insomnia, unspecified type  -     traZODone (DESYREL) 50 MG tablet; Take 1 tablet by mouth nightly, Disp-30 tablet, R-0Normal         Trazodone prescribed for anxiety insomnia  Side effect explained  Patient was understanding  Blood pressure controlled today  Patient has appointment with GI next month        Completed Refills   Requested Prescriptions     Pending Prescriptions Disp Refills    ondansetron (ZOFRAN-ODT) 4 MG disintegrating tablet 21 tablet 0     Sig: Take 1 tablet by mouth 3 times daily as needed for Nausea or Vomiting    nadolol (CORGARD) 20 MG tablet 30 tablet 0     Sig: Take 1 tablet by mouth daily     No follow-ups on file. No orders of the defined types were placed in this encounter. No orders of the defined types were placed in this encounter.       Electronically signed by Hero Mendoza MD on 12/27/2022 at 2:16 PM

## 2022-12-27 NOTE — PROGRESS NOTES
Visit Information    Have you changed or started any medications since your last visit including any over-the-counter medicines, vitamins, or herbal medicines? no   Have you stopped taking any of your medications? Is so, why? -  no  Are you having any side effects from any of your medications? - no    Have you seen any other physician or provider since your last visit?  no   Have you had any other diagnostic tests since your last visit?  no   Have you been seen in the emergency room and/or had an admission in a hospital since we last saw you?  no   Have you had your routine dental cleaning in the past 6 months?  no     Do you have an active MyChart account? If no, what is the barrier?   Yes    Patient Care Team:  Lakia Thomas MD as PCP - General (Internal Medicine)  Lakia Thomas MD as PCP - [facility]    Medical History Review  Past Medical, Family, and Social History reviewed and does contribute to the patient presenting condition    Health Maintenance   Topic Date Due    Hepatitis A vaccine (1 of 2 - Risk 2-dose series) Never done    Varicella vaccine (1 of 2 - 2-dose childhood series) Never done    Pneumococcal 0-64 years Vaccine (1 - PCV) Never done    HIV screen  Never done    Hepatitis B vaccine (1 of 3 - Risk 3-dose series) Never done    DTaP/Tdap/Td vaccine (1 - Tdap) Never done    COVID-19 Vaccine (3 - Booster for Moderna series) 06/17/2021    Flu vaccine (1) Never done    Depression Screen  11/15/2023    Diabetes screen  11/17/2025    Hepatitis C screen  Completed    Hib vaccine  Aged Out    Meningococcal (ACWY) vaccine  Aged Out

## 2023-02-01 DIAGNOSIS — I10 ESSENTIAL HYPERTENSION: ICD-10-CM

## 2023-02-01 RX ORDER — NADOLOL 20 MG/1
20 TABLET ORAL DAILY
Qty: 30 TABLET | Refills: 0 | Status: SHIPPED | OUTPATIENT
Start: 2023-02-01

## 2023-02-03 ENCOUNTER — APPOINTMENT (OUTPATIENT)
Dept: GENERAL RADIOLOGY | Age: 38
End: 2023-02-03
Payer: COMMERCIAL

## 2023-02-03 ENCOUNTER — HOSPITAL ENCOUNTER (EMERGENCY)
Age: 38
Discharge: HOME | End: 2023-02-03
Attending: EMERGENCY MEDICINE
Payer: COMMERCIAL

## 2023-02-03 VITALS
WEIGHT: 190 LBS | TEMPERATURE: 98.1 F | RESPIRATION RATE: 14 BRPM | DIASTOLIC BLOOD PRESSURE: 72 MMHG | BODY MASS INDEX: 28.79 KG/M2 | HEART RATE: 80 BPM | SYSTOLIC BLOOD PRESSURE: 121 MMHG | HEIGHT: 68 IN | OXYGEN SATURATION: 97 %

## 2023-02-03 DIAGNOSIS — S63.91XA SPRAIN OF RIGHT HAND, INITIAL ENCOUNTER: ICD-10-CM

## 2023-02-03 DIAGNOSIS — S63.501A SPRAIN OF RIGHT WRIST, INITIAL ENCOUNTER: Primary | ICD-10-CM

## 2023-02-03 PROCEDURE — 99283 EMERGENCY DEPT VISIT LOW MDM: CPT

## 2023-02-03 PROCEDURE — 73130 X-RAY EXAM OF HAND: CPT

## 2023-02-03 PROCEDURE — 73110 X-RAY EXAM OF WRIST: CPT

## 2023-02-03 ASSESSMENT — PAIN SCALES - GENERAL: PAINLEVEL_OUTOF10: 7

## 2023-02-03 ASSESSMENT — PAIN - FUNCTIONAL ASSESSMENT: PAIN_FUNCTIONAL_ASSESSMENT: 0-10

## 2023-02-03 NOTE — ED PROVIDER NOTES
[address]  eMERGENCY dEPARTMENT eNCOUnter      Pt Name: Radha Chery  MRN: [id number]  [place] 1985  Date of evaluation: 2/3/2023  Provider: Jesusita Hale PA-C    CHIEF COMPLAINT       Chief Complaint   Patient presents with    Wrist Injury     right           HISTORY OF PRESENT ILLNESS  (Location/Symptom, Timing/Onset, Context/Setting, Quality, Duration, Modifying Factors, Severity.)   Radha Chery is a 41 y.o. male who presents to the emergency department for evaluation of right hand and wrist pain. Pt states he tripped over a baby gate last night and fell landing onto right hand. Reports pain over the ulnar side of hand and wrist.  Endorses some tingling in the 4th, 5th fingers. No other complaints. Nursing Notes were reviewed. REVIEW OF SYSTEMS    (2-9 systems for level 4, 10 or more for level 5)     Review of Systems   Fall  Hand pain  Wrist pain   Tingling      Except as noted above the remainder of the review of systems was reviewed and negative.        PAST MEDICAL HISTORY     Past Medical History:   Diagnosis Date    Alcoholism ([address]     pt drinks a fifth of whiskey daily    Anemia     Cirrhosis, alcoholic ([address]     Pulmonary embolism ([address]      None otherwise stated in nurses notes    SURGICAL HISTORY       Past Surgical History:   Procedure Laterality Date    UPPER GASTROINTESTINAL ENDOSCOPY N/A 5/1/2022    EGD ESOPHAGOGASTRODUODENOSCOPY performed by Ainsley Johnson MD at [address] [place] [address] 10/30/2022    EGD BAND LIGATION performed by Evelia Quiroz MD at [facility]     None otherwise stated in nurses notes    CURRENT MEDICATIONS       Previous Medications    ALBUTEROL SULFATE HFA (VENTOLIN HFA) 108 (90 BASE) MCG/ACT INHALER    Inhale 2 puffs into the lungs 4 times daily as needed for Wheezing    BLOOD PRESSURE MONITORING (BP MONITOR-STETHOSCOPE) KIT    1 each by Does not apply route daily    EPINEPHRINE (EPIPEN 2-LIZETTE) 0.3 MG/0.3ML SOAJ INJECTION    Inject 0.3 mLs into the muscle once for 1 dose Use as directed for allergic reaction    FLUTICASONE (FLONASE) 50 MCG/ACT NASAL SPRAY    instill 1 spray into each nostril once daily    FLUTICASONE (FLONASE) 50 MCG/ACT NASAL SPRAY    1 spray by Each Nostril route daily    FOLIC ACID (FOLVITE) 1 MG TABLET    Take 1 tablet by mouth daily    LOSARTAN (COZAAR) 50 MG TABLET    Take 1 tablet by mouth daily    MENTHOL-METHYL SALICYLATE (ANALGESIC OINTMENT) OINT OINTMENT    Apply topically as needed Indications: on back    MULTIPLE VITAMINS-MINERALS (CENTRUM MEN) TABS    Take 1 tablet by mouth daily    NADOLOL (CORGARD) 20 MG TABLET    Take 1 tablet by mouth daily    NICOTINE 21-14-7 MG/24HR KIT    Place 14 mg onto the skin nightly     ONDANSETRON (ZOFRAN-ODT) 4 MG DISINTEGRATING TABLET    Take 1 tablet by mouth 3 times daily as needed for Nausea or Vomiting    PANTOPRAZOLE (PROTONIX) 40 MG TABLET    Take 1 tablet by mouth daily    SUCRALFATE (CARAFATE) 1 GM TABLET    Take 1 tablet by mouth 4 times daily    TRAZODONE (DESYREL) 50 MG TABLET    Take 1 tablet by mouth nightly    VITAMIN B-1 (THIAMINE) 100 MG TABLET    Take 1 tablet by mouth daily       ALLERGIES     Bee venom    FAMILY HISTORY           Problem Relation Age of Onset    Heart Disease Mother     Heart Attack Mother     Heart Disease Father      Family Status   Relation Name Status    Mother      Father  Alive      None otherwise stated in nurses notes    SOCIAL HISTORY      reports that he has been smoking cigarettes. He has a 21.00 pack-year smoking history. He has never used smokeless tobacco. He reports that he does not currently use alcohol. He reports that he does not currently use drugs.    lives at home with others     PHYSICAL EXAM    (up to 7 for level 4, 8 or more for level 5)     ED Triage Vitals [23 1712]   BP Temp Temp src Heart Rate Resp SpO2 Height Weight   121/72 98.1 °F (36.7 °C) -- 80 14 97 % 5' 8\" (1.727 m) 190 lb (86.2 kg)       Physical Exam   Nursing note and vitals reviewed. Constitutional: Oriented to person, place, and time and well-developed, well-nourished. Head: Normocephalic and atraumatic. Ear: External ears normal.   Nose: Nose normal and midline. Eyes: Conjunctivae and EOM are normal. Pupils are equal, round, and reactive to light. Neck: Normal range of motion. Neck supple. Cardiovascular: Normal rate, regular rhythm, normal heart sounds and intact distal pulses. Pulmonary/Chest: Effort normal and breath sounds normal. No respiratory distress. No wheezes. No rales. No chest tenderness. Musculoskeletal: examination of right arm reveals no visible deformities, bruising, swelling, abrasions, erythema. Tenderness over the  ulnar side of the wrist.  Pain over the 5th metacarpal.  No snuff box tenderness. FROM. 5/5 strength. Brisk cap refill. Distal sensation intact. 2/2 radial pulse. Neurological: Alert and oriented to person, place, and time. GCS score is 15. Skin: Skin is warm and dry. No rash noted. No erythema. No pallor. Psychiatric: Mood, memory, affect and judgment normal.           DIAGNOSTIC RESULTS     EKG: All EKG's are interpreted by the Emergency Department Physician who either signs or Co-signs this chart in the absence of a cardiologist.        RADIOLOGY:   All plain film, CT, MRI, and formal ultrasound images (except ED bedside ultrasound) are read by the radiologist, see reports below, unless otherwise noted in MDM or here. XR WRIST RIGHT (MIN 3 VIEWS)   Preliminary Result   No acute osseous abnormality identified in the hand or wrist.         XR HAND RIGHT (MIN 3 VIEWS)   Preliminary Result   No acute osseous abnormality identified in the hand or wrist.             No results found. LABS:  Labs Reviewed - No data to display    All other labs were within normal range or not returned as of this dictation.     EMERGENCY DEPARTMENT COURSE and DIFFERENTIAL DIAGNOSIS/MDM:   Vitals:    Vitals:    02/03/23 1712   BP: 121/72   Pulse: 80   Resp: 14   Temp: 98.1 °F (36.7 °C)   SpO2: 97%   Weight: 190 lb (86.2 kg)   Height: 5' 8\" (1.727 m)         Patient instructed to return to the emergency room if symptoms worsen, return, or any other concern right away which is agreed by the patient    ED MEDS:  No orders of the defined types were placed in this encounter. CONSULTS:  None    PROCEDURES:  None      FINAL IMPRESSION      1. Sprain of right wrist, initial encounter    2. Sprain of right hand, initial encounter          DISPOSITION/PLAN   DISPOSITION Decision To Discharge    PATIENT REFERRED TO:  Sherrie Su MD  [address] [facility] [address]  [address] [address]  [address] [place] [address] [phone] [id number] [phone]    Call       DISCHARGE MEDICATIONS:  New Prescriptions    No medications on file         Summation      Patient Course:    Tenderness over the wrist and hand. Fall last night. Pain is over ulnar aspect. PMS intact. Xrays are unremarkable. Suspect sprain. Discussed results and plan with the pt. They expressed appropriate understanding. Pt given close follow up, supportive care instructions and strict return instructions at the bedside. The care is provided during an unprecedented national emergency due to the novel coronavirus, COVID-19. ED Medications administered this visit:  Medications - No data to display    New Prescriptions from this visit:    New Prescriptions    No medications on file       Follow-up:  Sherrie Su MD  [address] [place] [address]  [address]  [place] [address]  [address] [place] [address] [phone]  [phone]    Call         Final Impression:   1.  Sprain of right wrist, initial encounter    2.  Sprain of right hand, initial encounter               (Please note that portions of this note were completed with a voice recognition program )        TERENCE Roberts PA-C  02/03/23 1918

## 2023-02-03 NOTE — ED TRIAGE NOTES
Mode of arrival (squad #, walk in, police, etc) : walk-in        Chief complaint(s): right wrist injury        Arrival Note (brief scenario, treatment PTA, etc).: Pt reports that last night he tripped and fell landing on right hand, c/o right wrist pain.

## 2023-02-05 ENCOUNTER — HOSPITAL ENCOUNTER (EMERGENCY)
Age: 38
Discharge: HOME | End: 2023-02-06
Attending: EMERGENCY MEDICINE
Payer: COMMERCIAL

## 2023-02-05 ENCOUNTER — APPOINTMENT (OUTPATIENT)
Dept: GENERAL RADIOLOGY | Age: 38
End: 2023-02-05
Payer: COMMERCIAL

## 2023-02-05 DIAGNOSIS — R55 SYNCOPE AND COLLAPSE: ICD-10-CM

## 2023-02-05 DIAGNOSIS — D69.6 THROMBOCYTOPENIA (HCC): ICD-10-CM

## 2023-02-05 DIAGNOSIS — K70.30 ALCOHOLIC CIRRHOSIS, UNSPECIFIED WHETHER ASCITES PRESENT (HCC): ICD-10-CM

## 2023-02-05 DIAGNOSIS — K70.10 ALCOHOLIC HEPATITIS WITHOUT ASCITES: Primary | ICD-10-CM

## 2023-02-05 LAB
ALBUMIN SERPL-MCNC: 3.5 G/DL (ref 3.5–5.2)
ALP SERPL-CCNC: 400 U/L (ref 40–129)
ALT SERPL-CCNC: 36 U/L (ref 5–41)
ANION GAP SERPL CALCULATED.3IONS-SCNC: 11 MMOL/L (ref 9–17)
AST SERPL-CCNC: 207 U/L
BILIRUB SERPL-MCNC: 3 MG/DL (ref 0.3–1.2)
BUN SERPL-MCNC: 13 MG/DL (ref 6–20)
CALCIUM SERPL-MCNC: 8.3 MG/DL (ref 8.6–10.4)
CHLORIDE SERPL-SCNC: 106 MMOL/L (ref 98–107)
CO2 SERPL-SCNC: 24 MMOL/L (ref 20–31)
CREAT SERPL-MCNC: 0.88 MG/DL (ref 0.7–1.2)
GFR SERPL CREATININE-BSD FRML MDRD: >60 ML/MIN/1.73M2
GLUCOSE SERPL-MCNC: 98 MG/DL (ref 70–99)
MAGNESIUM SERPL-MCNC: 2 MG/DL (ref 1.6–2.6)
POTASSIUM SERPL-SCNC: 3.9 MMOL/L (ref 3.7–5.3)
PROT SERPL-MCNC: 8.9 G/DL (ref 6.4–8.3)
SODIUM SERPL-SCNC: 141 MMOL/L (ref 135–144)
TROPONIN I SERPL DL<=0.01 NG/ML-MCNC: 11 NG/L (ref 0–22)

## 2023-02-05 PROCEDURE — 36415 COLL VENOUS BLD VENIPUNCTURE: CPT

## 2023-02-05 PROCEDURE — 71045 X-RAY EXAM CHEST 1 VIEW: CPT

## 2023-02-05 PROCEDURE — 99285 EMERGENCY DEPT VISIT HI MDM: CPT

## 2023-02-05 PROCEDURE — 80053 COMPREHEN METABOLIC PANEL: CPT

## 2023-02-05 PROCEDURE — 85025 COMPLETE CBC W/AUTO DIFF WBC: CPT

## 2023-02-05 PROCEDURE — 83690 ASSAY OF LIPASE: CPT

## 2023-02-05 PROCEDURE — 93005 ELECTROCARDIOGRAM TRACING: CPT | Performed by: EMERGENCY MEDICINE

## 2023-02-05 PROCEDURE — 83735 ASSAY OF MAGNESIUM: CPT

## 2023-02-05 PROCEDURE — 84484 ASSAY OF TROPONIN QUANT: CPT

## 2023-02-05 ASSESSMENT — PAIN SCALES - GENERAL: PAINLEVEL_OUTOF10: 6

## 2023-02-05 ASSESSMENT — PAIN - FUNCTIONAL ASSESSMENT: PAIN_FUNCTIONAL_ASSESSMENT: 0-10

## 2023-02-05 ASSESSMENT — PAIN DESCRIPTION - LOCATION: LOCATION: CHEST

## 2023-02-05 ASSESSMENT — PAIN DESCRIPTION - FREQUENCY: FREQUENCY: CONTINUOUS

## 2023-02-05 ASSESSMENT — ENCOUNTER SYMPTOMS
ABDOMINAL PAIN: 0
CONSTIPATION: 1
VOMITING: 1
NAUSEA: 1

## 2023-02-05 ASSESSMENT — LIFESTYLE VARIABLES
HOW MANY STANDARD DRINKS CONTAINING ALCOHOL DO YOU HAVE ON A TYPICAL DAY: 10 OR MORE
HOW OFTEN DO YOU HAVE A DRINK CONTAINING ALCOHOL: 4 OR MORE TIMES A WEEK

## 2023-02-05 ASSESSMENT — PAIN DESCRIPTION - DESCRIPTORS: DESCRIPTORS: ACHING

## 2023-02-05 ASSESSMENT — PAIN DESCRIPTION - PAIN TYPE: TYPE: ACUTE PAIN

## 2023-02-06 VITALS
BODY MASS INDEX: 28.79 KG/M2 | HEIGHT: 68 IN | HEART RATE: 59 BPM | TEMPERATURE: 97.7 F | SYSTOLIC BLOOD PRESSURE: 103 MMHG | WEIGHT: 190 LBS | RESPIRATION RATE: 16 BRPM | DIASTOLIC BLOOD PRESSURE: 66 MMHG | OXYGEN SATURATION: 94 %

## 2023-02-06 LAB
ABSOLUTE EOS #: 0.06 K/UL (ref 0–0.4)
ABSOLUTE LYMPH #: 3.17 K/UL (ref 1–4.8)
ABSOLUTE MONO #: 0.68 K/UL (ref 0.1–1.3)
BASOPHILS # BLD: 1 % (ref 0–2)
BASOPHILS ABSOLUTE: 0.06 K/UL (ref 0–0.2)
EKG ATRIAL RATE: 62 BPM
EKG P AXIS: 53 DEGREES
EKG P-R INTERVAL: 148 MS
EKG Q-T INTERVAL: 414 MS
EKG QRS DURATION: 104 MS
EKG QTC CALCULATION (BAZETT): 420 MS
EKG R AXIS: 53 DEGREES
EKG T AXIS: 1 DEGREES
EKG VENTRICULAR RATE: 62 BPM
EOSINOPHILS RELATIVE PERCENT: 1 % (ref 0–4)
HCT VFR BLD AUTO: 28.3 % (ref 41–53)
HGB BLD-MCNC: 9.1 G/DL (ref 13.5–17.5)
LIPASE SERPL-CCNC: 81 U/L (ref 13–60)
LYMPHOCYTES # BLD: 51 % (ref 24–44)
MCH RBC QN AUTO: 29.1 PG (ref 26–34)
MCHC RBC AUTO-ENTMCNC: 32.3 G/DL (ref 31–37)
MCV RBC AUTO: 90.1 FL (ref 80–100)
MONOCYTES # BLD: 11 % (ref 1–7)
MORPHOLOGY: ABNORMAL
MORPHOLOGY: ABNORMAL
PDW BLD-RTO: 22.3 % (ref 11.5–14.9)
PLATELET # BLD AUTO: 49 K/UL (ref 150–450)
PMV BLD AUTO: 9.5 FL (ref 6–12)
RBC # BLD: 3.14 M/UL (ref 4.5–5.9)
SEG NEUTROPHILS: 36 % (ref 36–66)
SEGMENTED NEUTROPHILS ABSOLUTE COUNT: 2.23 K/UL (ref 1.3–9.1)
TROPONIN I SERPL DL<=0.01 NG/ML-MCNC: 11 NG/L (ref 0–22)
WBC # BLD AUTO: 6.2 K/UL (ref 3.5–11)

## 2023-02-06 PROCEDURE — 84484 ASSAY OF TROPONIN QUANT: CPT

## 2023-02-06 PROCEDURE — 6370000000 HC RX 637 (ALT 250 FOR IP): Performed by: EMERGENCY MEDICINE

## 2023-02-06 PROCEDURE — 36415 COLL VENOUS BLD VENIPUNCTURE: CPT

## 2023-02-06 PROCEDURE — 6360000002 HC RX W HCPCS: Performed by: EMERGENCY MEDICINE

## 2023-02-06 PROCEDURE — 2580000003 HC RX 258: Performed by: EMERGENCY MEDICINE

## 2023-02-06 RX ORDER — 0.9 % SODIUM CHLORIDE 0.9 %
1000 INTRAVENOUS SOLUTION INTRAVENOUS ONCE
Status: COMPLETED | OUTPATIENT
Start: 2023-02-06 | End: 2023-02-06

## 2023-02-06 RX ORDER — ONDANSETRON 2 MG/ML
4 INJECTION INTRAMUSCULAR; INTRAVENOUS ONCE
Status: COMPLETED | OUTPATIENT
Start: 2023-02-06 | End: 2023-02-06

## 2023-02-06 RX ORDER — LORAZEPAM 1 MG/1
1 TABLET ORAL ONCE
Status: COMPLETED | OUTPATIENT
Start: 2023-02-06 | End: 2023-02-06

## 2023-02-06 RX ADMIN — ONDANSETRON 4 MG: 2 INJECTION INTRAMUSCULAR; INTRAVENOUS at 00:50

## 2023-02-06 RX ADMIN — SODIUM CHLORIDE 1000 ML: 9 INJECTION, SOLUTION INTRAVENOUS at 00:50

## 2023-02-06 RX ADMIN — LORAZEPAM 1 MG: 1 TABLET ORAL at 00:53

## 2023-02-06 ASSESSMENT — ENCOUNTER SYMPTOMS: COUGH: 0

## 2023-02-06 NOTE — ED PROVIDER NOTES
[address]  EMERGENCY DEPARTMENT ENCOUNTER      Pt Name: Silvio Saavedra  MRN: [id number]  [place] 1985  Date of evaluation: 2/5/23      CHIEF COMPLAINT       Chief Complaint   Patient presents with    Loss of Consciousness     HISTORY OF PRESENT ILLNESS   HPI 41 y.o. male presents with c/o a loss of consciousness. The patient reports that he is a chronic alcoholic. He says that 2 to 3 days ago he started to get upper abdominal pain and right upper quadrant abdominal pain nausea vomiting. He has been throwing up for several days. He been feeling very weak and tired. Today when he tried to get up from his recliner he felt lightheaded and dizzy went into his bedroom and he collapsed onto his floor. He thinks he hit the back of his head. He has a moderate headache. He reports that is having left sided cp. That has been present for the last two days. Pain is pressure like in character. Constant for the last few days. Reviewing the medical record he has known alcoholic cirrhosis. He does continue to drink. He denies any blood in his stool. REVIEW OF SYSTEMS       Review of Systems   Constitutional:  Negative for fever. HENT:  Negative for congestion. Eyes:  Negative for visual disturbance. Respiratory:  Negative for cough. Cardiovascular:  Positive for chest pain. Gastrointestinal:  Positive for constipation, nausea and vomiting. Negative for abdominal pain. Genitourinary:  Negative for difficulty urinating. Musculoskeletal:  Negative for neck pain. Skin:  Negative for rash. Neurological:  Positive for headaches. Psychiatric/Behavioral:  Negative for confusion.       PAST MEDICAL HISTORY     Past Medical History:   Diagnosis Date    Alcoholism ([address]     pt drinks a fifth of whiskey daily    Anemia     Cirrhosis, alcoholic ([address]     Pulmonary embolism (HCC)        SURGICAL HISTORY       Past Surgical History:   Procedure Laterality Date    UPPER GASTROINTESTINAL ENDOSCOPY N/A 5/1/2022    EGD ESOPHAGOGASTRODUODENOSCOPY performed by Marybeth Phillips MD at [facility] 10/30/2022    EGD BAND LIGATION performed by Sola Contreras MD at [address] [place] [address]       Discharge Medication List as of 2/6/2023  2:52 AM        CONTINUE these medications which have NOT CHANGED    Details   nadolol (CORGARD) 20 MG tablet Take 1 tablet by mouth daily, Disp-30 tablet, R-0Normal      ondansetron (ZOFRAN-ODT) 4 MG disintegrating tablet Take 1 tablet by mouth 3 times daily as needed for Nausea or Vomiting, Disp-21 tablet, R-0Normal      !! fluticasone (FLONASE) 50 MCG/ACT nasal spray 1 spray by Each Nostril route daily, Disp-32 g, R-1Normal      traZODone (DESYREL) 50 MG tablet Take 1 tablet by mouth nightly, Disp-30 tablet, R-0Normal      pantoprazole (PROTONIX) 40 MG tablet Take 1 tablet by mouth daily, Disp-30 tablet, R-3Normal      sucralfate (CARAFATE) 1 GM tablet Take 1 tablet by mouth 4 times daily, Disp-120 tablet, R-3Normal      losartan (COZAAR) 50 MG tablet Take 1 tablet by mouth daily, Disp-30 tablet, R-5Normal      albuterol sulfate HFA (VENTOLIN HFA) 108 (90 Base) MCG/ACT inhaler Inhale 2 puffs into the lungs 4 times daily as needed for Wheezing, Disp-18 g, [id number]      folic acid (FOLVITE) 1 MG tablet Take 1 tablet by mouth daily, Disp-30 tablet, R-3Normal      vitamin B-1 (THIAMINE) 100 MG tablet Take 1 tablet by mouth daily, Disp-60 tablet, R-1Normal      Multiple Vitamins-Minerals (CENTRUM MEN) TABS Take 1 tablet by mouth dailyHistorical Med      Menthol-Methyl Salicylate (ANALGESIC OINTMENT) OINT ointment Apply topically as needed Indications: on backHistorical Med      !! fluticasone (FLONASE) 50 MCG/ACT nasal spray instill 1 spray into each nostril once daily, Disp-16 g, R-1Normal      Blood Pressure Monitoring (BP MONITOR-STETHOSCOPE) KIT DAILY Starting Wed 4/7/2021, Disp-1 kit, R-0, Normal      Nicotine 21-14-7 MG/24HR KIT Place 14 mg onto the skin nightly Historical Med      EPINEPHrine (EPIPEN 2-LIZETTE) 0.3 MG/0.3ML SOAJ injection Inject 0.3 mLs into the muscle once for 1 dose Use as directed for allergic reaction, Disp-2 each, R-0Print       !! - Potential duplicate medications found. Please discuss with provider. ALLERGIES     is allergic to bee venom. FAMILY HISTORY     He indicated that his mother is . He indicated that his father is alive. SOCIAL HISTORY      reports that he has been smoking cigarettes. He has a 21.00 pack-year smoking history. He has never used smokeless tobacco. He reports that he does not currently use alcohol. He reports that he does not currently use drugs. PHYSICAL EXAM     INITIAL VITALS: /66   Pulse 59   Temp 97.7 °F (36.5 °C) (Oral)   Resp 16   Ht 5' 8\" (1.727 m)   Wt 190 lb (86.2 kg)   SpO2 94%   BMI 28.89 kg/m²   Gen: nad  Head: Normocephalic, atraumatic  Eye: Pupils equal round reactive to light, no conjunctivitis  ENT: MMM  Neck: no JVD  Heart: Regular rate and rhythm no murmurs  Lungs: Clear to auscultation bilaterally, no respiratory distress  Chest wall: No crepitus, no tenderness palpation  Abdomen: Soft, tenderness in the right upper quadrant in the epigastric area. , nondistended, with no peritoneal signs  Neurologic: Patient is alert and oriented x3, motor and sensation is intact in all 4 extremities, fluent speech  Extremities: no edema    MEDICAL DECISION MAKING:     This is a 81-year-old gentleman presenting with upper abdominal pain nausea vomiting and syncopal episode. Differential diagnosis is anemia related to cirrhosis possible GI bleeding, head injury intracranial hemorrhage, cardiac arrhythmia, hyponatremia electrolyte abnormalities, seizure, renal failure, hepatitis, pancreatitis. IV placed imaging and laboratory EKG studies ordered. Patient started on IV fluid hydration.   Laboratory studies show normal white blood cell count, hemoglobin stable at 9. Patient does have a thrombocytopenia at 52, and this is likely secondary to his liver disease. The patient has elevations in his AST and his bilirubin which is also been chronic for him. No sign of pancreatitis. Chest x-ray unremarkable. No troponin elevation or EKG abnormalities to suspect acute coronary syndrome. Pt reassessed, and remains in no distress. Nausea and vomtiing resolved. No more syncopal or presyncopal symptoms. Pt wife at bedise and discussed importance of alcohol cessation, his liver disease, and outpatient follow up. Recommended close follow up with pcp, return to ED if symptoms worsen, and warning precautions provided. EKG shows a sinus rhythm. HR is 62, , , , no JONATHAN, No STD, TWI, the axis is normal.      DATA FOR LAB AND RADIOLOGY TESTS ORDERED BELOW ARE REVIEWED BY THE ED CLINICIAN:    RADIOLOGY: All x-rays, CT, MRI, and formal ultrasound images (except ED bedside ultrasound) are read by the radiologist, see reports below, unless otherwise noted in MDM or here. Reports below are reviewed by myself. XR CHEST PORTABLE   Final Result   No acute pulmonary findings             LABS: Lab orders shown below, the results are reviewed by myself, and all abnormals are listed below.   Labs Reviewed   CBC WITH AUTO DIFFERENTIAL - Abnormal; Notable for the following components:       Result Value    RBC 3.14 (*)     Hemoglobin 9.1 (*)     Hematocrit 28.3 (*)     RDW 22.3 (*)     Platelets 49 (*)     Lymphocytes 51 (*)     Monocytes 11 (*)     All other components within normal limits   COMPREHENSIVE METABOLIC PANEL - Abnormal; Notable for the following components:    Calcium 8.3 (*)     Alkaline Phosphatase 400 (*)      (*)     Total Bilirubin 3.0 (*)     Total Protein 8.9 (*)     All other components within normal limits   LIPASE - Abnormal; Notable for the following components:    Lipase 81 (*)     All other components within normal limits   TROPONIN   TROPONIN   MAGNESIUM       Vitals Reviewed:    Vitals:    02/06/23 0100 02/06/23 0154 02/06/23 0159 02/06/23 0214   BP:  91/65 111/75 103/66   Pulse: 63 74 99 59   Resp:  17 29 16   Temp:       TempSrc:       SpO2:  96% 94% 94%   Weight:       Height:         MEDICATIONS GIVEN TO PATIENT THIS ENCOUNTER:  Orders Placed This Encounter   Medications    0.9 % sodium chloride bolus    ondansetron (ZOFRAN) injection 4 mg    LORazepam (ATIVAN) tablet 1 mg     DISCHARGE PRESCRIPTIONS:  Discharge Medication List as of 2/6/2023  2:52 AM        PHYSICIAN CONSULTS ORDERED THIS ENCOUNTER:  None     FINAL IMPRESSION      1. Alcoholic hepatitis without ascites    2. Syncope and collapse    3. Alcoholic cirrhosis, unspecified whether ascites present ([facility]    4.  Thrombocytopenia [facility])          DISPOSITION/PLAN   DISPOSITION Decision To Discharge 02/06/2023 02:39:25 AM      PATIENT REFERRED TO:  Roxanna Mcgraw MD  [place] [address]  [phone]    In 2 days      [address] ED  [address]  [address] [phone]    If symptoms worsen    DISCHARGE MEDICATIONS:  Discharge Medication List as of 2/6/2023  2:52 AM            Kelle Durán MD  Attending Emergency Physician                      Kelle Durán MD  02/06/23 2618       Kelle Durán MD  02/06/23 6248

## 2023-02-06 NOTE — ED NOTES
Called lab- [facility] to use the blood from previous blood drawn for lipase.      Kyler Johnson RN  89/41/32 [phone]

## 2023-02-06 NOTE — ED TRIAGE NOTES
Mode of arrival (squad #, walk in, police, etc) : [place] [employer]         Chief complaint(s): Syncope        Arrival Note (brief scenario, treatment PTA, etc). : Pt states he passed out at home. Pt was alert when EMS arrived. PT complains of: tingling in all extremities including lt side of face, chest pressure, nausea, body aches and lump in breast.        C= \"Have you ever felt that you should Cut down on your drinking? \"  no  A= \"Have people Annoyed you by criticizing your drinking? \"  yes  G= \"Have you ever felt bad or Guilty about your drinking? \"  yes  E= \"Have you ever had a drink as an Eye-opener first thing in the morning to steady your nerves or to help a hangover? \"  yes      Deferred []      Reason for deferring:     *If yes to two or more: probable alcohol abuse. *

## 2023-02-14 ENCOUNTER — OFFICE VISIT (OUTPATIENT)
Dept: INTERNAL MEDICINE CLINIC | Age: 38
End: 2023-02-14
Payer: COMMERCIAL

## 2023-02-14 VITALS — SYSTOLIC BLOOD PRESSURE: 110 MMHG | BODY MASS INDEX: 28.43 KG/M2 | WEIGHT: 187 LBS | DIASTOLIC BLOOD PRESSURE: 68 MMHG

## 2023-02-14 DIAGNOSIS — I10 ESSENTIAL HYPERTENSION: ICD-10-CM

## 2023-02-14 DIAGNOSIS — D69.6 THROMBOCYTOPENIA (HCC): ICD-10-CM

## 2023-02-14 DIAGNOSIS — K70.30 ALCOHOLIC CIRRHOSIS OF LIVER WITHOUT ASCITES (HCC): ICD-10-CM

## 2023-02-14 DIAGNOSIS — R11.0 NAUSEA: ICD-10-CM

## 2023-02-14 DIAGNOSIS — F32.A DEPRESSION, UNSPECIFIED DEPRESSION TYPE: Primary | ICD-10-CM

## 2023-02-14 PROCEDURE — G8484 FLU IMMUNIZE NO ADMIN: HCPCS | Performed by: INTERNAL MEDICINE

## 2023-02-14 PROCEDURE — G8419 CALC BMI OUT NRM PARAM NOF/U: HCPCS | Performed by: INTERNAL MEDICINE

## 2023-02-14 PROCEDURE — 4004F PT TOBACCO SCREEN RCVD TLK: CPT | Performed by: INTERNAL MEDICINE

## 2023-02-14 PROCEDURE — 3078F DIAST BP <80 MM HG: CPT | Performed by: INTERNAL MEDICINE

## 2023-02-14 PROCEDURE — G8427 DOCREV CUR MEDS BY ELIG CLIN: HCPCS | Performed by: INTERNAL MEDICINE

## 2023-02-14 PROCEDURE — 3074F SYST BP LT 130 MM HG: CPT | Performed by: INTERNAL MEDICINE

## 2023-02-14 PROCEDURE — 99214 OFFICE O/P EST MOD 30 MIN: CPT | Performed by: INTERNAL MEDICINE

## 2023-02-14 RX ORDER — METOCLOPRAMIDE 5 MG/1
5 TABLET ORAL 3 TIMES DAILY PRN
Qty: 120 TABLET | Refills: 3 | Status: ON HOLD | OUTPATIENT
Start: 2023-02-14

## 2023-02-14 RX ORDER — VENLAFAXINE HYDROCHLORIDE 37.5 MG/1
37.5 CAPSULE, EXTENDED RELEASE ORAL DAILY
Qty: 30 CAPSULE | Refills: 3 | Status: ON HOLD | OUTPATIENT
Start: 2023-02-14

## 2023-02-14 SDOH — ECONOMIC STABILITY: HOUSING INSECURITY
IN THE LAST 12 MONTHS, WAS THERE A TIME WHEN YOU DID NOT HAVE A STEADY PLACE TO SLEEP OR SLEPT IN A SHELTER (INCLUDING NOW)?: NO

## 2023-02-14 SDOH — ECONOMIC STABILITY: FOOD INSECURITY: WITHIN THE PAST 12 MONTHS, THE FOOD YOU BOUGHT JUST DIDN'T LAST AND YOU DIDN'T HAVE MONEY TO GET MORE.: NEVER TRUE

## 2023-02-14 SDOH — ECONOMIC STABILITY: FOOD INSECURITY: WITHIN THE PAST 12 MONTHS, YOU WORRIED THAT YOUR FOOD WOULD RUN OUT BEFORE YOU GOT MONEY TO BUY MORE.: NEVER TRUE

## 2023-02-14 SDOH — ECONOMIC STABILITY: INCOME INSECURITY: HOW HARD IS IT FOR YOU TO PAY FOR THE VERY BASICS LIKE FOOD, HOUSING, MEDICAL CARE, AND HEATING?: NOT HARD AT ALL

## 2023-02-14 ASSESSMENT — PATIENT HEALTH QUESTIONNAIRE - PHQ9
2. FEELING DOWN, DEPRESSED OR HOPELESS: 0
SUM OF ALL RESPONSES TO PHQ QUESTIONS 1-9: 0
SUM OF ALL RESPONSES TO PHQ9 QUESTIONS 1 & 2: 0
SUM OF ALL RESPONSES TO PHQ QUESTIONS 1-9: 0
SUM OF ALL RESPONSES TO PHQ QUESTIONS 1-9: 0
1. LITTLE INTEREST OR PLEASURE IN DOING THINGS: 0
SUM OF ALL RESPONSES TO PHQ QUESTIONS 1-9: 0

## 2023-02-14 NOTE — PROGRESS NOTES
Visit Information    Have you changed or started any medications since your last visit including any over-the-counter medicines, vitamins, or herbal medicines? no   Are you having any side effects from any of your medications? -  no  Have you stopped taking any of your medications? Is so, why? -  no    Have you seen any other physician or provider since your last visit? No  Have you had any other diagnostic tests since your last visit? No  Have you been seen in the emergency room and/or had an admission to a hospital since we last saw you? Yes - Records Obtained  Have you had your routine dental cleaning in the past 6 months? no    Have you activated your [employer] [place] account? If not, what are your barriers?  Yes     Patient Care Team:  Ranjan Puente MD as PCP - General (Internal Medicine)  Ranjan Puente MD as PCP - Empaneled Provider    Medical History Review  Past Medical, Family, and Social History reviewed and does not contribute to the patient presenting condition    Health Maintenance   Topic Date Due    Hepatitis A vaccine (1 of 2 - Risk 2-dose series) Never done    Varicella vaccine (1 of 2 - 2-dose childhood series) Never done    Pneumococcal 0-64 years Vaccine (1 - PCV) Never done    HIV screen  Never done    Hepatitis B vaccine (1 of 3 - Risk 3-dose series) Never done    DTaP/Tdap/Td vaccine (1 - Tdap) Never done    COVID-19 Vaccine (3 - Booster for Moderna series) 06/17/2021    Flu vaccine (1) Never done    Depression Screen  12/27/2023    Hepatitis C screen  Completed    Hib vaccine  Aged Out    Meningococcal (ACWY) vaccine  Aged Out

## 2023-02-14 NOTE — PROGRESS NOTES
[address] [address]  Dept: [phone]  Dept Fax: [phone]    Office Progress/Follow Up Note  Date of patient's visit: 2/14/2023  Patient's Name:  Niles Valentine YOB: 1985            Patient Care Team:  Edita Hirsch MD as PCP - General (Internal Medicine)  Edita Hirsch MD as PCP - Empaneled Provider    REASON FOR VISIT: Routine outpatient follow up/Same day visit/Post hospital/ED visit    HISTORY OF PRESENT ILLNESS:      Chief Complaint   Patient presents with    Hypertension     ER follow up     Cough     Patient has been having a cough for the last week         History was obtained from the patient. Niles Luongr is a 41 y.o. is here for a   Post ER follow-up  Was recently in ER with syncopal episode  Has been complaining of nausea vomiting  Started drinking alcohol again  Stated that he was very depressed and has been under a lot of stressful situations  Started with felt of alcohol every day states that now he is cutting down drinking 124 on soft beer  Drinking to prevent withdrawal  Does get withdrawal symptoms  Motivated to quit drinking    Recent labs reviewed at ER  Patient found to be thrombocytopenic with platelet count of 49  No active bleeding present does have small bruises near shoulder and on abdomen  Continues to complain of nausea especially in the morning  Has sour taste in the mouth cough  Nausea is almost constant  Denies any abdominal pain currently no chest pain shortness of breath  States that he has been checking his blood pressure and its been running low lately  Patient already cut down losartan to half  Will DC losartan discussed with the patient.   Was given trazodone last visit but did not like it so stopped taking it  Continues to be very depressed  Started on Effexor      Patient Active Problem List   Diagnosis    ACS (acute coronary syndrome) ([facility]    Essential hypertension    Uncomplicated alcohol dependence (HCC)    Alcoholic cardiomyopathy ([facility]    Diarrhea due to malabsorption    Heartburn    Generalized anxiety disorder    Hypomagnesemia    Hypokalemia    Transaminitis    Alcoholic hepatitis without ascites    Macrocytic anemia    Left lower lobe pneumonia    Sepsis ([facility]    Melena    Acute blood loss anemia    Acute liver failure without hepatic coma    Leukocytosis    Septicemia (HCC)    Right upper quadrant abdominal pain    HCAP (healthcare-associated pneumonia)    Iron overload    GI bleed    Hematemesis with nausea       Health Maintenance Due   Topic Date Due    Hepatitis A vaccine (1 of 2 - Risk 2-dose series) Never done    Varicella vaccine (1 of 2 - 2-dose childhood series) Never done    Pneumococcal 0-64 years Vaccine (1 - PCV) Never done    HIV screen  Never done    Hepatitis B vaccine (1 of 3 - Risk 3-dose series) Never done    DTaP/Tdap/Td vaccine (1 - Tdap) Never done    COVID-19 Vaccine (3 - Booster for Moderna series) 06/17/2021    Flu vaccine (1) Never done       Allergies   Allergen Reactions    Bee Venom Anaphylaxis         MEDICATIONS:     Current Outpatient Medications   Medication Sig Dispense Refill    nadolol (CORGARD) 20 MG tablet Take 1 tablet by mouth daily 30 tablet 0    ondansetron (ZOFRAN-ODT) 4 MG disintegrating tablet Take 1 tablet by mouth 3 times daily as needed for Nausea or Vomiting 21 tablet 0    fluticasone (FLONASE) 50 MCG/ACT nasal spray 1 spray by Each Nostril route daily 32 g 1    traZODone (DESYREL) 50 MG tablet Take 1 tablet by mouth nightly 30 tablet 0    pantoprazole (PROTONIX) 40 MG tablet Take 1 tablet by mouth daily 30 tablet 3    sucralfate (CARAFATE) 1 GM tablet Take 1 tablet by mouth 4 times daily 120 tablet 3    losartan (COZAAR) 50 MG tablet Take 1 tablet by mouth daily 30 tablet 5    albuterol sulfate HFA (VENTOLIN HFA) 108 (90 Base) MCG/ACT inhaler Inhale 2 puffs into the lungs 4 times daily as needed for Wheezing 18 g 0    folic acid (FOLVITE) 1 MG tablet Take 1 tablet by mouth daily 30 tablet 3    vitamin B-1 (THIAMINE) 100 MG tablet Take 1 tablet by mouth daily 60 tablet 1    Multiple Vitamins-Minerals (CENTRUM MEN) TABS Take 1 tablet by mouth daily      Menthol-Methyl Salicylate (ANALGESIC OINTMENT) OINT ointment Apply topically as needed Indications: on back      fluticasone (FLONASE) 50 MCG/ACT nasal spray instill 1 spray into each nostril once daily 16 g 1    Blood Pressure Monitoring (BP MONITOR-STETHOSCOPE) KIT 1 each by Does not apply route daily 1 kit 0    Nicotine 21-14-7 MG/24HR KIT Place 14 mg onto the skin nightly       EPINEPHrine (EPIPEN 2-LIZETTE) 0.3 MG/0.3ML SOAJ injection Inject 0.3 mLs into the muscle once for 1 dose Use as directed for allergic reaction 2 each 0     No current facility-administered medications for this visit. SOCIAL HISTORY    Reviewed and no change from previous record. Joaquín Diaz  reports that he has been smoking cigarettes. He has a 21.00 pack-year smoking history. He has never used smokeless tobacco.    FAMILY HISTORY:    Reviewed and No change from previous visit  family history includes Heart Attack in his mother; Heart Disease in his father and mother.     OF SYSTEMS:    General : Negative for fatigue, weight loss, appetite change  HEENT : Negative for nasal congestion, sneezing, runny nose, sinus pain  Respiratory : Negative for shortness of breath cough, congestion, wheezing  Cardiovascular: Negative for chest pain, palpitations, shortness of breath  GI: Positive for nausea, vomiting genitourinary : negative for dysuria, urinary frequency, urinary urgency, hematuria  Neurological : Negative for headache, dizziness, gait change,   Psych : Negative for depression, anxiety  Skin: Positive for bruises musculoskeletal : Negative for joint pain, muscle pain      PHYSICAL EXAM:      Vitals:    02/14/23 1121   BP: 110/68   Site: Left Upper Arm   Weight: 187 lb (84.8 kg)     BP Readings from Last 3 Encounters:   02/14/23 110/68   02/06/23 103/66   02/03/23 121/72        Physical Exam   Physical exam  General : Patient alert awake in no acute distress  HEENT : Atraumatic, normocephalic , oral mucosa membrane moist,  Eyes : Extraocular movements intact  Neck : Supple, range of motion intact,  Cardiovascular : Regular rate and rhythm, no murmur appreciated  Respiratory : Bilateral clear breath sounds, no wheezing crackles appreciated  Gastrointestinal  : Abdomen soft, nontender, bowel sounds present  Extremities : No pedal edema clubbing or cyanosis present  Skin : Warm and dry, no skin lesions appreciated  Psych : Mood normal  Neurological no focal motor neurological deficit present. Lab Results   Component Value Date    LABA1C 4.5 11/17/2022     Lab Results   Component Value Date    EAG 82 11/17/2022      LABORATORY FINDINGS:    CBC:  Lab Results   Component Value Date/Time    WBC 6.2 02/05/2023 11:25 PM    HGB 9.1 02/05/2023 11:25 PM    PLT 49 02/05/2023 11:25 PM       BMP:    Lab Results   Component Value Date/Time     02/05/2023 11:25 PM    K 3.9 02/05/2023 11:25 PM     02/05/2023 11:25 PM    CO2 24 02/05/2023 11:25 PM    BUN 13 02/05/2023 11:25 PM    CREATININE 0.88 02/05/2023 11:25 PM    GLUCOSE 98 02/05/2023 11:25 PM       HEMOGLOBIN A1C:   Lab Results   Component Value Date/Time    LABA1C 4.5 11/17/2022 10:00 AM       FASTING LIPID PANEL:No results found for: CHOL, HDL, TRIG    ASSESSMENT AND PLAN:      Diagnoses and all orders for this visit:  Depression, unspecified depression type  Essential hypertension  Thrombocytopenia (HCC)  Alcoholic cirrhosis of liver without ascites ([facility] [address]  -     CBC with Auto Differential; Future  -     Comprehensive Metabolic Panel; Future  -     Protime-INR; Future  Nausea  Other orders  -     metoclopramide (REGLAN) 5 MG tablet; Take 1 tablet by mouth 3 times daily as needed (nausea)  -     venlafaxine (EFFEXOR XR) 37.5 MG extended release capsule;  Take 1 capsule by mouth daily       Will give trial of Reglan for persistent nausea  Patient strongly advised to quit alcohol intake  Patient planning to join AA  Will discontinue losartan blood pressure on the borderline lower side  Reglan side effect explained including extrapyramidal  Patient voiced understanding  We will check CBC for thrombocytopenia does have bruises no active bleeding present  Repeat CMP get INR and CBC. Follow-up in 2 weeks for  FOLLOW UP AND INSTRUCTIONS:   Return in about 2 weeks (around 2/28/2023). John Perez received counseling on the following healthy behaviors: exercise    Discussed use, benefit, and side effects of prescribed medications. Barriers to medication compliance addressed. All patient questions answered. Pt voiced understanding. Patient given educational materials - see patient instructions    Hipolito Honeycutt MD  [facility]  2/14/2023, 11:24 AM    Please note that this chart was generated using voice recognition Dragon dictation software. Although every effort was made to ensure the accuracy of this automatedtranscription, some errors in transcription may have occurred.

## 2023-02-18 ENCOUNTER — HOSPITAL ENCOUNTER (INPATIENT)
Age: 38
LOS: 4 days | Discharge: PSYCHIATRIC OR SUBSTANCE USE FACILITY | DRG: 280 | End: 2023-02-22
Attending: EMERGENCY MEDICINE | Admitting: INTERNAL MEDICINE
Payer: COMMERCIAL

## 2023-02-18 ENCOUNTER — APPOINTMENT (OUTPATIENT)
Dept: GENERAL RADIOLOGY | Age: 38
DRG: 280 | End: 2023-02-18
Payer: COMMERCIAL

## 2023-02-18 DIAGNOSIS — R10.84 GENERALIZED ABDOMINAL PAIN: Primary | ICD-10-CM

## 2023-02-18 DIAGNOSIS — R45.851 SUICIDAL IDEATION: ICD-10-CM

## 2023-02-18 PROBLEM — F10.20 ALCOHOLISM (HCC): Status: ACTIVE | Noted: 2023-02-18

## 2023-02-18 LAB
ABSOLUTE EOS #: 0.15 K/UL (ref 0–0.4)
ABSOLUTE LYMPH #: 1.37 K/UL (ref 1–4.8)
ABSOLUTE MONO #: 0.46 K/UL (ref 0.1–1.3)
ALBUMIN SERPL-MCNC: 3.5 G/DL (ref 3.5–5.2)
ALP SERPL-CCNC: 530 U/L (ref 40–129)
ALT SERPL-CCNC: 29 U/L (ref 5–41)
AMMONIA PLAS-SCNC: 76 UMOL/L (ref 16–60)
ANION GAP SERPL CALCULATED.3IONS-SCNC: 12 MMOL/L (ref 9–17)
AST SERPL-CCNC: 208 U/L
BASOPHILS # BLD: 0 % (ref 0–2)
BASOPHILS ABSOLUTE: 0 K/UL (ref 0–0.2)
BILIRUB DIRECT SERPL-MCNC: 4.8 MG/DL
BILIRUB INDIRECT SERPL-MCNC: 2.3 MG/DL (ref 0–1)
BILIRUB SERPL-MCNC: 7.1 MG/DL (ref 0.3–1.2)
BUN SERPL-MCNC: 8 MG/DL (ref 6–20)
CALCIUM SERPL-MCNC: 9.1 MG/DL (ref 8.6–10.4)
CHLORIDE SERPL-SCNC: 100 MMOL/L (ref 98–107)
CO2 SERPL-SCNC: 21 MMOL/L (ref 20–31)
CREAT SERPL-MCNC: 0.62 MG/DL (ref 0.7–1.2)
EOSINOPHILS RELATIVE PERCENT: 2 % (ref 0–4)
GFR SERPL CREATININE-BSD FRML MDRD: >60 ML/MIN/1.73M2
GLUCOSE SERPL-MCNC: 130 MG/DL (ref 70–99)
HCT VFR BLD AUTO: 28.1 % (ref 41–53)
HGB BLD-MCNC: 9.4 G/DL (ref 13.5–17.5)
INR PPP: 1.2
LIPASE SERPL-CCNC: 117 U/L (ref 13–60)
LYMPHOCYTES # BLD: 18 % (ref 24–44)
MAGNESIUM SERPL-MCNC: 1.9 MG/DL (ref 1.6–2.6)
MCH RBC QN AUTO: 31.3 PG (ref 26–34)
MCHC RBC AUTO-ENTMCNC: 33.4 G/DL (ref 31–37)
MCV RBC AUTO: 93.7 FL (ref 80–100)
MONOCYTES # BLD: 6 % (ref 1–7)
MORPHOLOGY: ABNORMAL
PDW BLD-RTO: 26.3 % (ref 11.5–14.9)
PLATELET # BLD AUTO: 48 K/UL (ref 150–450)
PMV BLD AUTO: 10 FL (ref 6–12)
POTASSIUM SERPL-SCNC: 4 MMOL/L (ref 3.7–5.3)
PROT SERPL-MCNC: 9 G/DL (ref 6.4–8.3)
PROTHROMBIN TIME: 15.1 SEC (ref 11.8–14.6)
RBC # BLD: 3 M/UL (ref 4.5–5.9)
SEG NEUTROPHILS: 74 % (ref 36–66)
SEGMENTED NEUTROPHILS ABSOLUTE COUNT: 5.62 K/UL (ref 1.3–9.1)
SODIUM SERPL-SCNC: 133 MMOL/L (ref 135–144)
TROPONIN I SERPL DL<=0.01 NG/ML-MCNC: 10 NG/L (ref 0–22)
WBC # BLD AUTO: 7.6 K/UL (ref 3.5–11)

## 2023-02-18 PROCEDURE — 6360000002 HC RX W HCPCS: Performed by: STUDENT IN AN ORGANIZED HEALTH CARE EDUCATION/TRAINING PROGRAM

## 2023-02-18 PROCEDURE — 2060000000 HC ICU INTERMEDIATE R&B

## 2023-02-18 PROCEDURE — 2580000003 HC RX 258: Performed by: INTERNAL MEDICINE

## 2023-02-18 PROCEDURE — HZ2ZZZZ DETOXIFICATION SERVICES FOR SUBSTANCE ABUSE TREATMENT: ICD-10-PCS | Performed by: INTERNAL MEDICINE

## 2023-02-18 PROCEDURE — 99223 1ST HOSP IP/OBS HIGH 75: CPT | Performed by: INTERNAL MEDICINE

## 2023-02-18 PROCEDURE — 36415 COLL VENOUS BLD VENIPUNCTURE: CPT

## 2023-02-18 PROCEDURE — 82140 ASSAY OF AMMONIA: CPT

## 2023-02-18 PROCEDURE — 2580000003 HC RX 258: Performed by: STUDENT IN AN ORGANIZED HEALTH CARE EDUCATION/TRAINING PROGRAM

## 2023-02-18 PROCEDURE — 85610 PROTHROMBIN TIME: CPT

## 2023-02-18 PROCEDURE — 6370000000 HC RX 637 (ALT 250 FOR IP): Performed by: INTERNAL MEDICINE

## 2023-02-18 PROCEDURE — 80048 BASIC METABOLIC PNL TOTAL CA: CPT

## 2023-02-18 PROCEDURE — 99285 EMERGENCY DEPT VISIT HI MDM: CPT

## 2023-02-18 PROCEDURE — 83690 ASSAY OF LIPASE: CPT

## 2023-02-18 PROCEDURE — 83735 ASSAY OF MAGNESIUM: CPT

## 2023-02-18 PROCEDURE — 96374 THER/PROPH/DIAG INJ IV PUSH: CPT

## 2023-02-18 PROCEDURE — 93005 ELECTROCARDIOGRAM TRACING: CPT | Performed by: STUDENT IN AN ORGANIZED HEALTH CARE EDUCATION/TRAINING PROGRAM

## 2023-02-18 PROCEDURE — 71045 X-RAY EXAM CHEST 1 VIEW: CPT

## 2023-02-18 PROCEDURE — 84484 ASSAY OF TROPONIN QUANT: CPT

## 2023-02-18 PROCEDURE — 85025 COMPLETE CBC W/AUTO DIFF WBC: CPT

## 2023-02-18 PROCEDURE — 6360000002 HC RX W HCPCS: Performed by: INTERNAL MEDICINE

## 2023-02-18 PROCEDURE — 80076 HEPATIC FUNCTION PANEL: CPT

## 2023-02-18 RX ORDER — FOLIC ACID 1 MG/1
1 TABLET ORAL DAILY
Status: DISCONTINUED | OUTPATIENT
Start: 2023-02-18 | End: 2023-02-22 | Stop reason: HOSPADM

## 2023-02-18 RX ORDER — NADOLOL 20 MG/1
20 TABLET ORAL DAILY
Status: DISCONTINUED | OUTPATIENT
Start: 2023-02-19 | End: 2023-02-22 | Stop reason: HOSPADM

## 2023-02-18 RX ORDER — VENLAFAXINE HYDROCHLORIDE 37.5 MG/1
37.5 CAPSULE, EXTENDED RELEASE ORAL DAILY
Status: DISCONTINUED | OUTPATIENT
Start: 2023-02-19 | End: 2023-02-22 | Stop reason: HOSPADM

## 2023-02-18 RX ORDER — NADOLOL 20 MG/1
20 TABLET ORAL DAILY
Status: DISCONTINUED | OUTPATIENT
Start: 2023-02-18 | End: 2023-02-18

## 2023-02-18 RX ORDER — LORAZEPAM 2 MG/ML
3 INJECTION INTRAMUSCULAR
Status: DISCONTINUED | OUTPATIENT
Start: 2023-02-18 | End: 2023-02-20

## 2023-02-18 RX ORDER — SODIUM CHLORIDE 0.9 % (FLUSH) 0.9 %
5-40 SYRINGE (ML) INJECTION PRN
Status: DISCONTINUED | OUTPATIENT
Start: 2023-02-18 | End: 2023-02-22 | Stop reason: HOSPADM

## 2023-02-18 RX ORDER — LORAZEPAM 2 MG/ML
1 INJECTION INTRAMUSCULAR
Status: DISCONTINUED | OUTPATIENT
Start: 2023-02-18 | End: 2023-02-20

## 2023-02-18 RX ORDER — LORAZEPAM 2 MG/ML
4 INJECTION INTRAMUSCULAR
Status: DISCONTINUED | OUTPATIENT
Start: 2023-02-18 | End: 2023-02-20

## 2023-02-18 RX ORDER — SODIUM CHLORIDE 0.9 % (FLUSH) 0.9 %
5-40 SYRINGE (ML) INJECTION EVERY 12 HOURS SCHEDULED
Status: DISCONTINUED | OUTPATIENT
Start: 2023-02-18 | End: 2023-02-22 | Stop reason: HOSPADM

## 2023-02-18 RX ORDER — PANTOPRAZOLE SODIUM 40 MG/1
40 TABLET, DELAYED RELEASE ORAL DAILY
Status: DISCONTINUED | OUTPATIENT
Start: 2023-02-19 | End: 2023-02-22 | Stop reason: HOSPADM

## 2023-02-18 RX ORDER — FLUTICASONE PROPIONATE 50 MCG
2 SPRAY, SUSPENSION (ML) NASAL DAILY
Status: DISCONTINUED | OUTPATIENT
Start: 2023-02-18 | End: 2023-02-22 | Stop reason: HOSPADM

## 2023-02-18 RX ORDER — SUCRALFATE 1 G/1
1 TABLET ORAL 4 TIMES DAILY
Status: DISCONTINUED | OUTPATIENT
Start: 2023-02-18 | End: 2023-02-22 | Stop reason: HOSPADM

## 2023-02-18 RX ORDER — SODIUM CHLORIDE 9 MG/ML
INJECTION, SOLUTION INTRAVENOUS PRN
Status: DISCONTINUED | OUTPATIENT
Start: 2023-02-18 | End: 2023-02-22 | Stop reason: HOSPADM

## 2023-02-18 RX ORDER — LORAZEPAM 2 MG/ML
2 INJECTION INTRAMUSCULAR
Status: DISCONTINUED | OUTPATIENT
Start: 2023-02-18 | End: 2023-02-20

## 2023-02-18 RX ORDER — LORAZEPAM 1 MG/1
2 TABLET ORAL
Status: DISCONTINUED | OUTPATIENT
Start: 2023-02-18 | End: 2023-02-20

## 2023-02-18 RX ORDER — LORAZEPAM 1 MG/1
3 TABLET ORAL
Status: DISCONTINUED | OUTPATIENT
Start: 2023-02-18 | End: 2023-02-20

## 2023-02-18 RX ORDER — ALBUTEROL SULFATE 90 UG/1
2 AEROSOL, METERED RESPIRATORY (INHALATION) 4 TIMES DAILY PRN
Status: DISCONTINUED | OUTPATIENT
Start: 2023-02-18 | End: 2023-02-22 | Stop reason: HOSPADM

## 2023-02-18 RX ORDER — LORAZEPAM 1 MG/1
4 TABLET ORAL
Status: DISCONTINUED | OUTPATIENT
Start: 2023-02-18 | End: 2023-02-20

## 2023-02-18 RX ORDER — 0.9 % SODIUM CHLORIDE 0.9 %
1000 INTRAVENOUS SOLUTION INTRAVENOUS ONCE
Status: COMPLETED | OUTPATIENT
Start: 2023-02-18 | End: 2023-02-18

## 2023-02-18 RX ORDER — THIAMINE HYDROCHLORIDE 100 MG/ML
100 INJECTION, SOLUTION INTRAMUSCULAR; INTRAVENOUS DAILY
Status: DISCONTINUED | OUTPATIENT
Start: 2023-02-18 | End: 2023-02-18

## 2023-02-18 RX ORDER — ONDANSETRON 4 MG/1
4 TABLET, ORALLY DISINTEGRATING ORAL 3 TIMES DAILY PRN
Status: DISCONTINUED | OUTPATIENT
Start: 2023-02-18 | End: 2023-02-22 | Stop reason: HOSPADM

## 2023-02-18 RX ORDER — GAUZE BANDAGE 2" X 2"
100 BANDAGE TOPICAL DAILY
Status: DISCONTINUED | OUTPATIENT
Start: 2023-02-18 | End: 2023-02-22 | Stop reason: HOSPADM

## 2023-02-18 RX ORDER — LORAZEPAM 1 MG/1
1 TABLET ORAL
Status: DISCONTINUED | OUTPATIENT
Start: 2023-02-18 | End: 2023-02-20

## 2023-02-18 RX ADMIN — THIAMINE HYDROCHLORIDE 100 MG: 100 INJECTION, SOLUTION INTRAMUSCULAR; INTRAVENOUS at 16:23

## 2023-02-18 RX ADMIN — SODIUM CHLORIDE, PRESERVATIVE FREE 10 ML: 5 INJECTION INTRAVENOUS at 21:26

## 2023-02-18 RX ADMIN — ONDANSETRON 4 MG: 4 TABLET, ORALLY DISINTEGRATING ORAL at 23:49

## 2023-02-18 RX ADMIN — SODIUM CHLORIDE, PRESERVATIVE FREE 10 ML: 5 INJECTION INTRAVENOUS at 21:25

## 2023-02-18 RX ADMIN — LORAZEPAM 1 MG: 1 TABLET ORAL at 23:50

## 2023-02-18 RX ADMIN — FLUTICASONE PROPIONATE 2 SPRAY: 50 SPRAY, METERED NASAL at 21:27

## 2023-02-18 RX ADMIN — THIAMINE HCL TAB 100 MG 100 MG: 100 TAB at 21:24

## 2023-02-18 RX ADMIN — SODIUM CHLORIDE 1000 ML: 9 INJECTION, SOLUTION INTRAVENOUS at 16:46

## 2023-02-18 RX ADMIN — SUCRALFATE 1 G: 1 TABLET ORAL at 21:24

## 2023-02-18 RX ADMIN — LORAZEPAM 1 MG: 2 INJECTION INTRAMUSCULAR; INTRAVENOUS at 19:18

## 2023-02-18 RX ADMIN — FOLIC ACID 1 MG: 1 TABLET ORAL at 21:25

## 2023-02-18 ASSESSMENT — ENCOUNTER SYMPTOMS
EYE PAIN: 0
NAUSEA: 0
RHINORRHEA: 0
COLOR CHANGE: 1
SORE THROAT: 0
ABDOMINAL PAIN: 1
SINUS PRESSURE: 0
PHOTOPHOBIA: 0
VOMITING: 0
EYE REDNESS: 0
DIARRHEA: 0
COUGH: 0
CONSTIPATION: 0
CHEST TIGHTNESS: 0
SHORTNESS OF BREATH: 0

## 2023-02-18 ASSESSMENT — PAIN SCALES - GENERAL
PAINLEVEL_OUTOF10: 0
PAINLEVEL_OUTOF10: 0

## 2023-02-18 ASSESSMENT — LIFESTYLE VARIABLES
HOW MANY STANDARD DRINKS CONTAINING ALCOHOL DO YOU HAVE ON A TYPICAL DAY: 7 TO 9
HOW OFTEN DO YOU HAVE A DRINK CONTAINING ALCOHOL: 4 OR MORE TIMES A WEEK

## 2023-02-18 NOTE — ED PROVIDER NOTES
[address]  Emergency Department Encounter  Emergency Medicine Resident     Pt Name:Sam Dutta  MRN: [id number]  [place] 1985  Date of evaluation: 2/18/23  PCP:  Aly Roca MD  Note Started: 3:51 PM EST      CHIEF COMPLAINT       Chief Complaint   Patient presents with    Alcohol Problem    Mental Health Problem    Emesis    Nausea    Jaundice       HISTORY OF PRESENT ILLNESS  (Location/Symptom, Timing/Onset, Context/Setting, Quality, Duration, Modifying Factors, Severity.)      Citlalli Pickens is a 41 y.o. male who presents with body aches chills, nausea and vomiting. Also having generalized abdominal pain, right-sided sharp chest pain. Denies any shortness of breath. Patient is attempting to detox from alcohol. He typically drinks 2 to 3 pints of liquor a day his last drink was 5:00 this morning. He is also noticed that he looks more jaundiced than normal.  Patient has a history of alcohol cirrhosis and has tried detoxing in the past without success. Also complaining of 3 days of generalized headache. PAST MEDICAL / SURGICAL / SOCIAL / FAMILY HISTORY      has a past medical history of Alcoholism ([facility] [address] [address] Anemia, Cirrhosis, alcoholic ([facility] [address] and Pulmonary embolism ([facility] [address] has a past surgical history that includes Upper gastrointestinal endoscopy (N/A, 5/1/2022) and Upper gastrointestinal endoscopy (N/A, 10/30/2022).       Social History     Socioeconomic History    Marital status: Single     Spouse name: Not on file    Number of children: Not on file    Years of education: Not on file    Highest education level: Not on file   Occupational History    Not on file   Tobacco Use    Smoking status: Every Day     Packs/day: 1.00     Years: 21.00     Pack years: 21.00     Types: Cigarettes    Smokeless tobacco: Never    Tobacco comments:     using nicotine patches at night   Vaping Use    Vaping Use: Never used   Substance and Sexual Activity    Alcohol use: Not Currently Comment: hx of alcoholism; pt drinks a fifth of whiskey daily    Drug use: Not Currently     Comment: used cocaine in early 20's    Sexual activity: Not on file   Other Topics Concern    Not on file   Social History Narrative    Not on file     Social Determinants of Health     Financial Resource Strain: Low Risk     Difficulty of Paying Living Expenses: Not hard at all   Food Insecurity: No Food Insecurity    Worried About [address] in the Last Year: Never true    [address] in the Last Year: Never true   Transportation Needs: Unknown    Lack of Transportation (Medical): Not on file    Lack of Transportation (Non-Medical): No   Physical Activity: Not on file   Stress: Not on file   Social Connections: Not on file   Intimate Partner Violence: Not on file   Housing Stability: Unknown    Unable to Pay for Housing in the Last Year: Not on file    Number of Places Lived in the Last Year: Not on file    Unstable Housing in the Last Year: No       Family History   Problem Relation Age of Onset    Heart Disease Mother     Heart Attack Mother     Heart Disease Father        Allergies:  Bee venom    Home Medications:  Prior to Admission medications    Medication Sig Start Date End Date Taking?  Authorizing Provider   metoclopramide (REGLAN) 5 MG tablet Take 1 tablet by mouth 3 times daily as needed (nausea) 2/14/23   Octavio Cross MD   venlafaxine (EFFEXOR XR) 37.5 MG extended release capsule Take 1 capsule by mouth daily 2/14/23   Octavio Cross MD   nadolol (CORGARD) 20 MG tablet Take 1 tablet by mouth daily 2/1/23   Doc Vick MD   ondansetron (ZOFRAN-ODT) 4 MG disintegrating tablet Take 1 tablet by mouth 3 times daily as needed for Nausea or Vomiting 12/27/22   Octavio Cross MD   fluticasone [facility]) 50 MCG/ACT nasal spray 1 spray by Each Nostril route daily 12/27/22   Octavio Cross MD   pantoprazole (PROTONIX) 40 MG tablet Take 1 tablet by mouth daily 12/7/22   Octavio Cross MD   sucralfate ([address]) 1 GM tablet Take 1 tablet by mouth 4 times daily 12/7/22   Kayla Begum MD   albuterol sulfate HFA (VENTOLIN HFA) 108 (90 Base) MCG/ACT inhaler Inhale 2 puffs into the lungs 4 times daily as needed for Wheezing 5/3/22   Ciaran Enriquez MD   folic acid (FOLVITE) 1 MG tablet Take 1 tablet by mouth daily 5/4/22   Ciaran Enriquez MD   vitamin B-1 (THIAMINE) 100 MG tablet Take 1 tablet by mouth daily 5/4/22   Ciaran Enriquez MD   Multiple Vitamins-Minerals (CENTRUM MEN) TABS Take 1 tablet by mouth daily    Historical Provider, MD   Menthol-Methyl Salicylate (ANALGESIC OINTMENT) OINT ointment Apply topically as needed Indications: on back    Historical Provider, MD   naproxen (NAPROSYN) 500 MG tablet Take 1 tablet by mouth 2 times daily  Patient taking differently: Take 500 mg by mouth 2 times daily as needed 8/13/21 3/28/22  Jose Manuel James MD   fluticasone [facility]) 50 MCG/ACT nasal spray instill 1 spray into each nostril once daily 5/10/21   Rosa Ortiz MD   Blood Pressure Monitoring (BP MONITOR-STETHOSCOPE) KIT 1 each by Does not apply route daily 4/7/21   Rosa Ortiz MD   Nicotine 21-14-7 MG/24HR KIT Place 14 mg onto the skin nightly     Historical Provider, MD   EPINEPHrine (EPIPEN 2-LIZETTE) 0.3 MG/0.3ML SOAJ injection Inject 0.3 mLs into the muscle once for 1 dose Use as directed for allergic reaction 11/17/16 11/15/22  Maria Luisa Steven MD         REVIEW OF SYSTEMS       Review of Systems   Constitutional:  Positive for fatigue. Negative for activity change, chills, diaphoresis and fever. HENT:  Negative for congestion, rhinorrhea, sinus pressure and sore throat. Eyes:  Negative for photophobia, pain and redness. Respiratory:  Negative for cough, chest tightness and shortness of breath. Cardiovascular:  Positive for chest pain. Negative for leg swelling. Gastrointestinal:  Positive for abdominal pain. Negative for constipation, diarrhea, nausea and vomiting.    Genitourinary:  Negative for dysuria, flank pain, frequency and urgency. Musculoskeletal:  Negative for arthralgias, myalgias and neck stiffness. Skin:  Positive for color change. Negative for pallor and rash. Worsening jaundice   Neurological:  Negative for dizziness, syncope, weakness, light-headedness, numbness and headaches. Hematological:  Bruises/bleeds easily. PHYSICAL EXAM      INITIAL VITALS:   /63   Pulse 77   Temp 97.7 °F (36.5 °C) (Oral)   Resp 10   Ht 5' 8\" (1.727 m)   Wt 180 lb (81.6 kg)   SpO2 98%   BMI 27.37 kg/m²     Physical Exam  Constitutional:       Appearance: Normal appearance. He is not toxic-appearing or diaphoretic. Comments: Chronically ill-appearing   HENT:      Nose: Nose normal. No congestion. Mouth/Throat:      Mouth: Mucous membranes are moist.      Pharynx: Oropharynx is clear. Eyes:      General: Scleral icterus present. Extraocular Movements: Extraocular movements intact. Pupils: Pupils are equal, round, and reactive to light. Cardiovascular:      Rate and Rhythm: Normal rate and regular rhythm. Pulses: Normal pulses. Heart sounds: Normal heart sounds. Pulmonary:      Effort: Pulmonary effort is normal.      Breath sounds: Normal breath sounds. Abdominal:      General: Abdomen is flat. There is no distension. Palpations: Abdomen is soft. Tenderness: There is abdominal tenderness. Comments: Ecchymosis to right lower quadrant, mild tenderness to palpation across mid abdomen no rebound or guarding, no tenderness over McBurney's point   Musculoskeletal:         General: No swelling. Normal range of motion. Cervical back: Normal range of motion and neck supple. Skin:     Coloration: Skin is jaundiced. Neurological:      General: No focal deficit present. Mental Status: He is alert and oriented to person, place, and time.          DDX/DIAGNOSTIC RESULTS / EMERGENCY DEPARTMENT COURSE / MDM     Medical Decision Making  41year-old male with history of alcoholic cirrhosis presenting with generalized fatigue, abdominal pain right-sided chest pain headaches and attempting to detox from alcohol. Last drink was 5:00 this morning approximate 11 hours ago. Typically drinks 2 to 3 pints of liquor a day. Also stating to have suicidal ideation but does not have any plan states has been having auditory hallucinations but denying visual.  Will place patient in suicide precautions with a one-to-one sitter. Obtain CBC BMP evaluate for electrolyte abnormalities, hepatic panel as he has alcoholic cirrhosis evaluate for any elevated bilirubin. Also obtain 1 troponin, chest x-ray to evaluate for this chest pain. Placed on CIWA protocol evaluate for signs of alcohol withdrawal.  Anticipate admission. Impression is alcohol withdrawal, acute hepatitis, elevated bilirubin, ACS    Amount and/or Complexity of Data Reviewed  Labs: ordered. Decision-making details documented in ED Course. Radiology: ordered. ECG/medicine tests: ordered. Risk  Prescription drug management. Decision regarding hospitalization. EKG  Regular rate and rhythm  Normal axis  Intervals within normal limits  QTc 412  No ST elevation or ST depression  Normal sinus rhythm    All EKG's are interpreted by the Emergency Department Physician who either signs or Co-signs this chart in the absence of a cardiologist.    EMERGENCY DEPARTMENT COURSE:      ED Course as of 02/18/23 1929   Sat Feb 18, 2023   [facility]): 7.1  Admit for alcohol detox, elevated bilirubin [QC]      ED Course User Index  [QC] Kimberly Travis MD       PROCEDURES:  None    CONSULTS:  IP CONSULT TO SOCIAL WORK  IP CONSULT TO INTERNAL MEDICINE  IP CONSULT TO SOCIAL WORK  IP CONSULT TO GI  IP CONSULT TO PSYCHIATRY    CRITICAL CARE:  There was significant risk of life threatening deterioration of patient's condition requiring my direct management.  Critical care time 0 minutes, excluding any documented procedures. FINAL IMPRESSION      1. Generalized abdominal pain    2. Suicidal ideation          DISPOSITION / PLAN     DISPOSITION Admitted 02/18/2023 06:46:52 PM      PATIENT REFERRED TO:  No follow-up provider specified.     DISCHARGE MEDICATIONS:  New Prescriptions    No medications on file       Aisha Mcdowell MD  Emergency Medicine Resident    (Please note that portions of thisnote were completed with a voice recognition program.  Efforts were made to edit the dictations but occasionally words are mis-transcribed.)        Aisha Mcdowell MD  Resident  02/18/23 7807

## 2023-02-18 NOTE — ED NOTES
Patient reports suicidal ideation with thoughts of shooting himself in his back yard. Patient denies access to a gun and also states he does not have any intension to act on these thoughts right now as he has \"too much to live for right now. \"  Patient reportedly drinking a 5th of alcohol per day.

## 2023-02-18 NOTE — ED NOTES
Suicide precautions in place and seizure pads placed     \[facility]\"" - [facility], RN  02/18/23 [address] [facility]  02/18/23 6931

## 2023-02-19 LAB
ABSOLUTE BANDS #: 0.89 K/UL (ref 0–1)
ABSOLUTE EOS #: 0.07 K/UL (ref 0–0.4)
ABSOLUTE LYMPH #: 1.7 K/UL (ref 1–4.8)
ABSOLUTE MONO #: 0.52 K/UL (ref 0.1–1.3)
ALBUMIN SERPL-MCNC: 3.4 G/DL (ref 3.5–5.2)
ALP SERPL-CCNC: 512 U/L (ref 40–129)
ALT SERPL-CCNC: 27 U/L (ref 5–41)
ANION GAP SERPL CALCULATED.3IONS-SCNC: 10 MMOL/L (ref 9–17)
AST SERPL-CCNC: 210 U/L
BANDS: 12 % (ref 0–10)
BASOPHILS # BLD: 1 % (ref 0–2)
BASOPHILS ABSOLUTE: 0.07 K/UL (ref 0–0.2)
BILIRUB SERPL-MCNC: 8.8 MG/DL (ref 0.3–1.2)
BUN SERPL-MCNC: 7 MG/DL (ref 6–20)
CALCIUM SERPL-MCNC: 8.9 MG/DL (ref 8.6–10.4)
CHLORIDE SERPL-SCNC: 97 MMOL/L (ref 98–107)
CO2 SERPL-SCNC: 23 MMOL/L (ref 20–31)
CREAT SERPL-MCNC: 0.47 MG/DL (ref 0.7–1.2)
EOSINOPHILS RELATIVE PERCENT: 1 % (ref 0–4)
GFR SERPL CREATININE-BSD FRML MDRD: >60 ML/MIN/1.73M2
GLUCOSE SERPL-MCNC: 99 MG/DL (ref 70–99)
HCT VFR BLD AUTO: 25.9 % (ref 41–53)
HGB BLD-MCNC: 8.7 G/DL (ref 13.5–17.5)
LYMPHOCYTES # BLD: 23 % (ref 24–44)
MCH RBC QN AUTO: 31.3 PG (ref 26–34)
MCHC RBC AUTO-ENTMCNC: 33.5 G/DL (ref 31–37)
MCV RBC AUTO: 93.3 FL (ref 80–100)
METAMYELOCYTES ABSOLUTE COUNT: 0.07 K/UL
METAMYELOCYTES: 1 %
MONOCYTES # BLD: 7 % (ref 1–7)
MORPHOLOGY: ABNORMAL
MORPHOLOGY: ABNORMAL
PDW BLD-RTO: 26.2 % (ref 11.5–14.9)
PLATELET # BLD AUTO: 42 K/UL (ref 150–450)
PMV BLD AUTO: 10.4 FL (ref 6–12)
POTASSIUM SERPL-SCNC: 4.4 MMOL/L (ref 3.7–5.3)
PROT SERPL-MCNC: 8.5 G/DL (ref 6.4–8.3)
RBC # BLD: 2.77 M/UL (ref 4.5–5.9)
SEG NEUTROPHILS: 55 % (ref 36–66)
SEGMENTED NEUTROPHILS ABSOLUTE COUNT: 4.08 K/UL (ref 1.3–9.1)
SODIUM SERPL-SCNC: 130 MMOL/L (ref 135–144)
WBC # BLD AUTO: 7.4 K/UL (ref 3.5–11)

## 2023-02-19 PROCEDURE — 2580000003 HC RX 258: Performed by: INTERNAL MEDICINE

## 2023-02-19 PROCEDURE — 6370000000 HC RX 637 (ALT 250 FOR IP): Performed by: INTERNAL MEDICINE

## 2023-02-19 PROCEDURE — 80053 COMPREHEN METABOLIC PANEL: CPT

## 2023-02-19 PROCEDURE — 99222 1ST HOSP IP/OBS MODERATE 55: CPT | Performed by: INTERNAL MEDICINE

## 2023-02-19 PROCEDURE — 99233 SBSQ HOSP IP/OBS HIGH 50: CPT | Performed by: INTERNAL MEDICINE

## 2023-02-19 PROCEDURE — 2060000000 HC ICU INTERMEDIATE R&B

## 2023-02-19 PROCEDURE — 85025 COMPLETE CBC W/AUTO DIFF WBC: CPT

## 2023-02-19 PROCEDURE — 36415 COLL VENOUS BLD VENIPUNCTURE: CPT

## 2023-02-19 RX ORDER — LACTULOSE 10 G/15ML
20 SOLUTION ORAL 3 TIMES DAILY
Status: DISCONTINUED | OUTPATIENT
Start: 2023-02-19 | End: 2023-02-20

## 2023-02-19 RX ADMIN — LORAZEPAM 2 MG: 1 TABLET ORAL at 05:18

## 2023-02-19 RX ADMIN — FOLIC ACID 1 MG: 1 TABLET ORAL at 08:32

## 2023-02-19 RX ADMIN — SUCRALFATE 1 G: 1 TABLET ORAL at 13:38

## 2023-02-19 RX ADMIN — SUCRALFATE 1 G: 1 TABLET ORAL at 08:32

## 2023-02-19 RX ADMIN — PANTOPRAZOLE SODIUM 40 MG: 40 TABLET, DELAYED RELEASE ORAL at 08:32

## 2023-02-19 RX ADMIN — SUCRALFATE 1 G: 1 TABLET ORAL at 20:32

## 2023-02-19 RX ADMIN — NADOLOL 20 MG: 20 TABLET ORAL at 08:32

## 2023-02-19 RX ADMIN — LACTULOSE 20 G: 20 SOLUTION ORAL at 13:38

## 2023-02-19 RX ADMIN — SODIUM CHLORIDE, PRESERVATIVE FREE 10 ML: 5 INJECTION INTRAVENOUS at 20:42

## 2023-02-19 RX ADMIN — LACTULOSE 20 G: 20 SOLUTION ORAL at 20:32

## 2023-02-19 RX ADMIN — SODIUM CHLORIDE, PRESERVATIVE FREE 10 ML: 5 INJECTION INTRAVENOUS at 08:36

## 2023-02-19 RX ADMIN — LORAZEPAM 2 MG: 1 TABLET ORAL at 20:32

## 2023-02-19 RX ADMIN — VENLAFAXINE HYDROCHLORIDE 37.5 MG: 37.5 CAPSULE, EXTENDED RELEASE ORAL at 08:32

## 2023-02-19 RX ADMIN — SODIUM CHLORIDE, PRESERVATIVE FREE 10 ML: 5 INJECTION INTRAVENOUS at 08:37

## 2023-02-19 RX ADMIN — SODIUM CHLORIDE, PRESERVATIVE FREE 10 ML: 5 INJECTION INTRAVENOUS at 20:36

## 2023-02-19 RX ADMIN — FLUTICASONE PROPIONATE 2 SPRAY: 50 SPRAY, METERED NASAL at 08:37

## 2023-02-19 RX ADMIN — THIAMINE HCL TAB 100 MG 100 MG: 100 TAB at 08:32

## 2023-02-19 RX ADMIN — SUCRALFATE 1 G: 1 TABLET ORAL at 17:12

## 2023-02-19 RX ADMIN — LORAZEPAM 2 MG: 1 TABLET ORAL at 17:12

## 2023-02-19 RX ADMIN — LORAZEPAM 1 MG: 1 TABLET ORAL at 08:32

## 2023-02-19 ASSESSMENT — ENCOUNTER SYMPTOMS
WHEEZING: 0
TROUBLE SWALLOWING: 0
VOICE CHANGE: 0
CHOKING: 0
BACK PAIN: 0
SHORTNESS OF BREATH: 0
APNEA: 0
SORE THROAT: 0
COUGH: 0

## 2023-02-19 NOTE — PROGRESS NOTES
[address]    HISTORY AND PHYSICAL EXAMINATION            Date:   2/19/2023  Patient name:  Kelly Florence  Date of admission:  2/18/2023  3:40 PM  MRN:   [id number]  Account:  [de-identified]  YOB: 1985  PCP:    Jeffy Parry MD  Room:   2094/2094-01  Code Status:    Full Code    Chief Complaint:     Chief Complaint   Patient presents with    Alcohol Problem    Mental Health Problem    Emesis    Nausea    Jaundice       History Obtained From:     patient, electronic medical record    History of Present Illness: The patient is a 41 y.o. Non- / non  male who presents with Alcohol Problem, Mental Health Problem, Emesis, Nausea, and Jaundice   and he is admitted to the hospital for the management of jaundice, alcoholism   Patient has past medical history multiple medical problem which include hypertension, alcoholism, cirrhosis of liver, depression, history of esophageal varices s/p banding, patient, presented to emergency room, with complaints of worsening fatigue, tiredness, nausea, vomiting. Patient told me he could not able to hold anything down, he also noticed, worsening jaundice. Patient also feels depressed, he told me that, he has suicidal ideation.   Does not have any plan to commit suicide, never attempted suicide in the past.  Does not have access to gun  Patient still continue to drink, last drink was 5 AM in the morning, he will drink around 3 pints of vodka daily  No complaints of chest pain, shortness of breath  He told me he never required paracentesis in the past  2/19   Patient, clinically feeling better  Had 1 episode of vomiting last night  Required 2 doses of Ativan  No chest pain  Past Medical History:     Past Medical History:   Diagnosis Date    Alcoholism ([address]     pt drinks a fifth of whiskey daily    Anemia     Cirrhosis, alcoholic ([address]     Pulmonary embolism ([address]         Past Surgical History: Past Surgical History:   Procedure Laterality Date    UPPER GASTROINTESTINAL ENDOSCOPY N/A 5/1/2022    EGD ESOPHAGOGASTRODUODENOSCOPY performed by Priscila Garay MD at [address] [place] [address] N/A 10/30/2022    EGD BAND LIGATION performed by Anabela Hammonds MD at [facility]        Medications Prior to Admission:     Prior to Admission medications    Medication Sig Start Date End Date Taking?  Authorizing Provider   metoclopramide (REGLAN) 5 MG tablet Take 1 tablet by mouth 3 times daily as needed (nausea) 2/14/23   Kerri Fields MD   venlafaxine (EFFEXOR XR) 37.5 MG extended release capsule Take 1 capsule by mouth daily 2/14/23   Kerri Fields MD   nadolol (CORGARD) 20 MG tablet Take 1 tablet by mouth daily 2/1/23   Bria Valenzuela MD   ondansetron (ZOFRAN-ODT) 4 MG disintegrating tablet Take 1 tablet by mouth 3 times daily as needed for Nausea or Vomiting 12/27/22   Kerri Fields MD   fluticasone [facility]) 50 MCG/ACT nasal spray 1 spray by Each Nostril route daily 12/27/22   Kerri Fields MD   pantoprazole (PROTONIX) 40 MG tablet Take 1 tablet by mouth daily 12/7/22   Kerri Fields MD   sucralfate (CARAFATE) 1 GM tablet Take 1 tablet by mouth 4 times daily 12/7/22   Kerri Fields MD   albuterol sulfate HFA (VENTOLIN HFA) 108 (90 Base) MCG/ACT inhaler Inhale 2 puffs into the lungs 4 times daily as needed for Wheezing 5/3/22   Jos Davila MD   folic acid (FOLVITE) 1 MG tablet Take 1 tablet by mouth daily 5/4/22   Jos Davila MD   vitamin B-1 (THIAMINE) 100 MG tablet Take 1 tablet by mouth daily 5/4/22   Jos Davila MD   Multiple Vitamins-Minerals (CENTRUM MEN) TABS Take 1 tablet by mouth daily    Historical Provider, MD   Menthol-Methyl Salicylate (ANALGESIC OINTMENT) OINT ointment Apply topically as needed Indications: on back    Historical Provider, MD   naproxen (NAPROSYN) 500 MG tablet Take 1 tablet by mouth 2 times daily  Patient taking differently: Take 500 mg by mouth 2 times daily as needed 8/13/21 3/28/22  Brooks Barrow MD   fluticasone [address]) 50 MCG/ACT nasal spray instill 1 spray into each nostril once daily 5/10/21   Dolores Galindo MD   Blood Pressure Monitoring (BP MONITOR-STETHOSCOPE) KIT 1 each by Does not apply route daily 4/7/21   Dolores Galindo MD   Nicotine 21-14-7 MG/24HR KIT Place 14 mg onto the skin nightly     Historical Provider, MD   EPINEPHrine (EPIPEN 2-LIZETTE) 0.3 MG/0.3ML SOAJ injection Inject 0.3 mLs into the muscle once for 1 dose Use as directed for allergic reaction 11/17/16 11/15/22  Fior Vides MD        Allergies:     Bee venom    Social History:     Tobacco:    reports that he has been smoking cigarettes. He has a 21.00 pack-year smoking history. He has never used smokeless tobacco.  Alcohol:      reports that he does not currently use alcohol. Drug Use:  reports that he does not currently use drugs. Family History:     Family History   Problem Relation Age of Onset    Heart Disease Mother     Heart Attack Mother     Heart Disease Father        Review of Systems:     Positive and Negative as described in HPI. CONSTITUTIONAL: Generalized fatigue, malaise   HEENT: Worsening jaundice, yellowish discoloration of eyes and skin   RESPIRATORY:  negative for shortness of breath, cough, congestion, wheezing. CARDIOVASCULAR:  negative for chest pain, palpitations.   GASTROINTESTINAL: Positive for nausea, vomiting, anorexia  GENITOURINARY:  negative for difficulty of urination, burning with urination, frequency   INTEGUMENT:  negative for rash, skin lesions, easy bruising   HEMATOLOGIC/LYMPHATIC:  negative for swelling/edema   ALLERGIC/IMMUNOLOGIC:  negative for urticaria , itching  ENDOCRINE:  negative increase in drinking, increase in urination, hot or cold intolerance  MUSCULOSKELETAL:  negative joint pains, muscle aches, swelling of joints  NEUROLOGICAL:  negative for headaches, dizziness, lightheadedness, numbness, pain, tingling extremities  BEHAVIOR/PSYCH: Feels depressed, suicidal ideation  Physical Exam:   /76   Pulse 97   Temp (!) 100.8 °F (38.2 °C) (Oral)   Resp 18   Ht 5' 8\" (1.727 m)   Wt 209 lb 7 oz (95 kg)   SpO2 96%   BMI 31.84 kg/m²   Temp (24hrs), Av.6 °F (29.8 °C), Min:32 °F (0 °C), Max:100.8 °F (38.2 °C)    No results for input(s): POCGLU in the last 72 hours. Intake/Output Summary (Last 24 hours) at 2023 1001  Last data filed at 2023 6513  Gross per 24 hour   Intake --   Output 675 ml   Net -675 ml       General Appearance: Patient is agitated, depressed, frustrated with his situation, deep jaundice present  Mental status: oriented to person, place, and time with normal affect  Head:  normocephalic, atraumatic. Eye: Icterus present  Ear: normal external ear, no discharge, hearing intact  Nose:  no drainage noted  Mouth: mucous membranes moist  Neck: supple, no carotid bruits, thyroid not palpable  Lungs: Bilateral equal air entry, clear to ausculation, no wheezing, rales or rhonchi, normal effort  Cardiovascular: normal rate, regular rhythm, no murmur, gallop, rub.   Abdomen: Soft, nontender, nondistended, normal bowel sounds, no hepatomegaly or splenomegaly  Neurologic: There are no new focal motor or sensory deficits, normal muscle tone and bulk, no abnormal sensation, normal speech, cranial nerves II through XII grossly intact  Skin: No gross lesions, rashes, bruising or bleeding on exposed skin area  Extremities:  peripheral pulses palpable, no pedal edema or calf pain with palpation  Psych: normal affect     Investigations:      Laboratory Testing:  Recent Results (from the past 24 hour(s))   CBC with Auto Differential    Collection Time: 23  4:27 PM   Result Value Ref Range    WBC 7.6 3.5 - 11.0 k/uL    RBC 3.00 (L) 4.5 - 5.9 m/uL    Hemoglobin 9.4 (L) 13.5 - 17.5 g/dL    Hematocrit 28.1 (L) 41 - 53 %    MCV 93.7 80 - 100 fL    MCH 31.3 26 - 34 pg    MCHC 33.4 31 - 37 g/dL    RDW 26.3 (H) 11.5 - 14.9 %    Platelets 48 (L) 989 - 450 k/uL    MPV 10.0 6.0 - 12.0 fL    Seg Neutrophils 74 (H) 36 - 66 %    Lymphocytes 18 (L) 24 - 44 %    Monocytes 6 1 - 7 %    Eosinophils % 2 0 - 4 %    Basophils 0 0 - 2 %    Segs Absolute 5.62 1.3 - 9.1 k/uL    Absolute Lymph # 1.37 1.0 - 4.8 k/uL    Absolute Mono # 0.46 0.1 - 1.3 k/uL    Absolute Eos # 0.15 0.0 - 0.4 k/uL    Basophils Absolute 0.00 0.0 - 0.2 k/uL    Morphology 1+ TARGET CELLS     Morphology ANISOCYTOSIS PRESENT     Morphology 1+ TEARDROPS     Morphology 1+ ELLIPTOCYTES    Basic Metabolic Panel    Collection Time: 02/18/23  4:27 PM   Result Value Ref Range    Glucose 130 (H) 70 - 99 mg/dL    BUN 8 6 - 20 mg/dL    Creatinine 0.62 (L) 0.70 - 1.20 mg/dL    Est, Glom Filt Rate >60 >60 mL/min/1.73m2    Calcium 9.1 8.6 - 10.4 mg/dL    Sodium 133 (L) 135 - 144 mmol/L    Potassium 4.0 3.7 - 5.3 mmol/L    Chloride 100 98 - 107 mmol/L    CO2 21 20 - 31 mmol/L    Anion Gap 12 9 - 17 mmol/L   Magnesium    Collection Time: 02/18/23  4:27 PM   Result Value Ref Range    Magnesium 1.9 1.6 - 2.6 mg/dL   Hepatic Function Panel    Collection Time: 02/18/23  4:27 PM   Result Value Ref Range    Albumin 3.5 3.5 - 5.2 g/dL    Alkaline Phosphatase 530 (H) 40 - 129 U/L    ALT 29 5 - 41 U/L     (H) <40 U/L    Total Bilirubin 7.1 (H) 0.3 - 1.2 mg/dL    Bilirubin, Direct 4.8 (H) <0.3 mg/dL    Bilirubin, Indirect 2.3 (H) 0.0 - 1.0 mg/dL    Total Protein 9.0 (H) 6.4 - 8.3 g/dL   Lipase    Collection Time: 02/18/23  4:27 PM   Result Value Ref Range    Lipase 117 (H) 13 - 60 U/L   Troponin    Collection Time: 02/18/23  4:27 PM   Result Value Ref Range    Troponin, High Sensitivity 10 0 - 22 ng/L   Protime-INR    Collection Time: 02/18/23  4:27 PM   Result Value Ref Range    Protime 15.1 (H) 11.8 - 14.6 sec    INR 1.2    EKG 12 Lead    Collection Time: 02/18/23  5:19 PM   Result Value Ref Range    Ventricular Rate 73 BPM    Atrial Rate 73 BPM    P-R Interval 146 ms    QRS Duration 98 ms    Q-T Interval 374 ms    QTc Calculation (Bazett) 412 ms    P Axis 52 degrees    R Axis 18 degrees    T Axis 6 degrees   Ammonia    Collection Time: 02/18/23  8:24 PM   Result Value Ref Range    Ammonia 76 (H) 16 - 60 umol/L       Imaging/Diagnostics:        Assessment :      Primary Problem  Alcoholism [facility])    Active Hospital Problems    Diagnosis Date Noted    Alcoholism [facility]) [F10.20] 02/18/2023     Priority: Medium       Plan:     Patient status Admit as inpatient in the  Progressive Unit/Step down    Patient admitted with worsening jaundice, elevated liver enzymes,, his CTP score was 8, will order ammonia level, GI consulted  Calculated  Maddary DF score -9.3, do not qualify for steroids for alcoholic hepatitis  Active alcoholism, starting patient on thiamine, folic acid, multivitamin, CIWA protocol  Depression,suicidal ideation- sitter at bed side , jc consulted   SCD for DVT prophylxis -cannot give chemoprophylaxis with thrombocytopenia  Patient denying GI bleed  Hypertension, controlled, recently seen in clinic, antihypertensive was reduced  Overall prognosis guarded, patient need to quit drinking  Serum ammonia is high, starting patient on lactulose 3 times a day, with goal of 3 stools a day  Awaiting morning labs  Input from GI and psychiatrist awaited    Consultations:   IP CONSULT TO SOCIAL WORK  IP CONSULT TO INTERNAL MEDICINE  IP CONSULT TO SOCIAL WORK  IP CONSULT TO GI  IP CONSULT TO PSYCHIATRY    Patient is admitted as inpatient status because of co-morbidities listed above, severity of signs and symptoms as outlined, requirement for current medical therapies and most importantly because of direct risk to patient if care not provided in a hospital setting. Derek Byrnes MD  2/19/2023  10:01 AM    Copy sent to Dr. Octavio Cross MD    Please note that this chart was generated using voice recognition Dragon dictation software.   Although every effort was made to ensure the accuracy of this automated transcription, some errors in transcription may have occurred.

## 2023-02-19 NOTE — FLOWSHEET NOTE
02/19/23 0152   Treatment Team Notification   Reason for Communication Review case   Team Member Name Dr. Lee Estimable Provider   Method of Communication Call   Response Other (Comment)   Notification Time 0155       Consult was called to doctor Moreno Lam via answering services and the on-call nurse acknowledge the call and would notify physicisn

## 2023-02-19 NOTE — FLOWSHEET NOTE
02/19/23 0213   Treatment Team Notification   Reason for Communication Review case   Team Member Name [facility]   Treatment Team Role Consulting Provider   Method of Communication Secure Message   Response Other (Comment)   Notification Time 0214       Consult request message has been sent to Dr. Annabelle Kruse via secure message.  Awaiting MD's call back

## 2023-02-19 NOTE — CARE COORDINATION
Case Management Assessment  Initial Evaluation    Date/Time of Evaluation: 2/19/2023 1:31 PM  Assessment Completed by: Bernadine Elizondo RN    If patient is discharged prior to next notation, then this note serves as note for discharge by case management. Patient Name: Carla Plummer                   YOB: 1985  Diagnosis: Suicidal ideation [R45.851]  Alcoholism ([address] [F10.20]  Generalized abdominal pain [R10.84]                   Date / Time: 2/18/2023  3:40 PM    Patient Admission Status: Inpatient   Readmission Risk (Low < 19, Mod (19-27), High > 27): Readmission Risk Score: 22.8    Current PCP: Monster Boogie MD  PCP verified by CM? Yes    Chart Reviewed: Yes      History Provided by: Patient  Patient Orientation: Alert and Oriented    Patient Cognition: Alert    Hospitalization in the last 30 days (Readmission):  No    If yes, Readmission Assessment in CM Navigator will be completed. Advance Directives:      Code Status: Full Code   Patient's Primary Decision Maker is: Legal Next of Kin      Discharge Planning:    Patient lives with: Spouse/Significant Other, Family Members Type of Home: House  Primary Care Giver: Self  Patient Support Systems include: Spouse/Significant Other, Family Members   Current Financial resources: None  Current community resources: None  Current services prior to admission: Oxygen Therapy            Current DME:              Type of Home Care services:  None    ADLS  Prior functional level: Independent in ADLs/IADLs  Current functional level: Independent in ADLs/IADLs    PT AM-PAC:   /24  OT AM-PAC:   /24    Family can provide assistance at DC: Yes  Would you like Case Management to discuss the discharge plan with any other family members/significant others, and if so, who?  Yes  Plans to Return to Present Housing: Yes  Other Identified Issues/Barriers to RETURNING to current housing: No  Potential Assistance needed at discharge: N/A            Potential DME: No  Patient expects to discharge to: House  Plan for transportation at discharge: [address]: [address]  [address] / Plan: [address]  [address] / Product Type: *No Product type* /     Does insurance require precert for SNF: Yes    Potential assistance Purchasing Medications: No  Meds-to-Beds request:  No      S[facility] [address] [facility] #[id number] - [address] [facility], [address] [place] [address]  [address] [facility] [address]  Phone: [phone] Fax: [phone]      Notes:    Factors facilitating achievement of predicted outcomes: Friend support    Barriers to discharge: Psychosocial     Additional Case Management Notes: Patient from home with significant other, independent. Drives. DME: Oxygen provided by [facility] (concentrator and portable O2) for PRN 2LPM \"when my blood pressure is low. \"    VNS: Denies. Will follow for needs.      The Plan for Transition of Care is related to the following treatment goals of Suicidal ideation [R45.851]  Alcoholism ([address] [F10.20]  Generalized abdominal pain [R10.84]    Doroteo Hastings RN  Case Management Department  Ph: [phone] Fax: [phone]

## 2023-02-19 NOTE — ED PROVIDER NOTES
EMERGENCY DEPARTMENT ENCOUNTER   ATTENDING ATTESTATION     Pt Name: Katt Skinner  MRN: [id number]  [place] 1985  Date of evaluation: 2/19/23       Katt Skinner is a 41 y.o. male who presents with Alcohol Problem, Mental Health Problem, Emesis, Nausea, and Jaundice      MDM: 71-year-old male presents for complaints of alcohol withdrawal nausea and vomiting and jaundice. Reports that he drinks approximately 3 pints a days been doing this for prolonged period of time trying to quit alcohol. Reports worsening jaundice over the last couple days also reporting suicidal thoughts    We will check labs     Labs are reviewed patient noted of elevation of his T. bili, as well as his AST and ALT, likely are secondary to his alcohol use    We will admit for further treatment for alcohol abuse as well as mental health evaluation      Vitals:   Vitals:    02/19/23 0702 02/19/23 0837 02/19/23 0841 02/19/23 1200   BP: 129/76  129/76 123/79   Pulse: 95 97 97 92   Resp: 18   18   Temp: (!) 100.8 °F (38.2 °C)   99.8 °F (37.7 °C)   TempSrc: Oral   Oral   SpO2: 96%   96%   Weight:       Height:             I personally saw and examined the patient. I have reviewed and agree with the resident's findings, including all diagnostic interpretations and treatment plan as written. I was present for the key portions of any procedures performed and the inclusive time noted for any critical care statement. The care is provided during an unprecedented national emergency due to the novel coronavirus, COVID 19.   Jacob Prado DO  Attending Emergency Physician           Jacob Prado DO  02/19/23 0094

## 2023-02-19 NOTE — ED NOTES
Report given to MINERVA Sanchez from [facility]. Report method by phone   The following was reviewed with receiving RN:   Current vital signs:  BP 98/62   Pulse 68   Temp 97.7 °F (36.5 °C) (Oral)   Resp 17   Ht 5' 8\" (1.727 m)   Wt 180 lb (81.6 kg)   SpO2 96%   BMI 27.37 kg/m²                MEWS Score: 1     Any medication or safety alerts were reviewed. Any pending diagnostics and notifications were also reviewed, as well as any safety concerns or issues, abnormal labs, abnormal imaging, and abnormal assessment findings. Questions were answered.             [address]  02/18/23 5105

## 2023-02-19 NOTE — PLAN OF CARE
Problem: Discharge Planning  Goal: Discharge to home or other facility with appropriate resources  2/19/2023 1715 by Meir Dalton RN  Outcome: Progressing  Flowsheets  Taken 2/19/2023 0837 by Meir Dalton RN  Discharge to home or other facility with appropriate resources: Identify barriers to discharge with patient and caregiver  Taken 2/19/2023 0346 by Makayla Jimenez RN  Discharge to home or other facility with appropriate resources:   Identify barriers to discharge with patient and caregiver   Identify discharge learning needs (meds, wound care, etc)     Problem: Safety - Adult  Goal: Free from fall injury  2/19/2023 1715 by Meir Dalton RN  Outcome: Progressing  Flowsheets (Taken 2/19/2023 0841)  Free From Fall Injury: Instruct family/caregiver on patient safety     Problem: Self Harm/Suicidality  Goal: Will have no self-injury during hospital stay  Description: INTERVENTIONS:  1. Ensure constant observer at bedside with Q15M safety checks  2. Maintain a safe environment  3. Secure patient belongings  4. Ensure family/visitors adhere to safety recommendations  5. Ensure safety tray has been added to patient's diet order  6. Every shift and PRN: Re-assess suicidal risk via Frequent Screener    2/19/2023 1715 by Meir Dalton RN  Outcome: Progressing     Problem: Pain  Goal: Verbalizes/displays adequate comfort level or baseline comfort level  2/19/2023 1715 by Meir Dalton RN  Outcome: Progressing     Problem: Skin/Tissue Integrity  Goal: Absence of new skin breakdown  Description: 1. Monitor for areas of redness and/or skin breakdown  2. Assess vascular access sites hourly  3. Every 4-6 hours minimum:  Change oxygen saturation probe site  4. Every 4-6 hours:  If on nasal continuous positive airway pressure, respiratory therapy assess nares and determine need for appliance change or resting period.   2/19/2023 1715 by Meir Dalton RN  Outcome: Progressing

## 2023-02-19 NOTE — PLAN OF CARE
Problem: Discharge Planning  Goal: Discharge to home or other facility with appropriate resources  Outcome: Progressing     Problem: Safety - Adult  Goal: Free from fall injury  Outcome: Progressing  Note: No falls noted this shift. Patient ambulates with x1 staff assistance without difficulty. Bed kept in low position. Safe environment maintained. Bedside table & call light in reach. Uses call light appropriately when needing assistance. Problem: Self Harm/Suicidality  Goal: Will have no self-injury during hospital stay  Description: INTERVENTIONS:  1. Ensure constant observer at bedside with Q15M safety checks  2. Maintain a safe environment  3. Secure patient belongings  4. Ensure family/visitors adhere to safety recommendations  5. Ensure safety tray has been added to patient's diet order  6. Every shift and PRN: Re-assess suicidal risk via Frequent Screener    Outcome: Progressing  Flowsheets (Taken 2/19/2023 0340)  Will have no self-injury during hospital stay:   Ensure constant observer at bedside with Q15M safety checks   Maintain a safe environment   Ensure safety tray has been added to patient's diet order     Problem: Pain  Goal: Verbalizes/displays adequate comfort level or baseline comfort level  Outcome: Progressing  Note: Pt medicated with pain medication prn. Assessed all pain characteristics including level, type, location, frequency, and onset. Non-pharmacologic interventions offered to pt as well. Pt states pain is tolerable at this time. Will continue to monitor.

## 2023-02-19 NOTE — ED NOTES
Pt restless in bed stating he is anxious, nauseous, has a HX and seeing things. Very mild tremor noticed in hands. Pt alert and oriented x4. CIWA of 8 noted.       [facility]  02/18/23 1906

## 2023-02-19 NOTE — H&P
JET[facility] [address]    HISTORY AND PHYSICAL EXAMINATION            Date:   2/18/2023  Patient name:  Estefana Cushing  Date of admission:  2/18/2023  3:40 PM  MRN:   [id number]  Account:  [de-identified]  YOB: 1985  PCP:    Ira Covington MD  Room:   03/03  Code Status:    Prior    Chief Complaint:     Chief Complaint   Patient presents with    Alcohol Problem    Mental Health Problem    Emesis    Nausea    Jaundice       History Obtained From:     patient, electronic medical record    History of Present Illness: The patient is a 41 y.o. Non- / non  male who presents with Alcohol Problem, Mental Health Problem, Emesis, Nausea, and Jaundice   and he is admitted to the hospital for the management of jaundice, alcoholism   Patient has past medical history multiple medical problem which include hypertension, alcoholism, cirrhosis of liver, depression, history of esophageal varices s/p banding, patient, presented to emergency room, with complaints of worsening fatigue, tiredness, nausea, vomiting. Patient told me he could not able to hold anything down, he also noticed, worsening jaundice. Patient also feels depressed, he told me that, he has suicidal ideation.   Does not have any plan to commit suicide, never attempted suicide in the past.  Does not have access to gun  Patient still continue to drink, last drink was 5 AM in the morning, he will drink around 3 pints of vodka daily  No complaints of chest pain, shortness of breath  He told me he never required paracentesis in the past    Past Medical History:     Past Medical History:   Diagnosis Date    Alcoholism ([address]     pt drinks a fifth of whiskey daily    Anemia     Cirrhosis, alcoholic ([address]     Pulmonary embolism ([address]         Past Surgical History:     Past Surgical History:   Procedure Laterality Date    UPPER GASTROINTESTINAL ENDOSCOPY N/A 5/1/2022    EGD ESOPHAGOGASTRODUODENOSCOPY performed by Kyle Blount MD at [facility] N/A 10/30/2022    EGD BAND LIGATION performed by Alex Buchanan MD at [facility]        Medications Prior to Admission:     Prior to Admission medications    Medication Sig Start Date End Date Taking?  Authorizing Provider   metoclopramide (REGLAN) 5 MG tablet Take 1 tablet by mouth 3 times daily as needed (nausea) 2/14/23   Jay Jewell MD   venlafaxine (EFFEXOR XR) 37.5 MG extended release capsule Take 1 capsule by mouth daily 2/14/23   Jay Jewell MD   nadolol (CORGARD) 20 MG tablet Take 1 tablet by mouth daily 2/1/23   Kelle Ruvalcaba MD   ondansetron (ZOFRAN-ODT) 4 MG disintegrating tablet Take 1 tablet by mouth 3 times daily as needed for Nausea or Vomiting 12/27/22   Jay Jewell MD   fluticasone [facility]) 50 MCG/ACT nasal spray 1 spray by Each Nostril route daily 12/27/22   Jay Jewell MD   pantoprazole (PROTONIX) 40 MG tablet Take 1 tablet by mouth daily 12/7/22   Jay Jewell MD   sucralfate (CARAFATE) 1 GM tablet Take 1 tablet by mouth 4 times daily 12/7/22   Jay Jewell MD   albuterol sulfate HFA (VENTOLIN HFA) 108 (90 Base) MCG/ACT inhaler Inhale 2 puffs into the lungs 4 times daily as needed for Wheezing 5/3/22   Serina Davila MD   folic acid (FOLVITE) 1 MG tablet Take 1 tablet by mouth daily 5/4/22   Serina Davila MD   vitamin B-1 (THIAMINE) 100 MG tablet Take 1 tablet by mouth daily 5/4/22   Serina Davila MD   Multiple Vitamins-Minerals (CENTRUM MEN) TABS Take 1 tablet by mouth daily    Historical Provider, MD   Menthol-Methyl Salicylate (ANALGESIC OINTMENT) OINT ointment Apply topically as needed Indications: on back    Historical Provider, MD   naproxen (NAPROSYN) 500 MG tablet Take 1 tablet by mouth 2 times daily  Patient taking differently: Take 500 mg by mouth 2 times daily as needed 8/13/21 3/28/22  Vinay House MD   fluticasone (FLONASE) 50 MCG/ACT nasal spray instill 1 spray into each nostril once daily 5/10/21   Vamshi Reese MD   Blood Pressure Monitoring (BP MONITOR-STETHOSCOPE) KIT 1 each by Does not apply route daily 4/7/21   Vamshi Reese MD   Nicotine 21-14-7 MG/24HR KIT Place 14 mg onto the skin nightly     Historical Provider, MD   EPINEPHrine (EPIPEN 2-LIZETTE) 0.3 MG/0.3ML SOAJ injection Inject 0.3 mLs into the muscle once for 1 dose Use as directed for allergic reaction 11/17/16 11/15/22  Mega Palomares MD        Allergies:     Bee venom    Social History:     Tobacco:    reports that he has been smoking cigarettes. He has a 21.00 pack-year smoking history. He has never used smokeless tobacco.  Alcohol:      reports that he does not currently use alcohol. Drug Use:  reports that he does not currently use drugs. Family History:     Family History   Problem Relation Age of Onset    Heart Disease Mother     Heart Attack Mother     Heart Disease Father        Review of Systems:     Positive and Negative as described in HPI. CONSTITUTIONAL: Generalized fatigue, malaise   HEENT: Worsening jaundice, yellowish discoloration of eyes and skin   RESPIRATORY:  negative for shortness of breath, cough, congestion, wheezing. CARDIOVASCULAR:  negative for chest pain, palpitations.   GASTROINTESTINAL: Positive for nausea, vomiting, anorexia  GENITOURINARY:  negative for difficulty of urination, burning with urination, frequency   INTEGUMENT:  negative for rash, skin lesions, easy bruising   HEMATOLOGIC/LYMPHATIC:  negative for swelling/edema   ALLERGIC/IMMUNOLOGIC:  negative for urticaria , itching  ENDOCRINE:  negative increase in drinking, increase in urination, hot or cold intolerance  MUSCULOSKELETAL:  negative joint pains, muscle aches, swelling of joints  NEUROLOGICAL:  negative for headaches, dizziness, lightheadedness, numbness, pain, tingling extremities  BEHAVIOR/PSYCH: Feels depressed, suicidal ideation  Physical Exam:   /63   Pulse 77   Temp 97.7 °F (36.5 °C) (Oral)   Resp 10   Ht 5' 8\" (1.727 m)   Wt 180 lb (81.6 kg)   SpO2 98%   BMI 27.37 kg/m²   Temp (24hrs), Av.7 °F (36.5 °C), Min:97.7 °F (36.5 °C), Max:97.7 °F (36.5 °C)    No results for input(s): POCGLU in the last 72 hours. No intake or output data in the 24 hours ending 23    General Appearance: Patient is agitated, depressed, frustrated with his situation, deep jaundice present  Mental status: oriented to person, place, and time with normal affect  Head:  normocephalic, atraumatic. Eye: Icterus present  Ear: normal external ear, no discharge, hearing intact  Nose:  no drainage noted  Mouth: mucous membranes moist  Neck: supple, no carotid bruits, thyroid not palpable  Lungs: Bilateral equal air entry, clear to ausculation, no wheezing, rales or rhonchi, normal effort  Cardiovascular: normal rate, regular rhythm, no murmur, gallop, rub.   Abdomen: Soft, nontender, nondistended, normal bowel sounds, no hepatomegaly or splenomegaly  Neurologic: There are no new focal motor or sensory deficits, normal muscle tone and bulk, no abnormal sensation, normal speech, cranial nerves II through XII grossly intact  Skin: No gross lesions, rashes, bruising or bleeding on exposed skin area  Extremities:  peripheral pulses palpable, no pedal edema or calf pain with palpation  Psych: normal affect     Investigations:      Laboratory Testing:  Recent Results (from the past 24 hour(s))   CBC with Auto Differential    Collection Time: 23  4:27 PM   Result Value Ref Range    WBC 7.6 3.5 - 11.0 k/uL    RBC 3.00 (L) 4.5 - 5.9 m/uL    Hemoglobin 9.4 (L) 13.5 - 17.5 g/dL    Hematocrit 28.1 (L) 41 - 53 %    MCV 93.7 80 - 100 fL    MCH 31.3 26 - 34 pg    MCHC 33.4 31 - 37 g/dL    RDW 26.3 (H) 11.5 - 14.9 %    Platelets 48 (L) 247 - 450 k/uL    MPV 10.0 6.0 - 12.0 fL    Seg Neutrophils 74 (H) 36 - 66 %    Lymphocytes 18 (L) 24 - 44 %    Monocytes 6 1 - 7 %    Eosinophils % 2 0 - 4 %    Basophils 0 0 - 2 %    Segs Absolute 5.62 1.3 - 9.1 k/uL    Absolute Lymph # 1.37 1.0 - 4.8 k/uL    Absolute Mono # 0.46 0.1 - 1.3 k/uL    Absolute Eos # 0.15 0.0 - 0.4 k/uL    Basophils Absolute 0.00 0.0 - 0.2 k/uL    Morphology 1+ TARGET CELLS     Morphology ANISOCYTOSIS PRESENT     Morphology 1+ TEARDROPS     Morphology 1+ ELLIPTOCYTES    Basic Metabolic Panel    Collection Time: 02/18/23  4:27 PM   Result Value Ref Range    Glucose 130 (H) 70 - 99 mg/dL    BUN 8 6 - 20 mg/dL    Creatinine 0.62 (L) 0.70 - 1.20 mg/dL    Est, Glom Filt Rate >60 >60 mL/min/1.73m2    Calcium 9.1 8.6 - 10.4 mg/dL    Sodium 133 (L) 135 - 144 mmol/L    Potassium 4.0 3.7 - 5.3 mmol/L    Chloride 100 98 - 107 mmol/L    CO2 21 20 - 31 mmol/L    Anion Gap 12 9 - 17 mmol/L   Magnesium    Collection Time: 02/18/23  4:27 PM   Result Value Ref Range    Magnesium 1.9 1.6 - 2.6 mg/dL   Hepatic Function Panel    Collection Time: 02/18/23  4:27 PM   Result Value Ref Range    Albumin 3.5 3.5 - 5.2 g/dL    Alkaline Phosphatase 530 (H) 40 - 129 U/L    ALT 29 5 - 41 U/L     (H) <40 U/L    Total Bilirubin 7.1 (H) 0.3 - 1.2 mg/dL    Bilirubin, Direct 4.8 (H) <0.3 mg/dL    Bilirubin, Indirect 2.3 (H) 0.0 - 1.0 mg/dL    Total Protein 9.0 (H) 6.4 - 8.3 g/dL   Lipase    Collection Time: 02/18/23  4:27 PM   Result Value Ref Range    Lipase 117 (H) 13 - 60 U/L   Troponin    Collection Time: 02/18/23  4:27 PM   Result Value Ref Range    Troponin, High Sensitivity 10 0 - 22 ng/L   Protime-INR    Collection Time: 02/18/23  4:27 PM   Result Value Ref Range    Protime 15.1 (H) 11.8 - 14.6 sec    INR 1.2    EKG 12 Lead    Collection Time: 02/18/23  5:19 PM   Result Value Ref Range    Ventricular Rate 73 BPM    Atrial Rate 73 BPM    P-R Interval 146 ms    QRS Duration 98 ms    Q-T Interval 374 ms    QTc Calculation (Bazett) 412 ms    P Axis 52 degrees    R Axis 18 degrees    T Axis 6 degrees       Imaging/Diagnostics:        Assessment :      Primary Problem  Alcoholism (HCC)    Active Hospital Problems     Diagnosis Date Noted    Alcoholism [facility]) [F10.20] 02/18/2023     Priority: Medium       Plan:     Patient status Admit as inpatient in the  Progressive Unit/Step down    Patient admitted with worsening jaundice, elevated liver enzymes,, his CTP score was 8, will order ammonia level, GI consulted  Calculated  Maddary DF score -9.3, do not qualify for steroids for alcoholic hepatitis  Active alcoholism, starting patient on thiamine, folic acid, multivitamin, CIWA protocol  Depression,suicidal ideation- sitter at bed side , jc consulted   SCD for DVT prophylxis -cannot give chemoprophylaxis with thrombocytopenia  Patient denying GI bleed  Hypertension, controlled, recently seen in clinic, antihypertensive was reduced  Overall prognosis guarded, patient need to quit drinking    Consultations:   [address]    Patient is admitted as inpatient status because of co-morbidities listed above, severity of signs and symptoms as outlined, requirement for current medical therapies and most importantly because of direct risk to patient if care not provided in a hospital setting. Tk Cary MD  2/18/2023  7:16 PM    Copy sent to Dr. Justice Saravia MD    Please note that this chart was generated using voice recognition Dragon dictation software. Although every effort was made to ensure the accuracy of this automated transcription, some errors in transcription may have occurred.

## 2023-02-19 NOTE — PROGRESS NOTES
Upon patient's arrival to the ED, assessment was completed and Plan of care was explained to patient in addition to safety and bed exit and also the need for a constant observer. Patient acknowledged education and has been cooperative. No sign of distress noted this shift.

## 2023-02-19 NOTE — PLAN OF CARE
THE [facility]   Plan of Care Note    Delia Reynoso is a 41 y.o. male patient. Hospitalization Day:1    Briefly:    71-year-old male presents to ED for nausea, vomiting, jaundice, AMS. Patient has hx of EtOH abuse. PMHX significant for HTN, alcoholism, cirrhosis, depression, EV w/ banding. Patient is drowsy during examination. Reports fatigue. His nausea improved. Reports last vomited yesterday. Denies any hematemesis, coffee-ground emesis. Patient reports some dark stools but was taking Pepto-bismal for N/V. Denies NSAID use    He currently has 1:1 sitter for suicidal ideation. Patient continues to drink daily. He has worsening LFTs  Had EGD w/ banding in October 2022. Hepatomegaly on palpation    Data Review:  LABS and IMAGING:     CBC  Recent Labs     02/18/23  1627 02/19/23  1134   WBC 7.6 7.4   HGB 9.4* 8.7*   MCV 93.7 93.3   RDW 26.3* 26.2*   PLT 48* 42*       ANEMIA STUDIES  No results for input(s): LABIRON, TIBC, FERRITIN, DNOEWPMR19, FOLATE, OCCULTBLD in the last 72 hours. BMP  Recent Labs     02/18/23  1627 02/19/23  1134   * 130*   K 4.0 4.4    97*   CO2 21 23   BUN 8 7   CREATININE 0.62* 0.47*   GLUCOSE 130* 99   CALCIUM 9.1 8.9       LFTS  Recent Labs     02/18/23  1627 02/19/23  1134   ALKPHOS 530* 512*   ALT 29 27   * 210*   BILITOT 7.1* 8.8*   BILIDIR 4.8*  --    LABALBU 3.5 3.4*       AMYLASE/LIPASE/AMMONIA  Recent Labs     02/18/23  1627 02/18/23 2024   LIPASE 117*  --    AMMONIA  --  76*       Radiology Review:    No results found. Principal Problem:    Alcoholism ([address]  Resolved Problems:    * No resolved hospital problems. *       GI Assessment:    Elevated LFTs  Hyperammonemia  Cirrhosis  EtOH abuse  SI  Hx of EV w/ banding    Plan of care:   Thiamine, folic acid, multivitamin  Liver US  2 gm sodium diet as tolerated  Will need EGD this admission  PPI  CIWA protocol  EtOH rehab  Follow-up labs    Complete consult note and plan of care to follow per Dr. Jackie Morgan    Thank you for allowing me to participate in the care of your patient. Please feel free to contact me with any questions or concerns.      Brendon Navarrete, [address] [facility]  [phone]

## 2023-02-20 PROBLEM — F33.2 SEVERE RECURRENT MAJOR DEPRESSION WITHOUT PSYCHOTIC FEATURES (HCC): Status: ACTIVE | Noted: 2023-02-20

## 2023-02-20 PROBLEM — R10.84 GENERALIZED ABDOMINAL PAIN: Status: ACTIVE | Noted: 2023-02-20

## 2023-02-20 LAB
ABSOLUTE BANDS #: 0.84 K/UL (ref 0–1)
ABSOLUTE EOS #: 0.15 K/UL (ref 0–0.4)
ABSOLUTE LYMPH #: 0.91 K/UL (ref 1–4.8)
ABSOLUTE MONO #: 0.84 K/UL (ref 0.1–1.3)
ALBUMIN SERPL-MCNC: 3.3 G/DL (ref 3.5–5.2)
ALP SERPL-CCNC: 510 U/L (ref 40–129)
ALT SERPL-CCNC: 27 U/L (ref 5–41)
ANION GAP SERPL CALCULATED.3IONS-SCNC: 10 MMOL/L (ref 9–17)
AST SERPL-CCNC: 208 U/L
BANDS: 11 % (ref 0–10)
BASOPHILS # BLD: 1 % (ref 0–2)
BASOPHILS ABSOLUTE: 0.08 K/UL (ref 0–0.2)
BILIRUB SERPL-MCNC: 9.7 MG/DL (ref 0.3–1.2)
BUN SERPL-MCNC: 7 MG/DL (ref 6–20)
CALCIUM SERPL-MCNC: 9 MG/DL (ref 8.6–10.4)
CHLORIDE SERPL-SCNC: 95 MMOL/L (ref 98–107)
CO2 SERPL-SCNC: 23 MMOL/L (ref 20–31)
CREAT SERPL-MCNC: <0.4 MG/DL (ref 0.7–1.2)
EKG ATRIAL RATE: 73 BPM
EKG P AXIS: 52 DEGREES
EKG P-R INTERVAL: 146 MS
EKG Q-T INTERVAL: 374 MS
EKG QRS DURATION: 98 MS
EKG QTC CALCULATION (BAZETT): 412 MS
EKG R AXIS: 18 DEGREES
EKG T AXIS: 6 DEGREES
EKG VENTRICULAR RATE: 73 BPM
EOSINOPHILS RELATIVE PERCENT: 2 % (ref 0–4)
GFR SERPL CREATININE-BSD FRML MDRD: ABNORMAL ML/MIN/1.73M2
GLUCOSE SERPL-MCNC: 96 MG/DL (ref 70–99)
HCT VFR BLD AUTO: 27.5 % (ref 41–53)
HGB BLD-MCNC: 9.2 G/DL (ref 13.5–17.5)
LYMPHOCYTES # BLD: 12 % (ref 24–44)
MCH RBC QN AUTO: 31.7 PG (ref 26–34)
MCHC RBC AUTO-ENTMCNC: 33.5 G/DL (ref 31–37)
MCV RBC AUTO: 94.6 FL (ref 80–100)
METAMYELOCYTES ABSOLUTE COUNT: 0.15 K/UL
METAMYELOCYTES: 2 %
MONOCYTES # BLD: 11 % (ref 1–7)
MORPHOLOGY: ABNORMAL
PDW BLD-RTO: 26 % (ref 11.5–14.9)
PLATELET # BLD AUTO: 45 K/UL (ref 150–450)
PMV BLD AUTO: 10 FL (ref 6–12)
POTASSIUM SERPL-SCNC: 4 MMOL/L (ref 3.7–5.3)
PROT SERPL-MCNC: 9 G/DL (ref 6.4–8.3)
RBC # BLD: 2.91 M/UL (ref 4.5–5.9)
SEG NEUTROPHILS: 61 % (ref 36–66)
SEGMENTED NEUTROPHILS ABSOLUTE COUNT: 4.63 K/UL (ref 1.3–9.1)
SODIUM SERPL-SCNC: 128 MMOL/L (ref 135–144)
WBC # BLD AUTO: 7.6 K/UL (ref 3.5–11)

## 2023-02-20 PROCEDURE — 93010 ELECTROCARDIOGRAM REPORT: CPT | Performed by: INTERNAL MEDICINE

## 2023-02-20 PROCEDURE — 99232 SBSQ HOSP IP/OBS MODERATE 35: CPT | Performed by: INTERNAL MEDICINE

## 2023-02-20 PROCEDURE — 36415 COLL VENOUS BLD VENIPUNCTURE: CPT

## 2023-02-20 PROCEDURE — 6370000000 HC RX 637 (ALT 250 FOR IP): Performed by: INTERNAL MEDICINE

## 2023-02-20 PROCEDURE — 6360000002 HC RX W HCPCS: Performed by: INTERNAL MEDICINE

## 2023-02-20 PROCEDURE — APPSS30 APP SPLIT SHARED TIME 16-30 MINUTES: Performed by: NURSE PRACTITIONER

## 2023-02-20 PROCEDURE — 2580000003 HC RX 258: Performed by: INTERNAL MEDICINE

## 2023-02-20 PROCEDURE — 99239 HOSP IP/OBS DSCHRG MGMT >30: CPT | Performed by: INTERNAL MEDICINE

## 2023-02-20 PROCEDURE — 2060000000 HC ICU INTERMEDIATE R&B

## 2023-02-20 PROCEDURE — 80053 COMPREHEN METABOLIC PANEL: CPT

## 2023-02-20 PROCEDURE — 85025 COMPLETE CBC W/AUTO DIFF WBC: CPT

## 2023-02-20 RX ORDER — LORAZEPAM 2 MG/ML
1 INJECTION INTRAMUSCULAR
Status: DISCONTINUED | OUTPATIENT
Start: 2023-02-20 | End: 2023-02-22 | Stop reason: HOSPADM

## 2023-02-20 RX ORDER — LORAZEPAM 1 MG/1
1 TABLET ORAL EVERY 4 HOURS PRN
Status: DISCONTINUED | OUTPATIENT
Start: 2023-02-20 | End: 2023-02-20

## 2023-02-20 RX ORDER — LORAZEPAM 2 MG/ML
3 INJECTION INTRAMUSCULAR
Status: DISCONTINUED | OUTPATIENT
Start: 2023-02-20 | End: 2023-02-22 | Stop reason: HOSPADM

## 2023-02-20 RX ORDER — LORAZEPAM 1 MG/1
3 TABLET ORAL
Status: DISCONTINUED | OUTPATIENT
Start: 2023-02-20 | End: 2023-02-22 | Stop reason: HOSPADM

## 2023-02-20 RX ORDER — LACTULOSE 10 G/15ML
10 SOLUTION ORAL 3 TIMES DAILY
Status: DISCONTINUED | OUTPATIENT
Start: 2023-02-20 | End: 2023-02-22 | Stop reason: HOSPADM

## 2023-02-20 RX ORDER — SODIUM CHLORIDE 0.9 % (FLUSH) 0.9 %
5-40 SYRINGE (ML) INJECTION PRN
Status: DISCONTINUED | OUTPATIENT
Start: 2023-02-20 | End: 2023-02-22 | Stop reason: HOSPADM

## 2023-02-20 RX ORDER — LORAZEPAM 2 MG/ML
2 INJECTION INTRAMUSCULAR
Status: DISCONTINUED | OUTPATIENT
Start: 2023-02-20 | End: 2023-02-22 | Stop reason: HOSPADM

## 2023-02-20 RX ORDER — SODIUM CHLORIDE 0.9 % (FLUSH) 0.9 %
5-40 SYRINGE (ML) INJECTION EVERY 12 HOURS SCHEDULED
Status: DISCONTINUED | OUTPATIENT
Start: 2023-02-20 | End: 2023-02-22 | Stop reason: HOSPADM

## 2023-02-20 RX ORDER — SODIUM CHLORIDE 9 MG/ML
INJECTION, SOLUTION INTRAVENOUS PRN
Status: DISCONTINUED | OUTPATIENT
Start: 2023-02-20 | End: 2023-02-22 | Stop reason: HOSPADM

## 2023-02-20 RX ORDER — LORAZEPAM 1 MG/1
4 TABLET ORAL
Status: DISCONTINUED | OUTPATIENT
Start: 2023-02-20 | End: 2023-02-22 | Stop reason: HOSPADM

## 2023-02-20 RX ORDER — GAUZE BANDAGE 2" X 2"
100 BANDAGE TOPICAL DAILY
Status: DISCONTINUED | OUTPATIENT
Start: 2023-02-20 | End: 2023-02-22 | Stop reason: HOSPADM

## 2023-02-20 RX ORDER — LORAZEPAM 2 MG/ML
4 INJECTION INTRAMUSCULAR
Status: DISCONTINUED | OUTPATIENT
Start: 2023-02-20 | End: 2023-02-22 | Stop reason: HOSPADM

## 2023-02-20 RX ORDER — LORAZEPAM 1 MG/1
2 TABLET ORAL
Status: DISCONTINUED | OUTPATIENT
Start: 2023-02-20 | End: 2023-02-22 | Stop reason: HOSPADM

## 2023-02-20 RX ORDER — LORAZEPAM 1 MG/1
1 TABLET ORAL
Status: DISCONTINUED | OUTPATIENT
Start: 2023-02-20 | End: 2023-02-22 | Stop reason: HOSPADM

## 2023-02-20 RX ADMIN — LACTULOSE 10 G: 20 SOLUTION ORAL at 14:18

## 2023-02-20 RX ADMIN — SODIUM CHLORIDE, PRESERVATIVE FREE 10 ML: 5 INJECTION INTRAVENOUS at 09:58

## 2023-02-20 RX ADMIN — SUCRALFATE 1 G: 1 TABLET ORAL at 09:30

## 2023-02-20 RX ADMIN — FLUTICASONE PROPIONATE 2 SPRAY: 50 SPRAY, METERED NASAL at 09:30

## 2023-02-20 RX ADMIN — LORAZEPAM 3 MG: 1 TABLET ORAL at 05:24

## 2023-02-20 RX ADMIN — NADOLOL 20 MG: 20 TABLET ORAL at 09:30

## 2023-02-20 RX ADMIN — SODIUM CHLORIDE, PRESERVATIVE FREE 10 ML: 5 INJECTION INTRAVENOUS at 22:08

## 2023-02-20 RX ADMIN — SODIUM CHLORIDE, PRESERVATIVE FREE 10 ML: 5 INJECTION INTRAVENOUS at 10:00

## 2023-02-20 RX ADMIN — SODIUM CHLORIDE, PRESERVATIVE FREE 10 ML: 5 INJECTION INTRAVENOUS at 22:09

## 2023-02-20 RX ADMIN — Medication 2 MG: at 04:04

## 2023-02-20 RX ADMIN — VENLAFAXINE HYDROCHLORIDE 37.5 MG: 37.5 CAPSULE, EXTENDED RELEASE ORAL at 09:30

## 2023-02-20 RX ADMIN — Medication 2 MG: at 03:14

## 2023-02-20 RX ADMIN — ONDANSETRON 4 MG: 4 TABLET, ORALLY DISINTEGRATING ORAL at 12:10

## 2023-02-20 RX ADMIN — FOLIC ACID 1 MG: 1 TABLET ORAL at 09:30

## 2023-02-20 RX ADMIN — PANTOPRAZOLE SODIUM 40 MG: 40 TABLET, DELAYED RELEASE ORAL at 09:30

## 2023-02-20 RX ADMIN — SUCRALFATE 1 G: 1 TABLET ORAL at 14:18

## 2023-02-20 RX ADMIN — LORAZEPAM 1 MG: 1 TABLET ORAL at 14:27

## 2023-02-20 RX ADMIN — LACTULOSE 10 G: 20 SOLUTION ORAL at 22:07

## 2023-02-20 RX ADMIN — LACTULOSE 20 G: 20 SOLUTION ORAL at 09:30

## 2023-02-20 RX ADMIN — SUCRALFATE 1 G: 1 TABLET ORAL at 22:07

## 2023-02-20 RX ADMIN — Medication 2 MG: at 09:58

## 2023-02-20 RX ADMIN — THIAMINE HCL TAB 100 MG 100 MG: 100 TAB at 09:30

## 2023-02-20 ASSESSMENT — PAIN SCALES - GENERAL
PAINLEVEL_OUTOF10: 0
PAINLEVEL_OUTOF10: 2

## 2023-02-20 NOTE — CARE COORDINATION
ONGOING DISCHARGE PLAN:    Patient is alert and oriented x4. Spoke with patient regarding discharge plan and patient confirms that plan is still to return home. Patient is agitated and would like to leave. D/T suicidal ideation the patient has been pink slipped. Awaiting psych evaluation. Will continue to follow for additional discharge needs.     Electronically signed by Asha Jeffries RN on 2/20/2023 at 9:49 AM

## 2023-02-20 NOTE — PROGRESS NOTES
J[facility] 54    HISTORY AND PHYSICAL EXAMINATION            Date:   2/20/2023  Patient name:  Jen Shepard  Date of admission:  2/18/2023  3:40 PM  MRN:   [id number]  Account:  [de-identified]  YOB: 1985  PCP:    Jay Jewell MD  Room:   2094/2094-01  Code Status:    Full Code    Chief Complaint:     Chief Complaint   Patient presents with    Alcohol Problem    Mental Health Problem    Emesis    Nausea    Jaundice       History Obtained From:     patient, electronic medical record    History of Present Illness: The patient is a 41 y.o. Non- / non  male who presents with Alcohol Problem, Mental Health Problem, Emesis, Nausea, and Jaundice   and he is admitted to the hospital for the management of jaundice, alcoholism   Patient has past medical history multiple medical problem which include hypertension, alcoholism, cirrhosis of liver, depression, history of esophageal varices s/p banding, patient, presented to emergency room, with complaints of worsening fatigue, tiredness, nausea, vomiting. Patient told me he could not able to hold anything down, he also noticed, worsening jaundice. Patient also feels depressed, he told me that, he has suicidal ideation.   Does not have any plan to commit suicide, never attempted suicide in the past.  Does not have access to gun  Patient still continue to drink, last drink was 5 AM in the morning, he will drink around 3 pints of vodka daily  No complaints of chest pain, shortness of breath  He told me he never required paracentesis in the past  2/19   Patient, clinically feeling better  Had 1 episode of vomiting last night  Required 2 doses of Ativan  No chest pain    2/20   \Patient, feeling better  Has multiple bowel movement, dose of lactulose was reduced  Patient desperately want to go home  Patient required only one-time dose of Ativan since ,orning   Past Medical History:     Past Medical History:   Diagnosis Date    Alcoholism ([facility] [address]     pt drinks a fifth of whiskey daily    Anemia     Cirrhosis, alcoholic ([facility] [address]     Pulmonary embolism ([facility] [address]         Past Surgical History:     Past Surgical History:   Procedure Laterality Date    UPPER GASTROINTESTINAL ENDOSCOPY N/A 5/1/2022    EGD ESOPHAGOGASTRODUODENOSCOPY performed by Bubba Strickland MD at [address] [place] [address] N/A 10/30/2022    EGD BAND LIGATION performed by Aydin Tomas MD at [facility]        Medications Prior to Admission:     Prior to Admission medications    Medication Sig Start Date End Date Taking?  Authorizing Provider   metoclopramide (REGLAN) 5 MG tablet Take 1 tablet by mouth 3 times daily as needed (nausea) 2/14/23   Selin Vega MD   venlafaxine (EFFEXOR XR) 37.5 MG extended release capsule Take 1 capsule by mouth daily 2/14/23   Selin Vega MD   nadolol (CORGARD) 20 MG tablet Take 1 tablet by mouth daily 2/1/23   Rony Calixto MD   ondansetron (ZOFRAN-ODT) 4 MG disintegrating tablet Take 1 tablet by mouth 3 times daily as needed for Nausea or Vomiting 12/27/22   Selin Vega MD   fluticasone [facility]) 50 MCG/ACT nasal spray 1 spray by Each Nostril route daily 12/27/22   Selin Vega MD   pantoprazole (PROTONIX) 40 MG tablet Take 1 tablet by mouth daily 12/7/22   Selin Vega MD   sucralfate (CARAFATE) 1 GM tablet Take 1 tablet by mouth 4 times daily 12/7/22   Selin Vega MD   albuterol sulfate HFA (VENTOLIN HFA) 108 (90 Base) MCG/ACT inhaler Inhale 2 puffs into the lungs 4 times daily as needed for Wheezing 5/3/22   Sabi Santana MD   folic acid (FOLVITE) 1 MG tablet Take 1 tablet by mouth daily 5/4/22   Sabi Santana MD   vitamin B-1 (THIAMINE) 100 MG tablet Take 1 tablet by mouth daily 5/4/22   Sabi Santana MD   Multiple Vitamins-Minerals (CENTRUM MEN) TABS Take 1 tablet by mouth daily    Historical Provider, MD   Menthol-Methyl Salicylate (ANALGESIC OINTMENT) OINT ointment Apply topically as needed Indications: on back    Historical Provider, MD   naproxen (NAPROSYN) 500 MG tablet Take 1 tablet by mouth 2 times daily  Patient taking differently: Take 500 mg by mouth 2 times daily as needed 8/13/21 3/28/22  Jose Emerson MD   fluticasone [facility]) 50 MCG/ACT nasal spray instill 1 spray into each nostril once daily 5/10/21   Juana Tamayo MD   Blood Pressure Monitoring (BP MONITOR-STETHOSCOPE) KIT 1 each by Does not apply route daily 4/7/21   Juana Tamayo MD   Nicotine 21-14-7 MG/24HR KIT Place 14 mg onto the skin nightly     Historical Provider, MD   EPINEPHrine (EPIPEN 2-LIZETTE) 0.3 MG/0.3ML SOAJ injection Inject 0.3 mLs into the muscle once for 1 dose Use as directed for allergic reaction 11/17/16 11/15/22  Ramírez Fuentes MD        Allergies:     Bee venom    Social History:     Tobacco:    reports that he has been smoking cigarettes. He has a 21.00 pack-year smoking history. He has never used smokeless tobacco.  Alcohol:      reports that he does not currently use alcohol. Drug Use:  reports that he does not currently use drugs. Family History:     Family History   Problem Relation Age of Onset    Heart Disease Mother     Heart Attack Mother     Heart Disease Father        Review of Systems:     Positive and Negative as described in HPI. CONSTITUTIONAL: Generalized fatigue, malaise   HEENT: Worsening jaundice, yellowish discoloration of eyes and skin   RESPIRATORY:  negative for shortness of breath, cough, congestion, wheezing. CARDIOVASCULAR:  negative for chest pain, palpitations.   GASTROINTESTINAL: Positive for nausea, vomiting, anorexia  GENITOURINARY:  negative for difficulty of urination, burning with urination, frequency   INTEGUMENT:  negative for rash, skin lesions, easy bruising   HEMATOLOGIC/LYMPHATIC:  negative for swelling/edema   ALLERGIC/IMMUNOLOGIC:  negative for urticaria , itching  ENDOCRINE:  negative increase in drinking, increase in urination, hot or cold intolerance  MUSCULOSKELETAL:  negative joint pains, muscle aches, swelling of joints  NEUROLOGICAL:  negative for headaches, dizziness, lightheadedness, numbness, pain, tingling extremities  BEHAVIOR/PSYCH: Feels depressed, suicidal ideation  Physical Exam:   /87   Pulse 95   Temp 99 °F (37.2 °C) (Oral)   Resp 18   Ht 5' 8\" (1.727 m)   Wt 180 lb 12.4 oz (82 kg)   SpO2 95%   BMI 27.49 kg/m²   Temp (24hrs), Av.8 °F (37.1 °C), Min:98 °F (36.7 °C), Max:99.4 °F (37.4 °C)    No results for input(s): POCGLU in the last 72 hours. Intake/Output Summary (Last 24 hours) at 2023 1236  Last data filed at 2023 0530  Gross per 24 hour   Intake 640 ml   Output 500 ml   Net 140 ml       General Appearance: Patient is agitated, depressed, frustrated with his situation, deep jaundice present  Mental status: oriented to person, place, and time with normal affect  Head:  normocephalic, atraumatic. Eye: Icterus present  Ear: normal external ear, no discharge, hearing intact  Nose:  no drainage noted  Mouth: mucous membranes moist  Neck: supple, no carotid bruits, thyroid not palpable  Lungs: Bilateral equal air entry, clear to ausculation, no wheezing, rales or rhonchi, normal effort  Cardiovascular: normal rate, regular rhythm, no murmur, gallop, rub.   Abdomen: Soft, nontender, nondistended, normal bowel sounds, no hepatomegaly or splenomegaly  Neurologic: There are no new focal motor or sensory deficits, normal muscle tone and bulk, no abnormal sensation, normal speech, cranial nerves II through XII grossly intact  Skin: No gross lesions, rashes, bruising or bleeding on exposed skin area  Extremities:  peripheral pulses palpable, no pedal edema or calf pain with palpation  Psych: normal affect     Investigations:      Laboratory Testing:  Recent Results (from the past 24 hour(s))   Comprehensive Metabolic Panel w/ Reflex to MG    Collection Time: 23  5:43 AM   Result Value Ref Range    Glucose 96 70 - 99 mg/dL    BUN 7 6 - 20 mg/dL    Creatinine <0.40 (L) 0.70 - 1.20 mg/dL    Est, Glom Filt Rate Can not be calculated >60 mL/min/1.73m2    Calcium 9.0 8.6 - 10.4 mg/dL    Sodium 128 (L) 135 - 144 mmol/L    Potassium 4.0 3.7 - 5.3 mmol/L    Chloride 95 (L) 98 - 107 mmol/L    CO2 23 20 - 31 mmol/L    Anion Gap 10 9 - 17 mmol/L    Alkaline Phosphatase 510 (H) 40 - 129 U/L    ALT 27 5 - 41 U/L     (H) <40 U/L    Total Bilirubin 9.7 (H) 0.3 - 1.2 mg/dL    Total Protein 9.0 (H) 6.4 - 8.3 g/dL    Albumin 3.3 (L) 3.5 - 5.2 g/dL   CBC with Auto Differential    Collection Time: 02/20/23  5:43 AM   Result Value Ref Range    WBC 7.6 3.5 - 11.0 k/uL    RBC 2.91 (L) 4.5 - 5.9 m/uL    Hemoglobin 9.2 (L) 13.5 - 17.5 g/dL    Hematocrit 27.5 (L) 41 - 53 %    MCV 94.6 80 - 100 fL    MCH 31.7 26 - 34 pg    MCHC 33.5 31 - 37 g/dL    RDW 26.0 (H) 11.5 - 14.9 %    Platelets 45 (L) 698 - 450 k/uL    MPV 10.0 6.0 - 12.0 fL    Seg Neutrophils 61 36 - 66 %    Lymphocytes 12 (L) 24 - 44 %    Monocytes 11 (H) 1 - 7 %    Eosinophils % 2 0 - 4 %    Basophils 1 0 - 2 %    Bands 11 (H) 0 - 10 %    Metamyelocytes 2 (H) 0 %    Segs Absolute 4.63 1.3 - 9.1 k/uL    Absolute Lymph # 0.91 (L) 1.0 - 4.8 k/uL    Absolute Mono # 0.84 0.1 - 1.3 k/uL    Absolute Eos # 0.15 0.0 - 0.4 k/uL    Basophils Absolute 0.08 0.0 - 0.2 k/uL    Absolute Bands # 0.84 0.0 - 1.0 k/uL    Metamyelocytes Absolute 0.15 (H) 0 k/uL    Morphology ANISOCYTOSIS PRESENT     Morphology HYPOCHROMIA PRESENT     Morphology 2+ TARGET CELLS        Imaging/Diagnostics:        Assessment :      Primary Problem  Alcoholism [facility])    Active Hospital Problems    Diagnosis Date Noted    [facility]) [F10.20] 02/18/2023     Priority: Medium       Plan:     Patient status Admit as inpatient in the  Progressive Unit/Step down    Patient admitted with worsening jaundice, elevated liver enzymes,, his CTP score was 8, will order ammonia level, GI consulted  Calculated  Maddary DF score -9.3, do not qualify for steroids for alcoholic hepatitis  Active alcoholism, starting patient on thiamine, folic acid, multivitamin, CIWA protocol  Depression,suicidal ideation- sitter at bed side , jc consulted   [facility] for DVT prophylxis -cannot give chemoprophylaxis with thrombocytopenia  Patient denying GI bleed  Hypertension, controlled, recently seen in clinic, antihypertensive was reduced  Overall prognosis guarded, patient need to quit drinking  Serum ammonia is high, starting patient on lactulose 3 times a day, with goal of 3 stools a day  Awaiting morning labs  Input from GI and psychiatrist awaited  2/20   Patient, having more than 5 stools  Reducing lactulose to 10 mg 3 times a day  Patient desperate want to go home  Case discussed with GI team, since he did not have any active bleeding  No indication for urgent EGD  Patient can follow-up with GI as outpatient  Awaiting inputs from psychiatrist, patient, is pink slipped  Consultations:   IP CONSULT TO SOCIAL WORK  IP CONSULT TO INTERNAL MEDICINE  IP CONSULT TO SOCIAL WORK  IP CONSULT TO GI  IP CONSULT TO PSYCHIATRY    Patient is admitted as inpatient status because of co-morbidities listed above, severity of signs and symptoms as outlined, requirement for current medical therapies and most importantly because of direct risk to patient if care not provided in a hospital setting. Danette Dominguez MD  2/20/2023  12:36 PM    Copy sent to Dr. Luis Love MD    Please note that this chart was generated using voice recognition Dragon dictation software. Although every effort was made to ensure the accuracy of this automated transcription, some errors in transcription may have occurred.

## 2023-02-20 NOTE — PROGRESS NOTES
Patient pulled IV out and had a substantial amount of bleeding. Writer was able to put pressure to stop the bleeding. IV catheter was intact. Patient Stated \"I  want to get out of here but you guys won't let me\".

## 2023-02-20 NOTE — FLOWSHEET NOTE
02/20/23 0626   Treatment Team Notification   Reason for Communication Review case   Team Member Name Theresa Swan 89 Team Role Advanced Practice Nurse   Method of Communication Call   Response Other (Comment)   Notification Time [id number]       Patient stated he wants to leave and that he has things to do.  MALIHA Garcia was called and she would be up to see patient and also security has been called and are up to see patient

## 2023-02-20 NOTE — CONSULTS
GI Consult Note:    Name: Ale Cannon  MRN: [id number]     Acct: [de-identified]  Room: 2094/2094-01    Admit Date: 2/18/2023  PCP: Lakia Thomas MD    Physician Requesting Consult: Parish Fitch MD     Reason for Consult: Nausea vomiting, weakness, tiredness, lightheadedness. Chief Complaint:     Chief Complaint   Patient presents with    Alcohol Problem    Mental Health Problem    Emesis    Nausea    Jaundice       History Obtained From:     patient, electronic medical record    History of Present Illness:      Ale Cannon 41 y.o.  male who presents with Alcohol Problem, Mental Health Problem, Emesis, Nausea, and Jaundice      Symptoms:  Patient seen at [facility].    Admitted to the hospital with 3 days history of persistent nausea and emesis, lightheadedness, weakness. He was also having generalized body pains, tremors. No history of hematemesis. Bowel movements satisfactory. He has been taking Pepto-Bismol for several days and noted to have dark stools after taking this medication. Patient has been drinking alcohol moderately heavily and he is a last drink was prior to admission. This patient was evaluated by me in the past and was found to have fresh blood in the stomach and esophageal varices and had variceal banding done in October 2022. Patient did not follow in the office. He states that he has been taking prescribed medication, but I am not sure regarding this. He is supposed to be on nadolol, folic acid, PPI/pantoprazole,. He was not on diuretic therapy. He denies abdominal distention, swelling etc.  No history of swelling of the extremities. No prior history of encephalopathy, SBP, ascites etc.    Also reviewed GI APRN notes which is as follows:      73-year-old male presents to ED for nausea, vomiting, jaundice, AMS. Patient has hx of EtOH abuse. PMHX significant for HTN, alcoholism, cirrhosis, depression, EV w/ banding.   Patient is drowsy during examination. Reports fatigue. His nausea improved. Reports last vomited yesterday. Denies any hematemesis, coffee-ground emesis. Patient reports some dark stools but was taking Pepto-bismal for N/V. Denies NSAID use     He currently has 1:1 sitter for suicidal ideation. Patient continues to drink daily. He has worsening LFTs  Had EGD w/ banding in October 2022. Hepatomegaly on palpation     Data Review:  LABS and IMAGING:      CBC       Recent Labs     02/18/23  1627 02/19/23  1134   WBC 7.6 7.4   HGB 9.4* 8.7*   MCV 93.7 93.3   RDW 26.3* 26.2*   PLT 48* 42*         ANEMIA STUDIES  No results for input(s): LABIRON, TIBC, FERRITIN, [id number], FOLATE, OCCULTBLD in the last 72 hours. BMP       Recent Labs     02/18/23  1627 02/19/23  1134   * 130*   K 4.0 4.4    97*   CO2 21 23   BUN 8 7   CREATININE 0.62* 0.47*   GLUCOSE 130* 99   CALCIUM 9.1 8.9         LFTS       Recent Labs     02/18/23  1627 02/19/23  1134   ALKPHOS 530* 512*   ALT 29 27   * 210*   BILITOT 7.1* 8.8*   BILIDIR 4.8*  --    LABALBU 3.5 3.4*         AMYLASE/LIPASE/AMMONIA       Recent Labs     02/18/23  1627 02/18/23  2024   LIPASE 117*  --    AMMONIA  --  76*         Radiology Review:    No results found. Principal Problem:    Alcoholism ([facility]  Resolved Problems:    * No resolved hospital problems. *        GI Assessment:     Elevated LFTs  Hyperammonemia  Cirrhosis  EtOH abuse  SI  Hx of EV w/ banding     Plan of care: Thiamine, folic acid, multivitamin  Liver US  2 gm sodium diet as tolerated  Will need EGD this admission  PPI  [facility] protocol  EtOH rehab  Follow-up labs     Complete consult note and plan of care to follow per Dr. Sofya Chatterjee     Thank you for allowing me to participate in the care of your patient.   Please feel free to contact me with any questions or concerns        Past Medical History:     Past Medical History:   Diagnosis Date    Alcoholism ([facility] [facility]     pt drinks a fifth of whiskey daily    Anemia Cirrhosis, alcoholic ([facility]     Pulmonary embolism ([facility]         Past Surgical History:     Past Surgical History:   Procedure Laterality Date    UPPER GASTROINTESTINAL ENDOSCOPY N/A 5/1/2022    EGD ESOPHAGOGASTRODUODENOSCOPY performed by Eliezer Trejo MD at [address] N/A 10/30/2022    EGD BAND LIGATION performed by Matt Laws MD at [facility]        Medications Prior to Admission:       Prior to Admission medications    Medication Sig Start Date End Date Taking?  Authorizing Provider   metoclopramide (REGLAN) 5 MG tablet Take 1 tablet by mouth 3 times daily as needed (nausea) 2/14/23   Ladi Cohen MD   venlafaxine (EFFEXOR XR) 37.5 MG extended release capsule Take 1 capsule by mouth daily 2/14/23   Ladi Cohen MD   nadolol (CORGARD) 20 MG tablet Take 1 tablet by mouth daily 2/1/23   Sanju Schmidt MD   ondansetron (ZOFRAN-ODT) 4 MG disintegrating tablet Take 1 tablet by mouth 3 times daily as needed for Nausea or Vomiting 12/27/22   Ladi Cohen MD   fluticasone [facility]) 50 MCG/ACT nasal spray 1 spray by Each Nostril route daily 12/27/22   Ladi Cohen MD   pantoprazole (PROTONIX) 40 MG tablet Take 1 tablet by mouth daily 12/7/22   Ladi Cohen MD   sucralfate (CARAFATE) 1 GM tablet Take 1 tablet by mouth 4 times daily 12/7/22   Ladi Cohen MD   albuterol sulfate HFA (VENTOLIN HFA) 108 (90 Base) MCG/ACT inhaler Inhale 2 puffs into the lungs 4 times daily as needed for Wheezing 5/3/22   Jama Nino MD   folic acid (FOLVITE) 1 MG tablet Take 1 tablet by mouth daily 5/4/22   Jama Nino MD   vitamin B-1 (THIAMINE) 100 MG tablet Take 1 tablet by mouth daily 5/4/22   Jama Nino MD   Multiple Vitamins-Minerals (CENTRUM MEN) TABS Take 1 tablet by mouth daily    Historical Provider, MD   Menthol-Methyl Salicylate (ANALGESIC OINTMENT) OINT ointment Apply topically as needed Indications: on back    Historical Provider, MD   naproxen (NAPROSYN) 500 MG tablet Take 1 tablet by mouth 2 times daily  Patient taking differently: Take 500 mg by mouth 2 times daily as needed 8/13/21 3/28/22  Shira Hugo MD   fluticasone Amalia Dragon) 50 MCG/ACT nasal spray instill 1 spray into each nostril once daily 5/10/21   Sanju Schmidt MD   Blood Pressure Monitoring (BP MONITOR-STETHOSCOPE) KIT 1 each by Does not apply route daily 4/7/21   Sanju Schmidt MD   Nicotine 21-14-7 MG/24HR KIT Place 14 mg onto the skin nightly     Historical Provider, MD   EPINEPHrine (EPIPEN 2-LIZETTE) 0.3 MG/0.3ML SOAJ injection Inject 0.3 mLs into the muscle once for 1 dose Use as directed for allergic reaction 11/17/16 11/15/22  Migel Jaramillo MD        Allergies:       Bee venom    Social History:     Tobacco:    reports that he has been smoking cigarettes. He has a 21.00 pack-year smoking history. He has never used smokeless tobacco.  Alcohol:      reports that he does not currently use alcohol. Drug Use: reports that he does not currently use drugs. Family History:     Family History   Problem Relation Age of Onset    Heart Disease Mother     Heart Attack Mother     Heart Disease Father        Review of Systems:     Review of Systems   Constitutional:  Negative for appetite change, fatigue and fever. HENT:  Negative for mouth sores, sore throat, trouble swallowing and voice change. Eyes:         No ictirus   Respiratory:  Negative for apnea, cough, choking, shortness of breath and wheezing. Cardiovascular:  Negative for chest pain, palpitations and leg swelling. Endocrine: Negative for polydipsia, polyphagia and polyuria. Genitourinary:  Negative for frequency, hematuria and urgency. Musculoskeletal:  Negative for arthralgias, back pain, gait problem and joint swelling. Skin:  Negative for pallor and rash. Allergic/Immunologic: Negative for food allergies. Neurological:  Negative for dizziness, seizures, weakness and headaches. Hematological:  Negative for adenopathy.  Does not bruise/bleed easily. Psychiatric/Behavioral:  Negative for sleep disturbance. The patient is not nervous/anxious. Code Status:  Full Code    Physical Exam:     Vitals:  /62   Pulse 87   Temp 99.4 °F (37.4 °C) (Oral)   Resp 16   Ht 5' 8\" (1.727 m)   Wt 209 lb 7 oz (95 kg)   SpO2 96%   BMI 31.84 kg/m²   Temp (24hrs), Av.2 °F (31.2 °C), Min:32 °F (0 °C), Max:100.8 °F (38.2 °C)      Physical Exam  Vitals and nursing note reviewed. Constitutional:       General: He is not in acute distress. Appearance: He is well-developed. HENT:      Head: Normocephalic and atraumatic. Mouth/Throat:      Pharynx: No oropharyngeal exudate. Eyes:      General: No scleral icterus. Conjunctiva/sclera: Conjunctivae normal.      Pupils: Pupils are equal, round, and reactive to light. Neck:      Thyroid: No thyromegaly. Trachea: No tracheal deviation. Cardiovascular:      Rate and Rhythm: Normal rate and regular rhythm. Heart sounds: Normal heart sounds. No murmur heard. Pulmonary:      Effort: Pulmonary effort is normal. No respiratory distress. Breath sounds: Normal breath sounds. No wheezing or rales. Abdominal:      General: Bowel sounds are normal. There is no distension. Palpations: Abdomen is soft. There is no hepatomegaly or mass. Tenderness: There is no abdominal tenderness. There is no guarding. Hernia: No hernia is present. Comments: Liver palpable nontender. No ascites. Genitourinary:     Rectum: Normal.   Musculoskeletal:      Cervical back: Normal range of motion and neck supple. Lymphadenopathy:      Cervical: No cervical adenopathy. Skin:     General: Skin is warm and dry. Findings: No erythema or rash. Neurological:      Mental Status: He is alert and oriented to person, place, and time. Cranial Nerves: No cranial nerve deficit. Psychiatric:         Thought Content:  Thought content normal.     Data:   CBC:   Lab Results Component Value Date/Time    WBC 7.4 02/19/2023 11:34 AM    RBC 2.77 02/19/2023 11:34 AM    HGB 8.7 02/19/2023 11:34 AM    HCT 25.9 02/19/2023 11:34 AM    MCV 93.3 02/19/2023 11:34 AM    MCH 31.3 02/19/2023 11:34 AM    MCHC 33.5 02/19/2023 11:34 AM    RDW 26.2 02/19/2023 11:34 AM    PLT 42 02/19/2023 11:34 AM    MPV 10.4 02/19/2023 11:34 AM     CBC with Differential:  Lab Results   Component Value Date/Time    WBC 7.4 02/19/2023 11:34 AM    RBC 2.77 02/19/2023 11:34 AM    HGB 8.7 02/19/2023 11:34 AM    HCT 25.9 02/19/2023 11:34 AM    PLT 42 02/19/2023 11:34 AM    MCV 93.3 02/19/2023 11:34 AM    MCH 31.3 02/19/2023 11:34 AM    MCHC 33.5 02/19/2023 11:34 AM    RDW 26.2 02/19/2023 11:34 AM    NRBC 1 05/17/2021 01:01 PM    METASPCT 1 02/19/2023 11:34 AM    LYMPHOPCT 23 02/19/2023 11:34 AM    MONOPCT 7 02/19/2023 11:34 AM    BASOPCT 1 02/19/2023 11:34 AM    MONOSABS 0.52 02/19/2023 11:34 AM    LYMPHSABS 1.70 02/19/2023 11:34 AM    EOSABS 0.07 02/19/2023 11:34 AM    BASOSABS 0.07 02/19/2023 11:34 AM    DIFFTYPE NOT REPORTED 01/30/2022 02:20 PM     Hemoglobin/Hematocrit:  Lab Results   Component Value Date/Time    HGB 8.7 02/19/2023 11:34 AM    HCT 25.9 02/19/2023 11:34 AM     CMP:    Lab Results   Component Value Date/Time     02/19/2023 11:34 AM    K 4.4 02/19/2023 11:34 AM    CL 97 02/19/2023 11:34 AM    CO2 23 02/19/2023 11:34 AM    BUN 7 02/19/2023 11:34 AM    CREATININE 0.47 02/19/2023 11:34 AM    GFRAA >60 05/26/2022 05:20 AM    LABGLOM >60 02/19/2023 11:34 AM    GLUCOSE 99 02/19/2023 11:34 AM    PROT 8.5 02/19/2023 11:34 AM    LABALBU 3.4 02/19/2023 11:34 AM    CALCIUM 8.9 02/19/2023 11:34 AM    BILITOT 8.8 02/19/2023 11:34 AM    ALKPHOS 512 02/19/2023 11:34 AM     02/19/2023 11:34 AM    ALT 27 02/19/2023 11:34 AM     BMP:  Lab Results   Component Value Date/Time     02/19/2023 11:34 AM    K 4.4 02/19/2023 11:34 AM    CL 97 02/19/2023 11:34 AM    CO2 23 02/19/2023 11:34 AM    BUN 7 02/19/2023 11:34 AM    LABALBU 3.4 02/19/2023 11:34 AM    CREATININE 0.47 02/19/2023 11:34 AM    CALCIUM 8.9 02/19/2023 11:34 AM    GFRAA >60 05/26/2022 05:20 AM    LABGLOM >60 02/19/2023 11:34 AM    GLUCOSE 99 02/19/2023 11:34 AM     PT/INR:    Lab Results   Component Value Date/Time    PROTIME 15.1 02/18/2023 04:27 PM    INR 1.2 02/18/2023 04:27 PM     PTT:    Lab Results   Component Value Date/Time    APTT 40.5 04/27/2022 06:15 PM   [APTT}    Assesment:     Primary Problem  Alcoholism [facility])    Active Hospital Problems    Diagnosis Date Noted    Alcoholism [facility]) [F10.20] 02/18/2023     Priority: Medium       Plan: At present patient is stable. He will need EGD to evaluate esophageal varices and the need for banding. To watch for DTs. Counseling for alcoholism. Diet as tolerated, 2 g sodium diet. Thank you for allowing me to participate in the care of your patient. Please feel free to contact me with anyquestions or concerns. Electronically signed by Kerry Choi MD on 2/19/2023 at 7:38 PM     Copy sent to Dr. Will Zamora MD    Note is dictated utilizing voice recognition software. Unfortunately this leads to occasional typographical errors. Please contact our office if you have any questions. 2022.

## 2023-02-20 NOTE — PROGRESS NOTES
Patient seen at bedside with security present. Attempting to leave AMA< states he needs to go home to his family and can not miss anymore work. He has a pending psych eval based on comments of suicidal ideation that he made yesterday while intoxicated. Bedside RN has paged psych. I spoke with Dr. Shi Catalan on call attending and he is requesting for Pink slip to be completed by Resident asap.

## 2023-02-20 NOTE — PROGRESS NOTES
GI Progress notes    2/20/2023   11:42 AM    Name:  Silvia Delgado  MRN:    [id number]     Acct:     [de-identified]   Room:  2094/2094-01   Day: 2     Admit Date: 2/18/2023  3:40 PM  PCP: Jluis Valladares MD    Subjective:     C/C:   Chief Complaint   Patient presents with    Alcohol Problem    Mental Health Problem    Emesis    Nausea    Jaundice       Interval History: Status: not changed. Patient seen and examined  No acute events overnight  Patient was attempting to leave AMA earlier this am  Reports abdominal pain, nausea improved  Tolerating diet at present time  Reports 6 BM today      ROS:  Constitutional: negative for chills, fevers and sweats  Gastrointestinal: negative for abdominal pain, constipation, diarrhea, nausea and vomiting  Neurological: negative for dizziness and headaches    Medications: Allergies:    Allergies   Allergen Reactions    Bee Venom Anaphylaxis       Current Meds: lactulose (CHRONULAC) 10 GM/15ML solution 20 g, TID  sodium chloride flush 0.9 % injection 5-40 mL, 2 times per day  sodium chloride flush 0.9 % injection 5-40 mL, PRN  0.9 % sodium chloride infusion, PRN  albuterol sulfate HFA (PROVENTIL;VENTOLIN;PROAIR) 108 (90 Base) MCG/ACT inhaler 2 puff, 4x Daily PRN  fluticasone (FLONASE) 50 MCG/ACT nasal spray 2 spray, Daily  folic acid (FOLVITE) tablet 1 mg, Daily  pantoprazole (PROTONIX) tablet 40 mg, Daily  sucralfate (CARAFATE) tablet 1 g, 4x Daily  venlafaxine (EFFEXOR XR) extended release capsule 37.5 mg, Daily  ondansetron (ZOFRAN-ODT) disintegrating tablet 4 mg, TID PRN  sodium chloride flush 0.9 % injection 5-40 mL, 2 times per day  sodium chloride flush 0.9 % injection 5-40 mL, PRN  0.9 % sodium chloride infusion, PRN  LORazepam (ATIVAN) tablet 1 mg, Q1H PRN   Or  LORazepam (ATIVAN) injection 1 mg, Q1H PRN   Or  LORazepam (ATIVAN) tablet 2 mg, Q1H PRN   Or  LORazepam (ATIVAN) injection 2 mg, Q1H PRN   Or  LORazepam (ATIVAN) tablet 3 mg, Q1H PRN   Or  LORazepam (ATIVAN) injection 3 mg, Q1H PRN   Or  LORazepam (ATIVAN) tablet 4 mg, Q1H PRN   Or  LORazepam (ATIVAN) injection 4 mg, Q1H PRN  thiamine mononitrate tablet 100 mg, Daily  nadolol (CORGARD) tablet 20 mg, Daily        Data:     Code Status:  Full Code    Family History   Problem Relation Age of Onset    Heart Disease Mother     Heart Attack Mother     Heart Disease Father        Social History     Socioeconomic History    Marital status: Single     Spouse name: Not on file    Number of children: Not on file    Years of education: Not on file    Highest education level: Not on file   Occupational History    Not on file   Tobacco Use    Smoking status: Every Day     Packs/day: 1.00     Years: 21.00     Pack years: 21.00     Types: Cigarettes    Smokeless tobacco: Never    Tobacco comments:     using nicotine patches at night   Vaping Use    Vaping Use: Never used   Substance and Sexual Activity    Alcohol use: Not Currently     Comment: hx of alcoholism; pt drinks a fifth of whiskey daily    Drug use: Not Currently     Comment: used cocaine in early 20's    Sexual activity: Not on file   Other Topics Concern    Not on file   Social History Narrative    Not on file     Social Determinants of Health     Financial Resource Strain: Low Risk     Difficulty of Paying Living Expenses: Not hard at all   Food Insecurity: No Food Insecurity    Worried About Running Out of Food in the Last Year: Never true    [address] in the Last Year: Never true   Transportation Needs: Unknown    Lack of Transportation (Medical): Not on file    Lack of Transportation (Non-Medical):  No   Physical Activity: Not on file   Stress: Not on file   Social Connections: Not on file   Intimate Partner Violence: Not on file   Housing Stability: Unknown    Unable to Pay for Housing in the Last Year: Not on file    Number of Places Lived in the Last Year: Not on file    Unstable Housing in the Last Year: No       Vitals:  /86   Pulse 85   Temp 98 °F (36.7 °C) (Oral)   Resp 17   Ht 5' 8\" (1.727 m)   Wt 180 lb 12.4 oz (82 kg)   SpO2 100%   BMI 27.49 kg/m²   Temp (24hrs), Av °F (37.2 °C), Min:98 °F (36.7 °C), Max:99.8 °F (37.7 °C)    No results for input(s): POCGLU in the last 72 hours. I/O (24Hr):     Intake/Output Summary (Last 24 hours) at 2023 1142  Last data filed at 2023 0530  Gross per 24 hour   Intake 640 ml   Output 500 ml   Net 140 ml       Labs:      CBC:   Lab Results   Component Value Date/Time    WBC 7.6 2023 05:43 AM    RBC 2.91 2023 05:43 AM    HGB 9.2 2023 05:43 AM    HCT 27.5 2023 05:43 AM    MCV 94.6 2023 05:43 AM    MCH 31.7 2023 05:43 AM    MCHC 33.5 2023 05:43 AM    RDW 26.0 2023 05:43 AM    PLT 45 2023 05:43 AM    MPV 10.0 2023 05:43 AM     CBC with Differential:    Lab Results   Component Value Date/Time    WBC 7.6 2023 05:43 AM    RBC 2.91 2023 05:43 AM    HGB 9.2 2023 05:43 AM    HCT 27.5 2023 05:43 AM    PLT 45 2023 05:43 AM    MCV 94.6 2023 05:43 AM    MCH 31.7 2023 05:43 AM    MCHC 33.5 2023 05:43 AM    RDW 26.0 2023 05:43 AM    NRBC 1 2021 01:01 PM    METASPCT 2 2023 05:43 AM    LYMPHOPCT 12 2023 05:43 AM    MONOPCT 11 2023 05:43 AM    BASOPCT 1 2023 05:43 AM    MONOSABS 0.84 2023 05:43 AM    LYMPHSABS 0.91 2023 05:43 AM    EOSABS 0.15 2023 05:43 AM    BASOSABS 0.08 2023 05:43 AM    DIFFTYPE NOT REPORTED 2022 02:20 PM     Hemoglobin/Hematocrit:    Lab Results   Component Value Date/Time    HGB 9.2 2023 05:43 AM    HCT 27.5 2023 05:43 AM     CMP:    Lab Results   Component Value Date/Time     2023 05:43 AM    K 4.0 2023 05:43 AM    CL 95 2023 05:43 AM    CO2 23 2023 05:43 AM    BUN 7 2023 05:43 AM    CREATININE <0.40 2023 05:43 AM    GFRAA >60 2022 05:20 AM    LABGLOM Can not be calculated 02/20/2023 05:43 AM    GLUCOSE 96 02/20/2023 05:43 AM    PROT 9.0 02/20/2023 05:43 AM    LABALBU 3.3 02/20/2023 05:43 AM    CALCIUM 9.0 02/20/2023 05:43 AM    BILITOT 9.7 02/20/2023 05:43 AM    ALKPHOS 510 02/20/2023 05:43 AM     02/20/2023 05:43 AM    ALT 27 02/20/2023 05:43 AM     BMP:    Lab Results   Component Value Date/Time     02/20/2023 05:43 AM    K 4.0 02/20/2023 05:43 AM    CL 95 02/20/2023 05:43 AM    CO2 23 02/20/2023 05:43 AM    BUN 7 02/20/2023 05:43 AM    LABALBU 3.3 02/20/2023 05:43 AM    CREATININE <0.40 02/20/2023 05:43 AM    CALCIUM 9.0 02/20/2023 05:43 AM    GFRAA >60 05/26/2022 05:20 AM    LABGLOM Can not be calculated 02/20/2023 05:43 AM    GLUCOSE 96 02/20/2023 05:43 AM     PT/INR:    Lab Results   Component Value Date/Time    PROTIME 15.1 02/18/2023 04:27 PM    INR 1.2 02/18/2023 04:27 PM     PTT:    Lab Results   Component Value Date/Time    APTT 40.5 04/27/2022 06:15 PM   [APTT}    Physical Examination:        General appearance: alert, cooperative and no distress  Mental Status: oriented to person, place and time and normal affect  Abdomen: soft, nontender, nondistended, bowel sounds present, hepatomegaly  Extremities: no edema, redness or tenderness in the calves  Skin: no gross lesions, rashes, or induration    Assessment:        Primary Problem  Alcoholism [facility])     Active Hospital Problems    Diagnosis Date Noted    Alcoholism ([address] [F10.20] 02/18/2023     Priority: Medium     Past Medical History:   Diagnosis Date    Alcoholism ([address]     pt drinks a fifth of whiskey daily    Anemia     Cirrhosis, alcoholic ([address]     Pulmonary embolism ([address]         Plan:        Alcoholic hepatitis, alcoholic cirrhosis, hx of EV w/ banding in October 2022  2 gm sodium diet  Had 6 BM today, cut down lactulose, titrate to 3-4 BM daily  Will need EGD to evaluate varices, will discuss timing w/ Dr Sofya Chatterjee  Continue thiamine, folic acid, multivitamin  Continue CIWA  PPI  Supportive care    Time spent reviewing the chart, seeing the patient, and discussing with the attending MD around 30 minutes.       Explained to the patient and d/W Nursing Staff  Will F/U with you  Please call or Page for any issues or change in status  Thanks    Electronically signed by SILVA Jiménez NP on 2/20/2023 at 11:42 AM

## 2023-02-20 NOTE — FLOWSHEET NOTE
02/20/23 5788   Treatment Team Notification   Reason for Communication Review case   Team Member Name Dr. Barrera Mention Provider   Method of Communication Secure Message   Response Waiting for response   Notification Time [id number]       Dr. Perez Standing was reached out to via secured message with regards to patient's threat to leave AMA.  Awaiting MD's RESPONSE

## 2023-02-20 NOTE — PROGRESS NOTES
Writer was called to bedside by sitter at patient's request. Patient stated he wants to leave AMA because he feels better. \"My yellow skin and eyes are clear and I want to go home\". Writer tried to educate and explain to patient his disease process and POC. Patient stated he did not care and is not imprisoned. Writer then called house supervisor and the hospital security who all assisted in talking with patient and he agreed to stay and see an MD in the morning. Patient received 2 mg of IV lorazepam and is resting in bed at this time. Writer would check on patient in an hour to assess.

## 2023-02-20 NOTE — CONSULTS
Department of Psychiatry  Behavioral Health Consult    REASON FOR CONSULT: Concern for Depression with Suicidal Ideation    CONSULTING PHYSICIAN: Jose Luis Moncada    History obtained from: Patient, Nursing staff and Chart Review including medical hx, presenting hx, and labs    HISTORY OF PRESENT ILLNESS:      The patient is a 41 y.o. male with no significant documented past psychiatric history. He presented to ED for nausea, vomiting, jaundice, AMS. Patient has hx of EtOH abuse drinking on average of a pint of vodka daily for several years. PMHX significant for HTN, alcoholism, cirrhosis, depression, EV w/ banding. In chart review the patient reported feelings of depression on admission associated with suicidal ideation. The patient reported not having a plan to commit suicide and has never made an attempt. On assessment, the patient is cooperative and pleasant, although the patient held a depressed demeanor throughout our conversation. The patient is on 1:1 care for previous endorsement of suicidal ideation. The patient endorses feelings of depression which have persisted for numerous years. The patient states that he believes his depression first began around 2006 when his then fiance passed for which he turned to alcohol as a coping mechanism. The depression has fluctuated in severity for as long as he can remember but only recently gotten worse within the last month and a half after his sister and her family moved into his house. The patient endorses several stressors in his life at this time consisting of primarily of familial and occupational. The patient endorses drinking alcohol and smoking marijuana to cope with these stressors drinking on average of a pint of vodka daily (down from 3 in which he endorses drinking a few years ago) and smoking one to two joints nightly. The patient does endorse interest in seeking additional help in alcohol cessation.  The patient admits to using more frequently at night as that tends to be when the patient feels most anxious causing him the inability to sleep. The patient states that his anxiety comes in the form of racing thoughts as he tries to fall asleep. The patient mentions that he constantly feels tired from having to work full-time only to come home to a house that is in [address] When asked, the patient denies suicidal ideation at this time nor having thoughts as of recently. The patient states that he was confused when asked yesterday regarding thoughts of suicide, stating, \"I have had suicidal thoughts within the last 60 days but I haven't thought about suicide recently. Once I started to limit my alcohol intake I realized I have a lot more to live for. \" The patient denies having planned to commit or attempt suicide. Although he did say, \"I would imagine if I were to it would be with a bottle of Jennifer Mires and some pills. \" The patient also denies visual and/or auditory hallucinations, PTSD, and delusions. The patient is currently taking scheduled Effexor XR 37.5 mg once daily and PRN Ativan 1 mg for the above. The patient denies withdrawal symptoms aside from headaches and occasionally abdominal pain. Psychiatric Review of Systems           Obsessions and Compulsions: Denies       Sweta or Hypomania: Denies     Hallucinations: Denies     Panic Attacks:  Endorses     Anxiety: Endorses     Delusions:  Denies     Phobias:  Denies     Trauma: Denies      Substance Abuse History:  ETOH: Current alcohol usage:  Type of Drink(s):  Liquor/Mixed drink. Frequency of use:  Daily. Duration of alcohol use:  10+ years. Approximate date of last drink:  the morning of 2/18/2023  Marijuana: Endorses smoking marijuana daily (\"one to two joints a night\")  Opiates: Denies  Other Drugs: Denies      Past Psychiatric History:  Prior Diagnosis: No    Hospitalization: no  Hx of Suicidal Attempts: no  Hx of violence:  no    FamHx: maternal grandma and her sister suffered from psychosis. Mother suffers from Bipolar Disorder      Social History:   Living: lives with his wife and daughters, 2 dogs and sisters entire family  Occupation:  for [employer]  Stressors: Family, Occupation    Past Medical History:        Diagnosis Date    Alcoholism ([facility] [address]     pt drinks a fifth of whiskey daily    Anemia     Cirrhosis, alcoholic ([facility] [address]     Pulmonary embolism ([facility] [address]        Past Surgical History:        Procedure Laterality Date    UPPER GASTROINTESTINAL ENDOSCOPY N/A 5/1/2022    EGD ESOPHAGOGASTRODUODENOSCOPY performed by Jeronimo Quiñonez MD at [address] [place] [address] N/A 10/30/2022    EGD BAND LIGATION performed by Yair Dean MD at [facility]         Medications Prior to Admission:   Medications Prior to Admission: metoclopramide (REGLAN) 5 MG tablet, Take 1 tablet by mouth 3 times daily as needed (nausea)  venlafaxine (EFFEXOR XR) 37.5 MG extended release capsule, Take 1 capsule by mouth daily  nadolol (CORGARD) 20 MG tablet, Take 1 tablet by mouth daily  ondansetron (ZOFRAN-ODT) 4 MG disintegrating tablet, Take 1 tablet by mouth 3 times daily as needed for Nausea or Vomiting  fluticasone (FLONASE) 50 MCG/ACT nasal spray, 1 spray by Each Nostril route daily  pantoprazole (PROTONIX) 40 MG tablet, Take 1 tablet by mouth daily  sucralfate (CARAFATE) 1 GM tablet, Take 1 tablet by mouth 4 times daily  albuterol sulfate HFA (VENTOLIN HFA) 108 (90 Base) MCG/ACT inhaler, Inhale 2 puffs into the lungs 4 times daily as needed for Wheezing  folic acid (FOLVITE) 1 MG tablet, Take 1 tablet by mouth daily  vitamin B-1 (THIAMINE) 100 MG tablet, Take 1 tablet by mouth daily  Multiple Vitamins-Minerals (CENTRUM MEN) TABS, Take 1 tablet by mouth daily  Menthol-Methyl Salicylate (ANALGESIC OINTMENT) OINT ointment, Apply topically as needed Indications: on back  fluticasone (FLONASE) 50 MCG/ACT nasal spray, instill 1 spray into each nostril once daily  Blood Pressure Monitoring (BP MONITOR-STETHOSCOPE) KIT, 1 each by Does not apply route daily  Nicotine 21-14-7 MG/24HR KIT, Place 14 mg onto the skin nightly   EPINEPHrine (EPIPEN 2-LIZETTE) 0.3 MG/0.3ML SOAJ injection, Inject 0.3 mLs into the muscle once for 1 dose Use as directed for allergic reaction    Allergies:  Bee venom    FAMILY/SOCIAL HISTORY:  Family History   Problem Relation Age of Onset    Heart Disease Mother     Heart Attack Mother     Heart Disease Father      Social History     Socioeconomic History    Marital status: Single     Spouse name: Not on file    Number of children: Not on file    Years of education: Not on file    Highest education level: Not on file   Occupational History    Not on file   Tobacco Use    Smoking status: Every Day     Packs/day: 1.00     Years: 21.00     Pack years: 21.00     Types: Cigarettes    Smokeless tobacco: Never    Tobacco comments:     using nicotine patches at night   Vaping Use    Vaping Use: Never used   Substance and Sexual Activity    Alcohol use: Not Currently     Comment: hx of alcoholism; pt drinks a fifth of whiskey daily    Drug use: Not Currently     Comment: used cocaine in early 20's    Sexual activity: Not on file   Other Topics Concern    Not on file   Social History Narrative    Not on file     Social Determinants of Health     Financial Resource Strain: Low Risk     Difficulty of Paying Living Expenses: Not hard at all   Food Insecurity: No Food Insecurity    Worried About Running Out of Food in the Last Year: Never true    Ran Out of Food in the Last Year: Never true   Transportation Needs: Unknown    Lack of Transportation (Medical): Not on file    Lack of Transportation (Non-Medical):  No   Physical Activity: Not on file   Stress: Not on file   Social Connections: Not on file   Intimate Partner Violence: Not on file   Housing Stability: Unknown    Unable to Pay for Housing in the Last Year: Not on file    Number of Places Lived in the Last Year: Not on file    Unstable Housing in the Last Year: No       REVIEW OF SYSTEMS    Constitutional: [] fever  [] chills  [] weight loss  []weakness [] Other:  Eyes:  [] photophobia  [] discharge [] acuity change   [] Diplopia   [] Other:  HENT:  [] sore throat  [] ear pain [] Tinnitus   [] Other  Respiratory:  [] Cough  [] Shortness of breath   [] Sputum   [] Other:   Cardiac: []Chest pain   []Palpitations []Edema  []PND  [] Other:  GI:  [x]Abdominal pain   []Nausea  []Vomiting  []Diarrhea  [] Other:  :  [] Dysuria   []Frequency  []Hematuria  []Discharge  [] Other:  Possible Pregnancy: []Yes   []No   LMP:   Musculoskeletal:  []Back pain  []Neck pain  []Recent Injury   Skin:  []Rash  [] Itching  [] Other:  Neurologic:  [x] Headache  [] Focal weakness  [] Sensory changes []Other:  Endocrine:  [] Polyuria  [] Polydipsia  [] Hair Loss  [] Other:  Lymphatic:   [] Swollen glands   Psychiatric:  As per HPI      All other systems negative except as marked or mentioned/indicated in the HPI. Eduarda Lane      PHYSICAL EXAM:  Vitals:  /87   Pulse 95   Temp 99 °F (37.2 °C) (Oral)   Resp 18   Ht 5' 8\" (1.727 m)   Wt 180 lb 12.4 oz (82 kg)   SpO2 95%   BMI 27.49 kg/m²      Neuro Exam:   Muscle Strength & Tone: full ROM    Involuntary Movements: No    Mental Status Examination:    Level of consciousness:  within normal limits   Appearance:  seated in bed  Behavior/Motor:  no abnormalities noted  Attitude toward examiner:  cooperative and attentive  Speech:  normal rate and normal volume   Mood: sad  Affect:  mood congruent  Thought processes:  linear and coherent   Thought content:  Preoccupied with the stressors in his life and the effects his alcohol use has had on his health  Cognition:  oriented to person, place, and time   Concentration intact  Memory intact  Insight fair   Judgement fair         LABS: REVIEWED TODAY:  Recent Labs     02/18/23  1627 02/19/23  1134 02/20/23  0543   WBC 7.6 7.4 7.6   HGB 9.4* 8.7* 9.2*   PLT 48* 42* 45*     Recent Labs 02/18/23  1627 02/19/23  1134 02/20/23  0543   * 130* 128*   K 4.0 4.4 4.0    97* 95*   CO2 21 23 23   BUN 8 7 7   CREATININE 0.62* 0.47* <0.40*   GLUCOSE 130* 99 96     Recent Labs     02/18/23  1627 02/19/23  1134 02/20/23  0543   BILITOT 7.1* 8.8* 9.7*   ALKPHOS 530* 512* 510*   * 210* 208*   ALT 29 27 27     Lab Results   Component Value Date/Time    BARBSCNU NEGATIVE 06/04/2020 04:02 PM    LABBENZ NEGATIVE 06/04/2020 04:02 PM    LABMETH NEGATIVE 06/04/2020 04:02 PM    PPXUR NOT REPORTED 06/04/2020 04:02 PM     Lab Results   Component Value Date/Time    TSH 3.42 10/15/2022 12:36 AM     No results found for: LITHIUM  No results found for: VALPROATE, CBMZ  No results found for: LITHIUM, VALPROATE    FURTHER LABS ORDERED :      Radiology   XR WRIST RIGHT (MIN 3 VIEWS)    Result Date: 2/3/2023  EXAMINATION: THREE X-RAY VIEWS OF THE RIGHT HAND; FOUR X-RAY VIEWS OF THE RIGHT WRIST 2/3/2023 5:38 pm COMPARISON: None. HISTORY: ORDERING SYSTEM PROVIDED HISTORY: fall TECHNOLOGIST PROVIDED HISTORY: fall Reason for Exam: Pt.  states pain on medial aspect of hand, wrist s/p fall FINDINGS: HAND: No fracture or malalignment identified. The joint spaces are maintained. A curvilinear density projecting over the mid 1st metacarpal is noted, which may represent a superficial foreign body. WRIST: No fracture or malalignment identified. The joint spaces are maintained. No discrete soft tissue abnormality identified. No acute osseous abnormality identified in the hand or wrist.     XR HAND RIGHT (MIN 3 VIEWS)    Result Date: 2/3/2023  EXAMINATION: THREE X-RAY VIEWS OF THE RIGHT HAND; FOUR X-RAY VIEWS OF THE RIGHT WRIST 2/3/2023 5:38 pm COMPARISON: None. HISTORY: ORDERING SYSTEM PROVIDED HISTORY: fall TECHNOLOGIST PROVIDED HISTORY: fall Reason for Exam: Pt.  states pain on medial aspect of hand, wrist s/p fall FINDINGS: HAND: No fracture or malalignment identified. The joint spaces are maintained.   A curvilinear density projecting over the mid 1st metacarpal is noted, which may represent a superficial foreign body. WRIST: No fracture or malalignment identified. The joint spaces are maintained. No discrete soft tissue abnormality identified. No acute osseous abnormality identified in the hand or wrist.     XR CHEST PORTABLE    Result Date: 2/18/2023  EXAMINATION: ONE XRAY VIEW OF THE CHEST 2/18/2023 4:26 pm COMPARISON: February 5, 2023 HISTORY: ORDERING SYSTEM PROVIDED HISTORY: right sided chest pain TECHNOLOGIST PROVIDED HISTORY: right sided chest pain FINDINGS: The lungs are without acute focal process. There is no effusion or pneumothorax. The cardiomediastinal silhouette is without acute process. The osseous structures are without acute process. No acute process. XR CHEST PORTABLE    Result Date: 2/6/2023  EXAMINATION: ONE XRAY VIEW OF THE CHEST 2/5/2023 11:29 pm COMPARISON: Chest radiograph 10/15/2022, 05/21/2022 HISTORY: ORDERING SYSTEM PROVIDED HISTORY: cp- TECHNOLOGIST PROVIDED HISTORY: cp- Reason for Exam: chest pain, loss of consciousness FINDINGS: Lungs: Clear. Pleura: No effusion or pneumothorax. Cardiomediastinal silhouette: Normal contours. Bones: No acute osseous findings. Soft tissues: Normal.     No acute pulmonary findings         DIAGNOSIS:   Substance disorders:  Alcohol abuse  MDD, recurrent, severe    Plan:  Medications as noted above  Attempt to develop insight  Continue to monitor withdrawal symptoms  Follow-up daily when inpatient    Thanks for the consult. Please call me if needed. Electronically signed by Selvin CASTILLO on 2/20/2023 at 12:46 PM    Please note that this chart was generated using voice recognition Dragon dictation software. Although every effort was made to ensure the accuracy of this automated transcription, some errors in transcription may have occurred. I independently saw and evaluated the patient. I reviewed the  documentation above. Any additional comments or changes to the    documentation are stated below otherwise agree with assessment. I reviewed the patient's chart. He came to ER and reported suicidal ideation with thoughts of shooting himself in the backyard. He reported drinking 1/5 of liquor per day. I noted that he had a sodium of 133 at the time of presentation. This has worsened to 128. His ALP is over 500 and AST is 208. .  The patient's hemoglobin is 8.7     I noted that the patient was asking to leave AMA. He has been pink slipped. The patient was seen face-to-face. He is jaundiced. He is admitting to visual hallucinations for 2 weeks. He admits that he had suicidal thoughts. He is minimizing this at this time. The patient denies auditory hallucinations. He has increased latency in his responses. He told me that the date was 2/18/2023. The patient is oriented to situation. The patient denies previous suicide attempts. He has never been admitted to psychiatry. He had a 6-month sobriety duration which ended in May of last year. PLAN  Admit to psychiatry when medically cleared  Continue sitter. Continue Effexor as ordered  Will follow. Attempt to develop insight   Psycho-education conducted. Supportive Therapy conducted.   Follow-up daily while on inpatient unit    Electronically signed by Estelle Soto MD on 2/20/23 at 7:14 PM EST

## 2023-02-20 NOTE — PLAN OF CARE
Problem: Discharge Planning  Goal: Discharge to home or other facility with appropriate resources  2/19/2023 1715 by Renetta Yanez RN  Outcome: Progressing  Flowsheets  Taken 2/19/2023 0837 by Renetta Yanez RN  Discharge to home or other facility with appropriate resources: Identify barriers to discharge with patient and caregiver  Taken 2/19/2023 0346 by Abdoulaye Esquivel RN  Discharge to home or other facility with appropriate resources:   Identify barriers to discharge with patient and caregiver   Identify discharge learning needs (meds, wound care, etc)     Problem: Safety - Adult  Goal: Free from fall injury  2/19/2023 1715 by Renetta Yanez RN  Outcome: Progressing  Flowsheets (Taken 2/19/2023 0841)  Free From Fall Injury: Instruct family/caregiver on patient safety     Problem: Self Harm/Suicidality  Goal: Will have no self-injury during hospital stay  Description: INTERVENTIONS:  1. Ensure constant observer at bedside with Q15M safety checks  2. Maintain a safe environment  3. Secure patient belongings  4. Ensure family/visitors adhere to safety recommendations  5. Ensure safety tray has been added to patient's diet order  6.   Every shift and PRN: Re-assess suicidal risk via Frequent Screener    2/20/2023 0336 by Abdoulaye Esquivel RN  Flowsheets (Taken 2/19/2023 8119)  Will have no self-injury during hospital stay:   Ensure constant observer at bedside with Q15M safety checks   Maintain a safe environment   Ensure safety tray has been added to patient's diet order     Problem: Pain  Goal: Verbalizes/displays adequate comfort level or baseline comfort level  2/20/2023 0336 by Abdoulaye Esquivel RN  Flowsheets (Taken 2/20/2023 8319)  Verbalizes/displays adequate comfort level or baseline comfort level: Encourage patient to monitor pain and request assistance

## 2023-02-21 ENCOUNTER — APPOINTMENT (OUTPATIENT)
Dept: ULTRASOUND IMAGING | Age: 38
DRG: 280 | End: 2023-02-21
Payer: COMMERCIAL

## 2023-02-21 LAB
ABSOLUTE BANDS #: 1.09 K/UL (ref 0–1)
ABSOLUTE EOS #: 0.18 K/UL (ref 0–0.4)
ABSOLUTE LYMPH #: 0.91 K/UL (ref 1–4.8)
ABSOLUTE MONO #: 0.46 K/UL (ref 0.1–1.3)
ALBUMIN SERPL-MCNC: 3.4 G/DL (ref 3.5–5.2)
ALP SERPL-CCNC: 469 U/L (ref 40–129)
ALT SERPL-CCNC: 26 U/L (ref 5–41)
ANION GAP SERPL CALCULATED.3IONS-SCNC: 12 MMOL/L (ref 9–17)
AST SERPL-CCNC: 196 U/L
BANDS: 12 % (ref 0–10)
BASOPHILS # BLD: 1 % (ref 0–2)
BASOPHILS ABSOLUTE: 0.09 K/UL (ref 0–0.2)
BILIRUB SERPL-MCNC: 11.8 MG/DL (ref 0.3–1.2)
BUN SERPL-MCNC: 9 MG/DL (ref 6–20)
CALCIUM SERPL-MCNC: 9 MG/DL (ref 8.6–10.4)
CHLORIDE SERPL-SCNC: 98 MMOL/L (ref 98–107)
CO2 SERPL-SCNC: 22 MMOL/L (ref 20–31)
CREAT SERPL-MCNC: <0.4 MG/DL (ref 0.7–1.2)
EOSINOPHILS RELATIVE PERCENT: 2 % (ref 0–4)
GFR SERPL CREATININE-BSD FRML MDRD: ABNORMAL ML/MIN/1.73M2
GLUCOSE SERPL-MCNC: 92 MG/DL (ref 70–99)
HCT VFR BLD AUTO: 26.9 % (ref 41–53)
HGB BLD-MCNC: 9.3 G/DL (ref 13.5–17.5)
LYMPHOCYTES # BLD: 10 % (ref 24–44)
MCH RBC QN AUTO: 32.4 PG (ref 26–34)
MCHC RBC AUTO-ENTMCNC: 34.4 G/DL (ref 31–37)
MCV RBC AUTO: 94.2 FL (ref 80–100)
MONOCYTES # BLD: 5 % (ref 1–7)
MORPHOLOGY: ABNORMAL
PDW BLD-RTO: 26.8 % (ref 11.5–14.9)
PLATELET # BLD AUTO: 44 K/UL (ref 150–450)
PMV BLD AUTO: 9.3 FL (ref 6–12)
POTASSIUM SERPL-SCNC: 4.1 MMOL/L (ref 3.7–5.3)
PROT SERPL-MCNC: 8.9 G/DL (ref 6.4–8.3)
RBC # BLD: 2.86 M/UL (ref 4.5–5.9)
SEG NEUTROPHILS: 70 % (ref 36–66)
SEGMENTED NEUTROPHILS ABSOLUTE COUNT: 6.37 K/UL (ref 1.3–9.1)
SODIUM SERPL-SCNC: 130 MMOL/L (ref 135–144)
SODIUM SERPL-SCNC: 132 MMOL/L (ref 135–144)
WBC # BLD AUTO: 9.1 K/UL (ref 3.5–11)

## 2023-02-21 PROCEDURE — 36415 COLL VENOUS BLD VENIPUNCTURE: CPT

## 2023-02-21 PROCEDURE — 2580000003 HC RX 258: Performed by: NURSE PRACTITIONER

## 2023-02-21 PROCEDURE — 99239 HOSP IP/OBS DSCHRG MGMT >30: CPT | Performed by: INTERNAL MEDICINE

## 2023-02-21 PROCEDURE — 85025 COMPLETE CBC W/AUTO DIFF WBC: CPT

## 2023-02-21 PROCEDURE — 76705 ECHO EXAM OF ABDOMEN: CPT

## 2023-02-21 PROCEDURE — 84295 ASSAY OF SERUM SODIUM: CPT

## 2023-02-21 PROCEDURE — 80053 COMPREHEN METABOLIC PANEL: CPT

## 2023-02-21 PROCEDURE — 6370000000 HC RX 637 (ALT 250 FOR IP): Performed by: INTERNAL MEDICINE

## 2023-02-21 PROCEDURE — 2060000000 HC ICU INTERMEDIATE R&B

## 2023-02-21 PROCEDURE — 2580000003 HC RX 258: Performed by: INTERNAL MEDICINE

## 2023-02-21 RX ORDER — LORAZEPAM 1 MG/1
2 TABLET ORAL
OUTPATIENT
Start: 2023-02-21

## 2023-02-21 RX ORDER — SODIUM CHLORIDE 0.9 % (FLUSH) 0.9 %
5-40 SYRINGE (ML) INJECTION EVERY 12 HOURS SCHEDULED
OUTPATIENT
Start: 2023-02-21

## 2023-02-21 RX ORDER — PANTOPRAZOLE SODIUM 40 MG/1
40 TABLET, DELAYED RELEASE ORAL DAILY
Status: CANCELLED | OUTPATIENT
Start: 2023-02-22

## 2023-02-21 RX ORDER — SUCRALFATE 1 G/1
1 TABLET ORAL 4 TIMES DAILY
Status: CANCELLED | OUTPATIENT
Start: 2023-02-21

## 2023-02-21 RX ORDER — ALBUTEROL SULFATE 90 UG/1
2 AEROSOL, METERED RESPIRATORY (INHALATION) 4 TIMES DAILY PRN
Status: CANCELLED | OUTPATIENT
Start: 2023-02-21

## 2023-02-21 RX ORDER — SODIUM CHLORIDE 9 MG/ML
INJECTION, SOLUTION INTRAVENOUS CONTINUOUS
Status: DISCONTINUED | OUTPATIENT
Start: 2023-02-21 | End: 2023-02-22

## 2023-02-21 RX ORDER — VENLAFAXINE HYDROCHLORIDE 37.5 MG/1
37.5 CAPSULE, EXTENDED RELEASE ORAL DAILY
Status: CANCELLED | OUTPATIENT
Start: 2023-02-22

## 2023-02-21 RX ORDER — LORAZEPAM 1 MG/1
4 TABLET ORAL
OUTPATIENT
Start: 2023-02-21

## 2023-02-21 RX ORDER — LORAZEPAM 1 MG/1
3 TABLET ORAL
OUTPATIENT
Start: 2023-02-21

## 2023-02-21 RX ORDER — LORAZEPAM 2 MG/ML
3 INJECTION INTRAMUSCULAR
OUTPATIENT
Start: 2023-02-21

## 2023-02-21 RX ORDER — ONDANSETRON 4 MG/1
4 TABLET, ORALLY DISINTEGRATING ORAL 3 TIMES DAILY PRN
OUTPATIENT
Start: 2023-02-21

## 2023-02-21 RX ORDER — LACTULOSE 10 G/15ML
10 SOLUTION ORAL 3 TIMES DAILY
Status: CANCELLED | OUTPATIENT
Start: 2023-02-21

## 2023-02-21 RX ORDER — LORAZEPAM 2 MG/ML
4 INJECTION INTRAMUSCULAR
OUTPATIENT
Start: 2023-02-21

## 2023-02-21 RX ORDER — GAUZE BANDAGE 2" X 2"
100 BANDAGE TOPICAL DAILY
OUTPATIENT
Start: 2023-02-22

## 2023-02-21 RX ORDER — LORAZEPAM 1 MG/1
1 TABLET ORAL
OUTPATIENT
Start: 2023-02-21

## 2023-02-21 RX ORDER — NADOLOL 20 MG/1
20 TABLET ORAL DAILY
Status: CANCELLED | OUTPATIENT
Start: 2023-02-22

## 2023-02-21 RX ORDER — FOLIC ACID 1 MG/1
1 TABLET ORAL DAILY
OUTPATIENT
Start: 2023-02-22

## 2023-02-21 RX ORDER — LORAZEPAM 2 MG/ML
2 INJECTION INTRAMUSCULAR
OUTPATIENT
Start: 2023-02-21

## 2023-02-21 RX ORDER — FLUTICASONE PROPIONATE 50 MCG
2 SPRAY, SUSPENSION (ML) NASAL DAILY
Status: CANCELLED | OUTPATIENT
Start: 2023-02-22

## 2023-02-21 RX ORDER — LORAZEPAM 2 MG/ML
1 INJECTION INTRAMUSCULAR
OUTPATIENT
Start: 2023-02-21

## 2023-02-21 RX ADMIN — SUCRALFATE 1 G: 1 TABLET ORAL at 20:16

## 2023-02-21 RX ADMIN — THIAMINE HCL TAB 100 MG 100 MG: 100 TAB at 10:20

## 2023-02-21 RX ADMIN — LACTULOSE 10 G: 20 SOLUTION ORAL at 10:20

## 2023-02-21 RX ADMIN — NADOLOL 20 MG: 20 TABLET ORAL at 10:39

## 2023-02-21 RX ADMIN — FLUTICASONE PROPIONATE 2 SPRAY: 50 SPRAY, METERED NASAL at 10:22

## 2023-02-21 RX ADMIN — LACTULOSE 10 G: 20 SOLUTION ORAL at 16:10

## 2023-02-21 RX ADMIN — LACTULOSE 10 G: 20 SOLUTION ORAL at 20:16

## 2023-02-21 RX ADMIN — SUCRALFATE 1 G: 1 TABLET ORAL at 18:24

## 2023-02-21 RX ADMIN — SODIUM CHLORIDE: 9 INJECTION, SOLUTION INTRAVENOUS at 20:24

## 2023-02-21 RX ADMIN — FOLIC ACID 1 MG: 1 TABLET ORAL at 10:21

## 2023-02-21 RX ADMIN — VENLAFAXINE HYDROCHLORIDE 37.5 MG: 37.5 CAPSULE, EXTENDED RELEASE ORAL at 10:23

## 2023-02-21 RX ADMIN — SUCRALFATE 1 G: 1 TABLET ORAL at 10:20

## 2023-02-21 RX ADMIN — SODIUM CHLORIDE, PRESERVATIVE FREE 10 ML: 5 INJECTION INTRAVENOUS at 10:20

## 2023-02-21 RX ADMIN — PANTOPRAZOLE SODIUM 40 MG: 40 TABLET, DELAYED RELEASE ORAL at 10:21

## 2023-02-21 NOTE — CARE COORDINATION
DISCHARGE PLANNING NOTE:    Plan is for patient to be discharged to [facility] when medically cleared.     Electronically signed by Av Naranjo RN on 2/21/2023 at 12:31 PM

## 2023-02-21 NOTE — PLAN OF CARE
Problem: Discharge Planning  Goal: Discharge to home or other facility with appropriate resources  2/21/2023 1842 by Mariela Hawthorne RN  Outcome: Completed  2/21/2023 1842 by Mariela Hawthorne RN  Outcome: Progressing     Problem: Safety - Adult  Goal: Free from fall injury  2/21/2023 1842 by Mariela Hawthorne RN  Outcome: Completed  2/21/2023 1842 by Mariela Hawthorne RN  Outcome: Progressing     Problem: Self Harm/Suicidality  Goal: Will have no self-injury during hospital stay  2/21/2023 1842 by Mariela Hawthorne RN  Outcome: Completed  2/21/2023 1842 by Mariela Hawthorne RN  Outcome: Progressing     Problem: Pain  Goal: Verbalizes/displays adequate comfort level or baseline comfort level  2/21/2023 1842 by Mariela Hawthorne RN  Outcome: Completed  2/21/2023 1842 by Mariela Hawthorne RN  Outcome: Progressing     Problem: Skin/Tissue Integrity  Goal: Absence of new skin breakdown  2/21/2023 1842 by Mariela Hawthorne RN  Outcome: Completed  2/21/2023 1842 by Mariela Hawthorne RN  Outcome: Progressing     Problem: Chronic Conditions and Co-morbidities  Goal: Patient's chronic conditions and co-morbidity symptoms are monitored and maintained or improved  2/21/2023 1842 by Mariela Hawthorne RN  Outcome: Completed  2/21/2023 1842 by Mariela Hawthorne RN  Outcome: Progressing

## 2023-02-21 NOTE — PROGRESS NOTES
Pt tried leaving this morning because he said his ride was coming. RN explained why he couldn't leave and if he does need a ride once he is d/c a cab could be arranged so he shouldn't worried. Pt was agitated and said you can't stop me and tried leaving. Security was called and escorted pt back to the room. Pt allowed RN to place IV for some IV Ativan.  Dr. Blayne Paulson notified

## 2023-02-21 NOTE — PROGRESS NOTES
Pt wanted to leave and walked to the door and wasn't redirectable. Pt headed for elevators. Security called and pt made it to the main entrance and was escort per wheelchair w/ Security. Dr. Moncada notified. New pink slip done and Dr. Moncada reordered the [place] scale

## 2023-02-21 NOTE — PROGRESS NOTES
BEHAVIORAL HEALTH FOLLOW-UP NOTE     2/21/2023     Patient was seen and examined in person, Chart reviewed   Patient's case discussed with staff/team    Chief Complaint: MDD, recurrent severe without psychotic features    Interim History: On assessment, patient is cooperative and pleasant. Patient is jaundice in appearance but admits to previous headaches and chills having resolved. Patient denies experiencing additional signs/symptoms of alcohol withdrawal. In speaking with the patient today he feels depressed although he does endorse feeling improvement in his mood which he attributes to cessation of alcohol since admission. The patient denies current suicidal ideation. In addition, the patient denies auditory hallucinations. The patient admitted to me that he originally experienced visual hallucinations which he described as \"seeing white faces. \" These hallucinations subsided yesterday morning for which he currently denies. The patient also expanded upon the stressors contributing to his alcoholism in continuing to mention the additional family members now living in his house and how he doesn't feel like they contribute stating that he always feels as though the house is a mess once he gets off work. It was during this time that the patient mentioned having some obsessive-compulsive tendencies consisting of making sure things are done in a certain way and making sure things are organized in a certain way. Although a diagnosis for OCD was never officially made. In addition, he mentioned experiencing financial constraints in having projects around his house that need completed but not having the money to do so. The patient expressed frustrations with his current state in having a sitter at his bedside. He mentioned that he gets annoyed when they continue to tell him how they want him to do things. He states that he just wants to be able to move about to lessen his back pain.    In reviewing the patients labs, his sodium: 132 as of 2/21, improving from previous 128 as of 2/20. Tot Bilirubin has increased to 11.8 (2/21) from previous 9.7 (2/20). In addition, ALP is 469 and AST is 196 improving from previous labs. Hb has remained relatively constant at 9.3. Patient will continue with 1:1 sitter at this time.     /81   Pulse 89   Temp 98.3 °F (36.8 °C) (Oral)   Resp 20   Ht 5' 8\" (1.727 m)   Wt 173 lb 1 oz (78.5 kg)   SpO2 96%   BMI 26.31 kg/m²   Appetite:  [x] Normal/Unchanged  [] Increased  [] Decreased    Pt reports not having much of an appetite since admission which is due more so to personal preference  Sleep:       [] Normal/Unchanged  [x] Fair       [] Poor            Pt reports sleeping more frequently, especially after being given his PRN Ativan  Energy:    [x] Normal/Unchanged  [] Increased  [] Decreased        Aggression:  [] yes  [x] no      PAST MEDICAL/PSYCHIATRIC HISTORY:   Past Medical History:   Diagnosis Date    Alcoholism ([facility]     pt drinks a fifth of whiskey daily    Anemia     Cirrhosis, alcoholic ([facility] [address]     Pulmonary embolism (HCC)        FAMILY/SOCIAL HISTORY:  Family History   Problem Relation Age of Onset    Heart Disease Mother     Heart Attack Mother     Heart Disease Father      Social History     Socioeconomic History    Marital status: Single     Spouse name: Not on file    Number of children: Not on file    Years of education: Not on file    Highest education level: Not on file   Occupational History    Not on file   Tobacco Use    Smoking status: Every Day     Packs/day: 1.00     Years: 21.00     Pack years: 21.00     Types: Cigarettes    Smokeless tobacco: Never    Tobacco comments:     using nicotine patches at night   Vaping Use    Vaping Use: Never used   Substance and Sexual Activity    Alcohol use: Not Currently     Comment: hx of alcoholism; pt drinks a fifth of whiskey daily    Drug use: Not Currently     Comment: used cocaine in early 20's    Sexual activity: Not on file   Other Topics Concern    Not on file   Social History Narrative    Not on file     Social Determinants of Health     Financial Resource Strain: Low Risk     Difficulty of Paying Living Expenses: Not hard at all   Food Insecurity: No Food Insecurity    Worried About [address] in the Last Year: Never true    [address] in the Last Year: Never true   Transportation Needs: Unknown    Lack of Transportation (Medical): Not on file    Lack of Transportation (Non-Medical): No   Physical Activity: Not on file   Stress: Not on file   Social Connections: Not on file   Intimate Partner Violence: Not on file   Housing Stability: Unknown    Unable to Pay for Housing in the Last Year: Not on file    Number of Places Lived in the Last Year: Not on file    Unstable Housing in the Last Year: No           ROS:  [x] All negative/unchanged except if checked. Explain positive(checked items) below:  [] Constitutional  [] Eyes  [] Ear/Nose/Mouth/Throat  [] Respiratory  [] CV  [] GI  [x]   Jaunidce  [x] Musculoskeletal  Lower back pain  [] Skin/Breast  [] Neurological  [] Endocrine  [] Heme/Lymph  [] Allergic/Immunologic    Explanation: Pt reports that his headaches and chills have resolved since previous visit.     MEDICATIONS:    Current Facility-Administered Medications:     lactulose (CHRONULAC) 10 GM/15ML solution 10 g, 10 g, Oral, TID, Margarita Mendoza MD, 10 g at 02/20/23 2207    sodium chloride flush 0.9 % injection 5-40 mL, 5-40 mL, IntraVENous, 2 times per day, Margarita Mendoza MD, 10 mL at 02/20/23 2208    sodium chloride flush 0.9 % injection 5-40 mL, 5-40 mL, IntraVENous, PRN, Margarita Mendoza MD    0.9 % sodium chloride infusion, , IntraVENous, PRN, Margarita Mendoza MD    thiamine mononitrate tablet 100 mg, 100 mg, Oral, Daily, Jose Luis Moncada MD    LORazepam (ATIVAN) tablet 1 mg, 1 mg, Oral, Q1H PRN **OR** LORazepam (ATIVAN) injection 1 mg, 1 mg, IntraVENous, Q1H PRN **OR** LORazepam (ATIVAN) tablet 2 mg, 2 mg, Oral, Q1H PRN **OR** LORazepam (ATIVAN) injection 2 mg, 2 mg, IntraVENous, Q1H PRN **OR** LORazepam (ATIVAN) tablet 3 mg, 3 mg, Oral, Q1H PRN **OR** LORazepam (ATIVAN) injection 3 mg, 3 mg, IntraVENous, Q1H PRN **OR** LORazepam (ATIVAN) tablet 4 mg, 4 mg, Oral, Q1H PRN **OR** LORazepam (ATIVAN) injection 4 mg, 4 mg, IntraVENous, Q1H PRN, Catina Aleman MD    sodium chloride flush 0.9 % injection 5-40 mL, 5-40 mL, IntraVENous, 2 times per day, Catina Aleman MD, 10 mL at 02/20/23 2209    sodium chloride flush 0.9 % injection 5-40 mL, 5-40 mL, IntraVENous, PRN, Catina Aleman MD    0.9 % sodium chloride infusion, , IntraVENous, PRN, Catina Aleman MD    albuterol sulfate HFA (PROVENTIL;VENTOLIN;PROAIR) 108 (90 Base) MCG/ACT inhaler 2 puff, 2 puff, Inhalation, 4x Daily PRN, Catina Aleman MD    fluticasone (FLONASE) 50 MCG/ACT nasal spray 2 spray, 2 spray, Each Nostril, Daily, Catina Aleman MD, 2 spray at 24/03/49 0857    folic acid (FOLVITE) tablet 1 mg, 1 mg, Oral, Daily, Catina Aleman MD, 1 mg at 02/20/23 0930    pantoprazole (PROTONIX) tablet 40 mg, 40 mg, Oral, Daily, Catina Aleman MD, 40 mg at 02/20/23 0930    sucralfate (CARAFATE) tablet 1 g, 1 g, Oral, 4x Daily, Catina Aleman MD, 1 g at 02/20/23 2207    venlafaxine (EFFEXOR XR) extended release capsule 37.5 mg, 37.5 mg, Oral, Daily, Catina Aleman MD, 37.5 mg at 02/20/23 0930    ondansetron (ZOFRAN-ODT) disintegrating tablet 4 mg, 4 mg, Oral, TID PRN, Catina Aleman MD, 4 mg at 02/20/23 1210    sodium chloride flush 0.9 % injection 5-40 mL, 5-40 mL, IntraVENous, 2 times per day, Catina Aleman MD, 10 mL at 02/20/23 2209    sodium chloride flush 0.9 % injection 5-40 mL, 5-40 mL, IntraVENous, PRN, Catina Aleman MD    0.9 % sodium chloride infusion, , IntraVENous, PRN, Catina Aleman MD    thiamine mononitrate tablet 100 mg, 100 mg, Oral, Daily, Jose Luis Moncada MD, 100 mg at 02/20/23 0930    nadolol (CORGARD) tablet 20 mg, 20 mg, Oral, Daily, Jose Luis Erma Brock MD, 20 mg at 02/20/23 0930      Examination:  /81   Pulse 89   Temp 98.3 °F (36.8 °C) (Oral)   Resp 20   Ht 5' 8\" (1.727 m)   Wt 173 lb 1 oz (78.5 kg)   SpO2 96%   BMI 26.31 kg/m²     Medication side effects(SE): No medication side effects to report at this time    Mental Status Examination:    Level of consciousness:  within normal limits   Appearance:  fair grooming and fair hygiene  Behavior/Motor:  no abnormalities noted  Attitude toward examiner:  cooperative, attentive, and good eye contact  Speech:  normal rate and normal volume   Mood: depressed  Affect:  flat  Thought processes:  linear and coherent   Thought content:  Homicidal ideation - none  Suicidal Ideation:  denies suicidal ideation at current time  Delusions:  no evidence of delusions  Perceptual Disturbance:  denies any perceptual disturbance at this time  Cognition:  oriented to person, place, and time   Concentration intact  Insight fair   Judgement fair     ASSESSMENT: MDD, recurrent, severe without psychotic features  Patient symptoms are:  [] Well controlled  [] Improving  [] Worsening  [x] No change      Diagnosis:   Principal Problem:    Alcoholism ([facility] [address]  Active Problems:    Generalized abdominal pain    Severe recurrent major depression without psychotic features ([facility] [address]  Resolved Problems:    * No resolved hospital problems.  *      LABS:    Recent Labs     02/19/23  1134 02/20/23  0543 02/21/23  0526   WBC 7.4 7.6 9.1   HGB 8.7* 9.2* 9.3*   PLT 42* 45* 44*     Recent Labs     02/19/23  1134 02/20/23  0543 02/21/23  0526   * 128* 132*   K 4.4 4.0 4.1   CL 97* 95* 98   CO2 23 23 22   BUN 7 7 9   CREATININE 0.47* <0.40* <0.40*   GLUCOSE 99 96 92     Recent Labs     02/19/23  1134 02/20/23  0543 02/21/23  0526   BILITOT 8.8* 9.7* 11.8*   ALKPHOS 512* 510* 469*   * 208* 196*   ALT 27 27 26     Lab Results   Component Value Date/Time    BARBSCNU NEGATIVE 06/04/2020 04:02 PM    LABBENZ NEGATIVE 06/04/2020 04:02 PM    LABMETH NEGATIVE 06/04/2020 04:02 PM    PPXUR NOT REPORTED 06/04/2020 04:02 PM     Lab Results   Component Value Date/Time    TSH 3.42 10/15/2022 12:36 AM     No results found for: LITHIUM  No results found for: VALPROATE, CBMZ      Treatment Plan:  Admit to psychiatry when medically cleared  Continue sitter  Medications as noted above, continue Effexor as noted  Follow-up daily while inpatient  Attempt to develop insight  Psych-education conducted    Sam Emerson is a 38 y.o. male being evaluated face to face.     --Josue De La Cruz OMS-III on 2/21/2023 at 10:04 AM    An electronic signature was used to authenticate this note.     **This report has been created using voice recognition software. It may contain minor errors which are inherent in voice recognition technology.**   I independently saw and evaluated the patient.  I reviewed the  documentation above.  Any additional comments or changes to the    documentation are stated below otherwise agree with assessment.      The patient's bilirubin is high.  His liver enzymes are raised and his ALP appears to be worsening.  The patient continues to report depressive symptoms.  Today he was denying suicidal ideation but he was quite blunted in his affect.  No changes have been made to his medications at this time.  We will continue to monitor.        PLAN  Medications as noted above  Attempt to develop insight   Psycho-education conducted.  Supportive Therapy conducted.  Follow-up daily while on inpatient unit    Electronically signed by TRACE LEON MD on 2/21/23 at 9:16 PM EST

## 2023-02-21 NOTE — PROGRESS NOTES
[facility] [address] [address]    HISTORY AND PHYSICAL EXAMINATION            Date:   2/21/2023  Patient name:  Stanislav Roberts  Date of admission:  2/18/2023  3:40 PM  MRN:   [id number]  Account:  [de-identified]  YOB: 1985  PCP:    Hero Mendoza MD  Room:   2094/2094-01  Code Status:    Full Code    Chief Complaint:     Chief Complaint   Patient presents with    Alcohol Problem    Mental Health Problem    Emesis    Nausea    Jaundice       History Obtained From:     patient, electronic medical record    History of Present Illness: The patient is a 41 y.o. Non- / non  male who presents with Alcohol Problem, Mental Health Problem, Emesis, Nausea, and Jaundice   and he is admitted to the hospital for the management of jaundice, alcoholism   Patient has past medical history multiple medical problem which include hypertension, alcoholism, cirrhosis of liver, depression, history of esophageal varices s/p banding, patient, presented to emergency room, with complaints of worsening fatigue, tiredness, nausea, vomiting. Patient told me he could not able to hold anything down, he also noticed, worsening jaundice. Patient also feels depressed, he told me that, he has suicidal ideation.   Does not have any plan to commit suicide, never attempted suicide in the past.  Does not have access to gun  Patient still continue to drink, last drink was 5 AM in the morning, he will drink around 3 pints of vodka daily  No complaints of chest pain, shortness of breath  He told me he never required paracentesis in the past  2/19   Patient, clinically feeling better  Had 1 episode of vomiting last night  Required 2 doses of Ativan  No chest pain    2/20   \Patient, feeling better  Has multiple bowel movement, dose of lactulose was reduced  Patient desperately want to go home  Patient required only one-time dose of Ativan since ,orning     2/21  Patient, clinically doing better  Alert oriented  No abdominal pain, nausea, vomiting  Has only 1 bowel movement since morning  Dose of lactulose was reduced yesterday  Past Medical History:     Past Medical History:   Diagnosis Date    Alcoholism ([facility] [address]     pt drinks a fifth of whiskey daily    Anemia     Cirrhosis, alcoholic ([facility] [address]     Pulmonary embolism ([facility] [address]         Past Surgical History:     Past Surgical History:   Procedure Laterality Date    UPPER GASTROINTESTINAL ENDOSCOPY N/A 5/1/2022    EGD ESOPHAGOGASTRODUODENOSCOPY performed by Shahnaz Bradley MD at [facility] 10/30/2022    EGD BAND LIGATION performed by Kerry Choi MD at [facility]        Medications Prior to Admission:     Prior to Admission medications    Medication Sig Start Date End Date Taking?  Authorizing Provider   metoclopramide (REGLAN) 5 MG tablet Take 1 tablet by mouth 3 times daily as needed (nausea) 2/14/23   Will Zamora MD   venlafaxine (EFFEXOR XR) 37.5 MG extended release capsule Take 1 capsule by mouth daily 2/14/23   Will Zamora MD   nadolol (CORGARD) 20 MG tablet Take 1 tablet by mouth daily 2/1/23   Lorrie Goodson MD   ondansetron (ZOFRAN-ODT) 4 MG disintegrating tablet Take 1 tablet by mouth 3 times daily as needed for Nausea or Vomiting 12/27/22   Will Zamora MD   fluticasone [facility]) 50 MCG/ACT nasal spray 1 spray by Each Nostril route daily 12/27/22   Will Zamora MD   pantoprazole (PROTONIX) 40 MG tablet Take 1 tablet by mouth daily 12/7/22   Will Zamora MD   sucralfate (CARAFATE) 1 GM tablet Take 1 tablet by mouth 4 times daily 12/7/22   Will Zamora MD   albuterol sulfate HFA (VENTOLIN HFA) 108 (90 Base) MCG/ACT inhaler Inhale 2 puffs into the lungs 4 times daily as needed for Wheezing 5/3/22   Kim Sosa MD   folic acid (FOLVITE) 1 MG tablet Take 1 tablet by mouth daily 5/4/22   Kim Sosa MD   vitamin B-1 (THIAMINE) 100 MG tablet Take 1 tablet by mouth daily 5/4/22   Kim Sosa MD Multiple Vitamins-Minerals (CENTRUM MEN) TABS Take 1 tablet by mouth daily    Historical Provider, MD   Menthol-Methyl Salicylate (ANALGESIC OINTMENT) OINT ointment Apply topically as needed Indications: on back    Historical Provider, MD   naproxen (NAPROSYN) 500 MG tablet Take 1 tablet by mouth 2 times daily  Patient taking differently: Take 500 mg by mouth 2 times daily as needed 8/13/21 3/28/22  Verner Peper, MD   fluticasone Elenor Senna) 50 MCG/ACT nasal spray instill 1 spray into each nostril once daily 5/10/21   Henry Magana MD   Blood Pressure Monitoring (BP MONITOR-STETHOSCOPE) KIT 1 each by Does not apply route daily 4/7/21   Henry Magana MD   Nicotine 21-14-7 MG/24HR KIT Place 14 mg onto the skin nightly     Historical Provider, MD   EPINEPHrine (EPIPEN 2-LIZETTE) 0.3 MG/0.3ML SOAJ injection Inject 0.3 mLs into the muscle once for 1 dose Use as directed for allergic reaction 11/17/16 11/15/22  Loli Dubon MD        Allergies:     Bee venom    Social History:     Tobacco:    reports that he has been smoking cigarettes. He has a 21.00 pack-year smoking history. He has never used smokeless tobacco.  Alcohol:      reports that he does not currently use alcohol. Drug Use:  reports that he does not currently use drugs. Family History:     Family History   Problem Relation Age of Onset    Heart Disease Mother     Heart Attack Mother     Heart Disease Father        Review of Systems:     Positive and Negative as described in HPI. CONSTITUTIONAL: Generalized fatigue, malaise   HEENT: Worsening jaundice, yellowish discoloration of eyes and skin   RESPIRATORY:  negative for shortness of breath, cough, congestion, wheezing. CARDIOVASCULAR:  negative for chest pain, palpitations.   GASTROINTESTINAL: Positive for nausea, vomiting, anorexia  GENITOURINARY:  negative for difficulty of urination, burning with urination, frequency   INTEGUMENT:  negative for rash, skin lesions, easy bruising HEMATOLOGIC/LYMPHATIC:  negative for swelling/edema   ALLERGIC/IMMUNOLOGIC:  negative for urticaria , itching  ENDOCRINE:  negative increase in drinking, increase in urination, hot or cold intolerance  MUSCULOSKELETAL:  negative joint pains, muscle aches, swelling of joints  NEUROLOGICAL:  negative for headaches, dizziness, lightheadedness, numbness, pain, tingling extremities  BEHAVIOR/PSYCH: Feels depressed, suicidal ideation  Physical Exam:   /83   Pulse (!) 107   Temp 98 °F (36.7 °C) (Oral)   Resp 18   Ht 5' 8\" (1.727 m)   Wt 173 lb 1 oz (78.5 kg)   SpO2 98%   BMI 26.31 kg/m²   Temp (24hrs), Av.4 °F (36.9 °C), Min:98 °F (36.7 °C), Max:98.8 °F (37.1 °C)    No results for input(s): POCGLU in the last 72 hours. Intake/Output Summary (Last 24 hours) at 2023 1400  Last data filed at 2023 0800  Gross per 24 hour   Intake 120 ml   Output 300 ml   Net -180 ml       General Appearance: Patient is agitated, depressed, frustrated with his situation, deep jaundice present  Mental status: oriented to person, place, and time with normal affect  Head:  normocephalic, atraumatic. Eye: Icterus present  Ear: normal external ear, no discharge, hearing intact  Nose:  no drainage noted  Mouth: mucous membranes moist  Neck: supple, no carotid bruits, thyroid not palpable  Lungs: Bilateral equal air entry, clear to ausculation, no wheezing, rales or rhonchi, normal effort  Cardiovascular: normal rate, regular rhythm, no murmur, gallop, rub.   Abdomen: Soft, nontender, nondistended, normal bowel sounds, no hepatomegaly or splenomegaly  Neurologic: There are no new focal motor or sensory deficits, normal muscle tone and bulk, no abnormal sensation, normal speech, cranial nerves II through XII grossly intact  Skin: No gross lesions, rashes, bruising or bleeding on exposed skin area  Extremities:  peripheral pulses palpable, no pedal edema or calf pain with palpation  Psych: normal affect Investigations:      Laboratory Testing:  Recent Results (from the past 24 hour(s))   Comprehensive Metabolic Panel w/ Reflex to MG    Collection Time: 02/21/23  5:26 AM   Result Value Ref Range    Glucose 92 70 - 99 mg/dL    BUN 9 6 - 20 mg/dL    Creatinine <0.40 (L) 0.70 - 1.20 mg/dL    Est, Glom Filt Rate Can not be calculated >60 mL/min/1.73m2    Calcium 9.0 8.6 - 10.4 mg/dL    Sodium 132 (L) 135 - 144 mmol/L    Potassium 4.1 3.7 - 5.3 mmol/L    Chloride 98 98 - 107 mmol/L    CO2 22 20 - 31 mmol/L    Anion Gap 12 9 - 17 mmol/L    Alkaline Phosphatase 469 (H) 40 - 129 U/L    ALT 26 5 - 41 U/L     (H) <40 U/L    Total Bilirubin 11.8 (H) 0.3 - 1.2 mg/dL    Total Protein 8.9 (H) 6.4 - 8.3 g/dL    Albumin 3.4 (L) 3.5 - 5.2 g/dL   CBC with Auto Differential    Collection Time: 02/21/23  5:26 AM   Result Value Ref Range    WBC 9.1 3.5 - 11.0 k/uL    RBC 2.86 (L) 4.5 - 5.9 m/uL    Hemoglobin 9.3 (L) 13.5 - 17.5 g/dL    Hematocrit 26.9 (L) 41 - 53 %    MCV 94.2 80 - 100 fL    MCH 32.4 26 - 34 pg    MCHC 34.4 31 - 37 g/dL    RDW 26.8 (H) 11.5 - 14.9 %    Platelets 44 (L) 309 - 450 k/uL    MPV 9.3 6.0 - 12.0 fL    Seg Neutrophils 70 (H) 36 - 66 %    Lymphocytes 10 (L) 24 - 44 %    Monocytes 5 1 - 7 %    Eosinophils % 2 0 - 4 %    Basophils 1 0 - 2 %    Bands 12 (H) 0 - 10 %    Segs Absolute 6.37 1.3 - 9.1 k/uL    Absolute Lymph # 0.91 (L) 1.0 - 4.8 k/uL    Absolute Mono # 0.46 0.1 - 1.3 k/uL    Absolute Eos # 0.18 0.0 - 0.4 k/uL    Basophils Absolute 0.09 0.0 - 0.2 k/uL    Absolute Bands # 1.09 (H) 0.0 - 1.0 k/uL    Morphology ANISOCYTOSIS PRESENT     Morphology HYPOCHROMIA PRESENT     Morphology 1+ TARGET CELLS        Imaging/Diagnostics:        Assessment :      Primary Problem  Alcoholism [facility])    Active Hospital Problems    Diagnosis Date Noted    Generalized abdominal pain [R10.84] 02/20/2023     Priority: Medium    Severe recurrent major depression without psychotic features ([facility] [address] [F33.2] 02/20/2023 Priority: Medium    Al[facility] [F10.20] 02/18/2023     Priority: Medium       Plan:     Patient status Admit as inpatient in the  Progressive Unit/Step down    Patient admitted with worsening jaundice, elevated liver enzymes,, his CTP score was 8, will order ammonia level, GI consulted  Calculated  Maddary DF score -9.3, do not qualify for steroids for alcoholic hepatitis  Active alcoholism, starting patient on thiamine, folic acid, multivitamin, CIWA protocol  Depression,suicidal ideation- sitter at bed side , jc consulted   SCD for DVT prophylxis -cannot give chemoprophylaxis with thrombocytopenia  Patient denying GI bleed  Hypertension, controlled, recently seen in clinic, antihypertensive was reduced  Overall prognosis guarded, patient need to quit drinking  Serum ammonia is high, starting patient on lactulose 3 times a day, with goal of 3 stools a day  Awaiting morning labs  Input from GI and psychiatrist awaited  2/20   Patient, having more than 5 stools  Reducing lactulose to 10 mg 3 times a day  Patient desperate want to go home  Case discussed with GI team, since he did not have any active bleeding  No indication for urgent EGD  Patient can follow-up with GI as outpatient  Awaiting inputs from psychiatrist, patient, is pink slipped    2/21  Patient, is medically cleared to go to [facility]  I discussed case with GI team yesterday, will need EGD as outpatient  Liver function slightly worse  Ultrasound liver is negative  We will transfer patient to [facility] floor as recommended by psychiatrist    Consultations:   IP CONSULT TO SOCIAL WORK  IP CONSULT TO INTERNAL MEDICINE  IP CONSULT TO SOCIAL WORK  IP CONSULT TO GI  IP CONSULT TO PSYCHIATRY  IP CONSULT TO SOCIAL WORK    Patient is admitted as inpatient status because of co-morbidities listed above, severity of signs and symptoms as outlined, requirement for current medical therapies and most importantly because of direct risk to patient if care not provided in a hospital setting. Renetta Edmond MD  2/21/2023  2:00 PM    Copy sent to Dr. Rosa Wynn MD    Please note that this chart was generated using voice recognition Dragon dictation software. Although every effort was made to ensure the accuracy of this automated transcription, some errors in transcription may have occurred.

## 2023-02-21 NOTE — CARE COORDINATION
Ktaharine Sam,  with [facility] called to find out discharge plans. Her contact is [phone]. Informed her to reach out to [facility]  for further questions tomorrow.  Electronically signed by Kathie Larson RN on 2/21/2023 at 4:40 PM

## 2023-02-22 ENCOUNTER — HOSPITAL ENCOUNTER (INPATIENT)
Age: 38
LOS: 1 days | Discharge: HOME | DRG: 754 | End: 2023-02-23
Attending: PSYCHIATRY & NEUROLOGY | Admitting: PSYCHIATRY & NEUROLOGY
Payer: COMMERCIAL

## 2023-02-22 VITALS
SYSTOLIC BLOOD PRESSURE: 118 MMHG | WEIGHT: 173.06 LBS | HEART RATE: 77 BPM | TEMPERATURE: 98.3 F | RESPIRATION RATE: 16 BRPM | OXYGEN SATURATION: 98 % | BODY MASS INDEX: 26.23 KG/M2 | HEIGHT: 68 IN | DIASTOLIC BLOOD PRESSURE: 70 MMHG

## 2023-02-22 PROBLEM — F32.A DEPRESSION WITH SUICIDAL IDEATION: Status: ACTIVE | Noted: 2023-02-22

## 2023-02-22 PROBLEM — F33.2 MDD (MAJOR DEPRESSIVE DISORDER), RECURRENT SEVERE, WITHOUT PSYCHOSIS (HCC): Status: ACTIVE | Noted: 2023-02-22

## 2023-02-22 PROBLEM — R45.851 DEPRESSION WITH SUICIDAL IDEATION: Status: ACTIVE | Noted: 2023-02-22

## 2023-02-22 LAB
ANION GAP SERPL CALCULATED.3IONS-SCNC: 9 MMOL/L (ref 9–17)
BUN SERPL-MCNC: 10 MG/DL (ref 6–20)
CALCIUM SERPL-MCNC: 8.4 MG/DL (ref 8.6–10.4)
CHLORIDE SERPL-SCNC: 101 MMOL/L (ref 98–107)
CO2 SERPL-SCNC: 21 MMOL/L (ref 20–31)
CREAT SERPL-MCNC: <0.4 MG/DL (ref 0.7–1.2)
GFR SERPL CREATININE-BSD FRML MDRD: ABNORMAL ML/MIN/1.73M2
GLUCOSE SERPL-MCNC: 91 MG/DL (ref 70–99)
HCT VFR BLD AUTO: 26 % (ref 41–53)
HGB BLD-MCNC: 8.7 G/DL (ref 13.5–17.5)
MCH RBC QN AUTO: 31.8 PG (ref 26–34)
MCHC RBC AUTO-ENTMCNC: 33.5 G/DL (ref 31–37)
MCV RBC AUTO: 94.9 FL (ref 80–100)
PDW BLD-RTO: 27.7 % (ref 11.5–14.9)
PLATELET # BLD AUTO: 59 K/UL (ref 150–450)
PMV BLD AUTO: 9.6 FL (ref 6–12)
POTASSIUM SERPL-SCNC: 3.8 MMOL/L (ref 3.7–5.3)
RBC # BLD: 2.74 M/UL (ref 4.5–5.9)
SODIUM SERPL-SCNC: 131 MMOL/L (ref 135–144)
WBC # BLD AUTO: 7.8 K/UL (ref 3.5–11)

## 2023-02-22 PROCEDURE — APPSS30 APP SPLIT SHARED TIME 16-30 MINUTES: Performed by: NURSE PRACTITIONER

## 2023-02-22 PROCEDURE — G0378 HOSPITAL OBSERVATION PER HR: HCPCS

## 2023-02-22 PROCEDURE — 6370000000 HC RX 637 (ALT 250 FOR IP): Performed by: INTERNAL MEDICINE

## 2023-02-22 PROCEDURE — 1240000000 HC EMOTIONAL WELLNESS R&B

## 2023-02-22 PROCEDURE — 85027 COMPLETE CBC AUTOMATED: CPT

## 2023-02-22 PROCEDURE — 36415 COLL VENOUS BLD VENIPUNCTURE: CPT

## 2023-02-22 PROCEDURE — G0379 DIRECT REFER HOSPITAL OBSERV: HCPCS

## 2023-02-22 PROCEDURE — 6370000000 HC RX 637 (ALT 250 FOR IP): Performed by: NURSE PRACTITIONER

## 2023-02-22 PROCEDURE — 80048 BASIC METABOLIC PNL TOTAL CA: CPT

## 2023-02-22 PROCEDURE — 99239 HOSP IP/OBS DSCHRG MGMT >30: CPT | Performed by: INTERNAL MEDICINE

## 2023-02-22 PROCEDURE — 99232 SBSQ HOSP IP/OBS MODERATE 35: CPT | Performed by: INTERNAL MEDICINE

## 2023-02-22 RX ORDER — LORAZEPAM 1 MG/1
3 TABLET ORAL
Status: DISCONTINUED | OUTPATIENT
Start: 2023-02-22 | End: 2023-02-23 | Stop reason: HOSPADM

## 2023-02-22 RX ORDER — TRAZODONE HYDROCHLORIDE 50 MG/1
50 TABLET ORAL NIGHTLY PRN
Status: DISCONTINUED | OUTPATIENT
Start: 2023-02-22 | End: 2023-02-23 | Stop reason: HOSPADM

## 2023-02-22 RX ORDER — SODIUM CHLORIDE 9 MG/ML
INJECTION, SOLUTION INTRAVENOUS PRN
Status: DISCONTINUED | OUTPATIENT
Start: 2023-02-22 | End: 2023-02-23 | Stop reason: HOSPADM

## 2023-02-22 RX ORDER — HYDROXYZINE 50 MG/1
50 TABLET, FILM COATED ORAL 3 TIMES DAILY PRN
Status: DISCONTINUED | OUTPATIENT
Start: 2023-02-22 | End: 2023-02-23 | Stop reason: HOSPADM

## 2023-02-22 RX ORDER — LORAZEPAM 1 MG/1
2 TABLET ORAL
Status: DISCONTINUED | OUTPATIENT
Start: 2023-02-22 | End: 2023-02-23 | Stop reason: HOSPADM

## 2023-02-22 RX ORDER — SODIUM CHLORIDE 0.9 % (FLUSH) 0.9 %
5-40 SYRINGE (ML) INJECTION EVERY 12 HOURS SCHEDULED
Status: DISCONTINUED | OUTPATIENT
Start: 2023-02-22 | End: 2023-02-23 | Stop reason: HOSPADM

## 2023-02-22 RX ORDER — MAGNESIUM HYDROXIDE/ALUMINUM HYDROXICE/SIMETHICONE 120; 1200; 1200 MG/30ML; MG/30ML; MG/30ML
30 SUSPENSION ORAL EVERY 6 HOURS PRN
Status: DISCONTINUED | OUTPATIENT
Start: 2023-02-22 | End: 2023-02-23 | Stop reason: HOSPADM

## 2023-02-22 RX ORDER — GAUZE BANDAGE 2" X 2"
100 BANDAGE TOPICAL DAILY
Status: DISCONTINUED | OUTPATIENT
Start: 2023-02-22 | End: 2023-02-23 | Stop reason: HOSPADM

## 2023-02-22 RX ORDER — FOLIC ACID 1 MG/1
1 TABLET ORAL DAILY
Status: DISCONTINUED | OUTPATIENT
Start: 2023-02-22 | End: 2023-02-23 | Stop reason: HOSPADM

## 2023-02-22 RX ORDER — SODIUM CHLORIDE 0.9 % (FLUSH) 0.9 %
5-40 SYRINGE (ML) INJECTION PRN
Status: DISCONTINUED | OUTPATIENT
Start: 2023-02-22 | End: 2023-02-23 | Stop reason: HOSPADM

## 2023-02-22 RX ORDER — LORAZEPAM 1 MG/1
1 TABLET ORAL
Status: DISCONTINUED | OUTPATIENT
Start: 2023-02-22 | End: 2023-02-23 | Stop reason: HOSPADM

## 2023-02-22 RX ORDER — LORAZEPAM 1 MG/1
4 TABLET ORAL
Status: DISCONTINUED | OUTPATIENT
Start: 2023-02-22 | End: 2023-02-23 | Stop reason: HOSPADM

## 2023-02-22 RX ORDER — POLYETHYLENE GLYCOL 3350 17 G/17G
17 POWDER, FOR SOLUTION ORAL DAILY PRN
Status: DISCONTINUED | OUTPATIENT
Start: 2023-02-22 | End: 2023-02-23 | Stop reason: HOSPADM

## 2023-02-22 RX ADMIN — THIAMINE HCL TAB 100 MG 100 MG: 100 TAB at 16:41

## 2023-02-22 RX ADMIN — NADOLOL 20 MG: 20 TABLET ORAL at 09:02

## 2023-02-22 RX ADMIN — TRAZODONE HYDROCHLORIDE 50 MG: 50 TABLET ORAL at 20:58

## 2023-02-22 RX ADMIN — FOLIC ACID 1 MG: 1 TABLET ORAL at 09:01

## 2023-02-22 RX ADMIN — FOLIC ACID 1 MG: 1 TABLET ORAL at 16:41

## 2023-02-22 RX ADMIN — SUCRALFATE 1 G: 1 TABLET ORAL at 09:02

## 2023-02-22 RX ADMIN — VENLAFAXINE HYDROCHLORIDE 37.5 MG: 37.5 CAPSULE, EXTENDED RELEASE ORAL at 09:02

## 2023-02-22 RX ADMIN — LACTULOSE 10 G: 20 SOLUTION ORAL at 09:02

## 2023-02-22 RX ADMIN — THIAMINE HCL TAB 100 MG 100 MG: 100 TAB at 09:01

## 2023-02-22 RX ADMIN — PANTOPRAZOLE SODIUM 40 MG: 40 TABLET, DELAYED RELEASE ORAL at 09:02

## 2023-02-22 RX ADMIN — FLUTICASONE PROPIONATE 2 SPRAY: 50 SPRAY, METERED NASAL at 09:01

## 2023-02-22 RX ADMIN — HYDROXYZINE HYDROCHLORIDE 50 MG: 50 TABLET, FILM COATED ORAL at 20:58

## 2023-02-22 ASSESSMENT — PATIENT HEALTH QUESTIONNAIRE - PHQ9: SUM OF ALL RESPONSES TO PHQ QUESTIONS 1-9: 15

## 2023-02-22 ASSESSMENT — SLEEP AND FATIGUE QUESTIONNAIRES
DO YOU USE A SLEEP AID: YES
AVERAGE NUMBER OF SLEEP HOURS: 4
DO YOU HAVE DIFFICULTY SLEEPING: YES
SLEEP PATTERN: DISTURBED/INTERRUPTED SLEEP

## 2023-02-22 ASSESSMENT — PAIN SCALES - GENERAL: PAINLEVEL_OUTOF10: 0

## 2023-02-22 NOTE — CARE COORDINATION
DISCHARGE PLANNING NOTE:    Plan remains for patient to be discharged to [facility].     Electronically signed by Efe Fischer RN on 2/22/2023 at 1:57 PM

## 2023-02-22 NOTE — PROGRESS NOTES
RT unable to meet with pt to complete assessment during this shift. RT will complete assessment during next shift on 2/23/2023.

## 2023-02-22 NOTE — PROGRESS NOTES
GI Progress notes    2/22/2023   9:59 AM    Name:  Marilin Anne  MRN:    [id number]     Acct:     [de-identified]   Room:  2094/2094-01   Day: 4     Admit Date: 2/18/2023  3:40 PM  PCP: Harvey Jones MD    Subjective:     C/C:   Chief Complaint   Patient presents with    Alcohol Problem    Mental Health Problem    Emesis    Nausea    Jaundice       Interval History: Status: improved. Patient seen and examined. No acute events overnight  He is more alert today  Oriented x 4  Reports tolerated about 3/4 breakfast  No nausea, vomiting  Had BM this am, no overt GI bleeding  Hgb 8.7  Plt 59      ROS:  Constitutional: negative for chills, fevers and sweats  Gastrointestinal: negative for abdominal pain, constipation, diarrhea, nausea and vomiting  Neurological: negative for dizziness and headaches    Medications: Allergies:    Allergies   Allergen Reactions    Bee Venom Anaphylaxis       Current Meds: 0.9 % sodium chloride infusion, Continuous  lactulose (CHRONULAC) 10 GM/15ML solution 10 g, TID  sodium chloride flush 0.9 % injection 5-40 mL, 2 times per day  sodium chloride flush 0.9 % injection 5-40 mL, PRN  0.9 % sodium chloride infusion, PRN  thiamine mononitrate tablet 100 mg, Daily  LORazepam (ATIVAN) tablet 1 mg, Q1H PRN   Or  LORazepam (ATIVAN) injection 1 mg, Q1H PRN   Or  LORazepam (ATIVAN) tablet 2 mg, Q1H PRN   Or  LORazepam (ATIVAN) injection 2 mg, Q1H PRN   Or  LORazepam (ATIVAN) tablet 3 mg, Q1H PRN   Or  LORazepam (ATIVAN) injection 3 mg, Q1H PRN   Or  LORazepam (ATIVAN) tablet 4 mg, Q1H PRN   Or  LORazepam (ATIVAN) injection 4 mg, Q1H PRN  sodium chloride flush 0.9 % injection 5-40 mL, 2 times per day  sodium chloride flush 0.9 % injection 5-40 mL, PRN  0.9 % sodium chloride infusion, PRN  albuterol sulfate HFA (PROVENTIL;VENTOLIN;PROAIR) 108 (90 Base) MCG/ACT inhaler 2 puff, 4x Daily PRN  fluticasone (FLONASE) 50 MCG/ACT nasal spray 2 spray, Daily  folic acid (FOLVITE) tablet 1 mg, Daily  pantoprazole (PROTONIX) tablet 40 mg, Daily  sucralfate (CARAFATE) tablet 1 g, 4x Daily  venlafaxine (EFFEXOR XR) extended release capsule 37.5 mg, Daily  ondansetron (ZOFRAN-ODT) disintegrating tablet 4 mg, TID PRN  sodium chloride flush 0.9 % injection 5-40 mL, 2 times per day  sodium chloride flush 0.9 % injection 5-40 mL, PRN  0.9 % sodium chloride infusion, PRN  thiamine mononitrate tablet 100 mg, Daily  nadolol (CORGARD) tablet 20 mg, Daily        Data:     Code Status:  Full Code    Family History   Problem Relation Age of Onset    Heart Disease Mother     Heart Attack Mother     Heart Disease Father        Social History     Socioeconomic History    Marital status: Single     Spouse name: Not on file    Number of children: Not on file    Years of education: Not on file    Highest education level: Not on file   Occupational History    Not on file   Tobacco Use    Smoking status: Every Day     Packs/day: 1.00     Years: 21.00     Pack years: 21.00     Types: Cigarettes    Smokeless tobacco: Never    Tobacco comments:     using nicotine patches at night   Vaping Use    Vaping Use: Never used   Substance and Sexual Activity    Alcohol use: Not Currently     Comment: hx of alcoholism; pt drinks a fifth of whiskey daily    Drug use: Not Currently     Comment: used cocaine in early 20's    Sexual activity: Not on file   Other Topics Concern    Not on file   Social History Narrative    Not on file     Social Determinants of Health     Financial Resource Strain: Low Risk     Difficulty of Paying Living Expenses: Not hard at all   Food Insecurity: No Food Insecurity    Worried About Running Out of Food in the Last Year: Never true    Ran Out of Food in the Last Year: Never true   Transportation Needs: Unknown    Lack of Transportation (Medical): Not on file    Lack of Transportation (Non-Medical):  No   Physical Activity: Not on file   Stress: Not on file   Social Connections: Not on file   Intimate Partner Violence: Not on file   Housing Stability: Unknown    Unable to Pay for Housing in the Last Year: Not on file    Number of Places Lived in the Last Year: Not on file    Unstable Housing in the Last Year: No       Vitals:  /81   Pulse 76   Temp 98.1 °F (36.7 °C)   Resp 16   Ht 5' 8\" (1.727 m)   Wt 173 lb 1 oz (78.5 kg)   SpO2 97%   BMI 26.31 kg/m²   Temp (24hrs), Av.1 °F (36.7 °C), Min:97.9 °F (36.6 °C), Max:98.2 °F (36.8 °C)    No results for input(s): POCGLU in the last 72 hours. I/O (24Hr):     Intake/Output Summary (Last 24 hours) at 2023 0959  Last data filed at 2023 0833  Gross per 24 hour   Intake 720 ml   Output --   Net 720 ml       Labs:      CBC:   Lab Results   Component Value Date/Time    WBC 7.8 2023 05:34 AM    RBC 2.74 2023 05:34 AM    HGB 8.7 2023 05:34 AM    HCT 26.0 2023 05:34 AM    MCV 94.9 2023 05:34 AM    MCH 31.8 2023 05:34 AM    MCHC 33.5 2023 05:34 AM    RDW 27.7 2023 05:34 AM    PLT 59 2023 05:34 AM    MPV 9.6 2023 05:34 AM     CBC with Differential:    Lab Results   Component Value Date/Time    WBC 7.8 2023 05:34 AM    RBC 2.74 2023 05:34 AM    HGB 8.7 2023 05:34 AM    HCT 26.0 2023 05:34 AM    PLT 59 2023 05:34 AM    MCV 94.9 2023 05:34 AM    MCH 31.8 2023 05:34 AM    MCHC 33.5 2023 05:34 AM    RDW 27.7 2023 05:34 AM    NRBC 1 2021 01:01 PM    METASPCT 2 2023 05:43 AM    LYMPHOPCT 10 2023 05:26 AM    MONOPCT 5 2023 05:26 AM    BASOPCT 1 2023 05:26 AM    MONOSABS 0.46 2023 05:26 AM    LYMPHSABS 0.91 2023 05:26 AM    EOSABS 0.18 2023 05:26 AM    BASOSABS 0.09 2023 05:26 AM    DIFFTYPE NOT REPORTED 2022 02:20 PM     Hemoglobin/Hematocrit:    Lab Results   Component Value Date/Time    HGB 8.7 2023 05:34 AM    HCT 26.0 2023 05:34 AM     CMP:    Lab Results   Component Value Date/Time  02/22/2023 05:34 AM    K 3.8 02/22/2023 05:34 AM     02/22/2023 05:34 AM    CO2 21 02/22/2023 05:34 AM    BUN 10 02/22/2023 05:34 AM    CREATININE <0.40 02/22/2023 05:34 AM    GFRAA >60 05/26/2022 05:20 AM    LABGLOM Can not be calculated 02/22/2023 05:34 AM    GLUCOSE 91 02/22/2023 05:34 AM    PROT 8.9 02/21/2023 05:26 AM    LABALBU 3.4 02/21/2023 05:26 AM    CALCIUM 8.4 02/22/2023 05:34 AM    BILITOT 11.8 02/21/2023 05:26 AM    ALKPHOS 469 02/21/2023 05:26 AM     02/21/2023 05:26 AM    ALT 26 02/21/2023 05:26 AM     BMP:    Lab Results   Component Value Date/Time     02/22/2023 05:34 AM    K 3.8 02/22/2023 05:34 AM     02/22/2023 05:34 AM    CO2 21 02/22/2023 05:34 AM    BUN 10 02/22/2023 05:34 AM    LABALBU 3.4 02/21/2023 05:26 AM    CREATININE <0.40 02/22/2023 05:34 AM    CALCIUM 8.4 02/22/2023 05:34 AM    GFRAA >60 05/26/2022 05:20 AM    LABGLOM Can not be calculated 02/22/2023 05:34 AM    GLUCOSE 91 02/22/2023 05:34 AM     PT/INR:    Lab Results   Component Value Date/Time    PROTIME 15.1 02/18/2023 04:27 PM    INR 1.2 02/18/2023 04:27 PM     PTT:    Lab Results   Component Value Date/Time    APTT 40.5 04/27/2022 06:15 PM   [APTT}    Physical Examination:        General appearance: alert, cooperative and no distress  Mental Status: oriented to person, place and time and normal affect  Abdomen: soft, nontender, nondistended, bowel sounds present  Extremities: no edema, redness or tenderness in the calves  Skin: no gross lesions, rashes, or induration    Assessment:        Primary Problem  Alcoholism [facility])     Active Hospital Problems    Diagnosis Date Noted    Generalized abdominal pain [R10.84] 02/20/2023     Priority: Medium    Severe recurrent major depression without psychotic features ([facility] [F33.2] 02/20/2023     Priority: Medium    Alcoholism ([facility] [F10.20] 02/18/2023     Priority: Medium     Past Medical History:   Diagnosis Date    Alcoholism ([facility]     pt drinks a fifth of whiskey daily    Anemia     Cirrhosis, alcoholic ([address]     Pulmonary embolism (HCC)         Plan:        Alcoholic hepatitis, alcoholic cirrhosis, hx of EV w/ banding in October 2022  2 gm sodium diet  Continue lactulose, titrate to 3-4 BM daily  Will need EGD outpatient to evaluate varices, no overt GI bleeding at present time, hgb stable  Continue thiamine, folic acid, multivitamin  Avoid sedatives, narcotics  PPI  Supportive care  Okay for [facility]     Time spent reviewing the chart, seeing the patient, and discussing with the attending MD around 30 minutes. Explained to the patient and d/W Nursing Staff  Will F/U with you  Please call or Page for any issues or change in status  Thanks    Electronically signed by SILVA Rich NP on 2/22/2023 at 9:59 AM     GI attending physician note. Patient seen with SILVA     I independently performed an evaluation on Jen Shepard. I have reviewed the above documentation completed by the Nurse Practitioner and agree with the history, examination, and management plan. Recommendations discussed. Patient looks much better than yesterday. Tolerating diet. Has normal colored bowel movements. He is cooperative. Discussed with Dr. Jack Fuentes. Patient may be transferred to [facility].   If needed we will see the patient and follow the patient in [facility]

## 2023-02-22 NOTE — BH NOTE
Patient arrived on unit transported by wheelchair with two staff members, voluntary paperwork signed, ambulated to intake room well.metal detector used, patient changed into appropriate clothing and orientated to room.

## 2023-02-22 NOTE — BH NOTE
[facility]  Admission Note     Admission Type: Voluntary        Reason for admission:   SI  to drink self to death      Addictive Behavior:        Medical Problems:   Past Medical History:   Diagnosis Date    Alcoholism ([address]     pt drinks a fifth of whiskey daily    Anemia     Cirrhosis, alcoholic (HCC)     Pulmonary embolism (HCC)        Status EXAM:  Mental Status and Behavioral Exam  Normal: No  Level of Assistance: Independent/Self  Facial Expression: Flat  Affect: Blunt  Level of Consciousness: Alert  Frequency of Checks: 4 times per hour, close  Mood:Normal: No  Mood: Sad, Depressed  Motor Activity:Normal: Yes  Eye Contact: Good  Observed Behavior: Cooperative  Sexual Misconduct History: Current - no  Preception: Belva to person, Belva to time, Belva to place, Belva to situation  Attention:Normal: No  Attention: Unable to concentrate  Thought Processes: Blocking  Thought Content:Normal: No  Thought Content: Preoccupations  Depression Symptoms: Isolative  Anxiety Symptoms: Generalized  Sweta Symptoms: No problems reported or observed.   Hallucinations: None  Delusions: No  Memory:Normal: No  Memory: Poor recent, Poor remote  Insight and Judgment: No  Insight and Judgment: Poor insight, Poor judgment    Tobacco Screening:  Practical Counseling, on admission, ela X, if applicable and completed (first 3 are required if patient doesn't refuse):            ( ) Recognizing danger situations (included triggers and roadblocks)                    ( ) Coping skills (new ways to manage stress,relaxation techniques, changing routine, distraction)                                                           ( ) Basic information about quitting (benefits of quitting, techniques in how to quit, available resources  ( ) Referral for counseling faxed to [facility]                                                                                                                   (x ) Patient S: Knee pain  B: Fell this morning from standing after tripping on a pallet at work. She landed on both knees. Right knee is worse. Pt reports feeling nauseated and lightheaded d/t the pain.  A: Iced knee, possible imaging.   R: Ready for provider evaluation.    refused counseling  ( ) Patient has not smoked in the last 30 days    Metabolic Screening:    Lab Results   Component Value Date    LABA1C 4.5 11/17/2022       No results found for: CHOL  No results found for: TRIG  No results found for: HDL  No components found for: LDLCAL  No results found for: LABVLDL      Body mass index is 26.3 kg/m². BP Readings from Last 2 Encounters:   02/22/23 109/73   02/22/23 118/70           Pt admitted with followings belongings:  Dental Appliances: None  Vision - Corrective Lenses: Eyeglasses  Hearing Aid: None  Jewelry: Earrings, Ring  Body Piercings Removed: Yes (ear plugs with stones)  Clothing: Footwear, Hat, Jacket/Coat, Pants, Socks, Undergarments  Other Valuables: Lighter/Matches  Patient admitted from Progressive unit Patient arrived on unit transported by wheelchair with two staff members, voluntary paperwork signed, ambulated to intake room well.metal detector used, patient changed into appropriate clothing and orientated to room.     Be Samaniego, RN

## 2023-02-22 NOTE — BH NOTE
Patient given tobacco quitline number 16449285065 at this time, refusing to call at this time, states \" I just dont want to quit now\"- patient given information as to the dangers of long term tobacco use. Continue to reinforce the importance of tobacco cessation.

## 2023-02-22 NOTE — DISCHARGE SUMMARY
[facility] Internal Medicine    Discharge Summary     Patient ID: Swathi Hawkins  :  1985   MRN: [id number]     ACCOUNT:  [de-identified]   Patient's PCP: Arben Johnson MD  Admit Date: 2023   Discharge Date: 2023    Length of Stay: 4  Code Status:  Full Code  Admitting Physician: Cindi Jacques MD  Discharge Physician: Cindi Jacques MD     Active Discharge Diagnoses:     Primary Problem  Alcoholism [facility])      [place] Problems    Diagnosis Date Noted    Generalized abdominal pain [R10.84] 2023     Priority: Medium    Severe recurrent major depression without psychotic features ([facility] [F33.2] 2023     Priority: Medium    Al[facility]ism ([facility] [F10.20] 2023     Priority: Medium       Admission Condition:  Poor     Discharged Condition: fair    Hospital Stay:     Hospital Course:  Swathi Hawkins is a 41 y.o. male who was admitted for the management of Alcoholism ([facility] , presented to ER with Alcohol Problem, Mental Health Problem, Emesis, Nausea, and Jaundice  Patient, admitted to [facility] with alcohol intoxication, he has history of cirrhosis of liver, depression, esophageal varices s/p banding  Patient denying any complaints of bleeding  Underwent ultrasound liver which was normal  Patient has low sodium which improved  Patient, was seen by GI, they also okayed him to go to [facility] floor  Patient getting discharged to [facility]  Medically cleared to go to [facility], GI will follow patient at the [facility] floor every other day        Significant therapeutic interventions:     Significant Diagnostic Studies:   Labs / Micro:        ,     Radiology:    XR WRIST RIGHT (MIN 3 VIEWS)    Result Date: 2/3/2023  EXAMINATION: THREE X-RAY VIEWS OF THE RIGHT HAND; FOUR X-RAY VIEWS OF THE RIGHT WRIST 2/3/2023 5:38 pm COMPARISON: None.  HISTORY: ORDERING SYSTEM PROVIDED HISTORY: fall TECHNOLOGIST PROVIDED HISTORY: fall Reason for Exam: Pt.  states pain on medial aspect of hand, wrist s/p fall FINDINGS: HAND: No fracture or malalignment identified. The joint spaces are maintained. A curvilinear density projecting over the mid 1st metacarpal is noted, which may represent a superficial foreign body. WRIST: No fracture or malalignment identified. The joint spaces are maintained. No discrete soft tissue abnormality identified. No acute osseous abnormality identified in the hand or wrist.     XR HAND RIGHT (MIN 3 VIEWS)    Result Date: 2/3/2023  EXAMINATION: THREE X-RAY VIEWS OF THE RIGHT HAND; FOUR X-RAY VIEWS OF THE RIGHT WRIST 2/3/2023 5:38 pm COMPARISON: None. HISTORY: ORDERING SYSTEM PROVIDED HISTORY: fall TECHNOLOGIST PROVIDED HISTORY: fall Reason for Exam: Pt.  states pain on medial aspect of hand, wrist s/p fall FINDINGS: HAND: No fracture or malalignment identified. The joint spaces are maintained. A curvilinear density projecting over the mid 1st metacarpal is noted, which may represent a superficial foreign body. WRIST: No fracture or malalignment identified. The joint spaces are maintained. No discrete soft tissue abnormality identified. No acute osseous abnormality identified in the hand or wrist.     US LIVER    Result Date: 2/21/2023  EXAMINATION: RIGHT UPPER QUADRANT ULTRASOUND 2/21/2023 8:36 am COMPARISON: None. HISTORY: ORDERING SYSTEM PROVIDED HISTORY: Elevated LFTs, EtOH abuse TECHNOLOGIST PROVIDED HISTORY: Elevated LFTs, EtOH abuse FINDINGS: LIVER:  Nonspecific heterogeneous echotexture liver without focal lesion. Liver 19.7 cm in length. Hepatopetal flow portal vein. BILIARY SYSTEM:  Gallstones in the gallbladder. No wall thickening or pericholecystic fluid. Negative sonographic Goyal's sign. Common bile duct is within normal limits measuring 3.9 mm. RIGHT KIDNEY: The right kidney is grossly unremarkable without evidence of hydronephrosis. PANCREAS:  Visualized portions of the pancreas are unremarkable.  OTHER: No evidence of right upper quadrant ascites. Nonspecific heterogeneous echotexture of the liver which is mildly enlarged may reflect chronic hepatocellular disease. No focal lesion. Cholelithiasis. No acute findings. RECOMMENDATIONS: Unavailable     XR CHEST PORTABLE    Result Date: 2/18/2023  EXAMINATION: ONE XRAY VIEW OF THE CHEST 2/18/2023 4:26 pm COMPARISON: February 5, 2023 HISTORY: ORDERING SYSTEM PROVIDED HISTORY: right sided chest pain TECHNOLOGIST PROVIDED HISTORY: right sided chest pain FINDINGS: The lungs are without acute focal process. There is no effusion or pneumothorax. The cardiomediastinal silhouette is without acute process. The osseous structures are without acute process. No acute process. XR CHEST PORTABLE    Result Date: 2/6/2023  EXAMINATION: ONE XRAY VIEW OF THE CHEST 2/5/2023 11:29 pm COMPARISON: Chest radiograph 10/15/2022, 05/21/2022 HISTORY: ORDERING SYSTEM PROVIDED HISTORY: cp- TECHNOLOGIST PROVIDED HISTORY: cp- Reason for Exam: chest pain, loss of consciousness FINDINGS: Lungs: Clear. Pleura: No effusion or pneumothorax. Cardiomediastinal silhouette: Normal contours. Bones: No acute osseous findings. Soft tissues: Normal.     No acute pulmonary findings         Consultations:    Consults:     Final Specialist Recommendations/Findings:   IP CONSULT TO SOCIAL WORK  IP CONSULT TO INTERNAL MEDICINE  IP CONSULT TO SOCIAL WORK  IP CONSULT TO GI  IP CONSULT TO PSYCHIATRY  IP CONSULT TO SOCIAL WORK  IP CONSULT TO SOCIAL WORK  IP CONSULT TO INTERNAL MEDICINE  IP CONSULT TO SOCIAL WORK  IP CONSULT TO GI  IP CONSULT TO SOCIAL WORK      The patient was seen and examined on day of discharge and this discharge summary is in conjunction with any daily progress note from day of discharge. Discharge plan:     Disposition: Home    Physician Follow Up:     No follow-up provider specified.      Requiring Further Evaluation/Follow Up POST HOSPITALIZATION/Incidental Findings:    Diet: cardiac diet    Activity: As tolerated    Instructions to Patient:     Discharge Medications:      Medication List        CONTINUE taking these medications      BP Monitor-Stethoscope Kit  1 each by Does not apply route daily            ASK your doctor about these medications      albuterol sulfate  (90 Base) MCG/ACT inhaler  Commonly known as: Ventolin HFA  Inhale 2 puffs into the lungs 4 times daily as needed for Wheezing     analgesic ointment Oint ointment     Centrum Men Tabs     EPINEPHrine 0.3 MG/0.3ML Soaj injection  Commonly known as: EpiPen 2-Rafat  Inject 0.3 mLs into the muscle once for 1 dose Use as directed for allergic reaction     * fluticasone 50 MCG/ACT nasal spray  Commonly known as: FLONASE  instill 1 spray into each nostril once daily     * fluticasone 50 MCG/ACT nasal spray  Commonly known as: FLONASE  1 spray by Each Nostril route daily     folic acid 1 MG tablet  Commonly known as: FOLVITE  Take 1 tablet by mouth daily     metoclopramide 5 MG tablet  Commonly known as: Reglan  Take 1 tablet by mouth 3 times daily as needed (nausea)     nadolol 20 MG tablet  Commonly known as: Corgard  Take 1 tablet by mouth daily     naproxen 500 MG tablet  Commonly known as: Naprosyn  Take 1 tablet by mouth 2 times daily     Nicotine 21-14-7 MG/24HR Kit     ondansetron 4 MG disintegrating tablet  Commonly known as: ZOFRAN-ODT  Take 1 tablet by mouth 3 times daily as needed for Nausea or Vomiting     pantoprazole 40 MG tablet  Commonly known as: PROTONIX  Take 1 tablet by mouth daily     sucralfate 1 GM tablet  Commonly known as: Carafate  Take 1 tablet by mouth 4 times daily     venlafaxine 37.5 MG extended release capsule  Commonly known as: Effexor XR  Take 1 capsule by mouth daily     vitamin B-1 100 MG tablet  Commonly known as: THIAMINE  Take 1 tablet by mouth daily           * This list has 2 medication(s) that are the same as other medications prescribed for you.  Read the directions carefully, and ask your doctor or other care provider to review them with you.                  Time Spent on discharge is  35 mins in patient examination, evaluation, counseling as well as medication reconciliation, prescriptions for required medications, discharge plan and follow up.    Electronically signed by   Jose Luis Moncada MD  2/22/2023  11:12 AM      Thank you Dr. Rosie Montes De Oca MD for the opportunity to be involved in this patient's care.

## 2023-02-22 NOTE — PROGRESS NOTES
BEHAVIORAL HEALTH FOLLOW-UP NOTE     2/22/2023     Patient was seen and examined in person, Chart reviewed   Patient's case discussed with staff/team    Chief Complaint: MDD, recurrent severe without psychotic features    Interim History: On assessment, the patient is cooperative and holds a flat affect throughout. The patient is jaundice in appearance and reports a return of his headaches, however, stating that they are not as severe as previously described. There are no additional physical signs/symptoms of withdrawal to report at this time. In speaking with the patient he continues to endorse depressive feelings although he states improvement. The patient also continues to deny suicidal ideation at this time. The patient also continues to deny auditory and visual hallucinations at this time. The patient continues to remain frustrated with having a sitter in the room at all times and not being able to move about the room whenever he desires. He states that he feels like he is getting weaker as a result of not being able to move. On chart review, the patient's sodium remains consistently low with his most recent level at 131. The patient's calcium level was also slightly depressed in his most recent panel.  In addition, Hb remains low at 8.7, which has decreased from previous 9.3.    /81   Pulse 76   Temp 98.1 °F (36.7 °C)   Resp 16   Ht 5' 8\" (1.727 m)   Wt 173 lb 1 oz (78.5 kg)   SpO2 97%   BMI 26.31 kg/m²   Appetite: [x] Normal/Unchanged  [] Increased  [] Decreased      Sleep:       [x] Normal/Unchanged  [] Fair       [] Poor              Energy:    [x] Normal/Unchanged  [] Increased  [] Decreased        Aggression:  [] yes  [x] no      PAST MEDICAL/PSYCHIATRIC HISTORY:   Past Medical History:   Diagnosis Date    Alcoholism ([facility] [address]     pt drinks a fifth of whiskey daily    Anemia     Cirrhosis, alcoholic ([facility] [address]     Pulmonary embolism ([facility] [address]        FAMILY/SOCIAL HISTORY:  Family History   Problem Relation Age of Onset    Heart Disease Mother     Heart Attack Mother     Heart Disease Father      Social History     Socioeconomic History    Marital status: Single     Spouse name: Not on file    Number of children: Not on file    Years of education: Not on file    Highest education level: Not on file   Occupational History    Not on file   Tobacco Use    Smoking status: Every Day     Packs/day: 1.00     Years: 21.00     Pack years: 21.00     Types: Cigarettes    Smokeless tobacco: Never    Tobacco comments:     using nicotine patches at night   Vaping Use    Vaping Use: Never used   Substance and Sexual Activity    Alcohol use: Not Currently     Comment: hx of alcoholism; pt drinks a fifth of whiskey daily    Drug use: Not Currently     Comment: used cocaine in early 20's    Sexual activity: Not on file   Other Topics Concern    Not on file   Social History Narrative    Not on file     Social Determinants of Health     Financial Resource Strain: Low Risk     Difficulty of Paying Living Expenses: Not hard at all   Food Insecurity: No Food Insecurity    Worried About Running Out of Food in the Last Year: Never true    Ran Out of Food in the Last Year: Never true   Transportation Needs: Unknown    Lack of Transportation (Medical): Not on file    Lack of Transportation (Non-Medical): No   Physical Activity: Not on file   Stress: Not on file   Social Connections: Not on file   Intimate Partner Violence: Not on file   Housing Stability: Unknown    Unable to Pay for Housing in the Last Year: Not on file    Number of Places Lived in the Last Year: Not on file    Unstable Housing in the Last Year: No       ROS:  [x] All negative/unchanged except if checked.  Explain positive(checked items) below:  [] Constitutional  [] Eyes  [] Ear/Nose/Mouth/Throat  [] Respiratory  [] CV  [] GI  []   [] Musculoskeletal  [] Skin/Breast  [x] Neurological  Pt reports having a slight headache that began this morning  [] Endocrine  [] Heme/Lymph  [] Allergic/Immunologic    Explanation:     MEDICATIONS:    Current Facility-Administered Medications:     0.9 % sodium chloride infusion, , IntraVENous, Continuous, Radha Garcia, SILVA - NP, Last Rate: 125 mL/hr at 02/21/23 2024, New Bag at 02/21/23 2024    lactulose ([facility]) 10 GM/15ML solution 10 g, 10 g, Oral, TID, Chito Thomson MD, 10 g at 02/22/23 0902    sodium chloride flush 0.9 % injection 5-40 mL, 5-40 mL, IntraVENous, 2 times per day, Chito Thomson MD, 10 mL at 02/21/23 1020    sodium chloride flush 0.9 % injection 5-40 mL, 5-40 mL, IntraVENous, PRN, Chito Thomson MD    0.9 % sodium chloride infusion, , IntraVENous, PRN, Chito Thomson MD    thiamine mononitrate tablet 100 mg, 100 mg, Oral, Daily, Jose Luis Moncada MD    LORazepam (ATIVAN) tablet 1 mg, 1 mg, Oral, Q1H PRN **OR** LORazepam (ATIVAN) injection 1 mg, 1 mg, IntraVENous, Q1H PRN **OR** LORazepam (ATIVAN) tablet 2 mg, 2 mg, Oral, Q1H PRN **OR** LORazepam (ATIVAN) injection 2 mg, 2 mg, IntraVENous, Q1H PRN **OR** LORazepam (ATIVAN) tablet 3 mg, 3 mg, Oral, Q1H PRN **OR** LORazepam (ATIVAN) injection 3 mg, 3 mg, IntraVENous, Q1H PRN **OR** LORazepam (ATIVAN) tablet 4 mg, 4 mg, Oral, Q1H PRN **OR** LORazepam (ATIVAN) injection 4 mg, 4 mg, IntraVENous, Q1H PRN, Chito Thomson MD    sodium chloride flush 0.9 % injection 5-40 mL, 5-40 mL, IntraVENous, 2 times per day, Chito Thomson MD, 10 mL at 02/20/23 2209    sodium chloride flush 0.9 % injection 5-40 mL, 5-40 mL, IntraVENous, PRN, Jose Luis Moncada MD    0.9 % sodium chloride infusion, , IntraVENous, PRN, Chito Thomson MD    albuterol sulfate HFA (PROVENTIL;VENTOLIN;PROAIR) 108 (90 Base) MCG/ACT inhaler 2 puff, 2 puff, Inhalation, 4x Daily PRN, Chito Thomson MD    fluticasone (FLONASE) 50 MCG/ACT nasal spray 2 spray, 2 spray, Each Nostril, Daily, Chito Thomson MD, 2 spray at 01/54/55 0308    folic acid (FOLVITE) tablet 1 mg, 1 mg, Oral, Daily, Chito Thomson MD, 1 mg at 02/22/23 0901    pantoprazole (PROTONIX) tablet 40 mg, 40 mg, Oral, Daily, Margarita Mendoza MD, 40 mg at 02/22/23 0902    sucralfate (CARAFATE) tablet 1 g, 1 g, Oral, 4x Daily, Margarita Mendoza MD, 1 g at 02/22/23 0902    venlafaxine (EFFEXOR XR) extended release capsule 37.5 mg, 37.5 mg, Oral, Daily, Margarita Mendoza MD, 37.5 mg at 02/22/23 0902    ondansetron (ZOFRAN-ODT) disintegrating tablet 4 mg, 4 mg, Oral, TID PRN, Margarita Mendoza MD, 4 mg at 02/20/23 1210    sodium chloride flush 0.9 % injection 5-40 mL, 5-40 mL, IntraVENous, 2 times per day, Margarita Mendoza MD, 10 mL at 02/20/23 2209    sodium chloride flush 0.9 % injection 5-40 mL, 5-40 mL, IntraVENous, PRN, Margarita Mendoza MD    0.9 % sodium chloride infusion, , IntraVENous, PRN, Margarita Mendoza MD    thiamine mononitrate tablet 100 mg, 100 mg, Oral, Daily, Margarita Mendoza MD, 100 mg at 02/22/23 0901    nadolol (CORGARD) tablet 20 mg, 20 mg, Oral, Daily, Margarita Mendoza MD, 20 mg at 02/22/23 9595      Examination:  /81   Pulse 76   Temp 98.1 °F (36.7 °C)   Resp 16   Ht 5' 8\" (1.727 m)   Wt 173 lb 1 oz (78.5 kg)   SpO2 97%   BMI 26.31 kg/m²     Medication side effects(SE): No medication side effects to report    Mental Status Examination:    Level of consciousness:  within normal limits   Appearance:  fair grooming and fair hygiene  Behavior/Motor:  no abnormalities noted  Attitude toward examiner:  cooperative and attentive  Speech:  spontaneous, normal rate, and normal volume   Mood: depressed  Affect:  flat  Thought processes:  linear, goal directed, and coherent   Thought content:  Homicidal ideation - none  Suicidal Ideation:  passive suicidal thoughts.    Delusions:  no evidence of delusions  Perceptual Disturbance:  denies any perceptual disturbance  Cognition:  oriented to person, place, and time   Concentration intact  Insight fair   Judgement fair     ASSESSMENT:   Patient symptoms are:  [] Well controlled  [] Improving  [] Worsening  [x] No change      Diagnosis:   Principal Problem:    Alcoholism ([facility] [address]  Active Problems:    Generalized abdominal pain    Severe recurrent major depression without psychotic features ([facility] [address]  Resolved Problems:    * No resolved hospital problems. *      LABS:    Recent Labs     02/20/23  0543 02/21/23  0526 02/22/23  0534   WBC 7.6 9.1 7.8   HGB 9.2* 9.3* 8.7*   PLT 45* 44* 59*     Recent Labs     02/20/23  0543 02/21/23  0526 02/21/23  1833 02/22/23  0534   * 132* 130* 131*   K 4.0 4.1  --  3.8   CL 95* 98  --  101   CO2 23 22  --  21   BUN 7 9  --  10   CREATININE <0.40* <0.40*  --  <0.40*   GLUCOSE 96 92  --  91     Recent Labs     02/19/23  1134 02/20/23  0543 02/21/23  0526   BILITOT 8.8* 9.7* 11.8*   ALKPHOS 512* 510* 469*   * 208* 196*   ALT 27 27 26     Lab Results   Component Value Date/Time    BARBSCNU NEGATIVE 06/04/2020 04:02 PM    LABBENZ NEGATIVE 06/04/2020 04:02 PM    LABMETH NEGATIVE 06/04/2020 04:02 PM    PPXUR NOT REPORTED 06/04/2020 04:02 PM     Lab Results   Component Value Date/Time    TSH 3.42 10/15/2022 12:36 AM     No results found for: LITHIUM  No results found for: VALPROATE, CBMZ    Treatment Plan:  Admit to psychiatry pending improvement in hyponatremia  Continue sitter  Monitor electrolyte levels  Medications as noted above, continue Effexor as noted  Follow-up daily while inpatient  Attempt to develop insight  Psych-education conducted      Sade Giles is a 41 y.o. male being evaluated face to face. --Vania Huynh OMS-III on 2/22/2023 at 9:55 AM    An electronic signature was used to authenticate this note. **This report has been created using voice recognition software. It may contain minor errors which are inherent in voice recognition technology. **   I independently saw and evaluated the patient. I reviewed the  documentation above.   Any additional comments or changes to the    documentation are stated below otherwise agree with assessment.      -The patient was seen face to face. He continues to be depressed and has suicidal thoughts. I have discussed the patient's labs with the primary team.  GI is onboard and we will continue to follow the patient on the psychiatry inpatient unit. -If the patient's liver function tests worsen, he can be transferred back to internal medicine. At this time we will continue with the plan to admit to psychiatry      PLAN  Medications as noted above  Attempt to develop insight   Psycho-education conducted. Supportive Therapy conducted.   Follow-up daily while on inpatient unit    Electronically signed by Kelsea Roberto MD on 2/22/23 at 2:06 PM EST

## 2023-02-22 NOTE — CARE COORDINATION
Psychosocial Assessment    Current Level of Psychosocial Functioning     Independent X  Dependent    Minimal Assist     Comments:      Psychosocial High Risk Factors (check all that apply)    Unable to obtain meds   Chronic illness/pain    Substance abuse  X  Lack of Family Support   Financial stress   Isolation   Inadequate Community Resources  Suicide attempt(s)  Not taking medications   Victim of crime   Developmental Delay  Unable to manage personal needs    Age 72 or older   Homeless  No transportation   Readmission within 30 days  Unemployment  Traumatic Event    Family/Supports identified: Pt reports girlfriend and son are supportive    Patient Strengths: Stable housing, employed and has insurance     Patient Barriers: Alcohol abuse        CMHC/MH history: Wants linked with [facility]:  medication management, group/individual therapies, family meetings, psycho -education, treatment team meetings to assist with stabilization    Initial Discharge Plan:  Return home and [facility]    Clinical Summary:  Pt is a 41year old male admitted to the [facility] for safety from medical. Pt denies suicidal, homicidal ideations and hallucinations. Pt reports coming to the hospital for jaundice and \"ended up here in psych\" Pt denies any physical, emotional, verbal, or sexual abuse. Pt reports past legal issues. Pt is discharged focused.

## 2023-02-23 VITALS
DIASTOLIC BLOOD PRESSURE: 78 MMHG | RESPIRATION RATE: 14 BRPM | WEIGHT: 173 LBS | TEMPERATURE: 98 F | OXYGEN SATURATION: 99 % | HEART RATE: 88 BPM | BODY MASS INDEX: 26.22 KG/M2 | SYSTOLIC BLOOD PRESSURE: 120 MMHG | HEIGHT: 68 IN

## 2023-02-23 PROCEDURE — APPSS60 APP SPLIT SHARED TIME 46-60 MINUTES

## 2023-02-23 PROCEDURE — 6370000000 HC RX 637 (ALT 250 FOR IP): Performed by: NURSE PRACTITIONER

## 2023-02-23 PROCEDURE — G0378 HOSPITAL OBSERVATION PER HR: HCPCS

## 2023-02-23 PROCEDURE — 6370000000 HC RX 637 (ALT 250 FOR IP): Performed by: INTERNAL MEDICINE

## 2023-02-23 RX ORDER — NADOLOL 20 MG/1
20 TABLET ORAL DAILY
Status: DISCONTINUED | OUTPATIENT
Start: 2023-02-23 | End: 2023-02-23 | Stop reason: HOSPADM

## 2023-02-23 RX ORDER — PANTOPRAZOLE SODIUM 40 MG/1
40 TABLET, DELAYED RELEASE ORAL DAILY
Status: DISCONTINUED | OUTPATIENT
Start: 2023-02-23 | End: 2023-02-23 | Stop reason: HOSPADM

## 2023-02-23 RX ORDER — EPINEPHRINE 0.3 MG/.3ML
0.3 INJECTION SUBCUTANEOUS ONCE
Qty: 2 EACH | Refills: 0 | Status: SHIPPED | OUTPATIENT
Start: 2023-02-23 | End: 2023-02-23

## 2023-02-23 RX ORDER — LACTULOSE 10 G/15ML
10 SOLUTION ORAL 3 TIMES DAILY
Qty: 473 ML | Refills: 1 | Status: SHIPPED | OUTPATIENT
Start: 2023-02-23

## 2023-02-23 RX ORDER — FLUTICASONE PROPIONATE 50 MCG
2 SPRAY, SUSPENSION (ML) NASAL DAILY
Status: DISCONTINUED | OUTPATIENT
Start: 2023-02-23 | End: 2023-02-23 | Stop reason: HOSPADM

## 2023-02-23 RX ORDER — SUCRALFATE 1 G/1
1 TABLET ORAL 4 TIMES DAILY
Qty: 120 TABLET | Refills: 0 | Status: SHIPPED | OUTPATIENT
Start: 2023-02-23

## 2023-02-23 RX ORDER — ALBUTEROL SULFATE 90 UG/1
2 AEROSOL, METERED RESPIRATORY (INHALATION) 4 TIMES DAILY PRN
Qty: 18 G | Refills: 0 | Status: SHIPPED | OUTPATIENT
Start: 2023-02-23

## 2023-02-23 RX ORDER — VENLAFAXINE HYDROCHLORIDE 37.5 MG/1
37.5 CAPSULE, EXTENDED RELEASE ORAL DAILY
Status: DISCONTINUED | OUTPATIENT
Start: 2023-02-23 | End: 2023-02-23 | Stop reason: HOSPADM

## 2023-02-23 RX ORDER — SUCRALFATE 1 G/1
1 TABLET ORAL 4 TIMES DAILY
Status: DISCONTINUED | OUTPATIENT
Start: 2023-02-23 | End: 2023-02-23 | Stop reason: HOSPADM

## 2023-02-23 RX ORDER — VENLAFAXINE HYDROCHLORIDE 37.5 MG/1
37.5 CAPSULE, EXTENDED RELEASE ORAL
Status: DISCONTINUED | OUTPATIENT
Start: 2023-02-24 | End: 2023-02-23 | Stop reason: HOSPADM

## 2023-02-23 RX ORDER — LACTULOSE 10 G/15ML
10 SOLUTION ORAL 3 TIMES DAILY
Status: DISCONTINUED | OUTPATIENT
Start: 2023-02-23 | End: 2023-02-23 | Stop reason: HOSPADM

## 2023-02-23 RX ORDER — THIAMINE MONONITRATE (VIT B1) 100 MG
100 TABLET ORAL DAILY
Qty: 60 TABLET | Refills: 0 | Status: SHIPPED | OUTPATIENT
Start: 2023-02-23

## 2023-02-23 RX ORDER — ALBUTEROL SULFATE 90 UG/1
2 AEROSOL, METERED RESPIRATORY (INHALATION) 4 TIMES DAILY PRN
Status: DISCONTINUED | OUTPATIENT
Start: 2023-02-23 | End: 2023-02-23 | Stop reason: HOSPADM

## 2023-02-23 RX ADMIN — VENLAFAXINE HYDROCHLORIDE 37.5 MG: 37.5 CAPSULE, EXTENDED RELEASE ORAL at 11:05

## 2023-02-23 RX ADMIN — FOLIC ACID 1 MG: 1 TABLET ORAL at 08:17

## 2023-02-23 RX ADMIN — PANTOPRAZOLE SODIUM 40 MG: 40 TABLET, DELAYED RELEASE ORAL at 11:06

## 2023-02-23 RX ADMIN — LACTULOSE 10 G: 20 SOLUTION ORAL at 11:05

## 2023-02-23 RX ADMIN — THIAMINE HCL TAB 100 MG 100 MG: 100 TAB at 08:17

## 2023-02-23 NOTE — CARE COORDINATION
Name: lAe Cannon    : 1985    Discharge Date: 23        Destination: home     Discharge Medications:      Medication List        START taking these medications      lactulose 10 GM/15ML solution  Commonly known as: CHRONULAC  Take 15 mLs by mouth 3 times daily  Notes to patient: Constipation             CHANGE how you take these medications      fluticasone 50 MCG/ACT nasal spray  Commonly known as: FLONASE  1 spray by Each Nostril route daily  What changed: Another medication with the same name was removed. Continue taking this medication, and follow the directions you see here.             CONTINUE taking these medications      albuterol sulfate  (90 Base) MCG/ACT inhaler  Commonly known as: Ventolin HFA  Inhale 2 puffs into the lungs 4 times daily as needed for Wheezing  Notes to patient: Wheezing      BP Monitor-Stethoscope Kit  1 each by Does not apply route daily  Notes to patient: Blood pressure monitoring      EPINEPHrine 0.3 MG/0.3ML Soaj injection  Commonly known as: EpiPen 2-Rafat  Inject 0.3 mLs into the muscle once for 1 dose Use as directed for allergic reaction  Notes to patient: Allergic reaction      folic acid 1 MG tablet  Commonly known as: FOLVITE  Take 1 tablet by mouth daily  Notes to patient: Supplement      nadolol 20 MG tablet  Commonly known as: Corgard  Take 1 tablet by mouth daily  Notes to patient: Hypertension     pantoprazole 40 MG tablet  Commonly known as: PROTONIX  Take 1 tablet by mouth daily  Notes to patient: refulx     sucralfate 1 GM tablet  Commonly known as: Carafate  Take 1 tablet by mouth 4 times daily  Notes to patient: Reflux      venlafaxine 37.5 MG extended release capsule  Commonly known as: Effexor XR  Take 1 capsule by mouth daily  Notes to patient: Anxiety      vitamin B-1 100 MG tablet  Commonly known as: THIAMINE  Take 1 tablet by mouth daily  Notes to patient: Supplement             STOP taking these medications      analgesic ointment Oint ointment     Centrum Men Tabs     metoclopramide 5 MG tablet  Commonly known as: Reglan     naproxen 500 MG tablet  Commonly known as: Naprosyn     Nicotine 21-14-7 MG/24HR Kit     ondansetron 4 MG disintegrating tablet  Commonly known as: ZOFRAN-ODT               Where to Get Your Medications        These medications were sent to [address] [place] [address] [address], [address], [address] [facility] [address]      Phone: [phone]   albuterol sulfate  (90 Base) MCG/ACT inhaler  EPINEPHrine 0.3 MG/0.3ML Soaj injection  lactulose 10 GM/15ML solution  sucralfate 1 GM tablet  vitamin B-1 100 MG tablet     Pharmacy Instructions: Take medications as prescribed. Refrain from consuming drugs and alcohol while on medications.             Follow Up Appointment: Dwayne Driscoll [address] [facility]  [phone]    Follow up on 2/23/2023  Pt has appointment at 8:30 am.

## 2023-02-23 NOTE — BH NOTE
Patient given tobacco quitline number [phone] at this time, refusing to call at this time, states \" I just dont want to quit now\"- patient given information as to the dangers of long term tobacco use. Continue to reinforce the importance of tobacco cessation.

## 2023-02-23 NOTE — PLAN OF CARE
Problem: Self Harm/Suicidality  Goal: Will have no self-injury during hospital stay  Outcome: Progressing     Problem: Depression/Self Harm  Goal: Effect of psychiatric condition will be minimized and patient will be protected from self harm  Outcome: Progressing     Patient is isolative to his room but cooperative with staff. Patient denies any suicidal/homicidal ideations, hallucinations, depression, or anxiety at this time and states \"I just need sleep. \" Q15 minute checks maintained.

## 2023-02-23 NOTE — PROGRESS NOTES
CLINICAL PHARMACY NOTE: MEDS TO BEDS    Total # of Prescriptions Filled: 4   The following medications were delivered to the patient:  Ventolin inhaler  Thiamine 100mg  Sucralfate 1gm  Lactulose 10gm/15ml    Additional Documentation: Delivered Medication to [address] [facility]  4 meds 02/23/23 l

## 2023-02-23 NOTE — H&P
Department of Psychiatry  Attending Physician Psychiatric Assessment     Reason for Admission to Psychiatric Unit:  Concerns about patient's safety in the community    CHIEF COMPLAINT:  Depression with suicidal ideation    History obtained from: Patient, electronic medical record          HISTORY OF PRESENT ILLNESS:    Estefana Cushing is a 41 y.o. male who has a past medical history of mental illness, liver failure, septicemia, GI bleed, hypertension, and alcoholic hepatitis who presents to the ER originally for nausea and vomiting related to alcohol withdrawal, while in the ER patient did admit to having suicidal ideation and was seen as a consult by psychiatry. Jada Correa is agreeable to assessment at bedside today. He is calm and cooperative but somewhat frustrated with his admission. When asked about events leading up to admission, Jada Correa reports that while he was in the ER they \"Asked me a loaded question. \" He reports that they asked him \"Have you ever been suicidal?\"  Patient reports, \"\"I told them yes because I have in my past but not recently or anything. \"  He states, \"If they had asked me if that was the reason I was here I would have told them no. \"  Patient adamantly denies that he is currently suicidal or that he has been recently. Jada Correa does report that he has significant life stressors including financial issues, reporting that his brother and sister-in-law and their children recently moved into the house, and has been working to move up in his job by picking up extra shifts in extra hours. He reports that he did follow-up with his PCP regarding his increased stress level and they prescribed him Effexor which she has been taking since Monday. Jada Correa denies that he has been down and depressed lately and states \"I have not really been depressed but I am really stressed out. \"  He does report that his sleep is good and that he feels like he gets a good night sleep.   He denies problems with anhedonia, energy or motivation, or concentration. He does report that his appetite has been somewhat poor lately. He denies hopelessness or helplessness. He denies suicidal ideation. He denies homicidal ideation. Ida Sharp denies any history of sweta and denies ever going 3 or more days without sleep and feeling increased energy. He denies grandiose thoughts of himself or an increase in goal-directed activity. He denies symptoms of psychosis including auditory visual hallucinations. He denies paranoia. Ida Sharp does report that he has been recently stressed out however denies that this has been going on for longer than 6 months. He denies feeling excess worry or restlessness and edginess. He denies obsessive-compulsive thoughts or behaviors. He denies history of trauma or history of PTSD. Patient does endorse significant alcohol use and states that he can drink up to 1/5 a day. He reports that he is currently in liver failure secondary to his alcohol use and states that he has been using it as a way to cope. This writer did asked patient if he was interested in inpatient rehabilitation for his alcohol use and he states \"no I would like to do outpatient work however I have a family that I need to support with my job and I would not be able to do any inpatient treatment. \"             History of head trauma: [] Yes [x] No    History of seizures: [] Yes [x] No    History of violence or aggression: [] Yes [x] No         PSYCHIATRIC HISTORY:  [x] Yes [] No    Currently follows with his PCP. Denies lifetime suicide attempts. Denies psychiatric hospital admissions.     Past psychiatric medications includes:   Effexor    Medication Compliance: Yes    Adverse reactions from psychotropic medications: [] Yes [x] No         Lifetime Psychiatric Review of Systems         Depression: Denies     Anxiety: Denies     Panic Attacks: Denies     Sweta or Hypomania: Denies     Phobias: Denies     Obsessions and Compulsions: Denies     Visual Hallucinations: Denies     Auditory Hallucinations: Denies     Delusions: Denies     Paranoia: Denies     PTSD: Denies    Past Medical History:        Diagnosis Date    Alcoholism ([facility] [address]     pt drinks a fifth of whiskey daily    Anemia     Cirrhosis, alcoholic ([facility] [address]     Pulmonary embolism ([facility])        Past Surgical History:        Procedure Laterality Date    UPPER GASTROINTESTINAL ENDOSCOPY N/A 2022    EGD ESOPHAGOGASTRODUODENOSCOPY performed by Shahnaz Bradley MD at [address] [facility] N/A 10/30/2022    EGD BAND LIGATION performed by Kerry Choi MD at [address] [facility] ENDO       Allergies:  Bee venom         Social History:     Born in:   but living in [place]  Family: Raised by his mother and father however reports mother is  and no relationship with father due to him being an alcoholic. Reports having one sister but not close. Highest Level of Education: HS grad  Occupation: Works as a  at [employer]  Marital Status: Currently   Children: One teenage son  Residence: Lives at home with wife and son but reports his sister in law, brother in law and their two daughters have recently moved in  Stressors: Family life, financial stressors, alcohol use  Patient Assets/Supportive Factors: Patient has supportive family         DRUG USE HISTORY  Social History     Tobacco Use   Smoking Status Every Day    Packs/day: 1.00    Years: 21.00    Pack years: 21.00    Types: Cigarettes   Smokeless Tobacco Never   Tobacco Comments    using nicotine patches at night     Social History     Substance and Sexual Activity   Alcohol Use Not Currently    Comment: hx of alcoholism; pt drinks a fifth of whiskey daily     Social History     Substance and Sexual Activity   Drug Use Not Currently    Comment: used cocaine in early 20's       Patient does endorse significant alcohol use and states that he can drink up to 1/5 a day.   He reports that he is currently in liver failure secondary to his alcohol use and states that he has been using it as a way to cope. This writer did asked patient if he was interested in inpatient rehabilitation for his alcohol use and he states \"no I would like to do outpatient work however I have a family that I need to support with my job and I would not be able to do any inpatient treatment. \"          LEGAL HISTORY:   HISTORY OF INCARCERATION: [x] Yes [] No    Family History:       Problem Relation Age of Onset    Heart Disease Mother     Heart Attack Mother     Heart Disease Father        Psychiatric Family History    Patient endorses psychiatric family history. Suicides in family: [] Yes [x] No    Substance use in family: [x] Yes [] No         PHYSICAL EXAM:  Vitals:  /78   Pulse 88   Temp 98 °F (36.7 °C) (Oral)   Resp 14   Ht 5' 8\" (1.727 m)   Wt 173 lb (78.5 kg)   SpO2 99%   BMI 26.30 kg/m²     Pain Level: Denies    LABS:  Labs reviewed: [x] Yes  Last EKG in EMR reviewed: [x] Yes  QTc: 412  Na low at 131  Creatinine low at <.40  Liver Profile all abnormal  CBC abnormal            Review of Systems   Constitutional: Negative for chills and weight loss. HENT: Negative for ear pain and nosebleeds. Eyes: Negative for blurred vision and photophobia. Respiratory: Negative for cough, shortness of breath and wheezing. Cardiovascular: Negative for chest pain and palpitations. Gastrointestinal: Negative for abdominal pain, diarrhea and vomiting. Genitourinary: Negative for dysuria and urgency. Musculoskeletal: Negative for falls and joint pain. Skin: Negative for itching and rash. Neurological: Negative for tremors, seizures and weakness. Endo/Heme/Allergies: Does not bruise/bleed easily. Physical Exam:   Constitutional:  Very jaundice  HENT:   Head: Normocephalic and atraumatic. Eyes: Conjunctivae are normal. Right eye exhibits no discharge. Left eye exhibits no discharge.  No scleral icterus. Neck: Normal range of motion. Neck supple. Pulmonary/Chest:  No respiratory distress or accessory muscle use, no wheezing. Cardiac: Regular rate and rhythm. Abdominal: Soft. Non-tender. Exhibits no distension. Musculoskeletal: Normal range of motion. Exhibits no edema. Neurological: cranial nerves II-XII grossly in tact, normal gait and station. Skin: Skin is warm and dry. Patient is not diaphoretic. No erythema. Mental Status Examination:    Level of consciousness: Awake and alert  Appearance:  Appropriate attire, resting in bed, fair grooming, significantly jaundice  Behavior/Motor: Approachable, calm  Attitude toward examiner:  Cooperative, attentive, good eye contact  Speech: Normal rate, volume, and tone. Mood: Anxious  Affect: Mood-congruent  Thought processes: Linear and coherent  Thought content: Denies suicidal ideations, without current plan or intent, contracts for safety on the unit. Denies homicidal ideations               Denies visual hallucinations. Denies auditory hallucinations. Denies delusions              Denies paranoia  Cognition:  Oriented to self, location, time, situation  Concentration: Clinically adequate  Memory: Intact  Insight &Judgment: Poor         DSM-5 Diagnosis    Principal Problem: Depression with suicidal ideation    Alcohol Use Disorder    Psychosocial and Contextual factors:  Financial: Endorses  Occupational: Endorses  Relationship: Denies  Legal: Denies  Living situation: Endorses  Educational: Denies    Past Medical History:   Diagnosis Date    Alcoholism ([facility] [address]     pt drinks a fifth of whiskey daily    Anemia     Cirrhosis, alcoholic ([facility] [address]     Pulmonary embolism ([facility] [address]         TREATMENT CONSIDERATIONS    Continue inpatient psychiatric treatment. Home medications reviewed. Continue Effexor 37.5 mg  Problem list updated. Monitor need and frequency of PRN medications. Attempt to develop insight.   Follow-up daily while inpatient. Reviewed risks and benefits as well as potential side effects with patient. CONSULTS [x] Yes [] No  Internal Medicine for Medical H&P    Risk Management: close watch per standard protocol      Psychotherapy: participation in milieu and group and individual sessions with Attending Physician,  and Physician Assistant/CNP      Estimated length of stay:  2-14 days      GENERAL PATIENT/FAMILY EDUCATION  Patient will understand basic signs and symptoms, patient will understand benefits/risks and potential side effects from proposed medications, and patient will understand their role in recovery. Family is not active in patient's care. Patient assets that may be helpful during treatment include: Intent to participate and engage in treatment, sufficient fund of knowledge and intellect to understand and utilize treatments. Goals:    1) Remission of depression with suicidal ideation. 2) Stabilization of symptoms prior to discharge. 3) Establish efficacy and tolerability of medications. Behavioral Services  Medicare Certification     Admission Day 1  I certify that this patient's inpatient psychiatric hospital admission is medically necessary for:    x (1) treatment which could reasonably be expected to improve this patient's condition, or    x (2) diagnostic study or its equivalent. Time Spent: 60 minutes    Iftikhar Ratliff is a 41 y.o. male being evaluated face to face    --SILVA Sharif CNP on 2/23/2023 at 10:08 AM    An electronic signature was used to authenticate this note.

## 2023-02-23 NOTE — DISCHARGE INSTR - DIET

## 2023-02-23 NOTE — GROUP NOTE
Psych-Ed/Relapse Prevention Group Note        Date: February 23, 2023 Start Time: 11am  End Time: 11:45am      Number of Participants in Group & Unit Census:  8/23    Topic: Socialization    Goal of Group:Patient will demonstrate improved interpersonal skills      Comments:     Patient did not participate in Psych-Ed/Relapse Prevention group, despite staff encouragement and explanation of benefits. Patient remain seclusive to self. Q15 minute safety checks maintained for patient safety and will continue to encourage patient to attend unit programming.        Signature:  Melinda Lim, [address]

## 2023-02-23 NOTE — DISCHARGE INSTRUCTIONS
Information:  Medications:   Medication summary provided   I understand that I should take only the medications on my list.     -why and when I need to take each medicine.     -which side effects to watch for.     -that I should carry my medication information at all times in case of     Emergency situations. I will take all of my medicines to follow up appointments.     -check with my physician or pharmacist before taking any new    Medication, over the counter product or drink alcohol.    -Ask about food, drug or dietary supplement interactions.    -discard old lists and update records with medication providers. Notify Physician:  Notify physician if you notice:   Always call 911 if you feel your life is in danger  In case of an emergency call 911 immediately! If 911 is not available call your local emergency medical system for help    Behavioral Health Follow Up:  Original Referral Source:Progressive   Discharge Diagnosis: MDD (major depressive disorder), recurrent severe, without psychosis ([facility] [address] [F33.2]  Depression with suicidal ideation [F32. A, R45.851]  Recommendations for Level of Care: Take medications as prescribed and continue with follow up appointments   Patient status at discharge: Aox4, Stable, independent, ambulatory   [facility] hospital  [facility]   Aftercare plan faxed: Lalo Musa    -faxed by: Staff   -date: 2/23/2023   -time: 1400  Prescriptions: Prescriptions filled at hospital pharmacy and sent home with patient. Smoking: Quit Smoking. Call the [employer]'s smoking quitline at 6-705-78I-QUIT  Know the signs of a heart attack   If you have any of the following symptoms call 911 immediately, do not wait more    Than five minutes. 1. Pressure, fullness and/ or squeezing in the center of the chest spreading to    The jaw, neck or shoulder. 2. Chest discomfort with light headedness, fainting, sweating, nausea or    Shortness of breath.    3. Upper abdominal pressure or discomfort. 4. Lower chest pain, back pain, unusual fatigue, shortness of breath, nausea   Or dizziness. General Information:   Questions regarding your bill: Call HELP program [phone]     Suicide Hotline (Dirk Conroy)  ([phone]      Recovery Help line- 926.559.2216      To obtain results of pending studies call Medical Records at: [phone]     For emergencies and 24 hour/7 days a week contact information:  [phone]        Learning About COVID-19 and Flu Symptoms  How can you tell COVID-19 from the flu? COVID-19 and the flu have similar symptoms. The two can be hard to tell apart. The only way to know for sure which illness you have is to be tested. If you have questions about COVID-19 testing, ask your doctor or go to cdc.gov to use the COVID-19 Viral Testing Tool. Since the symptoms are so alike, it makes sense to act as if you have COVID-19 until your test results come back. This means staying home and limiting contact with people in your home. You'll need to wash your hands often and disinfect surfaces that you touch. And be sure to wear a mask when you're around other people. This is also good advice if you think you have the flu. COVID-19 and the flu have these symptoms in common:  Fever or chills  Cough  Shortness of breath  Fatigue (tiredness)  Sore throat  Runny or stuffy nose  Muscle and body aches  Headache  Vomiting and diarrhea (more common in children than adults)  COVID-19 has another symptom that also may occur:  New loss of taste or smell  COVID-19 symptoms may appear from 2 to 14 days after infection. Flu symptoms usually appear 1 to 4 days after infection. Why should you get the flu and COVID-19 vaccines? It's important to get your yearly flu vaccine and stay up to date on your COVID-19 vaccines. The flu and COVID-19 can be active at the same time. You can get sick with both infections at once.  And having both may make you more sick than getting just one. Getting both vaccines can prevent you and others from getting very sick. If you do get the flu or COVID-19 after being vaccinated, you're much less likely to get seriously ill. Where can you learn more? Go to http://www.woods.com/ and enter C123 to learn more about \"Learning About COVID-19 and Flu Symptoms. \"  Current as of: October 28, 2022               Content Version: 13.5  © 2006-2022 [employer]. Care instructions adapted under license by [facility] ([facility]). If you have questions about a medical condition or this instruction, always ask your healthcare professional. [address] any warranty or liability for your use of this information. lactulose (oral)  Pronunciation:  KAROLINE stahl  Brand:  Constulose, Generlac, Kristalose  What is the most important information I should know about lactulose? Use only as directed. Tell your doctor if you use other medicines or have other medical conditions or allergies. What is lactulose? Lactulose is used to treat chronic constipation. Lactulose is sometimes used to treat or prevent certain conditions of the brain that are caused by liver failure. These conditions can lead to confusion, problems with memory or thinking, behavior changes, tremors, feeling irritable, sleep problems, loss of coordination, and loss of consciousness. Lactulose may also be used for purposes not listed in this medication guide. What should I discuss with my healthcare provider before taking lactulose? You should not use lactulose if you are on a special diet low in galactose (milk sugar). Tell your doctor if you have ever had:  diabetes; or  if you need to have any type of intestinal test using a scope (such as a colonoscopy). Tell your doctor if you are pregnant or breastfeeding. How should I take lactulose? Follow all directions on your prescription label and read all medication guides or instruction sheets.  Use the medicine exactly as directed. Mix lactulose powder  with at least 4 ounces of water, milk, or fruit juice. Measure liquid medicine with the supplied measuring device (not a kitchen spoon). Lactulose should produce a bowel movement within 24 to 48 hours. If you use this medicine long-term, you may need frequent medical tests. Tell your doctor if you have a planned colonoscopy or proctoscopy procedure. Store tightly closed at room temperature, away from moisture and heat. Avoid freezing. The liquid may turn darker in color, but this will not affect the medicine. Do not use the medicine if it becomes very dark or gets thicker or thinner in texture. What happens if I miss a dose? Take the medicine as soon as you can, but skip the missed dose if it is almost time for your next dose. Do not take two doses at one time. What happens if I overdose? Seek emergency medical attention or call the Poison Help line at 1-782.312.9234. Overdose may cause nausea, vomiting, diarrhea, and stomach cramps, or symptoms of low blood potassium or low blood sodium (confusion, weakness, constipation, irregular heartbeats, fluttering in your chest, increased thirst or urination, numbness or tingling, muscle weakness or limp feeling). What should I avoid while taking lactulose? Ask your doctor before taking any other laxative or an antacid, and take only the type your doctor recommends. What are the possible side effects of lactulose? Get emergency medical help if you have signs of an allergic reaction: hives; difficult breathing; swelling of your face, lips, tongue, or throat. Stop using lactulose and call your doctor at once if you have severe or ongoing diarrhea. Common side effects may include:  bloating, gas;  stomach pain;  diarrhea; or  nausea, vomiting. This is not a complete list of side effects and others may occur. Call your doctor for medical advice about side effects.  You may report side effects to FDA at 1-800-FDA-1088. What other drugs will affect lactulose? Other drugs may affect lactulose, including prescription and over-the-counter medicines, vitamins, and herbal products. Tell your doctor about all other medicines you use. Where can I get more information? Your pharmacist can provide more information about lactulose. Remember, keep this and all other medicines out of the reach of children, never share your medicines with others, and use this medication only for the indication prescribed. Every effort has been made to ensure that the information provided by [address] is accurate, up-to-date, and complete, but no guarantee is made to that effect. Drug information contained herein may be time sensitive. [facility] information has been compiled for use by healthcare practitioners and consumers in the [place] and therefore [employer] does not warrant that uses outside of the [place] are appropriate, unless specifically indicated otherwise. [facility]'s drug information does not endorse drugs, diagnose patients or recommend therapy. [facility] drug information is an informational resource designed to assist licensed healthcare practitioners in caring for their patients and/or to serve consumers viewing this service as a supplement to, and not a substitute for, the expertise, skill, knowledge and judgment of healthcare practitioners. The absence of a warning for a given drug or drug combination in no way should be construed to indicate that the drug or drug combination is safe, effective or appropriate for any given patient. [facility] does not assume any responsibility for any aspect of healthcare administered with the aid of information [facility] provides. The information contained herein is not intended to cover all possible uses, directions, precautions, warnings, drug interactions, allergic reactions, or adverse effects.  If you have questions about the drugs you are taking, check with your doctor, nurse or pharmacist.  Copyright 1706-2343 [employer]. Version: 3.01. Revision date: 10/5/2021. Care instructions adapted under license by [facility] ([facility]). If you have questions about a medical condition or this instruction, always ask your healthcare professional. [address] any warranty or liability for your use of this information. fluticasone nasal  Pronunciation:  floo TIK a sone  Brand:  Flonase, Veramyst, Xhance  What is the most important information I should know about fluticasone nasal?  Follow all directions on your medicine label and package. Tell each of your healthcare providers about all your medical conditions, allergies, and all medicines you use. What is fluticasone nasal?  Fluticasone nasal (for the nose) is a steroid medicine that is used to treat nasal congestion, sneezing, runny nose, and itchy or watery eyes caused by seasonal or year-round allergies. The Sofi brand of this medicine is for use only in adults. Veramyst may be used in children as young as 3years old. Flonase is for use in adults and children who are at least 3years old. Fluticasone nasal may also be used for purposes not listed in this medication guide. What should I discuss with my healthcare provider before using fluticasone nasal?  You should not use fluticasone nasal if you are allergic to it. Fluticasone can weaken your immune system,  making it easier for you to get an infection or worsening an infection you already have or recently had. Tell your doctor about any illness or infection you have had within the past several weeks. Tell your doctor if you have ever had:  sores or ulcers inside your nose;  injury of or surgery on your nose;  glaucoma or cataracts;  liver disease;  diabetes;  a weak immune system; or  any type of infection (bacterial, fungal, viral, or parasitic).   If you use fluticasone nasal without a prescription and you have any medical conditions, ask a doctor or pharmacist if this medicine is safe for you. Tell your doctor if you are pregnant or breast-feeding. How should I use fluticasone nasal?  Follow all directions on your prescription label and read all medication guides or instruction sheets. Use the medicine exactly as directed. Do not share this medicine with another person, even if they have the same symptoms you have. Your dose will depend on the fluticasone brand or strength you use, and your dose may change once your symptoms improve. Follow all dosing instructions very carefully. A child using the nasal spray should be supervised by an adult. Read and carefully follow any Instructions for Use provided with your medicine. Ask your doctor or pharmacist if you do not understand these instructions. Shake the nasal spray just before each use. If you switched to fluticasone from another steroid medicine, you should not stop using it suddenly. Follow your doctor's instructions about tapering your dose. It may take several days before your symptoms improve. Keep using the medication as directed and tell your doctor if your symptoms do not improve after a week of treatment. Store fluticasone nasal in an upright position at room temperature, away from moisture and heat. Throw the spray bottle away after you have used 120 sprays, even if there is still medicine left in the bottle. What happens if I miss a dose? Use the medicine as soon as you can, but skip the missed dose if it is almost time for your next dose. Do not use two doses at one time. What happens if I overdose? Seek emergency medical attention or call the Poison Help line at 1-685.468.4778. An overdose of fluticasone nasal is not expected to produce life threatening symptoms.  Long term use of steroid medicine can lead to glaucoma, cataracts, thinning skin, easy bruising, changes in body fat (especially in your face, neck, back, and waist), increased acne or facial hair, menstrual problems, impotence, or loss of interest in sex. What should I avoid while using fluticasone nasal?  Avoid getting the spray in your eyes or mouth. If this does happen, rinse with water. Avoid being near people who are sick or have infections. Call your doctor for preventive treatment if you are exposed to chickenpox or measles. These conditions can be serious or even fatal in people who are using fluticasone nasal.  What are the possible side effects of fluticasone nasal?  Get emergency medical help if you have signs of an allergic reaction: hives, rash; feeling light-headed; difficult breathing; swelling of your face, lips, tongue, or throat. Call your doctor at once if you have:  severe or ongoing nosebleeds;  noisy breathing, runny nose, or crusting around your nostrils;  redness, sores, or white patches in your mouth or throat;  fever, chills, body aches;  blurred vision, eye pain, or seeing halos around lights;  any wound that will not heal; or  signs of a hormonal disorder --worsening tiredness or muscle weakness, feeling light-headed, nausea, vomiting. Steroid medicine can affect growth in children. Tell your doctor if your child is not growing at a normal rate while using this medicine. Common side effects may include:  minor nosebleed, burning or itching in your nose;  sores or white patches inside or around your nose;  cough, trouble breathing;  headache, back pain;  sinus pain, sore throat, fever; or  nausea, vomiting. This is not a complete list of side effects and others may occur. Call your doctor for medical advice about side effects. You may report side effects to FDA at 8-280-FDA-4865. What other drugs will affect fluticasone nasal?  Tell your doctor about all your other medicines, especially:  antifungal medicine; or  antiviral medicine to treat hepatis C or HIV/AIDS. This list is not complete.  Other drugs may affect fluticasone nasal, including prescription and over-the-counter medicines, vitamins, and herbal products. Not all possible drug interactions are listed here. Where can I get more information? Your pharmacist can provide more information about fluticasone nasal.  Remember, keep this and all other medicines out of the reach of children, never share your medicines with others, and use this medication only for the indication prescribed. Every effort has been made to ensure that the information provided by [address] [place], [place] [address]can Dr is accurate, up-to-date, and complete, but no guarantee is made to that effect. Drug information contained herein may be time sensitive. [facility] information has been compiled for use by healthcare practitioners and consumers in the [place] and therefore [facility] does not warrant that uses outside of the [place] are appropriate, unless specifically indicated otherwise. [facility]'s drug information does not endorse drugs, diagnose patients or recommend therapy. [facility] drug information is an informational resource designed to assist licensed healthcare practitioners in caring for their patients and/or to serve consumers viewing this service as a supplement to, and not a substitute for, the expertise, skill, knowledge and judgment of healthcare practitioners. The absence of a warning for a given drug or drug combination in no way should be construed to indicate that the drug or drug combination is safe, effective or appropriate for any given patient. [facility] does not assume any responsibility for any aspect of healthcare administered with the aid of information [facility] provides. The information contained herein is not intended to cover all possible uses, directions, precautions, warnings, drug interactions, allergic reactions, or adverse effects. If you have questions about the drugs you are taking, check with your doctor, nurse or pharmacist.  Copyright 1518-7023 [address] [place] [address] Avenue: 10.02. Revision date: 12/30/2019.   Care instructions adapted under license by [facility] ([facility]). If you have questions about a medical condition or this instruction, always ask your healthcare professional. [address] any warranty or liability for your use of this information.

## 2023-02-23 NOTE — BH NOTE
[address] [facility]  Discharge Note    Pt discharged with followings belongings:   Dental Appliances: None  Vision - Corrective Lenses: Eyeglasses  Hearing Aid: None  Jewelry: Earrings, Ring  Body Piercings Removed: Yes (ear plugs with stones)  Clothing: Footwear, Hat, Jacket/Coat, Pants, Socks, Undergarments  Other Valuables: Lighter/Matches   Valuables sent home with patient. Patient educated on aftercare instructions: yes  Information faxed to [facility] by staff  at 1:07 PM .Patient verbalize understanding of AVS:  yes. Status EXAM upon discharge: Aox4, stable, ambulatory, independent   Mental Status and Behavioral Exam  Normal: No  Level of Assistance: Independent/Self  Facial Expression: Flat  Affect: Blunt  Level of Consciousness: Alert  Frequency of Checks: 4 times per hour, close  Mood:Normal: No  Mood: Depressed, Anxious, Helpless  Motor Activity:Normal: Yes  Eye Contact: Fair  Observed Behavior: Cooperative, Withdrawn, Preoccupied  Sexual Misconduct History: Current - no  Preception: [place] to time, [place] to place, [place] to situation, [place] to person  Attention:Normal: Yes  Attention: Unable to concentrate  Thought Processes: Blocking  Thought Content:Normal: No  Thought Content: Preoccupations  Depression Symptoms: Isolative, Loss of interest, Feelings of helplessness  Anxiety Symptoms: Generalized  Sweta Symptoms: No problems reported or observed.   Hallucinations: None  Delusions: No  Memory:Normal: Yes  Memory: Poor recent, Poor remote  Insight and Judgment: No  Insight and Judgment: Poor judgment, Poor insight    Tobacco Screening:  Practical Counseling, on admission, ela X, if applicable and completed (first 3 are required if patient doesn't refuse):            ( ) Recognizing danger situations (included triggers and roadblocks)                    ( ) Coping skills (new ways to manage stress,relaxation techniques, changing routine, distraction)            ( ) Basic information about quitting (benefits of quitting, techniques in how to quit, available resources  ( ) [facility]                                                                                                                   ( ) Patient refused counseling  (x ) Patient refused referral  ( ) Patient refused prescription upon discharge  ( ) Patient has not smoked in the last 30 days    Metabolic Screening:    Lab Results   Component Value Date    LABA1C 4.5 11/17/2022       No results found for: CHOL  No results found for: TRIG  No results found for: HDL  No components found for: LDLCAL  No results found for: LABVLDL  Patient ambulated off the unit at 1255 with belongings. Patient was discharged to personal address with family in personal vehicle.   Hector Yanez RN

## 2023-02-23 NOTE — PROGRESS NOTES
Behavioral Services  Medicare Certification Upon Admission    I certify that this patient's inpatient psychiatric hospital admission is medically necessary for:    [x] (1) Treatment which could reasonably be expected to improve this patient's condition,       [x] (2) Or for diagnostic study;     AND     [x](2) The inpatient psychiatric services are provided while the individual is under the care of a physician and are included in the individualized plan of care.     Estimated length of stay/service 4-7 days    Plan for post-hospital care home with outpatient [facility] f.u    Electronically signed by Johnathan Henao MD on 2/23/2023 at 8:56 AM

## 2023-03-09 DIAGNOSIS — I10 ESSENTIAL HYPERTENSION: ICD-10-CM

## 2023-03-09 RX ORDER — NADOLOL 20 MG/1
20 TABLET ORAL DAILY
Qty: 30 TABLET | Refills: 0 | Status: SHIPPED | OUTPATIENT
Start: 2023-03-09

## 2023-03-27 ENCOUNTER — TELEPHONE (OUTPATIENT)
Dept: INTERNAL MEDICINE CLINIC | Age: 38
End: 2023-03-27

## 2023-05-14 DIAGNOSIS — K21.9 GASTROESOPHAGEAL REFLUX DISEASE, UNSPECIFIED WHETHER ESOPHAGITIS PRESENT: ICD-10-CM

## 2023-05-15 RX ORDER — PANTOPRAZOLE SODIUM 40 MG/1
40 TABLET, DELAYED RELEASE ORAL DAILY
Qty: 30 TABLET | Refills: 3 | Status: SHIPPED | OUTPATIENT
Start: 2023-05-15

## 2023-07-28 NOTE — ED PROVIDER NOTES
EMERGENCY DEPARTMENT ENCOUNTER    Pt Name: Bassam Flynn  MRN: [id number]  [place] 1985  Date of evaluation: 9/22/20  CHIEF COMPLAINT       Chief Complaint   Patient presents with    Dental Pain     HISTORY OF PRESENT ILLNESS   HPI     This is a 79-year-old who comes in today with dental pain. Patient states that he has had dental pain for a while he went to a dentist but he cannot get in until 4 days from now. The patient states that he tried naproxen as well as clinical strength Orajel and Listerine without any improvement of his symptoms. He is a smoker. He feels swelling in his mouth he denies any fevers chills cough congestion no difficulty in swallowing no shortness of breath abdominal pain nausea vomiting. Also complaining that his right knee feels numb when he bends it but when he extends it and no longer is numb. No trauma. REVIEW OF SYSTEMS     Review of Systems   Constitutional: Negative for fever. HENT: Positive for dental problem. Negative for congestion. Respiratory: Negative for cough and shortness of breath. Cardiovascular: Negative for chest pain. Gastrointestinal: Negative for abdominal pain, diarrhea and vomiting. Genitourinary: Negative for dysuria. Musculoskeletal: Negative for back pain. Knee numbness   Skin: Negative for rash. Neurological: Negative for headaches. All other systems reviewed and are negative. PASTMEDICAL HISTORY     Past Medical History:   Diagnosis Date    No active medical problems     Pulmonary embolism (HCC)      SURGICAL HISTORY     History reviewed. No pertinent surgical history. CURRENT MEDICATIONS       Previous Medications    AMLODIPINE (NORVASC) 10 MG TABLET    Take 1 tablet by mouth daily    APIXABAN (ELIQUIS DVT/PE STARTER PACK) 5 MG TABS TABLET    Take 10 mg (2 tablets) orally twice daily for 7 days, then take 5 mg (1 tablet) orally twice daily thereafter.     EPINEPHRINE (EPIPEN 2-LIZETTE) 0.3 MG/0.3ML SOAJ INJECTION Inject 0.3 mLs into the muscle once for 1 dose Use as directed for allergic reaction    FOLIC ACID (FOLVITE) 1 MG TABLET    Take 1 tablet by mouth daily    MULTIPLE VITAMINS-MINERALS (MULTIVITAMIN ADULT PO)    Take by mouth    NICOTINE 21-14-7 MG/24HR KIT    Place onto the skin    VITAMIN B-1 (THIAMINE) 100 MG TABLET    Take 1 tablet by mouth daily     ALLERGIES     is allergic to bee venom. FAMILY HISTORY     He indicated that his mother is . He indicated that his father is alive. SOCIAL HISTORY       Social History     Tobacco Use    Smoking status: Current Every Day Smoker     Packs/day: 1.00     Years: 10.00     Pack years: 10.00     Types: Cigarettes    Smokeless tobacco: Never Used    Tobacco comment: using nicotine patches at night   Substance Use Topics    Alcohol use: Yes     Frequency: 4 or more times a week     Drinks per session: 3 or 4     Binge frequency: Daily or almost daily     Comment: occ    Drug use: Not Currently     Comment: used cocaine in early 20's     PHYSICAL EXAM     INITIAL VITALS: BP (!) 145/97   Pulse 102   Temp 98.3 °F (36.8 °C) (Oral)   Resp 17   Ht 5' 8\" (1.727 m)   Wt 200 lb (90.7 kg)   SpO2 96%   BMI 30.41 kg/m²    Physical Exam  Vitals signs and nursing note reviewed. Constitutional:       General: He is not in acute distress. Appearance: He is well-developed. Comments: Smells very strongly of cigarette smoke   HENT:      Head: Normocephalic and atraumatic. Mouth/Throat:      Comments: Tenderness palpation right upper tooth without any evidence of abscess no facial swelling no posterior pharyngeal swelling no tongue swelling  Eyes:      Conjunctiva/sclera: Conjunctivae normal.   Neck:      Musculoskeletal: Neck supple. Cardiovascular:      Rate and Rhythm: Normal rate and regular rhythm. Heart sounds: No murmur. No friction rub. Pulmonary:      Effort: Pulmonary effort is normal. No respiratory distress.       Breath sounds: Normal breath sounds. No stridor. No wheezing or rhonchi. Abdominal:      General: There is no distension. Palpations: Abdomen is soft. Tenderness: There is no abdominal tenderness. There is no guarding or rebound. Musculoskeletal:      Comments: Full range of motion of the right knee without any tenderness palpation no effusion   Skin:     General: Skin is warm and dry. Capillary Refill: Capillary refill takes less than 2 seconds. Neurological:      Mental Status: He is alert. EMERGENCY DEPARTMENTCOURSE:   Differential diagnosis includes dental infection abscess deep space infection. The patient has no trismus no hot potato voice do not believe he has a deep space infection at this time suspect dental infection he will be discharged with penicillin         Vitals:    Vitals:    09/22/20 0112   BP: (!) 145/97   Pulse: 102   Resp: 17   Temp: 98.3 °F (36.8 °C)   TempSrc: Oral   SpO2: 96%   Weight: 200 lb (90.7 kg)   Height: 5' 8\" (1.727 m)       The patient was given the following medications while in the emergency department:  Orders Placed This Encounter   Medications    HYDROcodone-acetaminophen (NORCO) 5-325 MG per tablet 1 tablet    penicillin v potassium (VEETID) tablet 500 mg         FINAL IMPRESSION      1.  Pain, dental         DISPOSITION/PLAN   DISPOSITION Decision To Discharge 09/22/2020 01:37:20 Julieth Donnelly MD  Attending Emergency Physician    This charting supersedes any ED resident or staff charting and was written using speech recognition software       Rody Callejas MD  09/22/20 1856 Olanzapine Pregnancy And Lactation Text: This medication is pregnancy category C.   There are no adequate and well controlled trials with olanzapine in pregnant females.  Olanzapine should be used during pregnancy only if the potential benefit justifies the potential risk to the fetus.   In a study in lactating healthy women, olanzapine was excreted in breast milk.  It is recommended that women taking olanzapine should not breast feed.

## 2023-09-27 ENCOUNTER — ANESTHESIA EVENT (OUTPATIENT)
Dept: ENDOSCOPY | Age: 38
End: 2023-09-27
Payer: COMMERCIAL

## 2023-09-27 ENCOUNTER — APPOINTMENT (OUTPATIENT)
Dept: GENERAL RADIOLOGY | Age: 38
DRG: 197 | End: 2023-09-27
Payer: COMMERCIAL

## 2023-09-27 ENCOUNTER — HOSPITAL ENCOUNTER (INPATIENT)
Age: 38
LOS: 1 days | Discharge: ANOTHER HOSPITAL | DRG: 197 | End: 2023-09-28
Attending: EMERGENCY MEDICINE | Admitting: INTERNAL MEDICINE
Payer: COMMERCIAL

## 2023-09-27 ENCOUNTER — ANESTHESIA (OUTPATIENT)
Dept: ENDOSCOPY | Age: 38
End: 2023-09-27
Payer: COMMERCIAL

## 2023-09-27 ENCOUNTER — APPOINTMENT (OUTPATIENT)
Dept: CT IMAGING | Age: 38
DRG: 197 | End: 2023-09-27
Payer: COMMERCIAL

## 2023-09-27 DIAGNOSIS — F10.10 ALCOHOL ABUSE: ICD-10-CM

## 2023-09-27 DIAGNOSIS — K92.0 HEMATEMESIS WITH NAUSEA: Primary | ICD-10-CM

## 2023-09-27 PROBLEM — K70.31 ALCOHOLIC CIRRHOSIS OF LIVER WITH ASCITES (HCC): Status: ACTIVE | Noted: 2023-09-27

## 2023-09-27 LAB
AFP SERPL-MCNC: 2.4 UG/L
ALBUMIN SERPL-MCNC: 3.6 G/DL (ref 3.5–5.2)
ALP SERPL-CCNC: 229 U/L (ref 40–129)
ALT SERPL-CCNC: 20 U/L (ref 5–41)
AMMONIA PLAS-SCNC: 88 UMOL/L (ref 16–60)
AMMONIA PLAS-SCNC: 91 UMOL/L (ref 16–60)
ANION GAP SERPL CALCULATED.3IONS-SCNC: 12 MMOL/L (ref 9–17)
AST SERPL-CCNC: 87 U/L
BASOPHILS # BLD: 0.08 K/UL (ref 0–0.2)
BASOPHILS NFR BLD: 1 % (ref 0–2)
BILIRUB SERPL-MCNC: 2.5 MG/DL (ref 0.3–1.2)
BUN SERPL-MCNC: 9 MG/DL (ref 6–20)
CALCIUM SERPL-MCNC: 9 MG/DL (ref 8.6–10.4)
CHLORIDE SERPL-SCNC: 108 MMOL/L (ref 98–107)
CO2 SERPL-SCNC: 21 MMOL/L (ref 20–31)
CREAT SERPL-MCNC: 0.8 MG/DL (ref 0.7–1.2)
EKG ATRIAL RATE: 80 BPM
EKG P AXIS: 50 DEGREES
EKG P-R INTERVAL: 144 MS
EKG Q-T INTERVAL: 374 MS
EKG QRS DURATION: 88 MS
EKG QTC CALCULATION (BAZETT): 431 MS
EKG R AXIS: 48 DEGREES
EKG T AXIS: 57 DEGREES
EKG VENTRICULAR RATE: 80 BPM
EOSINOPHIL # BLD: 0.15 K/UL (ref 0–0.4)
EOSINOPHILS RELATIVE PERCENT: 2 % (ref 0–4)
ERYTHROCYTE [DISTWIDTH] IN BLOOD BY AUTOMATED COUNT: 18.9 % (ref 11.5–14.9)
ETHANOL PERCENT: 0.2 %
ETHANOLAMINE SERPL-MCNC: 202 MG/DL
GFR SERPL CREATININE-BSD FRML MDRD: >60 ML/MIN/1.73M2
GLUCOSE SERPL-MCNC: 106 MG/DL (ref 70–99)
HCT VFR BLD AUTO: 19.7 % (ref 41–53)
HCT VFR BLD AUTO: 23 % (ref 41–53)
HCT VFR BLD AUTO: 23.9 % (ref 41–53)
HCT VFR BLD AUTO: 32.9 % (ref 41–53)
HGB BLD-MCNC: 10.8 G/DL (ref 13.5–17.5)
HGB BLD-MCNC: 6.5 G/DL (ref 13.5–17.5)
HGB BLD-MCNC: 7.7 G/DL (ref 13.5–17.5)
HGB BLD-MCNC: 7.9 G/DL (ref 13.5–17.5)
INR PPP: 1.3
LIPASE SERPL-CCNC: 102 U/L (ref 13–60)
LYMPHOCYTES NFR BLD: 1.77 K/UL (ref 1–4.8)
LYMPHOCYTES RELATIVE PERCENT: 23 % (ref 24–44)
MAGNESIUM SERPL-MCNC: 1.7 MG/DL (ref 1.6–2.6)
MCH RBC QN AUTO: 31.6 PG (ref 26–34)
MCHC RBC AUTO-ENTMCNC: 32.9 G/DL (ref 31–37)
MCV RBC AUTO: 96.1 FL (ref 80–100)
MONOCYTES NFR BLD: 0.54 K/UL (ref 0.1–1.3)
MONOCYTES NFR BLD: 7 % (ref 1–7)
MORPHOLOGY: ABNORMAL
NEUTROPHILS NFR BLD: 67 % (ref 36–66)
NEUTS SEG NFR BLD: 5.16 K/UL (ref 1.3–9.1)
PARTIAL THROMBOPLASTIN TIME: 37.2 SEC (ref 24–36)
PHOSPHATE SERPL-MCNC: 4.3 MG/DL (ref 2.5–4.5)
PLATELET # BLD AUTO: 72 K/UL (ref 150–450)
PMV BLD AUTO: 7.7 FL (ref 6–12)
POTASSIUM SERPL-SCNC: 3.9 MMOL/L (ref 3.7–5.3)
PROT SERPL-MCNC: 9.2 G/DL (ref 6.4–8.3)
PROTHROMBIN TIME: 16.6 SEC (ref 11.8–14.6)
RBC # BLD AUTO: 3.42 M/UL (ref 4.5–5.9)
SODIUM SERPL-SCNC: 141 MMOL/L (ref 135–144)
WBC OTHER # BLD: 7.7 K/UL (ref 3.5–11)

## 2023-09-27 PROCEDURE — 36415 COLL VENOUS BLD VENIPUNCTURE: CPT

## 2023-09-27 PROCEDURE — A4216 STERILE WATER/SALINE, 10 ML: HCPCS | Performed by: EMERGENCY MEDICINE

## 2023-09-27 PROCEDURE — 6360000002 HC RX W HCPCS: Performed by: NURSE PRACTITIONER

## 2023-09-27 PROCEDURE — 86850 RBC ANTIBODY SCREEN: CPT

## 2023-09-27 PROCEDURE — C9113 INJ PANTOPRAZOLE SODIUM, VIA: HCPCS | Performed by: INTERNAL MEDICINE

## 2023-09-27 PROCEDURE — 06L28CZ OCCLUSION OF GASTRIC VEIN WITH EXTRALUMINAL DEVICE, VIA NATURAL OR ARTIFICIAL OPENING ENDOSCOPIC: ICD-10-PCS | Performed by: INTERNAL MEDICINE

## 2023-09-27 PROCEDURE — 2500000003 HC RX 250 WO HCPCS: Performed by: ANESTHESIOLOGY

## 2023-09-27 PROCEDURE — 7100000000 HC PACU RECOVERY - FIRST 15 MIN: Performed by: INTERNAL MEDICINE

## 2023-09-27 PROCEDURE — 85014 HEMATOCRIT: CPT

## 2023-09-27 PROCEDURE — 43244 EGD VARICES LIGATION: CPT | Performed by: INTERNAL MEDICINE

## 2023-09-27 PROCEDURE — 6360000004 HC RX CONTRAST MEDICATION: Performed by: EMERGENCY MEDICINE

## 2023-09-27 PROCEDURE — 2500000003 HC RX 250 WO HCPCS: Performed by: NURSE ANESTHETIST, CERTIFIED REGISTERED

## 2023-09-27 PROCEDURE — A4216 STERILE WATER/SALINE, 10 ML: HCPCS | Performed by: ANESTHESIOLOGY

## 2023-09-27 PROCEDURE — P9016 RBC LEUKOCYTES REDUCED: HCPCS

## 2023-09-27 PROCEDURE — 3700000001 HC ADD 15 MINUTES (ANESTHESIA): Performed by: INTERNAL MEDICINE

## 2023-09-27 PROCEDURE — 2000000000 HC ICU R&B

## 2023-09-27 PROCEDURE — 3700000000 HC ANESTHESIA ATTENDED CARE: Performed by: INTERNAL MEDICINE

## 2023-09-27 PROCEDURE — 6370000000 HC RX 637 (ALT 250 FOR IP): Performed by: NURSE PRACTITIONER

## 2023-09-27 PROCEDURE — 99223 1ST HOSP IP/OBS HIGH 75: CPT | Performed by: INTERNAL MEDICINE

## 2023-09-27 PROCEDURE — 2580000003 HC RX 258: Performed by: INTERNAL MEDICINE

## 2023-09-27 PROCEDURE — 6360000002 HC RX W HCPCS: Performed by: NURSE ANESTHETIST, CERTIFIED REGISTERED

## 2023-09-27 PROCEDURE — 99285 EMERGENCY DEPT VISIT HI MDM: CPT

## 2023-09-27 PROCEDURE — 2580000003 HC RX 258: Performed by: ANESTHESIOLOGY

## 2023-09-27 PROCEDURE — 85025 COMPLETE CBC W/AUTO DIFF WBC: CPT

## 2023-09-27 PROCEDURE — 85018 HEMOGLOBIN: CPT

## 2023-09-27 PROCEDURE — 82105 ALPHA-FETOPROTEIN SERUM: CPT

## 2023-09-27 PROCEDURE — 06L38CZ OCCLUSION OF ESOPHAGEAL VEIN WITH EXTRALUMINAL DEVICE, VIA NATURAL OR ARTIFICIAL OPENING ENDOSCOPIC: ICD-10-PCS | Performed by: INTERNAL MEDICINE

## 2023-09-27 PROCEDURE — 86920 COMPATIBILITY TEST SPIN: CPT

## 2023-09-27 PROCEDURE — 3609012300 HC EGD BAND LIGATION ESOPHGEAL/GASTRIC VARICES: Performed by: INTERNAL MEDICINE

## 2023-09-27 PROCEDURE — 6360000002 HC RX W HCPCS: Performed by: EMERGENCY MEDICINE

## 2023-09-27 PROCEDURE — 2580000003 HC RX 258: Performed by: NURSE PRACTITIONER

## 2023-09-27 PROCEDURE — 84100 ASSAY OF PHOSPHORUS: CPT

## 2023-09-27 PROCEDURE — 6360000002 HC RX W HCPCS

## 2023-09-27 PROCEDURE — 80053 COMPREHEN METABOLIC PANEL: CPT

## 2023-09-27 PROCEDURE — 86901 BLOOD TYPING SEROLOGIC RH(D): CPT

## 2023-09-27 PROCEDURE — 2500000003 HC RX 250 WO HCPCS: Performed by: INTERNAL MEDICINE

## 2023-09-27 PROCEDURE — 2720000010 HC SURG SUPPLY STERILE: Performed by: INTERNAL MEDICINE

## 2023-09-27 PROCEDURE — G0480 DRUG TEST DEF 1-7 CLASSES: HCPCS

## 2023-09-27 PROCEDURE — 7100000001 HC PACU RECOVERY - ADDTL 15 MIN: Performed by: INTERNAL MEDICINE

## 2023-09-27 PROCEDURE — 6360000002 HC RX W HCPCS: Performed by: INTERNAL MEDICINE

## 2023-09-27 PROCEDURE — 2580000003 HC RX 258: Performed by: EMERGENCY MEDICINE

## 2023-09-27 PROCEDURE — 96374 THER/PROPH/DIAG INJ IV PUSH: CPT

## 2023-09-27 PROCEDURE — 74174 CTA ABD&PLVS W/CONTRAST: CPT

## 2023-09-27 PROCEDURE — 96375 TX/PRO/DX INJ NEW DRUG ADDON: CPT

## 2023-09-27 PROCEDURE — 83735 ASSAY OF MAGNESIUM: CPT

## 2023-09-27 PROCEDURE — 6360000002 HC RX W HCPCS: Performed by: ANESTHESIOLOGY

## 2023-09-27 PROCEDURE — 2709999900 HC NON-CHARGEABLE SUPPLY: Performed by: INTERNAL MEDICINE

## 2023-09-27 PROCEDURE — 71045 X-RAY EXAM CHEST 1 VIEW: CPT

## 2023-09-27 PROCEDURE — 99255 IP/OBS CONSLTJ NEW/EST HI 80: CPT | Performed by: INTERNAL MEDICINE

## 2023-09-27 PROCEDURE — 85730 THROMBOPLASTIN TIME PARTIAL: CPT

## 2023-09-27 PROCEDURE — 83690 ASSAY OF LIPASE: CPT

## 2023-09-27 PROCEDURE — 85610 PROTHROMBIN TIME: CPT

## 2023-09-27 PROCEDURE — 36430 TRANSFUSION BLD/BLD COMPNT: CPT

## 2023-09-27 PROCEDURE — 86900 BLOOD TYPING SEROLOGIC ABO: CPT

## 2023-09-27 PROCEDURE — 93010 ELECTROCARDIOGRAM REPORT: CPT | Performed by: INTERNAL MEDICINE

## 2023-09-27 PROCEDURE — 82140 ASSAY OF AMMONIA: CPT

## 2023-09-27 PROCEDURE — 96365 THER/PROPH/DIAG IV INF INIT: CPT

## 2023-09-27 PROCEDURE — C1713 ANCHOR/SCREW BN/BN,TIS/BN: HCPCS | Performed by: INTERNAL MEDICINE

## 2023-09-27 PROCEDURE — 93005 ELECTROCARDIOGRAM TRACING: CPT | Performed by: EMERGENCY MEDICINE

## 2023-09-27 PROCEDURE — 2500000003 HC RX 250 WO HCPCS: Performed by: EMERGENCY MEDICINE

## 2023-09-27 PROCEDURE — C9113 INJ PANTOPRAZOLE SODIUM, VIA: HCPCS | Performed by: EMERGENCY MEDICINE

## 2023-09-27 RX ORDER — LACTULOSE 10 G/15ML
10 SOLUTION ORAL 3 TIMES DAILY
Status: DISCONTINUED | OUTPATIENT
Start: 2023-09-27 | End: 2023-09-28 | Stop reason: HOSPADM

## 2023-09-27 RX ORDER — SODIUM CHLORIDE 9 MG/ML
INJECTION, SOLUTION INTRAVENOUS CONTINUOUS
Status: DISCONTINUED | OUTPATIENT
Start: 2023-09-27 | End: 2023-09-28 | Stop reason: HOSPADM

## 2023-09-27 RX ORDER — OCTREOTIDE ACETATE 50 UG/ML
50 INJECTION, SOLUTION INTRAVENOUS; SUBCUTANEOUS ONCE
Status: COMPLETED | OUTPATIENT
Start: 2023-09-27 | End: 2023-09-27

## 2023-09-27 RX ORDER — OCTREOTIDE ACETATE 50 UG/ML
50 INJECTION, SOLUTION INTRAVENOUS; SUBCUTANEOUS ONCE
Status: DISCONTINUED | OUTPATIENT
Start: 2023-09-27 | End: 2023-09-27

## 2023-09-27 RX ORDER — LORAZEPAM 2 MG/ML
4 INJECTION INTRAMUSCULAR
Status: DISCONTINUED | OUTPATIENT
Start: 2023-09-27 | End: 2023-09-28 | Stop reason: HOSPADM

## 2023-09-27 RX ORDER — ONDANSETRON 2 MG/ML
INJECTION INTRAMUSCULAR; INTRAVENOUS PRN
Status: DISCONTINUED | OUTPATIENT
Start: 2023-09-27 | End: 2023-09-27 | Stop reason: SDUPTHER

## 2023-09-27 RX ORDER — ONDANSETRON 2 MG/ML
4 INJECTION INTRAMUSCULAR; INTRAVENOUS EVERY 6 HOURS PRN
Status: DISCONTINUED | OUTPATIENT
Start: 2023-09-27 | End: 2023-09-28 | Stop reason: HOSPADM

## 2023-09-27 RX ORDER — DEXAMETHASONE SODIUM PHOSPHATE 4 MG/ML
INJECTION, SOLUTION INTRA-ARTICULAR; INTRALESIONAL; INTRAMUSCULAR; INTRAVENOUS; SOFT TISSUE PRN
Status: DISCONTINUED | OUTPATIENT
Start: 2023-09-27 | End: 2023-09-27 | Stop reason: SDUPTHER

## 2023-09-27 RX ORDER — ONDANSETRON 2 MG/ML
4 INJECTION INTRAMUSCULAR; INTRAVENOUS EVERY 4 HOURS
Status: DISCONTINUED | OUTPATIENT
Start: 2023-09-27 | End: 2023-09-27

## 2023-09-27 RX ORDER — SODIUM CHLORIDE 0.9 % (FLUSH) 0.9 %
5-40 SYRINGE (ML) INJECTION PRN
Status: DISCONTINUED | OUTPATIENT
Start: 2023-09-27 | End: 2023-09-28 | Stop reason: HOSPADM

## 2023-09-27 RX ORDER — OCTREOTIDE ACETATE 50 UG/ML
50 INJECTION, SOLUTION INTRAVENOUS; SUBCUTANEOUS ONCE
Status: DISCONTINUED | OUTPATIENT
Start: 2023-09-27 | End: 2023-09-27 | Stop reason: RX

## 2023-09-27 RX ORDER — 0.9 % SODIUM CHLORIDE 0.9 %
100 INTRAVENOUS SOLUTION INTRAVENOUS ONCE
Status: COMPLETED | OUTPATIENT
Start: 2023-09-27 | End: 2023-09-27

## 2023-09-27 RX ORDER — SODIUM CHLORIDE 0.9 % (FLUSH) 0.9 %
10 SYRINGE (ML) INJECTION PRN
Status: DISCONTINUED | OUTPATIENT
Start: 2023-09-27 | End: 2023-09-28 | Stop reason: HOSPADM

## 2023-09-27 RX ORDER — SODIUM CHLORIDE 9 MG/ML
INJECTION, SOLUTION INTRAVENOUS PRN
Status: DISCONTINUED | OUTPATIENT
Start: 2023-09-27 | End: 2023-09-28 | Stop reason: HOSPADM

## 2023-09-27 RX ORDER — LORAZEPAM 2 MG/ML
3 INJECTION INTRAMUSCULAR
Status: DISCONTINUED | OUTPATIENT
Start: 2023-09-27 | End: 2023-09-28 | Stop reason: HOSPADM

## 2023-09-27 RX ORDER — PROPOFOL 10 MG/ML
INJECTION, EMULSION INTRAVENOUS PRN
Status: DISCONTINUED | OUTPATIENT
Start: 2023-09-27 | End: 2023-09-27 | Stop reason: SDUPTHER

## 2023-09-27 RX ORDER — LORAZEPAM 1 MG/1
2 TABLET ORAL
Status: DISCONTINUED | OUTPATIENT
Start: 2023-09-27 | End: 2023-09-28 | Stop reason: HOSPADM

## 2023-09-27 RX ORDER — LORAZEPAM 1 MG/1
1 TABLET ORAL
Status: DISCONTINUED | OUTPATIENT
Start: 2023-09-27 | End: 2023-09-28 | Stop reason: HOSPADM

## 2023-09-27 RX ORDER — ONDANSETRON 4 MG/1
4 TABLET, ORALLY DISINTEGRATING ORAL EVERY 8 HOURS PRN
Status: DISCONTINUED | OUTPATIENT
Start: 2023-09-27 | End: 2023-09-28 | Stop reason: HOSPADM

## 2023-09-27 RX ORDER — LORAZEPAM 1 MG/1
3 TABLET ORAL
Status: DISCONTINUED | OUTPATIENT
Start: 2023-09-27 | End: 2023-09-28 | Stop reason: HOSPADM

## 2023-09-27 RX ORDER — KETOROLAC TROMETHAMINE 30 MG/ML
30 INJECTION, SOLUTION INTRAMUSCULAR; INTRAVENOUS EVERY 6 HOURS PRN
Status: DISCONTINUED | OUTPATIENT
Start: 2023-09-27 | End: 2023-09-28

## 2023-09-27 RX ORDER — SODIUM CHLORIDE 0.9 % (FLUSH) 0.9 %
5-40 SYRINGE (ML) INJECTION EVERY 12 HOURS SCHEDULED
Status: DISCONTINUED | OUTPATIENT
Start: 2023-09-27 | End: 2023-09-28 | Stop reason: HOSPADM

## 2023-09-27 RX ORDER — LIDOCAINE HYDROCHLORIDE 10 MG/ML
INJECTION, SOLUTION EPIDURAL; INFILTRATION; INTRACAUDAL; PERINEURAL PRN
Status: DISCONTINUED | OUTPATIENT
Start: 2023-09-27 | End: 2023-09-27 | Stop reason: SDUPTHER

## 2023-09-27 RX ORDER — LORAZEPAM 1 MG/1
4 TABLET ORAL
Status: DISCONTINUED | OUTPATIENT
Start: 2023-09-27 | End: 2023-09-28 | Stop reason: HOSPADM

## 2023-09-27 RX ORDER — LORAZEPAM 2 MG/ML
2 INJECTION INTRAMUSCULAR
Status: DISCONTINUED | OUTPATIENT
Start: 2023-09-27 | End: 2023-09-28 | Stop reason: HOSPADM

## 2023-09-27 RX ORDER — METRONIDAZOLE 500 MG/100ML
500 INJECTION, SOLUTION INTRAVENOUS ONCE
Status: COMPLETED | OUTPATIENT
Start: 2023-09-27 | End: 2023-09-27

## 2023-09-27 RX ORDER — ONDANSETRON 2 MG/ML
4 INJECTION INTRAMUSCULAR; INTRAVENOUS ONCE
Status: COMPLETED | OUTPATIENT
Start: 2023-09-27 | End: 2023-09-27

## 2023-09-27 RX ORDER — SUCCINYLCHOLINE/SOD CL,ISO/PF 200MG/10ML
SYRINGE (ML) INTRAVENOUS PRN
Status: DISCONTINUED | OUTPATIENT
Start: 2023-09-27 | End: 2023-09-27 | Stop reason: SDUPTHER

## 2023-09-27 RX ORDER — SODIUM CHLORIDE 9 MG/ML
INJECTION, SOLUTION INTRAVENOUS PRN
Status: DISCONTINUED | OUTPATIENT
Start: 2023-09-27 | End: 2023-09-27

## 2023-09-27 RX ORDER — METOCLOPRAMIDE HYDROCHLORIDE 5 MG/ML
5 INJECTION INTRAMUSCULAR; INTRAVENOUS ONCE
Status: COMPLETED | OUTPATIENT
Start: 2023-09-27 | End: 2023-09-27

## 2023-09-27 RX ORDER — PHENYLEPHRINE HYDROCHLORIDE 10 MG/ML
INJECTION INTRAVENOUS PRN
Status: DISCONTINUED | OUTPATIENT
Start: 2023-09-27 | End: 2023-09-27 | Stop reason: SDUPTHER

## 2023-09-27 RX ORDER — LORAZEPAM 2 MG/ML
1 INJECTION INTRAMUSCULAR
Status: DISCONTINUED | OUTPATIENT
Start: 2023-09-27 | End: 2023-09-28 | Stop reason: HOSPADM

## 2023-09-27 RX ORDER — 0.9 % SODIUM CHLORIDE 0.9 %
1000 INTRAVENOUS SOLUTION INTRAVENOUS ONCE
Status: COMPLETED | OUTPATIENT
Start: 2023-09-27 | End: 2023-09-27

## 2023-09-27 RX ORDER — MIDAZOLAM HYDROCHLORIDE 1 MG/ML
INJECTION INTRAMUSCULAR; INTRAVENOUS PRN
Status: DISCONTINUED | OUTPATIENT
Start: 2023-09-27 | End: 2023-09-27 | Stop reason: SDUPTHER

## 2023-09-27 RX ORDER — NICOTINE 21 MG/24HR
1 PATCH, TRANSDERMAL 24 HOURS TRANSDERMAL DAILY
Status: DISCONTINUED | OUTPATIENT
Start: 2023-09-27 | End: 2023-09-28 | Stop reason: HOSPADM

## 2023-09-27 RX ADMIN — SODIUM CHLORIDE, PRESERVATIVE FREE 10 ML: 5 INJECTION INTRAVENOUS at 05:35

## 2023-09-27 RX ADMIN — FAMOTIDINE 10 MG: 10 INJECTION INTRAVENOUS at 09:59

## 2023-09-27 RX ADMIN — METOCLOPRAMIDE 5 MG: 5 INJECTION, SOLUTION INTRAMUSCULAR; INTRAVENOUS at 10:01

## 2023-09-27 RX ADMIN — METRONIDAZOLE 500 MG: 500 INJECTION, SOLUTION INTRAVENOUS at 03:29

## 2023-09-27 RX ADMIN — KETOROLAC TROMETHAMINE 30 MG: 30 INJECTION, SOLUTION INTRAMUSCULAR; INTRAVENOUS at 18:15

## 2023-09-27 RX ADMIN — THIAMINE HYDROCHLORIDE: 100 INJECTION, SOLUTION INTRAMUSCULAR; INTRAVENOUS at 03:20

## 2023-09-27 RX ADMIN — SODIUM CHLORIDE 1000 ML: 9 INJECTION, SOLUTION INTRAVENOUS at 02:59

## 2023-09-27 RX ADMIN — DEXAMETHASONE SODIUM PHOSPHATE 4 MG: 4 INJECTION INTRA-ARTICULAR; INTRALESIONAL; INTRAMUSCULAR; INTRAVENOUS; SOFT TISSUE at 08:52

## 2023-09-27 RX ADMIN — SODIUM CHLORIDE 40 MG: 9 INJECTION INTRAMUSCULAR; INTRAVENOUS; SUBCUTANEOUS at 03:10

## 2023-09-27 RX ADMIN — OCTREOTIDE ACETATE 50 MCG: 50 INJECTION, SOLUTION INTRAVENOUS; SUBCUTANEOUS at 02:54

## 2023-09-27 RX ADMIN — CEFTRIAXONE SODIUM 1000 MG: 1 INJECTION, POWDER, FOR SOLUTION INTRAMUSCULAR; INTRAVENOUS at 03:13

## 2023-09-27 RX ADMIN — PROPOFOL 200 MG: 10 INJECTION, EMULSION INTRAVENOUS at 08:37

## 2023-09-27 RX ADMIN — OCTREOTIDE ACETATE 50 MCG/HR: 500 INJECTION, SOLUTION INTRAVENOUS; SUBCUTANEOUS at 11:53

## 2023-09-27 RX ADMIN — PANTOPRAZOLE SODIUM 8 MG/HR: 40 INJECTION, POWDER, FOR SOLUTION INTRAVENOUS at 19:51

## 2023-09-27 RX ADMIN — THIAMINE HYDROCHLORIDE: 100 INJECTION, SOLUTION INTRAMUSCULAR; INTRAVENOUS at 22:18

## 2023-09-27 RX ADMIN — ONDANSETRON 4 MG: 2 INJECTION INTRAMUSCULAR; INTRAVENOUS at 08:24

## 2023-09-27 RX ADMIN — ONDANSETRON 4 MG: 2 INJECTION INTRAMUSCULAR; INTRAVENOUS at 02:50

## 2023-09-27 RX ADMIN — PANTOPRAZOLE SODIUM 8 MG/HR: 40 INJECTION, POWDER, FOR SOLUTION INTRAVENOUS at 11:51

## 2023-09-27 RX ADMIN — ONDANSETRON 4 MG: 2 INJECTION INTRAMUSCULAR; INTRAVENOUS at 05:53

## 2023-09-27 RX ADMIN — MIDAZOLAM 2 MG: 1 INJECTION INTRAMUSCULAR; INTRAVENOUS at 08:52

## 2023-09-27 RX ADMIN — Medication 140 MG: at 08:38

## 2023-09-27 RX ADMIN — PHENYLEPHRINE HYDROCHLORIDE 200 MCG: 10 INJECTION INTRAVENOUS at 08:59

## 2023-09-27 RX ADMIN — SODIUM CHLORIDE: 9 INJECTION, SOLUTION INTRAVENOUS at 06:25

## 2023-09-27 RX ADMIN — LIDOCAINE HYDROCHLORIDE 50 MG: 10 INJECTION, SOLUTION EPIDURAL; INFILTRATION; INTRACAUDAL; PERINEURAL at 08:37

## 2023-09-27 RX ADMIN — PANTOPRAZOLE SODIUM 8 MG/HR: 40 INJECTION, POWDER, FOR SOLUTION INTRAVENOUS at 03:02

## 2023-09-27 RX ADMIN — PHENYLEPHRINE HYDROCHLORIDE 200 MCG: 10 INJECTION INTRAVENOUS at 08:49

## 2023-09-27 RX ADMIN — ONDANSETRON 4 MG: 2 INJECTION INTRAMUSCULAR; INTRAVENOUS at 08:52

## 2023-09-27 RX ADMIN — SODIUM CHLORIDE: 9 INJECTION, SOLUTION INTRAVENOUS at 19:41

## 2023-09-27 RX ADMIN — OCTREOTIDE ACETATE 50 MCG/HR: 500 INJECTION, SOLUTION INTRAVENOUS; SUBCUTANEOUS at 20:56

## 2023-09-27 RX ADMIN — SODIUM CHLORIDE, PRESERVATIVE FREE 10 ML: 5 INJECTION INTRAVENOUS at 20:59

## 2023-09-27 RX ADMIN — IOPAMIDOL 100 ML: 755 INJECTION, SOLUTION INTRAVENOUS at 05:25

## 2023-09-27 RX ADMIN — LORAZEPAM 1 MG: 2 INJECTION INTRAMUSCULAR; INTRAVENOUS at 22:15

## 2023-09-27 RX ADMIN — SODIUM CHLORIDE 100 ML: 9 INJECTION, SOLUTION INTRAVENOUS at 05:35

## 2023-09-27 RX ADMIN — OCTREOTIDE ACETATE 50 MCG/HR: 500 INJECTION, SOLUTION INTRAVENOUS; SUBCUTANEOUS at 02:51

## 2023-09-27 ASSESSMENT — PAIN DESCRIPTION - LOCATION
LOCATION: ABDOMEN
LOCATION: HEAD

## 2023-09-27 ASSESSMENT — PAIN DESCRIPTION - ORIENTATION
ORIENTATION: LEFT;UPPER
ORIENTATION: OTHER (COMMENT)

## 2023-09-27 ASSESSMENT — ENCOUNTER SYMPTOMS
ABDOMINAL PAIN: 1
ABDOMINAL PAIN: 0
NAUSEA: 1
SHORTNESS OF BREATH: 0
COUGH: 0
STRIDOR: 0
ABDOMINAL DISTENTION: 0
VOMITING: 1
STRIDOR: 0
CONSTIPATION: 0
SHORTNESS OF BREATH: 0
WHEEZING: 0
CHEST TIGHTNESS: 0
BLOOD IN STOOL: 0
DIARRHEA: 0

## 2023-09-27 ASSESSMENT — LIFESTYLE VARIABLES
HOW OFTEN DO YOU HAVE A DRINK CONTAINING ALCOHOL: 4 OR MORE TIMES A WEEK
SMOKING_STATUS: 1
HOW OFTEN DO YOU HAVE A DRINK CONTAINING ALCOHOL: 4 OR MORE TIMES A WEEK
HOW MANY STANDARD DRINKS CONTAINING ALCOHOL DO YOU HAVE ON A TYPICAL DAY: 10 OR MORE
HOW MANY STANDARD DRINKS CONTAINING ALCOHOL DO YOU HAVE ON A TYPICAL DAY: 10 OR MORE

## 2023-09-27 ASSESSMENT — PAIN SCALES - GENERAL
PAINLEVEL_OUTOF10: 6
PAINLEVEL_OUTOF10: 0
PAINLEVEL_OUTOF10: 6
PAINLEVEL_OUTOF10: 0

## 2023-09-27 ASSESSMENT — PAIN - FUNCTIONAL ASSESSMENT: PAIN_FUNCTIONAL_ASSESSMENT: 0-10

## 2023-09-27 ASSESSMENT — PAIN DESCRIPTION - FREQUENCY: FREQUENCY: CONTINUOUS

## 2023-09-27 ASSESSMENT — PAIN DESCRIPTION - ONSET: ONSET: SUDDEN

## 2023-09-27 ASSESSMENT — PAIN DESCRIPTION - DESCRIPTORS: DESCRIPTORS: PRESSURE

## 2023-09-27 NOTE — ED TRIAGE NOTES
Mode of arrival (squad #, walk in, police, etc) : squad        Chief complaint(s): hematemesis and abdominal pain        Arrival Note (brief scenario, treatment PTA, etc). : Pt reports drinking 1 pint of vodka and a 6 pack of beer a day. Pt stated that he has been vomiting blood tonight since he he has attempted to stop drinking. Pt also reports LUQ abdominal pain. C= \"Have you ever felt that you should Cut down on your drinking? \"  Refused  A= \"Have people Annoyed you by criticizing your drinking? \"  Refused  G= \"Have you ever felt bad or Guilty about your drinking? \"  Refused  E= \"Have you ever had a drink as an Eye-opener first thing in the morning to steady your nerves or to help a hangover? \"  Refused      Deferred []      Reason for deferring: N/A    *If yes to two or more: probable alcohol abuse. *

## 2023-09-27 NOTE — OP NOTE
PROCEDURE NOTE    DATE OF PROCEDURE: 9/27/2023     SURGEON: Sharia Merlin, MD  Facility: [facility]  ASSISTANT: None  Anesthesia: General anesthesia  PREOPERATIVE DIAGNOSIS:   Massive GI bleed history of varices  Alcohol intoxication  Alcohol liver disease    Diagnosis:  As below           POSTOPERATIVE DIAGNOSIS: As described below    OPERATION: Upper GI endoscopy with Biopsy    ANESTHESIA: Moderate Sedation     ESTIMATED BLOOD LOSS: Less than 50 ml    COMPLICATIONS: None. SPECIMENS:      HISTORY: The patient is a 41y.o. year old male with history of above preop diagnosis. I recommended esophagogastroduodenoscopy with possible biopsy and I explained the risk, benefits, expected outcome, and alternatives to the procedure. Risks included but are not limited to bleeding, infection, respiratory distress, hypotension, and perforation of the esophagus, stomach, or duodenum. Patient understands and is in agreement. The patient was counseled at length about the risks of rafi Covid-19 during their perioperative period and any recovery window from their procedure. The patient was made aware that rafi Covid-19  may worsen their prognosis for recovering from their procedure  and lend to a higher morbidity and/or mortality risk. All material risks, benefits, and reasonable alternatives including postponing the procedure were discussed. The patient does wish to proceed with the procedure at this time. PROCEDURE: The patient was given IV conscious sedation. The patient's SPO2 remained above 90% throughout the procedure. The gastroscope was inserted orally and advanced under direct vision through the esophagus, through the stomach, through the pylorus, and into the descending duodenum. Post sedation note : The patient's SPO2 remained above 90% throughout the procedure. the vital signs remained stable , and no immediate complication form the procedure noted, patient will be ready for

## 2023-09-27 NOTE — PROGRESS NOTES
[facility]   CONSULT NOTE:    DATE OF CONSULT 9/27/2023    REASON FOR CONSULTATION:  Acute blood loss anemia in ICU status    PCP Duane Hardy MD     CHIEF COMPLAINT: Hematemesis    HISTORY OF PRESENT ILLNESS:     60-year-old male. History of EtOH abuse liver disease history of esophageal varices with a banding procedure in March 2023 at [facility]. Reportedly there was a grade 2 EV esophageal varices appreciated. His last drink reportedly was 10 PM last night. He presented to the ED because of large hematemesis. He reportedly had a large amount of hematemesis in the ER. Lab work included initial hemoglobin of 10.8 dropping down to 7.9. The patient has received 1 unit of packed cells. The patient reportedly had no problems with blood pressure being hypotensive though had problems with some tachycardia. Liver profile revealed an AST 87 ALT 20 ammonia level 88 platelet count currently 72K    Patient INR 1.3. Patient underwent EGD revealed active bleeding from a varicosities grade 2 GE junction patient's hemostasis was accomplished with band ligation. Distal stomach and the lot number appeared to be normal.  Patient is now on Protonix and octreotide drip. He is able to answer questions. He looks sleepy.       ALLERGIES:  Allergies   Allergen Reactions    Bee Venom Anaphylaxis       HOME MEDICATIONS:  Medications Prior to Admission: pantoprazole (PROTONIX) 40 MG tablet, Take 1 tablet by mouth daily (Patient not taking: Reported on 9/27/2023)  nadolol (CORGARD) 20 MG tablet, Take 1 tablet by mouth daily (Patient not taking: Reported on 9/27/2023)  albuterol sulfate HFA (VENTOLIN HFA) 108 (90 Base) MCG/ACT inhaler, Inhale 2 puffs into the lungs 4 times daily as needed for Wheezing  EPINEPHrine (EPIPEN 2-LIZETTE) 0.3 MG/0.3ML SOAJ injection, Inject 0.3 mLs into the muscle once for 1 dose Use as directed for allergic reaction  lactulose (CHRONULAC) 10 GM/15ML Clear posteriorly no crackles rales or wheezes   Heart: regular rate and rhythm, S1, S2 normal, no murmur, click, rub or gallop  Abdomen  soft, non-tender; bowel sounds normal; no masses,  no   Extremities no signs of pretibial edema    Skin  Skin color, texture, turgor normal. No rashes or lesions  Neurologic: Alert and oriented X 3,.     Imaging      Lab Review  CBC     Lab Results   Component Value Date/Time    WBC 7.7 09/27/2023 02:41 AM    RBC 3.42 09/27/2023 02:41 AM    HGB 7.7 09/27/2023 01:01 PM    HCT 23.0 09/27/2023 01:01 PM    PLT 72 09/27/2023 02:41 AM    MCV 96.1 09/27/2023 02:41 AM    MCH 31.6 09/27/2023 02:41 AM    MCHC 32.9 09/27/2023 02:41 AM    RDW 18.9 09/27/2023 02:41 AM    NRBC 1 05/17/2021 01:01 PM    METASPCT 2 02/20/2023 05:43 AM    LYMPHOPCT 23 09/27/2023 02:41 AM    MONOPCT 7 09/27/2023 02:41 AM    BASOPCT 1 09/27/2023 02:41 AM    MONOSABS 0.54 09/27/2023 02:41 AM    LYMPHSABS 1.77 09/27/2023 02:41 AM    EOSABS 0.15 09/27/2023 02:41 AM    BASOSABS 0.08 09/27/2023 02:41 AM    DIFFTYPE NOT REPORTED 01/30/2022 02:20 PM       BMP   Lab Results   Component Value Date/Time     09/27/2023 02:41 AM    K 3.9 09/27/2023 02:41 AM     09/27/2023 02:41 AM    CO2 21 09/27/2023 02:41 AM    BUN 9 09/27/2023 02:41 AM    CREATININE 0.8 09/27/2023 02:41 AM    GLUCOSE 106 09/27/2023 02:41 AM    CALCIUM 9.0 09/27/2023 02:41 AM    MG 1.7 09/27/2023 02:41 AM    PHOS 4.3 09/27/2023 06:40 AM       LFTS  Lab Results   Component Value Date/Time    ALKPHOS 229 09/27/2023 02:41 AM    ALT 20 09/27/2023 02:41 AM    AST 87 09/27/2023 02:41 AM    PROT 9.2 09/27/2023 02:41 AM    BILITOT 2.5 09/27/2023 02:41 AM    BILIDIR 4.8 02/18/2023 04:27 PM    IBILI 2.3 02/18/2023 04:27 PM    LABALBU 3.6 09/27/2023 02:41 AM       INR  Recent Labs     09/27/23  0241   PROTIME 16.6*   INR 1.3       APTT  Recent Labs     09/27/23  0241   APTT 37.2*       Lactic Acid  Lab Results   Component Value Date/Time    LACTA 2.1

## 2023-09-27 NOTE — CARE COORDINATION
Case Management Assessment  Initial Evaluation    Date/Time of Evaluation: 9/27/2023 4:49 PM  Assessment Completed by: Chip Connor RN    If patient is discharged prior to next notation, then this note serves as note for discharge by case management. Patient Name: Alejandrina Vásquez                   YOB: 1985  Diagnosis: Hematemesis [K92.0]  Alcohol abuse [F10.10]  Hematemesis with nausea [K92.0]                   Date / Time: 9/27/2023  2:31 AM    Patient Admission Status: Inpatient   Readmission Risk (Low < 19, Mod (19-27), High > 27): Readmission Risk Score: 26.1    Current PCP: Heber Mazariegos MD  PCP verified by CM? Yes    Chart Reviewed: Yes      History Provided by: Patient  Patient Orientation: Alert and Oriented    Patient Cognition: Alert    Hospitalization in the last 30 days (Readmission):  No    If yes, Readmission Assessment in CM Navigator will be completed. Advance Directives:      Code Status: Full Code   Patient's Primary Decision Maker is: Legal Next of Kin      Discharge Planning:    Patient lives with: Spouse/Significant Other Type of Home: House  Primary Care Giver: Self  Patient Support Systems include: Spouse/Significant Other   Current Financial resources: Medicaid  Current community resources: None  Current services prior to admission: None            Current DME:              Type of Home Care services:  None    ADLS  Prior functional level: Independent in ADLs/IADLs  Current functional level: Independent in ADLs/IADLs    PT AM-PAC:   /24  OT AM-PAC:   /24    Family can provide assistance at DC: Yes  Would you like Case Management to discuss the discharge plan with any other family members/significant others, and if so, who? No  Plans to Return to Present Housing: Yes  Other Identified Issues/Barriers to RETURNING to current housing: May need Alcohol Rehab, No PCP, denies assistance  Potential Assistance needed at discharge:  Other (Comment) (Alcohol [place] information,

## 2023-09-27 NOTE — PROGRESS NOTES
09/27/23 1918   Encounter Summary   Encounter Overview/Reason  Spiritual/Emotional Needs   Service Provided For: Patient   Referral/Consult From: Rounding   Complexity of Encounter Low   Spiritual/Emotional needs   Type Spiritual Support   Assessment/Intervention/Outcome   Assessment Unable to assess  (patient sleeping)   Intervention Prayer (assurance of)/[place]
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
## 2023-09-27 NOTE — PLAN OF CARE
The Endoscopic procedure was explained to the patient in detail-egd  All the Risks, Benefits, and Alternatives were explained  Risk of Bleeding, Perforation and Cardio Respiratory risks were explained  his questions were answered  The procedure has been scheduled today  The patient has verbalized understanding and agreement to this plan. Elle Guardado, SILVA - CNP

## 2023-09-27 NOTE — ED PROVIDER NOTES
EMERGENCY DEPARTMENT ENCOUNTER    Pt Name: Lola Aranda  MRN: [id number]  [address] [facility] [place] 1985  Date of evaluation: 9/27/23  CHIEF COMPLAINT       Chief Complaint   Patient presents with    Hematemesis    Abdominal Pain     LUQ     HISTORY OF PRESENT ILLNESS   80-year-old male with past medical history of alcohol abuse, alcoholic cirrhosis, esophageal varices presenting with chief complaint of alcohol withdrawal symptoms and vomiting blood. Patient said that he has been withdrawing from alcohol for the last few days and decided to have some alcoholic beverages today. He said his last alcoholic beverage was around 10 PM.  He woke up just prior to his call to EMS vomiting blood. He said there was an excessive amount of blood in his vomit. He has had a history of esophageal varices with banding in October 2022. Patient is complaining of epigastric pain. The history is provided by the patient. REVIEW OF SYSTEMS     Review of Systems   Constitutional:  Positive for fatigue. Negative for chills and fever. HENT:  Negative for congestion. Eyes:  Negative for visual disturbance. Respiratory:  Negative for cough and shortness of breath. Cardiovascular:  Negative for chest pain. Gastrointestinal:  Positive for abdominal pain, nausea and vomiting (blood). Negative for blood in stool. Genitourinary:  Negative for flank pain. Musculoskeletal:  Positive for myalgias. Neurological:  Negative for dizziness, light-headedness and headaches. Psychiatric/Behavioral:  Negative for behavioral problems.       PASTMEDICAL HISTORY     Past Medical History:   Diagnosis Date    Alcoholism ([facility])     pt drinks a fifth of whiskey daily    Anemia     Cirrhosis, alcoholic ([address])     Pulmonary embolism [facility])      Past Problem List  Patient Active Problem List   Diagnosis Code    ACS (acute coronary syndrome) ([facility]) I24.9    Essential hypertension S34    Uncomplicated alcohol dependence ([facility]) Q69.46    Alcoholic

## 2023-09-27 NOTE — ED NOTES
Pt vomited 1,000 mL hematemesis since he arrived at the hospital.      Elizabeth Mcrae RN  09/27/23 4466

## 2023-09-27 NOTE — PROGRESS NOTES
Patient received from surgery department in stable condition. Monitor and SpO2 applied. Oriented to room, call system, assessment completed.

## 2023-09-27 NOTE — CONSENT
Informed Consent for Blood Component Transfusion Note    I have discussed with the patient the rationale for blood component transfusion; its benefits in treating or preventing fatigue, organ damage, or death; and its risk which includes mild transfusion reactions, rare risk of blood borne infection, or more serious but rare reactions. I have discussed the alternatives to transfusion, including the risk and consequences of not receiving transfusion. The patient had an opportunity to ask questions and had agreed to proceed with transfusion of blood components.     Electronically signed by SILVA Fox CNP on 9/27/23 at 7:50 AM EDT

## 2023-09-27 NOTE — PROGRESS NOTES
Secure message sent to GI updating them on patient's repeat hemoglobin and one bloody stool since surgery. Waiting response.

## 2023-09-27 NOTE — H&P
Date    UPPER GASTROINTESTINAL ENDOSCOPY N/A 5/1/2022    EGD ESOPHAGOGASTRODUODENOSCOPY performed by Mago Copeland MD at [address] [facility] 10/30/2022    EGD BAND LIGATION performed by Jackson Stevens MD at [address] [facility] N/A 9/27/2023    EGD BAND LIGATION performed by Luh Almanza MD at [facility]        Medications Prior to Admission:     Prior to Admission medications    Medication Sig Start Date End Date Taking?  Authorizing Provider   pantoprazole (PROTONIX) 40 MG tablet Take 1 tablet by mouth daily  Patient not taking: Reported on 9/27/2023 5/15/23   Shanell Ying MD   nadolol (CORGARD) 20 MG tablet Take 1 tablet by mouth daily  Patient not taking: Reported on 9/27/2023 3/9/23   Shanell Ying MD   albuterol sulfate HFA (VENTOLIN HFA) 108 (90 Base) MCG/ACT inhaler Inhale 2 puffs into the lungs 4 times daily as needed for Wheezing 2/23/23   Jaelyn Bryant MD   EPINEPHrine (EPIPEN 2-LIZETTE) 0.3 MG/0.3ML SOAJ injection Inject 0.3 mLs into the muscle once for 1 dose Use as directed for allergic reaction 2/23/23 2/23/23  Jaelyn Bryant MD   lactulose (CHRONULAC) 10 GM/15ML solution Take 15 mLs by mouth 3 times daily 2/23/23   Jaelyn Bryant MD   sucralfate (CARAFATE) 1 GM tablet Take 1 tablet by mouth 4 times daily  Patient not taking: Reported on 9/27/2023 2/23/23   Jaelyn Bryant MD   vitamin B-1 (THIAMINE) 100 MG tablet Take 1 tablet by mouth daily 2/23/23   Jaelyn Bryant MD   venlafaxine (EFFEXOR XR) 37.5 MG extended release capsule Take 1 capsule by mouth daily  Patient not taking: Reported on 9/27/2023 2/14/23   Shanell Ying MD   fluticasone [facility]) 50 MCG/ACT nasal spray 1 spray by Each Nostril route daily  Patient not taking: Reported on 9/27/2023 12/27/22   Shanell Ying MD   folic acid (FOLVITE) 1 MG tablet Take 1 tablet by mouth daily 5/4/22   Romario Beasley MD   naproxen (NAPROSYN) 500 MG tablet Take 1 tablet by mouth 2 times colon. Other: No consolidation or pleural effusion at the lung bases. Is a heterogenous appearance of the liver but the technique is suboptimal for parenchymal evaluation given the early arterial phase and some transient hepatic attenuation differences. Underlying fatty metamorphosis or other pathology are not excluded. Recannulization of the umbilical vein and small varices in the upper abdomen. The spleen is mildly enlarged but appears homogeneous. No pancreatic calcification or ductal dilatation. The adrenal glands are unremarkable. Kidneys are enhancing equally and starting to excrete contrast.  No perinephric stranding or hydronephrosis on either side. . There is high-density fluid and material in the gastric lumen which may relate to the reported hematemesis. Thickening of the gastric wall and mucosal folds. With the high-density content in the lumen evaluation for focal ulcer mound is limited. There is no perforation, abscess, or pneumoperitoneum. Thickening of the distal esophagus is also noted. The small bowel and colon are not dilated. No sign of diverticulitis. There is no ascites or pneumoperitoneum. The urinary bladder wall is not significantly thickened for the under distension. Prostate appears acceptable. No free fluid in the pelvis. Multiple small lymph nodes are present in the upper abdomen, periportal, and peripancreatic regions. No acute fracture or destruction at the bones. There is no collapse or subluxation at the included thoracolumbar spine. 1.  No contrast extravasation into the lumen of the stomach at this time. High-density fluid/material in the stomach may relate to the reported hematemesis with intermittent hemorrhage. Evaluation for an ulcer mound is limited and can consider endoscopy. Also mild thickening of the distal esophagus. 2.  No bowel obstruction, ascites, or pneumoperitoneum.  3.  There is heterogenous appearance of the liver but the current phase of the

## 2023-09-27 NOTE — CONSENT
Informed Consent for Blood Component Transfusion Note    I have discussed with the patient the rationale for blood component transfusion; its benefits in treating or preventing fatigue, organ damage, or death; and its risk which includes mild transfusion reactions, rare risk of blood borne infection, or more serious but rare reactions. I have discussed the alternatives to transfusion, including the risk and consequences of not receiving transfusion. The patient had an opportunity to ask questions and had agreed to proceed with transfusion of blood components.     Electronically signed by Migel Du DO on 9/27/23 at 6:49 AM EDT

## 2023-09-27 NOTE — PROGRESS NOTES
One unit of RBC started at 0811, VS as charted, tolerating infusion, medicated with zofran x1 for c/o nausea, void per urinal 400 ml herman urine, rate of blood increased to 150, IV infusion of Normal saline decreased to 100ml/hr, Multivitamin infusion stopped to infuse blood,  no s/s of transfusion reaction noted, patient transport to E3 for EGD

## 2023-09-28 ENCOUNTER — HOSPITAL ENCOUNTER (INPATIENT)
Age: 38
LOS: 2 days | Discharge: HOME | DRG: 197 | End: 2023-09-30
Attending: INTERNAL MEDICINE | Admitting: INTERNAL MEDICINE
Payer: COMMERCIAL

## 2023-09-28 VITALS
SYSTOLIC BLOOD PRESSURE: 146 MMHG | TEMPERATURE: 98.5 F | RESPIRATION RATE: 18 BRPM | HEIGHT: 68 IN | BODY MASS INDEX: 25.23 KG/M2 | DIASTOLIC BLOOD PRESSURE: 79 MMHG | WEIGHT: 166.45 LBS | OXYGEN SATURATION: 100 % | HEART RATE: 75 BPM

## 2023-09-28 DIAGNOSIS — K21.9 GASTROESOPHAGEAL REFLUX DISEASE, UNSPECIFIED WHETHER ESOPHAGITIS PRESENT: ICD-10-CM

## 2023-09-28 PROBLEM — K92.2 GASTROINTESTINAL BLEEDING: Status: ACTIVE | Noted: 2023-09-28

## 2023-09-28 PROBLEM — K92.2 UPPER GI BLEED: Status: ACTIVE | Noted: 2023-09-28

## 2023-09-28 LAB
ABO + RH BLD: NORMAL
ALBUMIN SERPL-MCNC: 2.6 G/DL (ref 3.5–5.2)
ALBUMIN SERPL-MCNC: 2.9 G/DL (ref 3.5–5.2)
ALBUMIN/GLOB SERPL: 0.7 {RATIO} (ref 1–2.5)
ALP SERPL-CCNC: 131 U/L (ref 40–129)
ALP SERPL-CCNC: 132 U/L (ref 40–129)
ALT SERPL-CCNC: 13 U/L (ref 5–41)
ALT SERPL-CCNC: 14 U/L (ref 5–41)
ANION GAP SERPL CALCULATED.3IONS-SCNC: 10 MMOL/L (ref 9–17)
ANION GAP SERPL CALCULATED.3IONS-SCNC: 7 MMOL/L (ref 9–17)
ARM BAND NUMBER: NORMAL
AST SERPL-CCNC: 52 U/L
AST SERPL-CCNC: 57 U/L
BASOPHILS # BLD: 0 K/UL (ref 0–0.2)
BASOPHILS # BLD: <0.03 K/UL (ref 0–0.2)
BASOPHILS NFR BLD: 0 % (ref 0–2)
BASOPHILS NFR BLD: 0 % (ref 0–2)
BILIRUB SERPL-MCNC: 3.1 MG/DL (ref 0.3–1.2)
BILIRUB SERPL-MCNC: 3.2 MG/DL (ref 0.3–1.2)
BLOOD BANK SAMPLE EXPIRATION: NORMAL
BLOOD GROUP ANTIBODIES SERPL: NEGATIVE
BUN SERPL-MCNC: 13 MG/DL (ref 6–20)
BUN SERPL-MCNC: 15 MG/DL (ref 6–20)
CALCIUM SERPL-MCNC: 7.6 MG/DL (ref 8.6–10.4)
CALCIUM SERPL-MCNC: 7.7 MG/DL (ref 8.6–10.4)
CHLORIDE SERPL-SCNC: 106 MMOL/L (ref 98–107)
CHLORIDE SERPL-SCNC: 108 MMOL/L (ref 98–107)
CO2 SERPL-SCNC: 20 MMOL/L (ref 20–31)
CO2 SERPL-SCNC: 20 MMOL/L (ref 20–31)
CREAT SERPL-MCNC: 0.7 MG/DL (ref 0.7–1.2)
CREAT SERPL-MCNC: 0.8 MG/DL (ref 0.7–1.2)
EOSINOPHIL # BLD: 0 K/UL (ref 0–0.4)
EOSINOPHIL # BLD: 0.08 K/UL (ref 0–0.44)
EOSINOPHILS RELATIVE PERCENT: 0 % (ref 0–4)
EOSINOPHILS RELATIVE PERCENT: 1 % (ref 1–4)
ERYTHROCYTE [DISTWIDTH] IN BLOOD BY AUTOMATED COUNT: 19.3 % (ref 11.8–14.4)
ERYTHROCYTE [DISTWIDTH] IN BLOOD BY AUTOMATED COUNT: 20.8 % (ref 11.5–14.9)
GFR SERPL CREATININE-BSD FRML MDRD: >60 ML/MIN/1.73M2
GFR SERPL CREATININE-BSD FRML MDRD: >60 ML/MIN/1.73M2
GLUCOSE BLD-MCNC: 105 MG/DL (ref 75–110)
GLUCOSE SERPL-MCNC: 107 MG/DL (ref 70–99)
GLUCOSE SERPL-MCNC: 130 MG/DL (ref 70–99)
HCT VFR BLD AUTO: 21.2 % (ref 41–53)
HCT VFR BLD AUTO: 21.3 % (ref 41–53)
HCT VFR BLD AUTO: 21.7 % (ref 41–53)
HCT VFR BLD AUTO: 24.8 % (ref 40.7–50.3)
HCT VFR BLD AUTO: 25 % (ref 41–53)
HGB BLD-MCNC: 6.9 G/DL (ref 13.5–17.5)
HGB BLD-MCNC: 7.1 G/DL (ref 13.5–17.5)
HGB BLD-MCNC: 7.2 G/DL (ref 13.5–17.5)
HGB BLD-MCNC: 8.3 G/DL (ref 13.5–17.5)
HGB BLD-MCNC: 8.5 G/DL (ref 13–17)
IMM GRANULOCYTES # BLD AUTO: 0.03 K/UL (ref 0–0.3)
IMM GRANULOCYTES NFR BLD: 1 %
IRON SATN MFR SERPL: 92 % (ref 20–55)
IRON SERPL-MCNC: 220 UG/DL (ref 59–158)
LYMPHOCYTES NFR BLD: 1.01 K/UL (ref 1–4.8)
LYMPHOCYTES NFR BLD: 1.18 K/UL (ref 1.1–3.7)
LYMPHOCYTES RELATIVE PERCENT: 20 % (ref 24–43)
LYMPHOCYTES RELATIVE PERCENT: 22 % (ref 24–44)
MCH RBC QN AUTO: 31.1 PG (ref 26–34)
MCH RBC QN AUTO: 32.1 PG (ref 25.2–33.5)
MCHC RBC AUTO-ENTMCNC: 32.4 G/DL (ref 31–37)
MCHC RBC AUTO-ENTMCNC: 34.3 G/DL (ref 28.4–34.8)
MCV RBC AUTO: 93.6 FL (ref 82.6–102.9)
MCV RBC AUTO: 96 FL (ref 80–100)
MONOCYTES NFR BLD: 0.41 K/UL (ref 0.1–1.3)
MONOCYTES NFR BLD: 0.45 K/UL (ref 0.1–1.2)
MONOCYTES NFR BLD: 8 % (ref 3–12)
MONOCYTES NFR BLD: 9 % (ref 1–7)
MORPHOLOGY: ABNORMAL
NEUTROPHILS NFR BLD: 69 % (ref 36–66)
NEUTROPHILS NFR BLD: 70 % (ref 36–65)
NEUTS SEG NFR BLD: 3.18 K/UL (ref 1.3–9.1)
NEUTS SEG NFR BLD: 4.03 K/UL (ref 1.5–8.1)
NRBC BLD-RTO: 0 PER 100 WBC
PLATELET # BLD AUTO: 34 K/UL (ref 150–450)
PLATELET # BLD AUTO: ABNORMAL K/UL (ref 138–453)
PLATELET, FLUORESCENCE: 39 K/UL (ref 138–453)
PLATELETS.RETICULATED NFR BLD AUTO: 8 % (ref 1.1–10.3)
PMV BLD AUTO: 8.4 FL (ref 6–12)
POTASSIUM SERPL-SCNC: 3.8 MMOL/L (ref 3.7–5.3)
POTASSIUM SERPL-SCNC: 4.1 MMOL/L (ref 3.7–5.3)
PROT SERPL-MCNC: 6.6 G/DL (ref 6.4–8.3)
PROT SERPL-MCNC: 6.8 G/DL (ref 6.4–8.3)
RBC # BLD AUTO: 2.22 M/UL (ref 4.5–5.9)
RBC # BLD AUTO: 2.65 M/UL (ref 4.21–5.77)
RBC # BLD: ABNORMAL 10*6/UL
SODIUM SERPL-SCNC: 135 MMOL/L (ref 135–144)
SODIUM SERPL-SCNC: 136 MMOL/L (ref 135–144)
TIBC SERPL-MCNC: 240 UG/DL (ref 250–450)
UNSATURATED IRON BINDING CAPACITY: 20 UG/DL (ref 112–347)
WBC OTHER # BLD: 4.6 K/UL (ref 3.5–11)
WBC OTHER # BLD: 5.8 K/UL (ref 3.5–11.3)

## 2023-09-28 PROCEDURE — 85055 RETICULATED PLATELET ASSAY: CPT

## 2023-09-28 PROCEDURE — 6370000000 HC RX 637 (ALT 250 FOR IP)

## 2023-09-28 PROCEDURE — 86850 RBC ANTIBODY SCREEN: CPT

## 2023-09-28 PROCEDURE — 85025 COMPLETE CBC W/AUTO DIFF WBC: CPT

## 2023-09-28 PROCEDURE — 2580000003 HC RX 258: Performed by: INTERNAL MEDICINE

## 2023-09-28 PROCEDURE — 36415 COLL VENOUS BLD VENIPUNCTURE: CPT

## 2023-09-28 PROCEDURE — 85018 HEMOGLOBIN: CPT

## 2023-09-28 PROCEDURE — 83540 ASSAY OF IRON: CPT

## 2023-09-28 PROCEDURE — 85014 HEMATOCRIT: CPT

## 2023-09-28 PROCEDURE — 2580000003 HC RX 258

## 2023-09-28 PROCEDURE — 2580000003 HC RX 258: Performed by: NURSE PRACTITIONER

## 2023-09-28 PROCEDURE — 36430 TRANSFUSION BLD/BLD COMPNT: CPT

## 2023-09-28 PROCEDURE — 86901 BLOOD TYPING SEROLOGIC RH(D): CPT

## 2023-09-28 PROCEDURE — 82947 ASSAY GLUCOSE BLOOD QUANT: CPT

## 2023-09-28 PROCEDURE — 6360000002 HC RX W HCPCS: Performed by: INTERNAL MEDICINE

## 2023-09-28 PROCEDURE — 83550 IRON BINDING TEST: CPT

## 2023-09-28 PROCEDURE — 6370000000 HC RX 637 (ALT 250 FOR IP): Performed by: NURSE PRACTITIONER

## 2023-09-28 PROCEDURE — 2500000003 HC RX 250 WO HCPCS

## 2023-09-28 PROCEDURE — 80053 COMPREHEN METABOLIC PANEL: CPT

## 2023-09-28 PROCEDURE — P9016 RBC LEUKOCYTES REDUCED: HCPCS

## 2023-09-28 PROCEDURE — C9113 INJ PANTOPRAZOLE SODIUM, VIA: HCPCS | Performed by: INTERNAL MEDICINE

## 2023-09-28 PROCEDURE — 2000000000 HC ICU R&B

## 2023-09-28 PROCEDURE — 6360000002 HC RX W HCPCS

## 2023-09-28 PROCEDURE — 86900 BLOOD TYPING SEROLOGIC ABO: CPT

## 2023-09-28 PROCEDURE — 99233 SBSQ HOSP IP/OBS HIGH 50: CPT | Performed by: INTERNAL MEDICINE

## 2023-09-28 PROCEDURE — 87641 MR-STAPH DNA AMP PROBE: CPT

## 2023-09-28 PROCEDURE — APPSS30 APP SPLIT SHARED TIME 16-30 MINUTES: Performed by: NURSE PRACTITIONER

## 2023-09-28 PROCEDURE — C9113 INJ PANTOPRAZOLE SODIUM, VIA: HCPCS

## 2023-09-28 PROCEDURE — 6360000002 HC RX W HCPCS: Performed by: NURSE PRACTITIONER

## 2023-09-28 RX ORDER — SODIUM CHLORIDE 0.9 % (FLUSH) 0.9 %
5-40 SYRINGE (ML) INJECTION EVERY 12 HOURS SCHEDULED
Status: DISCONTINUED | OUTPATIENT
Start: 2023-09-28 | End: 2023-09-28

## 2023-09-28 RX ORDER — LORAZEPAM 2 MG/ML
2 INJECTION INTRAMUSCULAR
Status: CANCELLED | OUTPATIENT
Start: 2023-09-28

## 2023-09-28 RX ORDER — SODIUM CHLORIDE 9 MG/ML
INJECTION, SOLUTION INTRAVENOUS PRN
Status: DISCONTINUED | OUTPATIENT
Start: 2023-09-28 | End: 2023-09-30 | Stop reason: HOSPADM

## 2023-09-28 RX ORDER — LORAZEPAM 1 MG/1
4 TABLET ORAL
Status: CANCELLED | OUTPATIENT
Start: 2023-09-28

## 2023-09-28 RX ORDER — LORAZEPAM 2 MG/1
2 TABLET ORAL
Status: DISCONTINUED | OUTPATIENT
Start: 2023-09-28 | End: 2023-09-30 | Stop reason: HOSPADM

## 2023-09-28 RX ORDER — LORAZEPAM 2 MG/ML
4 INJECTION INTRAMUSCULAR
Status: DISCONTINUED | OUTPATIENT
Start: 2023-09-28 | End: 2023-09-30 | Stop reason: HOSPADM

## 2023-09-28 RX ORDER — POLYETHYLENE GLYCOL 3350 17 G/17G
17 POWDER, FOR SOLUTION ORAL DAILY PRN
Status: DISCONTINUED | OUTPATIENT
Start: 2023-09-28 | End: 2023-09-30 | Stop reason: HOSPADM

## 2023-09-28 RX ORDER — SODIUM CHLORIDE 9 MG/ML
INJECTION, SOLUTION INTRAVENOUS PRN
Status: DISCONTINUED | OUTPATIENT
Start: 2023-09-28 | End: 2023-09-28

## 2023-09-28 RX ORDER — NICOTINE 21 MG/24HR
1 PATCH, TRANSDERMAL 24 HOURS TRANSDERMAL DAILY
Status: CANCELLED | OUTPATIENT
Start: 2023-09-29

## 2023-09-28 RX ORDER — LORAZEPAM 1 MG/1
1 TABLET ORAL
Status: DISCONTINUED | OUTPATIENT
Start: 2023-09-28 | End: 2023-09-30 | Stop reason: HOSPADM

## 2023-09-28 RX ORDER — LORAZEPAM 1 MG/1
2 TABLET ORAL
Status: CANCELLED | OUTPATIENT
Start: 2023-09-28

## 2023-09-28 RX ORDER — ONDANSETRON 4 MG/1
4 TABLET, ORALLY DISINTEGRATING ORAL EVERY 8 HOURS PRN
Status: DISCONTINUED | OUTPATIENT
Start: 2023-09-28 | End: 2023-09-30 | Stop reason: HOSPADM

## 2023-09-28 RX ORDER — LORAZEPAM 1 MG/1
3 TABLET ORAL
Status: CANCELLED | OUTPATIENT
Start: 2023-09-28

## 2023-09-28 RX ORDER — SODIUM CHLORIDE 0.9 % (FLUSH) 0.9 %
5-40 SYRINGE (ML) INJECTION PRN
Status: DISCONTINUED | OUTPATIENT
Start: 2023-09-28 | End: 2023-09-28

## 2023-09-28 RX ORDER — LORAZEPAM 2 MG/ML
1 INJECTION INTRAMUSCULAR
Status: CANCELLED | OUTPATIENT
Start: 2023-09-28

## 2023-09-28 RX ORDER — SODIUM CHLORIDE, SODIUM LACTATE, POTASSIUM CHLORIDE, CALCIUM CHLORIDE 600; 310; 30; 20 MG/100ML; MG/100ML; MG/100ML; MG/100ML
INJECTION, SOLUTION INTRAVENOUS CONTINUOUS
Status: DISCONTINUED | OUTPATIENT
Start: 2023-09-28 | End: 2023-09-30 | Stop reason: HOSPADM

## 2023-09-28 RX ORDER — LORAZEPAM 1 MG/1
1 TABLET ORAL
Status: CANCELLED | OUTPATIENT
Start: 2023-09-28

## 2023-09-28 RX ORDER — SODIUM CHLORIDE 9 MG/ML
INJECTION, SOLUTION INTRAVENOUS CONTINUOUS
Status: CANCELLED | OUTPATIENT
Start: 2023-09-28

## 2023-09-28 RX ORDER — SODIUM CHLORIDE 0.9 % (FLUSH) 0.9 %
10 SYRINGE (ML) INJECTION PRN
Status: DISCONTINUED | OUTPATIENT
Start: 2023-09-28 | End: 2023-09-30 | Stop reason: HOSPADM

## 2023-09-28 RX ORDER — LORAZEPAM 2 MG/1
4 TABLET ORAL
Status: DISCONTINUED | OUTPATIENT
Start: 2023-09-28 | End: 2023-09-30 | Stop reason: HOSPADM

## 2023-09-28 RX ORDER — SODIUM CHLORIDE 9 MG/ML
INJECTION, SOLUTION INTRAVENOUS PRN
Status: DISCONTINUED | OUTPATIENT
Start: 2023-09-28 | End: 2023-09-28 | Stop reason: HOSPADM

## 2023-09-28 RX ORDER — ALBUTEROL SULFATE 90 UG/1
2 AEROSOL, METERED RESPIRATORY (INHALATION) EVERY 4 HOURS PRN
Status: DISCONTINUED | OUTPATIENT
Start: 2023-09-28 | End: 2023-09-30 | Stop reason: HOSPADM

## 2023-09-28 RX ORDER — LORAZEPAM 2 MG/ML
3 INJECTION INTRAMUSCULAR
Status: DISCONTINUED | OUTPATIENT
Start: 2023-09-28 | End: 2023-09-30 | Stop reason: HOSPADM

## 2023-09-28 RX ORDER — LORAZEPAM 2 MG/ML
1 INJECTION INTRAMUSCULAR
Status: DISCONTINUED | OUTPATIENT
Start: 2023-09-28 | End: 2023-09-30 | Stop reason: HOSPADM

## 2023-09-28 RX ORDER — SODIUM CHLORIDE 0.9 % (FLUSH) 0.9 %
5-40 SYRINGE (ML) INJECTION PRN
Status: CANCELLED | OUTPATIENT
Start: 2023-09-28

## 2023-09-28 RX ORDER — SODIUM CHLORIDE 0.9 % (FLUSH) 0.9 %
5-40 SYRINGE (ML) INJECTION EVERY 12 HOURS SCHEDULED
Status: CANCELLED | OUTPATIENT
Start: 2023-09-28

## 2023-09-28 RX ORDER — LORAZEPAM 2 MG/ML
3 INJECTION INTRAMUSCULAR
Status: CANCELLED | OUTPATIENT
Start: 2023-09-28

## 2023-09-28 RX ORDER — SODIUM CHLORIDE 9 MG/ML
INJECTION, SOLUTION INTRAVENOUS PRN
Status: CANCELLED | OUTPATIENT
Start: 2023-09-28

## 2023-09-28 RX ORDER — LACTULOSE 10 G/15ML
10 SOLUTION ORAL 3 TIMES DAILY
Status: CANCELLED | OUTPATIENT
Start: 2023-09-28

## 2023-09-28 RX ORDER — SODIUM CHLORIDE 9 MG/ML
INJECTION, SOLUTION INTRAVENOUS CONTINUOUS
Status: DISCONTINUED | OUTPATIENT
Start: 2023-09-28 | End: 2023-09-28

## 2023-09-28 RX ORDER — SODIUM CHLORIDE 0.9 % (FLUSH) 0.9 %
5-40 SYRINGE (ML) INJECTION EVERY 12 HOURS SCHEDULED
Status: DISCONTINUED | OUTPATIENT
Start: 2023-09-28 | End: 2023-09-30 | Stop reason: HOSPADM

## 2023-09-28 RX ORDER — ONDANSETRON 2 MG/ML
4 INJECTION INTRAMUSCULAR; INTRAVENOUS EVERY 6 HOURS PRN
Status: DISCONTINUED | OUTPATIENT
Start: 2023-09-28 | End: 2023-09-30 | Stop reason: HOSPADM

## 2023-09-28 RX ORDER — ENOXAPARIN SODIUM 100 MG/ML
40 INJECTION SUBCUTANEOUS DAILY
Status: DISCONTINUED | OUTPATIENT
Start: 2023-09-28 | End: 2023-09-30 | Stop reason: HOSPADM

## 2023-09-28 RX ORDER — ACETAMINOPHEN 325 MG/1
650 TABLET ORAL EVERY 6 HOURS PRN
Status: DISCONTINUED | OUTPATIENT
Start: 2023-09-28 | End: 2023-09-30 | Stop reason: HOSPADM

## 2023-09-28 RX ORDER — LORAZEPAM 2 MG/ML
4 INJECTION INTRAMUSCULAR
Status: CANCELLED | OUTPATIENT
Start: 2023-09-28

## 2023-09-28 RX ORDER — LACTULOSE 10 G/15ML
10 SOLUTION ORAL 3 TIMES DAILY
Status: DISCONTINUED | OUTPATIENT
Start: 2023-09-28 | End: 2023-09-30 | Stop reason: HOSPADM

## 2023-09-28 RX ORDER — NICOTINE 21 MG/24HR
1 PATCH, TRANSDERMAL 24 HOURS TRANSDERMAL DAILY
Status: DISCONTINUED | OUTPATIENT
Start: 2023-09-29 | End: 2023-09-30 | Stop reason: HOSPADM

## 2023-09-28 RX ORDER — SODIUM CHLORIDE 0.9 % (FLUSH) 0.9 %
10 SYRINGE (ML) INJECTION PRN
Status: CANCELLED | OUTPATIENT
Start: 2023-09-28

## 2023-09-28 RX ORDER — LORAZEPAM 2 MG/ML
2 INJECTION INTRAMUSCULAR
Status: DISCONTINUED | OUTPATIENT
Start: 2023-09-28 | End: 2023-09-30 | Stop reason: HOSPADM

## 2023-09-28 RX ORDER — ACETAMINOPHEN 650 MG/1
650 SUPPOSITORY RECTAL EVERY 6 HOURS PRN
Status: DISCONTINUED | OUTPATIENT
Start: 2023-09-28 | End: 2023-09-30 | Stop reason: HOSPADM

## 2023-09-28 RX ADMIN — CEFTRIAXONE SODIUM 1000 MG: 1 INJECTION, POWDER, FOR SOLUTION INTRAMUSCULAR; INTRAVENOUS at 04:19

## 2023-09-28 RX ADMIN — OCTREOTIDE ACETATE 50 MCG/HR: 500 INJECTION, SOLUTION INTRAVENOUS; SUBCUTANEOUS at 05:37

## 2023-09-28 RX ADMIN — PANTOPRAZOLE SODIUM 8 MG/HR: 40 INJECTION, POWDER, FOR SOLUTION INTRAVENOUS at 05:37

## 2023-09-28 RX ADMIN — SODIUM CHLORIDE: 9 INJECTION, SOLUTION INTRAVENOUS at 10:51

## 2023-09-28 RX ADMIN — PANTOPRAZOLE SODIUM 8 MG/HR: 40 INJECTION, POWDER, FOR SOLUTION INTRAVENOUS at 14:38

## 2023-09-28 RX ADMIN — SODIUM CHLORIDE, POTASSIUM CHLORIDE, SODIUM LACTATE AND CALCIUM CHLORIDE: 600; 310; 30; 20 INJECTION, SOLUTION INTRAVENOUS at 18:19

## 2023-09-28 RX ADMIN — OCTREOTIDE ACETATE 50 MCG/HR: 500 INJECTION, SOLUTION INTRAVENOUS; SUBCUTANEOUS at 13:56

## 2023-09-28 RX ADMIN — OCTREOTIDE ACETATE 50 MCG/HR: 500 INJECTION, SOLUTION INTRAVENOUS; SUBCUTANEOUS at 18:12

## 2023-09-28 RX ADMIN — PANTOPRAZOLE SODIUM 8 MG/HR: 40 INJECTION, POWDER, FOR SOLUTION INTRAVENOUS at 18:13

## 2023-09-28 RX ADMIN — SODIUM CHLORIDE, PRESERVATIVE FREE 10 ML: 5 INJECTION INTRAVENOUS at 21:00

## 2023-09-28 RX ADMIN — LORAZEPAM 2 MG: 2 INJECTION INTRAMUSCULAR; INTRAVENOUS at 18:13

## 2023-09-28 RX ADMIN — SODIUM CHLORIDE, PRESERVATIVE FREE 10 ML: 5 INJECTION INTRAVENOUS at 07:58

## 2023-09-28 RX ADMIN — ASCORBIC ACID, VITAMIN A PALMITATE, CHOLECALCIFEROL, THIAMINE HYDROCHLORIDE, RIBOFLAVIN-5 PHOSPHATE SODIUM, PYRIDOXINE HYDROCHLORIDE, NIACINAMIDE, DEXPANTHENOL, ALPHA-TOCOPHEROL ACETATE, VITAMIN K1, FOLIC ACID, BIOTIN, CYANOCOBALAMIN: 200; 3300; 200; 6; 3.6; 6; 40; 15; 10; 150; 600; 60; 5 INJECTION, SOLUTION INTRAVENOUS at 23:51

## 2023-09-28 RX ADMIN — LACTULOSE 10 G: 20 SOLUTION ORAL at 22:20

## 2023-09-28 ASSESSMENT — PAIN SCALES - GENERAL: PAINLEVEL_OUTOF10: 5

## 2023-09-28 ASSESSMENT — ENCOUNTER SYMPTOMS
CONSTIPATION: 0
BLOOD IN STOOL: 1
VOMITING: 0
DIARRHEA: 0
WHEEZING: 0
STRIDOR: 0
SHORTNESS OF BREATH: 0
COUGH: 0
CHEST TIGHTNESS: 0
ABDOMINAL DISTENTION: 0
ABDOMINAL PAIN: 0
NAUSEA: 0

## 2023-09-28 ASSESSMENT — PAIN DESCRIPTION - ORIENTATION: ORIENTATION: MID

## 2023-09-28 ASSESSMENT — PAIN DESCRIPTION - LOCATION: LOCATION: HEAD

## 2023-09-28 ASSESSMENT — PAIN DESCRIPTION - DESCRIPTORS: DESCRIPTORS: ACHING

## 2023-09-28 NOTE — PROGRESS NOTES
[address]    PROGRESS NOTE             9/28/2023    8:31 AM    Name:   Ulises Hodge  MRN:     [id number]     [address]:      [de-identified]   Room:   2004/2004-01   Day:  1  Admit Date:  9/27/2023  2:31 AM    PCP:  Wilmer Tee MD  Code Status:  Full Code    Subjective:     C/C:   Chief Complaint   Patient presents with    Hematemesis    Abdominal Pain     LUQ     Interval History Status: not changed. Patient was seen and examined at bedside this morning. The patient does not currently have any abdominal pain or tenderness on exam.  The patient did have another episode of bloody bowel movement overnight. Repeat hemoglobin check in the evening was 6.5. Dr. Rupinder Bejarano was contacted overnight and given orders for 1 unit transfusion. Hemoglobin initially improved to 7.2, but recheck about 5 hours later was back down to 6.9. We will recheck hemoglobin in 6 hours per Dr. Rupinder Bejarano, suspect transferred to [facility] V's for TIPS procedure. Brief History:     Ulises Hodge is a 41 y.o. male with a past medical history of alcohol use disorder, alcoholic cardiomyopathy, liver failure, esophageal varices, and GI bleed who presents with hematemesis. The patient had abruptly stopped drinking--drank 1 pint of vodka and 6 beers daily-- and relapsed the night prior to presentation at 10 pm. The patient woke up from his sleep at 1 am the morning of presentation with multiple episodes of hematemesis which caused him to present to the ED. Initial evaluation in the ED showed a hemoglobin of 10.8. He denies additional episodes of hematemesis about 3 L total in volume. CT abdomen pelvis demonstrated high density fluid in the stomach with no contrast extravasation. Repeat hemoglobin was 7.9, GI was consulted, the patient was taken for emergent EGD.   During the procedure, the patient had a GOV2 actively bleeding gastric varix that was banded with successful VOMITING STARTED AROUND 0100 THIS AM FINDINGS: CTA ABDOMEN PELVIS: No abdominal aortic aneurysm, dissection, or periaortic hematoma. Some early atherosclerosis the common iliac arteries. There is a large accessory right hepatic artery off the superior mesenteric artery. Prominent caliber of the right and left hepatic arteries. No high-density contrast extravasation into the stomach at this early arterial phase. No high density extravasation is seen in the small bowel and colon. Other: No consolidation or pleural effusion at the lung bases. Is a heterogenous appearance of the liver but the technique is suboptimal for parenchymal evaluation given the early arterial phase and some transient hepatic attenuation differences. Underlying fatty metamorphosis or other pathology are not excluded. Recannulization of the umbilical vein and small varices in the upper abdomen. The spleen is mildly enlarged but appears homogeneous. No pancreatic calcification or ductal dilatation. The adrenal glands are unremarkable. Kidneys are enhancing equally and starting to excrete contrast.  No perinephric stranding or hydronephrosis on either side. . There is high-density fluid and material in the gastric lumen which may relate to the reported hematemesis. Thickening of the gastric wall and mucosal folds. With the high-density content in the lumen evaluation for focal ulcer mound is limited. There is no perforation, abscess, or pneumoperitoneum. Thickening of the distal esophagus is also noted. The small bowel and colon are not dilated. No sign of diverticulitis. There is no ascites or pneumoperitoneum. The urinary bladder wall is not significantly thickened for the under distension. Prostate appears acceptable. No free fluid in the pelvis. Multiple small lymph nodes are present in the upper abdomen, periportal, and peripancreatic regions. No acute fracture or destruction at the bones.  There is no collapse or subluxation

## 2023-09-28 NOTE — PROGRESS NOTES
RN assisted patient to bathroom. Pt had medium size bloody dark BM. Hernandez Foots notified via perfect served as requested. STAT Hgb ordered and lab was called to bedside.

## 2023-09-28 NOTE — DISCHARGE SUMMARY
diverticulitis. There is no ascites or pneumoperitoneum. The urinary bladder wall is not significantly thickened for the under distension. Prostate appears acceptable. No free fluid in the pelvis. Multiple small lymph nodes are present in the upper abdomen, periportal, and peripancreatic regions. No acute fracture or destruction at the bones. There is no collapse or subluxation at the included thoracolumbar spine. 1.  No contrast extravasation into the lumen of the stomach at this time. High-density fluid/material in the stomach may relate to the reported hematemesis with intermittent hemorrhage. Evaluation for an ulcer mound is limited and can consider endoscopy. Also mild thickening of the distal esophagus. 2.  No bowel obstruction, ascites, or pneumoperitoneum. 3.  There is heterogenous appearance of the liver but the current phase of the enhancement is suboptimal for the evaluation. Could have underlying fatty metamorphosis, hepatitis, or other pathology. There is recannulization of the umbilical vein and small upper abdominal varices. Mild splenomegaly as well. XR CHEST PORTABLE    Result Date: 9/27/2023  EXAMINATION: ONE XRAY VIEW OF THE CHEST 9/27/2023 3:22 am COMPARISON: 02/18/2023 HISTORY: ORDERING SYSTEM PROVIDED HISTORY: vomiting blood TECHNOLOGIST PROVIDED HISTORY: vomiting blood Reason for Exam: vomiting blood Additional signs and symptoms: vomiting blood Relevant Medical/Surgical History: vomiting blood FINDINGS: The cardiomediastinal silhouette is within normal limits. There is no consolidation, pneumothorax or evidence for edema. No evidence for effusion. No acute osseous abnormality is identified. No acute airspace disease identified.          Consultations:    Consults:     Final Specialist Recommendations/Findings:   IP CONSULT TO GI  IP CONSULT TO CRITICAL CARE  IP CONSULT TO SOCIAL WORK  IP CONSULT TO CRITICAL CARE  IP CONSULT TO SOCIAL WORK Transfer to [place]. V's for TIPS is 30 minutes in patient examination, evaluation, counseling as well as medication reconciliation, prescriptions for required medications, discharge plan and follow up. Electronically signed by   Abel Campa MD  PGY-1 Transitional Year Resident  9/28/2023  1:30 PM      Thank you Dr. Kirill Degroot MD for the opportunity to be involved in this patient's care. Attending Physician Statement  I have discussed the care of Shahzad Acevedo and I have examined the patient myselft and taken ros and hpi , including pertinent history and exam findings,  with the resident. I have reviewed the key elements of all parts of the encounter with the resident. I agree with the DC plan as documented by the resident.       Electronically signed by Skyler Haywood MD

## 2023-09-28 NOTE — CONSENT
Informed Consent for Blood Component Transfusion Note    I have discussed with the patient the rationale for blood component transfusion; its benefits in treating or preventing fatigue, organ damage, or death; and its risk which includes mild transfusion reactions, rare risk of blood borne infection, or more serious but rare reactions. I have discussed the alternatives to transfusion, including the risk and consequences of not receiving transfusion. The patient had an opportunity to ask questions and had agreed to proceed with transfusion of blood components.     Electronically signed by SILVA Horne CNP on 9/28/23 at 2:21 PM EDT

## 2023-09-28 NOTE — CARE COORDINATION
DISCHARGE PLANNING NOTE:    Patient is POD #1 EGD with banding. Patient had additional bloody stools overnight, Hgb 6.9.     GI requested transfer to [facility]. [facility] for possible TIPS.     Electronically signed by Giles Bui RN on 9/28/2023 at 1:25 PM

## 2023-09-28 NOTE — ANESTHESIA POSTPROCEDURE EVALUATION
Department of Anesthesiology  Postprocedure Note    Patient: Catalino Vass  MRN: [id number]  YOB: 1985  Date of evaluation: 9/28/2023      Procedure Summary     Date: 09/27/23 Room / Location: [address] [place] [address]: FARIBA VASQUEZ    Anesthesia Start: 1551 Anesthesia Stop: 9555    Procedure: EGD BAND LIGATION (Esophagus) Diagnosis:       Gastrointestinal hemorrhage, unspecified gastrointestinal hemorrhage type      (Gastrointestinal hemorrhage, unspecified gastrointestinal hemorrhage type [K92.2])    Surgeons: Elsy Cintron MD Responsible Provider: Monica Hayes MD    Anesthesia Type: general ASA Status: 3 - Emergent          Anesthesia Type: No value filed. Antoinette Phase I: Antoinette Score: 10    Antoinette Phase II:        Anesthesia Post Evaluation    Comments: POD #1. Patient fast asleep. Per ICU RN, no anesthesia related issues.

## 2023-09-28 NOTE — PROGRESS NOTES
Per Dr. Anai Gray @ [facility] IR and Dr. Will Caldwell IR @ [facility]. TIPS can only be performed at [facility]. Spoke to Dr. Anai Gray and she informed writer that there is a pending transfer for an admission to [facility]. This was also confirmed by the patient's  [facility], ICU nurse, [facility] Mays, [address] [place] [address]. Anabel Mackeye ICU Manager notified also.

## 2023-09-28 NOTE — PROGRESS NOTES
Slade Doss from GI calls unit. Pt to be made NPO, and writer to get a hold of Dr. Eula Essex to initiate patient transfer to [facility]. V's for a transfer for TIPS procedure to be done. Pt to also receive a unit of PRBC.

## 2023-09-28 NOTE — PLAN OF CARE
Pt  continues to have bloody stool, no hematemesis or melena. Hgb 7.1 orders to give one unit of blood and keep npo. D/w dr Heidi Mathis and dr Stewart Huizar. Pt needs tips either at [facility] or at [facility] today. Pt agreeable to transfer if needed. Case was d/w dr Patricia Herrera by dr Stewart Huizar and dr Heidi Mathis . Kimmy Ghosh, SILVA - CNP

## 2023-09-28 NOTE — PROGRESS NOTES
2040: Secure message sent to Dr. Judie Felix regarding patient most recent hemoglobin. Message read, no orders received. 2103: Call made to Dr. Judie Felix, orders received to transfuse 1 unit PRBC.

## 2023-09-28 NOTE — PROGRESS NOTES
Writer called report to [address] at [facility]. [facility]. All questions were answered. Pt was picked up by the [facility] transport team. Pt was A/O x4 and in no acute distress. Tranfer packet and patient's belongings including his cell phone and  was taken with patient.

## 2023-09-28 NOTE — PROGRESS NOTES
Comprehensive Nutrition Assessment    Type and Reason for Visit:  Positive Nutrition Screen (weight loss and poor intake)    Nutrition Recommendations/Plan:   Continue NPO as ordered. Malnutrition Assessment:  Malnutrition Status: At risk for malnutrition (Comment) (alcohol abuse) (09/28/23 1211)    Context:  Chronic Illness     Findings of the 6 clinical characteristics of malnutrition:  Energy Intake:  Unable to assess  Weight Loss:   (7.8% over 7 months)     Body Fat Loss:  Unable to assess     Muscle Mass Loss:  Unable to assess    Fluid Accumulation:  No significant fluid accumulation     Strength:  Not Performed    Nutrition Assessment:    Pt has hx of alcohol abuse had been withdrawling from alcohol for few days prior to admission then relapsed night prior to going to ED. Pt had woke up at 1 am having multiple episodes of hematemesis. In ED had large amounts of hematemesis with hemoglobin dropping from 10.8 to 7.7 and got 3 units PRBC's. Pt went for EGD yesterday had banding but then had drop in hemoglobin overnight. RN reports plan to transfer to 's to get TIPS procedure. Nutrition Related Findings:    No edema. Hx: alcohol abuse, alcoholic cirrhosis, esophageal banding. Labs: ETOH-202, lipase-102, alk phos- 229 to 131, today hgb down to 6.9. Other Labs & meds reviewed. Wound Type: None       Current Nutrition Intake & Therapies:    Average Meal Intake: NPO     No diet orders on file    Anthropometric Measures:  Height: 5' 8\" (172.7 cm)  Ideal Body Weight (IBW): 154 lbs (70 kg)    Admission Body Weight: 166 lb (75.3 kg)  Current Body Weight: 166 lb (75.3 kg), 107.8 % IBW. Weight Source: Bed Scale  Current BMI (kg/m2): 25.2  Usual Body Weight: 180 lb (81.6 kg) (2-)  % Weight Change (Calculated): -7.8                    BMI Categories: Overweight (BMI 25.0-29. 9)    Estimated Daily Nutrient Needs:  Energy Requirements Based On: Formula  Weight Used for Energy Requirements: Admission  Energy

## 2023-09-28 NOTE — PROGRESS NOTES
Secure message sent to Dr. Murray Haas regarding patient hemoglobin level. Orders received to recheck hemoglobin in 6 hours.

## 2023-09-28 NOTE — PROGRESS NOTES
Respiratory evaluation done. Breath sounds clear. SpO2 100% on room air. Albuterol MDI ordered PRN. Patient states he has Albuterol prn at home but has no pulmonary hx. Patient states he is a smoker.

## 2023-09-29 LAB
ABO/RH: NORMAL
ALBUMIN SERPL-MCNC: 2.8 G/DL (ref 3.5–5.2)
ALBUMIN/GLOB SERPL: 0.8 {RATIO} (ref 1–2.5)
ALP SERPL-CCNC: 125 U/L (ref 40–129)
ALT SERPL-CCNC: 14 U/L (ref 5–41)
ANION GAP SERPL CALCULATED.3IONS-SCNC: 9 MMOL/L (ref 9–17)
ANTIBODY SCREEN: NEGATIVE
ARM BAND NUMBER: NORMAL
AST SERPL-CCNC: 58 U/L
BASOPHILS # BLD: 0.04 K/UL (ref 0–0.2)
BASOPHILS NFR BLD: 1 % (ref 0–2)
BILIRUB SERPL-MCNC: 3.1 MG/DL (ref 0.3–1.2)
BLOOD BANK BLOOD PRODUCT EXPIRATION DATE: NORMAL
BLOOD BANK DISPENSE STATUS: NORMAL
BLOOD BANK ISBT PRODUCT BLOOD TYPE: 6200
BLOOD BANK PRODUCT CODE: NORMAL
BLOOD BANK SAMPLE EXPIRATION: NORMAL
BLOOD BANK UNIT TYPE AND RH: NORMAL
BPU ID: NORMAL
BUN SERPL-MCNC: 13 MG/DL (ref 6–20)
CALCIUM SERPL-MCNC: 7.6 MG/DL (ref 8.6–10.4)
CHLORIDE SERPL-SCNC: 110 MMOL/L (ref 98–107)
CO2 SERPL-SCNC: 21 MMOL/L (ref 20–31)
COMPONENT: NORMAL
CREAT SERPL-MCNC: 0.7 MG/DL (ref 0.7–1.2)
CROSSMATCH RESULT: NORMAL
EOSINOPHIL # BLD: 0.14 K/UL (ref 0–0.44)
EOSINOPHILS RELATIVE PERCENT: 2 % (ref 1–4)
ERYTHROCYTE [DISTWIDTH] IN BLOOD BY AUTOMATED COUNT: 19.5 % (ref 11.8–14.4)
GFR SERPL CREATININE-BSD FRML MDRD: >60 ML/MIN/1.73M2
GLUCOSE SERPL-MCNC: 90 MG/DL (ref 70–99)
HCT VFR BLD AUTO: 25.1 % (ref 40.7–50.3)
HCT VFR BLD AUTO: 25.5 % (ref 40.7–50.3)
HCT VFR BLD AUTO: 26.6 % (ref 40.7–50.3)
HGB BLD-MCNC: 8.2 G/DL (ref 13–17)
HGB BLD-MCNC: 8.4 G/DL (ref 13–17)
HGB BLD-MCNC: 8.5 G/DL (ref 13–17)
IMM GRANULOCYTES # BLD AUTO: 0.03 K/UL (ref 0–0.3)
IMM GRANULOCYTES NFR BLD: 1 %
INR PPP: 1.7
LYMPHOCYTES NFR BLD: 1.38 K/UL (ref 1.1–3.7)
LYMPHOCYTES RELATIVE PERCENT: 24 % (ref 24–43)
MCH RBC QN AUTO: 31.3 PG (ref 25.2–33.5)
MCHC RBC AUTO-ENTMCNC: 32.9 G/DL (ref 28.4–34.8)
MCV RBC AUTO: 95.1 FL (ref 82.6–102.9)
MONOCYTES NFR BLD: 0.42 K/UL (ref 0.1–1.2)
MONOCYTES NFR BLD: 7 % (ref 3–12)
MRSA, DNA, NASAL: NEGATIVE
NEUTROPHILS NFR BLD: 65 % (ref 36–65)
NEUTS SEG NFR BLD: 3.8 K/UL (ref 1.5–8.1)
NRBC BLD-RTO: 0 PER 100 WBC
PLATELET # BLD AUTO: ABNORMAL K/UL (ref 138–453)
PLATELET, FLUORESCENCE: 37 K/UL (ref 138–453)
PLATELETS.RETICULATED NFR BLD AUTO: 6.9 % (ref 1.1–10.3)
POTASSIUM SERPL-SCNC: 3.6 MMOL/L (ref 3.7–5.3)
PROT SERPL-MCNC: 6.5 G/DL (ref 6.4–8.3)
PROTHROMBIN TIME: 19.2 SEC (ref 11.7–14.9)
RBC # BLD AUTO: 2.68 M/UL (ref 4.21–5.77)
RBC # BLD: ABNORMAL 10*6/UL
SODIUM SERPL-SCNC: 140 MMOL/L (ref 135–144)
SPECIMEN DESCRIPTION: NORMAL
TRANSFUSION STATUS: NORMAL
UNIT DIVISION: 0
UNIT ISSUE DATE/TIME: NORMAL
WBC OTHER # BLD: 5.8 K/UL (ref 3.5–11.3)

## 2023-09-29 PROCEDURE — 2580000003 HC RX 258

## 2023-09-29 PROCEDURE — 85025 COMPLETE CBC W/AUTO DIFF WBC: CPT

## 2023-09-29 PROCEDURE — 6370000000 HC RX 637 (ALT 250 FOR IP)

## 2023-09-29 PROCEDURE — 80053 COMPREHEN METABOLIC PANEL: CPT

## 2023-09-29 PROCEDURE — C9113 INJ PANTOPRAZOLE SODIUM, VIA: HCPCS

## 2023-09-29 PROCEDURE — 2000000000 HC ICU R&B

## 2023-09-29 PROCEDURE — 6360000002 HC RX W HCPCS

## 2023-09-29 PROCEDURE — 85055 RETICULATED PLATELET ASSAY: CPT

## 2023-09-29 PROCEDURE — 36415 COLL VENOUS BLD VENIPUNCTURE: CPT

## 2023-09-29 PROCEDURE — 85610 PROTHROMBIN TIME: CPT

## 2023-09-29 PROCEDURE — 2500000003 HC RX 250 WO HCPCS

## 2023-09-29 PROCEDURE — APPSS30 APP SPLIT SHARED TIME 16-30 MINUTES: Performed by: NURSE PRACTITIONER

## 2023-09-29 PROCEDURE — 85014 HEMATOCRIT: CPT

## 2023-09-29 PROCEDURE — 85018 HEMOGLOBIN: CPT

## 2023-09-29 PROCEDURE — 99291 CRITICAL CARE FIRST HOUR: CPT | Performed by: INTERNAL MEDICINE

## 2023-09-29 RX ORDER — POTASSIUM CHLORIDE 7.45 MG/ML
10 INJECTION INTRAVENOUS
Status: COMPLETED | OUTPATIENT
Start: 2023-09-29 | End: 2023-09-29

## 2023-09-29 RX ADMIN — POTASSIUM CHLORIDE 10 MEQ: 7.46 INJECTION, SOLUTION INTRAVENOUS at 05:51

## 2023-09-29 RX ADMIN — PANTOPRAZOLE SODIUM 8 MG/HR: 40 INJECTION, POWDER, FOR SOLUTION INTRAVENOUS at 19:46

## 2023-09-29 RX ADMIN — OCTREOTIDE ACETATE 50 MCG/HR: 500 INJECTION, SOLUTION INTRAVENOUS; SUBCUTANEOUS at 10:27

## 2023-09-29 RX ADMIN — OCTREOTIDE ACETATE 50 MCG/HR: 500 INJECTION, SOLUTION INTRAVENOUS; SUBCUTANEOUS at 19:45

## 2023-09-29 RX ADMIN — PANTOPRAZOLE SODIUM 8 MG/HR: 40 INJECTION, POWDER, FOR SOLUTION INTRAVENOUS at 00:23

## 2023-09-29 RX ADMIN — POTASSIUM CHLORIDE 10 MEQ: 7.46 INJECTION, SOLUTION INTRAVENOUS at 07:03

## 2023-09-29 RX ADMIN — LACTULOSE 10 G: 20 SOLUTION ORAL at 19:43

## 2023-09-29 RX ADMIN — LACTULOSE 10 G: 20 SOLUTION ORAL at 13:54

## 2023-09-29 RX ADMIN — ASCORBIC ACID, VITAMIN A PALMITATE, CHOLECALCIFEROL, THIAMINE HYDROCHLORIDE, RIBOFLAVIN-5 PHOSPHATE SODIUM, PYRIDOXINE HYDROCHLORIDE, NIACINAMIDE, DEXPANTHENOL, ALPHA-TOCOPHEROL ACETATE, VITAMIN K1, FOLIC ACID, BIOTIN, CYANOCOBALAMIN: 200; 3300; 200; 6; 3.6; 6; 40; 15; 10; 150; 600; 60; 5 INJECTION, SOLUTION INTRAVENOUS at 22:45

## 2023-09-29 RX ADMIN — SODIUM CHLORIDE, PRESERVATIVE FREE 10 ML: 5 INJECTION INTRAVENOUS at 19:43

## 2023-09-29 RX ADMIN — PANTOPRAZOLE SODIUM 8 MG/HR: 40 INJECTION, POWDER, FOR SOLUTION INTRAVENOUS at 10:26

## 2023-09-29 RX ADMIN — CEFTRIAXONE SODIUM 1000 MG: 10 INJECTION, POWDER, FOR SOLUTION INTRAVENOUS at 03:00

## 2023-09-29 RX ADMIN — POTASSIUM CHLORIDE 10 MEQ: 7.46 INJECTION, SOLUTION INTRAVENOUS at 08:06

## 2023-09-29 RX ADMIN — SODIUM CHLORIDE, POTASSIUM CHLORIDE, SODIUM LACTATE AND CALCIUM CHLORIDE: 600; 310; 30; 20 INJECTION, SOLUTION INTRAVENOUS at 13:53

## 2023-09-29 RX ADMIN — POTASSIUM CHLORIDE 10 MEQ: 7.46 INJECTION, SOLUTION INTRAVENOUS at 09:13

## 2023-09-29 RX ADMIN — OCTREOTIDE ACETATE 50 MCG/HR: 500 INJECTION, SOLUTION INTRAVENOUS; SUBCUTANEOUS at 00:23

## 2023-09-29 RX ADMIN — SODIUM CHLORIDE, POTASSIUM CHLORIDE, SODIUM LACTATE AND CALCIUM CHLORIDE: 600; 310; 30; 20 INJECTION, SOLUTION INTRAVENOUS at 01:49

## 2023-09-29 RX ADMIN — SODIUM CHLORIDE, POTASSIUM CHLORIDE, SODIUM LACTATE AND CALCIUM CHLORIDE: 600; 310; 30; 20 INJECTION, SOLUTION INTRAVENOUS at 19:50

## 2023-09-29 RX ADMIN — SODIUM CHLORIDE, PRESERVATIVE FREE 10 ML: 5 INJECTION INTRAVENOUS at 09:14

## 2023-09-29 ASSESSMENT — PAIN SCALES - GENERAL: PAINLEVEL_OUTOF10: 0

## 2023-09-29 NOTE — CARE COORDINATION
Met with pt after receiving a social work consult. Pt states that he drinks about a 6 pack daily. Pt states that he doesn't feel that his drinking is a problem. He did admit that \"everyone around him\" feels that it is a problem. Pt not interested in getting treatment but did accept a list of treatment programs.

## 2023-09-29 NOTE — CARE COORDINATION
Case Management Assessment  Initial Evaluation    Date/Time of Evaluation: 9/29/2023 2:36 PM  Assessment Completed by: Nilton Martínez RN    If patient is discharged prior to next notation, then this note serves as note for discharge by case management. Patient Name: Mateo Dai                   YOB: 1985  Diagnosis: Upper GI bleed [K92.2]  Gastrointestinal bleeding [K92.2]                   Date / Time: 9/28/2023  5:06 PM    Patient Admission Status: Inpatient   Readmission Risk (Low < 19, Mod (19-27), High > 27): Readmission Risk Score: 22.2    Current PCP: Diana Welsh MD  PCP verified by CM? (P) No (PCP list provided)    Chart Reviewed: Yes      History Provided by: (P) Patient  Patient Orientation: (P) Alert and Oriented    Patient Cognition: (P) Alert    Hospitalization in the last 30 days (Readmission):  No    If yes, Readmission Assessment in CM Navigator will be completed. Advance Directives:      Code Status: Full Code   Patient's Primary Decision Maker is: Legal Next of Kin      Discharge Planning:    Patient lives with: (P) Spouse/Significant Other Type of Home: (P) House  Primary Care Giver: (P) Self  Patient Support Systems include:     Current Financial resources: (P) Medicaid ([address] [address])  Current community resources:    Current services prior to admission: (P) None            Current DME:              Type of Home Care services:  (P) None    ADLS  Prior functional level: (P) Independent in ADLs/IADLs  Current functional level: (P) Independent in ADLs/IADLs    PT AM-PAC:   /24  OT AM-PAC:   /24    Family can provide assistance at DC: Would you like Case Management to discuss the discharge plan with any other family members/significant others, and if so, who?     Plans to Return to Present Housing: (P) Yes  Other Identified Issues/Barriers to RETURNING to current housing: none  Potential Assistance needed at discharge: (P) N/A            Potential DME: Patient expects to discharge to: (P) [address] [facility] for transportation at discharge:      Financial    Payor: [address] [place] / Plan: [address] [place] / Product Type: *No Product type* /     Does insurance require precert for SNF: Yes    Potential assistance Purchasing Medications: (P) No  Meds-to-Beds request: Yes      [id number] [place], [address] [address]  Phone: [phone] Fax: [phone]      Notes:    Factors facilitating achievement of predicted outcomes: Cooperative    Barriers to discharge: Medical complications    Additional Case Management Notes: Home independently, denies needs, will take bus home, SW consulted for ETOH use. PCP list provided, GI following. The Plan for Transition of Care is related to the following treatment goals of Upper GI bleed [K92.2]  Gastrointestinal bleeding [G05.7]    IF APPLICABLE: The Patient and/or patient representative Jenifer Hammonds and his family were provided with a choice of provider and agrees with the discharge plan. Freedom of choice list with basic dialogue that supports the patient's individualized plan of care/goals and shares the quality data associated with the providers was provided to: (P) Patient   Patient Representative Name:       The Patient and/or Patient Representative Agree with the Discharge Plan?  (P) Yes (Home independently.)    Quincy Sepulveda RN  Case Management Department  Ph: [phone] Fax: [phone]

## 2023-09-29 NOTE — CARE COORDINATION
09/29/23 1429   Readmission Assessment   Number of Days since last admission? 1-7 days  (Not a [place] from [facility])   Previous Disposition Other (comment)  (Not a [facility] from [facility])   Who is being Interviewed Patient  (Not a [facility] from [facility])   What was the patient's/caregiver's perception as to why they think they needed to return back to the hospital? Other (Comment)  (Not a [address] [place], [place] [address]way, [place] from [facility])   Did you visit your Primary Care Physician after you left the hospital, before you returned this time? No  (Not a [place] from [facility])   Why weren't you able to visit your PCP? Other (Comment)  (Not a [facility] from [facility])   Did you see a specialist, such as Cardiac, Pulmonary, Orthopedic Physician, etc. after you left the hospital? Other (Comment)  (Not a [facility] from [facility])   Who advised the patient to return to the hospital? Other (Comment)  (Not a [facility] from [facility])   Does the patient report anything that got in the way of taking their medications? No  (Not a [place] from [facility])   In our efforts to provide the best possible care to you and others like you, can you think of anything that we could have done to help you after you left the hospital the first time, so that you might not have needed to return so soon?  Other (Comment)  (Not a [facility] from [facility])

## 2023-09-29 NOTE — PROGRESS NOTES
INTENSIVE CARE UNIT  Resident Physician Progress Note    Patient - Leon Liz  Date of Admission -  9/28/2023  5:06 PM  Date of Evaluation -  9/29/2023  Room and Bed Number -  8511/4700-30   Hospital Day - 1    SUBJECTIVE:     HISTORY OF PRESENT ILLNESS:    Leon Liz is a 41 y.o. male with past medical history of alcohol use disorder, liver cirrhosis, esophageal varices, and GI bleed who presented to [facility] with hematemesis. Patient reportedly stopped any alcohol for multiple days, relapsed the evening before presentation and was woken up in the middle of the night with hematemesis causing him to present to the ED. Initial evaluation in the ED showed a hemoglobin of 10.8, however, patient then had 2 additional bouts of hematemesis of about 3 L of volume total.  These episodes were followed by a hemoglobin of 7.9. GI was consulted, and the patient was taken for emergent EGD. Procedure the patient had a GOV2 active bleeding gastric varix which was banded for hemostasis. There was a large amount of blood in the fundus of the stomach which was unable to be cleaned. The patient was then brought to the ICU. After admission the patient had multiple bouts of bloody bowel movements. Hemoglobin was initially stable, but overnight the patient had hemoglobin of 6.5, and he was transfused 1 unit RBCs. This initially improved hemoglobin to 7.2, but recheck about 5 hours later showed a hemoglobin decreasing back down to 6.9. Patient was also transfused 1 unit of platelets due to PLT of 34 prior to transfer to [facility] for TIPS procedure. Dr. Dewayne Deutsch (Interventional radiologist) contacted Dr. Patricia Herrera (ICU attending at [facility]) that the patient hemoglobin stable the TIPS procedure is held for now. OVERNIGHT EVENTS:      Pt transferred from [facility] late afternoon yesterday. Hgb and BP stable. Pt denies abdominal or chest pain, SOB, nausea, hematemesis.  Pt reports BM has returned to normal Albumin/Globulin Ratio 0.7 (L) 1.0 - 2.5    Total Bilirubin 3.2 (H) 0.3 - 1.2 mg/dL    Alkaline Phosphatase 132 (H) 40 - 129 U/L    ALT 14 5 - 41 U/L    AST 57 (H) <40 U/L   CBC with Auto Differential   Result Value Ref Range    WBC 5.8 3.5 - 11.3 k/uL    RBC 2.65 (L) 4.21 - 5.77 m/uL    Hemoglobin 8.5 (L) 13.0 - 17.0 g/dL    Hematocrit 24.8 (L) 40.7 - 50.3 %    MCV 93.6 82.6 - 102.9 fL    MCH 32.1 25.2 - 33.5 pg    MCHC 34.3 28.4 - 34.8 g/dL    RDW 19.3 (H) 11.8 - 14.4 %    Platelets See Reflexed IPF Result 138 - 453 k/uL    Platelet, Fluorescence 39 (L) 138 - 453 k/uL    Platelet, Immature Fraction 8.0 1.1 - 10.3 %    NRBC Automated 0.0 0.0 per 100 WBC    RBC Morphology ANISOCYTOSIS PRESENT     Neutrophils % 70 (H) 36 - 65 %    Lymphocytes % 20 (L) 24 - 43 %    Monocytes % 8 3 - 12 %    Eosinophils % 1 1 - 4 %    Basophils % 0 0 - 2 %    Immature Granulocytes 1 (H) 0 %    Neutrophils Absolute 4.03 1.50 - 8.10 k/uL    Lymphocytes Absolute 1.18 1.10 - 3.70 k/uL    Monocytes Absolute 0.45 0.10 - 1.20 k/uL    Eosinophils Absolute 0.08 0.00 - 0.44 k/uL    Basophils Absolute <0.03 0.00 - 0.20 k/uL    Absolute Immature Granulocyte 0.03 0.00 - 0.30 k/uL   Comprehensive Metabolic Panel w/ Reflex to MG   Result Value Ref Range    Sodium 140 135 - 144 mmol/L    Potassium 3.6 (L) 3.7 - 5.3 mmol/L    Chloride 110 (H) 98 - 107 mmol/L    CO2 21 20 - 31 mmol/L    Anion Gap 9 9 - 17 mmol/L    Glucose 90 70 - 99 mg/dL    BUN 13 6 - 20 mg/dL    Creatinine 0.7 0.7 - 1.2 mg/dL    Est, Glom Filt Rate >60 >60 mL/min/1.73m2    Calcium 7.6 (L) 8.6 - 10.4 mg/dL    Total Protein 6.5 6.4 - 8.3 g/dL    Albumin 2.8 (L) 3.5 - 5.2 g/dL    Albumin/Globulin Ratio 0.8 (L) 1.0 - 2.5    Total Bilirubin 3.1 (H) 0.3 - 1.2 mg/dL    Alkaline Phosphatase 125 40 - 129 U/L    ALT 14 5 - 41 U/L    AST 58 (H) <40 U/L   Hemoglobin and Hematocrit   Result Value Ref Range    Hemoglobin 8.2 (L) 13.0 - 17.0 g/dL    Hematocrit 25.1 (L) 40.7 - 50.3 %   Protime-INR

## 2023-09-29 NOTE — PLAN OF CARE
Problem: Discharge Planning  Goal: Discharge to home or other facility with appropriate resources  9/29/2023 0928 by Yeyo Terry RN  Outcome: Progressing     Problem: Pain  Goal: Verbalizes/displays adequate comfort level or baseline comfort level  9/29/2023 0928 by Yeyo Terry RN  Outcome: Progressing  Flowsheets (Taken 9/29/2023 0800)  Verbalizes/displays adequate comfort level or baseline comfort level: Assess pain using appropriate pain scale     Problem: Safety - Adult  Goal: Free from fall injury  9/29/2023 0928 by Yeyo Terry RN  Outcome: Progressing  Flowsheets (Taken 9/29/2023 0927)  Free From Fall Injury: Instruct family/caregiver on patient safety     Problem: ABCDS Injury Assessment  Goal: Absence of physical injury  9/29/2023 0928 by Yeyo Terry RN  Outcome: Progressing  Flowsheets (Taken 9/29/2023 0927)  Absence of Physical Injury: Implement safety measures based on patient assessment

## 2023-09-30 VITALS
SYSTOLIC BLOOD PRESSURE: 126 MMHG | BODY MASS INDEX: 25.23 KG/M2 | WEIGHT: 166.45 LBS | OXYGEN SATURATION: 99 % | DIASTOLIC BLOOD PRESSURE: 88 MMHG | RESPIRATION RATE: 15 BRPM | TEMPERATURE: 98.4 F | HEART RATE: 79 BPM | HEIGHT: 68 IN

## 2023-09-30 PROBLEM — K92.2 GASTROINTESTINAL BLEEDING: Status: RESOLVED | Noted: 2023-09-28 | Resolved: 2023-09-30

## 2023-09-30 PROBLEM — K92.2 UPPER GI BLEED: Status: RESOLVED | Noted: 2023-09-28 | Resolved: 2023-09-30

## 2023-09-30 LAB
ALBUMIN SERPL-MCNC: 2.9 G/DL (ref 3.5–5.2)
ALBUMIN/GLOB SERPL: 0.8 {RATIO} (ref 1–2.5)
ALP SERPL-CCNC: 127 U/L (ref 40–129)
ALT SERPL-CCNC: 13 U/L (ref 5–41)
ANION GAP SERPL CALCULATED.3IONS-SCNC: 11 MMOL/L (ref 9–17)
AST SERPL-CCNC: 52 U/L
BASOPHILS # BLD: 0.03 K/UL (ref 0–0.2)
BASOPHILS NFR BLD: 1 % (ref 0–2)
BILIRUB SERPL-MCNC: 2.4 MG/DL (ref 0.3–1.2)
BUN SERPL-MCNC: 8 MG/DL (ref 6–20)
CALCIUM SERPL-MCNC: 7.7 MG/DL (ref 8.6–10.4)
CHLORIDE SERPL-SCNC: 106 MMOL/L (ref 98–107)
CO2 SERPL-SCNC: 20 MMOL/L (ref 20–31)
CREAT SERPL-MCNC: 0.7 MG/DL (ref 0.7–1.2)
EOSINOPHIL # BLD: 0.16 K/UL (ref 0–0.44)
EOSINOPHILS RELATIVE PERCENT: 3 % (ref 1–4)
ERYTHROCYTE [DISTWIDTH] IN BLOOD BY AUTOMATED COUNT: 19.5 % (ref 11.8–14.4)
GFR SERPL CREATININE-BSD FRML MDRD: >60 ML/MIN/1.73M2
GLUCOSE SERPL-MCNC: 129 MG/DL (ref 70–99)
HCT VFR BLD AUTO: 25.5 % (ref 40.7–50.3)
HCT VFR BLD AUTO: 25.7 % (ref 40.7–50.3)
HGB BLD-MCNC: 8.2 G/DL (ref 13–17)
HGB BLD-MCNC: 8.5 G/DL (ref 13–17)
IMM GRANULOCYTES # BLD AUTO: <0.03 K/UL (ref 0–0.3)
IMM GRANULOCYTES NFR BLD: 0 %
INR PPP: 1.6
LYMPHOCYTES NFR BLD: 1.15 K/UL (ref 1.1–3.7)
LYMPHOCYTES RELATIVE PERCENT: 23 % (ref 24–43)
MCH RBC QN AUTO: 31.4 PG (ref 25.2–33.5)
MCHC RBC AUTO-ENTMCNC: 31.9 G/DL (ref 28.4–34.8)
MCV RBC AUTO: 98.5 FL (ref 82.6–102.9)
MONOCYTES NFR BLD: 0.4 K/UL (ref 0.1–1.2)
MONOCYTES NFR BLD: 8 % (ref 3–12)
NEUTROPHILS NFR BLD: 65 % (ref 36–65)
NEUTS SEG NFR BLD: 3.2 K/UL (ref 1.5–8.1)
NRBC BLD-RTO: 0 PER 100 WBC
PLATELET # BLD AUTO: ABNORMAL K/UL (ref 138–453)
PLATELET, FLUORESCENCE: 40 K/UL (ref 138–453)
PLATELETS.RETICULATED NFR BLD AUTO: 6.2 % (ref 1.1–10.3)
POTASSIUM SERPL-SCNC: 3.8 MMOL/L (ref 3.7–5.3)
PROT SERPL-MCNC: 6.6 G/DL (ref 6.4–8.3)
PROTHROMBIN TIME: 18.5 SEC (ref 11.7–14.9)
RBC # BLD AUTO: 2.61 M/UL (ref 4.21–5.77)
RBC # BLD: ABNORMAL 10*6/UL
SODIUM SERPL-SCNC: 137 MMOL/L (ref 135–144)
WBC OTHER # BLD: 5 K/UL (ref 3.5–11.3)

## 2023-09-30 PROCEDURE — 6370000000 HC RX 637 (ALT 250 FOR IP)

## 2023-09-30 PROCEDURE — 36415 COLL VENOUS BLD VENIPUNCTURE: CPT

## 2023-09-30 PROCEDURE — C9113 INJ PANTOPRAZOLE SODIUM, VIA: HCPCS | Performed by: PEDIATRICS

## 2023-09-30 PROCEDURE — 2580000003 HC RX 258

## 2023-09-30 PROCEDURE — 85014 HEMATOCRIT: CPT

## 2023-09-30 PROCEDURE — 6360000002 HC RX W HCPCS: Performed by: PEDIATRICS

## 2023-09-30 PROCEDURE — 85055 RETICULATED PLATELET ASSAY: CPT

## 2023-09-30 PROCEDURE — 6360000002 HC RX W HCPCS

## 2023-09-30 PROCEDURE — 85610 PROTHROMBIN TIME: CPT

## 2023-09-30 PROCEDURE — 85018 HEMOGLOBIN: CPT

## 2023-09-30 PROCEDURE — 80053 COMPREHEN METABOLIC PANEL: CPT

## 2023-09-30 PROCEDURE — 2580000003 HC RX 258: Performed by: PEDIATRICS

## 2023-09-30 PROCEDURE — C9113 INJ PANTOPRAZOLE SODIUM, VIA: HCPCS

## 2023-09-30 PROCEDURE — 85025 COMPLETE CBC W/AUTO DIFF WBC: CPT

## 2023-09-30 PROCEDURE — 99239 HOSP IP/OBS DSCHRG MGMT >30: CPT | Performed by: INTERNAL MEDICINE

## 2023-09-30 PROCEDURE — 99232 SBSQ HOSP IP/OBS MODERATE 35: CPT | Performed by: INTERNAL MEDICINE

## 2023-09-30 RX ORDER — PANTOPRAZOLE SODIUM 40 MG/1
40 TABLET, DELAYED RELEASE ORAL 2 TIMES DAILY
Qty: 30 TABLET | Refills: 3 | Status: SHIPPED | OUTPATIENT
Start: 2023-09-30

## 2023-09-30 RX ORDER — PANTOPRAZOLE SODIUM 40 MG/1
40 TABLET, DELAYED RELEASE ORAL
Status: DISCONTINUED | OUTPATIENT
Start: 2023-10-01 | End: 2023-09-30 | Stop reason: HOSPADM

## 2023-09-30 RX ORDER — THIAMINE MONONITRATE (VIT B1) 100 MG
100 TABLET ORAL DAILY
Qty: 60 TABLET | Refills: 0 | Status: SHIPPED | OUTPATIENT
Start: 2023-09-30

## 2023-09-30 RX ADMIN — LACTULOSE 10 G: 20 SOLUTION ORAL at 08:48

## 2023-09-30 RX ADMIN — OCTREOTIDE ACETATE 50 MCG/HR: 500 INJECTION, SOLUTION INTRAVENOUS; SUBCUTANEOUS at 07:22

## 2023-09-30 RX ADMIN — PANTOPRAZOLE SODIUM 8 MG/HR: 40 INJECTION, POWDER, FOR SOLUTION INTRAVENOUS at 05:18

## 2023-09-30 RX ADMIN — CEFTRIAXONE SODIUM 1000 MG: 10 INJECTION, POWDER, FOR SOLUTION INTRAVENOUS at 03:00

## 2023-09-30 RX ADMIN — SODIUM CHLORIDE, POTASSIUM CHLORIDE, SODIUM LACTATE AND CALCIUM CHLORIDE: 600; 310; 30; 20 INJECTION, SOLUTION INTRAVENOUS at 03:53

## 2023-09-30 RX ADMIN — PANTOPRAZOLE SODIUM 8 MG/HR: 40 INJECTION, POWDER, FOR SOLUTION INTRAVENOUS at 08:48

## 2023-09-30 NOTE — DISCHARGE INSTRUCTIONS
Take Protonix as prescribed. Call gastroenterologist for follow up this week. Stop caffeine, and decrease carbonated beverages to decrease your risk for further complications. Please feel free return to the hospital if your symptoms worsen or any new concerning symptoms develop. Follow-up with your primary care physician as needed for all other the concerns.

## 2023-09-30 NOTE — PLAN OF CARE
Problem: Discharge Planning  Goal: Discharge to home or other facility with appropriate resources  9/29/2023 2028 by Wendy Lim RN  Outcome: Progressing  9/29/2023 0928 by Karmen Ponce RN  Outcome: Progressing  Flowsheets (Taken 9/29/2023 0800)  Discharge to home or other facility with appropriate resources: Identify barriers to discharge with patient and caregiver     Problem: Safety - Adult  Goal: Free from fall injury  9/29/2023 2028 by Wendy Lim RN  Outcome: Progressing  9/29/2023 0928 by Karmen Ponce RN  Outcome: Progressing  Flowsheets (Taken 9/29/2023 0927)  Free From Fall Injury: Instruct family/caregiver on patient safety

## 2023-09-30 NOTE — PLAN OF CARE
Problem: Discharge Planning  Goal: Discharge to home or other facility with appropriate resources  9/30/2023 0738 by Ramon Nicole RN  Outcome: Progressing     Problem: Pain  Goal: Verbalizes/displays adequate comfort level or baseline comfort level  9/30/2023 0738 by Ramon Nicole RN  Outcome: Progressing     Problem: Safety - Adult  Goal: Free from fall injury  9/30/2023 0738 by Ramon Nicole RN  Outcome: [place] Needs (Taken 9/30/2023 2175)  Free From Fall Injury: Based on caregiver fall risk screen, instruct family/caregiver to ask for assistance with transferring infant if caregiver noted to have fall risk factors     Problem: ABCDS Injury Assessment  Goal: Absence of physical injury  9/30/2023 0738 by Ramon Nicole RN  Outcome: Progressing  Flowsheets (Taken 9/30/2023 0737)  Absence of Physical Injury: Implement safety measures based on patient assessment

## 2023-09-30 NOTE — PROGRESS NOTES
Emilie  Please feel free to contact me with any questions or concerns. Thank you for allowing me to participate in the care of your patient. SILVA Coronel - CNP on 9/30/2023 at 7:37 [facility]    Please note that this note was generated using a voice recognition dictation software. Although every effort was made to ensure the accuracy of this automated transcription, some errors in transcription may have occurred.

## 2023-09-30 NOTE — DISCHARGE INSTR - DIET

## 2023-09-30 NOTE — PROGRESS NOTES
1345: Patient discharged home with Jessica Bueno providing transportation and assistance. All personal items with patient. Discharge instructions reviewed with patient.

## 2023-09-30 NOTE — PLAN OF CARE
Problem: Discharge Planning  Goal: Discharge to home or other facility with appropriate resources  9/30/2023 1208 by Nenita Adhikari RN  Outcome: Adequate for Discharge  Flowsheets (Taken 9/30/2023 0800)  Discharge to home or other facility with appropriate resources: Refer to discharge planning if patient needs post-hospital services based on physician order or complex needs related to functional status, cognitive ability or social support system     Problem: Pain  Goal: Verbalizes/displays adequate comfort level or baseline comfort level  9/30/2023 1208 by Nenita Adhikari RN  Outcome: Adequate for Discharge     Problem: Safety - Adult  Goal: Free from fall injury  9/30/2023 1208 by Nenita Adhikari RN  Outcome: Adequate for Discharge     Problem: ABCDS Injury Assessment  Goal: Absence of physical injury  9/30/2023 1208 by Nenita Adhikari RN  Outcome: Adequate for Discharge

## 2023-10-03 ENCOUNTER — TELEPHONE (OUTPATIENT)
Dept: INTERNAL MEDICINE CLINIC | Age: 38
End: 2023-10-03

## 2023-10-03 NOTE — TELEPHONE ENCOUNTER
Care Transitions Initial Follow Up Call    Outreach made within 2 business days of discharge: Yes    Patient: Corinne Borrow Patient : 1985   MRN: [id number]  Reason for Admission: There are no discharge diagnoses documented for the most recent discharge.   Discharge Date: 23       Spoke with: Patient did not answer- LVM    Discharge department/facility: [address] [place] [address]

## 2023-12-01 ENCOUNTER — APPOINTMENT (OUTPATIENT)
Dept: GENERAL RADIOLOGY | Age: 38
End: 2023-12-01
Payer: COMMERCIAL

## 2023-12-01 ENCOUNTER — HOSPITAL ENCOUNTER (EMERGENCY)
Age: 38
Discharge: HOME | End: 2023-12-01
Attending: EMERGENCY MEDICINE
Payer: COMMERCIAL

## 2023-12-01 VITALS
OXYGEN SATURATION: 99 % | HEART RATE: 65 BPM | WEIGHT: 178 LBS | SYSTOLIC BLOOD PRESSURE: 143 MMHG | DIASTOLIC BLOOD PRESSURE: 91 MMHG | BODY MASS INDEX: 27.06 KG/M2 | TEMPERATURE: 98.2 F | RESPIRATION RATE: 11 BRPM

## 2023-12-01 DIAGNOSIS — S61.012A THUMB LACERATION, LEFT, INITIAL ENCOUNTER: Primary | ICD-10-CM

## 2023-12-01 LAB
ALBUMIN SERPL-MCNC: 3.7 G/DL (ref 3.5–5.2)
ALBUMIN/GLOB SERPL: 0.7 {RATIO} (ref 1–2.5)
ALP SERPL-CCNC: 266 U/L (ref 40–129)
ALT SERPL-CCNC: 35 U/L (ref 5–41)
ANION GAP SERPL CALCULATED.3IONS-SCNC: 14 MMOL/L (ref 9–17)
AST SERPL-CCNC: 201 U/L
BASOPHILS # BLD: 0.08 K/UL (ref 0–0.2)
BASOPHILS NFR BLD: 2 % (ref 0–2)
BILIRUB SERPL-MCNC: 3.2 MG/DL (ref 0.3–1.2)
BUN SERPL-MCNC: 10 MG/DL (ref 6–20)
CALCIUM SERPL-MCNC: 8.8 MG/DL (ref 8.6–10.4)
CHLORIDE SERPL-SCNC: 103 MMOL/L (ref 98–107)
CO2 SERPL-SCNC: 21 MMOL/L (ref 20–31)
CREAT SERPL-MCNC: 0.9 MG/DL (ref 0.7–1.2)
EOSINOPHIL # BLD: 0.11 K/UL (ref 0–0.44)
EOSINOPHILS RELATIVE PERCENT: 2 % (ref 1–4)
ERYTHROCYTE [DISTWIDTH] IN BLOOD BY AUTOMATED COUNT: 19.8 % (ref 11.8–14.4)
ETHANOL PERCENT: 0.22 %
ETHANOLAMINE SERPL-MCNC: 222 MG/DL
GFR SERPL CREATININE-BSD FRML MDRD: >60 ML/MIN/1.73M2
GLUCOSE SERPL-MCNC: 95 MG/DL (ref 70–99)
HCT VFR BLD AUTO: 26.3 % (ref 40.7–50.3)
HGB BLD-MCNC: 8.5 G/DL (ref 13–17)
IMM GRANULOCYTES # BLD AUTO: 0.03 K/UL (ref 0–0.3)
IMM GRANULOCYTES NFR BLD: 1 %
INR PPP: 1.4
LYMPHOCYTES NFR BLD: 0.92 K/UL (ref 1.1–3.7)
LYMPHOCYTES RELATIVE PERCENT: 18 % (ref 24–43)
MAGNESIUM SERPL-MCNC: 1.6 MG/DL (ref 1.6–2.6)
MCH RBC QN AUTO: 29.3 PG (ref 25.2–33.5)
MCHC RBC AUTO-ENTMCNC: 32.3 G/DL (ref 28.4–34.8)
MCV RBC AUTO: 90.7 FL (ref 82.6–102.9)
MONOCYTES NFR BLD: 0.52 K/UL (ref 0.1–1.2)
MONOCYTES NFR BLD: 10 % (ref 3–12)
NEUTROPHILS NFR BLD: 67 % (ref 36–65)
NEUTS SEG NFR BLD: 3.4 K/UL (ref 1.5–8.1)
NRBC BLD-RTO: 0 PER 100 WBC
PARTIAL THROMBOPLASTIN TIME: 38.8 SEC (ref 23–36.5)
PLATELET # BLD AUTO: ABNORMAL K/UL (ref 138–453)
PLATELET, FLUORESCENCE: 31 K/UL (ref 138–453)
PLATELETS.RETICULATED NFR BLD AUTO: 4.7 % (ref 1.1–10.3)
POTASSIUM SERPL-SCNC: 2.8 MMOL/L (ref 3.7–5.3)
PROT SERPL-MCNC: 9.3 G/DL (ref 6.4–8.3)
PROTHROMBIN TIME: 16.6 SEC (ref 11.7–14.9)
RBC # BLD AUTO: 2.9 M/UL (ref 4.21–5.77)
RBC # BLD: ABNORMAL 10*6/UL
SODIUM SERPL-SCNC: 138 MMOL/L (ref 135–144)
WBC OTHER # BLD: 5.1 K/UL (ref 3.5–11.3)

## 2023-12-01 PROCEDURE — 73130 X-RAY EXAM OF HAND: CPT

## 2023-12-01 PROCEDURE — 85055 RETICULATED PLATELET ASSAY: CPT

## 2023-12-01 PROCEDURE — 99284 EMERGENCY DEPT VISIT MOD MDM: CPT

## 2023-12-01 PROCEDURE — 96365 THER/PROPH/DIAG IV INF INIT: CPT

## 2023-12-01 PROCEDURE — 85610 PROTHROMBIN TIME: CPT

## 2023-12-01 PROCEDURE — 12001 RPR S/N/AX/GEN/TRNK 2.5CM/<: CPT

## 2023-12-01 PROCEDURE — 85025 COMPLETE CBC W/AUTO DIFF WBC: CPT

## 2023-12-01 PROCEDURE — 83735 ASSAY OF MAGNESIUM: CPT

## 2023-12-01 PROCEDURE — 2500000003 HC RX 250 WO HCPCS: Performed by: STUDENT IN AN ORGANIZED HEALTH CARE EDUCATION/TRAINING PROGRAM

## 2023-12-01 PROCEDURE — G0480 DRUG TEST DEF 1-7 CLASSES: HCPCS

## 2023-12-01 PROCEDURE — 85730 THROMBOPLASTIN TIME PARTIAL: CPT

## 2023-12-01 PROCEDURE — 6370000000 HC RX 637 (ALT 250 FOR IP): Performed by: STUDENT IN AN ORGANIZED HEALTH CARE EDUCATION/TRAINING PROGRAM

## 2023-12-01 PROCEDURE — 2580000003 HC RX 258: Performed by: STUDENT IN AN ORGANIZED HEALTH CARE EDUCATION/TRAINING PROGRAM

## 2023-12-01 PROCEDURE — 80053 COMPREHEN METABOLIC PANEL: CPT

## 2023-12-01 PROCEDURE — 6360000002 HC RX W HCPCS: Performed by: STUDENT IN AN ORGANIZED HEALTH CARE EDUCATION/TRAINING PROGRAM

## 2023-12-01 PROCEDURE — 96375 TX/PRO/DX INJ NEW DRUG ADDON: CPT

## 2023-12-01 RX ORDER — CEPHALEXIN 250 MG/1
500 CAPSULE ORAL ONCE
Status: COMPLETED | OUTPATIENT
Start: 2023-12-01 | End: 2023-12-01

## 2023-12-01 RX ORDER — LORAZEPAM 2 MG/ML
1 INJECTION INTRAMUSCULAR ONCE
Status: COMPLETED | OUTPATIENT
Start: 2023-12-01 | End: 2023-12-01

## 2023-12-01 RX ORDER — 0.9 % SODIUM CHLORIDE 0.9 %
1000 INTRAVENOUS SOLUTION INTRAVENOUS ONCE
Status: COMPLETED | OUTPATIENT
Start: 2023-12-01 | End: 2023-12-01

## 2023-12-01 RX ORDER — POTASSIUM CHLORIDE 20 MEQ/1
40 TABLET, EXTENDED RELEASE ORAL ONCE
Status: COMPLETED | OUTPATIENT
Start: 2023-12-01 | End: 2023-12-01

## 2023-12-01 RX ORDER — TRANEXAMIC ACID 100 MG/ML
1000 INJECTION, SOLUTION INTRAVENOUS ONCE
Status: COMPLETED | OUTPATIENT
Start: 2023-12-01 | End: 2023-12-01

## 2023-12-01 RX ORDER — MORPHINE SULFATE 4 MG/ML
4 INJECTION INTRAVENOUS ONCE
Status: COMPLETED | OUTPATIENT
Start: 2023-12-01 | End: 2023-12-01

## 2023-12-01 RX ORDER — OXYCODONE HYDROCHLORIDE 5 MG/1
5 TABLET ORAL ONCE
Status: COMPLETED | OUTPATIENT
Start: 2023-12-01 | End: 2023-12-01

## 2023-12-01 RX ORDER — CEPHALEXIN 500 MG/1
500 CAPSULE ORAL 4 TIMES DAILY
Qty: 28 CAPSULE | Refills: 0 | Status: ON HOLD | OUTPATIENT
Start: 2023-12-01 | End: 2023-12-08

## 2023-12-01 RX ORDER — POTASSIUM CHLORIDE 7.45 MG/ML
10 INJECTION INTRAVENOUS
Status: COMPLETED | OUTPATIENT
Start: 2023-12-01 | End: 2023-12-01

## 2023-12-01 RX ADMIN — CEPHALEXIN 500 MG: 250 CAPSULE ORAL at 16:02

## 2023-12-01 RX ADMIN — POTASSIUM CHLORIDE 10 MEQ: 7.46 INJECTION, SOLUTION INTRAVENOUS at 14:56

## 2023-12-01 RX ADMIN — LORAZEPAM 1 MG: 2 INJECTION INTRAMUSCULAR; INTRAVENOUS at 13:28

## 2023-12-01 RX ADMIN — MORPHINE SULFATE 4 MG: 4 INJECTION INTRAVENOUS at 12:50

## 2023-12-01 RX ADMIN — POTASSIUM CHLORIDE 10 MEQ: 7.46 INJECTION, SOLUTION INTRAVENOUS at 13:39

## 2023-12-01 RX ADMIN — POTASSIUM CHLORIDE 40 MEQ: 1500 TABLET, EXTENDED RELEASE ORAL at 14:31

## 2023-12-01 RX ADMIN — OXYCODONE 5 MG: 5 TABLET ORAL at 16:02

## 2023-12-01 RX ADMIN — SODIUM CHLORIDE 1000 ML: 9 INJECTION, SOLUTION INTRAVENOUS at 14:29

## 2023-12-01 RX ADMIN — TRANEXAMIC ACID 1000 MG: 100 INJECTION, SOLUTION INTRAVENOUS at 16:02

## 2023-12-01 ASSESSMENT — ENCOUNTER SYMPTOMS
VOMITING: 0
SHORTNESS OF BREATH: 0
CONSTIPATION: 0
DIARRHEA: 0
ABDOMINAL PAIN: 0
NAUSEA: 0

## 2023-12-01 ASSESSMENT — PAIN SCALES - GENERAL: PAINLEVEL_OUTOF10: 10

## 2023-12-01 ASSESSMENT — PAIN - FUNCTIONAL ASSESSMENT: PAIN_FUNCTIONAL_ASSESSMENT: 0-10

## 2023-12-01 NOTE — ED PROVIDER NOTES
[facility] ED  Emergency Department Encounter  Emergency Medicine Resident     Pt Name:Sam Valladares  MRN: [id number]  [address] 1985  Date of evaluation: 12/1/23  PCP:  Duane Hardy MD  Note Started: 12:48 PM EST      CHIEF COMPLAINT       Chief Complaint   Patient presents with    drill bit to left thumb    Epistaxis    Bleeding/Bruising       HISTORY OF PRESENT ILLNESS  (Location/Symptom, Timing/Onset, Context/Setting, Quality, Duration, Modifying Factors, Severity.)      Theodore Marie is a 41 y.o. male who presents to the ER by EMS due to drill bit to the nondominant thumb. Patient states this occurred probably an hour before arrival, he was drilling into metal.  Did have his tetanus vaccination updated earlier this year, states that he has difficulty with clotting. Also admits to intermittent epistaxis over the past few weeks, states that he has nosebleeds and that they stopped, he picks at the scab and then they continue to bleed. No acute bleed at this time. Patient states that he also is a chronic alcoholic, does have a history of withdrawal and has been admitted for multiple times. Has a difficult social situation at home. PAST MEDICAL / SURGICAL / SOCIAL / FAMILY HISTORY      has a past medical history of Alcoholism ([address]), Anemia, Cirrhosis, alcoholic ([address]), and Pulmonary embolism ([address]). has a past surgical history that includes Upper gastrointestinal endoscopy (N/A, 5/1/2022); Upper gastrointestinal endoscopy (N/A, 10/30/2022); and Upper gastrointestinal endoscopy (N/A, 9/27/2023).       Social History     Socioeconomic History    Marital status:      Spouse name: Heather Rene    Number of children: 2    Years of education: Not on file    Highest education level: Not on file   Occupational History    Not on file   Tobacco Use    Smoking status: Every Day     Packs/day: 1.00     Years: 21.00     Additional pack years: 0.00     Total pack years: 21.00

## 2023-12-01 NOTE — ED NOTES
The following labs were labeled with appropriate pt sticker and tubed to lab:     [x] Blue     [x] Lavender   [] on ice  [x] Green/yellow  [] Green/black [] on ice  [] Sherrel Peel  [] on ice  [] Yellow  [] Red  [] Pink  [] Type/ Screen  [] ABG  [] VBG    [] COVID-19 swab    [] Rapid  [] PCR  [] Flu swab  [] Peds Viral Panel     [] Urine Sample  [] Fecal Sample  [] Pelvic Cultures  [] Blood Cultures  [] X 2  [] STREP Cultures  [] Wound Cultures       Batch, Janina Baker RN  12/01/23 0985

## 2023-12-01 NOTE — DISCHARGE INSTRUCTIONS
Have your stitches removed in 7 to 10 days, take your antibiotics as prescribed. You were seen in the emergency department today for laceration to your left thumb your work-up showed no fracture or foreign body on the x-ray. Your liver function was checked as well as basic labs due to your history of cirrhosis, you were hypokalemic. You are also showing signs of alcohol withdrawal and we did offer you admission. Due to family issues you were unable to be admitted, if this changes please feel free to return. Please look out for the signs of infection that we discussed, you were provided with Dr. Fly De La Cruz phone number he has a plastic/hand surgeon if you were to need it. Please follow-up with your PCP within a week. Please follow-up with your PCP within the next 2 to 3 days      [address] for worsening symptoms, or if you develop any concerning symptoms such as: high fever not relieved by acetaminophen (Tylenol) and/or ibuprofen (Motrin), chills, shortness of breath, chest pain, persistent nausea and/or vomiting, numbness, weakness or tingling in the arms or legs or change in color of the extremities, changes in mental status, persistent headache, blurry vision, unable to follow up with your physician, or other any other care or concern.
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
## 2023-12-04 ENCOUNTER — APPOINTMENT (OUTPATIENT)
Dept: CT IMAGING | Age: 38
End: 2023-12-04
Payer: COMMERCIAL

## 2023-12-04 ENCOUNTER — HOSPITAL ENCOUNTER (INPATIENT)
Age: 38
LOS: 10 days | Discharge: HOME | End: 2023-12-15
Attending: EMERGENCY MEDICINE
Payer: COMMERCIAL

## 2023-12-04 ENCOUNTER — APPOINTMENT (OUTPATIENT)
Dept: GENERAL RADIOLOGY | Age: 38
End: 2023-12-04
Payer: COMMERCIAL

## 2023-12-04 DIAGNOSIS — K21.9 GASTROESOPHAGEAL REFLUX DISEASE, UNSPECIFIED WHETHER ESOPHAGITIS PRESENT: ICD-10-CM

## 2023-12-04 DIAGNOSIS — K92.2 UPPER GI BLEED: Primary | ICD-10-CM

## 2023-12-04 DIAGNOSIS — K85.90 ACUTE PANCREATITIS, UNSPECIFIED COMPLICATION STATUS, UNSPECIFIED PANCREATITIS TYPE: ICD-10-CM

## 2023-12-04 DIAGNOSIS — K70.30 ALCOHOLIC CIRRHOSIS, UNSPECIFIED WHETHER ASCITES PRESENT (HCC): ICD-10-CM

## 2023-12-04 LAB
ALBUMIN SERPL-MCNC: 3.2 G/DL (ref 3.5–5.2)
ALBUMIN/GLOB SERPL: 0.6 {RATIO} (ref 1–2.5)
ALP SERPL-CCNC: 273 U/L (ref 40–129)
ALT SERPL-CCNC: 30 U/L (ref 5–41)
ANION GAP SERPL CALCULATED.3IONS-SCNC: 17 MMOL/L (ref 9–17)
AST SERPL-CCNC: 175 U/L
BASOPHILS # BLD: 0.05 K/UL (ref 0–0.2)
BASOPHILS NFR BLD: 1 % (ref 0–2)
BILIRUB SERPL-MCNC: 2.4 MG/DL (ref 0.3–1.2)
BUN SERPL-MCNC: 9 MG/DL (ref 6–20)
CALCIUM SERPL-MCNC: 8.2 MG/DL (ref 8.6–10.4)
CHLORIDE SERPL-SCNC: 107 MMOL/L (ref 98–107)
CO2 SERPL-SCNC: 16 MMOL/L (ref 20–31)
CREAT SERPL-MCNC: 0.9 MG/DL (ref 0.7–1.2)
EOSINOPHIL # BLD: 0.11 K/UL (ref 0–0.4)
EOSINOPHILS RELATIVE PERCENT: 2 % (ref 1–4)
ERYTHROCYTE [DISTWIDTH] IN BLOOD BY AUTOMATED COUNT: 20.4 % (ref 11.8–14.4)
ETHANOL PERCENT: 0.28 %
ETHANOLAMINE SERPL-MCNC: 277 MG/DL
GFR SERPL CREATININE-BSD FRML MDRD: >60 ML/MIN/1.73M2
GLUCOSE SERPL-MCNC: 125 MG/DL (ref 70–99)
HCT VFR BLD AUTO: 24.2 % (ref 40.7–50.3)
HGB BLD-MCNC: 7.5 G/DL (ref 13–17)
IMM GRANULOCYTES # BLD AUTO: 0 K/UL (ref 0–0.3)
IMM GRANULOCYTES NFR BLD: 0 %
INR PPP: 1.4
LIPASE SERPL-CCNC: 117 U/L (ref 13–60)
LYMPHOCYTES NFR BLD: 1.01 K/UL (ref 1–4.8)
LYMPHOCYTES RELATIVE PERCENT: 19 % (ref 24–44)
MCH RBC QN AUTO: 29.4 PG (ref 25.2–33.5)
MCHC RBC AUTO-ENTMCNC: 31 G/DL (ref 28.4–34.8)
MCV RBC AUTO: 94.9 FL (ref 82.6–102.9)
MONOCYTES NFR BLD: 0.16 K/UL (ref 0.1–0.8)
MONOCYTES NFR BLD: 3 % (ref 1–7)
MORPHOLOGY: ABNORMAL
NEUTROPHILS NFR BLD: 75 % (ref 36–66)
NEUTS SEG NFR BLD: 3.97 K/UL (ref 1.8–7.7)
NRBC BLD-RTO: 0 PER 100 WBC
PLATELET # BLD AUTO: ABNORMAL K/UL (ref 138–453)
PLATELET, FLUORESCENCE: 38 K/UL (ref 138–453)
PLATELETS.RETICULATED NFR BLD AUTO: 7.7 % (ref 1.1–10.3)
POTASSIUM SERPL-SCNC: 3.3 MMOL/L (ref 3.7–5.3)
PROT SERPL-MCNC: 8.7 G/DL (ref 6.4–8.3)
PROTHROMBIN TIME: 17.2 SEC (ref 11.7–14.9)
RBC # BLD AUTO: 2.55 M/UL (ref 4.21–5.77)
SODIUM SERPL-SCNC: 140 MMOL/L (ref 135–144)
TROPONIN I SERPL HS-MCNC: 17 NG/L (ref 0–22)
TROPONIN I SERPL HS-MCNC: 18 NG/L (ref 0–22)
WBC OTHER # BLD: 5.3 K/UL (ref 3.5–11.3)

## 2023-12-04 PROCEDURE — 2580000003 HC RX 258: Performed by: PEDIATRICS

## 2023-12-04 PROCEDURE — 84484 ASSAY OF TROPONIN QUANT: CPT

## 2023-12-04 PROCEDURE — 86900 BLOOD TYPING SEROLOGIC ABO: CPT

## 2023-12-04 PROCEDURE — 93005 ELECTROCARDIOGRAM TRACING: CPT | Performed by: PEDIATRICS

## 2023-12-04 PROCEDURE — 71045 X-RAY EXAM CHEST 1 VIEW: CPT

## 2023-12-04 PROCEDURE — 86920 COMPATIBILITY TEST SPIN: CPT

## 2023-12-04 PROCEDURE — 86850 RBC ANTIBODY SCREEN: CPT

## 2023-12-04 PROCEDURE — 96375 TX/PRO/DX INJ NEW DRUG ADDON: CPT

## 2023-12-04 PROCEDURE — 99285 EMERGENCY DEPT VISIT HI MDM: CPT

## 2023-12-04 PROCEDURE — G0480 DRUG TEST DEF 1-7 CLASSES: HCPCS

## 2023-12-04 PROCEDURE — 85025 COMPLETE CBC W/AUTO DIFF WBC: CPT

## 2023-12-04 PROCEDURE — 96365 THER/PROPH/DIAG IV INF INIT: CPT

## 2023-12-04 PROCEDURE — 83690 ASSAY OF LIPASE: CPT

## 2023-12-04 PROCEDURE — 85055 RETICULATED PLATELET ASSAY: CPT

## 2023-12-04 PROCEDURE — 86901 BLOOD TYPING SEROLOGIC RH(D): CPT

## 2023-12-04 PROCEDURE — 85610 PROTHROMBIN TIME: CPT

## 2023-12-04 PROCEDURE — 6360000004 HC RX CONTRAST MEDICATION: Performed by: PEDIATRICS

## 2023-12-04 PROCEDURE — 74174 CTA ABD&PLVS W/CONTRAST: CPT

## 2023-12-04 PROCEDURE — 80053 COMPREHEN METABOLIC PANEL: CPT

## 2023-12-04 PROCEDURE — 6360000002 HC RX W HCPCS: Performed by: PEDIATRICS

## 2023-12-04 PROCEDURE — C9113 INJ PANTOPRAZOLE SODIUM, VIA: HCPCS | Performed by: PEDIATRICS

## 2023-12-04 PROCEDURE — A4216 STERILE WATER/SALINE, 10 ML: HCPCS | Performed by: PEDIATRICS

## 2023-12-04 RX ORDER — POTASSIUM CHLORIDE 7.45 MG/ML
10 INJECTION INTRAVENOUS
Status: DISPENSED | OUTPATIENT
Start: 2023-12-05 | End: 2023-12-05

## 2023-12-04 RX ORDER — ONDANSETRON 2 MG/ML
4 INJECTION INTRAMUSCULAR; INTRAVENOUS ONCE
Status: COMPLETED | OUTPATIENT
Start: 2023-12-04 | End: 2023-12-04

## 2023-12-04 RX ADMIN — ONDANSETRON 4 MG: 2 INJECTION INTRAMUSCULAR; INTRAVENOUS at 22:48

## 2023-12-04 RX ADMIN — CEFTRIAXONE SODIUM 1000 MG: 1 INJECTION, POWDER, FOR SOLUTION INTRAMUSCULAR; INTRAVENOUS at 22:13

## 2023-12-04 RX ADMIN — IOPAMIDOL 100 ML: 755 INJECTION, SOLUTION INTRAVENOUS at 22:29

## 2023-12-04 RX ADMIN — PANTOPRAZOLE SODIUM 80 MG: 40 INJECTION, POWDER, FOR SOLUTION INTRAVENOUS at 21:57

## 2023-12-04 RX ADMIN — PANTOPRAZOLE SODIUM 8 MG/HR: 40 INJECTION, POWDER, FOR SOLUTION INTRAVENOUS at 22:09

## 2023-12-04 RX ADMIN — OCTREOTIDE ACETATE 25 MCG/HR: 500 INJECTION, SOLUTION INTRAVENOUS; SUBCUTANEOUS at 22:54

## 2023-12-05 ENCOUNTER — ANESTHESIA EVENT (OUTPATIENT)
Dept: OPERATING ROOM | Age: 38
End: 2023-12-05
Payer: COMMERCIAL

## 2023-12-05 ENCOUNTER — APPOINTMENT (OUTPATIENT)
Dept: INTERVENTIONAL RADIOLOGY/VASCULAR | Age: 38
End: 2023-12-05
Payer: COMMERCIAL

## 2023-12-05 ENCOUNTER — ANESTHESIA (OUTPATIENT)
Dept: OPERATING ROOM | Age: 38
End: 2023-12-05
Payer: COMMERCIAL

## 2023-12-05 ENCOUNTER — ANESTHESIA (OUTPATIENT)
Dept: INTERVENTIONAL RADIOLOGY/VASCULAR | Age: 38
End: 2023-12-05
Payer: COMMERCIAL

## 2023-12-05 ENCOUNTER — ANESTHESIA EVENT (OUTPATIENT)
Dept: INTERVENTIONAL RADIOLOGY/VASCULAR | Age: 38
End: 2023-12-05
Payer: COMMERCIAL

## 2023-12-05 PROBLEM — K92.2 UPPER GI BLEED: Status: ACTIVE | Noted: 2023-12-05

## 2023-12-05 LAB
ALBUMIN SERPL-MCNC: 2.2 G/DL (ref 3.5–5.2)
ALBUMIN SERPL-MCNC: 2.3 G/DL (ref 3.5–5.2)
ALBUMIN SERPL-MCNC: 2.4 G/DL (ref 3.5–5.2)
ALBUMIN/GLOB SERPL: 1.1 {RATIO} (ref 1–2.5)
ALBUMIN/GLOB SERPL: 1.3 {RATIO} (ref 1–2.5)
ALBUMIN/GLOB SERPL: 1.3 {RATIO} (ref 1–2.5)
ALLEN TEST: ABNORMAL
ALP SERPL-CCNC: 101 U/L (ref 40–129)
ALP SERPL-CCNC: 64 U/L (ref 40–129)
ALP SERPL-CCNC: 68 U/L (ref 40–129)
ALT SERPL-CCNC: 31 U/L (ref 5–41)
ALT SERPL-CCNC: 32 U/L (ref 5–41)
ALT SERPL-CCNC: 34 U/L (ref 5–41)
ANION GAP SERPL CALCULATED.3IONS-SCNC: 11 MMOL/L (ref 9–17)
ANION GAP SERPL CALCULATED.3IONS-SCNC: 12 MMOL/L (ref 9–17)
ANION GAP SERPL CALCULATED.3IONS-SCNC: 12 MMOL/L (ref 9–17)
AST SERPL-CCNC: 185 U/L
AST SERPL-CCNC: 244 U/L
AST SERPL-CCNC: 251 U/L
BACTERIA URNS QL MICRO: ABNORMAL
BASOPHILS # BLD: 0 K/UL (ref 0–0.2)
BASOPHILS # BLD: 0 K/UL (ref 0–0.2)
BASOPHILS NFR BLD: 0 % (ref 0–2)
BASOPHILS NFR BLD: 0 % (ref 0–2)
BILIRUB SERPL-MCNC: 2.7 MG/DL (ref 0.3–1.2)
BILIRUB SERPL-MCNC: 2.8 MG/DL (ref 0.3–1.2)
BILIRUB SERPL-MCNC: 2.9 MG/DL (ref 0.3–1.2)
BILIRUB UR QL STRIP: NEGATIVE
BUN BLD-MCNC: 11 MG/DL (ref 8–26)
BUN BLD-MCNC: 12 MG/DL (ref 8–26)
BUN BLD-MCNC: 12 MG/DL (ref 8–26)
BUN BLD-MCNC: 13 MG/DL (ref 8–26)
BUN SERPL-MCNC: 14 MG/DL (ref 6–20)
BUN SERPL-MCNC: 14 MG/DL (ref 6–20)
BUN SERPL-MCNC: 15 MG/DL (ref 6–20)
CA-I BLD-SCNC: 0.95 MMOL/L (ref 1.15–1.33)
CA-I BLD-SCNC: 1 MMOL/L (ref 1.15–1.33)
CA-I BLD-SCNC: 1.08 MMOL/L (ref 1.15–1.33)
CA-I BLD-SCNC: 1.12 MMOL/L (ref 1.13–1.33)
CA-I BLD-SCNC: 1.21 MMOL/L (ref 1.15–1.33)
CA-I BLD-SCNC: 1.22 MMOL/L (ref 1.13–1.33)
CALCIUM SERPL-MCNC: 6.7 MG/DL (ref 8.6–10.4)
CALCIUM SERPL-MCNC: 8.2 MG/DL (ref 8.6–10.4)
CALCIUM SERPL-MCNC: 8.7 MG/DL (ref 8.6–10.4)
CASTS #/AREA URNS LPF: ABNORMAL /LPF (ref 0–8)
CHLORIDE BLD-SCNC: 113 MMOL/L (ref 98–107)
CHLORIDE BLD-SCNC: 118 MMOL/L (ref 98–107)
CHLORIDE BLD-SCNC: 118 MMOL/L (ref 98–107)
CHLORIDE BLD-SCNC: 119 MMOL/L (ref 98–107)
CHLORIDE SERPL-SCNC: 113 MMOL/L (ref 98–107)
CHLORIDE SERPL-SCNC: 115 MMOL/L (ref 98–107)
CHLORIDE SERPL-SCNC: 115 MMOL/L (ref 98–107)
CLARITY UR: CLEAR
CO2 BLD CALC-SCNC: 14 MMOL/L (ref 22–30)
CO2 BLD CALC-SCNC: 16 MMOL/L (ref 22–30)
CO2 BLD CALC-SCNC: 17 MMOL/L (ref 22–30)
CO2 BLD CALC-SCNC: 21 MMOL/L (ref 22–30)
CO2 BLD CALC-SCNC: 23 MMOL/L (ref 22–30)
CO2 SERPL-SCNC: 15 MMOL/L (ref 20–31)
CO2 SERPL-SCNC: 24 MMOL/L (ref 20–31)
CO2 SERPL-SCNC: 27 MMOL/L (ref 20–31)
COLOR UR: YELLOW
CREAT SERPL-MCNC: 0.9 MG/DL (ref 0.7–1.2)
CREAT SERPL-MCNC: 1 MG/DL (ref 0.7–1.2)
CREAT SERPL-MCNC: 1.2 MG/DL (ref 0.7–1.2)
EGFR, POC: >60 ML/MIN/1.73M2
EOSINOPHIL # BLD: 0 K/UL (ref 0–0.4)
EOSINOPHIL # BLD: 0 K/UL (ref 0–0.4)
EOSINOPHILS RELATIVE PERCENT: 0 % (ref 1–4)
EOSINOPHILS RELATIVE PERCENT: 0 % (ref 1–4)
EPI CELLS #/AREA URNS HPF: ABNORMAL /HPF (ref 0–5)
ERYTHROCYTE [DISTWIDTH] IN BLOOD BY AUTOMATED COUNT: 15.6 % (ref 11.8–14.4)
ERYTHROCYTE [DISTWIDTH] IN BLOOD BY AUTOMATED COUNT: 16 % (ref 11.8–14.4)
FDP: <5 UG/ML
FIBRINOGEN PPP-MCNC: 156 MG/DL (ref 203–521)
FIBRINOGEN, FUNCTIONAL TEG: 12 MM (ref 15–32)
FIBRINOGEN, FUNCTIONAL TEG: 16.1 MM (ref 15–32)
FIO2: 30
FIO2: 60
GFR SERPL CREATININE-BSD FRML MDRD: >60 ML/MIN/1.73M2
GLUCOSE BLD-MCNC: 126 MG/DL (ref 74–100)
GLUCOSE BLD-MCNC: 130 MG/DL (ref 74–100)
GLUCOSE BLD-MCNC: 143 MG/DL (ref 74–100)
GLUCOSE BLD-MCNC: 146 MG/DL (ref 74–100)
GLUCOSE BLD-MCNC: 155 MG/DL (ref 74–100)
GLUCOSE SERPL-MCNC: 139 MG/DL (ref 70–99)
GLUCOSE SERPL-MCNC: 140 MG/DL (ref 70–99)
GLUCOSE SERPL-MCNC: 165 MG/DL (ref 70–99)
GLUCOSE UR STRIP-MCNC: NEGATIVE MG/DL
HCO3 VENOUS: 15.4 MMOL/L (ref 22–29)
HCT VFR BLD AUTO: 16 % (ref 41–53)
HCT VFR BLD AUTO: 18.8 % (ref 40.7–50.3)
HCT VFR BLD AUTO: 20 % (ref 41–53)
HCT VFR BLD AUTO: 21 % (ref 41–53)
HCT VFR BLD AUTO: 24.6 % (ref 40.7–50.3)
HCT VFR BLD AUTO: 27.1 % (ref 40.7–50.3)
HGB BLD-MCNC: 6.4 G/DL (ref 13–17)
HGB BLD-MCNC: 8.3 G/DL (ref 13–17)
HGB BLD-MCNC: 8.8 G/DL (ref 13–17)
HGB UR QL STRIP.AUTO: NEGATIVE
IMM GRANULOCYTES # BLD AUTO: 0 K/UL (ref 0–0.3)
IMM GRANULOCYTES # BLD AUTO: 0.05 K/UL (ref 0–0.3)
IMM GRANULOCYTES NFR BLD: 0 %
IMM GRANULOCYTES NFR BLD: 1 %
INHIBITION AA TEG: 58.5 % (ref 0–11)
INHIBITION ADP TEG: 55.2 % (ref 0–17)
INR PPP: 1.7
INR PPP: 1.8
KETONES UR STRIP-MCNC: NEGATIVE MG/DL
LACTIC ACID, WHOLE BLOOD: 2.3 MMOL/L (ref 0.7–2.1)
LACTIC ACID, WHOLE BLOOD: 4.1 MMOL/L (ref 0.7–2.1)
LEUKOCYTE ESTERASE UR QL STRIP: NEGATIVE
LY30 (LYSIS) TEG: 0 % (ref 0–2.6)
LY30 (LYSIS) TEG: 0 % (ref 0–2.6)
LYMPHOCYTES NFR BLD: 0.33 K/UL (ref 1–4.8)
LYMPHOCYTES NFR BLD: 0.42 K/UL (ref 1–4.8)
LYMPHOCYTES RELATIVE PERCENT: 8 % (ref 24–44)
LYMPHOCYTES RELATIVE PERCENT: 8 % (ref 24–44)
MA (MAX CLOT) TEG KAOLIN: 42.5 MM (ref 53–68)
MA(AA) TEG: 21.6 MM (ref 51–71)
MA(ACTIVATED) TEG: 6.8 MM (ref 2–19)
MA(ADP) TEG: 22.8 MM (ref 45–69)
MA(MAX CLOT) RAPID TEG: 42 MM (ref 52–70)
MA(MAX CLOT) RAPID TEG: <40 MM (ref 52–70)
MAGNESIUM SERPL-MCNC: 1.2 MG/DL (ref 1.6–2.6)
MAGNESIUM SERPL-MCNC: 1.5 MG/DL (ref 1.6–2.6)
MCH RBC QN AUTO: 29.2 PG (ref 25.2–33.5)
MCH RBC QN AUTO: 29.2 PG (ref 25.2–33.5)
MCHC RBC AUTO-ENTMCNC: 33.7 G/DL (ref 28.4–34.8)
MCHC RBC AUTO-ENTMCNC: 34 G/DL (ref 28.4–34.8)
MCV RBC AUTO: 85.8 FL (ref 82.6–102.9)
MCV RBC AUTO: 86.6 FL (ref 82.6–102.9)
MODE: ABNORMAL
MODE: ABNORMAL
MONOCYTES NFR BLD: 0.25 K/UL (ref 0.1–0.8)
MONOCYTES NFR BLD: 0.27 K/UL (ref 0.1–0.8)
MONOCYTES NFR BLD: 5 % (ref 1–7)
MONOCYTES NFR BLD: 6 % (ref 1–7)
MORPHOLOGY: ABNORMAL
NEGATIVE BASE EXCESS, ART: 12.4 MMOL/L (ref 0–2)
NEGATIVE BASE EXCESS, ART: 4.1 MMOL/L (ref 0–2)
NEGATIVE BASE EXCESS, ART: 8.1 MMOL/L (ref 0–2)
NEGATIVE BASE EXCESS, VEN: 11 MMOL/L (ref 0–2)
NEUTROPHILS NFR BLD: 86 % (ref 36–66)
NEUTROPHILS NFR BLD: 86 % (ref 36–66)
NEUTS SEG NFR BLD: 3.52 K/UL (ref 1.8–7.7)
NEUTS SEG NFR BLD: 4.56 K/UL (ref 1.8–7.7)
NITRITE UR QL STRIP: NEGATIVE
NRBC BLD-RTO: 0 PER 100 WBC
NRBC BLD-RTO: 0 PER 100 WBC
O2 DELIVERY DEVICE: ABNORMAL
O2 DELIVERY DEVICE: ABNORMAL
O2 SAT, VEN: 68.2 % (ref 60–85)
PARTIAL THROMBOPLASTIN TIME: 39.3 SEC (ref 23–36.5)
PARTIAL THROMBOPLASTIN TIME: 45.1 SEC (ref 23–36.5)
PCO2, VEN: 36.5 MM HG (ref 41–51)
PH UR STRIP: 7 [PH] (ref 5–8)
PH VENOUS: 7.23 (ref 7.32–7.43)
PHOSPHATE SERPL-MCNC: 5.8 MG/DL (ref 2.5–4.5)
PHOSPHATE SERPL-MCNC: 6 MG/DL (ref 2.5–4.5)
PLATELET # BLD AUTO: 54 K/UL (ref 138–453)
PLATELET # BLD AUTO: ABNORMAL K/UL (ref 138–453)
PLATELET, FLUORESCENCE: 53 K/UL (ref 138–453)
PLATELETS.RETICULATED NFR BLD AUTO: 3 % (ref 1.1–10.3)
PMV BLD AUTO: 10.6 FL (ref 8.1–13.5)
PO2, VEN: 41.5 MM HG (ref 30–50)
POC ANION GAP: 10 MMOL/L (ref 7–16)
POC ANION GAP: 11 MMOL/L (ref 7–16)
POC ANION GAP: 12 MMOL/L (ref 7–16)
POC ANION GAP: 13 MMOL/L (ref 7–16)
POC CREATININE: 1 MG/DL (ref 0.51–1.19)
POC CREATININE: 1.3 MG/DL (ref 0.51–1.19)
POC CREATININE: 1.3 MG/DL (ref 0.51–1.19)
POC CREATININE: 1.4 MG/DL (ref 0.51–1.19)
POC HCO3: 14 MMOL/L (ref 21–28)
POC HCO3: 17.3 MMOL/L (ref 21–28)
POC HCO3: 20.8 MMOL/L (ref 21–28)
POC HCO3: 23.7 MMOL/L (ref 21–28)
POC HCO3: 25.3 MMOL/L (ref 21–28)
POC HCO3: 28.2 MMOL/L (ref 21–28)
POC HEMOGLOBIN (CALC): 5.4 G/DL (ref 13.5–17.5)
POC HEMOGLOBIN (CALC): 6.7 G/DL (ref 13.5–17.5)
POC HEMOGLOBIN (CALC): 7 G/DL (ref 13.5–17.5)
POC HEMOGLOBIN (CALC): 7.1 G/DL (ref 13.5–17.5)
POC HEMOGLOBIN (CALC): 7.1 G/DL (ref 13.5–17.5)
POC LACTIC ACID: 4.4 MMOL/L (ref 0.56–1.39)
POC LACTIC ACID: 4.4 MMOL/L (ref 0.56–1.39)
POC LACTIC ACID: 4.8 MMOL/L (ref 0.56–1.39)
POC LACTIC ACID: 5.6 MMOL/L (ref 0.56–1.39)
POC LACTIC ACID: 6.1 MMOL/L (ref 0.56–1.39)
POC O2 SATURATION: 98.3 % (ref 94–98)
POC O2 SATURATION: 99.8 % (ref 94–98)
POC O2 SATURATION: 99.8 % (ref 94–98)
POC O2 SATURATION: 99.9 % (ref 94–98)
POC PCO2: 30.2 MM HG (ref 35–48)
POC PCO2: 32.9 MM HG (ref 35–48)
POC PCO2: 33.6 MM HG (ref 35–48)
POC PCO2: 35.9 MM HG (ref 35–48)
POC PCO2: 39.1 MM HG (ref 35–48)
POC PCO2: 39.8 MM HG (ref 35–48)
POC PH: 7.24 (ref 7.35–7.45)
POC PH: 7.32 (ref 7.35–7.45)
POC PH: 7.37 (ref 7.35–7.45)
POC PH: 7.41 (ref 7.35–7.45)
POC PH: 7.47 (ref 7.35–7.45)
POC PH: 7.5 (ref 7.35–7.45)
POC PO2: 103.3 MM HG (ref 83–108)
POC PO2: 245.6 MM HG (ref 83–108)
POC PO2: 254.7 MM HG (ref 83–108)
POC PO2: 262 MM HG (ref 83–108)
POC PO2: 268.2 MM HG (ref 83–108)
POC PO2: 289 MM HG (ref 83–108)
POSITIVE BASE EXCESS, ART: 0.6 MMOL/L (ref 0–3)
POSITIVE BASE EXCESS, ART: 0.6 MMOL/L (ref 0–3)
POSITIVE BASE EXCESS, ART: 4.1 MMOL/L (ref 0–3)
POTASSIUM BLD-SCNC: 4.3 MMOL/L (ref 3.5–4.5)
POTASSIUM BLD-SCNC: 4.4 MMOL/L (ref 3.5–4.5)
POTASSIUM BLD-SCNC: 5 MMOL/L (ref 3.5–4.5)
POTASSIUM BLD-SCNC: 6.8 MMOL/L (ref 3.5–4.5)
POTASSIUM BLD-SCNC: 7.1 MMOL/L (ref 3.5–5.1)
POTASSIUM SERPL-SCNC: 4.4 MMOL/L (ref 3.7–5.3)
POTASSIUM SERPL-SCNC: 4.4 MMOL/L (ref 3.7–5.3)
POTASSIUM SERPL-SCNC: 6.9 MMOL/L (ref 3.7–5.3)
PROT SERPL-MCNC: 4.1 G/DL (ref 6.4–8.3)
PROT SERPL-MCNC: 4.2 G/DL (ref 6.4–8.3)
PROT SERPL-MCNC: 4.2 G/DL (ref 6.4–8.3)
PROT UR STRIP-MCNC: NEGATIVE MG/DL
PROTHROMBIN TIME: 19.8 SEC (ref 11.7–14.9)
PROTHROMBIN TIME: 20.6 SEC (ref 11.7–14.9)
RBC # BLD AUTO: 2.19 M/UL (ref 4.21–5.77)
RBC # BLD AUTO: 2.84 M/UL (ref 4.21–5.77)
RBC #/AREA URNS HPF: ABNORMAL /HPF (ref 0–4)
REACTION TIME TEG: 7.2 MIN (ref 4.6–9.1)
REACTION TIME TEG: 9.7 MIN (ref 4.6–9.1)
SAMPLE SITE: ABNORMAL
SODIUM BLD-SCNC: 143 MMOL/L (ref 138–146)
SODIUM BLD-SCNC: 146 MMOL/L (ref 138–146)
SODIUM BLD-SCNC: 148 MMOL/L (ref 138–146)
SODIUM BLD-SCNC: 149 MMOL/L (ref 138–146)
SODIUM SERPL-SCNC: 142 MMOL/L (ref 135–144)
SODIUM SERPL-SCNC: 151 MMOL/L (ref 135–144)
SODIUM SERPL-SCNC: 151 MMOL/L (ref 135–144)
SP GR UR STRIP: 1.04 (ref 1–1.03)
UROBILINOGEN UR STRIP-ACNC: NORMAL EU/DL (ref 0–1)
WBC #/AREA URNS HPF: ABNORMAL /HPF (ref 0–5)
WBC OTHER # BLD: 4.1 K/UL (ref 3.5–11.3)
WBC OTHER # BLD: 5.3 K/UL (ref 3.5–11.3)

## 2023-12-05 PROCEDURE — 2580000003 HC RX 258

## 2023-12-05 PROCEDURE — 82330 ASSAY OF CALCIUM: CPT

## 2023-12-05 PROCEDURE — 2500000003 HC RX 250 WO HCPCS

## 2023-12-05 PROCEDURE — 99255 IP/OBS CONSLTJ NEW/EST HI 80: CPT | Performed by: INTERNAL MEDICINE

## 2023-12-05 PROCEDURE — 2500000003 HC RX 250 WO HCPCS: Performed by: STUDENT IN AN ORGANIZED HEALTH CARE EDUCATION/TRAINING PROGRAM

## 2023-12-05 PROCEDURE — 87086 URINE CULTURE/COLONY COUNT: CPT

## 2023-12-05 PROCEDURE — 80051 ELECTROLYTE PANEL: CPT

## 2023-12-05 PROCEDURE — 6360000002 HC RX W HCPCS: Performed by: INTERNAL MEDICINE

## 2023-12-05 PROCEDURE — 2580000003 HC RX 258: Performed by: INTERNAL MEDICINE

## 2023-12-05 PROCEDURE — 94002 VENT MGMT INPAT INIT DAY: CPT

## 2023-12-05 PROCEDURE — P9073 PLATELETS PHERESIS PATH REDU: HCPCS

## 2023-12-05 PROCEDURE — 3700000000 HC ANESTHESIA ATTENDED CARE: Performed by: RADIOLOGY

## 2023-12-05 PROCEDURE — P9041 ALBUMIN (HUMAN),5%, 50ML: HCPCS

## 2023-12-05 PROCEDURE — 2580000003 HC RX 258: Performed by: PEDIATRICS

## 2023-12-05 PROCEDURE — 2700000000 HC OXYGEN THERAPY PER DAY

## 2023-12-05 PROCEDURE — 6360000004 HC RX CONTRAST MEDICATION: Performed by: INTERNAL MEDICINE

## 2023-12-05 PROCEDURE — 84520 ASSAY OF UREA NITROGEN: CPT

## 2023-12-05 PROCEDURE — P9012 CRYOPRECIPITATE EACH UNIT: HCPCS

## 2023-12-05 PROCEDURE — 6360000002 HC RX W HCPCS: Performed by: RADIOLOGY

## 2023-12-05 PROCEDURE — 85384 FIBRINOGEN ACTIVITY: CPT

## 2023-12-05 PROCEDURE — 85014 HEMATOCRIT: CPT

## 2023-12-05 PROCEDURE — 82803 BLOOD GASES ANY COMBINATION: CPT

## 2023-12-05 PROCEDURE — 36415 COLL VENOUS BLD VENIPUNCTURE: CPT

## 2023-12-05 PROCEDURE — 6360000002 HC RX W HCPCS: Performed by: PEDIATRICS

## 2023-12-05 PROCEDURE — 82565 ASSAY OF CREATININE: CPT

## 2023-12-05 PROCEDURE — P9017 PLASMA 1 DONOR FRZ W/IN 8 HR: HCPCS

## 2023-12-05 PROCEDURE — 2580000003 HC RX 258: Performed by: STUDENT IN AN ORGANIZED HEALTH CARE EDUCATION/TRAINING PROGRAM

## 2023-12-05 PROCEDURE — 82947 ASSAY GLUCOSE BLOOD QUANT: CPT

## 2023-12-05 PROCEDURE — 6370000000 HC RX 637 (ALT 250 FOR IP): Performed by: ANESTHESIOLOGY

## 2023-12-05 PROCEDURE — 6360000002 HC RX W HCPCS

## 2023-12-05 PROCEDURE — 37182 INSERT HEPATIC SHUNT (TIPS): CPT

## 2023-12-05 PROCEDURE — 85610 PROTHROMBIN TIME: CPT

## 2023-12-05 PROCEDURE — C1874 STENT, COATED/COV W/DEL SYS: HCPCS

## 2023-12-05 PROCEDURE — 2709999900 IR TIPS INSERTION

## 2023-12-05 PROCEDURE — 84132 ASSAY OF SERUM POTASSIUM: CPT

## 2023-12-05 PROCEDURE — 83605 ASSAY OF LACTIC ACID: CPT

## 2023-12-05 PROCEDURE — 96376 TX/PRO/DX INJ SAME DRUG ADON: CPT

## 2023-12-05 PROCEDURE — 81001 URINALYSIS AUTO W/SCOPE: CPT

## 2023-12-05 PROCEDURE — 87641 MR-STAPH DNA AMP PROBE: CPT

## 2023-12-05 PROCEDURE — 2500000003 HC RX 250 WO HCPCS: Performed by: PEDIATRICS

## 2023-12-05 PROCEDURE — 84100 ASSAY OF PHOSPHORUS: CPT

## 2023-12-05 PROCEDURE — 36430 TRANSFUSION BLD/BLD COMPNT: CPT

## 2023-12-05 PROCEDURE — 99291 CRITICAL CARE FIRST HOUR: CPT | Performed by: INTERNAL MEDICINE

## 2023-12-05 PROCEDURE — 85018 HEMOGLOBIN: CPT

## 2023-12-05 PROCEDURE — 2000000000 HC ICU R&B

## 2023-12-05 PROCEDURE — 82374 ASSAY BLOOD CARBON DIOXIDE: CPT

## 2023-12-05 PROCEDURE — 6360000002 HC RX W HCPCS: Performed by: STUDENT IN AN ORGANIZED HEALTH CARE EDUCATION/TRAINING PROGRAM

## 2023-12-05 PROCEDURE — 86927 PLASMA FRESH FROZEN: CPT

## 2023-12-05 PROCEDURE — C9113 INJ PANTOPRAZOLE SODIUM, VIA: HCPCS | Performed by: INTERNAL MEDICINE

## 2023-12-05 PROCEDURE — 85362 FIBRIN DEGRADATION PRODUCTS: CPT

## 2023-12-05 PROCEDURE — 85390 FIBRINOLYSINS SCREEN I&R: CPT

## 2023-12-05 PROCEDURE — C1752 CATH,HEMODIALYSIS,SHORT-TERM: HCPCS

## 2023-12-05 PROCEDURE — C9113 INJ PANTOPRAZOLE SODIUM, VIA: HCPCS | Performed by: PEDIATRICS

## 2023-12-05 PROCEDURE — 85025 COMPLETE CBC W/AUTO DIFF WBC: CPT

## 2023-12-05 PROCEDURE — 3700000001 HC ADD 15 MINUTES (ANESTHESIA): Performed by: RADIOLOGY

## 2023-12-05 PROCEDURE — 3609017100 HC EGD: Performed by: INTERNAL MEDICINE

## 2023-12-05 PROCEDURE — 80053 COMPREHEN METABOLIC PANEL: CPT

## 2023-12-05 PROCEDURE — 2720000010 HC SURG SUPPLY STERILE: Performed by: INTERNAL MEDICINE

## 2023-12-05 PROCEDURE — 3700000000 HC ANESTHESIA ATTENDED CARE: Performed by: INTERNAL MEDICINE

## 2023-12-05 PROCEDURE — C1769 GUIDE WIRE: HCPCS

## 2023-12-05 PROCEDURE — 37799 UNLISTED PX VASCULAR SURGERY: CPT

## 2023-12-05 PROCEDURE — 83735 ASSAY OF MAGNESIUM: CPT

## 2023-12-05 PROCEDURE — 85730 THROMBOPLASTIN TIME PARTIAL: CPT

## 2023-12-05 PROCEDURE — 85347 COAGULATION TIME ACTIVATED: CPT

## 2023-12-05 PROCEDURE — P9016 RBC LEUKOCYTES REDUCED: HCPCS

## 2023-12-05 PROCEDURE — 3700000001 HC ADD 15 MINUTES (ANESTHESIA): Performed by: INTERNAL MEDICINE

## 2023-12-05 PROCEDURE — 94761 N-INVAS EAR/PLS OXIMETRY MLT: CPT

## 2023-12-05 PROCEDURE — 77001 FLUOROGUIDE FOR VEIN DEVICE: CPT

## 2023-12-05 PROCEDURE — 85055 RETICULATED PLATELET ASSAY: CPT

## 2023-12-05 PROCEDURE — 36556 INSERT NON-TUNNEL CV CATH: CPT

## 2023-12-05 PROCEDURE — 85576 BLOOD PLATELET AGGREGATION: CPT

## 2023-12-05 RX ORDER — PROPOFOL 10 MG/ML
5-50 INJECTION, EMULSION INTRAVENOUS CONTINUOUS
Status: DISCONTINUED | OUTPATIENT
Start: 2023-12-05 | End: 2023-12-09

## 2023-12-05 RX ORDER — ROCURONIUM BROMIDE 10 MG/ML
INJECTION, SOLUTION INTRAVENOUS PRN
Status: DISCONTINUED | OUTPATIENT
Start: 2023-12-05 | End: 2023-12-05 | Stop reason: SDUPTHER

## 2023-12-05 RX ORDER — SODIUM CHLORIDE 9 MG/ML
INJECTION, SOLUTION INTRAVENOUS CONTINUOUS PRN
Status: DISCONTINUED | OUTPATIENT
Start: 2023-12-05 | End: 2023-12-05 | Stop reason: SDUPTHER

## 2023-12-05 RX ORDER — PROPOFOL 10 MG/ML
INJECTION, EMULSION INTRAVENOUS PRN
Status: DISCONTINUED | OUTPATIENT
Start: 2023-12-05 | End: 2023-12-05 | Stop reason: SDUPTHER

## 2023-12-05 RX ORDER — SODIUM CHLORIDE 9 MG/ML
INJECTION, SOLUTION INTRAVENOUS PRN
Status: DISCONTINUED | OUTPATIENT
Start: 2023-12-05 | End: 2023-12-06

## 2023-12-05 RX ORDER — SODIUM CHLORIDE, SODIUM LACTATE, POTASSIUM CHLORIDE, CALCIUM CHLORIDE 600; 310; 30; 20 MG/100ML; MG/100ML; MG/100ML; MG/100ML
INJECTION, SOLUTION INTRAVENOUS CONTINUOUS PRN
Status: DISCONTINUED | OUTPATIENT
Start: 2023-12-05 | End: 2023-12-05 | Stop reason: SDUPTHER

## 2023-12-05 RX ORDER — POTASSIUM CHLORIDE 29.8 MG/ML
20 INJECTION INTRAVENOUS PRN
Status: DISCONTINUED | OUTPATIENT
Start: 2023-12-05 | End: 2023-12-15 | Stop reason: HOSPADM

## 2023-12-05 RX ORDER — 0.9 % SODIUM CHLORIDE 0.9 %
1000 INTRAVENOUS SOLUTION INTRAVENOUS ONCE
Status: COMPLETED | OUTPATIENT
Start: 2023-12-05 | End: 2023-12-05

## 2023-12-05 RX ORDER — DEXTROSE MONOHYDRATE 25 G/50ML
INJECTION, SOLUTION INTRAVENOUS PRN
Status: DISCONTINUED | OUTPATIENT
Start: 2023-12-05 | End: 2023-12-05 | Stop reason: SDUPTHER

## 2023-12-05 RX ORDER — MIDAZOLAM HYDROCHLORIDE 1 MG/ML
1-10 INJECTION, SOLUTION INTRAVENOUS CONTINUOUS
Status: DISCONTINUED | OUTPATIENT
Start: 2023-12-05 | End: 2023-12-09

## 2023-12-05 RX ORDER — KETAMINE HCL IN NACL, ISO-OSM 100MG/10ML
SYRINGE (ML) INJECTION PRN
Status: DISCONTINUED | OUTPATIENT
Start: 2023-12-05 | End: 2023-12-05 | Stop reason: SDUPTHER

## 2023-12-05 RX ORDER — PROCHLORPERAZINE EDISYLATE 5 MG/ML
INJECTION INTRAMUSCULAR; INTRAVENOUS
Status: COMPLETED
Start: 2023-12-05 | End: 2023-12-05

## 2023-12-05 RX ORDER — FENTANYL CITRATE 50 UG/ML
INJECTION, SOLUTION INTRAMUSCULAR; INTRAVENOUS
Status: COMPLETED
Start: 2023-12-05 | End: 2023-12-05

## 2023-12-05 RX ORDER — SUCCINYLCHOLINE/SOD CL,ISO/PF 100 MG/5ML
SYRINGE (ML) INTRAVENOUS PRN
Status: DISCONTINUED | OUTPATIENT
Start: 2023-12-05 | End: 2023-12-05 | Stop reason: SDUPTHER

## 2023-12-05 RX ORDER — MIDAZOLAM HYDROCHLORIDE 1 MG/ML
INJECTION INTRAMUSCULAR; INTRAVENOUS PRN
Status: DISCONTINUED | OUTPATIENT
Start: 2023-12-05 | End: 2023-12-05 | Stop reason: SDUPTHER

## 2023-12-05 RX ORDER — SODIUM CHLORIDE 0.9 % (FLUSH) 0.9 %
5-40 SYRINGE (ML) INJECTION EVERY 12 HOURS SCHEDULED
Status: DISCONTINUED | OUTPATIENT
Start: 2023-12-05 | End: 2023-12-15 | Stop reason: HOSPADM

## 2023-12-05 RX ORDER — ONDANSETRON 2 MG/ML
INJECTION INTRAMUSCULAR; INTRAVENOUS PRN
Status: DISCONTINUED | OUTPATIENT
Start: 2023-12-05 | End: 2023-12-05 | Stop reason: SDUPTHER

## 2023-12-05 RX ORDER — SODIUM CHLORIDE 9 MG/ML
INJECTION, SOLUTION INTRAVENOUS PRN
Status: DISCONTINUED | OUTPATIENT
Start: 2023-12-05 | End: 2023-12-15 | Stop reason: HOSPADM

## 2023-12-05 RX ORDER — SODIUM CHLORIDE 9 MG/ML
INJECTION, SOLUTION INTRAVENOUS CONTINUOUS
Status: DISCONTINUED | OUTPATIENT
Start: 2023-12-05 | End: 2023-12-05

## 2023-12-05 RX ORDER — MIDAZOLAM HYDROCHLORIDE 2 MG/2ML
4 INJECTION, SOLUTION INTRAMUSCULAR; INTRAVENOUS ONCE
Status: COMPLETED | OUTPATIENT
Start: 2023-12-05 | End: 2023-12-05

## 2023-12-05 RX ORDER — ONDANSETRON 4 MG/1
4 TABLET, ORALLY DISINTEGRATING ORAL EVERY 8 HOURS PRN
Status: DISCONTINUED | OUTPATIENT
Start: 2023-12-05 | End: 2023-12-15 | Stop reason: HOSPADM

## 2023-12-05 RX ORDER — DIPHENHYDRAMINE HYDROCHLORIDE 50 MG/ML
INJECTION INTRAMUSCULAR; INTRAVENOUS
Status: DISPENSED
Start: 2023-12-05 | End: 2023-12-05

## 2023-12-05 RX ORDER — PROCHLORPERAZINE EDISYLATE 5 MG/ML
INJECTION INTRAMUSCULAR; INTRAVENOUS
Status: DISPENSED
Start: 2023-12-05 | End: 2023-12-05

## 2023-12-05 RX ORDER — ACETAMINOPHEN 650 MG/1
650 SUPPOSITORY RECTAL EVERY 6 HOURS PRN
Status: DISCONTINUED | OUTPATIENT
Start: 2023-12-05 | End: 2023-12-15 | Stop reason: HOSPADM

## 2023-12-05 RX ORDER — MAGNESIUM SULFATE IN WATER 40 MG/ML
2000 INJECTION, SOLUTION INTRAVENOUS ONCE
Status: COMPLETED | OUTPATIENT
Start: 2023-12-05 | End: 2023-12-06

## 2023-12-05 RX ORDER — ONDANSETRON 2 MG/ML
4 INJECTION INTRAMUSCULAR; INTRAVENOUS EVERY 6 HOURS PRN
Status: DISCONTINUED | OUTPATIENT
Start: 2023-12-05 | End: 2023-12-15 | Stop reason: HOSPADM

## 2023-12-05 RX ORDER — SODIUM CHLORIDE 0.9 % (FLUSH) 0.9 %
5-40 SYRINGE (ML) INJECTION PRN
Status: DISCONTINUED | OUTPATIENT
Start: 2023-12-05 | End: 2023-12-15 | Stop reason: HOSPADM

## 2023-12-05 RX ORDER — CALCIUM GLUCONATE 20 MG/ML
1000 INJECTION, SOLUTION INTRAVENOUS ONCE
Status: DISCONTINUED | OUTPATIENT
Start: 2023-12-05 | End: 2023-12-12

## 2023-12-05 RX ORDER — ACETAMINOPHEN 325 MG/1
650 TABLET ORAL EVERY 6 HOURS PRN
Status: DISCONTINUED | OUTPATIENT
Start: 2023-12-05 | End: 2023-12-15 | Stop reason: HOSPADM

## 2023-12-05 RX ORDER — PHENYLEPHRINE HCL IN 0.9% NACL 1 MG/10 ML
SYRINGE (ML) INTRAVENOUS PRN
Status: DISCONTINUED | OUTPATIENT
Start: 2023-12-05 | End: 2023-12-05 | Stop reason: SDUPTHER

## 2023-12-05 RX ORDER — CALCIUM GLUCONATE 20 MG/ML
2000 INJECTION, SOLUTION INTRAVENOUS ONCE
Status: COMPLETED | OUTPATIENT
Start: 2023-12-05 | End: 2023-12-06

## 2023-12-05 RX ORDER — FENTANYL CITRATE 50 UG/ML
INJECTION, SOLUTION INTRAMUSCULAR; INTRAVENOUS PRN
Status: DISCONTINUED | OUTPATIENT
Start: 2023-12-05 | End: 2023-12-05 | Stop reason: SDUPTHER

## 2023-12-05 RX ORDER — LORAZEPAM 2 MG/ML
INJECTION INTRAMUSCULAR
Status: DISPENSED
Start: 2023-12-05 | End: 2023-12-05

## 2023-12-05 RX ORDER — ONDANSETRON 2 MG/ML
INJECTION INTRAMUSCULAR; INTRAVENOUS
Status: COMPLETED
Start: 2023-12-05 | End: 2023-12-05

## 2023-12-05 RX ORDER — HEPARIN SODIUM 1000 [USP'U]/ML
INJECTION, SOLUTION INTRAVENOUS; SUBCUTANEOUS PRN
Status: COMPLETED | OUTPATIENT
Start: 2023-12-05 | End: 2023-12-05

## 2023-12-05 RX ORDER — ONDANSETRON 2 MG/ML
4 INJECTION INTRAMUSCULAR; INTRAVENOUS ONCE
Status: COMPLETED | OUTPATIENT
Start: 2023-12-05 | End: 2023-12-05

## 2023-12-05 RX ORDER — POLYETHYLENE GLYCOL 3350 17 G/17G
17 POWDER, FOR SOLUTION ORAL DAILY PRN
Status: DISCONTINUED | OUTPATIENT
Start: 2023-12-05 | End: 2023-12-15 | Stop reason: HOSPADM

## 2023-12-05 RX ORDER — POTASSIUM CHLORIDE 7.45 MG/ML
10 INJECTION INTRAVENOUS PRN
Status: DISCONTINUED | OUTPATIENT
Start: 2023-12-05 | End: 2023-12-15 | Stop reason: HOSPADM

## 2023-12-05 RX ORDER — FENTANYL CITRATE 50 UG/ML
100 INJECTION, SOLUTION INTRAMUSCULAR; INTRAVENOUS ONCE
Status: COMPLETED | OUTPATIENT
Start: 2023-12-05 | End: 2023-12-05

## 2023-12-05 RX ORDER — FENTANYL CITRATE 50 UG/ML
25 INJECTION, SOLUTION INTRAMUSCULAR; INTRAVENOUS EVERY 5 MIN PRN
Status: DISCONTINUED | OUTPATIENT
Start: 2023-12-05 | End: 2023-12-09

## 2023-12-05 RX ORDER — DEXAMETHASONE SODIUM PHOSPHATE 10 MG/ML
INJECTION INTRAMUSCULAR; INTRAVENOUS PRN
Status: DISCONTINUED | OUTPATIENT
Start: 2023-12-05 | End: 2023-12-05 | Stop reason: SDUPTHER

## 2023-12-05 RX ORDER — ONDANSETRON 2 MG/ML
INJECTION INTRAMUSCULAR; INTRAVENOUS PRN
Status: DISCONTINUED | OUTPATIENT
Start: 2023-12-05 | End: 2023-12-05

## 2023-12-05 RX ORDER — NOREPINEPHRINE BITARTRATE 0.06 MG/ML
1-100 INJECTION, SOLUTION INTRAVENOUS CONTINUOUS
Status: DISCONTINUED | OUTPATIENT
Start: 2023-12-05 | End: 2023-12-09

## 2023-12-05 RX ORDER — LABETALOL HYDROCHLORIDE 5 MG/ML
INJECTION, SOLUTION INTRAVENOUS PRN
Status: DISCONTINUED | OUTPATIENT
Start: 2023-12-05 | End: 2023-12-05 | Stop reason: SDUPTHER

## 2023-12-05 RX ORDER — PROPOFOL 10 MG/ML
INJECTION, EMULSION INTRAVENOUS
Status: COMPLETED
Start: 2023-12-05 | End: 2023-12-05

## 2023-12-05 RX ORDER — ALBUMIN, HUMAN INJ 5% 5 %
SOLUTION INTRAVENOUS PRN
Status: DISCONTINUED | OUTPATIENT
Start: 2023-12-05 | End: 2023-12-05 | Stop reason: SDUPTHER

## 2023-12-05 RX ORDER — MAGNESIUM SULFATE IN WATER 40 MG/ML
2000 INJECTION, SOLUTION INTRAVENOUS PRN
Status: DISCONTINUED | OUTPATIENT
Start: 2023-12-05 | End: 2023-12-15 | Stop reason: HOSPADM

## 2023-12-05 RX ORDER — LIDOCAINE HYDROCHLORIDE 10 MG/ML
INJECTION, SOLUTION EPIDURAL; INFILTRATION; INTRACAUDAL; PERINEURAL PRN
Status: DISCONTINUED | OUTPATIENT
Start: 2023-12-05 | End: 2023-12-05 | Stop reason: SDUPTHER

## 2023-12-05 RX ORDER — CALCIUM CHLORIDE 100 MG/ML
INJECTION INTRAVENOUS; INTRAVENTRICULAR PRN
Status: DISCONTINUED | OUTPATIENT
Start: 2023-12-05 | End: 2023-12-05 | Stop reason: SDUPTHER

## 2023-12-05 RX ORDER — CEFAZOLIN SODIUM 1 G/3ML
INJECTION, POWDER, FOR SOLUTION INTRAMUSCULAR; INTRAVENOUS PRN
Status: DISCONTINUED | OUTPATIENT
Start: 2023-12-05 | End: 2023-12-05 | Stop reason: SDUPTHER

## 2023-12-05 RX ADMIN — SODIUM CHLORIDE: 9 INJECTION, SOLUTION INTRAVENOUS at 13:16

## 2023-12-05 RX ADMIN — SODIUM CHLORIDE, PRESERVATIVE FREE 10 ML: 5 INJECTION INTRAVENOUS at 21:38

## 2023-12-05 RX ADMIN — Medication 10 MG: at 17:00

## 2023-12-05 RX ADMIN — INSULIN HUMAN 5 UNITS: 100 INJECTION, SOLUTION PARENTERAL at 12:31

## 2023-12-05 RX ADMIN — SODIUM CHLORIDE: 9 INJECTION, SOLUTION INTRAVENOUS at 13:28

## 2023-12-05 RX ADMIN — SODIUM CHLORIDE: 9 INJECTION, SOLUTION INTRAVENOUS at 17:45

## 2023-12-05 RX ADMIN — LIDOCAINE HYDROCHLORIDE 50 MG: 10 INJECTION, SOLUTION EPIDURAL; INFILTRATION; INTRACAUDAL; PERINEURAL at 08:40

## 2023-12-05 RX ADMIN — PROPOFOL 100 MG: 10 INJECTION, EMULSION INTRAVENOUS at 16:49

## 2023-12-05 RX ADMIN — Medication 20 MG: at 09:59

## 2023-12-05 RX ADMIN — CALCIUM CHLORIDE 0.2 G: 100 INJECTION INTRAVENOUS; INTRAVENTRICULAR at 12:56

## 2023-12-05 RX ADMIN — Medication 5 MCG/MIN: at 00:49

## 2023-12-05 RX ADMIN — Medication 20 MG: at 14:59

## 2023-12-05 RX ADMIN — Medication 20 MG: at 10:52

## 2023-12-05 RX ADMIN — Medication 100 MCG: at 12:06

## 2023-12-05 RX ADMIN — FENTANYL CITRATE 50 MCG: 50 INJECTION, SOLUTION INTRAMUSCULAR; INTRAVENOUS at 15:58

## 2023-12-05 RX ADMIN — IOPAMIDOL 208 ML: 755 INJECTION, SOLUTION INTRAVENOUS at 17:30

## 2023-12-05 RX ADMIN — Medication 5 MG: at 16:47

## 2023-12-05 RX ADMIN — POTASSIUM CHLORIDE 10 MEQ: 7.46 INJECTION, SOLUTION INTRAVENOUS at 01:12

## 2023-12-05 RX ADMIN — SODIUM CHLORIDE 1000 ML: 9 INJECTION, SOLUTION INTRAVENOUS at 00:43

## 2023-12-05 RX ADMIN — CALCIUM CHLORIDE 0.2 G: 100 INJECTION INTRAVENOUS; INTRAVENTRICULAR at 10:55

## 2023-12-05 RX ADMIN — ONDANSETRON 4 MG: 2 INJECTION INTRAMUSCULAR; INTRAVENOUS at 09:06

## 2023-12-05 RX ADMIN — SODIUM CHLORIDE: 9 INJECTION, SOLUTION INTRAVENOUS at 22:26

## 2023-12-05 RX ADMIN — SODIUM BICARBONATE 50 MEQ: 84 INJECTION INTRAVENOUS at 12:17

## 2023-12-05 RX ADMIN — PROPOFOL 40 MCG/KG/MIN: 10 INJECTION, EMULSION INTRAVENOUS at 16:33

## 2023-12-05 RX ADMIN — PROPOFOL 50 MCG/KG/MIN: 10 INJECTION, EMULSION INTRAVENOUS at 17:30

## 2023-12-05 RX ADMIN — Medication 200 MCG: at 10:02

## 2023-12-05 RX ADMIN — PANTOPRAZOLE SODIUM 8 MG/HR: 40 INJECTION, POWDER, FOR SOLUTION INTRAVENOUS at 17:38

## 2023-12-05 RX ADMIN — PROPOFOL 100 MG: 10 INJECTION, EMULSION INTRAVENOUS at 08:40

## 2023-12-05 RX ADMIN — ALBUMIN (HUMAN) 25 G: 12.5 INJECTION, SOLUTION INTRAVENOUS at 09:02

## 2023-12-05 RX ADMIN — SODIUM CHLORIDE: 9 INJECTION, SOLUTION INTRAVENOUS at 14:48

## 2023-12-05 RX ADMIN — CALCIUM CHLORIDE 0.5 G: 100 INJECTION INTRAVENOUS; INTRAVENTRICULAR at 12:37

## 2023-12-05 RX ADMIN — SODIUM BICARBONATE 50 MEQ: 84 INJECTION INTRAVENOUS at 12:39

## 2023-12-05 RX ADMIN — DEXTROSE MONOHYDRATE 12.5 G: 25 INJECTION, SOLUTION INTRAVENOUS at 12:30

## 2023-12-05 RX ADMIN — Medication 200 MCG: at 09:06

## 2023-12-05 RX ADMIN — ROCURONIUM BROMIDE 30 MG: 10 INJECTION, SOLUTION INTRAVENOUS at 14:47

## 2023-12-05 RX ADMIN — Medication 20 MG: at 09:08

## 2023-12-05 RX ADMIN — PROPOFOL 50 MCG/KG/MIN: 10 INJECTION, EMULSION INTRAVENOUS at 21:45

## 2023-12-05 RX ADMIN — PROPOFOL 20 MG: 10 INJECTION, EMULSION INTRAVENOUS at 12:01

## 2023-12-05 RX ADMIN — FENTANYL CITRATE 50 MCG: 50 INJECTION, SOLUTION INTRAMUSCULAR; INTRAVENOUS at 16:30

## 2023-12-05 RX ADMIN — PANTOPRAZOLE SODIUM 8 MG/HR: 40 INJECTION, POWDER, FOR SOLUTION INTRAVENOUS at 08:16

## 2023-12-05 RX ADMIN — ROCURONIUM BROMIDE 20 MG: 10 INJECTION, SOLUTION INTRAVENOUS at 13:33

## 2023-12-05 RX ADMIN — SODIUM BICARBONATE 50 MEQ: 84 INJECTION INTRAVENOUS at 12:22

## 2023-12-05 RX ADMIN — ERYTHROMYCIN LACTOBIONATE 250 MG: 500 INJECTION, POWDER, LYOPHILIZED, FOR SOLUTION INTRAVENOUS at 08:46

## 2023-12-05 RX ADMIN — CALCIUM CHLORIDE 0.5 G: 100 INJECTION INTRAVENOUS; INTRAVENTRICULAR at 14:50

## 2023-12-05 RX ADMIN — ROCURONIUM BROMIDE 30 MG: 10 INJECTION, SOLUTION INTRAVENOUS at 09:01

## 2023-12-05 RX ADMIN — Medication 200 MCG: at 10:18

## 2023-12-05 RX ADMIN — Medication 100 MCG: at 08:41

## 2023-12-05 RX ADMIN — Medication 100 MCG: at 08:55

## 2023-12-05 RX ADMIN — SODIUM CHLORIDE, POTASSIUM CHLORIDE, SODIUM LACTATE AND CALCIUM CHLORIDE: 600; 310; 30; 20 INJECTION, SOLUTION INTRAVENOUS at 09:36

## 2023-12-05 RX ADMIN — Medication 30 MG: at 12:49

## 2023-12-05 RX ADMIN — CALCIUM CHLORIDE 0.5 G: 100 INJECTION INTRAVENOUS; INTRAVENTRICULAR at 10:24

## 2023-12-05 RX ADMIN — ROCURONIUM BROMIDE 20 MG: 10 INJECTION, SOLUTION INTRAVENOUS at 10:52

## 2023-12-05 RX ADMIN — Medication 200 MCG: at 09:04

## 2023-12-05 RX ADMIN — POTASSIUM CHLORIDE 10 MEQ: 7.46 INJECTION, SOLUTION INTRAVENOUS at 00:10

## 2023-12-05 RX ADMIN — DEXTROSE MONOHYDRATE 12.5 G: 25 INJECTION, SOLUTION INTRAVENOUS at 13:30

## 2023-12-05 RX ADMIN — MIDAZOLAM HYDROCHLORIDE 4 MG: 1 INJECTION, SOLUTION INTRAMUSCULAR; INTRAVENOUS at 17:42

## 2023-12-05 RX ADMIN — Medication 100 MCG: at 12:05

## 2023-12-05 RX ADMIN — OCTREOTIDE ACETATE 25 MCG/HR: 500 INJECTION, SOLUTION INTRAVENOUS; SUBCUTANEOUS at 17:38

## 2023-12-05 RX ADMIN — HEPARIN SODIUM 1400 UNITS: 1000 INJECTION, SOLUTION INTRAVENOUS; SUBCUTANEOUS at 16:29

## 2023-12-05 RX ADMIN — PROCHLORPERAZINE EDISYLATE: 5 INJECTION INTRAMUSCULAR; INTRAVENOUS at 01:48

## 2023-12-05 RX ADMIN — FENTANYL CITRATE 100 MCG: 50 INJECTION, SOLUTION INTRAMUSCULAR; INTRAVENOUS at 17:15

## 2023-12-05 RX ADMIN — ONDANSETRON: 2 INJECTION INTRAMUSCULAR; INTRAVENOUS at 01:29

## 2023-12-05 RX ADMIN — SODIUM CHLORIDE 1000 ML: 9 INJECTION, SOLUTION INTRAVENOUS at 01:03

## 2023-12-05 RX ADMIN — CALCIUM CHLORIDE 0.5 G: 100 INJECTION INTRAVENOUS; INTRAVENTRICULAR at 15:08

## 2023-12-05 RX ADMIN — SODIUM CHLORIDE: 9 INJECTION, SOLUTION INTRAVENOUS at 11:49

## 2023-12-05 RX ADMIN — SODIUM CHLORIDE: 9 INJECTION, SOLUTION INTRAVENOUS at 10:47

## 2023-12-05 RX ADMIN — ROCURONIUM BROMIDE 30 MG: 10 INJECTION, SOLUTION INTRAVENOUS at 12:53

## 2023-12-05 RX ADMIN — DEXAMETHASONE SODIUM PHOSPHATE 8 MG: 10 INJECTION INTRAMUSCULAR; INTRAVENOUS at 09:06

## 2023-12-05 RX ADMIN — ONDANSETRON 4 MG: 2 INJECTION INTRAMUSCULAR; INTRAVENOUS at 00:49

## 2023-12-05 RX ADMIN — Medication 100 MCG: at 08:50

## 2023-12-05 RX ADMIN — Medication 100 MCG: at 08:49

## 2023-12-05 RX ADMIN — ROCURONIUM BROMIDE 20 MG: 10 INJECTION, SOLUTION INTRAVENOUS at 15:51

## 2023-12-05 RX ADMIN — CALCIUM CHLORIDE 0.3 G: 100 INJECTION INTRAVENOUS; INTRAVENTRICULAR at 12:10

## 2023-12-05 RX ADMIN — MIDAZOLAM HYDROCHLORIDE 2 MG: 1 INJECTION, SOLUTION INTRAMUSCULAR; INTRAVENOUS at 08:48

## 2023-12-05 RX ADMIN — SODIUM CHLORIDE, POTASSIUM CHLORIDE, SODIUM LACTATE AND CALCIUM CHLORIDE: 600; 310; 30; 20 INJECTION, SOLUTION INTRAVENOUS at 08:35

## 2023-12-05 RX ADMIN — ROCURONIUM BROMIDE 20 MG: 10 INJECTION, SOLUTION INTRAVENOUS at 09:20

## 2023-12-05 RX ADMIN — CEFAZOLIN 2 G: 1 INJECTION, POWDER, FOR SOLUTION INTRAMUSCULAR; INTRAVENOUS at 10:47

## 2023-12-05 RX ADMIN — CEFAZOLIN 2 G: 1 INJECTION, POWDER, FOR SOLUTION INTRAMUSCULAR; INTRAVENOUS at 14:46

## 2023-12-05 RX ADMIN — Medication 50 MCG/HR: at 17:43

## 2023-12-05 RX ADMIN — Medication 10 MG: at 12:04

## 2023-12-05 RX ADMIN — Medication 200 MCG: at 10:58

## 2023-12-05 RX ADMIN — Medication 100 MG: at 08:40

## 2023-12-05 RX ADMIN — INSULIN HUMAN 5 UNITS: 100 INJECTION, SOLUTION PARENTERAL at 13:30

## 2023-12-05 RX ADMIN — Medication 30 MG: at 08:40

## 2023-12-05 RX ADMIN — PROPOFOL 40 MCG/KG/MIN: 10 INJECTION, EMULSION INTRAVENOUS at 09:23

## 2023-12-05 RX ADMIN — Medication 200 MCG: at 09:02

## 2023-12-05 RX ADMIN — ROCURONIUM BROMIDE 30 MG: 10 INJECTION, SOLUTION INTRAVENOUS at 12:01

## 2023-12-05 RX ADMIN — SODIUM CHLORIDE: 9 INJECTION, SOLUTION INTRAVENOUS at 12:38

## 2023-12-05 RX ADMIN — Medication 100 MCG: at 08:43

## 2023-12-05 RX ADMIN — MAGNESIUM SULFATE HEPTAHYDRATE 2000 MG: 40 INJECTION, SOLUTION INTRAVENOUS at 22:42

## 2023-12-05 RX ADMIN — SODIUM BICARBONATE 50 MEQ: 84 INJECTION INTRAVENOUS at 14:39

## 2023-12-05 RX ADMIN — CALCIUM CHLORIDE 0.3 G: 100 INJECTION INTRAVENOUS; INTRAVENTRICULAR at 12:52

## 2023-12-05 RX ADMIN — HEPARIN SODIUM 1500 UNITS: 1000 INJECTION, SOLUTION INTRAVENOUS; SUBCUTANEOUS at 16:30

## 2023-12-05 RX ADMIN — CALCIUM GLUCONATE 2000 MG: 20 INJECTION, SOLUTION INTRAVENOUS at 22:28

## 2023-12-05 RX ADMIN — CALCIUM CHLORIDE 0.5 G: 100 INJECTION INTRAVENOUS; INTRAVENTRICULAR at 16:04

## 2023-12-05 RX ADMIN — SODIUM BICARBONATE 50 MEQ: 84 INJECTION INTRAVENOUS at 13:35

## 2023-12-05 RX ADMIN — POTASSIUM CHLORIDE 10 MEQ: 7.46 INJECTION, SOLUTION INTRAVENOUS at 02:15

## 2023-12-05 RX ADMIN — CALCIUM CHLORIDE 0.5 G: 100 INJECTION INTRAVENOUS; INTRAVENTRICULAR at 16:33

## 2023-12-05 ASSESSMENT — PULMONARY FUNCTION TESTS
PIF_VALUE: 19
PIF_VALUE: 19
PIF_VALUE: 39

## 2023-12-05 ASSESSMENT — PAIN SCALES - GENERAL: PAINLEVEL_OUTOF10: 0

## 2023-12-05 ASSESSMENT — LIFESTYLE VARIABLES
SMOKING_STATUS: 1
SMOKING_STATUS: 1

## 2023-12-05 NOTE — CONSENT
Informed Consent for Blood Component Transfusion Note    I have discussed with the patient the rationale for blood component transfusion; its benefits in treating or preventing fatigue, organ damage, or death; and its risk which includes mild transfusion reactions, rare risk of blood borne infection, or more serious but rare reactions. I have discussed the alternatives to transfusion, including the risk and consequences of not receiving transfusion. The patient had an opportunity to ask questions and had agreed to proceed with transfusion of blood components.     Electronically signed by Heather Marcum MD on 12/5/23 at 12:24 AM EST

## 2023-12-05 NOTE — ED TRIAGE NOTES
Pt presents to ED via EMS with c/o hematemesis x 35 minutes. Pt has a known history of alcohol abuse, last alcohol intake x 2 hours ago as reported. Pt has been diagnosed with esophageal varices x 6 months ago, s/p banding. As per EMS, the  states that pt vomited a whole bag of emesis bag, pt described it as bright red. Pt not actively vomiting at arrival to ED. Pt is tachycardic,afebrile, NAD, answering in full sentences, AxOx4. Pt atyached to full monitor, EKG done, 2 IV established. Dr. Hai Kirk at bedside to evaluate pt.

## 2023-12-05 NOTE — DISCHARGE INSTRUCTIONS
You were here for anemia and found to have massive GI bleed  Multiple upper GI endoscopy were done by the gastroenterology team in the hospital  They found multiple bleeding ulcer  Multiple esophageal varices were clipped  You were treated with octreotide and Protonix drip in the hospital  Now we are discharging you on oral Protonix every 12 hours until you see the GI doctor outpatient  Follow-up with GI doctor  Follow-up with your PCP with your blood pressure pill because your blood pressures were high at 1 point in the hospital but right now we are not discharging you on any blood pressure medication  Follow-up with all your follow-ups  Return to the ED if symptoms worsen   get an outpatient CBC  Titrate lactulose dosing based on your number of bowel movement, we want at least 3 bowel movement every day, if you have 3 loose bowel movement without the lactulose,do not take lactulose on that day,make sure you are at least having 3 loose bm everyday

## 2023-12-05 NOTE — ED NOTES
Pt provided urinal at bedside by Dr. Marla Veloz.  Pt educated that he will not be standing up to use urinal.     Jonel Mayo RN  12/05/23 8456

## 2023-12-05 NOTE — ED NOTES
Blood #2, bag of PRBC started.    Vitals as follows:  BP: 105/65  RR: 14  HR: 123  Spo2: 100 %  Temp: [address]  12/05/23 7239

## 2023-12-05 NOTE — ED NOTES
Report received from [facility]. Patient transferred to ED 14 for closer observation by nursing staff. Pt handoff with levophed at 13mcg/min.      Tayo Oakley RN  12/05/23 0885

## 2023-12-05 NOTE — ED NOTES
Patient began vomiting bright red blood, approximately 300ml output. Dr. Christina Marte at bedside.       Govind Hernandez RN  12/05/23 0857

## 2023-12-05 NOTE — ED NOTES
5th unit PRBC started by writer, verified with Winnie PALMA. To pressure bag per Dr. Haile Dotson.       Nithya Wing RN  12/05/23 4617

## 2023-12-05 NOTE — ANESTHESIA POSTPROCEDURE EVALUATION
Department of Anesthesiology  Postprocedure Note    Patient: Janessa Hinds  MRN: [id number]  YOB: 1985  Date of evaluation: 12/5/2023      Procedure Summary       Date: 12/05/23 Room / Location: [facility] OR  / [address]    Anesthesia Start: 3241 Anesthesia Stop: 1868    Procedure: EGD DIAGNOSTIC ONLY Diagnosis:       Upper gastrointestinal bleed      (Upper gastrointestinal bleed [K92.2])    Surgeons: Evelyn Cerrato MD Responsible Provider: Екатерина Poole MD    Anesthesia Type: general ASA Status: 3 - Emergent            Anesthesia Type: No value filed. Antoinette Phase I:      Antoinette Phase II:        Anesthesia Post Evaluation    Patient location during evaluation: bedside  Patient participation: complete - patient cannot participate  Level of consciousness: sedated and ventilated  Airway patency: patent  Complications: no  Cardiovascular status: vasoactive/inotropes  Respiratory status: intubated  Comments: Taken to IR immediately following procedure, intubated and sedated, transported by and monitored by CRNA.

## 2023-12-05 NOTE — H&P
Critical Care - History and Physical Examination    Patient's name:  Kala Regan  Medical Record Number: [id number]  Patient's account/billing number: [de-identified]  Patient's YOB: 1985  Age: 41 y.o. Date of Admission: 12/4/2023  9:31 PM  Reason of ICU admission:   Date of History and Physical Examination: 12/5/2023      Primary Care Physician: Cleveland Garnett MD  Attending Physician:    Code Status: Prior    Chief complaint: Hematemesis    Reason for ICU admission: Hypotension, upper GI bleed      History Of Present Illness:   History was obtained from chart review and the patient. Kala Regan is a 41 y.o. history of esophageal varices with banding, alcohol abuse, hypertension, depression who presents with multiple episodes of large amount of bloody emesis that began today. Patient reports drinking alcohol about 2 hours ago. Notes abdominal pain. States he feels short of breath as well. .  Does have a recent ICU admission on September of this year for upper GI bleed when esophageal banding was performed by GI. Patient hypotensive and tachycardic upon arrival to ED. Hemoglobin 7.5. Patient started on octreotide, Protonix, Rocephin by ED. Blood product transfusion initiated. Levophed started for hypotension. GI consulted by ED as well as interventional radiology due to GI recommendation for emergent TIPS procedure.           Past Medical History:        Diagnosis Date    Alcoholism ([address])     pt drinks a fifth of whiskey daily    Anemia     Cirrhosis, alcoholic ([address])     Pulmonary embolism ([address])        Past Surgical History:        Procedure Laterality Date    UPPER GASTROINTESTINAL ENDOSCOPY N/A 5/1/2022    EGD ESOPHAGOGASTRODUODENOSCOPY performed by Katt Motta MD at [address] 10/30/2022    EGD BAND LIGATION performed by Marquis Rg MD at [address] 9/27/2023    EGD BAND LIGATION

## 2023-12-05 NOTE — PROGRESS NOTES
DR. Adriano Baez UPDATED PATIENT'S WIFE ABOUT PT CURRENT STATUS.   [address] [place] [address] [phone]

## 2023-12-05 NOTE — OP NOTE
Operative Note      Patient: Yury Whelan  YOB: 1985  MRN: [id number]    Date of Procedure: 12/5/2023    Pre-Op Diagnosis Codes:     * Upper gastrointestinal bleed [K92.2]    Post-Op Diagnosis: Same  Grade 1 and grade 2 esophageal varices without bleeding  Gastric varices with active bleeding  Severe portal hypertensive gastropathy       Procedure(s):  EGD DIAGNOSTIC ONLY    Surgeon(s):  MD Alecia Ware MD    Assistant:   First Assistant: Kulwinder Schroeder RN    Anesthesia: Monitor Anesthesia Care    Estimated Blood Loss (mL): None    Complications: None    Specimens:   * No specimens in log *    Implants:  * No implants in log *      Drains: * No LDAs found *    Findings:   Grade 1 and grade 2 esophageal varices in the distal esophagus without bleeding or stigmata. Scars from prior banding noted in the mid and distal esophagus. A large blood clot was found in the gastric body and fundus with active bleeding underneath the clot noted suspicious for bleeding gastric varix. Several attempts to clear the clot were unsuccessful as the clot could not be dislodged and the stomach was rapidly filling up with blood. An attempt to place a Blakemore tube were unsuccessful. Emergent consultation to IR was made and IR physician was notified for emergent TIPS. Severe portal hypertensive gastropathy in the entire stomach. Blood and clots in the examined duodenum without any evidence or stigmata of bleeding. Recommendations  Patient to remain intubated and emergently transferred to IR for emergent TIPS procedure. Continue IV octreotide and PPI infusions and resuscitative measures. GI will follow following TIPS procedure.       Detailed Description of Procedure:   Informed consent was obtained from the patient after explanation of the procedure including indications, description of the procedure,  benefits and possible risks and complications of the procedure, and alternatives. Questions were answered. The patient's history was reviewed and a directed physical examination was performed prior to the procedure. Patient was monitored throughout the procedure with pulse oximetry and periodic assessment of vital signs. Patient was sedated as noted above. With the patient in the left lateral decubitus position, the Olympus videoendoscope was placed in the patient's mouth and under direct visualization passed into the esophagus. Visualization of the esophagus, stomach, and duodenum was performed during both introduction and withdrawal of the endoscope and retroflexed view of the proximal stomach was obtained. The scope was passed to the 2nd portion of the duodenum. The patient tolerated the procedure well and was taken to the recovery area in good condition. The patient  was taken to the recovery area in good condition.      Electronically signed by Michelle Mabry MD on 12/5/2023 at 9:50 AM

## 2023-12-05 NOTE — ED NOTES
Patient provided urinal at bedside by Dr. Walker Hernandez.  Pt educated that he will not be standing up to use urinal d/t fall risk     Matt Wade RN  12/05/23 1284

## 2023-12-05 NOTE — ED NOTES
25 mg benadryl given IVP by Winnie PALMA per Dr. Amin Moder orders.       Alli Scott RN  12/05/23 0277

## 2023-12-05 NOTE — OR NURSING
Per Ezekiel Pearl will need to order 2 unit of PRBc to infuse  Blood bank informed  Labs run via [facility]  Results shown to Dr Luci Moran  Will send [phone] to lab

## 2023-12-05 NOTE — PROGRESS NOTES
Pt had a bleeding varices so egd was completed and then a jayce tbe was attempted at 0852 by Dr Jocelyn Covington. IR was contacted and pt will go to IR for STAT procedure.

## 2023-12-05 NOTE — ED NOTES
Platelets verified by writer and Desmond Dominguez. Started to gravity by writer per Dr. Lorina Fabry order.      Yasir Delgadillo RN  12/05/23 7157

## 2023-12-05 NOTE — ANESTHESIA PRE PROCEDURE
(+) pneumonia: resolved and no interval change,           current smoker          Patient did not smoke on day of surgery. Cardiovascular:  Exercise tolerance: good (>4 METS)  (+) hypertension: no interval change      ECG reviewed    Rate: abnormal  Echocardiogram reviewed         Beta Blocker:  Dose within 24 Hrs        PE comment: norepi   Neuro/Psych:   (+) psychiatric history: stable with treatmentdepression/anxiety              ROS comment: Alcohol, marijuana. GI/Hepatic/Renal:   (+) liver disease: esophageal varices         ROS comment: Acute bleed. .   Endo/Other:    (+) blood dyscrasia: anemia and thrombocytopenia:. .                 Abdominal:             Vascular: negative vascular ROS. Other Findings: C broken            Anesthesia Plan      general     ASA 3 - emergent       Induction: intravenous and rapid sequence. MIPS: Postoperative opioids intended, Prophylactic antiemetics administered and Postoperative trial extubation. Anesthetic plan and risks discussed with patient. Use of blood products discussed with patient whom consented to blood products. Plan discussed with CRNA.                     Edmar Cam MD   12/5/2023

## 2023-12-05 NOTE — ED NOTES
4th unit PRBC verified with Winnie PALMA. RBC started by Carmelita Min. Blood reached vein at 0315.       Magdalena Marsh RN  12/05/23 9155

## 2023-12-05 NOTE — ED NOTES
Joselyn Suarez RN with uts for another IV attempt.       Derek Ware, [address] [place] [address]  12/05/23 8233

## 2023-12-05 NOTE — ED NOTES
Levophed decreased to 13 mcg/min, /72. Pt transferred to  14, Pt report given to Sharmin PALMA and Debra Mace RN.       Marcelloruben Dex, [address] [place] [address]  12/05/23 9299

## 2023-12-05 NOTE — ED NOTES
Blood unit #3, PRBC started with the following Vitals.   BP: 110/79  RR: 22  HR: 112  Spo2: 100 %  Temp: 98.3      Nik Coyne[address]  12/05/23 0754

## 2023-12-05 NOTE — BRIEF OP NOTE
Brief Postoperative Note    Kristen Muse  YOB: 1985  [id number]    Pre-operative Diagnosis: Variceal bleeding; massive hematemesis    Post-operative Diagnosis: Same    Procedure: TIPS stent placement; Trialysis central venous catheter placement    Anesthesia: General    Surgeons/Assistants: En    Estimated Blood Loss: less than 50     Complications: None    Specimens: Was Not Obtained    Electronically signed by Mackenzie Zafar MD on 12/5/2023 at 4:29 PM

## 2023-12-05 NOTE — ED NOTES
Writer spoke with [employer] and gave report  All questions answered      Manjit Cabrera, [address]  12/05/23 8935

## 2023-12-05 NOTE — ANESTHESIA PROCEDURE NOTES
Arterial Line:    An arterial line was placed using surface landmarks, in the OR for the following indication(s): continuous blood pressure monitoring and blood sampling needed. A 20 gauge (size), 4.45 cm (length), Arrow (type) catheter was placed, Seldinger technique used, into the left radial artery, secured by Tegaderm and tape. Anesthesia type: General    Events:  patient tolerated procedure well with no complications. 12/5/2023 10:31 AM12/5/2023 10:36 AM  Anesthesiologist: Raghav Burns MD  Resident/CRNA: Rangel Ventura, APRN - CRNA  Performed: Resident/CRNA   Preanesthetic Checklist  Completed: patient identified, IV checked, site marked, risks and benefits discussed, surgical/procedural consents, equipment checked, pre-op evaluation, timeout performed, anesthesia consent given, oxygen available, monitors applied/VS acknowledged, fire risk safety assessment completed and verbalized and blood product R/B/A discussed and consented

## 2023-12-05 NOTE — ED NOTES
Writer attempted to call [phone] nurse to give a report   No answer from car [facility], [address], [place]  12/05/23 1887

## 2023-12-05 NOTE — CONSULTS
GASTROENTEROLOGY CONSULTATION NOTE    12/5/2023      REASON FOR CONSULT: Variceal bleed    REQUESTING PHYSICIAN:  Rosendo Delgado MD    HISTORY OF PRESENT ILLNESS:    The patient is a 41 y.o. male who presents with nausea vomiting and hematemesis since yesterday. Patient has a long history of alcohol abuse and alcoholic cirrhosis and pancreatitis. Patient drinks 1/5 of whiskey daily. He has had multiple EGDs with variceal band ligation performed over the past year and a half. Patient's last EGD was in September 2023 and he was found to have a gastric varix that was banded. Patient was recommended TIPS procedure if he rebleeds. Patient has had multiple episodes of vomiting blood since presentation to the ED. I was called by the ER resident physician last night with the patient's. Due to prior history I recommended intubation, resuscitation and stabilization of the patient and emergent TIPS citing prior EGD report from September 2023. And to temporize the situation I recommended placing a Blakemore tube. Patient was started on IV pantoprazole and octreotide infusions and received multiple units of blood products including PRBCs, FFP's and platelets by the ER team, And was admitted to the ICU.     Medical History:   Past Medical History:   Diagnosis Date    Alcoholism ([address])     pt drinks a fifth of whiskey daily    Anemia     Cirrhosis, alcoholic ([address])     Pulmonary embolism (HCC)        SURGICAL HISTORY:  Past Surgical History:   Procedure Laterality Date    UPPER GASTROINTESTINAL ENDOSCOPY N/A 5/1/2022    EGD ESOPHAGOGASTRODUODENOSCOPY performed by Radha Campo MD at [facility] 10/30/2022    EGD BAND LIGATION performed by Lisa Begum MD at [facility] 9/27/2023    EGD BAND LIGATION performed by Julio Raymundo MD at [address] [facility],[address] alcoholic pancreatitis  Alcohol intoxication      RECOMMENDATIONS:   Recommended IR for emergent TIPS however was told that IR recommends an EGD first for evaluation  We will plan for emergent EGD  Continue IV octreotide and PPI infusion  Intubate for airway protection due to high aspiration risk      Please note that this chart was generated using voice recognition Dragon dictation software. Although every effort was made to ensure the accuracy of this automated transcription, some errors in transcription may have occurred.       Electronically signed by Wanda Wu MD on 12/5/2023 at 9:37 AM

## 2023-12-05 NOTE — ED NOTES
Dr. Karine Quispe notified of patient's blood pressure. 6th unit PRBC requested from lab. Release form sent.       Leticia Rogers RN  12/05/23 0613

## 2023-12-05 NOTE — ED NOTES
Home Oxygen Evaluation    Patient seen and evaluated for home oxygen needs per physician order.  A summary of the evaluation is listed below.       06/06/19 1140 06/06/19 1142 06/06/19 1144   Exercise (Home) O2 Eval   Liter Flow 0 1 2   Exercise O2 FIO2 (%) 21 % 24 % 28 %   Regulator Pulse Dose  --   --    Heart rate at rest 86 beats/min  --   --    SpO2 at rest 92 %  --   --    Heart rate during activity 110 beats/min  --   --    SpO2 during activity 85 % 86 % 90 %   Heart rate after activity  --   --   --    SpO2 after activity  --   --   --    Distance (feet)  --   --   --    Tolerance  --   --   --    $ Exercise O2 procedure complete (Pulmonary Stress Test)  --   --   --       06/06/19 1200   Exercise (Home) O2 Eval   Liter Flow 0   Exercise O2 FIO2 (%)  --    Regulator  --    Heart rate at rest 89 beats/min   SpO2 at rest 94 %   Heart rate during activity  --    SpO2 during activity  --    Heart rate after activity 89 beats/min   SpO2 after activity 94 %   Distance (feet) 80 ft   Tolerance stopped 3 times to rest her back   $ Exercise O2 procedure complete (Pulmonary Stress Test) Procedure Complete  (pt was able to use conserver tank)           Recommendations are as follows: oxygen via nasal cannula 2 lpm with activity with conserver tank.   Levophed to 10mcg/min     Harrison Flores RN  12/05/23 9135

## 2023-12-05 NOTE — ED NOTES
Patient called writer into room, states \"I actually think I'm withdrawing\". Dr Neftaly Wakefield notified. Pt appears anxious.  Tremors notified in bilateral upper extremities while patient is at rest. Heart rate 145, /85     Katerina Byrd RN  12/05/23 1345

## 2023-12-05 NOTE — ED NOTES
Pts jeans and socks and boxers were placed in patient belongings bag  Pts wallet and phone placed in the back left pocket side of jeans in belongings bag      Thai Jeffrey RN  12/05/23 5022  Nipple earring and gauges placed in specimen cup and labeled and placed in patients shoe and in belongings bag     Thai Jeffrey RN  12/05/23 3546

## 2023-12-05 NOTE — ED PROVIDER NOTES
[facility] ED  Emergency Department Encounter  Emergency Medicine Resident     Pt Name:Sam Cardenas  MRN: [id number]  [address] [facility] [place] 1985  Date of evaluation: 12/5/23  PCP:  Rafi Johnson MD    6:21 AM EST     CHIEF COMPLAINT       Chief Complaint   Patient presents with    Hematemesis     Vomiting x 35 minutes       HISTORY OF PRESENT ILLNESS  (Location/Symptom, Timing/Onset, Context/Setting, Quality, Duration, Modifying Factors, Severity.)      Yury Whelan is a 41 y.o. male who presents with with past pmhx of alco mag cirrhosis  Bleeding esophag varices s/p 3 bands  Had ICU admissions in past  Admitted for TIPS in past but patient stabilized without it after transfusions    Arrived after multiple episodes of massive hematemesis    He admits to ETOH 2 hours ago  He reports having withdrawls without it and having gone throught DTs in the past    No AC no AP  Has a wife Alisa and 16 yr old son  Denies CP abd pain or SOB  No bloody stool        PAST MEDICAL / SURGICAL / SOCIAL / FAMILY HISTORY      has a past medical history of Alcoholism ([address]), Anemia, Cirrhosis, alcoholic ([address]), and Pulmonary embolism ([address] has a past surgical history that includes Upper gastrointestinal endoscopy (N/A, 5/1/2022); Upper gastrointestinal endoscopy (N/A, 10/30/2022); and Upper gastrointestinal endoscopy (N/A, 9/27/2023). Social History     Socioeconomic History    Marital status:      Spouse name: Alejandra Wade    Number of children: 2    Years of education: Not on file    Highest education level: Not on file   Occupational History    Not on file   Tobacco Use    Smoking status: Every Day     Packs/day: 1.00     Years: 21.00     Additional pack years: 0.00     Total pack years: 21.00     Types: Cigarettes    Smokeless tobacco: Never    Tobacco comments:     using nicotine patches at night   Vaping Use    Vaping Use: Never used   Substance and Sexual Activity    Alcohol use:  Yes Patient is tachy. [LL]   2215 Hemoglobin Quant(!): 7.5 [LL]   2225 INR: 1.4 [LL]   2225 Prothrombin Time(!): 17.2 [LL]   Tue Dec 05, 2023   0001 IMPRESSION:  No arterial extravasation or abnormal vascular malformation. Stable heterogeneous attenuation of the liver with additional findings  compatible with portal hypertension. RECOMMENDATIONS:  Recommend upper endoscopy for further evaluation. Consider nonemergent IR consultation for transjugular biopsy of the liver and  hepatic hemodynamic assessment. [LL]   0001 FINDINGS:  The lungs are without acute focal process. There is no effusion or  pneumothorax. The cardiomediastinal silhouette is without acute process. The  osseous structures are without acute process. IMPRESSION:  No acute process [LL]   0024 Pt now hypotensive tachycardia - ordered blood to transfuse. Already have 2 lines.  Will run normal saline bolus through one line until blood arrives [LL]   0033 Patient becoming more hypotensive ordered 2 more units of blood products PRBC 60s over 50s BP [LL]   0036 Spoke with Dr. Mercy Betts he did recommend calling IR for emergent TIPS procedure patient is awake alert and has an improve BP and HR in the room but appears clinically worsening [LL]      ED Course User Index  [LL] Leon Alvarez MD     Patients chart not able to access during code yellow disaster    During epic down time he rcieved  4mg zofran  Compazine 10mg  Compazine 10mg  Benadryl 25mg  Benadryl 25mg  Ativan 2mg  Levo was increased up to 15  6 units total of PRBCs given during my care for the patient  1 u platelets  1 u FFP  He has urinated and passed a BM  His was was updated  He was admitted to the ICU      IR - Dr. Mandi Caraballo believes patient needs a TIPS today  Dr. Fidel Diaz recommended intubated and placing a blakemore - intubation and blakemore was prepared but patient showed signs of improvement and stabilization and decision was made to hold off  Patient then decompensated further

## 2023-12-05 NOTE — ED NOTES
Patient states he is feeling \"weird\". States his chest \"feels like its on fire\" and that he's \"all twitchy\". Pt states he does not feel like he is withdrawing at this time. Dr. Daisy Cota notified.       Ken Osbron, MINERVA  12/05/23 1253

## 2023-12-05 NOTE — ED NOTES
Dr. Binta Jaeger, Dr. Neftaly Wakefield at bedside. Katharine Hayes, RN, Ephraim, RN at bedside. Pt agreed for intubation incase the need arises, as per pt.       Lesli Orellana  12/05/23 9159

## 2023-12-06 LAB
ALBUMIN SERPL-MCNC: 2.2 G/DL (ref 3.5–5.2)
ALBUMIN SERPL-MCNC: 2.2 G/DL (ref 3.5–5.2)
ALBUMIN SERPL-MCNC: 2.3 G/DL (ref 3.5–5.2)
ALBUMIN/GLOB SERPL: 1.2 {RATIO} (ref 1–2.5)
ALBUMIN/GLOB SERPL: 1.3 {RATIO} (ref 1–2.5)
ALBUMIN/GLOB SERPL: 1.4 {RATIO} (ref 1–2.5)
ALLEN TEST: ABNORMAL
ALLEN TEST: ABNORMAL
ALP SERPL-CCNC: 60 U/L (ref 40–129)
ALP SERPL-CCNC: 60 U/L (ref 40–129)
ALP SERPL-CCNC: 64 U/L (ref 40–129)
ALT SERPL-CCNC: 28 U/L (ref 5–41)
ALT SERPL-CCNC: 29 U/L (ref 5–41)
ALT SERPL-CCNC: 31 U/L (ref 5–41)
AMMONIA PLAS-SCNC: 275 UMOL/L (ref 16–60)
ANION GAP SERPL CALCULATED.3IONS-SCNC: 5 MMOL/L (ref 9–17)
ANION GAP SERPL CALCULATED.3IONS-SCNC: 8 MMOL/L (ref 9–17)
ANION GAP SERPL CALCULATED.3IONS-SCNC: 8 MMOL/L (ref 9–17)
AST SERPL-CCNC: 259 U/L
AST SERPL-CCNC: 263 U/L
AST SERPL-CCNC: 268 U/L
BASOPHILS # BLD: 0 K/UL (ref 0–0.2)
BASOPHILS # BLD: 0.03 K/UL (ref 0–0.2)
BASOPHILS NFR BLD: 0 % (ref 0–2)
BILIRUB SERPL-MCNC: 2.9 MG/DL (ref 0.3–1.2)
BILIRUB SERPL-MCNC: 3 MG/DL (ref 0.3–1.2)
BILIRUB SERPL-MCNC: 3 MG/DL (ref 0.3–1.2)
BLOOD BANK BLOOD PRODUCT EXPIRATION DATE: NORMAL
BLOOD BANK DISPENSE STATUS: NORMAL
BLOOD BANK ISBT PRODUCT BLOOD TYPE: 5100
BLOOD BANK ISBT PRODUCT BLOOD TYPE: 6200
BLOOD BANK PRODUCT CODE: NORMAL
BLOOD BANK UNIT TYPE AND RH: NORMAL
BPU ID: NORMAL
BUN SERPL-MCNC: 17 MG/DL (ref 6–20)
BUN SERPL-MCNC: 20 MG/DL (ref 6–20)
BUN SERPL-MCNC: 22 MG/DL (ref 6–20)
CA-I BLD-SCNC: 1.11 MMOL/L (ref 1.13–1.33)
CA-I BLD-SCNC: 1.14 MMOL/L (ref 1.13–1.33)
CA-I BLD-SCNC: 1.21 MMOL/L (ref 1.13–1.33)
CALCIUM SERPL-MCNC: 7.7 MG/DL (ref 8.6–10.4)
CALCIUM SERPL-MCNC: 8 MG/DL (ref 8.6–10.4)
CALCIUM SERPL-MCNC: 8.2 MG/DL (ref 8.6–10.4)
CHLORIDE SERPL-SCNC: 114 MMOL/L (ref 98–107)
CHLORIDE SERPL-SCNC: 114 MMOL/L (ref 98–107)
CHLORIDE SERPL-SCNC: 115 MMOL/L (ref 98–107)
CO2 SERPL-SCNC: 27 MMOL/L (ref 20–31)
CO2 SERPL-SCNC: 27 MMOL/L (ref 20–31)
CO2 SERPL-SCNC: 28 MMOL/L (ref 20–31)
COMPONENT: NORMAL
CREAT SERPL-MCNC: 0.9 MG/DL (ref 0.7–1.2)
EKG ATRIAL RATE: 101 BPM
EKG P AXIS: 47 DEGREES
EKG P-R INTERVAL: 132 MS
EKG Q-T INTERVAL: 344 MS
EKG QRS DURATION: 90 MS
EKG QTC CALCULATION (BAZETT): 446 MS
EKG R AXIS: 15 DEGREES
EKG T AXIS: 75 DEGREES
EKG VENTRICULAR RATE: 101 BPM
EOSINOPHIL # BLD: 0 K/UL (ref 0–0.4)
EOSINOPHIL # BLD: 0.05 K/UL (ref 0–0.44)
EOSINOPHILS RELATIVE PERCENT: 0 % (ref 1–4)
EOSINOPHILS RELATIVE PERCENT: 1 % (ref 1–4)
ERYTHROCYTE [DISTWIDTH] IN BLOOD BY AUTOMATED COUNT: 16.2 % (ref 11.8–14.4)
ERYTHROCYTE [DISTWIDTH] IN BLOOD BY AUTOMATED COUNT: 16.3 % (ref 11.8–14.4)
ERYTHROCYTE [DISTWIDTH] IN BLOOD BY AUTOMATED COUNT: 16.8 % (ref 11.8–14.4)
ERYTHROCYTE [DISTWIDTH] IN BLOOD BY AUTOMATED COUNT: 16.9 % (ref 11.8–14.4)
FIBRINOGEN PPP-MCNC: 125 MG/DL (ref 203–521)
FIBRINOGEN, FUNCTIONAL TEG: 18.4 MM (ref 15–32)
FIBRINOGEN, FUNCTIONAL TEG: 18.5 MM (ref 15–32)
FIO2: 30
FIO2: 30
GFR SERPL CREATININE-BSD FRML MDRD: >60 ML/MIN/1.73M2
GLUCOSE BLD-MCNC: 141 MG/DL (ref 74–100)
GLUCOSE BLD-MCNC: 147 MG/DL (ref 75–110)
GLUCOSE SERPL-MCNC: 143 MG/DL (ref 70–99)
GLUCOSE SERPL-MCNC: 146 MG/DL (ref 70–99)
GLUCOSE SERPL-MCNC: 153 MG/DL (ref 70–99)
HCT VFR BLD AUTO: 19.3 % (ref 40.7–50.3)
HCT VFR BLD AUTO: 20.3 % (ref 40.7–50.3)
HCT VFR BLD AUTO: 22.6 % (ref 40.7–50.3)
HCT VFR BLD AUTO: 23.7 % (ref 40.7–50.3)
HCT VFR BLD AUTO: 25.3 % (ref 40.7–50.3)
HCT VFR BLD AUTO: 26 % (ref 40.7–50.3)
HGB BLD-MCNC: 6.6 G/DL (ref 13–17)
HGB BLD-MCNC: 6.9 G/DL (ref 13–17)
HGB BLD-MCNC: 7.5 G/DL (ref 13–17)
HGB BLD-MCNC: 8.1 G/DL (ref 13–17)
HGB BLD-MCNC: 8.2 G/DL (ref 13–17)
HGB BLD-MCNC: 8.4 G/DL (ref 13–17)
IMM GRANULOCYTES # BLD AUTO: 0 K/UL (ref 0–0.3)
IMM GRANULOCYTES # BLD AUTO: 0.03 K/UL (ref 0–0.3)
IMM GRANULOCYTES NFR BLD: 0 %
INR PPP: 1.8
INR PPP: 1.8
INR PPP: 1.9
LACTIC ACID, WHOLE BLOOD: 2 MMOL/L (ref 0.7–2.1)
LACTIC ACID, WHOLE BLOOD: 2.5 MMOL/L (ref 0.7–2.1)
LACTIC ACID, WHOLE BLOOD: 3 MMOL/L (ref 0.7–2.1)
LY30 (LYSIS) TEG: 0 % (ref 0–2.6)
LY30 (LYSIS) TEG: 0 % (ref 0–2.6)
LYMPHOCYTES NFR BLD: 0.34 K/UL (ref 1–4.8)
LYMPHOCYTES NFR BLD: 0.54 K/UL (ref 1–4.8)
LYMPHOCYTES NFR BLD: 0.73 K/UL (ref 1–4.8)
LYMPHOCYTES NFR BLD: 0.81 K/UL (ref 1.1–3.7)
LYMPHOCYTES RELATIVE PERCENT: 10 % (ref 24–43)
LYMPHOCYTES RELATIVE PERCENT: 13 % (ref 24–44)
LYMPHOCYTES RELATIVE PERCENT: 5 % (ref 24–44)
LYMPHOCYTES RELATIVE PERCENT: 9 % (ref 24–44)
MA(MAX CLOT) RAPID TEG: 50.3 MM (ref 52–70)
MA(MAX CLOT) RAPID TEG: 50.5 MM (ref 52–70)
MAGNESIUM SERPL-MCNC: 2.2 MG/DL (ref 1.6–2.6)
MAGNESIUM SERPL-MCNC: 2.2 MG/DL (ref 1.6–2.6)
MAGNESIUM SERPL-MCNC: 2.6 MG/DL (ref 1.6–2.6)
MCH RBC QN AUTO: 29.9 PG (ref 25.2–33.5)
MCH RBC QN AUTO: 30.1 PG (ref 25.2–33.5)
MCH RBC QN AUTO: 30.3 PG (ref 25.2–33.5)
MCH RBC QN AUTO: 30.5 PG (ref 25.2–33.5)
MCHC RBC AUTO-ENTMCNC: 32.4 G/DL (ref 28.4–34.8)
MCHC RBC AUTO-ENTMCNC: 34 G/DL (ref 28.4–34.8)
MCHC RBC AUTO-ENTMCNC: 34.2 G/DL (ref 28.4–34.8)
MCHC RBC AUTO-ENTMCNC: 34.2 G/DL (ref 28.4–34.8)
MCV RBC AUTO: 88.1 FL (ref 82.6–102.9)
MCV RBC AUTO: 89 FL (ref 82.6–102.9)
MCV RBC AUTO: 89.1 FL (ref 82.6–102.9)
MCV RBC AUTO: 92.3 FL (ref 82.6–102.9)
MICROORGANISM SPEC CULT: NO GROWTH
MODE: ABNORMAL
MODE: ABNORMAL
MONOCYTES NFR BLD: 0.22 K/UL (ref 0.1–0.8)
MONOCYTES NFR BLD: 0.27 K/UL (ref 0.1–0.8)
MONOCYTES NFR BLD: 0.66 K/UL (ref 0.1–0.8)
MONOCYTES NFR BLD: 0.73 K/UL (ref 0.1–1.2)
MONOCYTES NFR BLD: 11 % (ref 1–7)
MONOCYTES NFR BLD: 4 % (ref 1–7)
MONOCYTES NFR BLD: 4 % (ref 1–7)
MONOCYTES NFR BLD: 9 % (ref 3–12)
MORPHOLOGY: ABNORMAL
MRSA, DNA, NASAL: NEGATIVE
NEUTROPHILS NFR BLD: 80 % (ref 36–65)
NEUTROPHILS NFR BLD: 80 % (ref 36–66)
NEUTROPHILS NFR BLD: 83 % (ref 36–66)
NEUTROPHILS NFR BLD: 91 % (ref 36–66)
NEUTS SEG NFR BLD: 4.65 K/UL (ref 1.8–7.7)
NEUTS SEG NFR BLD: 4.8 K/UL (ref 1.8–7.7)
NEUTS SEG NFR BLD: 6.09 K/UL (ref 1.8–7.7)
NEUTS SEG NFR BLD: 6.12 K/UL (ref 1.5–8.1)
NRBC BLD-RTO: 0.3 PER 100 WBC
NRBC BLD-RTO: 0.4 PER 100 WBC
NRBC BLD-RTO: 0.4 PER 100 WBC
NRBC BLD-RTO: 0.5 PER 100 WBC
O2 DELIVERY DEVICE: ABNORMAL
O2 DELIVERY DEVICE: ABNORMAL
PARTIAL THROMBOPLASTIN TIME: 35.4 SEC (ref 23–36.5)
PARTIAL THROMBOPLASTIN TIME: 37.6 SEC (ref 23–36.5)
PARTIAL THROMBOPLASTIN TIME: 38.4 SEC (ref 23–36.5)
PHOSPHATE SERPL-MCNC: 4.4 MG/DL (ref 2.5–4.5)
PHOSPHATE SERPL-MCNC: 5 MG/DL (ref 2.5–4.5)
PHOSPHATE SERPL-MCNC: 5.2 MG/DL (ref 2.5–4.5)
PLATELET # BLD AUTO: 61 K/UL (ref 138–453)
PLATELET # BLD AUTO: 65 K/UL (ref 138–453)
PLATELET # BLD AUTO: 72 K/UL (ref 138–453)
PLATELET # BLD AUTO: 72 K/UL (ref 138–453)
PLATELET # BLD AUTO: 82 K/UL (ref 138–453)
PMV BLD AUTO: 10.4 FL (ref 8.1–13.5)
POC HCO3: 27.6 MMOL/L (ref 21–28)
POC HCO3: 28 MMOL/L (ref 21–28)
POC O2 SATURATION: 97.2 % (ref 94–98)
POC O2 SATURATION: 98.2 % (ref 94–98)
POC PCO2: 36.6 MM HG (ref 35–48)
POC PCO2: 38.3 MM HG (ref 35–48)
POC PH: 7.47 (ref 7.35–7.45)
POC PH: 7.49 (ref 7.35–7.45)
POC PO2: 87 MM HG (ref 83–108)
POC PO2: 98.8 MM HG (ref 83–108)
POSITIVE BASE EXCESS, ART: 3.8 MMOL/L (ref 0–3)
POSITIVE BASE EXCESS, ART: 4 MMOL/L (ref 0–3)
POTASSIUM SERPL-SCNC: 3.9 MMOL/L (ref 3.7–5.3)
POTASSIUM SERPL-SCNC: 3.9 MMOL/L (ref 3.7–5.3)
POTASSIUM SERPL-SCNC: 4 MMOL/L (ref 3.7–5.3)
PROT SERPL-MCNC: 3.9 G/DL (ref 6.4–8.3)
PROT SERPL-MCNC: 3.9 G/DL (ref 6.4–8.3)
PROT SERPL-MCNC: 4 G/DL (ref 6.4–8.3)
PROTHROMBIN TIME: 20.5 SEC (ref 11.7–14.9)
PROTHROMBIN TIME: 20.6 SEC (ref 11.7–14.9)
PROTHROMBIN TIME: 21.1 SEC (ref 11.7–14.9)
RBC # BLD AUTO: 2.19 M/UL (ref 4.21–5.77)
RBC # BLD AUTO: 2.28 M/UL (ref 4.21–5.77)
RBC # BLD AUTO: 2.66 M/UL (ref 4.21–5.77)
RBC # BLD AUTO: 2.74 M/UL (ref 4.21–5.77)
RBC # BLD: ABNORMAL 10*6/UL
REACTION TIME TEG: 8.2 MIN (ref 4.6–9.1)
REACTION TIME TEG: 8.7 MIN (ref 4.6–9.1)
SAMPLE SITE: ABNORMAL
SAMPLE SITE: ABNORMAL
SODIUM SERPL-SCNC: 148 MMOL/L (ref 135–144)
SODIUM SERPL-SCNC: 149 MMOL/L (ref 135–144)
SODIUM SERPL-SCNC: 149 MMOL/L (ref 135–144)
SPECIMEN DESCRIPTION: NORMAL
SPECIMEN DESCRIPTION: NORMAL
TRANSFUSION STATUS: NORMAL
UNIT DIVISION: 0
UNIT ISSUE DATE/TIME: NORMAL
WBC OTHER # BLD: 5.6 K/UL (ref 3.5–11.3)
WBC OTHER # BLD: 6 K/UL (ref 3.5–11.3)
WBC OTHER # BLD: 6.7 K/UL (ref 3.5–11.3)
WBC OTHER # BLD: 7.8 K/UL (ref 3.5–11.3)

## 2023-12-06 PROCEDURE — 2580000003 HC RX 258: Performed by: STUDENT IN AN ORGANIZED HEALTH CARE EDUCATION/TRAINING PROGRAM

## 2023-12-06 PROCEDURE — 6360000002 HC RX W HCPCS: Performed by: STUDENT IN AN ORGANIZED HEALTH CARE EDUCATION/TRAINING PROGRAM

## 2023-12-06 PROCEDURE — 51701 INSERT BLADDER CATHETER: CPT

## 2023-12-06 PROCEDURE — 85384 FIBRINOGEN ACTIVITY: CPT

## 2023-12-06 PROCEDURE — 82803 BLOOD GASES ANY COMBINATION: CPT

## 2023-12-06 PROCEDURE — 6360000002 HC RX W HCPCS: Performed by: INTERNAL MEDICINE

## 2023-12-06 PROCEDURE — 6360000002 HC RX W HCPCS

## 2023-12-06 PROCEDURE — 37799 UNLISTED PX VASCULAR SURGERY: CPT

## 2023-12-06 PROCEDURE — 2700000000 HC OXYGEN THERAPY PER DAY

## 2023-12-06 PROCEDURE — 51798 US URINE CAPACITY MEASURE: CPT

## 2023-12-06 PROCEDURE — 99291 CRITICAL CARE FIRST HOUR: CPT | Performed by: INTERNAL MEDICINE

## 2023-12-06 PROCEDURE — 85576 BLOOD PLATELET AGGREGATION: CPT

## 2023-12-06 PROCEDURE — 85018 HEMOGLOBIN: CPT

## 2023-12-06 PROCEDURE — 80053 COMPREHEN METABOLIC PANEL: CPT

## 2023-12-06 PROCEDURE — 2000000000 HC ICU R&B

## 2023-12-06 PROCEDURE — 82140 ASSAY OF AMMONIA: CPT

## 2023-12-06 PROCEDURE — 84100 ASSAY OF PHOSPHORUS: CPT

## 2023-12-06 PROCEDURE — 2580000003 HC RX 258: Performed by: NURSE PRACTITIONER

## 2023-12-06 PROCEDURE — 6370000000 HC RX 637 (ALT 250 FOR IP): Performed by: INTERNAL MEDICINE

## 2023-12-06 PROCEDURE — 85610 PROTHROMBIN TIME: CPT

## 2023-12-06 PROCEDURE — 2580000003 HC RX 258: Performed by: INTERNAL MEDICINE

## 2023-12-06 PROCEDURE — 2580000003 HC RX 258

## 2023-12-06 PROCEDURE — 6360000002 HC RX W HCPCS: Performed by: NURSE PRACTITIONER

## 2023-12-06 PROCEDURE — 85049 AUTOMATED PLATELET COUNT: CPT

## 2023-12-06 PROCEDURE — 82947 ASSAY GLUCOSE BLOOD QUANT: CPT

## 2023-12-06 PROCEDURE — 83605 ASSAY OF LACTIC ACID: CPT

## 2023-12-06 PROCEDURE — 85014 HEMATOCRIT: CPT

## 2023-12-06 PROCEDURE — P9016 RBC LEUKOCYTES REDUCED: HCPCS

## 2023-12-06 PROCEDURE — P9073 PLATELETS PHERESIS PATH REDU: HCPCS

## 2023-12-06 PROCEDURE — 85730 THROMBOPLASTIN TIME PARTIAL: CPT

## 2023-12-06 PROCEDURE — 94003 VENT MGMT INPAT SUBQ DAY: CPT

## 2023-12-06 PROCEDURE — 36430 TRANSFUSION BLD/BLD COMPNT: CPT

## 2023-12-06 PROCEDURE — 85347 COAGULATION TIME ACTIVATED: CPT

## 2023-12-06 PROCEDURE — 93010 ELECTROCARDIOGRAM REPORT: CPT | Performed by: INTERNAL MEDICINE

## 2023-12-06 PROCEDURE — 82330 ASSAY OF CALCIUM: CPT

## 2023-12-06 PROCEDURE — 85025 COMPLETE CBC W/AUTO DIFF WBC: CPT

## 2023-12-06 PROCEDURE — 85390 FIBRINOLYSINS SCREEN I&R: CPT

## 2023-12-06 PROCEDURE — 2500000003 HC RX 250 WO HCPCS: Performed by: STUDENT IN AN ORGANIZED HEALTH CARE EDUCATION/TRAINING PROGRAM

## 2023-12-06 PROCEDURE — 83735 ASSAY OF MAGNESIUM: CPT

## 2023-12-06 PROCEDURE — 94761 N-INVAS EAR/PLS OXIMETRY MLT: CPT

## 2023-12-06 PROCEDURE — C9113 INJ PANTOPRAZOLE SODIUM, VIA: HCPCS | Performed by: INTERNAL MEDICINE

## 2023-12-06 RX ORDER — SODIUM CHLORIDE, SODIUM LACTATE, POTASSIUM CHLORIDE, CALCIUM CHLORIDE 600; 310; 30; 20 MG/100ML; MG/100ML; MG/100ML; MG/100ML
INJECTION, SOLUTION INTRAVENOUS CONTINUOUS
Status: DISCONTINUED | OUTPATIENT
Start: 2023-12-06 | End: 2023-12-06

## 2023-12-06 RX ORDER — CALCIUM GLUCONATE 20 MG/ML
2000 INJECTION, SOLUTION INTRAVENOUS ONCE
Status: COMPLETED | OUTPATIENT
Start: 2023-12-06 | End: 2023-12-06

## 2023-12-06 RX ORDER — SODIUM CHLORIDE 9 MG/ML
INJECTION, SOLUTION INTRAVENOUS CONTINUOUS
Status: DISCONTINUED | OUTPATIENT
Start: 2023-12-06 | End: 2023-12-06

## 2023-12-06 RX ORDER — SODIUM CHLORIDE 9 MG/ML
INJECTION, SOLUTION INTRAVENOUS PRN
Status: DISCONTINUED | OUTPATIENT
Start: 2023-12-06 | End: 2023-12-09

## 2023-12-06 RX ORDER — SODIUM CHLORIDE, SODIUM LACTATE, POTASSIUM CHLORIDE, CALCIUM CHLORIDE 600; 310; 30; 20 MG/100ML; MG/100ML; MG/100ML; MG/100ML
INJECTION, SOLUTION INTRAVENOUS CONTINUOUS
Status: DISCONTINUED | OUTPATIENT
Start: 2023-12-06 | End: 2023-12-07

## 2023-12-06 RX ORDER — SODIUM CHLORIDE 9 MG/ML
INJECTION, SOLUTION INTRAVENOUS PRN
Status: DISCONTINUED | OUTPATIENT
Start: 2023-12-06 | End: 2023-12-06

## 2023-12-06 RX ADMIN — SODIUM CHLORIDE, PRESERVATIVE FREE 10 ML: 5 INJECTION INTRAVENOUS at 07:47

## 2023-12-06 RX ADMIN — PHYTONADIONE 10 MG: 10 INJECTION, EMULSION INTRAMUSCULAR; INTRAVENOUS; SUBCUTANEOUS at 11:34

## 2023-12-06 RX ADMIN — SODIUM CHLORIDE: 9 INJECTION, SOLUTION INTRAVENOUS at 10:23

## 2023-12-06 RX ADMIN — SODIUM CHLORIDE, PRESERVATIVE FREE 10 ML: 5 INJECTION INTRAVENOUS at 21:35

## 2023-12-06 RX ADMIN — PANTOPRAZOLE SODIUM 8 MG/HR: 40 INJECTION, POWDER, FOR SOLUTION INTRAVENOUS at 03:11

## 2023-12-06 RX ADMIN — PROPOFOL 25 MCG/KG/MIN: 10 INJECTION, EMULSION INTRAVENOUS at 21:34

## 2023-12-06 RX ADMIN — SODIUM CHLORIDE, POTASSIUM CHLORIDE, SODIUM LACTATE AND CALCIUM CHLORIDE: 600; 310; 30; 20 INJECTION, SOLUTION INTRAVENOUS at 10:50

## 2023-12-06 RX ADMIN — CALCIUM GLUCONATE 2000 MG: 20 INJECTION, SOLUTION INTRAVENOUS at 10:21

## 2023-12-06 RX ADMIN — Medication 100 MCG/HR: at 10:36

## 2023-12-06 RX ADMIN — SODIUM CHLORIDE: 9 INJECTION, SOLUTION INTRAVENOUS at 06:47

## 2023-12-06 RX ADMIN — WATER: 100 IRRIGANT IRRIGATION at 18:46

## 2023-12-06 RX ADMIN — PROPOFOL 50 MCG/KG/MIN: 10 INJECTION, EMULSION INTRAVENOUS at 01:06

## 2023-12-06 RX ADMIN — PROPOFOL 50 MCG/KG/MIN: 10 INJECTION, EMULSION INTRAVENOUS at 04:44

## 2023-12-06 RX ADMIN — PROPOFOL 30 MCG/KG/MIN: 10 INJECTION, EMULSION INTRAVENOUS at 10:25

## 2023-12-06 RX ADMIN — PROPOFOL 40 MCG/KG/MIN: 10 INJECTION, EMULSION INTRAVENOUS at 15:04

## 2023-12-06 RX ADMIN — PANTOPRAZOLE SODIUM 8 MG/HR: 40 INJECTION, POWDER, FOR SOLUTION INTRAVENOUS at 13:47

## 2023-12-06 RX ADMIN — Medication 1000 MG: at 21:34

## 2023-12-06 RX ADMIN — SODIUM CHLORIDE: 9 INJECTION, SOLUTION INTRAVENOUS at 11:32

## 2023-12-06 RX ADMIN — OCTREOTIDE ACETATE 50 MCG/HR: 500 INJECTION, SOLUTION INTRAVENOUS; SUBCUTANEOUS at 15:01

## 2023-12-06 ASSESSMENT — PULMONARY FUNCTION TESTS
PIF_VALUE: 20
PIF_VALUE: 19
PIF_VALUE: 21
PIF_VALUE: 26

## 2023-12-06 NOTE — PROGRESS NOTES
[facility]    GASTROENTEROLOGY PROGRESS NOTE    Patient:   Edita Stallworth   :    1985   Facility:   [address] [facility]   Date:    2023  Admission Dx:  Upper GI bleed [K92.2]  Acute pancreatitis, unspecified complication status, unspecified pancreatitis type [J84.39]  Alcoholic cirrhosis, unspecified whether ascites present ([address]) [K70.30]  Requesting physician: July Perales MD  Reason for consult:  Hematemesis   CC : \"Hematemesis\"    SUBJECTIVE     HISTORY OF PRESENT ILLNESS  This is a 41 y.o. pleasant  male who was admitted 2023 with hematemesis, acute pancreatitis and alcoholic cirrhosis. We have been asked to see the patient in consultation by July Perales MD for further evaluation and management of upper GI bleed. He has a past medical history significant for chronic alcoholism, hypertension, chronic anemia, pulmonary embolism, alcoholic cirrhosis and esophageal varices status post banding. He presented to the ED with multiple episodes of large amount of bloody emesis that began yesterday. Patient reported drinking alcohol 2 hours prior to coming to the ED. He was endorsing abdominal pain, shortness of breath and vomiting. He was recently admitted in the ICU in September for episode of upper GI bleed and underwent esophageal banding of the esophageal varices in the mid and the distal esophagus. He was hypotensive and tachycardic upon arrival to the ED. His hemoglobin was 7.5 and he received PRBC. He was started on octreotide drip, Protonix drip and Rocephin by the ED. GI was consulted for the concern of upper GI bleeding due to the esophageal varices. He underwent emergent EGD which showed siobhan blood in the stomach. Grade 1 and 2 esophageal varices in the distal esophagus without bleeding or stigmata was noted.   Large blood clot was found in the gastric body and fundus with active bleeding underneath the clot which was guiding catheter-guidewire combination, the catheter was negotiated into the main portal vein and further inferiorly into the SMV. The tract was dilated using an 8 mm balloon to allow advancement of the sheath further into the tract and into the portal system. Through the sheath, a sizing pigtail catheter was inserted and digital subtraction portography was performed. This allowed the appropriate stent length to be chosen. A 8 cm covered-2 cm uncovered x 10 mm Viatorr stent was then advanced into the sheath and was successfully deployed with the uncovered portion within the portal vein and the covered portion extending from the parenchymal tract into the right hepatic vein. This was followed by inflation using an 8 mm x 4 cm balloon catheter. The 5 [place] catheter was again advanced into the portal vein and a portal venogram was performed which revealed persistent mild gastroesophageal varices. A decision was made to dilate the stent using 10 mm x 4 cm balloon catheter. Completion portography demonstrated interval resolution of the gastroesophageal varices and appropriate positioning and patency of the Viatorr stent. The catheter was removed over a guidewire. The service requested a placement of a central venous catheter. Therefore, the introducer sheath was removed over the wire to allow placement of a 15.5 [place] [employer] central VT catheter after the tract was dilated. Final image demonstrated catheter tip terminating in the distal SVC. All ports aspirated and flushed appropriately. The catheter was sutured to the skin and dressed appropriately. The patient left the IR department in stable condition. Successful placement of a TIPS stent. Placement of a right internal jugular 5.5 [place] Trial[employer] central vein catheter.      IR NONTUNNELED VASCULAR CATHETER > 5 YEARS    Result Date: 12/6/2023  PROCEDURE: VR INSERTION OF TIPS; IR NONTUNNELED VASCULAR CATHETER General anesthesia 12/5/2023 HISTORY:

## 2023-12-06 NOTE — PLAN OF CARE
Problem: Safety - Adult  Goal: Free from fall injury  Outcome: Progressing     Problem: Discharge Planning  Goal: Discharge to home or other facility with appropriate resources  Outcome: Progressing     Problem: Safety - Medical Restraint  Goal: Remains free of injury from restraints (Restraint for Interference with Medical Device)  Description: INTERVENTIONS:  1. Determine that other, less restrictive measures have been tried or would not be effective before applying the restraint  2. Evaluate the patient's condition at the time of restraint application  3. Inform patient/family regarding the reason for restraint  4. Q2H: Monitor safety, psychosocial status, comfort, nutrition and hydration  Outcome: Progressing  Flowsheets  Taken 12/5/2023 1800 by Pascual Elliott RN  Remains free of injury from restraints (restraint for interference with medical device): Every 2 hours: Monitor safety, psychosocial status, comfort, nutrition and hydration  Taken 12/5/2023 1724 by Pascual Elliott RN  Remains free of injury from restraints (restraint for interference with medical device): Every 2 hours: Monitor safety, psychosocial status, comfort, nutrition and hydration     Problem: Skin/Tissue Integrity  Goal: Absence of new skin breakdown  Description: 1. Monitor for areas of redness and/or skin breakdown  2. Assess vascular access sites hourly  3. Every 4-6 hours minimum:  Change oxygen saturation probe site  4. Every 4-6 hours:  If on nasal continuous positive airway pressure, respiratory therapy assess nares and determine need for appliance change or resting period.   Outcome: Progressing     Problem: Pain  Goal: Verbalizes/displays adequate comfort level or baseline comfort level  Outcome: Progressing

## 2023-12-06 NOTE — PROGRESS NOTES
Comprehensive Nutrition Assessment    Type and Reason for Visit:  Initial (Vent Check)    Nutrition Recommendations/Plan:   Continue NPO. Monitor plans for nutrition and plan of care. If nutrition support requested, suggest TF of Peptide Based High Protein formula goal rate 50 mL/hr with current rate of propofol. Will provide 1200 kcals (+propofol kcals), 105 gm protein per day. Malnutrition Assessment:  Malnutrition Status:  Insufficient data (12/06/23 1408)    Context:  Chronic Illness     Findings of the 6 clinical characteristics of malnutrition:  Energy Intake:  Mild decrease in energy intake  Weight Loss:  Unable to assess     Body Fat Loss:  Unable to assess   Muscle Mass Loss:  Unable to assess  Fluid Accumulation:  Mild Extremities   Strength:  Not Performed    Nutrition Assessment:    Pt admitted with hematemesis. Pt intubated for EGD and TIPS stent placement for variceal bleeding. Propofol running at 16.9 mL/hr. PMH: h/o alcohol abuse, alcoholic liver cirrhosis, h/o esophageal banding, HTN. Jass Matters Remains NPO currently. Will monitor plans for nutrition and provide recommendations for nutrition support. Weight fluctuations noted per chart review. Labs reviewed: Ammonia 275 umol/L, Na 148 mmol/L, Ca 7.7 mg/dL. Meds reviewed. Nutrition Related Findings:    Labs/Meds reviewed. +NGT. Wound Type: None       Current Nutrition Intake & Therapies:    Average Meal Intake: NPO  Average Supplements Intake: NPO  Diet NPO  Additional Calorie Sources:  Propofol at 16.9 mL/hr = 446 kcals    Anthropometric Measures:  Height: 172.7 cm (5' 7.99\")  Ideal Body Weight (IBW): 154 lbs (70 kg)    Admission Body Weight: 88.1 kg (194 lb 3.6 oz)  Current Body Weight: 88.1 kg (194 lb 3.6 oz), 126.1 % IBW. Weight Source: Bed Scale  Current BMI (kg/m2): 29.5        Weight Adjustment For: No Adjustment                 BMI Categories: Overweight (BMI 25.0-29. 9)    Estimated Daily Nutrient Needs:  Energy Requirements Based On: Formula  Weight Used for Energy Requirements: Admission  Energy (kcal/day): 3525-2209 kcals/day  Weight Used for Protein Requirements: Ideal  Protein (g/day):  gm pro/day  Method Used for Fluid Requirements: Other (Comment)  Fluid (ml/day): per MD    Nutrition Diagnosis:   Inadequate oral intake related to impaired respiratory function as evidenced by NPO or clear liquid status due to medical condition    Nutrition Interventions:   Food and/or Nutrient Delivery: Continue NPO (Monitor plans for nutrition and plan of care.)  Nutrition Education/Counseling: No recommendation at this time  Coordination of Nutrition Care: Continue to monitor while inpatient       Goals:  Previous Goal Met:  (Goal Set)  Goals: Meet at least 75% of estimated needs, prior to discharge       Nutrition Monitoring and Evaluation:   Behavioral-Environmental Outcomes: None Identified  Food/Nutrient Intake Outcomes: Diet Advancement/Tolerance  Physical Signs/Symptoms Outcomes: Biochemical Data, GI Status, Fluid Status or Edema, Hemodynamic Status, Weight, Skin, Nutrition Focused Physical Findings    Discharge Planning:     Too soon to determine     Dylan [place] [address] [facility] [place]  Contact: [phone] / [phone]

## 2023-12-06 NOTE — PLAN OF CARE
Problem: Safety - Adult  Goal: Free from fall injury  12/6/2023 0916 by Jennyfer Ferris RN  Outcome: Progressing  12/5/2023 2134 by Jhoan Little RN  Outcome: Progressing    Problem: Skin/Tissue Integrity  Goal: Absence of new skin breakdown  Description: 1. Monitor for areas of redness and/or skin breakdown  2. Assess vascular access sites hourly  3. Every 4-6 hours minimum:  Change oxygen saturation probe site  4. Every 4-6 hours:  If on nasal continuous positive airway pressure, respiratory therapy assess nares and determine need for appliance change or resting period. 12/5/2023 2134 by Jhoan Little RN  Outcome: Progressing     Problem: Pain  Goal: Verbalizes/displays adequate comfort level or baseline comfort level  12/6/2023 0916 by Jennyfer Ferris RN  Outcome: Progressing  Flowsheets (Taken 12/6/2023 3999)  Verbalizes/displays adequate comfort level or baseline comfort level:   Encourage patient to monitor pain and request assistance   Administer analgesics based on type and severity of pain and evaluate response   Consider cultural and social influences on pain and pain management  12/5/2023 2134 by Jhoan Little RN  Outcome: Progressing     Problem: Respiratory - Adult  Goal: Achieves optimal ventilation and oxygenation  12/6/2023 0916 by Jennyfer Ferris RN  Outcome: Progressing  12/6/2023 0840 by Erik Colby RCP  Outcome: Progressing        Problem: Discharge Planning  Goal: Discharge to home or other facility with appropriate resources  12/6/2023 0916 by Jennyfer Ferris RN  Outcome: Progressing  12/5/2023 2134 by Jhoan Little RN  Outcome: Progressing     Problem: Safety - Medical Restraint  Goal: Remains free of injury from restraints (Restraint for Interference with Medical Device)  Description: INTERVENTIONS:  1. Determine that other, less restrictive measures have been tried or would not be effective before applying the restraint  2.  Evaluate the patient's condition at the time of restraint application  3. Inform patient/family regarding the reason for restraint  4.  Q2H: Monitor safety, psychosocial status, comfort, nutrition and hydration  12/6/2023 0916 by Donnei Stephenson RN  Outcome: Progressing  Flowsheets  Taken 12/6/2023 0800 by Donnie Stephenson RN  Remains free of injury from restraints (restraint for interference with medical device): Every 2 hours: Monitor safety, psychosocial status, comfort, nutrition and hydration  Taken 12/6/2023 0600 by Flako Mcfarlane RN  Remains free of injury from restraints (restraint for interference with medical device): Every 2 hours: Monitor safety, psychosocial status, comfort, nutrition and hydration  Taken 12/6/2023 0400 by Flako Mcfarlane RN  Remains free of injury from restraints (restraint for interference with medical device): Every 2 hours: Monitor safety, psychosocial status, comfort, nutrition and hydration  Taken 12/6/2023 0200 by Flako Mcfarlane RN  Remains free of injury from restraints (restraint for interference with medical device): Every 2 hours: Monitor safety, psychosocial status, comfort, nutrition and hydration  Taken 12/6/2023 0000 by Flako Mcfarlane RN  Remains free of injury from restraints (restraint for interference with medical device): Every 2 hours: Monitor safety, psychosocial status, comfort, nutrition and hydration  Taken 12/5/2023 2200 by Flako Mcfarlane RN  Remains free of injury from restraints (restraint for interference with medical device): Every 2 hours: Monitor safety, psychosocial status, comfort, nutrition and hydration  12/5/2023 2134 by Flako Mcfarlane RN  Outcome: Progressing  Flowsheets  Taken 12/5/2023 2000 by Flako Mcfarlane RN  Remains free of injury from restraints (restraint for interference with medical device): Every 2 hours: Monitor safety, psychosocial status, comfort, nutrition and hydration  Taken 12/5/2023 1800 by Allison Luong RN  Remains free of injury from restraints

## 2023-12-06 NOTE — FLOWSHEET NOTE
Girlfriend Charlie Carrillo called and updated on pts condition. All questions answered and support given. Pts father is still living.      Dad Frances Ken [phone]  [place] (Farhan's wife) [phone]

## 2023-12-07 LAB
ABO + RH BLD: NORMAL
ALBUMIN SERPL-MCNC: 2.4 G/DL (ref 3.5–5.2)
ALBUMIN/GLOB SERPL: 1.2 {RATIO} (ref 1–2.5)
ALP SERPL-CCNC: 78 U/L (ref 40–129)
ALT SERPL-CCNC: 30 U/L (ref 5–41)
AMMONIA PLAS-SCNC: 210 UMOL/L (ref 16–60)
ANION GAP SERPL CALCULATED.3IONS-SCNC: 5 MMOL/L (ref 9–17)
ARM BAND NUMBER: NORMAL
AST SERPL-CCNC: 208 U/L
BILIRUB SERPL-MCNC: 3 MG/DL (ref 0.3–1.2)
BLOOD BANK BLOOD PRODUCT EXPIRATION DATE: NORMAL
BLOOD BANK DISPENSE STATUS: NORMAL
BLOOD BANK ISBT PRODUCT BLOOD TYPE: 5100
BLOOD BANK ISBT PRODUCT BLOOD TYPE: 600
BLOOD BANK ISBT PRODUCT BLOOD TYPE: 6200
BLOOD BANK ISBT PRODUCT BLOOD TYPE: 6200
BLOOD BANK ISBT PRODUCT BLOOD TYPE: 7300
BLOOD BANK ISBT PRODUCT BLOOD TYPE: 7300
BLOOD BANK PRODUCT CODE: NORMAL
BLOOD BANK SAMPLE EXPIRATION: NORMAL
BLOOD BANK UNIT TYPE AND RH: NORMAL
BLOOD GROUP ANTIBODIES SERPL: NEGATIVE
BPU ID: NORMAL
BUN SERPL-MCNC: 34 MG/DL (ref 6–20)
CA-I BLD-SCNC: 1.14 MMOL/L (ref 1.13–1.33)
CALCIUM SERPL-MCNC: 7.5 MG/DL (ref 8.6–10.4)
CHLORIDE SERPL-SCNC: 119 MMOL/L (ref 98–107)
CO2 SERPL-SCNC: 27 MMOL/L (ref 20–31)
COMPONENT: NORMAL
CREAT SERPL-MCNC: 0.8 MG/DL (ref 0.7–1.2)
ERYTHROCYTE [DISTWIDTH] IN BLOOD BY AUTOMATED COUNT: 16.9 % (ref 11.8–14.4)
FIO2: 30
GFR SERPL CREATININE-BSD FRML MDRD: >60 ML/MIN/1.73M2
GLUCOSE BLD-MCNC: 110 MG/DL (ref 75–110)
GLUCOSE BLD-MCNC: 99 MG/DL (ref 74–100)
GLUCOSE SERPL-MCNC: 109 MG/DL (ref 70–99)
HCT VFR BLD AUTO: 21.9 % (ref 40.7–50.3)
HCT VFR BLD AUTO: 22.3 % (ref 40.7–50.3)
HCT VFR BLD AUTO: 22.8 % (ref 40.7–50.3)
HCT VFR BLD AUTO: 24 % (ref 40.7–50.3)
HGB BLD-MCNC: 7 G/DL (ref 13–17)
HGB BLD-MCNC: 7.1 G/DL (ref 13–17)
HGB BLD-MCNC: 7.3 G/DL (ref 13–17)
HGB BLD-MCNC: 7.9 G/DL (ref 13–17)
MCH RBC QN AUTO: 29.8 PG (ref 25.2–33.5)
MCHC RBC AUTO-ENTMCNC: 32 G/DL (ref 28.4–34.8)
MCV RBC AUTO: 93.1 FL (ref 82.6–102.9)
MODE: NORMAL
NRBC BLD-RTO: 0.4 PER 100 WBC
PHOSPHATE SERPL-MCNC: 2.8 MG/DL (ref 2.5–4.5)
PLATELET # BLD AUTO: 65 K/UL (ref 138–453)
PMV BLD AUTO: 10.7 FL (ref 8.1–13.5)
POC HCO3: 25.7 MMOL/L (ref 21–28)
POC O2 SATURATION: 97.7 % (ref 94–98)
POC PCO2: 37 MM HG (ref 35–48)
POC PH: 7.45 (ref 7.35–7.45)
POC PO2: 93.8 MM HG (ref 83–108)
POSITIVE BASE EXCESS, ART: 1.6 MMOL/L (ref 0–3)
POTASSIUM SERPL-SCNC: 4 MMOL/L (ref 3.7–5.3)
PROT SERPL-MCNC: 4.4 G/DL (ref 6.4–8.3)
RBC # BLD AUTO: 2.45 M/UL (ref 4.21–5.77)
SODIUM SERPL-SCNC: 151 MMOL/L (ref 135–144)
TRANSFUSION STATUS: NORMAL
UNIT DIVISION: 0
UNIT ISSUE DATE/TIME: NORMAL
WBC OTHER # BLD: 7.2 K/UL (ref 3.5–11.3)

## 2023-12-07 PROCEDURE — 6360000002 HC RX W HCPCS: Performed by: INTERNAL MEDICINE

## 2023-12-07 PROCEDURE — 2720000010 HC SURG SUPPLY STERILE: Performed by: INTERNAL MEDICINE

## 2023-12-07 PROCEDURE — 36415 COLL VENOUS BLD VENIPUNCTURE: CPT

## 2023-12-07 PROCEDURE — 2500000003 HC RX 250 WO HCPCS: Performed by: STUDENT IN AN ORGANIZED HEALTH CARE EDUCATION/TRAINING PROGRAM

## 2023-12-07 PROCEDURE — 94003 VENT MGMT INPAT SUBQ DAY: CPT

## 2023-12-07 PROCEDURE — 2580000003 HC RX 258: Performed by: STUDENT IN AN ORGANIZED HEALTH CARE EDUCATION/TRAINING PROGRAM

## 2023-12-07 PROCEDURE — 3609017100 HC EGD: Performed by: INTERNAL MEDICINE

## 2023-12-07 PROCEDURE — 82140 ASSAY OF AMMONIA: CPT

## 2023-12-07 PROCEDURE — 2709999900 HC NON-CHARGEABLE SUPPLY: Performed by: INTERNAL MEDICINE

## 2023-12-07 PROCEDURE — 3609012900 HC EGD FOREIGN BODY REMOVAL: Performed by: INTERNAL MEDICINE

## 2023-12-07 PROCEDURE — 2580000003 HC RX 258: Performed by: INTERNAL MEDICINE

## 2023-12-07 PROCEDURE — 36600 WITHDRAWAL OF ARTERIAL BLOOD: CPT

## 2023-12-07 PROCEDURE — 99291 CRITICAL CARE FIRST HOUR: CPT | Performed by: INTERNAL MEDICINE

## 2023-12-07 PROCEDURE — 2000000000 HC ICU R&B

## 2023-12-07 PROCEDURE — 82947 ASSAY GLUCOSE BLOOD QUANT: CPT

## 2023-12-07 PROCEDURE — 86901 BLOOD TYPING SEROLOGIC RH(D): CPT

## 2023-12-07 PROCEDURE — 85027 COMPLETE CBC AUTOMATED: CPT

## 2023-12-07 PROCEDURE — 51798 US URINE CAPACITY MEASURE: CPT

## 2023-12-07 PROCEDURE — 6360000002 HC RX W HCPCS: Performed by: NURSE PRACTITIONER

## 2023-12-07 PROCEDURE — 51701 INSERT BLADDER CATHETER: CPT

## 2023-12-07 PROCEDURE — 86850 RBC ANTIBODY SCREEN: CPT

## 2023-12-07 PROCEDURE — 85014 HEMATOCRIT: CPT

## 2023-12-07 PROCEDURE — 82330 ASSAY OF CALCIUM: CPT

## 2023-12-07 PROCEDURE — C9113 INJ PANTOPRAZOLE SODIUM, VIA: HCPCS | Performed by: INTERNAL MEDICINE

## 2023-12-07 PROCEDURE — 2580000003 HC RX 258

## 2023-12-07 PROCEDURE — 82803 BLOOD GASES ANY COMBINATION: CPT

## 2023-12-07 PROCEDURE — 94761 N-INVAS EAR/PLS OXIMETRY MLT: CPT

## 2023-12-07 PROCEDURE — 80053 COMPREHEN METABOLIC PANEL: CPT

## 2023-12-07 PROCEDURE — 3609012300 HC EGD BAND LIGATION ESOPHGEAL/GASTRIC VARICES: Performed by: INTERNAL MEDICINE

## 2023-12-07 PROCEDURE — 6370000000 HC RX 637 (ALT 250 FOR IP)

## 2023-12-07 PROCEDURE — 6360000002 HC RX W HCPCS: Performed by: STUDENT IN AN ORGANIZED HEALTH CARE EDUCATION/TRAINING PROGRAM

## 2023-12-07 PROCEDURE — 86900 BLOOD TYPING SEROLOGIC ABO: CPT

## 2023-12-07 PROCEDURE — 2700000000 HC OXYGEN THERAPY PER DAY

## 2023-12-07 PROCEDURE — 85018 HEMOGLOBIN: CPT

## 2023-12-07 PROCEDURE — 2580000003 HC RX 258: Performed by: NURSE PRACTITIONER

## 2023-12-07 PROCEDURE — 84100 ASSAY OF PHOSPHORUS: CPT

## 2023-12-07 RX ORDER — FENTANYL CITRATE 50 UG/ML
50 INJECTION, SOLUTION INTRAMUSCULAR; INTRAVENOUS ONCE
Status: DISCONTINUED | OUTPATIENT
Start: 2023-12-07 | End: 2023-12-09

## 2023-12-07 RX ORDER — LACTULOSE 10 G/15ML
20 SOLUTION ORAL 3 TIMES DAILY
Status: DISCONTINUED | OUTPATIENT
Start: 2023-12-07 | End: 2023-12-08

## 2023-12-07 RX ORDER — FENTANYL CITRATE 50 UG/ML
50 INJECTION, SOLUTION INTRAMUSCULAR; INTRAVENOUS AS NEEDED
Status: DISCONTINUED | OUTPATIENT
Start: 2023-12-07 | End: 2023-12-09

## 2023-12-07 RX ORDER — SODIUM CHLORIDE 450 MG/100ML
INJECTION, SOLUTION INTRAVENOUS CONTINUOUS
Status: DISCONTINUED | OUTPATIENT
Start: 2023-12-07 | End: 2023-12-09

## 2023-12-07 RX ADMIN — PANTOPRAZOLE SODIUM 8 MG/HR: 40 INJECTION, POWDER, FOR SOLUTION INTRAVENOUS at 01:13

## 2023-12-07 RX ADMIN — ERYTHROMYCIN LACTOBIONATE 250 MG: 500 INJECTION, POWDER, LYOPHILIZED, FOR SOLUTION INTRAVENOUS at 10:30

## 2023-12-07 RX ADMIN — SODIUM CHLORIDE: 4.5 INJECTION, SOLUTION INTRAVENOUS at 13:33

## 2023-12-07 RX ADMIN — PROPOFOL 40 MCG/KG/MIN: 10 INJECTION, EMULSION INTRAVENOUS at 14:01

## 2023-12-07 RX ADMIN — SODIUM CHLORIDE, PRESERVATIVE FREE 10 ML: 5 INJECTION INTRAVENOUS at 19:49

## 2023-12-07 RX ADMIN — OCTREOTIDE ACETATE 50 MCG/HR: 500 INJECTION, SOLUTION INTRAVENOUS; SUBCUTANEOUS at 09:22

## 2023-12-07 RX ADMIN — PANTOPRAZOLE SODIUM 8 MG/HR: 40 INJECTION, POWDER, FOR SOLUTION INTRAVENOUS at 16:59

## 2023-12-07 RX ADMIN — OCTREOTIDE ACETATE 50 MCG/HR: 500 INJECTION, SOLUTION INTRAVENOUS; SUBCUTANEOUS at 01:16

## 2023-12-07 RX ADMIN — LACTULOSE 20 G: 20 SOLUTION ORAL at 15:20

## 2023-12-07 RX ADMIN — PROPOFOL 40 MCG/KG/MIN: 10 INJECTION, EMULSION INTRAVENOUS at 02:19

## 2023-12-07 RX ADMIN — PANTOPRAZOLE SODIUM 8 MG/HR: 40 INJECTION, POWDER, FOR SOLUTION INTRAVENOUS at 05:56

## 2023-12-07 RX ADMIN — Medication 175 MCG/HR: at 05:14

## 2023-12-07 RX ADMIN — PROPOFOL 30 MCG/KG/MIN: 10 INJECTION, EMULSION INTRAVENOUS at 09:21

## 2023-12-07 RX ADMIN — SODIUM CHLORIDE, POTASSIUM CHLORIDE, SODIUM LACTATE AND CALCIUM CHLORIDE: 600; 310; 30; 20 INJECTION, SOLUTION INTRAVENOUS at 03:51

## 2023-12-07 RX ADMIN — PROPOFOL 40 MCG/KG/MIN: 10 INJECTION, EMULSION INTRAVENOUS at 19:30

## 2023-12-07 RX ADMIN — FENTANYL CITRATE 50 MCG: 50 INJECTION, SOLUTION INTRAMUSCULAR; INTRAVENOUS at 13:42

## 2023-12-07 RX ADMIN — SODIUM CHLORIDE, PRESERVATIVE FREE 10 ML: 5 INJECTION INTRAVENOUS at 09:11

## 2023-12-07 RX ADMIN — Medication 1000 MG: at 21:52

## 2023-12-07 RX ADMIN — LACTULOSE 20 G: 20 SOLUTION ORAL at 19:49

## 2023-12-07 RX ADMIN — FENTANYL CITRATE 50 MCG: 50 INJECTION, SOLUTION INTRAMUSCULAR; INTRAVENOUS at 08:16

## 2023-12-07 RX ADMIN — Medication 125 MCG/HR: at 12:17

## 2023-12-07 RX ADMIN — OCTREOTIDE ACETATE 50 MCG/HR: 500 INJECTION, SOLUTION INTRAVENOUS; SUBCUTANEOUS at 19:31

## 2023-12-07 ASSESSMENT — PULMONARY FUNCTION TESTS
PIF_VALUE: 22
PIF_VALUE: 20
PIF_VALUE: 21
PIF_VALUE: 18

## 2023-12-07 ASSESSMENT — PAIN SCALES - GENERAL
PAINLEVEL_OUTOF10: 0

## 2023-12-07 NOTE — CARE COORDINATION
Case Management Assessment  Initial Evaluation    Date/Time of Evaluation: 12/7/2023 12:07 PM  Assessment Completed by: James Romero RN    If patient is discharged prior to next notation, then this note serves as note for discharge by case management. Patient Name: Jerrlyn Osgood                   YOB: 1985  Diagnosis: Upper GI bleed [K92.2]  Acute pancreatitis, unspecified complication status, unspecified pancreatitis type [T44.75]  Alcoholic cirrhosis, unspecified whether ascites present [facility]) [K70.30]                   Date / Time: 12/4/2023  9:31 PM    Patient Admission Status: Inpatient   Readmission Risk (Low < 19, Mod (19-27), High > 27): Readmission Risk Score: 24.7    Current PCP: Arash Osorio MD  PCP verified by CM? (P) Yes Arash Osorio MD)    Chart Reviewed: Yes      History Provided by: (P) Significant Other (Dana Whitlock)  Patient Orientation: (P) Sedated, Other (see comment) (Intubated)    Patient Cognition:      Hospitalization in the last 30 days (Readmission):  No    If yes, Readmission Assessment in CM Navigator will be completed. Advance Directives:      Code Status: Full Code   Patient's Primary Decision Maker is: (P) Legal Next of [address] [place] [address] (per Regina Cortes, patient is legally  to [facility]. she has been in and out of residential. she is currently out of residential, but they do not have contact with her and do not know where she is.  Father is next of kin, patient does not have children)      Discharge Planning:    Patient lives with: (P) Spouse/Significant Other Type of Home: (P) House  Primary Care Giver:    Patient Support Systems include: (P) Spouse/Significant Other, Parent   Current Financial resources: (P) Medicaid  Current community resources: (P) None  Current services prior to admission: (P) None            Current DME:              Type of Home Care services:  (P) None    ADLS  Prior functional level: (P) Independent in ADLs/IADLs  Current functional level: (P) [V21.08]    IF APPLICABLE: The Patient and/or patient representative [address] [place] [address] and his family were provided with a choice of provider and agrees with the discharge plan. Freedom of choice list with basic dialogue that supports the patient's individualized plan of care/goals and shares the quality data associated with the providers was provided to: (P) Patient Representative   Patient Representative Name: (P) Karlo Retana, SO     The Patient and/or Patient Representative Agree with the Discharge Plan?  (P) Yes    Orlando Abdi RN  Case Management Department  Ph: [phone] Fax:

## 2023-12-07 NOTE — PROGRESS NOTES
Critical Care -Progress Note    Patient's name:  Raquel Parks  Medical Record Number: [id number]  Patient's account/billing number: [de-identified]  Patient's YOB: 1985  Age: 41 y.o. Date of Admission: 12/4/2023  9:31 PM  Date of History and Physical Examination: 12/7/2023      Primary Care Physician: Glenn Tirado MD  Attending Physician: Dr. Khanh Canales Status: Full Code    Chief complaint:   Chief Complaint   Patient presents with    Hematemesis     Vomiting x 35 minutes       Today's Evaluation     Evaluation:  Patient is intubated, on ventilator with RR 16, , PIP 22, PEEP 5. Blood gases stable with pH 7.45, pCO2 37, pO2 94, HCO3 27. Currently on propofol and fentanyl drip. Did not require any blood products overnight. 2.6L urine output last 24 hrs. Na 151, K 4.0, BUN 34, Creatinine 0.8, ionized calcium 1.14  Most recent hgb of 7.3  Lactulose enema given overnight due to elevated ammonia   Ammonia of 210 this am   Will consider another lactulose enema              Speciality's Recommendations:  Per GI patient is to remain intubated   Transfuse if hgb drops below 7   Continue protonix, octreotide   EGD terminated due to clots 12/7 plan on erythromycin and reattempt later today       HISTORY OF PRESENT ILLNESS:      History was obtained from chart review and unobtainable from patient due to mental status. Raquel Parks is a 41 y.o. history of esophageal varices with banding, alcohol abuse, hypertension, depression who presents with multiple episodes of large amount of bloody emesis that began today. Patient reports drinking alcohol about 2 hours ago. Notes abdominal pain. States he feels short of breath as well. .  Does have a recent ICU admission on September of this year for upper GI bleed when esophageal banding was performed by GI. Patient hypotensive and tachycardic upon arrival to ED. Hemoglobin 7.5. Patient started on octreotide, Protonix, Rocephin by ED. persistent mild gastroesophageal varices. A decision was made to dilate the stent using 10 mm x 4 cm balloon catheter. Completion portography demonstrated interval resolution of the gastroesophageal varices and appropriate positioning and patency of the Viatorr stent. The catheter was removed over a guidewire. The service requested a placement of a central venous catheter. Therefore, the introducer sheath was removed over the wire to allow placement of a 15.5 [place] [employer] central VT catheter after the tract was dilated. Final image demonstrated catheter tip terminating in the distal SVC. All ports aspirated and flushed appropriately. The catheter was sutured to the skin and dressed appropriately. The patient left the IR department in stable condition. Successful placement of a TIPS stent. Placement of a right internal jugular 5.5 [place] [employer] central vein catheter. CTA ABDOMEN PELVIS W WO CONTRAST    Result Date: 12/4/2023  EXAMINATION: CTA OF THE ABDOMEN AND PELVIS WITH AND WITHOUT CONTRAST 12/4/2023 10:26 pm: TECHNIQUE: CTA of the abdomen and pelvis was performed without and with the administration of intravenous contrast. Multiplanar reformatted images are provided for review. MIP images are provided for review. Automated exposure control, iterative reconstruction, and/or weight based adjustment of the mA/kV was utilized to reduce the radiation dose to as low as reasonably achievable. COMPARISON: 09/27/2023 HISTORY: ORDERING SYSTEM PROVIDED HISTORY: upper gi bleed TECHNOLOGIST PROVIDED HISTORY: upper gi bleed Decision Support Exception - unselect if not a suspected or confirmed emergency medical condition->Emergency Medical Condition (MA) Reason for Exam: upper GI bleed, emesis, hematemesis FINDINGS: CTA ABDOMEN: Abdominal aorta/Branches: Patent. No aneurysm  or dissection. The celiac, superior mesenteric and inferior mesenteric arteries are patent.  No arterial extravasation or abnormal

## 2023-12-07 NOTE — PLAN OF CARE
Problem: Safety - Adult  Goal: Free from fall injury  Outcome: Progressing     Problem: Discharge Planning  Goal: Discharge to home or other facility with appropriate resources  Outcome: Progressing     Problem: Safety - Medical Restraint  Goal: Remains free of injury from restraints (Restraint for Interference with Medical Device)  Description: INTERVENTIONS:  1. Determine that other, less restrictive measures have been tried or would not be effective before applying the restraint  2. Evaluate the patient's condition at the time of restraint application  3. Inform patient/family regarding the reason for restraint  4.  Q2H: Monitor safety, psychosocial status, comfort, nutrition and hydration  Outcome: Progressing  Flowsheets  Taken 12/7/2023 0600 by Roro Anaya RN  Remains free of injury from restraints (restraint for interference with medical device): Every 2 hours: Monitor safety, psychosocial status, comfort, nutrition and hydration  Taken 12/7/2023 0400 by Roro Anaya RN  Remains free of injury from restraints (restraint for interference with medical device): Every 2 hours: Monitor safety, psychosocial status, comfort, nutrition and hydration  Taken 12/7/2023 0200 by Roro Anaya RN  Remains free of injury from restraints (restraint for interference with medical device): Every 2 hours: Monitor safety, psychosocial status, comfort, nutrition and hydration  Taken 12/7/2023 0000 by Roro Anaya RN  Remains free of injury from restraints (restraint for interference with medical device): Every 2 hours: Monitor safety, psychosocial status, comfort, nutrition and hydration  Taken 12/6/2023 2200 by Roro Anaya RN  Remains free of injury from restraints (restraint for interference with medical device): Every 2 hours: Monitor safety, psychosocial status, comfort, nutrition and hydration  Taken 12/6/2023 2000 by Roro Anaya RN  Remains free of injury from restraints (restraint for interference with

## 2023-12-07 NOTE — OP NOTE
Operative Note      Patient: Lei Gorman  YOB: 1985  MRN: [id number]    Date of Procedure: 12/7/2023    Pre-Op Diagnosis Codes: * Bleeding gastric varices [I86.4]    Post-Op Diagnosis: Same and large blood clot in the stomach interfering with visualization. Procedure was aborted       Procedure(s):  EGD DIAGNOSTIC ONLY    Surgeon(s):  Rico Canseco MD    Assistant:   * No surgical staff found *    Anesthesia: Other.   Patient is sedated intubated on the vent support and was receiving IV fentanyl and propofol infusions per the ICU team.    Estimated Blood Loss (mL): None    Complications: None    Specimens:   * No specimens in log *    Implants:  * No implants in log *      Drains:   NG/OG/NJ/NE Tube Nasogastric 12 fr Right nostril (Active)   Surrounding Skin Clean, dry & intact 12/07/23 0800   Securement device Tape 12/07/23 0800   Status Suction-low intermittent 12/07/23 0800   Placement Verified External Catheter Length;Gastric Contents 12/07/23 0800   NG/OG/NJ/NE External Measurement (cm) 71 cm 12/07/23 0800   Drainage Appearance Bloody 12/07/23 0800   Free Water/Flush (mL) 50 mL 12/05/23 2000   Output (mL) 50 ml 12/07/23 0400       Fecal Management System 12/06/23 (Active)   Stool Appearance Bloody 12/07/23 0800   Stool Color Red;Black 12/07/23 0800   Position of Indicator Line Visible 12/07/23 0800   Balloon Volume Verified Yes 12/07/23 0800   Skin Assessment of the Anal Area Treatment with Zinc Oxide cream 12/07/23 0800   Tube Irrigated No 12/07/23 0800   Irrigation Volume (mL) 500 12/06/23 2000   Stool Amount Large 12/07/23 0800   Fecal Management Tube Output 300 ml 12/07/23 0000       [REMOVED] Urinary Catheter 12/05/23 (Removed)   Catheter Indications Need for fluid volume management of the critically ill patient in a critical care setting;Perioperative use for selected surgical procedures 12/06/23 1200   Site Assessment No urethral drainage 12/06/23 1200   Urine Color Yellow esophagus, stomach, was performed during both introduction and withdrawal of the endoscope and retroflexed view of the proximal stomach was obtained. The scope was passed to the proximal stomach. The patient tolerated the procedure well and was taken to the recovery area in good condition. The patient  was taken to the recovery area in good condition.      Electronically signed by Hanna Shaw MD on 12/7/2023 at 1:35 PM

## 2023-12-07 NOTE — PLAN OF CARE
Problem: Respiratory - Adult  Goal: Achieves optimal ventilation and oxygenation  12/6/2023 2226 by Tyler Triplett RCP  Outcome: Progressing  Flowsheets  Taken 12/6/2023 2226  Achieves optimal ventilation and oxygenation:   Assess for changes in respiratory status   Assess the need for suctioning and aspirate as needed   Respiratory therapy support as indicated   Oxygen supplementation based on oxygen saturation or arterial blood gases  Taken 12/6/2023 1937  Achieves optimal ventilation and oxygenation:   Assess for changes in respiratory status   Oxygen supplementation based on oxygen saturation or arterial blood gases   Assess the need for suctioning and aspirate as needed   Respiratory therapy support as indicated  12/6/2023 0916 by Mikhail Reyna RN  Outcome: Progressing  12/6/2023 0840 by VILMA FreedmanP  Outcome: Progressing

## 2023-12-07 NOTE — PLAN OF CARE
Problem: Safety - Adult  Goal: Free from fall injury  12/7/2023 0915 by Yazimn Rodríguez RN  Outcome: Progressing     Problem: Discharge Planning  Goal: Discharge to home or other facility with appropriate resources  12/7/2023 0915 by Yazmin Rodríguez RN  Outcome: Progressing     Problem: Safety - Medical Restraint  Goal: Remains free of injury from restraints (Restraint for Interference with Medical Device)  Description: INTERVENTIONS:  1. Determine that other, less restrictive measures have been tried or would not be effective before applying the restraint  2. Evaluate the patient's condition at the time of restraint application  3. Inform patient/family regarding the reason for restraint  4. Q2H: Monitor safety, psychosocial status, comfort, nutrition and hydration  12/7/2023 0915 by Yazmin Rodríguez RN  Outcome: Progressing     Problem: Skin/Tissue Integrity  Goal: Absence of new skin breakdown  Description: 1. Monitor for areas of redness and/or skin breakdown  2. Assess vascular access sites hourly  3. Every 4-6 hours minimum:  Change oxygen saturation probe site  4. Every 4-6 hours:  If on nasal continuous positive airway pressure, respiratory therapy assess nares and determine need for appliance change or resting period.   12/7/2023 0915 by Yazmin Rodríguez RN  Outcome: Progressing     Problem: Pain  Goal: Verbalizes/displays adequate comfort level or baseline comfort level  12/7/2023 0915 by Yazmin Rodríguez RN  Outcome: Progressing     Problem: Respiratory - Adult  Goal: Achieves optimal ventilation and oxygenation  12/7/2023 0915 by aYzmin Rodríguez RN  Outcome: Progressing  Flowsheets (Taken 12/7/2023 0805 by Mayda Hall RCP)  Achieves optimal ventilation and oxygenation:   Assess for changes in respiratory status   Position to facilitate oxygenation and minimize respiratory effort   Oxygen supplementation based on oxygen saturation or arterial blood gases   Assess the need for suctioning and aspirate as

## 2023-12-07 NOTE — OP NOTE
Operative Note      Patient: Anita Vargas  YOB: 1985  MRN: [id number]    Date of Procedure: 12/7/2023    Pre-Op Diagnosis Codes:     * Gastrointestinal hemorrhage, unspecified gastrointestinal hemorrhage type [K92.2]    Post-Op Diagnosis: Same  Grade 2 and 3 varices with stigmata of bleeding in the distal esophagus. Banded x 3  Large amount of blood and clots in the stomach precluding visualization. Successfully evacuated  Diffuse moderate portal hypertensive gastropathy  Previously seen gastric varices was decompressed       Procedure(s):  EGD DIAGNOSTIC ONLY  EGD BAND LIGATION  EGD FOREIGN BODY REMOVAL    Surgeon(s):  Deion Zhang MD    Assistant:   * No surgical staff found *    Anesthesia: Other: Patient was sedated per ICU team receiving IV fentanyl and propofol infusions.   Patient is intubated on vent support per the ICU team.  Continuous monitoring with art line was    Estimated Blood Loss (mL): Minimal    Complications: None    Specimens:   * No specimens in log *    Implants:  * No implants in log *      Drains:   NG/OG/NJ/NE Tube Nasogastric 12 fr Right nostril (Active)   Surrounding Skin Clean, dry & intact 12/07/23 0800   Securement device Tape 12/07/23 0800   Status Suction-low intermittent 12/07/23 0800   Placement Verified External Catheter Length;Gastric Contents 12/07/23 0800   NG/OG/NJ/NE External Measurement (cm) 71 cm 12/07/23 0800   Drainage Appearance Bloody 12/07/23 0800   Free Water/Flush (mL) 50 mL 12/05/23 2000   Output (mL) 50 ml 12/07/23 0400       Fecal Management System 12/06/23 (Active)   Stool Appearance Bloody 12/07/23 0800   Stool Color Red;Black 12/07/23 0800   Position of Indicator Line Visible 12/07/23 0800   Balloon Volume Verified Yes 12/07/23 0800   Skin Assessment of the Anal Area Treatment with Zinc Oxide cream 12/07/23 0800   Tube Irrigated No 12/07/23 0800   Irrigation Volume (mL) 500 12/06/23 2000   Stool Amount Large 12/07/23 0800   Fecal distal esophagus. Under endoscopic guidance a 12 [place] feeding tube was placed in the stomach body. Recommendations  Continue IV pantoprazole and IV octreotide infusions for an additional 3 days. May use NG tube for lactulose  Continue IV Rocephin 1 g daily  Please keep patient intubated for an additional 24 hours  Monitor H&H and transfuse as clinically indicated  GI will follow      Detailed Description of Procedure:   Informed consent was obtained from the patient's father after explanation of the procedure including indications, description of the procedure,  benefits and possible risks and complications of the procedure, and alternatives. Questions were answered. The patient's history was reviewed and a directed physical examination was performed prior to the procedure. Patient was monitored throughout the procedure with pulse oximetry and periodic assessment of vital signs. Patient was sedated as noted above. With the patient in the left lateral decubitus position, the Olympus videoendoscope was placed in the patient's mouth and under direct visualization passed into the esophagus. Visualization of the esophagus, stomach, and duodenum was performed during both introduction and withdrawal of the endoscope and retroflexed view of the proximal stomach was obtained. The scope was passed to the 3rd portion of the duodenum. Due to inability to suction large amount of blood clots in the stomach the adult gastroscope was exchanged with a therapeutic double channel gastroscope and the procedure was continued. The patient tolerated the procedure well and was taken to the recovery area in good condition. The patient  was taken to the recovery area in good condition.      Electronically signed by Deion Zhang MD on 12/7/2023 at 1:38 PM

## 2023-12-08 LAB
ALBUMIN SERPL-MCNC: 2.5 G/DL (ref 3.5–5.2)
ALP SERPL-CCNC: 96 U/L (ref 40–129)
ALT SERPL-CCNC: 30 U/L (ref 5–41)
AMMONIA PLAS-SCNC: 137 UMOL/L (ref 16–60)
ANION GAP SERPL CALCULATED.3IONS-SCNC: 5 MMOL/L (ref 9–17)
AST SERPL-CCNC: 185 U/L
BILIRUB SERPL-MCNC: 2.8 MG/DL (ref 0.3–1.2)
BUN SERPL-MCNC: 28 MG/DL (ref 6–20)
BUN/CREAT SERPL: 35 (ref 9–20)
CA-I BLD-SCNC: 1.12 MMOL/L (ref 1.13–1.33)
CALCIUM SERPL-MCNC: 7.4 MG/DL (ref 8.6–10.4)
CHLORIDE SERPL-SCNC: 120 MMOL/L (ref 98–107)
CO2 SERPL-SCNC: 25 MMOL/L (ref 20–31)
CREAT SERPL-MCNC: 0.8 MG/DL (ref 0.7–1.2)
ERYTHROCYTE [DISTWIDTH] IN BLOOD BY AUTOMATED COUNT: 17.8 % (ref 11.8–14.4)
FIO2: 30
GFR SERPL CREATININE-BSD FRML MDRD: >60 ML/MIN/1.73M2
GLUCOSE BLD-MCNC: 96 MG/DL (ref 74–100)
GLUCOSE SERPL-MCNC: 109 MG/DL (ref 70–99)
HCT VFR BLD AUTO: 22.5 % (ref 40.7–50.3)
HCT VFR BLD AUTO: 22.9 % (ref 40.7–50.3)
HCT VFR BLD AUTO: 24.3 % (ref 40.7–50.3)
HCT VFR BLD AUTO: 25.4 % (ref 40.7–50.3)
HGB BLD-MCNC: 7.1 G/DL (ref 13–17)
HGB BLD-MCNC: 7.2 G/DL (ref 13–17)
HGB BLD-MCNC: 7.7 G/DL (ref 13–17)
HGB BLD-MCNC: 7.8 G/DL (ref 13–17)
MCH RBC QN AUTO: 30.7 PG (ref 25.2–33.5)
MCHC RBC AUTO-ENTMCNC: 31.6 G/DL (ref 28.4–34.8)
MCV RBC AUTO: 97.4 FL (ref 82.6–102.9)
NEGATIVE BASE EXCESS, ART: 0.3 MMOL/L (ref 0–2)
NRBC BLD-RTO: 0.6 PER 100 WBC
PHOSPHATE SERPL-MCNC: 2.5 MG/DL (ref 2.5–4.5)
PLATELET # BLD AUTO: 56 K/UL (ref 138–453)
PMV BLD AUTO: 10.7 FL (ref 8.1–13.5)
POC HCO3: 24.1 MMOL/L (ref 21–28)
POC O2 SATURATION: 94.4 % (ref 94–98)
POC PCO2: 37.3 MM HG (ref 35–48)
POC PH: 7.42 (ref 7.35–7.45)
POC PO2: 71.1 MM HG (ref 83–108)
POTASSIUM SERPL-SCNC: 3.9 MMOL/L (ref 3.7–5.3)
PROT SERPL-MCNC: 4.7 G/DL (ref 6.4–8.3)
RBC # BLD AUTO: 2.31 M/UL (ref 4.21–5.77)
SAMPLE SITE: ABNORMAL
SODIUM SERPL-SCNC: 150 MMOL/L (ref 135–144)
WBC OTHER # BLD: 7.2 K/UL (ref 3.5–11.3)

## 2023-12-08 PROCEDURE — 82803 BLOOD GASES ANY COMBINATION: CPT

## 2023-12-08 PROCEDURE — C9113 INJ PANTOPRAZOLE SODIUM, VIA: HCPCS | Performed by: STUDENT IN AN ORGANIZED HEALTH CARE EDUCATION/TRAINING PROGRAM

## 2023-12-08 PROCEDURE — 2500000003 HC RX 250 WO HCPCS: Performed by: STUDENT IN AN ORGANIZED HEALTH CARE EDUCATION/TRAINING PROGRAM

## 2023-12-08 PROCEDURE — P9016 RBC LEUKOCYTES REDUCED: HCPCS

## 2023-12-08 PROCEDURE — 2580000003 HC RX 258

## 2023-12-08 PROCEDURE — 94761 N-INVAS EAR/PLS OXIMETRY MLT: CPT

## 2023-12-08 PROCEDURE — 99291 CRITICAL CARE FIRST HOUR: CPT | Performed by: INTERNAL MEDICINE

## 2023-12-08 PROCEDURE — 82330 ASSAY OF CALCIUM: CPT

## 2023-12-08 PROCEDURE — 51798 US URINE CAPACITY MEASURE: CPT

## 2023-12-08 PROCEDURE — 6360000002 HC RX W HCPCS: Performed by: INTERNAL MEDICINE

## 2023-12-08 PROCEDURE — 2580000003 HC RX 258: Performed by: NURSE PRACTITIONER

## 2023-12-08 PROCEDURE — 85018 HEMOGLOBIN: CPT

## 2023-12-08 PROCEDURE — 2500000003 HC RX 250 WO HCPCS: Performed by: INTERNAL MEDICINE

## 2023-12-08 PROCEDURE — 2580000003 HC RX 258: Performed by: STUDENT IN AN ORGANIZED HEALTH CARE EDUCATION/TRAINING PROGRAM

## 2023-12-08 PROCEDURE — 85027 COMPLETE CBC AUTOMATED: CPT

## 2023-12-08 PROCEDURE — 6360000002 HC RX W HCPCS: Performed by: NURSE PRACTITIONER

## 2023-12-08 PROCEDURE — 37799 UNLISTED PX VASCULAR SURGERY: CPT

## 2023-12-08 PROCEDURE — 82947 ASSAY GLUCOSE BLOOD QUANT: CPT

## 2023-12-08 PROCEDURE — 2700000000 HC OXYGEN THERAPY PER DAY

## 2023-12-08 PROCEDURE — 2580000003 HC RX 258: Performed by: INTERNAL MEDICINE

## 2023-12-08 PROCEDURE — 51701 INSERT BLADDER CATHETER: CPT

## 2023-12-08 PROCEDURE — 2000000000 HC ICU R&B

## 2023-12-08 PROCEDURE — 6360000002 HC RX W HCPCS: Performed by: STUDENT IN AN ORGANIZED HEALTH CARE EDUCATION/TRAINING PROGRAM

## 2023-12-08 PROCEDURE — 6370000000 HC RX 637 (ALT 250 FOR IP): Performed by: INTERNAL MEDICINE

## 2023-12-08 PROCEDURE — 36415 COLL VENOUS BLD VENIPUNCTURE: CPT

## 2023-12-08 PROCEDURE — 84100 ASSAY OF PHOSPHORUS: CPT

## 2023-12-08 PROCEDURE — 82140 ASSAY OF AMMONIA: CPT

## 2023-12-08 PROCEDURE — 80053 COMPREHEN METABOLIC PANEL: CPT

## 2023-12-08 PROCEDURE — 85014 HEMATOCRIT: CPT

## 2023-12-08 PROCEDURE — 94003 VENT MGMT INPAT SUBQ DAY: CPT

## 2023-12-08 RX ORDER — SODIUM CHLORIDE 9 MG/ML
INJECTION, SOLUTION INTRAVENOUS PRN
Status: DISCONTINUED | OUTPATIENT
Start: 2023-12-08 | End: 2023-12-15 | Stop reason: HOSPADM

## 2023-12-08 RX ORDER — SODIUM CHLORIDE 0.9 % (FLUSH) 0.9 %
5-40 SYRINGE (ML) INJECTION PRN
Status: DISCONTINUED | OUTPATIENT
Start: 2023-12-08 | End: 2023-12-15 | Stop reason: HOSPADM

## 2023-12-08 RX ORDER — LORAZEPAM 2 MG/ML
4 INJECTION INTRAMUSCULAR
Status: DISCONTINUED | OUTPATIENT
Start: 2023-12-08 | End: 2023-12-11

## 2023-12-08 RX ORDER — LORAZEPAM 2 MG/1
4 TABLET ORAL
Status: DISCONTINUED | OUTPATIENT
Start: 2023-12-08 | End: 2023-12-11

## 2023-12-08 RX ORDER — LORAZEPAM 2 MG/ML
2 INJECTION INTRAMUSCULAR
Status: DISCONTINUED | OUTPATIENT
Start: 2023-12-08 | End: 2023-12-11

## 2023-12-08 RX ORDER — LORAZEPAM 2 MG/1
2 TABLET ORAL
Status: DISCONTINUED | OUTPATIENT
Start: 2023-12-08 | End: 2023-12-11

## 2023-12-08 RX ORDER — LACTULOSE 10 G/15ML
20 SOLUTION ORAL
Status: DISPENSED | OUTPATIENT
Start: 2023-12-08 | End: 2023-12-09

## 2023-12-08 RX ORDER — LORAZEPAM 2 MG/ML
3 INJECTION INTRAMUSCULAR
Status: DISCONTINUED | OUTPATIENT
Start: 2023-12-08 | End: 2023-12-11

## 2023-12-08 RX ORDER — LORAZEPAM 1 MG/1
1 TABLET ORAL
Status: DISCONTINUED | OUTPATIENT
Start: 2023-12-08 | End: 2023-12-12

## 2023-12-08 RX ORDER — SODIUM CHLORIDE 0.9 % (FLUSH) 0.9 %
5-40 SYRINGE (ML) INJECTION EVERY 12 HOURS SCHEDULED
Status: DISCONTINUED | OUTPATIENT
Start: 2023-12-08 | End: 2023-12-15 | Stop reason: HOSPADM

## 2023-12-08 RX ORDER — LORAZEPAM 2 MG/ML
1 INJECTION INTRAMUSCULAR
Status: DISCONTINUED | OUTPATIENT
Start: 2023-12-08 | End: 2023-12-12

## 2023-12-08 RX ADMIN — LACTULOSE 20 G: 20 SOLUTION ORAL at 08:54

## 2023-12-08 RX ADMIN — Medication 150 MCG/HR: at 03:05

## 2023-12-08 RX ADMIN — PANTOPRAZOLE SODIUM 8 MG/HR: 40 INJECTION, POWDER, FOR SOLUTION INTRAVENOUS at 13:49

## 2023-12-08 RX ADMIN — LACTULOSE 20 G: 20 SOLUTION ORAL at 14:58

## 2023-12-08 RX ADMIN — THIAMINE HYDROCHLORIDE: 100 INJECTION, SOLUTION INTRAMUSCULAR; INTRAVENOUS at 12:15

## 2023-12-08 RX ADMIN — LACTULOSE 20 G: 20 SOLUTION ORAL at 20:56

## 2023-12-08 RX ADMIN — SODIUM CHLORIDE, PRESERVATIVE FREE 10 ML: 5 INJECTION INTRAVENOUS at 21:51

## 2023-12-08 RX ADMIN — LACTULOSE 20 G: 20 SOLUTION ORAL at 18:58

## 2023-12-08 RX ADMIN — PANTOPRAZOLE SODIUM 8 MG/HR: 40 INJECTION, POWDER, FOR SOLUTION INTRAVENOUS at 02:31

## 2023-12-08 RX ADMIN — PANTOPRAZOLE SODIUM 8 MG/HR: 40 INJECTION, POWDER, FOR SOLUTION INTRAVENOUS at 23:33

## 2023-12-08 RX ADMIN — OCTREOTIDE ACETATE 50 MCG/HR: 500 INJECTION, SOLUTION INTRAVENOUS; SUBCUTANEOUS at 17:17

## 2023-12-08 RX ADMIN — SODIUM CHLORIDE, PRESERVATIVE FREE 10 ML: 5 INJECTION INTRAVENOUS at 20:44

## 2023-12-08 RX ADMIN — SODIUM CHLORIDE: 4.5 INJECTION, SOLUTION INTRAVENOUS at 05:55

## 2023-12-08 RX ADMIN — Medication 1000 MG: at 21:51

## 2023-12-08 RX ADMIN — PROPOFOL 30 MCG/KG/MIN: 10 INJECTION, EMULSION INTRAVENOUS at 00:11

## 2023-12-08 RX ADMIN — ONDANSETRON 4 MG: 2 INJECTION INTRAMUSCULAR; INTRAVENOUS at 22:03

## 2023-12-08 RX ADMIN — PROPOFOL 25 MCG/KG/MIN: 10 INJECTION, EMULSION INTRAVENOUS at 06:43

## 2023-12-08 RX ADMIN — SODIUM CHLORIDE, PRESERVATIVE FREE 10 ML: 5 INJECTION INTRAVENOUS at 12:20

## 2023-12-08 RX ADMIN — SODIUM CHLORIDE, PRESERVATIVE FREE 10 ML: 5 INJECTION INTRAVENOUS at 20:45

## 2023-12-08 RX ADMIN — OCTREOTIDE ACETATE 50 MCG/HR: 500 INJECTION, SOLUTION INTRAVENOUS; SUBCUTANEOUS at 05:42

## 2023-12-08 RX ADMIN — SODIUM CHLORIDE, PRESERVATIVE FREE 20 ML: 5 INJECTION INTRAVENOUS at 08:58

## 2023-12-08 ASSESSMENT — PAIN SCALES - GENERAL: PAINLEVEL_OUTOF10: 0

## 2023-12-08 ASSESSMENT — PULMONARY FUNCTION TESTS: PIF_VALUE: 19

## 2023-12-08 NOTE — PLAN OF CARE
Problem: Safety - Adult  Goal: Free from fall injury  12/7/2023 2046 by Db Langston RN  Outcome: Progressing  12/7/2023 0915 by Sameer Ramon RN  Outcome: Progressing  Flowsheets (Taken 12/7/2023 0800 by Ivone Giles, RN)  Free From Fall Injury:   Instruct family/caregiver on patient safety   Based on caregiver fall risk screen, instruct family/caregiver to ask for assistance with transferring infant if caregiver noted to have fall risk factors  12/7/2023 0702 by Db Langston RN  Outcome: Progressing     Problem: Discharge Planning  Goal: Discharge to home or other facility with appropriate resources  12/7/2023 2046 by Db Langston RN  Outcome: Progressing  12/7/2023 0915 by Sameer Ramon RN  Outcome: Progressing  Flowsheets (Taken 12/7/2023 0800 by Ivone Giles, RN)  Discharge to home or other facility with appropriate resources:   Identify barriers to discharge with patient and caregiver   Arrange for needed discharge resources and transportation as appropriate   Identify discharge learning needs (meds, wound care, etc)   Refer to discharge planning if patient needs post-hospital services based on physician order or complex needs related to functional status, cognitive ability or social support system  12/7/2023 0702 by Db Langston RN  Outcome: Progressing     Problem: Safety - Medical Restraint  Goal: Remains free of injury from restraints (Restraint for Interference with Medical Device)  Description: INTERVENTIONS:  1. Determine that other, less restrictive measures have been tried or would not be effective before applying the restraint  2. Evaluate the patient's condition at the time of restraint application  3. Inform patient/family regarding the reason for restraint  4.  Q2H: Monitor safety, psychosocial status, comfort, nutrition and hydration  12/7/2023 2046 by Db Langston RN  Outcome: Progressing  Flowsheets (Taken 12/7/2023 2000)  Remains free of injury from restraints (restraint for

## 2023-12-08 NOTE — PROGRESS NOTES
Pt extubated at 1035 by RT per order from Dr. Thierno Hamilton. Writer present in room as well. Patient was placed on 5L nasal canula and O2 sat 90%.

## 2023-12-08 NOTE — CARE COORDINATION
Transitional planning. Extubated today, 5L NC, CIWA scale started. Monitoring Hemoglobin, hx of ETOH use, plan is home w/ GF. May need SW consult for ETOH use.

## 2023-12-08 NOTE — PLAN OF CARE
Problem: Safety - Adult  Goal: Free from fall injury  12/8/2023 0738 by Sandrita Cordoba RN  Outcome: Progressing  12/7/2023 2046 by Danny Burleson RN  Outcome: Progressing     Problem: Discharge Planning  Goal: Discharge to home or other facility with appropriate resources  12/8/2023 0738 by Sandrita Cordoba RN  Outcome: Progressing  12/7/2023 2046 by Danny Burleson RN  Outcome: Progressing     Problem: Safety - Medical Restraint  Goal: Remains free of injury from restraints (Restraint for Interference with Medical Device)  Description: INTERVENTIONS:  1. Determine that other, less restrictive measures have been tried or would not be effective before applying the restraint  2. Evaluate the patient's condition at the time of restraint application  3. Inform patient/family regarding the reason for restraint  4.  Q2H: Monitor safety, psychosocial status, comfort, nutrition and hydration  12/8/2023 0738 by Sandrita Cordoba RN  Outcome: Progressing  Flowsheets  Taken 12/8/2023 0600 by Danny Burleson RN  Remains free of injury from restraints (restraint for interference with medical device): Every 2 hours: Monitor safety, psychosocial status, comfort, nutrition and hydration  Taken 12/8/2023 0400 by Danny Burleson RN  Remains free of injury from restraints (restraint for interference with medical device): Every 2 hours: Monitor safety, psychosocial status, comfort, nutrition and hydration  Taken 12/8/2023 0000 by Danny Burleson RN  Remains free of injury from restraints (restraint for interference with medical device): Every 2 hours: Monitor safety, psychosocial status, comfort, nutrition and hydration  Taken 12/7/2023 2200 by Danny Burleson RN  Remains free of injury from restraints (restraint for interference with medical device): Every 2 hours: Monitor safety, psychosocial status, comfort, nutrition and hydration  12/7/2023 2046 by Danny Burleson RN  Outcome: Progressing  Flowsheets (Taken 12/7/2023 2000)  Remains free of injury

## 2023-12-08 NOTE — PROGRESS NOTES
Order obtained for extubation. SpO2 of 95 on 30% FiO2. Patient extubated and placed on 5 liters/min via nasal cannula. Post extubation SpO2 is 90% with HR  120 bpm and RR 16 breaths/min. Patient had moderate cough that was productive of tan sputum. Extubation Well tolerated by patient. .   Breath Sounds: clear    Jen Stovall RCP   10:43 AM

## 2023-12-08 NOTE — PLAN OF CARE
Problem: Safety - Medical Restraint  Goal: Remains free of injury from restraints (Restraint for Interference with Medical Device)  Description: INTERVENTIONS:  1. Determine that other, less restrictive measures have been tried or would not be effective before applying the restraint  2. Evaluate the patient's condition at the time of restraint application  3. Inform patient/family regarding the reason for restraint  4.  Q2H: Monitor safety, psychosocial status, comfort, nutrition and hydration  12/8/2023 1143 by Elías Luevano RN  Outcome: Completed  Flowsheets (Taken 12/8/2023 0800)  Remains free of injury from restraints (restraint for interference with medical device):   Determine that other, less restrictive measures have been tried or would not be effective before applying the restraint   Evaluate the patient's condition at the time of restraint application   Inform patient/family regarding the reason for restraint   Every 2 hours: Monitor safety, psychosocial status, comfort, nutrition and hydration  12/8/2023 0738 by Elías Luevano RN  Outcome: Progressing  Flowsheets  Taken 12/8/2023 0600 by Demetrius Spencer RN  Remains free of injury from restraints (restraint for interference with medical device): Every 2 hours: Monitor safety, psychosocial status, comfort, nutrition and hydration  Taken 12/8/2023 0400 by Demetrius Spencer RN  Remains free of injury from restraints (restraint for interference with medical device): Every 2 hours: Monitor safety, psychosocial status, comfort, nutrition and hydration  Taken 12/8/2023 0000 by Demetrius Spencer RN  Remains free of injury from restraints (restraint for interference with medical device): Every 2 hours: Monitor safety, psychosocial status, comfort, nutrition and hydration  Taken 12/7/2023 2200 by Demetrius Spencer RN  Remains free of injury from restraints (restraint for interference with medical device): Every 2 hours: Monitor safety, psychosocial status, comfort, nutrition

## 2023-12-08 NOTE — PROGRESS NOTES
Comprehensive Nutrition Assessment    Type and Reason for Visit:  Reassess    Nutrition Recommendations/Plan:   Continue NPO. Monitor plans for nutrition and start of oral diet as able/appropriate. Will monitor labs, weights, and plan of care. Malnutrition Assessment:  Malnutrition Status: At risk for malnutrition (12/08/23 1400)    Context:  Chronic Illness     Findings of the 6 clinical characteristics of malnutrition:  Energy Intake:  Mild decrease in energy intake  Weight Loss:  Unable to assess     Body Fat Loss:  No significant body fat loss   Muscle Mass Loss:  No significant muscle mass loss  Fluid Accumulation:  Mild (to Moderate) Extremities, Generalized   Strength:  Not Performed    Nutrition Assessment:    Pt extubated this morning. Will monitor plans for nutrition. S/p EGD and banding on 12/7. FMS in place. Weights reviewed. Labs reviewed: Na 150 mmol/L, Cl 120 mmol/L. Meds include: Lactulose. Nutrition Related Findings:    Labs/Meds reviewed. +FMS. Wound Type: None       Current Nutrition Intake & Therapies:    Average Meal Intake: NPO  Average Supplements Intake: NPO  Diet NPO  Additional Calorie Sources:  None    Anthropometric Measures:  Height: 172.7 cm (5' 7.99\")  Ideal Body Weight (IBW): 154 lbs (70 kg)    Admission Body Weight: 88.1 kg (194 lb 3.6 oz)  Current Body Weight: 88 kg (194 lb 0.1 oz), 126 % IBW. Weight Source: Bed Scale  Current BMI (kg/m2): 29.5        Weight Adjustment For: No Adjustment                 BMI Categories: Overweight (BMI 25.0-29. 9)    Estimated Daily Nutrient Needs:  Energy Requirements Based On: Kcal/kg  Weight Used for Energy Requirements: Admission  Energy (kcal/day): 8000-0770 kcals/day  Weight Used for Protein Requirements: Ideal  Protein (g/day):  gm pro/day  Method Used for Fluid Requirements: Other (Comment)  Fluid (ml/day): per MD    Nutrition Diagnosis:   Inadequate oral intake related to  (recent extubation) as evidenced by NPO or

## 2023-12-08 NOTE — CARE COORDINATION
Social work received a consult from [address] [place] [address] to find pt's estranged wife for decision making. Pt is known to SW from previous admissions. Pt never  his wife but now has a SO. Case discussed with Maicol Brewster in ethics and it is felt that we need to give the pt more time to be weaned before tracking down his estranged wife. If there needs to be a medical decision made before this happens, we can pursue looking for the wife.

## 2023-12-09 PROBLEM — R57.8 HEMORRHAGIC SHOCK (HCC): Status: RESOLVED | Noted: 2023-12-04 | Resolved: 2023-12-05

## 2023-12-09 PROBLEM — K70.30 ALC (ALCOHOLIC LIVER CIRRHOSIS) (HCC): Status: ACTIVE | Noted: 2023-09-27

## 2023-12-09 LAB
ABO/RH: NORMAL
ALBUMIN SERPL-MCNC: 2.6 G/DL (ref 3.5–5.2)
ALBUMIN/GLOB SERPL: 0.9 {RATIO} (ref 1–2.5)
ALP SERPL-CCNC: 126 U/L (ref 40–129)
ALT SERPL-CCNC: 27 U/L (ref 5–41)
AMMONIA PLAS-SCNC: 136 UMOL/L (ref 16–60)
ANION GAP SERPL CALCULATED.3IONS-SCNC: 7 MMOL/L (ref 9–17)
ANION GAP SERPL CALCULATED.3IONS-SCNC: 8 MMOL/L (ref 9–17)
ANTIBODY SCREEN: NEGATIVE
ARM BAND NUMBER: NORMAL
AST SERPL-CCNC: 121 U/L
BILIRUB SERPL-MCNC: 4.5 MG/DL (ref 0.3–1.2)
BLOOD BANK BLOOD PRODUCT EXPIRATION DATE: NORMAL
BLOOD BANK DISPENSE STATUS: NORMAL
BLOOD BANK ISBT PRODUCT BLOOD TYPE: 6200
BLOOD BANK PRODUCT CODE: NORMAL
BLOOD BANK SAMPLE EXPIRATION: NORMAL
BLOOD BANK UNIT TYPE AND RH: NORMAL
BPU ID: NORMAL
BUN SERPL-MCNC: 16 MG/DL (ref 6–20)
BUN SERPL-MCNC: 22 MG/DL (ref 6–20)
CA-I BLD-SCNC: 1.14 MMOL/L (ref 1.13–1.33)
CALCIUM SERPL-MCNC: 7.3 MG/DL (ref 8.6–10.4)
CALCIUM SERPL-MCNC: 7.7 MG/DL (ref 8.6–10.4)
CHLORIDE SERPL-SCNC: 114 MMOL/L (ref 98–107)
CHLORIDE SERPL-SCNC: 122 MMOL/L (ref 98–107)
CO2 SERPL-SCNC: 20 MMOL/L (ref 20–31)
CO2 SERPL-SCNC: 23 MMOL/L (ref 20–31)
COMPONENT: NORMAL
CREAT SERPL-MCNC: 0.5 MG/DL (ref 0.7–1.2)
CREAT SERPL-MCNC: 0.7 MG/DL (ref 0.7–1.2)
CROSSMATCH RESULT: NORMAL
ERYTHROCYTE [DISTWIDTH] IN BLOOD BY AUTOMATED COUNT: 18.6 % (ref 11.8–14.4)
GFR SERPL CREATININE-BSD FRML MDRD: >60 ML/MIN/1.73M2
GFR SERPL CREATININE-BSD FRML MDRD: >60 ML/MIN/1.73M2
GLUCOSE SERPL-MCNC: 103 MG/DL (ref 70–99)
GLUCOSE SERPL-MCNC: 132 MG/DL (ref 70–99)
HCT VFR BLD AUTO: 26 % (ref 40.7–50.3)
HCT VFR BLD AUTO: 26.7 % (ref 40.7–50.3)
HCT VFR BLD AUTO: 27.3 % (ref 40.7–50.3)
HCT VFR BLD AUTO: 29.1 % (ref 40.7–50.3)
HGB BLD-MCNC: 7.9 G/DL (ref 13–17)
HGB BLD-MCNC: 8 G/DL (ref 13–17)
HGB BLD-MCNC: 8.1 G/DL (ref 13–17)
HGB BLD-MCNC: 8.7 G/DL (ref 13–17)
INR PPP: 1.5
MAGNESIUM SERPL-MCNC: 2 MG/DL (ref 1.6–2.6)
MCH RBC QN AUTO: 29.7 PG (ref 25.2–33.5)
MCHC RBC AUTO-ENTMCNC: 30 G/DL (ref 28.4–34.8)
MCV RBC AUTO: 99.3 FL (ref 82.6–102.9)
NRBC BLD-RTO: 0.2 PER 100 WBC
PHOSPHATE SERPL-MCNC: 2 MG/DL (ref 2.5–4.5)
PLATELET # BLD AUTO: 61 K/UL (ref 138–453)
PMV BLD AUTO: 10 FL (ref 8.1–13.5)
POTASSIUM SERPL-SCNC: 3.5 MMOL/L (ref 3.7–5.3)
POTASSIUM SERPL-SCNC: 3.5 MMOL/L (ref 3.7–5.3)
PROT SERPL-MCNC: 5.6 G/DL (ref 6.4–8.3)
PROTHROMBIN TIME: 17.9 SEC (ref 11.7–14.9)
RBC # BLD AUTO: 2.69 M/UL (ref 4.21–5.77)
SODIUM SERPL-SCNC: 142 MMOL/L (ref 135–144)
SODIUM SERPL-SCNC: 145 MMOL/L (ref 135–144)
SODIUM SERPL-SCNC: 152 MMOL/L (ref 135–144)
TRANSFUSION STATUS: NORMAL
UNIT DIVISION: 0
UNIT ISSUE DATE/TIME: NORMAL
WBC OTHER # BLD: 8.7 K/UL (ref 3.5–11.3)

## 2023-12-09 PROCEDURE — 99291 CRITICAL CARE FIRST HOUR: CPT | Performed by: INTERNAL MEDICINE

## 2023-12-09 PROCEDURE — 2500000003 HC RX 250 WO HCPCS: Performed by: INTERNAL MEDICINE

## 2023-12-09 PROCEDURE — 85027 COMPLETE CBC AUTOMATED: CPT

## 2023-12-09 PROCEDURE — 6360000002 HC RX W HCPCS: Performed by: STUDENT IN AN ORGANIZED HEALTH CARE EDUCATION/TRAINING PROGRAM

## 2023-12-09 PROCEDURE — 85610 PROTHROMBIN TIME: CPT

## 2023-12-09 PROCEDURE — 82140 ASSAY OF AMMONIA: CPT

## 2023-12-09 PROCEDURE — 2580000003 HC RX 258

## 2023-12-09 PROCEDURE — 2580000003 HC RX 258: Performed by: STUDENT IN AN ORGANIZED HEALTH CARE EDUCATION/TRAINING PROGRAM

## 2023-12-09 PROCEDURE — 6370000000 HC RX 637 (ALT 250 FOR IP)

## 2023-12-09 PROCEDURE — 84295 ASSAY OF SERUM SODIUM: CPT

## 2023-12-09 PROCEDURE — 6360000002 HC RX W HCPCS: Performed by: INTERNAL MEDICINE

## 2023-12-09 PROCEDURE — 2580000003 HC RX 258: Performed by: INTERNAL MEDICINE

## 2023-12-09 PROCEDURE — 82330 ASSAY OF CALCIUM: CPT

## 2023-12-09 PROCEDURE — C9113 INJ PANTOPRAZOLE SODIUM, VIA: HCPCS | Performed by: STUDENT IN AN ORGANIZED HEALTH CARE EDUCATION/TRAINING PROGRAM

## 2023-12-09 PROCEDURE — 85014 HEMATOCRIT: CPT

## 2023-12-09 PROCEDURE — 80048 BASIC METABOLIC PNL TOTAL CA: CPT

## 2023-12-09 PROCEDURE — 80053 COMPREHEN METABOLIC PANEL: CPT

## 2023-12-09 PROCEDURE — 2580000003 HC RX 258: Performed by: NURSE PRACTITIONER

## 2023-12-09 PROCEDURE — 6360000002 HC RX W HCPCS: Performed by: NURSE PRACTITIONER

## 2023-12-09 PROCEDURE — 6370000000 HC RX 637 (ALT 250 FOR IP): Performed by: INTERNAL MEDICINE

## 2023-12-09 PROCEDURE — 85018 HEMOGLOBIN: CPT

## 2023-12-09 PROCEDURE — 84100 ASSAY OF PHOSPHORUS: CPT

## 2023-12-09 PROCEDURE — 6360000002 HC RX W HCPCS

## 2023-12-09 PROCEDURE — 2060000000 HC ICU INTERMEDIATE R&B

## 2023-12-09 PROCEDURE — 99231 SBSQ HOSP IP/OBS SF/LOW 25: CPT | Performed by: INTERNAL MEDICINE

## 2023-12-09 PROCEDURE — 83735 ASSAY OF MAGNESIUM: CPT

## 2023-12-09 PROCEDURE — 36415 COLL VENOUS BLD VENIPUNCTURE: CPT

## 2023-12-09 RX ORDER — LACTULOSE 10 G/15ML
20 SOLUTION ORAL 3 TIMES DAILY
Status: DISCONTINUED | OUTPATIENT
Start: 2023-12-09 | End: 2023-12-10

## 2023-12-09 RX ORDER — FENTANYL CITRATE 50 UG/ML
50 INJECTION, SOLUTION INTRAMUSCULAR; INTRAVENOUS
Status: DISCONTINUED | OUTPATIENT
Start: 2023-12-09 | End: 2023-12-12

## 2023-12-09 RX ORDER — SODIUM CHLORIDE, SODIUM LACTATE, POTASSIUM CHLORIDE, CALCIUM CHLORIDE 600; 310; 30; 20 MG/100ML; MG/100ML; MG/100ML; MG/100ML
INJECTION, SOLUTION INTRAVENOUS CONTINUOUS
Status: DISCONTINUED | OUTPATIENT
Start: 2023-12-09 | End: 2023-12-10

## 2023-12-09 RX ORDER — POTASSIUM CHLORIDE 20 MEQ/1
40 TABLET, EXTENDED RELEASE ORAL ONCE
Status: COMPLETED | OUTPATIENT
Start: 2023-12-09 | End: 2023-12-09

## 2023-12-09 RX ORDER — POTASSIUM CHLORIDE 20 MEQ/1
40 TABLET, EXTENDED RELEASE ORAL ONCE
Status: DISCONTINUED | OUTPATIENT
Start: 2023-12-09 | End: 2023-12-09

## 2023-12-09 RX ORDER — LABETALOL HYDROCHLORIDE 5 MG/ML
5 INJECTION, SOLUTION INTRAVENOUS DAILY PRN
Status: DISCONTINUED | OUTPATIENT
Start: 2023-12-09 | End: 2023-12-13

## 2023-12-09 RX ADMIN — LACTULOSE 20 G: 20 SOLUTION ORAL at 15:32

## 2023-12-09 RX ADMIN — OCTREOTIDE ACETATE 50 MCG/HR: 500 INJECTION, SOLUTION INTRAVENOUS; SUBCUTANEOUS at 03:26

## 2023-12-09 RX ADMIN — LACTULOSE 20 G: 20 SOLUTION ORAL at 10:07

## 2023-12-09 RX ADMIN — SODIUM CHLORIDE, PRESERVATIVE FREE 10 ML: 5 INJECTION INTRAVENOUS at 10:09

## 2023-12-09 RX ADMIN — SODIUM CHLORIDE, PRESERVATIVE FREE 10 ML: 5 INJECTION INTRAVENOUS at 10:08

## 2023-12-09 RX ADMIN — SODIUM CHLORIDE: 4.5 INJECTION, SOLUTION INTRAVENOUS at 01:29

## 2023-12-09 RX ADMIN — POTASSIUM CHLORIDE 10 MEQ: 7.46 INJECTION, SOLUTION INTRAVENOUS at 12:27

## 2023-12-09 RX ADMIN — SODIUM CHLORIDE 75 ML/HR: 234 INJECTION INTRAMUSCULAR; INTRAVENOUS; SUBCUTANEOUS at 11:28

## 2023-12-09 RX ADMIN — PANTOPRAZOLE SODIUM 8 MG/HR: 40 INJECTION, POWDER, FOR SOLUTION INTRAVENOUS at 10:51

## 2023-12-09 RX ADMIN — ONDANSETRON 4 MG: 2 INJECTION INTRAMUSCULAR; INTRAVENOUS at 04:09

## 2023-12-09 RX ADMIN — LACTULOSE 20 G: 20 SOLUTION ORAL at 00:40

## 2023-12-09 RX ADMIN — POTASSIUM CHLORIDE 10 MEQ: 7.46 INJECTION, SOLUTION INTRAVENOUS at 11:27

## 2023-12-09 RX ADMIN — POTASSIUM CHLORIDE 40 MEQ: 1500 TABLET, EXTENDED RELEASE ORAL at 23:32

## 2023-12-09 RX ADMIN — LACTULOSE 20 G: 20 SOLUTION ORAL at 20:20

## 2023-12-09 RX ADMIN — LACTULOSE 20 G: 20 SOLUTION ORAL at 03:33

## 2023-12-09 RX ADMIN — LORAZEPAM 1 MG: 2 INJECTION INTRAMUSCULAR; INTRAVENOUS at 04:14

## 2023-12-09 RX ADMIN — PANTOPRAZOLE SODIUM 8 MG/HR: 40 INJECTION, POWDER, FOR SOLUTION INTRAVENOUS at 20:23

## 2023-12-09 RX ADMIN — POTASSIUM CHLORIDE 10 MEQ: 7.46 INJECTION, SOLUTION INTRAVENOUS at 10:06

## 2023-12-09 RX ADMIN — Medication 1000 MG: at 22:08

## 2023-12-09 RX ADMIN — THIAMINE HYDROCHLORIDE: 100 INJECTION, SOLUTION INTRAMUSCULAR; INTRAVENOUS at 07:51

## 2023-12-09 RX ADMIN — SODIUM CHLORIDE: 9 INJECTION, SOLUTION INTRAVENOUS at 10:06

## 2023-12-09 RX ADMIN — POTASSIUM CHLORIDE 10 MEQ: 7.46 INJECTION, SOLUTION INTRAVENOUS at 08:46

## 2023-12-09 RX ADMIN — LACTULOSE 20 G: 20 SOLUTION ORAL at 05:47

## 2023-12-09 RX ADMIN — SODIUM CHLORIDE, POTASSIUM CHLORIDE, SODIUM LACTATE AND CALCIUM CHLORIDE: 600; 310; 30; 20 INJECTION, SOLUTION INTRAVENOUS at 22:07

## 2023-12-09 ASSESSMENT — PAIN SCALES - GENERAL
PAINLEVEL_OUTOF10: 0

## 2023-12-09 NOTE — PLAN OF CARE
Problem: Safety - Adult  Goal: Free from fall injury  12/9/2023 0745 by Albert Zelaya RN  Outcome: Progressing  12/9/2023 0433 by Erika Reese RN  Outcome: Progressing  12/9/2023 0355 by Erika Reese RN  Outcome: Progressing     Problem: Discharge Planning  Goal: Discharge to home or other facility with appropriate resources  12/9/2023 0745 by Albert Zelaya RN  Outcome: Progressing  12/9/2023 0433 by Erika Reese RN  Outcome: Progressing  12/9/2023 0355 by Erika Reese RN  Outcome: Progressing     Problem: Skin/Tissue Integrity  Goal: Absence of new skin breakdown  Description: 1. Monitor for areas of redness and/or skin breakdown  2. Assess vascular access sites hourly  3. Every 4-6 hours minimum:  Change oxygen saturation probe site  4. Every 4-6 hours:  If on nasal continuous positive airway pressure, respiratory therapy assess nares and determine need for appliance change or resting period.   12/9/2023 0745 by Albert Zelaya RN  Outcome: Progressing  12/9/2023 0433 by Erika Reese RN  Outcome: Progressing  12/9/2023 0355 by Erika Reese RN  Outcome: Progressing     Problem: Pain  Goal: Verbalizes/displays adequate comfort level or baseline comfort level  12/9/2023 0745 by Albert Zelaya RN  Outcome: Progressing  12/9/2023 0433 by Erika Reese RN  Outcome: Progressing  12/9/2023 0355 by Erika Reese RN  Outcome: Progressing     Problem: Respiratory - Adult  Goal: Achieves optimal ventilation and oxygenation  12/9/2023 0745 by Albert Zelaya RN  Outcome: Progressing  12/9/2023 0433 by Erika Reese RN  Outcome: Progressing  12/9/2023 0355 by Erika Reese RN  Outcome: Progressing     Problem: ABCDS Injury Assessment  Goal: Absence of physical injury  12/9/2023 0745 by Albert Zelaya RN  Outcome: Progressing  12/9/2023 0433 by Erika Reese RN  Outcome: Progressing  12/9/2023 0355 by Erika Reese RN  Outcome: Progressing     Problem: Nutrition Deficit:  Goal: Optimize nutritional

## 2023-12-09 NOTE — CARE COORDINATION
Met with pt after receiving a social work consult for [facility] abuse\". Pt known to SW from previous admissions. Pt is very lethargic and slow to answer questions and having difficulty staying awake. He states that he is drinking 1 pint of liquor a day. Pt states that he knows that his drinking is a problem. He states that he wants to get help. Explained to pt that SW can assist to get him into treatment. Left a list of alcohol rehab programs. Explained to pt that SW would return to assist when he is able to think more clear. Pt has [facility].

## 2023-12-09 NOTE — PROGRESS NOTES
INTERNAL MEDICINE                                                                             ICU PATIENT TRANSFER NOTE        Patient:  Tia Setting  YOB: 1985  MRN: [id number]     Acct: [de-identified]     Admit date: 12/4/2023    Code Status:-  Full code     Reason for ICU Admission:-   Massive upper GI bleed, circulatory shock requiring pressors multiple blood transfusions    SUPPORT DEVICES: [] Ventilator [] BIPAP  [x] Nasal Cannula [] Room Air    Consultations:- [] Cardiology [] Nephrology  [] Hemo onco  [x] GI                               [] ID [] ENT  [] Rheum [] Endo   []Physiotherapy                                 Others:-  IR    NUTRITION:  [] NPO [] Tube Feeding (Specify: ) [] TPN  [x] PO - Clear liquid diet    Central Lines:- [x] No        If yes - Days/Date of Insertion. Pt seen and examined. No acute events overnight. Labs and charts reviewed. Afebrile, hemodynamically stable, tachycardic 106, saturating well on 3L NC continue to wean down. FMS placed for diarrhea    Review of Systems   Constitutional:  Positive for activity change and fatigue. Negative for chills and fever. Drowsy, sleepy. Respiratory:  Negative for shortness of breath and wheezing. Cardiovascular:  Positive for leg swelling. Negative for chest pain. Gastrointestinal:  Positive for blood in stool (dark maroon in FMS) and diarrhea. Negative for abdominal pain, constipation, nausea and vomiting. Genitourinary:  Negative for difficulty urinating and dysuria. Skin:  Positive for pallor. Neurological:  Negative for dizziness, light-headedness and headaches. Psychiatric/Behavioral:  Positive for confusion.          ICU COURSE :-     Tia Setting is a 71-year-old male with PMHx significant for alcohol abuse, alcoholic cardiomyopathy, tobacco

## 2023-12-09 NOTE — PLAN OF CARE
Problem: Safety - Adult  Goal: Free from fall injury  Outcome: Progressing     Problem: Discharge Planning  Goal: Discharge to home or other facility with appropriate resources  Outcome: Progressing     Problem: Skin/Tissue Integrity  Goal: Absence of new skin breakdown  Description: 1. Monitor for areas of redness and/or skin breakdown  2. Assess vascular access sites hourly  3. Every 4-6 hours minimum:  Change oxygen saturation probe site  4. Every 4-6 hours:  If on nasal continuous positive airway pressure, respiratory therapy assess nares and determine need for appliance change or resting period.   Outcome: Progressing     Problem: Pain  Goal: Verbalizes/displays adequate comfort level or baseline comfort level  Outcome: Progressing     Problem: Respiratory - Adult  Goal: Achieves optimal ventilation and oxygenation  Outcome: Progressing     Problem: ABCDS Injury Assessment  Goal: Absence of physical injury  Outcome: Progressing     Problem: Nutrition Deficit:  Goal: Optimize nutritional status  Outcome: Progressing  Flowsheets (Taken 12/8/2023 1356 by Yi Huang, RD, LD)  Nutrient intake appropriate for improving, restoring, or maintaining nutritional needs:   Assess nutritional status and recommend course of action   Monitor oral intake, labs, and treatment plans   Recommend appropriate diets, oral nutritional supplements, and vitamin/mineral supplements     Problem: Chronic Conditions and Co-morbidities  Goal: Patient's chronic conditions and co-morbidity symptoms are monitored and maintained or improved  Outcome: Progressing

## 2023-12-09 NOTE — PLAN OF CARE
Problem: Safety - Adult  Goal: Free from fall injury  12/9/2023 0433 by Erika Reese RN  Outcome: Progressing  12/9/2023 0355 by Erika Reese RN  Outcome: Progressing     Problem: Discharge Planning  Goal: Discharge to home or other facility with appropriate resources  12/9/2023 0433 by Erika Reese RN  Outcome: Progressing  12/9/2023 0355 by Erika Reese RN  Outcome: Progressing     Problem: Skin/Tissue Integrity  Goal: Absence of new skin breakdown  Description: 1. Monitor for areas of redness and/or skin breakdown  2. Assess vascular access sites hourly  3. Every 4-6 hours minimum:  Change oxygen saturation probe site  4. Every 4-6 hours:  If on nasal continuous positive airway pressure, respiratory therapy assess nares and determine need for appliance change or resting period.   12/9/2023 0433 by Erika Reese RN  Outcome: Progressing  12/9/2023 0355 by Erika Reese RN  Outcome: Progressing     Problem: Pain  Goal: Verbalizes/displays adequate comfort level or baseline comfort level  12/9/2023 0433 by Erika Reese RN  Outcome: Progressing  12/9/2023 0355 by Erika Reese RN  Outcome: Progressing     Problem: Respiratory - Adult  Goal: Achieves optimal ventilation and oxygenation  12/9/2023 0433 by Erika Reese RN  Outcome: Progressing  12/9/2023 0355 by Erika Reese RN  Outcome: Progressing     Problem: ABCDS Injury Assessment  Goal: Absence of physical injury  12/9/2023 0433 by Erika Reese RN  Outcome: Progressing  12/9/2023 0355 by Erika Reese RN  Outcome: Progressing     Problem: Nutrition Deficit:  Goal: Optimize nutritional status  12/9/2023 0433 by Erika Reese RN  Outcome: Progressing  12/9/2023 0355 by Erika Reese RN  Outcome: Progressing  Flowsheets (Taken 12/8/2023 1356 by Jaspal Randall, RD, LD)  Nutrient intake appropriate for improving, restoring, or maintaining nutritional needs:   Assess nutritional status and recommend course of action   Monitor oral intake, labs, and treatment plans   Recommend appropriate diets, oral nutritional supplements, and vitamin/mineral supplements     Problem: Chronic Conditions and Co-morbidities  Goal: Patient's chronic conditions and co-morbidity symptoms are monitored and maintained or improved  12/9/2023 0433 by Ilya Franco RN  Outcome: Progressing  12/9/2023 0355 by Ilya Franco RN  Outcome: Progressing

## 2023-12-09 NOTE — PROGRESS NOTES
Critical care team - Resident sign-out to medicine service      Date and time: 12/9/2023 10:23 AM  Patient's name:  Rehan Mario Record Number: [id number]  Patient's account/billing number: [de-identified]  Patient's YOB: 1985  Age: 41 y.o. Date of Admission: 12/4/2023  9:31 PM  Length of stay during current admission: 4    Primary Care Physician: Aurelio Zacarias MD    Code Status: Full Code    Mode of physician to physician communication:        [x] Via telephone   [] In person     Date and time of sign-out: 12/9/2023 10:23 AM    Accepting Internal Medicine resident: Dr. Yesi Ambrocio    Accepting Medicine team: IM Team med 2    Accepting team's attending: Dr. Vazquez Perez    Patient's current ICU Bed:  7853     Patient's assigned bed on floor:  432        [] Med-Surg Monitored [x] Step-down       [] Psychiatry ICU       [] Psych floor     Reason for ICU admission:     Massive upper GIB    ICU course summary:     12/4: 41 y.o. history of esophageal varices with banding, alcohol abuse, hypertension, depression who presents with multiple episodes of large amount of bloody emesis that began today. Patient reports drinking alcohol about 2 hours ago. Notes abdominal pain. States he feels short of breath as well. .  Does have a recent ICU admission on September of this year for upper GI bleed when esophageal banding was performed by GI. Patient hypotensive and tachycardic upon arrival to ED. Hemoglobin 7.5. Patient started on octreotide, Protonix, Rocephin by ED. Blood product transfusion initiated. Levophed started for hypotension. GI consulted by ED as well as interventional radiology due to GI recommendation for emergent TIPS procedure.    12/5: intubation, EGD performed, emergent TIPS by IR, mass transfusion protocol  12/6: ammonia significantly elevated, begin lactulose enema  12/7: repeat EGD, banding of esophageal varices  12/8 extubated    Product count - 17 prbc, 10 plt, 8 ffp, 1

## 2023-12-10 PROBLEM — K85.90 ACUTE PANCREATITIS: Status: ACTIVE | Noted: 2023-12-10

## 2023-12-10 LAB
ALBUMIN SERPL-MCNC: 2.3 G/DL (ref 3.5–5.2)
ALBUMIN/GLOB SERPL: 0.7 {RATIO} (ref 1–2.5)
ALP SERPL-CCNC: 179 U/L (ref 40–129)
ALT SERPL-CCNC: 24 U/L (ref 5–41)
AMMONIA PLAS-SCNC: 62 UMOL/L (ref 16–60)
AMMONIA PLAS-SCNC: 63 UMOL/L (ref 16–60)
ANION GAP SERPL CALCULATED.3IONS-SCNC: 6 MMOL/L (ref 9–17)
ANION GAP SERPL CALCULATED.3IONS-SCNC: 8 MMOL/L (ref 9–17)
AST SERPL-CCNC: 89 U/L
BILIRUB SERPL-MCNC: 4.6 MG/DL (ref 0.3–1.2)
BUN SERPL-MCNC: 13 MG/DL (ref 6–20)
BUN SERPL-MCNC: 16 MG/DL (ref 6–20)
CALCIUM SERPL-MCNC: 7.3 MG/DL (ref 8.6–10.4)
CALCIUM SERPL-MCNC: 7.7 MG/DL (ref 8.6–10.4)
CHLORIDE SERPL-SCNC: 111 MMOL/L (ref 98–107)
CHLORIDE SERPL-SCNC: 118 MMOL/L (ref 98–107)
CO2 SERPL-SCNC: 14 MMOL/L (ref 20–31)
CO2 SERPL-SCNC: 20 MMOL/L (ref 20–31)
CREAT SERPL-MCNC: 0.5 MG/DL (ref 0.7–1.2)
CREAT SERPL-MCNC: 0.6 MG/DL (ref 0.7–1.2)
ERYTHROCYTE [DISTWIDTH] IN BLOOD BY AUTOMATED COUNT: 18.6 % (ref 11.8–14.4)
GFR SERPL CREATININE-BSD FRML MDRD: >60 ML/MIN/1.73M2
GFR SERPL CREATININE-BSD FRML MDRD: >60 ML/MIN/1.73M2
GLUCOSE SERPL-MCNC: 112 MG/DL (ref 70–99)
GLUCOSE SERPL-MCNC: 95 MG/DL (ref 70–99)
HCT VFR BLD AUTO: 25.2 % (ref 40.7–50.3)
HCT VFR BLD AUTO: 25.5 % (ref 40.7–50.3)
HCT VFR BLD AUTO: 26 % (ref 40.7–50.3)
HCT VFR BLD AUTO: 26.3 % (ref 40.7–50.3)
HGB BLD-MCNC: 7.5 G/DL (ref 13–17)
HGB BLD-MCNC: 7.7 G/DL (ref 13–17)
HGB BLD-MCNC: 7.8 G/DL (ref 13–17)
HGB BLD-MCNC: 8.2 G/DL (ref 13–17)
INR PPP: 1.3
MCH RBC QN AUTO: 31.1 PG (ref 25.2–33.5)
MCHC RBC AUTO-ENTMCNC: 30.6 G/DL (ref 28.4–34.8)
MCV RBC AUTO: 101.6 FL (ref 82.6–102.9)
NRBC BLD-RTO: 0.4 PER 100 WBC
PLATELET # BLD AUTO: ABNORMAL K/UL (ref 138–453)
PLATELET, FLUORESCENCE: 71 K/UL (ref 138–453)
PLATELETS.RETICULATED NFR BLD AUTO: 3.2 % (ref 1.1–10.3)
POTASSIUM SERPL-SCNC: 3.7 MMOL/L (ref 3.7–5.3)
POTASSIUM SERPL-SCNC: 3.8 MMOL/L (ref 3.7–5.3)
PROT SERPL-MCNC: 5.6 G/DL (ref 6.4–8.3)
PROTHROMBIN TIME: 15.4 SEC (ref 11.7–14.9)
RBC # BLD AUTO: 2.51 M/UL (ref 4.21–5.77)
SODIUM SERPL-SCNC: 138 MMOL/L (ref 135–144)
SODIUM SERPL-SCNC: 138 MMOL/L (ref 135–144)
SODIUM SERPL-SCNC: 139 MMOL/L (ref 135–144)
SODIUM SERPL-SCNC: 139 MMOL/L (ref 135–144)
WBC OTHER # BLD: 10.4 K/UL (ref 3.5–11.3)

## 2023-12-10 PROCEDURE — 6360000002 HC RX W HCPCS: Performed by: INTERNAL MEDICINE

## 2023-12-10 PROCEDURE — 97162 PT EVAL MOD COMPLEX 30 MIN: CPT

## 2023-12-10 PROCEDURE — 6370000000 HC RX 637 (ALT 250 FOR IP)

## 2023-12-10 PROCEDURE — 6360000002 HC RX W HCPCS: Performed by: NURSE PRACTITIONER

## 2023-12-10 PROCEDURE — 80053 COMPREHEN METABOLIC PANEL: CPT

## 2023-12-10 PROCEDURE — 80048 BASIC METABOLIC PNL TOTAL CA: CPT

## 2023-12-10 PROCEDURE — 85027 COMPLETE CBC AUTOMATED: CPT

## 2023-12-10 PROCEDURE — 2580000003 HC RX 258

## 2023-12-10 PROCEDURE — 85610 PROTHROMBIN TIME: CPT

## 2023-12-10 PROCEDURE — 2580000003 HC RX 258: Performed by: STUDENT IN AN ORGANIZED HEALTH CARE EDUCATION/TRAINING PROGRAM

## 2023-12-10 PROCEDURE — APPSS45 APP SPLIT SHARED TIME 31-45 MINUTES: Performed by: INTERNAL MEDICINE

## 2023-12-10 PROCEDURE — 85014 HEMATOCRIT: CPT

## 2023-12-10 PROCEDURE — 2700000000 HC OXYGEN THERAPY PER DAY

## 2023-12-10 PROCEDURE — 94761 N-INVAS EAR/PLS OXIMETRY MLT: CPT

## 2023-12-10 PROCEDURE — 2580000003 HC RX 258: Performed by: NURSE PRACTITIONER

## 2023-12-10 PROCEDURE — 2580000003 HC RX 258: Performed by: INTERNAL MEDICINE

## 2023-12-10 PROCEDURE — 85055 RETICULATED PLATELET ASSAY: CPT

## 2023-12-10 PROCEDURE — 2060000000 HC ICU INTERMEDIATE R&B

## 2023-12-10 PROCEDURE — C9113 INJ PANTOPRAZOLE SODIUM, VIA: HCPCS | Performed by: INTERNAL MEDICINE

## 2023-12-10 PROCEDURE — 85018 HEMOGLOBIN: CPT

## 2023-12-10 PROCEDURE — C9113 INJ PANTOPRAZOLE SODIUM, VIA: HCPCS | Performed by: STUDENT IN AN ORGANIZED HEALTH CARE EDUCATION/TRAINING PROGRAM

## 2023-12-10 PROCEDURE — 2500000003 HC RX 250 WO HCPCS: Performed by: INTERNAL MEDICINE

## 2023-12-10 PROCEDURE — 84295 ASSAY OF SERUM SODIUM: CPT

## 2023-12-10 PROCEDURE — 99233 SBSQ HOSP IP/OBS HIGH 50: CPT | Performed by: INTERNAL MEDICINE

## 2023-12-10 PROCEDURE — 6360000002 HC RX W HCPCS: Performed by: STUDENT IN AN ORGANIZED HEALTH CARE EDUCATION/TRAINING PROGRAM

## 2023-12-10 PROCEDURE — 6360000002 HC RX W HCPCS

## 2023-12-10 PROCEDURE — 36415 COLL VENOUS BLD VENIPUNCTURE: CPT

## 2023-12-10 PROCEDURE — 6370000000 HC RX 637 (ALT 250 FOR IP): Performed by: INTERNAL MEDICINE

## 2023-12-10 PROCEDURE — 99231 SBSQ HOSP IP/OBS SF/LOW 25: CPT | Performed by: INTERNAL MEDICINE

## 2023-12-10 PROCEDURE — 82140 ASSAY OF AMMONIA: CPT

## 2023-12-10 RX ORDER — LACTULOSE 10 G/15ML
10 SOLUTION ORAL 3 TIMES DAILY
Status: DISCONTINUED | OUTPATIENT
Start: 2023-12-10 | End: 2023-12-15 | Stop reason: HOSPADM

## 2023-12-10 RX ORDER — PROPRANOLOL HYDROCHLORIDE 20 MG/1
20 TABLET ORAL 2 TIMES DAILY
Status: DISCONTINUED | OUTPATIENT
Start: 2023-12-10 | End: 2023-12-10

## 2023-12-10 RX ORDER — SODIUM CHLORIDE, SODIUM LACTATE, POTASSIUM CHLORIDE, CALCIUM CHLORIDE 600; 310; 30; 20 MG/100ML; MG/100ML; MG/100ML; MG/100ML
INJECTION, SOLUTION INTRAVENOUS CONTINUOUS
Status: DISCONTINUED | OUTPATIENT
Start: 2023-12-10 | End: 2023-12-12

## 2023-12-10 RX ORDER — PROPRANOLOL HYDROCHLORIDE 10 MG/1
10 TABLET ORAL 2 TIMES DAILY
Status: DISCONTINUED | OUTPATIENT
Start: 2023-12-10 | End: 2023-12-11

## 2023-12-10 RX ADMIN — PROPRANOLOL HYDROCHLORIDE 10 MG: 10 TABLET ORAL at 20:17

## 2023-12-10 RX ADMIN — LACTULOSE 10 G: 20 SOLUTION ORAL at 13:42

## 2023-12-10 RX ADMIN — SODIUM CHLORIDE, PRESERVATIVE FREE 10 ML: 5 INJECTION INTRAVENOUS at 08:44

## 2023-12-10 RX ADMIN — OCTREOTIDE ACETATE 50 MCG/HR: 500 INJECTION, SOLUTION INTRAVENOUS; SUBCUTANEOUS at 00:43

## 2023-12-10 RX ADMIN — RIFAXIMIN 550 MG: 550 TABLET ORAL at 15:20

## 2023-12-10 RX ADMIN — SODIUM CHLORIDE, PRESERVATIVE FREE 40 MG: 5 INJECTION INTRAVENOUS at 20:16

## 2023-12-10 RX ADMIN — LACTULOSE 20 G: 20 SOLUTION ORAL at 08:43

## 2023-12-10 RX ADMIN — LORAZEPAM 2 MG: 2 TABLET ORAL at 03:29

## 2023-12-10 RX ADMIN — SODIUM CHLORIDE, PRESERVATIVE FREE 10 ML: 5 INJECTION INTRAVENOUS at 20:16

## 2023-12-10 RX ADMIN — LACTULOSE 10 G: 20 SOLUTION ORAL at 20:18

## 2023-12-10 RX ADMIN — Medication 1000 MG: at 22:45

## 2023-12-10 RX ADMIN — LORAZEPAM 2 MG: 2 TABLET ORAL at 02:01

## 2023-12-10 RX ADMIN — SODIUM CHLORIDE, POTASSIUM CHLORIDE, SODIUM LACTATE AND CALCIUM CHLORIDE: 600; 310; 30; 20 INJECTION, SOLUTION INTRAVENOUS at 22:54

## 2023-12-10 RX ADMIN — LABETALOL HYDROCHLORIDE 5 MG: 5 INJECTION, SOLUTION INTRAVENOUS at 03:30

## 2023-12-10 RX ADMIN — THIAMINE HYDROCHLORIDE: 100 INJECTION, SOLUTION INTRAMUSCULAR; INTRAVENOUS at 08:45

## 2023-12-10 RX ADMIN — PANTOPRAZOLE SODIUM 8 MG/HR: 40 INJECTION, POWDER, FOR SOLUTION INTRAVENOUS at 06:28

## 2023-12-10 RX ADMIN — RIFAXIMIN 550 MG: 550 TABLET ORAL at 20:17

## 2023-12-10 NOTE — PLAN OF CARE
Problem: Safety - Adult  Goal: Free from fall injury  12/10/2023 0716 by Claudio Calderon RN  Outcome: Progressing  12/9/2023 2307 by Nazanin Lopez RN  Outcome: Progressing     Problem: Discharge Planning  Goal: Discharge to home or other facility with appropriate resources  12/10/2023 0716 by Claudio Calderon RN  Outcome: Progressing  12/9/2023 2307 by Nazanin Lopez RN  Outcome: Progressing     Problem: Skin/Tissue Integrity  Goal: Absence of new skin breakdown  Description: 1. Monitor for areas of redness and/or skin breakdown  2. Assess vascular access sites hourly  3. Every 4-6 hours minimum:  Change oxygen saturation probe site  4. Every 4-6 hours:  If on nasal continuous positive airway pressure, respiratory therapy assess nares and determine need for appliance change or resting period.   12/10/2023 0716 by Claudio Calderon RN  Outcome: Progressing  12/9/2023 2307 by Nazanin Lopez RN  Outcome: Progressing     Problem: Pain  Goal: Verbalizes/displays adequate comfort level or baseline comfort level  12/10/2023 0716 by Claudio Calderon RN  Outcome: Progressing  12/9/2023 2307 by Nazanin Lopez RN  Outcome: Progressing     Problem: Respiratory - Adult  Goal: Achieves optimal ventilation and oxygenation  12/10/2023 0716 by Claudio Calderon RN  Outcome: Progressing  12/9/2023 2307 by Nazanin Lopez RN  Outcome: Progressing     Problem: ABCDS Injury Assessment  Goal: Absence of physical injury  12/10/2023 0716 by Claudio Calderon RN  Outcome: Progressing  12/9/2023 2307 by Nazanin Lopez RN  Outcome: Progressing     Problem: Nutrition Deficit:  Goal: Optimize nutritional status  12/10/2023 0716 by Claudio Calderon RN  Outcome: Progressing  12/9/2023 2307 by Nazanin Lopez RN  Outcome: Progressing     Problem: Chronic Conditions and Co-morbidities  Goal: Patient's chronic conditions and co-morbidity symptoms are monitored and maintained or improved  12/10/2023 0716 by Claudio Caledron RN  Outcome:

## 2023-12-10 NOTE — PROGRESS NOTES
[facility]  Internal Medicine Teaching Residency Program  Inpatient Daily Progress Note  ______________________________________________________________________________    Patient: Catalino Kingsport  YOB: 1985   ZNO:[id number]    Acct: [de-identified]     Room: [address] [place] [address]  Admit date: 12/4/2023  Today's date: 12/10/23  Number of days in the hospital: 5    SUBJECTIVE   Admitting Diagnosis: Upper GI bleed  CC: Hematemesis    Pt seen and examined. No acute events overnight. Labs and charts reviewed. Afebrile, hemodynamically stable, saturating well on 2L NC, weaning down. BP was elevated and patient was tachycardic overnight likely secondary to alcohol withdrawal, required 4 mg of Ativan total  Hemoglobin stable 7.8 this a.m. , potassium 3.8, ammonia trending down to 62 this a.m., bicarb 14 chloride 118. Sodium overcorrected from 152 > 138, IVF stopped, follow-up on stat BMP  Awake, AOx4 this morning, continuous diarrhea FMS in place, will decrease lactulose from 20 to 10 3 times daily. AST and ALT downtrending, alk phos increased 179, total bilirubin 4.6 stable  - On clear liquid diet, advance diet as tolerated  - GI following, recommending discontinue octreotide drip, start propranolol 20 twice daily, Protonix 40 twice daily, titrate lactulose to 3-4 bowel movements, avoid diarrhea      Review of Systems  Review of Systems   Constitutional:  Positive for activity change and fatigue. Negative for chills and fever. Drowsy, sleepy. Respiratory:  Negative for shortness of breath and wheezing. Cardiovascular:  Positive for leg swelling. Negative for chest pain. Gastrointestinal:  Positive for blood in stool (dark maroon in FMS) and diarrhea. Negative for abdominal pain, constipation, nausea and vomiting. Genitourinary:  Negative for difficulty urinating and dysuria. Skin:  Positive for pallor.    Neurological:  Negative for dizziness, losartan was discontinued due to low BP in 2/2023  - Currently BP slightly elevated with tachycardia likely secondary to withdrawal   - will continue to monitor BP, cover with medications as needed. Hypernatremia - resolved  - IVF third NS at 75 cc/h started 12/9  - Sodium downtrending 152> 145  - Discontinue third NS, switch to LR 75 cc/h to avoid rapid correction. Alcohol abuse  - Watch for withdrawal  - CIWA protocol in place  - Continue Folate and Thiamine  - Encouraged alcohol cessation  -  on board, alcohol rehab programs provided     Tobacco smoker - counseled on cessation       DVT Ppx: EPC cuffs  PT OT consulted  DC planning:  on board, appreciate recommendations     Mitchell Eisenmenger, M.D. Internal Medicine Resident PGY-1  [address],  [place], [place].    1:31 AM 12/10/2023     Please note that part of this chart was generated using voice recognition dictation software. Although every effort was made to ensure the accuracy of this automated transcription, some errors in transcription may have occurred.

## 2023-12-10 NOTE — PLAN OF CARE
Problem: Safety - Adult  Goal: Free from fall injury  Outcome: Progressing     Problem: Discharge Planning  Goal: Discharge to home or other facility with appropriate resources  Outcome: Progressing     Problem: Skin/Tissue Integrity  Goal: Absence of new skin breakdown  Description: 1. Monitor for areas of redness and/or skin breakdown  2. Assess vascular access sites hourly  3. Every 4-6 hours minimum:  Change oxygen saturation probe site  4. Every 4-6 hours:  If on nasal continuous positive airway pressure, respiratory therapy assess nares and determine need for appliance change or resting period.   Outcome: Progressing     Problem: Pain  Goal: Verbalizes/displays adequate comfort level or baseline comfort level  Outcome: Progressing     Problem: Respiratory - Adult  Goal: Achieves optimal ventilation and oxygenation  Outcome: Progressing     Problem: ABCDS Injury Assessment  Goal: Absence of physical injury  Outcome: Progressing     Problem: Nutrition Deficit:  Goal: Optimize nutritional status  Outcome: Progressing     Problem: Chronic Conditions and Co-morbidities  Goal: Patient's chronic conditions and co-morbidity symptoms are monitored and maintained or improved  Outcome: Progressing

## 2023-12-10 NOTE — PROGRESS NOTES
Physical Therapy  Facility/Department: [address] STEPDOWN  Physical Therapy Initial Assessment    Name: Roberto Grimes  : 1985  MRN: [id number]  Date of Service: 12/10/2023    Discharge Recommendations:  Continue to assess pending progress, Therapy recommended at discharge          Patient Diagnosis(es): The primary encounter diagnosis was Upper GI bleed. Diagnoses of Alcoholic cirrhosis, unspecified whether ascites present ([address]) and Acute pancreatitis, unspecified complication status, unspecified pancreatitis type were also pertinent to this visit. Past Medical History:  has a past medical history of Alcoholism ([address]), Anemia, Cirrhosis, alcoholic ([address]), and Pulmonary embolism ([address]). Past Surgical History:  has a past surgical history that includes Upper gastrointestinal endoscopy (N/A, 2022); Upper gastrointestinal endoscopy (N/A, 10/30/2022); Upper gastrointestinal endoscopy (N/A, 2023); Esophagogastroduodenoscopy (2023); IR TIPS INSERTION (2023); IR NONTUNNELED VASCULAR CATHETER > 5 YEARS (2023); Upper gastrointestinal endoscopy (N/A, 2023); Upper gastrointestinal endoscopy (N/A, 2023); Upper gastrointestinal endoscopy (2023); and Upper gastrointestinal endoscopy (2023). Assessment   Assessment: pt presents with decreased strength in B LEs, decreased sitting balance, impaired cognition and requires cues to attend to simple tasks this date. pt became mildly irritated with activity. Pt is a flal risk and currently requires assist for any mobility. Recommend cont therapy in acute setting to address deficits.   Therapy Prognosis: Fair  Decision Making: Medium Complexity  Requires PT Follow-Up: Yes  Activity Tolerance  Activity Tolerance: Treatment limited secondary to decreased cognition (decreased command following and alertness)     Plan   Physical Therapy Plan  General Plan: 5-7 times per week  Current Treatment Recommendations: Strengthening, ROM, Balance

## 2023-12-11 ENCOUNTER — APPOINTMENT (OUTPATIENT)
Dept: GENERAL RADIOLOGY | Age: 38
End: 2023-12-11
Payer: COMMERCIAL

## 2023-12-11 LAB
ALBUMIN SERPL-MCNC: 2 G/DL (ref 3.5–5.2)
ALBUMIN/GLOB SERPL: 0.6 {RATIO} (ref 1–2.5)
ALP SERPL-CCNC: 209 U/L (ref 40–129)
ALT SERPL-CCNC: 21 U/L (ref 5–41)
AMMONIA PLAS-SCNC: 68 UMOL/L (ref 16–60)
ANION GAP SERPL CALCULATED.3IONS-SCNC: 7 MMOL/L (ref 9–17)
AST SERPL-CCNC: 74 U/L
BILIRUB SERPL-MCNC: 4.1 MG/DL (ref 0.3–1.2)
BUN SERPL-MCNC: 9 MG/DL (ref 6–20)
CALCIUM SERPL-MCNC: 7.1 MG/DL (ref 8.6–10.4)
CHLORIDE SERPL-SCNC: 111 MMOL/L (ref 98–107)
CO2 SERPL-SCNC: 20 MMOL/L (ref 20–31)
CREAT SERPL-MCNC: 0.4 MG/DL (ref 0.7–1.2)
EKG ATRIAL RATE: 89 BPM
EKG P AXIS: 55 DEGREES
EKG P-R INTERVAL: 138 MS
EKG Q-T INTERVAL: 354 MS
EKG QRS DURATION: 90 MS
EKG QTC CALCULATION (BAZETT): 430 MS
EKG R AXIS: 37 DEGREES
EKG T AXIS: 76 DEGREES
EKG VENTRICULAR RATE: 89 BPM
ERYTHROCYTE [DISTWIDTH] IN BLOOD BY AUTOMATED COUNT: 18.5 % (ref 11.8–14.4)
GFR SERPL CREATININE-BSD FRML MDRD: >60 ML/MIN/1.73M2
GLUCOSE SERPL-MCNC: 85 MG/DL (ref 70–99)
HCT VFR BLD AUTO: 23.1 % (ref 40.7–50.3)
HCT VFR BLD AUTO: 24.1 % (ref 40.7–50.3)
HCT VFR BLD AUTO: 27.4 % (ref 40.7–50.3)
HGB BLD-MCNC: 7.3 G/DL (ref 13–17)
HGB BLD-MCNC: 7.3 G/DL (ref 13–17)
HGB BLD-MCNC: 8.4 G/DL (ref 13–17)
INR PPP: 1.4
MCH RBC QN AUTO: 30.7 PG (ref 25.2–33.5)
MCHC RBC AUTO-ENTMCNC: 30.3 G/DL (ref 28.4–34.8)
MCV RBC AUTO: 101.3 FL (ref 82.6–102.9)
NRBC BLD-RTO: 0.3 PER 100 WBC
PLATELET # BLD AUTO: ABNORMAL K/UL (ref 138–453)
PLATELET, FLUORESCENCE: 75 K/UL (ref 138–453)
PLATELETS.RETICULATED NFR BLD AUTO: 3.6 % (ref 1.1–10.3)
POTASSIUM SERPL-SCNC: 3.9 MMOL/L (ref 3.7–5.3)
PROT SERPL-MCNC: 5.1 G/DL (ref 6.4–8.3)
PROTHROMBIN TIME: 16.6 SEC (ref 11.7–14.9)
RBC # BLD AUTO: 2.38 M/UL (ref 4.21–5.77)
SODIUM SERPL-SCNC: 138 MMOL/L (ref 135–144)
WBC OTHER # BLD: 11.2 K/UL (ref 3.5–11.3)

## 2023-12-11 PROCEDURE — 6360000002 HC RX W HCPCS

## 2023-12-11 PROCEDURE — 93010 ELECTROCARDIOGRAM REPORT: CPT | Performed by: INTERNAL MEDICINE

## 2023-12-11 PROCEDURE — 6360000002 HC RX W HCPCS: Performed by: INTERNAL MEDICINE

## 2023-12-11 PROCEDURE — 80053 COMPREHEN METABOLIC PANEL: CPT

## 2023-12-11 PROCEDURE — 2580000003 HC RX 258: Performed by: INTERNAL MEDICINE

## 2023-12-11 PROCEDURE — 82140 ASSAY OF AMMONIA: CPT

## 2023-12-11 PROCEDURE — 6370000000 HC RX 637 (ALT 250 FOR IP)

## 2023-12-11 PROCEDURE — APPSS45 APP SPLIT SHARED TIME 31-45 MINUTES: Performed by: INTERNAL MEDICINE

## 2023-12-11 PROCEDURE — 6370000000 HC RX 637 (ALT 250 FOR IP): Performed by: INTERNAL MEDICINE

## 2023-12-11 PROCEDURE — 2060000000 HC ICU INTERMEDIATE R&B

## 2023-12-11 PROCEDURE — 99232 SBSQ HOSP IP/OBS MODERATE 35: CPT | Performed by: INTERNAL MEDICINE

## 2023-12-11 PROCEDURE — 71045 X-RAY EXAM CHEST 1 VIEW: CPT

## 2023-12-11 PROCEDURE — 85027 COMPLETE CBC AUTOMATED: CPT

## 2023-12-11 PROCEDURE — 2500000003 HC RX 250 WO HCPCS: Performed by: INTERNAL MEDICINE

## 2023-12-11 PROCEDURE — 2500000003 HC RX 250 WO HCPCS

## 2023-12-11 PROCEDURE — 87040 BLOOD CULTURE FOR BACTERIA: CPT

## 2023-12-11 PROCEDURE — 93005 ELECTROCARDIOGRAM TRACING: CPT | Performed by: INTERNAL MEDICINE

## 2023-12-11 PROCEDURE — 85610 PROTHROMBIN TIME: CPT

## 2023-12-11 PROCEDURE — 85014 HEMATOCRIT: CPT

## 2023-12-11 PROCEDURE — 2580000003 HC RX 258: Performed by: STUDENT IN AN ORGANIZED HEALTH CARE EDUCATION/TRAINING PROGRAM

## 2023-12-11 PROCEDURE — 2580000003 HC RX 258

## 2023-12-11 PROCEDURE — 85055 RETICULATED PLATELET ASSAY: CPT

## 2023-12-11 PROCEDURE — 99231 SBSQ HOSP IP/OBS SF/LOW 25: CPT | Performed by: INTERNAL MEDICINE

## 2023-12-11 PROCEDURE — 36415 COLL VENOUS BLD VENIPUNCTURE: CPT

## 2023-12-11 PROCEDURE — 85018 HEMOGLOBIN: CPT

## 2023-12-11 PROCEDURE — C9113 INJ PANTOPRAZOLE SODIUM, VIA: HCPCS | Performed by: INTERNAL MEDICINE

## 2023-12-11 RX ORDER — FOLIC ACID 5 MG/ML
1 INJECTION, SOLUTION INTRAMUSCULAR; INTRAVENOUS; SUBCUTANEOUS DAILY
Status: DISCONTINUED | OUTPATIENT
Start: 2023-12-11 | End: 2023-12-11

## 2023-12-11 RX ORDER — CARVEDILOL 6.25 MG/1
6.25 TABLET ORAL 2 TIMES DAILY WITH MEALS
Status: DISCONTINUED | OUTPATIENT
Start: 2023-12-11 | End: 2023-12-15 | Stop reason: HOSPADM

## 2023-12-11 RX ORDER — LORAZEPAM 1 MG/1
1 TABLET ORAL ONCE
Status: COMPLETED | OUTPATIENT
Start: 2023-12-11 | End: 2023-12-11

## 2023-12-11 RX ORDER — THIAMINE HYDROCHLORIDE 100 MG/ML
400 INJECTION, SOLUTION INTRAMUSCULAR; INTRAVENOUS DAILY
Status: DISCONTINUED | OUTPATIENT
Start: 2023-12-11 | End: 2023-12-11

## 2023-12-11 RX ADMIN — LORAZEPAM 2 MG: 2 INJECTION INTRAMUSCULAR; INTRAVENOUS at 11:31

## 2023-12-11 RX ADMIN — SODIUM CHLORIDE, PRESERVATIVE FREE 40 MG: 5 INJECTION INTRAVENOUS at 08:01

## 2023-12-11 RX ADMIN — SODIUM CHLORIDE, PRESERVATIVE FREE 10 ML: 5 INJECTION INTRAVENOUS at 08:02

## 2023-12-11 RX ADMIN — LACTULOSE 10 G: 20 SOLUTION ORAL at 20:33

## 2023-12-11 RX ADMIN — RIFAXIMIN 550 MG: 550 TABLET ORAL at 08:02

## 2023-12-11 RX ADMIN — FOLIC ACID: 5 INJECTION, SOLUTION INTRAMUSCULAR; INTRAVENOUS; SUBCUTANEOUS at 13:13

## 2023-12-11 RX ADMIN — Medication 1000 MG: at 22:09

## 2023-12-11 RX ADMIN — CARVEDILOL 6.25 MG: 6.25 TABLET, FILM COATED ORAL at 16:54

## 2023-12-11 RX ADMIN — PROPRANOLOL HYDROCHLORIDE 10 MG: 10 TABLET ORAL at 08:02

## 2023-12-11 RX ADMIN — THIAMINE HYDROCHLORIDE: 100 INJECTION, SOLUTION INTRAMUSCULAR; INTRAVENOUS at 08:03

## 2023-12-11 RX ADMIN — SODIUM CHLORIDE, PRESERVATIVE FREE 10 ML: 5 INJECTION INTRAVENOUS at 20:32

## 2023-12-11 RX ADMIN — RIFAXIMIN 550 MG: 550 TABLET ORAL at 20:32

## 2023-12-11 RX ADMIN — CARVEDILOL 6.25 MG: 6.25 TABLET, FILM COATED ORAL at 12:45

## 2023-12-11 RX ADMIN — SODIUM CHLORIDE, PRESERVATIVE FREE 40 MG: 5 INJECTION INTRAVENOUS at 20:33

## 2023-12-11 RX ADMIN — THIAMINE HYDROCHLORIDE 400 MG: 100 INJECTION, SOLUTION INTRAMUSCULAR; INTRAVENOUS at 12:43

## 2023-12-11 RX ADMIN — LORAZEPAM 2 MG: 2 INJECTION INTRAMUSCULAR; INTRAVENOUS at 07:59

## 2023-12-11 RX ADMIN — LORAZEPAM 1 MG: 1 TABLET ORAL at 08:29

## 2023-12-11 RX ADMIN — LACTULOSE 10 G: 20 SOLUTION ORAL at 13:55

## 2023-12-11 ASSESSMENT — PAIN SCALES - GENERAL: PAINLEVEL_OUTOF10: 0

## 2023-12-11 NOTE — PROGRESS NOTES
[facility] [employer]  Internal Medicine Teaching Residency Program  Inpatient Daily Progress Note  ______________________________________________________________________________    Patient: Catalino Caney  YOB: 1985   [id number]    Acct: [de-identified]     Room: [address] [place] [address]  Admit date: 12/4/2023  Today's date: 12/11/23  Number of days in the hospital: 6    SUBJECTIVE   Admitting Diagnosis: Upper GI bleed  CC: Hematemesis    Pt seen and examined. Was agitated, confused this morning, disoriented pulling IV and EPC cuffs, given Ativan per CIWA protocol, will continue to monitor and try to avoid benzos. Higher dose of thiamine  Mild fever 100.5 overnight, will check chest x-ray, blood cultures  Poor PO intake with diarrhea, was started on LR 50 cc/hr overnight will DC  Labs and charts reviewed. hemodynamically stable, saturating well on 2L NC, weaning down. Hemoglobin stable 7.3 this a.m. , potassium 3.9, ammonia trending down to 62 this a.m., bicarb 20 chloride 111. Awake, AOx4 this morning, continuous diarrhea FMS in place, morning dose of lactulose was held, will resume later today as diarrhea improving.  - AST and ALT downtrending, alk phos increased 179>209, total bilirubin 4.6 stable  - On clear liquid diet, diet advanced to regular, monitor tolerance. - GI following, Protonix 40 twice daily, Rifaximin 550, titrate lactulose to 3-4 bowel movements, avoid diarrhea, avoid narcotics      Review of Systems  Review of Systems   Constitutional:  Positive for activity change and fatigue. Negative for chills and fever. Drowsy, sleepy. Respiratory:  Negative for shortness of breath and wheezing. Cardiovascular:  Positive for leg swelling. Negative for chest pain. Gastrointestinal:  Positive for blood in stool (dark maroon in FMS) and diarrhea. Negative for abdominal pain, constipation, nausea and vomiting.    Genitourinary:  Negative for on 12/4 with nausea vomiting and multiple episodes of large amounts of of hematemesis secondary to variceal bleed, transferred to [facility] for emergent TIPS by IR 12/5, repeat EGD 12/7 with banding of esophageal gastric varices, s/p massive transfusion of blood products, GI on board, extubated 12/8, transfer out of ICU 12/9.       Assessment:  Principal Problem:    Upper GI bleed  Active Problems:    Acute blood loss anemia    Alcoholism (HCC)    Transaminitis    Hematemesis    ALC (alcoholic liver cirrhosis) (HCC)  Resolved Problems:    Hemorrhagic shock (HCC)        Plan:  Encephalopathy  - Likely metabolic secondary to cirrhosis and alcohol withdrawal  - Continue to monitor mentation  - Bed sitter    Upper GI bleed  Gastric and esophageal variceal bleeding s/p emergent TIPS with IR 12/5  S/p banding of gastroesophageal and esophageal varices 12/7  - Hx of upper GI bleed s/p multiple EGD and banding, last done prior to this admission in 9/2023  - S/p massive transfusion of blood products, 12 PRBC, 8 platelets, 6 FFP's, 1 cryoprecipitate.  - GI on board, appreciate recommendations  - On IV octreotide and Protonix drips since 12/5, continue until 12/10  - On clear liquid diet, advance to full diet as tolerated  - No further hematemesis noted  - Continue with Rocephin last dose 12/12  - Monitor hemoglobin, transfuse as needed to keep above 7  - GI following, off octreotide drip 12/10, started propranolol 10 twice daily, Protonix 40 twice daily, titrate lactulose to 3-4 bowel movements, avoid diarrhea  - Propranolol switched to Coreg    Acute blood loss anemia  - Secondary to GI bleed  - S/p massive blood product transfusion  - Continue to monitor H&H, watch for bleeding, transfuse as needed keep hemoglobin above 7     Alcoholic liver cirrhosis  Hyperammonemia  Transaminitis - improving  - Decompensated liver cirrhosis, complicated by esophageal and gastric varices, encephalopathy and ascites  - Elevated ammonia 275

## 2023-12-11 NOTE — PLAN OF CARE
Problem: Safety - Adult  Goal: Free from fall injury  Outcome: Progressing  Flowsheets (Taken 12/10/2023 2000)  Free From Fall Injury: Instruct family/caregiver on patient safety     Problem: Discharge Planning  Goal: Discharge to home or other facility with appropriate resources  Outcome: Progressing  Flowsheets (Taken 12/10/2023 2000)  Discharge to home or other facility with appropriate resources:   Identify barriers to discharge with patient and caregiver   Arrange for needed discharge resources and transportation as appropriate   Identify discharge learning needs (meds, wound care, etc)   Refer to discharge planning if patient needs post-hospital services based on physician order or complex needs related to functional status, cognitive ability or social support system     Problem: Pain  Goal: Verbalizes/displays adequate comfort level or baseline comfort level  Outcome: Progressing     Problem: Respiratory - Adult  Goal: Achieves optimal ventilation and oxygenation  Outcome: Progressing  Flowsheets (Taken 12/10/2023 2000)  Achieves optimal ventilation and oxygenation:   Assess for changes in respiratory status   Assess for changes in mentation and behavior   Respiratory therapy support as indicated     Problem: Chronic Conditions and Co-morbidities  Goal: Patient's chronic conditions and co-morbidity symptoms are monitored and maintained or improved  Outcome: Progressing  Flowsheets (Taken 12/10/2023 2000)  Care Plan - Patient's Chronic Conditions and Co-Morbidity Symptoms are Monitored and Maintained or Improved:   Monitor and assess patient's chronic conditions and comorbid symptoms for stability, deterioration, or improvement   Collaborate with multidisciplinary team to address chronic and comorbid conditions and prevent exacerbation or deterioration   Update acute care plan with appropriate goals if chronic or comorbid symptoms are exacerbated and prevent overall improvement and discharge

## 2023-12-11 NOTE — PLAN OF CARE
Problem: Safety - Adult  Goal: Free from fall injury  12/11/2023 0924 by Annika Vargas RN  Outcome: Progressing  12/11/2023 0506 by Megha Hughes RN  Outcome: Progressing  Flowsheets (Taken 12/10/2023 2000)  Free From Fall Injury: Instruct family/caregiver on patient safety     Problem: Discharge Planning  Goal: Discharge to home or other facility with appropriate resources  12/11/2023 0924 by Annika Vargas RN  Outcome: Progressing  12/11/2023 0506 by Megha Hughes RN  Outcome: Progressing  Flowsheets (Taken 12/10/2023 2000)  Discharge to home or other facility with appropriate resources:   Identify barriers to discharge with patient and caregiver   Arrange for needed discharge resources and transportation as appropriate   Identify discharge learning needs (meds, wound care, etc)   Refer to discharge planning if patient needs post-hospital services based on physician order or complex needs related to functional status, cognitive ability or social support system     Problem: Skin/Tissue Integrity  Goal: Absence of new skin breakdown  Description: 1. Monitor for areas of redness and/or skin breakdown  2. Assess vascular access sites hourly  3. Every 4-6 hours minimum:  Change oxygen saturation probe site  4. Every 4-6 hours:  If on nasal continuous positive airway pressure, respiratory therapy assess nares and determine need for appliance change or resting period.   12/11/2023 0924 by Annika Vargas RN  Outcome: Progressing  12/11/2023 0506 by Megha Hughes RN  Outcome: Progressing     Problem: Pain  Goal: Verbalizes/displays adequate comfort level or baseline comfort level  12/11/2023 0924 by Annika Vargas RN  Outcome: Progressing  12/11/2023 0506 by Megha Hughes RN  Outcome: Progressing     Problem: Respiratory - Adult  Goal: Achieves optimal ventilation and oxygenation  12/11/2023 0924 by Annika Vargas RN  Outcome: Progressing  12/11/2023 0506 by Megha Hughes RN  Outcome:

## 2023-12-11 NOTE — PROGRESS NOTES
[address]    Admission Medication Reconciliation       The patient's list of current home medications has been reviewed. Source(s) of information: Dispense Report    Based on information provided by the above source(s), I have updated the patient's home med list as described below. Please review the ACTION REQUESTED section of this note below for any discrepancies on current hospital orders. I changed or updated the following medications on the patient's home medication list:  Removed None     Added None     Adjusted   None   Other Notes Flagged for Provider Review:  Cephalexin 500mg  Lactulose 10g/5mL --> last filled 2/84/22  Folic Acid 1mg --> last filled 5/3/22         PROVIDER ACTION REQUESTED  Medications that need to be addressed by a physician/nurse practitioner:    Medication Action Requested   Flagged Medications for Provider Review      Please review flagged medications and verify if the patient is still taking them. Remove from home medication list as necessary. Please feel free to call me with any questions about this encounter. Thank you.     Thank you  Caroline Norton, PharmD, [facility]  Inpatient Clinical Pharmacist  [phone]      Electronically signed by Aryan Paz on 12/11/2023 at 4:40 PM

## 2023-12-12 LAB
ALBUMIN SERPL-MCNC: 2.2 G/DL (ref 3.5–5.2)
ALBUMIN/GLOB SERPL: 0.7 {RATIO} (ref 1–2.5)
ALP SERPL-CCNC: 226 U/L (ref 40–129)
ALT SERPL-CCNC: 18 U/L (ref 5–41)
AMMONIA PLAS-SCNC: 70 UMOL/L (ref 16–60)
ANION GAP SERPL CALCULATED.3IONS-SCNC: 7 MMOL/L (ref 9–17)
AST SERPL-CCNC: 64 U/L
BILIRUB SERPL-MCNC: 4.3 MG/DL (ref 0.3–1.2)
BILIRUB UR QL STRIP: NEGATIVE
BUN SERPL-MCNC: 8 MG/DL (ref 6–20)
CALCIUM SERPL-MCNC: 7.3 MG/DL (ref 8.6–10.4)
CHLORIDE SERPL-SCNC: 115 MMOL/L (ref 98–107)
CLARITY UR: CLEAR
CO2 SERPL-SCNC: 20 MMOL/L (ref 20–31)
COLOR UR: YELLOW
COMMENT: NORMAL
CREAT SERPL-MCNC: 0.5 MG/DL (ref 0.7–1.2)
EKG ATRIAL RATE: 96 BPM
EKG P AXIS: 45 DEGREES
EKG P-R INTERVAL: 144 MS
EKG Q-T INTERVAL: 344 MS
EKG QRS DURATION: 98 MS
EKG QTC CALCULATION (BAZETT): 434 MS
EKG R AXIS: 22 DEGREES
EKG T AXIS: 104 DEGREES
EKG VENTRICULAR RATE: 96 BPM
ERYTHROCYTE [DISTWIDTH] IN BLOOD BY AUTOMATED COUNT: 19 % (ref 11.8–14.4)
GFR SERPL CREATININE-BSD FRML MDRD: >60 ML/MIN/1.73M2
GLUCOSE SERPL-MCNC: 76 MG/DL (ref 70–99)
GLUCOSE UR STRIP-MCNC: NEGATIVE MG/DL
HCT VFR BLD AUTO: 24.4 % (ref 40.7–50.3)
HGB BLD-MCNC: 7.7 G/DL (ref 13–17)
HGB UR QL STRIP.AUTO: NEGATIVE
KETONES UR STRIP-MCNC: NEGATIVE MG/DL
LEUKOCYTE ESTERASE UR QL STRIP: NEGATIVE
MAGNESIUM SERPL-MCNC: 1.8 MG/DL (ref 1.6–2.6)
MCH RBC QN AUTO: 31.3 PG (ref 25.2–33.5)
MCHC RBC AUTO-ENTMCNC: 31.6 G/DL (ref 28.4–34.8)
MCV RBC AUTO: 99.2 FL (ref 82.6–102.9)
NITRITE UR QL STRIP: NEGATIVE
NRBC BLD-RTO: 0.2 PER 100 WBC
PH UR STRIP: 8 [PH] (ref 5–8)
PLATELET # BLD AUTO: 103 K/UL (ref 138–453)
PMV BLD AUTO: 10.9 FL (ref 8.1–13.5)
POTASSIUM SERPL-SCNC: 3.2 MMOL/L (ref 3.7–5.3)
PROT SERPL-MCNC: 5.3 G/DL (ref 6.4–8.3)
PROT UR STRIP-MCNC: NEGATIVE MG/DL
RBC # BLD AUTO: 2.46 M/UL (ref 4.21–5.77)
SODIUM SERPL-SCNC: 142 MMOL/L (ref 135–144)
SP GR UR STRIP: 1.01 (ref 1–1.03)
UROBILINOGEN UR STRIP-ACNC: NORMAL EU/DL (ref 0–1)
WBC OTHER # BLD: 9.1 K/UL (ref 3.5–11.3)

## 2023-12-12 PROCEDURE — 82140 ASSAY OF AMMONIA: CPT

## 2023-12-12 PROCEDURE — 99231 SBSQ HOSP IP/OBS SF/LOW 25: CPT | Performed by: INTERNAL MEDICINE

## 2023-12-12 PROCEDURE — 6370000000 HC RX 637 (ALT 250 FOR IP)

## 2023-12-12 PROCEDURE — 36415 COLL VENOUS BLD VENIPUNCTURE: CPT

## 2023-12-12 PROCEDURE — 99232 SBSQ HOSP IP/OBS MODERATE 35: CPT | Performed by: INTERNAL MEDICINE

## 2023-12-12 PROCEDURE — 6370000000 HC RX 637 (ALT 250 FOR IP): Performed by: INTERNAL MEDICINE

## 2023-12-12 PROCEDURE — 2060000000 HC ICU INTERMEDIATE R&B

## 2023-12-12 PROCEDURE — 81003 URINALYSIS AUTO W/O SCOPE: CPT

## 2023-12-12 PROCEDURE — 6360000002 HC RX W HCPCS: Performed by: INTERNAL MEDICINE

## 2023-12-12 PROCEDURE — 93005 ELECTROCARDIOGRAM TRACING: CPT

## 2023-12-12 PROCEDURE — 2580000003 HC RX 258

## 2023-12-12 PROCEDURE — 6360000002 HC RX W HCPCS

## 2023-12-12 PROCEDURE — 2500000003 HC RX 250 WO HCPCS

## 2023-12-12 PROCEDURE — 2580000003 HC RX 258: Performed by: INTERNAL MEDICINE

## 2023-12-12 PROCEDURE — 85027 COMPLETE CBC AUTOMATED: CPT

## 2023-12-12 PROCEDURE — C9113 INJ PANTOPRAZOLE SODIUM, VIA: HCPCS | Performed by: INTERNAL MEDICINE

## 2023-12-12 PROCEDURE — 80053 COMPREHEN METABOLIC PANEL: CPT

## 2023-12-12 PROCEDURE — 93010 ELECTROCARDIOGRAM REPORT: CPT | Performed by: INTERNAL MEDICINE

## 2023-12-12 PROCEDURE — 83735 ASSAY OF MAGNESIUM: CPT

## 2023-12-12 RX ORDER — POTASSIUM CHLORIDE 20 MEQ/1
40 TABLET, EXTENDED RELEASE ORAL ONCE
Status: COMPLETED | OUTPATIENT
Start: 2023-12-12 | End: 2023-12-12

## 2023-12-12 RX ORDER — HALOPERIDOL 5 MG/ML
2 INJECTION INTRAMUSCULAR ONCE
Status: COMPLETED | OUTPATIENT
Start: 2023-12-12 | End: 2023-12-12

## 2023-12-12 RX ORDER — HALOPERIDOL 5 MG/ML
1 INJECTION INTRAMUSCULAR EVERY 8 HOURS PRN
Status: DISCONTINUED | OUTPATIENT
Start: 2023-12-12 | End: 2023-12-15 | Stop reason: HOSPADM

## 2023-12-12 RX ORDER — QUETIAPINE FUMARATE 25 MG/1
25 TABLET, FILM COATED ORAL NIGHTLY
Status: DISCONTINUED | OUTPATIENT
Start: 2023-12-12 | End: 2023-12-15 | Stop reason: HOSPADM

## 2023-12-12 RX ADMIN — HALOPERIDOL LACTATE 2 MG: 5 INJECTION, SOLUTION INTRAMUSCULAR at 04:41

## 2023-12-12 RX ADMIN — POTASSIUM CHLORIDE 40 MEQ: 1500 TABLET, EXTENDED RELEASE ORAL at 11:05

## 2023-12-12 RX ADMIN — LACTULOSE 10 G: 20 SOLUTION ORAL at 14:41

## 2023-12-12 RX ADMIN — CARVEDILOL 6.25 MG: 6.25 TABLET, FILM COATED ORAL at 09:16

## 2023-12-12 RX ADMIN — Medication 1000 MG: at 22:02

## 2023-12-12 RX ADMIN — RIFAXIMIN 550 MG: 550 TABLET ORAL at 09:16

## 2023-12-12 RX ADMIN — QUETIAPINE FUMARATE 25 MG: 25 TABLET ORAL at 22:02

## 2023-12-12 RX ADMIN — LORAZEPAM 1 MG: 2 INJECTION INTRAMUSCULAR; INTRAVENOUS at 03:19

## 2023-12-12 RX ADMIN — THIAMINE HYDROCHLORIDE 400 MG: 100 INJECTION, SOLUTION INTRAMUSCULAR; INTRAVENOUS at 09:57

## 2023-12-12 RX ADMIN — SODIUM CHLORIDE, PRESERVATIVE FREE 10 ML: 5 INJECTION INTRAVENOUS at 22:02

## 2023-12-12 RX ADMIN — SODIUM CHLORIDE, PRESERVATIVE FREE 40 MG: 5 INJECTION INTRAVENOUS at 09:16

## 2023-12-12 RX ADMIN — LACTULOSE 10 G: 20 SOLUTION ORAL at 22:02

## 2023-12-12 RX ADMIN — SODIUM CHLORIDE, PRESERVATIVE FREE 40 MG: 5 INJECTION INTRAVENOUS at 22:02

## 2023-12-12 RX ADMIN — CARVEDILOL 6.25 MG: 6.25 TABLET, FILM COATED ORAL at 17:15

## 2023-12-12 RX ADMIN — RIFAXIMIN 550 MG: 550 TABLET ORAL at 22:02

## 2023-12-12 RX ADMIN — LORAZEPAM 1 MG: 2 INJECTION INTRAMUSCULAR; INTRAVENOUS at 04:19

## 2023-12-12 RX ADMIN — FOLIC ACID: 5 INJECTION, SOLUTION INTRAMUSCULAR; INTRAVENOUS; SUBCUTANEOUS at 09:21

## 2023-12-12 NOTE — PLAN OF CARE
Problem: Safety - Adult  Goal: Free from fall injury  Outcome: Progressing     Problem: Discharge Planning  Goal: Discharge to home or other facility with appropriate resources  Outcome: Progressing  Flowsheets (Taken 12/12/2023 0800)  Discharge to home or other facility with appropriate resources: Identify barriers to discharge with patient and caregiver     Problem: Skin/Tissue Integrity  Goal: Absence of new skin breakdown  Description: 1. Monitor for areas of redness and/or skin breakdown  2. Assess vascular access sites hourly  3. Every 4-6 hours minimum:  Change oxygen saturation probe site  4. Every 4-6 hours:  If on nasal continuous positive airway pressure, respiratory therapy assess nares and determine need for appliance change or resting period.   Outcome: Progressing     Problem: Pain  Goal: Verbalizes/displays adequate comfort level or baseline comfort level  Outcome: Progressing     Problem: Respiratory - Adult  Goal: Achieves optimal ventilation and oxygenation  Outcome: Progressing  Flowsheets (Taken 12/12/2023 0800)  Achieves optimal ventilation and oxygenation:   Assess for changes in mentation and behavior   Assess for changes in respiratory status     Problem: ABCDS Injury Assessment  Goal: Absence of physical injury  Outcome: Progressing     Problem: Nutrition Deficit:  Goal: Optimize nutritional status  Outcome: Progressing     Problem: Chronic Conditions and Co-morbidities  Goal: Patient's chronic conditions and co-morbidity symptoms are monitored and maintained or improved  Outcome: Progressing  Flowsheets (Taken 12/12/2023 0800)  Care Plan - Patient's Chronic Conditions and Co-Morbidity Symptoms are Monitored and Maintained or Improved: Monitor and assess patient's chronic conditions and comorbid symptoms for stability, deterioration, or improvement

## 2023-12-12 NOTE — PROGRESS NOTES
Pt noted to be hallucinating at times, reach out for objects unseen and acting like he is taking things to his mouth with nothing in his hand. VS stable pt remains impulsive but redirectable at this time. Sitter at bedside. VS stable will continue to monitor.

## 2023-12-12 NOTE — PROGRESS NOTES
139 139 138   K 3.8 3.7  --  3.9   * 111*  --  111*   CO2 14* 20  --  20   BUN 16 13  --  9   CREATININE 0.6* 0.5*  --  0.4*   GLUCOSE 112* 95  --  85   CALCIUM 7.7* 7.3*  --  7.1*       LFTS  Recent Labs     12/10/23  0248 12/11/23  0246   ALKPHOS 179* 209*   ALT 24 21   AST 89* 74*   BILITOT 4.6* 4.1*   LABALBU 2.3* 2.0*       AMYLASE/LIPASE/AMMONIA  Recent Labs     12/10/23  0214 12/10/23  2113 12/11/23  0246   AMMONIA 62* 63* 68*       Acute Hepatitis Panel   Lab Results   Component Value Date/Time    HEPBSAG NONREACTIVE 05/02/2022 04:28 AM    HEPCAB NONREACTIVE 05/02/2022 04:28 AM    HEPBIGM NONREACTIVE 05/02/2022 04:28 AM    HEPAIGM NONREACTIVE 05/02/2022 04:28 AM       HCV Genotype   No components found for: \"HEPATITISCGENOTYPE\"    HCV Quantitative   No results found for: \"HCVQNT\"    LIVER WORK UP:    AFP  Lab Results   Component Value Date/Time    AFP 2.4 09/27/2023 01:01 PM       Alpha 1 antitrypsin   Lab Results   Component Value Date/Time    A1A 238 04/28/2022 01:50 PM       Anti - Liver/Kidney Ab  Lab Results   Component Value Date/Time    LIVER-KIDNEYMICROSOMALAB <1:20 04/28/2022 01:50 PM       TIFFANIE  Lab Results   Component Value Date/Time    TIFFANIE POSITIVE 04/28/2022 01:50 PM       AMA  Lab Results   Component Value Date/Time    MITOAB 3.6 04/28/2022 01:50 PM       ASMA  Lab Results   Component Value Date/Time    SMOOTHMUSCAB 13 04/28/2022 01:50 PM       Ceruloplasmin  Lab Results   Component Value Date/Time    CERULOPLSM 30 04/28/2022 01:50 PM       Celiac panel  Lab Results   Component Value Date/Time     04/28/2022 01:50 PM       IgG  No results found for: \"IGG\"    IgM  Lab Results   Component Value Date/Time    IGM 61 04/28/2022 01:50 PM       GGT   No results found for: \"LABGGT\"    PT/INR  Recent Labs     12/09/23  2107 12/10/23  0214 12/11/23  0246   PROTIME 17.9* 15.4* 16.6*   INR 1.5 1.3 1.4       Cancer Markers:  CEA:    Lab Results   Component Value Date/Time    CEA 2.5 10/31/2022 06:59 AM     Ca 125:  No results found for: \"\"  Ca 19-9:     Lab Results   Component Value Date/Time     31 10/31/2022 06:59 AM     AFP:   Lab Results   Component Value Date/Time    AFP 2.4 09/27/2023 01:01 PM       Lactic acid:No results for input(s): \"LACTACIDWB\" in the last 72 hours. Radiology Review:    . Yao Douglas XR CHEST PORTABLE   Final Result   Bilateral perihilar edema as well as infiltrates in the right upper and right   lower lobes. Effusions are likely. Pneumonia is likely. Alternatively,   this may represent fluid overload related to the TIPS shunt. Heart and   mediastinum appear stable. IR NONTUNNELED VASCULAR CATHETER > 5 YEARS   Final Result   Successful placement of a TIPS stent. Placement of a right internal jugular 5.5 [place] [employer] central vein   catheter. IR TIPS INSERTION   Final Result   Successful placement of a TIPS stent. Placement of a right internal jugular 5.5 [place] [employer] central vein   catheter. CTA ABDOMEN PELVIS W WO CONTRAST   Final Result   No arterial extravasation or abnormal vascular malformation. Stable heterogeneous attenuation of the liver with additional findings   compatible with portal hypertension. RECOMMENDATIONS:   Recommend upper endoscopy for further evaluation. Consider nonemergent IR consultation for transjugular biopsy of the liver and   hepatic hemodynamic assessment. XR CHEST PORTABLE   Final Result   No acute process.                  ENDOSCOPY   12/07/2023 - EGD - Grade 2 and 3 distal EV with stigmata of bleeding s/p banded x 3, large amount of blood clots in stomach, moderate portal hypertensive gastropathy    12/05/2023 - EGD-   grade 1 and grade 2 EV without banding, gastric varices with active bleeding, severe portal hypertensive gastropathy      Principal Problem:    Upper GI bleed  Active Problems:    Acute blood loss anemia    Alcoholism (HCC)    Transaminitis

## 2023-12-12 NOTE — CARE COORDINATION
Transitional planning note: plan remains to return home with significant other.  Social work is following for possible ETOH rehab

## 2023-12-12 NOTE — PROGRESS NOTES
Physical Therapy        Physical Therapy Cancel Note      DATE: 2023    NAME: Edita Stallworth  MRN: [id number]   : 1985      Patient not seen this date for Physical Therapy due to:    Patient is not appropriate for PT Re-evaluation at this time d/t Pt easily agitated and request to be left alone. Nurse notified PT to continue to follow and further address as indicated.       Electronically signed by Mireya Anna PT on 2023 at 3:32 PM

## 2023-12-12 NOTE — PROGRESS NOTES
[facility]  Internal Medicine Teaching Residency Program  Inpatient Daily Progress Note  ______________________________________________________________________________    Patient: Jh Esparza  YOB: 1985   [id number]    Acct: [de-identified]     Room: [address] [place] [address]  Admit date: 12/4/2023  Today's date: 12/12/23  Number of days in the hospital: 7    SUBJECTIVE   Admitting Diagnosis: Upper GI bleed  CC: Hematemesis    -Pt seen and examined.   -Patient was agitated overnight and received 2mg Ativan and later Haloperidol.  -Patient was more alert and oriented today. -GI recommends  to avoid BZD as it can mask encephalopathy. Plan to start patient on low dose seroquel nightly and haloperidol PRN. We will D/C [facility] protocol. -CXR was ordered yesterday which revealed B/L perihilar edema  with infiltrates in the right upper and right lower  lobes. Pneumonia v/s fluid overload d/t TIPS shunt.  -Potassium in the AM was 3.2. Replaced.  -Hb stable  -Patient is currently on lactulose and rifaximin. He had 3 BM since yesterday.       BRIEF HISTORY     Jh Esparza is a 46-year-old male with PMHx significant for alcohol abuse, alcoholic cardiomyopathy, tobacco smoker, alcoholic liver cirrhosis/liver failure, esophageal varices s/p multiple band ligation last banding 9/23 TIPS was recommended if patient rebleeds, GI bleed, who presented to the ED of [facility] facility initially on 12/4 with nausea vomiting and multiple episodes of large amounts of of hematemesis, in ED was found to be hypotensive, tachycardic hemoglobin 7.5, intubated to secure airway, GI consulted EGD done showing grade 1 grade 2 esophageal varices large blood clot in gastric body with active bleed, severe portal hypertensive gastropathy noted   and recommended patient to remain intubated, resuscitated stabilized and transferred to [facility] for emergent TIPS by IR, patient started on

## 2023-12-12 NOTE — PROGRESS NOTES
[facility])  Occupational Therapy Not Seen Note    DATE: 2023    NAME: Yury Whelan  MRN: [id number]   : 1985      Patient not seen this date for Occupational Therapy due to:    Strict Bedrest: will eval once removed     Next Scheduled Treatment: check back later as able or 2023     Electronically signed by HARRIET Whaley on 2023 at 9:34 AM

## 2023-12-13 LAB
ALBUMIN SERPL-MCNC: 2.3 G/DL (ref 3.5–5.2)
ALBUMIN/GLOB SERPL: 0.8 {RATIO} (ref 1–2.5)
ALP SERPL-CCNC: 258 U/L (ref 40–129)
ALT SERPL-CCNC: 19 U/L (ref 5–41)
ANION GAP SERPL CALCULATED.3IONS-SCNC: 9 MMOL/L (ref 9–17)
AST SERPL-CCNC: 64 U/L
BILIRUB SERPL-MCNC: 4.1 MG/DL (ref 0.3–1.2)
BUN SERPL-MCNC: 5 MG/DL (ref 6–20)
CALCIUM SERPL-MCNC: 7.2 MG/DL (ref 8.6–10.4)
CHLORIDE SERPL-SCNC: 112 MMOL/L (ref 98–107)
CO2 SERPL-SCNC: 18 MMOL/L (ref 20–31)
CREAT SERPL-MCNC: 0.6 MG/DL (ref 0.7–1.2)
ERYTHROCYTE [DISTWIDTH] IN BLOOD BY AUTOMATED COUNT: 19.9 % (ref 11.8–14.4)
GFR SERPL CREATININE-BSD FRML MDRD: >60 ML/MIN/1.73M2
GLUCOSE SERPL-MCNC: 88 MG/DL (ref 70–99)
HCT VFR BLD AUTO: 24.1 % (ref 40.7–50.3)
HCT VFR BLD AUTO: 26 % (ref 40.7–50.3)
HGB BLD-MCNC: 7 G/DL (ref 13–17)
HGB BLD-MCNC: 8.1 G/DL (ref 13–17)
MAGNESIUM SERPL-MCNC: 2 MG/DL (ref 1.6–2.6)
MCH RBC QN AUTO: 30.2 PG (ref 25.2–33.5)
MCHC RBC AUTO-ENTMCNC: 29 G/DL (ref 28.4–34.8)
MCV RBC AUTO: 103.9 FL (ref 82.6–102.9)
NRBC BLD-RTO: 0 PER 100 WBC
PLATELET # BLD AUTO: ABNORMAL K/UL (ref 138–453)
PLATELET, FLUORESCENCE: 83 K/UL (ref 138–453)
PLATELETS.RETICULATED NFR BLD AUTO: 3.6 % (ref 1.1–10.3)
POTASSIUM SERPL-SCNC: 3.2 MMOL/L (ref 3.7–5.3)
PROT SERPL-MCNC: 5.3 G/DL (ref 6.4–8.3)
RBC # BLD AUTO: 2.32 M/UL (ref 4.21–5.77)
SODIUM SERPL-SCNC: 139 MMOL/L (ref 135–144)
WBC OTHER # BLD: 8.3 K/UL (ref 3.5–11.3)

## 2023-12-13 PROCEDURE — 85027 COMPLETE CBC AUTOMATED: CPT

## 2023-12-13 PROCEDURE — 6370000000 HC RX 637 (ALT 250 FOR IP)

## 2023-12-13 PROCEDURE — 86901 BLOOD TYPING SEROLOGIC RH(D): CPT

## 2023-12-13 PROCEDURE — 6370000000 HC RX 637 (ALT 250 FOR IP): Performed by: INTERNAL MEDICINE

## 2023-12-13 PROCEDURE — C9113 INJ PANTOPRAZOLE SODIUM, VIA: HCPCS | Performed by: INTERNAL MEDICINE

## 2023-12-13 PROCEDURE — 6370000000 HC RX 637 (ALT 250 FOR IP): Performed by: STUDENT IN AN ORGANIZED HEALTH CARE EDUCATION/TRAINING PROGRAM

## 2023-12-13 PROCEDURE — 6360000002 HC RX W HCPCS

## 2023-12-13 PROCEDURE — 2580000003 HC RX 258

## 2023-12-13 PROCEDURE — P9016 RBC LEUKOCYTES REDUCED: HCPCS

## 2023-12-13 PROCEDURE — 83735 ASSAY OF MAGNESIUM: CPT

## 2023-12-13 PROCEDURE — 99232 SBSQ HOSP IP/OBS MODERATE 35: CPT | Performed by: INTERNAL MEDICINE

## 2023-12-13 PROCEDURE — 86850 RBC ANTIBODY SCREEN: CPT

## 2023-12-13 PROCEDURE — 2060000000 HC ICU INTERMEDIATE R&B

## 2023-12-13 PROCEDURE — 36415 COLL VENOUS BLD VENIPUNCTURE: CPT

## 2023-12-13 PROCEDURE — 85014 HEMATOCRIT: CPT

## 2023-12-13 PROCEDURE — 94761 N-INVAS EAR/PLS OXIMETRY MLT: CPT

## 2023-12-13 PROCEDURE — 2580000003 HC RX 258: Performed by: INTERNAL MEDICINE

## 2023-12-13 PROCEDURE — 36430 TRANSFUSION BLD/BLD COMPNT: CPT

## 2023-12-13 PROCEDURE — 86920 COMPATIBILITY TEST SPIN: CPT

## 2023-12-13 PROCEDURE — 85018 HEMOGLOBIN: CPT

## 2023-12-13 PROCEDURE — 6360000002 HC RX W HCPCS: Performed by: INTERNAL MEDICINE

## 2023-12-13 PROCEDURE — 85055 RETICULATED PLATELET ASSAY: CPT

## 2023-12-13 PROCEDURE — 80053 COMPREHEN METABOLIC PANEL: CPT

## 2023-12-13 PROCEDURE — 2700000000 HC OXYGEN THERAPY PER DAY

## 2023-12-13 PROCEDURE — 86900 BLOOD TYPING SEROLOGIC ABO: CPT

## 2023-12-13 PROCEDURE — 2500000003 HC RX 250 WO HCPCS

## 2023-12-13 RX ORDER — POTASSIUM CHLORIDE 20 MEQ/1
40 TABLET, EXTENDED RELEASE ORAL ONCE
Status: COMPLETED | OUTPATIENT
Start: 2023-12-13 | End: 2023-12-13

## 2023-12-13 RX ORDER — UREA 10 %
3 LOTION (ML) TOPICAL ONCE
Status: COMPLETED | OUTPATIENT
Start: 2023-12-13 | End: 2023-12-13

## 2023-12-13 RX ORDER — SODIUM CHLORIDE 9 MG/ML
INJECTION, SOLUTION INTRAVENOUS PRN
Status: DISCONTINUED | OUTPATIENT
Start: 2023-12-13 | End: 2023-12-15 | Stop reason: HOSPADM

## 2023-12-13 RX ORDER — FOLIC ACID 1 MG/1
1 TABLET ORAL DAILY
Status: DISCONTINUED | OUTPATIENT
Start: 2023-12-14 | End: 2023-12-15 | Stop reason: HOSPADM

## 2023-12-13 RX ADMIN — QUETIAPINE FUMARATE 25 MG: 25 TABLET ORAL at 22:12

## 2023-12-13 RX ADMIN — POTASSIUM CHLORIDE 40 MEQ: 1500 TABLET, EXTENDED RELEASE ORAL at 08:17

## 2023-12-13 RX ADMIN — SODIUM CHLORIDE, PRESERVATIVE FREE 40 MG: 5 INJECTION INTRAVENOUS at 22:15

## 2023-12-13 RX ADMIN — ACETAMINOPHEN 650 MG: 325 TABLET ORAL at 17:51

## 2023-12-13 RX ADMIN — ACETAMINOPHEN 650 MG: 325 TABLET ORAL at 23:37

## 2023-12-13 RX ADMIN — ACETAMINOPHEN 650 MG: 325 TABLET ORAL at 10:46

## 2023-12-13 RX ADMIN — Medication 3 MG: at 23:37

## 2023-12-13 RX ADMIN — SODIUM CHLORIDE, PRESERVATIVE FREE 40 MG: 5 INJECTION INTRAVENOUS at 09:29

## 2023-12-13 RX ADMIN — THIAMINE HYDROCHLORIDE 400 MG: 100 INJECTION, SOLUTION INTRAMUSCULAR; INTRAVENOUS at 10:25

## 2023-12-13 RX ADMIN — SODIUM CHLORIDE, PRESERVATIVE FREE 10 ML: 5 INJECTION INTRAVENOUS at 09:35

## 2023-12-13 RX ADMIN — FOLIC ACID: 5 INJECTION, SOLUTION INTRAMUSCULAR; INTRAVENOUS; SUBCUTANEOUS at 09:45

## 2023-12-13 RX ADMIN — CARVEDILOL 6.25 MG: 6.25 TABLET, FILM COATED ORAL at 08:17

## 2023-12-13 RX ADMIN — RIFAXIMIN 550 MG: 550 TABLET ORAL at 09:48

## 2023-12-13 RX ADMIN — LACTULOSE 10 G: 20 SOLUTION ORAL at 09:47

## 2023-12-13 RX ADMIN — LACTULOSE 10 G: 20 SOLUTION ORAL at 22:12

## 2023-12-13 RX ADMIN — LACTULOSE 10 G: 20 SOLUTION ORAL at 14:21

## 2023-12-13 RX ADMIN — SODIUM CHLORIDE, PRESERVATIVE FREE 10 ML: 5 INJECTION INTRAVENOUS at 22:14

## 2023-12-13 RX ADMIN — RIFAXIMIN 550 MG: 550 TABLET ORAL at 22:12

## 2023-12-13 RX ADMIN — CARVEDILOL 6.25 MG: 6.25 TABLET, FILM COATED ORAL at 16:23

## 2023-12-13 RX ADMIN — POTASSIUM CHLORIDE 40 MEQ: 1500 TABLET, EXTENDED RELEASE ORAL at 06:09

## 2023-12-13 ASSESSMENT — PAIN - FUNCTIONAL ASSESSMENT: PAIN_FUNCTIONAL_ASSESSMENT: ACTIVITIES ARE NOT PREVENTED

## 2023-12-13 ASSESSMENT — PAIN SCALES - GENERAL
PAINLEVEL_OUTOF10: 0
PAINLEVEL_OUTOF10: 6
PAINLEVEL_OUTOF10: 0

## 2023-12-13 ASSESSMENT — PAIN DESCRIPTION - DESCRIPTORS: DESCRIPTORS: ACHING

## 2023-12-13 ASSESSMENT — PAIN DESCRIPTION - LOCATION: LOCATION: ABDOMEN;BACK

## 2023-12-13 NOTE — PLAN OF CARE
Problem: Safety - Adult  Goal: Free from fall injury  12/13/2023 1003 by Magda Quinteros RN  Outcome: Progressing  Flowsheets (Taken 12/13/2023 0959)  Free From Fall Injury: Instruct family/caregiver on patient safety  12/12/2023 2312 by Marquise Mcclendon RN  Outcome: Progressing     Problem: Discharge Planning  Goal: Discharge to home or other facility with appropriate resources  12/13/2023 1003 by Magda Quinteros RN  Outcome: Progressing  12/12/2023 2312 by Marquise Mcclendon RN  Outcome: Progressing     Problem: Skin/Tissue Integrity  Goal: Absence of new skin breakdown  Description: 1. Monitor for areas of redness and/or skin breakdown  2. Assess vascular access sites hourly  3. Every 4-6 hours minimum:  Change oxygen saturation probe site  4. Every 4-6 hours:  If on nasal continuous positive airway pressure, respiratory therapy assess nares and determine need for appliance change or resting period.   12/13/2023 1003 by Magda Quinteros RN  Outcome: Progressing  12/12/2023 2312 by Marquise Mcclendon RN  Outcome: Progressing     Problem: Pain  Goal: Verbalizes/displays adequate comfort level or baseline comfort level  12/13/2023 1003 by Magda Quinteros RN  Outcome: Progressing  12/12/2023 2312 by Marquise Mcclendon RN  Outcome: Progressing     Problem: Respiratory - Adult  Goal: Achieves optimal ventilation and oxygenation  12/13/2023 1003 by Magda Quinteros RN  Outcome: Progressing  Flowsheets (Taken 12/13/2023 0810)  Achieves optimal ventilation and oxygenation: Assess for changes in respiratory status  12/12/2023 2312 by Marquise Mcclendon RN  Outcome: Progressing     Problem: ABCDS Injury Assessment  Goal: Absence of physical injury  12/13/2023 1003 by Magda Quinteros RN  Outcome: Progressing  Flowsheets (Taken 12/13/2023 0959)  Absence of Physical Injury: Implement safety measures based on patient assessment  12/12/2023 2312 by Marquise Mcclednon RN  Outcome: Progressing     Problem: Nutrition Deficit:  Goal: Optimize nutritional status  12/13/2023 1003 by Magda Quinteros RN  Outcome: Progressing  12/12/2023 2312 by Marquise Mcclendon, RN  Outcome: Progressing     Problem: Chronic Conditions and Co-morbidities  Goal: Patient's chronic conditions and co-morbidity symptoms are monitored and maintained or improved  12/13/2023 1003 by Magda Quinteros RN  Outcome: Progressing  12/12/2023 2312 by Marquise Mcclendon, RN  Outcome: Progressing

## 2023-12-13 NOTE — PROGRESS NOTES
[facility]  Internal Medicine Teaching Residency Program  Inpatient Daily Progress Note  ______________________________________________________________________________    Patient: Florentin Chang  YOB: 1985   TF    Acct: [de-identified]     Room: [address] [place] [address]  Admit date: 2023  Today's date: 23  Number of days in the hospital: 8    SUBJECTIVE   Admitting Diagnosis: Upper GI bleed  CC: Hematemesis    - Pt seen and examined. - Patient was more alert and oriented today, AOx4  - no acute events overnight  - GI signed off, outpatient follow up with GI.  - started patient on low dose seroquel nightly and haloperidol PRN - didn't require haldol. CIWA protocol discontinued. - CXR  which revealed B/L perihilar edema  with infiltrates in the right upper and right lower  lobes. Pneumonia v/s fluid overload d/t TIPS shunt.  - Potassium in the AM was 3.2. Replaced. - Hb 7 from 7.7, 1 unit PRBCs transfused, will monitor H&H, and watch for bleed. - febrile this am Tmax 100.6 received tylenol, blood cx no growth, will continue to monitor closely. - Intermittently tachypneic with RR 30s then down to 20s-10s  - Patient is currently on lactulose and rifaximin.  He had 2 BM nonbloody since yesterday.  - PT/OT following      BRIEF HISTORY     Florentin Chang is a 69-year-old male with PMHx significant for alcohol abuse, alcoholic cardiomyopathy, tobacco smoker, alcoholic liver cirrhosis/liver failure, esophageal varices s/p multiple band ligation last banding  TIPS was recommended if patient rebleeds, GI bleed, who presented to the ED of [facility] facility initially on  with nausea vomiting and multiple episodes of large amounts of of hematemesis, in ED was found to be hypotensive, tachycardic hemoglobin 7.5, intubated to secure airway, GI consulted EGD done showing grade 1 grade 2 esophageal varices large blood clot in gastric body with

## 2023-12-13 NOTE — PLAN OF CARE
Problem: Safety - Adult  Goal: Free from fall injury  12/12/2023 2312 by Rubi Harris RN  Outcome: Progressing  12/12/2023 1803 by Adriano Jacobs RN  Outcome: Progressing     Problem: Discharge Planning  Goal: Discharge to home or other facility with appropriate resources  12/12/2023 2312 by Rubi Harris RN  Outcome: Progressing  12/12/2023 1803 by Adriano Jacobs RN  Outcome: Progressing  Flowsheets (Taken 12/12/2023 0800)  Discharge to home or other facility with appropriate resources: Identify barriers to discharge with patient and caregiver     Problem: Skin/Tissue Integrity  Goal: Absence of new skin breakdown  Description: 1. Monitor for areas of redness and/or skin breakdown  2. Assess vascular access sites hourly  3. Every 4-6 hours minimum:  Change oxygen saturation probe site  4. Every 4-6 hours:  If on nasal continuous positive airway pressure, respiratory therapy assess nares and determine need for appliance change or resting period.   12/12/2023 2312 by Rubi Harris RN  Outcome: Progressing  12/12/2023 1803 by Adriano Jacobs RN  Outcome: Progressing     Problem: Pain  Goal: Verbalizes/displays adequate comfort level or baseline comfort level  12/12/2023 2312 by Rubi Harris RN  Outcome: Progressing  12/12/2023 1803 by Adriano Jacobs RN  Outcome: Progressing     Problem: Respiratory - Adult  Goal: Achieves optimal ventilation and oxygenation  12/12/2023 2312 by Rubi Harris RN  Outcome: Progressing  12/12/2023 1803 by Adriano Jacobs RN  Outcome: Progressing  Flowsheets (Taken 12/12/2023 0800)  Achieves optimal ventilation and oxygenation:   Assess for changes in mentation and behavior   Assess for changes in respiratory status     Problem: ABCDS Injury Assessment  Goal: Absence of physical injury  12/12/2023 2312 by Rubi Harris RN  Outcome: Progressing  12/12/2023 1803 by Adriano Jacobs RN  Outcome: Progressing     Problem: Nutrition Deficit:  Goal: Optimize nutritional

## 2023-12-13 NOTE — PROGRESS NOTES
Gave tylenol at 1045 for temp of 100.1. Notified provider and rechecked at 1140 temp increased to 100.6, provider notified as well.

## 2023-12-13 NOTE — PROGRESS NOTES
[facility])  Occupational Therapy Not Seen Note    DATE: 2023    NAME: Catalino Apopka  MRN: [id number]   : 1985      Patient not seen this date for Occupational Therapy due to:    Blood Transfusion: HGB 7.0     Next Scheduled Treatment: Ck in pm or      Electronically signed by Anurag Atkinson OT on 2023 at 7:52 AM

## 2023-12-13 NOTE — PROGRESS NOTES
Comprehensive Nutrition Assessment    Type and Reason for Visit:  Reassess    Nutrition Recommendations/Plan:   Continue current diet, HIWOT with 1.8 L FR  Monitor/encourage PO intake     Malnutrition Assessment:  Malnutrition Status: At risk for malnutrition (Comment) (12/08/23 1400)    Context:  Chronic Illness     Findings of the 6 clinical characteristics of malnutrition:  Energy Intake:  Mild decrease in energy intake (Comment)  Weight Loss:  Unable to assess     Body Fat Loss:  No significant body fat loss     Muscle Mass Loss:  No significant muscle mass loss    Fluid Accumulation:  Mild (to Moderate) Extremities, Generalized   Strength:  Not Performed    Nutrition Assessment:    Pt diet advanced to FLD on 12/10, now tolerating Regular diet with > 50% PO intake. Pt endorses improving appetite and NKFA. Will continue to monitor need for ONS. LBM 12/13. +1 generalized/BUE, +2 BLE edema noted. Wt fluctuations noted, likely d/t fluid, will monitor. Nutrition Related Findings:    labs/meds reivewed Wound Type: None       Current Nutrition Intake & Therapies:    Average Meal Intake: 51-75%  Average Supplements Intake: None Ordered  ADULT DIET; Regular; No Added Salt (3-4 gm); 1800 ml    Anthropometric Measures:  Height: 172.7 cm (5' 7.99\")  Ideal Body Weight (IBW): 154 lbs (70 kg)    Admission Body Weight: 88.1 kg (194 lb 3.6 oz)  Current Body Weight: 92.5 kg (203 lb 14.8 oz) (12/13), 132.4 % IBW. Weight Source: Bed Scale  Current BMI (kg/m2): 31        Weight Adjustment For: No Adjustment                 BMI Categories: Obese Class 1 (BMI 30.0-34. 9)    Estimated Daily Nutrient Needs:  Energy Requirements Based On: Kcal/kg  Weight Used for Energy Requirements: Admission  Energy (kcal/day): 7052-2424 kcals/day  Weight Used for Protein Requirements: Ideal  Protein (g/day):  gm pro/day  Method Used for Fluid Requirements: Other (Comment)  Fluid (ml/day): 1800 mL per MD    Nutrition Diagnosis:   Inadequate

## 2023-12-13 NOTE — CARE COORDINATION
Patient's transition is unclear at this time . Patient has had multiple units of PRBC, Platelets and FFP. He has not been evaluated by OT and the PT note states they will assess progression. Currently he is using a brief and is not getting out of bed. He is slow to respond to questions. Will attempt to contact family to discuss transition plan.

## 2023-12-14 ENCOUNTER — APPOINTMENT (OUTPATIENT)
Dept: VASCULAR LAB | Age: 38
End: 2023-12-14
Payer: COMMERCIAL

## 2023-12-14 PROBLEM — R50.9 FEVER OF UNKNOWN ORIGIN: Status: ACTIVE | Noted: 2023-12-14

## 2023-12-14 LAB
ABO/RH: NORMAL
ANION GAP SERPL CALCULATED.3IONS-SCNC: 6 MMOL/L (ref 9–17)
ANTIBODY SCREEN: NEGATIVE
ARM BAND NUMBER: NORMAL
BLOOD BANK BLOOD PRODUCT EXPIRATION DATE: NORMAL
BLOOD BANK DISPENSE STATUS: NORMAL
BLOOD BANK ISBT PRODUCT BLOOD TYPE: 6200
BLOOD BANK PRODUCT CODE: NORMAL
BLOOD BANK SAMPLE EXPIRATION: NORMAL
BLOOD BANK UNIT TYPE AND RH: NORMAL
BPU ID: NORMAL
BUN SERPL-MCNC: 7 MG/DL (ref 6–20)
CALCIUM SERPL-MCNC: 7.2 MG/DL (ref 8.6–10.4)
CHLORIDE SERPL-SCNC: 113 MMOL/L (ref 98–107)
CO2 SERPL-SCNC: 18 MMOL/L (ref 20–31)
COMPONENT: NORMAL
CREAT SERPL-MCNC: 0.7 MG/DL (ref 0.7–1.2)
CROSSMATCH RESULT: NORMAL
ERYTHROCYTE [DISTWIDTH] IN BLOOD BY AUTOMATED COUNT: 20.5 % (ref 11.8–14.4)
GFR SERPL CREATININE-BSD FRML MDRD: >60 ML/MIN/1.73M2
GLUCOSE SERPL-MCNC: 99 MG/DL (ref 70–99)
HCT VFR BLD AUTO: 25.1 % (ref 40.7–50.3)
HCT VFR BLD AUTO: 25.4 % (ref 40.7–50.3)
HCT VFR BLD AUTO: 26.2 % (ref 40.7–50.3)
HGB BLD-MCNC: 7.5 G/DL (ref 13–17)
HGB BLD-MCNC: 7.7 G/DL (ref 13–17)
HGB BLD-MCNC: 8.2 G/DL (ref 13–17)
MCH RBC QN AUTO: 30.7 PG (ref 25.2–33.5)
MCHC RBC AUTO-ENTMCNC: 30.7 G/DL (ref 28.4–34.8)
MCV RBC AUTO: 100 FL (ref 82.6–102.9)
NRBC BLD-RTO: 0 PER 100 WBC
PLATELET # BLD AUTO: ABNORMAL K/UL (ref 138–453)
PLATELET, FLUORESCENCE: 85 K/UL (ref 138–453)
PLATELETS.RETICULATED NFR BLD AUTO: 4.8 % (ref 1.1–10.3)
POTASSIUM SERPL-SCNC: 3.8 MMOL/L (ref 3.7–5.3)
RBC # BLD AUTO: 2.51 M/UL (ref 4.21–5.77)
SODIUM SERPL-SCNC: 137 MMOL/L (ref 135–144)
TRANSFUSION STATUS: NORMAL
UNIT DIVISION: 0
UNIT ISSUE DATE/TIME: NORMAL
WBC OTHER # BLD: 8.4 K/UL (ref 3.5–11.3)

## 2023-12-14 PROCEDURE — 2580000003 HC RX 258: Performed by: STUDENT IN AN ORGANIZED HEALTH CARE EDUCATION/TRAINING PROGRAM

## 2023-12-14 PROCEDURE — 99232 SBSQ HOSP IP/OBS MODERATE 35: CPT | Performed by: INTERNAL MEDICINE

## 2023-12-14 PROCEDURE — 6370000000 HC RX 637 (ALT 250 FOR IP): Performed by: STUDENT IN AN ORGANIZED HEALTH CARE EDUCATION/TRAINING PROGRAM

## 2023-12-14 PROCEDURE — 6370000000 HC RX 637 (ALT 250 FOR IP): Performed by: INTERNAL MEDICINE

## 2023-12-14 PROCEDURE — 93970 EXTREMITY STUDY: CPT

## 2023-12-14 PROCEDURE — 2580000003 HC RX 258: Performed by: INTERNAL MEDICINE

## 2023-12-14 PROCEDURE — 97164 PT RE-EVAL EST PLAN CARE: CPT

## 2023-12-14 PROCEDURE — 85027 COMPLETE CBC AUTOMATED: CPT

## 2023-12-14 PROCEDURE — 85055 RETICULATED PLATELET ASSAY: CPT

## 2023-12-14 PROCEDURE — 6360000002 HC RX W HCPCS: Performed by: INTERNAL MEDICINE

## 2023-12-14 PROCEDURE — 80048 BASIC METABOLIC PNL TOTAL CA: CPT

## 2023-12-14 PROCEDURE — 2580000003 HC RX 258

## 2023-12-14 PROCEDURE — 85014 HEMATOCRIT: CPT

## 2023-12-14 PROCEDURE — 93970 EXTREMITY STUDY: CPT | Performed by: SURGERY

## 2023-12-14 PROCEDURE — C9113 INJ PANTOPRAZOLE SODIUM, VIA: HCPCS | Performed by: INTERNAL MEDICINE

## 2023-12-14 PROCEDURE — 6370000000 HC RX 637 (ALT 250 FOR IP): Performed by: NURSE PRACTITIONER

## 2023-12-14 PROCEDURE — 36415 COLL VENOUS BLD VENIPUNCTURE: CPT

## 2023-12-14 PROCEDURE — 2060000000 HC ICU INTERMEDIATE R&B

## 2023-12-14 PROCEDURE — 6370000000 HC RX 637 (ALT 250 FOR IP)

## 2023-12-14 PROCEDURE — 85018 HEMOGLOBIN: CPT

## 2023-12-14 PROCEDURE — 97116 GAIT TRAINING THERAPY: CPT

## 2023-12-14 RX ORDER — UREA 10 %
3 LOTION (ML) TOPICAL NIGHTLY PRN
Status: DISCONTINUED | OUTPATIENT
Start: 2023-12-14 | End: 2023-12-15 | Stop reason: HOSPADM

## 2023-12-14 RX ADMIN — CARVEDILOL 6.25 MG: 6.25 TABLET, FILM COATED ORAL at 08:08

## 2023-12-14 RX ADMIN — ACETAMINOPHEN 650 MG: 325 TABLET ORAL at 17:38

## 2023-12-14 RX ADMIN — SODIUM CHLORIDE, PRESERVATIVE FREE 40 MG: 5 INJECTION INTRAVENOUS at 08:08

## 2023-12-14 RX ADMIN — SODIUM CHLORIDE, PRESERVATIVE FREE 10 ML: 5 INJECTION INTRAVENOUS at 08:09

## 2023-12-14 RX ADMIN — QUETIAPINE FUMARATE 25 MG: 25 TABLET ORAL at 20:35

## 2023-12-14 RX ADMIN — SODIUM CHLORIDE, PRESERVATIVE FREE 10 ML: 5 INJECTION INTRAVENOUS at 20:34

## 2023-12-14 RX ADMIN — RIFAXIMIN 550 MG: 550 TABLET ORAL at 08:39

## 2023-12-14 RX ADMIN — Medication 3 MG: at 21:15

## 2023-12-14 RX ADMIN — SODIUM CHLORIDE, PRESERVATIVE FREE 10 ML: 5 INJECTION INTRAVENOUS at 20:36

## 2023-12-14 RX ADMIN — LACTULOSE 10 G: 20 SOLUTION ORAL at 08:08

## 2023-12-14 RX ADMIN — SODIUM CHLORIDE, PRESERVATIVE FREE 10 ML: 5 INJECTION INTRAVENOUS at 20:35

## 2023-12-14 RX ADMIN — CARVEDILOL 6.25 MG: 6.25 TABLET, FILM COATED ORAL at 16:55

## 2023-12-14 RX ADMIN — RIFAXIMIN 550 MG: 550 TABLET ORAL at 20:35

## 2023-12-14 RX ADMIN — SODIUM CHLORIDE, PRESERVATIVE FREE 40 MG: 5 INJECTION INTRAVENOUS at 20:35

## 2023-12-14 RX ADMIN — FOLIC ACID 1 MG: 1 TABLET ORAL at 08:08

## 2023-12-14 RX ADMIN — LACTULOSE 10 G: 20 SOLUTION ORAL at 21:14

## 2023-12-14 NOTE — CARE COORDINATION
Met with pt to discuss treatment programs. Pt is alert and oriented now. Pt states that he knows that he needs to stop drinking. He feels that now that he is detoxed, he feels that he is \"good\". Strongly encouraged pt to think about getting help. Informed pt that he has been in the hospital for 10 days, withdrawing from alcohol. Pt states that his girlfriend is at home with a \"shattered leg\". He also states that his 16year old son needs him too. Pt stated that SW could give him some resources and he can look into them later. Provided a list of treatment programs. Also explained to pt that while he was withdrawing and was not coherent, we were made aware that he is still . Explained to him that his estranged wife would be his emergency contact. Asked pt who he wants to be his decision maker if he is unable. He stated that he wants his long time girlfriend Johnny Caro. Explained to pt that it would be a good idea to complete DPOA paperwork. Pt agreed and SW called spiritual care and asked if they could complete the paperwork with him. Spoke to Morgan, she stated that someone would meet with pt.

## 2023-12-14 NOTE — PLAN OF CARE
Problem: Safety - Adult  Goal: Free from fall injury  Outcome: Progressing  Flowsheets (Taken 12/14/2023 0800)  Free From Fall Injury: Instruct family/caregiver on patient safety     Problem: Discharge Planning  Goal: Discharge to home or other facility with appropriate resources  Outcome: Progressing     Problem: Skin/Tissue Integrity  Goal: Absence of new skin breakdown  Description: 1. Monitor for areas of redness and/or skin breakdown  2. Assess vascular access sites hourly  3. Every 4-6 hours minimum:  Change oxygen saturation probe site  4. Every 4-6 hours:  If on nasal continuous positive airway pressure, respiratory therapy assess nares and determine need for appliance change or resting period.   Outcome: Progressing     Problem: Pain  Goal: Verbalizes/displays adequate comfort level or baseline comfort level  Outcome: Progressing     Problem: Respiratory - Adult  Goal: Achieves optimal ventilation and oxygenation  Outcome: Progressing     Problem: ABCDS Injury Assessment  Goal: Absence of physical injury  Outcome: Progressing     Problem: Nutrition Deficit:  Goal: Optimize nutritional status  Outcome: Progressing     Problem: Chronic Conditions and Co-morbidities  Goal: Patient's chronic conditions and co-morbidity symptoms are monitored and maintained or improved  Outcome: Progressing

## 2023-12-14 NOTE — PROGRESS NOTES
Physical Therapy  Facility/Department: [address] STEPDOWN  Physical Therapy Change in status Re-evaluation    Name: Theodore Cleaning  : 1985  MRN: [id number]  Date of Service: 2023    Chief Complaint   Patient presents with    Hematemesis     Vomiting x 35 minutes      Admitting Diagnosis: Upper GI bleed  CC: Hematemesis       Discharge Recommendations:  Continue to assess pending progress, Therapy recommended at discharge   PT Equipment Recommendations  Equipment Needed: No      Patient Diagnosis(es): The primary encounter diagnosis was Upper GI bleed. Diagnoses of Alcoholic cirrhosis, unspecified whether ascites present ([address]) and Acute pancreatitis, unspecified complication status, unspecified pancreatitis type were also pertinent to this visit. Past Medical History:  has a past medical history of Alcoholism ([address]), Anemia, Cirrhosis, alcoholic ([address]), and Pulmonary embolism ([address]). Past Surgical History:  has a past surgical history that includes Upper gastrointestinal endoscopy (N/A, 2022); Upper gastrointestinal endoscopy (N/A, 10/30/2022); Upper gastrointestinal endoscopy (N/A, 2023); Esophagogastroduodenoscopy (2023); IR TIPS INSERTION (2023); IR NONTUNNELED VASCULAR CATHETER > 5 YEARS (2023); Upper gastrointestinal endoscopy (N/A, 2023); Upper gastrointestinal endoscopy (N/A, 2023); Upper gastrointestinal endoscopy (2023); and Upper gastrointestinal endoscopy (2023). Assessment   Body Structures, Functions, Activity Limitations Requiring Skilled Therapeutic Intervention: Decreased functional mobility ; Decreased body mechanics; Decreased tolerance to work activity; Decreased safe awareness;Decreased balance;Decreased endurance  Assessment: Pt presents with general weakness and decondtioing with fall risk, Pt able to ambulate 80 ft with CGA, slow nisreen, able to follow commands.  Pt demonstrate independence with bed mobility and require CGA for

## 2023-12-15 VITALS
WEIGHT: 203.93 LBS | BODY MASS INDEX: 30.91 KG/M2 | RESPIRATION RATE: 21 BRPM | SYSTOLIC BLOOD PRESSURE: 116 MMHG | DIASTOLIC BLOOD PRESSURE: 64 MMHG | HEART RATE: 84 BPM | HEIGHT: 68 IN | OXYGEN SATURATION: 96 % | TEMPERATURE: 98.6 F

## 2023-12-15 LAB
ANION GAP SERPL CALCULATED.3IONS-SCNC: 7 MMOL/L (ref 9–17)
BUN SERPL-MCNC: 6 MG/DL (ref 6–20)
CALCIUM SERPL-MCNC: 7.5 MG/DL (ref 8.6–10.4)
CHLORIDE SERPL-SCNC: 112 MMOL/L (ref 98–107)
CO2 SERPL-SCNC: 18 MMOL/L (ref 20–31)
CREAT SERPL-MCNC: 0.7 MG/DL (ref 0.7–1.2)
ERYTHROCYTE [DISTWIDTH] IN BLOOD BY AUTOMATED COUNT: 20.1 % (ref 11.8–14.4)
GFR SERPL CREATININE-BSD FRML MDRD: >60 ML/MIN/1.73M2
GLUCOSE SERPL-MCNC: 97 MG/DL (ref 70–99)
HCT VFR BLD AUTO: 25.4 % (ref 40.7–50.3)
HGB BLD-MCNC: 8.1 G/DL (ref 13–17)
MCH RBC QN AUTO: 30.3 PG (ref 25.2–33.5)
MCHC RBC AUTO-ENTMCNC: 31.9 G/DL (ref 28.4–34.8)
MCV RBC AUTO: 95.1 FL (ref 82.6–102.9)
NRBC BLD-RTO: 0 PER 100 WBC
PLATELET # BLD AUTO: ABNORMAL K/UL (ref 138–453)
PLATELET, FLUORESCENCE: 123 K/UL (ref 138–453)
PLATELETS.RETICULATED NFR BLD AUTO: 4.7 % (ref 1.1–10.3)
POTASSIUM SERPL-SCNC: 4.1 MMOL/L (ref 3.7–5.3)
RBC # BLD AUTO: 2.67 M/UL (ref 4.21–5.77)
SODIUM SERPL-SCNC: 137 MMOL/L (ref 135–144)
WBC OTHER # BLD: 10.5 K/UL (ref 3.5–11.3)

## 2023-12-15 PROCEDURE — C9113 INJ PANTOPRAZOLE SODIUM, VIA: HCPCS | Performed by: INTERNAL MEDICINE

## 2023-12-15 PROCEDURE — 80048 BASIC METABOLIC PNL TOTAL CA: CPT

## 2023-12-15 PROCEDURE — 85055 RETICULATED PLATELET ASSAY: CPT

## 2023-12-15 PROCEDURE — 99232 SBSQ HOSP IP/OBS MODERATE 35: CPT | Performed by: INTERNAL MEDICINE

## 2023-12-15 PROCEDURE — 2580000003 HC RX 258: Performed by: INTERNAL MEDICINE

## 2023-12-15 PROCEDURE — 6360000002 HC RX W HCPCS: Performed by: INTERNAL MEDICINE

## 2023-12-15 PROCEDURE — 6370000000 HC RX 637 (ALT 250 FOR IP)

## 2023-12-15 PROCEDURE — 2580000003 HC RX 258

## 2023-12-15 PROCEDURE — 85027 COMPLETE CBC AUTOMATED: CPT

## 2023-12-15 PROCEDURE — 6370000000 HC RX 637 (ALT 250 FOR IP): Performed by: INTERNAL MEDICINE

## 2023-12-15 PROCEDURE — 36415 COLL VENOUS BLD VENIPUNCTURE: CPT

## 2023-12-15 RX ORDER — LACTULOSE 10 G/15ML
10 SOLUTION ORAL 3 TIMES DAILY
Qty: 946 ML | Refills: 3 | Status: SHIPPED | OUTPATIENT
Start: 2023-12-15

## 2023-12-15 RX ORDER — CEPHALEXIN 500 MG/1
500 CAPSULE ORAL 4 TIMES DAILY
Qty: 28 CAPSULE | Refills: 0 | Status: SHIPPED | OUTPATIENT
Start: 2023-12-15 | End: 2023-12-22

## 2023-12-15 RX ORDER — PANTOPRAZOLE SODIUM 40 MG/1
40 TABLET, DELAYED RELEASE ORAL EVERY 12 HOURS
Qty: 30 TABLET | Refills: 4 | Status: SHIPPED | OUTPATIENT
Start: 2023-12-15

## 2023-12-15 RX ADMIN — SODIUM CHLORIDE, PRESERVATIVE FREE 40 MG: 5 INJECTION INTRAVENOUS at 09:37

## 2023-12-15 RX ADMIN — CARVEDILOL 6.25 MG: 6.25 TABLET, FILM COATED ORAL at 09:37

## 2023-12-15 RX ADMIN — RIFAXIMIN 550 MG: 550 TABLET ORAL at 09:38

## 2023-12-15 RX ADMIN — FOLIC ACID 1 MG: 1 TABLET ORAL at 09:37

## 2023-12-15 RX ADMIN — SODIUM CHLORIDE, PRESERVATIVE FREE 10 ML: 5 INJECTION INTRAVENOUS at 09:37

## 2023-12-15 NOTE — PLAN OF CARE
Problem: Safety - Adult  Goal: Free from fall injury  12/15/2023 1558 by Ruchi Howell RN  Outcome: Adequate for Discharge  12/15/2023 0352 by Freddie Hutton RN  Outcome: Progressing     Problem: Discharge Planning  Goal: Discharge to home or other facility with appropriate resources  12/15/2023 1558 by Ruchi Howell RN  Outcome: Adequate for Discharge  12/15/2023 0352 by Freddie Hutton RN  Outcome: Progressing     Problem: Skin/Tissue Integrity  Goal: Absence of new skin breakdown  Description: 1. Monitor for areas of redness and/or skin breakdown  2. Assess vascular access sites hourly  3. Every 4-6 hours minimum:  Change oxygen saturation probe site  4. Every 4-6 hours:  If on nasal continuous positive airway pressure, respiratory therapy assess nares and determine need for appliance change or resting period.   12/15/2023 1558 by Ruchi Howell RN  Outcome: Adequate for Discharge  12/15/2023 0352 by Freddie Hutton RN  Outcome: Progressing     Problem: Pain  Goal: Verbalizes/displays adequate comfort level or baseline comfort level  12/15/2023 1558 by Ruchi Howell RN  Outcome: Adequate for Discharge  12/15/2023 0352 by Freddie Hutton RN  Outcome: Progressing     Problem: Respiratory - Adult  Goal: Achieves optimal ventilation and oxygenation  Outcome: Adequate for Discharge     Problem: ABCDS Injury Assessment  Goal: Absence of physical injury  12/15/2023 1558 by Ruchi Howell RN  Outcome: Adequate for Discharge  12/15/2023 0352 by Freddie Hutton RN  Outcome: Progressing     Problem: Nutrition Deficit:  Goal: Optimize nutritional status  Outcome: Adequate for Discharge     Problem: Chronic Conditions and Co-morbidities  Goal: Patient's chronic conditions and co-morbidity symptoms are monitored and maintained or improved  Outcome: Adequate for Discharge

## 2023-12-15 NOTE — PLAN OF CARE
Problem: Safety - Adult  Goal: Free from fall injury  12/15/2023 0352 by Barry Hernandez RN  Outcome: Progressing  12/14/2023 1811 by Candelaria Ken RN  Outcome: Progressing  Flowsheets (Taken 12/14/2023 0800)  Free From Fall Injury: Instruct family/caregiver on patient safety     Problem: Discharge Planning  Goal: Discharge to home or other facility with appropriate resources  12/15/2023 0352 by Barry Hernandez RN  Outcome: Progressing  12/14/2023 1811 by Candelaria Ken RN  Outcome: Progressing     Problem: Skin/Tissue Integrity  Goal: Absence of new skin breakdown  Description: 1. Monitor for areas of redness and/or skin breakdown  2. Assess vascular access sites hourly  3. Every 4-6 hours minimum:  Change oxygen saturation probe site  4. Every 4-6 hours:  If on nasal continuous positive airway pressure, respiratory therapy assess nares and determine need for appliance change or resting period.   12/15/2023 0352 by Barry Hernandez RN  Outcome: Progressing  12/14/2023 1811 by Candelaria Ken RN  Outcome: Progressing     Problem: Pain  Goal: Verbalizes/displays adequate comfort level or baseline comfort level  12/15/2023 0352 by Barry Hernandez RN  Outcome: Progressing  12/14/2023 1811 by Candelaria Ken RN  Outcome: Progressing     Problem: ABCDS Injury Assessment  Goal: Absence of physical injury  12/15/2023 0352 by Barry Hernandez RN  Outcome: Progressing  12/14/2023 1811 by Candelaria Ken RN  Outcome: Progressing

## 2023-12-15 NOTE — CARE COORDINATION
Met with patient to discuss transitional planning. Plan is home with girlfriend. Patient will have transportation home. Patient agreeable to plan.

## 2023-12-15 NOTE — PROGRESS NOTES
BMP:   Recent Labs     12/13/23  0431 12/14/23  0354 12/15/23  0340    137 137   K 3.2* 3.8 4.1   * 113* 112*   CO2 18* 18* 18*   BUN 5* 7 6   CREATININE 0.6* 0.7 0.7       BNP: No results for input(s): \"BNP\" in the last 72 hours. PT/INR:   No results for input(s): \"PROTIME\", \"INR\" in the last 72 hours. APTT: No results for input(s): \"APTT\" in the last 72 hours. CARDIAC ENZYMES: No results for input(s): \"CKMB\", \"CKMBINDEX\", \"TROPONINI\" in the last 72 hours. Invalid input(s): \"CKTOTAL;3\"  FASTING LIPID PANEL:No results found for: \"CHOL\", \"HDL\", \"TRIG\"  LIVER PROFILE:   Recent Labs     12/12/23  0813 12/13/23  0431   AST 64* 64*   ALT 18 19   BILITOT 4.3* 4.1*   ALKPHOS 226* 258*        MICROBIOLOGY:   Lab Results   Component Value Date/Time    CULTURE NO GROWTH 3 DAYS 12/11/2023 12:40 PM       Imaging:    IR TIPS INSERTION    Result Date: 12/6/2023  Successful placement of a TIPS stent. Placement of a right internal jugular 5.5 [place] [employer] central vein catheter. IR NONTUNNELED VASCULAR CATHETER > 5 YEARS    Result Date: 12/6/2023  Successful placement of a TIPS stent. Placement of a right internal jugular 5.5 [place] [employer] central vein catheter. CTA ABDOMEN PELVIS W WO CONTRAST    Result Date: 12/4/2023  No arterial extravasation or abnormal vascular malformation. Stable heterogeneous attenuation of the liver with additional findings compatible with portal hypertension. RECOMMENDATIONS: Recommend upper endoscopy for further evaluation. Consider nonemergent IR consultation for transjugular biopsy of the liver and hepatic hemodynamic assessment. XR CHEST PORTABLE    Result Date: 12/4/2023  No acute process.        ASSESSMENT & PLAN     ASSESSMENT / PLAN:     Impression:  Hugo Fuller is a 60-year-old male with PMHx significant for alcohol abuse, alcoholic cardiomyopathy, tobacco smoker, alcoholic liver cirrhosis/liver failure, esophageal varices s/p multiple band in transcription may have occurred.

## 2023-12-15 NOTE — PROGRESS NOTES
Discharge instructions and medications reviewed with patient, no further questions stated. IV removed, pt. Dressed appropriate for weather.

## 2023-12-16 LAB
MICROORGANISM SPEC CULT: NORMAL
MICROORGANISM SPEC CULT: NORMAL
SERVICE CMNT-IMP: NORMAL
SERVICE CMNT-IMP: NORMAL
SPECIMEN DESCRIPTION: NORMAL
SPECIMEN DESCRIPTION: NORMAL

## 2023-12-22 ENCOUNTER — APPOINTMENT (OUTPATIENT)
Dept: ULTRASOUND IMAGING | Age: 38
DRG: 260 | End: 2023-12-22
Payer: COMMERCIAL

## 2023-12-22 ENCOUNTER — APPOINTMENT (OUTPATIENT)
Dept: GENERAL RADIOLOGY | Age: 38
DRG: 260 | End: 2023-12-22
Payer: COMMERCIAL

## 2023-12-22 ENCOUNTER — APPOINTMENT (OUTPATIENT)
Dept: CT IMAGING | Age: 38
DRG: 260 | End: 2023-12-22
Payer: COMMERCIAL

## 2023-12-22 ENCOUNTER — HOSPITAL ENCOUNTER (INPATIENT)
Age: 38
LOS: 4 days | Discharge: HOME | DRG: 260 | End: 2023-12-26
Attending: EMERGENCY MEDICINE | Admitting: INTERNAL MEDICINE
Payer: COMMERCIAL

## 2023-12-22 DIAGNOSIS — R17 JAUNDICE, NON-NEONATAL: Primary | ICD-10-CM

## 2023-12-22 DIAGNOSIS — K70.30 ALCOHOLIC CIRRHOSIS, UNSPECIFIED WHETHER ASCITES PRESENT (HCC): ICD-10-CM

## 2023-12-22 LAB
ALBUMIN SERPL-MCNC: 2.5 G/DL (ref 3.5–5.2)
ALBUMIN/GLOB SERPL: 0.6 {RATIO} (ref 1–2.5)
ALP SERPL-CCNC: 338 U/L (ref 40–129)
ALT SERPL-CCNC: 19 U/L (ref 5–41)
AMMONIA PLAS-SCNC: 50 UMOL/L (ref 16–60)
ANION GAP SERPL CALCULATED.3IONS-SCNC: 13 MMOL/L (ref 9–17)
AST SERPL-CCNC: 74 U/L
BACTERIA URNS QL MICRO: NORMAL
BASOPHILS # BLD: 0.15 K/UL (ref 0–0.2)
BASOPHILS NFR BLD: 1 % (ref 0–2)
BILIRUB DIRECT SERPL-MCNC: 12.5 MG/DL
BILIRUB INDIRECT SERPL-MCNC: 1.9 MG/DL (ref 0–1)
BILIRUB SERPL-MCNC: 14.4 MG/DL (ref 0.3–1.2)
BILIRUB UR QL STRIP: ABNORMAL
BNP SERPL-MCNC: 176 PG/ML
BUN SERPL-MCNC: 8 MG/DL (ref 6–20)
CALCIUM SERPL-MCNC: 7.9 MG/DL (ref 8.6–10.4)
CASTS #/AREA URNS LPF: NORMAL /LPF (ref 0–8)
CHLORIDE SERPL-SCNC: 104 MMOL/L (ref 98–107)
CLARITY UR: CLEAR
CO2 SERPL-SCNC: 17 MMOL/L (ref 20–31)
COLOR UR: ABNORMAL
CREAT SERPL-MCNC: <0.2 MG/DL (ref 0.7–1.2)
EOSINOPHIL # BLD: 0.31 K/UL (ref 0–0.44)
EOSINOPHILS RELATIVE PERCENT: 2 % (ref 1–4)
EPI CELLS #/AREA URNS HPF: NORMAL /HPF (ref 0–5)
ERYTHROCYTE [DISTWIDTH] IN BLOOD BY AUTOMATED COUNT: 23.1 % (ref 11.8–14.4)
FLUAV AG SPEC QL: NEGATIVE
FLUBV AG SPEC QL: NEGATIVE
GFR SERPL CREATININE-BSD FRML MDRD: ABNORMAL ML/MIN/1.73M2
GLUCOSE SERPL-MCNC: 131 MG/DL (ref 70–99)
GLUCOSE UR STRIP-MCNC: NEGATIVE MG/DL
HCT VFR BLD AUTO: 27.5 % (ref 40.7–50.3)
HGB BLD-MCNC: 9.1 G/DL (ref 13–17)
HGB UR QL STRIP.AUTO: NEGATIVE
IMM GRANULOCYTES # BLD AUTO: 0.15 K/UL (ref 0–0.3)
IMM GRANULOCYTES NFR BLD: 1 %
INR PPP: 1.7
KETONES UR STRIP-MCNC: NEGATIVE MG/DL
LACTIC ACID, SEPSIS WHOLE BLOOD: 1.8 MMOL/L (ref 0.5–1.9)
LACTIC ACID, SEPSIS WHOLE BLOOD: 2 MMOL/L (ref 0.5–1.9)
LEUKOCYTE ESTERASE UR QL STRIP: NEGATIVE
LIPASE SERPL-CCNC: 176 U/L (ref 13–60)
LYMPHOCYTES NFR BLD: 1.07 K/UL (ref 1.1–3.7)
LYMPHOCYTES RELATIVE PERCENT: 7 % (ref 24–43)
MCH RBC QN AUTO: 31.6 PG (ref 25.2–33.5)
MCHC RBC AUTO-ENTMCNC: 33.1 G/DL (ref 28.4–34.8)
MCV RBC AUTO: 95.5 FL (ref 82.6–102.9)
MONOCYTES NFR BLD: 0.77 K/UL (ref 0.1–1.2)
MONOCYTES NFR BLD: 5 % (ref 3–12)
MORPHOLOGY: ABNORMAL
NEUTROPHILS NFR BLD: 84 % (ref 36–65)
NEUTS SEG NFR BLD: 12.85 K/UL (ref 1.5–8.1)
NITRITE UR QL STRIP: NEGATIVE
NRBC BLD-RTO: 0 PER 100 WBC
PH UR STRIP: 7.5 [PH] (ref 5–8)
PLATELET # BLD AUTO: 252 K/UL (ref 138–453)
PMV BLD AUTO: 11 FL (ref 8.1–13.5)
POTASSIUM SERPL-SCNC: 3.5 MMOL/L (ref 3.7–5.3)
PROT SERPL-MCNC: 6.9 G/DL (ref 6.4–8.3)
PROT UR STRIP-MCNC: NEGATIVE MG/DL
PROTHROMBIN TIME: 19.5 SEC (ref 11.7–14.9)
RBC # BLD AUTO: 2.88 M/UL (ref 4.21–5.77)
RBC #/AREA URNS HPF: NORMAL /HPF (ref 0–4)
SARS-COV-2 RDRP RESP QL NAA+PROBE: NOT DETECTED
SODIUM SERPL-SCNC: 134 MMOL/L (ref 135–144)
SP GR UR STRIP: 1.03 (ref 1–1.03)
SPECIMEN DESCRIPTION: NORMAL
TROPONIN I SERPL HS-MCNC: 11 NG/L (ref 0–22)
TROPONIN I SERPL HS-MCNC: 12 NG/L (ref 0–22)
UROBILINOGEN UR STRIP-ACNC: NORMAL EU/DL (ref 0–1)
WBC #/AREA URNS HPF: NORMAL /HPF (ref 0–5)
WBC OTHER # BLD: 15.3 K/UL (ref 3.5–11.3)

## 2023-12-22 PROCEDURE — 83880 ASSAY OF NATRIURETIC PEPTIDE: CPT

## 2023-12-22 PROCEDURE — 2580000003 HC RX 258: Performed by: STUDENT IN AN ORGANIZED HEALTH CARE EDUCATION/TRAINING PROGRAM

## 2023-12-22 PROCEDURE — 6360000002 HC RX W HCPCS: Performed by: STUDENT IN AN ORGANIZED HEALTH CARE EDUCATION/TRAINING PROGRAM

## 2023-12-22 PROCEDURE — B51T1ZZ FLUOROSCOPY OF PORTAL AND SPLANCHNIC VEINS USING LOW OSMOLAR CONTRAST: ICD-10-PCS | Performed by: EMERGENCY MEDICINE

## 2023-12-22 PROCEDURE — 85025 COMPLETE CBC W/AUTO DIFF WBC: CPT

## 2023-12-22 PROCEDURE — 1200000000 HC SEMI PRIVATE

## 2023-12-22 PROCEDURE — 87635 SARS-COV-2 COVID-19 AMP PRB: CPT

## 2023-12-22 PROCEDURE — 84484 ASSAY OF TROPONIN QUANT: CPT

## 2023-12-22 PROCEDURE — 87804 INFLUENZA ASSAY W/OPTIC: CPT

## 2023-12-22 PROCEDURE — 87040 BLOOD CULTURE FOR BACTERIA: CPT

## 2023-12-22 PROCEDURE — 80076 HEPATIC FUNCTION PANEL: CPT

## 2023-12-22 PROCEDURE — 85610 PROTHROMBIN TIME: CPT

## 2023-12-22 PROCEDURE — 81001 URINALYSIS AUTO W/SCOPE: CPT

## 2023-12-22 PROCEDURE — 96374 THER/PROPH/DIAG INJ IV PUSH: CPT

## 2023-12-22 PROCEDURE — 6360000004 HC RX CONTRAST MEDICATION: Performed by: STUDENT IN AN ORGANIZED HEALTH CARE EDUCATION/TRAINING PROGRAM

## 2023-12-22 PROCEDURE — 83690 ASSAY OF LIPASE: CPT

## 2023-12-22 PROCEDURE — 83605 ASSAY OF LACTIC ACID: CPT

## 2023-12-22 PROCEDURE — 96375 TX/PRO/DX INJ NEW DRUG ADDON: CPT

## 2023-12-22 PROCEDURE — 82140 ASSAY OF AMMONIA: CPT

## 2023-12-22 PROCEDURE — 06183J4 BYPASS PORTAL VEIN TO HEPATIC VEIN WITH SYNTHETIC SUBSTITUTE, PERCUTANEOUS APPROACH: ICD-10-PCS | Performed by: EMERGENCY MEDICINE

## 2023-12-22 PROCEDURE — 6370000000 HC RX 637 (ALT 250 FOR IP)

## 2023-12-22 PROCEDURE — 71046 X-RAY EXAM CHEST 2 VIEWS: CPT

## 2023-12-22 PROCEDURE — 02HV33Z INSERTION OF INFUSION DEVICE INTO SUPERIOR VENA CAVA, PERCUTANEOUS APPROACH: ICD-10-PCS | Performed by: EMERGENCY MEDICINE

## 2023-12-22 PROCEDURE — 74177 CT ABD & PELVIS W/CONTRAST: CPT

## 2023-12-22 PROCEDURE — 99285 EMERGENCY DEPT VISIT HI MDM: CPT

## 2023-12-22 PROCEDURE — 76705 ECHO EXAM OF ABDOMEN: CPT

## 2023-12-22 PROCEDURE — 80048 BASIC METABOLIC PNL TOTAL CA: CPT

## 2023-12-22 RX ORDER — HEPARIN SODIUM 5000 [USP'U]/ML
5000 INJECTION, SOLUTION INTRAVENOUS; SUBCUTANEOUS EVERY 8 HOURS SCHEDULED
Status: DISCONTINUED | OUTPATIENT
Start: 2023-12-22 | End: 2023-12-26 | Stop reason: HOSPADM

## 2023-12-22 RX ORDER — POLYETHYLENE GLYCOL 3350 17 G/17G
17 POWDER, FOR SOLUTION ORAL DAILY PRN
Status: DISCONTINUED | OUTPATIENT
Start: 2023-12-22 | End: 2023-12-26 | Stop reason: HOSPADM

## 2023-12-22 RX ORDER — ACETAMINOPHEN 325 MG/1
650 TABLET ORAL EVERY 6 HOURS PRN
Status: DISCONTINUED | OUTPATIENT
Start: 2023-12-22 | End: 2023-12-26 | Stop reason: HOSPADM

## 2023-12-22 RX ORDER — MAGNESIUM SULFATE IN WATER 40 MG/ML
2000 INJECTION, SOLUTION INTRAVENOUS PRN
Status: DISCONTINUED | OUTPATIENT
Start: 2023-12-22 | End: 2023-12-26 | Stop reason: HOSPADM

## 2023-12-22 RX ORDER — OXYCODONE HYDROCHLORIDE 5 MG/1
2.5 TABLET ORAL EVERY 6 HOURS PRN
Status: DISCONTINUED | OUTPATIENT
Start: 2023-12-22 | End: 2023-12-26 | Stop reason: HOSPADM

## 2023-12-22 RX ORDER — ACETAMINOPHEN 650 MG/1
650 SUPPOSITORY RECTAL EVERY 6 HOURS PRN
Status: DISCONTINUED | OUTPATIENT
Start: 2023-12-22 | End: 2023-12-26 | Stop reason: HOSPADM

## 2023-12-22 RX ORDER — ALBUTEROL SULFATE 90 UG/1
2 AEROSOL, METERED RESPIRATORY (INHALATION) EVERY 4 HOURS PRN
Status: DISCONTINUED | OUTPATIENT
Start: 2023-12-22 | End: 2023-12-26 | Stop reason: HOSPADM

## 2023-12-22 RX ORDER — SODIUM CHLORIDE 9 MG/ML
INJECTION, SOLUTION INTRAVENOUS PRN
Status: DISCONTINUED | OUTPATIENT
Start: 2023-12-22 | End: 2023-12-26 | Stop reason: HOSPADM

## 2023-12-22 RX ORDER — SODIUM CHLORIDE 0.9 % (FLUSH) 0.9 %
5-40 SYRINGE (ML) INJECTION PRN
Status: DISCONTINUED | OUTPATIENT
Start: 2023-12-22 | End: 2023-12-26 | Stop reason: HOSPADM

## 2023-12-22 RX ORDER — 0.9 % SODIUM CHLORIDE 0.9 %
1000 INTRAVENOUS SOLUTION INTRAVENOUS ONCE
Status: COMPLETED | OUTPATIENT
Start: 2023-12-22 | End: 2023-12-22

## 2023-12-22 RX ORDER — ONDANSETRON 2 MG/ML
4 INJECTION INTRAMUSCULAR; INTRAVENOUS ONCE
Status: COMPLETED | OUTPATIENT
Start: 2023-12-22 | End: 2023-12-22

## 2023-12-22 RX ORDER — ONDANSETRON 2 MG/ML
4 INJECTION INTRAMUSCULAR; INTRAVENOUS EVERY 6 HOURS PRN
Status: DISCONTINUED | OUTPATIENT
Start: 2023-12-22 | End: 2023-12-26 | Stop reason: HOSPADM

## 2023-12-22 RX ORDER — POTASSIUM CHLORIDE 20 MEQ/1
40 TABLET, EXTENDED RELEASE ORAL PRN
Status: DISCONTINUED | OUTPATIENT
Start: 2023-12-22 | End: 2023-12-26 | Stop reason: HOSPADM

## 2023-12-22 RX ORDER — ONDANSETRON 4 MG/1
4 TABLET, ORALLY DISINTEGRATING ORAL EVERY 8 HOURS PRN
Status: DISCONTINUED | OUTPATIENT
Start: 2023-12-22 | End: 2023-12-26 | Stop reason: HOSPADM

## 2023-12-22 RX ORDER — SODIUM CHLORIDE 0.9 % (FLUSH) 0.9 %
5-40 SYRINGE (ML) INJECTION EVERY 12 HOURS SCHEDULED
Status: DISCONTINUED | OUTPATIENT
Start: 2023-12-22 | End: 2023-12-26 | Stop reason: HOSPADM

## 2023-12-22 RX ORDER — POTASSIUM CHLORIDE 7.45 MG/ML
10 INJECTION INTRAVENOUS PRN
Status: DISCONTINUED | OUTPATIENT
Start: 2023-12-22 | End: 2023-12-26 | Stop reason: HOSPADM

## 2023-12-22 RX ORDER — MORPHINE SULFATE 4 MG/ML
4 INJECTION, SOLUTION INTRAMUSCULAR; INTRAVENOUS ONCE
Status: COMPLETED | OUTPATIENT
Start: 2023-12-22 | End: 2023-12-22

## 2023-12-22 RX ADMIN — OXYCODONE 2.5 MG: 5 TABLET ORAL at 22:12

## 2023-12-22 RX ADMIN — IOPAMIDOL 75 ML: 755 INJECTION, SOLUTION INTRAVENOUS at 15:50

## 2023-12-22 RX ADMIN — SODIUM CHLORIDE 1000 ML: 9 INJECTION, SOLUTION INTRAVENOUS at 14:41

## 2023-12-22 RX ADMIN — PIPERACILLIN AND TAZOBACTAM 4500 MG: 4; .5 INJECTION, POWDER, LYOPHILIZED, FOR SOLUTION INTRAVENOUS; PARENTERAL at 17:08

## 2023-12-22 RX ADMIN — ONDANSETRON 4 MG: 2 INJECTION INTRAMUSCULAR; INTRAVENOUS at 14:41

## 2023-12-22 RX ADMIN — MORPHINE SULFATE 4 MG: 4 INJECTION INTRAVENOUS at 14:41

## 2023-12-22 ASSESSMENT — PAIN SCALES - GENERAL
PAINLEVEL_OUTOF10: 8
PAINLEVEL_OUTOF10: 8
PAINLEVEL_OUTOF10: 10

## 2023-12-22 ASSESSMENT — PAIN DESCRIPTION - LOCATION: LOCATION: ABDOMEN

## 2023-12-22 NOTE — ED NOTES
Internal medicine at bedside to evaluate pt. [address] [facility] for pt to have sips of water. Pt given water.

## 2023-12-22 NOTE — ED NOTES
Writer perfect served resident about discontinuing Zoysn. Resident states to continue to Myrna Ghosh going now and to retime it. Writer called pharmacy.

## 2023-12-22 NOTE — H&P
Right Ear: External ear normal.      Left Ear: External ear normal.      Nose: Nose normal.      Mouth/Throat:      Mouth: Mucous membranes are moist.   Eyes:      General: Scleral icterus present. Extraocular Movements: Extraocular movements intact. Pupils: Pupils are equal, round, and reactive to light. Cardiovascular:      Rate and Rhythm: Regular rhythm. Tachycardia present. Pulses: Normal pulses. Pulmonary:      Effort: Pulmonary effort is normal. No respiratory distress. Breath sounds: Normal breath sounds. Abdominal:      General: There is no distension. Palpations: Abdomen is soft. Comments: Abdomen soft, nondistended. Mild tenderness in left upper quadrant, no rebound or guarding   Musculoskeletal:      Cervical back: Normal range of motion. Comments: Moving all 4 extremities   Skin:     General: Skin is warm. Capillary Refill: Capillary refill takes less than 2 seconds. Coloration: Skin is jaundiced. Neurological:      General: No focal deficit present. Mental Status: He is alert and oriented to person, place, and time. Cranial Nerves: No cranial nerve deficit.    Psychiatric:         Mood and Affect: Mood normal.     INVESTIGATIONS     Laboratory Testing:     Recent Results (from the past 24 hour(s))   CBC with Auto Differential    Collection Time: 12/22/23  2:40 PM   Result Value Ref Range    WBC 15.3 (H) 3.5 - 11.3 k/uL    RBC 2.88 (L) 4.21 - 5.77 m/uL    Hemoglobin 9.1 (L) 13.0 - 17.0 g/dL    Hematocrit 27.5 (L) 40.7 - 50.3 %    MCV 95.5 82.6 - 102.9 fL    MCH 31.6 25.2 - 33.5 pg    MCHC 33.1 28.4 - 34.8 g/dL    RDW 23.1 (H) 11.8 - 14.4 %    Platelets 736 089 - 326 k/uL    MPV 11.0 8.1 - 13.5 fL    NRBC Automated 0.0 0.0 per 100 WBC    Immature Granulocytes 1 (H) 0 %    Neutrophils % 84 (H) 36 - 65 %    Lymphocytes % 7 (L) 24 - 43 %    Monocytes % 5 3 - 12 %    Eosinophils % 2 1 - 4 %    Basophils % 1 0 - 2 %    Absolute Immature

## 2023-12-23 PROBLEM — F10.21 HISTORY OF ALCOHOLISM (HCC): Status: ACTIVE | Noted: 2023-12-23

## 2023-12-23 PROBLEM — R17 JAUNDICE, NON-NEONATAL: Status: ACTIVE | Noted: 2023-12-23

## 2023-12-23 PROBLEM — Z95.828 S/P TIPS (TRANSJUGULAR INTRAHEPATIC PORTOSYSTEMIC SHUNT): Status: ACTIVE | Noted: 2023-12-23

## 2023-12-23 LAB
ALBUMIN SERPL-MCNC: 2.3 G/DL (ref 3.5–5.2)
ALBUMIN/GLOB SERPL: 0.6 {RATIO} (ref 1–2.5)
ALP SERPL-CCNC: 314 U/L (ref 40–129)
ALT SERPL-CCNC: 15 U/L (ref 5–41)
ANION GAP SERPL CALCULATED.3IONS-SCNC: 11 MMOL/L (ref 9–17)
AST SERPL-CCNC: 55 U/L
BASOPHILS # BLD: 0.14 K/UL (ref 0–0.2)
BASOPHILS NFR BLD: 1 % (ref 0–2)
BILIRUB DIRECT SERPL-MCNC: 10 MG/DL
BILIRUB SERPL-MCNC: 14.7 MG/DL (ref 0.3–1.2)
BUN SERPL-MCNC: 6 MG/DL (ref 6–20)
CALCIUM SERPL-MCNC: 7.7 MG/DL (ref 8.6–10.4)
CHLORIDE SERPL-SCNC: 103 MMOL/L (ref 98–107)
CO2 SERPL-SCNC: 18 MMOL/L (ref 20–31)
CREAT SERPL-MCNC: 0.3 MG/DL (ref 0.7–1.2)
CRP SERPL HS-MCNC: 15.2 MG/L (ref 0–5)
EOSINOPHIL # BLD: 0.41 K/UL (ref 0–0.44)
EOSINOPHILS RELATIVE PERCENT: 3 % (ref 1–4)
ERYTHROCYTE [DISTWIDTH] IN BLOOD BY AUTOMATED COUNT: 23.5 % (ref 11.8–14.4)
ERYTHROCYTE [SEDIMENTATION RATE] IN BLOOD BY PHOTOMETRIC METHOD: 65 MM/HR (ref 0–15)
FIBRINOGEN PPP-MCNC: 254 MG/DL (ref 203–521)
GFR SERPL CREATININE-BSD FRML MDRD: >60 ML/MIN/1.73M2
GLUCOSE SERPL-MCNC: 97 MG/DL (ref 70–99)
HAPTOGLOB SERPL-MCNC: 31 MG/DL (ref 30–200)
HCT VFR BLD AUTO: 24.6 % (ref 40.7–50.3)
HGB BLD-MCNC: 8 G/DL (ref 13–17)
IMM GRANULOCYTES # BLD AUTO: 0.14 K/UL (ref 0–0.3)
IMM GRANULOCYTES NFR BLD: 1 %
IMM RETICS NFR: 14.9 % (ref 2.7–18.3)
LDH SERPL-CCNC: 224 U/L (ref 135–225)
LIPASE SERPL-CCNC: 120 U/L (ref 13–60)
LYMPHOCYTES NFR BLD: 1.22 K/UL (ref 1.1–3.7)
LYMPHOCYTES RELATIVE PERCENT: 9 % (ref 24–43)
MAGNESIUM SERPL-MCNC: 1.6 MG/DL (ref 1.6–2.6)
MCH RBC QN AUTO: 31.1 PG (ref 25.2–33.5)
MCHC RBC AUTO-ENTMCNC: 32.5 G/DL (ref 28.4–34.8)
MCV RBC AUTO: 95.7 FL (ref 82.6–102.9)
MONOCYTES NFR BLD: 0.95 K/UL (ref 0.1–1.2)
MONOCYTES NFR BLD: 7 % (ref 3–12)
MORPHOLOGY: ABNORMAL
NEUTROPHILS NFR BLD: 79 % (ref 36–65)
NEUTS SEG NFR BLD: 10.74 K/UL (ref 1.5–8.1)
NRBC BLD-RTO: 0 PER 100 WBC
PLATELET # BLD AUTO: 193 K/UL (ref 138–453)
PMV BLD AUTO: 10.5 FL (ref 8.1–13.5)
POTASSIUM SERPL-SCNC: 3 MMOL/L (ref 3.7–5.3)
PROT SERPL-MCNC: 6.2 G/DL (ref 6.4–8.3)
RBC # BLD AUTO: 2.57 M/UL (ref 4.21–5.77)
RETIC HEMOGLOBIN: 38.5 PG (ref 28.2–35.7)
RETICS # AUTO: 0.14 M/UL (ref 0.03–0.08)
RETICS/RBC NFR AUTO: 5 % (ref 0.5–1.9)
SODIUM SERPL-SCNC: 132 MMOL/L (ref 135–144)
WBC OTHER # BLD: 13.6 K/UL (ref 3.5–11.3)

## 2023-12-23 PROCEDURE — 1200000000 HC SEMI PRIVATE

## 2023-12-23 PROCEDURE — 86645 CMV ANTIBODY IGM: CPT

## 2023-12-23 PROCEDURE — 80074 ACUTE HEPATITIS PANEL: CPT

## 2023-12-23 PROCEDURE — 36415 COLL VENOUS BLD VENIPUNCTURE: CPT

## 2023-12-23 PROCEDURE — 6360000002 HC RX W HCPCS

## 2023-12-23 PROCEDURE — 2580000003 HC RX 258

## 2023-12-23 PROCEDURE — 85652 RBC SED RATE AUTOMATED: CPT

## 2023-12-23 PROCEDURE — 6370000000 HC RX 637 (ALT 250 FOR IP)

## 2023-12-23 PROCEDURE — 82248 BILIRUBIN DIRECT: CPT

## 2023-12-23 PROCEDURE — 85384 FIBRINOGEN ACTIVITY: CPT

## 2023-12-23 PROCEDURE — 83615 LACTATE (LD) (LDH) ENZYME: CPT

## 2023-12-23 PROCEDURE — 99255 IP/OBS CONSLTJ NEW/EST HI 80: CPT | Performed by: INTERNAL MEDICINE

## 2023-12-23 PROCEDURE — 86663 EPSTEIN-BARR ANTIBODY: CPT

## 2023-12-23 PROCEDURE — 99223 1ST HOSP IP/OBS HIGH 75: CPT | Performed by: INTERNAL MEDICINE

## 2023-12-23 PROCEDURE — 86140 C-REACTIVE PROTEIN: CPT

## 2023-12-23 PROCEDURE — 85045 AUTOMATED RETICULOCYTE COUNT: CPT

## 2023-12-23 PROCEDURE — 6360000002 HC RX W HCPCS: Performed by: INTERNAL MEDICINE

## 2023-12-23 PROCEDURE — 86664 EPSTEIN-BARR NUCLEAR ANTIGEN: CPT

## 2023-12-23 PROCEDURE — 83735 ASSAY OF MAGNESIUM: CPT

## 2023-12-23 PROCEDURE — 85025 COMPLETE CBC W/AUTO DIFF WBC: CPT

## 2023-12-23 PROCEDURE — 2500000003 HC RX 250 WO HCPCS

## 2023-12-23 PROCEDURE — 86665 EPSTEIN-BARR CAPSID VCA: CPT

## 2023-12-23 PROCEDURE — 83010 ASSAY OF HAPTOGLOBIN QUANT: CPT

## 2023-12-23 PROCEDURE — 83690 ASSAY OF LIPASE: CPT

## 2023-12-23 PROCEDURE — 99254 IP/OBS CNSLTJ NEW/EST MOD 60: CPT | Performed by: SURGERY

## 2023-12-23 PROCEDURE — 80053 COMPREHEN METABOLIC PANEL: CPT

## 2023-12-23 RX ORDER — POTASSIUM CHLORIDE 7.45 MG/ML
10 INJECTION INTRAVENOUS
Status: ACTIVE | OUTPATIENT
Start: 2023-12-23 | End: 2023-12-23

## 2023-12-23 RX ORDER — MORPHINE SULFATE 2 MG/ML
2 INJECTION, SOLUTION INTRAMUSCULAR; INTRAVENOUS ONCE
Status: COMPLETED | OUTPATIENT
Start: 2023-12-23 | End: 2023-12-23

## 2023-12-23 RX ORDER — MAGNESIUM SULFATE IN WATER 40 MG/ML
2000 INJECTION, SOLUTION INTRAVENOUS ONCE
Status: COMPLETED | OUTPATIENT
Start: 2023-12-23 | End: 2023-12-23

## 2023-12-23 RX ORDER — LACTULOSE 10 G/15ML
10 SOLUTION ORAL 3 TIMES DAILY
Status: DISCONTINUED | OUTPATIENT
Start: 2023-12-23 | End: 2023-12-26 | Stop reason: HOSPADM

## 2023-12-23 RX ORDER — POTASSIUM CHLORIDE 7.45 MG/ML
10 INJECTION INTRAVENOUS
Status: COMPLETED | OUTPATIENT
Start: 2023-12-23 | End: 2023-12-23

## 2023-12-23 RX ORDER — SODIUM CHLORIDE 9 MG/ML
INJECTION, SOLUTION INTRAVENOUS CONTINUOUS
Status: DISCONTINUED | OUTPATIENT
Start: 2023-12-23 | End: 2023-12-25

## 2023-12-23 RX ORDER — POTASSIUM CHLORIDE 7.45 MG/ML
10 INJECTION INTRAVENOUS
Status: DISPENSED | OUTPATIENT
Start: 2023-12-23 | End: 2023-12-23

## 2023-12-23 RX ORDER — PANTOPRAZOLE SODIUM 40 MG/1
40 TABLET, DELAYED RELEASE ORAL EVERY 12 HOURS
Status: DISCONTINUED | OUTPATIENT
Start: 2023-12-23 | End: 2023-12-26 | Stop reason: HOSPADM

## 2023-12-23 RX ORDER — POTASSIUM CHLORIDE 20 MEQ/1
40 TABLET, EXTENDED RELEASE ORAL ONCE
Status: COMPLETED | OUTPATIENT
Start: 2023-12-23 | End: 2023-12-23

## 2023-12-23 RX ADMIN — SODIUM CHLORIDE: 9 INJECTION, SOLUTION INTRAVENOUS at 13:17

## 2023-12-23 RX ADMIN — RIFAXIMIN 550 MG: 550 TABLET ORAL at 20:53

## 2023-12-23 RX ADMIN — MORPHINE SULFATE 2 MG: 2 INJECTION, SOLUTION INTRAMUSCULAR; INTRAVENOUS at 01:40

## 2023-12-23 RX ADMIN — OXYCODONE 2.5 MG: 5 TABLET ORAL at 06:37

## 2023-12-23 RX ADMIN — RIFAXIMIN 550 MG: 550 TABLET ORAL at 13:56

## 2023-12-23 RX ADMIN — THIAMINE HYDROCHLORIDE: 100 INJECTION, SOLUTION INTRAMUSCULAR; INTRAVENOUS at 16:35

## 2023-12-23 RX ADMIN — LACTULOSE 10 G: 20 SOLUTION ORAL at 13:55

## 2023-12-23 RX ADMIN — OXYCODONE 2.5 MG: 5 TABLET ORAL at 14:13

## 2023-12-23 RX ADMIN — PIPERACILLIN AND TAZOBACTAM 3375 MG: 3; .375 INJECTION, POWDER, LYOPHILIZED, FOR SOLUTION INTRAVENOUS at 06:39

## 2023-12-23 RX ADMIN — ONDANSETRON 4 MG: 2 INJECTION INTRAMUSCULAR; INTRAVENOUS at 18:37

## 2023-12-23 RX ADMIN — PIPERACILLIN AND TAZOBACTAM 3375 MG: 3; .375 INJECTION, POWDER, LYOPHILIZED, FOR SOLUTION INTRAVENOUS at 01:11

## 2023-12-23 RX ADMIN — POTASSIUM CHLORIDE 10 MEQ: 7.46 INJECTION, SOLUTION INTRAVENOUS at 18:34

## 2023-12-23 RX ADMIN — POTASSIUM CHLORIDE 10 MEQ: 7.46 INJECTION, SOLUTION INTRAVENOUS at 22:14

## 2023-12-23 RX ADMIN — POTASSIUM CHLORIDE 10 MEQ: 7.46 INJECTION, SOLUTION INTRAVENOUS at 14:02

## 2023-12-23 RX ADMIN — PIPERACILLIN AND TAZOBACTAM 3375 MG: 3; .375 INJECTION, POWDER, LYOPHILIZED, FOR SOLUTION INTRAVENOUS at 21:07

## 2023-12-23 RX ADMIN — MAGNESIUM SULFATE HEPTAHYDRATE 2000 MG: 40 INJECTION, SOLUTION INTRAVENOUS at 14:07

## 2023-12-23 RX ADMIN — POTASSIUM CHLORIDE 10 MEQ: 7.46 INJECTION, SOLUTION INTRAVENOUS at 16:11

## 2023-12-23 RX ADMIN — POTASSIUM CHLORIDE 40 MEQ: 1500 TABLET, EXTENDED RELEASE ORAL at 13:56

## 2023-12-23 RX ADMIN — PIPERACILLIN AND TAZOBACTAM 3375 MG: 3; .375 INJECTION, POWDER, LYOPHILIZED, FOR SOLUTION INTRAVENOUS at 14:09

## 2023-12-23 RX ADMIN — OXYCODONE 2.5 MG: 5 TABLET ORAL at 20:52

## 2023-12-23 ASSESSMENT — PAIN DESCRIPTION - LOCATION
LOCATION: ABDOMEN

## 2023-12-23 ASSESSMENT — PAIN SCALES - GENERAL
PAINLEVEL_OUTOF10: 4
PAINLEVEL_OUTOF10: 5
PAINLEVEL_OUTOF10: 7

## 2023-12-23 NOTE — PLAN OF CARE
Problem: Chronic Conditions and Co-morbidities  Goal: Patient's chronic conditions and co-morbidity symptoms are monitored and maintained or improved  12/23/2023 1849 by Jocy Pa RN  Outcome: Progressing  12/23/2023 0513 by Josh Emery RN  Outcome: Progressing     Problem: Pain  Goal: Verbalizes/displays adequate comfort level or baseline comfort level  12/23/2023 1849 by Jocy Pa RN  Outcome: Progressing  12/23/2023 0513 by Josh Emery RN  Outcome: Progressing     Problem: ABCDS Injury Assessment  Goal: Absence of physical injury  12/23/2023 1849 by Jocy Pa RN  Outcome: Progressing  12/23/2023 0513 by Josh Emery RN  Outcome: Progressing

## 2023-12-23 NOTE — ED NOTES
Spoke with ultrasound regarding pt status. Pt reported eating honey bun at approx 9 am and cup of water at 7 pm. Per ultrasound, pt is to stay NPO until ultrasound.

## 2023-12-23 NOTE — CONSULTS
Thank you for the interesting evaluation. Further recommendations to follow. Damon Fischer DO  12/23/2023, 1:31 PM   Attestation signed by Emil Shen MD    I personally evaluated the patient and directed the medical decision making with Resident/STEVEN after the physical/radiologic exam and laboratory values were reviewed and confirmed. Pain left sided. Rec f/u with hepatobiliary surgeon. Will sign off.      Emil Shen MD
hematemesis FINDINGS: The lungs are without acute focal process. There is no effusion or pneumothorax. The cardiomediastinal silhouette is without acute process. The osseous structures are without acute process. No acute process. XR HAND LEFT (MIN 3 VIEWS)    Result Date: 12/1/2023  EXAMINATION: THREE XRAY VIEWS OF THE LEFT HAND 12/1/2023 12:45 pm COMPARISON: None. HISTORY: ORDERING SYSTEM PROVIDED HISTORY: Drill bit to thumb TECHNOLOGIST PROVIDED HISTORY: Drill bit to thumb FINDINGS: There is no evidence of acute fracture. There is normal alignment. No acute joint abnormality. No focal osseous lesion. No focal soft tissue abnormality. No acute osseous abnormality. IMPRESSION:     -Likely gastroenteritis, cause unknown  -Conjugated hyperbilirubinemia but no signs of ductal obstruction  -Cirrhotic liver disease due to chronic alcohol use disorder  -Portal hypertension s/p IR guided TIPS on 12/4/2023  -Minimal ascites  -Gallbladder distention without cholelithiasis, no signs of cholecystitis or biliary dilatation on CT abdomen and USG RUQ  -History of alcohol use disorder  -Hx of variceal bleed    Old records, labs and imaging reviewed. PLAN     -Had a discussion with the patient regarding the need of MRI with MRCP pancreatic protocol to rule out etiologies of marked elevation in the total and direct bilirubin. He refused MRI and and said that he does not want any medications with it as well. Patient explained the risk versus benefit and he understood the implications.  -Will do infectious and hemolytic workup for the elevated conjugated hyperbilirubinemia  -Monitor and trend LFTs  -Keep the patient on lactulose with the goal to produce at least 2-3 bowel movements per day. -Trend bilirubin level  -Appreciate general surgery recommendations  -We will follow.           This plan was formulated in collaboration with Dr. Jocy Givens MD.  Thank you for allowing us to participate in the care

## 2023-12-23 NOTE — ED NOTES
Pt belongings placed in labeled belongings bag with patient identifier sticker. Belongings placed on stretcher with patient for transport. T2R signed by RN.

## 2023-12-24 ENCOUNTER — APPOINTMENT (OUTPATIENT)
Dept: MRI IMAGING | Age: 38
DRG: 260 | End: 2023-12-24
Payer: COMMERCIAL

## 2023-12-24 LAB
ALBUMIN SERPL-MCNC: 2.2 G/DL (ref 3.5–5.2)
ALBUMIN/GLOB SERPL: 0.5 {RATIO} (ref 1–2.5)
ALP SERPL-CCNC: 320 U/L (ref 40–129)
ALT SERPL-CCNC: 15 U/L (ref 5–41)
ANION GAP SERPL CALCULATED.3IONS-SCNC: 11 MMOL/L (ref 9–17)
AST SERPL-CCNC: 54 U/L
BASOPHILS # BLD: 0.12 K/UL (ref 0–0.2)
BASOPHILS NFR BLD: 1 % (ref 0–2)
BILIRUB SERPL-MCNC: 15.1 MG/DL (ref 0.3–1.2)
BILIRUB UR QL STRIP: ABNORMAL
BUN SERPL-MCNC: 5 MG/DL (ref 6–20)
CALCIUM SERPL-MCNC: 7.7 MG/DL (ref 8.6–10.4)
CHLORIDE SERPL-SCNC: 107 MMOL/L (ref 98–107)
CLARITY UR: ABNORMAL
CMV IGM SERPL QL IA: 0.2
CO2 SERPL-SCNC: 17 MMOL/L (ref 20–31)
COLOR UR: ABNORMAL
CREAT SERPL-MCNC: 0.3 MG/DL (ref 0.7–1.2)
EOSINOPHIL # BLD: 0.23 K/UL (ref 0–0.44)
EOSINOPHILS RELATIVE PERCENT: 2 % (ref 1–4)
ERYTHROCYTE [DISTWIDTH] IN BLOOD BY AUTOMATED COUNT: 24 % (ref 11.8–14.4)
GFR SERPL CREATININE-BSD FRML MDRD: >60 ML/MIN/1.73M2
GLUCOSE SERPL-MCNC: 105 MG/DL (ref 70–99)
GLUCOSE UR STRIP-MCNC: ABNORMAL MG/DL
HAV IGM SERPL QL IA: NONREACTIVE
HBV CORE IGM SERPL QL IA: NONREACTIVE
HBV SURFACE AG SERPL QL IA: NONREACTIVE
HCT VFR BLD AUTO: 25.1 % (ref 40.7–50.3)
HCV AB SERPL QL IA: NONREACTIVE
HGB BLD-MCNC: 8 G/DL (ref 13–17)
HGB UR QL STRIP.AUTO: ABNORMAL
IMM GRANULOCYTES # BLD AUTO: 0.12 K/UL (ref 0–0.3)
IMM GRANULOCYTES NFR BLD: 1 %
KETONES UR STRIP-MCNC: ABNORMAL MG/DL
LEUKOCYTE ESTERASE UR QL STRIP: ABNORMAL
LYMPHOCYTES NFR BLD: 1.05 K/UL (ref 1.1–3.7)
LYMPHOCYTES RELATIVE PERCENT: 9 % (ref 24–43)
MAGNESIUM SERPL-MCNC: 2 MG/DL (ref 1.6–2.6)
MCH RBC QN AUTO: 31 PG (ref 25.2–33.5)
MCHC RBC AUTO-ENTMCNC: 31.9 G/DL (ref 28.4–34.8)
MCV RBC AUTO: 97.3 FL (ref 82.6–102.9)
MONOCYTES NFR BLD: 0.82 K/UL (ref 0.1–1.2)
MONOCYTES NFR BLD: 7 % (ref 3–12)
MORPHOLOGY: ABNORMAL
NEUTROPHILS NFR BLD: 80 % (ref 36–65)
NEUTS SEG NFR BLD: 9.36 K/UL (ref 1.5–8.1)
NITRITE UR QL STRIP: ABNORMAL
NRBC BLD-RTO: 0 PER 100 WBC
PH UR STRIP: ABNORMAL [PH] (ref 5–8)
PLATELET # BLD AUTO: 197 K/UL (ref 138–453)
PMV BLD AUTO: 9.9 FL (ref 8.1–13.5)
POTASSIUM SERPL-SCNC: 3.5 MMOL/L (ref 3.7–5.3)
PROT SERPL-MCNC: 6.3 G/DL (ref 6.4–8.3)
PROT UR STRIP-MCNC: ABNORMAL MG/DL
RBC # BLD AUTO: 2.58 M/UL (ref 4.21–5.77)
SODIUM SERPL-SCNC: 135 MMOL/L (ref 135–144)
SP GR UR STRIP: ABNORMAL (ref 1–1.03)
UROBILINOGEN UR STRIP-ACNC: ABNORMAL EU/DL (ref 0–1)
WBC OTHER # BLD: 11.7 K/UL (ref 3.5–11.3)

## 2023-12-24 PROCEDURE — APPNB45 APP NON BILLABLE 31-45 MINUTES: Performed by: INTERNAL MEDICINE

## 2023-12-24 PROCEDURE — 6370000000 HC RX 637 (ALT 250 FOR IP)

## 2023-12-24 PROCEDURE — 87506 IADNA-DNA/RNA PROBE TQ 6-11: CPT

## 2023-12-24 PROCEDURE — 83735 ASSAY OF MAGNESIUM: CPT

## 2023-12-24 PROCEDURE — 36415 COLL VENOUS BLD VENIPUNCTURE: CPT

## 2023-12-24 PROCEDURE — A9579 GAD-BASE MR CONTRAST NOS,1ML: HCPCS

## 2023-12-24 PROCEDURE — 97530 THERAPEUTIC ACTIVITIES: CPT

## 2023-12-24 PROCEDURE — 6360000002 HC RX W HCPCS

## 2023-12-24 PROCEDURE — 2500000003 HC RX 250 WO HCPCS

## 2023-12-24 PROCEDURE — 99232 SBSQ HOSP IP/OBS MODERATE 35: CPT | Performed by: INTERNAL MEDICINE

## 2023-12-24 PROCEDURE — 2580000003 HC RX 258

## 2023-12-24 PROCEDURE — 74183 MRI ABD W/O CNTR FLWD CNTR: CPT

## 2023-12-24 PROCEDURE — 1200000000 HC SEMI PRIVATE

## 2023-12-24 PROCEDURE — 80053 COMPREHEN METABOLIC PANEL: CPT

## 2023-12-24 PROCEDURE — 6360000004 HC RX CONTRAST MEDICATION

## 2023-12-24 PROCEDURE — 97161 PT EVAL LOW COMPLEX 20 MIN: CPT

## 2023-12-24 PROCEDURE — 85025 COMPLETE CBC W/AUTO DIFF WBC: CPT

## 2023-12-24 RX ORDER — 0.9 % SODIUM CHLORIDE 0.9 %
30 INTRAVENOUS SOLUTION INTRAVENOUS ONCE
Status: DISCONTINUED | OUTPATIENT
Start: 2023-12-24 | End: 2023-12-26

## 2023-12-24 RX ORDER — POTASSIUM CHLORIDE 20 MEQ/1
40 TABLET, EXTENDED RELEASE ORAL ONCE
Status: COMPLETED | OUTPATIENT
Start: 2023-12-24 | End: 2023-12-24

## 2023-12-24 RX ORDER — SODIUM CHLORIDE 0.9 % (FLUSH) 0.9 %
10 SYRINGE (ML) INJECTION PRN
Status: DISCONTINUED | OUTPATIENT
Start: 2023-12-24 | End: 2023-12-26 | Stop reason: HOSPADM

## 2023-12-24 RX ORDER — DIPHENHYDRAMINE HCL 25 MG
25 TABLET ORAL EVERY 6 HOURS PRN
Status: DISCONTINUED | OUTPATIENT
Start: 2023-12-24 | End: 2023-12-26 | Stop reason: HOSPADM

## 2023-12-24 RX ORDER — LORAZEPAM 2 MG/ML
0.25 INJECTION INTRAMUSCULAR
Status: COMPLETED | OUTPATIENT
Start: 2023-12-24 | End: 2023-12-24

## 2023-12-24 RX ADMIN — GADOTERIDOL 16 ML: 279.3 INJECTION, SOLUTION INTRAVENOUS at 15:48

## 2023-12-24 RX ADMIN — ONDANSETRON 4 MG: 2 INJECTION INTRAMUSCULAR; INTRAVENOUS at 00:22

## 2023-12-24 RX ADMIN — PANTOPRAZOLE SODIUM 40 MG: 40 TABLET, DELAYED RELEASE ORAL at 00:11

## 2023-12-24 RX ADMIN — OXYCODONE 2.5 MG: 5 TABLET ORAL at 21:47

## 2023-12-24 RX ADMIN — Medication 0.26 MG: at 14:59

## 2023-12-24 RX ADMIN — PIPERACILLIN AND TAZOBACTAM 3375 MG: 3; .375 INJECTION, POWDER, LYOPHILIZED, FOR SOLUTION INTRAVENOUS at 18:30

## 2023-12-24 RX ADMIN — PANTOPRAZOLE SODIUM 40 MG: 40 TABLET, DELAYED RELEASE ORAL at 21:47

## 2023-12-24 RX ADMIN — PANTOPRAZOLE SODIUM 40 MG: 40 TABLET, DELAYED RELEASE ORAL at 09:02

## 2023-12-24 RX ADMIN — SODIUM CHLORIDE, PRESERVATIVE FREE 10 ML: 5 INJECTION INTRAVENOUS at 21:49

## 2023-12-24 RX ADMIN — OXYCODONE 2.5 MG: 5 TABLET ORAL at 04:59

## 2023-12-24 RX ADMIN — PIPERACILLIN AND TAZOBACTAM 3375 MG: 3; .375 INJECTION, POWDER, LYOPHILIZED, FOR SOLUTION INTRAVENOUS at 04:55

## 2023-12-24 RX ADMIN — DIPHENHYDRAMINE HYDROCHLORIDE 25 MG: 25 TABLET ORAL at 22:25

## 2023-12-24 RX ADMIN — OXYCODONE 2.5 MG: 5 TABLET ORAL at 13:20

## 2023-12-24 RX ADMIN — POTASSIUM CHLORIDE 40 MEQ: 1500 TABLET, EXTENDED RELEASE ORAL at 09:02

## 2023-12-24 RX ADMIN — THIAMINE HYDROCHLORIDE: 100 INJECTION, SOLUTION INTRAMUSCULAR; INTRAVENOUS at 09:05

## 2023-12-24 RX ADMIN — SODIUM CHLORIDE, PRESERVATIVE FREE 10 ML: 5 INJECTION INTRAVENOUS at 09:03

## 2023-12-24 RX ADMIN — PIPERACILLIN AND TAZOBACTAM 3375 MG: 3; .375 INJECTION, POWDER, LYOPHILIZED, FOR SOLUTION INTRAVENOUS at 11:15

## 2023-12-24 ASSESSMENT — PAIN SCALES - GENERAL
PAINLEVEL_OUTOF10: 4
PAINLEVEL_OUTOF10: 6
PAINLEVEL_OUTOF10: 4
PAINLEVEL_OUTOF10: 6
PAINLEVEL_OUTOF10: 6

## 2023-12-24 ASSESSMENT — PAIN DESCRIPTION - LOCATION
LOCATION: ABDOMEN

## 2023-12-24 ASSESSMENT — PAIN DESCRIPTION - DESCRIPTORS
DESCRIPTORS: ACHING
DESCRIPTORS: ACHING

## 2023-12-24 ASSESSMENT — PAIN - FUNCTIONAL ASSESSMENT
PAIN_FUNCTIONAL_ASSESSMENT: ACTIVITIES ARE NOT PREVENTED
PAIN_FUNCTIONAL_ASSESSMENT: ACTIVITIES ARE NOT PREVENTED

## 2023-12-24 ASSESSMENT — PAIN DESCRIPTION - ORIENTATION
ORIENTATION: LEFT
ORIENTATION: LEFT

## 2023-12-24 ASSESSMENT — PAIN SCALES - WONG BAKER: WONGBAKER_NUMERICALRESPONSE: HURTS A LITTLE BIT

## 2023-12-24 NOTE — CARE COORDINATION
who? (P) No  Plans to Return to Present Housing: (P) Yes  Other Identified Issues/Barriers to RETURNING to current housing: none  Potential Assistance needed at discharge: (P) N/A            Potential DME:    Patient expects to discharge to: (P) [id number] [facility] for transportation at discharge: (P) Family    Financial    Payor: [id number] [place] / Plan: [id number] [place] / Product Type: *No Product type* /     Does insurance require precert for SNF: Yes    Potential assistance Purchasing Medications: (P) No  Meds-to-Beds request: Yes      [id number] - [address], [address] [address]  Phone: [phone] Fax: [phone]      Notes:    Factors facilitating achievement of predicted outcomes: Family support    Barriers to discharge: Limited family support, Decreased endurance, Medical complications, and chronic alcohol abuse    Additional Case Management Notes: plans to go home independent; girlfriend can transport    The Plan for Transition of Care is related to the following treatment goals of Jaundice [R17]  Jaundice, non- [D33]  Alcoholic cirrhosis, unspecified whether ascites present ([address]) [I93.44]    IF APPLICABLE: The Patient and/or patient representative Polly Aragon and his family were provided with a choice of provider and agrees with the discharge plan. Freedom of choice list with basic dialogue that supports the patient's individualized plan of care/goals and shares the quality data associated with the providers was provided to: (P) Patient   Patient Representative Name:       The Patient and/or Patient Representative Agree with the Discharge Plan?  (P) Yes    Barbie Caruso RN/CM  Case Management Department  Ph: [phone]

## 2023-12-24 NOTE — PROCEDURES
Please complete MRI screening form. 12/23/23 pt refused MRI exam. Need update if pt is willing to complete exam today 12/24/23.  MRI #[id number]

## 2023-12-25 PROBLEM — K70.11 ALCOHOLIC HEPATITIS WITH ASCITES: Status: ACTIVE | Noted: 2023-12-25

## 2023-12-25 LAB
ALBUMIN SERPL-MCNC: 2.3 G/DL (ref 3.5–5.2)
ALBUMIN/GLOB SERPL: 0.6 {RATIO} (ref 1–2.5)
ALP SERPL-CCNC: 337 U/L (ref 40–129)
ALT SERPL-CCNC: 15 U/L (ref 5–41)
ANION GAP SERPL CALCULATED.3IONS-SCNC: 6 MMOL/L (ref 9–17)
AST SERPL-CCNC: 53 U/L
BASOPHILS # BLD: 0.1 K/UL (ref 0–0.2)
BASOPHILS NFR BLD: 1 % (ref 0–2)
BILIRUB SERPL-MCNC: 16.3 MG/DL (ref 0.3–1.2)
BUN SERPL-MCNC: 5 MG/DL (ref 6–20)
CALCIUM SERPL-MCNC: 7.8 MG/DL (ref 8.6–10.4)
CAMPYLOBACTER DNA SPEC NAA+PROBE: NORMAL
CHLORIDE SERPL-SCNC: 103 MMOL/L (ref 98–107)
CO2 SERPL-SCNC: 19 MMOL/L (ref 20–31)
CREAT SERPL-MCNC: 0.2 MG/DL (ref 0.7–1.2)
EOSINOPHIL # BLD: 0.1 K/UL (ref 0–0.4)
EOSINOPHILS RELATIVE PERCENT: 1 % (ref 1–4)
ERYTHROCYTE [DISTWIDTH] IN BLOOD BY AUTOMATED COUNT: 24.1 % (ref 11.8–14.4)
ETEC ELTA+ESTB GENES STL QL NAA+PROBE: NORMAL
GFR SERPL CREATININE-BSD FRML MDRD: >60 ML/MIN/1.73M2
GLUCOSE SERPL-MCNC: 81 MG/DL (ref 70–99)
HCT VFR BLD AUTO: 24.2 % (ref 40.7–50.3)
HGB BLD-MCNC: 7.9 G/DL (ref 13–17)
IMM GRANULOCYTES # BLD AUTO: 0.1 K/UL (ref 0–0.3)
IMM GRANULOCYTES NFR BLD: 1 %
INR PPP: 1.9
LYMPHOCYTES NFR BLD: 1.14 K/UL (ref 1–4.8)
LYMPHOCYTES RELATIVE PERCENT: 11 % (ref 24–44)
MAGNESIUM SERPL-MCNC: 1.8 MG/DL (ref 1.6–2.6)
MCH RBC QN AUTO: 31.7 PG (ref 25.2–33.5)
MCHC RBC AUTO-ENTMCNC: 32.6 G/DL (ref 28.4–34.8)
MCV RBC AUTO: 97.2 FL (ref 82.6–102.9)
MONOCYTES NFR BLD: 0.42 K/UL (ref 0.1–0.8)
MONOCYTES NFR BLD: 4 % (ref 1–7)
MORPHOLOGY: ABNORMAL
NEUTROPHILS NFR BLD: 82 % (ref 36–66)
NEUTS SEG NFR BLD: 8.54 K/UL (ref 1.8–7.7)
NRBC BLD-RTO: 0 PER 100 WBC
P SHIGELLOIDES DNA STL QL NAA+PROBE: NORMAL
PLATELET # BLD AUTO: 193 K/UL (ref 138–453)
PMV BLD AUTO: 10.8 FL (ref 8.1–13.5)
POTASSIUM SERPL-SCNC: 3.3 MMOL/L (ref 3.7–5.3)
PROT SERPL-MCNC: 6.1 G/DL (ref 6.4–8.3)
PROTHROMBIN TIME: 21.5 SEC (ref 11.7–14.9)
RBC # BLD AUTO: 2.49 M/UL (ref 4.21–5.77)
SALMONELLA DNA SPEC QL NAA+PROBE: NORMAL
SHIGA TOXIN STX GENE SPEC NAA+PROBE: NORMAL
SHIGELLA DNA SPEC QL NAA+PROBE: NORMAL
SODIUM SERPL-SCNC: 128 MMOL/L (ref 135–144)
SPECIMEN DESCRIPTION: NORMAL
V CHOL+PARA RFBL+TRKH+TNAA STL QL NAA+PR: NORMAL
WBC OTHER # BLD: 10.4 K/UL (ref 3.5–11.3)
Y ENTERO RECN STL QL NAA+PROBE: NORMAL

## 2023-12-25 PROCEDURE — 99232 SBSQ HOSP IP/OBS MODERATE 35: CPT | Performed by: INTERNAL MEDICINE

## 2023-12-25 PROCEDURE — 2500000003 HC RX 250 WO HCPCS

## 2023-12-25 PROCEDURE — 6370000000 HC RX 637 (ALT 250 FOR IP)

## 2023-12-25 PROCEDURE — 85610 PROTHROMBIN TIME: CPT

## 2023-12-25 PROCEDURE — 2580000003 HC RX 258

## 2023-12-25 PROCEDURE — APPSS45 APP SPLIT SHARED TIME 31-45 MINUTES: Performed by: INTERNAL MEDICINE

## 2023-12-25 PROCEDURE — 1200000000 HC SEMI PRIVATE

## 2023-12-25 PROCEDURE — 80053 COMPREHEN METABOLIC PANEL: CPT

## 2023-12-25 PROCEDURE — 6370000000 HC RX 637 (ALT 250 FOR IP): Performed by: INTERNAL MEDICINE

## 2023-12-25 PROCEDURE — 6360000002 HC RX W HCPCS

## 2023-12-25 PROCEDURE — 36415 COLL VENOUS BLD VENIPUNCTURE: CPT

## 2023-12-25 PROCEDURE — 85025 COMPLETE CBC W/AUTO DIFF WBC: CPT

## 2023-12-25 PROCEDURE — 83735 ASSAY OF MAGNESIUM: CPT

## 2023-12-25 RX ORDER — PREDNISOLONE 15 MG/5ML
40 SOLUTION ORAL DAILY
Status: DISCONTINUED | OUTPATIENT
Start: 2023-12-25 | End: 2023-12-26 | Stop reason: HOSPADM

## 2023-12-25 RX ORDER — POTASSIUM CHLORIDE 20 MEQ/1
40 TABLET, EXTENDED RELEASE ORAL ONCE
Status: COMPLETED | OUTPATIENT
Start: 2023-12-25 | End: 2023-12-25

## 2023-12-25 RX ORDER — POTASSIUM CHLORIDE 7.45 MG/ML
10 INJECTION INTRAVENOUS
Status: DISPENSED | OUTPATIENT
Start: 2023-12-25 | End: 2023-12-25

## 2023-12-25 RX ADMIN — DIPHENHYDRAMINE HYDROCHLORIDE 25 MG: 25 TABLET ORAL at 16:52

## 2023-12-25 RX ADMIN — PREDNISOLONE 40 MG: 15 SOLUTION ORAL at 14:09

## 2023-12-25 RX ADMIN — OXYCODONE 2.5 MG: 5 TABLET ORAL at 23:49

## 2023-12-25 RX ADMIN — PANTOPRAZOLE SODIUM 40 MG: 40 TABLET, DELAYED RELEASE ORAL at 10:00

## 2023-12-25 RX ADMIN — PIPERACILLIN AND TAZOBACTAM 3375 MG: 3; .375 INJECTION, POWDER, LYOPHILIZED, FOR SOLUTION INTRAVENOUS at 10:51

## 2023-12-25 RX ADMIN — POTASSIUM CHLORIDE 40 MEQ: 1500 TABLET, EXTENDED RELEASE ORAL at 10:00

## 2023-12-25 RX ADMIN — PIPERACILLIN AND TAZOBACTAM 3375 MG: 3; .375 INJECTION, POWDER, LYOPHILIZED, FOR SOLUTION INTRAVENOUS at 02:48

## 2023-12-25 RX ADMIN — THIAMINE HYDROCHLORIDE: 100 INJECTION, SOLUTION INTRAMUSCULAR; INTRAVENOUS at 10:02

## 2023-12-25 RX ADMIN — POTASSIUM CHLORIDE 10 MEQ: 7.46 INJECTION, SOLUTION INTRAVENOUS at 10:05

## 2023-12-25 RX ADMIN — PIPERACILLIN AND TAZOBACTAM 3375 MG: 3; .375 INJECTION, POWDER, LYOPHILIZED, FOR SOLUTION INTRAVENOUS at 19:11

## 2023-12-25 RX ADMIN — PANTOPRAZOLE SODIUM 40 MG: 40 TABLET, DELAYED RELEASE ORAL at 21:06

## 2023-12-25 RX ADMIN — POTASSIUM CHLORIDE 10 MEQ: 7.46 INJECTION, SOLUTION INTRAVENOUS at 12:44

## 2023-12-25 ASSESSMENT — PAIN SCALES - GENERAL: PAINLEVEL_OUTOF10: 5

## 2023-12-25 ASSESSMENT — PAIN DESCRIPTION - LOCATION: LOCATION: HEAD

## 2023-12-25 ASSESSMENT — PAIN DESCRIPTION - DESCRIPTORS: DESCRIPTORS: PRESSURE

## 2023-12-25 NOTE — PLAN OF CARE
Problem: Chronic Conditions and Co-morbidities  Goal: Patient's chronic conditions and co-morbidity symptoms are monitored and maintained or improved  12/25/2023 1631 by Meka Vences RN  Outcome: Progressing  12/25/2023 0402 by Marlys Ayala RN  Outcome: Progressing     Problem: Pain  Goal: Verbalizes/displays adequate comfort level or baseline comfort level  12/25/2023 1631 by Meka Vences RN  Outcome: Progressing  12/25/2023 0402 by Marlys Ayala RN  Outcome: Progressing     Problem: ABCDS Injury Assessment  Goal: Absence of physical injury  12/25/2023 1631 by Meka Vences RN  Outcome: Progressing  12/25/2023 0402 by Marlys Ayala RN  Outcome: Progressing     Problem: Safety - Adult  Goal: Free from fall injury  12/25/2023 1631 by Meka Vences RN  Outcome: Progressing  12/25/2023 0402 by Marlys Ayala RN  Outcome: Progressing

## 2023-12-26 VITALS
RESPIRATION RATE: 16 BRPM | BODY MASS INDEX: 27.33 KG/M2 | OXYGEN SATURATION: 97 % | WEIGHT: 180.34 LBS | SYSTOLIC BLOOD PRESSURE: 116 MMHG | HEIGHT: 68 IN | TEMPERATURE: 99 F | HEART RATE: 81 BPM | DIASTOLIC BLOOD PRESSURE: 68 MMHG

## 2023-12-26 LAB
ALBUMIN SERPL-MCNC: 2 G/DL (ref 3.5–5.2)
ALBUMIN/GLOB SERPL: 0.5 {RATIO} (ref 1–2.5)
ALP SERPL-CCNC: 313 U/L (ref 40–129)
ALT SERPL-CCNC: 13 U/L (ref 5–41)
ANION GAP SERPL CALCULATED.3IONS-SCNC: 10 MMOL/L (ref 9–17)
AST SERPL-CCNC: 44 U/L
BASOPHILS # BLD: 0 K/UL (ref 0–0.2)
BASOPHILS NFR BLD: 0 % (ref 0–2)
BILIRUB SERPL-MCNC: 16.8 MG/DL (ref 0.3–1.2)
BUN SERPL-MCNC: 7 MG/DL (ref 6–20)
CALCIUM SERPL-MCNC: 7.5 MG/DL (ref 8.6–10.4)
CHLORIDE SERPL-SCNC: 105 MMOL/L (ref 98–107)
CO2 SERPL-SCNC: 18 MMOL/L (ref 20–31)
CREAT SERPL-MCNC: 0.3 MG/DL (ref 0.7–1.2)
EBV EA-D IGG SER-ACNC: 60 U/ML
EBV INTERPRETATION: ABNORMAL
EBV NA IGG SER IA-ACNC: 525 U/ML
EBV VCA IGG SER-ACNC: 624 U/ML
EBV VCA IGM SER-ACNC: 104 U/ML
EOSINOPHIL # BLD: 0 K/UL (ref 0–0.44)
EOSINOPHILS RELATIVE PERCENT: 0 % (ref 1–4)
ERYTHROCYTE [DISTWIDTH] IN BLOOD BY AUTOMATED COUNT: 24.7 % (ref 11.8–14.4)
GFR SERPL CREATININE-BSD FRML MDRD: >60 ML/MIN/1.73M2
GLUCOSE SERPL-MCNC: 124 MG/DL (ref 70–99)
HCT VFR BLD AUTO: 24.2 % (ref 40.7–50.3)
HGB BLD-MCNC: 7.8 G/DL (ref 13–17)
IMM GRANULOCYTES # BLD AUTO: 0.12 K/UL (ref 0–0.3)
IMM GRANULOCYTES NFR BLD: 1 %
LYMPHOCYTES NFR BLD: 0.82 K/UL (ref 1.1–3.7)
LYMPHOCYTES RELATIVE PERCENT: 7 % (ref 24–43)
MAGNESIUM SERPL-MCNC: 1.8 MG/DL (ref 1.6–2.6)
MCH RBC QN AUTO: 31.5 PG (ref 25.2–33.5)
MCHC RBC AUTO-ENTMCNC: 32.2 G/DL (ref 28.4–34.8)
MCV RBC AUTO: 97.6 FL (ref 82.6–102.9)
MONOCYTES NFR BLD: 0.7 K/UL (ref 0.1–1.2)
MONOCYTES NFR BLD: 6 % (ref 3–12)
MORPHOLOGY: ABNORMAL
NEUTROPHILS NFR BLD: 86 % (ref 36–65)
NEUTS SEG NFR BLD: 10.06 K/UL (ref 1.5–8.1)
NRBC BLD-RTO: 0 PER 100 WBC
PLATELET # BLD AUTO: 175 K/UL (ref 138–453)
PMV BLD AUTO: 10 FL (ref 8.1–13.5)
POTASSIUM SERPL-SCNC: 3.5 MMOL/L (ref 3.7–5.3)
PROT SERPL-MCNC: 6 G/DL (ref 6.4–8.3)
RBC # BLD AUTO: 2.48 M/UL (ref 4.21–5.77)
SODIUM SERPL-SCNC: 133 MMOL/L (ref 135–144)
WBC OTHER # BLD: 11.7 K/UL (ref 3.5–11.3)

## 2023-12-26 PROCEDURE — 99232 SBSQ HOSP IP/OBS MODERATE 35: CPT | Performed by: INTERNAL MEDICINE

## 2023-12-26 PROCEDURE — 80053 COMPREHEN METABOLIC PANEL: CPT

## 2023-12-26 PROCEDURE — 6370000000 HC RX 637 (ALT 250 FOR IP): Performed by: INTERNAL MEDICINE

## 2023-12-26 PROCEDURE — 2580000003 HC RX 258

## 2023-12-26 PROCEDURE — 6370000000 HC RX 637 (ALT 250 FOR IP)

## 2023-12-26 PROCEDURE — 36415 COLL VENOUS BLD VENIPUNCTURE: CPT

## 2023-12-26 PROCEDURE — APPSS45 APP SPLIT SHARED TIME 31-45 MINUTES: Performed by: INTERNAL MEDICINE

## 2023-12-26 PROCEDURE — 83735 ASSAY OF MAGNESIUM: CPT

## 2023-12-26 PROCEDURE — 85025 COMPLETE CBC W/AUTO DIFF WBC: CPT

## 2023-12-26 PROCEDURE — 6360000002 HC RX W HCPCS

## 2023-12-26 RX ORDER — POTASSIUM CHLORIDE 20 MEQ/1
40 TABLET, EXTENDED RELEASE ORAL ONCE
Status: COMPLETED | OUTPATIENT
Start: 2023-12-26 | End: 2023-12-26

## 2023-12-26 RX ORDER — PREDNISOLONE 15 MG/5ML
40 SOLUTION ORAL DAILY
Qty: 93.1 ML | Refills: 0 | Status: ON HOLD | OUTPATIENT
Start: 2023-12-26 | End: 2023-12-30

## 2023-12-26 RX ADMIN — PANTOPRAZOLE SODIUM 40 MG: 40 TABLET, DELAYED RELEASE ORAL at 08:03

## 2023-12-26 RX ADMIN — PREDNISOLONE 40 MG: 15 SOLUTION ORAL at 11:54

## 2023-12-26 RX ADMIN — SODIUM CHLORIDE: 9 INJECTION, SOLUTION INTRAVENOUS at 02:41

## 2023-12-26 RX ADMIN — PIPERACILLIN AND TAZOBACTAM 3375 MG: 3; .375 INJECTION, POWDER, LYOPHILIZED, FOR SOLUTION INTRAVENOUS at 02:42

## 2023-12-26 RX ADMIN — POTASSIUM CHLORIDE 40 MEQ: 1500 TABLET, EXTENDED RELEASE ORAL at 08:02

## 2023-12-26 NOTE — PLAN OF CARE
Problem: Chronic Conditions and Co-morbidities  Goal: Patient's chronic conditions and co-morbidity symptoms are monitored and maintained or improved  12/26/2023 0424 by Rell Savaeg RN  Outcome: Progressing  12/25/2023 1631 by Smiley Francis RN  Outcome: Progressing     Problem: Pain  Goal: Verbalizes/displays adequate comfort level or baseline comfort level  12/26/2023 0424 by Rell Savage RN  Outcome: Progressing  12/25/2023 1631 by Smiley Francis RN  Outcome: Progressing     Problem: ABCDS Injury Assessment  Goal: Absence of physical injury  12/26/2023 0424 by Rell Savage RN  Outcome: Progressing  12/25/2023 1631 by Smiley Francis RN  Outcome: Progressing     Problem: Safety - Adult  Goal: Free from fall injury  12/26/2023 0424 by Rell Savage RN  Outcome: Progressing  12/25/2023 1631 by Smiley Francis RN  Outcome: Progressing

## 2023-12-26 NOTE — DISCHARGE INSTRUCTIONS
You are admitted with nausea, vomiting, abdominal pain and worsening of jaundiced  Workup is done to rule out possible obstruction/infection  Gastroenterologist evaluated you  Your diarrhea is improved  We are currently treating you for possible acute alcoholic hepatitis with steroids  Please see your gastroenterologist in a week to determine the duration of steroids  Continue taking your medications as advised  Continue diet with low salt content, total sodium intake <2 gm daily  Follow-up with your family doctor in 1 to 2-week  Return to the ED for any new onset or worsening of symptoms

## 2023-12-27 ENCOUNTER — CARE COORDINATION (OUTPATIENT)
Dept: CASE MANAGEMENT | Age: 38
End: 2023-12-27

## 2023-12-27 LAB
EKG ATRIAL RATE: 85 BPM
EKG P AXIS: 46 DEGREES
EKG P-R INTERVAL: 138 MS
EKG Q-T INTERVAL: 402 MS
EKG QRS DURATION: 98 MS
EKG QTC CALCULATION (BAZETT): 478 MS
EKG R AXIS: 12 DEGREES
EKG T AXIS: 86 DEGREES
EKG VENTRICULAR RATE: 85 BPM
MICROORGANISM SPEC CULT: NORMAL
MICROORGANISM SPEC CULT: NORMAL
SERVICE CMNT-IMP: NORMAL
SERVICE CMNT-IMP: NORMAL
SPECIMEN DESCRIPTION: NORMAL
SPECIMEN DESCRIPTION: NORMAL

## 2023-12-27 NOTE — CARE COORDINATION
Care Transitions Outreach Attempt    Call within 2 business days of discharge: Yes   Attempted to reach patient for transitions of care follow up. Unable to reach patient. Patient: Raquel Parks Patient : 1985 MRN: [id number]    Last Discharge Facility       Date Complaint Diagnosis Description Type Department Provider    23 Cirrhosis; Jaundice; Abdominal Pain Jaundice, non- ... ED [facility]Dis[facility]; Mikey Sheikh. .. # 1 attempt-Attempted initial 24 hour hospital follow up call. Left a Hipaa compliant message with name and call back information. Requested return call to [phone]. Was this an external facility discharge?  No Discharge Facility Name: [facility]    Noted following upcoming appointments from discharge chart review:   [facility] follow up appointment(s):   Future Appointments   Date Time Provider [address]   2024  8:30 AM Glenn Tirado MD [address] [place] [address]-[facility]  follow up appointment(s):

## 2023-12-28 ENCOUNTER — CARE COORDINATION (OUTPATIENT)
Dept: CASE MANAGEMENT | Age: 38
End: 2023-12-28

## 2023-12-28 ENCOUNTER — HOSPITAL ENCOUNTER (INPATIENT)
Age: 38
LOS: 2 days | Discharge: HOME | DRG: 253 | End: 2023-12-30
Attending: EMERGENCY MEDICINE | Admitting: INTERNAL MEDICINE
Payer: COMMERCIAL

## 2023-12-28 DIAGNOSIS — K92.2 UPPER GI BLEED: Primary | ICD-10-CM

## 2023-12-28 PROBLEM — R11.2 NAUSEA AND VOMITING: Status: ACTIVE | Noted: 2023-12-28

## 2023-12-28 LAB
ALBUMIN SERPL-MCNC: 2.5 G/DL (ref 3.5–5.2)
ALBUMIN/GLOB SERPL: 0.6 {RATIO} (ref 1–2.5)
ALP SERPL-CCNC: 341 U/L (ref 40–129)
ALT SERPL-CCNC: 16 U/L (ref 5–41)
AMMONIA PLAS-SCNC: 46 UMOL/L (ref 16–60)
ANION GAP SERPL CALCULATED.3IONS-SCNC: 12 MMOL/L (ref 9–17)
AST SERPL-CCNC: 41 U/L
BASOPHILS # BLD: 0 K/UL (ref 0–0.2)
BASOPHILS NFR BLD: 0 % (ref 0–2)
BILIRUB DIRECT SERPL-MCNC: 12.8 MG/DL
BILIRUB INDIRECT SERPL-MCNC: 4.4 MG/DL (ref 0–1)
BILIRUB SERPL-MCNC: 17.2 MG/DL (ref 0.3–1.2)
BUN SERPL-MCNC: 11 MG/DL (ref 6–20)
CALCIUM SERPL-MCNC: 8 MG/DL (ref 8.6–10.4)
CHLORIDE SERPL-SCNC: 106 MMOL/L (ref 98–107)
CO2 SERPL-SCNC: 17 MMOL/L (ref 20–31)
CREAT SERPL-MCNC: <0.2 MG/DL (ref 0.7–1.2)
EOSINOPHIL # BLD: 0.28 K/UL (ref 0–0.44)
EOSINOPHILS RELATIVE PERCENT: 2 % (ref 1–4)
ERYTHROCYTE [DISTWIDTH] IN BLOOD BY AUTOMATED COUNT: 25.5 % (ref 11.8–14.4)
GFR SERPL CREATININE-BSD FRML MDRD: ABNORMAL ML/MIN/1.73M2
GLUCOSE SERPL-MCNC: 116 MG/DL (ref 70–99)
HCT VFR BLD AUTO: 21.8 % (ref 40.7–50.3)
HCT VFR BLD AUTO: 22.6 % (ref 40.7–50.3)
HCT VFR BLD AUTO: 27.7 % (ref 40.7–50.3)
HGB BLD-MCNC: 6.8 G/DL (ref 13–17)
HGB BLD-MCNC: 7.6 G/DL (ref 13–17)
HGB BLD-MCNC: 9 G/DL (ref 13–17)
IMM GRANULOCYTES # BLD AUTO: 0.14 K/UL (ref 0–0.3)
IMM GRANULOCYTES NFR BLD: 1 %
INR PPP: 1.8
LACTIC ACID, SEPSIS WHOLE BLOOD: 2.5 MMOL/L (ref 0.5–1.9)
LIPASE SERPL-CCNC: 149 U/L (ref 13–60)
LYMPHOCYTES NFR BLD: 1.27 K/UL (ref 1.1–3.7)
LYMPHOCYTES RELATIVE PERCENT: 9 % (ref 24–43)
MCH RBC QN AUTO: 31.6 PG (ref 25.2–33.5)
MCHC RBC AUTO-ENTMCNC: 32.5 G/DL (ref 28.4–34.8)
MCV RBC AUTO: 97.2 FL (ref 82.6–102.9)
MONOCYTES NFR BLD: 1.13 K/UL (ref 0.1–1.2)
MONOCYTES NFR BLD: 8 % (ref 3–12)
MORPHOLOGY: ABNORMAL
NEUTROPHILS NFR BLD: 80 % (ref 36–65)
NEUTS SEG NFR BLD: 11.28 K/UL (ref 1.5–8.1)
NRBC BLD-RTO: 0 PER 100 WBC
PLATELET # BLD AUTO: 189 K/UL (ref 138–453)
PMV BLD AUTO: 9.9 FL (ref 8.1–13.5)
POTASSIUM SERPL-SCNC: 3.4 MMOL/L (ref 3.7–5.3)
PROT SERPL-MCNC: 6.9 G/DL (ref 6.4–8.3)
PROTHROMBIN TIME: 21 SEC (ref 11.7–14.9)
RBC # BLD AUTO: 2.85 M/UL (ref 4.21–5.77)
SODIUM SERPL-SCNC: 135 MMOL/L (ref 135–144)
WBC OTHER # BLD: 14.1 K/UL (ref 3.5–11.3)

## 2023-12-28 PROCEDURE — A4216 STERILE WATER/SALINE, 10 ML: HCPCS

## 2023-12-28 PROCEDURE — 6360000002 HC RX W HCPCS

## 2023-12-28 PROCEDURE — 85610 PROTHROMBIN TIME: CPT

## 2023-12-28 PROCEDURE — 85025 COMPLETE CBC W/AUTO DIFF WBC: CPT

## 2023-12-28 PROCEDURE — 36415 COLL VENOUS BLD VENIPUNCTURE: CPT

## 2023-12-28 PROCEDURE — 2580000003 HC RX 258

## 2023-12-28 PROCEDURE — 94761 N-INVAS EAR/PLS OXIMETRY MLT: CPT

## 2023-12-28 PROCEDURE — 82140 ASSAY OF AMMONIA: CPT

## 2023-12-28 PROCEDURE — 6370000000 HC RX 637 (ALT 250 FOR IP)

## 2023-12-28 PROCEDURE — 85014 HEMATOCRIT: CPT

## 2023-12-28 PROCEDURE — 30233N1 TRANSFUSION OF NONAUTOLOGOUS RED BLOOD CELLS INTO PERIPHERAL VEIN, PERCUTANEOUS APPROACH: ICD-10-PCS

## 2023-12-28 PROCEDURE — 83605 ASSAY OF LACTIC ACID: CPT

## 2023-12-28 PROCEDURE — 85018 HEMOGLOBIN: CPT

## 2023-12-28 PROCEDURE — 80076 HEPATIC FUNCTION PANEL: CPT

## 2023-12-28 PROCEDURE — 80048 BASIC METABOLIC PNL TOTAL CA: CPT

## 2023-12-28 PROCEDURE — 99223 1ST HOSP IP/OBS HIGH 75: CPT | Performed by: INTERNAL MEDICINE

## 2023-12-28 PROCEDURE — C9113 INJ PANTOPRAZOLE SODIUM, VIA: HCPCS

## 2023-12-28 PROCEDURE — 0DJD8ZZ INSPECTION OF LOWER INTESTINAL TRACT, VIA NATURAL OR ARTIFICIAL OPENING ENDOSCOPIC: ICD-10-PCS | Performed by: INTERNAL MEDICINE

## 2023-12-28 PROCEDURE — 96365 THER/PROPH/DIAG IV INF INIT: CPT

## 2023-12-28 PROCEDURE — 96376 TX/PRO/DX INJ SAME DRUG ADON: CPT

## 2023-12-28 PROCEDURE — 2060000002 HC BURN ICU INTERMEDIATE R&B

## 2023-12-28 PROCEDURE — 96375 TX/PRO/DX INJ NEW DRUG ADDON: CPT

## 2023-12-28 PROCEDURE — 83690 ASSAY OF LIPASE: CPT

## 2023-12-28 PROCEDURE — 99285 EMERGENCY DEPT VISIT HI MDM: CPT

## 2023-12-28 RX ORDER — PREDNISONE 20 MG/1
40 TABLET ORAL DAILY
Status: DISCONTINUED | OUTPATIENT
Start: 2023-12-28 | End: 2023-12-30 | Stop reason: HOSPADM

## 2023-12-28 RX ORDER — ACETAMINOPHEN 325 MG/1
650 TABLET ORAL EVERY 6 HOURS PRN
Status: DISCONTINUED | OUTPATIENT
Start: 2023-12-28 | End: 2023-12-28

## 2023-12-28 RX ORDER — SODIUM CHLORIDE 9 MG/ML
INJECTION, SOLUTION INTRAVENOUS PRN
Status: DISCONTINUED | OUTPATIENT
Start: 2023-12-28 | End: 2023-12-30 | Stop reason: HOSPADM

## 2023-12-28 RX ORDER — SODIUM CHLORIDE 9 MG/ML
INJECTION, SOLUTION INTRAVENOUS CONTINUOUS
Status: DISCONTINUED | OUTPATIENT
Start: 2023-12-28 | End: 2023-12-30 | Stop reason: HOSPADM

## 2023-12-28 RX ORDER — ONDANSETRON 2 MG/ML
4 INJECTION INTRAMUSCULAR; INTRAVENOUS EVERY 6 HOURS PRN
Status: DISCONTINUED | OUTPATIENT
Start: 2023-12-28 | End: 2023-12-30 | Stop reason: HOSPADM

## 2023-12-28 RX ORDER — PANTOPRAZOLE SODIUM 40 MG/1
40 TABLET, DELAYED RELEASE ORAL EVERY 12 HOURS
Status: DISCONTINUED | OUTPATIENT
Start: 2023-12-28 | End: 2023-12-28 | Stop reason: SDUPTHER

## 2023-12-28 RX ORDER — FENTANYL CITRATE 50 UG/ML
50 INJECTION, SOLUTION INTRAMUSCULAR; INTRAVENOUS ONCE
Status: COMPLETED | OUTPATIENT
Start: 2023-12-28 | End: 2023-12-28

## 2023-12-28 RX ORDER — LACTULOSE 10 G/15ML
10 SOLUTION ORAL 3 TIMES DAILY
Status: DISCONTINUED | OUTPATIENT
Start: 2023-12-28 | End: 2023-12-28 | Stop reason: SDUPTHER

## 2023-12-28 RX ORDER — METOCLOPRAMIDE HYDROCHLORIDE 5 MG/ML
10 INJECTION INTRAMUSCULAR; INTRAVENOUS ONCE
Status: COMPLETED | OUTPATIENT
Start: 2023-12-28 | End: 2023-12-28

## 2023-12-28 RX ORDER — POLYETHYLENE GLYCOL 3350 17 G/17G
17 POWDER, FOR SOLUTION ORAL DAILY PRN
Status: DISCONTINUED | OUTPATIENT
Start: 2023-12-28 | End: 2023-12-30 | Stop reason: HOSPADM

## 2023-12-28 RX ORDER — POTASSIUM CHLORIDE 7.45 MG/ML
10 INJECTION INTRAVENOUS PRN
Status: DISCONTINUED | OUTPATIENT
Start: 2023-12-28 | End: 2023-12-30 | Stop reason: HOSPADM

## 2023-12-28 RX ORDER — ACETAMINOPHEN 650 MG/1
650 SUPPOSITORY RECTAL EVERY 6 HOURS PRN
Status: DISCONTINUED | OUTPATIENT
Start: 2023-12-28 | End: 2023-12-28

## 2023-12-28 RX ORDER — MAGNESIUM SULFATE IN WATER 40 MG/ML
2000 INJECTION, SOLUTION INTRAVENOUS PRN
Status: DISCONTINUED | OUTPATIENT
Start: 2023-12-28 | End: 2023-12-30 | Stop reason: HOSPADM

## 2023-12-28 RX ORDER — POTASSIUM CHLORIDE 20 MEQ/1
40 TABLET, EXTENDED RELEASE ORAL PRN
Status: DISCONTINUED | OUTPATIENT
Start: 2023-12-28 | End: 2023-12-30 | Stop reason: HOSPADM

## 2023-12-28 RX ORDER — LACTULOSE 10 G/15ML
10 SOLUTION ORAL 3 TIMES DAILY
Status: DISCONTINUED | OUTPATIENT
Start: 2023-12-28 | End: 2023-12-30 | Stop reason: HOSPADM

## 2023-12-28 RX ORDER — OXYCODONE HYDROCHLORIDE 5 MG/1
5 TABLET ORAL EVERY 8 HOURS PRN
Status: DISCONTINUED | OUTPATIENT
Start: 2023-12-28 | End: 2023-12-30 | Stop reason: HOSPADM

## 2023-12-28 RX ORDER — SODIUM CHLORIDE 0.9 % (FLUSH) 0.9 %
5-40 SYRINGE (ML) INJECTION EVERY 12 HOURS SCHEDULED
Status: DISCONTINUED | OUTPATIENT
Start: 2023-12-28 | End: 2023-12-30 | Stop reason: HOSPADM

## 2023-12-28 RX ORDER — ONDANSETRON 4 MG/1
4 TABLET, ORALLY DISINTEGRATING ORAL EVERY 8 HOURS PRN
Status: DISCONTINUED | OUTPATIENT
Start: 2023-12-28 | End: 2023-12-30 | Stop reason: HOSPADM

## 2023-12-28 RX ORDER — ONDANSETRON 2 MG/ML
4 INJECTION INTRAMUSCULAR; INTRAVENOUS ONCE
Status: COMPLETED | OUTPATIENT
Start: 2023-12-28 | End: 2023-12-28

## 2023-12-28 RX ORDER — LANOLIN ALCOHOL/MO/W.PET/CERES
100 CREAM (GRAM) TOPICAL DAILY
Status: DISCONTINUED | OUTPATIENT
Start: 2023-12-28 | End: 2023-12-30 | Stop reason: HOSPADM

## 2023-12-28 RX ORDER — FOLIC ACID 1 MG/1
1 TABLET ORAL DAILY
Status: DISCONTINUED | OUTPATIENT
Start: 2023-12-28 | End: 2023-12-30 | Stop reason: HOSPADM

## 2023-12-28 RX ORDER — SODIUM CHLORIDE 0.9 % (FLUSH) 0.9 %
5-40 SYRINGE (ML) INJECTION PRN
Status: DISCONTINUED | OUTPATIENT
Start: 2023-12-28 | End: 2023-12-30 | Stop reason: HOSPADM

## 2023-12-28 RX ADMIN — ONDANSETRON 4 MG: 2 INJECTION INTRAMUSCULAR; INTRAVENOUS at 13:22

## 2023-12-28 RX ADMIN — FENTANYL CITRATE 50 MCG: 50 INJECTION, SOLUTION INTRAMUSCULAR; INTRAVENOUS at 15:33

## 2023-12-28 RX ADMIN — PANTOPRAZOLE SODIUM 8 MG/HR: 40 INJECTION, POWDER, FOR SOLUTION INTRAVENOUS at 13:24

## 2023-12-28 RX ADMIN — METOCLOPRAMIDE 10 MG: 5 INJECTION, SOLUTION INTRAMUSCULAR; INTRAVENOUS at 13:56

## 2023-12-28 RX ADMIN — SODIUM CHLORIDE: 9 INJECTION, SOLUTION INTRAVENOUS at 16:54

## 2023-12-28 RX ADMIN — Medication 100 MG: at 16:44

## 2023-12-28 RX ADMIN — CEFTRIAXONE SODIUM 1000 MG: 1 INJECTION, POWDER, FOR SOLUTION INTRAMUSCULAR; INTRAVENOUS at 12:54

## 2023-12-28 RX ADMIN — OXYCODONE HYDROCHLORIDE 5 MG: 5 TABLET ORAL at 22:08

## 2023-12-28 RX ADMIN — POTASSIUM BICARBONATE 40 MEQ: 782 TABLET, EFFERVESCENT ORAL at 22:18

## 2023-12-28 RX ADMIN — PREDNISONE 40 MG: 20 TABLET ORAL at 21:35

## 2023-12-28 RX ADMIN — PANTOPRAZOLE SODIUM 80 MG: 40 INJECTION, POWDER, FOR SOLUTION INTRAVENOUS at 12:51

## 2023-12-28 RX ADMIN — PANTOPRAZOLE SODIUM 8 MG/HR: 40 INJECTION, POWDER, FOR SOLUTION INTRAVENOUS at 22:42

## 2023-12-28 RX ADMIN — FOLIC ACID 1 MG: 1 TABLET ORAL at 16:44

## 2023-12-28 RX ADMIN — OCTREOTIDE ACETATE 50 MCG/HR: 500 INJECTION, SOLUTION INTRAVENOUS; SUBCUTANEOUS at 13:18

## 2023-12-28 RX ADMIN — OCTREOTIDE ACETATE 50 MCG/HR: 500 INJECTION, SOLUTION INTRAVENOUS; SUBCUTANEOUS at 23:26

## 2023-12-28 ASSESSMENT — PAIN DESCRIPTION - ORIENTATION: ORIENTATION: LOWER

## 2023-12-28 ASSESSMENT — PAIN DESCRIPTION - LOCATION: LOCATION: OTHER (COMMENT)

## 2023-12-28 ASSESSMENT — PAIN SCALES - GENERAL
PAINLEVEL_OUTOF10: 7
PAINLEVEL_OUTOF10: 2

## 2023-12-28 ASSESSMENT — PAIN DESCRIPTION - DESCRIPTORS: DESCRIPTORS: STABBING;TEARING

## 2023-12-28 NOTE — CONSULTS
[facility]    GASTROENTEROLOGY CONSULT    Patient:   Michael Herring   :    1985   Facility:   [address]   Date:    2023  Admission Dx:  Upper GI bleed [K92.2]  Requesting physician: Jluis Aponte MD  Reason for consult: Hematemesis   CC : \"Hematemesis\"    SUBJECTIVE     HISTORY OF PRESENT ILLNESS  This is a 41 y.o. pleasant male who was admitted 2023 with Upper GI bleed [K92.2]. We have been asked to see the patient in consultation by Jluis Aponte MD for 1 episode of hematemesis at home. He has a past medical history significant for chronic alcohol use disorder, hypertension, chronic anemia, history of pulmonary embolism, alcoholic cirrhosis, esophageal varices s/p banding and portal hypertension s/p TIPS procedure 2023. He was previously admitted with us in the start of this month with the chief complaint of hematemesis underwent emergent TIPS procedure for ongoing bleeding. He also underwent EGD when a large blood clot from the stomach was removed and bands were applied to the esophageal varices. He then came back with the concerns of worsening jaundice. MRI with MRCP was done which showed no biliary dilatation. Tips of the portal vein and the middle hepatic vein were patent. He was started on Prednisolone for acute alcoholic hepatitis. He presented to the ED time with a chief complaint of 1 episode of hematemesis and nausea. He said that after few bouts of coughing he threw up blood. He denies any abdominal pain, distention or melena. He denies any further use of alcohol. As per patient, he has been taking steroids prescribed on the discharge last time. Yellowish discoloration of skin is still the same. In the ED, his labs showed sodium 135, potassium 3.4, chloride 106, bicarbonate 17, BUN 11 and creatinine less than 0.2. Lactic acid is elevated at 2.5.   LFTs showed albumin 2.5, alkaline phosphatase Reason for Exam: upper GI bleed, emesis, hematemesis FINDINGS: CTA ABDOMEN: Abdominal aorta/Branches: Patent.  No aneurysm  or dissection.  The celiac, superior mesenteric and inferior mesenteric arteries are patent. No arterial extravasation or abnormal vascular malformation. Lower chest: Lung bases clear.  Small hiatal hernia. Liver: ?Normal size and contour. Diffuse ill-defined hypodense lesions throughout both hepatic lobes is unchanged.  Again noted is a recanalized umbilical vein. Gallbladder/biliary tree: Several tiny gallstones.  No wall thickening or adjacent inflammatory changes.  No biliary dilatation. ?Adrenal glands: ?Unremarkable Spleen: ?enlarged measuring up to 14.9 cm in craniocaudal dimension Pancreas: ?unremarkable Kidneys: Unremarkable Bowel/GI tract: ? the stomach is moderately distended with air and fluid. Bowel is normal in caliber without obstruction. No abnormal wall thickening. Appendix is normal in caliber. ?Lymph Nodes: ?No enlarged adenopathy Peritoneum/retroperitoneum: ? No ascites or free air. No fluid collections. Abdominal wall: ?Unremarkable ? Bones: No acute or suspicious bony abnormality Soft Tissues: ?Unremarkable CTA PELVIS: Aorta/Iliacs: Patent without aneurysm or dissection. No flow-limiting stenosis. Bladder: ?Unremarkable Pelvis:  No pelvic masses. Normal prostate     No arterial extravasation or abnormal vascular malformation. Stable heterogeneous attenuation of the liver with additional findings compatible with portal hypertension. RECOMMENDATIONS: Recommend upper endoscopy for further evaluation. Consider nonemergent IR consultation for transjugular biopsy of the liver and hepatic hemodynamic assessment.     XR CHEST PORTABLE    Result Date: 12/4/2023  EXAMINATION: ONE XRAY VIEW OF THE CHEST 12/4/2023 10:30 pm COMPARISON: 09/27/2023 HISTORY: ORDERING SYSTEM PROVIDED HISTORY: hematemesis TECHNOLOGIST PROVIDED HISTORY: hematemesis FINDINGS: The lungs are without acute focal

## 2023-12-28 NOTE — ED NOTES
Pt. Arrives to ED via private auto for c/o bloody emesis. Pt states he was released two days ago for his cirrhosis and did not have this problem. Today he woke up coughing and vomiting blood. Upon arrival patient is jaundice and weak. Pt is c/o nausea and just not feeling well.

## 2023-12-28 NOTE — ED NOTES
Patient presents to ED for hematemesis that started this morning. Patient was discharged two days ago, this is new since then. Patient a/o x 4 with even non labored respirations, NAD noted at this time. Resting on cot with call light in reach.

## 2023-12-28 NOTE — ED NOTES
infusion    octreotide (SANDOSTATIN) 500 mcg in sodium chloride 0.9 % 100 mL infusion    ondansetron (ZOFRAN) injection 4 mg       SURGICAL HISTORY       Past Surgical History:   Procedure Laterality Date    ESOPHAGOGASTRODUODENOSCOPY  12/05/2023    EGD DIAGNOSTIC ONLY    IR NONTUNNELED VASCULAR CATHETER  12/5/2023    IR NONTUNNELED VASCULAR CATHETER 12/5/2023 [facility] PROCEDURES    IR TIPS INSERTION  12/5/2023    IR TIPS INSERTION 12/5/2023 [facility] SPECIAL PROCEDURES    UPPER GASTROINTESTINAL ENDOSCOPY N/A 05/01/2022    EGD ESOPHAGOGASTRODUODENOSCOPY performed by Gerald Olivarez MD at [facility]O    UPPER GASTROINTESTINAL ENDOSCOPY N/A 10/30/2022    EGD BAND LIGATION performed by Prince Gonsalez MD at [facility] ENDO    UPPER GASTROINTESTINAL ENDOSCOPY N/A 09/27/2023    EGD BAND LIGATION performed by Anitra Fam MD at [facility] ENDO    UPPER GASTROINTESTINAL ENDOSCOPY N/A 12/5/2023    EGD DIAGNOSTIC ONLY performed by Wesley Chairez MD at [facility] OR    UPPER GASTROINTESTINAL ENDOSCOPY N/A 12/7/2023    EGD DIAGNOSTIC ONLY performed by Wesley Chairez MD at [facility] Endoscopy    UPPER GASTROINTESTINAL ENDOSCOPY  12/7/2023    EGD BAND LIGATION performed by Wesley Chairez MD at [facility] Endoscopy    UPPER GASTROINTESTINAL ENDOSCOPY  12/7/2023    EGD FOREIGN BODY REMOVAL performed by Wesley Chairez MD at [facility] Endoscopy       PAST MEDICAL HISTORY       Past Medical History:   Diagnosis Date    Alcoholism (HCC)     pt drinks a fifth of whiskey daily    Anemia     Cirrhosis, alcoholic (HCC)     Pulmonary embolism (HCC)        Labs:  Labs Reviewed   CBC WITH AUTO DIFFERENTIAL - Abnormal; Notable for the following components:       Result Value    WBC 14.1 (*)     RBC 2.85 (*)     Hemoglobin 9.0 (*)     Hematocrit 27.7 (*)     RDW 25.5 (*)     All other components within normal limits   LIPASE - Abnormal; Notable for the following components:    Lipase 149 (*)     All other components within normal limits   LACTATE, SEPSIS -  Abnormal; Notable for the following components:    Lactic Acid, Sepsis, Whole Blood 2.5 (*)     All other components within normal limits   BASIC METABOLIC PANEL - Abnormal; Notable for the following components:    Potassium 3.4 (*)     CO2 17 (*)     Glucose 116 (*)     Creatinine <0.2 (*)     Calcium 8.0 (*)     All other components within normal limits   HEPATIC FUNCTION PANEL - Abnormal; Notable for the following components:    Albumin 2.5 (*)     Alkaline Phosphatase 341 (*)     AST 41 (*)     Total Bilirubin 17.2 (*)     Bilirubin, Direct 12.8 (*)     Bilirubin, Indirect 4.4 (*)     Albumin/Globulin Ratio 0.6 (*)     All other components within normal limits       Electronically signed by Ju Orourke RN on 12/28/2023 at 1:36 PM

## 2023-12-28 NOTE — ED PROVIDER NOTES
[facility] ED  Emergency Department Encounter  Emergency Medicine Resident     Pt Name:Sam Emerson  MRN: [id number]  Birthdate 1985  Date of evaluation: 12/28/23  PCP:  Rosie Montes De Oca MD  Note Started: 2:50 PM EST      CHIEF COMPLAINT       Chief Complaint   Patient presents with    Hematemesis       HISTORY OF PRESENT ILLNESS  (Location/Symptom, Timing/Onset, Context/Setting, Quality, Duration, Modifying Factors, Severity.)      Sam Emerson is a 38 y.o. male who presents with 1 episode of hematemesis earlier today.  Patient does have history of alcoholic liver cirrhosis, was recently admitted to the ICU back in September for GI bleed.  Patient did have recent TIPS surgery on 12/05 for variceal bleeding as well.    Patient is endorsing abdominal pain at this time, states that this episode of hematemesis just occurred prior to arrival.  He states he started coughing and threw up blood.  He states he feels abdominal pain which was the same as he had earlier this month when he was experiencing another GI bleed.    Patient is denying chest pain, difficulty breathing, syncope.  He states he is compliant with his medications and has not consumed alcohol since being discharged.  He reiterates his wishes to be full code and is accepting blood products should he need them emergently.    PAST MEDICAL / SURGICAL / SOCIAL / FAMILY HISTORY      has a past medical history of Alcoholism (HCC), Anemia, Cirrhosis, alcoholic (HCC), and Pulmonary embolism (HCC).     has a past surgical history that includes Upper gastrointestinal endoscopy (N/A, 05/01/2022); Upper gastrointestinal endoscopy (N/A, 10/30/2022); Upper gastrointestinal endoscopy (N/A, 09/27/2023); Esophagogastroduodenoscopy (12/05/2023); IR TIPS INSERTION (12/5/2023); IR NONTUNNELED VASCULAR CATHETER > 5 YEARS (12/5/2023); Upper gastrointestinal endoscopy (N/A, 12/5/2023); Upper gastrointestinal endoscopy (N/A, 12/7/2023); Upper  Making  71-year-old male presenting for hematemesis just prior to arrival.  Vitals unremarkable, not tachycardic, not hypotensive. Patient does have history of recent TIPS, is profoundly jaundiced, states that this is his baseline however. Anticipate admission, will obtain labs, will transfuse if hemoglobin below 7 or if patient deteriorates. No concern for airway compromise at this time however we will continue to monitor patient. He wishes to remain full code, is amenable to blood product administration if needed. Will start octreotide, Protonix, and Rocephin. Will discuss with GI as well. Amount and/or Complexity of Data Reviewed  Labs: ordered. Decision-making details documented in ED Course. Risk  Prescription drug management. Decision regarding hospitalization. EMERGENCY DEPARTMENT COURSE:  ED Course as of 12/28/23 1517   Thu Dec 28, 2023   1258 GI consulted [KJ]   1309 WBC(!): 14.1 [KJ]   1309 Hemoglobin Quant(!): 9.0  Above baseline [KJ]   1309 Platelet Count: 685 [KJ]   1315 Platelet Count: 406 [KJ]   1320 Potassium(!): 3.4 [KJ]   1320 Sodium: 135 [KJ]   1320 Creatinine(!): <0.2 [KJ]   1320 BUN,BUNPL: 11 [KJ]   1320 Lipase(!): 149 [KJ]   1320 WBC(!): 14.1 [KJ]      ED Course User Index  Lacy Vaz MD   Patient admitted to internal medicine service. Discussed with GI, will await hemoglobin. If patient is acutely anemic will go for urgent GI, otherwise will plan for when patient is admitted. IMAGING:  No orders to display     MEDICATIONS GIVEN TO PATIENT THIS ENCOUNTER:  Orders Placed This Encounter   Medications    pantoprazole (PROTONIX) 80 mg in sodium chloride (PF) 0.9 % 20 mL injection    pantoprazole (PROTONIX) 80 mg in sodium chloride 0.9 % 100 mL infusion    cefTRIAXone (ROCEPHIN) 1,000 mg in sodium chloride 0.9 % 50 mL IVPB (mini-bag)     Order Specific Question:   Antimicrobial Indications     Answer:    Other     Order Specific Question:   Other Abx Indication

## 2023-12-28 NOTE — CARE COORDINATION
Care Transitions Outreach Attempt    Call within 2 business days of discharge: Yes   Attempted to reach patient for transitions of care follow up. Unable to reach patient. Final attempt to reach    Patient: Theodore Cleaning Patient : 1985 MRN: [id number]    Last Discharge Facility       Date Complaint Diagnosis Description Type Department Provider    23 Cirrhosis; Jaundice; Abdominal Pain Jaundice, non- ... ED to Hosp-Admission (Discharged) (ADMITTED) [facility] 3C En Wang MD; Reyna Pandey. .. Was this an external facility discharge?  No Discharge Facility Name: [address] [place] [address] [facility]    Noted following upcoming appointments from discharge chart review:   [address] Francie Rojas [facility],[address] follow up appointment(s):   Future Appointments   Date Time Provider [address] [place] [address]   2024  8:30 AM Rubina Coyne MD [address] [place] [address]  follow up appointment(s): n/a

## 2023-12-28 NOTE — ED NOTES
The following labs were labeled with appropriate pt sticker and tubed to lab:     [x] Blue     [x] Lavender   [] on ice  [x] Green/yellow  [x] Green/black [] on ice  [] Cinderella Camel  [] on ice  [] Yellow  [] Red  [] Pink  [] Type/ Screen  [] ABG  [] VBG    [] COVID-19 swab    [] Rapid  [] PCR  [] Flu swab  [] Peds Viral Panel     [] Urine Sample  [] Fecal Sample  [] Pelvic Cultures  [] Blood Cultures  [] X 2  [] STREP Cultures  [] Wound Cultures

## 2023-12-29 ENCOUNTER — ANESTHESIA EVENT (OUTPATIENT)
Dept: OPERATING ROOM | Age: 38
End: 2023-12-29
Payer: COMMERCIAL

## 2023-12-29 ENCOUNTER — ANESTHESIA (OUTPATIENT)
Dept: OPERATING ROOM | Age: 38
End: 2023-12-29
Payer: COMMERCIAL

## 2023-12-29 LAB
ALBUMIN SERPL-MCNC: 2.2 G/DL (ref 3.5–5.2)
ALBUMIN/GLOB SERPL: 0.7 {RATIO} (ref 1–2.5)
ALP SERPL-CCNC: 246 U/L (ref 40–129)
ALT SERPL-CCNC: 13 U/L (ref 5–41)
ANION GAP SERPL CALCULATED.3IONS-SCNC: 10 MMOL/L (ref 9–17)
AST SERPL-CCNC: 35 U/L
BASOPHILS # BLD: 0 K/UL (ref 0–0.2)
BASOPHILS NFR BLD: 0 % (ref 0–2)
BILIRUB DIRECT SERPL-MCNC: 10.2 MG/DL
BILIRUB INDIRECT SERPL-MCNC: 3.5 MG/DL (ref 0–1)
BILIRUB SERPL-MCNC: 13.7 MG/DL (ref 0.3–1.2)
BUN SERPL-MCNC: 15 MG/DL (ref 6–20)
CALCIUM SERPL-MCNC: 7.7 MG/DL (ref 8.6–10.4)
CHLORIDE SERPL-SCNC: 106 MMOL/L (ref 98–107)
CO2 SERPL-SCNC: 18 MMOL/L (ref 20–31)
CREAT SERPL-MCNC: 0.4 MG/DL (ref 0.7–1.2)
EOSINOPHIL # BLD: 0.22 K/UL (ref 0–0.44)
EOSINOPHILS RELATIVE PERCENT: 3 % (ref 1–4)
ERYTHROCYTE [DISTWIDTH] IN BLOOD BY AUTOMATED COUNT: 24.8 % (ref 11.8–14.4)
GFR SERPL CREATININE-BSD FRML MDRD: >60 ML/MIN/1.73M2
GLUCOSE SERPL-MCNC: 119 MG/DL (ref 70–99)
HCT VFR BLD AUTO: 20.6 % (ref 40.7–50.3)
HCT VFR BLD AUTO: 22.4 % (ref 40.7–50.3)
HCT VFR BLD AUTO: 22.4 % (ref 40.7–50.3)
HCT VFR BLD AUTO: 26.4 % (ref 40.7–50.3)
HGB BLD-MCNC: 6.4 G/DL (ref 13–17)
HGB BLD-MCNC: 6.8 G/DL (ref 13–17)
HGB BLD-MCNC: 7.7 G/DL (ref 13–17)
HGB BLD-MCNC: 8.4 G/DL (ref 13–17)
IMM GRANULOCYTES # BLD AUTO: 0.07 K/UL (ref 0–0.3)
IMM GRANULOCYTES NFR BLD: 1 %
INR PPP: 1.7
LACTIC ACID, WHOLE BLOOD: 1.9 MMOL/L (ref 0.7–2.1)
LIPASE SERPL-CCNC: 71 U/L (ref 13–60)
LYMPHOCYTES NFR BLD: 0.52 K/UL (ref 1.1–3.7)
LYMPHOCYTES RELATIVE PERCENT: 7 % (ref 24–43)
MCH RBC QN AUTO: 31.8 PG (ref 25.2–33.5)
MCHC RBC AUTO-ENTMCNC: 30.4 G/DL (ref 28.4–34.8)
MCV RBC AUTO: 104.7 FL (ref 82.6–102.9)
MONOCYTES NFR BLD: 0.44 K/UL (ref 0.1–1.2)
MONOCYTES NFR BLD: 6 % (ref 3–12)
MORPHOLOGY: ABNORMAL
NEUTROPHILS NFR BLD: 83 % (ref 36–65)
NEUTS SEG NFR BLD: 6.15 K/UL (ref 1.5–8.1)
NRBC BLD-RTO: 0 PER 100 WBC
PARTIAL THROMBOPLASTIN TIME: 34.7 SEC (ref 23–36.5)
PLATELET # BLD AUTO: 117 K/UL (ref 138–453)
PMV BLD AUTO: 10.7 FL (ref 8.1–13.5)
POTASSIUM SERPL-SCNC: 4.2 MMOL/L (ref 3.7–5.3)
PROT SERPL-MCNC: 5.4 G/DL (ref 6.4–8.3)
PROTHROMBIN TIME: 19.3 SEC (ref 11.7–14.9)
RBC # BLD AUTO: 2.14 M/UL (ref 4.21–5.77)
SODIUM SERPL-SCNC: 134 MMOL/L (ref 135–144)
WBC OTHER # BLD: 7.4 K/UL (ref 3.5–11.3)

## 2023-12-29 PROCEDURE — 85025 COMPLETE CBC W/AUTO DIFF WBC: CPT

## 2023-12-29 PROCEDURE — 86927 PLASMA FRESH FROZEN: CPT

## 2023-12-29 PROCEDURE — 6360000002 HC RX W HCPCS: Performed by: SPECIALIST

## 2023-12-29 PROCEDURE — C9113 INJ PANTOPRAZOLE SODIUM, VIA: HCPCS

## 2023-12-29 PROCEDURE — 85730 THROMBOPLASTIN TIME PARTIAL: CPT

## 2023-12-29 PROCEDURE — 83690 ASSAY OF LIPASE: CPT

## 2023-12-29 PROCEDURE — 0DJ08ZZ INSPECTION OF UPPER INTESTINAL TRACT, VIA NATURAL OR ARTIFICIAL OPENING ENDOSCOPIC: ICD-10-PCS | Performed by: INTERNAL MEDICINE

## 2023-12-29 PROCEDURE — 43235 EGD DIAGNOSTIC BRUSH WASH: CPT | Performed by: INTERNAL MEDICINE

## 2023-12-29 PROCEDURE — 6370000000 HC RX 637 (ALT 250 FOR IP): Performed by: INTERNAL MEDICINE

## 2023-12-29 PROCEDURE — 80048 BASIC METABOLIC PNL TOTAL CA: CPT

## 2023-12-29 PROCEDURE — P9016 RBC LEUKOCYTES REDUCED: HCPCS

## 2023-12-29 PROCEDURE — 3609017100 HC EGD: Performed by: INTERNAL MEDICINE

## 2023-12-29 PROCEDURE — 3700000000 HC ANESTHESIA ATTENDED CARE: Performed by: INTERNAL MEDICINE

## 2023-12-29 PROCEDURE — 2709999900 HC NON-CHARGEABLE SUPPLY: Performed by: INTERNAL MEDICINE

## 2023-12-29 PROCEDURE — 2580000003 HC RX 258

## 2023-12-29 PROCEDURE — 36430 TRANSFUSION BLD/BLD COMPNT: CPT

## 2023-12-29 PROCEDURE — 2580000003 HC RX 258: Performed by: INTERNAL MEDICINE

## 2023-12-29 PROCEDURE — 2060000002 HC BURN ICU INTERMEDIATE R&B

## 2023-12-29 PROCEDURE — 3700000001 HC ADD 15 MINUTES (ANESTHESIA): Performed by: INTERNAL MEDICINE

## 2023-12-29 PROCEDURE — 86901 BLOOD TYPING SEROLOGIC RH(D): CPT

## 2023-12-29 PROCEDURE — 83605 ASSAY OF LACTIC ACID: CPT

## 2023-12-29 PROCEDURE — 2500000003 HC RX 250 WO HCPCS: Performed by: SPECIALIST

## 2023-12-29 PROCEDURE — 6370000000 HC RX 637 (ALT 250 FOR IP)

## 2023-12-29 PROCEDURE — 7100000000 HC PACU RECOVERY - FIRST 15 MIN: Performed by: INTERNAL MEDICINE

## 2023-12-29 PROCEDURE — 7100000001 HC PACU RECOVERY - ADDTL 15 MIN: Performed by: INTERNAL MEDICINE

## 2023-12-29 PROCEDURE — 85018 HEMOGLOBIN: CPT

## 2023-12-29 PROCEDURE — 36415 COLL VENOUS BLD VENIPUNCTURE: CPT

## 2023-12-29 PROCEDURE — 85014 HEMATOCRIT: CPT

## 2023-12-29 PROCEDURE — P9017 PLASMA 1 DONOR FRZ W/IN 8 HR: HCPCS

## 2023-12-29 PROCEDURE — 80076 HEPATIC FUNCTION PANEL: CPT

## 2023-12-29 PROCEDURE — 86850 RBC ANTIBODY SCREEN: CPT

## 2023-12-29 PROCEDURE — 86920 COMPATIBILITY TEST SPIN: CPT

## 2023-12-29 PROCEDURE — 6360000002 HC RX W HCPCS: Performed by: INTERNAL MEDICINE

## 2023-12-29 PROCEDURE — 99232 SBSQ HOSP IP/OBS MODERATE 35: CPT | Performed by: INTERNAL MEDICINE

## 2023-12-29 PROCEDURE — 86900 BLOOD TYPING SEROLOGIC ABO: CPT

## 2023-12-29 PROCEDURE — 6360000002 HC RX W HCPCS

## 2023-12-29 PROCEDURE — 85610 PROTHROMBIN TIME: CPT

## 2023-12-29 RX ORDER — SODIUM CHLORIDE 9 MG/ML
INJECTION, SOLUTION INTRAVENOUS PRN
Status: DISCONTINUED | OUTPATIENT
Start: 2023-12-29 | End: 2023-12-29

## 2023-12-29 RX ORDER — PANTOPRAZOLE SODIUM 40 MG/1
40 TABLET, DELAYED RELEASE ORAL
Status: DISCONTINUED | OUTPATIENT
Start: 2023-12-30 | End: 2023-12-30 | Stop reason: HOSPADM

## 2023-12-29 RX ORDER — BISACODYL 5 MG/1
20 TABLET, DELAYED RELEASE ORAL ONCE
Status: DISCONTINUED | OUTPATIENT
Start: 2023-12-29 | End: 2023-12-30 | Stop reason: HOSPADM

## 2023-12-29 RX ORDER — PROPOFOL 10 MG/ML
INJECTION, EMULSION INTRAVENOUS PRN
Status: DISCONTINUED | OUTPATIENT
Start: 2023-12-29 | End: 2023-12-29 | Stop reason: SDUPTHER

## 2023-12-29 RX ORDER — SODIUM CHLORIDE 0.9 % (FLUSH) 0.9 %
5-40 SYRINGE (ML) INJECTION PRN
Status: DISCONTINUED | OUTPATIENT
Start: 2023-12-29 | End: 2023-12-29

## 2023-12-29 RX ORDER — SODIUM CHLORIDE 9 MG/ML
INJECTION, SOLUTION INTRAVENOUS PRN
Status: COMPLETED | OUTPATIENT
Start: 2023-12-29 | End: 2023-12-29

## 2023-12-29 RX ORDER — LIDOCAINE HYDROCHLORIDE 10 MG/ML
INJECTION, SOLUTION EPIDURAL; INFILTRATION; INTRACAUDAL; PERINEURAL PRN
Status: DISCONTINUED | OUTPATIENT
Start: 2023-12-29 | End: 2023-12-29 | Stop reason: SDUPTHER

## 2023-12-29 RX ORDER — SODIUM CHLORIDE 0.9 % (FLUSH) 0.9 %
5-40 SYRINGE (ML) INJECTION EVERY 12 HOURS SCHEDULED
Status: DISCONTINUED | OUTPATIENT
Start: 2023-12-29 | End: 2023-12-29

## 2023-12-29 RX ADMIN — PREDNISONE 40 MG: 20 TABLET ORAL at 10:39

## 2023-12-29 RX ADMIN — PANTOPRAZOLE SODIUM 8 MG/HR: 40 INJECTION, POWDER, FOR SOLUTION INTRAVENOUS at 08:20

## 2023-12-29 RX ADMIN — PROPOFOL 120 MG: 10 INJECTION, EMULSION INTRAVENOUS at 09:24

## 2023-12-29 RX ADMIN — OXYCODONE HYDROCHLORIDE 5 MG: 5 TABLET ORAL at 19:53

## 2023-12-29 RX ADMIN — FOLIC ACID 1 MG: 1 TABLET ORAL at 10:35

## 2023-12-29 RX ADMIN — OCTREOTIDE ACETATE 50 MCG/HR: 500 INJECTION, SOLUTION INTRAVENOUS; SUBCUTANEOUS at 08:52

## 2023-12-29 RX ADMIN — OXYCODONE HYDROCHLORIDE 5 MG: 5 TABLET ORAL at 10:34

## 2023-12-29 RX ADMIN — Medication 1000 MG: at 13:13

## 2023-12-29 RX ADMIN — LIDOCAINE HYDROCHLORIDE 100 MG: 10 INJECTION, SOLUTION EPIDURAL; INFILTRATION; INTRACAUDAL; PERINEURAL at 09:24

## 2023-12-29 RX ADMIN — POLYETHYLENE GLYCOL 3350, SODIUM SULFATE ANHYDROUS, SODIUM BICARBONATE, SODIUM CHLORIDE, POTASSIUM CHLORIDE 4000 ML: 236; 22.74; 6.74; 5.86; 2.97 POWDER, FOR SOLUTION ORAL at 11:15

## 2023-12-29 RX ADMIN — SODIUM CHLORIDE, PRESERVATIVE FREE 10 ML: 5 INJECTION INTRAVENOUS at 19:54

## 2023-12-29 RX ADMIN — SODIUM CHLORIDE, PRESERVATIVE FREE 10 ML: 5 INJECTION INTRAVENOUS at 09:00

## 2023-12-29 RX ADMIN — SODIUM CHLORIDE: 9 INJECTION, SOLUTION INTRAVENOUS at 08:52

## 2023-12-29 RX ADMIN — SODIUM CHLORIDE, PRESERVATIVE FREE 10 ML: 5 INJECTION INTRAVENOUS at 10:36

## 2023-12-29 RX ADMIN — Medication 100 MG: at 10:37

## 2023-12-29 ASSESSMENT — PAIN DESCRIPTION - ORIENTATION
ORIENTATION: MID
ORIENTATION: LOWER

## 2023-12-29 ASSESSMENT — PAIN SCALES - GENERAL
PAINLEVEL_OUTOF10: 4
PAINLEVEL_OUTOF10: 3
PAINLEVEL_OUTOF10: 8
PAINLEVEL_OUTOF10: 5
PAINLEVEL_OUTOF10: 7

## 2023-12-29 ASSESSMENT — PAIN DESCRIPTION - LOCATION
LOCATION: ABDOMEN
LOCATION: ABDOMEN
LOCATION: ABDOMEN;THROAT
LOCATION: ABDOMEN

## 2023-12-29 ASSESSMENT — PAIN DESCRIPTION - DESCRIPTORS
DESCRIPTORS: CRAMPING;STABBING
DESCRIPTORS: SHARP

## 2023-12-29 NOTE — H&P
ENZYMES: No results for input(s): \"CKMB\", \"CKMBINDEX\", \"TROPONINI\" in the last 72 hours. Invalid input(s): \"CKTOTAL;3\"  FASTING LIPID PANEL:No results found for: \"CHOL\", \"HDL\", \"TRIG\"  LIVER PROFILE:   Recent Labs     12/26/23  0648 12/28/23  1251   AST 44* 41*   ALT 13 16   BILIDIR  --  12.8*   BILITOT 16.8* 17.2*   ALKPHOS 313* 341*      MICROBIOLOGY:   Lab Results   Component Value Date/Time    CULTURE NO GROWTH 5 DAYS 12/22/2023 03:28 PM    CULTURE NO GROWTH 5 DAYS 12/22/2023 03:28 PM       Imaging:   MRI ABDOMEN W WO CONTRAST MRCP    Result Date: 12/24/2023  1. Hepatic cirrhosis. Splenomegaly, ascites, gallbladder wall thickening and small bowel wall thickening may be due to portal hypertension. 2. No biliary dilatation. 3. The tips of the portal vein and the middle hepatic vein are patent. The proximal end of the shunt is noted not well visualized due to metallic artifacts. The distal end appears to be patent. US GALLBLADDER RUQ    Result Date: 12/22/2023  1. Hepatic steatosis and cirrhosis with TIPS shunt in-situ. 2. Diffuse gallbladder wall thickening with pericholecystic fluid. This may be related to the patient's underlying liver disease. No calculi are seen. If there is clinical concern for acute cholecystitis, HIDA scan can be obtained for further evaluation. 3. Small amount of ascites. CT ABDOMEN PELVIS W IV CONTRAST Additional Contrast? None    Result Date: 12/22/2023  1. Hepatic cirrhosis and portal hypertension with splenomegaly, small amount of ascites and diffuse fat stranding. A tTIPS has been placed and is believed to be patent given central opacification. 2. New thickening of the gallbladder which could be related 2 hepatic cirrhosis and ascites, but cholecystitis cannot be excluded. There is also cholelithiasis. 3. Thickening of the proximal jejunum and of the colon from the cecum to the mid transverse colon.   This could also be related to portal hypertension and ascites, but

## 2023-12-29 NOTE — ANESTHESIA PRE PROCEDURE
recurrent major depression without psychotic features ([address]) F33.2    MDD (major depressive disorder), recurrent severe, without psychosis ([address]) F33.2    Depression with suicidal ideation F32. A, R45.851    Hematemesis V37.7    ALC (alcoholic liver cirrhosis) (HCC) K70.30    Upper GI bleed K92.2    Acute pancreatitis K85.90    Fever of unknown origin R50.9    Jaundice R17    Jaundice, non- R17    S/P TIPS (transjugular intrahepatic portosystemic shunt) Z95.828    History of alcoholism (HCC) G45.60    Alcoholic hepatitis with ascites K70.11    Nausea and vomiting R11.2       Past Medical History:        Diagnosis Date    Alcoholism ([address])     pt drinks a fifth of whiskey daily    Anemia     Cirrhosis, alcoholic ([address])     Pulmonary embolism ([address])        Past Surgical History:        Procedure Laterality Date    ESOPHAGOGASTRODUODENOSCOPY  2023    EGD DIAGNOSTIC ONLY    IR NONTUNNELED VASCULAR CATHETER  2023    IR NONTUNNELED VASCULAR CATHETER 2023 [facility] SPECIAL PROCEDURES    IR TIPS INSERTION  2023    IR TIPS INSERTION 2023 [facility] PROCEDURES    UPPER GASTROINTESTINAL ENDOSCOPY N/A 2022    EGD ESOPHAGOGASTRODUODENOSCOPY performed by Riana Brasher MD at [facility] 10/30/2022    EGD BAND LIGATION performed by Chelsie Callahan MD at [facility] 2023    EGD BAND LIGATION performed by Yoshi Doyle MD at [facility] 2023    EGD DIAGNOSTIC ONLY performed by Ervin Seip, MD at [facility] 304 2023    EGD DIAGNOSTIC ONLY performed by Ervin Seip, MD at [address] [facility]  2023    EGD BAND LIGATION performed by Ervin Seip, MD at [address] [facility]  2023    EGD FOREIGN BODY REMOVAL performed by Ervin Seip, MD at [facility] Endoscopy

## 2023-12-29 NOTE — CONSENT
Informed Consent for Blood Component Transfusion Note    I have discussed with the patient the rationale for blood component transfusion; its benefits in treating or preventing fatigue, organ damage, or death; and its risk which includes mild transfusion reactions, rare risk of blood borne infection, or more serious but rare reactions. I have discussed the alternatives to transfusion, including the risk and consequences of not receiving transfusion. The patient had an opportunity to ask questions and had agreed to proceed with transfusion of blood components.     Electronically signed by Ivonne Campuzano MD on 12/28/23 at 11:36 PM EST

## 2023-12-29 NOTE — PLAN OF CARE
Problem: Chronic Conditions and Co-morbidities  Goal: Patient's chronic conditions and co-morbidity symptoms are monitored and maintained or improved  12/29/2023 0617 by Akash Haddad RN  Outcome: Progressing     Problem: Pain  Goal: Verbalizes/displays adequate comfort level or baseline comfort level  12/29/2023 1706 by Jayashree Hermosillo  Outcome: Progressing  12/29/2023 0617 by Akash Haddad RN  Outcome: Progressing     Problem: Safety - Adult  Goal: Free from fall injury  12/29/2023 1706 by Jayashree Hermosillo  Outcome: Progressing  12/29/2023 0617 by Akash Haddad RN  Outcome: Progressing     Problem: Discharge Planning  Goal: Discharge to home or other facility with appropriate resources  Outcome: Progressing

## 2023-12-29 NOTE — PROGRESS NOTES
ALKPHOS 341* 246*      MICROBIOLOGY:   Lab Results   Component Value Date/Time    CULTURE NO GROWTH 5 DAYS 12/22/2023 03:28 PM    CULTURE NO GROWTH 5 DAYS 12/22/2023 03:28 PM       Imaging:    MRI ABDOMEN W WO CONTRAST MRCP    Result Date: 12/24/2023  1. Hepatic cirrhosis. Splenomegaly, ascites, gallbladder wall thickening and small bowel wall thickening may be due to portal hypertension. 2. No biliary dilatation. 3. The tips of the portal vein and the middle hepatic vein are patent. The proximal end of the shunt is noted not well visualized due to metallic artifacts. The distal end appears to be patent. US GALLBLADDER RUQ    Result Date: 12/22/2023  1. Hepatic steatosis and cirrhosis with TIPS shunt in-situ. 2. Diffuse gallbladder wall thickening with pericholecystic fluid. This may be related to the patient's underlying liver disease. No calculi are seen. If there is clinical concern for acute cholecystitis, HIDA scan can be obtained for further evaluation. 3. Small amount of ascites. CT ABDOMEN PELVIS W IV CONTRAST Additional Contrast? None    Result Date: 12/22/2023  1. Hepatic cirrhosis and portal hypertension with splenomegaly, small amount of ascites and diffuse fat stranding. A tTIPS has been placed and is believed to be patent given central opacification. 2. New thickening of the gallbladder which could be related 2 hepatic cirrhosis and ascites, but cholecystitis cannot be excluded. There is also cholelithiasis. 3. Thickening of the proximal jejunum and of the colon from the cecum to the mid transverse colon. This could also be related to portal hypertension and ascites, but enterocolitis cannot be excluded.      XR CHEST (2 VW)    Result Date: 12/22/2023  Minimal left basilar atelectasis       ASSESSMENT & PLAN     ASSESSMENT / PLAN:     IMPRESSION  This is a 41 y.o. male with a PMHx significant for alcohol use, decompensated liver cirrhosis who presented with hematemesis and found to have

## 2023-12-29 NOTE — OP NOTE
EGD report    Esophagogastroduodenoscopy (EGD) Procedure Note    Procedure:  EGD     Procedure Date: 12/29/23    Indications:  GI bleed, history of liver cirrhosis, varices s/p TIPS    Sedation:  MAC    Attending Physician:  Dr. Ava Doan MD    Assistant:  None    Procedure Details:    Informed consent was obtained for the procedure, including sedation. Risks of infection, perforation, hemorrhage, adverse drug reaction, and aspiration were discussed. The patient was placed in the left lateral decubitus position. The patient was monitored continuously with ECG tracing, pulse oximetry, blood pressure monitoring, and direct observation. The gastroscope was inserted into the mouth and advanced under direct vision to second portion of the duodenum. A careful inspection was made as the gastroscope was withdrawn, including a retroflexed view of the proximal stomach; findings and interventions are described below. Appropriate photodocumentation was obtained. Findings:  Retropharyngeal area was grossly normal appearing     Esophagus: Small esophageal varices noted without high risk features x 2. No bleeding noted. Stomach: Moderate portal hypertensive gastropathy noted. No obvious gastric varices noted. Duodenum:     Bulb: normal    First part: Normal    Second Part: Normal      Complications:  None           Estimated blood loss:  Minimal    Disposition:  [facility]           Condition: stable    Specimen Removed: None    Impression:    Small esophageal varices x 2 without high risk features or bleeding. Moderate portal hypertensive gastropathy  No gastric varices noted. Recommendations:  Return to [facility]  Clear liquid diet. Bowel prep tonight and colonoscopy 12/30/23    Attending Attestation: I performed the procedure.     Ava Doan MD

## 2023-12-30 ENCOUNTER — ANESTHESIA (OUTPATIENT)
Dept: OPERATING ROOM | Age: 38
End: 2023-12-30
Payer: COMMERCIAL

## 2023-12-30 VITALS
OXYGEN SATURATION: 100 % | RESPIRATION RATE: 18 BRPM | HEART RATE: 47 BPM | DIASTOLIC BLOOD PRESSURE: 80 MMHG | HEIGHT: 68 IN | TEMPERATURE: 97.6 F | WEIGHT: 167.77 LBS | SYSTOLIC BLOOD PRESSURE: 126 MMHG | BODY MASS INDEX: 25.43 KG/M2

## 2023-12-30 PROBLEM — K92.2 UPPER GI BLEED: Status: RESOLVED | Noted: 2023-12-05 | Resolved: 2023-12-30

## 2023-12-30 PROBLEM — R11.2 NAUSEA AND VOMITING: Status: RESOLVED | Noted: 2023-12-28 | Resolved: 2023-12-30

## 2023-12-30 LAB
ABO/RH: NORMAL
ANION GAP SERPL CALCULATED.3IONS-SCNC: 10 MMOL/L (ref 9–17)
ANTIBODY SCREEN: NEGATIVE
ARM BAND NUMBER: NORMAL
BASOPHILS # BLD: 0 K/UL (ref 0–0.2)
BASOPHILS NFR BLD: 0 % (ref 0–2)
BLOOD BANK BLOOD PRODUCT EXPIRATION DATE: NORMAL
BLOOD BANK DISPENSE STATUS: NORMAL
BLOOD BANK ISBT PRODUCT BLOOD TYPE: 600
BLOOD BANK ISBT PRODUCT BLOOD TYPE: 6200
BLOOD BANK ISBT PRODUCT BLOOD TYPE: 6200
BLOOD BANK PRODUCT CODE: NORMAL
BLOOD BANK SAMPLE EXPIRATION: NORMAL
BLOOD BANK UNIT TYPE AND RH: NORMAL
BPU ID: NORMAL
BUN SERPL-MCNC: 13 MG/DL (ref 6–20)
CALCIUM SERPL-MCNC: 7.6 MG/DL (ref 8.6–10.4)
CHLORIDE SERPL-SCNC: 106 MMOL/L (ref 98–107)
CO2 SERPL-SCNC: 21 MMOL/L (ref 20–31)
COMPONENT: NORMAL
CREAT SERPL-MCNC: 0.4 MG/DL (ref 0.7–1.2)
CROSSMATCH RESULT: NORMAL
CROSSMATCH RESULT: NORMAL
EOSINOPHIL # BLD: 0.12 K/UL (ref 0–0.44)
EOSINOPHILS RELATIVE PERCENT: 1 % (ref 1–4)
ERYTHROCYTE [DISTWIDTH] IN BLOOD BY AUTOMATED COUNT: 23.9 % (ref 11.8–14.4)
GFR SERPL CREATININE-BSD FRML MDRD: >60 ML/MIN/1.73M2
GLUCOSE SERPL-MCNC: 103 MG/DL (ref 70–99)
HCT VFR BLD AUTO: 26 % (ref 40.7–50.3)
HGB BLD-MCNC: 8.4 G/DL (ref 13–17)
IMM GRANULOCYTES # BLD AUTO: 0.12 K/UL (ref 0–0.3)
IMM GRANULOCYTES NFR BLD: 1 %
LYMPHOCYTES NFR BLD: 1.18 K/UL (ref 1.1–3.7)
LYMPHOCYTES RELATIVE PERCENT: 10 % (ref 24–43)
MAGNESIUM SERPL-MCNC: 2 MG/DL (ref 1.6–2.6)
MCH RBC QN AUTO: 31.6 PG (ref 25.2–33.5)
MCHC RBC AUTO-ENTMCNC: 32.3 G/DL (ref 28.4–34.8)
MCV RBC AUTO: 97.7 FL (ref 82.6–102.9)
MONOCYTES NFR BLD: 0.94 K/UL (ref 0.1–1.2)
MONOCYTES NFR BLD: 8 % (ref 3–12)
MORPHOLOGY: ABNORMAL
NEUTROPHILS NFR BLD: 80 % (ref 36–65)
NEUTS SEG NFR BLD: 9.44 K/UL (ref 1.5–8.1)
NRBC BLD-RTO: 0 PER 100 WBC
PLATELET # BLD AUTO: ABNORMAL K/UL (ref 138–453)
PLATELET, FLUORESCENCE: 127 K/UL (ref 138–453)
PLATELETS.RETICULATED NFR BLD AUTO: 2.8 % (ref 1.1–10.3)
POTASSIUM SERPL-SCNC: 3.3 MMOL/L (ref 3.7–5.3)
RBC # BLD AUTO: 2.66 M/UL (ref 4.21–5.77)
SODIUM SERPL-SCNC: 137 MMOL/L (ref 135–144)
TRANSFUSION STATUS: NORMAL
UNIT DIVISION: 0
UNIT ISSUE DATE/TIME: NORMAL
WBC OTHER # BLD: 11.8 K/UL (ref 3.5–11.3)

## 2023-12-30 PROCEDURE — 99239 HOSP IP/OBS DSCHRG MGMT >30: CPT | Performed by: INTERNAL MEDICINE

## 2023-12-30 PROCEDURE — 45378 DIAGNOSTIC COLONOSCOPY: CPT | Performed by: INTERNAL MEDICINE

## 2023-12-30 PROCEDURE — 2500000003 HC RX 250 WO HCPCS: Performed by: NURSE ANESTHETIST, CERTIFIED REGISTERED

## 2023-12-30 PROCEDURE — 6370000000 HC RX 637 (ALT 250 FOR IP)

## 2023-12-30 PROCEDURE — 80048 BASIC METABOLIC PNL TOTAL CA: CPT

## 2023-12-30 PROCEDURE — 6370000000 HC RX 637 (ALT 250 FOR IP): Performed by: INTERNAL MEDICINE

## 2023-12-30 PROCEDURE — 6360000002 HC RX W HCPCS: Performed by: NURSE ANESTHETIST, CERTIFIED REGISTERED

## 2023-12-30 PROCEDURE — 3700000001 HC ADD 15 MINUTES (ANESTHESIA): Performed by: INTERNAL MEDICINE

## 2023-12-30 PROCEDURE — 3609027000 HC COLONOSCOPY: Performed by: INTERNAL MEDICINE

## 2023-12-30 PROCEDURE — 36415 COLL VENOUS BLD VENIPUNCTURE: CPT

## 2023-12-30 PROCEDURE — 2580000003 HC RX 258: Performed by: ANESTHESIOLOGY

## 2023-12-30 PROCEDURE — 7100000000 HC PACU RECOVERY - FIRST 15 MIN: Performed by: INTERNAL MEDICINE

## 2023-12-30 PROCEDURE — 83735 ASSAY OF MAGNESIUM: CPT

## 2023-12-30 PROCEDURE — 85055 RETICULATED PLATELET ASSAY: CPT

## 2023-12-30 PROCEDURE — 7100000001 HC PACU RECOVERY - ADDTL 15 MIN: Performed by: INTERNAL MEDICINE

## 2023-12-30 PROCEDURE — 85025 COMPLETE CBC W/AUTO DIFF WBC: CPT

## 2023-12-30 PROCEDURE — 3700000000 HC ANESTHESIA ATTENDED CARE: Performed by: INTERNAL MEDICINE

## 2023-12-30 RX ORDER — CALCIUM CARBONATE 500 MG/1
500 TABLET, CHEWABLE ORAL ONCE
Status: COMPLETED | OUTPATIENT
Start: 2023-12-30 | End: 2023-12-30

## 2023-12-30 RX ORDER — FENTANYL CITRATE 50 UG/ML
25 INJECTION, SOLUTION INTRAMUSCULAR; INTRAVENOUS EVERY 5 MIN PRN
Status: DISCONTINUED | OUTPATIENT
Start: 2023-12-30 | End: 2023-12-30 | Stop reason: HOSPADM

## 2023-12-30 RX ORDER — SODIUM CHLORIDE 0.9 % (FLUSH) 0.9 %
5-40 SYRINGE (ML) INJECTION EVERY 12 HOURS SCHEDULED
Status: DISCONTINUED | OUTPATIENT
Start: 2023-12-30 | End: 2023-12-30 | Stop reason: HOSPADM

## 2023-12-30 RX ORDER — POTASSIUM CHLORIDE 20 MEQ/1
40 TABLET, EXTENDED RELEASE ORAL ONCE
Status: COMPLETED | OUTPATIENT
Start: 2023-12-30 | End: 2023-12-30

## 2023-12-30 RX ORDER — PROPOFOL 10 MG/ML
INJECTION, EMULSION INTRAVENOUS CONTINUOUS PRN
Status: DISCONTINUED | OUTPATIENT
Start: 2023-12-30 | End: 2023-12-30 | Stop reason: SDUPTHER

## 2023-12-30 RX ORDER — SODIUM CHLORIDE, SODIUM LACTATE, POTASSIUM CHLORIDE, CALCIUM CHLORIDE 600; 310; 30; 20 MG/100ML; MG/100ML; MG/100ML; MG/100ML
INJECTION, SOLUTION INTRAVENOUS CONTINUOUS
Status: DISCONTINUED | OUTPATIENT
Start: 2023-12-30 | End: 2023-12-30 | Stop reason: HOSPADM

## 2023-12-30 RX ORDER — PREDNISOLONE 15 MG/5ML
40 SOLUTION ORAL DAILY
Qty: 266 ML | Refills: 0 | Status: SHIPPED | OUTPATIENT
Start: 2023-12-30 | End: 2024-01-19

## 2023-12-30 RX ORDER — ONDANSETRON 2 MG/ML
4 INJECTION INTRAMUSCULAR; INTRAVENOUS
Status: DISCONTINUED | OUTPATIENT
Start: 2023-12-30 | End: 2023-12-30 | Stop reason: HOSPADM

## 2023-12-30 RX ORDER — SODIUM CHLORIDE, SODIUM LACTATE, POTASSIUM CHLORIDE, CALCIUM CHLORIDE 600; 310; 30; 20 MG/100ML; MG/100ML; MG/100ML; MG/100ML
INJECTION, SOLUTION INTRAVENOUS CONTINUOUS
Status: DISCONTINUED | OUTPATIENT
Start: 2023-12-30 | End: 2023-12-30

## 2023-12-30 RX ORDER — PROCHLORPERAZINE EDISYLATE 5 MG/ML
5 INJECTION INTRAMUSCULAR; INTRAVENOUS
Status: DISCONTINUED | OUTPATIENT
Start: 2023-12-30 | End: 2023-12-30 | Stop reason: HOSPADM

## 2023-12-30 RX ORDER — LIDOCAINE HYDROCHLORIDE 10 MG/ML
INJECTION, SOLUTION EPIDURAL; INFILTRATION; INTRACAUDAL; PERINEURAL PRN
Status: DISCONTINUED | OUTPATIENT
Start: 2023-12-30 | End: 2023-12-30 | Stop reason: SDUPTHER

## 2023-12-30 RX ORDER — MIDAZOLAM HYDROCHLORIDE 1 MG/ML
INJECTION INTRAMUSCULAR; INTRAVENOUS PRN
Status: DISCONTINUED | OUTPATIENT
Start: 2023-12-30 | End: 2023-12-30 | Stop reason: SDUPTHER

## 2023-12-30 RX ADMIN — ANTACID TABLETS 500 MG: 500 TABLET, CHEWABLE ORAL at 02:27

## 2023-12-30 RX ADMIN — MIDAZOLAM 2 MG: 1 INJECTION INTRAMUSCULAR; INTRAVENOUS at 07:46

## 2023-12-30 RX ADMIN — PROPOFOL 40 MG: 10 INJECTION, EMULSION INTRAVENOUS at 07:49

## 2023-12-30 RX ADMIN — Medication 100 MG: at 10:50

## 2023-12-30 RX ADMIN — OXYCODONE HYDROCHLORIDE 5 MG: 5 TABLET ORAL at 04:23

## 2023-12-30 RX ADMIN — PROPOFOL 125 MCG/KG/MIN: 10 INJECTION, EMULSION INTRAVENOUS at 07:48

## 2023-12-30 RX ADMIN — PANTOPRAZOLE SODIUM 40 MG: 40 TABLET, DELAYED RELEASE ORAL at 06:14

## 2023-12-30 RX ADMIN — PREDNISONE 40 MG: 20 TABLET ORAL at 10:50

## 2023-12-30 RX ADMIN — POTASSIUM CHLORIDE 40 MEQ: 1500 TABLET, EXTENDED RELEASE ORAL at 10:49

## 2023-12-30 RX ADMIN — FOLIC ACID 1 MG: 1 TABLET ORAL at 10:50

## 2023-12-30 RX ADMIN — SODIUM CHLORIDE, POTASSIUM CHLORIDE, SODIUM LACTATE AND CALCIUM CHLORIDE: 600; 310; 30; 20 INJECTION, SOLUTION INTRAVENOUS at 07:25

## 2023-12-30 RX ADMIN — LIDOCAINE HYDROCHLORIDE 50 MG: 10 INJECTION, SOLUTION EPIDURAL; INFILTRATION; INTRACAUDAL; PERINEURAL at 07:48

## 2023-12-30 ASSESSMENT — PAIN DESCRIPTION - DESCRIPTORS: DESCRIPTORS: SHARP

## 2023-12-30 ASSESSMENT — PAIN - FUNCTIONAL ASSESSMENT
PAIN_FUNCTIONAL_ASSESSMENT: NONE - DENIES PAIN
PAIN_FUNCTIONAL_ASSESSMENT: NONE - DENIES PAIN

## 2023-12-30 ASSESSMENT — PAIN DESCRIPTION - LOCATION: LOCATION: ABDOMEN

## 2023-12-30 ASSESSMENT — PAIN SCALES - GENERAL: PAINLEVEL_OUTOF10: 8

## 2023-12-30 ASSESSMENT — PAIN DESCRIPTION - ORIENTATION: ORIENTATION: MID

## 2023-12-30 NOTE — DISCHARGE INSTRUCTIONS
You are admitted with hematemesis and concern for gastrointestinal bleed  Gastroenterology evaluated you, esophagogastroduodenoscopy shows varices but no active bleeding  Colonoscopy is done which does not show any active source of bleeding  You are given blood transfusion  Your blood counts are stable now  Please continue taking your medications as advised  Please see the gastroenterologist in 1 to 2-week  Continue diet with low salt content, total sodium intake <2 gm daily  Follow-up with your family doctor in 1 to 2-week  Return to the ED for any new onset or worsening of symptoms

## 2023-12-30 NOTE — PROGRESS NOTES
[facility]  Internal Medicine Teaching Residency Program  Inpatient Daily Progress Note  ______________________________________________________________________________    Patient: Hudson Tim  YOB: 1985   OEQ:[id number]    Acct: [de-identified]     Room: [address] [place] [address]  Admit date: 12/28/2023  Today's date: 12/30/23  Number of days in the hospital: 2    SUBJECTIVE   Admitting Diagnosis: Upper GI bleed  CC: Hematemesis     Pt was not seen today as he was in OR with GI for colonoscopy   Labs and charts reviewed. Leukocytosis , hemoglobin stable, potassium 3.3-replaced, mag pending  Afebrile, hemodynamically stable, saturating well on room air. EGD inconclusive for GI bleed source, on clear liquid diet, received bowel prep for colonoscopy today with GI.  -Per GI on colonoscopy: no active bleeding noted, 4 to 5 mm ulcer above the anorectal area can be related to ischemia VS pressure ulcer VS stercoral colitis from constipation, not biopsy > recommending avoiding constipation, low-sodium diet and cleared for discharge from GI standpoint    Review of Systems  ROS cannot be done at this time as patient was in OR for colonoscopy, will check on later today    BRIEF HISTORY     The patient is a pleasant 41 y.o. male with a PMHx significant for      Decompensated liver cirrhosis with portal hypertension and esophageal varices  Alcohol use disorder     presents with a chief complaint of hematemesis, 1 episode, small-volume which occurred this morning, associated with nausea and epigastric discomfort. Complains of diarrhea, 6-8 bowel movements since yesterday, formed stools. Patient reports not taking lactulose. Denies any symptoms of fever, chills, cough or chest pain, shortness of breath. Patient was recently hospitalized with similar complaints, was intubated, underwent IR guided TIPS and banding of esophageal varices.   He was also started on steroids for 2. No biliary dilatation. 3. The tips of the portal vein and the middle hepatic vein are patent. The proximal end of the shunt is noted not well visualized due to metallic artifacts. The distal end appears to be patent.     US GALLBLADDER RUQ    Result Date: 12/22/2023  1. Hepatic steatosis and cirrhosis with TIPS shunt in-situ. 2. Diffuse gallbladder wall thickening with pericholecystic fluid. This may be related to the patient's underlying liver disease. No calculi are seen. If there is clinical concern for acute cholecystitis, HIDA scan can be obtained for further evaluation. 3. Small amount of ascites.     CT ABDOMEN PELVIS W IV CONTRAST Additional Contrast? None    Result Date: 12/22/2023  1. Hepatic cirrhosis and portal hypertension with splenomegaly, small amount of ascites and diffuse fat stranding.  A tTIPS has been placed and is believed to be patent given central opacification. 2. New thickening of the gallbladder which could be related 2 hepatic cirrhosis and ascites, but cholecystitis cannot be excluded.  There is also cholelithiasis. 3. Thickening of the proximal jejunum and of the colon from the cecum to the mid transverse colon.  This could also be related to portal hypertension and ascites, but enterocolitis cannot be excluded.     XR CHEST (2 VW)    Result Date: 12/22/2023  Minimal left basilar atelectasis       ASSESSMENT & PLAN     ASSESSMENT / PLAN:     IMPRESSION  This is a 38 y.o. male with a PMHx significant for alcohol use, decompensated liver cirrhosis who presented with hematemesis and found to have upper GI bleed.   Patient admitted to inpatient status for further management.     Principal Problem:    Upper GI bleed  Active Problems:    Acute alcoholic hepatitis    Nausea and vomiting  Resolved Problems:    * No resolved hospital problems. *     Principal Problem:   GI bleed upper vs lower   Hematemesis  -Likely from bleeding esophageal varices - ruled out on EGD 12/29  -Recent EGD on

## 2023-12-30 NOTE — PLAN OF CARE
Problem: Chronic Conditions and Co-morbidities  Goal: Patient's chronic conditions and co-morbidity symptoms are monitored and maintained or improved  Outcome: Completed     Problem: Pain  Goal: Verbalizes/displays adequate comfort level or baseline comfort level  Outcome: Completed     Problem: Safety - Adult  Goal: Free from fall injury  Outcome: Completed     Problem: Discharge Planning  Goal: Discharge to home or other facility with appropriate resources  Outcome: Completed

## 2023-12-30 NOTE — OP NOTE
Colonoscopy report    Colonoscopy Procedure Note    Procedure:  Colonoscopy    Procedure Date: 12/30/23    Indications:  GI bleed    Sedation:  MAC    Attending Physician:  Dr. Eliu Washburn MD.     Assistant Physician: None    Consent:   Informed consent was obtained for the procedure after explaining the risks including bleeding, perforation, aspiration, arrhythmia, risks related to sedation, reaction to medications and rarely death, benefits and alternatives to the patient. The patient verbalized understanding and agreed to proceed with the procedure. Procedure Details: The patient was placed in the left lateral decubitus position. Oxygen and cardiac monitoring equipment was attached. The patient's vital signs were monitored continuously  throughout the procedure. After appropriate sedation was achieved, a rectal examination was performed. The colonoscope was inserted into the rectum and advanced under direct vision to the terminal ileum. The quality of the colonic preparation was adequate. A careful inspection was made as the colonoscope was withdrawn, including a retroflexed view of the rectum; findings and interventions are described below. Appropriate photodocumentation was obtained. Complications:  None    Estimated blood loss:  Minimal           Disposition:  [facility]           Condition: stable    Withdrawal Time: 12 minutes    Findings: The bowel prep was adequate  Terminal ileum was normal  Some pinkish material was noted throughout the colon, no active bleeding was noted. A 4 to 5 mm ulcer was noted just above the anorectal area. This would likely signify stercoral colitis vs ischemia vs pressure ulcer. A biopsy was not taken today.     Specimen Removed: None    Endoscopic Impression:    Adequate bowel prep  No active bleeding noted throughout  4 to 5 mm ulcer noted above the anorectal area, could be related to ischemia versus pressure ulcer versus stercoral colitis from

## 2023-12-30 NOTE — CARE COORDINATION
Case Management Assessment  Initial Evaluation    Date/Time of Evaluation: 12/30/2023 5:57 PM  Assessment Completed by: Susana Cardoso    If patient is discharged prior to next notation, then this note serves as note for discharge by case management. Patient Name: Hugo Fuller                   YOB: 1985  Diagnosis: Upper GI bleed [K92.2]                   Date / Time: 12/28/2023 12:27 PM    Patient Admission Status: Inpatient   Readmission Risk (Low < 19, Mod (19-27), High > 27): Readmission Risk Score: 36.2    Current PCP: Mariluz Malik MD  PCP verified by CM? (P) Yes (Dr Mariluz Malik)    Chart Reviewed: Yes      History Provided by: (P) Patient  Patient Orientation: (P) Alert and Oriented, Person, Place, Situation, Self    Patient Cognition: (P) Alert    Hospitalization in the last 30 days (Readmission):  Yes    If yes, Readmission Assessment in  Navigator will be completed.     Advance Directives:      Code Status: Prior   Patient's Primary Decision Maker is: (P) Legal Next of Kin    Primary Decision Maker: CHRISTOPHER GROSSMAN - Domestic Partner - [phone]    Discharge Planning:    Patient lives with: (P) Spouse/Significant Other Type of Home: (P) House  Primary Care Giver: (P) Self  Patient Support Systems include: (P) Spouse/Significant Other, Parent   Current Financial resources: (P) Medicaid  Current community resources:    Current services prior to admission: (P) None            Current DME:              Type of Home Care services:  (P) None    ADLS  Prior functional level: (P) Independent in ADLs/IADLs  Current functional level: (P) Independent in ADLs/IADLs    PT AM-PAC:   /24  OT AM-PAC:   /24    Family can provide assistance at DC: (P) Yes  Would you like Case Management to discuss the discharge plan with any other family members/significant others, and if so, who? (P) No  Plans to Return to Present Housing: (P) Yes  Other Identified Issues/Barriers to RETURNING to current

## 2023-12-30 NOTE — ANESTHESIA PRE PROCEDURE
\"POCBUN\", \"POCHEMO\", \"POCHCT\" in the last 72 hours. Coags:   Lab Results   Component Value Date/Time    PROTIME 19.3 12/29/2023 04:36 AM    INR 1.7 12/29/2023 04:36 AM    APTT 34.7 12/29/2023 04:36 AM       HCG (If Applicable): No results found for: \"PREGTESTUR\", \"PREGSERUM\", \"HCG\", \"HCGQUANT\"     ABGs:   Lab Results   Component Value Date/Time    PHART 7.436 04/30/2022 01:43 PM    PO2ART 69.0 04/30/2022 01:43 PM    RHZ6HQQ 29.1 04/30/2022 01:43 PM    BWW9SNC 19.6 04/30/2022 01:43 PM    I5OEBKMU 93.1 04/30/2022 01:43 PM        Type & Screen (If Applicable):  No results found for: \"LABABO\", \"LABRH\"    Drug/Infectious Status (If Applicable):  Lab Results   Component Value Date/Time    HEPCAB NONREACTIVE 12/23/2023 06:58 AM       COVID-19 Screening (If Applicable):   Lab Results   Component Value Date/Time    COVID19 Not Detected 12/22/2023 04:42 PM    COVID19 Not Detected 04/27/2022 06:22 PM           Anesthesia Evaluation  Patient summary reviewed and Nursing notes reviewed   no history of anesthetic complications:   Airway: Mallampati: II  TM distance: >3 FB   Neck ROM: full  Mouth opening: > = 3 FB   Dental:    (+) poor dentition      Pulmonary:   (+) pneumonia: resolved and no interval change,           current smoker          Patient did not smoke on day of surgery. Cardiovascular:    (+) hypertension: no interval change      ECG reviewed      Echocardiogram reviewed               ROS comment: 12/23  Normal sinus rhythm  Left ventricular hypertrophy with repolarization abnormality  Abnormal ECG  When compared with ECG of 12-DEC-2023 09:11,  T wave inversion less evident in Lateral leads    11/22/2022    ----------------------------------------------------------------------------  FINDINGS  Left Atrium  Left atrium is mildly dilated. Left Ventricle  Left ventricle is normal in size. Global left ventricular systolic function  is normal Estimated ejection fraction is 55-60 % .   No obvious wall
Other Findings:       Anesthesia Plan      MAC and TIVA     ASA 4     (Reviewed all available relevant information, laboratory results and diagnostic tests. Discussed risks , benefits and alternatives of anesthetic and sedation options. Possible need  for blood transfusions discussed. Pt / family given opportunity to ask questions. All questions answered. Possible GA/ TIVA/ ETT/ LMA  back up)  Induction: intravenous. Anesthetic plan and risks discussed with patient. Use of blood products discussed with patient whom consented to blood products. Plan discussed with CRNA.     Attending anesthesiologist reviewed and agrees with Bradford Johnson MD   12/30/2023

## 2023-12-30 NOTE — PROGRESS NOTES
RN removed patient IV & went over discharge paperwork at bedside-- all questions answered. Patient left with paperwork & all personal belongings. Patient left ambulatory.

## 2023-12-31 NOTE — DISCHARGE SUMMARY
[facility]     Department of Internal Medicine - Staff Internal Medicine Teaching Service    INPATIENT DISCHARGE SUMMARY      Patient Identification:  Maurisio Emerson is a 38 y.o. male.  :  1985  MRN: [id number]     Acct: [id number]   PCP: Rosie Montes De Oca MD  Admit Date:  2023  Discharge date and time: 2023 12:56 PM   Attending Provider: MAURISIO HART MD                                  ACTIVE DISCHARGE DIAGNOSES     Hospital Problem Lists:  Principal Problem (Resolved):    Upper GI bleed  Active Problems:    Acute alcoholic hepatitis  Resolved Problems:    Nausea and vomiting      HOSPITAL STAY     Brief Inpatient course:   Maurisio Emerson is a 38 y.o. male  a PMHx significant for Decompensated liver cirrhosis with portal hypertension and esophageal varices s/p TIPS and esophageal varices banding, Alcohol use disorder who was admitted for the management of Upper GI bleed, presented to the emergency department with a chief complaint of hematemesis, 1 episode, small-volume which occurred this morning, associated with nausea and epigastric discomfort.  Complains of diarrhea, 6-8 bowel movements since yesterday, formed stools, not taking lactulose. Denies any symptoms of fever, chills, cough or chest pain, shortness of breath.  Patient was recently discharged on steroids for acute alcoholic hepatitis. Reports compliance with all the medications.  Patient remained Hemodynamically stable. Initial hemoglobin 9, had a second episode of hematemesis in the ED, Conjugated hyperbilirubinemia. TB: 17.2 direct bilirubin 12.8, Hypokalemia 3.4, GI consulted, started on Protonix and octreotide drip with IV fluid, HGB dropped to 6.4 received 1 unit PRBC, repeated 6.8, another unit of PRBC transfused overnight, next Day patient had a large dark bloody bowel movement prior to EGD, EGD was inconclusive, showed small esophageal varices x 2 without High risk features or bleeding,

## 2024-01-02 ENCOUNTER — TELEPHONE (OUTPATIENT)
Dept: GASTROENTEROLOGY | Age: 39
End: 2024-01-02

## 2024-01-02 ENCOUNTER — APPOINTMENT (OUTPATIENT)
Dept: GENERAL RADIOLOGY | Age: 39
End: 2024-01-02
Payer: COMMERCIAL

## 2024-01-02 ENCOUNTER — APPOINTMENT (OUTPATIENT)
Dept: ULTRASOUND IMAGING | Age: 39
End: 2024-01-02
Payer: COMMERCIAL

## 2024-01-02 ENCOUNTER — HOSPITAL ENCOUNTER (EMERGENCY)
Age: 39
Discharge: LEFT BEFORE DISCHARGE | End: 2024-01-02
Attending: EMERGENCY MEDICINE
Payer: COMMERCIAL

## 2024-01-02 VITALS
BODY MASS INDEX: 24.96 KG/M2 | HEART RATE: 78 BPM | TEMPERATURE: 97.6 F | HEIGHT: 68 IN | SYSTOLIC BLOOD PRESSURE: 117 MMHG | OXYGEN SATURATION: 100 % | DIASTOLIC BLOOD PRESSURE: 66 MMHG | RESPIRATION RATE: 15 BRPM | WEIGHT: 164.68 LBS

## 2024-01-02 DIAGNOSIS — R10.84 GENERALIZED ABDOMINAL PAIN: ICD-10-CM

## 2024-01-02 DIAGNOSIS — E80.6 HYPERBILIRUBINEMIA: Primary | ICD-10-CM

## 2024-01-02 PROBLEM — K72.90 DECOMPENSATED HEPATIC CIRRHOSIS (HCC): Status: ACTIVE | Noted: 2024-01-02

## 2024-01-02 PROBLEM — K74.60 DECOMPENSATED HEPATIC CIRRHOSIS (HCC): Status: ACTIVE | Noted: 2024-01-02

## 2024-01-02 LAB
ALBUMIN SERPL-MCNC: 2.6 G/DL (ref 3.5–5.2)
ALP SERPL-CCNC: 300 U/L (ref 40–129)
ALT SERPL-CCNC: 23 U/L (ref 5–41)
AMMONIA PLAS-SCNC: 70 UMOL/L (ref 16–60)
ANION GAP SERPL CALCULATED.3IONS-SCNC: 12 MMOL/L (ref 9–17)
AST SERPL-CCNC: 58 U/L
BACTERIA URNS QL MICRO: NORMAL
BASOPHILS # BLD: 0 K/UL (ref 0–0.2)
BASOPHILS NFR BLD: 0 % (ref 0–2)
BILIRUB DIRECT SERPL-MCNC: 17.7 MG/DL
BILIRUB INDIRECT SERPL-MCNC: 4.4 MG/DL (ref 0–1)
BILIRUB SERPL-MCNC: 22.1 MG/DL (ref 0.3–1.2)
BILIRUB UR QL STRIP: ABNORMAL
BUN SERPL-MCNC: 11 MG/DL (ref 6–20)
CALCIUM SERPL-MCNC: 8 MG/DL (ref 8.6–10.4)
CASTS #/AREA URNS LPF: NORMAL /LPF (ref 0–8)
CHLORIDE SERPL-SCNC: 105 MMOL/L (ref 98–107)
CLARITY UR: CLEAR
CO2 SERPL-SCNC: 20 MMOL/L (ref 20–31)
COLOR UR: ABNORMAL
CREAT SERPL-MCNC: <0.4 MG/DL (ref 0.7–1.2)
EOSINOPHIL # BLD: 0 K/UL (ref 0–0.4)
EOSINOPHILS RELATIVE PERCENT: 0 % (ref 1–4)
EPI CELLS #/AREA URNS HPF: NORMAL /HPF (ref 0–5)
ERYTHROCYTE [DISTWIDTH] IN BLOOD BY AUTOMATED COUNT: 24.3 % (ref 11.8–14.4)
GFR SERPL CREATININE-BSD FRML MDRD: ABNORMAL ML/MIN/1.73M2
GLUCOSE SERPL-MCNC: 116 MG/DL (ref 70–99)
GLUCOSE UR STRIP-MCNC: NEGATIVE MG/DL
HCT VFR BLD AUTO: 30.1 % (ref 40.7–50.3)
HGB BLD-MCNC: 9.9 G/DL (ref 13–17)
HGB UR QL STRIP.AUTO: NEGATIVE
IMM GRANULOCYTES # BLD AUTO: 0 K/UL (ref 0–0.3)
IMM GRANULOCYTES NFR BLD: 0 %
INR PPP: 1.9
KETONES UR STRIP-MCNC: NEGATIVE MG/DL
LACTIC ACID, SEPSIS WHOLE BLOOD: 1.9 MMOL/L (ref 0.5–1.9)
LEUKOCYTE ESTERASE UR QL STRIP: ABNORMAL
LYMPHOCYTES NFR BLD: 0.16 K/UL (ref 1–4.8)
LYMPHOCYTES RELATIVE PERCENT: 1 % (ref 24–44)
MCH RBC QN AUTO: 32.2 PG (ref 25.2–33.5)
MCHC RBC AUTO-ENTMCNC: 32.9 G/DL (ref 28.4–34.8)
MCV RBC AUTO: 98 FL (ref 82.6–102.9)
MONOCYTES NFR BLD: 1.11 K/UL (ref 0.1–0.8)
MONOCYTES NFR BLD: 7 % (ref 1–7)
MORPHOLOGY: ABNORMAL
NEUTROPHILS NFR BLD: 92 % (ref 36–66)
NEUTS SEG NFR BLD: 14.53 K/UL (ref 1.8–7.7)
NITRITE UR QL STRIP: NEGATIVE
NRBC BLD-RTO: 0 PER 100 WBC
PARTIAL THROMBOPLASTIN TIME: 34.8 SEC (ref 23–36.5)
PH UR STRIP: 7 [PH] (ref 5–8)
PLATELET # BLD AUTO: ABNORMAL K/UL (ref 138–453)
PLATELET, FLUORESCENCE: 104 K/UL (ref 138–453)
PLATELETS.RETICULATED NFR BLD AUTO: 2.7 % (ref 1.1–10.3)
POTASSIUM SERPL-SCNC: 3.7 MMOL/L (ref 3.7–5.3)
PROT SERPL-MCNC: 6.7 G/DL (ref 6.4–8.3)
PROT UR STRIP-MCNC: NEGATIVE MG/DL
PROTHROMBIN TIME: 21.7 SEC (ref 11.7–14.9)
RBC # BLD AUTO: 3.07 M/UL (ref 4.21–5.77)
RBC #/AREA URNS HPF: NORMAL /HPF (ref 0–4)
SODIUM SERPL-SCNC: 137 MMOL/L (ref 135–144)
SP GR UR STRIP: 1.01 (ref 1–1.03)
UROBILINOGEN UR STRIP-ACNC: NORMAL EU/DL (ref 0–1)
WBC #/AREA URNS HPF: NORMAL /HPF (ref 0–5)
WBC OTHER # BLD: 15.8 K/UL (ref 3.5–11.3)

## 2024-01-02 PROCEDURE — 85730 THROMBOPLASTIN TIME PARTIAL: CPT

## 2024-01-02 PROCEDURE — 71045 X-RAY EXAM CHEST 1 VIEW: CPT

## 2024-01-02 PROCEDURE — 85610 PROTHROMBIN TIME: CPT

## 2024-01-02 PROCEDURE — 85055 RETICULATED PLATELET ASSAY: CPT

## 2024-01-02 PROCEDURE — 96375 TX/PRO/DX INJ NEW DRUG ADDON: CPT | Performed by: EMERGENCY MEDICINE

## 2024-01-02 PROCEDURE — 82140 ASSAY OF AMMONIA: CPT

## 2024-01-02 PROCEDURE — 99284 EMERGENCY DEPT VISIT MOD MDM: CPT | Performed by: EMERGENCY MEDICINE

## 2024-01-02 PROCEDURE — 85025 COMPLETE CBC W/AUTO DIFF WBC: CPT

## 2024-01-02 PROCEDURE — 96365 THER/PROPH/DIAG IV INF INIT: CPT | Performed by: EMERGENCY MEDICINE

## 2024-01-02 PROCEDURE — 76705 ECHO EXAM OF ABDOMEN: CPT

## 2024-01-02 PROCEDURE — 80048 BASIC METABOLIC PNL TOTAL CA: CPT

## 2024-01-02 PROCEDURE — 83605 ASSAY OF LACTIC ACID: CPT

## 2024-01-02 PROCEDURE — 6360000002 HC RX W HCPCS: Performed by: STUDENT IN AN ORGANIZED HEALTH CARE EDUCATION/TRAINING PROGRAM

## 2024-01-02 PROCEDURE — APPNB180 APP NON BILLABLE TIME > 60 MINS: Performed by: NURSE PRACTITIONER

## 2024-01-02 PROCEDURE — 93975 VASCULAR STUDY: CPT

## 2024-01-02 PROCEDURE — 87040 BLOOD CULTURE FOR BACTERIA: CPT

## 2024-01-02 PROCEDURE — 99222 1ST HOSP IP/OBS MODERATE 55: CPT | Performed by: INTERNAL MEDICINE

## 2024-01-02 PROCEDURE — 81001 URINALYSIS AUTO W/SCOPE: CPT

## 2024-01-02 PROCEDURE — 2580000003 HC RX 258: Performed by: STUDENT IN AN ORGANIZED HEALTH CARE EDUCATION/TRAINING PROGRAM

## 2024-01-02 PROCEDURE — 96376 TX/PRO/DX INJ SAME DRUG ADON: CPT | Performed by: EMERGENCY MEDICINE

## 2024-01-02 PROCEDURE — 80076 HEPATIC FUNCTION PANEL: CPT

## 2024-01-02 RX ORDER — OXYCODONE HYDROCHLORIDE 5 MG/1
5 TABLET ORAL EVERY 8 HOURS PRN
Qty: 12 TABLET | Refills: 0 | Status: SHIPPED | OUTPATIENT
Start: 2024-01-02 | End: 2024-01-06

## 2024-01-02 RX ORDER — ONDANSETRON 2 MG/ML
4 INJECTION INTRAMUSCULAR; INTRAVENOUS ONCE
Status: COMPLETED | OUTPATIENT
Start: 2024-01-02 | End: 2024-01-02

## 2024-01-02 RX ORDER — FENTANYL CITRATE 50 UG/ML
50 INJECTION, SOLUTION INTRAMUSCULAR; INTRAVENOUS ONCE
Status: COMPLETED | OUTPATIENT
Start: 2024-01-02 | End: 2024-01-02

## 2024-01-02 RX ORDER — ONDANSETRON 4 MG/1
4 TABLET, FILM COATED ORAL EVERY 8 HOURS PRN
Qty: 20 TABLET | Refills: 0 | Status: SHIPPED | OUTPATIENT
Start: 2024-01-02

## 2024-01-02 RX ORDER — CIPROFLOXACIN 500 MG/1
500 TABLET, FILM COATED ORAL 2 TIMES DAILY
Qty: 20 TABLET | Refills: 0 | Status: SHIPPED | OUTPATIENT
Start: 2024-01-02 | End: 2024-01-12

## 2024-01-02 RX ADMIN — FENTANYL CITRATE 50 MCG: 50 INJECTION, SOLUTION INTRAMUSCULAR; INTRAVENOUS at 12:03

## 2024-01-02 RX ADMIN — CEFTRIAXONE SODIUM 1000 MG: 1 INJECTION, POWDER, FOR SOLUTION INTRAMUSCULAR; INTRAVENOUS at 14:52

## 2024-01-02 RX ADMIN — FENTANYL CITRATE 50 MCG: 50 INJECTION, SOLUTION INTRAMUSCULAR; INTRAVENOUS at 07:18

## 2024-01-02 RX ADMIN — ONDANSETRON 4 MG: 2 INJECTION INTRAMUSCULAR; INTRAVENOUS at 07:18

## 2024-01-02 ASSESSMENT — PAIN SCALES - GENERAL
PAINLEVEL_OUTOF10: 8
PAINLEVEL_OUTOF10: 8
PAINLEVEL_OUTOF10: 6

## 2024-01-02 ASSESSMENT — ENCOUNTER SYMPTOMS
ABDOMINAL PAIN: 1
DIARRHEA: 0
COLOR CHANGE: 1
NAUSEA: 0

## 2024-01-02 ASSESSMENT — PAIN DESCRIPTION - LOCATION: LOCATION: ABDOMEN

## 2024-01-02 ASSESSMENT — PAIN - FUNCTIONAL ASSESSMENT: PAIN_FUNCTIONAL_ASSESSMENT: 0-10

## 2024-01-02 ASSESSMENT — PAIN DESCRIPTION - PAIN TYPE: TYPE: ACUTE PAIN;CHRONIC PAIN

## 2024-01-02 NOTE — ED NOTES
IR called writer, reports there is no fluid to drain and they will call for transport to send pt back to ED

## 2024-01-02 NOTE — PROGRESS NOTES
US of 4 abdominal quadrants showed small amount of ascites by the dome of the liver not suitable for safe US guided paracentesis.

## 2024-01-02 NOTE — TELEPHONE ENCOUNTER
Writer called and LVM for pt to call office back to schedule hosp follow up  with Dr. Weiner in 2 weeks per Dr. Weiner.    Thank you!    Sydney

## 2024-01-02 NOTE — DISCHARGE INSTRUCTIONS
We wanted to admit you for concern for possible infection in your abdomen you elected to sign out AGAINST MEDICAL ADVICE you understood the risks including worsening infection and possibly death please return the emergency department at any time especially if your symptoms worsen.  Take the antibiotics prescribed you use Zofran for nausea and Roxicodone for pain.  Follow-up with primary care physician in the next 2 days and call GI and follow-up with them as well

## 2024-01-02 NOTE — PROGRESS NOTES
Pt arrives to IR for paracentesis  Upon imaging there is not enough fluid to safely perform procedure.  Explained to patient.  Additional images obtained  Return to floor

## 2024-01-02 NOTE — ED NOTES
Pt c/o abdominal pain x 1 day. Pt is in liver failure and very jaundice. Pt reports that he has had a paracentesis a year ago. Also reports that he has had some internal bleeding recently. December 5th pt was vomiting gross amounts of blood and intubated. Pt is alert and oriented. NAD at this time. Abdomen appears mildly distended and firm but very uncomfortable. Pt reports some nausea and is unable to find a position of comfort. Placed on cardiac monitor, IV established. Awaiting orders, will continue to monitor

## 2024-01-02 NOTE — TELEPHONE ENCOUNTER
----- Message from SILVA Bailey CNP sent at 12/30/2023 11:38 AM EST -----  Regarding: follow up  Pt will need squeezed in Friday this week or Monday because he has Alcoholic hepatitis and is being dc on steroids, will need a Lille score calculated to determine if they should be continued    Dc today    Alex

## 2024-01-02 NOTE — ED NOTES
ED to inpatient nurses report      Chief Complaint:  Chief Complaint   Patient presents with    Abdominal Pain      Would like Paracentesis, has had it done one other time last year.       Jaundice     Liver failure x a few years       Present to ED from: home    MOA:     LOC: alert and orientated to name, place, date  Mobility: Independent  Oxygen Baseline: room air    Current needs required: room air   Pending ED orders: n/a  Present condition: stable    Why did the patient come to the ED? Pt c/o abdominal pain x 1 day. Pt is in liver failure and very jaundice. Pt reports that he has had a paracentesis a year ago. Also reports that he has had some internal bleeding recently. December 5th pt was vomiting gross amounts of blood and intubated. Pt is alert and oriented. NAD at this time. Abdomen appears mildly distended and firm but very uncomfortable. Pt reports some nausea and is unable to find a position of comfort.   What is the plan? IR for paracentesis, pain control   Any procedures or intervention occur? IR  Any safety concerns?? N/a    Mental Status:  Level of Consciousness: Alert (0)    Psych Assessment:   Psychosocial  Psychosocial (WDL): Within Defined Limits  Vital signs   Vitals:    01/02/24 0653 01/02/24 0730 01/02/24 1045   BP: 138/82 122/74 (!) 106/58   Pulse: 70 73 80   Resp: (!) 8 13 14   Temp: 97.6 °F (36.4 °C)     TempSrc: Oral     SpO2: 100% 100% 99%   Weight: 74.7 kg (164 lb 10.9 oz)     Height: 1.727 m (5' 8\")          Vitals:  Patient Vitals for the past 24 hrs:   BP Temp Temp src Pulse Resp SpO2 Height Weight   01/02/24 1045 (!) 106/58 -- -- 80 14 99 % -- --   01/02/24 0730 122/74 -- -- 73 13 100 % -- --   01/02/24 0653 138/82 97.6 °F (36.4 °C) Oral 70 (!) 8 100 % 1.727 m (5' 8\") 74.7 kg (164 lb 10.9 oz)      Visit Vitals  BP (!) 106/58   Pulse 80   Temp 97.6 °F (36.4 °C) (Oral)   Resp 14   Ht 1.727 m (5' 8\")   Wt 74.7 kg (164 lb 10.9 oz)   SpO2 99%   BMI 25.04 kg/m²        LDAs:   Peripheral

## 2024-01-02 NOTE — ED NOTES
The following labs were labeled with appropriate pt sticker and tubed to lab:     [x] Blue     [] Lavender   [] on ice  [] Green/yellow  [] Green/black [] on ice  [] Grey  [] on ice  [] Yellow  [] Red  [] Pink  [] Type/ Screen  [] ABG  [] VBG    [] COVID-19 swab    [] Rapid  [] PCR  [] Flu swab  [] Peds Viral Panel     [] Urine Sample  [] Fecal Sample  [] Pelvic Cultures  [] Blood Cultures  [] X 2  [] STREP Cultures  [] Wound Cultures

## 2024-01-02 NOTE — ED NOTES
Pt resting on stretcher, attached to cardiac monitor and continuous spo2, call light in reach, RR even and non labored, verbalizes no needs at this time.

## 2024-01-02 NOTE — ED NOTES
The following labs were labeled with appropriate pt sticker and tubed to lab:     [] Blue     [] Lavender   [] on ice  [] Green/yellow  [] Green/black [] on ice  [] Grey  [] on ice  [] Yellow  [] Red  [] Pink  [] Type/ Screen  [] ABG  [] VBG    [] COVID-19 swab    [] Rapid  [] PCR  [] Flu swab  [] Peds Viral Panel     [] Urine Sample  [] Fecal Sample  [] Pelvic Cultures  [x] Blood Cultures  [x] X 2  [] STREP Cultures  [] Wound Cultures

## 2024-01-02 NOTE — ED PROVIDER NOTES
[facility]     Emergency Department     Faculty Attestation    I performed a history and physical examination of the patient and discussed management with the resident. I reviewed the resident’s note and agree with the documented findings and plan of care. Any areas of disagreement are noted on the chart. I was personally present for the key portions of any procedures. I have documented in the chart those procedures where I was not present during the key portions. I have reviewed the emergency nurses triage note. I agree with the chief complaint, past medical history, past surgical history, allergies, medications, social and family history as documented unless otherwise noted below.        For Physician Assistant/ Nurse Practitioner cases/documentation I have personally evaluated this patient and have completed at least one if not all key elements of the E/M (history, physical exam, and MDM). Additional findings are as noted.  I have personally seen and evaluated the patient.  I find the patient's history and physical exam are consistent with the NP/PA documentation.  I agree with the care provided, treatment rendered, disposition and follow-up plan.    Reporting progressive abdominal pain and swelling with a history of cirrhosis has had recent admissions for what appears to be GI bleeding.  On examination vital signs are stable the patient is afebrile here patient is abdomen is noted to be slightly distended and diffusely tender but generally soft without peritoneal signs. not a profound ascitic abdomen on and on examination.  Laboratory studies pending      Critical Care     Raciel Banda M.D.  Attending Emergency  Physician           Raciel Banda MD  01/02/24 0801    
of Transportation (Medical): No     Lack of Transportation (Non-Medical): No   Physical Activity: Not on file   Stress: Not on file   Social Connections: Not on file   Intimate Partner Violence: Not on file   Housing Stability: Low Risk  (12/29/2023)    Housing Stability Vital Sign     Unable to Pay for Housing in the Last Year: No     Number of Places Lived in the Last Year: 1     Unstable Housing in the Last Year: No       Family History   Problem Relation Age of Onset    Heart Disease Mother     Heart Attack Mother     Heart Disease Father        Allergies:  Bee venom    Home Medications:  Prior to Admission medications    Medication Sig Start Date End Date Taking? Authorizing Provider   prednisoLONE 15 MG/5ML solution Take 13.3 mLs by mouth daily for 20 days 12/30/23 1/19/24  Madiha Moncada MD   nicotine (NICODERM CQ) 7 MG/24HR Place 1 patch onto the skin daily 12/27/23   Madiha Moncada MD   pantoprazole (PROTONIX) 40 MG tablet Take 1 tablet by mouth in the morning and 1 tablet in the evening. 12/15/23   Paramjit Barrera MD   lactulose (CHRONULAC) 10 GM/15ML solution Take 15 mLs by mouth 3 times daily 12/15/23   Paramjit Barrera MD   rifAXIMin (XIFAXAN) 550 MG tablet Take 1 tablet by mouth in the morning and at bedtime 12/15/23   Paramjit Barrera MD   vitamin B-1 (THIAMINE) 100 MG tablet Take 1 tablet by mouth daily 9/30/23   Marible Ayala MD   albuterol sulfate HFA (VENTOLIN HFA) 108 (90 Base) MCG/ACT inhaler Inhale 2 puffs into the lungs 4 times daily as needed for Wheezing  Patient not taking: Reported on 12/23/2023 2/23/23   Nick Magana MD   EPINEPHrine (EPIPEN 2-LIZETTE) 0.3 MG/0.3ML SOAJ injection Inject 0.3 mLs into the muscle once for 1 dose Use as directed for allergic reaction 2/23/23 2/23/23  Nick Magana MD   folic acid (FOLVITE) 1 MG tablet Take 1 tablet by mouth daily 5/4/22   Joselyn Isaac MD   naproxen (NAPROSYN) 500 MG tablet Take 1 tablet by mouth 2 times 
and bilateral mild pleural effusions. Organs: Liver: There is hepatic cirrhosis.  Liver is mildly enlarged.  There is no hepatic steatosis.  No discrete focal lesions are seen on this single phase examination.  A tips shunt is seen between the main portal vein and the middle hepatic vein.  The proximal end of the tips shunt is not well visualized due to metallic artifacts.  The distal and appears to be patent. Spleen: Spleen is enlarged measuring 17 cm in length.. Pancreas: Normal Gallbladder: Diffuse labral thickening is seen without any calculi.  CBD is normal in caliber measuring 3 mm.. Adrenal glands: Normal. No focal lesions are seen. CBD: Normal in caliber. Kidneys and ureters: Normal. There is no hydronephrosis or calculi. Ureters are non-dilated. Abdominal aorta and branches: Normal in caliber. IVC and portal vein: Normal in caliber. Urinary bladder: Normal wall thickness. GI/Bowel: Bowel loops are normal in caliber.  Mild wall thickening is seen in the jejunum.  No evidence of appendicitis. Peritoneum/Retroperitoneum: There is mild ascites.  Generalized body wall edema and fat stranding is seen.     1. Hepatic cirrhosis.  Splenomegaly, ascites, gallbladder wall thickening and small bowel wall thickening may be due to portal hypertension. 2. No biliary dilatation. 3. The tips of the portal vein and the middle hepatic vein are patent. The proximal end of the shunt is noted not well visualized due to metallic artifacts. The distal end appears to be patent.     US GALLBLADDER RUQ    Result Date: 12/22/2023  EXAMINATION: RIGHT UPPER QUADRANT ULTRASOUND 12/22/2023 8:10 pm COMPARISON: CT 12/22/2023. HISTORY: ORDERING SYSTEM PROVIDED HISTORY: eval cholecystitis TECHNOLOGIST PROVIDED HISTORY: eval cholecystitis FINDINGS: LIVER:  There is hepatic steatosis and cirrhosis.  A TIPS shunt is seen in-situ. BILIARY SYSTEM:  Gallbladder is diffusely thickened with pericholecystic fluid.  Wall thickness measures 3.4 mm.  
atelectasis left lung base.  Parenchymal opacities described on the prior study have essentially resolved.  Bilateral metallic chest wall piercings.  Faintly visualized tips stent.     Minimal left basilar atelectasis     Vascular duplex lower extremity venous bilateral    Result Date: 12/14/2023    No evidence of deep vein or superficial vein thrombosis in the bilateral lower extremities.     XR CHEST PORTABLE    Result Date: 12/11/2023  EXAMINATION: ONE XRAY VIEW OF THE CHEST 12/11/2023 2:46 pm COMPARISON: December 4, 2023 HISTORY: ORDERING SYSTEM PROVIDED HISTORY: shortness of breath TECHNOLOGIST PROVIDED HISTORY: shortness of breath FINDINGS: Bilateral perihilar edema with infiltrates in the right upper and right lower lobes.  Suggestion of small effusions.  Heart and mediastinum appear stable. Incidental TIPS shunt is noted in the right upper quadrant.     Bilateral perihilar edema as well as infiltrates in the right upper and right lower lobes.  Effusions are likely.  Pneumonia is likely.  Alternatively, this may represent fluid overload related to the TIPS shunt.  Heart and mediastinum appear stable.     IR TIPS INSERTION    Result Date: 12/6/2023  PROCEDURE: VR INSERTION OF TIPS; IR NONTUNNELED VASCULAR CATHETER General anesthesia 12/5/2023 HISTORY: ORDERING SYSTEM PROVIDED HISTORY: emergent TIPS procedure TECHNOLOGIST PROVIDED HISTORY: emergent TIPS procedure Massive hematemesis, unable to control with endoscopy TECHNIQUE: Ultrasound and fluoroscopy were utilized CONTRAST: 120 mL SEDATION: The patient was on ventilatory support prior to the procedure. FLUOROSCOPY DOSE AND TYPE: Radiation Exposure Index: Kerma mGy, DAP 34,966 micro gray meters squared DESCRIPTION OF PROCEDURE: Emergent procedure was requested by the patient's physician.  The patient is not able to consent due status and inability to reach family members to obtain proxy consent. I am one out of 2 concurring staff physicians documented that

## 2024-01-02 NOTE — TELEPHONE ENCOUNTER
Patient currently in the ER at [facility].  TE sent to [facility] to follow upon d/c.

## 2024-01-02 NOTE — CONSULTS
[facility] Gastroenterology  Consultation Note     .  Chief Complaint:  Abdominal pain  Reason for consult:    Recent TIPS  Concern for SBP    History of present illness: This is a 38 y.o. male with PMH including decompensated alcoholic cirrhosis with TIPS 12/4/2023, alcoholic pancreatitis, alcohol misuse disorder, GI bleeding with last endoscopy 12/29/2023 that revealed small esophageal varices x 2, moderate portal hypertensive gastropathy followed by colonoscopy that showed a 4 to 5 mm ulcer above the anal rectal area.  Patient was recently discharged, felt well at home and had been compliant with all of his medications including prednisone for alcoholic hepatitis.   Patient presents to the ER today with complaints of acute onset of diffuse sharp stabbing abdominal pain with distention.   Patient denies any hematemesis melena or hematochezia.  No encephalopathy, no fevers or chills.  Patient denies any alcohol consumption.   In the ED workup included attempt for paracentesis but ultrasound showed not enough fluid available for tap.   On exam, patient is extremely jaundiced with scleral icterus, negative for any signs of encephalopathy.   Review of labs show CBC with WBCs of 15.8 (11.8 on 12/30/2023), hemoglobin 9.9 improved from 8.4 g/dL, platelets 104.  BMP unremarkable  LFTs show alk phos 300, ammonia 70, AST 58 T. bili 22.1 (13.712/29/2023) , indirect 17.7  Patient did have ultrasound with Dopplers of the liver to assess patency of TIPS-pending      Previous GI history:   12/28/2023 EGD and Colonoscopy per Dr. Galloway:  Endoscopy  Impression:    Small esophageal varices x 2 without high risk features or bleeding.  Moderate portal hypertensive gastropathy  No gastric varices noted.     Recommendations:  Return to hospital [facility]  Clear liquid diet.  Bowel prep tonight and colonoscopy 12/30/23  Okay to DC octreotide and protonix     Attending Attestation: I performed the procedure.     Luciano Galloway MD

## 2024-01-06 PROBLEM — E80.6 HYPERBILIRUBINEMIA: Status: ACTIVE | Noted: 2024-01-06

## 2024-01-07 ENCOUNTER — HOSPITAL ENCOUNTER (INPATIENT)
Age: 39
LOS: 2 days | Discharge: HOME | End: 2024-01-09
Attending: EMERGENCY MEDICINE | Admitting: INTERNAL MEDICINE
Payer: COMMERCIAL

## 2024-01-07 DIAGNOSIS — K70.30 ALCOHOLIC CIRRHOSIS, UNSPECIFIED WHETHER ASCITES PRESENT (HCC): Primary | ICD-10-CM

## 2024-01-07 DIAGNOSIS — E80.6 HYPERBILIRUBINEMIA: ICD-10-CM

## 2024-01-07 PROBLEM — K72.90 LIVER FAILURE WITHOUT HEPATIC COMA (HCC): Status: ACTIVE | Noted: 2024-01-07

## 2024-01-07 LAB
ALBUMIN SERPL-MCNC: 2.7 G/DL (ref 3.5–5.2)
ALBUMIN/GLOB SERPL: 0.7 {RATIO} (ref 1–2.5)
ALP SERPL-CCNC: 317 U/L (ref 40–129)
ALT SERPL-CCNC: 29 U/L (ref 5–41)
ANION GAP SERPL CALCULATED.3IONS-SCNC: 15 MMOL/L (ref 9–17)
AST SERPL-CCNC: 59 U/L
BASOPHILS # BLD: 0 K/UL (ref 0–0.2)
BASOPHILS NFR BLD: 0 % (ref 0–2)
BILIRUB DIRECT SERPL-MCNC: 14.7 MG/DL
BILIRUB INDIRECT SERPL-MCNC: 5.2 MG/DL (ref 0–1)
BILIRUB SERPL-MCNC: 19.9 MG/DL (ref 0.3–1.2)
BUN SERPL-MCNC: 12 MG/DL (ref 6–20)
CALCIUM SERPL-MCNC: 8.2 MG/DL (ref 8.6–10.4)
CHLORIDE SERPL-SCNC: 104 MMOL/L (ref 98–107)
CO2 SERPL-SCNC: 17 MMOL/L (ref 20–31)
CREAT SERPL-MCNC: 0.3 MG/DL (ref 0.7–1.2)
EOSINOPHIL # BLD: 0.74 K/UL (ref 0–0.4)
EOSINOPHILS RELATIVE PERCENT: 3 % (ref 1–4)
ERYTHROCYTE [DISTWIDTH] IN BLOOD BY AUTOMATED COUNT: 25 % (ref 11.8–14.4)
ETHANOL PERCENT: <0.01 %
ETHANOLAMINE SERPL-MCNC: <10 MG/DL
GFR SERPL CREATININE-BSD FRML MDRD: >60 ML/MIN/1.73M2
GLUCOSE SERPL-MCNC: 161 MG/DL (ref 70–99)
HCT VFR BLD AUTO: 31.9 % (ref 40.7–50.3)
HGB BLD-MCNC: 10.3 G/DL (ref 13–17)
IMM GRANULOCYTES # BLD AUTO: 0 K/UL (ref 0–0.3)
IMM GRANULOCYTES NFR BLD: 0 %
INR PPP: 1.5
LACTIC ACID, WHOLE BLOOD: 1.2 MMOL/L (ref 0.7–2.1)
LDH SERPL-CCNC: 236 U/L (ref 135–225)
LIPASE SERPL-CCNC: 118 U/L (ref 13–60)
LYMPHOCYTES NFR BLD: 0.74 K/UL (ref 1–4.8)
LYMPHOCYTES RELATIVE PERCENT: 3 % (ref 24–44)
MAGNESIUM SERPL-MCNC: 2.1 MG/DL (ref 1.6–2.6)
MCH RBC QN AUTO: 32.4 PG (ref 25.2–33.5)
MCHC RBC AUTO-ENTMCNC: 32.3 G/DL (ref 28.4–34.8)
MCV RBC AUTO: 100.3 FL (ref 82.6–102.9)
MICROORGANISM SPEC CULT: NORMAL
MICROORGANISM SPEC CULT: NORMAL
MONOCYTES NFR BLD: 1.24 K/UL (ref 0.1–0.8)
MONOCYTES NFR BLD: 5 % (ref 1–7)
MORPHOLOGY: ABNORMAL
MORPHOLOGY: ABNORMAL
NEUTROPHILS NFR BLD: 89 % (ref 36–66)
NEUTS SEG NFR BLD: 22.08 K/UL (ref 1.8–7.7)
NRBC BLD-RTO: 0 PER 100 WBC
PLATELET # BLD AUTO: ABNORMAL K/UL (ref 138–453)
PLATELET, FLUORESCENCE: 129 K/UL (ref 138–453)
PLATELETS.RETICULATED NFR BLD AUTO: 3.9 % (ref 1.1–10.3)
POTASSIUM SERPL-SCNC: 2.9 MMOL/L (ref 3.7–5.3)
PROT SERPL-MCNC: 6.8 G/DL (ref 6.4–8.3)
PROTHROMBIN TIME: 18 SEC (ref 11.7–14.9)
RBC # BLD AUTO: 3.18 M/UL (ref 4.21–5.77)
SERVICE CMNT-IMP: NORMAL
SERVICE CMNT-IMP: NORMAL
SODIUM SERPL-SCNC: 136 MMOL/L (ref 135–144)
SPECIMEN DESCRIPTION: NORMAL
SPECIMEN DESCRIPTION: NORMAL
WBC OTHER # BLD: 24.8 K/UL (ref 3.5–11.3)

## 2024-01-07 PROCEDURE — 83735 ASSAY OF MAGNESIUM: CPT

## 2024-01-07 PROCEDURE — G0480 DRUG TEST DEF 1-7 CLASSES: HCPCS

## 2024-01-07 PROCEDURE — 6370000000 HC RX 637 (ALT 250 FOR IP)

## 2024-01-07 PROCEDURE — 96374 THER/PROPH/DIAG INJ IV PUSH: CPT

## 2024-01-07 PROCEDURE — 6370000000 HC RX 637 (ALT 250 FOR IP): Performed by: INTERNAL MEDICINE

## 2024-01-07 PROCEDURE — 2580000003 HC RX 258

## 2024-01-07 PROCEDURE — C9113 INJ PANTOPRAZOLE SODIUM, VIA: HCPCS | Performed by: INTERNAL MEDICINE

## 2024-01-07 PROCEDURE — 99285 EMERGENCY DEPT VISIT HI MDM: CPT

## 2024-01-07 PROCEDURE — A4216 STERILE WATER/SALINE, 10 ML: HCPCS | Performed by: INTERNAL MEDICINE

## 2024-01-07 PROCEDURE — 6360000002 HC RX W HCPCS

## 2024-01-07 PROCEDURE — 6370000000 HC RX 637 (ALT 250 FOR IP): Performed by: NURSE PRACTITIONER

## 2024-01-07 PROCEDURE — 85610 PROTHROMBIN TIME: CPT

## 2024-01-07 PROCEDURE — 80048 BASIC METABOLIC PNL TOTAL CA: CPT

## 2024-01-07 PROCEDURE — 83690 ASSAY OF LIPASE: CPT

## 2024-01-07 PROCEDURE — 85055 RETICULATED PLATELET ASSAY: CPT

## 2024-01-07 PROCEDURE — 96375 TX/PRO/DX INJ NEW DRUG ADDON: CPT

## 2024-01-07 PROCEDURE — 83605 ASSAY OF LACTIC ACID: CPT

## 2024-01-07 PROCEDURE — 87040 BLOOD CULTURE FOR BACTERIA: CPT

## 2024-01-07 PROCEDURE — 80076 HEPATIC FUNCTION PANEL: CPT

## 2024-01-07 PROCEDURE — 85025 COMPLETE CBC W/AUTO DIFF WBC: CPT

## 2024-01-07 PROCEDURE — 2580000003 HC RX 258: Performed by: INTERNAL MEDICINE

## 2024-01-07 PROCEDURE — 99223 1ST HOSP IP/OBS HIGH 75: CPT | Performed by: INTERNAL MEDICINE

## 2024-01-07 PROCEDURE — 83615 LACTATE (LD) (LDH) ENZYME: CPT

## 2024-01-07 PROCEDURE — 1200000000 HC SEMI PRIVATE

## 2024-01-07 PROCEDURE — 6360000002 HC RX W HCPCS: Performed by: INTERNAL MEDICINE

## 2024-01-07 RX ORDER — MAGNESIUM SULFATE IN WATER 40 MG/ML
2000 INJECTION, SOLUTION INTRAVENOUS PRN
Status: DISCONTINUED | OUTPATIENT
Start: 2024-01-07 | End: 2024-01-09 | Stop reason: HOSPADM

## 2024-01-07 RX ORDER — SODIUM CHLORIDE 0.9 % (FLUSH) 0.9 %
5-40 SYRINGE (ML) INJECTION PRN
Status: DISCONTINUED | OUTPATIENT
Start: 2024-01-07 | End: 2024-01-09 | Stop reason: HOSPADM

## 2024-01-07 RX ORDER — SODIUM CHLORIDE 9 MG/ML
INJECTION, SOLUTION INTRAVENOUS PRN
Status: DISCONTINUED | OUTPATIENT
Start: 2024-01-07 | End: 2024-01-09 | Stop reason: HOSPADM

## 2024-01-07 RX ORDER — SODIUM CHLORIDE 0.9 % (FLUSH) 0.9 %
5-40 SYRINGE (ML) INJECTION EVERY 12 HOURS SCHEDULED
Status: DISCONTINUED | OUTPATIENT
Start: 2024-01-07 | End: 2024-01-09 | Stop reason: HOSPADM

## 2024-01-07 RX ORDER — LORAZEPAM 2 MG/ML
1 INJECTION INTRAMUSCULAR
Status: DISCONTINUED | OUTPATIENT
Start: 2024-01-07 | End: 2024-01-09 | Stop reason: HOSPADM

## 2024-01-07 RX ORDER — 0.9 % SODIUM CHLORIDE 0.9 %
1000 INTRAVENOUS SOLUTION INTRAVENOUS ONCE
Status: COMPLETED | OUTPATIENT
Start: 2024-01-07 | End: 2024-01-07

## 2024-01-07 RX ORDER — ONDANSETRON 2 MG/ML
4 INJECTION INTRAMUSCULAR; INTRAVENOUS ONCE
Status: COMPLETED | OUTPATIENT
Start: 2024-01-07 | End: 2024-01-07

## 2024-01-07 RX ORDER — LANOLIN ALCOHOL/MO/W.PET/CERES
100 CREAM (GRAM) TOPICAL DAILY
Status: DISCONTINUED | OUTPATIENT
Start: 2024-01-07 | End: 2024-01-08

## 2024-01-07 RX ORDER — POTASSIUM CHLORIDE 7.45 MG/ML
10 INJECTION INTRAVENOUS PRN
Status: DISCONTINUED | OUTPATIENT
Start: 2024-01-07 | End: 2024-01-09 | Stop reason: HOSPADM

## 2024-01-07 RX ORDER — LORAZEPAM 2 MG/ML
2 INJECTION INTRAMUSCULAR
Status: DISCONTINUED | OUTPATIENT
Start: 2024-01-07 | End: 2024-01-09 | Stop reason: HOSPADM

## 2024-01-07 RX ORDER — LORAZEPAM 2 MG/1
4 TABLET ORAL
Status: DISCONTINUED | OUTPATIENT
Start: 2024-01-07 | End: 2024-01-09 | Stop reason: HOSPADM

## 2024-01-07 RX ORDER — LORAZEPAM 2 MG/ML
4 INJECTION INTRAMUSCULAR
Status: DISCONTINUED | OUTPATIENT
Start: 2024-01-07 | End: 2024-01-09 | Stop reason: HOSPADM

## 2024-01-07 RX ORDER — FENTANYL CITRATE 50 UG/ML
50 INJECTION, SOLUTION INTRAMUSCULAR; INTRAVENOUS ONCE
Status: COMPLETED | OUTPATIENT
Start: 2024-01-07 | End: 2024-01-07

## 2024-01-07 RX ORDER — POTASSIUM CHLORIDE 7.45 MG/ML
10 INJECTION INTRAVENOUS
Status: COMPLETED | OUTPATIENT
Start: 2024-01-07 | End: 2024-01-07

## 2024-01-07 RX ORDER — ONDANSETRON 2 MG/ML
4 INJECTION INTRAMUSCULAR; INTRAVENOUS EVERY 6 HOURS PRN
Status: DISCONTINUED | OUTPATIENT
Start: 2024-01-07 | End: 2024-01-09 | Stop reason: HOSPADM

## 2024-01-07 RX ORDER — ACETAMINOPHEN 325 MG/1
650 TABLET ORAL EVERY 6 HOURS PRN
Status: DISCONTINUED | OUTPATIENT
Start: 2024-01-07 | End: 2024-01-09 | Stop reason: HOSPADM

## 2024-01-07 RX ORDER — POTASSIUM CHLORIDE 20 MEQ/1
40 TABLET, EXTENDED RELEASE ORAL PRN
Status: DISCONTINUED | OUTPATIENT
Start: 2024-01-07 | End: 2024-01-09 | Stop reason: HOSPADM

## 2024-01-07 RX ORDER — LORAZEPAM 0.5 MG/1
1 TABLET ORAL
Status: DISCONTINUED | OUTPATIENT
Start: 2024-01-07 | End: 2024-01-09 | Stop reason: HOSPADM

## 2024-01-07 RX ORDER — POLYETHYLENE GLYCOL 3350 17 G/17G
17 POWDER, FOR SOLUTION ORAL DAILY PRN
Status: DISCONTINUED | OUTPATIENT
Start: 2024-01-07 | End: 2024-01-09 | Stop reason: HOSPADM

## 2024-01-07 RX ORDER — ONDANSETRON 4 MG/1
4 TABLET, ORALLY DISINTEGRATING ORAL EVERY 8 HOURS PRN
Status: DISCONTINUED | OUTPATIENT
Start: 2024-01-07 | End: 2024-01-09 | Stop reason: HOSPADM

## 2024-01-07 RX ORDER — ACETAMINOPHEN 650 MG/1
650 SUPPOSITORY RECTAL EVERY 6 HOURS PRN
Status: DISCONTINUED | OUTPATIENT
Start: 2024-01-07 | End: 2024-01-09 | Stop reason: HOSPADM

## 2024-01-07 RX ORDER — POTASSIUM CHLORIDE 20 MEQ/1
40 TABLET, EXTENDED RELEASE ORAL ONCE
Status: COMPLETED | OUTPATIENT
Start: 2024-01-07 | End: 2024-01-07

## 2024-01-07 RX ORDER — OXYCODONE HYDROCHLORIDE 5 MG/1
5 TABLET ORAL EVERY 6 HOURS PRN
Status: DISCONTINUED | OUTPATIENT
Start: 2024-01-07 | End: 2024-01-09 | Stop reason: HOSPADM

## 2024-01-07 RX ORDER — LORAZEPAM 2 MG/ML
3 INJECTION INTRAMUSCULAR
Status: DISCONTINUED | OUTPATIENT
Start: 2024-01-07 | End: 2024-01-09 | Stop reason: HOSPADM

## 2024-01-07 RX ORDER — LORAZEPAM 0.5 MG/1
2 TABLET ORAL
Status: DISCONTINUED | OUTPATIENT
Start: 2024-01-07 | End: 2024-01-09 | Stop reason: HOSPADM

## 2024-01-07 RX ADMIN — PANTOPRAZOLE SODIUM 40 MG: 40 INJECTION, POWDER, FOR SOLUTION INTRAVENOUS at 22:30

## 2024-01-07 RX ADMIN — POTASSIUM BICARBONATE 40 MEQ: 782 TABLET, EFFERVESCENT ORAL at 22:30

## 2024-01-07 RX ADMIN — Medication 100 MG: at 22:30

## 2024-01-07 RX ADMIN — SODIUM CHLORIDE 1000 ML: 9 INJECTION, SOLUTION INTRAVENOUS at 18:14

## 2024-01-07 RX ADMIN — POTASSIUM CHLORIDE 40 MEQ: 1500 TABLET, EXTENDED RELEASE ORAL at 19:02

## 2024-01-07 RX ADMIN — POTASSIUM CHLORIDE 10 MEQ: 10 INJECTION, SOLUTION INTRAVENOUS at 19:09

## 2024-01-07 RX ADMIN — OXYCODONE HYDROCHLORIDE 5 MG: 5 TABLET ORAL at 22:31

## 2024-01-07 RX ADMIN — FENTANYL CITRATE 50 MCG: 50 INJECTION, SOLUTION INTRAMUSCULAR; INTRAVENOUS at 19:27

## 2024-01-07 RX ADMIN — POTASSIUM CHLORIDE 10 MEQ: 10 INJECTION, SOLUTION INTRAVENOUS at 20:00

## 2024-01-07 RX ADMIN — ONDANSETRON 4 MG: 2 INJECTION INTRAMUSCULAR; INTRAVENOUS at 19:27

## 2024-01-07 RX ADMIN — CEFTRIAXONE 2000 MG: 2 INJECTION, POWDER, FOR SOLUTION INTRAMUSCULAR; INTRAVENOUS at 19:27

## 2024-01-07 ASSESSMENT — ENCOUNTER SYMPTOMS
SHORTNESS OF BREATH: 0
BLOOD IN STOOL: 0
VOMITING: 0
ANAL BLEEDING: 0
ABDOMINAL PAIN: 1
COLOR CHANGE: 0
NAUSEA: 0

## 2024-01-07 ASSESSMENT — PAIN - FUNCTIONAL ASSESSMENT: PAIN_FUNCTIONAL_ASSESSMENT: NONE - DENIES PAIN

## 2024-01-07 NOTE — ED PROVIDER NOTES
[facility] ED  Emergency Department Encounter  Emergency Medicine Resident     Pt Name:Sam Emerson  MRN: [id number]  Birthdate 1985  Date of evaluation: 1/7/24  PCP:  Rosie Montes De Oca MD  Note Started: 5:43 PM EST      CHIEF COMPLAINT       Chief Complaint   Patient presents with    Fatigue    Jaundice       HISTORY OF PRESENT ILLNESS  (Location/Symptom, Timing/Onset, Context/Setting, Quality, Duration, Modifying Factors, Severity.)      Sam Emerson is a 39 y.o. male who presents with ongoing fatigue and glove and stocking neuropathy.  Patient states that he was here 5 days ago, was being seen for the same at the time.  Labs were concerning for possible infection with white count however patient is on prednisone since 12/30/2023.  There was concern for spontaneous bacterial peritonitis at the time, patient left AGAINST MEDICAL ADVICE however prior to being admitted.  He was provided a prescription for ciprofloxacin, states he has been taking it as prescribed.      Patient does have significant history of alcoholic hepatitis, status post TIPS in December 2023, patient denies drinking since then, denies worsening abdominal pain, denies bloody/dark bowel movements, denies hematemesis denies fevers at home, denies trauma to the abdomen, denies dysuria.    PAST MEDICAL / SURGICAL / SOCIAL / FAMILY HISTORY      has a past medical history of Alcoholism (HCC), Anemia, Cirrhosis, alcoholic (HCC), and Pulmonary embolism (HCC).     has a past surgical history that includes Upper gastrointestinal endoscopy (N/A, 05/01/2022); Upper gastrointestinal endoscopy (N/A, 10/30/2022); Upper gastrointestinal endoscopy (N/A, 09/27/2023); Esophagogastroduodenoscopy (12/05/2023); IR TIPS INSERTION (12/05/2023); IR NONTUNNELED VASCULAR CATHETER > 5 YEARS (12/05/2023); Upper gastrointestinal endoscopy (N/A, 12/05/2023); Upper gastrointestinal endoscopy (N/A, 12/07/2023); Upper gastrointestinal endoscopy

## 2024-01-07 NOTE — ED TRIAGE NOTES
Pt to ED for being lethargic and being jaundice. Pt states he was here on 1/2/24 and left AMA because he had things to do. Pt states his labs were elevated then as well but insisted on going home. Pt states he is more tired today but states the jaundice is about the same. Pt denies chest pain or SOB.

## 2024-01-07 NOTE — ED PROVIDER NOTES
[facility]     Emergency Department     Faculty Attestation    I performed a history and physical examination of the patient and discussed management with the resident. I reviewed the resident´s note and agree with the documented findings and plan of care. Any areas of disagreement are noted on the chart. I was personally present for the key portions of any procedures. I have documented in the chart those procedures where I was not present during the key portions. I have reviewed the emergency nurses triage note. I agree with the chief complaint, past medical history, past surgical history, allergies, medications, social and family history as documented unless otherwise noted below. For Physician Assistant/ Nurse Practitioner cases/documentation I have personally evaluated this patient and have completed at least one if not all key elements of the E/M (history, physical exam, and MDM). Additional findings are as noted.    History of liver failure due to chronic alcoholism, patient is obviously jaundiced, chest clear, heart exam normal, abdomen soft and nontender.     Jose Alejandro Anderson MD  01/07/24 4901

## 2024-01-08 ENCOUNTER — APPOINTMENT (OUTPATIENT)
Dept: ULTRASOUND IMAGING | Age: 39
End: 2024-01-08
Payer: COMMERCIAL

## 2024-01-08 ENCOUNTER — APPOINTMENT (OUTPATIENT)
Dept: CT IMAGING | Age: 39
End: 2024-01-08
Payer: COMMERCIAL

## 2024-01-08 PROBLEM — E72.20 HYPERAMMONEMIA (HCC): Status: ACTIVE | Noted: 2024-01-08

## 2024-01-08 LAB
ABO + RH BLD: NORMAL
ALBUMIN FLD-MCNC: 0.3 G/DL
ALBUMIN SERPL-MCNC: 2.1 G/DL (ref 3.5–5.2)
ALBUMIN/GLOB SERPL: 0.6 {RATIO} (ref 1–2.5)
ALP SERPL-CCNC: 271 U/L (ref 40–129)
ALT SERPL-CCNC: 25 U/L (ref 5–41)
AMMONIA PLAS-SCNC: 76 UMOL/L (ref 16–60)
AMYLASE FLD-CCNC: 61 U/L
ANION GAP SERPL CALCULATED.3IONS-SCNC: 11 MMOL/L (ref 9–17)
ARM BAND NUMBER: NORMAL
AST SERPL-CCNC: 55 U/L
BASOPHILS # BLD: 0 K/UL (ref 0–0.2)
BASOPHILS NFR BLD: 0 % (ref 0–2)
BILIRUB SERPL-MCNC: 17.3 MG/DL (ref 0.3–1.2)
BLOOD BANK SAMPLE EXPIRATION: NORMAL
BLOOD GROUP ANTIBODIES SERPL: NEGATIVE
BUN SERPL-MCNC: 10 MG/DL (ref 6–20)
CALCIUM SERPL-MCNC: 7.6 MG/DL (ref 8.6–10.4)
CHLORIDE SERPL-SCNC: 104 MMOL/L (ref 98–107)
CO2 SERPL-SCNC: 18 MMOL/L (ref 20–31)
CORTIS SERPL-MCNC: 3.6 UG/DL (ref 2.5–19.5)
CREAT SERPL-MCNC: 0.3 MG/DL (ref 0.7–1.2)
EOSINOPHIL # BLD: 0.39 K/UL (ref 0–0.44)
EOSINOPHILS RELATIVE PERCENT: 2 % (ref 1–4)
ERYTHROCYTE [DISTWIDTH] IN BLOOD BY AUTOMATED COUNT: 24.8 % (ref 11.8–14.4)
GFR SERPL CREATININE-BSD FRML MDRD: >60 ML/MIN/1.73M2
GLUCOSE SERPL-MCNC: 102 MG/DL (ref 70–99)
HCT VFR BLD AUTO: 24.3 % (ref 40.7–50.3)
HCT VFR BLD AUTO: 24.9 % (ref 40.7–50.3)
HCT VFR BLD AUTO: 25.7 % (ref 40.7–50.3)
HGB BLD-MCNC: 8.1 G/DL (ref 13–17)
HGB BLD-MCNC: 8.3 G/DL (ref 13–17)
HGB BLD-MCNC: 8.4 G/DL (ref 13–17)
IMM GRANULOCYTES # BLD AUTO: 0.2 K/UL (ref 0–0.3)
IMM GRANULOCYTES NFR BLD: 1 %
LDH FLD L TO P-CCNC: 33 U/L
LYMPHOCYTES NFR BLD: 1.37 K/UL (ref 1.1–3.7)
LYMPHOCYTES RELATIVE PERCENT: 7 % (ref 24–43)
MCH RBC QN AUTO: 33.1 PG (ref 25.2–33.5)
MCHC RBC AUTO-ENTMCNC: 33.3 G/DL (ref 28.4–34.8)
MCV RBC AUTO: 99.2 FL (ref 82.6–102.9)
MICROORGANISM SPEC CULT: NORMAL
MONOCYTES NFR BLD: 1.37 K/UL (ref 0.1–1.2)
MONOCYTES NFR BLD: 7 % (ref 3–12)
MORPHOLOGY: ABNORMAL
NEUTROPHILS NFR BLD: 83 % (ref 36–65)
NEUTS SEG NFR BLD: 16.17 K/UL (ref 1.5–8.1)
NRBC BLD-RTO: 0 PER 100 WBC
PLATELET # BLD AUTO: ABNORMAL K/UL (ref 138–453)
PLATELET, FLUORESCENCE: 93 K/UL (ref 138–453)
PLATELETS.RETICULATED NFR BLD AUTO: 3 % (ref 1.1–10.3)
POTASSIUM SERPL-SCNC: 3.6 MMOL/L (ref 3.7–5.3)
PROT FLD-MCNC: <1 G/DL
PROT SERPL-MCNC: 5.5 G/DL (ref 6.4–8.3)
RBC # BLD AUTO: 2.45 M/UL (ref 4.21–5.77)
SODIUM SERPL-SCNC: 133 MMOL/L (ref 135–144)
SPECIMEN DESCRIPTION: NORMAL
SPECIMEN TYPE: NORMAL
WBC OTHER # BLD: 19.5 K/UL (ref 3.5–11.3)

## 2024-01-08 PROCEDURE — 74176 CT ABD & PELVIS W/O CONTRAST: CPT

## 2024-01-08 PROCEDURE — 85055 RETICULATED PLATELET ASSAY: CPT

## 2024-01-08 PROCEDURE — 86901 BLOOD TYPING SEROLOGIC RH(D): CPT

## 2024-01-08 PROCEDURE — 85018 HEMOGLOBIN: CPT

## 2024-01-08 PROCEDURE — 2580000003 HC RX 258: Performed by: INTERNAL MEDICINE

## 2024-01-08 PROCEDURE — 87075 CULTR BACTERIA EXCEPT BLOOD: CPT

## 2024-01-08 PROCEDURE — 87070 CULTURE OTHR SPECIMN AEROBIC: CPT

## 2024-01-08 PROCEDURE — 88305 TISSUE EXAM BY PATHOLOGIST: CPT

## 2024-01-08 PROCEDURE — 87205 SMEAR GRAM STAIN: CPT

## 2024-01-08 PROCEDURE — 1200000000 HC SEMI PRIVATE

## 2024-01-08 PROCEDURE — 86850 RBC ANTIBODY SCREEN: CPT

## 2024-01-08 PROCEDURE — 89051 BODY FLUID CELL COUNT: CPT

## 2024-01-08 PROCEDURE — 6360000002 HC RX W HCPCS: Performed by: INTERNAL MEDICINE

## 2024-01-08 PROCEDURE — 82150 ASSAY OF AMYLASE: CPT

## 2024-01-08 PROCEDURE — 82533 TOTAL CORTISOL: CPT

## 2024-01-08 PROCEDURE — 99223 1ST HOSP IP/OBS HIGH 75: CPT | Performed by: INTERNAL MEDICINE

## 2024-01-08 PROCEDURE — C9113 INJ PANTOPRAZOLE SODIUM, VIA: HCPCS | Performed by: INTERNAL MEDICINE

## 2024-01-08 PROCEDURE — 82042 OTHER SOURCE ALBUMIN QUAN EA: CPT

## 2024-01-08 PROCEDURE — 99232 SBSQ HOSP IP/OBS MODERATE 35: CPT | Performed by: FAMILY MEDICINE

## 2024-01-08 PROCEDURE — 83615 LACTATE (LD) (LDH) ENZYME: CPT

## 2024-01-08 PROCEDURE — 6370000000 HC RX 637 (ALT 250 FOR IP): Performed by: INTERNAL MEDICINE

## 2024-01-08 PROCEDURE — 86900 BLOOD TYPING SEROLOGIC ABO: CPT

## 2024-01-08 PROCEDURE — 82140 ASSAY OF AMMONIA: CPT

## 2024-01-08 PROCEDURE — 85025 COMPLETE CBC W/AUTO DIFF WBC: CPT

## 2024-01-08 PROCEDURE — APPNB60 APP NON BILLABLE TIME 46-60 MINS: Performed by: INTERNAL MEDICINE

## 2024-01-08 PROCEDURE — 2709999900 US GUIDED PARACENTESIS

## 2024-01-08 PROCEDURE — 88112 CYTOPATH CELL ENHANCE TECH: CPT

## 2024-01-08 PROCEDURE — 80053 COMPREHEN METABOLIC PANEL: CPT

## 2024-01-08 PROCEDURE — 6370000000 HC RX 637 (ALT 250 FOR IP): Performed by: NURSE PRACTITIONER

## 2024-01-08 PROCEDURE — A4216 STERILE WATER/SALINE, 10 ML: HCPCS | Performed by: INTERNAL MEDICINE

## 2024-01-08 PROCEDURE — 0W9G3ZZ DRAINAGE OF PERITONEAL CAVITY, PERCUTANEOUS APPROACH: ICD-10-PCS | Performed by: RADIOLOGY

## 2024-01-08 PROCEDURE — 84157 ASSAY OF PROTEIN OTHER: CPT

## 2024-01-08 PROCEDURE — 85014 HEMATOCRIT: CPT

## 2024-01-08 RX ORDER — LANOLIN ALCOHOL/MO/W.PET/CERES
100 CREAM (GRAM) TOPICAL DAILY
Status: DISCONTINUED | OUTPATIENT
Start: 2024-01-08 | End: 2024-01-08

## 2024-01-08 RX ORDER — THIAMINE MONONITRATE (VIT B1) 100 MG
100 TABLET ORAL DAILY
Status: DISCONTINUED | OUTPATIENT
Start: 2024-01-08 | End: 2024-01-08

## 2024-01-08 RX ORDER — PANTOPRAZOLE SODIUM 40 MG/1
40 TABLET, DELAYED RELEASE ORAL
Status: DISCONTINUED | OUTPATIENT
Start: 2024-01-09 | End: 2024-01-09 | Stop reason: HOSPADM

## 2024-01-08 RX ORDER — NICOTINE 21 MG/24HR
1 PATCH, TRANSDERMAL 24 HOURS TRANSDERMAL DAILY
Status: DISCONTINUED | OUTPATIENT
Start: 2024-01-08 | End: 2024-01-09 | Stop reason: HOSPADM

## 2024-01-08 RX ORDER — LACTULOSE 10 G/15ML
10 SOLUTION ORAL 3 TIMES DAILY
Status: DISCONTINUED | OUTPATIENT
Start: 2024-01-08 | End: 2024-01-09 | Stop reason: HOSPADM

## 2024-01-08 RX ORDER — PREDNISOLONE 15 MG/5ML
40 SOLUTION ORAL DAILY
Status: DISCONTINUED | OUTPATIENT
Start: 2024-01-08 | End: 2024-01-08

## 2024-01-08 RX ADMIN — RIFAXIMIN 550 MG: 550 TABLET ORAL at 09:53

## 2024-01-08 RX ADMIN — SODIUM CHLORIDE: 9 INJECTION, SOLUTION INTRAVENOUS at 06:40

## 2024-01-08 RX ADMIN — Medication 2000 MG: at 18:18

## 2024-01-08 RX ADMIN — OCTREOTIDE ACETATE 50 MCG/HR: 500 INJECTION, SOLUTION INTRAVENOUS; SUBCUTANEOUS at 06:56

## 2024-01-08 RX ADMIN — PANTOPRAZOLE SODIUM 8 MG/HR: 40 INJECTION, POWDER, FOR SOLUTION INTRAVENOUS at 06:52

## 2024-01-08 RX ADMIN — OXYCODONE HYDROCHLORIDE 5 MG: 5 TABLET ORAL at 18:24

## 2024-01-08 RX ADMIN — RIFAXIMIN 550 MG: 550 TABLET ORAL at 21:37

## 2024-01-08 RX ADMIN — PHYTONADIONE 5 MG: 10 INJECTION, EMULSION INTRAMUSCULAR; INTRAVENOUS; SUBCUTANEOUS at 06:55

## 2024-01-08 RX ADMIN — PREDNISOLONE 40 MG: 15 SOLUTION ORAL at 09:52

## 2024-01-08 RX ADMIN — OXYCODONE HYDROCHLORIDE 5 MG: 5 TABLET ORAL at 11:02

## 2024-01-08 RX ADMIN — PANTOPRAZOLE SODIUM 80 MG: 40 INJECTION, POWDER, FOR SOLUTION INTRAVENOUS at 06:41

## 2024-01-08 RX ADMIN — OXYCODONE HYDROCHLORIDE 5 MG: 5 TABLET ORAL at 04:39

## 2024-01-08 ASSESSMENT — ENCOUNTER SYMPTOMS
ABDOMINAL PAIN: 0
BLOOD IN STOOL: 0
VOMITING: 0
DIARRHEA: 0
NAUSEA: 0
COUGH: 0
SHORTNESS OF BREATH: 0
CHEST TIGHTNESS: 0
CONSTIPATION: 0
WHEEZING: 0

## 2024-01-08 ASSESSMENT — PAIN DESCRIPTION - ORIENTATION
ORIENTATION: MID;UPPER
ORIENTATION: UPPER

## 2024-01-08 ASSESSMENT — PAIN DESCRIPTION - LOCATION
LOCATION: ABDOMEN

## 2024-01-08 ASSESSMENT — PAIN SCALES - GENERAL
PAINLEVEL_OUTOF10: 0
PAINLEVEL_OUTOF10: 4
PAINLEVEL_OUTOF10: 7
PAINLEVEL_OUTOF10: 7
PAINLEVEL_OUTOF10: 3
PAINLEVEL_OUTOF10: 1

## 2024-01-08 ASSESSMENT — PAIN DESCRIPTION - PAIN TYPE: TYPE: ACUTE PAIN

## 2024-01-08 ASSESSMENT — PAIN DESCRIPTION - DESCRIPTORS: DESCRIPTORS: ACHING

## 2024-01-08 ASSESSMENT — PAIN - FUNCTIONAL ASSESSMENT
PAIN_FUNCTIONAL_ASSESSMENT: 0-10

## 2024-01-08 NOTE — ED NOTES
• LORazepam (ATIVAN) injection 4 mg       SURGICAL HISTORY       Past Surgical History:   Procedure Laterality Date   • COLONOSCOPY  12/30/2023    diagnostic   • COLONOSCOPY N/A 12/30/2023    COLONOSCOPY DIAGNOSTIC performed by Luciano Galloway MD at [facility] OR   • ESOPHAGOGASTRODUODENOSCOPY  12/05/2023    EGD DIAGNOSTIC ONLY   • ESOPHAGOGASTRODUODENOSCOPY  12/29/2023   • IR NONTUNNELED VASCULAR CATHETER  12/05/2023    IR NONTUNNELED VASCULAR CATHETER 12/5/2023 [facility] SPECIAL PROCEDURES   • IR TIPS INSERTION  12/05/2023    IR TIPS INSERTION 12/5/2023 [facility] SPECIAL PROCEDURES   • UPPER GASTROINTESTINAL ENDOSCOPY N/A 05/01/2022    EGD ESOPHAGOGASTRODUODENOSCOPY performed by Gerald Olivarez MD at [facility]   • UPPER GASTROINTESTINAL ENDOSCOPY N/A 10/30/2022    EGD BAND LIGATION performed by Prince Gonsalez MD at [facility]   • UPPER GASTROINTESTINAL ENDOSCOPY N/A 09/27/2023    EGD BAND LIGATION performed by Anitra Fam MD at [facility]   • UPPER GASTROINTESTINAL ENDOSCOPY N/A 12/05/2023    EGD DIAGNOSTIC ONLY performed by Wesley Chairez MD at [facility] OR   • UPPER GASTROINTESTINAL ENDOSCOPY N/A 12/07/2023    EGD DIAGNOSTIC ONLY performed by Wesley Chairez MD at [facility] Endoscopy   • UPPER GASTROINTESTINAL ENDOSCOPY  12/07/2023    EGD BAND LIGATION performed by Wesley Chairez MD at [facility] Endoscopy   • UPPER GASTROINTESTINAL ENDOSCOPY  12/07/2023    EGD FOREIGN BODY REMOVAL performed by Wesley Chairez MD at [facility] Endoscopy   • UPPER GASTROINTESTINAL ENDOSCOPY N/A 12/29/2023    EGD ESOPHAGOGASTRODUODENOSCOPY performed by Luciano Galloway MD at [facility] OR       PAST MEDICAL HISTORY       Past Medical History:   Diagnosis Date   • Alcoholism (HCC)     pt drinks a fifth of whiskey daily   • Anemia    • Cirrhosis, alcoholic (HCC)    • Pulmonary embolism (HCC)        Labs:  Labs Reviewed   CBC WITH AUTO DIFFERENTIAL - Abnormal; Notable for the following components:       Result Value    WBC 24.8 (*)     RBC 3.18 (*)      Hemoglobin 10.3 (*)     Hematocrit 31.9 (*)     RDW 25.0 (*)     Platelet, Fluorescence 129 (*)     Neutrophils % 89 (*)     Lymphocytes % 3 (*)     Neutrophils Absolute 22.08 (*)     Lymphocytes Absolute 0.74 (*)     Monocytes Absolute 1.24 (*)     Eosinophils Absolute 0.74 (*)     All other components within normal limits   BASIC METABOLIC PANEL - Abnormal; Notable for the following components:    Potassium 2.9 (*)     CO2 17 (*)     Glucose 161 (*)     Creatinine 0.3 (*)     Calcium 8.2 (*)     All other components within normal limits   HEPATIC FUNCTION PANEL - Abnormal; Notable for the following components:    Albumin 2.7 (*)     Alkaline Phosphatase 317 (*)     AST 59 (*)     Total Bilirubin 19.9 (*)     Albumin/Globulin Ratio 0.7 (*)     All other components within normal limits   LIPASE - Abnormal; Notable for the following components:    Lipase 118 (*)     All other components within normal limits   PROTIME-INR - Abnormal; Notable for the following components:    Protime 18.0 (*)     All other components within normal limits   CULTURE, BLOOD 1   CULTURE, BLOOD 1   CBC WITH AUTO DIFFERENTIAL   ETHANOL   ETHANOL       Electronically signed by Charlene Soler RN on 1/7/2024 at 7:45 PM

## 2024-01-08 NOTE — ED NOTES
ED to inpatient nurses report      Chief Complaint:  Chief Complaint   Patient presents with    Fatigue    Jaundice     Present to ED from: home    MOA:     LOC: alert and orientated to name, place, date  Mobility: Independent  Oxygen Baseline: RA    Current needs required: RA   Pending ED orders: none  Present condition: stable    Why did the patient come to the ED? Pt to ED for being lethargic and being jaundice. Pt states he was here on 1/2/24 and left AMA because he had things to do. Pt states his labs were elevated then as well but insisted on going home. Pt states he is more tired today but states the jaundice is about the same. Pt denies chest pain or SOB.   What is the plan? admit  Any procedures or intervention occur? Septic workup, IV potassium, oral Potassium replacement, IV Rocephin  Any safety concerns??    Mental Status:  Level of Consciousness: Alert (0)    Psych Assessment:   Psychosocial  Psychosocial (WDL): Exceptions to WDL  Patient Behaviors: Lethargic  Vital signs   Vitals:    01/08/24 0352 01/08/24 0353 01/08/24 0430 01/08/24 0439   BP:   104/65    Pulse: 71 71 89    Resp: 16 16  18   Temp:       TempSrc:       SpO2: 99% 99%          Vitals:  Patient Vitals for the past 24 hrs:   BP Temp Temp src Pulse Resp SpO2   01/08/24 0439 -- -- -- -- 18 --   01/08/24 0430 104/65 -- -- 89 -- --   01/08/24 0353 -- -- -- 71 16 99 %   01/08/24 0352 -- -- -- 71 16 99 %   01/08/24 0351 -- -- -- 72 16 99 %   01/08/24 0350 -- -- -- 76 15 99 %   01/08/24 0349 -- -- -- (!) 109 25 99 %   01/08/24 0348 -- -- -- (!) 104 16 99 %   01/08/24 0347 -- -- -- 73 15 99 %   01/08/24 0346 -- -- -- 86 16 100 %   01/08/24 0220 -- -- -- 81 15 98 %   01/08/24 0045 -- -- -- 91 17 99 %   01/08/24 0000 -- -- -- 83 15 99 %   01/07/24 2330 -- -- -- 96 16 100 %   01/07/24 2309 -- -- -- 88 15 100 %   01/07/24 2231 120/73 -- -- (!) 109 17 99 %   01/07/24 1723 126/81 98 °F (36.7 °C) Oral (!) 109 12 100 %      Visit Vitals  /65   Pulse  Albumin/Globulin Ratio 0.7 (*)     All other components within normal limits   LIPASE - Abnormal; Notable for the following components:    Lipase 118 (*)     All other components within normal limits   PROTIME-INR - Abnormal; Notable for the following components:    Protime 18.0 (*)     All other components within normal limits   CBC WITH AUTO DIFFERENTIAL - Abnormal; Notable for the following components:    WBC 19.5 (*)     RBC 2.45 (*)     Hemoglobin 8.1 (*)     Hematocrit 24.3 (*)     RDW 24.8 (*)     Platelet, Fluorescence 93 (*)     All other components within normal limits   COMPREHENSIVE METABOLIC PANEL W/ REFLEX TO MG FOR LOW K - Abnormal; Notable for the following components:    Sodium 133 (*)     Potassium 3.6 (*)     CO2 18 (*)     Glucose 102 (*)     Creatinine 0.3 (*)     Calcium 7.6 (*)     Total Protein 5.5 (*)     Albumin 2.1 (*)     Albumin/Globulin Ratio 0.6 (*)     Total Bilirubin 17.3 (*)     Alkaline Phosphatase 271 (*)     AST 55 (*)     All other components within normal limits   LACTATE DEHYDROGENASE - Abnormal; Notable for the following components:     (*)     All other components within normal limits   CULTURE, BLOOD 1   CULTURE, BLOOD 1   ETHANOL   LACTIC ACID   MAGNESIUM       Electronically signed by Jose Alejandro Pascal RN on 1/8/2024 at 6:06 AM

## 2024-01-08 NOTE — ED NOTES
The following labs were labeled with appropriate pt sticker and tubed to lab:     [] Blue     [] Lavender   [] on ice  [x] Green/yellow  [] Green/black [] on ice  [] Grey  [] on ice  [] Yellow  [] Red  [] Pink  [x] Type/ Screen  [] ABG  [] VBG    [] COVID-19 swab    [] Rapid  [] PCR  [] Flu swab  [] Peds Viral Panel     [] Urine Sample  [] Fecal Sample  [] Pelvic Cultures  [] Blood Cultures  [] X 2  [] STREP Cultures  [] Wound Cultures

## 2024-01-08 NOTE — H&P
[facility]  Office: [phone]  Paul Hardy DO, Elijah Dye DO, Rik Rankin DO, Laci Barth DO, Perri Rolle MD, Rita Spencer MD, Bárbara Olivarez MD, Hansa Loco MD,  Sam Reardon MD, Maximo Amanda MD, Naida Kraus MD,  Miguel Melton DO, Cholo Diaz MD, Jluis Michaud MD, Dante Hardy DO, Candy Reyna MD,  Giovanny Genao DO, Claribel King MD, Sabi Ken MD, Erum Gottlieb MD, Rehana Burdick MD,  Clark Soliz MD, Ector James MD, Ivonne Valladares MD, Vinod Voss MD, Mainor Kirk MD, Rosales Bowden MD, Abdi Unger DO, Amadou Carpenter DO, Nancy Nicholson MD,  Evangelista Maria MD, Shirley Waterhouse, CNP,  Malena Hayes, CNP, Oswaldo Urrutia, CNP,  Armida Martin, NICK, Shayna Rodriguez, CNP, Yesenia Menchaca, CNP, Lita Garay CNP, Sharmin Prince, CNP, Sylvia Harris, CNP, Maribel Veras, PA-C, Sarah Dutta, PA-C, Donna Cedeno, CNP, Ne Carnes, CNS, Holly Melara, CNP, Gloria Tate, CNP, Tracy Schwab, CNP         [facility]   IN-PATIENT SERVICE   [facility]    HISTORY AND PHYSICAL EXAMINATION            Date:   1/7/2024  Patient name:  Sam Emerson  Date of admission:  1/7/2024  5:16 PM  MRN:   [id number]  Account:  [id number]  YOB: 1985  PCP:    Rosie Montes De Oca MD  Room:   42/42  Code Status:    Full Code    Chief Complaint:     Chief Complaint   Patient presents with    Fatigue    Jaundice         History of Present Illness:     38 y.o. male  a PMHx significant for Decompensated liver cirrhosis with portal hypertension and esophageal varices s/p TIPS and esophageal varices banding, Alcohol use disorder who was admitted for the management of Upper GI bleed, presented to the emergency department  with blt hand parasthesias and ongoing abdominal pain.  He reports his jaundice is same. Denies itching or blood in stool . Denies any bleeding, vomiting, diarrhea, cp, sob eth or drug use.     He was in our ER 5  colonoscopy which was also inconclusive for any active bleed, 4 to 5 mm ulcer above the Anorectal area that could be related to ischemia versus pressure ulcer versus stercoral colitis from constipation was not biopsied on recommendation for avoiding constipation, low-sodium diet was cleared for discharge from GI standpoint.  -labs this am showed a drop from  8.1 from 10.3  -K 2.9, mg pending,   -IR consult,   -INR 1.5, admin vit k.   -wilfredo testm ldh for possible hemoysis . Total bili 20.   -emerpic abx for sbp.   -blood cultures, resume lactulose,   -counseled to patient to stop taking Nsaids.   -ciwa, etoh level, u tox, , GI , palliative   -HH q6  -npo  -CT abdomen      #Steroid use. Based on meld score hes on prednisone which could account for his leukocytosis. Empirically treat. Monitor closely.

## 2024-01-08 NOTE — PROGRESS NOTES
Patient to IR for diagnostic paracentesis.  JR ARAGON and JOE RDMS at bedside.  Site prepped and draped, area numbed with lidocaine.  6ml of clear yellow fluid aspirated.  Specimen collected.  Dry sterile drsg with tegaderm placed to site.  Patient tolerated well.

## 2024-01-08 NOTE — ED NOTES
Pt resting on cart, RR even and unlabored. He is updated on new diet order. He denies wanting food at this time.  He reports pain is tolerable at this time and denies nausea.  Vitals stable.  Will continue to monitor.

## 2024-01-08 NOTE — PROGRESS NOTES
[facility]  Office: [phone]  Paul Hardy DO, Elijah Dye DO, Rik Rankin DO, Laci Barth DO, Perri Rolle MD, Rita Spencer MD, Bárbara Olivarez MD, Hansa Loco MD,  Sam Reardon MD, Maximo Amanda MD, Naida Kraus MD,  Miguel Melton DO, Cholo Diaz MD, Jluis Michaud MD, Dante Hardy DO, Candy Reyna MD,  Giovanny Genao DO, Claribel King MD, Sabi Ken MD, Erum Gottlieb MD, Rehana Burdick MD,  Clark Soliz MD, Ector James MD, Ivonne Valladares MD, Vinod Voss MD, Mainor Kirk MD, Rosales Bowden MD, Abdi Unger DO, Amadou Carpenter DO, Nancy Nicholson MD,  Evangelista Maria MD, Shirley Waterhouse, CNP,  Malena Hayes, CNP, Oswaldo Urrutia, CNP,  Armida Martin, DNP, Shayna Rodriguez, CNP, Yesenia Menchaca, CNP, Lita Garay CNP, Sharmin Prince, CNP, Sylvia Harris, CNP, Maribel Veras, PA-C, Sarah Dutta, PA-C, Donna Cedeno, CNP, Ne Carnes, CNS, Holly Melara, CNP, Gloria Tate, CNP, Tracy Schwab, CNP         [facility]   IN-PATIENT SERVICE   [facility]    Progress Note    1/8/2024    8:14 AM    Name:   Sam Emerson  MRN:     [id number]     Acct:      [id number]   Room:   20/20  IP Day:  1  Admit Date:  1/7/2024  5:16 PM    PCP:   Rosie Montes De Oca MD  Code Status:  Full Code    Subjective:     C/C:   Chief Complaint   Patient presents with    Fatigue    Jaundice     Interval History Status: improved    Patient seen and examined at bedside, no acute events overnight.  Earlier in the morning patient was started on Protonix and octreotide drip for drop in hemoglobin, patient was hydrated overnight and likely there is a component of hemoconcentration.  His CT abdomen pelvis reviewed, peripancreatic and periduodenal edema, patient denies any acute GI symptoms including worsening abdominal pain nausea or vomiting  Since his potassium is replaced he is feeling better  Patient denies any chest pain, shortness of  General: No tenderness.   Skin:     General: Skin is warm and dry.      Coloration: Skin is jaundiced.      Findings: No erythema, lesion or rash.   Neurological:      Mental Status: He is alert and oriented to person, place, and time.      Cranial Nerves: No cranial nerve deficit.      Motor: No seizure activity.   Psychiatric:         Speech: Speech normal.         Behavior: Behavior normal. Behavior is cooperative.         Assessment:        Hospital Problems             Last Modified POA    * (Principal) Liver failure without hepatic coma (HCC) 1/7/2024 Yes    Leukocytosis 1/8/2024 Yes    GI bleed 1/8/2024 Yes    Alcoholism (HCC) 1/8/2024 Yes    MDD (major depressive disorder), recurrent severe, without psychosis (HCC) 1/8/2024 Yes    Essential hypertension 1/8/2024 Yes    Alcoholic cardiomyopathy (HCC) 1/8/2024 Yes    Generalized anxiety disorder 1/8/2024 Yes    Transaminitis 1/8/2024 Yes    ALC (alcoholic liver cirrhosis) (HCC) 1/8/2024 Yes    S/P TIPS (transjugular intrahepatic portosystemic shunt) 1/8/2024 Yes    Hyperbilirubinemia 1/8/2024 Yes       Plan:            Leukocytosis/ Concerns for SBP : Already was taking Abx, paracentesis ordered, on Abx  Cultures pending    Hypokalemia : replaced       Alcoholic liver decompensated  cirrhosis/   Hyperbilirubinemia/  Transaminitis:   - Chronic, continue chronic medications   - S/P TIPS 12/2023    Recent alcoholic hepatitis and the patient is on chronic steroid taper, this might be contributing to leukocytosis      Alcoholism : abstinent per his report?    - Anemia : no active concerns of GI bleed, DC protonix and Octreotide drips    Coagulopathy : was given vit k on admission      MDD/  Generalized anxiety disorder : Not on chronic medications      Alcoholic cardiomyopathy     Discussed with the patient and the nurse     Hansa Loco MD  1/8/2024  8:14 AM

## 2024-01-08 NOTE — ED NOTES
ED to inpatient nurses report      Chief Complaint:  Chief Complaint   Patient presents with    Fatigue    Jaundice     Present to ED from: home    MOA:     LOC: alert and orientated to name, place, date  Mobility: Independent  Oxygen Baseline: room air    Current needs required: pain control, labs   Pending ED orders: none  Present condition: stable    Why did the patient come to the ED? Pt here for fatigue and abdominal pain x2 days. Pt is also jaundiced.  What is the plan? Labs, hydration  Any procedures or intervention occur? Paracentesis (6ml obtained)  Any safety concerns??    Mental Status:  Level of Consciousness: Alert (0)    Psych Assessment:   Psychosocial  Psychosocial (WDL): Within Defined Limits  Patient Behaviors: Lethargic  Vital signs   Vitals:    01/08/24 0717 01/08/24 0908 01/08/24 0942 01/08/24 1011   BP: 121/83  123/76    Pulse: 74 100 100 80   Resp: 14  16    Temp: 97.9 °F (36.6 °C)      TempSrc: Oral      SpO2: 98% 98% 98%         Vitals:  Patient Vitals for the past 24 hrs:   BP Temp Temp src Pulse Resp SpO2   01/08/24 1011 -- -- -- 80 -- --   01/08/24 0942 123/76 -- -- 100 16 98 %   01/08/24 0908 -- -- -- 100 -- 98 %   01/08/24 0717 121/83 97.9 °F (36.6 °C) Oral 74 14 98 %   01/08/24 0657 -- -- -- 98 17 100 %   01/08/24 0652 -- -- -- 98 13 100 %   01/08/24 0647 127/80 -- -- 84 13 99 %   01/08/24 0643 -- -- -- 82 15 99 %   01/08/24 0638 -- -- -- 85 11 99 %   01/08/24 0633 -- -- -- 88 18 98 %   01/08/24 0632 -- -- -- 95 13 98 %   01/08/24 0631 113/80 -- -- 80 16 97 %   01/08/24 0630 -- -- -- 100 18 98 %   01/08/24 0629 -- -- -- 92 14 98 %   01/08/24 0628 -- -- -- 99 17 98 %   01/08/24 0627 -- -- -- 96 16 99 %   01/08/24 0626 -- -- -- 99 12 99 %   01/08/24 0439 -- -- -- -- 18 --   01/08/24 0430 104/65 -- -- 89 -- --   01/08/24 0353 -- -- -- 71 16 99 %   01/08/24 0352 -- -- -- 71 16 99 %   01/08/24 0351 -- -- -- 72 16 99 %   01/08/24 0350 -- -- -- 76 15 99 %   01/08/24 0349 -- -- -- (!) 109 25  normal limits   PROTIME-INR - Abnormal; Notable for the following components:    Protime 18.0 (*)     All other components within normal limits   CBC WITH AUTO DIFFERENTIAL - Abnormal; Notable for the following components:    WBC 19.5 (*)     RBC 2.45 (*)     Hemoglobin 8.1 (*)     Hematocrit 24.3 (*)     RDW 24.8 (*)     Platelet, Fluorescence 93 (*)     Immature Granulocytes 1 (*)     Neutrophils % 83 (*)     Lymphocytes % 7 (*)     Neutrophils Absolute 16.17 (*)     Monocytes Absolute 1.37 (*)     All other components within normal limits   COMPREHENSIVE METABOLIC PANEL W/ REFLEX TO MG FOR LOW K - Abnormal; Notable for the following components:    Sodium 133 (*)     Potassium 3.6 (*)     CO2 18 (*)     Glucose 102 (*)     Creatinine 0.3 (*)     Calcium 7.6 (*)     Total Protein 5.5 (*)     Albumin 2.1 (*)     Albumin/Globulin Ratio 0.6 (*)     Total Bilirubin 17.3 (*)     Alkaline Phosphatase 271 (*)     AST 55 (*)     All other components within normal limits   LACTATE DEHYDROGENASE - Abnormal; Notable for the following components:     (*)     All other components within normal limits   CULTURE, BLOOD 1   CULTURE, BLOOD 1   CULTURE, BODY FLUID   ETHANOL   LACTIC ACID   MAGNESIUM   CORTISOL TOTAL   HEMOGLOBIN AND HEMATOCRIT   HEMOGLOBIN AND HEMATOCRIT   CELL COUNT WITH DIFFERENTIAL, BODY FLUID   CYTOLOGY, NON-GYN   AMYLASE, BODY FLUID   ALBUMIN, FLUID   PROTEIN, BODY FLUID   LACTATE DEHYDROGENASE, BODY FLUID   AMMONIA   TYPE AND SCREEN       Electronically signed by Danielle Camara RN on 1/8/2024 at 10:16 AM

## 2024-01-08 NOTE — ED NOTES
Writer to bedside.  Pt is here for jaundice and fatigue.  He states he has not been able to stay awake for more than 20 minutes for the past 2 days.  He also reports upper abdominal pain. He reports his pain is a 1/10 at this time. He states he's had ongoing problems with this since the beginning of December.  Pt is alert, oriented, speaking in full sentences.

## 2024-01-08 NOTE — BRIEF OP NOTE
Brief Postoperative Note for Paracentesis    Sam Emerson  YOB: 1985  [id number]    Pre-operative Diagnosis:  Ascites     Post-operative Diagnosis: Same    Procedure: Ultrasound guided Paracentesis     Anesthesia: 1% Lidocaine     Surgeons/Assistants: Eladio Germain PA-C    Complications: none    EBL: Minimal    Specimens: Were obtained    Ultrasound guided paracentesis performed. 6 ml clear yellow fluid obtained.  Dressing applied.     Electronically signed by MAHESH Esposito on 1/8/2024 at 9:16 AM

## 2024-01-08 NOTE — ED NOTES
The following labs were labeled with appropriate pt sticker and tubed to lab:     [] Blue     [x] Lavender   [] on ice  [x] Green/yellow  [] Green/black [] on ice  [] Grey  [] on ice  [] Yellow  [] Red  [] Pink  [] Type/ Screen  [] ABG  [] VBG    [] COVID-19 swab    [] Rapid  [] PCR  [] Flu swab  [] Peds Viral Panel     [] Urine Sample  [] Fecal Sample  [] Pelvic Cultures  [] Blood Cultures  [] X 2  [] STREP Cultures  [] Wound Cultures

## 2024-01-08 NOTE — ED NOTES
Perfect serve NP tutolo regarding patient lab result of hgb of 8.1 from 10.3 last night. Awaiting response.

## 2024-01-08 NOTE — ED NOTES
ED to inpatient nurses report      Chief Complaint:  Chief Complaint   Patient presents with    Fatigue    Jaundice     Present to ED from: home    MOA:     LOC: alert and orientated to name, place, date  Mobility: Independent  Oxygen Baseline: room air    Current needs required: pain control.   Pending ED orders: none  Present condition: stable    Why did the patient come to the ED? Fatigue, not being able to stay awake, abdominal pain and jaundice  What is the plan? Labs, medications  Any procedures or intervention occur? Paracentesis (6ml off)  Any safety concerns?? none    Mental Status:  Level of Consciousness: Alert (0)    Psych Assessment:   Psychosocial  Psychosocial (WDL): Within Defined Limits  Patient Behaviors: Lethargic  Vital signs   Vitals:    01/08/24 1101 01/08/24 1200 01/08/24 1336 01/08/24 1428   BP:    122/85   Pulse: 89 78 86 82   Resp: 16 12 18 14   Temp:       TempSrc:       SpO2: 99% 98% 98% 100%        Vitals:  Patient Vitals for the past 24 hrs:   BP Temp Temp src Pulse Resp SpO2   01/08/24 1428 122/85 -- -- 82 14 100 %   01/08/24 1336 -- -- -- 86 18 98 %   01/08/24 1200 -- -- -- 78 12 98 %   01/08/24 1101 -- -- -- 89 16 99 %   01/08/24 1011 -- -- -- 80 -- --   01/08/24 0942 123/76 -- -- 100 16 98 %   01/08/24 0908 -- -- -- 100 -- 98 %   01/08/24 0717 121/83 97.9 °F (36.6 °C) Oral 74 14 98 %   01/08/24 0657 -- -- -- 98 17 100 %   01/08/24 0652 -- -- -- 98 13 100 %   01/08/24 0647 127/80 -- -- 84 13 99 %   01/08/24 0643 -- -- -- 82 15 99 %   01/08/24 0638 -- -- -- 85 11 99 %   01/08/24 0633 -- -- -- 88 18 98 %   01/08/24 0632 -- -- -- 95 13 98 %   01/08/24 0631 113/80 -- -- 80 16 97 %   01/08/24 0630 -- -- -- 100 18 98 %   01/08/24 0629 -- -- -- 92 14 98 %   01/08/24 0628 -- -- -- 99 17 98 %   01/08/24 0627 -- -- -- 96 16 99 %   01/08/24 0626 -- -- -- 99 12 99 %   01/08/24 0439 -- -- -- -- 18 --   01/08/24 0430 104/65 -- -- 89 -- --   01/08/24 0353 -- -- -- 71 16 99 %   01/08/24 0352 -- --  DEHYDROGENASE, BODY FLUID   HEMOGLOBIN AND HEMATOCRIT   TYPE AND SCREEN       Electronically signed by Danielle Camara RN on 1/8/2024 at 2:30 PM

## 2024-01-08 NOTE — CARE COORDINATION
01/08/24 1248   Readmission Assessment   Number of Days since last admission? 8-30 days   Previous Disposition Home with Family   Who is being Interviewed Patient   What was the patient's/caregiver's perception as to why they think they needed to return back to the hospital? Other (Comment)  (symptoms)   Did you visit your Primary Care Physician after you left the hospital, before you returned this time? No   Why weren't you able to visit your PCP? Did not have an appointment   Did you see a specialist, such as Cardiac, Pulmonary, Orthopedic Physician, etc. after you left the hospital? No   Who advised the patient to return to the hospital? Self-referral   Does the patient report anything that got in the way of taking their medications? No   In our efforts to provide the best possible care to you and others like you, can you think of anything that we could have done to help you after you left the hospital the first time, so that you might not have needed to return so soon? Discharge instructions that are concise, clear, and non contradictory

## 2024-01-08 NOTE — CARE COORDINATION
Case Management Assessment  Initial Evaluation    Date/Time of Evaluation: 1/8/2024 12:50 PM  Assessment Completed by: NERI TILLMAN RN    If patient is discharged prior to next notation, then this note serves as note for discharge by case management.    Patient Name: Sam Emerson                   YOB: 1985  Diagnosis: Liver failure without hepatic coma (HCC) [K72.90]                   Date / Time: 1/7/2024  5:16 PM    Patient Admission Status: Inpatient   Readmission Risk (Low < 19, Mod (19-27), High > 27): Readmission Risk Score: 40.5    Current PCP: Rosie Montes De Oca MD  PCP verified by CM? Yes    Chart Reviewed: Yes      History Provided by: Patient  Patient Orientation: Alert and Oriented    Patient Cognition: Alert    Hospitalization in the last 30 days (Readmission):  Yes    If yes, Readmission Assessment in CM Navigator will be completed.    Advance Directives:      Code Status: Full Code   Patient's Primary Decision Maker is:      Primary Decision Maker: CHRISTOPHER GROSSMAN - Domestic Partner - [phone]    Discharge Planning:    Patient lives with: Spouse/Significant Other Type of Home: House  Primary Care Giver: Self  Patient Support Systems include: Spouse/Significant Other   Current Financial resources:    Current community resources:    Current services prior to admission: None            Current DME:              Type of Home Care services:  None    ADLS  Prior functional level: Independent in ADLs/IADLs  Current functional level: Independent in ADLs/IADLs    PT AM-PAC:   /24  OT AM-PAC:   /24    Family can provide assistance at DC:    Would you like Case Management to discuss the discharge plan with any other family members/significant others, and if so, who?    Plans to Return to Present Housing: Yes  Other Identified Issues/Barriers to RETURNING to current housing: none  Potential Assistance needed at discharge: Transportation            Potential DME:    Patient expects to

## 2024-01-08 NOTE — PLAN OF CARE
recommendation for lab work to be repeated 2024 to calculate Laura score.  Patient was seen at [facility] 2024 as outlined above for abdominal pain.  Calculation of Laura score based on  lab  2023 and 2024 -reveals Laura score 0.619 indicating poor prognosis and nonresponder to Prednisolone.       Previous GI history:     SUMMARY TABLE    Last EGD  - 2023- Dr Galloway  Impression:    Small esophageal varices x 2 without high risk features or bleeding.  Moderate portal hypertensive gastropathy  No gastric varices noted.    Last colonoscopy  - 2023  Findings:   The bowel prep was adequate  Terminal ileum was normal  Some pinkish material was noted throughout the colon, no active bleeding was noted.  A 4 to 5 mm ulcer was noted just above the anorectal area.  This would likely signify stercoral colitis vs ischemia vs pressure ulcer. A biopsy was not taken today.      Primary GI physician  - Dr ZACHERY Olivarez        Objective:   VITALS:  /76   Pulse 78   Temp 97.9 °F (36.6 °C) (Oral)   Resp 12   SpO2 98%   TEMPERATURE:  Current - Temp: 97.9 °F (36.6 °C); Max - Temp  Av °F (36.7 °C)  Min: 97.9 °F (36.6 °C)  Max: 98 °F (36.7 °C)    Physical Assessment:  General appearance:  Jaundice, alert, cooperative and no distress  Mental Status:  oriented to person, place and time and normal affect  Lungs:  clear to auscultation bilaterally, normal effort  Heart:  regular rate and rhythm, no murmur  Abdomen:  soft, mild LUQ tenderness, nondistended, normal bowel sounds, no masses  Extremities:  no edema, no redness, No clubbing  Skin:  warm, dry, no gross lesions or rashes      Assssment  1. Alcoholic cirrhosis decompensated by portal hypertensive gastropathy,  ascites, encephalopathy, variceal GI bleed requiring TIPS 2023  2.  Hyperammonemia -patient refuses to take lactulose as prescribed.  TIPS patent.   3.  Leukocytosis-likely secondary to prednisolone  4.   Hyperbilirubinemia-chronic secondary to alcoholic liver disease  5.  Abnormal imaging showing peripancreatic and duodenal edema.         Plan  1.  Discontinue Prednisolone.  No improvement with Laura score after 7 days  2.  No need for octreotide or Protonix drip from GI perspective  3.  Start Protonix 40 mg once daily  4.  Recommend workup for underlying infection in setting of leukocytosis though likely related to steroids.  Follow-up on blood culture results  5.  Low-sodium diet  6.  Continue Xifaxan 550 mg twice daily  7.  Monitor serial H&H.  Transfuse to keep 7-8 gm/dl.  Do not over transfuse as this will increase portal pressures  8.  Formal GI consult to follow           Initial care time/code: Spent ~ 60 minutes on this date of service including chart prep, patient interaction, discussion with primary team, documentation and coordination of care for the above conditions    This plan was formulated in collaboration with Dr. Weiner   Thank you for allowing us to participate in the care of your patient.    Electronically signed by: SILVA Adkins CNP on 1/8/2024 at 1:03 PM     Please note that this note was generated using a voice recognition dictation software.  Although every effort was made to ensure the accuracy of this automated transcription, some errors in transcription may have occurred.

## 2024-01-09 VITALS
RESPIRATION RATE: 18 BRPM | OXYGEN SATURATION: 99 % | SYSTOLIC BLOOD PRESSURE: 130 MMHG | DIASTOLIC BLOOD PRESSURE: 81 MMHG | TEMPERATURE: 98.3 F | HEART RATE: 99 BPM

## 2024-01-09 PROBLEM — K58.2 IRRITABLE BOWEL SYNDROME WITH BOTH CONSTIPATION AND DIARRHEA: Status: ACTIVE | Noted: 2024-01-09

## 2024-01-09 PROBLEM — Z51.5 ENCOUNTER FOR PALLIATIVE CARE: Status: ACTIVE | Noted: 2024-01-09

## 2024-01-09 LAB
ALBUMIN SERPL-MCNC: 2.3 G/DL (ref 3.5–5.2)
ALBUMIN/GLOB SERPL: 0.7 {RATIO} (ref 1–2.5)
ALP SERPL-CCNC: 294 U/L (ref 40–129)
ALT SERPL-CCNC: 27 U/L (ref 5–41)
ANION GAP SERPL CALCULATED.3IONS-SCNC: 11 MMOL/L (ref 9–17)
AST SERPL-CCNC: 56 U/L
BILIRUB SERPL-MCNC: 19.3 MG/DL (ref 0.3–1.2)
BODY FLD TYPE: NORMAL
BUN SERPL-MCNC: 12 MG/DL (ref 6–20)
CA-I BLD-SCNC: 1.14 MMOL/L (ref 1.13–1.33)
CALCIUM SERPL-MCNC: 7.8 MG/DL (ref 8.6–10.4)
CASE NUMBER:: NORMAL
CHLORIDE SERPL-SCNC: 105 MMOL/L (ref 98–107)
CO2 SERPL-SCNC: 21 MMOL/L (ref 20–31)
CREAT SERPL-MCNC: <0.2 MG/DL (ref 0.7–1.2)
ERYTHROCYTE [DISTWIDTH] IN BLOOD BY AUTOMATED COUNT: 24.2 % (ref 11.8–14.4)
GFR SERPL CREATININE-BSD FRML MDRD: ABNORMAL ML/MIN/1.73M2
GLUCOSE SERPL-MCNC: 106 MG/DL (ref 70–99)
HCT VFR BLD AUTO: 26.4 % (ref 40.7–50.3)
HCT VFR BLD AUTO: 27 % (ref 40.7–50.3)
HCT VFR BLD AUTO: 27 % (ref 40.7–50.3)
HCT VFR BLD AUTO: 28.1 % (ref 40.7–50.3)
HGB BLD-MCNC: 8.8 G/DL (ref 13–17)
HGB BLD-MCNC: 8.9 G/DL (ref 13–17)
HGB BLD-MCNC: 8.9 G/DL (ref 13–17)
HGB BLD-MCNC: 9 G/DL (ref 13–17)
LYMPHOCYTES NFR FLD: 70 %
MCH RBC QN AUTO: 32.8 PG (ref 25.2–33.5)
MCHC RBC AUTO-ENTMCNC: 33 G/DL (ref 28.4–34.8)
MCV RBC AUTO: 99.6 FL (ref 82.6–102.9)
NEUTROPHILS NFR FLD: 17 %
NRBC BLD-RTO: 0 PER 100 WBC
PLATELET # BLD AUTO: ABNORMAL K/UL (ref 138–453)
PLATELET, FLUORESCENCE: 101 K/UL (ref 138–453)
PLATELETS.RETICULATED NFR BLD AUTO: 4.2 % (ref 1.1–10.3)
POTASSIUM SERPL-SCNC: 3.8 MMOL/L (ref 3.7–5.3)
PROT SERPL-MCNC: 5.8 G/DL (ref 6.4–8.3)
RBC # BLD AUTO: 2.71 M/UL (ref 4.21–5.77)
RBC # FLD: <3000 CELLS/UL
SODIUM SERPL-SCNC: 137 MMOL/L (ref 135–144)
SPECIMEN DESCRIPTION: NORMAL
UNIDENT CELLS NFR FLD: NORMAL %
WBC # FLD: 41 CELLS/UL
WBC OTHER # BLD: 22.9 K/UL (ref 3.5–11.3)

## 2024-01-09 PROCEDURE — 85014 HEMATOCRIT: CPT

## 2024-01-09 PROCEDURE — APPSS45 APP SPLIT SHARED TIME 31-45 MINUTES: Performed by: INTERNAL MEDICINE

## 2024-01-09 PROCEDURE — 6370000000 HC RX 637 (ALT 250 FOR IP): Performed by: INTERNAL MEDICINE

## 2024-01-09 PROCEDURE — 2580000003 HC RX 258: Performed by: INTERNAL MEDICINE

## 2024-01-09 PROCEDURE — 85027 COMPLETE CBC AUTOMATED: CPT

## 2024-01-09 PROCEDURE — 85055 RETICULATED PLATELET ASSAY: CPT

## 2024-01-09 PROCEDURE — 80053 COMPREHEN METABOLIC PANEL: CPT

## 2024-01-09 PROCEDURE — 82330 ASSAY OF CALCIUM: CPT

## 2024-01-09 PROCEDURE — 6370000000 HC RX 637 (ALT 250 FOR IP): Performed by: NURSE PRACTITIONER

## 2024-01-09 PROCEDURE — 99232 SBSQ HOSP IP/OBS MODERATE 35: CPT | Performed by: INTERNAL MEDICINE

## 2024-01-09 PROCEDURE — 99221 1ST HOSP IP/OBS SF/LOW 40: CPT | Performed by: INTERNAL MEDICINE

## 2024-01-09 PROCEDURE — 99232 SBSQ HOSP IP/OBS MODERATE 35: CPT | Performed by: PHYSICIAN ASSISTANT

## 2024-01-09 PROCEDURE — 85018 HEMOGLOBIN: CPT

## 2024-01-09 RX ORDER — PREDNISOLONE 15 MG/5ML
SOLUTION ORAL
Qty: 43.4 ML | Refills: 0 | Status: SHIPPED | OUTPATIENT
Start: 2024-01-09 | End: 2024-01-09 | Stop reason: DRUGHIGH

## 2024-01-09 RX ORDER — PREDNISOLONE 15 MG/5ML
30 SOLUTION ORAL DAILY
Status: DISCONTINUED | OUTPATIENT
Start: 2024-01-09 | End: 2024-01-09 | Stop reason: HOSPADM

## 2024-01-09 RX ORDER — PREDNISOLONE 15 MG/5ML
SOLUTION ORAL
Qty: 151.9 ML | Refills: 0 | Status: SHIPPED | OUTPATIENT
Start: 2024-01-09 | End: 2024-01-19

## 2024-01-09 RX ORDER — POLYETHYLENE GLYCOL 3350 17 G/17G
17 POWDER, FOR SOLUTION ORAL DAILY PRN
Qty: 30 PACKET | Refills: 0 | Status: SHIPPED | OUTPATIENT
Start: 2024-01-09 | End: 2024-02-08

## 2024-01-09 RX ADMIN — SODIUM CHLORIDE, PRESERVATIVE FREE 10 ML: 5 INJECTION INTRAVENOUS at 02:46

## 2024-01-09 RX ADMIN — RIFAXIMIN 550 MG: 550 TABLET ORAL at 10:02

## 2024-01-09 RX ADMIN — OXYCODONE HYDROCHLORIDE 5 MG: 5 TABLET ORAL at 07:38

## 2024-01-09 RX ADMIN — PANTOPRAZOLE SODIUM 40 MG: 40 TABLET, DELAYED RELEASE ORAL at 07:38

## 2024-01-09 RX ADMIN — PREDNISOLONE 30 MG: 15 SOLUTION ORAL at 16:36

## 2024-01-09 RX ADMIN — SODIUM CHLORIDE, PRESERVATIVE FREE 10 ML: 5 INJECTION INTRAVENOUS at 03:16

## 2024-01-09 RX ADMIN — OXYCODONE HYDROCHLORIDE 5 MG: 5 TABLET ORAL at 14:21

## 2024-01-09 ASSESSMENT — PAIN DESCRIPTION - LOCATION: LOCATION: HEAD

## 2024-01-09 ASSESSMENT — PAIN SCALES - GENERAL: PAINLEVEL_OUTOF10: 5

## 2024-01-09 NOTE — DISCHARGE SUMMARY
[facility]  Office: [phone]  Pual Hardy DO, Elijah Dye DO, Rik Rankin DO, Laci Barth DO, Perri Rolle MD, Rita Spencer MD, Bárbara Olivarez MD, Hansa Loco MD,  Sam Reardon MD, Maximo Amanda MD, Naida Kraus MD,  Miguel Melton DO, Cholo Diaz MD, Jluis Michaud MD, Dante Hardy DO, Candy Reyna MD,  Giovanny Genao DO, Claribel King MD, Sabi Ken MD, Erum Gottlieb MD, Rehana Burdick MD,  Clark Soliz MD, Ector James MD, Ivonne Valladares MD, Vinod Voss MD, Mainor Kirk MD, Rosales Bowden MD, Abdi Unger DO, Amadou Carpenter DO, Nancy Nicholson MD,  Evangelista Maria MD, Shirley Waterhouse, CNP,  Malena Hayes, CNP, Oswaldo Urrutia, CNP,  Armida Martin, NICK, Shayna Rodriguez, CNP, Yesenia Menchaca, CNP, Lita Garay CNP, Sharmin Prince, CNP, Sylvia Harris, CNP, DEEJAY IbrahimC, DEEJAY JohnsonC, Donna Cedeno, CNP, Ne Carnes, CNS, Holly Melara, CNP, Gloria Tate, CNP, Tracy Schwab, CNP         [facility]   IN-PATIENT SERVICE   [facility]    Discharge Summary     Patient ID: Sam Emerson  :  1985   MRN: [id number]     ACCOUNT:  [id number]   Patient's PCP: Rosie Montes De Oca MD  Admit Date: 2024   Discharge Date: 2024     Length of Stay: 2  Code Status:  Full Code  Admitting Physician: Clarissa Roman MD  Discharge Physician: MAHESH Johnson     Active Discharge Diagnoses:     Hospital Problem Lists:  Principal Problem:    Leukocytosis  Active Problems:    GI bleed    Alcoholism (HCC)    MDD (major depressive disorder), recurrent severe, without psychosis (HCC)    Essential hypertension    Alcoholic cardiomyopathy (HCC)    Generalized anxiety disorder    Transaminitis    ALC (alcoholic liver cirrhosis) (HCC)    S/P TIPS (transjugular intrahepatic portosystemic shunt)    Hyperbilirubinemia    Liver failure without hepatic coma (HCC)    Hyperammonemia (HCC)    Encounter for palliative

## 2024-01-09 NOTE — CONSULTS
Request   [] Other:       Active Hospital Problems    Diagnosis Date Noted    MDD (major depressive disorder), recurrent severe, without psychosis (HCC) [F33.2] 02/22/2023     Priority: Medium    Alcoholism (HCC) [F10.20] 02/18/2023     Priority: Medium    GI bleed [K92.2] 10/29/2022     Priority: Medium    Leukocytosis [D72.829] 05/21/2022     Priority: Medium    Hyperammonemia (HCC) [E72.20] 01/08/2024    Liver failure without hepatic coma (HCC) [K72.90] 01/07/2024    Hyperbilirubinemia [E80.6] 01/06/2024    S/P TIPS (transjugular intrahepatic portosystemic shunt) [Z95.828] 12/23/2023    ALC (alcoholic liver cirrhosis) (HCC) [K70.30] 09/27/2023    Transaminitis [R74.01] 10/15/2021    Generalized anxiety disorder [F41.1] 06/02/2021    Alcoholic cardiomyopathy (HCC) [I42.6] 07/07/2020    Essential hypertension [I10] 05/26/2020       PAST MEDICAL HISTORY      Diagnosis Date    Alcoholism (HCC)     pt drinks a fifth of whiskey daily    Anemia     Cirrhosis, alcoholic (HCC)     Pulmonary embolism (HCC)        PAST SURGICAL HISTORY  Past Surgical History:   Procedure Laterality Date    COLONOSCOPY  12/30/2023    diagnostic    COLONOSCOPY N/A 12/30/2023    COLONOSCOPY DIAGNOSTIC performed by Luciano Galloway MD at [facility] OR    ESOPHAGOGASTRODUODENOSCOPY  12/05/2023    EGD DIAGNOSTIC ONLY    ESOPHAGOGASTRODUODENOSCOPY  12/29/2023    IR NONTUNNELED VASCULAR CATHETER  12/05/2023    IR NONTUNNELED VASCULAR CATHETER 12/5/2023 [facility] SPECIAL PROCEDURES    IR TIPS INSERTION  12/05/2023    IR TIPS INSERTION 12/5/2023 [facility] SPECIAL PROCEDURES    UPPER GASTROINTESTINAL ENDOSCOPY N/A 05/01/2022    EGD ESOPHAGOGASTRODUODENOSCOPY performed by Gerald Olivarez MD at [facility] ENDO    UPPER GASTROINTESTINAL ENDOSCOPY N/A 10/30/2022    EGD BAND LIGATION performed by Prince Gonsalez MD at [facility] ENDO    UPPER GASTROINTESTINAL ENDOSCOPY N/A 09/27/2023    EGD BAND LIGATION performed by Anitra Fam MD at [facility] ENDO    UPPER GASTROINTESTINAL ENDOSCOPY  Temp 98.3 °F (36.8 °C)   Resp 16   SpO2 99%     Wt Readings from Last 3 Encounters:   01/02/24 74.7 kg (164 lb 10.9 oz)   12/28/23 76.1 kg (167 lb 12.3 oz)   12/22/23 81.8 kg (180 lb 5.4 oz)        Code Status: Full Code     ADVANCED CARE PLANNING:  Patient has capacity for medical decisions: yes  Health Care Power of : yes  Living Will: not asked     Personal, Social, and Family History  Marital Status: Long-term significant other  Living situation: with family:  significant other  Does patient understand diagnosis/treatment? yes  Does caregiver understand diagnosis/treatment? not asked    Assessment        REVIEW OF SYSTEMS  CONSTITUTIONAL:  negative for fevers, chills, sweats, fatigue, weight loss  HEENT:  negative for vision, hearing changes, runny nose, throat pain  RESPIRATORY:  negative for shortness of breath, cough, congestion, wheezing  CARDIOVASCULAR:  negative for chest pain, palpitations  GASTROINTESTINAL:  negative for nausea, vomiting, diarrhea, constipation, change in bowel habits, abdominal pain   GENITOURINARY:  negative for difficulty of urination, burning with urination, frequency   INTEGUMENT:  negative for rash, skin lesions, easy bruising   HEMATOLOGIC/LYMPHATIC:  negative for swelling/edema   ALLERGIC/IMMUNOLOGIC:  negative for urticaria , itching  ENDOCRINE:  negative increase in drinking, increase in urination, hot or cold intolerance  MUSCULOSKELETAL:  negative joint pains, muscle aches, swelling of joints  NEUROLOGICAL: Reports improvement of his numbness and tingling of his hands and feet.  Negative for headaches, dizziness, lightheadedness  BEHAVIOR/PSYCH:  negative for depression, anxiety    PHYSICAL ASSESSMENT:  General Appearance: alert, well appearing, and in no acute distress  Mental status: oriented to person, place, and time  Head: normocephalic, atraumatic  Eye: no icterus, redness, pupils equal and reactive, extraocular eye movements intact, conjunctiva clear  Ear:

## 2024-01-09 NOTE — CONSULTS
Gastroenterology Consult Note      Patient: Sam Emerson  : 1985  Acct#:      Date:  2024    Subjective:       History of Present Illness  Patient is a 39 y.o.  male admitted with Liver failure without hepatic coma (HCC) [K72.90] who is seen in consult for GI bleed.    He is known c/o- alcoholic cirrhosis decompensated by portal hypertensive gastropathy,  ascites, encephalopathy, h/o- variceal GI bleed S/P TIPS 2023 who presented to the ED with c/o- extreme fatigue and left upper quadrant abdominal pain.  Patient reports he has been sleeping at home persistently.  He reports he is having 3-4 bowel movements daily.     No c/o- nausea, vomiting, fever, loss of appetite, weight loss, bloating, bleeding OK, diarrhea, nocturnal diarrhea, tenesmus    He had CT abdomen which shows peripancreatic and periduodenal edema, more prominent since prior imaging.  Cirrhosis with splenomegaly and small volume ascites.  Trace pleural effusion.     He was recently admitted through ED on 2024 for abdominal pain.  Workup for paracentesis showed not enough fluid available for tap.  Patient showed no signs of encephalopathy.  Right upper quadrant ultrasound with Doppler evaluation of TIPS showed the TIPS was patent with velocities at upper limits of normal. Patient left AMA at that time.      Initial labs today revealed a WBCs 24.8, Hgb 10.3, HCT 31.9 with platelets 129.  Repeat labs showed decline in hemoglobin to 8.1 with repeat 8.3.  Patient's baseline hemoglobin 8-9 gm/dl.  Patient was initially started on PPI drip and octreotide which has been discontinued.       Past Medical History:   Diagnosis Date    Alcoholism (HCC)     pt drinks a fifth of whiskey daily    Anemia     Cirrhosis, alcoholic (HCC)     Pulmonary embolism (HCC)       Past Surgical History:   Procedure Laterality Date    COLONOSCOPY  2023    diagnostic    COLONOSCOPY N/A 2023    COLONOSCOPY DIAGNOSTIC

## 2024-01-09 NOTE — DISCHARGE INSTRUCTIONS
Please follow directions closely with Prednisolone taper  Follow up with GI in 3 days on 1/12/23  Follow up with your primary care provider within 7 days of discharge. Call to set up an appointment.  If you begin to experience chest pain, shortness of breath, severe abdominal pain, or any other worsening symptoms please come to the emergency department immediately.

## 2024-01-09 NOTE — ACP (ADVANCE CARE PLANNING)
Advance Care Planning     Advance Care Planning (ACP) Physician/NP/PA (Provider) Conversation      Date of ACP Conversation: 01/09/24    Conversation Conducted with:   Patient with Decision Making Capacity    Health Care Decision Maker:    Current Designated Health Care Decision Maker:    Primary Decision Maker: CHRISTOPHER GROSSMAN - Domestic Partner - [phone]    Care Preferences:    Hospitalization:  \"If your health worsens and it becomes clear that your chance of recovery is unlikely, what would your preference be regarding hospitalization?\"  Currently hospitalized    Ventilation:  \"If you were in your present state of health and suddenly became very ill and were unable to breathe on your own, what would your preference be about the use of a ventilator (breathing machine) if it was available to you?\"    FULL CODE    \"If your health worsens and it becomes clear that your chance of recovery is unlikely, what would your preference be about the use of a ventilator (breathing machine) if it was available to you?\"   FULL CODE    Resuscitation:  \"CPR works best to restart the heart when there is a sudden event, like a heart attack, in someone who is otherwise healthy. Unfortunately, CPR does not typically restart the heart for people who have serious health conditions or who are very sick.\"    \"In the event your heart stopped as a result of an underlying serious health condition, would you want attempts to be made to restart your heart (answer \"yes\" for attempt to resuscitate) or would you prefer a natural death (answer \"no\" for do not attempt to resuscitate)?\"   FULL CODE       Conversation Outcomes / Follow-Up Plan:   ACP paperwork in place, reviewed  FULL CODE      Length of Voluntary ACP Conversation in minutes:  <16 minutes (Non-Billable)    TARA PADILLA DO

## 2024-01-09 NOTE — PROGRESS NOTES
Continuous Infusions:    sodium chloride Stopped (24 0956)    sodium chloride       PRN Meds: HYDROmorphone, sodium chloride flush, sodium chloride, potassium chloride **OR** potassium alternative oral replacement **OR** potassium chloride, magnesium sulfate, ondansetron **OR** ondansetron, polyethylene glycol, acetaminophen **OR** acetaminophen, sodium chloride flush, sodium chloride, LORazepam **OR** LORazepam **OR** LORazepam **OR** LORazepam **OR** LORazepam **OR** LORazepam **OR** LORazepam **OR** LORazepam, oxyCODONE    Data:     Past Medical History:   has a past medical history of Alcoholism (HCC), Anemia, Cirrhosis, alcoholic (HCC), and Pulmonary embolism (HCC).    Social History:   reports that he has been smoking cigarettes. He has a 21.0 pack-year smoking history. He has never used smokeless tobacco. He reports current alcohol use of about 84.0 standard drinks of alcohol per week. He reports that he does not currently use drugs after having used the following drugs: Marijuana (Weed).     Family History:   Family History   Problem Relation Age of Onset    Heart Disease Mother     Heart Attack Mother     Heart Disease Father        Vitals:  /81   Pulse 99   Temp 98.3 °F (36.8 °C)   Resp 18   SpO2 99%   Temp (24hrs), Av.3 °F (36.8 °C), Min:98.3 °F (36.8 °C), Max:98.3 °F (36.8 °C)    No results for input(s): \"POCGLU\" in the last 72 hours.    I/O (24Hr):  No intake or output data in the 24 hours ending 24 1610      Labs:  Hematology:  Recent Labs     24  1748 24  0530 24  1155 24  0309 24  0526 24  1011   WBC 24.8* 19.5*  --   --  22.9*  --    RBC 3.18* 2.45*  --   --  2.71*  --    HGB 10.3* 8.1*   < > 8.8* 8.9*  8.9* 9.0*   HCT 31.9* 24.3*   < > 26.4* 27.0*  27.0* 28.1*   .3 99.2  --   --  99.6  --    MCH 32.4 33.1  --   --  32.8  --    MCHC 32.3 33.3  --   --  33.0  --    RDW 25.0* 24.8*  --   --  24.2*  --    PLT See Reflexed IPF  splenomegaly  Extremities:  no edema, redness, tenderness in the calves  Skin:  no gross lesions, rashes, induration    Assessment:        Hospital Problems             Last Modified POA    * (Principal) Leukocytosis 1/8/2024 Yes    GI bleed 1/8/2024 Yes    Alcoholism (HCC) 1/8/2024 Yes    MDD (major depressive disorder), recurrent severe, without psychosis (HCC) 1/8/2024 Yes    Essential hypertension 1/8/2024 Yes    Alcoholic cardiomyopathy (HCC) 1/8/2024 Yes    Generalized anxiety disorder 1/8/2024 Yes    Transaminitis 1/8/2024 Yes    ALC (alcoholic liver cirrhosis) (HCC) 1/8/2024 Yes    S/P TIPS (transjugular intrahepatic portosystemic shunt) 1/8/2024 Yes    Hyperbilirubinemia 1/8/2024 Yes    Liver failure without hepatic coma (HCC) 1/8/2024 Yes    Hyperammonemia (HCC) 1/8/2024 Yes    Encounter for palliative care 1/9/2024 Yes    Irritable bowel syndrome with both constipation and diarrhea 1/9/2024 Yes       Plan:        Leukocytosis - Concern for SBP, currently afebrile with leukocytosis 22.9.  Continue Rocephin. Awaiting cultures.  Hypokalemia - 3.8 this AM. No need for additional supplementation.   Alcoholic liver cirrhosis s/p TIPS - Hyperbilirubinemia likely secondary. Continue chronic home medications.   Alcoholic Hepatitis - Patient was on chronic steroid taper which has been discontinued. Steroids likely contributory to leukocytosis.   Anemia - Hgb 8.9 this AM. Will continue to monitor closely. Transfuse if <7. Still no signs or symptoms of bleeding.  Major depressive disorder/LEROY - Symptoms currently under control, not on any chronic medications.   Hypocalcemia - Ionized calcium 1.14.  No need for supplementation.  IBS - Miralax PRN. Rifaximin daily.       MAHESH Johnson  1/9/2024  4:10 PM

## 2024-01-10 ENCOUNTER — CARE COORDINATION (OUTPATIENT)
Dept: CASE MANAGEMENT | Age: 39
End: 2024-01-10

## 2024-01-10 DIAGNOSIS — Z95.828 S/P TIPS (TRANSJUGULAR INTRAHEPATIC PORTOSYSTEMIC SHUNT): Primary | ICD-10-CM

## 2024-01-10 LAB — SURGICAL PATHOLOGY REPORT: NORMAL

## 2024-01-10 NOTE — CARE COORDINATION
appointment-review 1/12 GI appt - check if they informed patient of xifaxan samples or program - check if PCP office scheduled yet  medication management-check any med changes from appts    Care Transition Nurse reviewed discharge instructions and medical action plan with patient who verbalized understanding. The patient was given an opportunity to ask questions and does not have any further questions or concerns at this time. Were discharge instructions available to patient? Yes. Reviewed appropriate site of care based on symptoms and resources available to patient including: PCP  Specialist  CTN . The patient agrees to contact the PCP office for questions related to their healthcare.     Advance Care Planning:   Does patient have an Advance Directive: reviewed and current.    Medication reconciliation was performed with patient, who verbalizes understanding of administration of home medications. Medications reviewed, 1111F entered: yes    Was patient discharged with a pulse oximeter? no    Non-face-to-face services provided:  Scheduled appointment with PCP-Office is calling patient back with appt - he spoke with office sooner  Scheduled appointment with Specialist-GI appt 1/12  Obtained and reviewed discharge summary and/or continuity of care documents    Offered patient enrollment in the Remote Patient Monitoring (RPM) program for in-home monitoring:  did not discuss on this visit .      Care Transitions 24 Hour Call    Do you have a copy of your discharge instructions?: Yes  Do you have all of your prescriptions and are they filled?: Yes  Have you been contacted by a [facility] Pharmacist?: No  Have you scheduled your follow up appointment?: Yes (Comment:  1/12 - PCP office is calling patient back to schedule)  How are you going to get to your appointment?: Car - family or friend to transport  Do you feel like you have everything you need to keep you well at home?: Yes  Care Transitions Interventions

## 2024-01-13 ENCOUNTER — APPOINTMENT (OUTPATIENT)
Dept: CT IMAGING | Age: 39
DRG: 720 | End: 2024-01-13
Payer: COMMERCIAL

## 2024-01-13 ENCOUNTER — HOSPITAL ENCOUNTER (EMERGENCY)
Age: 39
Discharge: HOME | DRG: 720 | End: 2024-01-13
Attending: EMERGENCY MEDICINE
Payer: COMMERCIAL

## 2024-01-13 VITALS
HEIGHT: 68 IN | DIASTOLIC BLOOD PRESSURE: 72 MMHG | OXYGEN SATURATION: 99 % | WEIGHT: 171.96 LBS | TEMPERATURE: 98 F | HEART RATE: 98 BPM | BODY MASS INDEX: 26.06 KG/M2 | RESPIRATION RATE: 16 BRPM | SYSTOLIC BLOOD PRESSURE: 114 MMHG

## 2024-01-13 DIAGNOSIS — R10.9 ABDOMINAL PAIN, UNSPECIFIED ABDOMINAL LOCATION: Primary | ICD-10-CM

## 2024-01-13 LAB
ALBUMIN SERPL-MCNC: 2.8 G/DL (ref 3.5–5.2)
ALBUMIN/GLOB SERPL: 0.8 {RATIO} (ref 1–2.5)
ALP SERPL-CCNC: 393 U/L (ref 40–129)
ALT SERPL-CCNC: 34 U/L (ref 5–41)
AMMONIA PLAS-SCNC: 36 UMOL/L (ref 16–60)
ANION GAP SERPL CALCULATED.3IONS-SCNC: 12 MMOL/L (ref 9–17)
AST SERPL-CCNC: 64 U/L
BASOPHILS # BLD: 0.19 K/UL (ref 0–0.2)
BASOPHILS NFR BLD: 1 % (ref 0–2)
BILIRUB DIRECT SERPL-MCNC: 18.6 MG/DL
BILIRUB INDIRECT SERPL-MCNC: 6.3 MG/DL (ref 0–1)
BILIRUB SERPL-MCNC: 24.9 MG/DL (ref 0.3–1.2)
BUN SERPL-MCNC: 12 MG/DL (ref 6–20)
CALCIUM SERPL-MCNC: 8 MG/DL (ref 8.6–10.4)
CHLORIDE SERPL-SCNC: 102 MMOL/L (ref 98–107)
CO2 SERPL-SCNC: 19 MMOL/L (ref 20–31)
CREAT SERPL-MCNC: 0.2 MG/DL (ref 0.7–1.2)
EOSINOPHIL # BLD: 0.19 K/UL (ref 0–0.44)
EOSINOPHILS RELATIVE PERCENT: 1 % (ref 1–4)
ERYTHROCYTE [DISTWIDTH] IN BLOOD BY AUTOMATED COUNT: 23.6 % (ref 11.8–14.4)
GFR SERPL CREATININE-BSD FRML MDRD: >60 ML/MIN/1.73M2
GLUCOSE SERPL-MCNC: 133 MG/DL (ref 70–99)
HCT VFR BLD AUTO: 32.3 % (ref 40.7–50.3)
HGB BLD-MCNC: 10.4 G/DL (ref 13–17)
IMM GRANULOCYTES # BLD AUTO: 0.19 K/UL (ref 0–0.3)
IMM GRANULOCYTES NFR BLD: 1 %
INR PPP: 1.7
LIPASE SERPL-CCNC: 64 U/L (ref 13–60)
LYMPHOCYTES NFR BLD: 1.32 K/UL (ref 1.1–3.7)
LYMPHOCYTES RELATIVE PERCENT: 7 % (ref 24–43)
MCH RBC QN AUTO: 33.3 PG (ref 25.2–33.5)
MCHC RBC AUTO-ENTMCNC: 32.2 G/DL (ref 28.4–34.8)
MCV RBC AUTO: 103.5 FL (ref 82.6–102.9)
MICROORGANISM SPEC CULT: NORMAL
MICROORGANISM/AGENT SPEC: NORMAL
MONOCYTES NFR BLD: 1.32 K/UL (ref 0.1–1.2)
MONOCYTES NFR BLD: 7 % (ref 3–12)
MORPHOLOGY: ABNORMAL
NEUTROPHILS NFR BLD: 83 % (ref 36–65)
NEUTS SEG NFR BLD: 15.69 K/UL (ref 1.5–8.1)
NRBC BLD-RTO: 0 PER 100 WBC
PLATELET # BLD AUTO: ABNORMAL K/UL (ref 138–453)
PLATELET, FLUORESCENCE: 122 K/UL (ref 138–453)
PLATELETS.RETICULATED NFR BLD AUTO: 3.6 % (ref 1.1–10.3)
POTASSIUM SERPL-SCNC: 2.9 MMOL/L (ref 3.7–5.3)
PROT SERPL-MCNC: 6.4 G/DL (ref 6.4–8.3)
PROTHROMBIN TIME: 19.6 SEC (ref 11.7–14.9)
RBC # BLD AUTO: 3.12 M/UL (ref 4.21–5.77)
SODIUM SERPL-SCNC: 133 MMOL/L (ref 135–144)
SPECIMEN DESCRIPTION: NORMAL
WBC OTHER # BLD: 18.9 K/UL (ref 3.5–11.3)

## 2024-01-13 PROCEDURE — 96361 HYDRATE IV INFUSION ADD-ON: CPT | Performed by: EMERGENCY MEDICINE

## 2024-01-13 PROCEDURE — 6360000002 HC RX W HCPCS

## 2024-01-13 PROCEDURE — 83690 ASSAY OF LIPASE: CPT

## 2024-01-13 PROCEDURE — 96375 TX/PRO/DX INJ NEW DRUG ADDON: CPT | Performed by: EMERGENCY MEDICINE

## 2024-01-13 PROCEDURE — 85610 PROTHROMBIN TIME: CPT

## 2024-01-13 PROCEDURE — 6360000004 HC RX CONTRAST MEDICATION

## 2024-01-13 PROCEDURE — 82140 ASSAY OF AMMONIA: CPT

## 2024-01-13 PROCEDURE — 80076 HEPATIC FUNCTION PANEL: CPT

## 2024-01-13 PROCEDURE — 80048 BASIC METABOLIC PNL TOTAL CA: CPT

## 2024-01-13 PROCEDURE — 85025 COMPLETE CBC W/AUTO DIFF WBC: CPT

## 2024-01-13 PROCEDURE — 99285 EMERGENCY DEPT VISIT HI MDM: CPT | Performed by: EMERGENCY MEDICINE

## 2024-01-13 PROCEDURE — 85055 RETICULATED PLATELET ASSAY: CPT

## 2024-01-13 PROCEDURE — 6370000000 HC RX 637 (ALT 250 FOR IP)

## 2024-01-13 PROCEDURE — 74177 CT ABD & PELVIS W/CONTRAST: CPT

## 2024-01-13 PROCEDURE — 2580000003 HC RX 258

## 2024-01-13 RX ORDER — POTASSIUM CHLORIDE 20 MEQ/1
40 TABLET, EXTENDED RELEASE ORAL ONCE
Status: COMPLETED | OUTPATIENT
Start: 2024-01-13 | End: 2024-01-13

## 2024-01-13 RX ORDER — ONDANSETRON 2 MG/ML
4 INJECTION INTRAMUSCULAR; INTRAVENOUS ONCE
Status: COMPLETED | OUTPATIENT
Start: 2024-01-13 | End: 2024-01-13

## 2024-01-13 RX ORDER — 0.9 % SODIUM CHLORIDE 0.9 %
500 INTRAVENOUS SOLUTION INTRAVENOUS ONCE
Status: COMPLETED | OUTPATIENT
Start: 2024-01-13 | End: 2024-01-13

## 2024-01-13 RX ORDER — POTASSIUM CHLORIDE 7.45 MG/ML
10 INJECTION INTRAVENOUS ONCE
Status: COMPLETED | OUTPATIENT
Start: 2024-01-13 | End: 2024-01-13

## 2024-01-13 RX ORDER — OXYCODONE HYDROCHLORIDE 5 MG/1
5 TABLET ORAL ONCE
Status: COMPLETED | OUTPATIENT
Start: 2024-01-13 | End: 2024-01-13

## 2024-01-13 RX ORDER — 0.9 % SODIUM CHLORIDE 0.9 %
1000 INTRAVENOUS SOLUTION INTRAVENOUS ONCE
Status: COMPLETED | OUTPATIENT
Start: 2024-01-13 | End: 2024-01-13

## 2024-01-13 RX ORDER — FENTANYL CITRATE 50 UG/ML
25 INJECTION, SOLUTION INTRAMUSCULAR; INTRAVENOUS ONCE
Status: COMPLETED | OUTPATIENT
Start: 2024-01-13 | End: 2024-01-13

## 2024-01-13 RX ADMIN — POTASSIUM CHLORIDE 40 MEQ: 1500 TABLET, EXTENDED RELEASE ORAL at 21:30

## 2024-01-13 RX ADMIN — POTASSIUM CHLORIDE 10 MEQ: 7.46 INJECTION, SOLUTION INTRAVENOUS at 21:31

## 2024-01-13 RX ADMIN — FENTANYL CITRATE 25 MCG: 50 INJECTION, SOLUTION INTRAMUSCULAR; INTRAVENOUS at 19:42

## 2024-01-13 RX ADMIN — ONDANSETRON 4 MG: 2 INJECTION INTRAMUSCULAR; INTRAVENOUS at 19:42

## 2024-01-13 RX ADMIN — SODIUM CHLORIDE 500 ML: 9 INJECTION, SOLUTION INTRAVENOUS at 19:42

## 2024-01-13 RX ADMIN — OXYCODONE HYDROCHLORIDE 5 MG: 5 TABLET ORAL at 21:32

## 2024-01-13 RX ADMIN — IOPAMIDOL 75 ML: 755 INJECTION, SOLUTION INTRAVENOUS at 20:11

## 2024-01-13 RX ADMIN — SODIUM CHLORIDE 1000 ML: 9 INJECTION, SOLUTION INTRAVENOUS at 21:31

## 2024-01-13 ASSESSMENT — PAIN DESCRIPTION - LOCATION: LOCATION: ABDOMEN;HEAD

## 2024-01-13 ASSESSMENT — PAIN SCALES - GENERAL: PAINLEVEL_OUTOF10: 9

## 2024-01-13 NOTE — ED PROVIDER NOTES
[facility] ED  Emergency Department Encounter  Emergency Medicine Resident     Pt Name:Sam Emerson  MRN: [id number]  Birthdate 1985  Date of evaluation: 1/13/24  PCP:  Rosie Montes De Oca MD  Note Started: 6:57 PM EST      CHIEF COMPLAINT       Chief Complaint   Patient presents with    Abdominal Pain     Diffuse abdominal pain, states liver is inflamed    Headache     Frontal, behind eyes    Blurred Vision    Jaundice       HISTORY OF PRESENT ILLNESS  (Location/Symptom, Timing/Onset, Context/Setting, Quality, Duration, Modifying Factors, Severity.)      Sam Emerson is a 39 y.o. male who presents with abdominal pain.  Patient comes in for abdominal pain states he has a history of liver failure secondary to alcohol abuse.  Reports in early December he had a variceal bleed that needed to be cauterized by GI.  States he was recently admitted for elevated ammonia and was discharged home on lactulose.  He states that he was discharged this past Tuesday.  He states he started having symptoms Wednesday but Thursday he felt more tired concerning him but it was similar symptoms to when he was previously admitted.  States that he is constipated and then started taking his lactulose again today and has had multiple episodes of bowel movements and diarrhea.  States that he has previously been nauseated has Zofran at home.  States he is also having abdominal pain.  Denies headache.    PAST MEDICAL / SURGICAL / SOCIAL / FAMILY HISTORY      has a past medical history of Alcoholism (HCC), Anemia, Cirrhosis, alcoholic (HCC), and Pulmonary embolism (HCC).       has a past surgical history that includes Upper gastrointestinal endoscopy (N/A, 05/01/2022); Upper gastrointestinal endoscopy (N/A, 10/30/2022); Upper gastrointestinal endoscopy (N/A, 09/27/2023); Esophagogastroduodenoscopy (12/05/2023); IR TIPS INSERTION (12/05/2023); IR NONTUNNELED VASCULAR CATHETER > 5 YEARS (12/05/2023); Upper  to  ascites and portal hypertension.  4. Trace left pleural effusion. [GI]   2235 Reevaluation patient is feeling better.  Patient potassium was replaced and his lab work is either at his baseline or lower.  Plan for discharge home.  He states he has plenty of Zofran and other medications for his liver failure.  Discussed return instructions she gave verbal understanding. [GI]      ED Course User Index  [GI] Edgard Walker DO       PROCEDURES:      CONSULTS:  None    CRITICAL CARE:  There was significant risk of life threatening deterioration of patient's condition requiring my direct management. Critical care time minutes, excluding any documented procedures.    FINAL IMPRESSION      1. Abdominal pain, unspecified abdominal location          DISPOSITION / PLAN     DISPOSITION Decision To Discharge 01/13/2024 10:36:53 PM      PATIENT REFERRED TO:  Rosie Montes De Oca MD  [address] [id number]  [phone]    Call in 2 days  As needed      DISCHARGE MEDICATIONS:  Discharge Medication List as of 1/13/2024 10:37 PM          Edgard Walker DO  Emergency Medicine Resident    (Please note that portions of this note were completed with a voice recognition program.  Efforts were made to edit the dictations but occasionally words are mis-transcribed.)

## 2024-01-14 NOTE — DISCHARGE INSTRUCTIONS
You are seen in the emergency department for your abdominal pain.  Your ammonia level was within normal limits.  Continue to take the lactulose as prescribed.  Please continue use the Zofran as prescribed.    PLEASE RETURN TO THE EMERGENCY DEPARTMENT IMMEDIATELY for worsening symptoms, or if you develop any concerning symptoms such as: high fever not relieved by acetaminophen (Tylenol) and/or ibuprofen (Motrin), chills, shortness of breath, chest pain, persistent nausea and/or vomiting, numbness, weakness or tingling in the arms or legs or change in color of the extremities, changes in mental status, persistent headache, blurry vision.    Return within 8 - 12 hours if you have any of the following: worsening of pain in your abdomen, no food sounds good to you, you continue to vomit, pain goes to your back or testicles, have pain in the abdomen when going over a bump in the car or when you jump up and down, inability to urinate, unable to follow up with your physician, or other any other care or concern.

## 2024-01-14 NOTE — ED PROVIDER NOTES
I performed a history and physical examination of the patient and discussed management with the resident. I reviewed the resident’s note and agree with the documented findings and plan of care. Any areas of disagreement are noted on the chart. I was personally present for the key portions of any procedures. I have documented in the chart those procedures where I was not present during the key portions. I have reviewed the emergency nurses triage note. I agree with the chief complaint, past medical history, past surgical history, allergies, medications, social and family history as documented unless otherwise noted below. Documentation of the HPI, Physical Exam and Medical Decision Making performed by medical students or scribes is based on my personal performance of the HPI, PE and MDM.   For Phys Assistant/ Nurse Practitioner cases/documentation I have personally evaluated this patient and have completed at least one if not all key elements of the E/M (history, physical exam, and MDM). I find the patient's history and physical exam are consistent with the NP/PA documentation. I agree with the care provided, treatment rendered, disposition and followup plan.   Additional findings are as noted.    Dante Pitts MD  Attending Emergency  Physician        This patient presents to the emergency department with complaints of headache and lightheadedness as well as shortness of breath on exertion, diffuse abdominal pain, jaundice, constipation.  He had nausea but no vomiting.  Denies diarrhea.  No melena, hematochezia, hematemesis.  Past medical history significant for alcoholic cirrhosis and ascites.  Patient recently underwent a TIPS procedure.  Patient was recent admitted and discharged after a paracentesis having ruled out spontaneous bacterial peritonitis.  Patient denies any alcohol ingestion.  Reports he is taking his prescriptions as directed.  He reports that over the course of the day he has been experiencing

## 2024-01-14 NOTE — ED NOTES
JUAN MANUEL called [employer] to scheduled transportation. Patient does not have transport services. Juan Manuel discussed this with patient.  Patient stated he does receive rides from insurance to and from appointments. He will call [employer] to confirm transportation benefit. JUAN MANUEL will provide a one time [employer] cab voucher to home.

## 2024-01-14 NOTE — ED NOTES
Pt arrived to ED alert and oriented x4 via triage.  Pt c/o multiple complaints at this time.  Pt reports frontal headache with dizziness, states that he is disoriented but is answering questions appropriately.  Pt reports SOB on exertion, states that he was going to walk to a store a block from where he lives but could not make it.  Pt reports diffuse abdominal pain, states \"my liver is inflamed\".  Pt is jaundice, states that this has been ongoing for 1 month.  Pt reports taking Lactulose as prescribed, TID.  Pt denies having been around anyone suspected to have COVID-19 or anyone that has been sick, denies recent travel outside the state of [place] or .  Pt placed on cardiac monitor, continuous pulse ox, and BP cuff.  RR even and unlabored.   NAD noted.   Whiteboard updated.  Will continue with plan of care.

## 2024-01-15 ENCOUNTER — TELEPHONE (OUTPATIENT)
Dept: INTERNAL MEDICINE CLINIC | Age: 39
End: 2024-01-15

## 2024-01-15 ENCOUNTER — CARE COORDINATION (OUTPATIENT)
Dept: CASE MANAGEMENT | Age: 39
End: 2024-01-15

## 2024-01-15 ENCOUNTER — APPOINTMENT (OUTPATIENT)
Dept: GENERAL RADIOLOGY | Age: 39
DRG: 720 | End: 2024-01-15
Payer: COMMERCIAL

## 2024-01-15 ENCOUNTER — HOSPITAL ENCOUNTER (INPATIENT)
Age: 39
LOS: 3 days | Discharge: HOME | DRG: 720 | End: 2024-01-18
Attending: EMERGENCY MEDICINE | Admitting: INTERNAL MEDICINE
Payer: COMMERCIAL

## 2024-01-15 DIAGNOSIS — K65.2 SBP (SPONTANEOUS BACTERIAL PERITONITIS) (HCC): Primary | ICD-10-CM

## 2024-01-15 DIAGNOSIS — R17 JAUNDICE, NON-NEONATAL: ICD-10-CM

## 2024-01-15 DIAGNOSIS — K72.90 LIVER FAILURE WITHOUT HEPATIC COMA, UNSPECIFIED CHRONICITY (HCC): ICD-10-CM

## 2024-01-15 DIAGNOSIS — N30.00 ACUTE CYSTITIS WITHOUT HEMATURIA: ICD-10-CM

## 2024-01-15 DIAGNOSIS — E80.6 HYPERBILIRUBINEMIA: ICD-10-CM

## 2024-01-15 DIAGNOSIS — N39.0 URINARY TRACT INFECTION WITHOUT HEMATURIA, SITE UNSPECIFIED: ICD-10-CM

## 2024-01-15 PROBLEM — R10.9 ABDOMINAL PAIN: Status: ACTIVE | Noted: 2024-01-15

## 2024-01-15 LAB
ALBUMIN SERPL-MCNC: 2.4 G/DL (ref 3.5–5.2)
ALP SERPL-CCNC: 399 U/L (ref 40–129)
ALT SERPL-CCNC: 34 U/L (ref 5–41)
AMMONIA PLAS-SCNC: 49 UMOL/L (ref 16–60)
AMYLASE SERPL-CCNC: 96 U/L (ref 28–100)
ANION GAP SERPL CALCULATED.3IONS-SCNC: 11 MMOL/L (ref 9–17)
AST SERPL-CCNC: 68 U/L
BACTERIA URNS QL MICRO: ABNORMAL
BASOPHILS # BLD: 0 K/UL (ref 0–0.2)
BASOPHILS NFR BLD: 0 % (ref 0–2)
BILIRUB DIRECT SERPL-MCNC: 19.2 MG/DL
BILIRUB INDIRECT SERPL-MCNC: 5.5 MG/DL (ref 0–1)
BILIRUB SERPL-MCNC: 24.7 MG/DL (ref 0.3–1.2)
BILIRUB UR QL STRIP: ABNORMAL
BNP SERPL-MCNC: 125 PG/ML
BUN SERPL-MCNC: 13 MG/DL (ref 6–20)
CALCIUM SERPL-MCNC: 8.7 MG/DL (ref 8.6–10.4)
CASTS #/AREA URNS LPF: ABNORMAL /LPF
CASTS #/AREA URNS LPF: ABNORMAL /LPF
CHLORIDE SERPL-SCNC: 105 MMOL/L (ref 98–107)
CLARITY UR: ABNORMAL
CO2 SERPL-SCNC: 19 MMOL/L (ref 20–31)
COLOR UR: ABNORMAL
CREAT SERPL-MCNC: <0.4 MG/DL (ref 0.7–1.2)
EOSINOPHIL # BLD: 0.45 K/UL (ref 0–0.4)
EOSINOPHILS RELATIVE PERCENT: 2 % (ref 0–4)
EPI CELLS #/AREA URNS HPF: ABNORMAL /HPF
ERYTHROCYTE [DISTWIDTH] IN BLOOD BY AUTOMATED COUNT: 23.9 % (ref 11.5–14.9)
GFR SERPL CREATININE-BSD FRML MDRD: ABNORMAL ML/MIN/1.73M2
GLUCOSE SERPL-MCNC: 117 MG/DL (ref 70–99)
GLUCOSE UR STRIP-MCNC: NEGATIVE MG/DL
HCT VFR BLD AUTO: 28.9 % (ref 41–53)
HGB BLD-MCNC: 9.8 G/DL (ref 13.5–17.5)
HGB UR QL STRIP.AUTO: NEGATIVE
KETONES UR STRIP-MCNC: NEGATIVE MG/DL
LEUKOCYTE ESTERASE UR QL STRIP: ABNORMAL
LIPASE SERPL-CCNC: 42 U/L (ref 13–60)
LYMPHOCYTES NFR BLD: 1.56 K/UL (ref 1–4.8)
LYMPHOCYTES RELATIVE PERCENT: 7 % (ref 24–44)
MCH RBC QN AUTO: 34.2 PG (ref 26–34)
MCHC RBC AUTO-ENTMCNC: 33.9 G/DL (ref 31–37)
MCV RBC AUTO: 100.8 FL (ref 80–100)
MONOCYTES NFR BLD: 1.34 K/UL (ref 0.1–1.3)
MONOCYTES NFR BLD: 6 % (ref 1–7)
MORPHOLOGY: ABNORMAL
MORPHOLOGY: ABNORMAL
NEUTROPHILS NFR BLD: 85 % (ref 36–66)
NEUTS SEG NFR BLD: 18.95 K/UL (ref 1.3–9.1)
NITRITE UR QL STRIP: POSITIVE
PH UR STRIP: 6 [PH] (ref 5–8)
PLATELET # BLD AUTO: 130 K/UL (ref 150–450)
PMV BLD AUTO: 8.6 FL (ref 6–12)
POTASSIUM SERPL-SCNC: 3.6 MMOL/L (ref 3.7–5.3)
PROT SERPL-MCNC: 6 G/DL (ref 6.4–8.3)
PROT UR STRIP-MCNC: ABNORMAL MG/DL
RBC # BLD AUTO: 2.86 M/UL (ref 4.5–5.9)
RBC #/AREA URNS HPF: ABNORMAL /HPF
SODIUM SERPL-SCNC: 135 MMOL/L (ref 135–144)
SP GR UR STRIP: 1.02 (ref 1–1.03)
TROPONIN I SERPL HS-MCNC: 10 NG/L (ref 0–22)
TROPONIN I SERPL HS-MCNC: 9 NG/L (ref 0–22)
UROBILINOGEN UR STRIP-ACNC: NORMAL EU/DL (ref 0–1)
WBC #/AREA URNS HPF: ABNORMAL /HPF
WBC OTHER # BLD: 22.3 K/UL (ref 3.5–11)

## 2024-01-15 PROCEDURE — 82140 ASSAY OF AMMONIA: CPT

## 2024-01-15 PROCEDURE — 80076 HEPATIC FUNCTION PANEL: CPT

## 2024-01-15 PROCEDURE — 96375 TX/PRO/DX INJ NEW DRUG ADDON: CPT

## 2024-01-15 PROCEDURE — 36415 COLL VENOUS BLD VENIPUNCTURE: CPT

## 2024-01-15 PROCEDURE — 93005 ELECTROCARDIOGRAM TRACING: CPT

## 2024-01-15 PROCEDURE — 2580000003 HC RX 258

## 2024-01-15 PROCEDURE — 85025 COMPLETE CBC W/AUTO DIFF WBC: CPT

## 2024-01-15 PROCEDURE — 6360000002 HC RX W HCPCS

## 2024-01-15 PROCEDURE — 80048 BASIC METABOLIC PNL TOTAL CA: CPT

## 2024-01-15 PROCEDURE — 84484 ASSAY OF TROPONIN QUANT: CPT

## 2024-01-15 PROCEDURE — 82150 ASSAY OF AMYLASE: CPT

## 2024-01-15 PROCEDURE — 71046 X-RAY EXAM CHEST 2 VIEWS: CPT

## 2024-01-15 PROCEDURE — 83690 ASSAY OF LIPASE: CPT

## 2024-01-15 PROCEDURE — 83880 ASSAY OF NATRIURETIC PEPTIDE: CPT

## 2024-01-15 PROCEDURE — 96365 THER/PROPH/DIAG IV INF INIT: CPT

## 2024-01-15 PROCEDURE — 81001 URINALYSIS AUTO W/SCOPE: CPT

## 2024-01-15 PROCEDURE — 2060000000 HC ICU INTERMEDIATE R&B

## 2024-01-15 PROCEDURE — 99285 EMERGENCY DEPT VISIT HI MDM: CPT

## 2024-01-15 RX ORDER — SODIUM CHLORIDE 9 MG/ML
INJECTION, SOLUTION INTRAVENOUS PRN
Status: DISCONTINUED | OUTPATIENT
Start: 2024-01-15 | End: 2024-01-18 | Stop reason: HOSPADM

## 2024-01-15 RX ORDER — ACETAMINOPHEN 325 MG/1
650 TABLET ORAL EVERY 6 HOURS PRN
Status: DISCONTINUED | OUTPATIENT
Start: 2024-01-15 | End: 2024-01-18 | Stop reason: HOSPADM

## 2024-01-15 RX ORDER — FENTANYL CITRATE 0.05 MG/ML
50 INJECTION, SOLUTION INTRAMUSCULAR; INTRAVENOUS ONCE
Status: COMPLETED | OUTPATIENT
Start: 2024-01-15 | End: 2024-01-15

## 2024-01-15 RX ORDER — FENTANYL CITRATE 0.05 MG/ML
25 INJECTION, SOLUTION INTRAMUSCULAR; INTRAVENOUS ONCE
Status: COMPLETED | OUTPATIENT
Start: 2024-01-15 | End: 2024-01-15

## 2024-01-15 RX ORDER — ENOXAPARIN SODIUM 100 MG/ML
40 INJECTION SUBCUTANEOUS DAILY
Status: DISCONTINUED | OUTPATIENT
Start: 2024-01-16 | End: 2024-01-18 | Stop reason: HOSPADM

## 2024-01-15 RX ORDER — SODIUM CHLORIDE 0.9 % (FLUSH) 0.9 %
5-40 SYRINGE (ML) INJECTION EVERY 12 HOURS SCHEDULED
Status: DISCONTINUED | OUTPATIENT
Start: 2024-01-15 | End: 2024-01-18 | Stop reason: HOSPADM

## 2024-01-15 RX ORDER — LACTULOSE 10 G/15ML
10 SOLUTION ORAL 3 TIMES DAILY
Status: DISCONTINUED | OUTPATIENT
Start: 2024-01-15 | End: 2024-01-18 | Stop reason: HOSPADM

## 2024-01-15 RX ORDER — ONDANSETRON 2 MG/ML
4 INJECTION INTRAMUSCULAR; INTRAVENOUS ONCE
Status: COMPLETED | OUTPATIENT
Start: 2024-01-15 | End: 2024-01-15

## 2024-01-15 RX ORDER — MAGNESIUM SULFATE HEPTAHYDRATE 40 MG/ML
2000 INJECTION, SOLUTION INTRAVENOUS PRN
Status: DISCONTINUED | OUTPATIENT
Start: 2024-01-15 | End: 2024-01-18 | Stop reason: HOSPADM

## 2024-01-15 RX ORDER — ACETAMINOPHEN 650 MG/1
650 SUPPOSITORY RECTAL EVERY 6 HOURS PRN
Status: DISCONTINUED | OUTPATIENT
Start: 2024-01-15 | End: 2024-01-18 | Stop reason: HOSPADM

## 2024-01-15 RX ORDER — POTASSIUM CHLORIDE 20 MEQ/1
40 TABLET, EXTENDED RELEASE ORAL PRN
Status: DISCONTINUED | OUTPATIENT
Start: 2024-01-15 | End: 2024-01-18 | Stop reason: HOSPADM

## 2024-01-15 RX ORDER — POTASSIUM CHLORIDE 7.45 MG/ML
10 INJECTION INTRAVENOUS PRN
Status: DISCONTINUED | OUTPATIENT
Start: 2024-01-15 | End: 2024-01-18 | Stop reason: HOSPADM

## 2024-01-15 RX ORDER — ONDANSETRON 2 MG/ML
4 INJECTION INTRAMUSCULAR; INTRAVENOUS EVERY 6 HOURS PRN
Status: DISCONTINUED | OUTPATIENT
Start: 2024-01-15 | End: 2024-01-18 | Stop reason: HOSPADM

## 2024-01-15 RX ORDER — ONDANSETRON 4 MG/1
4 TABLET, ORALLY DISINTEGRATING ORAL EVERY 8 HOURS PRN
Status: DISCONTINUED | OUTPATIENT
Start: 2024-01-15 | End: 2024-01-18 | Stop reason: HOSPADM

## 2024-01-15 RX ORDER — SODIUM CHLORIDE 0.9 % (FLUSH) 0.9 %
10 SYRINGE (ML) INJECTION PRN
Status: DISCONTINUED | OUTPATIENT
Start: 2024-01-15 | End: 2024-01-18 | Stop reason: HOSPADM

## 2024-01-15 RX ORDER — POLYETHYLENE GLYCOL 3350 17 G/17G
17 POWDER, FOR SOLUTION ORAL DAILY PRN
Status: DISCONTINUED | OUTPATIENT
Start: 2024-01-15 | End: 2024-01-18 | Stop reason: HOSPADM

## 2024-01-15 RX ORDER — METRONIDAZOLE 500 MG/100ML
500 INJECTION, SOLUTION INTRAVENOUS EVERY 8 HOURS
Status: DISCONTINUED | OUTPATIENT
Start: 2024-01-15 | End: 2024-01-18 | Stop reason: HOSPADM

## 2024-01-15 RX ADMIN — FENTANYL CITRATE 25 MCG: 0.05 INJECTION, SOLUTION INTRAMUSCULAR; INTRAVENOUS at 16:52

## 2024-01-15 RX ADMIN — FENTANYL CITRATE 50 MCG: 0.05 INJECTION, SOLUTION INTRAMUSCULAR; INTRAVENOUS at 20:54

## 2024-01-15 RX ADMIN — CEFTRIAXONE SODIUM 2000 MG: 2 INJECTION, POWDER, FOR SOLUTION INTRAMUSCULAR; INTRAVENOUS at 17:42

## 2024-01-15 RX ADMIN — METRONIDAZOLE 500 MG: 500 INJECTION, SOLUTION INTRAVENOUS at 20:57

## 2024-01-15 RX ADMIN — ONDANSETRON 4 MG: 2 INJECTION INTRAMUSCULAR; INTRAVENOUS at 16:52

## 2024-01-15 ASSESSMENT — PAIN DESCRIPTION - LOCATION
LOCATION: ABDOMEN;CHEST
LOCATION: ABDOMEN
LOCATION: ABDOMEN;CHEST

## 2024-01-15 ASSESSMENT — PAIN SCALES - GENERAL
PAINLEVEL_OUTOF10: 8
PAINLEVEL_OUTOF10: 8
PAINLEVEL_OUTOF10: 3
PAINLEVEL_OUTOF10: 8

## 2024-01-15 ASSESSMENT — LIFESTYLE VARIABLES
HOW OFTEN DO YOU HAVE A DRINK CONTAINING ALCOHOL: NEVER
HOW MANY STANDARD DRINKS CONTAINING ALCOHOL DO YOU HAVE ON A TYPICAL DAY: PATIENT DOES NOT DRINK

## 2024-01-15 ASSESSMENT — PAIN DESCRIPTION - PAIN TYPE: TYPE: ACUTE PAIN

## 2024-01-15 ASSESSMENT — PAIN DESCRIPTION - DESCRIPTORS
DESCRIPTORS: ACHING
DESCRIPTORS: OTHER (COMMENT)

## 2024-01-15 ASSESSMENT — PAIN - FUNCTIONAL ASSESSMENT: PAIN_FUNCTIONAL_ASSESSMENT: 0-10

## 2024-01-15 NOTE — CARE COORDINATION
Care Transitions IED follow up call     Call within 2 business days of discharge: Yes    Patient Current Location:  Home: [address] [place] [address]    LPN Care Coordinator contacted the patient by telephone to perform post hospital discharge assessment. Verified name and  with patient as identifiers. Provided introduction to self, and explanation of the LPN Care Coordinator role.     Patient: Sam Emerson Patient : 1985   MRN: [id number]  Reason for Admission: Abdominal pain unspecified abdominal pain location   Discharge Date: 24 RARS: Readmission Risk Score: 38.6      Last Discharge Facility       Date Complaint Diagnosis Description Type Department Provider    24 Abdominal Pain; Headache; Blurred Vision; Jaundice Abdominal pain, unspecified abdominal location ED (DISCHARGE) [facility] ED Dante Pitts MD            Was this an external facility discharge? No Discharge Facility: [facility]    Challenges to be reviewed by the provider   Additional needs identified to be addressed with provider: yes   Spoke to patient for ER follow up. He reports he does not feel better since coming home from ER he still has abdominal pain/cramping , weakness, fatigue HA, sob increased HR and chest pain writer advised him to return to ER. His spouse will take him. Writer did call your office and spoke to Shameka.                Method of communication with provider: chart routing.    Sam returned writers call for ED follow up. He reports that he does not feel better he actually feels worse. He c/o HA sob , increased HR and abdominal pain and cramping he has had 3 BM today. He is drinking fluids and eating okay. He denies N/V fever or chills. He is taking lactulose. Writer asked if anyone is home to take him to ER. He stated his spouse is there and he will have her take him. Writer advised him to return to ER. Writer called PCP office spoke to Shameka who will send message to pcp.     LPN Care Coordinator reviewed

## 2024-01-15 NOTE — ED TRIAGE NOTES
Mode of arrival (squad #, walk in, police, etc) : squad        Chief complaint(s): chest pain/abd pain        Arrival Note (brief scenario, treatment PTA, etc).: patient called [facility] to his house today d/t chest pain and generalized abd pain. Patient was given one nitro in transit to hospital but refused asa d/t liver disease. Patient quit drinking completely 12/5/23.               C= \"Have you ever felt that you should Cut down on your drinking?\"  No  A= \"Have people Annoyed you by criticizing your drinking?\"  No  G= \"Have you ever felt bad or Guilty about your drinking?\"  No  E= \"Have you ever had a drink as an Eye-opener first thing in the morning to steady your nerves or to help a hangover?\"  No      Deferred []      Reason for deferring: N/A      *If yes to two or more: probable alcohol abuse.*

## 2024-01-15 NOTE — ED PROVIDER NOTES
EMERGENCY DEPARTMENT ENCOUNTER   ATTENDING ATTESTATION     Pt Name: Sam Emerson  MRN: [id number]  Birthdate 1985  Date of evaluation: 1/15/24       Sam Emerson is a 39 y.o. male who presents with Chest Pain and Abdominal Pain      MDM:   Nausea, soft stools, abd pain  Hx of cirrhosis  Jaundice present  Abd soft nontender now  Admitting for GI eval    Vitals:   Vitals:    01/15/24 1621 01/15/24 1645   BP: (!) 128/94 (!) 142/88   Pulse: (!) 115 (!) 115   Resp: 21 14   Temp: 97.7 °F (36.5 °C)    TempSrc: Oral    SpO2: 99% 99%   Weight: 72.6 kg (160 lb)    Height: 1.727 m (5' 8\")          I personally saw and examined the patient. I have reviewed and agree with the resident's findings, including all diagnostic interpretations and treatment plan as written. I was present for the key portions of any procedures performed and the inclusive time noted for any critical care statement.    Jimenez Campos MD  Attending Emergency Physician            Jimenez Campos MD  01/15/24 [id number]    
procedures.    FINAL IMPRESSION      1. SBP (spontaneous bacterial peritonitis) (HCC)    2. Hyperbilirubinemia    3. Liver failure without hepatic coma, unspecified chronicity (HCC)    4. Jaundice, non-    5. Urinary tract infection without hematuria, site unspecified          DISPOSITION / PLAN     DISPOSITION Admitted 01/15/2024 07:24:13 PM      PATIENT REFERRED TO:  No follow-up provider specified.    DISCHARGE MEDICATIONS:  New Prescriptions    No medications on file       Sascha Novak DO  Emergency Medicine Resident    (Please note that portions of this note were completed with a voice recognition program.  Efforts were made to edit the dictations but occasionally words are mis-transcribed.)

## 2024-01-15 NOTE — TELEPHONE ENCOUNTER
Patient was discharged from the ER yesterday. Mariaa LINARES with [facility] spoke to the patient as an ED follow up call.    Patient is still having Abdominal pain, SOB, Weakness, Fatigue and Headache.    Patient stated it felt like he was rushed out of the hospital. He was not ready to leave.    Mariaa advised patient to go back to the ER.     Patient states his wife will take him.

## 2024-01-15 NOTE — ED NOTES
Report given to MINERVA Robertson from ED.   Report method in person   The following was reviewed with receiving RN:   Current vital signs:  /83   Pulse (!) 110   Temp 97.7 °F (36.5 °C) (Oral)   Resp 16   Ht 1.727 m (5' 8\")   Wt 72.6 kg (160 lb)   SpO2 98%   BMI 24.33 kg/m²                MEWS Score: 4     Any medication or safety alerts were reviewed. Any pending diagnostics and notifications were also reviewed, as well as any safety concerns or issues, abnormal labs, abnormal imaging, and abnormal assessment findings. Questions were answered.

## 2024-01-15 NOTE — CARE COORDINATION
Care Transitions Outreach Attempt # 1 ER follow up.    Call within 2 business days of discharge: Yes   Attempted to reach patient for transitions of care follow up. Unable to reach patient.Left HIPPA compliant  requesting a return call.   Patient: Sam Emerson Patient : 1985 MRN: [id number]    Last Discharge Facility       Date Complaint Diagnosis Description Type Department Provider    24 Abdominal Pain; Headache; Blurred Vision; Jaundice Abdominal pain, unspecified abdominal location ED (DISCHARGE) [facility] ED Dante Pitts MD              Was this an external facility discharge? No Discharge Facility Name: [facility]   No future appointments.

## 2024-01-16 PROBLEM — F10.10 ALCOHOL ABUSE: Status: ACTIVE | Noted: 2024-01-16

## 2024-01-16 PROBLEM — K52.9 COLITIS: Status: ACTIVE | Noted: 2024-01-16

## 2024-01-16 PROBLEM — D69.6 THROMBOCYTOPENIA (HCC): Status: ACTIVE | Noted: 2024-01-16

## 2024-01-16 PROBLEM — N30.00 ACUTE CYSTITIS WITHOUT HEMATURIA: Status: ACTIVE | Noted: 2024-01-16

## 2024-01-16 PROBLEM — K65.2 SBP (SPONTANEOUS BACTERIAL PERITONITIS) (HCC): Status: ACTIVE | Noted: 2024-01-16

## 2024-01-16 PROBLEM — D64.9 ANEMIA: Status: ACTIVE | Noted: 2021-10-26

## 2024-01-16 LAB
ABO + RH BLD: NORMAL
ABSOLUTE BANDS: 1.62 K/UL (ref 0–1)
ANION GAP SERPL CALCULATED.3IONS-SCNC: 10 MMOL/L (ref 9–17)
ARM BAND NUMBER: NORMAL
BANDS: 8 % (ref 0–10)
BASOPHILS # BLD: 0.2 K/UL (ref 0–0.2)
BASOPHILS NFR BLD: 1 % (ref 0–2)
BLOOD BANK SAMPLE EXPIRATION: NORMAL
BLOOD GROUP ANTIBODIES SERPL: NEGATIVE
BUN SERPL-MCNC: 12 MG/DL (ref 6–20)
C DIFF GDH + TOXINS A+B STL QL IA.RAPID: NEGATIVE
CALCIUM SERPL-MCNC: 8.2 MG/DL (ref 8.6–10.4)
CHLORIDE SERPL-SCNC: 105 MMOL/L (ref 98–107)
CO2 SERPL-SCNC: 18 MMOL/L (ref 20–31)
CREAT SERPL-MCNC: <0.4 MG/DL (ref 0.7–1.2)
EOSINOPHIL # BLD: 0.2 K/UL (ref 0–0.4)
EOSINOPHILS RELATIVE PERCENT: 1 % (ref 0–4)
ERYTHROCYTE [DISTWIDTH] IN BLOOD BY AUTOMATED COUNT: 23.8 % (ref 11.5–14.9)
GFR SERPL CREATININE-BSD FRML MDRD: ABNORMAL ML/MIN/1.73M2
GLUCOSE SERPL-MCNC: 114 MG/DL (ref 70–99)
HCT VFR BLD AUTO: 24.7 % (ref 41–53)
HCT VFR BLD AUTO: 25.4 % (ref 41–53)
HCT VFR BLD AUTO: 26.4 % (ref 41–53)
HGB BLD-MCNC: 8.4 G/DL (ref 13.5–17.5)
HGB BLD-MCNC: 8.9 G/DL (ref 13.5–17.5)
HGB BLD-MCNC: 9 G/DL (ref 13.5–17.5)
LYMPHOCYTES NFR BLD: 1.82 K/UL (ref 1–4.8)
LYMPHOCYTES RELATIVE PERCENT: 9 % (ref 24–44)
MAGNESIUM SERPL-MCNC: 1.6 MG/DL (ref 1.6–2.6)
MCH RBC QN AUTO: 34.6 PG (ref 26–34)
MCHC RBC AUTO-ENTMCNC: 34.1 G/DL (ref 31–37)
MCV RBC AUTO: 101.4 FL (ref 80–100)
METAMYELOCYTES ABSOLUTE COUNT: 0.2 K/UL
METAMYELOCYTES: 1 %
MONOCYTES NFR BLD: 10 % (ref 1–7)
MONOCYTES NFR BLD: 2.02 K/UL (ref 0.1–1.3)
MORPHOLOGY: ABNORMAL
MYELOCYTES ABSOLUTE COUNT: 0.2 K/UL
MYELOCYTES: 1 %
NEUTROPHILS NFR BLD: 69 % (ref 36–66)
NEUTS SEG NFR BLD: 13.94 K/UL (ref 1.3–9.1)
PLATELET # BLD AUTO: 121 K/UL (ref 150–450)
PMV BLD AUTO: 8.1 FL (ref 6–12)
POTASSIUM SERPL-SCNC: 3.1 MMOL/L (ref 3.7–5.3)
RBC # BLD AUTO: 2.44 M/UL (ref 4.5–5.9)
SODIUM SERPL-SCNC: 133 MMOL/L (ref 135–144)
SPECIMEN DESCRIPTION: NORMAL
WBC OTHER # BLD: 20.2 K/UL (ref 3.5–11)

## 2024-01-16 PROCEDURE — 86850 RBC ANTIBODY SCREEN: CPT

## 2024-01-16 PROCEDURE — 6370000000 HC RX 637 (ALT 250 FOR IP): Performed by: NURSE PRACTITIONER

## 2024-01-16 PROCEDURE — 2580000003 HC RX 258

## 2024-01-16 PROCEDURE — 6370000000 HC RX 637 (ALT 250 FOR IP): Performed by: INTERNAL MEDICINE

## 2024-01-16 PROCEDURE — 51798 US URINE CAPACITY MEASURE: CPT

## 2024-01-16 PROCEDURE — 85018 HEMOGLOBIN: CPT

## 2024-01-16 PROCEDURE — APPNB30 APP NON BILLABLE TIME 0-30 MINS: Performed by: NURSE PRACTITIONER

## 2024-01-16 PROCEDURE — 93005 ELECTROCARDIOGRAM TRACING: CPT

## 2024-01-16 PROCEDURE — 99255 IP/OBS CONSLTJ NEW/EST HI 80: CPT | Performed by: INTERNAL MEDICINE

## 2024-01-16 PROCEDURE — 36415 COLL VENOUS BLD VENIPUNCTURE: CPT

## 2024-01-16 PROCEDURE — 86900 BLOOD TYPING SEROLOGIC ABO: CPT

## 2024-01-16 PROCEDURE — 80048 BASIC METABOLIC PNL TOTAL CA: CPT

## 2024-01-16 PROCEDURE — 6370000000 HC RX 637 (ALT 250 FOR IP)

## 2024-01-16 PROCEDURE — 83735 ASSAY OF MAGNESIUM: CPT

## 2024-01-16 PROCEDURE — 87449 NOS EACH ORGANISM AG IA: CPT

## 2024-01-16 PROCEDURE — 87506 IADNA-DNA/RNA PROBE TQ 6-11: CPT

## 2024-01-16 PROCEDURE — 6360000002 HC RX W HCPCS: Performed by: NURSE PRACTITIONER

## 2024-01-16 PROCEDURE — 2060000000 HC ICU INTERMEDIATE R&B

## 2024-01-16 PROCEDURE — 87324 CLOSTRIDIUM AG IA: CPT

## 2024-01-16 PROCEDURE — 85014 HEMATOCRIT: CPT

## 2024-01-16 PROCEDURE — 85025 COMPLETE CBC W/AUTO DIFF WBC: CPT

## 2024-01-16 PROCEDURE — 86901 BLOOD TYPING SEROLOGIC RH(D): CPT

## 2024-01-16 PROCEDURE — 6360000002 HC RX W HCPCS

## 2024-01-16 PROCEDURE — 2580000003 HC RX 258: Performed by: NURSE PRACTITIONER

## 2024-01-16 RX ORDER — OXYCODONE HCL 10 MG/1
10 TABLET, FILM COATED, EXTENDED RELEASE ORAL EVERY 12 HOURS SCHEDULED
Status: DISCONTINUED | OUTPATIENT
Start: 2024-01-16 | End: 2024-01-18 | Stop reason: HOSPADM

## 2024-01-16 RX ORDER — PANTOPRAZOLE SODIUM 40 MG/1
40 TABLET, DELAYED RELEASE ORAL
Status: DISCONTINUED | OUTPATIENT
Start: 2024-01-17 | End: 2024-01-16

## 2024-01-16 RX ORDER — OXYCODONE HCL 10 MG/1
10 TABLET, FILM COATED, EXTENDED RELEASE ORAL EVERY 12 HOURS SCHEDULED
Status: DISCONTINUED | OUTPATIENT
Start: 2024-01-16 | End: 2024-01-16

## 2024-01-16 RX ORDER — PANTOPRAZOLE SODIUM 40 MG/1
40 TABLET, DELAYED RELEASE ORAL
Status: DISCONTINUED | OUTPATIENT
Start: 2024-01-16 | End: 2024-01-18 | Stop reason: HOSPADM

## 2024-01-16 RX ORDER — FENTANYL CITRATE 0.05 MG/ML
50 INJECTION, SOLUTION INTRAMUSCULAR; INTRAVENOUS
Status: DISCONTINUED | OUTPATIENT
Start: 2024-01-16 | End: 2024-01-16

## 2024-01-16 RX ADMIN — ONDANSETRON 4 MG: 2 INJECTION INTRAMUSCULAR; INTRAVENOUS at 12:40

## 2024-01-16 RX ADMIN — FENTANYL CITRATE 50 MCG: 0.05 INJECTION, SOLUTION INTRAMUSCULAR; INTRAVENOUS at 01:13

## 2024-01-16 RX ADMIN — RIFAXIMIN 400 MG: 200 TABLET ORAL at 14:47

## 2024-01-16 RX ADMIN — METRONIDAZOLE 500 MG: 500 INJECTION, SOLUTION INTRAVENOUS at 20:45

## 2024-01-16 RX ADMIN — SODIUM CHLORIDE, PRESERVATIVE FREE 5 ML: 5 INJECTION INTRAVENOUS at 01:19

## 2024-01-16 RX ADMIN — FENTANYL CITRATE 50 MCG: 0.05 INJECTION, SOLUTION INTRAMUSCULAR; INTRAVENOUS at 06:29

## 2024-01-16 RX ADMIN — PANTOPRAZOLE SODIUM 40 MG: 40 TABLET, DELAYED RELEASE ORAL at 14:47

## 2024-01-16 RX ADMIN — POTASSIUM CHLORIDE 40 MEQ: 1500 TABLET, EXTENDED RELEASE ORAL at 09:32

## 2024-01-16 RX ADMIN — FENTANYL CITRATE 50 MCG: 0.05 INJECTION, SOLUTION INTRAMUSCULAR; INTRAVENOUS at 10:32

## 2024-01-16 RX ADMIN — SODIUM CHLORIDE, PRESERVATIVE FREE 10 ML: 5 INJECTION INTRAVENOUS at 21:00

## 2024-01-16 RX ADMIN — METRONIDAZOLE 500 MG: 500 INJECTION, SOLUTION INTRAVENOUS at 12:47

## 2024-01-16 RX ADMIN — LACTULOSE 10 G: 20 SOLUTION ORAL at 01:07

## 2024-01-16 RX ADMIN — METRONIDAZOLE 500 MG: 500 INJECTION, SOLUTION INTRAVENOUS at 06:32

## 2024-01-16 RX ADMIN — RIFAXIMIN 400 MG: 200 TABLET ORAL at 09:33

## 2024-01-16 RX ADMIN — CEFTRIAXONE SODIUM 2000 MG: 2 INJECTION, POWDER, FOR SOLUTION INTRAMUSCULAR; INTRAVENOUS at 18:19

## 2024-01-16 RX ADMIN — OXYCODONE HYDROCHLORIDE 10 MG: 10 TABLET, FILM COATED, EXTENDED RELEASE ORAL at 14:47

## 2024-01-16 RX ADMIN — RIFAXIMIN 400 MG: 200 TABLET ORAL at 21:00

## 2024-01-16 RX ADMIN — SODIUM CHLORIDE, PRESERVATIVE FREE 10 ML: 5 INJECTION INTRAVENOUS at 09:33

## 2024-01-16 ASSESSMENT — PAIN DESCRIPTION - LOCATION
LOCATION: ABDOMEN

## 2024-01-16 ASSESSMENT — PAIN SCALES - GENERAL
PAINLEVEL_OUTOF10: 2
PAINLEVEL_OUTOF10: 8
PAINLEVEL_OUTOF10: 6
PAINLEVEL_OUTOF10: 9
PAINLEVEL_OUTOF10: 2
PAINLEVEL_OUTOF10: 4
PAINLEVEL_OUTOF10: 8

## 2024-01-16 ASSESSMENT — PAIN DESCRIPTION - ORIENTATION
ORIENTATION: UPPER
ORIENTATION: UPPER
ORIENTATION: RIGHT;LEFT;UPPER;LOWER
ORIENTATION: LOWER
ORIENTATION: LOWER;LEFT
ORIENTATION: LOWER
ORIENTATION: UPPER

## 2024-01-16 ASSESSMENT — PAIN DESCRIPTION - DESCRIPTORS
DESCRIPTORS: ACHING
DESCRIPTORS: CRAMPING;ACHING
DESCRIPTORS: ACHING
DESCRIPTORS: ACHING
DESCRIPTORS: CRAMPING
DESCRIPTORS: CRAMPING

## 2024-01-16 NOTE — ED NOTES
Hannah Dunn NP informed Hgb 8.4 this morning and Hct 24.7 and further orders obtained to get repeat Hgb and Hct  as ordered and type and screen at that time.

## 2024-01-16 NOTE — FLOWSHEET NOTE
Alejandra monsalve np for GI perfect served: pt had 900 ml emesis of orange brown and red chunky undigested food. pt is on a regular low sodium diet.

## 2024-01-16 NOTE — H&P
[facility] Internal Medicine  Guy Nicholson MD; Luis Bradley MD; Rick Brewster MD; MD Jolynn Olea MD; Quentin Guevara MD    [facility] Internal Medicine   IN-PATIENT SERVICE   [facility]    HISTORY AND PHYSICAL EXAMINATION            Date:   1/16/2024  Patient name:  Sam Emerson  Date of admission:  1/15/2024  4:20 PM  MRN:   [id number]  Account:  [id number]  YOB: 1985  PCP:    Rosie Montes De Oca MD  Room:   [facility]  Code Status:    Full Code    Chief Complaint:     Chief Complaint   Patient presents with    Chest Pain    Abdominal Pain       History Obtained From:     Patient/EMR/Bedside RN    History of Present Illness:     Sam Emerson is a 39 y.o. Non- / non  male who presents with Chest Pain and Abdominal Pain   and is admitted to the hospital for the management of Colitis.  Sam Emerson is a 39 y.o. Non- / non  male who presents with and is admitted to the hospital for the management of Abdominal pain.  He reports abdominal pain radiating to his chest, fatigue, jaundice, and increased watery bowel movements for the past 3 days.  Sitting up aggravates his abdominal pain and lying down relieves it.      The patient endorses a TIPS procedure and a recent history of ICU admission at [facility] where he was intubated and required 16 units of blood.  He also endorses a recent TIPS procedure as a result of liver failure and a paracentesis 2 weeks ago. He reports a marked decrease in urine output, dark-colored urine, and he measures his intake and output closely.  He states that the urinary changes have been ongoing for the past 3 days.         Upon examination the abdomen is tender with active bowel sounds in all 4 quadrants, his skin was jaundiced, and he has 1+ nonpitting edema in both lower extremities.      Past Medical History:     Past Medical History:   Diagnosis Date

## 2024-01-16 NOTE — PLAN OF CARE
Problem: Discharge Planning  Goal: Discharge to home or other facility with appropriate resources  Outcome: Progressing  Dc barrier: total bili 24.7 and wbc 22.3. on iv atb x2     Problem: Safety - Adult  Goal: Free from fall injury  Outcome: Progressing  Pt assessed as a fall risk this shift. Remains free from falls and accidental injury at this time. Fall precautions in place, including falling star sign and fall risk band on pt. Floor free from obstacles, and bed is locked and in lowest position. Adequate lighting provided.  Pt encouraged to call before getting OOB for any need.  Bed alarm activated. Will continue to monitor needs during hourly rounding, and reinforce education on use of call light.        Problem: Pain  Goal: Verbalizes/displays adequate comfort level or baseline comfort level  Outcome: Progressing  Pt medicated with pain medication prn.  Assessed all pain characteristics including level, type, location, frequency, and onset.  Non-pharmacologic interventions offered to pt as well.  Pt states pain is tolerable at this time.

## 2024-01-16 NOTE — PLAN OF CARE
PRE CONSULT ROUNDING NOTE  HPI  39 year old male with pmh of alcohol abuse, liver failure decompensated cirrhosis variceal bleeding requiring TIPS 12/4/23 portal hypertensive gastropathy PE not on any anticoagulation meds who presented to the ED for abdominal pain. He was just discharged from [facility] one week ago where he was evaluated for encephalopathy.he states he had LUQ pain at that time. He did undergo paracentesis that admission that was negative for SBP. Today he states that he has diffuse abdominal pain not accompanied by nausea and excessive diarrhea. He was taking lactulose at home but reports 6 loose non bloody stools yesterday with taking only one dose of lactulose. Today he has had two loose stools. No fevers or chills. He states he has not urinated since 1000 am yesterday, his UA is positive for UTI. He has been started on antibiotics. He is not taking diuretics at home. Labs show normal creat, wbc 20.2 hgb 8.4 plt 121 inr 1.7 lipase 42 alk phos 399 alt 34 ast 68 bili 24.7. he was previously on prednisolone but he is unsure if he is taking it. He has not been on antibiotics recently.     Ct abd shows  IMPRESSION:  1. Hepatic cirrhosis and portal hypertension with splenomegaly, a small  amount of ascites and diffuse fat stranding throughout the abdomen and  pelvis.  A TIPS is in place and believed to be patent given the attenuation  of the lumen.  2. Diffuse thickening of the small bowel and thickening of the colon from the  cecum through the descending colon.  This is favored to be related ascites  and portal hypertension, but enterocolitis cannot be excluded.  3. The presence of diffuse fat stranding evaluation of inflammation, but  there are no other definitive acute findings in the abdomen or pelvis.  There  is persistent fat stranding adjacent the pancreas which could be related to  ascites and portal hypertension.  4. Trace left pleural effusion.     Endoscopy 12/29/23 egd (erika)

## 2024-01-16 NOTE — ED NOTES
Admission Dx: hyperbilirubinenia    Pts Chief Complaints on Arrival: abdominal pain    ADL's - Self-care    Pending Diagnostics:  n/a    Residence PTA: single story home    Special Considerations/Circumstances:  n/a    Vitals: Current vital signs:  /86   Pulse (!) 109   Temp 97.8 °F (36.6 °C) (Oral)   Resp 16   Ht 1.727 m (5' 8\")   Wt 72.6 kg (160 lb)   SpO2 98%   BMI 24.33 kg/m²                MEWS Score: 2

## 2024-01-16 NOTE — ED NOTES
A/O X 3. Skin warm and dry. Color jaundice. Respirations quiet and easy. Lungs diminished t/o posteriorly. Heart sounds regular. Bowel sounds present x 4 quadrants, abdomen is soft, c/o generalized abdominal pain. Peripheral pulses palpable neg edema.

## 2024-01-17 ENCOUNTER — APPOINTMENT (OUTPATIENT)
Dept: CT IMAGING | Age: 39
DRG: 720 | End: 2024-01-17
Payer: COMMERCIAL

## 2024-01-17 LAB
ABSOLUTE BANDS: 2.7 K/UL (ref 0–1)
ALBUMIN SERPL-MCNC: 2.3 G/DL (ref 3.5–5.2)
ALP SERPL-CCNC: 365 U/L (ref 40–129)
ALT SERPL-CCNC: 26 U/L (ref 5–41)
ANION GAP SERPL CALCULATED.3IONS-SCNC: 7 MMOL/L (ref 9–17)
AST SERPL-CCNC: 45 U/L
BANDS: 13 % (ref 0–10)
BASOPHILS # BLD: 0 K/UL (ref 0–0.2)
BASOPHILS NFR BLD: 0 % (ref 0–2)
BILIRUB SERPL-MCNC: 22.3 MG/DL (ref 0.3–1.2)
BUN SERPL-MCNC: 9 MG/DL (ref 6–20)
CALCIUM SERPL-MCNC: 7.9 MG/DL (ref 8.6–10.4)
CAMPYLOBACTER DNA SPEC NAA+PROBE: NORMAL
CHLORIDE SERPL-SCNC: 103 MMOL/L (ref 98–107)
CO2 SERPL-SCNC: 19 MMOL/L (ref 20–31)
CREAT SERPL-MCNC: <0.4 MG/DL (ref 0.7–1.2)
EKG ATRIAL RATE: 102 BPM
EKG ATRIAL RATE: 111 BPM
EKG P AXIS: 33 DEGREES
EKG P AXIS: 38 DEGREES
EKG P-R INTERVAL: 118 MS
EKG P-R INTERVAL: 130 MS
EKG Q-T INTERVAL: 328 MS
EKG Q-T INTERVAL: 362 MS
EKG QRS DURATION: 88 MS
EKG QRS DURATION: 94 MS
EKG QTC CALCULATION (BAZETT): 446 MS
EKG QTC CALCULATION (BAZETT): 471 MS
EKG R AXIS: 24 DEGREES
EKG R AXIS: 7 DEGREES
EKG T AXIS: 81 DEGREES
EKG T AXIS: 85 DEGREES
EKG VENTRICULAR RATE: 102 BPM
EKG VENTRICULAR RATE: 111 BPM
EOSINOPHIL # BLD: 0.83 K/UL (ref 0–0.4)
EOSINOPHILS RELATIVE PERCENT: 4 % (ref 0–4)
ERYTHROCYTE [DISTWIDTH] IN BLOOD BY AUTOMATED COUNT: 23.2 % (ref 11.5–14.9)
ETEC ELTA+ESTB GENES STL QL NAA+PROBE: NORMAL
GFR SERPL CREATININE-BSD FRML MDRD: ABNORMAL ML/MIN/1.73M2
GLUCOSE SERPL-MCNC: 93 MG/DL (ref 70–99)
HCT VFR BLD AUTO: 24.5 % (ref 41–53)
HCT VFR BLD AUTO: 25.3 % (ref 41–53)
HCT VFR BLD AUTO: 25.8 % (ref 41–53)
HCT VFR BLD AUTO: 25.9 % (ref 41–53)
HGB BLD-MCNC: 8.4 G/DL (ref 13.5–17.5)
HGB BLD-MCNC: 8.4 G/DL (ref 13.5–17.5)
HGB BLD-MCNC: 8.9 G/DL (ref 13.5–17.5)
HGB BLD-MCNC: 8.9 G/DL (ref 13.5–17.5)
LYMPHOCYTES NFR BLD: 1.25 K/UL (ref 1–4.8)
LYMPHOCYTES RELATIVE PERCENT: 6 % (ref 24–44)
MCH RBC QN AUTO: 35 PG (ref 26–34)
MCHC RBC AUTO-ENTMCNC: 34.5 G/DL (ref 31–37)
MCV RBC AUTO: 101.4 FL (ref 80–100)
METAMYELOCYTES ABSOLUTE COUNT: 0.42 K/UL
METAMYELOCYTES: 2 %
MONOCYTES NFR BLD: 1.25 K/UL (ref 0.1–1.3)
MONOCYTES NFR BLD: 6 % (ref 1–7)
MORPHOLOGY: ABNORMAL
MYELOCYTES ABSOLUTE COUNT: 1.04 K/UL
MYELOCYTES: 5 %
NEUTROPHILS NFR BLD: 64 % (ref 36–66)
NEUTS SEG NFR BLD: 13.31 K/UL (ref 1.3–9.1)
P SHIGELLOIDES DNA STL QL NAA+PROBE: NORMAL
PLATELET # BLD AUTO: 120 K/UL (ref 150–450)
PMV BLD AUTO: 7.2 FL (ref 6–12)
POTASSIUM SERPL-SCNC: 3.2 MMOL/L (ref 3.7–5.3)
PROT SERPL-MCNC: 5.4 G/DL (ref 6.4–8.3)
RBC # BLD AUTO: 2.55 M/UL (ref 4.5–5.9)
SALMONELLA DNA SPEC QL NAA+PROBE: NORMAL
SHIGA TOXIN STX GENE SPEC NAA+PROBE: NORMAL
SHIGELLA DNA SPEC QL NAA+PROBE: NORMAL
SODIUM SERPL-SCNC: 129 MMOL/L (ref 135–144)
SPECIMEN DESCRIPTION: NORMAL
V CHOL+PARA RFBL+TRKH+TNAA STL QL NAA+PR: NORMAL
WBC OTHER # BLD: 20.8 K/UL (ref 3.5–11)
Y ENTERO RECN STL QL NAA+PROBE: NORMAL

## 2024-01-17 PROCEDURE — 6370000000 HC RX 637 (ALT 250 FOR IP): Performed by: INTERNAL MEDICINE

## 2024-01-17 PROCEDURE — 80053 COMPREHEN METABOLIC PANEL: CPT

## 2024-01-17 PROCEDURE — 2500000003 HC RX 250 WO HCPCS: Performed by: NURSE PRACTITIONER

## 2024-01-17 PROCEDURE — 6370000000 HC RX 637 (ALT 250 FOR IP): Performed by: NURSE PRACTITIONER

## 2024-01-17 PROCEDURE — 85014 HEMATOCRIT: CPT

## 2024-01-17 PROCEDURE — 6360000002 HC RX W HCPCS

## 2024-01-17 PROCEDURE — 2580000003 HC RX 258

## 2024-01-17 PROCEDURE — 74177 CT ABD & PELVIS W/CONTRAST: CPT

## 2024-01-17 PROCEDURE — 36415 COLL VENOUS BLD VENIPUNCTURE: CPT

## 2024-01-17 PROCEDURE — APPSS30 APP SPLIT SHARED TIME 16-30 MINUTES: Performed by: NURSE PRACTITIONER

## 2024-01-17 PROCEDURE — 93010 ELECTROCARDIOGRAM REPORT: CPT | Performed by: INTERNAL MEDICINE

## 2024-01-17 PROCEDURE — 6360000004 HC RX CONTRAST MEDICATION: Performed by: INTERNAL MEDICINE

## 2024-01-17 PROCEDURE — 85018 HEMOGLOBIN: CPT

## 2024-01-17 PROCEDURE — 85025 COMPLETE CBC W/AUTO DIFF WBC: CPT

## 2024-01-17 PROCEDURE — 2580000003 HC RX 258: Performed by: INTERNAL MEDICINE

## 2024-01-17 PROCEDURE — 6370000000 HC RX 637 (ALT 250 FOR IP)

## 2024-01-17 PROCEDURE — 2580000003 HC RX 258: Performed by: NURSE PRACTITIONER

## 2024-01-17 PROCEDURE — 6360000002 HC RX W HCPCS: Performed by: NURSE PRACTITIONER

## 2024-01-17 PROCEDURE — 99231 SBSQ HOSP IP/OBS SF/LOW 25: CPT | Performed by: INTERNAL MEDICINE

## 2024-01-17 PROCEDURE — 2060000000 HC ICU INTERMEDIATE R&B

## 2024-01-17 RX ORDER — 0.9 % SODIUM CHLORIDE 0.9 %
100 INTRAVENOUS SOLUTION INTRAVENOUS ONCE
Status: COMPLETED | OUTPATIENT
Start: 2024-01-17 | End: 2024-01-17

## 2024-01-17 RX ORDER — SODIUM CHLORIDE 0.9 % (FLUSH) 0.9 %
10 SYRINGE (ML) INJECTION PRN
Status: DISCONTINUED | OUTPATIENT
Start: 2024-01-17 | End: 2024-01-18 | Stop reason: HOSPADM

## 2024-01-17 RX ADMIN — SODIUM CHLORIDE: 9 INJECTION, SOLUTION INTRAVENOUS at 18:29

## 2024-01-17 RX ADMIN — PANTOPRAZOLE SODIUM 40 MG: 40 TABLET, DELAYED RELEASE ORAL at 05:49

## 2024-01-17 RX ADMIN — RIFAXIMIN 400 MG: 200 TABLET ORAL at 16:21

## 2024-01-17 RX ADMIN — SODIUM CHLORIDE, PRESERVATIVE FREE 10 ML: 5 INJECTION INTRAVENOUS at 21:04

## 2024-01-17 RX ADMIN — HYDROMORPHONE HYDROCHLORIDE 1 MG: 1 INJECTION, SOLUTION INTRAMUSCULAR; INTRAVENOUS; SUBCUTANEOUS at 00:42

## 2024-01-17 RX ADMIN — CEFTRIAXONE SODIUM 2000 MG: 2 INJECTION, POWDER, FOR SOLUTION INTRAMUSCULAR; INTRAVENOUS at 18:31

## 2024-01-17 RX ADMIN — IOPAMIDOL 75 ML: 755 INJECTION, SOLUTION INTRAVENOUS at 14:57

## 2024-01-17 RX ADMIN — OXYCODONE HYDROCHLORIDE 10 MG: 10 TABLET, FILM COATED, EXTENDED RELEASE ORAL at 21:04

## 2024-01-17 RX ADMIN — RIFAXIMIN 400 MG: 200 TABLET ORAL at 21:05

## 2024-01-17 RX ADMIN — METRONIDAZOLE 500 MG: 500 INJECTION, SOLUTION INTRAVENOUS at 12:27

## 2024-01-17 RX ADMIN — RIFAXIMIN 400 MG: 200 TABLET ORAL at 09:53

## 2024-01-17 RX ADMIN — POTASSIUM CHLORIDE 40 MEQ: 1500 TABLET, EXTENDED RELEASE ORAL at 09:53

## 2024-01-17 RX ADMIN — SODIUM CHLORIDE, PRESERVATIVE FREE 10 ML: 5 INJECTION INTRAVENOUS at 14:57

## 2024-01-17 RX ADMIN — METRONIDAZOLE 500 MG: 500 INJECTION, SOLUTION INTRAVENOUS at 20:03

## 2024-01-17 RX ADMIN — SODIUM CHLORIDE 100 ML: 9 INJECTION, SOLUTION INTRAVENOUS at 14:57

## 2024-01-17 RX ADMIN — BARIUM SULFATE 450 ML: 20 SUSPENSION ORAL at 13:36

## 2024-01-17 RX ADMIN — OXYCODONE HYDROCHLORIDE 10 MG: 10 TABLET, FILM COATED, EXTENDED RELEASE ORAL at 09:53

## 2024-01-17 RX ADMIN — METRONIDAZOLE 500 MG: 500 INJECTION, SOLUTION INTRAVENOUS at 04:00

## 2024-01-17 RX ADMIN — SODIUM CHLORIDE, PRESERVATIVE FREE 10 ML: 5 INJECTION INTRAVENOUS at 10:03

## 2024-01-17 ASSESSMENT — PAIN SCALES - GENERAL
PAINLEVEL_OUTOF10: 7
PAINLEVEL_OUTOF10: 5
PAINLEVEL_OUTOF10: 3
PAINLEVEL_OUTOF10: 0

## 2024-01-17 ASSESSMENT — PAIN DESCRIPTION - LOCATION
LOCATION: ABDOMEN
LOCATION: HEAD;ABDOMEN

## 2024-01-17 ASSESSMENT — PAIN - FUNCTIONAL ASSESSMENT: PAIN_FUNCTIONAL_ASSESSMENT: PREVENTS OR INTERFERES SOME ACTIVE ACTIVITIES AND ADLS

## 2024-01-17 ASSESSMENT — PAIN DESCRIPTION - ORIENTATION
ORIENTATION: LEFT
ORIENTATION: RIGHT;LEFT
ORIENTATION: RIGHT;LEFT

## 2024-01-17 ASSESSMENT — PAIN DESCRIPTION - DESCRIPTORS: DESCRIPTORS: ACHING;THROBBING;TIGHTNESS

## 2024-01-17 NOTE — PLAN OF CARE
Problem: Chronic Conditions and Co-morbidities  Goal: Patient's chronic conditions and co-morbidity symptoms are monitored and maintained or improved  1/17/2024 0241 by Sia Ziegler RN  Outcome: Progressing   Maintained & improving   Problem: Safety - Adult  Goal: Free from fall injury  1/17/2024 0230 by Sia Ziegler, RN  Outcome: Progressing   No falls/injuries this shift, bed in lowest position, brakes on, bed alarm on, call light in reach, side rails up x2  Problem: Pain  Goal: Verbalizes/displays adequate comfort level or baseline comfort level  1/17/2024 0230 by Sia Ziegler RN  Outcome: Progressing   No new signs/symptoms of pain noted, pain rating < 3 on scale 0-10, pain controlled with medication/repositioning   No

## 2024-01-17 NOTE — CONSULTS
All appropriate imaging studies and reports reviewed: Yes                 Assessment:     Principal Problem:    Colitis  Active Problems:    Liver failure without hepatic coma (HCC)    Anemia    Fulminant hepatic failure (HCC)    Abdominal pain    Acute cystitis without hematuria    Thrombocytopenia (HCC)    Alcohol abuse    SBP (spontaneous bacterial peritonitis) (HCC)  Resolved Problems:    * No resolved hospital problems. *      Abdominal pain  Diarrhea although he is on lactulose  Leukocytosis  Ascites but no SBP  UTI  Questionable recent steroid use  Status post TIPS  History of encephalopathy, mental status is normal today    Recommendations:   Stool studies  Antibiotics  Will hold lactulose and titrate only to 3 bowel movements a day  Continue Xifaxan  Urology workup  Low-sodium diet  Protonix  Trend LFTs  Trend H&H  Will avoid steroid until because of infection                    Thank you for allowing me to participate in the care of your patient.  Please feel free to contact me with any questions or concerns.   This note is created with the assistance of the speech recognition program. While intending to generate a document that actually reflects the content of the visit, it can still have some errors including those of syntax and sound-a-like substitutions which may escape proof reading. Actual meaning can be extrapolated by contextual inference.  Anitra Fam MD

## 2024-01-18 ENCOUNTER — APPOINTMENT (OUTPATIENT)
Dept: INTERVENTIONAL RADIOLOGY/VASCULAR | Age: 39
DRG: 720 | End: 2024-01-18
Payer: COMMERCIAL

## 2024-01-18 VITALS
BODY MASS INDEX: 26.46 KG/M2 | DIASTOLIC BLOOD PRESSURE: 85 MMHG | WEIGHT: 174.6 LBS | TEMPERATURE: 98.3 F | HEIGHT: 68 IN | OXYGEN SATURATION: 98 % | SYSTOLIC BLOOD PRESSURE: 118 MMHG | HEART RATE: 110 BPM | RESPIRATION RATE: 20 BRPM

## 2024-01-18 LAB
ABSOLUTE BANDS: 1.2 K/UL (ref 0–1)
ALBUMIN FLD-MCNC: <0.2 G/DL
ALBUMIN SERPL-MCNC: 2.3 G/DL (ref 3.5–5.2)
ALP SERPL-CCNC: 357 U/L (ref 40–129)
ALT SERPL-CCNC: 27 U/L (ref 5–41)
ANION GAP SERPL CALCULATED.3IONS-SCNC: 7 MMOL/L (ref 9–17)
AST SERPL-CCNC: 54 U/L
BANDS: 8 % (ref 0–10)
BASOPHILS # BLD: 0 K/UL (ref 0–0.2)
BASOPHILS NFR BLD: 0 % (ref 0–2)
BILIRUB SERPL-MCNC: 21.6 MG/DL (ref 0.3–1.2)
BUN SERPL-MCNC: 7 MG/DL (ref 6–20)
CALCIUM SERPL-MCNC: 7.7 MG/DL (ref 8.6–10.4)
CHLORIDE SERPL-SCNC: 104 MMOL/L (ref 98–107)
CO2 SERPL-SCNC: 18 MMOL/L (ref 20–31)
CREAT SERPL-MCNC: <0.4 MG/DL (ref 0.7–1.2)
EOSINOPHIL # BLD: 0 K/UL (ref 0–0.4)
EOSINOPHILS RELATIVE PERCENT: 0 % (ref 0–4)
ERYTHROCYTE [DISTWIDTH] IN BLOOD BY AUTOMATED COUNT: 23.3 % (ref 11.5–14.9)
GFR SERPL CREATININE-BSD FRML MDRD: ABNORMAL ML/MIN/1.73M2
GLUCOSE SERPL-MCNC: 121 MG/DL (ref 70–99)
HCT VFR BLD AUTO: 26 % (ref 41–53)
HCT VFR BLD AUTO: 26.2 % (ref 41–53)
HGB BLD-MCNC: 8.6 G/DL (ref 13.5–17.5)
HGB BLD-MCNC: 8.8 G/DL (ref 13.5–17.5)
LYMPHOCYTES NFR BLD: 0.9 K/UL (ref 1–4.8)
LYMPHOCYTES RELATIVE PERCENT: 6 % (ref 24–44)
MCH RBC QN AUTO: 34.8 PG (ref 26–34)
MCHC RBC AUTO-ENTMCNC: 33.2 G/DL (ref 31–37)
MCV RBC AUTO: 104.6 FL (ref 80–100)
MONOCYTES NFR BLD: 0.75 K/UL (ref 0.1–1.3)
MONOCYTES NFR BLD: 5 % (ref 1–7)
MORPHOLOGY: ABNORMAL
NEUTROPHILS NFR BLD: 81 % (ref 36–66)
NEUTS SEG NFR BLD: 12.1 K/UL (ref 1.3–9.1)
NUCLEATED RED BLOOD CELLS: 1 PER 100 WBC
PLATELET # BLD AUTO: 124 K/UL (ref 150–450)
PMV BLD AUTO: 8 FL (ref 6–12)
POTASSIUM SERPL-SCNC: 3.6 MMOL/L (ref 3.7–5.3)
PROT FLD-MCNC: <1 G/DL
PROT SERPL-MCNC: 5.3 G/DL (ref 6.4–8.3)
RBC # BLD AUTO: 2.49 M/UL (ref 4.5–5.9)
SODIUM SERPL-SCNC: 129 MMOL/L (ref 135–144)
SPECIMEN TYPE: NORMAL
SPECIMEN TYPE: NORMAL
WBC OTHER # BLD: 15 K/UL (ref 3.5–11)

## 2024-01-18 PROCEDURE — 2580000003 HC RX 258

## 2024-01-18 PROCEDURE — 87075 CULTR BACTERIA EXCEPT BLOOD: CPT

## 2024-01-18 PROCEDURE — 87205 SMEAR GRAM STAIN: CPT

## 2024-01-18 PROCEDURE — 0W9G3ZZ DRAINAGE OF PERITONEAL CAVITY, PERCUTANEOUS APPROACH: ICD-10-PCS | Performed by: RADIOLOGY

## 2024-01-18 PROCEDURE — 2580000003 HC RX 258: Performed by: NURSE PRACTITIONER

## 2024-01-18 PROCEDURE — 85014 HEMATOCRIT: CPT

## 2024-01-18 PROCEDURE — 84157 ASSAY OF PROTEIN OTHER: CPT

## 2024-01-18 PROCEDURE — 2709999900 IR US GUIDED PARACENTESIS

## 2024-01-18 PROCEDURE — 99231 SBSQ HOSP IP/OBS SF/LOW 25: CPT | Performed by: INTERNAL MEDICINE

## 2024-01-18 PROCEDURE — APPSS30 APP SPLIT SHARED TIME 16-30 MINUTES: Performed by: NURSE PRACTITIONER

## 2024-01-18 PROCEDURE — 87070 CULTURE OTHR SPECIMN AEROBIC: CPT

## 2024-01-18 PROCEDURE — 36415 COLL VENOUS BLD VENIPUNCTURE: CPT

## 2024-01-18 PROCEDURE — 6370000000 HC RX 637 (ALT 250 FOR IP)

## 2024-01-18 PROCEDURE — 85025 COMPLETE CBC W/AUTO DIFF WBC: CPT

## 2024-01-18 PROCEDURE — 49083 ABD PARACENTESIS W/IMAGING: CPT

## 2024-01-18 PROCEDURE — 85018 HEMOGLOBIN: CPT

## 2024-01-18 PROCEDURE — 6370000000 HC RX 637 (ALT 250 FOR IP): Performed by: INTERNAL MEDICINE

## 2024-01-18 PROCEDURE — 6360000002 HC RX W HCPCS

## 2024-01-18 PROCEDURE — 89051 BODY FLUID CELL COUNT: CPT

## 2024-01-18 PROCEDURE — 82042 OTHER SOURCE ALBUMIN QUAN EA: CPT

## 2024-01-18 PROCEDURE — 80053 COMPREHEN METABOLIC PANEL: CPT

## 2024-01-18 PROCEDURE — 6360000002 HC RX W HCPCS: Performed by: NURSE PRACTITIONER

## 2024-01-18 PROCEDURE — 6370000000 HC RX 637 (ALT 250 FOR IP): Performed by: NURSE PRACTITIONER

## 2024-01-18 RX ORDER — AMOXICILLIN AND CLAVULANATE POTASSIUM 875; 125 MG/1; MG/1
1 TABLET, FILM COATED ORAL 2 TIMES DAILY
Qty: 14 TABLET | Refills: 0 | Status: SHIPPED | OUTPATIENT
Start: 2024-01-18 | End: 2024-01-25

## 2024-01-18 RX ORDER — OXYCODONE HCL 10 MG/1
10 TABLET, FILM COATED, EXTENDED RELEASE ORAL EVERY 12 HOURS SCHEDULED
Qty: 30 EACH | Refills: 0 | Status: SHIPPED | OUTPATIENT
Start: 2024-01-18 | End: 2024-01-19

## 2024-01-18 RX ADMIN — METRONIDAZOLE 500 MG: 500 INJECTION, SOLUTION INTRAVENOUS at 04:52

## 2024-01-18 RX ADMIN — OXYCODONE HYDROCHLORIDE 10 MG: 10 TABLET, FILM COATED, EXTENDED RELEASE ORAL at 08:08

## 2024-01-18 RX ADMIN — RIFAXIMIN 400 MG: 200 TABLET ORAL at 14:17

## 2024-01-18 RX ADMIN — PANTOPRAZOLE SODIUM 40 MG: 40 TABLET, DELAYED RELEASE ORAL at 05:53

## 2024-01-18 RX ADMIN — RIFAXIMIN 400 MG: 200 TABLET ORAL at 08:07

## 2024-01-18 RX ADMIN — CEFTRIAXONE SODIUM 2000 MG: 2 INJECTION, POWDER, FOR SOLUTION INTRAMUSCULAR; INTRAVENOUS at 18:25

## 2024-01-18 RX ADMIN — METRONIDAZOLE 500 MG: 500 INJECTION, SOLUTION INTRAVENOUS at 11:26

## 2024-01-18 RX ADMIN — SODIUM CHLORIDE, PRESERVATIVE FREE 10 ML: 5 INJECTION INTRAVENOUS at 08:09

## 2024-01-18 RX ADMIN — OXYCODONE HYDROCHLORIDE 10 MG: 10 TABLET, FILM COATED, EXTENDED RELEASE ORAL at 19:52

## 2024-01-18 ASSESSMENT — PAIN DESCRIPTION - DESCRIPTORS
DESCRIPTORS: ACHING
DESCRIPTORS: ACHING

## 2024-01-18 ASSESSMENT — PAIN SCALES - GENERAL
PAINLEVEL_OUTOF10: 8
PAINLEVEL_OUTOF10: 6
PAINLEVEL_OUTOF10: 7

## 2024-01-18 ASSESSMENT — PAIN DESCRIPTION - LOCATION
LOCATION: ABDOMEN
LOCATION: ABDOMEN

## 2024-01-18 ASSESSMENT — PAIN - FUNCTIONAL ASSESSMENT
PAIN_FUNCTIONAL_ASSESSMENT: NONE - DENIES PAIN
PAIN_FUNCTIONAL_ASSESSMENT: NONE - DENIES PAIN
PAIN_FUNCTIONAL_ASSESSMENT: ACTIVITIES ARE NOT PREVENTED

## 2024-01-18 NOTE — PLAN OF CARE
Problem: Discharge Planning  Goal: Discharge to home or other facility with appropriate resources  1/18/2024 1745 by Lauren Zaragoza RN  Outcome: Progressing     Problem: Chronic Conditions and Co-morbidities  Goal: Patient's chronic conditions and co-morbidity symptoms are monitored and maintained or improved  1/18/2024 1745 by Lauren Zaragoza RN  Outcome: Progressing     Problem: Safety - Adult  Goal: Free from fall injury  1/18/2024 1745 by Lauren Zaragoza RN  Outcome: Progressing     Problem: Pain  Goal: Verbalizes/displays adequate comfort level or baseline comfort level  1/18/2024 1745 by Lauren Zaragoza RN  Outcome: Progressing     Problem: ABCDS Injury Assessment  Goal: Absence of physical injury  1/18/2024 1745 by Lauren Zaragoza RN  Outcome: Progressing

## 2024-01-18 NOTE — PLAN OF CARE
Problem: Discharge Planning  Goal: Discharge to home or other facility with appropriate resources  1/18/2024 0443 by Karis Butler RN  Outcome: Progressing     Problem: Chronic Conditions and Co-morbidities  Goal: Patient's chronic conditions and co-morbidity symptoms are monitored and maintained or improved  1/18/2024 0443 by Karis Butler RN  Outcome: Progressing     Problem: Safety - Adult  Goal: Free from fall injury  1/18/2024 0443 by Karis Butler RN  Outcome: Progressing     Problem: Pain  Goal: Verbalizes/displays adequate comfort level or baseline comfort level  1/18/2024 0443 by Karis Butler RN  Outcome: Progressing     Problem: ABCDS Injury Assessment  Goal: Absence of physical injury  1/18/2024 0443 by Karis Butler RN  Outcome: Progressing

## 2024-01-18 NOTE — PLAN OF CARE
Problem: Discharge Planning  Goal: Discharge to home or other facility with appropriate resources  Outcome: Progressing  Flowsheets (Taken 1/17/2024 0745)  Discharge to home or other facility with appropriate resources:   Refer to discharge planning if patient needs post-hospital services based on physician order or complex needs related to functional status, cognitive ability or social support system   Arrange for interpreters to assist at discharge as needed   Identify discharge learning needs (meds, wound care, etc)   Arrange for needed discharge resources and transportation as appropriate   Identify barriers to discharge with patient and caregiver     Problem: Chronic Conditions and Co-morbidities  Goal: Patient's chronic conditions and co-morbidity symptoms are monitored and maintained or improved  Outcome: Progressing  Flowsheets (Taken 1/17/2024 0745)  Care Plan - Patient's Chronic Conditions and Co-Morbidity Symptoms are Monitored and Maintained or Improved:   Update acute care plan with appropriate goals if chronic or comorbid symptoms are exacerbated and prevent overall improvement and discharge   Collaborate with multidisciplinary team to address chronic and comorbid conditions and prevent exacerbation or deterioration   Monitor and assess patient's chronic conditions and comorbid symptoms for stability, deterioration, or improvement     Problem: Safety - Adult  Goal: Free from fall injury  Outcome: Progressing  Fall assessment preformed. Bed in low locked position with call light and tray table within reach. Education given. Will continue to monitor.       Problem: Pain  Goal: Verbalizes/displays adequate comfort level or baseline comfort level  Outcome: Progressing  Flowsheets (Taken 1/17/2024 0953)  Verbalizes/displays adequate comfort level or baseline comfort level:   Encourage patient to monitor pain and request assistance   Assess pain using appropriate pain scale   Administer analgesics based on

## 2024-01-19 ENCOUNTER — CARE COORDINATION (OUTPATIENT)
Dept: CASE MANAGEMENT | Age: 39
End: 2024-01-19

## 2024-01-19 DIAGNOSIS — K65.2 SBP (SPONTANEOUS BACTERIAL PERITONITIS) (HCC): Primary | ICD-10-CM

## 2024-01-19 LAB
APPEARANCE FLD: CLEAR
BLASTS NFR FLD: ABNORMAL %
BODY FLD TYPE: ABNORMAL
COLOR FLD: YELLOW
EOSINOPHIL NFR FLD: ABNORMAL %
LYMPHOCYTES NFR FLD: 43 %
MANUAL DIF COMMENT FLD-IMP: ABNORMAL
MONOCYTES NFR FLD: 43 %
NEUTROPHILS NFR FLD: 11 %
RBC # FLD: 180 CELLS/UL
UNIDENT CELLS NFR FLD: 3 %
WBC # FLD: 70 CELLS/UL

## 2024-01-19 RX ORDER — OXYCODONE HCL 10 MG/1
10 TABLET, FILM COATED, EXTENDED RELEASE ORAL EVERY 12 HOURS SCHEDULED
Qty: 30 EACH | Refills: 0 | Status: SHIPPED | OUTPATIENT
Start: 2024-01-19 | End: 2024-02-03

## 2024-01-19 NOTE — PROGRESS NOTES
[facility]    Gastroenterology Daily Progress Note      Patient:   Sam Emerson   :    1985   Facility:   [facility]  Date:     2024  Consultant:   SILVA Ziegler CNP, CNP      SUBJECTIVE  39 y.o. male admitted 1/15/2024 with Hyperbilirubinemia [E80.6]  Abdominal pain [R10.9]  SBP (spontaneous bacterial peritonitis) (HCC) [K65.2]  Jaundice, non- [R17]  Urinary tract infection without hematuria, site unspecified [N39.0]  Liver failure without hepatic coma, unspecified chronicity (HCC) [K72.90] and seen for cirrhosis with diarrhea and abdominal pain. The pt was seen and examined. Had three non bloody stools overnight, has decreased abdominal pain and is tolerating food. Stool cultures are negative. Ct abd did not show colitis.        OBJECTIVE  Scheduled Meds:   cefTRIAXone (ROCEPHIN) IV  2,000 mg IntraVENous Q24H    rifAXIMin  400 mg Oral TID    pantoprazole  40 mg Oral QAM AC    oxyCODONE  10 mg Oral 2 times per day    metroNIDAZOLE  500 mg IntraVENous Q8H    [Held by provider] lactulose  10 g Oral TID    nicotine  1 patch TransDERmal Daily    sodium chloride flush  5-40 mL IntraVENous 2 times per day    enoxaparin  40 mg SubCUTAneous Daily       Vital Signs:  /84   Pulse (!) 113   Temp 97.3 °F (36.3 °C) (Oral)   Resp 16   Ht 1.727 m (5' 8\")   Wt 79.2 kg (174 lb 9.7 oz)   SpO2 97%   BMI 26.55 kg/m²    Review of Systems - History obtained from chart review and the patient  General ROS: negative  Respiratory ROS: no cough, shortness of breath, or wheezing  Cardiovascular ROS: no chest pain or dyspnea on exertion  Gastrointestinal ROS: positive for - abdominal pain loose stools   Physical Exam:     General Appearance: ill appearing alert and oriented to person, place and time, well-developed and well-nourished, in no acute distress  Skin: warm and dry, no rash or erythema pt has jaundice  Head: normocephalic and atraumatic  Eyes: pupils equal, 
   01/17/24 1500   Encounter Summary   Encounter Overview/Reason  Attempted Encounter   Service Provided For: Patient not available  (patient not in his room)       
  Physician Progress Note      PATIENT:               MAURISIO MENA  CSN #:                  [id number]  :                       1985  ADMIT DATE:       1/15/2024 4:20 PM  DISCH DATE:  RESPONDING  PROVIDER #:        Quentin Guevara MD          QUERY TEXT:    Pt admitted with spontaneous bacterial peritonitis and UTI. Pt noted to have   elevated WBC and HR. If possible, please document in the progress notes and   discharge summary if you are evaluating and /or treating any of the following:    The medical record reflects the following:  Risk Factors: hepatic encephalopathy, alcohol abuse  Clinical Indicators: 2024, PN, Dr. Quentin Guevara MD:  \"31-year-old   gentleman admitted with abdominal pain secondary to acute colitis, also   possible spontaneous bacterial peritonitis, further antibiotic failure,   Bilirubin 24, CT of the abdomen pelvis reviewed.  UTI, on Rocephin, cultures   pending.\"  WBC:  22.3 on admission --> 15 on 2024  HR:  >90 bpm since admission  Treatment: Rocephin, Flagyl    Thank you,  TRINI Jara RN, CDS  [email or web address]  Options provided:  -- Sepsis associated with spontaneous bacterial peritonitis, present on   admission  -- Sepsis associated with a UTI, present on admission  -- UTI and spontaneous bacterial peritonitis without Sepsis  -- Sepsis was ruled out  -- Other - I will add my own diagnosis  -- Disagree - Not applicable / Not valid  -- Disagree - Clinically unable to determine / Unknown  -- Refer to Clinical Documentation Reviewer    PROVIDER RESPONSE TEXT:    This patient has sepsis associated with a UTI which was present on admission.    Query created by: Urmila Jara on 2024 5:32 PM      Electronically signed by:  Quentin Guevara MD 2024 5:38 PM          
Alert and oriented. US in use. Rt abd prepped draped. Numbed and accessed by Dr Malave Obtained 1.1 L of clear yellow fluid. Tolerated well Report called to Lupis PALMA. Specimen to lab  
Ascitic fluid from today’s paracentesis does not show sbp culture is pending. Ok to send pt home from a gi standpoint with outpatient gi f/u Alejandra Gamino CNP APRN  
CLINICAL PHARMACY NOTE: MEDS TO BEDS    Total # of Prescriptions Filled: 1   The following medications were delivered to the patient:  Amoxicillin/Pot Clav 875/125mg     Additional Documentation: delivered to pt himself in room 1/18/24 12:55pm martina let nurse's station know pt [id number] complete  
Notified Tanika Duncan NP, critical bilirubin 22.3 from lab.   
Patient abdomen dressing at paracentesis site dry and intact.  VS stable.  Patient discharge instructions reviewed with patient and discharge paperwork given to patient.  Patient has augmentin delivered from pharmacy Cab called as patient states he has no transportation home.  
Patient discharged per wheelchair per MINERVA Locke to meet cab.  Belongings taken with patient.  
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 and being unable to work; listening presence and support; patient stated he had a \"huge wake up call regarding his drinking in December\"; patient discussed his alcohol abuse; welcomed prayer;     01/17/24 1534   Encounter Summary   Encounter Overview/Reason  Spiritual/Emotional Needs   Service Provided For: Patient   Referral/Consult From: [facility]   Support System Spouse   Last Encounter  01/17/24   Complexity of Encounter Moderate   Begin Time 1510   End Time  1534   Total Time Calculated 24 min   Spiritual/Emotional needs   Type Spiritual Support   Grief, Loss, and Adjustments   Type Adjustment to illness   Assessment/Intervention/Outcome   Assessment Anxious;Coping;Hopeful;Powerlessness;Stress overload   Intervention Active listening;Discussed belief system/Cheondoism practices/ila;Discussed illness injury and it’s impact;Explored/Affirmed feelings, thoughts, concerns;Prayer (assurance of)/[place];Sustaining Presence/Ministry of presence   Outcome Comfort;Coping;Engaged in conversation;Expressed feelings, needs, and concerns;Expressed Gratitude;Receptive       
Pharmacy Medication History Note      List of current medications patient is taking is complete.     Source of information: patient, dispense report, OARRS    Changes made to medication list:  Medications flagged for removal (include reason, ex. noncompliance):  None    Medications removed (include reason, ex. therapy complete or physician discontinued):  None    Medications added/doses adjusted:  None    Other notes (ex. Recent course of antibiotics, Coumadin dosing):  OARRS Report - Oxycodone IR 5mg #12 for 4 on 24  Per patient, he was very unsure of his prednisolone taper. He thought he was changed to a 7-day step down consisting of the followin days of 13.7mLs, 2 days of 7.5mLs, and 3 days of 3.5mLs with the patient believing he was about to start the last 3 days. Per the discharge summary on 2024, the directions state the directions in the home medications (Take 10 mLs by mouth daily for 7 days, THEN 6.7 mLs daily for 7 days, THEN 3.3 mLs daily for 7 days, THEN 1.7 mLs daily for 7 days.)    Denies use of other OTC or herbal medications.      Allergies clarified    Medication list provided to the patient:no  Medication education provided to the patient:none      Electronically signed by Gumaro Cabrera on 1/15/2024 at 7:35 PM                                                     
Pt arrived to floor via w/c from ED and was transfered to bed.  Vitals taken, telemetry applied. Assessment complete. No distress noted. See doc flowsheet and admission navigator for details. POC and education initiated and reviewed with patient. Call light within reach, and pt educated on its use. Bed in lowest position, and locked. Side rails up x 2. Denied further questions or needs at this time. Will continue to monitor.  
mLs by mouth daily for 7 days, THEN 6.7 mLs daily for 7 days, THEN 3.3 mLs daily for 7 days, THEN 1.7 mLs daily for 7 days. 1/9/24 2/6/24  Sarah Dutta PA   ondansetron (ZOFRAN) 4 MG tablet Take 1 tablet by mouth every 8 hours as needed for Nausea 1/2/24   Gumaro Graff DO   nicotine (NICODERM CQ) 7 MG/24HR Place 1 patch onto the skin daily 12/27/23   Madiha Moncada MD   pantoprazole (PROTONIX) 40 MG tablet Take 1 tablet by mouth in the morning and 1 tablet in the evening. 12/15/23   Paramjit Barrera MD   lactulose (CHRONULAC) 10 GM/15ML solution Take 15 mLs by mouth 3 times daily 12/15/23   Paramjit Barrera MD   rifAXIMin (XIFAXAN) 550 MG tablet Take 1 tablet by mouth in the morning and at bedtime  Patient not taking: Reported on 1/15/2024 12/15/23   Paramjit Barrera MD   vitamin B-1 (THIAMINE) 100 MG tablet Take 1 tablet by mouth daily 9/30/23   Maribel Ayala MD   EPINEPHrine (EPIPEN 2-LIZETTE) 0.3 MG/0.3ML SOAJ injection Inject 0.3 mLs into the muscle once for 1 dose Use as directed for allergic reaction 2/23/23 2/23/23  Nick Magana MD   folic acid (FOLVITE) 1 MG tablet Take 1 tablet by mouth daily 5/4/22   Joselyn Isaac MD   naproxen (NAPROSYN) 500 MG tablet Take 1 tablet by mouth 2 times daily  Patient taking differently: Take 500 mg by mouth 2 times daily as needed 8/13/21 3/28/22  Jimenez Campos MD   Blood Pressure Monitoring (BP MONITOR-STETHOSCOPE) KIT 1 each by Does not apply route daily 4/7/21   Jolynn Ibrahim MD        Allergies:     Bee venom    Social History:     Tobacco:    reports that he has been smoking cigarettes. He has a 21.0 pack-year smoking history. He has never used smokeless tobacco.  Alcohol:      reports current alcohol use of about 84.0 standard drinks of alcohol per week.  Drug Use:  reports that he does not currently use drugs after having used the following drugs: Marijuana (Weed).    Family History:     Family History   Problem Relation Age of 
place and believed to be patent given the attenuation  of the lumen.  2. Diffuse thickening of the small bowel and thickening of the colon from the  cecum through the descending colon.  This is favored to be related ascites  and portal hypertension, but enterocolitis cannot be excluded.  3. The presence of diffuse fat stranding evaluation of inflammation, but  there are no other definitive acute findings in the abdomen or pelvis.  There  is persistent fat stranding adjacent the pancreas which could be related to  ascites and portal hypertension.  4. Trace left pleural effusion.     ASSESSMENT/plan  Abdominal pain with leucocytosis imaging showing possible colitis  D/w md will repeat ct abd to eval for colitis and ascites  Stool cultures pending  Protonix    2.uti mgt per primary service    3.Decompensated cirrhosis secondary to etoh abuse hx tips 12/4/23  Continue the xifaxin  Hold the lactulose for now  2gm low salt diet  Avoid sedatives/narcotics  Trend lft     3.anemia  with thrombocytopenia  Trend hh  no overt bleeding    Time spent reviewing chart assessing pt and d/w md around 30 minutes    This plan was formulated in collaboration with Dr. Fam  .    Electronically signed by: SILVA Ziegler CNP on 1/17/2024 at 8:20 AM   Attending Physician Statement  I have discussed the care of Sam Emerson and   I have examined the patient myselft independently, and taken ros and hpi , including pertinent history and exam findings,  with the author of this note .   I have reviewed the key elements of all parts of the encounter with the nurse practitioner/resident.    I agree with the assessment, plan and orders as documented by the above health care provider     More than 50% of the time on this visit was spent by me   hysical Exam  Blood pressure (!) 139/97, pulse (!) 112, temperature 97.9 °F (36.6 °C), resp. rate 16, height 1.727 m (5' 8\"), weight 72.6 kg (160 lb 0.9 oz), SpO2 98 %.         General 
occurred.    [facility]  [address] [place] [address].   Phone [phone]

## 2024-01-19 NOTE — CARE COORDINATION
ONGOING DISCHARGE PLAN:    Patient is alert and oriented x4.    Spoke with patient regarding discharge plan and patient confirms that plan is still to discharge to home with no needs    Stool cultures pending    UTI     Xifaxin    Hold lactulose for now    Trend LFT    Possible colitis    repeat ct abd to eval for colitis and ascites    Will continue to follow for additional discharge needs.    If patient is discharged prior to next notation, then this note serves as note for discharge by case management.    Electronically signed by Bernadine Pelayo RN on 1/17/2024 at 12:57 PM    
ONGOING DISCHARGE PLAN:    Patient is alert and oriented x4.    Spoke with patient regarding discharge plan and patient confirms that plan is still to return to home w/ Family.     Denies VNS.     Remains on IV Rocephin/Flagyl.     LFT's remains elevated, trending down.     Plan for Paracentesis today. GI on board.     Hospital F/U apt w/ Dr. Brewster, for 1/24/24 @ 10:30. No sooner apt's available w/ Dr. Montes De Oca.     Will continue to follow for additional discharge needs.    If patient is discharged prior to next notation, then this note serves as note for discharge by case management.    Electronically signed by Zoey Oconnor RN on 1/18/2024 at 12:08 PM   
Writer was notified by Vandana Ellis, Transition Care Nurse, that pt. Did NOT get his Script for his Oxycodone, prior to DC yesterday & he is unable to  Script at the Hospital.    Writer spoke to Dr. Guevara in regards to above & he has E-Scribed the Script to Pt's Pharmacy, [facility] [address].     Writer did notify Vandana & she will notify the Pt.   
members/significant others, and if so, who? No  Plans to Return to Present Housing: Yes  Other Identified Issues/Barriers to RETURNING to current housing: no  Potential Assistance needed at discharge: Transportation            Potential DME:    Patient expects to discharge to: House  Plan for transportation at discharge: Family    Financial    Payor: [facility] PLAN / Plan: [facility] PLAN / Product Type: *No Product type* /     Does insurance require precert for SNF: Yes    Potential assistance Purchasing Medications: No  Meds-to-Beds request:        [id number] - [place], [place] - [address] -  [phone] - F [phone]  [address] [address]  Phone: [phone] Fax: [phone]      Notes:    Factors facilitating achievement of predicted outcomes: Family support, Motivated, Cooperative, and Pleasant    Barriers to discharge: Medical complications    Additional Case Management Notes: [address] [facility]  Pt is from home with his family in a one story home DME none VNS denies Plan is to discharge to home with no need.  Pt will need a cab ride home.  WIll follow .//tv     The Plan for Transition of Care is related to the following treatment goals of Hyperbilirubinemia [E80.6]  Abdominal pain [R10.9]  SBP (spontaneous bacterial peritonitis) (HCC) [K65.2]  Jaundice, non- [R17]  Urinary tract infection without hematuria, site unspecified [N39.0]  Liver failure without hepatic coma, unspecified chronicity (HCC) [K72.90]    IF APPLICABLE: The Patient and/or patient representative Sam and his family were provided with a choice of provider and agrees with the discharge plan. Freedom of choice list with basic dialogue that supports the patient's individualized plan of care/goals and shares the quality data associated with the providers was provided to: Patient   Patient Representative Name:       The Patient and/or Patient Representative Agree with the

## 2024-01-19 NOTE — CARE COORDINATION
Care Transitions Initial Follow Up Call Attempt #1 Attempted initial 24 hour transitional call to patient/domestic partner, Sydney.  Left VM to return call directly to CTN.       Call within 2 business days of discharge: Yes    Recent consecutive readmissions + frequent ED visits - [facility] admission 1/15- with ABD pain, weakness, diarrhea, SOB. Paracentesis done. Treated with IV ab for UTI + spontaneous bacterial peritonitis.  Discharged home on 7 day course of Augmentin and oxycodone.      Patient: Sam Emerson   Patient : 1985   MRN: [id number]    Reason for Admission: UTI + spontaneous bacterial peritonitis.  Discharge Date: 24   RARS: Readmission Risk Score: 44.4      Last Discharge Facility       Date Complaint Diagnosis Description Type Department Provider    1/15/24 Chest Pain; Abdominal Pain SBP (spontaneous bacterial peritonitis) (HCC) ... ED to Hosp-Admission (Discharged) (ADMITTED) Quentin Alegria MD; Kelton Campos...            Was this an external facility discharge? No   Non-face-to-face services provided:  Scheduled appointment with PCP- Dr Brewster  Obtained and reviewed discharge summary and/or continuity of care documents    Follow Up  Future Appointments   Date Time Provider Department Center   2024 10:30 AM Rick Brewster MD [facility]       Care Transition Nurse provided contact information.    Beryl Ellis RN

## 2024-01-19 NOTE — CARE COORDINATION
Care Transitions Initial Follow Up Call - patient returned call to CTN    Call within 2 business days of discharge: Yes    Patient Current Location:  Home: [address] [place] [address]    Care Transition Nurse contacted the patient by telephone to perform post hospital discharge assessment. Verified name and  with patient as identifiers. Provided introduction to self, and explanation of the Care Transition Nurse role.     Patient: Sam Emerson             Patient : 1985   MRN: [id number]             Reason for Admission: UTI + spontaneous bacterial peritonitis, Paracentesis  Discharge Date: 24         RARS: Readmission Risk Score: 44.4      Last Discharge Facility       Date Complaint Diagnosis Description Type Department Provider    1/15/24 Chest Pain; Abdominal Pain SBP (spontaneous bacterial peritonitis) (HCC) ... ED to Hosp-Admission (Discharged) (ADMITTED)  Quentin Celis MD; Kelton Campos...            Was this an external facility discharge? No   Challenges to be reviewed by the provider   Additional needs identified to be addressed with provider: Yes  medications-checking with [facility] progressive unit re: printed script for oxycodone that patient did not receive prior to DC yesterday - if unable to locate script, will have to check with physician re: other solution     Noted that printed script was located, not signed so Dr Guevara e-prescribed oxycodone script to patient's [facility]                 Recent consecutive readmissions + frequent ED visits - [facility] admission 1/15- with ABD pain, weakness, diarrhea, SOB. Paracentesis done. Treated with IV ab for UTI + spontaneous bacterial peritonitis.  Discharged home on 7 day course of Augmentin and oxycodone.    Sam returned call to CTN - woke up at 9am when he was due for oxycodone - printed script was not taken home by patient - he said he was rushed to catch his cab.  I contacted Zoey CAMACHO this morning on progressive

## 2024-01-21 LAB
MICROORGANISM SPEC CULT: NORMAL
MICROORGANISM/AGENT SPEC: NORMAL
SPECIMEN DESCRIPTION: NORMAL

## 2024-01-22 ENCOUNTER — CARE COORDINATION (OUTPATIENT)
Dept: CASE MANAGEMENT | Age: 39
End: 2024-01-22

## 2024-01-22 ENCOUNTER — TELEPHONE (OUTPATIENT)
Dept: INTERNAL MEDICINE CLINIC | Age: 39
End: 2024-01-22

## 2024-01-22 LAB
CASE NUMBER:: NORMAL
SPECIMEN DESCRIPTION: NORMAL

## 2024-01-22 NOTE — TELEPHONE ENCOUNTER
Care Transitions Initial Follow Up Call    Outreach made within 2 business days of discharge: Yes    Patient: Sam Emerson Patient : 1985   MRN: [id number]  Reason for Admission: There are no discharge diagnoses documented for the most recent discharge.  Discharge Date: 24       Spoke with: patient    Discharge department/facility: [facility] Interactive Patient Contact:  Was patient able to fill all prescriptions: Yes  Was patient instructed to bring all medications to the follow-up visit: Yes  Is patient taking all medications as directed in the discharge summary? Yes  Does patient understand their discharge instructions: Yes  Does patient have questions or concerns that need addressed prior to 7-14 day follow up office visit: no    Scheduled appointment with PCP within 7-14 days    Follow Up  Future Appointments   Date Time Provider Department Center   2024  9:00 AM Rick Brewster MD [facility]       Regina Goldstein MA

## 2024-01-22 NOTE — CARE COORDINATION
Care Transitions Follow Up Call Attempt #1 -Attempted to reach patient for subsequent transitional call.  VM left to return call to CTN     Noted  appt with PCP        Patient: Sam Emerson             Patient : 1985   MRN: [id number]             Reason for Admission: UTI + spontaneous bacterial peritonitis, Paracentesis  Discharge Date: 24         RARS: Readmission Risk Score: 44.4        Care Transition Nurse provided contact information for future needs.     Plan for next call: symptom management-check any UTI s/s, check po intake and pattern BM & if any more diarrhea, check level of pain - check oxycodone  follow-up appointment-  medication management-check if still taking course of augmentin        Beryl Ellis RN

## 2024-01-23 LAB — SURGICAL PATHOLOGY REPORT: NORMAL

## 2024-01-23 NOTE — CARE COORDINATION
Care Transitions Follow Up Call - patient returned call to CTN  4pm     Patient Current Location:  Home: [address] [place] [address]    Care Transition Nurse contacted the patient by telephone to follow up after admission Verified name and  with patient as identifiers.    Patient: Sam Emerson             Patient : 1985   MRN: [id number]             Reason for Admission: UTI + spontaneous bacterial peritonitis, Paracentesis  Discharge Date: 24         RARS: Readmission Risk Score: 44.4      Needs to be reviewed by the provider   Additional needs identified to be addressed with provider: - has HFU appt                Patient returned call - reports that his ABD pain is better controlled now that he obtained his oxy prescription from discharging physician.  Denies any further diarrhea or UTI symptoms.  Continues augmentin for UTI, but also says that he thinks the antibiotic is also helping his cough - mostly whitish expectorant now.  HFU appt is scheduled for       Plan for next call: symptom management-check pain control, BM, ABD symptoms, cough, UTI s/s  follow-up appointment-review HFU appt  medication management-check if  Augmentin is completed    Addressed changes since last contact:   reviewed symptoms, upcoming appt  Discussed follow-up appointments. If no appointment was previously scheduled, appointment scheduling offered: Yes.   Is follow up appointment scheduled within 7 days of discharge? Yes.    Follow Up  Future Appointments   Date Time Provider Department Center   2024  9:00 AM Rick Brewster MD [facility]         Care Transition Nurse reviewed discharge instructions and medical action plan with patient and discussed any barriers to care and/or understanding of plan of care after discharge. Discussed appropriate site of care based on symptoms and resources available to patient including: PCP.CTN The patient agrees to contact the PCP office for

## 2024-01-24 ENCOUNTER — OFFICE VISIT (OUTPATIENT)
Dept: INTERNAL MEDICINE CLINIC | Age: 39
End: 2024-01-24
Payer: COMMERCIAL

## 2024-01-24 VITALS
SYSTOLIC BLOOD PRESSURE: 138 MMHG | OXYGEN SATURATION: 100 % | DIASTOLIC BLOOD PRESSURE: 84 MMHG | BODY MASS INDEX: 28.49 KG/M2 | WEIGHT: 188 LBS | HEART RATE: 120 BPM | HEIGHT: 68 IN

## 2024-01-24 DIAGNOSIS — E87.6 HYPOKALEMIA: ICD-10-CM

## 2024-01-24 DIAGNOSIS — K70.11 ASCITES DUE TO ALCOHOLIC HEPATITIS: Primary | ICD-10-CM

## 2024-01-24 DIAGNOSIS — F10.21 HISTORY OF ALCOHOLISM (HCC): ICD-10-CM

## 2024-01-24 DIAGNOSIS — R17 JAUNDICE: ICD-10-CM

## 2024-01-24 PROBLEM — J96.01 ACUTE RESPIRATORY FAILURE WITH HYPOXIA (HCC): Status: RESOLVED | Noted: 2024-01-24 | Resolved: 2024-01-24

## 2024-01-24 PROBLEM — J96.01 ACUTE RESPIRATORY FAILURE WITH HYPOXIA (HCC): Status: ACTIVE | Noted: 2024-01-24

## 2024-01-24 PROCEDURE — 99215 OFFICE O/P EST HI 40 MIN: CPT | Performed by: INTERNAL MEDICINE

## 2024-01-24 RX ORDER — NADOLOL 20 MG/1
20 TABLET ORAL DAILY
Qty: 30 TABLET | Refills: 3 | Status: SHIPPED | OUTPATIENT
Start: 2024-01-24

## 2024-01-24 RX ORDER — THIAMINE MONONITRATE (VIT B1) 100 MG
100 TABLET ORAL DAILY
Qty: 30 TABLET | Refills: 3 | Status: SHIPPED | OUTPATIENT
Start: 2024-01-24

## 2024-01-24 ASSESSMENT — ENCOUNTER SYMPTOMS
TROUBLE SWALLOWING: 0
SHORTNESS OF BREATH: 1
ABDOMINAL DISTENTION: 1
WHEEZING: 0
COUGH: 0
BLOOD IN STOOL: 0
EYE DISCHARGE: 0
EYE PAIN: 0
ABDOMINAL PAIN: 1
DIARRHEA: 0
COLOR CHANGE: 0

## 2024-01-24 ASSESSMENT — PATIENT HEALTH QUESTIONNAIRE - PHQ9
8. MOVING OR SPEAKING SO SLOWLY THAT OTHER PEOPLE COULD HAVE NOTICED. OR THE OPPOSITE, BEING SO FIGETY OR RESTLESS THAT YOU HAVE BEEN MOVING AROUND A LOT MORE THAN USUAL: 0
6. FEELING BAD ABOUT YOURSELF - OR THAT YOU ARE A FAILURE OR HAVE LET YOURSELF OR YOUR FAMILY DOWN: 0
SUM OF ALL RESPONSES TO PHQ QUESTIONS 1-9: 0
4. FEELING TIRED OR HAVING LITTLE ENERGY: 0
2. FEELING DOWN, DEPRESSED OR HOPELESS: 0
SUM OF ALL RESPONSES TO PHQ9 QUESTIONS 1 & 2: 0
7. TROUBLE CONCENTRATING ON THINGS, SUCH AS READING THE NEWSPAPER OR WATCHING TELEVISION: 0
9. THOUGHTS THAT YOU WOULD BE BETTER OFF DEAD, OR OF HURTING YOURSELF: 0
10. IF YOU CHECKED OFF ANY PROBLEMS, HOW DIFFICULT HAVE THESE PROBLEMS MADE IT FOR YOU TO DO YOUR WORK, TAKE CARE OF THINGS AT HOME, OR GET ALONG WITH OTHER PEOPLE: 0
SUM OF ALL RESPONSES TO PHQ QUESTIONS 1-9: 0
SUM OF ALL RESPONSES TO PHQ QUESTIONS 1-9: 0
5. POOR APPETITE OR OVEREATING: 0
SUM OF ALL RESPONSES TO PHQ QUESTIONS 1-9: 0
3. TROUBLE FALLING OR STAYING ASLEEP: 0
1. LITTLE INTEREST OR PLEASURE IN DOING THINGS: 0

## 2024-01-24 NOTE — PROGRESS NOTES
Visit Information    Have you changed or started any medications since your last visit including any over-the-counter medicines, vitamins, or herbal medicines? no   Are you having any side effects from any of your medications? -  no  Have you stopped taking any of your medications? Is so, why? -  no    Have you seen any other physician or provider since your last visit? No  Have you had any other diagnostic tests since your last visit? Yes - Records Obtained  Have you been seen in the emergency room and/or had an admission to a hospital since we last saw you? Yes - Records Obtained  Have you had your routine dental cleaning in the past 6 months? no    Have you activated your [employer] account? If not, what are your barriers? Yes     Patient Care Team:  Rosie Montes De Oca MD as PCP - General (Internal Medicine)  Rosie Montes De Oca MD as PCP - Empaneled Provider  Prince Gonsalez MD as Consulting Physician (Gastroenterology)  Beryl Ellis, RN as Care Transitions Nurse    Medical History Review  Past Medical, Family, and Social History reviewed and does contribute to the patient presenting condition    Health Maintenance   Topic Date Due    Varicella vaccine (1 of 2 - 2-dose childhood series) Never done    Pneumococcal 0-64 years Vaccine (1 - PCV) Never done    HIV screen  Never done    Hepatitis A vaccine (1 of 2 - Risk 2-dose series) Never done    Hepatitis B vaccine (2 of 2 - CpG 2-dose series) 04/13/2023    Flu vaccine (1) Never done    COVID-19 Vaccine (3 - 2023-24 season) 09/01/2023    Depression Monitoring  02/14/2024    Diabetes screen  11/17/2025    DTaP/Tdap/Td vaccine (2 - Td or Tdap) 09/21/2033    Hepatitis C screen  Completed    Hib vaccine  Aged Out    HPV vaccine  Aged Out    Polio vaccine  Aged Out    Meningococcal (ACWY) vaccine  Aged Out    Depression Screen  Discontinued       
instructions.Discussed use, benefit, and side effects of prescribed medications.  All patientquestions answered. Pt voiced understanding. Reviewed health maintenance.  Instructedto continue current medications, diet and exercise.  Patient agreed with treatmentplan. Follow up as directed.       Please note that this chart was generated using voice recognition Dragon dictation software.  Although every effort was made to ensure the accuracy of this automated transcription, some errors in transcription may have occurred.     Electronically signed by Rick Brewster MD on 1/24/2024 at 9:36 AM

## 2024-01-25 LAB
MICROORGANISM SPEC CULT: NORMAL
MICROORGANISM/AGENT SPEC: NORMAL
SPECIMEN DESCRIPTION: NORMAL

## 2024-01-26 ENCOUNTER — CARE COORDINATION (OUTPATIENT)
Dept: CASE MANAGEMENT | Age: 39
End: 2024-01-26

## 2024-01-26 NOTE — CARE COORDINATION
Care Transitions Follow Up Call    Patient Current Location:  Home: [address] [place], [place] [id number]    Care Transition Nurse contacted the patient by telephone to follow up after admission..  Verified name and  with patient as identifiers.    Patient: Sam Emerson             Patient : 1985   MRN: [id number]             Reason for Admission: UTI + spontaneous bacterial peritonitis, Paracentesis  Discharge Date: 24         RARS: Readmission Risk Score: 44.4      Needs to be reviewed by the provider   Additional needs identified to be addressed with provider:   Had HFU on  - orders obtained for labs, paracentesis, recommendation per provider is for ER if not feeling well, corgard added for better HR control             Patient reports doing OK & that cough is better.  Augmentin for UTI is completed.  Denies SOB, no palpitations, no temp, but HR is still more elevated ~ 120 which is what it was at  HFU - corgard added per Dr Brewster & patient was instructed to go to ED if not feeling well.  Sam is aware of those instructions, but does not feel he needs to do so at this time.  He is in process of scheduling paracentesis & is recommended that he get his recently ordered lab work drawn.  Has another appt on , this time with his PCP.      Plan for next call: symptom management-check HR, edema, ascites - check if paracentesis is scheduled yet and if labs drawn yet  medication management-check tolerance to corgard    Addressed changes since last contact:  medications-corgard added  Discussed follow-up appointments. If no appointment was previously scheduled, appointment scheduling offered: Yes.   Is follow up appointment scheduled within 7 days of discharge? Yes.    Follow Up  Future Appointments   Date Time Provider Department Center   2024  1:45 PM Gerald Olivarez MD [facility]   2024  4:30 PM Rosie Montes De Oca MD [facility]         Care Transition Nurse reviewed

## 2024-01-29 ENCOUNTER — TELEPHONE (OUTPATIENT)
Dept: INTERNAL MEDICINE CLINIC | Age: 39
End: 2024-01-29

## 2024-01-29 ENCOUNTER — HOSPITAL ENCOUNTER (INPATIENT)
Age: 39
LOS: 2 days | Discharge: HOME | DRG: 280 | End: 2024-02-01
Attending: STUDENT IN AN ORGANIZED HEALTH CARE EDUCATION/TRAINING PROGRAM | Admitting: INTERNAL MEDICINE
Payer: COMMERCIAL

## 2024-01-29 DIAGNOSIS — K52.9 COLITIS: ICD-10-CM

## 2024-01-29 DIAGNOSIS — E72.20 HYPERAMMONEMIA (HCC): Primary | ICD-10-CM

## 2024-01-29 DIAGNOSIS — R53.83 OTHER FATIGUE: ICD-10-CM

## 2024-01-29 DIAGNOSIS — K76.82 HEPATIC ENCEPHALOPATHY (HCC): ICD-10-CM

## 2024-01-29 DIAGNOSIS — E44.1 MILD MALNUTRITION (HCC): ICD-10-CM

## 2024-01-29 DIAGNOSIS — K65.2 SBP (SPONTANEOUS BACTERIAL PERITONITIS) (HCC): ICD-10-CM

## 2024-01-29 PROCEDURE — 99285 EMERGENCY DEPT VISIT HI MDM: CPT

## 2024-01-29 RX ORDER — FUROSEMIDE 20 MG/1
20 TABLET ORAL DAILY PRN
Qty: 60 TABLET | Refills: 3 | Status: ON HOLD | OUTPATIENT
Start: 2024-01-29 | End: 2024-02-01

## 2024-01-29 ASSESSMENT — PAIN - FUNCTIONAL ASSESSMENT: PAIN_FUNCTIONAL_ASSESSMENT: 0-10

## 2024-01-29 ASSESSMENT — PAIN SCALES - GENERAL: PAINLEVEL_OUTOF10: 0

## 2024-01-29 NOTE — TELEPHONE ENCOUNTER
Patient called stating that he would like a script sent to the pharmacy for lasix. Patient states he is having ankle swelling. He believes this is from all the previous surgeries he just had. Please advise if you can send a lasix script to [facility].

## 2024-01-30 ENCOUNTER — APPOINTMENT (OUTPATIENT)
Dept: GENERAL RADIOLOGY | Age: 39
DRG: 280 | End: 2024-01-30
Payer: COMMERCIAL

## 2024-01-30 ENCOUNTER — APPOINTMENT (OUTPATIENT)
Dept: VASCULAR LAB | Age: 39
DRG: 280 | End: 2024-01-30
Attending: INTERNAL MEDICINE
Payer: COMMERCIAL

## 2024-01-30 PROBLEM — E44.1 MILD MALNUTRITION (HCC): Status: ACTIVE | Noted: 2024-01-30

## 2024-01-30 PROBLEM — R53.83 OTHER FATIGUE: Status: ACTIVE | Noted: 2024-01-30

## 2024-01-30 PROBLEM — K76.82 HEPATIC ENCEPHALOPATHY (HCC): Status: ACTIVE | Noted: 2024-01-30

## 2024-01-30 LAB
ALBUMIN SERPL-MCNC: 1.8 G/DL (ref 3.5–5.2)
ALBUMIN SERPL-MCNC: 1.8 G/DL (ref 3.5–5.2)
ALP SERPL-CCNC: 382 U/L (ref 40–129)
ALP SERPL-CCNC: 390 U/L (ref 40–129)
ALT SERPL-CCNC: 14 U/L (ref 5–41)
ALT SERPL-CCNC: 15 U/L (ref 5–41)
AMMONIA PLAS-SCNC: 147 UMOL/L (ref 16–60)
ANION GAP SERPL CALCULATED.3IONS-SCNC: 8 MMOL/L (ref 9–17)
ANION GAP SERPL CALCULATED.3IONS-SCNC: 8 MMOL/L (ref 9–17)
AST SERPL-CCNC: 43 U/L
AST SERPL-CCNC: 44 U/L
B PARAP IS1001 DNA NPH QL NAA+NON-PROBE: NOT DETECTED
B PERT DNA SPEC QL NAA+PROBE: NOT DETECTED
BASOPHILS # BLD: 0 K/UL (ref 0–0.2)
BASOPHILS NFR BLD: 0 % (ref 0–2)
BILIRUB SERPL-MCNC: 9.5 MG/DL (ref 0.3–1.2)
BILIRUB SERPL-MCNC: 9.9 MG/DL (ref 0.3–1.2)
BUN SERPL-MCNC: 5 MG/DL (ref 6–20)
BUN SERPL-MCNC: 5 MG/DL (ref 6–20)
C PNEUM DNA NPH QL NAA+NON-PROBE: NOT DETECTED
CALCIUM SERPL-MCNC: 7.2 MG/DL (ref 8.6–10.4)
CALCIUM SERPL-MCNC: 7.3 MG/DL (ref 8.6–10.4)
CHLORIDE SERPL-SCNC: 107 MMOL/L (ref 98–107)
CHLORIDE SERPL-SCNC: 108 MMOL/L (ref 98–107)
CO2 SERPL-SCNC: 23 MMOL/L (ref 20–31)
CO2 SERPL-SCNC: 23 MMOL/L (ref 20–31)
CREAT SERPL-MCNC: 0.4 MG/DL (ref 0.7–1.2)
CREAT SERPL-MCNC: 0.6 MG/DL (ref 0.7–1.2)
ECHO BSA: 2.02 M2
EOSINOPHIL # BLD: 0.13 K/UL (ref 0–0.4)
EOSINOPHILS RELATIVE PERCENT: 1 % (ref 0–4)
ERYTHROCYTE [DISTWIDTH] IN BLOOD BY AUTOMATED COUNT: 21.4 % (ref 11.5–14.9)
FLUAV RNA NPH QL NAA+NON-PROBE: NOT DETECTED
FLUBV RNA NPH QL NAA+NON-PROBE: NOT DETECTED
GFR SERPL CREATININE-BSD FRML MDRD: >60 ML/MIN/1.73M2
GFR SERPL CREATININE-BSD FRML MDRD: >60 ML/MIN/1.73M2
GLUCOSE SERPL-MCNC: 122 MG/DL (ref 70–99)
GLUCOSE SERPL-MCNC: 95 MG/DL (ref 70–99)
HADV DNA NPH QL NAA+NON-PROBE: NOT DETECTED
HCOV 229E RNA NPH QL NAA+NON-PROBE: NOT DETECTED
HCOV HKU1 RNA NPH QL NAA+NON-PROBE: NOT DETECTED
HCOV NL63 RNA NPH QL NAA+NON-PROBE: NOT DETECTED
HCOV OC43 RNA NPH QL NAA+NON-PROBE: NOT DETECTED
HCT VFR BLD AUTO: 26.1 % (ref 41–53)
HGB BLD-MCNC: 8.8 G/DL (ref 13.5–17.5)
HMPV RNA NPH QL NAA+NON-PROBE: NOT DETECTED
HPIV1 RNA NPH QL NAA+NON-PROBE: NOT DETECTED
HPIV2 RNA NPH QL NAA+NON-PROBE: NOT DETECTED
HPIV3 RNA NPH QL NAA+NON-PROBE: NOT DETECTED
HPIV4 RNA NPH QL NAA+NON-PROBE: NOT DETECTED
INR PPP: 1.9
LACTATE BLDV-SCNC: 2.7 MMOL/L (ref 0.5–2.2)
LIPASE SERPL-CCNC: 33 U/L (ref 13–60)
LYMPHOCYTES NFR BLD: 1.82 K/UL (ref 1–4.8)
LYMPHOCYTES RELATIVE PERCENT: 14 % (ref 24–44)
M PNEUMO DNA NPH QL NAA+NON-PROBE: NOT DETECTED
MAGNESIUM SERPL-MCNC: 1.4 MG/DL (ref 1.6–2.6)
MAGNESIUM SERPL-MCNC: 2.2 MG/DL (ref 1.6–2.6)
MCH RBC QN AUTO: 35.3 PG (ref 26–34)
MCHC RBC AUTO-ENTMCNC: 33.8 G/DL (ref 31–37)
MCV RBC AUTO: 104.5 FL (ref 80–100)
MONOCYTES NFR BLD: 1.17 K/UL (ref 0.1–1.3)
MONOCYTES NFR BLD: 9 % (ref 1–7)
MORPHOLOGY: ABNORMAL
NEUTROPHILS NFR BLD: 76 % (ref 36–66)
NEUTS SEG NFR BLD: 9.88 K/UL (ref 1.3–9.1)
PARTIAL THROMBOPLASTIN TIME: 44.9 SEC (ref 24–36)
PLATELET # BLD AUTO: 73 K/UL (ref 150–450)
PMV BLD AUTO: 8.2 FL (ref 6–12)
POTASSIUM SERPL-SCNC: 2.7 MMOL/L (ref 3.7–5.3)
POTASSIUM SERPL-SCNC: 3.5 MMOL/L (ref 3.7–5.3)
PROT SERPL-MCNC: 5 G/DL (ref 6.4–8.3)
PROT SERPL-MCNC: 5.1 G/DL (ref 6.4–8.3)
PROTHROMBIN TIME: 21.9 SEC (ref 11.8–14.6)
RBC # BLD AUTO: 2.49 M/UL (ref 4.5–5.9)
RSV RNA NPH QL NAA+NON-PROBE: NOT DETECTED
RV+EV RNA NPH QL NAA+NON-PROBE: NOT DETECTED
SARS-COV-2 RNA NPH QL NAA+NON-PROBE: NOT DETECTED
SODIUM SERPL-SCNC: 138 MMOL/L (ref 135–144)
SODIUM SERPL-SCNC: 139 MMOL/L (ref 135–144)
SPECIMEN DESCRIPTION: NORMAL
TROPONIN I SERPL HS-MCNC: 14 NG/L (ref 0–22)
WBC OTHER # BLD: 13 K/UL (ref 3.5–11)

## 2024-01-30 PROCEDURE — 36415 COLL VENOUS BLD VENIPUNCTURE: CPT

## 2024-01-30 PROCEDURE — 83735 ASSAY OF MAGNESIUM: CPT

## 2024-01-30 PROCEDURE — 6370000000 HC RX 637 (ALT 250 FOR IP): Performed by: STUDENT IN AN ORGANIZED HEALTH CARE EDUCATION/TRAINING PROGRAM

## 2024-01-30 PROCEDURE — 85730 THROMBOPLASTIN TIME PARTIAL: CPT

## 2024-01-30 PROCEDURE — 96368 THER/DIAG CONCURRENT INF: CPT

## 2024-01-30 PROCEDURE — 80053 COMPREHEN METABOLIC PANEL: CPT

## 2024-01-30 PROCEDURE — 2580000003 HC RX 258: Performed by: NURSE PRACTITIONER

## 2024-01-30 PROCEDURE — 99255 IP/OBS CONSLTJ NEW/EST HI 80: CPT | Performed by: INTERNAL MEDICINE

## 2024-01-30 PROCEDURE — 84484 ASSAY OF TROPONIN QUANT: CPT

## 2024-01-30 PROCEDURE — 2060000000 HC ICU INTERMEDIATE R&B

## 2024-01-30 PROCEDURE — 71045 X-RAY EXAM CHEST 1 VIEW: CPT

## 2024-01-30 PROCEDURE — 93005 ELECTROCARDIOGRAM TRACING: CPT | Performed by: STUDENT IN AN ORGANIZED HEALTH CARE EDUCATION/TRAINING PROGRAM

## 2024-01-30 PROCEDURE — 93970 EXTREMITY STUDY: CPT

## 2024-01-30 PROCEDURE — 85025 COMPLETE CBC W/AUTO DIFF WBC: CPT

## 2024-01-30 PROCEDURE — 6370000000 HC RX 637 (ALT 250 FOR IP): Performed by: NURSE PRACTITIONER

## 2024-01-30 PROCEDURE — 83605 ASSAY OF LACTIC ACID: CPT

## 2024-01-30 PROCEDURE — 6370000000 HC RX 637 (ALT 250 FOR IP): Performed by: INTERNAL MEDICINE

## 2024-01-30 PROCEDURE — 83690 ASSAY OF LIPASE: CPT

## 2024-01-30 PROCEDURE — 85610 PROTHROMBIN TIME: CPT

## 2024-01-30 PROCEDURE — 97161 PT EVAL LOW COMPLEX 20 MIN: CPT

## 2024-01-30 PROCEDURE — 82140 ASSAY OF AMMONIA: CPT

## 2024-01-30 PROCEDURE — 2580000003 HC RX 258: Performed by: STUDENT IN AN ORGANIZED HEALTH CARE EDUCATION/TRAINING PROGRAM

## 2024-01-30 PROCEDURE — 6360000002 HC RX W HCPCS: Performed by: NURSE PRACTITIONER

## 2024-01-30 PROCEDURE — 99223 1ST HOSP IP/OBS HIGH 75: CPT | Performed by: INTERNAL MEDICINE

## 2024-01-30 PROCEDURE — 87040 BLOOD CULTURE FOR BACTERIA: CPT

## 2024-01-30 PROCEDURE — 93970 EXTREMITY STUDY: CPT | Performed by: SURGERY

## 2024-01-30 PROCEDURE — 96365 THER/PROPH/DIAG IV INF INIT: CPT

## 2024-01-30 PROCEDURE — 6360000002 HC RX W HCPCS: Performed by: STUDENT IN AN ORGANIZED HEALTH CARE EDUCATION/TRAINING PROGRAM

## 2024-01-30 PROCEDURE — 2500000003 HC RX 250 WO HCPCS: Performed by: STUDENT IN AN ORGANIZED HEALTH CARE EDUCATION/TRAINING PROGRAM

## 2024-01-30 PROCEDURE — 0202U NFCT DS 22 TRGT SARS-COV-2: CPT

## 2024-01-30 PROCEDURE — 97165 OT EVAL LOW COMPLEX 30 MIN: CPT

## 2024-01-30 RX ORDER — GAUZE BANDAGE 2" X 2"
100 BANDAGE TOPICAL DAILY
Status: DISCONTINUED | OUTPATIENT
Start: 2024-01-30 | End: 2024-02-01 | Stop reason: HOSPADM

## 2024-01-30 RX ORDER — PANTOPRAZOLE SODIUM 40 MG/1
40 TABLET, DELAYED RELEASE ORAL EVERY 12 HOURS
Status: DISCONTINUED | OUTPATIENT
Start: 2024-01-30 | End: 2024-02-01 | Stop reason: HOSPADM

## 2024-01-30 RX ORDER — SPIRONOLACTONE 25 MG/1
50 TABLET ORAL DAILY
Status: DISCONTINUED | OUTPATIENT
Start: 2024-01-30 | End: 2024-02-01 | Stop reason: HOSPADM

## 2024-01-30 RX ORDER — LACTULOSE 10 G/15ML
10 SOLUTION ORAL 3 TIMES DAILY
Status: DISCONTINUED | OUTPATIENT
Start: 2024-01-30 | End: 2024-01-30

## 2024-01-30 RX ORDER — MAGNESIUM SULFATE HEPTAHYDRATE 40 MG/ML
2000 INJECTION, SOLUTION INTRAVENOUS ONCE
Status: COMPLETED | OUTPATIENT
Start: 2024-01-30 | End: 2024-01-30

## 2024-01-30 RX ORDER — SODIUM CHLORIDE 0.9 % (FLUSH) 0.9 %
5-40 SYRINGE (ML) INJECTION PRN
Status: DISCONTINUED | OUTPATIENT
Start: 2024-01-30 | End: 2024-02-01 | Stop reason: HOSPADM

## 2024-01-30 RX ORDER — FOLIC ACID 1 MG/1
1 TABLET ORAL DAILY
Status: DISCONTINUED | OUTPATIENT
Start: 2024-01-30 | End: 2024-02-01 | Stop reason: HOSPADM

## 2024-01-30 RX ORDER — POTASSIUM CHLORIDE 7.45 MG/ML
10 INJECTION INTRAVENOUS
Status: COMPLETED | OUTPATIENT
Start: 2024-01-30 | End: 2024-01-30

## 2024-01-30 RX ORDER — AZITHROMYCIN 250 MG/1
500 TABLET, FILM COATED ORAL DAILY
Status: DISCONTINUED | OUTPATIENT
Start: 2024-01-30 | End: 2024-02-01 | Stop reason: HOSPADM

## 2024-01-30 RX ORDER — LACTULOSE 10 G/15ML
20 SOLUTION ORAL 3 TIMES DAILY
Status: DISCONTINUED | OUTPATIENT
Start: 2024-01-30 | End: 2024-02-01 | Stop reason: HOSPADM

## 2024-01-30 RX ORDER — POLYETHYLENE GLYCOL 3350 17 G/17G
17 POWDER, FOR SOLUTION ORAL DAILY PRN
Status: DISCONTINUED | OUTPATIENT
Start: 2024-01-30 | End: 2024-02-01 | Stop reason: HOSPADM

## 2024-01-30 RX ORDER — SODIUM CHLORIDE 9 MG/ML
INJECTION, SOLUTION INTRAVENOUS PRN
Status: DISCONTINUED | OUTPATIENT
Start: 2024-01-30 | End: 2024-02-01 | Stop reason: HOSPADM

## 2024-01-30 RX ORDER — OXYCODONE HCL 10 MG/1
10 TABLET, FILM COATED, EXTENDED RELEASE ORAL EVERY 12 HOURS SCHEDULED
Status: DISCONTINUED | OUTPATIENT
Start: 2024-01-30 | End: 2024-02-01 | Stop reason: HOSPADM

## 2024-01-30 RX ORDER — FUROSEMIDE 20 MG/1
20 TABLET ORAL DAILY
Status: DISCONTINUED | OUTPATIENT
Start: 2024-01-30 | End: 2024-01-31

## 2024-01-30 RX ORDER — ONDANSETRON 4 MG/1
4 TABLET, ORALLY DISINTEGRATING ORAL EVERY 8 HOURS PRN
Status: DISCONTINUED | OUTPATIENT
Start: 2024-01-30 | End: 2024-02-01 | Stop reason: HOSPADM

## 2024-01-30 RX ORDER — MAGNESIUM SULFATE HEPTAHYDRATE 40 MG/ML
2000 INJECTION, SOLUTION INTRAVENOUS PRN
Status: DISCONTINUED | OUTPATIENT
Start: 2024-01-30 | End: 2024-02-01 | Stop reason: HOSPADM

## 2024-01-30 RX ORDER — SODIUM CHLORIDE 0.9 % (FLUSH) 0.9 %
5-40 SYRINGE (ML) INJECTION EVERY 12 HOURS SCHEDULED
Status: DISCONTINUED | OUTPATIENT
Start: 2024-01-30 | End: 2024-02-01 | Stop reason: HOSPADM

## 2024-01-30 RX ORDER — NADOLOL 20 MG/1
20 TABLET ORAL DAILY
Status: DISCONTINUED | OUTPATIENT
Start: 2024-01-30 | End: 2024-02-01 | Stop reason: HOSPADM

## 2024-01-30 RX ORDER — POTASSIUM CHLORIDE 20 MEQ/1
40 TABLET, EXTENDED RELEASE ORAL PRN
Status: DISCONTINUED | OUTPATIENT
Start: 2024-01-30 | End: 2024-02-01 | Stop reason: HOSPADM

## 2024-01-30 RX ORDER — POTASSIUM CHLORIDE 7.45 MG/ML
10 INJECTION INTRAVENOUS PRN
Status: DISCONTINUED | OUTPATIENT
Start: 2024-01-30 | End: 2024-02-01 | Stop reason: HOSPADM

## 2024-01-30 RX ORDER — LACTULOSE 10 G/15ML
20 SOLUTION ORAL ONCE
Status: COMPLETED | OUTPATIENT
Start: 2024-01-30 | End: 2024-01-30

## 2024-01-30 RX ORDER — ONDANSETRON 2 MG/ML
4 INJECTION INTRAMUSCULAR; INTRAVENOUS EVERY 6 HOURS PRN
Status: DISCONTINUED | OUTPATIENT
Start: 2024-01-30 | End: 2024-02-01 | Stop reason: HOSPADM

## 2024-01-30 RX ADMIN — SODIUM CHLORIDE, PRESERVATIVE FREE 10 ML: 5 INJECTION INTRAVENOUS at 20:36

## 2024-01-30 RX ADMIN — AZITHROMYCIN DIHYDRATE 500 MG: 250 TABLET ORAL at 14:16

## 2024-01-30 RX ADMIN — SODIUM CHLORIDE, PRESERVATIVE FREE 10 ML: 5 INJECTION INTRAVENOUS at 06:06

## 2024-01-30 RX ADMIN — SPIRONOLACTONE 50 MG: 25 TABLET, FILM COATED ORAL at 14:25

## 2024-01-30 RX ADMIN — POTASSIUM CHLORIDE 40 MEQ: 1500 TABLET, EXTENDED RELEASE ORAL at 08:55

## 2024-01-30 RX ADMIN — POTASSIUM CHLORIDE 10 MEQ: 7.46 INJECTION, SOLUTION INTRAVENOUS at 04:16

## 2024-01-30 RX ADMIN — FUROSEMIDE 20 MG: 20 TABLET ORAL at 14:16

## 2024-01-30 RX ADMIN — OXYCODONE HYDROCHLORIDE 10 MG: 10 TABLET, FILM COATED, EXTENDED RELEASE ORAL at 20:34

## 2024-01-30 RX ADMIN — LACTULOSE 20 G: 20 SOLUTION ORAL at 20:34

## 2024-01-30 RX ADMIN — POTASSIUM CHLORIDE 10 MEQ: 7.46 INJECTION, SOLUTION INTRAVENOUS at 02:19

## 2024-01-30 RX ADMIN — POTASSIUM CHLORIDE 10 MEQ: 7.46 INJECTION, SOLUTION INTRAVENOUS at 03:15

## 2024-01-30 RX ADMIN — OXYCODONE HYDROCHLORIDE 10 MG: 10 TABLET, FILM COATED, EXTENDED RELEASE ORAL at 08:00

## 2024-01-30 RX ADMIN — RIFAXIMIN 400 MG: 200 TABLET ORAL at 20:34

## 2024-01-30 RX ADMIN — RIFAXIMIN 400 MG: 200 TABLET ORAL at 04:17

## 2024-01-30 RX ADMIN — POTASSIUM BICARBONATE 40 MEQ: 782 TABLET, EFFERVESCENT ORAL at 02:19

## 2024-01-30 RX ADMIN — RIFAXIMIN 400 MG: 200 TABLET ORAL at 14:25

## 2024-01-30 RX ADMIN — LACTULOSE 20 G: 20 SOLUTION ORAL at 14:16

## 2024-01-30 RX ADMIN — FOLIC ACID 1 MG: 1 TABLET ORAL at 08:55

## 2024-01-30 RX ADMIN — ONDANSETRON 4 MG: 2 INJECTION INTRAMUSCULAR; INTRAVENOUS at 06:06

## 2024-01-30 RX ADMIN — THIAMINE HCL TAB 100 MG 100 MG: 100 TAB at 08:55

## 2024-01-30 RX ADMIN — MAGNESIUM SULFATE HEPTAHYDRATE 2000 MG: 40 INJECTION, SOLUTION INTRAVENOUS at 05:04

## 2024-01-30 RX ADMIN — RIFAXIMIN 400 MG: 200 TABLET ORAL at 08:55

## 2024-01-30 RX ADMIN — NADOLOL 20 MG: 20 TABLET ORAL at 08:55

## 2024-01-30 RX ADMIN — CEFTRIAXONE SODIUM 2000 MG: 2 INJECTION, POWDER, FOR SOLUTION INTRAMUSCULAR; INTRAVENOUS at 03:11

## 2024-01-30 RX ADMIN — LACTULOSE 20 G: 20 SOLUTION ORAL at 04:17

## 2024-01-30 RX ADMIN — PANTOPRAZOLE SODIUM 40 MG: 40 TABLET, DELAYED RELEASE ORAL at 18:27

## 2024-01-30 RX ADMIN — PANTOPRAZOLE SODIUM 40 MG: 40 TABLET, DELAYED RELEASE ORAL at 11:06

## 2024-01-30 RX ADMIN — SODIUM CHLORIDE, PRESERVATIVE FREE 10 ML: 5 INJECTION INTRAVENOUS at 09:00

## 2024-01-30 ASSESSMENT — ENCOUNTER SYMPTOMS
ABDOMINAL PAIN: 1
RHINORRHEA: 0
COUGH: 0
NAUSEA: 1
SHORTNESS OF BREATH: 0
VOMITING: 0

## 2024-01-30 ASSESSMENT — PAIN DESCRIPTION - DESCRIPTORS
DESCRIPTORS: ACHING;CRAMPING;DISCOMFORT
DESCRIPTORS: ACHING;CRAMPING
DESCRIPTORS: THROBBING;CRAMPING
DESCRIPTORS: THROBBING;CRAMPING

## 2024-01-30 ASSESSMENT — PAIN DESCRIPTION - ONSET
ONSET: ON-GOING
ONSET: ON-GOING

## 2024-01-30 ASSESSMENT — PAIN DESCRIPTION - LOCATION
LOCATION: ABDOMEN;FOOT
LOCATION: ABDOMEN
LOCATION: ABDOMEN;FOOT
LOCATION: ABDOMEN

## 2024-01-30 ASSESSMENT — PAIN SCALES - GENERAL
PAINLEVEL_OUTOF10: 3
PAINLEVEL_OUTOF10: 7
PAINLEVEL_OUTOF10: 7
PAINLEVEL_OUTOF10: 2

## 2024-01-30 ASSESSMENT — PAIN DESCRIPTION - PAIN TYPE
TYPE: ACUTE PAIN
TYPE: ACUTE PAIN

## 2024-01-30 ASSESSMENT — PAIN DESCRIPTION - FREQUENCY
FREQUENCY: CONTINUOUS
FREQUENCY: CONTINUOUS

## 2024-01-30 NOTE — PLAN OF CARE
Problem: Discharge Planning  Goal: Discharge to home or other facility with appropriate resources  Outcome: Progressing     Problem: Pain  Goal: Verbalizes/displays adequate comfort level or baseline comfort level  Outcome: Progressing     Problem: Safety - Adult  Goal: Free from fall injury  Outcome: Progressing     Problem: ABCDS Injury Assessment  Goal: Absence of physical injury  Outcome: Progressing     Problem: Nutrition Deficit:  Goal: Optimize nutritional status  Outcome: Progressing     Problem: Chronic Conditions and Co-morbidities  Goal: Patient's chronic conditions and co-morbidity symptoms are monitored and maintained or improved  Outcome: Progressing

## 2024-01-30 NOTE — ED PROVIDER NOTES
[facility]  Emergency Department Encounter  Emergency Medicine Physician     Pt Name: Sam Emerson  MRN: [id number]  Birthdate 1985  Date of evaluation: 1/30/24  PCP:  Rosie Montes De Oca MD    CHIEF COMPLAINT       Chief Complaint   Patient presents with    Fatigue    Cramping    Ear Fullness       HISTORY OF PRESENT ILLNESS  (Location/Symptom, Timing/Onset, Context/Setting, Quality, Duration, Modifying Factors, Severity.)    Sam Emerson is a 39 y.o. male who presents with fatigue, some abdominal pain.  States that he is feeling worsening fatigue since his most recent discharge.  Was recently seen and evaluated and treated for SBP.  He states that he has had some increased abdominal girth.  He denies any dysuria or hematuria.  Denies any chest pain or shortness of breath.  Does endorse some bodyaches.  States that has been compliant with his lactulose at home and has been having several bowel movements daily.  States that he was on rifaximin while he was in the hospital but that he is insurance does not cover it as an outpatient so he is only been taking the lactulose.  States that he has been compliant with all other medications.        PAST MEDICAL / SURGICAL / SOCIAL / FAMILY HISTORY    has a past medical history of Alcoholism (HCC), Anemia, Cirrhosis, alcoholic (HCC), and Pulmonary embolism (HCC).     has a past surgical history that includes Upper gastrointestinal endoscopy (N/A, 05/01/2022); Upper gastrointestinal endoscopy (N/A, 10/30/2022); Upper gastrointestinal endoscopy (N/A, 09/27/2023); Esophagogastroduodenoscopy (12/05/2023); IR TIPS INSERTION (12/05/2023); IR NONTUNNELED VASCULAR CATHETER > 5 YEARS (12/05/2023); Upper gastrointestinal endoscopy (N/A, 12/05/2023); Upper gastrointestinal endoscopy (N/A, 12/07/2023); Upper gastrointestinal endoscopy (12/07/2023); Upper gastrointestinal endoscopy (12/07/2023); Esophagogastroduodenoscopy (12/29/2023); Colonoscopy (12/30/2023);

## 2024-01-30 NOTE — CARE COORDINATION
Case Management Assessment  Initial Evaluation    Date/Time of Evaluation: 1/30/2024 1:33 PM  Assessment Completed by: Zoey Oconnor RN    If patient is discharged prior to next notation, then this note serves as note for discharge by case management.    Patient Name: Sam Emerson                   YOB: 1985  Diagnosis: Hyperammonemia (HCC) [E72.20]                   Date / Time: 1/29/2024 11:33 PM    Patient Admission Status: Inpatient   Readmission Risk (Low < 19, Mod (19-27), High > 27): Readmission Risk Score: 45.7    Current PCP: Rosie Montes De Oca MD  PCP verified by CM? Yes    Chart Reviewed: Yes      History Provided by: Patient  Patient Orientation: Alert and Oriented    Patient Cognition: Alert    Hospitalization in the last 30 days (Readmission):  Yes    If yes, Readmission Assessment in CM Navigator will be completed.    Advance Directives:      Code Status: Full Code   Patient's Primary Decision Maker is:      Primary Decision Maker: CHRISTOPHER GROSSMAN - Domestic Partner - [phone]    Discharge Planning:    Patient lives with: Spouse/Significant Other Type of Home: House  Primary Care Giver: Self  Patient Support Systems include: Spouse/Significant Other   Current Financial resources: Medicaid  Current community resources: None  Current services prior to admission: Durable Medical Equipment            Current DME: Crutches, Shower Chair            Type of Home Care services:  None    ADLS  Prior functional level: Independent in ADLs/IADLs  Current functional level: Independent in ADLs/IADLs    PT AM-PAC:   /24  OT AM-PAC:   /24    Family can provide assistance at DC: Yes  Would you like Case Management to discuss the discharge plan with any other family members/significant others, and if so, who? No  Plans to Return to Present Housing: Yes  Other Identified Issues/Barriers to RETURNING to current housing: None  Potential Assistance needed at discharge: N/A            Potential DME:

## 2024-01-30 NOTE — TELEPHONE ENCOUNTER
Patient is currently Admitted to the hospital, will make a follow up once patient is discharged from the hospital.

## 2024-01-30 NOTE — H&P
was obtained after a explanation of the procedure including  risks.  Universal protocol was followed.  A limited ultrasound of the abdomen  was performed. The right abdomen was prepped and draped in sterile fashion,  and local anesthesia was achieved with 1% lidocaine.  A 5 Syriac needle  sheath was advanced into the ascites under direct ultrasound guidance (a  sterile probe cover and gel were used), and paracentesis was performed.  A  total of 1.1 L of translucent yellow fluid was aspirated.  At the completion  of the aspiration, a small amount of ascites remained.  Blood loss was  minimal.  The patient tolerated the procedure well without immediately  apparent complication.    FINDINGS:  Initial limited abdominal ultrasound demonstrated a moderate amount of  ascites.  Impression: Successful paracentesis  CT ABDOMEN PELVIS W IV CONTRAST Additional Contrast? Radiologist Recommendation  Narrative: EXAMINATION:  CT OF THE ABDOMEN AND PELVIS WITH CONTRAST 1/17/2024 2:47 pm    TECHNIQUE:  CT of the abdomen and pelvis was performed with the administration of  intravenous contrast. Multiplanar reformatted images are provided for review.  Automated exposure control, iterative reconstruction, and/or weight based  adjustment of the mA/kV was utilized to reduce the radiation dose to as low  as reasonably achievable.    COMPARISON:  01/13/2024    HISTORY:  ORDERING SYSTEM PROVIDED HISTORY: leucocytosis abdominal pain, continued  diarrhea-not taking lactulose ?colitis  TECHNOLOGIST PROVIDED HISTORY:  leucocytosis abdominal pain, continued diarrhea-not taking lactulose ?colitis    Reason for Exam: abd pain  Additional signs and symptoms: leucocytosis abdominal pain, continued  diarrhea-not taking lactulose ?colitis    FINDINGS:  Lower Chest: Trace left effusion and bilateral lower lobe atelectatic changes.    Organs: Redemonstration of liver cirrhosis which contains a TIPS shunt.  Shunt appears to be patent.  No focal liver

## 2024-01-30 NOTE — CONSULTS
GI Consult Note:    Name: Sam Emerson  MRN: [id number]     Acct: [id number]  Room: 2084/2084-01    Admit Date: 1/29/2024  PCP: Rosie Montes De Oca MD    Physician Requesting Consult: Rosie Montes De Oca MD     Reason for Consult:      Hepatic encephalopathy  Cirrhosis of liver  Fatigue change in mental status  Elevated ammonia levels  Alcoholic cirrhosis  Status post TIPS      Chief Complaint:     Chief Complaint   Patient presents with    Fatigue    Cramping    Ear Fullness       History Obtained From:     Patient and EMR    History of Present Illness:      Sam Emerson is a  39 y.o.  male who presents with Fatigue, Cramping, and Ear Fullness    This 39-year-old gentleman history significant for end-stage liver disease from cirrhosis of the liver has history for TIPS placement in the past has been hospitalized multiple times in the past for similar issues  He has history for alcoholic cirrhosis  Patient states that he has quit drinking last month  He has change in mental status was brought to the emergency room was found to have elevated ammonia levels  Has poor compliance while taking his lactulose  Xifaxan is not covered by insurance he says  Denies any current alcohol abuse  Complains of abdominal bloating gas cramping pains  Denies any hematemesis coffee-ground emesis denies any rectal bleeding denies any melanotic stools  His potassium was low  Has hypomagnesemia  Has history for SBP  Previous records reviewed with  Symptoms:  Onset:  Location:  abdomen  Duration:  day(s)  Severity:  moderate  Quality:  intermittent      Past Medical History:     Past Medical History:   Diagnosis Date    Alcoholism (HCC)     pt drinks a fifth of whiskey daily    Anemia     Cirrhosis, alcoholic (HCC)     Pulmonary embolism (HCC)         Past Surgical History:     Past Surgical History:   Procedure Laterality Date    COLONOSCOPY  12/30/2023    diagnostic    COLONOSCOPY N/A 12/30/2023    COLONOSCOPY DIAGNOSTIC

## 2024-01-31 ENCOUNTER — APPOINTMENT (OUTPATIENT)
Dept: INTERVENTIONAL RADIOLOGY/VASCULAR | Age: 39
DRG: 280 | End: 2024-01-31
Payer: COMMERCIAL

## 2024-01-31 LAB
AFP SERPL-MCNC: <1.8 UG/L
ALBUMIN FLD-MCNC: 0.4 G/DL
ALBUMIN SERPL-MCNC: 1.7 G/DL (ref 3.5–5.2)
ALP SERPL-CCNC: 362 U/L (ref 40–129)
ALT SERPL-CCNC: 13 U/L (ref 5–41)
AMMONIA PLAS-SCNC: 96 UMOL/L (ref 16–60)
ANION GAP SERPL CALCULATED.3IONS-SCNC: 8 MMOL/L (ref 9–17)
AST SERPL-CCNC: 40 U/L
BASOPHILS # BLD: 0.13 K/UL (ref 0–0.2)
BASOPHILS NFR BLD: 1 % (ref 0–2)
BILIRUB SERPL-MCNC: 9.2 MG/DL (ref 0.3–1.2)
BUN SERPL-MCNC: 4 MG/DL (ref 6–20)
CALCIUM SERPL-MCNC: 7 MG/DL (ref 8.6–10.4)
CASE NUMBER:: NORMAL
CHLORIDE SERPL-SCNC: 108 MMOL/L (ref 98–107)
CO2 SERPL-SCNC: 22 MMOL/L (ref 20–31)
CREAT SERPL-MCNC: 0.5 MG/DL (ref 0.7–1.2)
EKG ATRIAL RATE: 91 BPM
EKG P AXIS: 46 DEGREES
EKG P-R INTERVAL: 134 MS
EKG Q-T INTERVAL: 364 MS
EKG QRS DURATION: 98 MS
EKG QTC CALCULATION (BAZETT): 447 MS
EKG R AXIS: 23 DEGREES
EKG T AXIS: 98 DEGREES
EKG VENTRICULAR RATE: 91 BPM
EOSINOPHIL # BLD: 0.27 K/UL (ref 0–0.4)
EOSINOPHILS RELATIVE PERCENT: 2 % (ref 0–4)
ERYTHROCYTE [DISTWIDTH] IN BLOOD BY AUTOMATED COUNT: 20.7 % (ref 11.5–14.9)
GFR SERPL CREATININE-BSD FRML MDRD: >60 ML/MIN/1.73M2
GLUCOSE SERPL-MCNC: 95 MG/DL (ref 70–99)
HCT VFR BLD AUTO: 22.6 % (ref 41–53)
HGB BLD-MCNC: 7.6 G/DL (ref 13.5–17.5)
LDH FLD L TO P-CCNC: 33 U/L
LYMPHOCYTES NFR BLD: 1.61 K/UL (ref 1–4.8)
LYMPHOCYTES RELATIVE PERCENT: 12 % (ref 24–44)
MAGNESIUM SERPL-MCNC: 1.4 MG/DL (ref 1.6–2.6)
MCH RBC QN AUTO: 35.2 PG (ref 26–34)
MCHC RBC AUTO-ENTMCNC: 33.6 G/DL (ref 31–37)
MCV RBC AUTO: 104.9 FL (ref 80–100)
MONOCYTES NFR BLD: 1.34 K/UL (ref 0.1–1.3)
MONOCYTES NFR BLD: 10 % (ref 1–7)
MORPHOLOGY: ABNORMAL
NEUTROPHILS NFR BLD: 75 % (ref 36–66)
NEUTS SEG NFR BLD: 10.05 K/UL (ref 1.3–9.1)
PLATELET # BLD AUTO: 74 K/UL (ref 150–450)
PMV BLD AUTO: 8.6 FL (ref 6–12)
POTASSIUM SERPL-SCNC: 3.4 MMOL/L (ref 3.7–5.3)
PROT FLD-MCNC: <1 G/DL
PROT SERPL-MCNC: 4.9 G/DL (ref 6.4–8.3)
RBC # BLD AUTO: 2.15 M/UL (ref 4.5–5.9)
SODIUM SERPL-SCNC: 138 MMOL/L (ref 135–144)
SPECIMEN DESCRIPTION: NORMAL
SPECIMEN TYPE: NORMAL
WBC OTHER # BLD: 13.4 K/UL (ref 3.5–11)

## 2024-01-31 PROCEDURE — 82105 ALPHA-FETOPROTEIN SERUM: CPT

## 2024-01-31 PROCEDURE — 88112 CYTOPATH CELL ENHANCE TECH: CPT

## 2024-01-31 PROCEDURE — 83735 ASSAY OF MAGNESIUM: CPT

## 2024-01-31 PROCEDURE — 2060000000 HC ICU INTERMEDIATE R&B

## 2024-01-31 PROCEDURE — APPNB30 APP NON BILLABLE TIME 0-30 MINS: Performed by: NURSE PRACTITIONER

## 2024-01-31 PROCEDURE — 87070 CULTURE OTHR SPECIMN AEROBIC: CPT

## 2024-01-31 PROCEDURE — 2709999900 IR US GUIDED PARACENTESIS

## 2024-01-31 PROCEDURE — 6360000002 HC RX W HCPCS: Performed by: INTERNAL MEDICINE

## 2024-01-31 PROCEDURE — 0W9G3ZZ DRAINAGE OF PERITONEAL CAVITY, PERCUTANEOUS APPROACH: ICD-10-PCS | Performed by: RADIOLOGY

## 2024-01-31 PROCEDURE — 2580000003 HC RX 258: Performed by: NURSE PRACTITIONER

## 2024-01-31 PROCEDURE — 99231 SBSQ HOSP IP/OBS SF/LOW 25: CPT | Performed by: INTERNAL MEDICINE

## 2024-01-31 PROCEDURE — 2580000003 HC RX 258: Performed by: INTERNAL MEDICINE

## 2024-01-31 PROCEDURE — 85025 COMPLETE CBC W/AUTO DIFF WBC: CPT

## 2024-01-31 PROCEDURE — 6370000000 HC RX 637 (ALT 250 FOR IP): Performed by: NURSE PRACTITIONER

## 2024-01-31 PROCEDURE — 87075 CULTR BACTERIA EXCEPT BLOOD: CPT

## 2024-01-31 PROCEDURE — 6370000000 HC RX 637 (ALT 250 FOR IP): Performed by: INTERNAL MEDICINE

## 2024-01-31 PROCEDURE — 82042 OTHER SOURCE ALBUMIN QUAN EA: CPT

## 2024-01-31 PROCEDURE — 36415 COLL VENOUS BLD VENIPUNCTURE: CPT

## 2024-01-31 PROCEDURE — 89051 BODY FLUID CELL COUNT: CPT

## 2024-01-31 PROCEDURE — 49083 ABD PARACENTESIS W/IMAGING: CPT

## 2024-01-31 PROCEDURE — 84157 ASSAY OF PROTEIN OTHER: CPT

## 2024-01-31 PROCEDURE — 87205 SMEAR GRAM STAIN: CPT

## 2024-01-31 PROCEDURE — 6360000002 HC RX W HCPCS: Performed by: NURSE PRACTITIONER

## 2024-01-31 PROCEDURE — 88305 TISSUE EXAM BY PATHOLOGIST: CPT

## 2024-01-31 PROCEDURE — 80053 COMPREHEN METABOLIC PANEL: CPT

## 2024-01-31 PROCEDURE — 83615 LACTATE (LD) (LDH) ENZYME: CPT

## 2024-01-31 PROCEDURE — 99233 SBSQ HOSP IP/OBS HIGH 50: CPT | Performed by: INTERNAL MEDICINE

## 2024-01-31 PROCEDURE — 93010 ELECTROCARDIOGRAM REPORT: CPT | Performed by: INTERNAL MEDICINE

## 2024-01-31 PROCEDURE — 82140 ASSAY OF AMMONIA: CPT

## 2024-01-31 RX ORDER — MIDODRINE HYDROCHLORIDE 2.5 MG/1
2.5 TABLET ORAL
Status: DISCONTINUED | OUTPATIENT
Start: 2024-01-31 | End: 2024-02-01 | Stop reason: HOSPADM

## 2024-01-31 RX ORDER — FUROSEMIDE 10 MG/ML
20 INJECTION INTRAMUSCULAR; INTRAVENOUS DAILY
Status: DISCONTINUED | OUTPATIENT
Start: 2024-01-31 | End: 2024-02-01 | Stop reason: HOSPADM

## 2024-01-31 RX ADMIN — SODIUM CHLORIDE, PRESERVATIVE FREE 10 ML: 5 INJECTION INTRAVENOUS at 08:29

## 2024-01-31 RX ADMIN — PANTOPRAZOLE SODIUM 40 MG: 40 TABLET, DELAYED RELEASE ORAL at 17:22

## 2024-01-31 RX ADMIN — RIFAXIMIN 400 MG: 200 TABLET ORAL at 08:34

## 2024-01-31 RX ADMIN — SODIUM CHLORIDE, PRESERVATIVE FREE 10 ML: 5 INJECTION INTRAVENOUS at 20:15

## 2024-01-31 RX ADMIN — SODIUM CHLORIDE: 9 INJECTION, SOLUTION INTRAVENOUS at 02:48

## 2024-01-31 RX ADMIN — PANTOPRAZOLE SODIUM 40 MG: 40 TABLET, DELAYED RELEASE ORAL at 06:08

## 2024-01-31 RX ADMIN — OXYCODONE HYDROCHLORIDE 10 MG: 10 TABLET, FILM COATED, EXTENDED RELEASE ORAL at 08:28

## 2024-01-31 RX ADMIN — AZITHROMYCIN DIHYDRATE 500 MG: 250 TABLET ORAL at 08:28

## 2024-01-31 RX ADMIN — THIAMINE HCL TAB 100 MG 100 MG: 100 TAB at 08:28

## 2024-01-31 RX ADMIN — OXYCODONE HYDROCHLORIDE 10 MG: 10 TABLET, FILM COATED, EXTENDED RELEASE ORAL at 20:08

## 2024-01-31 RX ADMIN — RIFAXIMIN 400 MG: 200 TABLET ORAL at 14:04

## 2024-01-31 RX ADMIN — MIDODRINE HYDROCHLORIDE 2.5 MG: 2.5 TABLET ORAL at 17:22

## 2024-01-31 RX ADMIN — CEFTRIAXONE SODIUM 2000 MG: 2 INJECTION, POWDER, FOR SOLUTION INTRAMUSCULAR; INTRAVENOUS at 02:51

## 2024-01-31 RX ADMIN — FUROSEMIDE 20 MG: 20 TABLET ORAL at 08:29

## 2024-01-31 RX ADMIN — POTASSIUM CHLORIDE 40 MEQ: 1500 TABLET, EXTENDED RELEASE ORAL at 08:28

## 2024-01-31 RX ADMIN — LACTULOSE 20 G: 20 SOLUTION ORAL at 08:29

## 2024-01-31 RX ADMIN — RIFAXIMIN 400 MG: 200 TABLET ORAL at 20:08

## 2024-01-31 RX ADMIN — SODIUM CHLORIDE, PRESERVATIVE FREE 10 ML: 5 INJECTION INTRAVENOUS at 08:33

## 2024-01-31 RX ADMIN — FUROSEMIDE 20 MG: 10 INJECTION, SOLUTION INTRAMUSCULAR; INTRAVENOUS at 15:48

## 2024-01-31 RX ADMIN — SPIRONOLACTONE 50 MG: 25 TABLET, FILM COATED ORAL at 08:28

## 2024-01-31 RX ADMIN — FOLIC ACID 1 MG: 1 TABLET ORAL at 08:28

## 2024-01-31 ASSESSMENT — PAIN SCALES - GENERAL
PAINLEVEL_OUTOF10: 7
PAINLEVEL_OUTOF10: 4
PAINLEVEL_OUTOF10: 8

## 2024-01-31 ASSESSMENT — PAIN DESCRIPTION - DESCRIPTORS
DESCRIPTORS: DISCOMFORT
DESCRIPTORS: ACHING

## 2024-01-31 ASSESSMENT — PAIN - FUNCTIONAL ASSESSMENT: PAIN_FUNCTIONAL_ASSESSMENT: ACTIVITIES ARE NOT PREVENTED

## 2024-01-31 ASSESSMENT — PAIN DESCRIPTION - LOCATION
LOCATION: ABDOMEN
LOCATION: ABDOMEN

## 2024-01-31 ASSESSMENT — PAIN SCALES - WONG BAKER: WONGBAKER_NUMERICALRESPONSE: 0

## 2024-01-31 ASSESSMENT — PAIN DESCRIPTION - ORIENTATION: ORIENTATION: MID

## 2024-01-31 NOTE — CARE COORDINATION
ONGOING DISCHARGE PLAN:    Patient is alert and oriented x4.    Spoke with patient regarding discharge plan and patient confirms that plan is still to return to home w/ SO.     Denies VNS.     Pt. Remains on IV Rocephin & PO Zithromax.     Ammonia today 96 from 147. K+ 3.4,WBC 13.4. HGB 7.6.     Pt. Had Paracentesis today in IR. 3.6 Liters taken off.     Pt is on Xifaxan. Pt. States, \"He was denied this by Insurance before, even after Prior Auth, was done by Pharmacy & it was too expensive OOP\". Will need to follow again.     PT/OT on board. Will follow for any rec/needs.       Will continue to follow for additional discharge needs.    If patient is discharged prior to next notation, then this note serves as note for discharge by case management.    Electronically signed by Zoey Oconnor RN on 1/31/2024 at 10:56 AM

## 2024-01-31 NOTE — PLAN OF CARE
Problem: Pain  Goal: Verbalizes/displays adequate comfort level or baseline comfort level  1/31/2024 0326 by Nadja Kauffman RN  Outcome: Progressing     Problem: Safety - Adult  Goal: Free from fall injury  1/31/2024 0326 by Nadja Kauffman RN  Outcome: Progressing     Problem: ABCDS Injury Assessment  Goal: Absence of physical injury  1/31/2024 0326 by Nadja Kauffman RN  Outcome: Progressing     Problem: Nutrition Deficit:  Goal: Optimize nutritional status  1/31/2024 0326 by Nadja Kauffman RN  Outcome: Progressing     Problem: Chronic Conditions and Co-morbidities  Goal: Patient's chronic conditions and co-morbidity symptoms are monitored and maintained or improved  1/31/2024 0326 by Nadja Kauffman RN  Outcome: Progressing     Problem: Skin/Tissue Integrity  Goal: Absence of new skin breakdown  Description: 1.  Monitor for areas of redness and/or skin breakdown  2.  Assess vascular access sites hourly  3.  Every 4-6 hours minimum:  Change oxygen saturation probe site  4.  Every 4-6 hours:  If on nasal continuous positive airway pressure, respiratory therapy assess nares and determine need for appliance change or resting period.  Outcome: Progressing     Problem: Hematologic - Adult  Goal: Maintains hematologic stability  Outcome: Progressing     Problem: Cardiovascular - Adult  Goal: Maintains optimal cardiac output and hemodynamic stability  Outcome: Progressing

## 2024-01-31 NOTE — PLAN OF CARE
Problem: Discharge Planning  Goal: Discharge to home or other facility with appropriate resources  Outcome: Progressing     Problem: Pain  Goal: Verbalizes/displays adequate comfort level or baseline comfort level  1/31/2024 1656 by Donna Liz RN  Outcome: Progressing     Problem: Safety - Adult  Goal: Free from fall injury  1/31/2024 1656 by Donna Liz RN  Outcome: Progressing     Problem: ABCDS Injury Assessment  Goal: Absence of physical injury  1/31/2024 1656 by Donna Liz RN  Outcome: Progressing     Problem: Nutrition Deficit:  Goal: Optimize nutritional status  1/31/2024 1656 by Donna Liz RN  Outcome: Progressing     Problem: Chronic Conditions and Co-morbidities  Goal: Patient's chronic conditions and co-morbidity symptoms are monitored and maintained or improved  1/31/2024 1656 by Donna Liz RN  Outcome: Progressing     Problem: Skin/Tissue Integrity  Goal: Absence of new skin breakdown  Description: 1.  Monitor for areas of redness and/or skin breakdown  2.  Assess vascular access sites hourly  3.  Every 4-6 hours minimum:  Change oxygen saturation probe site  4.  Every 4-6 hours:  If on nasal continuous positive airway pressure, respiratory therapy assess nares and determine need for appliance change or resting period.  1/31/2024 1656 by Donna Liz RN  Outcome: Progressing     Problem: Hematologic - Adult  Goal: Maintains hematologic stability  1/31/2024 1656 by Donna Liz RN  Outcome: Progressing     Problem: Cardiovascular - Adult  Goal: Maintains optimal cardiac output and hemodynamic stability  1/31/2024 1656 by Donna Liz RN  Outcome: Progressing

## 2024-01-31 NOTE — BRIEF OP NOTE
Brief Postoperative Note    Sam Emerson  YOB: 1985  [id number]    Pre-operative Diagnosis: cirrhosis and ascites    Post-operative Diagnosis: Same    Procedure: Paracentesis    Anesthesia: Local    Surgeons/Assistants: Dr. Barbour    Estimated Blood Loss: less than 50     Complications: None    Specimens: Was Obtained: 60ml clear herman ascites to lab    Findings: Successful para. See rad rpt for details.    Electronically signed by IRVIN BARBOUR MD on 1/31/2024 at 9:13 AM

## 2024-02-01 VITALS
DIASTOLIC BLOOD PRESSURE: 72 MMHG | OXYGEN SATURATION: 98 % | HEART RATE: 101 BPM | WEIGHT: 167.99 LBS | RESPIRATION RATE: 18 BRPM | TEMPERATURE: 98 F | SYSTOLIC BLOOD PRESSURE: 108 MMHG | BODY MASS INDEX: 25.46 KG/M2 | HEIGHT: 68 IN

## 2024-02-01 LAB
ALBUMIN SERPL-MCNC: 1.8 G/DL (ref 3.5–5.2)
ALP SERPL-CCNC: 381 U/L (ref 40–129)
ALT SERPL-CCNC: 13 U/L (ref 5–41)
AMMONIA PLAS-SCNC: 86 UMOL/L (ref 16–60)
ANION GAP SERPL CALCULATED.3IONS-SCNC: 8 MMOL/L (ref 9–17)
APPEARANCE FLD: ABNORMAL
AST SERPL-CCNC: 44 U/L
BASOPHILS # BLD: 0.15 K/UL (ref 0–0.2)
BASOPHILS NFR BLD: 1 % (ref 0–2)
BILIRUB SERPL-MCNC: 8.8 MG/DL (ref 0.3–1.2)
BLASTS NFR FLD: ABNORMAL %
BODY FLD TYPE: ABNORMAL
BUN SERPL-MCNC: 4 MG/DL (ref 6–20)
CALCIUM SERPL-MCNC: 6.7 MG/DL (ref 8.6–10.4)
CHLORIDE SERPL-SCNC: 106 MMOL/L (ref 98–107)
CO2 SERPL-SCNC: 24 MMOL/L (ref 20–31)
COLOR FLD: YELLOW
CREAT SERPL-MCNC: 0.6 MG/DL (ref 0.7–1.2)
EOSINOPHIL # BLD: 0.15 K/UL (ref 0–0.4)
EOSINOPHIL NFR FLD: 1 %
EOSINOPHILS RELATIVE PERCENT: 1 % (ref 0–4)
ERYTHROCYTE [DISTWIDTH] IN BLOOD BY AUTOMATED COUNT: 20.1 % (ref 11.5–14.9)
GFR SERPL CREATININE-BSD FRML MDRD: >60 ML/MIN/1.73M2
GLUCOSE SERPL-MCNC: 93 MG/DL (ref 70–99)
HCT VFR BLD AUTO: 23.4 % (ref 41–53)
HGB BLD-MCNC: 7.9 G/DL (ref 13.5–17.5)
LYMPHOCYTES NFR BLD: 1.68 K/UL (ref 1–4.8)
LYMPHOCYTES NFR FLD: 24 %
LYMPHOCYTES RELATIVE PERCENT: 11 % (ref 24–44)
MAGNESIUM SERPL-MCNC: 1.2 MG/DL (ref 1.6–2.6)
MANUAL DIF COMMENT FLD-IMP: ABNORMAL
MCH RBC QN AUTO: 35.2 PG (ref 26–34)
MCHC RBC AUTO-ENTMCNC: 33.6 G/DL (ref 31–37)
MCV RBC AUTO: 104.8 FL (ref 80–100)
MONOCYTES NFR BLD: 1.53 K/UL (ref 0.1–1.3)
MONOCYTES NFR BLD: 10 % (ref 1–7)
MONOCYTES NFR FLD: 74 %
MORPHOLOGY: ABNORMAL
NEUTROPHILS NFR BLD: 77 % (ref 36–66)
NEUTROPHILS NFR FLD: 1 %
NEUTS SEG NFR BLD: 11.79 K/UL (ref 1.3–9.1)
PLATELET # BLD AUTO: 81 K/UL (ref 150–450)
PMV BLD AUTO: 8.5 FL (ref 6–12)
POTASSIUM SERPL-SCNC: 3.5 MMOL/L (ref 3.7–5.3)
PROT SERPL-MCNC: 5.2 G/DL (ref 6.4–8.3)
RBC # BLD AUTO: 2.23 M/UL (ref 4.5–5.9)
RBC # FLD: 387 CELLS/UL
SODIUM SERPL-SCNC: 138 MMOL/L (ref 135–144)
SURGICAL PATHOLOGY REPORT: NORMAL
UNIDENT CELLS NFR FLD: ABNORMAL %
WBC # FLD: 120 CELLS/UL
WBC OTHER # BLD: 15.3 K/UL (ref 3.5–11)

## 2024-02-01 PROCEDURE — 85025 COMPLETE CBC W/AUTO DIFF WBC: CPT

## 2024-02-01 PROCEDURE — 2580000003 HC RX 258: Performed by: INTERNAL MEDICINE

## 2024-02-01 PROCEDURE — 99231 SBSQ HOSP IP/OBS SF/LOW 25: CPT | Performed by: INTERNAL MEDICINE

## 2024-02-01 PROCEDURE — 6370000000 HC RX 637 (ALT 250 FOR IP): Performed by: INTERNAL MEDICINE

## 2024-02-01 PROCEDURE — 82140 ASSAY OF AMMONIA: CPT

## 2024-02-01 PROCEDURE — 2580000003 HC RX 258: Performed by: NURSE PRACTITIONER

## 2024-02-01 PROCEDURE — 6360000002 HC RX W HCPCS: Performed by: NURSE PRACTITIONER

## 2024-02-01 PROCEDURE — 6360000002 HC RX W HCPCS: Performed by: INTERNAL MEDICINE

## 2024-02-01 PROCEDURE — 83735 ASSAY OF MAGNESIUM: CPT

## 2024-02-01 PROCEDURE — 80053 COMPREHEN METABOLIC PANEL: CPT

## 2024-02-01 PROCEDURE — 99239 HOSP IP/OBS DSCHRG MGMT >30: CPT | Performed by: INTERNAL MEDICINE

## 2024-02-01 PROCEDURE — 6370000000 HC RX 637 (ALT 250 FOR IP): Performed by: NURSE PRACTITIONER

## 2024-02-01 PROCEDURE — 36415 COLL VENOUS BLD VENIPUNCTURE: CPT

## 2024-02-01 RX ORDER — FUROSEMIDE 20 MG/1
20 TABLET ORAL DAILY
Qty: 60 TABLET | Refills: 3 | Status: SHIPPED | OUTPATIENT
Start: 2024-02-01

## 2024-02-01 RX ORDER — MIDODRINE HYDROCHLORIDE 2.5 MG/1
2.5 TABLET ORAL
Qty: 90 TABLET | Refills: 3 | Status: SHIPPED | OUTPATIENT
Start: 2024-02-01

## 2024-02-01 RX ORDER — SPIRONOLACTONE 50 MG/1
50 TABLET, FILM COATED ORAL DAILY
Qty: 30 TABLET | Refills: 3 | Status: SHIPPED | OUTPATIENT
Start: 2024-02-01

## 2024-02-01 RX ADMIN — LACTULOSE 20 G: 20 SOLUTION ORAL at 09:04

## 2024-02-01 RX ADMIN — RIFAXIMIN 400 MG: 200 TABLET ORAL at 13:19

## 2024-02-01 RX ADMIN — FOLIC ACID 1 MG: 1 TABLET ORAL at 09:03

## 2024-02-01 RX ADMIN — THIAMINE HCL TAB 100 MG 100 MG: 100 TAB at 09:04

## 2024-02-01 RX ADMIN — FUROSEMIDE 20 MG: 10 INJECTION, SOLUTION INTRAMUSCULAR; INTRAVENOUS at 09:02

## 2024-02-01 RX ADMIN — CEFTRIAXONE SODIUM 2000 MG: 2 INJECTION, POWDER, FOR SOLUTION INTRAMUSCULAR; INTRAVENOUS at 03:11

## 2024-02-01 RX ADMIN — AZITHROMYCIN DIHYDRATE 500 MG: 250 TABLET ORAL at 09:04

## 2024-02-01 RX ADMIN — SPIRONOLACTONE 50 MG: 25 TABLET, FILM COATED ORAL at 09:03

## 2024-02-01 RX ADMIN — SODIUM CHLORIDE, PRESERVATIVE FREE 10 ML: 5 INJECTION INTRAVENOUS at 09:31

## 2024-02-01 RX ADMIN — PANTOPRAZOLE SODIUM 40 MG: 40 TABLET, DELAYED RELEASE ORAL at 05:40

## 2024-02-01 RX ADMIN — OXYCODONE HYDROCHLORIDE 10 MG: 10 TABLET, FILM COATED, EXTENDED RELEASE ORAL at 09:03

## 2024-02-01 RX ADMIN — MIDODRINE HYDROCHLORIDE 2.5 MG: 2.5 TABLET ORAL at 09:04

## 2024-02-01 RX ADMIN — POTASSIUM CHLORIDE 40 MEQ: 1500 TABLET, EXTENDED RELEASE ORAL at 09:16

## 2024-02-01 RX ADMIN — MIDODRINE HYDROCHLORIDE 2.5 MG: 2.5 TABLET ORAL at 13:18

## 2024-02-01 RX ADMIN — RIFAXIMIN 400 MG: 200 TABLET ORAL at 09:29

## 2024-02-01 ASSESSMENT — PAIN DESCRIPTION - ORIENTATION: ORIENTATION: MID;RIGHT

## 2024-02-01 ASSESSMENT — PAIN DESCRIPTION - LOCATION
LOCATION: ABDOMEN
LOCATION: ABDOMEN

## 2024-02-01 ASSESSMENT — PAIN SCALES - GENERAL
PAINLEVEL_OUTOF10: 7
PAINLEVEL_OUTOF10: 6

## 2024-02-01 ASSESSMENT — PAIN DESCRIPTION - DESCRIPTORS: DESCRIPTORS: ACHING;STABBING

## 2024-02-01 NOTE — PROGRESS NOTES
[facility]    Gastroenterology Daily Progress Note      Patient:   Sam Emerson   :    1985   Facility:   [facility]  Date:     2024  Consultant:   SILVA Ziegler - CNP, CNP      SUBJECTIVE  39 y.o. male admitted 2024 with Hyperammonemia (HCC) [E72.20] and seen for decompensated cirrhosis. The pt was seen and examined. .he is s/p paracentesis today, labs are pending. Reports decreased abdominal pain no emesis. Having non bloody bm's        OBJECTIVE  Scheduled Meds:   folic acid  1 mg Oral Daily    [Held by provider] nadolol  20 mg Oral Daily    nicotine  1 patch TransDERmal Daily    oxyCODONE  10 mg Oral 2 times per day    pantoprazole  40 mg Oral Q12H    vitamin B-1  100 mg Oral Daily    sodium chloride flush  5-40 mL IntraVENous 2 times per day    cefTRIAXone (ROCEPHIN) IV  2,000 mg IntraVENous Q24H    rifAXIMin  400 mg Oral TID    azithromycin  500 mg Oral Daily    furosemide  20 mg Oral Daily    spironolactone  50 mg Oral Daily    sodium chloride flush  5-40 mL IntraVENous 2 times per day    lactulose  20 g Oral TID       Vital Signs:  /71   Pulse 100   Temp 98 °F (36.7 °C) (Oral)   Resp 16   Ht 1.727 m (5' 8\")   Wt 76.2 kg (167 lb 15.9 oz)   SpO2 100%   BMI 25.54 kg/m²    Review of Systems - History obtained from chart review and the patient  General ROS: negative  Respiratory ROS: no cough, shortness of breath, or wheezing  Cardiovascular ROS: no chest pain or dyspnea on exertion  Gastrointestinal ROS: positive for - abdominal pain   Physical Exam:     General Appearance: alert and oriented to person, place and time, well-developed and well-nourished, in no acute distress  Skin: warm and dry, no rash or erythema pt has jaundice  Head: normocephalic and atraumatic  Eyes: pupils equal, round, and reactive to light, extraocular eye movements intact, conjunctivae icteric  ENT: hearing grossly normal bilaterally  Neck: neck supple and non 
    [facility]  Guy Nicholson MD; Luis Bradley MD; Rcik Brewster MD; MD Jolynn Olea MD; Quentin Guevara MD  [facility]   IN-PATIENT SERVICE  [facility]                 Date:   1/30/2024  Patientname:  Sam Emerson  Date of admission:  1/29/2024 11:33 PM  MRN:   [id number]  Account:  [id number]  YOB: 1985  PCP:    Rosie Montes De Oca MD  Room:   03/03  Code Status:    Full code      Chief Complaint:     Chief Complaint   Patient presents with    Fatigue    Cramping    Ear Fullness       History of Present Illness:     Sam Emerson is a 39 y.o. Non- / non  male who presents with Fatigue, Cramping, and Ear Fullness and is admitted to the hospital for the management of Hyperammonemia (HCC). Medical history significant for alcohol abuse sober since Dec 5, 2023, alcoholic cirrhosis of liver, anemia and pulmonary embolism. Patient has had multiple recent admissions since December 2023 for worsening liver failure, upper GI bleed and spontaneous bacterial peritonitis. Present to ED with reports of fatigue and abdominal cramping/fullness. His abdomen is distended with ascites present. Last paracentesis was 1/18/24 with 1.1L removed. Patient states that he feels he needs fluid removed. He also has need BLE edema +4. Ammonia elevated at 147, reports compliance with home lactulose 3x daily. States that his insurance will not cover Xifaxan. Reports 2-3 bowel movements per day. Hypokalemia K+ 2.7, Hypomagnesia 1.4. Tachycardia in ED, started on Ceftriaxone due to recent history of SBP. Denies fever. chills, chest pain, vomiting, diarrhea, and urinary symptoms. Symptoms are reported as   Chronic with acute increase in severity.   Past Medical History:     Past Medical History:   Diagnosis Date    Alcoholism (HCC)     pt drinks a fifth of whiskey daily    Anemia     Cirrhosis, alcoholic (HCC)     Pulmonary embolism 
    [facility]    Gastroenterology Daily Progress Note      Patient:   Sam Emerson   :    1985   Facility:   [facility]   Date:     2024  Consultant:   SILVA Ziegler CNP, CNP      SUBJECTIVE  39 y.o. male admitted 2024 with Hyperammonemia (HCC) [E72.20] and seen for decompensated cirrhosis the pt was seen and examined. His ascitic fluid is negative for sbp..reports decreased abd pain and tolerating food. Having non blood bms        OBJECTIVE  Scheduled Meds:   furosemide  20 mg IntraVENous Daily    midodrine  2.5 mg Oral TID WC    folic acid  1 mg Oral Daily    [Held by provider] nadolol  20 mg Oral Daily    nicotine  1 patch TransDERmal Daily    oxyCODONE  10 mg Oral 2 times per day    pantoprazole  40 mg Oral Q12H    vitamin B-1  100 mg Oral Daily    sodium chloride flush  5-40 mL IntraVENous 2 times per day    cefTRIAXone (ROCEPHIN) IV  2,000 mg IntraVENous Q24H    rifAXIMin  400 mg Oral TID    azithromycin  500 mg Oral Daily    spironolactone  50 mg Oral Daily    sodium chloride flush  5-40 mL IntraVENous 2 times per day    lactulose  20 g Oral TID       Vital Signs:  /78   Pulse (!) 109   Temp 98 °F (36.7 °C) (Oral)   Resp 18   Ht 1.727 m (5' 8\")   Wt 76.2 kg (167 lb 15.9 oz)   SpO2 96%   BMI 25.54 kg/m²    Review of Systems - History obtained from chart review and the patient  General ROS: negative  Respiratory ROS: no cough, shortness of breath, or wheezing  Cardiovascular ROS: no chest pain or dyspnea on exertion  Gastrointestinal ROS: positive for - abdominal pain      Physical Exam:     General Appearance: alert and oriented to person, place and time, well-developed and well-nourished, in no acute distress  Skin: warm and dry, no rash or erythema pt has jaundice  Head: normocephalic and atraumatic  Eyes: pupils equal, round, and reactive to light, extraocular eye movements intact, conjunctivae icteric  ENT: hearing grossly normal 
Addressed oxy rx with dr mcgovern face to face in department. Pt's pain is abdominal tenderness.  She states \"I will see him in the office. He doesn't need the script.\"  
Alejandra warner NP   Patient:  MAURISIO MENA   YOB: 1985  MRN:  [id number]  Location: [facility]    2084-01 1/30/24 7:38 AM   [phone] [place] [facility] [place] Yuli Estrada RE: MAURISIO MENA 1985 RM: 2084-01 worsening liver failure, ammonia 147 Need Callback: NO CALLBACK REQ PROG CARE ROUTINE  Unre   
Comprehensive Nutrition Assessment    Type and Reason for Visit:  Positive Nutrition Screen (wt loss, poor appetite)    Nutrition Recommendations/Plan:   Will continue 2g Na diet  Will add supplements when NH3 is improved     Malnutrition Assessment:  Malnutrition Status:  Mild malnutrition (01/30/24 1434)    Context:  Chronic Illness     Findings of the 6 clinical characteristics of malnutrition:  Energy Intake:  75% or less estimated energy requirements for 1 month or longer  Weight Loss:  Unable to assess (variable wt due to ascites)     Body Fat Loss:  Mild body fat loss Orbital   Muscle Mass Loss:  Mild muscle mass loss Temples (temporalis)  Fluid Accumulation:   (mild to moderate) Extremities, Generalized   Strength:  Not Performed    Nutrition Assessment:    Pt admitted due to elevated NH3 147/decompensated liver failure. Observed pt eating lunch tray.    Nutrition Related Findings:    mild to moderate edema: generalized/all extremities, Labs: Reviewed, Meds: Lactulose, BM 1/30 Wound Type: None  , pt is jaundiced, PMH: ETOH, Liver Cirrhosis, TIPS    Current Nutrition Intake & Therapies:    Average Meal Intake: 26-50%, 51-75%     ADULT DIET; Regular; Low Sodium (2 gm)    Anthropometric Measures:  Height: 172.7 cm (5' 8\")  Ideal Body Weight (IBW): 154 lbs (70 kg)    Admission Body Weight: 85.3 kg (188 lb)  Current Body Weight: 76.2 kg (167 lb 15.9 oz) (obtained by RD), 109.1 % IBW. Weight Source: Bed Scale  Current BMI (kg/m2): 25.5  Usual Body Weight: 92.1 kg (203 lb) (12/13/23)  % Weight Change (Calculated): -17.2                    BMI Categories: Overweight (BMI 25.0-29.9)    Estimated Daily Nutrient Needs:  Energy Requirements Based On: Formula  Weight Used for Energy Requirements: Current  Energy (kcal/day): [place] x 1.3= 3369-3455 kcal  Weight Used for Protein Requirements: Admission  Protein (g/day): 1g/kg= 75 g (while liver failure is decompensated)     Fluid (ml/day): per MD    Nutrition 
Discharge teaching and instructions for diagnosis of hyperammonemia completed with patient using teachback method. AVS reviewed. Escripted Rxs to home pharmacy. Patient voiced understanding regarding prescriptions, follow up appointments, and care of self at home. Denies questions. IV d/c'd with cath intact and drsg applied. Skin W/D. Easy respirations. Denies pain. D/c'd with all belongings. Discharged in a wheelchair to  home with support per self per uber.   
Patient tolerated paracentesis without distress. 3600 ml of herman fluid removed. Dressing to site. Patient returned to room. Nurse updated.     
Physical Therapy  Facility/Department: [facility] PROGRESSIVE CARE  Physical Therapy Initial Assessment    Name: Sam Emerson  : 1985  MRN: [id number]  Date of Service: 2024    Discharge Recommendations:  Home with assist PRN          Patient Diagnosis(es): The primary encounter diagnosis was Hyperammonemia (HCC). A diagnosis of SBP (spontaneous bacterial peritonitis) (HCC) was also pertinent to this visit.  Past Medical History:  has a past medical history of Alcoholism (HCC), Anemia, Cirrhosis, alcoholic (HCC), and Pulmonary embolism (HCC).  Past Surgical History:  has a past surgical history that includes Upper gastrointestinal endoscopy (N/A, 2022); Upper gastrointestinal endoscopy (N/A, 10/30/2022); Upper gastrointestinal endoscopy (N/A, 2023); Esophagogastroduodenoscopy (2023); IR TIPS INSERTION (2023); IR NONTUNNELED VASCULAR CATHETER > 5 YEARS (2023); Upper gastrointestinal endoscopy (N/A, 2023); Upper gastrointestinal endoscopy (N/A, 2023); Upper gastrointestinal endoscopy (2023); Upper gastrointestinal endoscopy (2023); Esophagogastroduodenoscopy (2023); Colonoscopy (2023); Upper gastrointestinal endoscopy (N/A, 2023); and Colonoscopy (N/A, 2023).    Assessment   Assessment: Pt is IND with functional mobility - pt appears at prior level of function with no skilled PT needs  Decision Making: Low Complexity  Exam: ROM, MMT, bed mobility, transfers, amb, stairs  Clinical Presentation: Pt alert, cooperative, pleasant  Barriers to Learning: none  Requires PT Follow-Up: Yes  Activity Tolerance  Activity Tolerance: Patient tolerated treatment well     Plan   Physical Therapy Plan  General Plan: Discharge with evaluation only  Safety Devices  Type of Devices: All fall risk precautions in place, Call light within reach, Gait belt, Patient at risk for falls, Nurse notified, Left in bed, Bed alarm in place  Restraints  Restraints 
Pt arrived to room 2084 by wheelchair by ER transporter. Pt oriented to room and call light, vital signs obtained, call light is within reach, bed is low/locked, side rails are up x2.  
Pt is a high fall risk but refusing bed alarm on. Pt was educated on benefits and risks, verbalized understanding but still refusing. Pt is alert, oriented X4, bed in low position and call light within reach. Tanika Duncan, NP notified. Plan of care ongoing.   
pt declined lactulose dose this morning at 0900, but stated \"I will take the afternoon dose.\"      MINERVA Bullock notified.  
needed for taking care of personal grooming?: None  How much help for eating meals?: None  AM-PAC Inpatient Daily Activity Raw Score: 24  AM-PAC Inpatient ADL T-Scale Score : 57.54  ADL Inpatient CMS 0-100% Score: 0  ADL Inpatient CMS G-Code Modifier : CH       Goals     Short Term Goals  Time Frame for Short Term Goals: D/C OT    Plan  Occupational Therapy Plan  Times Per Week: D/C OT    OT Individual Minutes  OT Individual Minutes  Time In: 1336  Time Out: 1348  Minutes: 12       Electronically signed by HARRIET Oneil on 1/30/24 at 3:57 PM EST       
mg/dL    Creatinine 0.5 (L) 0.7 - 1.2 mg/dL    Est, Glom Filt Rate >60 >60 mL/min/1.73m2    Calcium 7.0 (L) 8.6 - 10.4 mg/dL    Total Protein 4.9 (L) 6.4 - 8.3 g/dL    Albumin 1.7 (L) 3.5 - 5.2 g/dL    Total Bilirubin 9.2 (H) 0.3 - 1.2 mg/dL    Alkaline Phosphatase 362 (H) 40 - 129 U/L    ALT 13 5 - 41 U/L    AST 40 (H) <40 U/L   Ammonia    Collection Time: 01/31/24  5:54 AM   Result Value Ref Range    Ammonia 96 (H) 16 - 60 umol/L   CBC with Auto Differential    Collection Time: 01/31/24  5:54 AM   Result Value Ref Range    WBC 13.4 (H) 3.5 - 11.0 k/uL    RBC 2.15 (L) 4.5 - 5.9 m/uL    Hemoglobin 7.6 (L) 13.5 - 17.5 g/dL    Hematocrit 22.6 (L) 41 - 53 %    .9 (H) 80 - 100 fL    MCH 35.2 (H) 26 - 34 pg    MCHC 33.6 31 - 37 g/dL    RDW 20.7 (H) 11.5 - 14.9 %    Platelets 74 (L) 150 - 450 k/uL    MPV 8.6 6.0 - 12.0 fL    Neutrophils % 75 (H) 36 - 66 %    Lymphocytes % 12 (L) 24 - 44 %    Monocytes % 10 (H) 1 - 7 %    Eosinophils % 2 0 - 4 %    Basophils % 1 0 - 2 %    Neutrophils Absolute 10.05 (H) 1.3 - 9.1 k/uL    Lymphocytes Absolute 1.61 1.0 - 4.8 k/uL    Monocytes Absolute 1.34 (H) 0.1 - 1.3 k/uL    Eosinophils Absolute 0.27 0.0 - 0.4 k/uL    Basophils Absolute 0.13 0.0 - 0.2 k/uL    Morphology ANISOCYTOSIS PRESENT     Morphology HYPOCHROMIA PRESENT     Morphology MACROCYTOSIS PRESENT     Morphology 1+ ECHINOCYTES     Morphology 1+ POLYCHROMASIA    Magnesium    Collection Time: 01/31/24  5:54 AM   Result Value Ref Range    Magnesium 1.4 (L) 1.6 - 2.6 mg/dL   Cell Count with Differential, Body Fluid    Collection Time: 01/31/24  9:33 AM   Result Value Ref Range    Color, Fluid Yellow     Appearance, Fluid SLIGHTLY CLOUDY     WBC, Fluid 120 cells/uL    RBC, Fluid 387 cells/uL    Specimen Type .ASCITIC FLUID     Fluid Diff Comment PENDING     Neutrophil Count, Fluid 1 (H) 0 %    Lymphocytes, Body Fluid 24 (H) 0 %    Monocyte Count, Fluid 74 (H) 0 %    Eos, Fluid 1 (H) 0 %    Basos, Fluid      0 %    Other

## 2024-02-01 NOTE — DISCHARGE INSTR - COC
Date of Last BM: ***  No intake or output data in the 24 hours ending 24 1048  I/O last 3 completed shifts:  In: 903.3 [P.O.:840; I.V.:14.1; IV Piggyback:49.2]  Out: -     Safety Concerns:     { [facility]MER Safety Concerns:365992411}    Impairments/Disabilities:      { SUMMER Impairments/Disabilities:653296275}    Nutrition Therapy:  Current Nutrition Therapy:   { SUMMER Diet List:515261265}    Routes of Feeding: {[facility] DME Other Feedings:074675006}  Liquids: {Slp liquid thickness:88540}  Daily Fluid Restriction: {[facility] DME Yes amt example:653548698}  Last Modified Barium Swallow with Video (Video Swallowing Test): {Done Not Done Date:}    Treatments at the Time of Hospital Discharge:   Respiratory Treatments: ***  Oxygen Therapy:  {Therapy; copd oxygen:92743}  Ventilator:    { CC Vent List:220762464}    Rehab Therapies: {THERAPEUTIC INTERVENTION:2774437132}  Weight Bearing Status/Restrictions: {[facility] Weight Bearin}  Other Medical Equipment (for information only, NOT a DME order):  {EQUIPMENT:533662572}  Other Treatments: ***    Patient's personal belongings (please select all that are sent with patient):  {[facility] DME Belongings:825428037}    RN SIGNATURE:  {Esignature:745948730}    CASE MANAGEMENT/SOCIAL WORK SECTION    Inpatient Status Date: ***    Readmission Risk Assessment Score:  Readmission Risk              Risk of Unplanned Readmission:  59           Discharging to Facility/ Agency   Name:   Address:  Phone:  Fax:    Dialysis Facility (if applicable)   Name:  Address:  Dialysis Schedule:  Phone:  Fax:    / signature: {Esignature:544522147}    PHYSICIAN SECTION    Prognosis: {Prognosis:6395386485}    Condition at Discharge: { Patient Condition:414870980}    Rehab Potential (if transferring to Rehab): {Prognosis:5478909491}    Recommended Labs or Other Treatments After Discharge: ***    Physician Certification: I certify the above information and transfer of Sam

## 2024-02-01 NOTE — FLOWSHEET NOTE
Alejandra Gamino NP for GI: I have a dc order if ok with GI. If ok, do you need to see pt before dc today? when should he follow up?

## 2024-02-01 NOTE — DISCHARGE INSTR - DIET

## 2024-02-01 NOTE — DISCHARGE SUMMARY
with velocities at the upper limits of normal. Hepatic vasculature demonstrates normal flow in the normal direction.     XR CHEST PORTABLE    Result Date: 1/2/2024  EXAMINATION: ONE XRAY VIEW OF THE CHEST 1/2/2024 3:54 pm COMPARISON: None. HISTORY: ORDERING SYSTEM PROVIDED HISTORY: assess for PNA TECHNOLOGIST PROVIDED HISTORY: assess for PNA FINDINGS: There is an infiltrate noted in the the left lower lobe, suspicious for pneumonia.  The lungs otherwise clear.  Heart appears unremarkable.  Bony structures appear normal.     Infiltrate noted in the left lower lobe, suspicious for pneumonia.     US ABDOMEN LIMITED    Result Date: 1/2/2024  EXAMINATION: LIMITED ABDOMINAL ULTRASOUND 1/2/2024 1:00 pm COMPARISON: CT abdomen and pelvis December 22, 2023; MRI abdomen December 24, 2023 HISTORY: ORDERING SYSTEM PROVIDED HISTORY: suspect SBP-fluid anaylsis TECHNOLOGIST PROVIDED HISTORY: Not enough fluid for para suspect SBP-fluid anaylsis FINDINGS: Limited sonogram of the 4 quadrants of the abdomen demonstrates trace ascites.  The patient declined paracentesis at this time.     Trace abdominal ascites.  The patient declined paracentesis.         Consultations:    Consults:     Final Specialist Recommendations/Findings:   IP CONSULT TO INTERNAL MEDICINE  IP CONSULT TO GI  IP CONSULT TO DIETITIAN      The patient was seen and examined on day of discharge and this discharge summary is in conjunction with any daily progress note from day of discharge.    Discharge plan:     Disposition: Home    Physician Follow Up:   No follow-up provider specified.     Requiring Further Evaluation/Follow Up POST HOSPITALIZATION/Incidental Findings:    Diet: cardiac diet    Activity: As tolerated    Instructions to Patient:     Discharge Medications:      Medication List        START taking these medications      midodrine 2.5 MG tablet  Commonly known as: PROAMATINE  Take 1 tablet by mouth 3 times daily (with meals)     rifAXIMin 200 MG

## 2024-02-01 NOTE — FLOWSHEET NOTE
Alejandra Gamino NP for GI: dc planning stated that pt rifaxin for ibs is $2,000/month and unable to voucher. Can you suggest another med to replace please?

## 2024-02-01 NOTE — CARE COORDINATION
ONGOING DISCHARGE PLAN:    Patient is alert and oriented x4.    Spoke with patient regarding discharge plan and patient confirms that plan is still to return home with significant other.     Updated the patient's bedside nurse that the patient's Xifaxan was denied by insurance even after prior authorization and it is too costly for the patient to obtain. Awaiting GI clearance.     F/U appointment made at 2:30 PM on 2/13/24 with Dr. Moncada.     Will continue to follow for additional discharge needs.    If patient is discharged prior to next notation, then this note serves as note for discharge by case management.    Electronically signed by Rabia Stephens RN on 2/1/2024 at 10:46 AM

## 2024-02-01 NOTE — FLOWSHEET NOTE
Dr Montes De Oca secure messaged: pt states needs oxy script because he only has 4 left at home. oxy IR 10mg is what he's requesting dt insurance won't cover the ER.

## 2024-02-02 ENCOUNTER — CARE COORDINATION (OUTPATIENT)
Dept: CASE MANAGEMENT | Age: 39
End: 2024-02-02

## 2024-02-02 NOTE — CARE COORDINATION
Care Transitions Initial Follow Up Call Attempt #1 -Attempted initial 24 hour transitional call to patient.  Left VM to return call directly to CTN         Spoke with pharmacist re: xifaxin script which was e-prescribed per PCP at discharge, but pharmacist unable to reach re: PA needed- now he is faxing prior auth request to Dr Sher DIAZ who patient has appt with on  & was consulted while hospitalized.    Next call attempt - remind of PCP + GI f/u appts - check about Xifaxin PA (local pharmacy faxed PA request to GI office, Dr Lb Olivarez @ [facility]) - patient has GI appt     Call within 2 business days of discharge: Yes      Patient: Sam Emerson   Patient : 1985   MRN: [id number]    Reason for Admission: fatigue, cramps, hepatic encephalopathy, hyperammonemia   Discharge Date: 24   RARS: Readmission Risk Score: 46.9      Last Discharge Facility       Date Complaint Diagnosis Description Type Department Provider    24 Fatigue; Cramping; Ear Fullness Hyperammonemia (HCC) ... ED to Hosp-Admission (Discharged) (ADMITTED) [facility] Rosie Stanley MD; Edd Calixto...            Was this an external facility discharge? No     - [facility] readmission for fatigue, cramps, hepatic encephalopathy - needed paracentesis + medication readjustment.  Discharged back home, patient realizes his overall poor prognosis.  Xifaxin needs PA - pharmacist faxed PA request to Dr Sher DIAZ who patient has appt with on .    Non-face-to-face services provided:  Scheduled appointment with PCP-  Scheduled appointment with Specialist-GI   Obtained and reviewed discharge summary and/or continuity of care documents      Is follow up appointment scheduled within 7 days of discharge? Yes.    Follow Up  Future Appointments   Date Time Provider Department Center   2024  1:45 PM Gerald Olivarez MD [facility]   2024  4:30 PM Rosie Montes De Oca MD [facility]   2024  2:30 PM [place]

## 2024-02-04 ENCOUNTER — HOSPITAL ENCOUNTER (INPATIENT)
Age: 39
LOS: 2 days | Discharge: HOME | DRG: 280 | End: 2024-02-06
Attending: EMERGENCY MEDICINE | Admitting: INTERNAL MEDICINE
Payer: COMMERCIAL

## 2024-02-04 ENCOUNTER — APPOINTMENT (OUTPATIENT)
Dept: GENERAL RADIOLOGY | Age: 39
DRG: 280 | End: 2024-02-04
Payer: COMMERCIAL

## 2024-02-04 DIAGNOSIS — E80.6 HYPERBILIRUBINEMIA: ICD-10-CM

## 2024-02-04 DIAGNOSIS — K72.10 CHRONIC LIVER FAILURE WITHOUT HEPATIC COMA (HCC): Primary | ICD-10-CM

## 2024-02-04 LAB
ALBUMIN SERPL-MCNC: 2.2 G/DL (ref 3.5–5.2)
ALP SERPL-CCNC: 440 U/L (ref 40–129)
ALT SERPL-CCNC: 14 U/L (ref 5–41)
AMMONIA PLAS-SCNC: 41 UMOL/L (ref 16–60)
ANION GAP SERPL CALCULATED.3IONS-SCNC: 8 MMOL/L (ref 9–17)
AST SERPL-CCNC: 54 U/L
BASOPHILS # BLD: 0 K/UL (ref 0–0.2)
BASOPHILS NFR BLD: 0 % (ref 0–2)
BILIRUB DIRECT SERPL-MCNC: 6.3 MG/DL
BILIRUB INDIRECT SERPL-MCNC: 4.3 MG/DL (ref 0–1)
BILIRUB SERPL-MCNC: 10.6 MG/DL (ref 0.3–1.2)
BUN SERPL-MCNC: 8 MG/DL (ref 6–20)
CA-I BLD-SCNC: 1.06 MMOL/L (ref 1.1–1.33)
CALCIUM SERPL-MCNC: 7.2 MG/DL (ref 8.6–10.4)
CHLORIDE SERPL-SCNC: 101 MMOL/L (ref 98–107)
CO2 SERPL-SCNC: 24 MMOL/L (ref 20–31)
CREAT SERPL-MCNC: 0.8 MG/DL (ref 0.7–1.2)
EOSINOPHIL # BLD: 0 K/UL (ref 0–0.4)
EOSINOPHILS RELATIVE PERCENT: 0 % (ref 0–4)
ERYTHROCYTE [DISTWIDTH] IN BLOOD BY AUTOMATED COUNT: 19.1 % (ref 11.5–14.9)
GFR SERPL CREATININE-BSD FRML MDRD: >60 ML/MIN/1.73M2
GLUCOSE SERPL-MCNC: 131 MG/DL (ref 70–99)
HCT VFR BLD AUTO: 26.8 % (ref 41–53)
HGB BLD-MCNC: 9.1 G/DL (ref 13.5–17.5)
LACTATE BLDV-SCNC: 1.8 MMOL/L (ref 0.5–2.2)
LYMPHOCYTES NFR BLD: 0.82 K/UL (ref 1–4.8)
LYMPHOCYTES RELATIVE PERCENT: 5 % (ref 24–44)
MCH RBC QN AUTO: 36 PG (ref 26–34)
MCHC RBC AUTO-ENTMCNC: 33.8 G/DL (ref 31–37)
MCV RBC AUTO: 106.4 FL (ref 80–100)
METAMYELOCYTES ABSOLUTE COUNT: 0.16 K/UL
METAMYELOCYTES: 1 %
MICROORGANISM SPEC CULT: NORMAL
MICROORGANISM/AGENT SPEC: NORMAL
MONOCYTES NFR BLD: 1.3 K/UL (ref 0.1–1.3)
MONOCYTES NFR BLD: 8 % (ref 1–7)
MORPHOLOGY: ABNORMAL
NEUTROPHILS NFR BLD: 86 % (ref 36–66)
NEUTS SEG NFR BLD: 14.02 K/UL (ref 1.3–9.1)
PLATELET # BLD AUTO: 140 K/UL (ref 150–450)
PMV BLD AUTO: 8.4 FL (ref 6–12)
POTASSIUM SERPL-SCNC: 3.6 MMOL/L (ref 3.7–5.3)
PROT SERPL-MCNC: 5.9 G/DL (ref 6.4–8.3)
RBC # BLD AUTO: 2.52 M/UL (ref 4.5–5.9)
SERVICE CMNT-IMP: NORMAL
SERVICE CMNT-IMP: NORMAL
SODIUM SERPL-SCNC: 133 MMOL/L (ref 135–144)
SPECIMEN DESCRIPTION: NORMAL
WBC OTHER # BLD: 16.3 K/UL (ref 3.5–11)

## 2024-02-04 PROCEDURE — 71046 X-RAY EXAM CHEST 2 VIEWS: CPT

## 2024-02-04 PROCEDURE — 87075 CULTR BACTERIA EXCEPT BLOOD: CPT

## 2024-02-04 PROCEDURE — 87070 CULTURE OTHR SPECIMN AEROBIC: CPT

## 2024-02-04 PROCEDURE — 82330 ASSAY OF CALCIUM: CPT

## 2024-02-04 PROCEDURE — 80048 BASIC METABOLIC PNL TOTAL CA: CPT

## 2024-02-04 PROCEDURE — 87205 SMEAR GRAM STAIN: CPT

## 2024-02-04 PROCEDURE — 6360000002 HC RX W HCPCS

## 2024-02-04 PROCEDURE — 36415 COLL VENOUS BLD VENIPUNCTURE: CPT

## 2024-02-04 PROCEDURE — 2580000003 HC RX 258

## 2024-02-04 PROCEDURE — 2060000000 HC ICU INTERMEDIATE R&B

## 2024-02-04 PROCEDURE — 80076 HEPATIC FUNCTION PANEL: CPT

## 2024-02-04 PROCEDURE — 83605 ASSAY OF LACTIC ACID: CPT

## 2024-02-04 PROCEDURE — 82140 ASSAY OF AMMONIA: CPT

## 2024-02-04 PROCEDURE — 49082 ABD PARACENTESIS: CPT

## 2024-02-04 PROCEDURE — 93005 ELECTROCARDIOGRAM TRACING: CPT

## 2024-02-04 PROCEDURE — 85025 COMPLETE CBC W/AUTO DIFF WBC: CPT

## 2024-02-04 PROCEDURE — 99285 EMERGENCY DEPT VISIT HI MDM: CPT

## 2024-02-04 PROCEDURE — 96374 THER/PROPH/DIAG INJ IV PUSH: CPT

## 2024-02-04 RX ORDER — MORPHINE SULFATE 4 MG/ML
4 INJECTION, SOLUTION INTRAMUSCULAR; INTRAVENOUS ONCE
Status: COMPLETED | OUTPATIENT
Start: 2024-02-04 | End: 2024-02-04

## 2024-02-04 RX ORDER — 0.9 % SODIUM CHLORIDE 0.9 %
1000 INTRAVENOUS SOLUTION INTRAVENOUS ONCE
Status: COMPLETED | OUTPATIENT
Start: 2024-02-04 | End: 2024-02-04

## 2024-02-04 RX ADMIN — SODIUM CHLORIDE 1000 ML: 9 INJECTION, SOLUTION INTRAVENOUS at 20:29

## 2024-02-04 RX ADMIN — MORPHINE SULFATE 4 MG: 4 INJECTION, SOLUTION INTRAMUSCULAR; INTRAVENOUS at 23:02

## 2024-02-04 ASSESSMENT — PAIN DESCRIPTION - LOCATION
LOCATION: ABDOMEN

## 2024-02-04 ASSESSMENT — PAIN DESCRIPTION - PAIN TYPE: TYPE: ACUTE PAIN

## 2024-02-04 ASSESSMENT — PAIN DESCRIPTION - FREQUENCY
FREQUENCY: CONTINUOUS
FREQUENCY: CONTINUOUS

## 2024-02-04 ASSESSMENT — LIFESTYLE VARIABLES
HOW MANY STANDARD DRINKS CONTAINING ALCOHOL DO YOU HAVE ON A TYPICAL DAY: PATIENT DOES NOT DRINK
HOW OFTEN DO YOU HAVE A DRINK CONTAINING ALCOHOL: NEVER

## 2024-02-04 ASSESSMENT — PAIN SCALES - GENERAL
PAINLEVEL_OUTOF10: 5
PAINLEVEL_OUTOF10: 9
PAINLEVEL_OUTOF10: 5
PAINLEVEL_OUTOF10: 9

## 2024-02-04 ASSESSMENT — PAIN DESCRIPTION - DESCRIPTORS
DESCRIPTORS: ACHING
DESCRIPTORS: ACHING

## 2024-02-04 ASSESSMENT — PAIN - FUNCTIONAL ASSESSMENT: PAIN_FUNCTIONAL_ASSESSMENT: 0-10

## 2024-02-05 ENCOUNTER — CARE COORDINATION (OUTPATIENT)
Dept: CASE MANAGEMENT | Age: 39
End: 2024-02-05

## 2024-02-05 LAB
ABSOLUTE BANDS: 0.7 K/UL (ref 0–1)
ALBUMIN SERPL-MCNC: 1.8 G/DL (ref 3.5–5.2)
ALP SERPL-CCNC: 401 U/L (ref 40–129)
ALT SERPL-CCNC: 12 U/L (ref 5–41)
ANION GAP SERPL CALCULATED.3IONS-SCNC: 6 MMOL/L (ref 9–17)
APPEARANCE FLD: CLEAR
AST SERPL-CCNC: 46 U/L
BACTERIA URNS QL MICRO: ABNORMAL
BANDS: 5 % (ref 0–10)
BASOPHILS # BLD: 0 K/UL (ref 0–0.2)
BASOPHILS NFR BLD: 0 % (ref 0–2)
BILIRUB DIRECT SERPL-MCNC: 5.6 MG/DL
BILIRUB INDIRECT SERPL-MCNC: 3.5 MG/DL (ref 0–1)
BILIRUB SERPL-MCNC: 9.1 MG/DL (ref 0.3–1.2)
BILIRUB UR QL STRIP: ABNORMAL
BODY FLD TYPE: NORMAL
BUN SERPL-MCNC: 8 MG/DL (ref 6–20)
CALCIUM SERPL-MCNC: 7.4 MG/DL (ref 8.6–10.4)
CASTS #/AREA URNS LPF: ABNORMAL /LPF
CHLORIDE SERPL-SCNC: 106 MMOL/L (ref 98–107)
CLARITY UR: CLEAR
CO2 SERPL-SCNC: 22 MMOL/L (ref 20–31)
COLOR FLD: YELLOW
COLOR UR: ABNORMAL
CREAT SERPL-MCNC: 0.6 MG/DL (ref 0.7–1.2)
EKG ATRIAL RATE: 104 BPM
EKG P AXIS: 36 DEGREES
EKG P-R INTERVAL: 134 MS
EKG Q-T INTERVAL: 358 MS
EKG QRS DURATION: 92 MS
EKG QTC CALCULATION (BAZETT): 470 MS
EKG R AXIS: 17 DEGREES
EKG T AXIS: 94 DEGREES
EKG VENTRICULAR RATE: 104 BPM
EOSINOPHIL # BLD: 0.28 K/UL (ref 0–0.4)
EOSINOPHILS RELATIVE PERCENT: 2 % (ref 0–4)
EPI CELLS #/AREA URNS HPF: ABNORMAL /HPF
ERYTHROCYTE [DISTWIDTH] IN BLOOD BY AUTOMATED COUNT: 19 % (ref 11.5–14.9)
GFR SERPL CREATININE-BSD FRML MDRD: >60 ML/MIN/1.73M2
GLUCOSE SERPL-MCNC: 87 MG/DL (ref 70–99)
GLUCOSE UR STRIP-MCNC: NEGATIVE MG/DL
HCT VFR BLD AUTO: 23.4 % (ref 41–53)
HGB BLD-MCNC: 8.1 G/DL (ref 13.5–17.5)
HGB UR QL STRIP.AUTO: NEGATIVE
KETONES UR STRIP-MCNC: NEGATIVE MG/DL
LEUKOCYTE ESTERASE UR QL STRIP: ABNORMAL
LYMPHOCYTES NFR BLD: 1.54 K/UL (ref 1–4.8)
LYMPHOCYTES RELATIVE PERCENT: 11 % (ref 24–44)
MCH RBC QN AUTO: 37 PG (ref 26–34)
MCHC RBC AUTO-ENTMCNC: 34.6 G/DL (ref 31–37)
MCV RBC AUTO: 107 FL (ref 80–100)
MONOCYTES NFR BLD: 0.84 K/UL (ref 0.1–1.3)
MONOCYTES NFR BLD: 6 % (ref 1–7)
MORPHOLOGY: ABNORMAL
NEUTROPHILS NFR BLD: 76 % (ref 36–66)
NEUTS SEG NFR BLD: 10.64 K/UL (ref 1.3–9.1)
NITRITE UR QL STRIP: POSITIVE
PH UR STRIP: 6.5 [PH] (ref 5–8)
PLATELET # BLD AUTO: 120 K/UL (ref 150–450)
PMV BLD AUTO: 8.4 FL (ref 6–12)
POTASSIUM SERPL-SCNC: 4.4 MMOL/L (ref 3.7–5.3)
PROT SERPL-MCNC: 5.7 G/DL (ref 6.4–8.3)
PROT UR STRIP-MCNC: NEGATIVE MG/DL
RBC # BLD AUTO: 2.19 M/UL (ref 4.5–5.9)
RBC # FLD: 2815 CELLS/UL
RBC #/AREA URNS HPF: ABNORMAL /HPF
SODIUM SERPL-SCNC: 134 MMOL/L (ref 135–144)
SP GR UR STRIP: 1.02 (ref 1–1.03)
UROBILINOGEN UR STRIP-ACNC: NORMAL EU/DL (ref 0–1)
WBC # FLD: 98 CELLS/UL
WBC #/AREA URNS HPF: ABNORMAL /HPF
WBC OTHER # BLD: 14 K/UL (ref 3.5–11)

## 2024-02-05 PROCEDURE — 2580000003 HC RX 258: Performed by: NURSE PRACTITIONER

## 2024-02-05 PROCEDURE — 2060000000 HC ICU INTERMEDIATE R&B

## 2024-02-05 PROCEDURE — 6370000000 HC RX 637 (ALT 250 FOR IP): Performed by: INTERNAL MEDICINE

## 2024-02-05 PROCEDURE — 36415 COLL VENOUS BLD VENIPUNCTURE: CPT

## 2024-02-05 PROCEDURE — 81001 URINALYSIS AUTO W/SCOPE: CPT

## 2024-02-05 PROCEDURE — 0W9G3ZZ DRAINAGE OF PERITONEAL CAVITY, PERCUTANEOUS APPROACH: ICD-10-PCS

## 2024-02-05 PROCEDURE — 99223 1ST HOSP IP/OBS HIGH 75: CPT | Performed by: INTERNAL MEDICINE

## 2024-02-05 PROCEDURE — 6370000000 HC RX 637 (ALT 250 FOR IP): Performed by: NURSE PRACTITIONER

## 2024-02-05 PROCEDURE — 85025 COMPLETE CBC W/AUTO DIFF WBC: CPT

## 2024-02-05 PROCEDURE — 80076 HEPATIC FUNCTION PANEL: CPT

## 2024-02-05 PROCEDURE — 80048 BASIC METABOLIC PNL TOTAL CA: CPT

## 2024-02-05 PROCEDURE — 93010 ELECTROCARDIOGRAM REPORT: CPT | Performed by: INTERNAL MEDICINE

## 2024-02-05 PROCEDURE — 6360000002 HC RX W HCPCS: Performed by: EMERGENCY MEDICINE

## 2024-02-05 PROCEDURE — 2580000003 HC RX 258: Performed by: EMERGENCY MEDICINE

## 2024-02-05 RX ORDER — MIDODRINE HYDROCHLORIDE 2.5 MG/1
2.5 TABLET ORAL
Status: DISCONTINUED | OUTPATIENT
Start: 2024-02-05 | End: 2024-02-06 | Stop reason: HOSPADM

## 2024-02-05 RX ORDER — LACTULOSE 10 G/15ML
30 SOLUTION ORAL 3 TIMES DAILY
Status: DISCONTINUED | OUTPATIENT
Start: 2024-02-05 | End: 2024-02-06

## 2024-02-05 RX ORDER — NADOLOL 20 MG/1
20 TABLET ORAL DAILY
Status: DISCONTINUED | OUTPATIENT
Start: 2024-02-05 | End: 2024-02-06 | Stop reason: HOSPADM

## 2024-02-05 RX ORDER — SODIUM CHLORIDE 0.9 % (FLUSH) 0.9 %
5-40 SYRINGE (ML) INJECTION PRN
Status: DISCONTINUED | OUTPATIENT
Start: 2024-02-05 | End: 2024-02-06 | Stop reason: HOSPADM

## 2024-02-05 RX ORDER — POLYETHYLENE GLYCOL 3350 17 G/17G
17 POWDER, FOR SOLUTION ORAL DAILY PRN
Status: DISCONTINUED | OUTPATIENT
Start: 2024-02-05 | End: 2024-02-06 | Stop reason: HOSPADM

## 2024-02-05 RX ORDER — MAGNESIUM SULFATE HEPTAHYDRATE 40 MG/ML
2000 INJECTION, SOLUTION INTRAVENOUS PRN
Status: DISCONTINUED | OUTPATIENT
Start: 2024-02-05 | End: 2024-02-06 | Stop reason: HOSPADM

## 2024-02-05 RX ORDER — HEPARIN SODIUM 5000 [USP'U]/ML
5000 INJECTION, SOLUTION INTRAVENOUS; SUBCUTANEOUS EVERY 8 HOURS SCHEDULED
Status: DISCONTINUED | OUTPATIENT
Start: 2024-02-05 | End: 2024-02-06 | Stop reason: HOSPADM

## 2024-02-05 RX ORDER — POTASSIUM CHLORIDE 7.45 MG/ML
10 INJECTION INTRAVENOUS PRN
Status: DISCONTINUED | OUTPATIENT
Start: 2024-02-05 | End: 2024-02-06 | Stop reason: HOSPADM

## 2024-02-05 RX ORDER — FOLIC ACID 1 MG/1
1 TABLET ORAL DAILY
Status: DISCONTINUED | OUTPATIENT
Start: 2024-02-05 | End: 2024-02-06 | Stop reason: HOSPADM

## 2024-02-05 RX ORDER — POTASSIUM CHLORIDE 20 MEQ/1
40 TABLET, EXTENDED RELEASE ORAL PRN
Status: DISCONTINUED | OUTPATIENT
Start: 2024-02-05 | End: 2024-02-06 | Stop reason: HOSPADM

## 2024-02-05 RX ORDER — ONDANSETRON 2 MG/ML
4 INJECTION INTRAMUSCULAR; INTRAVENOUS EVERY 6 HOURS PRN
Status: DISCONTINUED | OUTPATIENT
Start: 2024-02-05 | End: 2024-02-06 | Stop reason: HOSPADM

## 2024-02-05 RX ORDER — OXYCODONE HYDROCHLORIDE 10 MG/1
10 TABLET ORAL EVERY 8 HOURS PRN
Status: DISCONTINUED | OUTPATIENT
Start: 2024-02-05 | End: 2024-02-06 | Stop reason: HOSPADM

## 2024-02-05 RX ORDER — SODIUM CHLORIDE 0.9 % (FLUSH) 0.9 %
5-40 SYRINGE (ML) INJECTION EVERY 12 HOURS SCHEDULED
Status: DISCONTINUED | OUTPATIENT
Start: 2024-02-05 | End: 2024-02-06 | Stop reason: HOSPADM

## 2024-02-05 RX ORDER — GAUZE BANDAGE 2" X 2"
100 BANDAGE TOPICAL DAILY
Status: DISCONTINUED | OUTPATIENT
Start: 2024-02-05 | End: 2024-02-06 | Stop reason: HOSPADM

## 2024-02-05 RX ORDER — LACTULOSE 10 G/15ML
10 SOLUTION ORAL 3 TIMES DAILY
Status: DISCONTINUED | OUTPATIENT
Start: 2024-02-05 | End: 2024-02-05

## 2024-02-05 RX ORDER — PANTOPRAZOLE SODIUM 40 MG/1
40 TABLET, DELAYED RELEASE ORAL EVERY 12 HOURS
Status: DISCONTINUED | OUTPATIENT
Start: 2024-02-05 | End: 2024-02-06 | Stop reason: HOSPADM

## 2024-02-05 RX ORDER — SODIUM CHLORIDE 9 MG/ML
INJECTION, SOLUTION INTRAVENOUS PRN
Status: DISCONTINUED | OUTPATIENT
Start: 2024-02-05 | End: 2024-02-06 | Stop reason: HOSPADM

## 2024-02-05 RX ORDER — FUROSEMIDE 20 MG/1
20 TABLET ORAL DAILY
Status: DISCONTINUED | OUTPATIENT
Start: 2024-02-05 | End: 2024-02-06 | Stop reason: HOSPADM

## 2024-02-05 RX ORDER — ONDANSETRON 4 MG/1
4 TABLET, ORALLY DISINTEGRATING ORAL EVERY 8 HOURS PRN
Status: DISCONTINUED | OUTPATIENT
Start: 2024-02-05 | End: 2024-02-06 | Stop reason: HOSPADM

## 2024-02-05 RX ORDER — SPIRONOLACTONE 25 MG/1
50 TABLET ORAL DAILY
Status: DISCONTINUED | OUTPATIENT
Start: 2024-02-05 | End: 2024-02-06 | Stop reason: HOSPADM

## 2024-02-05 RX ADMIN — FUROSEMIDE 20 MG: 20 TABLET ORAL at 09:54

## 2024-02-05 RX ADMIN — RIFAXIMIN 400 MG: 200 TABLET ORAL at 14:10

## 2024-02-05 RX ADMIN — OXYCODONE HYDROCHLORIDE 10 MG: 10 TABLET ORAL at 18:27

## 2024-02-05 RX ADMIN — LACTULOSE 30 G: 20 SOLUTION ORAL at 21:21

## 2024-02-05 RX ADMIN — LACTULOSE 10 G: 20 SOLUTION ORAL at 09:54

## 2024-02-05 RX ADMIN — FOLIC ACID 1 MG: 1 TABLET ORAL at 09:54

## 2024-02-05 RX ADMIN — OXYCODONE HYDROCHLORIDE 10 MG: 10 TABLET ORAL at 09:53

## 2024-02-05 RX ADMIN — RIFAXIMIN 400 MG: 200 TABLET ORAL at 21:21

## 2024-02-05 RX ADMIN — PANTOPRAZOLE SODIUM 40 MG: 40 TABLET, DELAYED RELEASE ORAL at 00:48

## 2024-02-05 RX ADMIN — MIDODRINE HYDROCHLORIDE 2.5 MG: 5 TABLET ORAL at 14:13

## 2024-02-05 RX ADMIN — RIFAXIMIN 400 MG: 200 TABLET ORAL at 09:55

## 2024-02-05 RX ADMIN — SPIRONOLACTONE 50 MG: 25 TABLET, FILM COATED ORAL at 09:54

## 2024-02-05 RX ADMIN — SODIUM CHLORIDE, PRESERVATIVE FREE 10 ML: 5 INJECTION INTRAVENOUS at 09:55

## 2024-02-05 RX ADMIN — LACTULOSE 30 G: 20 SOLUTION ORAL at 14:10

## 2024-02-05 RX ADMIN — CEFTRIAXONE SODIUM 1000 MG: 1 INJECTION, POWDER, FOR SOLUTION INTRAMUSCULAR; INTRAVENOUS at 10:02

## 2024-02-05 RX ADMIN — THIAMINE HCL TAB 100 MG 100 MG: 100 TAB at 09:54

## 2024-02-05 RX ADMIN — OXYCODONE HYDROCHLORIDE 10 MG: 10 TABLET ORAL at 00:48

## 2024-02-05 RX ADMIN — MIDODRINE HYDROCHLORIDE 2.5 MG: 5 TABLET ORAL at 09:54

## 2024-02-05 RX ADMIN — NADOLOL 20 MG: 20 TABLET ORAL at 09:55

## 2024-02-05 RX ADMIN — PANTOPRAZOLE SODIUM 40 MG: 40 TABLET, DELAYED RELEASE ORAL at 14:10

## 2024-02-05 ASSESSMENT — ENCOUNTER SYMPTOMS
BACK PAIN: 0
CONSTIPATION: 0
SHORTNESS OF BREATH: 0
EYE DISCHARGE: 0
NAUSEA: 1
VOMITING: 0
DIARRHEA: 1
COLOR CHANGE: 1

## 2024-02-05 ASSESSMENT — PAIN SCALES - GENERAL
PAINLEVEL_OUTOF10: 7
PAINLEVEL_OUTOF10: 7
PAINLEVEL_OUTOF10: 8

## 2024-02-05 ASSESSMENT — PAIN DESCRIPTION - LOCATION: LOCATION: ABDOMEN

## 2024-02-05 NOTE — CARE COORDINATION
Case Management Assessment  Initial Evaluation    Date/Time of Evaluation: 2/5/2024 11:19 AM  Assessment Completed by: Katerina Mckeon RN    If patient is discharged prior to next notation, then this note serves as note for discharge by case management.    Patient Name: Sam Emerson                   YOB: 1985  Diagnosis: Hyperbilirubinemia [E80.6]  Chronic liver failure without hepatic coma (HCC) [K72.10]                   Date / Time: 2/4/2024  7:43 PM    Patient Admission Status: Inpatient   Readmission Risk (Low < 19, Mod (19-27), High > 27): Readmission Risk Score: 47.4    Current PCP: Rosie Montes De Oca MD  PCP verified by CM? Yes    Chart Reviewed: Yes      History Provided by: Patient  Patient Orientation: Alert and Oriented    Patient Cognition: Alert    Hospitalization in the last 30 days (Readmission):  Yes    If yes, Readmission Assessment in CM Navigator will be completed.    Advance Directives:      Code Status: Full Code   Patient's Primary Decision Maker is: Legal Next of Kin    Primary Decision Maker: CHRISTOPHER GROSSMAN - Domestic Partner - [phone]    Discharge Planning:    Patient lives with: Spouse/Significant Other, Children Type of Home: House  Primary Care Giver: Self  Patient Support Systems include: Spouse/Significant Other, Parent   Current Financial resources: Medicare  Current community resources: None  Current services prior to admission: None            Current DME:              Type of Home Care services:  None    ADLS  Prior functional level: Independent in ADLs/IADLs  Current functional level: Independent in ADLs/IADLs    PT AM-PAC:   /24  OT AM-PAC:   /24    Family can provide assistance at DC:    Would you like Case Management to discuss the discharge plan with any other family members/significant others, and if so, who?    Plans to Return to Present Housing: Yes  Other Identified Issues/Barriers to RETURNING to current housing: none  Potential Assistance needed at  discharge: N/A            Potential DME:    Patient expects to discharge to: House  Plan for transportation at discharge: Family    Financial    Payor: [employer] PLAN / Plan: [employer] PLAN / Product Type: *No Product type* /     Does insurance require precert for SNF: Yes    Potential assistance Purchasing Medications: No  Meds-to-Beds request:        [id number] - [place] - [address] - P [phone] - F [phone]  [address] [address]  Phone: [phone] Fax: [phone]      Notes:    Factors facilitating achievement of predicted outcomes: Cooperative, Pleasant, and Sense of humor    Barriers to discharge: Medical complications    Additional Case Management Notes: 2/5/24 [facility] READMIT from home with SO and child DME: none VNS:none pt denies needs. IV rocephin , lactulose- Xifaxan was denied by insurance even after prior authorization and it is too costly for the patient to obtain  , consult to GI and Palliative care. Last paracentesis was 1/31 1.6 L removed. OH 47%. Following for needs//JF              The Plan for Transition of Care is related to the following treatment goals of Hyperbilirubinemia [E80.6]  Chronic liver failure without hepatic coma (HCC) [K72.10]    IF APPLICABLE: The Patient and/or patient representative Sam and his family were provided with a choice of provider and agrees with the discharge plan. Freedom of choice list with basic dialogue that supports the patient's individualized plan of care/goals and shares the quality data associated with the providers was provided to: Patient   Patient Representative Name:       The Patient and/or Patient Representative Agree with the Discharge Plan? Yes    Katerina Mckeon RN  Case Management Department  Ph: [phone]

## 2024-02-05 NOTE — PROGRESS NOTES
[facility]   OCCUPATIONAL THERAPY MISSED TREATMENT NOTE   INPATIENT   Date: 24  Patient Name: Sam Emerson       Room:   MRN: [id number]   Account #: [id number]    : 1985  (39 y.o.)  Gender: male                 REASON FOR MISSED TREATMENT:  Patient screened for occupational therapy this date.    -    Patient is independent with functional mobility and self-care. Patient denies any concerns or deficits with self-care. OT being discontinued with no acute OT needs identified at this time. Please re-consult if there is a change in status. [phone]      Electronically signed by HARRIET Oneil on 24 at 11:40 AM EST

## 2024-02-05 NOTE — PLAN OF CARE
Problem: Pain  Goal: Verbalizes/displays adequate comfort level or baseline comfort level  Outcome: Progressing     Problem: Safety - Adult  Goal: Free from fall injury  Outcome: Progressing     Problem: Discharge Planning  Goal: Discharge to home or other facility with appropriate resources  Outcome: Progressing     Problem: ABCDS Injury Assessment  Goal: Absence of physical injury  Outcome: Progressing     Problem: Cardiovascular - Adult  Goal: Maintains optimal cardiac output and hemodynamic stability  Outcome: Progressing

## 2024-02-05 NOTE — PLAN OF CARE
[facility]   Plan of Care Note    Sam Emerson is a 39 y.o. male patient.  Hospitalization Day:1    HPI:    39-year-old male with past medical history of alcoholic cirrhosis of liver decompensated by portal hypertensive gastropathy, ascites, encephalopathy, history of variceal GI bleed status post TIPS on 12/4/2023, PE, anemia presents to the ED with weakness, intermittent confusion, upper abdominal pain for the last 1 day.    Patient states that he was doing really well up until yesterday.  States that after he took his lactulose he had had some nausea, increased abdominal bloating, epigastric discomfort.  He did have episode of emesis.  He denies any hematemesis, coffee-ground emesis.  Denies alcohol use.  Patient states that he has been taking his lactulose as prescribed, has been compliant with his medications.  His last bowel movement was yesterday at 1830.  States his insurance denied his Xifaxan.  Denies any fever, chills, loss of appetite, weight loss, bleeding, chest pain, shortness of breath.    On admission a diagnostic tap was performed in ED.  Negative for SBP.  His LFTs relatively stable since last admission.  Hgb 8.1 - no overt GI bleeding  Ammonia 41  MELD 25  +UTI    12/5/23 had EGD with Dr. Chairez revealed grade 1 and grade 2 esophageal varices without bleeding, gastric varices with active bleeding, severe portal hypertensive gastropathy.  IR was consulted emergently for TIPS.  12/6/23 s/p TIPS insertion  12/7/23 s/p repeat EGD with EV banding  12/29/23 EGD small esophageal varices x 2 without high risk features or bleeding, mild portal hypertensive gastropathy, no gastric varices noted  12/30/23 colonoscopy 4 to 5 mm ulcer noted just above the anorectal area.  Likely stercoral colitis versus ischemia versus pressure ulcer      MELD 3.0: 25 at 2/1/2024  6:02 AM  MELD-Na: 22 at 2/1/2024  6:02 AM  Calculated from:  Serum Creatinine: 0.6 mg/dL (Using min of 1 mg/dL) at  2/1/2024  6:02 AM  Serum Sodium: 138 mmol/L (Using max of 137 mmol/L) at 2/1/2024  6:02 AM  Total Bilirubin: 8.8 mg/dL at 2/1/2024  6:02 AM  Serum Albumin: 1.8 g/dL at 2/1/2024  6:02 AM  INR(ratio): 1.9 at 1/30/2024 12:55 AM  Age at listing (hypothetical): 39 years  Sex: Male at 2/1/2024  6:02 AM      Data Review:  LABS and IMAGING:     CBC  Recent Labs     02/04/24 2030 02/05/24  0554   WBC 16.3* 14.0*   HGB 9.1* 8.1*   .4* 107.0*   RDW 19.1* 19.0*   * 120*       ANEMIA STUDIES  No results for input(s): \"TIBC\", \"FERRITIN\", \"WOFFRVZW63\", \"FOLATE\", \"OCCULTBLD\" in the last 72 hours.    Invalid input(s): \"LABIRON\"    BMP  Recent Labs     02/04/24 2030 02/05/24  0554   * 134*   K 3.6* 4.4    106   CO2 24 22   BUN 8 8   CREATININE 0.8 0.6*   GLUCOSE 131* 87   CALCIUM 7.2* 7.4*       LFTS  Recent Labs     02/04/24 2030 02/05/24  0554   ALKPHOS 440* 401*   ALT 14 12   AST 54* 46*   BILITOT 10.6* 9.1*   BILIDIR 6.3* 5.6*   LABALBU 2.2* 1.8*       AMYLASE/LIPASE/AMMONIA  Recent Labs     02/04/24 2030   AMMONIA 41       Radiology Review:    No results found.    Principal Problem:    Hyperbilirubinemia  Resolved Problems:    * No resolved hospital problems. *       GI Assessment:  Decompensated alcoholic cirrhosis with EV, portal hypertensive gastropathy, gastric varices s/p TIPS admitted with upper abdominal discomfort, mild encephalopathy.    UTI  Leukocytosis    Plan of care:  Diagnostic tap negative for SBP, no significant ascites  Clinically improved during my examination, alert and oriented, ammonia 41  Continue lactulose, titrate to 3-4 bowel movements a day  Continue Xifaxan.  Patient has been having difficulty obtaining this from his insurance.  Will plan for outpatient PA.  Has outpatient appt next week  2 gm sodium diet    Complete consult note and plan of care to follow per Dr. Weiner    Thank you for allowing me to participate in the care of your patient.  Please feel free to contact me

## 2024-02-05 NOTE — PROGRESS NOTES
[facility] Internal Medicine  Guy Nicholson MD; Luis Bradley MD; Rick [place] MD; MD Jolynn Olea MD; Quentin Guevara MD  [facility] Internal Medicine   IN-PATIENT SERVICE  [facility]                 Date:   2/5/2024  Patientname:  Sam Emerson  Date of admission:  2/4/2024  7:43 PM  MRN:   [id number]  Account:  [id number]  YOB: 1985  PCP:    Rosie Montes De Oca MD  Room:   2112/2112-01  Code Status:    Full Code      Chief Complaint:     Chief Complaint   Patient presents with    Emesis       History of Present Illness:     Sam Emerson is a 39 y.o. Non- / non  male who presents with Emesis and is admitted to the hospital for the management of Hyperbilirubinemia. Medical history significant for alcoholic cirrhosis of liver with liver failure, hx of ETOH abuse sober since December 2023, anemia and hx of pulmonary embolism. He has had frequent readmissions since December secondary to liver failure, upper GI bleed and spontaneous bacterial peritonitis. In December he underwent TIPS procedure and required 16 units PRBC for multiple gastric and esophageal bleeds. He was intubated in the ICU for 10 days. Most recent admission 1/29-2/1 for hyperammonemia, paracentesis completed with 3.6L fluid removed. According to patient, he has had nausea with vomiting over the past 24 hours.. Also reports increased abdominal pain and intermittent brain fog with ataxia. Presents with significant jaundice. Patient reports that he does well when admitted and receiving lactulose and Xifaxan but unfortunately his insurance will not cover home administration of Xifaxan. Ammonia 41, Alk Phos 440, ALT 14, AST 54. Total bilirubin 10.6, increased from last admission at 8.8. He also reports decreased urine output with erich burning with urination. WBC trending up to 16.3. He also has a recent history of SBP but most recent peritoneal fluid cultures negative, new

## 2024-02-05 NOTE — CARE COORDINATION
DISCHARGE PLANNING NOTE:    Updated the patient's bedside nurse that the patient's Xifaxan was denied by insurance even after prior authorization and it is too costly for the patient to obtain. Electronically signed by Katerina Mckeon RN on 2/5/2024 at 3:50 PM

## 2024-02-05 NOTE — ED NOTES
Report given to MINERVA Jaimes from PCU.   Report method by phone   The following was reviewed with receiving RN:   Current vital signs:  /72   Pulse (!) 103   Temp 98 °F (36.7 °C) (Oral)   Resp 13   Ht 1.727 m (5' 8\")   Wt 77.1 kg (170 lb)   SpO2 99%   BMI 25.85 kg/m²                MEWS Score: 1     Any medication or safety alerts were reviewed. Any pending diagnostics and notifications were also reviewed, as well as any safety concerns or issues, abnormal labs, abnormal imaging, and abnormal assessment findings. Questions were answered.

## 2024-02-05 NOTE — PROGRESS NOTES
Physical Therapy        Physical Therapy Cancel Note      DATE: 2024    NAME: Sam Emerson  MRN: [id number]   : 1985      Patient not seen this date for Physical Therapy due to:    Patient independent with functional mobility. Will defer PT evaluation at this time. Please reorder PT if future needs arise.       Electronically signed by Carlotta Michelle PT on 2024 at 12:05 PM

## 2024-02-05 NOTE — ED PROVIDER NOTES
[facility] CARE  Emergency Department Encounter  Emergency Medicine Resident     Pt Name:Sam Emerson  MRN: [id number]  Birthdate 1985  Date of evaluation: 2/4/24  PCP:  Rosie Montes De Oca MD  Note Started: 7:56 PM EST      CHIEF COMPLAINT       Chief Complaint   Patient presents with    Emesis       HISTORY OF PRESENT ILLNESS  (Location/Symptom, Timing/Onset, Context/Setting, Quality, Duration, Modifying Factors, Severity.)      Sam Emerson is a 39-year-old male presents the ED with complaint of nausea and vomiting as well as low-grade, intermittent confusion, which he believes is secondary to his liver failure.  Patient is status post TIPS procedure in December where he required ICU admission following multiple gastric and esophageal bleeds.  Patient was intubated for 10 days and required massive transfusion protocol (16 units of blood, 4 of plasma).  Patient was recently discharged from the hospital for hyperammonemia along with concern for SBP.    Patient discharged on lactulose which he has been taking.  Patient states he felt he had greater clarity of thought when he was on the lactulose and rifaximin while inpatient.  Unable to acquire rifaximin on an outpatient basis due to insurance issues.    Patient has not been drinking since his ICU admission in December 2023.  Patient continues to smoke cigarettes.    PAST MEDICAL / SURGICAL / SOCIAL / FAMILY HISTORY      has a past medical history of Alcoholism (HCC), Anemia, Cirrhosis, alcoholic (HCC), and Pulmonary embolism (HCC).       has a past surgical history that includes Upper gastrointestinal endoscopy (N/A, 05/01/2022); Upper gastrointestinal endoscopy (N/A, 10/30/2022); Upper gastrointestinal endoscopy (N/A, 09/27/2023); Esophagogastroduodenoscopy (12/05/2023); IR TIPS INSERTION (12/05/2023); IR NONTUNNELED VASCULAR CATHETER > 5 YEARS (12/05/2023); Upper gastrointestinal endoscopy (N/A, 12/05/2023); Upper gastrointestinal endoscopy  Nick Magana MD   naproxen (NAPROSYN) 500 MG tablet Take 1 tablet by mouth 2 times daily  Patient taking differently: Take 500 mg by mouth 2 times daily as needed 8/13/21 3/28/22  Jimenez Campos MD   Blood Pressure Monitoring (BP MONITOR-STETHOSCOPE) KIT 1 each by Does not apply route daily 4/7/21   Jolynn Ibrahim MD       REVIEW OF SYSTEMS       Review of Systems   Constitutional:  Positive for activity change and fatigue. Negative for chills, diaphoresis and fever.   Eyes:  Negative for discharge and visual disturbance.   Respiratory:  Negative for shortness of breath.    Cardiovascular:  Positive for leg swelling.   Gastrointestinal:  Positive for diarrhea and nausea. Negative for constipation and vomiting.   Genitourinary:  Positive for decreased urine volume. Negative for flank pain and urgency.   Musculoskeletal:  Positive for myalgias. Negative for back pain.   Skin:  Positive for color change. Negative for pallor, rash and wound.   Neurological:  Positive for weakness, light-headedness and headaches.       PHYSICAL EXAM      INITIAL VITALS:   /82   Pulse (!) 103   Temp 98 °F (36.7 °C) (Oral)   Resp 18   Ht 1.727 m (5' 8\")   Wt 77.1 kg (170 lb)   SpO2 99%   BMI 25.85 kg/m²     Physical Exam  GCS 15, A&O x 4, VSS except for elevated heart rate at 125.  Regular rhythm on auscultation.  Lungs CTA.  Abdomen: S, distended, minimally tender to palpation throughout.  No distinct fluid wave.  Skin has a jaundiced hue.  PERRL.  EOMI.  Scleral icterus.  Mild pitting edema bilateral lower extremities.    DDX/DIAGNOSTIC RESULTS / EMERGENCY DEPARTMENT COURSE / MDM     Medical Decision Making  See ED course    Amount and/or Complexity of Data Reviewed  Labs: ordered. Decision-making details documented in ED Course.  Radiology: ordered.  ECG/medicine tests: ordered.    Risk  Prescription drug management.  Decision regarding hospitalization.        EKG    All EKG's are interpreted by the Emergency

## 2024-02-05 NOTE — H&P
[facility] Internal Medicine  Guy Nicholson MD; Luis Bradley MD; Rick [place] MD; MD Jolynn Olea MD; Quentin Guevara MD; Rosie Montes De Oca MD      HISTORY AND PHYSICAL EXAMINATION            Date:   2/5/2024  Patient name:  Sam Emerson  MRN:   [id number]  Account:  [id number]  YOB: 1985  PCP:    Rosie Montes De Oca MD  Code Status:    Full Code    Chief Complaint:     Chief Complaint   Patient presents with    Emesis         History Obtained From:     Patient, EMR, nursing staff    HPI     This patient is a 39 y.o. Non- / non  male who presents with     Emesis and is admitted to the hospital for the management of Hyperbilirubinemia. Medical history significant for alcoholic cirrhosis of liver with liver failure, hx of ETOH abuse sober since December 2023, anemia and hx of pulmonary embolism. He has had frequent readmissions since December secondary to liver failure, upper GI bleed and spontaneous bacterial peritonitis. In December he underwent TIPS procedure and required 16 units PRBC for multiple gastric and esophageal bleeds. He was intubated in the ICU for 10 days. Most recent admission 1/29-2/1 for hyperammonemia, paracentesis completed with 3.6L fluid removed. According to patient, he has had nausea with vomiting over the past 24 hours.. Also reports increased abdominal pain and intermittent brain fog with ataxia. Presents with significant jaundice. Patient reports that he does well when admitted and receiving lactulose and Xifaxan but unfortunately his insurance will not cover home administration of Xifaxan. Ammonia 41, Alk Phos 440, ALT 14, AST 54. Total bilirubin 10.6, increased from last admission at 8.8. He also reports decreased urine output with erich burning with urination. WBC trending up to 16.3. He also has a recent history of SBP but most recent peritoneal fluid cultures negative, new fluid cultures obtained in ED. UA pending.  Denies fever,

## 2024-02-05 NOTE — ED PROVIDER NOTES
[facility] PROGRESSIVE CARE  eMERGENCY dEPARTMENT eNCOUnter   Attending Attestation     Pt Name: Sam Emerson  MRN: [id number]  Birthdate 1985  Date of evaluation: 2/4/24    History, EXAM, MDM:    Sam Emerson is a 39 y.o. male who presents with Emesis  This is a 39-year-old male with end-stage liver disease Predom presenting with weakness confusion upper abdominal pain for the last day.  MELD score of 26.  The patient is jaundiced he has upper abdominal tenderness on exam.  No asterixis.  He has conversational speech.  He is not obtunded.  White blood cell count of 16 lactic acid 1.8 renal function within normal limits albumin 2.2 bilirubin 10.  Chest x-ray showed no consolidation.  SBP ruled out with diagnostic paracentesis.   Will place the patient in the hospital for further management of his end-stage liver disease.  UA pending at time of admission.     EKG: All EKG's are interpreted by the Emergency Department Physician who either signs or Co-signs this chart in the absence of a cardiologist.  EKG shows a sinus tachycardia rhythm.  HR is 104, , QRS 92, , no JONATHAN, No STD, No TWI, the axis is normal.          Vitals:   Vitals:    02/04/24 2349 02/05/24 0000 02/05/24 0024 02/05/24 0118   BP: 124/72 121/74 134/82    Pulse: (!) 103 (!) 106 (!) 103    Resp: 13 16 18 18   Temp: 98 °F (36.7 °C)  98 °F (36.7 °C)    TempSrc: Oral  Oral    SpO2: 99% 98%     Weight:       Height:         I performed a history and physical examination of the patient and discussed management with the resident. I reviewed the resident’s note and agree with the documented findings and plan of care. Any areas of disagreement are noted on the chart. I was personally present for the key portions of any procedures. I have documented in the chart those procedures where I was not present during the key portions. I have personally reviewed all images and agree with the resident's interpretation. I have reviewed the emergency nurses

## 2024-02-05 NOTE — PROCEDURES
PROCEDURE NOTE - PARACENTESIS    PATIENT NAME: Sam Emerson  MEDICAL RECORD NO. [id number]  DATE: 2/4/2024  ATTENDING PHYSICIAN: Dr. Olvera    PREOPERATIVE DIAGNOSIS:  Ascites  POSTOPERATIVE DIAGNOSIS:  Same  PROCEDURE PERFORMED:   Paracentesis  PERFORMING PHYSICIAN: Sascha Novak DO      DISCUSSION:  Sam Emerson is a 39 y.o.-year-old male who requires paracentesis for Ascites.  The history and physical examination were reviewed and confirmed.      CONSENT: The patient provided verbal consent for this procedure.     PROCEDURE:  The patient was placed in the supine position with the head of the bed slightly elevated and the appropriate landmarks were identified. The skin over the puncture site in the right lower quadrant region was prepped with chlorhexidine. Local anesthesia was obtained by infiltration using 1% Lidocaine without epinephrine. A needle was then advanced into the abdominal cavity. Fluid was returned which was serous.  A total volume of 10 cc was withdrawn which was sent to the lab for appropriate testing and cell count and differential. The needle was then withdrawn and a sterile dressing was placed over the site.     The patient tolerated the procedure well.    COMPLICATIONS:  None     Sascha Novak DO  10:31 PM, 2/4/24

## 2024-02-06 VITALS
TEMPERATURE: 97.7 F | RESPIRATION RATE: 17 BRPM | WEIGHT: 170 LBS | DIASTOLIC BLOOD PRESSURE: 72 MMHG | BODY MASS INDEX: 25.76 KG/M2 | SYSTOLIC BLOOD PRESSURE: 132 MMHG | OXYGEN SATURATION: 100 % | HEIGHT: 68 IN | HEART RATE: 95 BPM

## 2024-02-06 PROBLEM — Z71.89 GOALS OF CARE, COUNSELING/DISCUSSION: Status: ACTIVE | Noted: 2024-02-06

## 2024-02-06 LAB
ALBUMIN SERPL-MCNC: 1.6 G/DL (ref 3.5–5.2)
ALP SERPL-CCNC: 381 U/L (ref 40–129)
ALT SERPL-CCNC: 12 U/L (ref 5–41)
ANION GAP SERPL CALCULATED.3IONS-SCNC: 6 MMOL/L (ref 9–17)
AST SERPL-CCNC: 50 U/L
BASOPHILS # BLD: 0.14 K/UL (ref 0–0.2)
BASOPHILS NFR BLD: 1 % (ref 0–2)
BILIRUB DIRECT SERPL-MCNC: 5 MG/DL
BILIRUB INDIRECT SERPL-MCNC: 3 MG/DL (ref 0–1)
BILIRUB SERPL-MCNC: 8 MG/DL (ref 0.3–1.2)
BLASTS NFR FLD: 0 %
BUN SERPL-MCNC: 8 MG/DL (ref 6–20)
CALCIUM SERPL-MCNC: 7.7 MG/DL (ref 8.6–10.4)
CHLORIDE SERPL-SCNC: 107 MMOL/L (ref 98–107)
CO2 SERPL-SCNC: 23 MMOL/L (ref 20–31)
CREAT SERPL-MCNC: 0.7 MG/DL (ref 0.7–1.2)
EOSINOPHIL # BLD: 0.14 K/UL (ref 0–0.4)
EOSINOPHIL NFR FLD: 0 %
EOSINOPHILS RELATIVE PERCENT: 1 % (ref 0–4)
ERYTHROCYTE [DISTWIDTH] IN BLOOD BY AUTOMATED COUNT: 19.1 % (ref 11.5–14.9)
GFR SERPL CREATININE-BSD FRML MDRD: >60 ML/MIN/1.73M2
GLUCOSE SERPL-MCNC: 98 MG/DL (ref 70–99)
HCT VFR BLD AUTO: 23.8 % (ref 41–53)
HGB BLD-MCNC: 7.9 G/DL (ref 13.5–17.5)
LYMPHOCYTES NFR BLD: 1.64 K/UL (ref 1–4.8)
LYMPHOCYTES NFR FLD: 52 %
LYMPHOCYTES RELATIVE PERCENT: 12 % (ref 24–44)
MANUAL DIF COMMENT FLD-IMP: ABNORMAL
MCH RBC QN AUTO: 35.6 PG (ref 26–34)
MCHC RBC AUTO-ENTMCNC: 33.1 G/DL (ref 31–37)
MCV RBC AUTO: 107.6 FL (ref 80–100)
MICROORGANISM SPEC CULT: NORMAL
MICROORGANISM/AGENT SPEC: NORMAL
MONOCYTES NFR BLD: 1.51 K/UL (ref 0.1–1.3)
MONOCYTES NFR BLD: 11 % (ref 1–7)
MONOCYTES NFR FLD: 22 %
MORPHOLOGY: ABNORMAL
NEUTROPHILS NFR BLD: 75 % (ref 36–66)
NEUTROPHILS NFR FLD: 22 %
NEUTS SEG NFR BLD: 10.27 K/UL (ref 1.3–9.1)
PLATELET # BLD AUTO: 119 K/UL (ref 150–450)
PMV BLD AUTO: 8.5 FL (ref 6–12)
POTASSIUM SERPL-SCNC: 4.1 MMOL/L (ref 3.7–5.3)
PROT SERPL-MCNC: 5.5 G/DL (ref 6.4–8.3)
RBC # BLD AUTO: 2.21 M/UL (ref 4.5–5.9)
SODIUM SERPL-SCNC: 136 MMOL/L (ref 135–144)
SPECIMEN DESCRIPTION: NORMAL
UNIDENT CELLS NFR FLD: 4 %
WBC OTHER # BLD: 13.7 K/UL (ref 3.5–11)

## 2024-02-06 PROCEDURE — 80048 BASIC METABOLIC PNL TOTAL CA: CPT

## 2024-02-06 PROCEDURE — 99222 1ST HOSP IP/OBS MODERATE 55: CPT

## 2024-02-06 PROCEDURE — 6370000000 HC RX 637 (ALT 250 FOR IP): Performed by: NURSE PRACTITIONER

## 2024-02-06 PROCEDURE — 2580000003 HC RX 258: Performed by: INTERNAL MEDICINE

## 2024-02-06 PROCEDURE — 2580000003 HC RX 258: Performed by: NURSE PRACTITIONER

## 2024-02-06 PROCEDURE — 6360000002 HC RX W HCPCS: Performed by: INTERNAL MEDICINE

## 2024-02-06 PROCEDURE — 99239 HOSP IP/OBS DSCHRG MGMT >30: CPT | Performed by: INTERNAL MEDICINE

## 2024-02-06 PROCEDURE — 85025 COMPLETE CBC W/AUTO DIFF WBC: CPT

## 2024-02-06 PROCEDURE — 36415 COLL VENOUS BLD VENIPUNCTURE: CPT

## 2024-02-06 PROCEDURE — 99232 SBSQ HOSP IP/OBS MODERATE 35: CPT | Performed by: INTERNAL MEDICINE

## 2024-02-06 PROCEDURE — 80076 HEPATIC FUNCTION PANEL: CPT

## 2024-02-06 RX ORDER — LACTULOSE 10 G/15ML
20 SOLUTION ORAL 3 TIMES DAILY
Qty: 946 ML | Refills: 1 | Status: SHIPPED | OUTPATIENT
Start: 2024-02-06

## 2024-02-06 RX ORDER — LACTULOSE 10 G/15ML
20 SOLUTION ORAL 3 TIMES DAILY
Status: DISCONTINUED | OUTPATIENT
Start: 2024-02-06 | End: 2024-02-06 | Stop reason: HOSPADM

## 2024-02-06 RX ADMIN — CEFTRIAXONE SODIUM 1000 MG: 1 INJECTION, POWDER, FOR SOLUTION INTRAMUSCULAR; INTRAVENOUS at 09:03

## 2024-02-06 RX ADMIN — OXYCODONE HYDROCHLORIDE 10 MG: 10 TABLET ORAL at 02:29

## 2024-02-06 RX ADMIN — SPIRONOLACTONE 50 MG: 25 TABLET, FILM COATED ORAL at 08:51

## 2024-02-06 RX ADMIN — PANTOPRAZOLE SODIUM 40 MG: 40 TABLET, DELAYED RELEASE ORAL at 08:51

## 2024-02-06 RX ADMIN — FUROSEMIDE 20 MG: 20 TABLET ORAL at 08:51

## 2024-02-06 RX ADMIN — NADOLOL 20 MG: 20 TABLET ORAL at 08:57

## 2024-02-06 RX ADMIN — FOLIC ACID 1 MG: 1 TABLET ORAL at 08:51

## 2024-02-06 RX ADMIN — SODIUM CHLORIDE, PRESERVATIVE FREE 10 ML: 5 INJECTION INTRAVENOUS at 09:04

## 2024-02-06 RX ADMIN — OXYCODONE HYDROCHLORIDE 10 MG: 10 TABLET ORAL at 11:11

## 2024-02-06 RX ADMIN — RIFAXIMIN 400 MG: 200 TABLET ORAL at 08:57

## 2024-02-06 RX ADMIN — RIFAXIMIN 400 MG: 200 TABLET ORAL at 15:09

## 2024-02-06 RX ADMIN — THIAMINE HCL TAB 100 MG 100 MG: 100 TAB at 08:51

## 2024-02-06 RX ADMIN — MIDODRINE HYDROCHLORIDE 2.5 MG: 5 TABLET ORAL at 08:51

## 2024-02-06 RX ADMIN — LACTULOSE 20 G: 20 SOLUTION ORAL at 09:56

## 2024-02-06 ASSESSMENT — PAIN SCALES - GENERAL
PAINLEVEL_OUTOF10: 7
PAINLEVEL_OUTOF10: 7

## 2024-02-06 NOTE — CARE COORDINATION
ONGOING DISCHARGE PLAN:    Patient is alert and oriented x4.    Spoke with patient regarding discharge plan and patient confirms that plan is still home without needs. Declined VNS.    Active order for IV Rocephin.    Xifaxan denied by insurance.    Will continue to follow for additional discharge needs.    If patient is discharged prior to next notation, then this note serves as note for discharge by case management.    Electronically signed by Jocy Kirby RN on 2/6/2024 at 1:47 PM

## 2024-02-06 NOTE — PLAN OF CARE
Problem: Pain  Goal: Verbalizes/displays adequate comfort level or baseline comfort level  Outcome: Progressing     Problem: Safety - Adult  Goal: Free from fall injury  Outcome: Progressing     Problem: Discharge Planning  Goal: Discharge to home or other facility with appropriate resources  Outcome: Progressing     Problem: ABCDS Injury Assessment  Goal: Absence of physical injury  Outcome: Progressing     Problem: Cardiovascular - Adult  Goal: Maintains optimal cardiac output and hemodynamic stability  Outcome: Progressing     Problem: Skin/Tissue Integrity - Adult  Goal: Skin integrity remains intact  Outcome: Progressing     Problem: Gastrointestinal - Adult  Goal: Minimal or absence of nausea and vomiting  Outcome: Progressing     Problem: Genitourinary - Adult  Goal: Absence of urinary retention  Outcome: Progressing     Problem: Chronic Conditions and Co-morbidities  Goal: Patient's chronic conditions and co-morbidity symptoms are monitored and maintained or improved  Outcome: Progressing

## 2024-02-06 NOTE — DISCHARGE INSTR - COC
BAND LIGATION performed by Wesley Chairez MD at [facility]    UPPER GASTROINTESTINAL ENDOSCOPY  2023    EGD FOREIGN BODY REMOVAL performed by Wesley Chairez MD at [facility]    UPPER GASTROINTESTINAL ENDOSCOPY N/A 2023    EGD ESOPHAGOGASTRODUODENOSCOPY performed by Luciano Galloway MD at [facility] OR       Immunization History:   Immunization History   Administered Date(s) Administered    COVID-19, MODERNA BLUE border, Primary or Immunocompromised, (age 12y+), IM, 100 mcg/0.5mL 2021, 2021    MMR, PRIORIX, M-M-R II, (age 12m+), SC, 0.5mL 1998       Active Problems:  Patient Active Problem List   Diagnosis Code    ACS (acute coronary syndrome) (HCC) I24.9    Essential hypertension I10    Uncomplicated alcohol dependence (HCC) F10.20    Alcoholic cardiomyopathy (HCC) I42.6    Diarrhea due to malabsorption K90.9, R19.7    Heartburn R12    Generalized anxiety disorder F41.1    Hypomagnesemia E83.42    Hypokalemia E87.6    Transaminitis R74.01    Acute alcoholic hepatitis K70.10    Anemia D64.9    Left lower lobe pneumonia J18.9    Sepsis (HCC) A41.9    Melena K92.1    Acute blood loss anemia D62    Liver failure without hepatic coma (HCC) K72.90    Leukocytosis D72.829    Septicemia (HCC) A41.9    Right upper quadrant abdominal pain R10.11    HCAP (healthcare-associated pneumonia) J18.9    Iron overload E83.19    GI bleed K92.2    Hematemesis with nausea K92.0    Alcoholism (HCC) F10.20    Abdominal pain, generalized R10.84    Severe recurrent major depression without psychotic features (HCC) F33.2    MDD (major depressive disorder), recurrent severe, without psychosis (HCC) F33.2    Depression with suicidal ideation F32.A, R45.851    Hematemesis K92.0    ALC (alcoholic liver cirrhosis) (HCC) K70.30    Acute pancreatitis K85.90    Fever of unknown origin R50.9    Jaundice R17    Jaundice, non- R17    S/P TIPS (transjugular intrahepatic portosystemic shunt) Z95.828    History  {GREATER/LESS:219318127} 30 days.     Update Admission H&P: {CHP DME Changes in HandP:[id number]}    PHYSICIAN SIGNATURE:  {Esignature:[id number]}

## 2024-02-06 NOTE — DISCHARGE INSTR - DIET

## 2024-02-06 NOTE — DISCHARGE SUMMARY
IN-PATIENT SERVICE   [facility] Internal Medicine    Discharge Summary     Patient ID: Sam Emerson  :  1985   MRN: [id number]     ACCOUNT:  [id number]   Patient's PCP: Rosie Montes De Oca MD  Admit Date: 2024   Discharge Date: 24 ***  Length of Stay: 2  Code Status:  Full Code  Admitting Physician: Rosie Motnes De Oca MD  Discharge Physician: Jolynn Ibrahim MD     Active Discharge Diagnoses:     Primary Problem  Hyperbilirubinemia      Hospital Problems  Active Hospital Problems    Diagnosis Date Noted    Goals of care, counseling/discussion [Z71.89] 2024    Hyperbilirubinemia [E80.6] 2024       Admission Condition:  fair     Discharged Condition: fair    Hospital Stay:     Hospital Course:  Sam Emerson is a 39 y.o. male who was admitted for the management of Hyperbilirubinemia , presented to ER with Emesis          Significant therapeutic interventions: As above    Significant Diagnostic Studies:   Labs / Micro:    Lab Results   Component Value Date    WBC 13.7 (H) 2024    HGB 7.9 (L) 2024    HCT 23.8 (L) 2024    .6 (H) 2024     (L) 2024          Lab Results   Component Value Date/Time     2024 05:25 AM    K 4.1 2024 05:25 AM     2024 05:25 AM    CO2 23 2024 05:25 AM    BUN 8 2024 05:25 AM    CREATININE 0.7 2024 05:25 AM    GLUCOSE 98 2024 05:25 AM    CALCIUM 7.7 2024 05:25 AM          Radiology:    XR CHEST (2 VW)    Result Date: 2024  EXAMINATION: TWO XRAY VIEWS OF THE CHEST 2024 8:23 pm COMPARISON: 2024 HISTORY: ORDERING SYSTEM PROVIDED HISTORY: Confusion TECHNOLOGIST PROVIDED HISTORY: Confusion Reason for Exam: Pt. c/o elevated ammonia levels, epigastric pain, and a non-productive cough. Hx of pneumonia, bronchitis, smoking, and transjugular intrahepatic portosystemic shunt (TIPS) procedure. FINDINGS: Cardiomediastinal silhouette appears  : Murray Barbour ANESTHESIA: Local TECHNIQUE: After obtaining informed consent, the patient was placed in the supine position in bed. The right lateral flank area was cleansed and draped in the usual sterile fashion. 1% Lidocaine was used for local anesthesia. Next, a 5 Malagasy one-step needle catheter was introduced into the ascites fluid collection. As much fluid was removed as possible by vacuum container bottles. A total of 3600 cc of clear herman colored fluid was removed.  No sample was requested. COMPARISON: Paracentesis dated 01/18/2024 FINDINGS: Technically successful ultrasound guided large volume paracentesis ESTIMATED BLOOD LOSS: Less than 50 SPECIMENS: None requested     Technically successful US guided large volume paracentesis with removal of 3600 cc of fluid as described above. COMPLICATIONS: The patient tolerated the procedure well without any immediate complications.     Vascular duplex lower extremity venous bilateral    Result Date: 1/30/2024    No evidence of deep vein or superficial vein thrombosis in the bilateral lower extremities.     XR CHEST PORTABLE    Result Date: 1/30/2024  EXAMINATION: ONE XRAY VIEW OF THE CHEST 1/30/2024 1:05 pm COMPARISON: January 15, 2024 HISTORY: ORDERING SYSTEM PROVIDED HISTORY: sob TECHNOLOGIST PROVIDED HISTORY: sob Reason for Exam: Sob and cough FINDINGS: The lungs are without acute focal process.  There is no effusion or pneumothorax. The cardiomediastinal silhouette is without acute process. The osseous structures are without acute process.     No acute process.     IR US GUIDED PARACENTESIS    Result Date: 1/18/2024  PROCEDURE: PARACENTESIS WITHOUT IMAGE GUIDANCE US ABDOMEN LIMITED 1/18/2024 HISTORY: ORDERING SYSTEM PROVIDED HISTORY: ascites TECHNOLOGIST PROVIDED HISTORY: Ascites TECHNIQUE: Informed consent was obtained after a explanation of the procedure including risks.  Universal protocol was followed.  A limited ultrasound of the abdomen was

## 2024-02-06 NOTE — PLAN OF CARE
Problem: Pain  Goal: Verbalizes/displays adequate comfort level or baseline comfort level  2/6/2024 1518 by Fiona Johnson RN  Outcome: Adequate for Discharge     Problem: Safety - Adult  Goal: Free from fall injury  2/6/2024 1518 by Fiona Johnson RN  Outcome: Adequate for Discharge     Problem: Discharge Planning  Goal: Discharge to home or other facility with appropriate resources  2/6/2024 1518 by Fiona Johnson RN  Outcome: Adequate for Discharge     Problem: ABCDS Injury Assessment  Goal: Absence of physical injury  2/6/2024 1518 by Fiona Johnson RN  Outcome: Adequate for Discharge     Problem: Cardiovascular - Adult  Goal: Maintains optimal cardiac output and hemodynamic stability  2/6/2024 1518 by Fiona Johnson RN  Outcome: Adequate for Discharge     Problem: Skin/Tissue Integrity - Adult  Goal: Skin integrity remains intact  2/6/2024 1518 by Fiona Johnson RN  Outcome: Adequate for Discharge     Problem: Gastrointestinal - Adult  Goal: Minimal or absence of nausea and vomiting  2/6/2024 1518 by Fiona Johnson RN  Outcome: Adequate for Discharge     Problem: Genitourinary - Adult  Goal: Absence of urinary retention  2/6/2024 1518 by Fiona Johnson RN  Outcome: Adequate for Discharge     Problem: Chronic Conditions and Co-morbidities  Goal: Patient's chronic conditions and co-morbidity symptoms are monitored and maintained or improved  2/6/2024 1518 by Fiona Johnson RN  Outcome: Adequate for Discharge

## 2024-02-06 NOTE — CONSULTS
HISTORY  Social History     Tobacco Use    Smoking status: Every Day     Current packs/day: 1.00     Average packs/day: 1 pack/day for 21.0 years (21.0 ttl pk-yrs)     Types: Cigarettes    Smokeless tobacco: Never    Tobacco comments:     using nicotine patches at night   Vaping Use    Vaping Use: Never used   Substance Use Topics    Alcohol use: Yes     Alcohol/week: 84.0 standard drinks of alcohol     Types: 84 Cans of beer per week     Comment: hx of alcoholism; pt drinks a fifth of whiskey daily    Drug use: Not Currently     Types: Marijuana (Weed)     Comment: used cocaine in early 20's       FAMILY HISTORY  ..  Family History   Problem Relation Age of Onset    Heart Disease Mother     Heart Attack Mother     Heart Disease Father         ALLERGIES  Allergies   Allergen Reactions    Bee Venom Anaphylaxis         MEDICATIONS  Current Medications    folic acid  1 mg Oral Daily    furosemide  20 mg Oral Daily    midodrine  2.5 mg Oral TID WC    nadolol  20 mg Oral Daily    nicotine  1 patch TransDERmal Daily    pantoprazole  40 mg Oral Q12H    rifAXIMin  400 mg Oral TID    spironolactone  50 mg Oral Daily    vitamin B-1  100 mg Oral Daily    sodium chloride flush  5-40 mL IntraVENous 2 times per day    cefTRIAXone (ROCEPHIN) IV  1,000 mg IntraVENous Daily    lactulose  30 g Oral TID    heparin (porcine)  5,000 Units SubCUTAneous 3 times per day     sodium chloride flush, sodium chloride, potassium chloride **OR** potassium alternative oral replacement **OR** potassium chloride, magnesium sulfate, ondansetron **OR** ondansetron, polyethylene glycol, oxyCODONE  IV Drips/Infusions   sodium chloride       Home Medications  No current facility-administered medications on file prior to encounter.     Current Outpatient Medications on File Prior to Encounter   Medication Sig Dispense Refill    rifAXIMin (XIFAXAN) 200 MG tablet Take 2 tablets by mouth 3 times daily for 10 days 60 tablet 0    midodrine (PROAMATINE) 2.5 MG    Respiratory: no wheezing, chest tightness, or shortness of breath   Cardiovascular: no chest pain or pressure, no palpitations, no diaphoresis   Gastrointestinal: nausea, and diarrhea   Genitourinary: no dysuria, frequency,hematuria , or nocturia   Musculoskeletal: no myalgias or arthralgias, no back pain   Skin: no rashes or sores   Neurological: no focal weakness, numbness, tingling, or headache, no seizures    PHYSICAL ASSESSMENT:  Constitutional: Alert and oriented to person, place, and time.   Head: Normocephalic and atraumatic.   Eyes: Jaundice   Neck: Normal range of motion. Neck supple. No tracheal deviation present.   Cardiovascular: Normal rate and regular rhythm, S1, S2, no murmur   Pulmonary/Chest: Effort normal and breath sounds normal. No rales or wheezes.  Abdomen: Soft. No tenderness, not distended, no ascites, no organomegaly   Musculoskeletal: Normal range of motion. No edema lower ext.   Neurological: CN II-XII grossly intact, no focal neurological deficits   Skin: Normal turgor, no bleeding, no bruising     Palliative Performance Scale:  _x__60%  Ambulation reduced; Significant disease; Can't do hobbies/housework; intake normal or reduced; occasional assist; LOC full/confusion  ___50%  Mainly sit/lie; Extensive disease; Can't do any work; Considerable assist; intake normal or reduced; LOC full/confusion  ___40%  Mainly in bed; Extensive disease; Mainly assist; intake normal or reduced; LOC full/confusion   ___30%  Bed Bound; Extensive disease; Total care; intake reduced; LOCfull/confusion  ___20%  Bed Bound; Extensive disease; Total care; intake minimal; Drowsy/coma  ___10%  Bed Bound; Extensive disease; Total care; Mouth care only; Drowsy/coma  ___0       Death        Thank you for allowing Palliative Care to participate in the care of Mr. Emerson .    This note has been dictated by dragon, typing errors may be a possibility.    The total time I spent in seeing the patient, discussing goals

## 2024-02-06 NOTE — PLAN OF CARE
Problem: Pain  Goal: Verbalizes/displays adequate comfort level or baseline comfort level  2/6/2024 0529 by Elinor Brady RN  Outcome: Progressing     Problem: Safety - Adult  Goal: Free from fall injury  2/6/2024 0529 by Elinor Brady RN  Outcome: Progressing     Problem: Discharge Planning  Goal: Discharge to home or other facility with appropriate resources  2/6/2024 0529 by Elinor Brady RN  Outcome: Progressing     Problem: ABCDS Injury Assessment  Goal: Absence of physical injury  2/6/2024 0529 by Elinor Brady RN  Outcome: Progressing     Problem: Cardiovascular - Adult  Goal: Maintains optimal cardiac output and hemodynamic stability  2/6/2024 0529 by Elinor Brady RN  Outcome: Progressing     Problem: Skin/Tissue Integrity - Adult  Goal: Skin integrity remains intact  2/6/2024 0529 by Elinor Brady RN  Outcome: Progressing     Problem: Skin/Tissue Integrity - Adult  Goal: Incisions, wounds, or drain sites healing without S/S of infection  2/6/2024 0529 by Elinor Brady RN  Outcome: Progressing     Problem: Gastrointestinal - Adult  Goal: Minimal or absence of nausea and vomiting  2/6/2024 0529 by Elinor Brady RN  Outcome: Progressing     Problem: Genitourinary - Adult  Goal: Absence of urinary retention  2/6/2024 0529 by Elinor Brady RN  Outcome: Progressing     Problem: Chronic Conditions and Co-morbidities  Goal: Patient's chronic conditions and co-morbidity symptoms are monitored and maintained or improved  2/6/2024 0529 by Elinor Brady RN  Outcome: Progressing

## 2024-02-06 NOTE — PROGRESS NOTES
CLINICAL PHARMACY NOTE: MEDS TO BEDS    Total # of Prescriptions Filled: 1   The following medications ready for pickup at [facility] pt has discharged prior to delivery 2/6/24 kbg  Lactulose     Additional Documentation:    Ambulatory

## 2024-02-06 NOTE — PROGRESS NOTES
GI Progress notes    2/6/2024   1:34 PM    Name:  Sam Emerson  MRN:    [id number]     Acct:     [id number]   Room:  2112/2112-01   Day: 2     Admit Date: 2/4/2024  7:43 PM  PCP: Rosie Montes De Oca MD    Subjective:     C/C:   Chief Complaint   Patient presents with    Emesis       Interval History: Status: improved.     Patient seen and examined  No acute events overnight  Had 3 BM so far today  Has some mild abdominal cramping  Case management did try to get PA for his Xifaxan and this was denied.      ROS:  Constitutional: negative for chills, fevers and sweats  Gastrointestinal: negative for abdominal pain, constipation, diarrhea, nausea and vomiting  Neurological: negative for dizziness and headaches    Medications:     Allergies:   Allergies   Allergen Reactions    Bee Venom Anaphylaxis       Current Meds: lactulose (CHRONULAC) 10 GM/15ML solution 20 g, TID  folic acid (FOLVITE) tablet 1 mg, Daily  furosemide (LASIX) tablet 20 mg, Daily  midodrine (PROAMATINE) tablet 2.5 mg, TID WC  nadolol (CORGARD) tablet 20 mg, Daily  nicotine (NICODERM CQ) 7 MG/24HR 1 patch, Daily  pantoprazole (PROTONIX) tablet 40 mg, Q12H  rifAXIMin (XIFAXAN) tablet 400 mg, TID  spironolactone (ALDACTONE) tablet 50 mg, Daily  thiamine mononitrate tablet 100 mg, Daily  sodium chloride flush 0.9 % injection 5-40 mL, 2 times per day  sodium chloride flush 0.9 % injection 5-40 mL, PRN  0.9 % sodium chloride infusion, PRN  potassium chloride (KLOR-CON M) extended release tablet 40 mEq, PRN   Or  potassium bicarb-citric acid (EFFER-K) effervescent tablet 40 mEq, PRN   Or  potassium chloride 10 mEq/100 mL IVPB (Peripheral Line), PRN  magnesium sulfate 2000 mg in water 50 mL IVPB, PRN  ondansetron (ZOFRAN-ODT) disintegrating tablet 4 mg, Q8H PRN   Or  ondansetron (ZOFRAN) injection 4 mg, Q6H PRN  polyethylene glycol (GLYCOLAX) packet 17 g, Daily PRN  oxyCODONE HCl (OXY-IR) immediate release tablet 10 mg, Q8H PRN  cefTRIAXone (ROCEPHIN)

## 2024-02-06 NOTE — ACP (ADVANCE CARE PLANNING)
Advance Care Planning     Advance Care Planning (ACP) Physician/NP/PA Conversation    Date of Conversation: 2/4/2024  Conducted with: Patient with Decision Making Capacity    Healthcare Decision Maker:    Primary Decision Maker: CHRISTOPHER GROSSMAN - Domestic Partner - [phone]  Click here to complete Healthcare Decision Makers including selection of the Healthcare Decision Maker Relationship (ie \"Primary\").         Care Preferences:    Hospitalization:  \"If your health worsens and it becomes clear that your chance of recovery is unlikely, what would be your preference regarding hospitalization?\"  The patient would prefer hospitalization.    Ventilation:  \"If you were unable to breath on your own and your chance of recovery was unlikely, what would be your preference about the use of a ventilator (breathing machine) if it was available to you?\"  The patient would desire the use of a ventilator.    Resuscitation:  \"In the event your heart stopped as a result of an underlying serious health condition, would you want attempts made to restart your heart, or would you prefer a natural death?\"  Yes, attempt to resuscitate.    treatment goals    Conversation Outcomes / Follow-Up Plan:  ACP in process - information provided, considering goals and options  Reviewed DNR/DNI and patient elects Full Code (Attempt Resuscitation)    Length of Voluntary ACP Conversation in minutes:  <16 minutes (Non-Billable)    Jennifer Pelaez, APRN - CNP

## 2024-02-07 ENCOUNTER — OFFICE VISIT (OUTPATIENT)
Dept: INTERNAL MEDICINE CLINIC | Age: 39
End: 2024-02-07

## 2024-02-07 ENCOUNTER — CARE COORDINATION (OUTPATIENT)
Dept: CASE MANAGEMENT | Age: 39
End: 2024-02-07

## 2024-02-07 VITALS
OXYGEN SATURATION: 98 % | HEART RATE: 108 BPM | WEIGHT: 173 LBS | DIASTOLIC BLOOD PRESSURE: 78 MMHG | SYSTOLIC BLOOD PRESSURE: 118 MMHG | BODY MASS INDEX: 26.3 KG/M2

## 2024-02-07 DIAGNOSIS — K70.11 ASCITES DUE TO ALCOHOLIC HEPATITIS: Primary | ICD-10-CM

## 2024-02-07 DIAGNOSIS — Z09 HOSPITAL DISCHARGE FOLLOW-UP: ICD-10-CM

## 2024-02-07 DIAGNOSIS — I10 ESSENTIAL HYPERTENSION: ICD-10-CM

## 2024-02-07 DIAGNOSIS — K70.31 ALCOHOLIC CIRRHOSIS OF LIVER WITH ASCITES (HCC): ICD-10-CM

## 2024-02-07 NOTE — PROGRESS NOTES
Post-Discharge Transitional Care  Follow Up      Sam Emerson   YOB: 1985    Date of Office Visit:  2/7/2024  Date of Hospital Admission: 2/4/24  Date of Hospital Discharge: 2/6/24  Risk of hospital readmission (high >=14%. Medium >=10%) :Readmission Risk Score: 47.5      Care management risk score Rising risk (score 2-5) and Complex Care (Scores >=6): No Risk Score On File     Non face to face  following discharge, date last encounter closed (first attempt may have been earlier): 02/07/2024    Call initiated 2 business days of discharge: Yes    ASSESSMENT/PLAN:   Ascites due to alcoholic hepatitis  -     [facility] - Gerald Olivarez MD, Gastroenterology, [place]  -     [facility]  Essential hypertension  -     [facility]  Alcoholic cirrhosis of liver with ascites (HCC)  -     [facility] Physical [facility]  Hospital discharge follow-up  -     [place] DISCHARGE MEDS RECONCILED W/ CURRENT OUTPATIENT MED LIST      Medical Decision Making: moderate complexity  No follow-ups on file.    On this date 2/7/2024 I have spent  minutes reviewing previous notes, test results and face to face with the patient discussing the diagnosis and importance of compliance with the treatment plan as well as documenting on the day of the visit.       Subjective:   HPI:  Follow up of Hospital problems/diagnosis(es):     Inpatient course: Discharge summary reviewed- see chart.    Interval history/Current status:   Patient was discharged yesterday, fluid negative for SBP  Started on Mestinon spironolactone and ProAmatine  Checking blood pressure at home states that it has been staying stable  Was told that he will likely need paracentesis in another 2 weeks, voiced understanding  Patient to follow-up with GI as outpatient  Has not drank alcohol since December 2023,  Patient wants to get evaluated for his work restrictions,  Will give physical therapy evaluation for functional

## 2024-02-07 NOTE — PROGRESS NOTES
Visit Information    Have you changed or started any medications since your last visit including any over-the-counter medicines, vitamins, or herbal medicines? no   Are you having any side effects from any of your medications? -  no  Have you stopped taking any of your medications? Is so, why? -  no    Have you seen any other physician or provider since your last visit? No  Have you had any other diagnostic tests since your last visit? No  Have you been seen in the emergency room and/or had an admission to a hospital since we last saw you? No  Have you had your routine dental cleaning in the past 6 months? no    Have you activated your [employer] account? If not, what are your barriers? Yes     Patient Care Team:  Rosie Montes De Oca MD as PCP - General (Internal Medicine)  Rosie Montes De Oca MD as PCP - Empaneled Provider  Prince Gonsalez MD as Consulting Physician (Gastroenterology)  Beryl Ellis RN as Care Transitions Nurse    Medical History Review  Past Medical, Family, and Social History reviewed and does not contribute to the patient presenting condition    Health Maintenance   Topic Date Due    Varicella vaccine (1 of 2 - 2-dose childhood series) Never done    Pneumococcal 0-64 years Vaccine (1 - PCV) Never done    HIV screen  Never done    Hepatitis A vaccine (1 of 2 - Risk 2-dose series) Never done    Hepatitis B vaccine (2 of 2 - CpG 2-dose series) 04/13/2023    Flu vaccine (1) Never done    COVID-19 Vaccine (3 - 2023-24 season) 09/01/2023    Depression Monitoring  01/24/2025    DTaP/Tdap/Td vaccine (2 - Td or Tdap) 09/21/2033    Hepatitis C screen  Completed    Hib vaccine  Aged Out    HPV vaccine  Aged Out    Polio vaccine  Aged Out    Meningococcal (ACWY) vaccine  Aged Out    Depression Screen  Discontinued    Diabetes screen  Discontinued

## 2024-02-07 NOTE — CARE COORDINATION
Care Transitions Initial Follow Up Call Attempt #1 -Attempted initial 24 hour transitional call to patient.  Left VM to return call directly to CTN.       Noted that PCP appt is scheduled for today,  and/or HFU appt with Dr Moncada for .  Reminder VM message left       Call within 2 business days of discharge: Yes        Patient: Sam Emerson             Patient : 1985   MRN: [id number]             Reason for Admission: Frequent readmissions for complications of ETOH liver cirrhosis, hyperbilirubinemia and hyperamonemia, N/V, recent bacterial peritonitis   Discharge Date: 24           RARS: Readmission Risk Score: 46.9      Last Discharge Facility       Date Complaint Diagnosis Description Type Department Provider    24 Emesis Chronic liver failure without hepatic coma (HCC) ... ED to Hosp-Admission (Discharged) (ADMITTED) Rosie Olvera MD; May, ...            Was this an external facility discharge? No   Non-face-to-face services provided:  Scheduled appointment with PCP-,   Obtained and reviewed discharge summary and/or continuity of care documents    Is follow up appointment scheduled within 7 days of discharge? Yes.    Follow Up  Future Appointments   Date Time Provider Department Center   2024  4:30 PM Rosie Montes De Oca MD [facility]   2024  2:30 PM [facility]       Care Transition Nurse provided contact information.      Beryl Ellis RN

## 2024-02-08 ENCOUNTER — CARE COORDINATION (OUTPATIENT)
Dept: CASE MANAGEMENT | Age: 39
End: 2024-02-08

## 2024-02-08 NOTE — CARE COORDINATION
Care Transitions Initial Follow Up Call Attempt #2 - Attempted initial 24 hour transitional call to patient.  Left VM to return call directly to CTN.       Call within 2 business days of discharge: Yes    Noted patient did have PCP appt yesterday, 24  CT program ended after consecutive attempts to reach patient - routed referral to ACM         Patient: Sam Emerson             Patient : 1985    MRN: [id number]             Reason for Admission: Frequent readmissions for complications of ETOH liver cirrhosis, hyperbilirubinemia and hyperamonemia, N/V, recent bacterial peritonitis   Discharge Date: 24           RARS: Readmission Risk Score: 46.9      Last Discharge Facility       Date Complaint Diagnosis Description Type Department Provider    24 Emesis Chronic liver failure without hepatic coma (HCC) ... ED to Hosp-Admission (Discharged) (ADMITTED)  Rosie Stanley MD; May, ...            Was this an external facility discharge? No   Non-face-to-face services provided:  Scheduled appointment with PCP-24  Obtained and reviewed discharge summary and/or continuity of care documents      Is follow up appointment scheduled within 7 days of discharge? Yes.    Follow Up  Future Appointments   Date Time Provider Department Center   3/6/2024 11:30 AM Rosie Montes De Oca MD [facility]       Care Transition Nurse provided contact information.      Will refer to [facility] - frequent readmissions due to chronic hepatic disease  Discussed & routed to LINDA Torres RN

## 2024-02-10 LAB
MICROORGANISM SPEC CULT: NORMAL
MICROORGANISM/AGENT SPEC: NORMAL
SPECIMEN DESCRIPTION: NORMAL

## 2024-02-11 ENCOUNTER — HOSPITAL ENCOUNTER (INPATIENT)
Age: 39
LOS: 2 days | Discharge: HOME | DRG: 663 | End: 2024-02-14
Attending: STUDENT IN AN ORGANIZED HEALTH CARE EDUCATION/TRAINING PROGRAM | Admitting: INTERNAL MEDICINE
Payer: COMMERCIAL

## 2024-02-11 DIAGNOSIS — K76.82 HEPATIC ENCEPHALOPATHY (HCC): ICD-10-CM

## 2024-02-11 DIAGNOSIS — D64.9 ANEMIA, UNSPECIFIED TYPE: Primary | ICD-10-CM

## 2024-02-11 DIAGNOSIS — K70.31 ALCOHOLIC CIRRHOSIS OF LIVER WITH ASCITES (HCC): ICD-10-CM

## 2024-02-11 DIAGNOSIS — R10.11 RIGHT UPPER QUADRANT ABDOMINAL PAIN: ICD-10-CM

## 2024-02-11 DIAGNOSIS — K58.2 IRRITABLE BOWEL SYNDROME WITH BOTH CONSTIPATION AND DIARRHEA: ICD-10-CM

## 2024-02-11 DIAGNOSIS — Z95.828 S/P TIPS (TRANSJUGULAR INTRAHEPATIC PORTOSYSTEMIC SHUNT): ICD-10-CM

## 2024-02-11 PROCEDURE — 99285 EMERGENCY DEPT VISIT HI MDM: CPT

## 2024-02-11 PROCEDURE — 6360000002 HC RX W HCPCS: Performed by: STUDENT IN AN ORGANIZED HEALTH CARE EDUCATION/TRAINING PROGRAM

## 2024-02-11 PROCEDURE — 2580000003 HC RX 258: Performed by: STUDENT IN AN ORGANIZED HEALTH CARE EDUCATION/TRAINING PROGRAM

## 2024-02-11 PROCEDURE — 96374 THER/PROPH/DIAG INJ IV PUSH: CPT

## 2024-02-11 RX ORDER — 0.9 % SODIUM CHLORIDE 0.9 %
1000 INTRAVENOUS SOLUTION INTRAVENOUS ONCE
Status: COMPLETED | OUTPATIENT
Start: 2024-02-11 | End: 2024-02-11

## 2024-02-11 RX ORDER — ONDANSETRON 2 MG/ML
4 INJECTION INTRAMUSCULAR; INTRAVENOUS ONCE
Status: COMPLETED | OUTPATIENT
Start: 2024-02-11 | End: 2024-02-11

## 2024-02-11 RX ADMIN — ONDANSETRON 4 MG: 2 INJECTION INTRAMUSCULAR; INTRAVENOUS at 23:57

## 2024-02-11 RX ADMIN — SODIUM CHLORIDE 1000 ML: 9 INJECTION, SOLUTION INTRAVENOUS at 23:56

## 2024-02-12 ENCOUNTER — APPOINTMENT (OUTPATIENT)
Dept: INTERVENTIONAL RADIOLOGY/VASCULAR | Age: 39
DRG: 663 | End: 2024-02-12
Payer: COMMERCIAL

## 2024-02-12 ENCOUNTER — APPOINTMENT (OUTPATIENT)
Dept: VASCULAR LAB | Age: 39
DRG: 663 | End: 2024-02-12
Payer: COMMERCIAL

## 2024-02-12 ENCOUNTER — APPOINTMENT (OUTPATIENT)
Dept: CT IMAGING | Age: 39
DRG: 663 | End: 2024-02-12
Payer: COMMERCIAL

## 2024-02-12 ENCOUNTER — APPOINTMENT (OUTPATIENT)
Dept: GENERAL RADIOLOGY | Age: 39
DRG: 663 | End: 2024-02-12
Payer: COMMERCIAL

## 2024-02-12 LAB
ABSOLUTE BANDS: 0.31 K/UL (ref 0–1)
ALBUMIN FLD-MCNC: 0.5 G/DL
ALBUMIN SERPL-MCNC: 2 G/DL (ref 3.5–5.2)
ALBUMIN SERPL-MCNC: 2 G/DL (ref 3.5–5.2)
ALP SERPL-CCNC: 392 U/L (ref 40–129)
ALP SERPL-CCNC: 405 U/L (ref 40–129)
ALT SERPL-CCNC: 12 U/L (ref 5–41)
ALT SERPL-CCNC: 13 U/L (ref 5–41)
AMMONIA PLAS-SCNC: 65 UMOL/L (ref 16–60)
AMYLASE FLD-CCNC: 27 U/L
ANION GAP SERPL CALCULATED.3IONS-SCNC: 5 MMOL/L (ref 9–17)
ANION GAP SERPL CALCULATED.3IONS-SCNC: 7 MMOL/L (ref 9–17)
AST SERPL-CCNC: 46 U/L
AST SERPL-CCNC: 50 U/L
BANDS: 2 % (ref 0–10)
BASOPHILS # BLD: 0.16 K/UL (ref 0–0.2)
BASOPHILS # BLD: 0.31 K/UL (ref 0–0.2)
BASOPHILS NFR BLD: 1 % (ref 0–2)
BASOPHILS NFR BLD: 3 % (ref 0–2)
BILIRUB DIRECT SERPL-MCNC: 3.9 MG/DL
BILIRUB INDIRECT SERPL-MCNC: 2.7 MG/DL (ref 0–1)
BILIRUB SERPL-MCNC: 6.6 MG/DL (ref 0.3–1.2)
BILIRUB SERPL-MCNC: 7.3 MG/DL (ref 0.3–1.2)
BUN SERPL-MCNC: 11 MG/DL (ref 6–20)
BUN SERPL-MCNC: 11 MG/DL (ref 6–20)
CALCIUM SERPL-MCNC: 7.8 MG/DL (ref 8.6–10.4)
CALCIUM SERPL-MCNC: 8.2 MG/DL (ref 8.6–10.4)
CHLORIDE SERPL-SCNC: 109 MMOL/L (ref 98–107)
CHLORIDE SERPL-SCNC: 113 MMOL/L (ref 98–107)
CO2 SERPL-SCNC: 20 MMOL/L (ref 20–31)
CO2 SERPL-SCNC: 22 MMOL/L (ref 20–31)
CREAT SERPL-MCNC: 0.7 MG/DL (ref 0.7–1.2)
CREAT SERPL-MCNC: 0.7 MG/DL (ref 0.7–1.2)
D DIMER PPP FEU-MCNC: 18.48 UG/ML FEU (ref 0–0.59)
ECHO BSA: 1.9 M2
EKG ATRIAL RATE: 82 BPM
EKG P AXIS: 41 DEGREES
EKG P-R INTERVAL: 126 MS
EKG Q-T INTERVAL: 380 MS
EKG QRS DURATION: 90 MS
EKG QTC CALCULATION (BAZETT): 443 MS
EKG R AXIS: 22 DEGREES
EKG T AXIS: 61 DEGREES
EKG VENTRICULAR RATE: 82 BPM
EOSINOPHIL # BLD: 0.41 K/UL (ref 0–0.4)
EOSINOPHIL # BLD: 0.47 K/UL (ref 0–0.4)
EOSINOPHILS RELATIVE PERCENT: 3 % (ref 0–4)
EOSINOPHILS RELATIVE PERCENT: 4 % (ref 0–4)
ERYTHROCYTE [DISTWIDTH] IN BLOOD BY AUTOMATED COUNT: 19.9 % (ref 11.5–14.9)
ERYTHROCYTE [DISTWIDTH] IN BLOOD BY AUTOMATED COUNT: 22.7 % (ref 11.5–14.9)
FERRITIN SERPL-MCNC: 93 NG/ML (ref 30–400)
FIBRINOGEN PPP-MCNC: 199 MG/DL (ref 210–530)
FOLATE SERPL-MCNC: 14 NG/ML (ref 4.8–24.2)
GFR SERPL CREATININE-BSD FRML MDRD: >60 ML/MIN/1.73M2
GFR SERPL CREATININE-BSD FRML MDRD: >60 ML/MIN/1.73M2
GLUCOSE FLD-MCNC: 104 MG/DL
GLUCOSE SERPL-MCNC: 113 MG/DL (ref 70–99)
GLUCOSE SERPL-MCNC: 92 MG/DL (ref 70–99)
HAPTOGLOB SERPL-MCNC: <10 MG/DL (ref 30–200)
HCT VFR BLD AUTO: 14.1 % (ref 41–53)
HCT VFR BLD AUTO: 35.2 % (ref 41–53)
HGB BLD-MCNC: 11.7 G/DL (ref 13.5–17.5)
HGB BLD-MCNC: 4.7 G/DL (ref 13.5–17.5)
INR PPP: 1.5
IRON SATN MFR SERPL: ABNORMAL % (ref 20–55)
IRON SERPL-MCNC: 167 UG/DL (ref 59–158)
LACTATE BLDV-SCNC: 1 MMOL/L (ref 0.5–1.9)
LACTATE BLDV-SCNC: 1.9 MMOL/L (ref 0.5–2.2)
LDH FLD L TO P-CCNC: 46 U/L
LDH SERPL-CCNC: 325 U/L (ref 135–225)
LIPASE SERPL-CCNC: 32 U/L (ref 13–60)
LYMPHOCYTES NFR BLD: 2.06 K/UL (ref 1–4.8)
LYMPHOCYTES NFR BLD: 2.65 K/UL (ref 1–4.8)
LYMPHOCYTES RELATIVE PERCENT: 17 % (ref 24–44)
LYMPHOCYTES RELATIVE PERCENT: 20 % (ref 24–44)
MAGNESIUM SERPL-MCNC: 1.6 MG/DL (ref 1.6–2.6)
MAGNESIUM SERPL-MCNC: 1.6 MG/DL (ref 1.6–2.6)
MCH RBC QN AUTO: 34.7 PG (ref 26–34)
MCH RBC QN AUTO: 37.1 PG (ref 26–34)
MCHC RBC AUTO-ENTMCNC: 33.1 G/DL (ref 31–37)
MCHC RBC AUTO-ENTMCNC: 33.2 G/DL (ref 31–37)
MCV RBC AUTO: 104.5 FL (ref 80–100)
MCV RBC AUTO: 112.2 FL (ref 80–100)
MICROORGANISM/AGENT SPEC: ABNORMAL
MONOCYTES NFR BLD: 0.62 K/UL (ref 0.1–1.3)
MONOCYTES NFR BLD: 0.78 K/UL (ref 0.1–1.3)
MONOCYTES NFR BLD: 5 % (ref 1–7)
MONOCYTES NFR BLD: 6 % (ref 1–7)
MORPHOLOGY: ABNORMAL
NEUTROPHILS NFR BLD: 67 % (ref 36–66)
NEUTROPHILS NFR BLD: 72 % (ref 36–66)
NEUTS SEG NFR BLD: 11.23 K/UL (ref 1.3–9.1)
NEUTS SEG NFR BLD: 6.9 K/UL (ref 1.3–9.1)
PARTIAL THROMBOPLASTIN TIME: 41.6 SEC (ref 24–36)
PLATELET # BLD AUTO: 111 K/UL (ref 150–450)
PLATELET # BLD AUTO: 150 K/UL (ref 150–450)
PMV BLD AUTO: 8 FL (ref 6–12)
PMV BLD AUTO: 8.2 FL (ref 6–12)
POTASSIUM SERPL-SCNC: 3.4 MMOL/L (ref 3.7–5.3)
POTASSIUM SERPL-SCNC: 3.9 MMOL/L (ref 3.7–5.3)
PROT FLD-MCNC: <1 G/DL
PROT SERPL-MCNC: 5.8 G/DL (ref 6.4–8.3)
PROT SERPL-MCNC: 6.1 G/DL (ref 6.4–8.3)
PROTHROMBIN TIME: 18.7 SEC (ref 11.8–14.6)
RBC # BLD AUTO: 1.26 M/UL (ref 4.5–5.9)
RBC # BLD AUTO: 3.37 M/UL (ref 4.5–5.9)
RETICS # AUTO: 0.12 M/UL (ref 0.02–0.1)
RETICS/RBC NFR AUTO: 3.7 % (ref 0.5–2)
SODIUM SERPL-SCNC: 136 MMOL/L (ref 135–144)
SODIUM SERPL-SCNC: 140 MMOL/L (ref 135–144)
SPECIMEN DESCRIPTION: ABNORMAL
SPECIMEN TYPE: NORMAL
TIBC SERPL-MCNC: ABNORMAL UG/DL (ref 250–450)
UNSATURATED IRON BINDING CAPACITY: <17 UG/DL (ref 112–347)
VIT B12 SERPL-MCNC: 1778 PG/ML (ref 232–1245)
WBC OTHER # BLD: 10.3 K/UL (ref 3.5–11)
WBC OTHER # BLD: 15.6 K/UL (ref 3.5–11)

## 2024-02-12 PROCEDURE — 6360000004 HC RX CONTRAST MEDICATION: Performed by: INTERNAL MEDICINE

## 2024-02-12 PROCEDURE — 85025 COMPLETE CBC W/AUTO DIFF WBC: CPT

## 2024-02-12 PROCEDURE — 6370000000 HC RX 637 (ALT 250 FOR IP): Performed by: STUDENT IN AN ORGANIZED HEALTH CARE EDUCATION/TRAINING PROGRAM

## 2024-02-12 PROCEDURE — 6370000000 HC RX 637 (ALT 250 FOR IP)

## 2024-02-12 PROCEDURE — 93005 ELECTROCARDIOGRAM TRACING: CPT | Performed by: STUDENT IN AN ORGANIZED HEALTH CARE EDUCATION/TRAINING PROGRAM

## 2024-02-12 PROCEDURE — 82746 ASSAY OF FOLIC ACID SERUM: CPT

## 2024-02-12 PROCEDURE — 83735 ASSAY OF MAGNESIUM: CPT

## 2024-02-12 PROCEDURE — 82607 VITAMIN B-12: CPT

## 2024-02-12 PROCEDURE — 86900 BLOOD TYPING SEROLOGIC ABO: CPT

## 2024-02-12 PROCEDURE — C9113 INJ PANTOPRAZOLE SODIUM, VIA: HCPCS

## 2024-02-12 PROCEDURE — 99223 1ST HOSP IP/OBS HIGH 75: CPT | Performed by: INTERNAL MEDICINE

## 2024-02-12 PROCEDURE — P9016 RBC LEUKOCYTES REDUCED: HCPCS

## 2024-02-12 PROCEDURE — 86920 COMPATIBILITY TEST SPIN: CPT

## 2024-02-12 PROCEDURE — 83010 ASSAY OF HAPTOGLOBIN QUANT: CPT

## 2024-02-12 PROCEDURE — 86850 RBC ANTIBODY SCREEN: CPT

## 2024-02-12 PROCEDURE — 89051 BODY FLUID CELL COUNT: CPT

## 2024-02-12 PROCEDURE — 80076 HEPATIC FUNCTION PANEL: CPT

## 2024-02-12 PROCEDURE — 84157 ASSAY OF PROTEIN OTHER: CPT

## 2024-02-12 PROCEDURE — 82945 GLUCOSE OTHER FLUID: CPT

## 2024-02-12 PROCEDURE — 85610 PROTHROMBIN TIME: CPT

## 2024-02-12 PROCEDURE — 6370000000 HC RX 637 (ALT 250 FOR IP): Performed by: NURSE PRACTITIONER

## 2024-02-12 PROCEDURE — 87070 CULTURE OTHR SPECIMN AEROBIC: CPT

## 2024-02-12 PROCEDURE — 83690 ASSAY OF LIPASE: CPT

## 2024-02-12 PROCEDURE — 83540 ASSAY OF IRON: CPT

## 2024-02-12 PROCEDURE — 2580000003 HC RX 258

## 2024-02-12 PROCEDURE — 86901 BLOOD TYPING SEROLOGIC RH(D): CPT

## 2024-02-12 PROCEDURE — 88112 CYTOPATH CELL ENHANCE TECH: CPT

## 2024-02-12 PROCEDURE — 6360000002 HC RX W HCPCS

## 2024-02-12 PROCEDURE — 85730 THROMBOPLASTIN TIME PARTIAL: CPT

## 2024-02-12 PROCEDURE — 82728 ASSAY OF FERRITIN: CPT

## 2024-02-12 PROCEDURE — 80053 COMPREHEN METABOLIC PANEL: CPT

## 2024-02-12 PROCEDURE — 82150 ASSAY OF AMYLASE: CPT

## 2024-02-12 PROCEDURE — 74018 RADEX ABDOMEN 1 VIEW: CPT

## 2024-02-12 PROCEDURE — 83605 ASSAY OF LACTIC ACID: CPT

## 2024-02-12 PROCEDURE — 2580000003 HC RX 258: Performed by: INTERNAL MEDICINE

## 2024-02-12 PROCEDURE — 82140 ASSAY OF AMMONIA: CPT

## 2024-02-12 PROCEDURE — 85384 FIBRINOGEN ACTIVITY: CPT

## 2024-02-12 PROCEDURE — 36430 TRANSFUSION BLD/BLD COMPNT: CPT

## 2024-02-12 PROCEDURE — 99255 IP/OBS CONSLTJ NEW/EST HI 80: CPT | Performed by: INTERNAL MEDICINE

## 2024-02-12 PROCEDURE — 2060000000 HC ICU INTERMEDIATE R&B

## 2024-02-12 PROCEDURE — 71260 CT THORAX DX C+: CPT

## 2024-02-12 PROCEDURE — 82042 OTHER SOURCE ALBUMIN QUAN EA: CPT

## 2024-02-12 PROCEDURE — 93970 EXTREMITY STUDY: CPT

## 2024-02-12 PROCEDURE — 93010 ELECTROCARDIOGRAM REPORT: CPT | Performed by: INTERNAL MEDICINE

## 2024-02-12 PROCEDURE — 36415 COLL VENOUS BLD VENIPUNCTURE: CPT

## 2024-02-12 PROCEDURE — 83615 LACTATE (LD) (LDH) ENZYME: CPT

## 2024-02-12 PROCEDURE — 87040 BLOOD CULTURE FOR BACTERIA: CPT

## 2024-02-12 PROCEDURE — 85045 AUTOMATED RETICULOCYTE COUNT: CPT

## 2024-02-12 PROCEDURE — 80048 BASIC METABOLIC PNL TOTAL CA: CPT

## 2024-02-12 PROCEDURE — 93970 EXTREMITY STUDY: CPT | Performed by: STUDENT IN AN ORGANIZED HEALTH CARE EDUCATION/TRAINING PROGRAM

## 2024-02-12 PROCEDURE — A4216 STERILE WATER/SALINE, 10 ML: HCPCS

## 2024-02-12 PROCEDURE — 2709999900 IR US GUIDED PARACENTESIS

## 2024-02-12 PROCEDURE — APPNB30 APP NON BILLABLE TIME 0-30 MINS: Performed by: NURSE PRACTITIONER

## 2024-02-12 PROCEDURE — 83550 IRON BINDING TEST: CPT

## 2024-02-12 PROCEDURE — 87075 CULTR BACTERIA EXCEPT BLOOD: CPT

## 2024-02-12 PROCEDURE — 88305 TISSUE EXAM BY PATHOLOGIST: CPT

## 2024-02-12 PROCEDURE — 87205 SMEAR GRAM STAIN: CPT

## 2024-02-12 PROCEDURE — 85379 FIBRIN DEGRADATION QUANT: CPT

## 2024-02-12 PROCEDURE — 49083 ABD PARACENTESIS W/IMAGING: CPT

## 2024-02-12 RX ORDER — ENEMA 19; 7 G/133ML; G/133ML
ENEMA RECTAL
Status: DISCONTINUED
Start: 2024-02-12 | End: 2024-02-12 | Stop reason: WASHOUT

## 2024-02-12 RX ORDER — SODIUM CHLORIDE 9 MG/ML
INJECTION, SOLUTION INTRAVENOUS PRN
Status: DISCONTINUED | OUTPATIENT
Start: 2024-02-12 | End: 2024-02-14 | Stop reason: HOSPADM

## 2024-02-12 RX ORDER — FOLIC ACID 1 MG/1
1 TABLET ORAL DAILY
Status: DISCONTINUED | OUTPATIENT
Start: 2024-02-12 | End: 2024-02-14 | Stop reason: HOSPADM

## 2024-02-12 RX ORDER — POTASSIUM CHLORIDE 20 MEQ/1
40 TABLET, EXTENDED RELEASE ORAL PRN
Status: DISCONTINUED | OUTPATIENT
Start: 2024-02-12 | End: 2024-02-14 | Stop reason: HOSPADM

## 2024-02-12 RX ORDER — SODIUM CHLORIDE 0.9 % (FLUSH) 0.9 %
10 SYRINGE (ML) INJECTION PRN
Status: DISCONTINUED | OUTPATIENT
Start: 2024-02-12 | End: 2024-02-14 | Stop reason: HOSPADM

## 2024-02-12 RX ORDER — LACTULOSE 10 G/15ML
20 SOLUTION ORAL 3 TIMES DAILY
Status: DISCONTINUED | OUTPATIENT
Start: 2024-02-12 | End: 2024-02-14 | Stop reason: HOSPADM

## 2024-02-12 RX ORDER — SODIUM CHLORIDE 0.9 % (FLUSH) 0.9 %
5-40 SYRINGE (ML) INJECTION EVERY 12 HOURS SCHEDULED
Status: DISCONTINUED | OUTPATIENT
Start: 2024-02-12 | End: 2024-02-14 | Stop reason: HOSPADM

## 2024-02-12 RX ORDER — GAUZE BANDAGE 2" X 2"
100 BANDAGE TOPICAL DAILY
Status: DISCONTINUED | OUTPATIENT
Start: 2024-02-12 | End: 2024-02-14 | Stop reason: HOSPADM

## 2024-02-12 RX ORDER — SPIRONOLACTONE 25 MG/1
50 TABLET ORAL DAILY
Status: DISCONTINUED | OUTPATIENT
Start: 2024-02-12 | End: 2024-02-14 | Stop reason: HOSPADM

## 2024-02-12 RX ORDER — ONDANSETRON 4 MG/1
4 TABLET, FILM COATED ORAL EVERY 8 HOURS PRN
Status: DISCONTINUED | OUTPATIENT
Start: 2024-02-12 | End: 2024-02-14 | Stop reason: HOSPADM

## 2024-02-12 RX ORDER — POTASSIUM CHLORIDE 7.45 MG/ML
10 INJECTION INTRAVENOUS PRN
Status: DISCONTINUED | OUTPATIENT
Start: 2024-02-12 | End: 2024-02-14 | Stop reason: HOSPADM

## 2024-02-12 RX ORDER — MORPHINE SULFATE 2 MG/ML
2 INJECTION, SOLUTION INTRAMUSCULAR; INTRAVENOUS EVERY 4 HOURS PRN
Status: DISCONTINUED | OUTPATIENT
Start: 2024-02-12 | End: 2024-02-12

## 2024-02-12 RX ORDER — MIDODRINE HYDROCHLORIDE 2.5 MG/1
2.5 TABLET ORAL
Status: DISCONTINUED | OUTPATIENT
Start: 2024-02-12 | End: 2024-02-14 | Stop reason: HOSPADM

## 2024-02-12 RX ORDER — 0.9 % SODIUM CHLORIDE 0.9 %
100 INTRAVENOUS SOLUTION INTRAVENOUS ONCE
Status: DISCONTINUED | OUTPATIENT
Start: 2024-02-12 | End: 2024-02-14 | Stop reason: HOSPADM

## 2024-02-12 RX ORDER — OXYCODONE HYDROCHLORIDE 5 MG/1
5 TABLET ORAL EVERY 6 HOURS PRN
Status: DISCONTINUED | OUTPATIENT
Start: 2024-02-12 | End: 2024-02-14 | Stop reason: HOSPADM

## 2024-02-12 RX ORDER — FUROSEMIDE 20 MG/1
20 TABLET ORAL DAILY
Status: DISCONTINUED | OUTPATIENT
Start: 2024-02-12 | End: 2024-02-14 | Stop reason: HOSPADM

## 2024-02-12 RX ORDER — SODIUM CHLORIDE 0.9 % (FLUSH) 0.9 %
5-40 SYRINGE (ML) INJECTION PRN
Status: DISCONTINUED | OUTPATIENT
Start: 2024-02-12 | End: 2024-02-14 | Stop reason: HOSPADM

## 2024-02-12 RX ORDER — ACETAMINOPHEN 325 MG/1
650 TABLET ORAL EVERY 6 HOURS PRN
Status: DISCONTINUED | OUTPATIENT
Start: 2024-02-12 | End: 2024-02-14 | Stop reason: HOSPADM

## 2024-02-12 RX ORDER — ACETAMINOPHEN 650 MG/1
650 SUPPOSITORY RECTAL EVERY 6 HOURS PRN
Status: DISCONTINUED | OUTPATIENT
Start: 2024-02-12 | End: 2024-02-14 | Stop reason: HOSPADM

## 2024-02-12 RX ORDER — OXYCODONE HYDROCHLORIDE 5 MG/1
5 TABLET ORAL ONCE
Status: COMPLETED | OUTPATIENT
Start: 2024-02-12 | End: 2024-02-12

## 2024-02-12 RX ORDER — PANTOPRAZOLE SODIUM 40 MG/1
40 TABLET, DELAYED RELEASE ORAL EVERY 12 HOURS
Status: DISCONTINUED | OUTPATIENT
Start: 2024-02-12 | End: 2024-02-12

## 2024-02-12 RX ORDER — NADOLOL 20 MG/1
20 TABLET ORAL DAILY
Status: DISCONTINUED | OUTPATIENT
Start: 2024-02-12 | End: 2024-02-14 | Stop reason: HOSPADM

## 2024-02-12 RX ADMIN — SODIUM CHLORIDE, PRESERVATIVE FREE 10 ML: 5 INJECTION INTRAVENOUS at 13:30

## 2024-02-12 RX ADMIN — PANTOPRAZOLE SODIUM 40 MG: 40 INJECTION, POWDER, FOR SOLUTION INTRAVENOUS at 22:39

## 2024-02-12 RX ADMIN — ONDANSETRON HYDROCHLORIDE 4 MG: 4 TABLET, FILM COATED ORAL at 16:11

## 2024-02-12 RX ADMIN — PANTOPRAZOLE SODIUM 40 MG: 40 TABLET, DELAYED RELEASE ORAL at 04:48

## 2024-02-12 RX ADMIN — MIDODRINE HYDROCHLORIDE 2.5 MG: 2.5 TABLET ORAL at 08:23

## 2024-02-12 RX ADMIN — NADOLOL 20 MG: 20 TABLET ORAL at 08:23

## 2024-02-12 RX ADMIN — RIFAXIMIN 200 MG: 200 TABLET ORAL at 14:37

## 2024-02-12 RX ADMIN — IOPAMIDOL 75 ML: 755 INJECTION, SOLUTION INTRAVENOUS at 13:30

## 2024-02-12 RX ADMIN — FUROSEMIDE 20 MG: 20 TABLET ORAL at 08:23

## 2024-02-12 RX ADMIN — SODIUM CHLORIDE, PRESERVATIVE FREE 10 ML: 5 INJECTION INTRAVENOUS at 22:41

## 2024-02-12 RX ADMIN — THIAMINE HCL TAB 100 MG 100 MG: 100 TAB at 08:23

## 2024-02-12 RX ADMIN — SODIUM CHLORIDE, PRESERVATIVE FREE 10 ML: 5 INJECTION INTRAVENOUS at 08:23

## 2024-02-12 RX ADMIN — OXYCODONE HYDROCHLORIDE 5 MG: 5 TABLET ORAL at 22:39

## 2024-02-12 RX ADMIN — OXYCODONE HYDROCHLORIDE 5 MG: 5 TABLET ORAL at 16:12

## 2024-02-12 RX ADMIN — MIDODRINE HYDROCHLORIDE 2.5 MG: 2.5 TABLET ORAL at 16:11

## 2024-02-12 RX ADMIN — MORPHINE SULFATE 2 MG: 2 INJECTION, SOLUTION INTRAMUSCULAR; INTRAVENOUS at 13:12

## 2024-02-12 RX ADMIN — OXYCODONE HYDROCHLORIDE 5 MG: 5 TABLET ORAL at 01:25

## 2024-02-12 RX ADMIN — LACTULOSE 20 G: 20 SOLUTION ORAL at 14:37

## 2024-02-12 RX ADMIN — MIDODRINE HYDROCHLORIDE 2.5 MG: 2.5 TABLET ORAL at 11:51

## 2024-02-12 RX ADMIN — FOLIC ACID 1 MG: 1 TABLET ORAL at 08:24

## 2024-02-12 RX ADMIN — MORPHINE SULFATE 2 MG: 2 INJECTION, SOLUTION INTRAMUSCULAR; INTRAVENOUS at 08:21

## 2024-02-12 RX ADMIN — LACTULOSE 20 G: 20 SOLUTION ORAL at 22:39

## 2024-02-12 RX ADMIN — LACTULOSE 20 G: 20 SOLUTION ORAL at 08:23

## 2024-02-12 RX ADMIN — RIFAXIMIN 200 MG: 200 TABLET ORAL at 22:48

## 2024-02-12 RX ADMIN — POTASSIUM CHLORIDE 40 MEQ: 1500 TABLET, EXTENDED RELEASE ORAL at 22:39

## 2024-02-12 RX ADMIN — SPIRONOLACTONE 50 MG: 25 TABLET, FILM COATED ORAL at 08:23

## 2024-02-12 RX ADMIN — RIFAXIMIN 200 MG: 200 TABLET ORAL at 08:23

## 2024-02-12 RX ADMIN — CEFTRIAXONE SODIUM 2000 MG: 2 INJECTION, POWDER, FOR SOLUTION INTRAMUSCULAR; INTRAVENOUS at 04:45

## 2024-02-12 NOTE — PLAN OF CARE
Problem: Discharge Planning  Goal: Discharge to home or other facility with appropriate resources  2/12/2024 1420 by Tere Lama RN  Outcome: Progressing  Flowsheets (Taken 2/12/2024 1200)  Discharge to home or other facility with appropriate resources:   Identify barriers to discharge with patient and caregiver   Identify discharge learning needs (meds, wound care, etc)   Arrange for interpreters to assist at discharge as needed   Refer to discharge planning if patient needs post-hospital services based on physician order or complex needs related to functional status, cognitive ability or social support system   Arrange for needed discharge resources and transportation as appropriate     Problem: Skin/Tissue Integrity  Goal: Absence of new skin breakdown  Description: 1.  Monitor for areas of redness and/or skin breakdown  2.  Assess vascular access sites hourly  3.  Every 4-6 hours minimum:  Change oxygen saturation probe site  4.  Every 4-6 hours:  If on nasal continuous positive airway pressure, respiratory therapy assess nares and determine need for appliance change or resting period.  2/12/2024 1420 by Tere Lama RN  Outcome: Progressing     Problem: Chronic Conditions and Co-morbidities  Goal: Patient's chronic conditions and co-morbidity symptoms are monitored and maintained or improved  Outcome: Progressing  Flowsheets  Taken 2/12/2024 1200 by Tere Lama RN  Care Plan - Patient's Chronic Conditions and Co-Morbidity Symptoms are Monitored and Maintained or Improved:   Monitor and assess patient's chronic conditions and comorbid symptoms for stability, deterioration, or improvement   Collaborate with multidisciplinary team to address chronic and comorbid conditions and prevent exacerbation or deterioration   Update acute care plan with appropriate goals if chronic or comorbid symptoms are exacerbated and prevent overall improvement and discharge  Taken 2/12/2024 0400 by Antonio Dumont RN  Care

## 2024-02-12 NOTE — ED TRIAGE NOTES
Mode of arrival (squad #, walk in, police, etc) : walk in        Chief complaint(s): emesis, dizziness, abd pain        Arrival Note (brief scenario, treatment PTA, etc).: pt has liver failure, reports new onset dizziness and emesis. Pt states his stomach has been hurting the last few days and he suspects he has fluid collecting again. Pt also suspects his ammonia might be high because he has been disoriented        C= \"Have you ever felt that you should Cut down on your drinking?\"  No  A= \"Have people Annoyed you by criticizing your drinking?\"  No  G= \"Have you ever felt bad or Guilty about your drinking?\"  No  E= \"Have you ever had a drink as an Eye-opener first thing in the morning to steady your nerves or to help a hangover?\"  No      Deferred []      Reason for deferring: N/A    *If yes to two or more: probable alcohol abuse.*

## 2024-02-12 NOTE — PROGRESS NOTES
Physician Progress Note      PATIENT:               MAURISIO MENA  CSN #:                  [id number]  :                       1985  ADMIT DATE:       2024 7:43 PM  DISCH DATE:        2024 3:30 PM  RESPONDING  PROVIDER #:        Jolynn Ibrahim MD          QUERY TEXT:    Pt admitted with emesis and found to have possible UTI. Pt noted to have WBC   27.7, PCT 0.27, temp up to 100.9, HR up to 114 and RR up to 32. If possible,   please document in the progress notes and discharge summary if you are   evaluating and /or treating any of the following:    The medical record reflects the following:  Risk Factors: UTI  Clinical Indicators: on arrival WBC 27.7, PCT 0.27, lactic 1.9, temp   98.9-100.9, HR , RR 18-32; UA + nitrites, trace leukocytes  Treatment: Labs, imaging, monitoring, Rocephin  Options provided:  -- Sepsis, present on admission  -- Sepsis, present on admission, now resolved  -- Sepsis, developed following admission  -- Sepsis was ruled out  -- Other - I will add my own diagnosis  -- Disagree - Not applicable / Not valid  -- Disagree - Clinically unable to determine / Unknown  -- Refer to Clinical Documentation Reviewer    PROVIDER RESPONSE TEXT:    This patient has sepsis which was present on admission.    Query created by: Brigitte Price on 2024 12:19 PM      Electronically signed by:  Jolynn Ibrahim MD 2024 3:30 PM

## 2024-02-12 NOTE — CONSULTS
[facility]   Pulmonary and Critical Care CONSULT NOTE:      DATE OF CONSULT 2/12/2024    REASON FOR CONSULTATION:  Critical care management      PCP Rosie Montes De Oca MD     CHIEF COMPLAINT: Lethargy weakness and dizziness    HISTORY OF PRESENT ILLNESS:   This is a 39-year-old gentleman with severe alcoholic induced cirrhosis who has been in and out of the hospital most of the past 1 year.  He has been to [facility], [facility], [facility] and [facility].    The patient was still having relapse of his alcoholism up until December 5.  He states that he has not had anything to drink since then but was having relapses and alcoholism earlier in 2023.    Despite all of his admissions for liver disease he has never actually seen GI/hepatology in an office setting only in the hospital.  He is currently trying to establish care with Dr. Fam.    He tells me that he was having extreme lethargy.  He had no energy to get out of bed.  He was sleeping almost the entire day and was developing dizziness, stool incontinence and had vomiting.  He did not see any obvious blood in his vomit or in his stool.  He was found to be severely anemic on presentation and has received 2 units of packed red blood cells so far.    He has had multiple admissions for decompensated cirrhosis, esophageal varices requiring banding, life-threatening GI bleed requiring admission to [facility] in December he tells me with 16 unit transfusion, 5 days on a ventilator and a TIPS procedure performed during that hospital visit.    It is difficult to get an exact timeline on the patient because he goes to so many different hospitals and healthcare systems.    He is an active smoker both cigarettes and marijuana.    He lives at home with his wife and 17-year-old son.  He does not have any other family.    He has had repeated admissions with electrolyte disturbance, low potassium,

## 2024-02-12 NOTE — CONSULTS
[facility]    GASTROENTEROLOGY CONSULT    Patient:   Sam Emerson   :    1985   Facility:   [facility]  Date:    2024  Admission Dx:  Sepsis (HCC) [A41.9]  Anemia, unspecified type [D64.9]  Requesting physician: Quentin Guevara MD  Reason for consult:  cirrhosis      SUBJECTIVE:  History of Present Illness:  This is a 39 y.o.   male who was admitted 2024 with Sepsis (HCC) [A41.9]  Anemia, unspecified type [D64.9]. We have been asked to see the patient in consultation by Quentin Guevara MD for  cirrhosis. This is a 39 year old male with pmh of alcohol abuse decompensated cirrhosis s/p tips anemia pe variceal bleeding portal hypertensive gastropathy who presented to the ED for lethargy with weakness and dizziness. He was just seen by our service for hyperbilirubinemia. He has had weakness and lethargy for the last several days. No fevers or chills. He has decreased appetite with generalized abdominal pain. He did have one episode of non bloody emesis pta. Last bm was normal appearing per pt, he states he is having 3 bm per day. He underwent paracentesis at his last admission one week ago that was negative for sbp. This admission he was found to have a hgb of 4.7 improved to 11.7 with blood transfusions. He has not had hematemesis hematochezia or melena. He is not taking NSAIDS or anticoagulation medications. KUB did not show obstruction. Ammonia 65 lipase 32 alk phos 392 alt 12 ast 45 bili 6.6.afp normal acute hepatitis panel non reactive autoimmune testing negative except evan was positive. No abdominal imaging has been done this admission but ct abd 24 showed a patent tips shunt with cirrhosis splenomegaly cholelithiasis and pthn.  Labs show wbc 15.6>10.3 plt 150>111 inr 1.5. he has not had dysphagia weight loss change in the bowel habits rash.       ENDOSCOPY HX 23 egd (erika) small EV no bleeding moderate portal hypertension gastropathy

## 2024-02-12 NOTE — CARE COORDINATION
Case Management Assessment  Initial Evaluation    Date/Time of Evaluation: 2/12/2024 10:17 AM  Assessment Completed by: Katerina Mckeon RN    If patient is discharged prior to next notation, then this note serves as note for discharge by case management.    Patient Name: Sam Emerson                   YOB: 1985  Diagnosis: Sepsis (HCC) [A41.9]  Anemia, unspecified type [D64.9]                   Date / Time: 2/11/2024 10:51 PM    Patient Admission Status: Inpatient   Readmission Risk (Low < 19, Mod (19-27), High > 27): Readmission Risk Score: 47.5    Current PCP: Rosie Montes De Oca MD  PCP verified by CM?      Chart Reviewed: Yes      History Provided by: Patient  Patient Orientation: Alert and Oriented    Patient Cognition: Alert    Hospitalization in the last 30 days (Readmission):  Yes    If yes, Readmission Assessment in  Navigator will be completed.    Advance Directives:      Code Status: Full Code   Patient's Primary Decision Maker is: Legal Next of Kin    Primary Decision Maker: CHRISTOPHER GROSSMAN - [facility] Partner - [phone]    Discharge Planning:    Patient lives with: Spouse/Significant Other Type of Home: House  Primary Care Giver: Self  Patient Support Systems include: Spouse/Significant Other   Current Financial resources: Medicaid  Current community resources:    Current services prior to admission: None            Current DME:              Type of Home Care services:  None    ADLS  Prior functional level: Independent in ADLs/IADLs  Current functional level: Independent in ADLs/IADLs    PT AM-PAC:   /24  OT AM-PAC:   /24    Family can provide assistance at DC: Yes  Would you like Case Management to discuss the discharge plan with any other family members/significant others, and if so, who?    Plans to Return to Present Housing:    Other Identified Issues/Barriers to RETURNING to current housing: none  Potential Assistance needed at discharge: N/A            Potential DME:    Patient

## 2024-02-12 NOTE — PLAN OF CARE
Problem: Discharge Planning  Goal: Discharge to home or other facility with appropriate resources  2/12/2024 0543 by Antonio Dumont, RN  Outcome: Progressing  2/12/2024 0542 by Antonio Dumont, RN  Outcome: Progressing     Problem: Skin/Tissue Integrity  Goal: Absence of new skin breakdown  Description: 1.  Monitor for areas of redness and/or skin breakdown  2.  Assess vascular access sites hourly  3.  Every 4-6 hours minimum:  Change oxygen saturation probe site  4.  Every 4-6 hours:  If on nasal continuous positive airway pressure, respiratory therapy assess nares and determine need for appliance change or resting period.  Outcome: Progressing

## 2024-02-12 NOTE — ED PROVIDER NOTES
[facility]  Emergency Department Encounter  Emergency Medicine Physician     Pt Name: Sam Emerson  MRN: [id number]  Birthdate 1985  Date of evaluation: 2/12/24  PCP:  Rosie Montes De Oca MD    CHIEF COMPLAINT       Chief Complaint   Patient presents with    Emesis    Dizziness       HISTORY OF PRESENT ILLNESS  (Location/Symptom, Timing/Onset, Context/Setting, Quality, Duration, Modifying Factors, Severity.)    Sam Emerson is a 39 y.o. male who presents with fatigue, emesis, dizziness.  Patient states that he was recently discharged from this hospital and he has been having some worsening symptoms.  States that he has been compliant with all of his home medications including his lactulose.  He states that he has felt more weak, and \"exhausted\".  He denies any chest pain or shortness of breath.  States that he always has abdominal pain but that has not changed.  Denies any dysuria or hematuria.  States that he does have some body aches.  States that he does have loose stools whenever he takes his lactulose.        PAST MEDICAL / SURGICAL / SOCIAL / FAMILY HISTORY    has a past medical history of Alcoholism (HCC), Anemia, Cirrhosis, alcoholic (HCC), and Pulmonary embolism (HCC).     has a past surgical history that includes Upper gastrointestinal endoscopy (N/A, 05/01/2022); Upper gastrointestinal endoscopy (N/A, 10/30/2022); Upper gastrointestinal endoscopy (N/A, 09/27/2023); Esophagogastroduodenoscopy (12/05/2023); IR TIPS INSERTION (12/05/2023); IR NONTUNNELED VASCULAR CATHETER > 5 YEARS (12/05/2023); Upper gastrointestinal endoscopy (N/A, 12/05/2023); Upper gastrointestinal endoscopy (N/A, 12/07/2023); Upper gastrointestinal endoscopy (12/07/2023); Upper gastrointestinal endoscopy (12/07/2023); Esophagogastroduodenoscopy (12/29/2023); Colonoscopy (12/30/2023); Upper gastrointestinal endoscopy (N/A, 12/29/2023); and Colonoscopy (N/A, 12/30/2023).    Social History     Socioeconomic

## 2024-02-12 NOTE — H&P
[facility]  IN-PATIENT SERVICE  [facility]  IN-PATIENT SERVICE   [facility]     HISTORY AND PHYSICAL EXAMINATION            Date:   2/12/2024  Patient name:  Sam Emerson  Date of admission:  2/11/2024 10:51 PM  MRN:   [id number]  Account:  [id number]  YOB: 1985  PCP:    Rosie Montes De Oca MD  Room:   2004/2004-01  Code Status:    Full Code    Chief Complaint:     Chief Complaint   Patient presents with    Emesis    Dizziness       History Obtained From:     patient    History of Present Illness:     39 years old male with past medical history of alcoholic cirrhosis of liver,TIPS, anemia, pulmonary embolism, hyperammonemia presented to ED with complaints of fatigue, weakness and increased somnolence.  He has had multiple admissions in the past for worsening liver failure, upper GI bleed and spontaneous bacterial peritonitis.    As per the patient, he does not have a recent history of hematemesis and melena.  He denied any recent history of fever, chills, shortness of breath, chest pain.  At the ED his hemoglobin was 4.2 and liver function test showed bilirubin total of 6.6 and direct of 3.9, , AST of 46, anemia of 65, WBC count is elevated 15.6.  Patient is further admitted for the management and further evaluation of anemia.    On examination at ICU, he is alert oriented to time place and person.  Eyes exams revealed jaundiced pallor.  Poor abdominal examination is soft but mild generalized tenderness present.    Past Medical History:     Past Medical History:   Diagnosis Date    Alcoholism (HCC)     pt drinks a fifth of whiskey daily    Anemia     Cirrhosis, alcoholic (HCC)     Pulmonary embolism (HCC)         Past SurgicalHistory:     Past Surgical History:   Procedure Laterality Date    COLONOSCOPY  12/30/2023    diagnostic    COLONOSCOPY N/A 12/30/2023    COLONOSCOPY DIAGNOSTIC performed

## 2024-02-12 NOTE — CONSENT
Informed Consent for Blood Component Transfusion Note    I have discussed with the patient the rationale for blood component transfusion; its benefits in treating or preventing fatigue, organ damage, or death; and its risk which includes mild transfusion reactions, rare risk of blood borne infection, or more serious but rare reactions. I have discussed the alternatives to transfusion, including the risk and consequences of not receiving transfusion. The patient had an opportunity to ask questions and had agreed to proceed with transfusion of blood components.    Electronically signed by Jae Calixto DO on 2/12/24 at 1:15 AM EST

## 2024-02-12 NOTE — PLAN OF CARE
Problem: Discharge Planning  Goal: Discharge to home or other facility with appropriate resources  2/12/2024 1819 by Fiona Johnson RN  Outcome: Progressing     Problem: Skin/Tissue Integrity  Goal: Absence of new skin breakdown  2/12/2024 1819 by Fiona Johnson RN  Outcome: Progressing     Problem: Chronic Conditions and Co-morbidities  Goal: Patient's chronic conditions and co-morbidity symptoms are monitored and maintained or improved  2/12/2024 1819 by Fiona Johnson RN  Outcome: Progressing     Problem: ABCDS Injury Assessment  Goal: Absence of physical injury  2/12/2024 1819 by Fiona Johnson RN  Outcome: Progressing

## 2024-02-13 ENCOUNTER — APPOINTMENT (OUTPATIENT)
Dept: CT IMAGING | Age: 39
DRG: 663 | End: 2024-02-13
Payer: COMMERCIAL

## 2024-02-13 LAB
ALBUMIN SERPL-MCNC: 1.9 G/DL (ref 3.5–5.2)
ALP SERPL-CCNC: 359 U/L (ref 40–129)
ALT SERPL-CCNC: 13 U/L (ref 5–41)
ANION GAP SERPL CALCULATED.3IONS-SCNC: 7 MMOL/L (ref 9–17)
APPEARANCE FLD: CLEAR
AST SERPL-CCNC: 49 U/L
BASOPHILS # BLD: 0.1 K/UL (ref 0–0.2)
BASOPHILS NFR BLD: 1 % (ref 0–2)
BILIRUB SERPL-MCNC: 6 MG/DL (ref 0.3–1.2)
BLASTS NFR FLD: 0 %
BODY FLD TYPE: ABNORMAL
BUN SERPL-MCNC: 11 MG/DL (ref 6–20)
CALCIUM SERPL-MCNC: 7.7 MG/DL (ref 8.6–10.4)
CASE NUMBER:: NORMAL
CHLORIDE SERPL-SCNC: 107 MMOL/L (ref 98–107)
CO2 SERPL-SCNC: 22 MMOL/L (ref 20–31)
COLOR FLD: YELLOW
CREAT SERPL-MCNC: 0.8 MG/DL (ref 0.7–1.2)
DAT POLY-SP REAG RBC QL: NEGATIVE
DATE, STOOL #1: NORMAL
EOSINOPHIL # BLD: 0.3 K/UL (ref 0–0.4)
EOSINOPHIL NFR FLD: 0 %
EOSINOPHILS RELATIVE PERCENT: 3 % (ref 0–4)
ERYTHROCYTE [DISTWIDTH] IN BLOOD BY AUTOMATED COUNT: 23.4 % (ref 11.5–14.9)
GFR SERPL CREATININE-BSD FRML MDRD: >60 ML/MIN/1.73M2
GLUCOSE SERPL-MCNC: 93 MG/DL (ref 70–99)
HCT VFR BLD AUTO: 30.7 % (ref 41–53)
HEMOCCULT SP1 STL QL: NEGATIVE
HGB BLD-MCNC: 10.6 G/DL (ref 13.5–17.5)
INR PPP: 1.5
LYMPHOCYTES NFR BLD: 1.5 K/UL (ref 1–4.8)
LYMPHOCYTES NFR FLD: 19 %
LYMPHOCYTES RELATIVE PERCENT: 15 % (ref 24–44)
MANUAL DIF COMMENT FLD-IMP: ABNORMAL
MCH RBC QN AUTO: 36 PG (ref 26–34)
MCHC RBC AUTO-ENTMCNC: 34.4 G/DL (ref 31–37)
MCV RBC AUTO: 104.4 FL (ref 80–100)
MONOCYTES NFR BLD: 0.8 K/UL (ref 0.1–1.3)
MONOCYTES NFR BLD: 8 % (ref 1–7)
MONOCYTES NFR FLD: 73 %
MORPHOLOGY: ABNORMAL
MORPHOLOGY: ABNORMAL
NEUTROPHILS NFR BLD: 73 % (ref 36–66)
NEUTROPHILS NFR FLD: 4 %
NEUTS SEG NFR BLD: 7.3 K/UL (ref 1.3–9.1)
PATH REV BLD -IMP: NORMAL
PLATELET # BLD AUTO: 102 K/UL (ref 150–450)
PMV BLD AUTO: 7.6 FL (ref 6–12)
POTASSIUM SERPL-SCNC: 4.6 MMOL/L (ref 3.7–5.3)
PROT SERPL-MCNC: 5.7 G/DL (ref 6.4–8.3)
PROTHROMBIN TIME: 18.2 SEC (ref 11.8–14.6)
RBC # BLD AUTO: 2.94 M/UL (ref 4.5–5.9)
RBC # FLD: 391 CELLS/UL
SODIUM SERPL-SCNC: 136 MMOL/L (ref 135–144)
SPECIMEN DESCRIPTION: NORMAL
SURGICAL PATHOLOGY REPORT: NORMAL
TIME, STOOL #1: NORMAL
UNIDENT CELLS NFR FLD: 4 %
WBC # FLD: 145 CELLS/UL
WBC OTHER # BLD: 10 K/UL (ref 3.5–11)

## 2024-02-13 PROCEDURE — 2060000000 HC ICU INTERMEDIATE R&B

## 2024-02-13 PROCEDURE — 99231 SBSQ HOSP IP/OBS SF/LOW 25: CPT | Performed by: INTERNAL MEDICINE

## 2024-02-13 PROCEDURE — A4216 STERILE WATER/SALINE, 10 ML: HCPCS

## 2024-02-13 PROCEDURE — 85610 PROTHROMBIN TIME: CPT

## 2024-02-13 PROCEDURE — 87070 CULTURE OTHR SPECIMN AEROBIC: CPT

## 2024-02-13 PROCEDURE — 6370000000 HC RX 637 (ALT 250 FOR IP)

## 2024-02-13 PROCEDURE — 82272 OCCULT BLD FECES 1-3 TESTS: CPT

## 2024-02-13 PROCEDURE — 36415 COLL VENOUS BLD VENIPUNCTURE: CPT

## 2024-02-13 PROCEDURE — 6360000002 HC RX W HCPCS

## 2024-02-13 PROCEDURE — 99254 IP/OBS CNSLTJ NEW/EST MOD 60: CPT | Performed by: INTERNAL MEDICINE

## 2024-02-13 PROCEDURE — 74176 CT ABD & PELVIS W/O CONTRAST: CPT

## 2024-02-13 PROCEDURE — 6370000000 HC RX 637 (ALT 250 FOR IP): Performed by: NURSE PRACTITIONER

## 2024-02-13 PROCEDURE — 2580000003 HC RX 258

## 2024-02-13 PROCEDURE — 87205 SMEAR GRAM STAIN: CPT

## 2024-02-13 PROCEDURE — 99233 SBSQ HOSP IP/OBS HIGH 50: CPT | Performed by: INTERNAL MEDICINE

## 2024-02-13 PROCEDURE — APPNB30 APP NON BILLABLE TIME 0-30 MINS: Performed by: NURSE PRACTITIONER

## 2024-02-13 PROCEDURE — C9113 INJ PANTOPRAZOLE SODIUM, VIA: HCPCS

## 2024-02-13 PROCEDURE — 85025 COMPLETE CBC W/AUTO DIFF WBC: CPT

## 2024-02-13 PROCEDURE — 80053 COMPREHEN METABOLIC PANEL: CPT

## 2024-02-13 PROCEDURE — 6360000002 HC RX W HCPCS: Performed by: INTERNAL MEDICINE

## 2024-02-13 PROCEDURE — 2580000003 HC RX 258: Performed by: INTERNAL MEDICINE

## 2024-02-13 PROCEDURE — 86880 COOMBS TEST DIRECT: CPT

## 2024-02-13 RX ORDER — METHYLPREDNISOLONE SODIUM SUCCINATE 1 G/16ML
40 INJECTION, POWDER, LYOPHILIZED, FOR SOLUTION INTRAMUSCULAR; INTRAVENOUS 2 TIMES DAILY
Status: DISCONTINUED | OUTPATIENT
Start: 2024-02-13 | End: 2024-02-13

## 2024-02-13 RX ADMIN — CEFTRIAXONE SODIUM 2000 MG: 2 INJECTION, POWDER, FOR SOLUTION INTRAMUSCULAR; INTRAVENOUS at 04:40

## 2024-02-13 RX ADMIN — NADOLOL 20 MG: 20 TABLET ORAL at 09:12

## 2024-02-13 RX ADMIN — RIFAXIMIN 200 MG: 200 TABLET ORAL at 09:12

## 2024-02-13 RX ADMIN — THIAMINE HCL TAB 100 MG 100 MG: 100 TAB at 09:08

## 2024-02-13 RX ADMIN — RIFAXIMIN 200 MG: 200 TABLET ORAL at 13:42

## 2024-02-13 RX ADMIN — LACTULOSE 20 G: 20 SOLUTION ORAL at 21:03

## 2024-02-13 RX ADMIN — LACTULOSE 20 G: 20 SOLUTION ORAL at 13:42

## 2024-02-13 RX ADMIN — PANTOPRAZOLE SODIUM 40 MG: 40 INJECTION, POWDER, FOR SOLUTION INTRAVENOUS at 09:08

## 2024-02-13 RX ADMIN — SODIUM CHLORIDE: 9 INJECTION, SOLUTION INTRAVENOUS at 04:34

## 2024-02-13 RX ADMIN — MIDODRINE HYDROCHLORIDE 2.5 MG: 2.5 TABLET ORAL at 17:02

## 2024-02-13 RX ADMIN — FOLIC ACID 1 MG: 1 TABLET ORAL at 09:08

## 2024-02-13 RX ADMIN — MIDODRINE HYDROCHLORIDE 2.5 MG: 2.5 TABLET ORAL at 12:11

## 2024-02-13 RX ADMIN — PANTOPRAZOLE SODIUM 40 MG: 40 INJECTION, POWDER, FOR SOLUTION INTRAVENOUS at 21:03

## 2024-02-13 RX ADMIN — OXYCODONE HYDROCHLORIDE 5 MG: 5 TABLET ORAL at 04:43

## 2024-02-13 RX ADMIN — LACTULOSE 20 G: 20 SOLUTION ORAL at 09:08

## 2024-02-13 RX ADMIN — OXYCODONE HYDROCHLORIDE 5 MG: 5 TABLET ORAL at 19:16

## 2024-02-13 RX ADMIN — WATER 40 MG: 1 INJECTION INTRAMUSCULAR; INTRAVENOUS; SUBCUTANEOUS at 21:03

## 2024-02-13 RX ADMIN — SODIUM CHLORIDE, PRESERVATIVE FREE 10 ML: 5 INJECTION INTRAVENOUS at 21:04

## 2024-02-13 RX ADMIN — OXYCODONE HYDROCHLORIDE 5 MG: 5 TABLET ORAL at 11:16

## 2024-02-13 RX ADMIN — SODIUM CHLORIDE, PRESERVATIVE FREE 10 ML: 5 INJECTION INTRAVENOUS at 09:08

## 2024-02-13 RX ADMIN — MIDODRINE HYDROCHLORIDE 2.5 MG: 2.5 TABLET ORAL at 09:08

## 2024-02-13 RX ADMIN — RIFAXIMIN 200 MG: 200 TABLET ORAL at 21:02

## 2024-02-13 NOTE — PLAN OF CARE
Problem: Discharge Planning  Goal: Discharge to home or other facility with appropriate resources  2/13/2024 0537 by Gabby Pool, RN  Outcome: Progressing  Flowsheets (Taken 2/13/2024 0537)  Discharge to home or other facility with appropriate resources:   Identify barriers to discharge with patient and caregiver   Identify discharge learning needs (meds, wound care, etc)   Refer to discharge planning if patient needs post-hospital services based on physician order or complex needs related to functional status, cognitive ability or social support system   Arrange for needed discharge resources and transportation as appropriate  Note: Inform pt. Of discharge teaching and planned. Instructed pt. To inform me if further teaching need to be done.      Problem: Chronic Conditions and Co-morbidities  Goal: Patient's chronic conditions and co-morbidity symptoms are monitored and maintained or improved  2/13/2024 0537 by Gabby Pool, RN  Outcome: Progressing  Flowsheets (Taken 2/13/2024 0537)  Care Plan - Patient's Chronic Conditions and Co-Morbidity Symptoms are Monitored and Maintained or Improved:   Monitor and assess patient's chronic conditions and comorbid symptoms for stability, deterioration, or improvement   Collaborate with multidisciplinary team to address chronic and comorbid conditions and prevent exacerbation or deterioration   Update acute care plan with appropriate goals if chronic or comorbid symptoms are exacerbated and prevent overall improvement and discharge  Note: Made sure pt. Understood what their conditions was and why it was occurring. Also educated pt. On ways to prevent the condition from worsening and from occurring again.      Problem: Safety - Adult  Goal: Free from fall injury  2/13/2024 0537 by Gabby Pool, RN  Outcome: Progressing  Flowsheets (Taken 2/13/2024 0537)  Free From Fall Injury:   Instruct family/caregiver on patient safety   Based on caregiver fall risk screen,

## 2024-02-13 NOTE — CARE COORDINATION
ONGOING DISCHARGE PLAN:    Patient was sound asleep at the time of Visit.     Writer reviewed chart Notes.     Pt. Is from Home w/ SO.     VNS, was denied.     HGB today 10.6, Was transfused this admission.     Pt. Remains on Lactulose, Xifaxin, is not covered by his Insurance.     LFT's remains Elevated, but are improving. GI on board.     PT/OT on board.     CT Abd/Pelvis today.    Will continue to follow for additional discharge needs.    If patient is discharged prior to next notation, then this note serves as note for discharge by case management.    Electronically signed by Zoey Oconnor RN on 2/13/2024 at 3:22 PM

## 2024-02-13 NOTE — CONSULTS
Today's Date: 2/13/2024  Patient Name: Sam Emerson  Date of admission: 2/11/2024 10:51 PM  Patient's age: 39 y.o., 1985  Admission Dx: Sepsis (HCC) [A41.9]  Anemia, unspecified type [D64.9]    Reason for Consult:  drop in hemoglobin, unlikely to be GI related   Requesting Physician: Quentin Guevara MD    CHIEF COMPLAINT:    Chief Complaint   Patient presents with    Emesis    Dizziness       History Obtained From:  patient and chart    HISTORY OF PRESENT ILLNESS:      Sam Emerson is a 39 y.o. male who is admitted to the hospital on 2/11/2024  for fatigue, emesis.    He has past medical history of alcoholic cirrhosis of liver,TIPS, anemia, pulmonary embolism.   On admission hemoglobin noted to be low at  4.2  Patient is further admitted for the management and further evaluation of anemia.  Now his hemoglobin is stable around 10.  His labs showed Low hapto less than  10 and   Seen by GI and he had EGD and colonoscopy  in Dece 2023 showed 4 to 5 mm ulcer was noted just above the anorectal area and EGF showed Small esophageal varices x 2 without high risk features or bleeding., moderate portal hypertensive gastropathy    Past Medical History:   has a past medical history of Alcoholism (HCC), Anemia, Cirrhosis, alcoholic (HCC), and Pulmonary embolism (HCC).    Past Surgical History:   has a past surgical history that includes Upper gastrointestinal endoscopy (N/A, 05/01/2022); Upper gastrointestinal endoscopy (N/A, 10/30/2022); Upper gastrointestinal endoscopy (N/A, 09/27/2023); Esophagogastroduodenoscopy (12/05/2023); IR TIPS INSERTION (12/05/2023); IR NONTUNNELED VASCULAR CATHETER > 5 YEARS (12/05/2023); Upper gastrointestinal endoscopy (N/A, 12/05/2023); Upper gastrointestinal endoscopy (N/A, 12/07/2023); Upper gastrointestinal endoscopy (12/07/2023); Upper gastrointestinal endoscopy (12/07/2023); Esophagogastroduodenoscopy (12/29/2023); Colonoscopy (12/30/2023); Upper gastrointestinal

## 2024-02-14 VITALS
HEIGHT: 68 IN | SYSTOLIC BLOOD PRESSURE: 118 MMHG | BODY MASS INDEX: 25.19 KG/M2 | DIASTOLIC BLOOD PRESSURE: 81 MMHG | WEIGHT: 166.23 LBS | TEMPERATURE: 97.7 F | OXYGEN SATURATION: 100 % | HEART RATE: 72 BPM | RESPIRATION RATE: 16 BRPM

## 2024-02-14 LAB
ALBUMIN SERPL-MCNC: 2 G/DL (ref 3.5–5.2)
ALP SERPL-CCNC: 383 U/L (ref 40–129)
ALT SERPL-CCNC: 11 U/L (ref 5–41)
ANION GAP SERPL CALCULATED.3IONS-SCNC: 9 MMOL/L (ref 9–17)
AST SERPL-CCNC: 41 U/L
BASOPHILS # BLD: 0.08 K/UL (ref 0–0.2)
BASOPHILS NFR BLD: 1 % (ref 0–2)
BILIRUB SERPL-MCNC: 5.3 MG/DL (ref 0.3–1.2)
BUN SERPL-MCNC: 11 MG/DL (ref 6–20)
CALCIUM SERPL-MCNC: 7.9 MG/DL (ref 8.6–10.4)
CHLORIDE SERPL-SCNC: 105 MMOL/L (ref 98–107)
CO2 SERPL-SCNC: 19 MMOL/L (ref 20–31)
CREAT SERPL-MCNC: 0.7 MG/DL (ref 0.7–1.2)
EOSINOPHIL # BLD: 0.08 K/UL (ref 0–0.4)
EOSINOPHILS RELATIVE PERCENT: 1 % (ref 0–4)
ERYTHROCYTE [DISTWIDTH] IN BLOOD BY AUTOMATED COUNT: 22 % (ref 11.5–14.9)
GFR SERPL CREATININE-BSD FRML MDRD: >60 ML/MIN/1.73M2
GLUCOSE SERPL-MCNC: 163 MG/DL (ref 70–99)
HCT VFR BLD AUTO: 30.7 % (ref 41–53)
HGB BLD-MCNC: 10.6 G/DL (ref 13.5–17.5)
LYMPHOCYTES NFR BLD: 0.45 K/UL (ref 1–4.8)
LYMPHOCYTES RELATIVE PERCENT: 6 % (ref 24–44)
MCH RBC QN AUTO: 35.7 PG (ref 26–34)
MCHC RBC AUTO-ENTMCNC: 34.5 G/DL (ref 31–37)
MCV RBC AUTO: 103.5 FL (ref 80–100)
MICROORGANISM SPEC CULT: NORMAL
MICROORGANISM/AGENT SPEC: NORMAL
MONOCYTES NFR BLD: 0.08 K/UL (ref 0.1–1.3)
MONOCYTES NFR BLD: 1 % (ref 1–7)
MORPHOLOGY: ABNORMAL
NEUTROPHILS NFR BLD: 91 % (ref 36–66)
NEUTS SEG NFR BLD: 6.81 K/UL (ref 1.3–9.1)
PLATELET # BLD AUTO: 100 K/UL (ref 150–450)
PMV BLD AUTO: 7.6 FL (ref 6–12)
POTASSIUM SERPL-SCNC: 4.3 MMOL/L (ref 3.7–5.3)
PROT SERPL-MCNC: 5.8 G/DL (ref 6.4–8.3)
RBC # BLD AUTO: 2.97 M/UL (ref 4.5–5.9)
SODIUM SERPL-SCNC: 133 MMOL/L (ref 135–144)
SPECIMEN DESCRIPTION: NORMAL
SURGICAL PATHOLOGY REPORT: NORMAL
WBC OTHER # BLD: 7.5 K/UL (ref 3.5–11)

## 2024-02-14 PROCEDURE — 99232 SBSQ HOSP IP/OBS MODERATE 35: CPT | Performed by: INTERNAL MEDICINE

## 2024-02-14 PROCEDURE — 30233N1 TRANSFUSION OF NONAUTOLOGOUS RED BLOOD CELLS INTO PERIPHERAL VEIN, PERCUTANEOUS APPROACH: ICD-10-PCS | Performed by: INTERNAL MEDICINE

## 2024-02-14 PROCEDURE — 6370000000 HC RX 637 (ALT 250 FOR IP): Performed by: NURSE PRACTITIONER

## 2024-02-14 PROCEDURE — 2580000003 HC RX 258

## 2024-02-14 PROCEDURE — 99239 HOSP IP/OBS DSCHRG MGMT >30: CPT | Performed by: INTERNAL MEDICINE

## 2024-02-14 PROCEDURE — C9113 INJ PANTOPRAZOLE SODIUM, VIA: HCPCS

## 2024-02-14 PROCEDURE — 36415 COLL VENOUS BLD VENIPUNCTURE: CPT

## 2024-02-14 PROCEDURE — 80053 COMPREHEN METABOLIC PANEL: CPT

## 2024-02-14 PROCEDURE — 6370000000 HC RX 637 (ALT 250 FOR IP)

## 2024-02-14 PROCEDURE — 0W9G3ZZ DRAINAGE OF PERITONEAL CAVITY, PERCUTANEOUS APPROACH: ICD-10-PCS | Performed by: INTERNAL MEDICINE

## 2024-02-14 PROCEDURE — A4216 STERILE WATER/SALINE, 10 ML: HCPCS

## 2024-02-14 PROCEDURE — 6360000002 HC RX W HCPCS

## 2024-02-14 PROCEDURE — 2580000003 HC RX 258: Performed by: INTERNAL MEDICINE

## 2024-02-14 PROCEDURE — 85025 COMPLETE CBC W/AUTO DIFF WBC: CPT

## 2024-02-14 PROCEDURE — 6360000002 HC RX W HCPCS: Performed by: INTERNAL MEDICINE

## 2024-02-14 PROCEDURE — APPNB30 APP NON BILLABLE TIME 0-30 MINS: Performed by: NURSE PRACTITIONER

## 2024-02-14 PROCEDURE — 99231 SBSQ HOSP IP/OBS SF/LOW 25: CPT | Performed by: INTERNAL MEDICINE

## 2024-02-14 RX ORDER — TRAMADOL HYDROCHLORIDE 50 MG/1
25 TABLET ORAL DAILY
Qty: 5 TABLET | Refills: 0 | Status: SHIPPED | OUTPATIENT
Start: 2024-02-14 | End: 2024-02-24

## 2024-02-14 RX ORDER — TRAMADOL HYDROCHLORIDE 50 MG/1
25 TABLET ORAL DAILY
Qty: 5 TABLET | Refills: 0 | Status: SHIPPED | OUTPATIENT
Start: 2024-02-14 | End: 2024-02-14

## 2024-02-14 RX ADMIN — NADOLOL 20 MG: 20 TABLET ORAL at 07:45

## 2024-02-14 RX ADMIN — CEFTRIAXONE SODIUM 2000 MG: 2 INJECTION, POWDER, FOR SOLUTION INTRAMUSCULAR; INTRAVENOUS at 05:29

## 2024-02-14 RX ADMIN — OXYCODONE HYDROCHLORIDE 5 MG: 5 TABLET ORAL at 07:59

## 2024-02-14 RX ADMIN — FOLIC ACID 1 MG: 1 TABLET ORAL at 07:47

## 2024-02-14 RX ADMIN — SODIUM CHLORIDE, PRESERVATIVE FREE 10 ML: 5 INJECTION INTRAVENOUS at 07:46

## 2024-02-14 RX ADMIN — LACTULOSE 20 G: 20 SOLUTION ORAL at 07:46

## 2024-02-14 RX ADMIN — OXYCODONE HYDROCHLORIDE 5 MG: 5 TABLET ORAL at 01:44

## 2024-02-14 RX ADMIN — MIDODRINE HYDROCHLORIDE 2.5 MG: 2.5 TABLET ORAL at 07:47

## 2024-02-14 RX ADMIN — WATER 40 MG: 1 INJECTION INTRAMUSCULAR; INTRAVENOUS; SUBCUTANEOUS at 07:47

## 2024-02-14 RX ADMIN — MIDODRINE HYDROCHLORIDE 2.5 MG: 2.5 TABLET ORAL at 11:35

## 2024-02-14 RX ADMIN — RIFAXIMIN 200 MG: 200 TABLET ORAL at 07:45

## 2024-02-14 RX ADMIN — THIAMINE HCL TAB 100 MG 100 MG: 100 TAB at 07:47

## 2024-02-14 RX ADMIN — PANTOPRAZOLE SODIUM 40 MG: 40 INJECTION, POWDER, FOR SOLUTION INTRAVENOUS at 07:47

## 2024-02-14 NOTE — DISCHARGE INSTR - COC
Catheter:596744483}   Colostomy/Ileostomy/Ileal Conduit: {YES / NO:22635}       Date of Last BM: ***  No intake or output data in the 24 hours ending 24 1347  I/O last 3 completed shifts:  In: 720 [P.O.:720]  Out: -     Safety Concerns:     { [facility] Safety Concerns:097427284}    Impairments/Disabilities:      { [facility] Impairments/Disabilities:249086609}    Nutrition Therapy:  Current Nutrition Therapy:   { [facility] Diet List:152494568}    Routes of Feeding: {[facility] DME Other Feedings:398190666}  Liquids: {Slp liquid thickness:69010}  Daily Fluid Restriction: {[facility] DME Yes amt example:757186470}  Last Modified Barium Swallow with Video (Video Swallowing Test): {Done Not Done Date:}    Treatments at the Time of Hospital Discharge:   Respiratory Treatments: ***  Oxygen Therapy:  {Therapy; copd oxygen:97492}  Ventilator:    { CC Vent List:899099185}    Rehab Therapies: {THERAPEUTIC INTERVENTION:6687490091}  Weight Bearing Status/Restrictions: {[facility] Weight Bearin}  Other Medical Equipment (for information only, NOT a DME order):  {EQUIPMENT:641732966}  Other Treatments: ***    Patient's personal belongings (please select all that are sent with patient):  {[facility] DME Belongings:830111696}    RN SIGNATURE:  {Esignature:138931947}    CASE MANAGEMENT/SOCIAL WORK SECTION    Inpatient Status Date: ***    Readmission Risk Assessment Score:  Readmission Risk              Risk of Unplanned Readmission:  70           Discharging to Facility/ Agency   Name:   Address:  Phone:  Fax:    Dialysis Facility (if applicable)   Name:  Address:  Dialysis Schedule:  Phone:  Fax:    / signature: {Esignature:828326968}    PHYSICIAN SECTION    Prognosis: {Prognosis:5416930110}    Condition at Discharge: { Patient Condition:465846358}    Rehab Potential (if transferring to Rehab): {Prognosis:9216612978}    Recommended Labs or Other Treatments After Discharge: ***    Physician Certification: I certify

## 2024-02-14 NOTE — CARE COORDINATION
ONGOING DISCHARGE PLAN:    Patient is alert and oriented x4.    Spoke with patient regarding discharge plan and patient confirms that plan is still to DC to home w/ SO.     Denies VNS.    Writer did inform Dr. Stone, about Pt. Not being able to afford his Xifaxin & she will DC off his med list & When she sees him in the Clinic, she will speak to him about this.    Writer is also checking w/ Pharmacist here, for any alternatives/suggestions. Writer will follow.     GI on board. Pt. Had Paracentesis on 2/12/24 w/ 2.1 Liters taken off.     Pt. Has Apt. Already scheduled w/ Dr. Olivarez on 3/7/24 @ 3:00 PM.     May need ride home.     Pt. Denies other needs at this time.     Will continue to follow for additional discharge needs.    If patient is discharged prior to next notation, then this note serves as note for discharge by case management.    Electronically signed by Zoey Oconnor RN on 2/14/2024 at 1:28 PM       ADD: Writer spoke to Will, Pharmacist, He will speak to the Pt. In regards to Xifaxin information, he has found.

## 2024-02-14 NOTE — PLAN OF CARE
Problem: Discharge Planning  Goal: Discharge to home or other facility with appropriate resources  Outcome: Adequate for Discharge     Problem: Skin/Tissue Integrity  Goal: Absence of new skin breakdown  Description: 1.  Monitor for areas of redness and/or skin breakdown  2.  Assess vascular access sites hourly  3.  Every 4-6 hours minimum:  Change oxygen saturation probe site  4.  Every 4-6 hours:  If on nasal continuous positive airway pressure, respiratory therapy assess nares and determine need for appliance change or resting period.  Outcome: Adequate for Discharge     Problem: Chronic Conditions and Co-morbidities  Goal: Patient's chronic conditions and co-morbidity symptoms are monitored and maintained or improved  Outcome: Adequate for Discharge     Problem: ABCDS Injury Assessment  Goal: Absence of physical injury  Outcome: Adequate for Discharge     Problem: Safety - Adult  Goal: Free from fall injury  Outcome: Adequate for Discharge     Problem: Pain  Goal: Verbalizes/displays adequate comfort level or baseline comfort level  Outcome: Adequate for Discharge

## 2024-02-14 NOTE — DISCHARGE SUMMARY
by mouth in the morning and 1 tablet in the evening.     spironolactone 50 MG tablet  Commonly known as: ALDACTONE  Take 1 tablet by mouth daily     vitamin B-1 100 MG tablet  Commonly known as: THIAMINE  Take 1 tablet by mouth daily            STOP taking these medications      naproxen 500 MG tablet  Commonly known as: Naprosyn     rifAXIMin 200 MG tablet  Commonly known as: XIFAXAN               Where to Get Your Medications        These medications were sent to [id number] - [address] -  [phone] - [phone]  [address] [place] [address]      Phone: [phone]   traMADol 50 MG tablet         Time Spent on discharge is  40 mins in patient examination, evaluation, counseling as well as medication reconciliation, prescriptions for required medications, discharge plan and follow up.    Electronically signed by   Marshall Acevedo MD  2/14/2024  2:09 PM      Thank you Rosie Gonzales MD for the opportunity to be involved in this patient's care.

## 2024-02-14 NOTE — PROGRESS NOTES
Physical Therapy Cancel Note      DATE: 2024    NAME: Sam Emerson  MRN: [id number]   : 1985      Patient not seen this date for Physical Therapy due to:    Patient independent with functional mobility. Pt demo's ability to ambulate in room with mild unsteadiness d/t just waking up, otherwise no assistance required. Pt denies concerns for mobility or self care at home. Will defer PT evaluation at this time. Please reorder PT if future needs arise. Time: [id number]      Electronically signed by Kandice Reyes PT on 2024 at 12:20 PM    
    [facility]    Gastroenterology Daily Progress Note      Patient:   Sam Emerson   :    1985   [facility]  Date:     2024  Consultant:   SILVA Ziegler CNP, CNP      SUBJECTIVE  39 y.o. male admitted 2024 with Sepsis (HCC) [A41.9]  Anemia, unspecified type [D64.9] and seen for decompensated cirrhosis and anemia. The pt was seen and examined. He is s/p paracentesis yesterday with removal of 2.1 liters of fluid, fluid negative for sbp. Reports decreased abdominal pain. Hgb 10.6. no BM overnight. .        OBJECTIVE  Scheduled Meds:   sodium chloride flush  5-40 mL IntraVENous 2 times per day    folic acid  1 mg Oral Daily    [Held by provider] furosemide  20 mg Oral Daily    lactulose  20 g Oral TID    midodrine  2.5 mg Oral TID WC    nadolol  20 mg Oral Daily    nicotine  1 patch TransDERmal Daily    rifAXIMin  200 mg Oral TID    [Held by provider] spironolactone  50 mg Oral Daily    vitamin B-1  100 mg Oral Daily    pantoprazole (PROTONIX) 40 mg in sodium chloride (PF) 0.9 % 10 mL injection  40 mg IntraVENous Q12H    cefTRIAXone (ROCEPHIN) IV  2,000 mg IntraVENous Q24H    sodium chloride  100 mL IntraVENous Once       Vital Signs:  /62   Pulse 73   Temp 98 °F (36.7 °C) (Oral)   Resp 18   Ht 1.727 m (5' 7.99\")   Wt 75.4 kg (166 lb 3.6 oz)   SpO2 99%   BMI 25.28 kg/m²    Review of Systems - History obtained from chart review and the patient  General ROS: positive for  - fatigue  Respiratory ROS: no cough, shortness of breath, or wheezing  Cardiovascular ROS: no chest pain or dyspnea on exertion  Gastrointestinal ROS: positive for - abdominal pain   Physical Exam:     General Appearance: alert and oriented to person, place and time, well-developed and well-nourished, in no acute distress  Skin: warm and dry, no rash or erythema pt has jaundice  Head: normocephalic and atraumatic  Eyes: pupils equal, round, and reactive to light, 
    [facility] Internal Medicine  Guy Nicholson MD; Luis Bradley MD; Rick Brewster MD; MD Jolynn Olea MD; Quentin Guevara MD  [facility] Internal Medicine   IN-PATIENT SERVICE  [facility]     HISTORY AND PHYSICAL EXAMINATION            Date:   2/12/2024  Patientname:  Sam Emerson  Date of admission:  2/11/2024 10:51 PM  MRN:   [id number]  Account:  [id number]  YOB: 1985  PCP:    Rosie Montes De Oca MD  Room:   04/04  Code Status:    Prior      Chief Complaint:     Chief Complaint   Patient presents with    Emesis    Dizziness       History Obtained From:     patient    History of Present Illness:     Sam Emerson is a 39 y.o. Non- / non  male who presents with Emesis and Dizziness   and is admitted to the hospital for the management of Sepsis (HCC).    According to patient, he has felt extremely fatigued and dizzy for the past 24 hours.  Yesterday evening when he stood up too quickly he became lightheaded and fell; upon presentation to the ER his hemoglobin was 4.2.  He endorses a history of liver failure and has been compliant with his lactulose that was recently increased.  He also reports having several thoracentesis recently each 1 about 2 weeks apart.  Chart reviews TIPS procedure currently in place as of January 8, 2024. He reports no signs of bleeding, no excessive bruising, epistaxis, dark, tarry stool, or blood in his urine.  He does endorse increased pitting edema in both lower extremities and cramping pain in his right leg.  Assessment reveals that the patient is jaundiced with a soft, tender abdomen predominantly on the left side.  He has active bowel sounds in all 4 quadrants.  Heart sounds S1-S2, mild murmur noted.  His lung sounds are clear and he is alert, oriented and appropriate.  The skin appears to be free from rashes or bruising.  No enlarged lymph nodes noted.  Past Medical History:     Past Medical History: 
    Today's Date: 2/14/2024  Patient Name: Sam Emerson  Date of admission: 2/11/2024 10:51 PM  Patient's age: 39 y.o., 1985  Admission Dx: Sepsis (HCC) [A41.9]  Anemia, unspecified type [D64.9]    Reason for Consult:  drop in hemoglobin, unlikely to be GI related   Requesting Physician: Quentin Guevara MD    CHIEF COMPLAINT:    Chief Complaint   Patient presents with    Emesis    Dizziness       History Obtained From:  patient and chart  INTERVAL HISTORY :    Patient seen and examined  HB stable LEANA is negative  No new events    HISTORY OF PRESENT ILLNESS:    Sam Emerson is a 39 y.o. male who is admitted to the hospital on 2/11/2024  for fatigue, emesis.    He has past medical history of alcoholic cirrhosis of liver,TIPS, anemia, pulmonary embolism.   On admission hemoglobin noted to be low at  4.2  Patient is further admitted for the management and further evaluation of anemia.  Now his hemoglobin is stable around 10.  His labs showed Low hapto less than  10 and   Seen by GI and he had EGD and colonoscopy  in Dece 2023 showed 4 to 5 mm ulcer was noted just above the anorectal area and EGF showed Small esophageal varices x 2 without high risk features or bleeding., moderate portal hypertensive gastropathy    Past Medical History:   has a past medical history of Alcoholism (HCC), Anemia, Cirrhosis, alcoholic (HCC), and Pulmonary embolism (HCC).    Past Surgical History:   has a past surgical history that includes Upper gastrointestinal endoscopy (N/A, 05/01/2022); Upper gastrointestinal endoscopy (N/A, 10/30/2022); Upper gastrointestinal endoscopy (N/A, 09/27/2023); Esophagogastroduodenoscopy (12/05/2023); IR TIPS INSERTION (12/05/2023); IR NONTUNNELED VASCULAR CATHETER > 5 YEARS (12/05/2023); Upper gastrointestinal endoscopy (N/A, 12/05/2023); Upper gastrointestinal endoscopy (N/A, 12/07/2023); Upper gastrointestinal endoscopy (12/07/2023); Upper gastrointestinal endoscopy (12/07/2023); 
   02/12/24 2000   Encounter Summary   Encounter Overview/Reason  Spiritual/Emotional Needs   Service Provided For: Patient   Referral/Consult From: Charity   Last Encounter  02/12/24   Complexity of Encounter Moderate   Spiritual/Emotional needs   Type Spiritual Support   Assessment/Intervention/Outcome   Assessment Calm;Coping;Peaceful   Intervention Active listening;Explored/Affirmed feelings, thoughts, concerns;Prayer (assurance of)/[place];Sustaining Presence/Ministry of presence   Outcome Comfort;Acceptance;Coping;Engaged in conversation;Peace;Receptive       
  [facility]  IN-PATIENT SERVICE  [facility]    PROGRESS NOTE             2/13/2024    7:56 AM    Name:   Sam Emerson  MRN:     [id number]     Acct:      [id number]   Room:   2089/2089-01   Day:  1  Admit Date:  2/11/2024 10:51 PM    PCP:  Rosie Montes De Oca MD  Code Status:  Full Code    Subjective:     C/C:   Chief Complaint   Patient presents with    Emesis    Dizziness     Interval History Status: improved.    Patient seen and examined at bedside  Vitals stable  Patient complaining of mild pain in the abdomen, on exam mild tenderness present  Hemoglobin today stable at around 10.6.  He underwent abdominal paracentesis yesterday and 2.1 L of fluid was removed.  Ascites fluid labs not suggestive of SBP.  Vascular duplex showed no evidence of deep or superficial vein thrombus .CT chest PE negative for pulmonary embolism.      Brief History:     39 years old male with past medical history of alcoholic cirrhosis of liver,TIPS, anemia, pulmonary embolism, hyperammonemia presented to ED with complaints of fatigue, weakness and increased somnolence.  He has had multiple admissions in the past for worsening liver failure, upper GI bleed and spontaneous bacterial peritonitis.     As per the patient, he does not have a recent history of hematemesis and melena.  He denied any recent history of fever, chills, shortness of breath, chest pain.  At the ED his hemoglobin was 4.2 and liver function test showed bilirubin total of 6.6 and direct of 3.9, , AST of 46, anemia of 65, WBC count is elevated 15.6.  Patient is further admitted for the management and further evaluation of anemia.     On examination at ICU, he is alert oriented to time place and person.  Eyes exams revealed jaundiced pallor.  Poor abdominal examination is soft but mild generalized tenderness present.    Review of Systems:     Review of Systems  Constitutional:  Positive for fatigue. Negative for 
..Discharge teaching and instructions for diagnosis of anemia completed with patient using teachback method. AVS reviewed. Escripted Rxs to home pharmacy. Patient voiced understanding regarding prescriptions, follow up appointments, and care of self at home. Denies questions. IV d/c'd with cath intact and drsg applied. Skin Pk/W/D. Easy respirations. Denies pain. D/c'd with all belongings. Discharged in a wheelchair to  home with support per  cab.    
Bandaid placed. Pt tolerated well  
CLINICAL PHARMACY NOTE: MEDS TO BEDS    Total # of Prescriptions Filled: 1   The following medications were delivered to the patient:  Lactulose 10gm/15ml    Additional Documentation:  Delivered medications to patients room   
Informed resident team that Dr. Heath is okay with patient moving out of ICU to PCU if hemoglobin is stable post transfusion. Informed resident team that repeat hgb post 2u PRBC is 11.7  
Jeff Cassidy MD   Patient:  MAURISIO MENA   YOB: 1985  MRN:  [id number]  [facility]: [facility]    2089-01 2/13/24 12:17 PM   [phone] Hospital or Facility: [facility] PRO CARE From: Yuli Estrada RE: MAURISIO MENA RM: 2089-01 drop in hemoglobin, unlikely to be GI related  Unrea   
[facility]   OCCUPATIONAL THERAPY MISSED TREATMENT NOTE   INPATIENT   Date: 24  Patient Name: Sam Emerson       Room:   MRN: [id number]   Account #: [id number]    : 1985  (39 y.o.)  Gender: male     REASON FOR MISSED TREATMENT:  Pt reports that he is IND with self-care and mobility, demo'd in/out of bed and ambulating to door and back.  No need for skilled OT intervention at this time.    -   OT being discontinued at this time. Patient functioning at Premorbid Level  No further needs  1040    Electronically signed by HARRIET Del Real on 24 at 11:46 AM EST    
Patient arrived to room. Blood running at 150ml/hr, IV checked no signs of infiltration. Patient assessment completed, and patient orientated to room.   
Patient educated on 1500 ml fluid restriction   
Patient educated on Npo status, very agitated. He states he is having pain in his abdomen, resident team notified.   
Patient known from previous admissions; patient provided medical update; patient talked about this wife's medical issues and the difficulties he has with everyday responsibilities; listening presence and support; declined prayer; blessing given     02/13/24 3812   Encounter Summary   Encounter Overview/Reason  Spiritual/Emotional Needs   Service Provided For: Patient   Referral/Consult From: Charity   Last Encounter  02/13/24   Complexity of Encounter Moderate   Spiritual/Emotional needs   Type Spiritual Support   Grief, Loss, and Adjustments   Type Adjustment to illness   Assessment/Intervention/Outcome   Assessment Coping;Hopeful;Powerlessness   Intervention Active listening;Discussed illness injury and it’s impact;Explored/Affirmed feelings, thoughts, concerns;Prayer (assurance of)/[place];Sustaining Presence/Ministry of presence   Outcome Coping;Engaged in conversation;Expressed feelings, needs, and concerns;Expressed Gratitude;Receptive         
Report called to Fiona PALMA on PCU   
Right abd prepped and draped. Numbed and accessed per Dr. Kaplan. 60 ml clear yellow fluid obtained and sent to lab. 2.1 liters obtained. Pt tolerated well.  
Sharon NP with EMILY mark with patient   
Sharon NP with GI okay with patient resuming diet as patient is not having any active signs of bleeding, and there is no plans for scope/ intervention at this time. She states she is okay with low sodium diet, rounding information relayed to the resident team.   
Spoke with resident team about lactic acid being normal and lab inquiring about discontinuing them. Also, that the patient has not had any labs done since ER. Resident team to d/c lactic levels and place orders for AM labs.   
Writer received a call from micro stating that pt sputum sample was contaminated and a new order and sample will need be obtain. New order for Resp.Culture place. Writer inform pt that we would need another sputum sample, pt stated he understood.   
Tanika Duncan APRN - NP, 50 mg at 02/12/24 0823    thiamine mononitrate tablet 100 mg, 100 mg, Oral, Daily, Tanika Duncan APRN - NP, 100 mg at 02/13/24 0908    0.9 % sodium chloride infusion, , IntraVENous, PRN, Tanika Duncan APRN - NP    pantoprazole (PROTONIX) 40 mg in sodium chloride (PF) 0.9 % 10 mL injection, 40 mg, IntraVENous, Q12H, Ny Maldonado MD, 40 mg at 02/13/24 0908    cefTRIAXone (ROCEPHIN) 2,000 mg in sodium chloride 0.9 % 50 mL IVPB (mini-bag), 2,000 mg, IntraVENous, Q24H, Ny Maldonado MD, Stopped at 02/13/24 0538    sodium chloride flush 0.9 % injection 10 mL, 10 mL, IntraVENous, PRN, Rosie Montes De Oca MD, 10 mL at 02/12/24 1330    sodium chloride 0.9 % bolus 100 mL, 100 mL, IntraVENous, Once, Rosie Montes De Oca MD    oxyCODONE (ROXICODONE) immediate release tablet 5 mg, 5 mg, Oral, Q6H PRN, Ny Maldonado MD, 5 mg at 02/13/24 1116    ondansetron (ZOFRAN) tablet 4 mg, 4 mg, Oral, Q8H PRN, Ny Maldonado MD, 4 mg at 02/12/24 1611    potassium chloride (KLOR-CON M) extended release tablet 40 mEq, 40 mEq, Oral, PRN, 40 mEq at 02/12/24 2239 **OR** potassium bicarb-citric acid (EFFER-K) effervescent tablet 40 mEq, 40 mEq, Oral, PRN **OR** potassium chloride 10 mEq/100 mL IVPB (Peripheral Line), 10 mEq, IntraVENous, PRN, Radha Garcia APRN - NP    Lab Results:     Lab Results   Component Value Date    WBC 10.0 02/13/2024    HGB 10.6 (L) 02/13/2024    HCT 30.7 (L) 02/13/2024    .4 (H) 02/13/2024     (L) 02/13/2024     Lab Results   Component Value Date    CALCIUM 7.7 (L) 02/13/2024     02/13/2024    K 4.6 02/13/2024    CO2 22 02/13/2024     02/13/2024    BUN 11 02/13/2024    CREATININE 0.8 02/13/2024       Lab Results   Component Value Date    INR 1.5 02/13/2024    PROTIME 18.2 (H) 02/13/2024       Radiology:       ASSESSMENT:       Severe symptomatic anemia-hemoglobin 4.9, received 2 units, no obvious external bleeding  Alcoholic cirrhosis-very 
patent on us 1/2/24  S/p paracentesis negative for sbp; culture is pending  Continue lactulose and xifaxin  Continue nadolol and proamatine  Avoid sedatives and narcotics  2gm low salt diet         This plan was formulated in collaboration with Dr. Fam .    Electronically signed by: SILVA Ziegler CNP on 2/14/2024 at 8:40 AM     Attending Physician Statement  I have discussed the care of Sam Emerson and   I have examined the patient myselft independently, and taken ros and hpi , including pertinent history and exam findings,  with the author of this note .   I have reviewed the key elements of all parts of the encounter with the nurse practitioner/resident.    I agree with the assessment, plan and orders as documented by the above health care provider     More than 50% of the time was spent taking care of this patient in addition to the nurse practitioner time.  That also included history taking follow-up physical examination and review of system, and formulating the plan and the orders          hysical Exam  Blood pressure 118/81, pulse 72, temperature 97.7 °F (36.5 °C), temperature source Oral, resp. rate 16, height 1.727 m (5' 7.99\"), weight 75.4 kg (166 lb 3.6 oz), SpO2 100 %.         Additional :    CT showing no retroperitoneal bleed  There is no sign of GI bleed  We can follow this patient outpatient  Continue lactulose and Xifaxan  Continue nadolol  Paracentesis as needed      This note was created with the assistance of a speech-recognition program.  Although the intention is to generate a document that actually reflects the content of the visit, no guarantees can be provided that every mistake has been identified and corrected by editing.   Electronically signed by Anitra Fam MD      
to patient if care not provided in a hospital setting.       Marshall Acevedo MD  PGY1-Transitional Year Resident  2/14/2024  7:13 AM     This note is created with the assistance of the speech recognition program. While intending to generate a document that actually reflects the content of the visit, it can still have some errors including those of syntax and sound-a-like substitutions which may escape proof reading. Actual meaning can be extrapolated by contextual inference.   Attending Physician Statement  I have discussed the care of Sam Emerson and I have examined the patient myself and taken ROS and HPI, including pertinent history and exam findings, with the resident. I have reviewed the key elements of all parts of the encounter with the resident.  I agree with the assessment, plan and orders as documented by the resident.    Patient seen and examined  Hemoglobin has been stable  Direct [place] test negative  Will discharge today  Electronically signed by Rosie Montes De Oca MD

## 2024-02-14 NOTE — DISCHARGE INSTRUCTIONS
Strict compliance with the medications  Follow-up with GI team outpatient  Follow-up with IR team outpatient to check the patency of the TIPS  Follow-up twice weekly with the IR team for the regular scheduled paracentesis

## 2024-02-14 NOTE — PLAN OF CARE
Problem: Discharge Planning  Goal: Discharge to home or other facility with appropriate resources  Outcome: Progressing  Flowsheets (Taken 2/13/2024 1942)  Discharge to home or other facility with appropriate resources:   Identify barriers to discharge with patient and caregiver   Arrange for needed discharge resources and transportation as appropriate   Identify discharge learning needs (meds, wound care, etc)     Problem: Skin/Tissue Integrity  Goal: Absence of new skin breakdown  Description: 1.  Monitor for areas of redness and/or skin breakdown  2.  Assess vascular access sites hourly  3.  Every 4-6 hours minimum:  Change oxygen saturation probe site  4.  Every 4-6 hours:  If on nasal continuous positive airway pressure, respiratory therapy assess nares and determine need for appliance change or resting period.  Outcome: Progressing     Problem: Chronic Conditions and Co-morbidities  Goal: Patient's chronic conditions and co-morbidity symptoms are monitored and maintained or improved  Outcome: Progressing  Flowsheets (Taken 2/13/2024 1942)  Care Plan - Patient's Chronic Conditions and Co-Morbidity Symptoms are Monitored and Maintained or Improved:   Monitor and assess patient's chronic conditions and comorbid symptoms for stability, deterioration, or improvement   Collaborate with multidisciplinary team to address chronic and comorbid conditions and prevent exacerbation or deterioration   Update acute care plan with appropriate goals if chronic or comorbid symptoms are exacerbated and prevent overall improvement and discharge     Problem: ABCDS Injury Assessment  Goal: Absence of physical injury  Outcome: Progressing     Problem: Safety - Adult  Goal: Free from fall injury  Outcome: Progressing     Problem: Pain  Goal: Verbalizes/displays adequate comfort level or baseline comfort level  Outcome: Progressing

## 2024-02-15 ENCOUNTER — CARE COORDINATION (OUTPATIENT)
Dept: CASE MANAGEMENT | Age: 39
End: 2024-02-15

## 2024-02-15 DIAGNOSIS — A41.9 SEPSIS, DUE TO UNSPECIFIED ORGANISM, UNSPECIFIED WHETHER ACUTE ORGAN DYSFUNCTION PRESENT (HCC): Primary | ICD-10-CM

## 2024-02-15 NOTE — CARE COORDINATION
script was printed & his son was going to pharmacy to , but lost it along the way - patient is asking for this one to be canceled & would like a new one.    Informed of care transition again after discharge.      Care Transition Nurse reviewed discharge instructions and medical action plan with patient who verbalized understanding. The patient was given an opportunity to ask questions and does not have any further questions or concerns at this time. Were discharge instructions available to patient? Yes. Reviewed appropriate site of care based on symptoms and resources available to patient including: PCP  Specialist  CTN . The patient agrees to contact the PCP office for questions related to their healthcare.     Advance Care Planning:   Does patient have an Advance Directive: reviewed and current.    Medication reconciliation was performed with patient, who verbalizes understanding of administration of home medications. Medications reviewed, 1111F entered: yes    Was patient discharged with a pulse oximeter? no    Non-face-to-face services provided:  Obtained and reviewed discharge summary and/or continuity of care documents    Offered patient enrollment in the Remote Patient Monitoring (RPM) program for in-home monitoring:  Did not discuss on this call .    Care Transitions 24 Hour Call    Schedule Follow Up Appointment with PCP: Completed  Do you have a copy of your discharge instructions?: Yes  Do you have all of your prescriptions and are they filled?: No (Comment: tramadol issue - patient's son lost printed script)  Have you been contacted by a [facility] Pharmacist?: No  Have you scheduled your follow up appointment?: Yes  How are you going to get to your appointment?: Car - family or friend to transport  Do you feel like you have everything you need to keep you well at home?: No (Comment: needs tramadol script - needs to take prior to paracentesis every 2 weeks)  Care Transitions Interventions

## 2024-02-16 ENCOUNTER — CARE COORDINATION (OUTPATIENT)
Dept: CASE MANAGEMENT | Age: 39
End: 2024-02-16

## 2024-02-16 NOTE — CARE COORDINATION
Care Transitions Follow Up Call - quick call to patient to check re: script - he has not followed up with office - will check again on next call    Patient Current Location:  Home: [address] [place] [address]    Care Transition Nurse contacted the patient by telephone to follow up after admission..  Verified name and  with patient as identifiers.    Patient: Sam Emerson             Patient : 1985   MRN: [id number]             Reason for Admission: readmission -  liver failure, severe anemia requiring transfusions (HGB 4.2), recent bacterial peritonitis/sepsis.  Discharge Date: 24         RARS: Readmission Risk Score: 45.5      Follow Up  Future Appointments   Date Time Provider Department Center   2024  2:45 PM Jeff Cassidy MD  [facility]   3/6/2024 11:30 AM Rosie Montes De Oca MD [facility]   3/7/2024  3:00 PM Gerald Olivarez MD [facility]       Plan for next call: symptom management-check ascites - weakness, fatigue  follow-up appointment-remind upcoming  medication management-check about tramadol - son lost script    Beryl Ellis RN

## 2024-02-17 LAB
ABO/RH: NORMAL
ANTIBODY SCREEN: NEGATIVE
ARM BAND NUMBER: NORMAL
BLOOD BANK BLOOD PRODUCT EXPIRATION DATE: NORMAL
BLOOD BANK BLOOD PRODUCT EXPIRATION DATE: NORMAL
BLOOD BANK DISPENSE STATUS: NORMAL
BLOOD BANK DISPENSE STATUS: NORMAL
BLOOD BANK ISBT PRODUCT BLOOD TYPE: 6200
BLOOD BANK ISBT PRODUCT BLOOD TYPE: 6200
BLOOD BANK PRODUCT CODE: NORMAL
BLOOD BANK PRODUCT CODE: NORMAL
BLOOD BANK SAMPLE EXPIRATION: NORMAL
BLOOD BANK UNIT TYPE AND RH: NORMAL
BLOOD BANK UNIT TYPE AND RH: NORMAL
BPU ID: NORMAL
BPU ID: NORMAL
COMPONENT: NORMAL
COMPONENT: NORMAL
CROSSMATCH RESULT: NORMAL
CROSSMATCH RESULT: NORMAL
MICROORGANISM SPEC CULT: NORMAL
MICROORGANISM SPEC CULT: NORMAL
SERVICE CMNT-IMP: NORMAL
SERVICE CMNT-IMP: NORMAL
SPECIMEN DESCRIPTION: NORMAL
SPECIMEN DESCRIPTION: NORMAL
TRANSFUSION STATUS: NORMAL
TRANSFUSION STATUS: NORMAL
UNIT DIVISION: 0
UNIT DIVISION: 0
UNIT ISSUE DATE/TIME: NORMAL
UNIT ISSUE DATE/TIME: NORMAL

## 2024-02-18 LAB
MICROORGANISM SPEC CULT: NORMAL
MICROORGANISM/AGENT SPEC: NORMAL
SPECIMEN DESCRIPTION: NORMAL

## 2024-02-19 ENCOUNTER — TELEPHONE (OUTPATIENT)
Dept: INTERNAL MEDICINE CLINIC | Age: 39
End: 2024-02-19

## 2024-02-19 ENCOUNTER — CARE COORDINATION (OUTPATIENT)
Dept: CASE MANAGEMENT | Age: 39
End: 2024-02-19

## 2024-02-19 NOTE — TELEPHONE ENCOUNTER
Patient called and stated he has had a prolonged headache for 3 days. No open appointments. Advised patient to go to Walk-In.

## 2024-02-19 NOTE — CARE COORDINATION
Care Transitions Follow Up Call attempt #1 -Attempted to reach patient for subsequent transitional call.  VM left to return call to CTN  Message left as per PCP that script for tramadol was sent in to patient's [facility] pharmacy      Patient: Sam Emerson             Patient : 1985   MRN: [id number]             Reason for Admission: readmission -  liver failure, severe anemia requiring transfusions (HGB 4.2), recent bacterial peritonitis/sepsis.  Discharge Date: 24         RARS: Readmission Risk Score: 45.5      Follow Up  Future Appointments   Date Time Provider Department Center   2024  2:45 PM Jeff Cassidy MD  [facility]   3/6/2024 11:30 AM Rosie Montes De Oca MD [facility]   3/7/2024  3:00 PM Gerald Olivarez MD [facility]       Care Transition Nurse provided contact information for future needs.     Beryl Ellis RN

## 2024-02-20 ENCOUNTER — CARE COORDINATION (OUTPATIENT)
Dept: CASE MANAGEMENT | Age: 39
End: 2024-02-20

## 2024-02-20 NOTE — CARE COORDINATION
Care Transitions Follow Up Call Attempt #2 - Attempted to reach patient or significant other (hipaa, same contact #) for subsequent transitional call.  VM left to return call to CTN.  If no return call back to CTN, will end CT program & refer again to ACM    I contacted patient's [facility] as requested by PCP to check if patient has picked up his requested repeat script for Tramadol (told me his printed script was lost, so second script was requested by patient).  Pharmacist informed me that his insurance says too early to fill, so no tramadol was dispensed from that script.    Patient: Sam Emerson             Patient : 1985   MRN: [id number]             Reason for Admission: readmission -  liver failure, severe anemia requiring transfusions (HGB 4.2), recent bacterial peritonitis/sepsis.  Discharge Date: 24         RARS: Readmission Risk Score: 45.5      Follow Up  Future Appointments   Date Time Provider Department Center   2024  2:45 PM Jeff Cassidy MD  [facility]   3/6/2024 11:30 AM Rosie Montes De Oca MD [facility]   3/7/2024  3:00 PM Gerald Olivarez MD [facility]       Care Transition Nurse provided contact information for future needs.     4pm - no return call from patient back to CTN after consecutive attempts to reach - CT program ended & referred to LINDA Torres RN

## 2024-02-21 ENCOUNTER — TELEPHONE (OUTPATIENT)
Dept: INTERVENTIONAL RADIOLOGY/VASCULAR | Age: 39
End: 2024-02-21

## 2024-02-21 ENCOUNTER — CARE COORDINATION (OUTPATIENT)
Dept: CARE COORDINATION | Age: 39
End: 2024-02-21

## 2024-02-21 NOTE — CARE COORDINATION
CTN hand off  Left VM requesting a return call to discuss any needs, follow up on discharge instructions and needed appointments.   Will mail introduction letter.   Did note the following appointments have been scheduled:    Gerald Olivarez MD  [address] [id number] [phone]   Follow up on 3/7/2024  @3:00pm for GI follow up     Rosie Montes De Oca MD  [address] [id number]  [phone]   Follow up on 3/6/2024  @11:30 am for PCP follow up

## 2024-02-22 ENCOUNTER — HOSPITAL ENCOUNTER (INPATIENT)
Age: 39
LOS: 2 days | Discharge: HOME | DRG: 423 | End: 2024-02-25
Attending: EMERGENCY MEDICINE | Admitting: INTERNAL MEDICINE
Payer: COMMERCIAL

## 2024-02-22 ENCOUNTER — APPOINTMENT (OUTPATIENT)
Dept: CT IMAGING | Age: 39
DRG: 423 | End: 2024-02-22
Payer: COMMERCIAL

## 2024-02-22 DIAGNOSIS — E72.20 HYPERAMMONEMIA (HCC): Primary | ICD-10-CM

## 2024-02-22 DIAGNOSIS — K70.30 ALCOHOLIC CIRRHOSIS, UNSPECIFIED WHETHER ASCITES PRESENT (HCC): ICD-10-CM

## 2024-02-22 LAB
ABSOLUTE BANDS: 0.15 K/UL (ref 0–1)
ALBUMIN SERPL-MCNC: 2.7 G/DL (ref 3.5–5.2)
ALP SERPL-CCNC: 303 U/L (ref 40–129)
ALT SERPL-CCNC: 20 U/L (ref 5–41)
AMMONIA PLAS-SCNC: 123 UMOL/L (ref 16–60)
ANION GAP SERPL CALCULATED.3IONS-SCNC: 11 MMOL/L (ref 9–17)
AST SERPL-CCNC: 62 U/L
BANDS: 2 % (ref 0–10)
BASOPHILS # BLD: 0.07 K/UL (ref 0–0.2)
BASOPHILS NFR BLD: 1 % (ref 0–2)
BILIRUB DIRECT SERPL-MCNC: 2.4 MG/DL
BILIRUB INDIRECT SERPL-MCNC: 2.9 MG/DL (ref 0–1)
BILIRUB SERPL-MCNC: 5.3 MG/DL (ref 0.3–1.2)
BUN SERPL-MCNC: 13 MG/DL (ref 6–20)
CALCIUM SERPL-MCNC: 8.2 MG/DL (ref 8.6–10.4)
CHLORIDE SERPL-SCNC: 108 MMOL/L (ref 98–107)
CO2 SERPL-SCNC: 20 MMOL/L (ref 20–31)
CREAT SERPL-MCNC: 0.8 MG/DL (ref 0.7–1.2)
EOSINOPHIL # BLD: 0.15 K/UL (ref 0–0.4)
EOSINOPHILS RELATIVE PERCENT: 2 % (ref 0–4)
ERYTHROCYTE [DISTWIDTH] IN BLOOD BY AUTOMATED COUNT: 20.6 % (ref 11.5–14.9)
GFR SERPL CREATININE-BSD FRML MDRD: >60 ML/MIN/1.73M2
GLUCOSE SERPL-MCNC: 103 MG/DL (ref 70–99)
HCT VFR BLD AUTO: 31.4 % (ref 41–53)
HGB BLD-MCNC: 10.5 G/DL (ref 13.5–17.5)
LIPASE SERPL-CCNC: 50 U/L (ref 13–60)
LYMPHOCYTES NFR BLD: 1.17 K/UL (ref 1–4.8)
LYMPHOCYTES RELATIVE PERCENT: 16 % (ref 24–44)
MCH RBC QN AUTO: 35.3 PG (ref 26–34)
MCHC RBC AUTO-ENTMCNC: 33.6 G/DL (ref 31–37)
MCV RBC AUTO: 105.1 FL (ref 80–100)
MONOCYTES NFR BLD: 0.8 K/UL (ref 0.1–1.3)
MONOCYTES NFR BLD: 11 % (ref 1–7)
MORPHOLOGY: ABNORMAL
NEUTROPHILS NFR BLD: 68 % (ref 36–66)
NEUTS SEG NFR BLD: 4.96 K/UL (ref 1.3–9.1)
PLATELET # BLD AUTO: 110 K/UL (ref 150–450)
PMV BLD AUTO: 8.1 FL (ref 6–12)
POTASSIUM SERPL-SCNC: 4.3 MMOL/L (ref 3.7–5.3)
PROT SERPL-MCNC: 6.4 G/DL (ref 6.4–8.3)
RBC # BLD AUTO: 2.98 M/UL (ref 4.5–5.9)
SODIUM SERPL-SCNC: 139 MMOL/L (ref 135–144)
WBC OTHER # BLD: 7.3 K/UL (ref 3.5–11)

## 2024-02-22 PROCEDURE — 6360000002 HC RX W HCPCS: Performed by: EMERGENCY MEDICINE

## 2024-02-22 PROCEDURE — 82140 ASSAY OF AMMONIA: CPT

## 2024-02-22 PROCEDURE — 2580000003 HC RX 258: Performed by: EMERGENCY MEDICINE

## 2024-02-22 PROCEDURE — 36415 COLL VENOUS BLD VENIPUNCTURE: CPT

## 2024-02-22 PROCEDURE — 80048 BASIC METABOLIC PNL TOTAL CA: CPT

## 2024-02-22 PROCEDURE — 99285 EMERGENCY DEPT VISIT HI MDM: CPT

## 2024-02-22 PROCEDURE — 83690 ASSAY OF LIPASE: CPT

## 2024-02-22 PROCEDURE — 6360000004 HC RX CONTRAST MEDICATION: Performed by: EMERGENCY MEDICINE

## 2024-02-22 PROCEDURE — 80076 HEPATIC FUNCTION PANEL: CPT

## 2024-02-22 PROCEDURE — 70450 CT HEAD/BRAIN W/O DYE: CPT

## 2024-02-22 PROCEDURE — 74177 CT ABD & PELVIS W/CONTRAST: CPT

## 2024-02-22 PROCEDURE — 96374 THER/PROPH/DIAG INJ IV PUSH: CPT

## 2024-02-22 PROCEDURE — 85025 COMPLETE CBC W/AUTO DIFF WBC: CPT

## 2024-02-22 PROCEDURE — 96375 TX/PRO/DX INJ NEW DRUG ADDON: CPT

## 2024-02-22 RX ORDER — 0.9 % SODIUM CHLORIDE 0.9 %
500 INTRAVENOUS SOLUTION INTRAVENOUS ONCE
Status: COMPLETED | OUTPATIENT
Start: 2024-02-22 | End: 2024-02-22

## 2024-02-22 RX ORDER — 0.9 % SODIUM CHLORIDE 0.9 %
100 INTRAVENOUS SOLUTION INTRAVENOUS ONCE
Status: COMPLETED | OUTPATIENT
Start: 2024-02-22 | End: 2024-02-22

## 2024-02-22 RX ORDER — KETOROLAC TROMETHAMINE 30 MG/ML
15 INJECTION, SOLUTION INTRAMUSCULAR; INTRAVENOUS ONCE
Status: COMPLETED | OUTPATIENT
Start: 2024-02-22 | End: 2024-02-22

## 2024-02-22 RX ORDER — ONDANSETRON 2 MG/ML
4 INJECTION INTRAMUSCULAR; INTRAVENOUS ONCE
Status: COMPLETED | OUTPATIENT
Start: 2024-02-22 | End: 2024-02-22

## 2024-02-22 RX ORDER — SODIUM CHLORIDE 0.9 % (FLUSH) 0.9 %
10 SYRINGE (ML) INJECTION PRN
Status: DISCONTINUED | OUTPATIENT
Start: 2024-02-22 | End: 2024-02-25 | Stop reason: HOSPADM

## 2024-02-22 RX ORDER — LACTULOSE 10 G/15ML
30 SOLUTION ORAL ONCE
Status: COMPLETED | OUTPATIENT
Start: 2024-02-22 | End: 2024-02-23

## 2024-02-22 RX ADMIN — SODIUM CHLORIDE 100 ML: 9 INJECTION, SOLUTION INTRAVENOUS at 22:47

## 2024-02-22 RX ADMIN — ONDANSETRON 4 MG: 2 INJECTION INTRAMUSCULAR; INTRAVENOUS at 21:30

## 2024-02-22 RX ADMIN — SODIUM CHLORIDE, PRESERVATIVE FREE 10 ML: 5 INJECTION INTRAVENOUS at 22:43

## 2024-02-22 RX ADMIN — IOPAMIDOL 75 ML: 755 INJECTION, SOLUTION INTRAVENOUS at 22:47

## 2024-02-22 RX ADMIN — SODIUM CHLORIDE 500 ML: 9 INJECTION, SOLUTION INTRAVENOUS at 21:30

## 2024-02-22 RX ADMIN — KETOROLAC TROMETHAMINE 15 MG: 30 INJECTION INTRAMUSCULAR; INTRAVENOUS at 21:32

## 2024-02-22 ASSESSMENT — PAIN DESCRIPTION - LOCATION
LOCATION: ABDOMEN
LOCATION: ABDOMEN

## 2024-02-22 ASSESSMENT — PAIN SCALES - GENERAL
PAINLEVEL_OUTOF10: 7
PAINLEVEL_OUTOF10: 7

## 2024-02-22 ASSESSMENT — PAIN - FUNCTIONAL ASSESSMENT: PAIN_FUNCTIONAL_ASSESSMENT: 0-10

## 2024-02-22 ASSESSMENT — PAIN DESCRIPTION - DESCRIPTORS: DESCRIPTORS: ACHING

## 2024-02-22 ASSESSMENT — PAIN DESCRIPTION - ORIENTATION: ORIENTATION: RIGHT

## 2024-02-23 PROBLEM — G93.41 METABOLIC ENCEPHALOPATHY: Status: ACTIVE | Noted: 2024-02-23

## 2024-02-23 LAB
ANION GAP SERPL CALCULATED.3IONS-SCNC: 7 MMOL/L (ref 9–17)
BACTERIA URNS QL MICRO: ABNORMAL
BASOPHILS # BLD: 0.15 K/UL (ref 0–0.2)
BASOPHILS NFR BLD: 2 % (ref 0–2)
BILIRUB UR QL STRIP: NEGATIVE
BUN SERPL-MCNC: 16 MG/DL (ref 6–20)
CALCIUM SERPL-MCNC: 8.4 MG/DL (ref 8.6–10.4)
CASTS #/AREA URNS LPF: ABNORMAL /LPF
CHLORIDE SERPL-SCNC: 107 MMOL/L (ref 98–107)
CLARITY UR: CLEAR
CO2 SERPL-SCNC: 23 MMOL/L (ref 20–31)
COLOR UR: ABNORMAL
CREAT SERPL-MCNC: 1.1 MG/DL (ref 0.7–1.2)
EOSINOPHIL # BLD: 0.15 K/UL (ref 0–0.4)
EOSINOPHILS RELATIVE PERCENT: 2 % (ref 0–4)
EPI CELLS #/AREA URNS HPF: ABNORMAL /HPF
ERYTHROCYTE [DISTWIDTH] IN BLOOD BY AUTOMATED COUNT: 20.6 % (ref 11.5–14.9)
GFR SERPL CREATININE-BSD FRML MDRD: >60 ML/MIN/1.73M2
GLUCOSE SERPL-MCNC: 94 MG/DL (ref 70–99)
GLUCOSE UR STRIP-MCNC: NEGATIVE MG/DL
HCT VFR BLD AUTO: 30.2 % (ref 41–53)
HGB BLD-MCNC: 10 G/DL (ref 13.5–17.5)
HGB UR QL STRIP.AUTO: NEGATIVE
KETONES UR STRIP-MCNC: NEGATIVE MG/DL
LEUKOCYTE ESTERASE UR QL STRIP: NEGATIVE
LYMPHOCYTES NFR BLD: 1.6 K/UL (ref 1–4.8)
LYMPHOCYTES RELATIVE PERCENT: 21 % (ref 24–44)
MCH RBC QN AUTO: 35 PG (ref 26–34)
MCHC RBC AUTO-ENTMCNC: 33.1 G/DL (ref 31–37)
MCV RBC AUTO: 105.8 FL (ref 80–100)
MONOCYTES NFR BLD: 0.76 K/UL (ref 0.1–1.3)
MONOCYTES NFR BLD: 10 % (ref 1–7)
MORPHOLOGY: ABNORMAL
NEUTROPHILS NFR BLD: 65 % (ref 36–66)
NEUTS SEG NFR BLD: 4.94 K/UL (ref 1.3–9.1)
NITRITE UR QL STRIP: NEGATIVE
PH UR STRIP: 7.5 [PH] (ref 5–8)
PLATELET # BLD AUTO: 108 K/UL (ref 150–450)
PMV BLD AUTO: 8 FL (ref 6–12)
POTASSIUM SERPL-SCNC: 4.7 MMOL/L (ref 3.7–5.3)
PROT UR STRIP-MCNC: NEGATIVE MG/DL
RBC # BLD AUTO: 2.86 M/UL (ref 4.5–5.9)
RBC #/AREA URNS HPF: ABNORMAL /HPF
SODIUM SERPL-SCNC: 137 MMOL/L (ref 135–144)
SP GR UR STRIP: 1.02 (ref 1–1.03)
UROBILINOGEN UR STRIP-ACNC: NORMAL EU/DL (ref 0–1)
WBC #/AREA URNS HPF: ABNORMAL /HPF
WBC OTHER # BLD: 7.6 K/UL (ref 3.5–11)

## 2024-02-23 PROCEDURE — 80048 BASIC METABOLIC PNL TOTAL CA: CPT

## 2024-02-23 PROCEDURE — 6370000000 HC RX 637 (ALT 250 FOR IP): Performed by: EMERGENCY MEDICINE

## 2024-02-23 PROCEDURE — 99223 1ST HOSP IP/OBS HIGH 75: CPT | Performed by: INTERNAL MEDICINE

## 2024-02-23 PROCEDURE — 1200000000 HC SEMI PRIVATE

## 2024-02-23 PROCEDURE — 6370000000 HC RX 637 (ALT 250 FOR IP): Performed by: NURSE PRACTITIONER

## 2024-02-23 PROCEDURE — 85025 COMPLETE CBC W/AUTO DIFF WBC: CPT

## 2024-02-23 PROCEDURE — 6360000002 HC RX W HCPCS

## 2024-02-23 PROCEDURE — 6370000000 HC RX 637 (ALT 250 FOR IP)

## 2024-02-23 PROCEDURE — 6370000000 HC RX 637 (ALT 250 FOR IP): Performed by: INTERNAL MEDICINE

## 2024-02-23 PROCEDURE — 81001 URINALYSIS AUTO W/SCOPE: CPT

## 2024-02-23 PROCEDURE — APPNB60 APP NON BILLABLE TIME 46-60 MINS: Performed by: NURSE PRACTITIONER

## 2024-02-23 PROCEDURE — 36415 COLL VENOUS BLD VENIPUNCTURE: CPT

## 2024-02-23 PROCEDURE — 99254 IP/OBS CNSLTJ NEW/EST MOD 60: CPT | Performed by: INTERNAL MEDICINE

## 2024-02-23 PROCEDURE — 2580000003 HC RX 258

## 2024-02-23 RX ORDER — PANTOPRAZOLE SODIUM 40 MG/1
40 TABLET, DELAYED RELEASE ORAL EVERY 12 HOURS
Status: DISCONTINUED | OUTPATIENT
Start: 2024-02-23 | End: 2024-02-23

## 2024-02-23 RX ORDER — LACTULOSE 10 G/15ML
20 SOLUTION ORAL EVERY 6 HOURS SCHEDULED
Status: DISCONTINUED | OUTPATIENT
Start: 2024-02-23 | End: 2024-02-25 | Stop reason: HOSPADM

## 2024-02-23 RX ORDER — POTASSIUM CHLORIDE 20 MEQ/1
40 TABLET, EXTENDED RELEASE ORAL PRN
Status: DISCONTINUED | OUTPATIENT
Start: 2024-02-23 | End: 2024-02-25 | Stop reason: HOSPADM

## 2024-02-23 RX ORDER — KETOROLAC TROMETHAMINE 30 MG/ML
30 INJECTION, SOLUTION INTRAMUSCULAR; INTRAVENOUS ONCE
Status: COMPLETED | OUTPATIENT
Start: 2024-02-23 | End: 2024-02-23

## 2024-02-23 RX ORDER — TRAMADOL HYDROCHLORIDE 50 MG/1
25 TABLET ORAL DAILY PRN
Status: DISCONTINUED | OUTPATIENT
Start: 2024-02-23 | End: 2024-02-25 | Stop reason: HOSPADM

## 2024-02-23 RX ORDER — FOLIC ACID 1 MG/1
1 TABLET ORAL DAILY
Status: DISCONTINUED | OUTPATIENT
Start: 2024-02-23 | End: 2024-02-25 | Stop reason: HOSPADM

## 2024-02-23 RX ORDER — SODIUM CHLORIDE 0.9 % (FLUSH) 0.9 %
5-40 SYRINGE (ML) INJECTION EVERY 12 HOURS SCHEDULED
Status: DISCONTINUED | OUTPATIENT
Start: 2024-02-23 | End: 2024-02-25 | Stop reason: HOSPADM

## 2024-02-23 RX ORDER — MIDODRINE HYDROCHLORIDE 2.5 MG/1
2.5 TABLET ORAL
Status: DISCONTINUED | OUTPATIENT
Start: 2024-02-23 | End: 2024-02-25 | Stop reason: HOSPADM

## 2024-02-23 RX ORDER — MAGNESIUM SULFATE HEPTAHYDRATE 40 MG/ML
2000 INJECTION, SOLUTION INTRAVENOUS PRN
Status: DISCONTINUED | OUTPATIENT
Start: 2024-02-23 | End: 2024-02-25 | Stop reason: HOSPADM

## 2024-02-23 RX ORDER — POTASSIUM CHLORIDE 7.45 MG/ML
10 INJECTION INTRAVENOUS PRN
Status: DISCONTINUED | OUTPATIENT
Start: 2024-02-23 | End: 2024-02-25 | Stop reason: HOSPADM

## 2024-02-23 RX ORDER — ACETAMINOPHEN 650 MG/1
650 SUPPOSITORY RECTAL EVERY 6 HOURS PRN
Status: DISCONTINUED | OUTPATIENT
Start: 2024-02-23 | End: 2024-02-25 | Stop reason: HOSPADM

## 2024-02-23 RX ORDER — ACETAMINOPHEN 325 MG/1
650 TABLET ORAL EVERY 6 HOURS PRN
Status: DISCONTINUED | OUTPATIENT
Start: 2024-02-23 | End: 2024-02-25 | Stop reason: HOSPADM

## 2024-02-23 RX ORDER — POLYETHYLENE GLYCOL 3350 17 G/17G
17 POWDER, FOR SOLUTION ORAL DAILY PRN
Status: DISCONTINUED | OUTPATIENT
Start: 2024-02-23 | End: 2024-02-25 | Stop reason: HOSPADM

## 2024-02-23 RX ORDER — BUTALBITAL, ACETAMINOPHEN AND CAFFEINE 300; 40; 50 MG/1; MG/1; MG/1
1 CAPSULE ORAL EVERY 4 HOURS PRN
Status: DISCONTINUED | OUTPATIENT
Start: 2024-02-23 | End: 2024-02-25 | Stop reason: HOSPADM

## 2024-02-23 RX ORDER — GAUZE BANDAGE 2" X 2"
100 BANDAGE TOPICAL DAILY
Status: DISCONTINUED | OUTPATIENT
Start: 2024-02-23 | End: 2024-02-25 | Stop reason: HOSPADM

## 2024-02-23 RX ORDER — ONDANSETRON 4 MG/1
4 TABLET, ORALLY DISINTEGRATING ORAL EVERY 8 HOURS PRN
Status: DISCONTINUED | OUTPATIENT
Start: 2024-02-23 | End: 2024-02-25 | Stop reason: HOSPADM

## 2024-02-23 RX ORDER — LANOLIN ALCOHOL/MO/W.PET/CERES
3 CREAM (GRAM) TOPICAL NIGHTLY PRN
Status: DISCONTINUED | OUTPATIENT
Start: 2024-02-23 | End: 2024-02-25 | Stop reason: HOSPADM

## 2024-02-23 RX ORDER — PANTOPRAZOLE SODIUM 40 MG/1
40 TABLET, DELAYED RELEASE ORAL EVERY 12 HOURS
Status: DISCONTINUED | OUTPATIENT
Start: 2024-02-23 | End: 2024-02-25 | Stop reason: HOSPADM

## 2024-02-23 RX ORDER — SODIUM CHLORIDE 0.9 % (FLUSH) 0.9 %
10 SYRINGE (ML) INJECTION PRN
Status: DISCONTINUED | OUTPATIENT
Start: 2024-02-23 | End: 2024-02-25 | Stop reason: HOSPADM

## 2024-02-23 RX ORDER — BISACODYL 10 MG
10 SUPPOSITORY, RECTAL RECTAL DAILY PRN
Status: DISCONTINUED | OUTPATIENT
Start: 2024-02-23 | End: 2024-02-25 | Stop reason: HOSPADM

## 2024-02-23 RX ORDER — FUROSEMIDE 20 MG/1
20 TABLET ORAL DAILY
Status: DISCONTINUED | OUTPATIENT
Start: 2024-02-23 | End: 2024-02-25 | Stop reason: HOSPADM

## 2024-02-23 RX ORDER — ONDANSETRON 2 MG/ML
4 INJECTION INTRAMUSCULAR; INTRAVENOUS EVERY 6 HOURS PRN
Status: DISCONTINUED | OUTPATIENT
Start: 2024-02-23 | End: 2024-02-25 | Stop reason: HOSPADM

## 2024-02-23 RX ORDER — LACTULOSE 10 G/15ML
20 SOLUTION ORAL 3 TIMES DAILY
Status: DISCONTINUED | OUTPATIENT
Start: 2024-02-23 | End: 2024-02-23

## 2024-02-23 RX ORDER — SPIRONOLACTONE 25 MG/1
50 TABLET ORAL DAILY
Status: DISCONTINUED | OUTPATIENT
Start: 2024-02-23 | End: 2024-02-25 | Stop reason: HOSPADM

## 2024-02-23 RX ORDER — NADOLOL 20 MG/1
20 TABLET ORAL DAILY
Status: DISCONTINUED | OUTPATIENT
Start: 2024-02-23 | End: 2024-02-25 | Stop reason: HOSPADM

## 2024-02-23 RX ORDER — ENOXAPARIN SODIUM 100 MG/ML
40 INJECTION SUBCUTANEOUS DAILY
Status: DISCONTINUED | OUTPATIENT
Start: 2024-02-23 | End: 2024-02-25 | Stop reason: HOSPADM

## 2024-02-23 RX ORDER — SODIUM CHLORIDE 9 MG/ML
INJECTION, SOLUTION INTRAVENOUS PRN
Status: DISCONTINUED | OUTPATIENT
Start: 2024-02-23 | End: 2024-02-25 | Stop reason: HOSPADM

## 2024-02-23 RX ADMIN — PANTOPRAZOLE SODIUM 40 MG: 40 TABLET, DELAYED RELEASE ORAL at 18:15

## 2024-02-23 RX ADMIN — SODIUM CHLORIDE, PRESERVATIVE FREE 10 ML: 5 INJECTION INTRAVENOUS at 20:09

## 2024-02-23 RX ADMIN — BUTALBITA,ACETAMINOPHEN AND CAFFEINE 1 CAPSULE: 50; 300; 40 CAPSULE ORAL at 12:26

## 2024-02-23 RX ADMIN — BUTALBITA,ACETAMINOPHEN AND CAFFEINE 1 CAPSULE: 50; 300; 40 CAPSULE ORAL at 07:37

## 2024-02-23 RX ADMIN — LACTULOSE 20 G: 20 SOLUTION ORAL at 18:15

## 2024-02-23 RX ADMIN — MIDODRINE HYDROCHLORIDE 2.5 MG: 2.5 TABLET ORAL at 12:26

## 2024-02-23 RX ADMIN — LACTULOSE 20 G: 20 SOLUTION ORAL at 08:41

## 2024-02-23 RX ADMIN — NADOLOL 20 MG: 20 TABLET ORAL at 08:41

## 2024-02-23 RX ADMIN — KETOROLAC TROMETHAMINE 30 MG: 30 INJECTION, SOLUTION INTRAMUSCULAR; INTRAVENOUS at 04:17

## 2024-02-23 RX ADMIN — MIDODRINE HYDROCHLORIDE 2.5 MG: 2.5 TABLET ORAL at 18:15

## 2024-02-23 RX ADMIN — FOLIC ACID 1 MG: 1 TABLET ORAL at 08:41

## 2024-02-23 RX ADMIN — RIFAXIMIN 400 MG: 200 TABLET ORAL at 20:08

## 2024-02-23 RX ADMIN — SPIRONOLACTONE 50 MG: 25 TABLET, FILM COATED ORAL at 08:41

## 2024-02-23 RX ADMIN — THIAMINE HCL TAB 100 MG 100 MG: 100 TAB at 08:41

## 2024-02-23 RX ADMIN — LACTULOSE 30 G: 20 SOLUTION ORAL at 00:34

## 2024-02-23 RX ADMIN — MIDODRINE HYDROCHLORIDE 2.5 MG: 2.5 TABLET ORAL at 08:41

## 2024-02-23 RX ADMIN — RIFAXIMIN 400 MG: 200 TABLET ORAL at 15:23

## 2024-02-23 RX ADMIN — SODIUM CHLORIDE, PRESERVATIVE FREE 10 ML: 5 INJECTION INTRAVENOUS at 08:42

## 2024-02-23 RX ADMIN — FUROSEMIDE 20 MG: 20 TABLET ORAL at 08:41

## 2024-02-23 RX ADMIN — PANTOPRAZOLE SODIUM 40 MG: 40 TABLET, DELAYED RELEASE ORAL at 06:15

## 2024-02-23 ASSESSMENT — PAIN DESCRIPTION - DESCRIPTORS
DESCRIPTORS: ACHING
DESCRIPTORS: ACHING

## 2024-02-23 ASSESSMENT — PAIN DESCRIPTION - LOCATION
LOCATION: HEAD
LOCATION: HEAD
LOCATION: ABDOMEN;HEAD
LOCATION: ABDOMEN

## 2024-02-23 ASSESSMENT — PAIN SCALES - GENERAL
PAINLEVEL_OUTOF10: 7
PAINLEVEL_OUTOF10: 7
PAINLEVEL_OUTOF10: 6
PAINLEVEL_OUTOF10: 5
PAINLEVEL_OUTOF10: 4

## 2024-02-23 ASSESSMENT — PAIN DESCRIPTION - ORIENTATION: ORIENTATION: RIGHT

## 2024-02-23 NOTE — CONSULTS
--    ALT 20  --   --    ALKPHOS 303*  --   --    BILITOT 5.3*  --   --    BILIDIR 2.4*  --   --    AMMONIA  --  123*  --    LIPASE 50  --   --      Assessment:   Principal Problem:    Hyperammonemia (HCC)  Resolved Problems:    * No resolved hospital problems. *    39-year-old male with history of decompensated cirrhosis status post TIPS admitted with abdominal pain, constipation, headache.  Found to have elevated ammonia 123.  CT with no acute findings.  No signs of encephalopathy, no ascites, no bilateral lower extremity.    -Decompensated cirrhosis s/p TIPS  -Constipation  -Hyperammonemia  -Intractable headache  -Anemia      Plan:   Increase lactulose to every 6 hours, clinically he is feeling a bit better today and he is passing gas freely  Advised to avoid narcotics  Xifaxan  Continue nadolol  Folic acid, thiamine, multivitamin  PPI BID  AM labs    This plan was formulated in collaboration with Dr. Galloway    Electronically signed by SILVA Cassidy NP on 2/23/2024 at 11:55 AM     Note is dictated utilizing voice recognition software. Unfortunately this leads to occasional typographical errors. Please contact our office if you have any questions.    Attending Physician Statement  I have discussed the care of Sam Emerson and I have examined the patient myselft and taken ros and hpi , including pertinent history and exam findings,  with the author of this note . I have reviewed the key elements of all parts of the encounter with the nurse practitioner/resident.  I agree with the assessment, plan and orders as documented by the above health care provider.  I have made necessary changes as deemed appropriate directly in the note.     More than 50% of the time was spent taking care of this patient in addition to the nurse practitioner time.  That also included history taking follow-up physical examination and review of system.    Electronically signed by Luciano Galloway MD

## 2024-02-23 NOTE — ED NOTES
TRANSFER - OUT REPORT:    Verbal report given to Katerina PALMA on Sam Emerson  being transferred to 2052 for routine progression of patient care       Report consisted of patient's Situation, Background, Assessment and   Recommendations(SBAR).     Information from the following report(s) ED Encounter Summary was reviewed with the receiving nurse.    Rose Fall Assessment:    Presents to emergency department  because of falls (Syncope, seizure, or loss of consciousness): No  Age > 70: No  Altered Mental Status, Intoxication with alcohol or substance confusion (Disorientation, impaired judgment, poor safety awaremess, or inability to follow instructions): No  Impaired Mobility: Ambulates or transfers with assistive devices or assistance; Unable to ambulate or transer.: No  Nursing Judgement: No          Lines:   Peripheral IV 02/22/24 Left;Dorsal Forearm (Active)        Opportunity for questions and clarification was provided.      Patient transported with:  Tech

## 2024-02-23 NOTE — ED PROVIDER NOTES
EMERGENCY DEPARTMENT ENCOUNTER    Pt Name: Sam Emerson  MRN: [id number]  Birthdate 1985  Date of evaluation: 2/22/24  CHIEF COMPLAINT       Chief Complaint   Patient presents with    Abdominal Pain    Fatigue           Headache    Nausea    Constipation     HISTORY OF PRESENT ILLNESS   39-year-old male with past medical history of cirrhosis of the liver with TIPS procedure in December 2023 is presenting for chief complaint of abdominal pain and constipation.  He is complaining of an upper abdominal pain, mostly on the right side.  He says that he takes his lactulose 3 times daily and even increase his dose but he has not had a bowel movement in the last 3 days.  He is concerned about his ammonia levels.  He is also complaining of nausea without vomiting.  Patient also has a headache for the last 5 days.  He describes it as a dull headache.  He said he is never had a headache like this in the past.  He says usually his headaches resolve within a few and are secondary to dehydration.  He denies any motor or sensory deficits.    The history is provided by the patient.           REVIEW OF SYSTEMS     Review of Systems   Constitutional:  Positive for fatigue. Negative for chills and fever.   HENT:  Negative for congestion.    Eyes:  Negative for visual disturbance.   Respiratory:  Negative for cough and shortness of breath.    Cardiovascular:  Negative for chest pain.   Gastrointestinal:  Positive for abdominal pain, constipation and nausea. Negative for vomiting.   Genitourinary:  Negative for flank pain.   Musculoskeletal:  Negative for myalgias.   Neurological:  Positive for headaches. Negative for dizziness, tremors, seizures, syncope, facial asymmetry, speech difficulty, weakness, light-headedness and numbness.   Psychiatric/Behavioral:  Negative for behavioral problems.      PASTMEDICAL HISTORY     Past Medical History:   Diagnosis Date    Alcoholism (HCC)     pt drinks a fifth of whiskey daily    Anemia

## 2024-02-23 NOTE — ED TRIAGE NOTES
Mode of arrival (squad #, walk in, police, etc) : walk in        Chief complaint(s): Abd pain, n/v, constipation, headache        Arrival Note (brief scenario, treatment PTA, etc).: Pt states he had a TIPS procedure done 12/5/23. He recently started an abd pain and a headache x 5 days. Pt also reports he's been very nauseous and worried his ammonia levels may have increased today despite taking lactulose 30mmg 3x/day        C= \"Have you ever felt that you should Cut down on your drinking?\"  No  A= \"Have people Annoyed you by criticizing your drinking?\"  No  G= \"Have you ever felt bad or Guilty about your drinking?\"  No  E= \"Have you ever had a drink as an Eye-opener first thing in the morning to steady your nerves or to help a hangover?\"  No      Deferred []      Reason for deferring: N/A    *If yes to two or more: probable alcohol abuse.*

## 2024-02-23 NOTE — CARE COORDINATION
Case Management Assessment  Initial Evaluation    Date/Time of Evaluation: 2/23/2024 10:43 AM  Assessment Completed by: Jocy Kirby RN    If patient is discharged prior to next notation, then this note serves as note for discharge by case management.    Patient Name: Sam Emerson                   YOB: 1985  Diagnosis: Metabolic encephalopathy [G93.41]  Hyperammonemia (HCC) [E72.20]  Alcoholic cirrhosis, unspecified whether ascites present (HCC) [K70.30]                   Date / Time: 2/22/2024  8:28 PM    Patient Admission Status: Inpatient   Readmission Risk (Low < 19, Mod (19-27), High > 27): Readmission Risk Score: 44.7    Current PCP: Rosie Montes De Oca MD  PCP verified by CM? Yes    Chart Reviewed: Yes      History Provided by: Patient  Patient Orientation: Alert and Oriented    Patient Cognition: Alert    Hospitalization in the last 30 days (Readmission):  Yes    If yes, Readmission Assessment in CM Navigator will be completed.    Readmission Assessment  Number of Days since last admission?: 8-30 days  Previous Disposition: Home with Family  Who is being Interviewed: Patient  What was the patient's/caregiver's perception as to why they think they needed to return back to the hospital?: Other (Comment) (abd pain, fatigue, constipation)  Did you visit your Primary Care Physician after you left the hospital, before you returned this time?: No  Why weren't you able to visit your PCP?: Did not have an appointment  Did you see a specialist, such as Cardiac, Pulmonary, Orthopedic Physician, etc. after you left the hospital?: No  Who advised the patient to return to the hospital?: Self-referral  Does the patient report anything that got in the way of taking their medications?: No (Pt states he takes his Lactulose as prescribed, however it doesn't seem to work as well as the Lactulose at the hospital)  In our efforts to provide the best possible care to you and others like you, can you think

## 2024-02-23 NOTE — PLAN OF CARE
Problem: Pain  Goal: Verbalizes/displays adequate comfort level or baseline comfort level  2/23/2024 1158 by Yamilet Mae, RN  Outcome: Progressing, Pt educated on non-pharmacological pain interventions. PRN pain medications administered.        Problem: Safety - Adult  Goal: Free from fall injury  2/23/2024 1158 by Yamilet Mae, RN  Outcome: Progressing, Patient remains free of incidence/ injury. Bed remains in low position. Side rails up x2.

## 2024-02-23 NOTE — PLAN OF CARE
Problem: Discharge Planning  Goal: Discharge to home or other facility with appropriate resources  Outcome: Progressing  Flowsheets (Taken 2/23/2024 0346)  Discharge to home or other facility with appropriate resources:   Identify barriers to discharge with patient and caregiver   Arrange for needed discharge resources and transportation as appropriate   Identify discharge learning needs (meds, wound care, etc)   Refer to discharge planning if patient needs post-hospital services based on physician order or complex needs related to functional status, cognitive ability or social support system     Problem: Pain  Goal: Verbalizes/displays adequate comfort level or baseline comfort level  Outcome: Progressing  Flowsheets (Taken 2/23/2024 0352)  Verbalizes/displays adequate comfort level or baseline comfort level:   Encourage patient to monitor pain and request assistance   Assess pain using appropriate pain scale   Administer analgesics based on type and severity of pain and evaluate response     Problem: Safety - Adult  Goal: Free from fall injury  Outcome: Progressing  Flowsheets (Taken 2/23/2024 0317)  Free From Fall Injury:   Instruct family/caregiver on patient safety   Based on caregiver fall risk screen, instruct family/caregiver to ask for assistance with transferring infant if caregiver noted to have fall risk factors     Problem: ABCDS Injury Assessment  Goal: Absence of physical injury  Outcome: Progressing  Flowsheets (Taken 2/23/2024 0317)  Absence of Physical Injury: Implement safety measures based on patient assessment

## 2024-02-23 NOTE — H&P
[facility] Internal Medicine  Rick Brewster MD; Jose Luis Moncada MD, Luis Bradley MD, Rosie Montes De Oca MD, Nasrin Ibrahim MD; Quentin Guevara MD    [facility] Internal Medicine   IN-PATIENT SERVICE   [facility]    HISTORY AND PHYSICAL EXAMINATION            Date:   2/23/2024  Patient name:  Sam Emerson  Date of admission:  2/22/2024  8:28 PM  MRN:   [id number]  Account:  [id number]  YOB: 1985  PCP:    Rosie Montes De Oca MD  Room:   2052/2052-01  Code Status:    Full Code    Chief Complaint:     Chief Complaint   Patient presents with    Abdominal Pain    Fatigue           Headache    Nausea    Constipation       History Obtained From:     Patient/EMR/Bedside RN    History of Present Illness:     Sam Emerson is a 39 y.o. Non- / non  male who presents with Abdominal Pain, Fatigue (/), Headache, Nausea, and Constipation   and is admitted to the hospital for the management of Hyperammonemia (HCC).  According to patient, he had been taking his lactulose as ordered.  The patient states that he had no bowel movements for the past 3 days, and no flatus the past 2 days.  He presents to the ER because he was had nausea, fatigue, generalized weakness, and abdominal pain.  He was concerned for his ammonia level and his TIPS placement.  He also reports a headache lasting 5 days.  The patient states that he was started on tramadol for his headaches; he states however that the constipation predated his tramadol use.  The patient endorses marijuana use  Assessment reveals hypoactive bowel sounds in all 4 quadrants, and a soft diffusely tender abdomen.  Respiratory assessment reveals clear lung sounds.  Cardiovascular S1, S2, no rubs no murmurs noted.  Patient does have a headache but no dizziness, syncope, facial asymmetry, or speech difficulty.  The patient's skin appears to be warm and jaundiced.      Past Medical History:     Past

## 2024-02-24 LAB
ALBUMIN SERPL-MCNC: 2.7 G/DL (ref 3.5–5.2)
ALP SERPL-CCNC: 307 U/L (ref 40–129)
ALT SERPL-CCNC: 18 U/L (ref 5–41)
AMMONIA PLAS-SCNC: 67 UMOL/L (ref 16–60)
ANION GAP SERPL CALCULATED.3IONS-SCNC: 8 MMOL/L (ref 9–17)
AST SERPL-CCNC: 61 U/L
BASOPHILS # BLD: 0.17 K/UL (ref 0–0.2)
BASOPHILS NFR BLD: 2 % (ref 0–2)
BILIRUB SERPL-MCNC: 4 MG/DL (ref 0.3–1.2)
BUN SERPL-MCNC: 18 MG/DL (ref 6–20)
CALCIUM SERPL-MCNC: 8.2 MG/DL (ref 8.6–10.4)
CHLORIDE SERPL-SCNC: 106 MMOL/L (ref 98–107)
CO2 SERPL-SCNC: 21 MMOL/L (ref 20–31)
CREAT SERPL-MCNC: 0.7 MG/DL (ref 0.7–1.2)
EOSINOPHIL # BLD: 0.17 K/UL (ref 0–0.4)
EOSINOPHILS RELATIVE PERCENT: 2 % (ref 0–4)
ERYTHROCYTE [DISTWIDTH] IN BLOOD BY AUTOMATED COUNT: 20.6 % (ref 11.5–14.9)
GFR SERPL CREATININE-BSD FRML MDRD: >60 ML/MIN/1.73M2
GLUCOSE SERPL-MCNC: 91 MG/DL (ref 70–99)
HCT VFR BLD AUTO: 28.6 % (ref 41–53)
HGB BLD-MCNC: 9.8 G/DL (ref 13.5–17.5)
LYMPHOCYTES NFR BLD: 1.55 K/UL (ref 1–4.8)
LYMPHOCYTES RELATIVE PERCENT: 18 % (ref 24–44)
MCH RBC QN AUTO: 35.5 PG (ref 26–34)
MCHC RBC AUTO-ENTMCNC: 34.1 G/DL (ref 31–37)
MCV RBC AUTO: 104.1 FL (ref 80–100)
MONOCYTES NFR BLD: 0.86 K/UL (ref 0.1–1.3)
MONOCYTES NFR BLD: 10 % (ref 1–7)
MORPHOLOGY: ABNORMAL
NEUTROPHILS NFR BLD: 68 % (ref 36–66)
NEUTS SEG NFR BLD: 5.85 K/UL (ref 1.3–9.1)
PLATELET # BLD AUTO: 106 K/UL (ref 150–450)
PMV BLD AUTO: 8 FL (ref 6–12)
POTASSIUM SERPL-SCNC: 4.5 MMOL/L (ref 3.7–5.3)
PROT SERPL-MCNC: 6.3 G/DL (ref 6.4–8.3)
RBC # BLD AUTO: 2.75 M/UL (ref 4.5–5.9)
SODIUM SERPL-SCNC: 135 MMOL/L (ref 135–144)
WBC OTHER # BLD: 8.6 K/UL (ref 3.5–11)

## 2024-02-24 PROCEDURE — 99233 SBSQ HOSP IP/OBS HIGH 50: CPT | Performed by: INTERNAL MEDICINE

## 2024-02-24 PROCEDURE — 82140 ASSAY OF AMMONIA: CPT

## 2024-02-24 PROCEDURE — 6370000000 HC RX 637 (ALT 250 FOR IP)

## 2024-02-24 PROCEDURE — 85025 COMPLETE CBC W/AUTO DIFF WBC: CPT

## 2024-02-24 PROCEDURE — 36415 COLL VENOUS BLD VENIPUNCTURE: CPT

## 2024-02-24 PROCEDURE — 80053 COMPREHEN METABOLIC PANEL: CPT

## 2024-02-24 PROCEDURE — 6370000000 HC RX 637 (ALT 250 FOR IP): Performed by: NURSE PRACTITIONER

## 2024-02-24 PROCEDURE — APPNB30 APP NON BILLABLE TIME 0-30 MINS: Performed by: NURSE PRACTITIONER

## 2024-02-24 PROCEDURE — 99231 SBSQ HOSP IP/OBS SF/LOW 25: CPT | Performed by: INTERNAL MEDICINE

## 2024-02-24 PROCEDURE — 1200000000 HC SEMI PRIVATE

## 2024-02-24 PROCEDURE — 6370000000 HC RX 637 (ALT 250 FOR IP): Performed by: INTERNAL MEDICINE

## 2024-02-24 PROCEDURE — 2580000003 HC RX 258

## 2024-02-24 RX ADMIN — LACTULOSE 20 G: 20 SOLUTION ORAL at 00:05

## 2024-02-24 RX ADMIN — LACTULOSE 20 G: 20 SOLUTION ORAL at 17:59

## 2024-02-24 RX ADMIN — PANTOPRAZOLE SODIUM 40 MG: 40 TABLET, DELAYED RELEASE ORAL at 05:16

## 2024-02-24 RX ADMIN — SPIRONOLACTONE 50 MG: 25 TABLET, FILM COATED ORAL at 08:14

## 2024-02-24 RX ADMIN — FUROSEMIDE 20 MG: 20 TABLET ORAL at 08:14

## 2024-02-24 RX ADMIN — RIFAXIMIN 400 MG: 200 TABLET ORAL at 14:29

## 2024-02-24 RX ADMIN — MIDODRINE HYDROCHLORIDE 2.5 MG: 2.5 TABLET ORAL at 17:59

## 2024-02-24 RX ADMIN — PANTOPRAZOLE SODIUM 40 MG: 40 TABLET, DELAYED RELEASE ORAL at 17:59

## 2024-02-24 RX ADMIN — RIFAXIMIN 550 MG: 550 TABLET ORAL at 21:46

## 2024-02-24 RX ADMIN — THIAMINE HCL TAB 100 MG 100 MG: 100 TAB at 08:14

## 2024-02-24 RX ADMIN — SODIUM CHLORIDE, PRESERVATIVE FREE 10 ML: 5 INJECTION INTRAVENOUS at 08:13

## 2024-02-24 RX ADMIN — NADOLOL 20 MG: 20 TABLET ORAL at 08:15

## 2024-02-24 RX ADMIN — SODIUM CHLORIDE, PRESERVATIVE FREE 10 ML: 5 INJECTION INTRAVENOUS at 21:47

## 2024-02-24 RX ADMIN — LACTULOSE 20 G: 20 SOLUTION ORAL at 05:16

## 2024-02-24 RX ADMIN — FOLIC ACID 1 MG: 1 TABLET ORAL at 08:15

## 2024-02-24 RX ADMIN — MIDODRINE HYDROCHLORIDE 2.5 MG: 2.5 TABLET ORAL at 12:21

## 2024-02-24 RX ADMIN — MIDODRINE HYDROCHLORIDE 2.5 MG: 2.5 TABLET ORAL at 08:15

## 2024-02-24 RX ADMIN — RIFAXIMIN 400 MG: 200 TABLET ORAL at 08:15

## 2024-02-24 NOTE — PLAN OF CARE
Problem: Discharge Planning  Goal: Discharge to home or other facility with appropriate resources  Outcome: Progressing  Flowsheets (Taken 2/23/2024 2008)  Discharge to home or other facility with appropriate resources:   Identify barriers to discharge with patient and caregiver   Arrange for needed discharge resources and transportation as appropriate   Identify discharge learning needs (meds, wound care, etc)   Refer to discharge planning if patient needs post-hospital services based on physician order or complex needs related to functional status, cognitive ability or social support system     Problem: Pain  Goal: Verbalizes/displays adequate comfort level or baseline comfort level  Outcome: Progressing  Flowsheets (Taken 2/23/2024 0352)  Verbalizes/displays adequate comfort level or baseline comfort level:   Encourage patient to monitor pain and request assistance   Assess pain using appropriate pain scale   Administer analgesics based on type and severity of pain and evaluate response     Problem: Safety - Adult  Goal: Free from fall injury  Outcome: Progressing  Flowsheets (Taken 2/24/2024 0101)  Free From Fall Injury:   Instruct family/caregiver on patient safety   Based on caregiver fall risk screen, instruct family/caregiver to ask for assistance with transferring infant if caregiver noted to have fall risk factors     Problem: ABCDS Injury Assessment  Goal: Absence of physical injury  Outcome: Progressing  Flowsheets (Taken 2/24/2024 0101)  Absence of Physical Injury: Implement safety measures based on patient assessment

## 2024-02-24 NOTE — PLAN OF CARE
Problem: Pain  Goal: Verbalizes/displays adequate comfort level or baseline comfort level  2/24/2024 1420 by Yamilet Mae, RN  Outcome: Progressing, Pt educated on non-pharmacological pain interventions. PRN pain medications administered.        Problem: Safety - Adult  Goal: Free from fall injury  2/24/2024 1420 by Yamilet Mae, RN  Outcome: Progressing, Patient remains free of incidence/ injury. Bed remains in low position. Side rails up x2.

## 2024-02-25 VITALS
WEIGHT: 170 LBS | RESPIRATION RATE: 16 BRPM | HEART RATE: 64 BPM | SYSTOLIC BLOOD PRESSURE: 115 MMHG | OXYGEN SATURATION: 100 % | TEMPERATURE: 97.9 F | DIASTOLIC BLOOD PRESSURE: 76 MMHG | HEIGHT: 68 IN | BODY MASS INDEX: 25.76 KG/M2

## 2024-02-25 LAB
ANION GAP SERPL CALCULATED.3IONS-SCNC: 9 MMOL/L (ref 9–17)
BASOPHILS # BLD: 0.1 K/UL (ref 0–0.2)
BASOPHILS NFR BLD: 1 % (ref 0–2)
BUN SERPL-MCNC: 15 MG/DL (ref 6–20)
CALCIUM SERPL-MCNC: 8.3 MG/DL (ref 8.6–10.4)
CHLORIDE SERPL-SCNC: 104 MMOL/L (ref 98–107)
CO2 SERPL-SCNC: 24 MMOL/L (ref 20–31)
CREAT SERPL-MCNC: 0.8 MG/DL (ref 0.7–1.2)
EOSINOPHIL # BLD: 0.2 K/UL (ref 0–0.4)
EOSINOPHILS RELATIVE PERCENT: 2 % (ref 0–4)
ERYTHROCYTE [DISTWIDTH] IN BLOOD BY AUTOMATED COUNT: 20.5 % (ref 11.5–14.9)
GFR SERPL CREATININE-BSD FRML MDRD: >60 ML/MIN/1.73M2
GLUCOSE SERPL-MCNC: 91 MG/DL (ref 70–99)
HCT VFR BLD AUTO: 29.9 % (ref 41–53)
HGB BLD-MCNC: 10.3 G/DL (ref 13.5–17.5)
LYMPHOCYTES NFR BLD: 1.7 K/UL (ref 1–4.8)
LYMPHOCYTES RELATIVE PERCENT: 20 % (ref 24–44)
MCH RBC QN AUTO: 35.9 PG (ref 26–34)
MCHC RBC AUTO-ENTMCNC: 34.3 G/DL (ref 31–37)
MCV RBC AUTO: 104.6 FL (ref 80–100)
MONOCYTES NFR BLD: 0.7 K/UL (ref 0.1–1.3)
MONOCYTES NFR BLD: 9 % (ref 1–7)
NEUTROPHILS NFR BLD: 68 % (ref 36–66)
NEUTS SEG NFR BLD: 5.7 K/UL (ref 1.3–9.1)
PLATELET # BLD AUTO: 111 K/UL (ref 150–450)
PMV BLD AUTO: 8.2 FL (ref 6–12)
POTASSIUM SERPL-SCNC: 4.6 MMOL/L (ref 3.7–5.3)
RBC # BLD AUTO: 2.86 M/UL (ref 4.5–5.9)
SODIUM SERPL-SCNC: 137 MMOL/L (ref 135–144)
WBC OTHER # BLD: 8.3 K/UL (ref 3.5–11)

## 2024-02-25 PROCEDURE — 36415 COLL VENOUS BLD VENIPUNCTURE: CPT

## 2024-02-25 PROCEDURE — 6370000000 HC RX 637 (ALT 250 FOR IP): Performed by: INTERNAL MEDICINE

## 2024-02-25 PROCEDURE — 6370000000 HC RX 637 (ALT 250 FOR IP)

## 2024-02-25 PROCEDURE — 80048 BASIC METABOLIC PNL TOTAL CA: CPT

## 2024-02-25 PROCEDURE — 99239 HOSP IP/OBS DSCHRG MGMT >30: CPT | Performed by: INTERNAL MEDICINE

## 2024-02-25 PROCEDURE — 2580000003 HC RX 258

## 2024-02-25 PROCEDURE — 6370000000 HC RX 637 (ALT 250 FOR IP): Performed by: NURSE PRACTITIONER

## 2024-02-25 PROCEDURE — 85025 COMPLETE CBC W/AUTO DIFF WBC: CPT

## 2024-02-25 RX ORDER — LACTULOSE 10 G/15ML
20 SOLUTION ORAL EVERY 6 HOURS
Qty: 946 ML | Refills: 1 | Status: SHIPPED | OUTPATIENT
Start: 2024-02-25

## 2024-02-25 RX ADMIN — MIDODRINE HYDROCHLORIDE 2.5 MG: 2.5 TABLET ORAL at 11:49

## 2024-02-25 RX ADMIN — SODIUM CHLORIDE, PRESERVATIVE FREE 20 ML: 5 INJECTION INTRAVENOUS at 08:10

## 2024-02-25 RX ADMIN — LACTULOSE 20 G: 20 SOLUTION ORAL at 00:44

## 2024-02-25 RX ADMIN — SPIRONOLACTONE 50 MG: 25 TABLET, FILM COATED ORAL at 08:09

## 2024-02-25 RX ADMIN — LACTULOSE 20 G: 20 SOLUTION ORAL at 06:23

## 2024-02-25 RX ADMIN — FOLIC ACID 1 MG: 1 TABLET ORAL at 08:09

## 2024-02-25 RX ADMIN — FUROSEMIDE 20 MG: 20 TABLET ORAL at 08:09

## 2024-02-25 RX ADMIN — MIDODRINE HYDROCHLORIDE 2.5 MG: 2.5 TABLET ORAL at 08:09

## 2024-02-25 RX ADMIN — NADOLOL 20 MG: 20 TABLET ORAL at 08:10

## 2024-02-25 RX ADMIN — RIFAXIMIN 550 MG: 550 TABLET ORAL at 08:09

## 2024-02-25 RX ADMIN — ACETAMINOPHEN 650 MG: 325 TABLET ORAL at 06:39

## 2024-02-25 RX ADMIN — PANTOPRAZOLE SODIUM 40 MG: 40 TABLET, DELAYED RELEASE ORAL at 06:26

## 2024-02-25 RX ADMIN — THIAMINE HCL TAB 100 MG 100 MG: 100 TAB at 08:09

## 2024-02-25 ASSESSMENT — PAIN SCALES - GENERAL: PAINLEVEL_OUTOF10: 5

## 2024-02-25 ASSESSMENT — PAIN DESCRIPTION - LOCATION: LOCATION: ABDOMEN

## 2024-02-25 ASSESSMENT — PAIN DESCRIPTION - DESCRIPTORS: DESCRIPTORS: STABBING

## 2024-02-25 ASSESSMENT — PAIN DESCRIPTION - ORIENTATION: ORIENTATION: RIGHT;LOWER

## 2024-02-25 NOTE — PROGRESS NOTES
[place] [facility]    Gastroenterology Daily Progress Note      Patient:   Sam Emerson   :    1985   Facility:   [facility]  Date:     2024  Consultant:   SILVA Ziegler - CNP, CNP      SUBJECTIVE  39 y.o. male admitted 2024 with Metabolic encephalopathy [G93.41]  Hyperammonemia (HCC) [E72.20]  Alcoholic cirrhosis, unspecified whether ascites present (HCC) [K70.30] and seen for decompensated cirrhosis  with constipation and hyperammonemia. The pt was seen and examined. He is alert and oriented. Has had multiple loose non bloody stools. No emesis. Am labs are pending. C/o minimal RUQ pain..        OBJECTIVE  Scheduled Meds:   folic acid  1 mg Oral Daily    furosemide  20 mg Oral Daily    midodrine  2.5 mg Oral TID WC    nadolol  20 mg Oral Daily    nicotine  1 patch TransDERmal Daily    spironolactone  50 mg Oral Daily    vitamin B-1  100 mg Oral Daily    sodium chloride flush  5-40 mL IntraVENous 2 times per day    enoxaparin  40 mg SubCUTAneous Daily    pantoprazole  40 mg Oral Q12H    lactulose  20 g Oral 4 times per day    rifAXIMin  400 mg Oral TID       Vital Signs:  /74   Pulse 70   Temp 98.2 °F (36.8 °C)   Resp 16   Ht 1.727 m (5' 8\")   Wt 77.1 kg (170 lb)   SpO2 100%   BMI 25.85 kg/m²    Review of Systems - History obtained from chart review and the patient  General ROS: negative  Respiratory ROS: no cough, shortness of breath, or wheezing  Cardiovascular ROS: no chest pain or dyspnea on exertion  Gastrointestinal ROS: positive for - abdominal pain   Physical Exam:       General Appearance: alert and oriented to person, place and time, well-developed and well-nourished, in no acute distress  Skin: warm and dry, no rash or erythema  Head: normocephalic and atraumatic  Eyes: pupils equal, round, and reactive to light, extraocular eye movements intact, conjunctivae icteric  ENT: hearing grossly normal bilaterally  Neck: neck supple and non tender 
Nutrition Assessment     Type and Reason for Visit: Positive Nutrition Screen    Nutrition Recommendations/Plan:   Will continue to provide Regular diet     Malnutrition Assessment:  Malnutrition Status:  None    Nutrition Assessment:  Pt admitted due to NH3 of 123, now improved to 67. Pt's constipation is resolved. He is consuming Regular diet. Review of wt history shows pt's stated wt is within his usual body wt range. Plan is for discharge 2/25.    Nutrition Related Findings:     Wound Type: None    Current Nutrition Therapies:    ADULT DIET; Regular    Anthropometric Measures:  Height: 172.7 cm (5' 8\")  Current Body Wt: 77.1 kg (170 lb)   BMI: 25.9    Nutrition Diagnosis:   No nutrition diagnosis at this time     Nutrition Interventions:   Food and/or Nutrient Delivery: Continue Current Diet                      Nutrition Monitoring and Evaluation:      Food/Nutrient Intake Outcomes: Food and Nutrient Intake       Discharge Planning:    Continue current diet     Thelma Lyle RD, LD  Contact: [phone]    
Patient admitted to 2052. Vitals, admission question and assessment completed. Patient oriented to room and call light.   
Pt INT removed. Pt tolerated well, no complications. Follow up appts., discharge instructions and medications reviewed with patient. Understanding stated, denies questions or concerns. Pt transferred home via private vehicle.  
destiny brock with writer; patient known from previous admissions; patient provided medical update; patient coping and hoping to be able to be placed on a transplant list in June, 2024; patient talked about his multiple complications; patient also discussed his wife's medical issues and her long recovery from surgery; listening presence, support and words of affirmation provided as patient discussed  his medical journey since December, 2023; patient declined prayer; blessing given; patient grateful for visit stating his family is unable to visit him in the hospital;     02/23/24 1841   Encounter Summary   Encounter Overview/Reason  Spiritual/Emotional Needs   Service Provided For: Patient   Referral/Consult From: Charity   Support System Spouse   Last Encounter  02/23/24   Complexity of Encounter Moderate   Begin Time 1415   End Time  1430   Total Time Calculated 15 min   Grief, Loss, and Adjustments   Type Adjustment to illness   Assessment/Intervention/Outcome   Assessment Anxious;Coping;Hopeful;Powerlessness;Stress overload   Intervention Active listening;Discussed illness injury and it’s impact;Explored/Affirmed feelings, thoughts, concerns;Sustaining Presence/Ministry of presence;Prayer (assurance of)/Seymour   Outcome Comfort;Coping;Engaged in conversation;Expressed feelings, needs, and concerns;Expressed Gratitude;Receptive         
5 days. Take lowest dose possible to manage pain Max Daily Amount: 25 mg 2/19/24 2/29/24  Rosie Montes De Oca MD   traMADol (ULTRAM) 50 MG tablet Take 0.5 tablets by mouth daily for 10 days. Intended supply: 3 days. Take lowest dose possible to manage pain Max Daily Amount: 25 mg 2/14/24 2/24/24  Marshall Acevedo MD   lactulose (CHRONULAC) 10 GM/15ML solution Take 30 mLs by mouth 3 times daily 2/6/24   Jolynn Ibrahim MD   midodrine (PROAMATINE) 2.5 MG tablet Take 1 tablet by mouth 3 times daily (with meals) 2/1/24   Rosie Montes De Oca MD   furosemide (LASIX) 20 MG tablet Take 1 tablet by mouth daily 2/1/24   Rosie Montes De Oca MD   spironolactone (ALDACTONE) 50 MG tablet Take 1 tablet by mouth daily 2/1/24   Rosie Montes De Oca MD   nadolol (CORGARD) 20 MG tablet Take 1 tablet by mouth daily 1/24/24   Rick Brewster MD   ondansetron (ZOFRAN) 4 MG tablet Take 1 tablet by mouth every 8 hours as needed for Nausea 1/2/24   Gumaro Graff DO   nicotine (NICODERM CQ) 7 MG/24HR Place 1 patch onto the skin daily 12/27/23   Madiha Moncada MD   pantoprazole (PROTONIX) 40 MG tablet Take 1 tablet by mouth in the morning and 1 tablet in the evening. 12/15/23   Paramjit Barrera MD   vitamin B-1 (THIAMINE) 100 MG tablet Take 1 tablet by mouth daily 9/30/23   Maribel Ayala MD   EPINEPHrine (EPIPEN 2-LIZETTE) 0.3 MG/0.3ML SOAJ injection Inject 0.3 mLs into the muscle once for 1 dose Use as directed for allergic reaction 2/23/23 2/23/23  Nick Magana MD   folic acid (FOLVITE) 1 MG tablet Take 1 tablet by mouth daily 5/4/22   Joselyn Isaac MD   naproxen (NAPROSYN) 500 MG tablet Take 1 tablet by mouth 2 times daily  Patient taking differently: Take 500 mg by mouth 2 times daily as needed 8/13/21 3/28/22  Jimenez Campos MD   Blood Pressure Monitoring (BP MONITOR-STETHOSCOPE) KIT 1 each by Does not apply route daily 4/7/21   Jolynn Ibrahim MD        Allergies:     Bee venom    Social History:     Tobacco:    reports that he has 
pack-year smoking history. He has never used smokeless tobacco.  Alcohol:      reports current alcohol use of about 84.0 standard drinks of alcohol per week.  Drug Use:  reports current drug use. Drug: Marijuana (Weed).    Family History:     Family History   Problem Relation Age of Onset    Heart Disease Mother     Heart Attack Mother     Heart Disease Father          Investigations:      Laboratory Testing:  Recent Results (from the past 24 hour(s))   Basic Metabolic Panel    Collection Time: 02/22/24  9:55 PM   Result Value Ref Range    Sodium 139 135 - 144 mmol/L    Potassium 4.3 3.7 - 5.3 mmol/L    Chloride 108 (H) 98 - 107 mmol/L    CO2 20 20 - 31 mmol/L    Anion Gap 11 9 - 17 mmol/L    Glucose 103 (H) 70 - 99 mg/dL    BUN 13 6 - 20 mg/dL    Creatinine 0.8 0.7 - 1.2 mg/dL    Est, Glom Filt Rate >60 >60 mL/min/1.73m2    Calcium 8.2 (L) 8.6 - 10.4 mg/dL   Hepatic Function Panel    Collection Time: 02/22/24  9:55 PM   Result Value Ref Range    Albumin 2.7 (L) 3.5 - 5.2 g/dL    Alkaline Phosphatase 303 (H) 40 - 129 U/L    ALT 20 5 - 41 U/L    AST 62 (H) <40 U/L    Total Bilirubin 5.3 (H) 0.3 - 1.2 mg/dL    Bilirubin, Direct 2.4 (H) <0.3 mg/dL    Bilirubin, Indirect 2.9 (H) 0.0 - 1.0 mg/dL    Total Protein 6.4 6.4 - 8.3 g/dL   CBC with Auto Differential    Collection Time: 02/22/24  9:55 PM   Result Value Ref Range    WBC 7.3 3.5 - 11.0 k/uL    RBC 2.98 (L) 4.5 - 5.9 m/uL    Hemoglobin 10.5 (L) 13.5 - 17.5 g/dL    Hematocrit 31.4 (L) 41 - 53 %    .1 (H) 80 - 100 fL    MCH 35.3 (H) 26 - 34 pg    MCHC 33.6 31 - 37 g/dL    RDW 20.6 (H) 11.5 - 14.9 %    Platelets 110 (L) 150 - 450 k/uL    MPV 8.1 6.0 - 12.0 fL    Neutrophils % 68 (H) 36 - 66 %    Lymphocytes % 16 (L) 24 - 44 %    Monocytes % 11 (H) 1 - 7 %    Eosinophils % 2 0 - 4 %    Basophils % 1 0 - 2 %    Bands 2 0 - 10 %    Neutrophils Absolute 4.96 1.3 - 9.1 k/uL    Lymphocytes Absolute 1.17 1.0 - 4.8 k/uL    Monocytes Absolute 0.80 0.1 - 1.3 k/uL

## 2024-02-25 NOTE — DISCHARGE INSTR - COC
of alcoholism (HCC) F10.21    Alcoholic hepatitis with ascites K70.11    Decompensated hepatic cirrhosis (HCC) K72.90, K74.60    Hyperbilirubinemia E80.6    Fulminant hepatic failure (HCC) K72.90    Hyperammonemia (HCC) E72.20    Encounter for palliative care Z51.5    Irritable bowel syndrome with both constipation and diarrhea K58.2    Abdominal pain R10.9    Colitis K52.9    Acute cystitis without hematuria N30.00    Thrombocytopenia (HCC) D69.6    Alcohol abuse F10.10    SBP (spontaneous bacterial peritonitis) (HCC) K65.2    Mild malnutrition (HCC) E44.1    Hepatic encephalopathy (HCC) K76.82    Other fatigue R53.83    Goals of care, counseling/discussion Z71.89    Metabolic encephalopathy G93.41       Isolation/Infection:   Isolation            No Isolation          Patient Infection Status       Infection Onset Added Last Indicated Last Indicated By Review Planned Expiration Resolved Resolved By    None active    Resolved    COVID-19 22 COVID-19 & Influenza Combo   22 Infection                        Nurse Assessment:  Last Vital Signs: /76   Pulse 64   Temp 97.9 °F (36.6 °C)   Resp 16   Ht 1.727 m (5' 8\")   Wt 77.1 kg (170 lb)   SpO2 100%   BMI 25.85 kg/m²     Last documented pain score (0-10 scale): Pain Level: 5  Last Weight:   Wt Readings from Last 1 Encounters:   24 77.1 kg (170 lb)     Mental Status:  {IP PT MENTAL STATUS:77524}    IV Access:  { SUMMER IV ACCESS:438775824}    Nursing Mobility/ADLs:  Walking   {CHP DME ADLs:842677958}  Transfer  {CHP DME ADLs:142179952}  Bathing  {CHP DME ADLs:989218491}  Dressing  {CHP DME ADLs:426732416}  Toileting  {CHP DME ADLs:778508454}  Feeding  {CHP DME ADLs:286919331}  Med Admin  {CHP DME ADLs:171803341}  Med Delivery   { SUMMER MED Delivery:442516888}    Wound Care Documentation and Therapy:        Elimination:  Continence:   Bowel: {YES / NO:}  Bladder: {YES / NO:}  Urinary Catheter: {Urinary

## 2024-02-25 NOTE — PLAN OF CARE
Problem: Discharge Planning  Goal: Discharge to home or other facility with appropriate resources  Outcome: Progressing  Flowsheets (Taken 2/25/2024 0444)  Discharge to home or other facility with appropriate resources:   Identify barriers to discharge with patient and caregiver   Identify discharge learning needs (meds, wound care, etc)   Refer to discharge planning if patient needs post-hospital services based on physician order or complex needs related to functional status, cognitive ability or social support system   Arrange for needed discharge resources and transportation as appropriate  Note: Inform pt. Of discharge teaching and planned. Instructed pt. To inform me if further teaching need to be done.      Problem: Pain  Goal: Verbalizes/displays adequate comfort level or baseline comfort level  Outcome: Progressing  Flowsheets (Taken 2/25/2024 0444)  Verbalizes/displays adequate comfort level or baseline comfort level:   Encourage patient to monitor pain and request assistance   Administer analgesics based on type and severity of pain and evaluate response   Consider cultural and social influences on pain and pain management   Assess pain using appropriate pain scale   Implement non-pharmacological measures as appropriate and evaluate response   Notify Licensed Independent Practitioner if interventions unsuccessful or patient reports new pain  Note: PT. Received pain meds in timely manner. Teach pt. Non-pharm. Methods to handle pain.      Problem: Safety - Adult  Goal: Free from fall injury  Outcome: Progressing  Flowsheets (Taken 2/25/2024 0444)  Free From Fall Injury:   Based on caregiver fall risk screen, instruct family/caregiver to ask for assistance with transferring infant if caregiver noted to have fall risk factors   Instruct family/caregiver on patient safety  Note: Made sure call light was in reach, a clear pathway and adequate lighting was provided. Also made sure that the pt. Was wearing

## 2024-02-25 NOTE — PLAN OF CARE
Problem: Pain  Goal: Verbalizes/displays adequate comfort level or baseline comfort level  2/25/2024 1203 by Yamilet Mae RN  Outcome: Adequate for Discharge, Pt educated on non-pharmacological pain interventions. PRN pain medications administered.        Problem: Safety - Adult  Goal: Free from fall injury  2/25/2024 1203 by Yamilet Mae, MINERVA  Outcome: Adequate for Discharge, Patient remains free of incidence/ injury. Bed remains in low position. Side rails up x2.

## 2024-02-26 ENCOUNTER — CARE COORDINATION (OUTPATIENT)
Dept: CASE MANAGEMENT | Age: 39
End: 2024-02-26

## 2024-02-26 NOTE — CARE COORDINATION
Care Transitions Initial Follow Up Call Attempt #1 -Attempted initial 24 hour transitional call to patient on both contact #'s.  Left VM to return call directly to CTN    CTN communicated with Rocío Fierro RN re: evaluating patient for [facility] due to readmission frequency + not able to reach patient as before   - she plans to follow up with patient    Call within 2 business days of discharge: Yes    Readmission ST -  ABD pain, nausea, headache, constipation - hyperammonemia, chronic ETOH hepatic disease      Patient: Sam Emerson   Patient : 1985   MRN: [id number]    Reason for Admission: hyperammonemia, chronic ETOH hepatic disease  Discharge Date: 24   RARS: Readmission Risk Score: 45.2      Last Discharge Facility       Date Complaint Diagnosis Description Type Department Provider    24 Abdominal Pain; Fatigue; Headache; Nausea; Constipation Hyperammonemia (HCC) ... ED to Hosp-Admission (Discharged) (ADMITTED) [facility] Jose Luis Moncada MD; Christoph Casillas...            Was this an external facility discharge? No   Non-face-to-face services provided:  Scheduled appointment with PCP-3/6  Scheduled appointment with Specialist[facility] , GI 3/7  Obtained and reviewed discharge summary and/or continuity of care documents  Communication with home health agencies or other community services the patient is currently using-Rocío Fierro      Follow Up  Future Appointments   Date Time Provider Department Center   2024  2:45 PM Jeff Cassidy MD  [facility]   3/6/2024 11:30 AM Rosie Montes De Oca MD [facility]   3/7/2024  3:00 PM Gerald Olivarez MD [facility]       Care Transition Nurse provided contact information.     Beryl Ellis, MINERVA

## 2024-02-27 ENCOUNTER — CARE COORDINATION (OUTPATIENT)
Dept: CASE MANAGEMENT | Age: 39
End: 2024-02-27

## 2024-02-27 NOTE — CARE COORDINATION
Care Transitions Initial Follow Up Call Attempt - day #2 - Attempted initial 24 hour transitional call to patient - both contact #.  Left VM to return call directly to CTN    Call within 2 business days of discharge: Yes       CTN communicated with Rocío Fierro RN yesterday & today re: evaluating patient for [facility] due to readmission frequency + not able to reach patient as before   - she plans to follow up with patient.    CT program ended       Patient: Sam Emerson             Patient : 1985   MRN: [id number]             Reason for Admission: hyperammonemia, chronic ETOH hepatic disease  Discharge Date: 24         RARS: Readmission Risk Score: 45.2      Last Discharge Facility       Date Complaint Diagnosis Description Type Department Provider    24 Abdominal Pain; Fatigue; Headache; Nausea; Constipation Hyperammonemia (HCC) ... ED to Hosp-Admission (Discharged) (ADMITTED) [facility] Jose Luis West MD; Christoph Casillas...            Was this an external facility discharge? No     Non-face-to-face services provided:  Scheduled appointment with PCP-3/6  Scheduled appointment with [facility] , GI 3/7  Obtained and reviewed discharge summary and/or continuity of care documents  Communication with home health agencies or other community services the patient is currently using-Rocío Fierro      Is follow up appointment scheduled within 7 days of discharge? Yes.    Follow Up  Future Appointments   Date Time Provider Department Center   2024  2:45 PM Jeff Cassidy MD  Cancer Ct [facility]   3/6/2024 11:30 AM Rosie Montes De Oca MD [facility]   3/7/2024  3:00 PM Gerald Olivarez MD [facility]       Care Transition Nurse provided contact information.      Beryl Ellis, RN

## 2024-03-06 ENCOUNTER — TELEPHONE (OUTPATIENT)
Dept: INTERNAL MEDICINE CLINIC | Age: 39
End: 2024-03-06

## 2024-03-06 ENCOUNTER — OFFICE VISIT (OUTPATIENT)
Dept: INTERNAL MEDICINE CLINIC | Age: 39
End: 2024-03-06
Payer: COMMERCIAL

## 2024-03-06 VITALS
SYSTOLIC BLOOD PRESSURE: 130 MMHG | DIASTOLIC BLOOD PRESSURE: 84 MMHG | OXYGEN SATURATION: 98 % | WEIGHT: 160 LBS | HEART RATE: 67 BPM | BODY MASS INDEX: 24.33 KG/M2

## 2024-03-06 DIAGNOSIS — F32.A DEPRESSION, UNSPECIFIED DEPRESSION TYPE: ICD-10-CM

## 2024-03-06 DIAGNOSIS — L23.9 ALLERGIC DERMATITIS: ICD-10-CM

## 2024-03-06 DIAGNOSIS — F10.21 HISTORY OF ALCOHOLISM (HCC): ICD-10-CM

## 2024-03-06 DIAGNOSIS — K70.31 ALCOHOLIC CIRRHOSIS OF LIVER WITH ASCITES (HCC): Primary | ICD-10-CM

## 2024-03-06 DIAGNOSIS — I10 ESSENTIAL HYPERTENSION: ICD-10-CM

## 2024-03-06 DIAGNOSIS — K70.31 ALCOHOLIC CIRRHOSIS OF LIVER WITH ASCITES (HCC): ICD-10-CM

## 2024-03-06 DIAGNOSIS — R63.4 WEIGHT LOSS: Primary | ICD-10-CM

## 2024-03-06 DIAGNOSIS — G43.809 OTHER MIGRAINE WITHOUT STATUS MIGRAINOSUS, NOT INTRACTABLE: ICD-10-CM

## 2024-03-06 PROCEDURE — 4004F PT TOBACCO SCREEN RCVD TLK: CPT | Performed by: INTERNAL MEDICINE

## 2024-03-06 PROCEDURE — 1111F DSCHRG MED/CURRENT MED MERGE: CPT | Performed by: INTERNAL MEDICINE

## 2024-03-06 PROCEDURE — 99214 OFFICE O/P EST MOD 30 MIN: CPT | Performed by: INTERNAL MEDICINE

## 2024-03-06 PROCEDURE — 3075F SYST BP GE 130 - 139MM HG: CPT | Performed by: INTERNAL MEDICINE

## 2024-03-06 PROCEDURE — 3079F DIAST BP 80-89 MM HG: CPT | Performed by: INTERNAL MEDICINE

## 2024-03-06 PROCEDURE — G8484 FLU IMMUNIZE NO ADMIN: HCPCS | Performed by: INTERNAL MEDICINE

## 2024-03-06 PROCEDURE — G8420 CALC BMI NORM PARAMETERS: HCPCS | Performed by: INTERNAL MEDICINE

## 2024-03-06 PROCEDURE — G8427 DOCREV CUR MEDS BY ELIG CLIN: HCPCS | Performed by: INTERNAL MEDICINE

## 2024-03-06 RX ORDER — PREDNISONE 20 MG/1
20 TABLET ORAL DAILY
Qty: 5 TABLET | Refills: 0 | Status: SHIPPED | OUTPATIENT
Start: 2024-03-06 | End: 2024-03-11

## 2024-03-06 RX ORDER — DIAPER,BRIEF,INFANT-TODD,DISP
EACH MISCELLANEOUS
Qty: 1 EACH | Refills: 0 | Status: SHIPPED | OUTPATIENT
Start: 2024-03-06

## 2024-03-06 RX ORDER — SUMATRIPTAN 50 MG/1
50 TABLET, FILM COATED ORAL
Qty: 9 TABLET | Refills: 0 | Status: SHIPPED | OUTPATIENT
Start: 2024-03-06 | End: 2024-03-06

## 2024-03-06 RX ORDER — ONDANSETRON 4 MG/1
4 TABLET, FILM COATED ORAL EVERY 8 HOURS PRN
Qty: 20 TABLET | Refills: 0 | Status: SHIPPED | OUTPATIENT
Start: 2024-03-06

## 2024-03-06 SDOH — ECONOMIC STABILITY: FOOD INSECURITY: WITHIN THE PAST 12 MONTHS, THE FOOD YOU BOUGHT JUST DIDN'T LAST AND YOU DIDN'T HAVE MONEY TO GET MORE.: NEVER TRUE

## 2024-03-06 SDOH — ECONOMIC STABILITY: INCOME INSECURITY: HOW HARD IS IT FOR YOU TO PAY FOR THE VERY BASICS LIKE FOOD, HOUSING, MEDICAL CARE, AND HEATING?: NOT HARD AT ALL

## 2024-03-06 SDOH — ECONOMIC STABILITY: FOOD INSECURITY: WITHIN THE PAST 12 MONTHS, YOU WORRIED THAT YOUR FOOD WOULD RUN OUT BEFORE YOU GOT MONEY TO BUY MORE.: NEVER TRUE

## 2024-03-06 NOTE — PROGRESS NOTES
Visit Information    Have you changed or started any medications since your last visit including any over-the-counter medicines, vitamins, or herbal medicines? no   Are you having any side effects from any of your medications? -  no  Have you stopped taking any of your medications? Is so, why? -  no    Have you seen any other physician or provider since your last visit? No  Have you had any other diagnostic tests since your last visit? No  Have you been seen in the emergency room and/or had an admission to a hospital since we last saw you? No  Have you had your routine dental cleaning in the past 6 months? no    Have you activated your [employer] account? If not, what are your barriers? Yes     Patient Care Team:  Rosie Montes De Oca MD as PCP - General (Internal Medicine)  Rosie Montes De Oca MD as PCP - Empaneled Provider  Prince Gonsalez MD as Consulting Physician (Gastroenterology)    Medical History Review  Past Medical, Family, and Social History reviewed and does not contribute to the patient presenting condition    Health Maintenance   Topic Date Due    Varicella vaccine (1 of 2 - 2-dose childhood series) Never done    Pneumococcal 0-64 years Vaccine (1 - PCV) Never done    HIV screen  Never done    Hepatitis A vaccine (1 of 2 - Risk 2-dose series) Never done    Hepatitis B vaccine (2 of 2 - CpG 2-dose series) 04/13/2023    Flu vaccine (1) Never done    COVID-19 Vaccine (3 - 2023-24 season) 09/01/2023    Depression Monitoring  01/24/2025    DTaP/Tdap/Td vaccine (2 - Td or Tdap) 09/21/2033    Hepatitis C screen  Completed    Hib vaccine  Aged Out    HPV vaccine  Aged Out    Polio vaccine  Aged Out    Meningococcal (ACWY) vaccine  Aged Out    Depression Screen  Discontinued    Diabetes screen  Discontinued

## 2024-03-06 NOTE — PROGRESS NOTES
[facility]  [address] [address]  Dept: [phone]  Dept Fax: [phone]    Office Progress/Follow Up Note  Date of patient's visit: 3/6/2024  Patient's Name:  Sam Emerson YOB: 1985            Patient Care Team:  Rosie Montes De Oca MD as PCP - General (Internal Medicine)  Rosie Montes De Oca MD as PCP - Empaneled Provider  Prince Gonsalez MD as Consulting Physician (Gastroenterology)    REASON FOR VISIT: Routine outpatient follow up/Same day visit/Post hospital/ED visit    HISTORY OF PRESENT ILLNESS:      Chief Complaint   Patient presents with    ascites     Follow up     Weight Loss     Patient has been having weight loss     Headache     Patient has been having headaches for the last 2 weeks get sensitive to light    Herpes Zoster     Patient thinks he may have shingles on his stomach and back         History was obtained from the patient. Sam Emerson is a 39 y.o. is here for a   Came for follow-up  Has multiple comorbidities including decompensated liver cirrhosis  Variceal bleeding s/p TIPS  Multiple admissions in the hospital for hepatic encephalopathy    Recently admitted with constipation dose of lactulose increased  Patient does report intermittent constipation, himself increase the dose of lactulose  No sign of volume overload present patient currently not taking any diuretics  Has not needed any paracentesis lately  Abdominal examination benign  No ascites    Patient reports weight loss  States that he has been snacking all the time and feels very hungry but not able to gain weight  No fever or chills  Does have his chronic abdominal pain  Multiple times had CAT scan which has been nonacute    Has rash on the abdominal wall and back, stated that rash has been there and since he went to ER, itches a lot  Did not take Benadryl due to concern of constipation      Has been having headache almost every other day, states for couple

## 2024-03-07 ENCOUNTER — OFFICE VISIT (OUTPATIENT)
Dept: GASTROENTEROLOGY | Age: 39
End: 2024-03-07
Payer: COMMERCIAL

## 2024-03-07 VITALS
TEMPERATURE: 98.8 F | WEIGHT: 166 LBS | BODY MASS INDEX: 25.24 KG/M2 | DIASTOLIC BLOOD PRESSURE: 89 MMHG | SYSTOLIC BLOOD PRESSURE: 142 MMHG

## 2024-03-07 DIAGNOSIS — K65.2 SBP (SPONTANEOUS BACTERIAL PERITONITIS) (HCC): ICD-10-CM

## 2024-03-07 DIAGNOSIS — K76.82 HEPATIC ENCEPHALOPATHY (HCC): Primary | ICD-10-CM

## 2024-03-07 DIAGNOSIS — R10.84 ABDOMINAL PAIN, GENERALIZED: ICD-10-CM

## 2024-03-07 DIAGNOSIS — D50.0 IRON DEFICIENCY ANEMIA DUE TO CHRONIC BLOOD LOSS: ICD-10-CM

## 2024-03-07 DIAGNOSIS — E80.6 HYPERBILIRUBINEMIA: ICD-10-CM

## 2024-03-07 DIAGNOSIS — E44.1 MILD MALNUTRITION (HCC): ICD-10-CM

## 2024-03-07 DIAGNOSIS — K70.11 ALCOHOLIC HEPATITIS WITH ASCITES: ICD-10-CM

## 2024-03-07 DIAGNOSIS — E72.20 HYPERAMMONEMIA (HCC): ICD-10-CM

## 2024-03-07 DIAGNOSIS — D69.6 THROMBOCYTOPENIA (HCC): ICD-10-CM

## 2024-03-07 DIAGNOSIS — F10.21 HISTORY OF ALCOHOLISM (HCC): ICD-10-CM

## 2024-03-07 DIAGNOSIS — F10.10 ALCOHOL ABUSE: ICD-10-CM

## 2024-03-07 DIAGNOSIS — R17 JAUNDICE: ICD-10-CM

## 2024-03-07 DIAGNOSIS — Z95.828 S/P TIPS (TRANSJUGULAR INTRAHEPATIC PORTOSYSTEMIC SHUNT): ICD-10-CM

## 2024-03-07 DIAGNOSIS — K74.60 DECOMPENSATED HEPATIC CIRRHOSIS (HCC): ICD-10-CM

## 2024-03-07 DIAGNOSIS — K72.90 DECOMPENSATED HEPATIC CIRRHOSIS (HCC): ICD-10-CM

## 2024-03-07 DIAGNOSIS — R10.30 LOWER ABDOMINAL PAIN: ICD-10-CM

## 2024-03-07 PROCEDURE — 3077F SYST BP >= 140 MM HG: CPT | Performed by: INTERNAL MEDICINE

## 2024-03-07 PROCEDURE — 4004F PT TOBACCO SCREEN RCVD TLK: CPT | Performed by: INTERNAL MEDICINE

## 2024-03-07 PROCEDURE — G8484 FLU IMMUNIZE NO ADMIN: HCPCS | Performed by: INTERNAL MEDICINE

## 2024-03-07 PROCEDURE — G8419 CALC BMI OUT NRM PARAM NOF/U: HCPCS | Performed by: INTERNAL MEDICINE

## 2024-03-07 PROCEDURE — 99214 OFFICE O/P EST MOD 30 MIN: CPT | Performed by: INTERNAL MEDICINE

## 2024-03-07 PROCEDURE — 3079F DIAST BP 80-89 MM HG: CPT | Performed by: INTERNAL MEDICINE

## 2024-03-07 PROCEDURE — 1111F DSCHRG MED/CURRENT MED MERGE: CPT | Performed by: INTERNAL MEDICINE

## 2024-03-07 PROCEDURE — G8427 DOCREV CUR MEDS BY ELIG CLIN: HCPCS | Performed by: INTERNAL MEDICINE

## 2024-03-07 RX ORDER — POLYETHYLENE GLYCOL 3350 17 G/17G
17 POWDER, FOR SOLUTION ORAL DAILY
Qty: 1530 G | Refills: 1 | Status: SHIPPED | OUTPATIENT
Start: 2024-03-07 | End: 2024-09-03

## 2024-03-07 ASSESSMENT — ENCOUNTER SYMPTOMS
RECTAL PAIN: 0
SHORTNESS OF BREATH: 0
ABDOMINAL PAIN: 1
VOMITING: 1
COLOR CHANGE: 0
SORE THROAT: 0
WHEEZING: 0
CONSTIPATION: 1
COUGH: 0
DIARRHEA: 0
ABDOMINAL DISTENTION: 1
ANAL BLEEDING: 0
TROUBLE SWALLOWING: 0
BLOOD IN STOOL: 0
NAUSEA: 1
CHOKING: 0

## 2024-03-07 NOTE — PROGRESS NOTES
multiple questions were answered to his satisfaction      Thank you for allowing me to participate in the care of Mr. Emerson. For any further questions please do not hesitate to contact me.    I have reviewed and agree with the ROS entered by the MA/Nurse.     This note is created with the assistance of the speech recognition program.  While intending to generate a document that actually reflects the content of the visit, document can still have some errors including those of syntax and sound like substitutions which may escape proof reading.  Actual meaning can be extrapolated by contextual diversion.     Gerald Olivarez MD, FACG  Board Certified in Gastroenterology and Internal Medicine  [facility]  Office #: [phone]

## 2024-03-12 ENCOUNTER — CARE COORDINATION (OUTPATIENT)
Dept: CARE COORDINATION | Age: 39
End: 2024-03-12

## 2024-03-13 ENCOUNTER — TELEPHONE (OUTPATIENT)
Dept: GASTROENTEROLOGY | Age: 39
End: 2024-03-13

## 2024-03-13 NOTE — CARE COORDINATION
[facility] outreach new referral;    Spoke to patient reguarding [facility] outreach program.Patient states he was fine and was not interested in services.

## 2024-03-25 ENCOUNTER — TELEPHONE (OUTPATIENT)
Dept: INTERNAL MEDICINE CLINIC | Age: 39
End: 2024-03-25

## 2024-05-29 ENCOUNTER — TELEPHONE (OUTPATIENT)
Dept: INTERNAL MEDICINE CLINIC | Age: 39
End: 2024-05-29

## 2024-07-11 ENCOUNTER — TELEPHONE (OUTPATIENT)
Dept: GASTROENTEROLOGY | Age: 39
End: 2024-07-11

## 2024-07-22 ENCOUNTER — HOSPITAL ENCOUNTER (EMERGENCY)
Age: 39
Discharge: HOME | End: 2024-07-23
Attending: STUDENT IN AN ORGANIZED HEALTH CARE EDUCATION/TRAINING PROGRAM
Payer: COMMERCIAL

## 2024-07-22 ENCOUNTER — APPOINTMENT (OUTPATIENT)
Dept: CT IMAGING | Age: 39
End: 2024-07-22
Payer: COMMERCIAL

## 2024-07-22 DIAGNOSIS — R10.9 FLANK PAIN: Primary | ICD-10-CM

## 2024-07-22 LAB
ALBUMIN SERPL-MCNC: 3.8 G/DL (ref 3.5–5.2)
ALP SERPL-CCNC: 338 U/L (ref 40–129)
ALT SERPL-CCNC: 20 U/L (ref 5–41)
AMMONIA PLAS-SCNC: 83 UMOL/L (ref 16–60)
ANION GAP SERPL CALCULATED.3IONS-SCNC: 15 MMOL/L (ref 9–17)
AST SERPL-CCNC: 85 U/L
BASOPHILS # BLD: 0.1 K/UL (ref 0–0.2)
BASOPHILS NFR BLD: 2 % (ref 0–2)
BILIRUB SERPL-MCNC: 3.1 MG/DL (ref 0.3–1.2)
BILIRUB UR QL STRIP: NEGATIVE
BUN SERPL-MCNC: 7 MG/DL (ref 6–20)
CALCIUM SERPL-MCNC: 8.2 MG/DL (ref 8.6–10.4)
CHLORIDE SERPL-SCNC: 107 MMOL/L (ref 98–107)
CLARITY UR: CLEAR
CO2 SERPL-SCNC: 21 MMOL/L (ref 20–31)
COLOR UR: YELLOW
COMMENT: NORMAL
CREAT SERPL-MCNC: 0.8 MG/DL (ref 0.7–1.2)
EOSINOPHIL # BLD: 0.2 K/UL (ref 0–0.4)
EOSINOPHILS RELATIVE PERCENT: 3 % (ref 0–4)
ERYTHROCYTE [DISTWIDTH] IN BLOOD BY AUTOMATED COUNT: 18.1 % (ref 11.5–14.9)
GFR, ESTIMATED: >90 ML/MIN/1.73M2
GLUCOSE SERPL-MCNC: 103 MG/DL (ref 70–99)
GLUCOSE UR STRIP-MCNC: NEGATIVE MG/DL
HCT VFR BLD AUTO: 30.5 % (ref 41–53)
HGB BLD-MCNC: 10.5 G/DL (ref 13.5–17.5)
HGB UR QL STRIP.AUTO: NEGATIVE
KETONES UR STRIP-MCNC: NEGATIVE MG/DL
LEUKOCYTE ESTERASE UR QL STRIP: NEGATIVE
LIPASE SERPL-CCNC: 91 U/L (ref 13–60)
LYMPHOCYTES NFR BLD: 1.4 K/UL (ref 1–4.8)
LYMPHOCYTES RELATIVE PERCENT: 20 % (ref 24–44)
MCH RBC QN AUTO: 34.2 PG (ref 26–34)
MCHC RBC AUTO-ENTMCNC: 34.3 G/DL (ref 31–37)
MCV RBC AUTO: 99.8 FL (ref 80–100)
MONOCYTES NFR BLD: 0.5 K/UL (ref 0.1–1.3)
MONOCYTES NFR BLD: 7 % (ref 1–7)
NEUTROPHILS NFR BLD: 68 % (ref 36–66)
NEUTS SEG NFR BLD: 4.7 K/UL (ref 1.3–9.1)
NITRITE UR QL STRIP: NEGATIVE
PH UR STRIP: 7 [PH] (ref 5–8)
PLATELET # BLD AUTO: 90 K/UL (ref 150–450)
PMV BLD AUTO: 7.3 FL (ref 6–12)
POTASSIUM SERPL-SCNC: 3.3 MMOL/L (ref 3.7–5.3)
PROT SERPL-MCNC: 8.2 G/DL (ref 6.4–8.3)
PROT UR STRIP-MCNC: NEGATIVE MG/DL
RBC # BLD AUTO: 3.06 M/UL (ref 4.5–5.9)
SODIUM SERPL-SCNC: 143 MMOL/L (ref 135–144)
SP GR UR STRIP: 1.01 (ref 1–1.03)
UROBILINOGEN UR STRIP-ACNC: NORMAL EU/DL (ref 0–1)
WBC OTHER # BLD: 6.9 K/UL (ref 3.5–11)

## 2024-07-22 PROCEDURE — 80053 COMPREHEN METABOLIC PANEL: CPT

## 2024-07-22 PROCEDURE — 85025 COMPLETE CBC W/AUTO DIFF WBC: CPT

## 2024-07-22 PROCEDURE — 82140 ASSAY OF AMMONIA: CPT

## 2024-07-22 PROCEDURE — 96361 HYDRATE IV INFUSION ADD-ON: CPT

## 2024-07-22 PROCEDURE — 96374 THER/PROPH/DIAG INJ IV PUSH: CPT

## 2024-07-22 PROCEDURE — 2580000003 HC RX 258

## 2024-07-22 PROCEDURE — 81003 URINALYSIS AUTO W/O SCOPE: CPT

## 2024-07-22 PROCEDURE — 6360000002 HC RX W HCPCS

## 2024-07-22 PROCEDURE — 74177 CT ABD & PELVIS W/CONTRAST: CPT

## 2024-07-22 PROCEDURE — 36415 COLL VENOUS BLD VENIPUNCTURE: CPT

## 2024-07-22 PROCEDURE — 2580000003 HC RX 258: Performed by: STUDENT IN AN ORGANIZED HEALTH CARE EDUCATION/TRAINING PROGRAM

## 2024-07-22 PROCEDURE — 83690 ASSAY OF LIPASE: CPT

## 2024-07-22 PROCEDURE — 99285 EMERGENCY DEPT VISIT HI MDM: CPT

## 2024-07-22 RX ORDER — 0.9 % SODIUM CHLORIDE 0.9 %
100 INTRAVENOUS SOLUTION INTRAVENOUS ONCE
Status: COMPLETED | OUTPATIENT
Start: 2024-07-23 | End: 2024-07-23

## 2024-07-22 RX ORDER — 0.9 % SODIUM CHLORIDE 0.9 %
1000 INTRAVENOUS SOLUTION INTRAVENOUS ONCE
Status: COMPLETED | OUTPATIENT
Start: 2024-07-22 | End: 2024-07-23

## 2024-07-22 RX ORDER — KETOROLAC TROMETHAMINE 30 MG/ML
30 INJECTION, SOLUTION INTRAMUSCULAR; INTRAVENOUS ONCE
Status: COMPLETED | OUTPATIENT
Start: 2024-07-22 | End: 2024-07-22

## 2024-07-22 RX ORDER — SODIUM CHLORIDE 0.9 % (FLUSH) 0.9 %
10 SYRINGE (ML) INJECTION PRN
Status: COMPLETED | OUTPATIENT
Start: 2024-07-22 | End: 2024-07-23

## 2024-07-22 RX ADMIN — SODIUM CHLORIDE 1000 ML: 9 INJECTION, SOLUTION INTRAVENOUS at 23:00

## 2024-07-22 RX ADMIN — KETOROLAC TROMETHAMINE 30 MG: 30 INJECTION, SOLUTION INTRAMUSCULAR at 23:02

## 2024-07-22 RX ADMIN — SODIUM CHLORIDE 100 ML: 9 INJECTION, SOLUTION INTRAVENOUS at 23:59

## 2024-07-23 VITALS
HEART RATE: 84 BPM | TEMPERATURE: 98.7 F | WEIGHT: 170 LBS | SYSTOLIC BLOOD PRESSURE: 141 MMHG | DIASTOLIC BLOOD PRESSURE: 74 MMHG | RESPIRATION RATE: 16 BRPM | HEIGHT: 68 IN | OXYGEN SATURATION: 100 % | BODY MASS INDEX: 25.76 KG/M2

## 2024-07-23 PROCEDURE — 6370000000 HC RX 637 (ALT 250 FOR IP)

## 2024-07-23 PROCEDURE — 96375 TX/PRO/DX INJ NEW DRUG ADDON: CPT

## 2024-07-23 PROCEDURE — 6360000004 HC RX CONTRAST MEDICATION: Performed by: STUDENT IN AN ORGANIZED HEALTH CARE EDUCATION/TRAINING PROGRAM

## 2024-07-23 PROCEDURE — 2580000003 HC RX 258: Performed by: STUDENT IN AN ORGANIZED HEALTH CARE EDUCATION/TRAINING PROGRAM

## 2024-07-23 PROCEDURE — 6360000002 HC RX W HCPCS

## 2024-07-23 PROCEDURE — 96361 HYDRATE IV INFUSION ADD-ON: CPT

## 2024-07-23 RX ORDER — MORPHINE SULFATE 4 MG/ML
4 INJECTION, SOLUTION INTRAMUSCULAR; INTRAVENOUS ONCE
Status: COMPLETED | OUTPATIENT
Start: 2024-07-23 | End: 2024-07-23

## 2024-07-23 RX ADMIN — SODIUM CHLORIDE, PRESERVATIVE FREE 10 ML: 5 INJECTION INTRAVENOUS at 00:00

## 2024-07-23 RX ADMIN — IOPAMIDOL 75 ML: 755 INJECTION, SOLUTION INTRAVENOUS at 00:00

## 2024-07-23 RX ADMIN — POTASSIUM BICARBONATE 40 MEQ: 782 TABLET, EFFERVESCENT ORAL at 01:37

## 2024-07-23 RX ADMIN — MORPHINE SULFATE 4 MG: 4 INJECTION, SOLUTION INTRAMUSCULAR; INTRAVENOUS at 01:38

## 2024-07-23 NOTE — DISCHARGE INSTRUCTIONS
Please follow-up with your PCP and return to the ED with any new or worsening symptoms or concerns.  Please refrain from drinking.

## 2024-07-23 NOTE — ED PROVIDER NOTES
EMERGENCY DEPARTMENT ENCOUNTER   ATTENDING ATTESTATION     Pt Name: Sam Emerson  MRN: [id number]  Birthdate 1985  Date of evaluation: 7/22/24       Sam Emerson is a 39 y.o. male who presents with Flank Pain and Jaundice    History of alcoholic cirrhosis    Presenting with right sided flank pain, hematuria    Increasing jaundice    Will obtain labs and CT    MDM:     Workup is all reassuring    CT unremarkable    Will discharge with primary follow-up and return precautions    Vitals:   Vitals:    07/22/24 2114   BP: (!) 159/88   Pulse: 96   Resp: 20   Temp: 98.7 °F (37.1 °C)   TempSrc: Oral   SpO2: 98%   Weight: 77.1 kg (170 lb)   Height: 1.727 m (5' 8\")         I personally saw and examined the patient. I have reviewed and agree with the resident's findings, including all diagnostic interpretations and treatment plan as written. I was present for the key portions of any procedures performed and the inclusive time noted for any critical care statement.    Laci Benz MD  Attending Emergency Physician           Laci Benz MD  07/23/24 0124    
packs/day: 1 pack/day for 21.0 years (21.0 ttl pk-yrs)     Types: Cigarettes    Smokeless tobacco: Never    Tobacco comments:     using nicotine patches at night   Vaping Use    Vaping Use: Never used   Substance and Sexual Activity    Alcohol use: Yes     Alcohol/week: 84.0 standard drinks of alcohol     Types: 84 Cans of beer per week     Comment: hx of alcoholism; pt drinks a fifth of whiskey daily    Drug use: Yes     Types: Marijuana (Weed)     Comment: used cocaine in early 20's, still using marijuana    Sexual activity: Yes     Partners: Female   Other Topics Concern    Not on file   Social History Narrative    Not on file     Social Determinants of Health     Financial Resource Strain: Low Risk  (3/6/2024)    Overall Financial Resource Strain (CARDIA)     Difficulty of Paying Living Expenses: Not hard at all   Food Insecurity: No Food Insecurity (3/6/2024)    Hunger Vital Sign     Worried About Running Out of Food in the Last Year: Never true     Ran Out of Food in the Last Year: Never true   Transportation Needs: Unmet Transportation Needs (3/6/2024)    PRAPARE - Transportation     Lack of Transportation (Medical): Yes     Lack of Transportation (Non-Medical): Yes   Physical Activity: Not on file   Stress: Not on file   Social Connections: Not on file   Intimate Partner Violence: Not on file   Housing Stability: Low Risk  (3/6/2024)    Housing Stability Vital Sign     Unable to Pay for Housing in the Last Year: No     Number of Places Lived in the Last Year: 1     Unstable Housing in the Last Year: No       Family History   Problem Relation Age of Onset    Heart Disease Mother     Heart Attack Mother     Heart Disease Father        Allergies:  Bee venom    Home Medications:  Prior to Admission medications    Medication Sig Start Date End Date Taking? Authorizing Provider   polyethylene glycol (GLYCOLAX) 17 GM/SCOOP powder Take 17 g by mouth daily 3/7/24 9/3/24  Gerald Olivarez MD   ondansetron (ZOFRAN) 4 MG

## 2024-07-23 NOTE — ED TRIAGE NOTES
Mode of arrival (squad #, walk in, police, etc) : walk in         Chief complaint(s): Pt states RT Flank/back pain and yellowing of eyes        Arrival Note (brief scenario, treatment PTA, etc).: Pt states urinated and passed small yellow stone and pain since then. PT also has yellowing of eyes.         C= \"Have you ever felt that you should Cut down on your drinking?\"  No  A= \"Have people Annoyed you by criticizing your drinking?\"  No  G= \"Have you ever felt bad or Guilty about your drinking?\"  No  E= \"Have you ever had a drink as an Eye-opener first thing in the morning to steady your nerves or to help a hangover?\"  No      Deferred []      Reason for deferring: N/A    *If yes to two or more: probable alcohol abuse.*

## 2024-08-04 DIAGNOSIS — K21.9 GASTROESOPHAGEAL REFLUX DISEASE, UNSPECIFIED WHETHER ESOPHAGITIS PRESENT: ICD-10-CM

## 2024-08-05 RX ORDER — PANTOPRAZOLE SODIUM 40 MG/1
TABLET, DELAYED RELEASE ORAL
Qty: 30 TABLET | Refills: 4 | Status: SHIPPED | OUTPATIENT
Start: 2024-08-05

## 2024-08-06 NOTE — PLAN OF CARE
Problem: Safety - Adult  Goal: Free from fall injury  Outcome: Progressing     Problem: Discharge Planning  Goal: Discharge to home or other facility with appropriate resources  Outcome: Progressing 6

## 2024-08-14 DIAGNOSIS — G43.809 OTHER MIGRAINE WITHOUT STATUS MIGRAINOSUS, NOT INTRACTABLE: ICD-10-CM

## 2024-08-15 RX ORDER — SUMATRIPTAN 50 MG/1
TABLET, FILM COATED ORAL
Qty: 9 TABLET | Refills: 0 | Status: SHIPPED | OUTPATIENT
Start: 2024-08-15

## 2024-08-20 ENCOUNTER — OFFICE VISIT (OUTPATIENT)
Dept: INTERNAL MEDICINE CLINIC | Age: 39
End: 2024-08-20
Payer: COMMERCIAL

## 2024-08-20 VITALS
HEIGHT: 68 IN | HEART RATE: 70 BPM | SYSTOLIC BLOOD PRESSURE: 132 MMHG | BODY MASS INDEX: 26.83 KG/M2 | WEIGHT: 177 LBS | DIASTOLIC BLOOD PRESSURE: 70 MMHG | OXYGEN SATURATION: 98 %

## 2024-08-20 DIAGNOSIS — K70.31 ALCOHOLIC CIRRHOSIS OF LIVER WITH ASCITES (HCC): ICD-10-CM

## 2024-08-20 DIAGNOSIS — G89.29 CHRONIC RIGHT-SIDED LOW BACK PAIN WITHOUT SCIATICA: ICD-10-CM

## 2024-08-20 DIAGNOSIS — K21.9 GASTROESOPHAGEAL REFLUX DISEASE, UNSPECIFIED WHETHER ESOPHAGITIS PRESENT: ICD-10-CM

## 2024-08-20 DIAGNOSIS — M54.50 CHRONIC RIGHT-SIDED LOW BACK PAIN WITHOUT SCIATICA: ICD-10-CM

## 2024-08-20 DIAGNOSIS — F33.2 MDD (MAJOR DEPRESSIVE DISORDER), RECURRENT SEVERE, WITHOUT PSYCHOSIS (HCC): Primary | ICD-10-CM

## 2024-08-20 PROCEDURE — 4004F PT TOBACCO SCREEN RCVD TLK: CPT | Performed by: INTERNAL MEDICINE

## 2024-08-20 PROCEDURE — 99214 OFFICE O/P EST MOD 30 MIN: CPT | Performed by: INTERNAL MEDICINE

## 2024-08-20 PROCEDURE — 3078F DIAST BP <80 MM HG: CPT | Performed by: INTERNAL MEDICINE

## 2024-08-20 PROCEDURE — G8427 DOCREV CUR MEDS BY ELIG CLIN: HCPCS | Performed by: INTERNAL MEDICINE

## 2024-08-20 PROCEDURE — 3075F SYST BP GE 130 - 139MM HG: CPT | Performed by: INTERNAL MEDICINE

## 2024-08-20 PROCEDURE — G8419 CALC BMI OUT NRM PARAM NOF/U: HCPCS | Performed by: INTERNAL MEDICINE

## 2024-08-20 RX ORDER — TIZANIDINE 2 MG/1
2 TABLET ORAL 3 TIMES DAILY PRN
Qty: 30 TABLET | Refills: 0 | Status: SHIPPED | OUTPATIENT
Start: 2024-08-20 | End: 2024-09-19

## 2024-08-20 RX ORDER — CETIRIZINE HYDROCHLORIDE 10 MG/1
5 TABLET ORAL DAILY
Qty: 30 TABLET | Refills: 3 | Status: SHIPPED | OUTPATIENT
Start: 2024-08-20 | End: 2024-08-20

## 2024-08-20 RX ORDER — LACTULOSE 10 G/15ML
20 SOLUTION ORAL EVERY 6 HOURS
Qty: 946 ML | Refills: 1 | Status: SHIPPED | OUTPATIENT
Start: 2024-08-20

## 2024-08-20 RX ORDER — CETIRIZINE HYDROCHLORIDE 10 MG/1
5 TABLET ORAL DAILY PRN
Qty: 30 TABLET | Refills: 3 | Status: SHIPPED | OUTPATIENT
Start: 2024-08-20

## 2024-08-20 RX ORDER — GABAPENTIN 100 MG/1
100 CAPSULE ORAL NIGHTLY
Qty: 30 CAPSULE | Refills: 3 | Status: SHIPPED | OUTPATIENT
Start: 2024-08-20 | End: 2024-12-18

## 2024-08-20 NOTE — PROGRESS NOTES
[facility]  [address] [address]  Dept: [phone]  Dept Fax: [phone]    Office Progress/Follow Up Note  Date of patient's visit: 8/20/2024  Patient's Name:  Sam Emerson YOB: 1985            Patient Care Team:  Rosie Montes De Oca MD as PCP - General (Internal Medicine)  Rosie Montes De Oca MD as PCP - Empaneled Provider  Prince Gonsalez MD as Consulting Physician (Gastroenterology)    REASON FOR VISIT: Routine outpatient follow up/Same day visit/Post hospital/ED visit    HISTORY OF PRESENT ILLNESS:      Chief Complaint   Patient presents with    Back Pain     Edmunds 7/22   Jaundice and flank pain, spot noted on back     Constipation     Last BM was yesterday and was hard and difficult to pass         History was obtained from the patient. Sam Emerson is a 39 y.o. is here for a   Came for follow-up  Has multiple comorbidities including decompensated liver cirrhosis  Variceal bleeding s/p TIPS  Multiple admissions in the hospital for hepatic encephalopathy    Recently admitted with constipation dose of lactulose increased  Patient does report intermittent constipation, himself increase the dose of lactulose  No sign of volume overload present patient currently not taking any diuretics  Has not needed any paracentesis lately  Abdominal examination benign  No ascites    Patient reports weight loss  States that he has been snacking all the time and feels very hungry but not able to gain weight  No fever or chills  Does have his chronic abdominal pain  Multiple times had CAT scan which has been nonacute    Has rash on the abdominal wall and back, stated that rash has been there and since he went to ER, itches a lot  Did not take Benadryl due to concern of constipation      Has been having headache almost every other day, states for couple of hours, get nausea photophobia with the headache    82/0   I came for follow-up, recently went

## 2024-08-20 NOTE — PROGRESS NOTES
Visit Information    Have you changed or started any medications since your last visit including any over-the-counter medicines, vitamins, or herbal medicines? no   Are you having any side effects from any of your medications? -  no  Have you stopped taking any of your medications? Is so, why? -  no    Have you seen any other physician or provider since your last visit? No  Have you had any other diagnostic tests since your last visit? yes  Have you been seen in the emergency room and/or had an admission to a hospital since we last saw you? yes  Have you had your routine dental cleaning in the past 6 months? no    Have you activated your [employer] account? If not, what are your barriers? Yes     Patient Care Team:  Rosie Montes De Oca MD as PCP - General (Internal Medicine)  Rosie Montes De Oca MD as PCP - Empaneled Provider  Prince Gonsalez MD as Consulting Physician (Gastroenterology)    Medical History Review  Past Medical, Family, and Social History reviewed and does contribute to the patient presenting condition    Health Maintenance   Topic Date Due    Pneumococcal 0-64 years Vaccine (1 of 2 - PCV) Never done    Varicella vaccine (1 of 2 - 13+ 2-dose series) Never done    HIV screen  Never done    Hepatitis A vaccine (1 of 2 - Risk 2-dose series) Never done    Hepatitis B vaccine (2 of 2 - CpG 2-dose series) 04/13/2023    COVID-19 Vaccine (3 - 2023-24 season) 09/01/2023    Flu vaccine (1) Never done    Depression Monitoring  01/24/2025    Diabetes screen  11/17/2025    DTaP/Tdap/Td vaccine (2 - Td or Tdap) 09/21/2033    Hepatitis C screen  Completed    Hib vaccine  Aged Out    HPV vaccine  Aged Out    Polio vaccine  Aged Out    Meningococcal (ACWY) vaccine  Aged Out    Depression Screen  Discontinued

## 2024-08-22 ENCOUNTER — CARE COORDINATION (OUTPATIENT)
Dept: CARE COORDINATION | Age: 39
End: 2024-08-22

## 2024-08-22 NOTE — CARE COORDINATION
PCP referral, introduction letter mailed.   DAISY FangN, RN ambulatory care manager [phone].

## 2024-08-28 ENCOUNTER — TELEPHONE (OUTPATIENT)
Dept: INTERNAL MEDICINE CLINIC | Age: 39
End: 2024-08-28

## 2024-08-28 NOTE — TELEPHONE ENCOUNTER
Patient complaining of increased lower back pain (taking pain medication as prescribed with no relief) and right hand numbness and tingling for 2 days.     He is scheduled for a CT on 9/5 and I rescheduled his appointment to your first available on 9/17.    Please advise if there is anything we can do in the meantime

## 2024-08-28 NOTE — TELEPHONE ENCOUNTER
Y-Klub contacted office informing that she did not receive 6 min O2 walk test, informing office notes received without testing info.  Please advise and return call.

## 2024-08-28 NOTE — TELEPHONE ENCOUNTER
Called patient to clarify.   Patient does not need oxygen, he is requesting that it be taken out of his home, as he only used it for a short period during a COVID virus.     Patient was last seen in office on 8/20   His SpO2 was 98% on room air    Okay to write up a discontinuation order for patient to have his O2 tanks removed from home?

## 2024-08-28 NOTE — TELEPHONE ENCOUNTER
Contacted Butch back to inform discontinuation of orders,  she advised that discontinuation of O2 form will have to be faxed to his local [facility].      Provider phone #  for reference  #[phone]

## 2024-08-30 ENCOUNTER — TELEPHONE (OUTPATIENT)
Dept: INTERNAL MEDICINE CLINIC | Age: 39
End: 2024-08-30

## 2024-08-30 NOTE — TELEPHONE ENCOUNTER
ALIYA -    Pt called in and informed that he was attacked by a cat around 10:30a this morning and he has deep puncture wounds and lacerations on his forearm. He also advised that he can barely move/use his arm and he feels the cat got his muscle pretty good while attacking him.     I did advise pt to be evaluated in the ED due to inability to use his arm and puncture/lacerations on arm.     Pt was agreeable

## 2024-08-31 ENCOUNTER — HOSPITAL ENCOUNTER (EMERGENCY)
Age: 39
Discharge: ANOTHER HOSPITAL | End: 2024-09-01
Attending: EMERGENCY MEDICINE
Payer: COMMERCIAL

## 2024-08-31 ENCOUNTER — DIRECT ADMIT ORDERS (OUTPATIENT)
Dept: INTERNAL MEDICINE CLINIC | Age: 39
End: 2024-08-31

## 2024-08-31 DIAGNOSIS — E87.6 HYPOKALEMIA: ICD-10-CM
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
## 2024-08-31 LAB
ANION GAP SERPL CALCULATED.3IONS-SCNC: 13 MMOL/L (ref 9–17)
BASOPHILS # BLD: 0.1 K/UL (ref 0–0.2)
BASOPHILS NFR BLD: 1 % (ref 0–2)
BUN SERPL-MCNC: 11 MG/DL (ref 6–20)
CALCIUM SERPL-MCNC: 8.7 MG/DL (ref 8.6–10.4)
CHLORIDE SERPL-SCNC: 102 MMOL/L (ref 98–107)
CO2 SERPL-SCNC: 19 MMOL/L (ref 20–31)
CREAT SERPL-MCNC: 0.8 MG/DL (ref 0.7–1.2)
CRP SERPL HS-MCNC: 9.3 MG/L (ref 0–5)
EOSINOPHIL # BLD: 0.2 K/UL (ref 0–0.4)
EOSINOPHILS RELATIVE PERCENT: 1 % (ref 0–4)
ERYTHROCYTE [DISTWIDTH] IN BLOOD BY AUTOMATED COUNT: 19.6 % (ref 11.5–14.9)
ETHANOL PERCENT: 0.18 %
ETHANOLAMINE SERPL-MCNC: 181 MG/DL (ref 0–0.08)
GFR, ESTIMATED: >90 ML/MIN/1.73M2
GLUCOSE SERPL-MCNC: 98 MG/DL (ref 70–99)
HCT VFR BLD AUTO: 35 % (ref 41–53)
HGB BLD-MCNC: 11.9 G/DL (ref 13.5–17.5)
LYMPHOCYTES NFR BLD: 1.4 K/UL (ref 1–4.8)
LYMPHOCYTES RELATIVE PERCENT: 11 % (ref 24–44)
MCH RBC QN AUTO: 36.7 PG (ref 26–34)
MCHC RBC AUTO-ENTMCNC: 34.1 G/DL (ref 31–37)
MCV RBC AUTO: 107.6 FL (ref 80–100)
MONOCYTES NFR BLD: 1.2 K/UL (ref 0.1–1.3)
MONOCYTES NFR BLD: 9 % (ref 1–7)
NEUTROPHILS NFR BLD: 78 % (ref 36–66)
NEUTS SEG NFR BLD: 10.7 K/UL (ref 1.3–9.1)
PLATELET # BLD AUTO: 64 K/UL (ref 150–450)
PMV BLD AUTO: 8 FL (ref 6–12)
POTASSIUM SERPL-SCNC: 3 MMOL/L (ref 3.7–5.3)
RBC # BLD AUTO: 3.25 M/UL (ref 4.5–5.9)
SODIUM SERPL-SCNC: 134 MMOL/L (ref 135–144)
WBC OTHER # BLD: 13.6 K/UL (ref 3.5–11)

## 2024-08-31 PROCEDURE — 86140 C-REACTIVE PROTEIN: CPT

## 2024-08-31 PROCEDURE — 6360000002 HC RX W HCPCS: Performed by: EMERGENCY MEDICINE

## 2024-08-31 PROCEDURE — 6370000000 HC RX 637 (ALT 250 FOR IP): Performed by: EMERGENCY MEDICINE

## 2024-08-31 PROCEDURE — 96366 THER/PROPH/DIAG IV INF ADDON: CPT

## 2024-08-31 PROCEDURE — 85025 COMPLETE CBC W/AUTO DIFF WBC: CPT

## 2024-08-31 PROCEDURE — 96365 THER/PROPH/DIAG IV INF INIT: CPT

## 2024-08-31 PROCEDURE — G0480 DRUG TEST DEF 1-7 CLASSES: HCPCS

## 2024-08-31 PROCEDURE — 87075 CULTR BACTERIA EXCEPT BLOOD: CPT

## 2024-08-31 PROCEDURE — 36415 COLL VENOUS BLD VENIPUNCTURE: CPT

## 2024-08-31 PROCEDURE — 85652 RBC SED RATE AUTOMATED: CPT

## 2024-08-31 PROCEDURE — 90715 TDAP VACCINE 7 YRS/> IM: CPT | Performed by: EMERGENCY MEDICINE

## 2024-08-31 PROCEDURE — 99285 EMERGENCY DEPT VISIT HI MDM: CPT

## 2024-08-31 PROCEDURE — 90471 IMMUNIZATION ADMIN: CPT | Performed by: EMERGENCY MEDICINE

## 2024-08-31 PROCEDURE — 87205 SMEAR GRAM STAIN: CPT

## 2024-08-31 PROCEDURE — 87070 CULTURE OTHR SPECIMN AEROBIC: CPT

## 2024-08-31 PROCEDURE — 2580000003 HC RX 258: Performed by: EMERGENCY MEDICINE

## 2024-08-31 PROCEDURE — 87186 SC STD MICRODIL/AGAR DIL: CPT

## 2024-08-31 PROCEDURE — 87077 CULTURE AEROBIC IDENTIFY: CPT

## 2024-08-31 PROCEDURE — 80048 BASIC METABOLIC PNL TOTAL CA: CPT

## 2024-08-31 RX ORDER — ACETAMINOPHEN 325 MG/1
650 TABLET ORAL EVERY 6 HOURS PRN
Status: CANCELLED | OUTPATIENT
Start: 2024-08-31

## 2024-08-31 RX ORDER — POTASSIUM CHLORIDE 7.45 MG/ML
10 INJECTION INTRAVENOUS PRN
Status: CANCELLED | OUTPATIENT
Start: 2024-08-31

## 2024-08-31 RX ORDER — MAGNESIUM SULFATE 1 G/100ML
1000 INJECTION INTRAVENOUS PRN
Status: CANCELLED | OUTPATIENT
Start: 2024-08-31

## 2024-08-31 RX ORDER — SODIUM CHLORIDE 0.9 % (FLUSH) 0.9 %
10 SYRINGE (ML) INJECTION PRN
Status: CANCELLED | OUTPATIENT
Start: 2024-08-31

## 2024-08-31 RX ORDER — ENOXAPARIN SODIUM 100 MG/ML
40 INJECTION SUBCUTANEOUS DAILY
Status: CANCELLED | OUTPATIENT
Start: 2024-09-01

## 2024-08-31 RX ORDER — POTASSIUM CHLORIDE 1500 MG/1
40 TABLET, EXTENDED RELEASE ORAL PRN
Status: CANCELLED | OUTPATIENT
Start: 2024-08-31

## 2024-08-31 RX ORDER — SODIUM CHLORIDE 9 MG/ML
INJECTION, SOLUTION INTRAVENOUS PRN
Status: CANCELLED | OUTPATIENT
Start: 2024-08-31

## 2024-08-31 RX ORDER — SODIUM CHLORIDE 9 MG/ML
INJECTION, SOLUTION INTRAVENOUS CONTINUOUS
Status: CANCELLED | OUTPATIENT
Start: 2024-08-31

## 2024-08-31 RX ORDER — ONDANSETRON 2 MG/ML
4 INJECTION INTRAMUSCULAR; INTRAVENOUS EVERY 6 HOURS PRN
Status: CANCELLED | OUTPATIENT
Start: 2024-08-31

## 2024-08-31 RX ORDER — ACETAMINOPHEN 650 MG/1
650 SUPPOSITORY RECTAL EVERY 6 HOURS PRN
Status: CANCELLED | OUTPATIENT
Start: 2024-08-31

## 2024-08-31 RX ORDER — POLYETHYLENE GLYCOL 3350 17 G/17G
17 POWDER, FOR SOLUTION ORAL DAILY PRN
Status: CANCELLED | OUTPATIENT
Start: 2024-08-31

## 2024-08-31 RX ORDER — ONDANSETRON 4 MG/1
4 TABLET, ORALLY DISINTEGRATING ORAL EVERY 8 HOURS PRN
Status: CANCELLED | OUTPATIENT
Start: 2024-08-31

## 2024-08-31 RX ORDER — SODIUM CHLORIDE 0.9 % (FLUSH) 0.9 %
5-40 SYRINGE (ML) INJECTION EVERY 12 HOURS SCHEDULED
Status: CANCELLED | OUTPATIENT
Start: 2024-08-31

## 2024-08-31 RX ORDER — POTASSIUM CHLORIDE 1500 MG/1
40 TABLET, EXTENDED RELEASE ORAL ONCE
Status: COMPLETED | OUTPATIENT
Start: 2024-08-31 | End: 2024-08-31

## 2024-08-31 RX ORDER — POTASSIUM CHLORIDE 7.45 MG/ML
10 INJECTION INTRAVENOUS
Status: COMPLETED | OUTPATIENT
Start: 2024-08-31 | End: 2024-09-01

## 2024-08-31 RX ADMIN — TETANUS TOXOID, REDUCED DIPHTHERIA TOXOID AND ACELLULAR PERTUSSIS VACCINE, ADSORBED 0.5 ML: 5; 2.5; 8; 8; 2.5 SUSPENSION INTRAMUSCULAR at 20:35

## 2024-08-31 RX ADMIN — POTASSIUM CHLORIDE 10 MEQ: 7.46 INJECTION, SOLUTION INTRAVENOUS at 22:33

## 2024-08-31 RX ADMIN — SODIUM CHLORIDE 3000 MG: 900 INJECTION INTRAVENOUS at 20:31

## 2024-08-31 RX ADMIN — POTASSIUM CHLORIDE 10 MEQ: 7.46 INJECTION, SOLUTION INTRAVENOUS at 23:40

## 2024-08-31 RX ADMIN — POTASSIUM CHLORIDE 40 MEQ: 1500 TABLET, EXTENDED RELEASE ORAL at 22:33

## 2024-08-31 ASSESSMENT — PAIN SCALES - GENERAL: PAINLEVEL_OUTOF10: 6

## 2024-08-31 ASSESSMENT — ENCOUNTER SYMPTOMS
COLOR CHANGE: 0
SHORTNESS OF BREATH: 0
PHOTOPHOBIA: 0
DIARRHEA: 0
NAUSEA: 0
VOMITING: 0
COUGH: 0
TROUBLE SWALLOWING: 0
ABDOMINAL PAIN: 0

## 2024-08-31 ASSESSMENT — LIFESTYLE VARIABLES
HOW OFTEN DO YOU HAVE A DRINK CONTAINING ALCOHOL: MONTHLY OR LESS
HOW MANY STANDARD DRINKS CONTAINING ALCOHOL DO YOU HAVE ON A TYPICAL DAY: 3 OR 4

## 2024-08-31 ASSESSMENT — PAIN - FUNCTIONAL ASSESSMENT: PAIN_FUNCTIONAL_ASSESSMENT: 0-10

## 2024-08-31 NOTE — ED PROVIDER NOTES
used cocaine in early 20's, still using marijuana     PHYSICAL EXAM     INITIAL VITALS: BP (!) 146/83   Pulse (!) 107   Temp 98.3 °F (36.8 °C)   Resp 18   Ht 1.727 m (5' 8\")   Wt 80.3 kg (177 lb)   SpO2 98%   BMI 26.91 kg/m²    Physical Exam  Constitutional:       General: He is not in acute distress.     Appearance: Normal appearance.   HENT:      Head: Normocephalic and atraumatic.      Right Ear: External ear normal.      Left Ear: External ear normal.      Nose: Nose normal. No congestion or rhinorrhea.   Eyes:      Extraocular Movements: Extraocular movements intact.      Pupils: Pupils are equal, round, and reactive to light.   Cardiovascular:      Rate and Rhythm: Normal rate and regular rhythm.      Pulses: Normal pulses.      Heart sounds: Normal heart sounds.   Pulmonary:      Effort: Pulmonary effort is normal. No respiratory distress.      Breath sounds: Normal breath sounds.   Abdominal:      General: Bowel sounds are normal.      Palpations: Abdomen is soft.   Musculoskeletal:         General: No deformity. Normal range of motion.      Cervical back: Normal range of motion. No rigidity.   Skin:     General: Skin is warm and dry.      Findings: Wound (multiple puncture wounds BUE) present.   Neurological:      General: No focal deficit present.      Mental Status: He is alert and oriented to person, place, and time. Mental status is at baseline.   Psychiatric:         Mood and Affect: Mood normal.         Behavior: Behavior normal.         MEDICAL DECISION MAKING / ED COURSE:         1)  Number and Complexity of Problems Addressed at this Encounter      2)  Data Reviewed and Analyzed  (Lab and radiology tests/orders below in next section)          3)  Treatment and Disposition         39-year-old male presenting to the ER complaining of multiple cat bite wounds to the bilateral upper extremities.  On exam, the patient does appear to have multiple wounds.  The patient is having trouble extending

## 2024-08-31 NOTE — ED NOTES
Mode of arrival (squad #, walk in, police, etc) : Walk-in        Chief complaint(s): Cat bite        Arrival Note (brief scenario, treatment PTA, etc).: Pt arrived to the ED with c/o cat bite that happened yesterday. Pt states the cat is full vaccinated and he got a TDAP last December. Pt states that he was working on a vehicle today when he accidentally hit his arm on a part, where the cat bit him, and pus started coming down his arm.         C= \"Have you ever felt that you should Cut down on your drinking?\"  No  A= \"Have people Annoyed you by criticizing your drinking?\"  No  G= \"Have you ever felt bad or Guilty about your drinking?\"  No  E= \"Have you ever had a drink as an Eye-opener first thing in the morning to steady your nerves or to help a hangover?\"  No      Deferred []      Reason for deferring: N/A    *If yes to two or more: probable alcohol abuse.*

## 2024-09-01 ENCOUNTER — APPOINTMENT (OUTPATIENT)
Dept: GENERAL RADIOLOGY | Age: 39
DRG: 383 | End: 2024-09-01
Attending: FAMILY MEDICINE
Payer: COMMERCIAL

## 2024-09-01 ENCOUNTER — HOSPITAL ENCOUNTER (INPATIENT)
Age: 39
LOS: 1 days | Discharge: LEFT BEFORE DISCHARGE | DRG: 383 | End: 2024-09-02
Attending: FAMILY MEDICINE | Admitting: FAMILY MEDICINE
Payer: COMMERCIAL

## 2024-09-01 VITALS
SYSTOLIC BLOOD PRESSURE: 117 MMHG | WEIGHT: 177 LBS | BODY MASS INDEX: 26.83 KG/M2 | HEART RATE: 98 BPM | HEIGHT: 68 IN | DIASTOLIC BLOOD PRESSURE: 73 MMHG | TEMPERATURE: 98.3 F | RESPIRATION RATE: 18 BRPM | OXYGEN SATURATION: 96 %

## 2024-09-01 PROBLEM — S51.851A CAT BITE OF FOREARM, RIGHT, INITIAL ENCOUNTER: Status: ACTIVE | Noted: 2024-09-01
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
## 2024-09-01 LAB
ALBUMIN SERPL-MCNC: 3.2 G/DL (ref 3.5–5.2)
ALBUMIN/GLOB SERPL: 1 {RATIO} (ref 1–2.5)
ALP SERPL-CCNC: 248 U/L (ref 40–129)
ALT SERPL-CCNC: 15 U/L (ref 10–50)
AMMONIA PLAS-SCNC: 117 UMOL/L (ref 16–60)
AST SERPL-CCNC: 64 U/L (ref 10–50)
BILIRUB DIRECT SERPL-MCNC: 1.8 MG/DL (ref 0–0.2)
BILIRUB INDIRECT SERPL-MCNC: 2.9 MG/DL (ref 0–1)
BILIRUB SERPL-MCNC: 4.7 MG/DL (ref 0–1.2)
ERYTHROCYTE [SEDIMENTATION RATE] IN BLOOD BY PHOTOMETRIC METHOD: 97 MM/HR (ref 0–15)
GLOBULIN SER CALC-MCNC: 3.9 G/DL
PROT SERPL-MCNC: 7.1 G/DL (ref 6.6–8.7)

## 2024-09-01 PROCEDURE — 6370000000 HC RX 637 (ALT 250 FOR IP): Performed by: STUDENT IN AN ORGANIZED HEALTH CARE EDUCATION/TRAINING PROGRAM

## 2024-09-01 PROCEDURE — 6360000002 HC RX W HCPCS: Performed by: EMERGENCY MEDICINE

## 2024-09-01 PROCEDURE — 6370000000 HC RX 637 (ALT 250 FOR IP): Performed by: NURSE PRACTITIONER

## 2024-09-01 PROCEDURE — 73090 X-RAY EXAM OF FOREARM: CPT

## 2024-09-01 PROCEDURE — 80076 HEPATIC FUNCTION PANEL: CPT

## 2024-09-01 PROCEDURE — 73110 X-RAY EXAM OF WRIST: CPT

## 2024-09-01 PROCEDURE — 1200000000 HC SEMI PRIVATE

## 2024-09-01 PROCEDURE — 99223 1ST HOSP IP/OBS HIGH 75: CPT | Performed by: STUDENT IN AN ORGANIZED HEALTH CARE EDUCATION/TRAINING PROGRAM

## 2024-09-01 PROCEDURE — 6360000002 HC RX W HCPCS: Performed by: NURSE PRACTITIONER

## 2024-09-01 PROCEDURE — 82140 ASSAY OF AMMONIA: CPT

## 2024-09-01 PROCEDURE — 36415 COLL VENOUS BLD VENIPUNCTURE: CPT

## 2024-09-01 PROCEDURE — 96375 TX/PRO/DX INJ NEW DRUG ADDON: CPT

## 2024-09-01 PROCEDURE — 2580000003 HC RX 258: Performed by: NURSE PRACTITIONER

## 2024-09-01 RX ORDER — TIZANIDINE 2 MG/1
2 TABLET ORAL 3 TIMES DAILY PRN
Status: DISCONTINUED | OUTPATIENT
Start: 2024-09-01 | End: 2024-09-02 | Stop reason: HOSPADM

## 2024-09-01 RX ORDER — LORAZEPAM 2 MG/ML
4 INJECTION INTRAMUSCULAR
Status: DISCONTINUED | OUTPATIENT
Start: 2024-09-01 | End: 2024-09-02 | Stop reason: HOSPADM

## 2024-09-01 RX ORDER — GAUZE BANDAGE 2" X 2"
100 BANDAGE TOPICAL DAILY
Status: DISCONTINUED | OUTPATIENT
Start: 2024-09-01 | End: 2024-09-01

## 2024-09-01 RX ORDER — SODIUM CHLORIDE 0.9 % (FLUSH) 0.9 %
5-40 SYRINGE (ML) INJECTION PRN
Status: DISCONTINUED | OUTPATIENT
Start: 2024-09-01 | End: 2024-09-02 | Stop reason: HOSPADM

## 2024-09-01 RX ORDER — NADOLOL 20 MG/1
20 TABLET ORAL DAILY
Status: DISCONTINUED | OUTPATIENT
Start: 2024-09-01 | End: 2024-09-02 | Stop reason: HOSPADM

## 2024-09-01 RX ORDER — SODIUM CHLORIDE 9 MG/ML
INJECTION, SOLUTION INTRAVENOUS CONTINUOUS
Status: DISCONTINUED | OUTPATIENT
Start: 2024-09-01 | End: 2024-09-02

## 2024-09-01 RX ORDER — LORAZEPAM 2 MG/ML
2 INJECTION INTRAMUSCULAR
Status: DISCONTINUED | OUTPATIENT
Start: 2024-09-01 | End: 2024-09-02 | Stop reason: HOSPADM

## 2024-09-01 RX ORDER — MAGNESIUM SULFATE 1 G/100ML
1000 INJECTION INTRAVENOUS PRN
Status: DISCONTINUED | OUTPATIENT
Start: 2024-09-01 | End: 2024-09-02 | Stop reason: HOSPADM

## 2024-09-01 RX ORDER — SODIUM CHLORIDE 0.9 % (FLUSH) 0.9 %
5-40 SYRINGE (ML) INJECTION EVERY 12 HOURS SCHEDULED
Status: DISCONTINUED | OUTPATIENT
Start: 2024-09-01 | End: 2024-09-02 | Stop reason: HOSPADM

## 2024-09-01 RX ORDER — LORAZEPAM 1 MG/1
1 TABLET ORAL
Status: DISCONTINUED | OUTPATIENT
Start: 2024-09-01 | End: 2024-09-02 | Stop reason: HOSPADM

## 2024-09-01 RX ORDER — POLYETHYLENE GLYCOL 3350 17 G/17G
17 POWDER, FOR SOLUTION ORAL DAILY PRN
Status: DISCONTINUED | OUTPATIENT
Start: 2024-09-01 | End: 2024-09-02 | Stop reason: HOSPADM

## 2024-09-01 RX ORDER — ONDANSETRON 4 MG/1
4 TABLET, ORALLY DISINTEGRATING ORAL EVERY 8 HOURS PRN
Status: DISCONTINUED | OUTPATIENT
Start: 2024-09-01 | End: 2024-09-02 | Stop reason: HOSPADM

## 2024-09-01 RX ORDER — MIDODRINE HYDROCHLORIDE 5 MG/1
2.5 TABLET ORAL
Status: DISCONTINUED | OUTPATIENT
Start: 2024-09-01 | End: 2024-09-02 | Stop reason: HOSPADM

## 2024-09-01 RX ORDER — POTASSIUM CHLORIDE 7.45 MG/ML
10 INJECTION INTRAVENOUS PRN
Status: DISCONTINUED | OUTPATIENT
Start: 2024-09-01 | End: 2024-09-02 | Stop reason: HOSPADM

## 2024-09-01 RX ORDER — HYDROCODONE BITARTRATE AND ACETAMINOPHEN 5; 325 MG/1; MG/1
2 TABLET ORAL EVERY 6 HOURS PRN
Status: COMPLETED | OUTPATIENT
Start: 2024-09-01 | End: 2024-09-02

## 2024-09-01 RX ORDER — GABAPENTIN 100 MG/1
100 CAPSULE ORAL NIGHTLY
Status: DISCONTINUED | OUTPATIENT
Start: 2024-09-01 | End: 2024-09-01

## 2024-09-01 RX ORDER — ACETAMINOPHEN 325 MG/1
650 TABLET ORAL EVERY 6 HOURS PRN
Status: DISCONTINUED | OUTPATIENT
Start: 2024-09-01 | End: 2024-09-02 | Stop reason: HOSPADM

## 2024-09-01 RX ORDER — POLYETHYLENE GLYCOL 3350 17 G/17G
17 POWDER, FOR SOLUTION ORAL DAILY
Status: DISCONTINUED | OUTPATIENT
Start: 2024-09-01 | End: 2024-09-02 | Stop reason: HOSPADM

## 2024-09-01 RX ORDER — SODIUM CHLORIDE 9 MG/ML
INJECTION, SOLUTION INTRAVENOUS PRN
Status: DISCONTINUED | OUTPATIENT
Start: 2024-09-01 | End: 2024-09-02 | Stop reason: HOSPADM

## 2024-09-01 RX ORDER — LACTULOSE 10 G/15ML
20 SOLUTION ORAL EVERY 6 HOURS
Status: DISCONTINUED | OUTPATIENT
Start: 2024-09-01 | End: 2024-09-01

## 2024-09-01 RX ORDER — SODIUM CHLORIDE 0.9 % (FLUSH) 0.9 %
10 SYRINGE (ML) INJECTION PRN
Status: DISCONTINUED | OUTPATIENT
Start: 2024-09-01 | End: 2024-09-02 | Stop reason: HOSPADM

## 2024-09-01 RX ORDER — FOLIC ACID 1 MG/1
1 TABLET ORAL DAILY
Status: DISCONTINUED | OUTPATIENT
Start: 2024-09-01 | End: 2024-09-02 | Stop reason: HOSPADM

## 2024-09-01 RX ORDER — ENOXAPARIN SODIUM 100 MG/ML
40 INJECTION SUBCUTANEOUS DAILY
Status: DISCONTINUED | OUTPATIENT
Start: 2024-09-01 | End: 2024-09-02 | Stop reason: HOSPADM

## 2024-09-01 RX ORDER — LORAZEPAM 2 MG/ML
1 INJECTION INTRAMUSCULAR
Status: DISCONTINUED | OUTPATIENT
Start: 2024-09-01 | End: 2024-09-02 | Stop reason: HOSPADM

## 2024-09-01 RX ORDER — ONDANSETRON 2 MG/ML
4 INJECTION INTRAMUSCULAR; INTRAVENOUS EVERY 6 HOURS PRN
Status: DISCONTINUED | OUTPATIENT
Start: 2024-09-01 | End: 2024-09-02 | Stop reason: HOSPADM

## 2024-09-01 RX ORDER — LANOLIN ALCOHOL/MO/W.PET/CERES
100 CREAM (GRAM) TOPICAL DAILY
Status: DISCONTINUED | OUTPATIENT
Start: 2024-09-01 | End: 2024-09-02 | Stop reason: HOSPADM

## 2024-09-01 RX ORDER — GABAPENTIN 100 MG/1
100 CAPSULE ORAL NIGHTLY
Status: DISCONTINUED | OUTPATIENT
Start: 2024-09-01 | End: 2024-09-02 | Stop reason: HOSPADM

## 2024-09-01 RX ORDER — ACETAMINOPHEN 650 MG/1
650 SUPPOSITORY RECTAL EVERY 6 HOURS PRN
Status: DISCONTINUED | OUTPATIENT
Start: 2024-09-01 | End: 2024-09-02 | Stop reason: HOSPADM

## 2024-09-01 RX ORDER — LORAZEPAM 2 MG/1
4 TABLET ORAL
Status: DISCONTINUED | OUTPATIENT
Start: 2024-09-01 | End: 2024-09-02 | Stop reason: HOSPADM

## 2024-09-01 RX ORDER — ONDANSETRON 2 MG/ML
4 INJECTION INTRAMUSCULAR; INTRAVENOUS ONCE
Status: COMPLETED | OUTPATIENT
Start: 2024-09-01 | End: 2024-09-01

## 2024-09-01 RX ORDER — LORAZEPAM 2 MG/1
2 TABLET ORAL
Status: DISCONTINUED | OUTPATIENT
Start: 2024-09-01 | End: 2024-09-02 | Stop reason: HOSPADM

## 2024-09-01 RX ORDER — LACTULOSE 10 G/15ML
10 SOLUTION ORAL 4 TIMES DAILY PRN
Status: DISCONTINUED | OUTPATIENT
Start: 2024-09-01 | End: 2024-09-02 | Stop reason: HOSPADM

## 2024-09-01 RX ORDER — PANTOPRAZOLE SODIUM 40 MG/1
40 TABLET, DELAYED RELEASE ORAL
Status: DISCONTINUED | OUTPATIENT
Start: 2024-09-01 | End: 2024-09-02 | Stop reason: HOSPADM

## 2024-09-01 RX ORDER — POTASSIUM CHLORIDE 1500 MG/1
40 TABLET, EXTENDED RELEASE ORAL PRN
Status: DISCONTINUED | OUTPATIENT
Start: 2024-09-01 | End: 2024-09-02 | Stop reason: HOSPADM

## 2024-09-01 RX ORDER — LORAZEPAM 2 MG/ML
3 INJECTION INTRAMUSCULAR
Status: DISCONTINUED | OUTPATIENT
Start: 2024-09-01 | End: 2024-09-02 | Stop reason: HOSPADM

## 2024-09-01 RX ORDER — CETIRIZINE HYDROCHLORIDE 10 MG/1
5 TABLET ORAL DAILY PRN
Status: DISCONTINUED | OUTPATIENT
Start: 2024-09-01 | End: 2024-09-02 | Stop reason: HOSPADM

## 2024-09-01 RX ADMIN — AMPICILLIN SODIUM AND SULBACTAM SODIUM 3000 MG: 2; 1 INJECTION, POWDER, FOR SOLUTION INTRAMUSCULAR; INTRAVENOUS at 11:55

## 2024-09-01 RX ADMIN — MIDODRINE HYDROCHLORIDE 2.5 MG: 5 TABLET ORAL at 11:49

## 2024-09-01 RX ADMIN — HYDROCODONE BITARTRATE AND ACETAMINOPHEN 2 TABLET: 5; 325 TABLET ORAL at 21:13

## 2024-09-01 RX ADMIN — SODIUM CHLORIDE: 9 INJECTION, SOLUTION INTRAVENOUS at 03:31

## 2024-09-01 RX ADMIN — ACETAMINOPHEN 650 MG: 325 TABLET ORAL at 08:36

## 2024-09-01 RX ADMIN — Medication 100 MG: at 10:13

## 2024-09-01 RX ADMIN — AMPICILLIN SODIUM AND SULBACTAM SODIUM 3000 MG: 2; 1 INJECTION, POWDER, FOR SOLUTION INTRAMUSCULAR; INTRAVENOUS at 05:41

## 2024-09-01 RX ADMIN — NADOLOL 20 MG: 20 TABLET ORAL at 08:35

## 2024-09-01 RX ADMIN — GABAPENTIN 100 MG: 100 CAPSULE ORAL at 21:04

## 2024-09-01 RX ADMIN — MIDODRINE HYDROCHLORIDE 2.5 MG: 5 TABLET ORAL at 16:54

## 2024-09-01 RX ADMIN — PANTOPRAZOLE SODIUM 40 MG: 40 TABLET, DELAYED RELEASE ORAL at 16:54

## 2024-09-01 RX ADMIN — FOLIC ACID 1 MG: 1 TABLET ORAL at 08:36

## 2024-09-01 RX ADMIN — PANTOPRAZOLE SODIUM 40 MG: 40 TABLET, DELAYED RELEASE ORAL at 07:21

## 2024-09-01 RX ADMIN — ACETAMINOPHEN 650 MG: 325 TABLET ORAL at 16:53

## 2024-09-01 RX ADMIN — ONDANSETRON 4 MG: 2 INJECTION INTRAMUSCULAR; INTRAVENOUS at 18:03

## 2024-09-01 RX ADMIN — MIDODRINE HYDROCHLORIDE 2.5 MG: 5 TABLET ORAL at 08:35

## 2024-09-01 RX ADMIN — AMPICILLIN SODIUM AND SULBACTAM SODIUM 3000 MG: 2; 1 INJECTION, POWDER, FOR SOLUTION INTRAMUSCULAR; INTRAVENOUS at 16:57

## 2024-09-01 RX ADMIN — ONDANSETRON 4 MG: 2 INJECTION INTRAMUSCULAR; INTRAVENOUS at 00:25

## 2024-09-01 RX ADMIN — LACTULOSE 20 G: 20 SOLUTION ORAL at 07:21

## 2024-09-01 RX ADMIN — TIZANIDINE 2 MG: 2 TABLET ORAL at 08:35

## 2024-09-01 RX ADMIN — ONDANSETRON 4 MG: 2 INJECTION INTRAMUSCULAR; INTRAVENOUS at 10:12

## 2024-09-01 RX ADMIN — AMPICILLIN SODIUM AND SULBACTAM SODIUM 3000 MG: 2; 1 INJECTION, POWDER, FOR SOLUTION INTRAMUSCULAR; INTRAVENOUS at 23:18

## 2024-09-01 RX ADMIN — TIZANIDINE 2 MG: 2 TABLET ORAL at 17:04

## 2024-09-01 RX ADMIN — Medication 100 MG: at 16:54

## 2024-09-01 RX ADMIN — POTASSIUM CHLORIDE 10 MEQ: 7.46 INJECTION, SOLUTION INTRAVENOUS at 00:27

## 2024-09-01 RX ADMIN — CETIRIZINE HYDROCHLORIDE 5 MG: 10 TABLET, FILM COATED ORAL at 08:39

## 2024-09-01 ASSESSMENT — PAIN DESCRIPTION - LOCATION
LOCATION: ARM;BACK
LOCATION: HEAD
LOCATION: ARM
LOCATION: HEAD
LOCATION: ARM;BACK

## 2024-09-01 ASSESSMENT — PAIN DESCRIPTION - ORIENTATION
ORIENTATION: RIGHT;LEFT
ORIENTATION: RIGHT;LEFT
ORIENTATION: RIGHT
ORIENTATION: RIGHT;LOWER
ORIENTATION: RIGHT

## 2024-09-01 ASSESSMENT — PAIN SCALES - GENERAL
PAINLEVEL_OUTOF10: 7
PAINLEVEL_OUTOF10: 2
PAINLEVEL_OUTOF10: 7
PAINLEVEL_OUTOF10: 2
PAINLEVEL_OUTOF10: 7
PAINLEVEL_OUTOF10: 2

## 2024-09-01 ASSESSMENT — PAIN - FUNCTIONAL ASSESSMENT
PAIN_FUNCTIONAL_ASSESSMENT: PREVENTS OR INTERFERES SOME ACTIVE ACTIVITIES AND ADLS
PAIN_FUNCTIONAL_ASSESSMENT: PREVENTS OR INTERFERES SOME ACTIVE ACTIVITIES AND ADLS
PAIN_FUNCTIONAL_ASSESSMENT: PREVENTS OR INTERFERES WITH MANY ACTIVE NOT PASSIVE ACTIVITIES
PAIN_FUNCTIONAL_ASSESSMENT: PREVENTS OR INTERFERES WITH MANY ACTIVE NOT PASSIVE ACTIVITIES

## 2024-09-01 ASSESSMENT — PAIN DESCRIPTION - DESCRIPTORS
DESCRIPTORS: ACHING;PRESSURE;SHARP;STABBING
DESCRIPTORS: ACHING

## 2024-09-01 ASSESSMENT — PAIN DESCRIPTION - PAIN TYPE: TYPE: ACUTE PAIN

## 2024-09-01 NOTE — ED NOTES
Report given to Lety LOPEZ RN from [facility].   Report method by phone   The following was reviewed with receiving RN:   Current vital signs:  /73   Pulse 98   Temp 98.3 °F (36.8 °C)   Resp 18   Ht 1.727 m (5' 8\")   Wt 80.3 kg (177 lb)   SpO2 96%   BMI 26.91 kg/m²                MEWS Score: 2     Any medication or safety alerts were reviewed. Any pending diagnostics and notifications were also reviewed, as well as any safety concerns or issues, abnormal labs, abnormal imaging, and abnormal assessment findings. Questions were answered.

## 2024-09-01 NOTE — H&P
[facility]  Office: [phone]  Paul Hardy DO, Elijah Dye DO, Rik Rankin DO, Laci Barth DO, Perri Rolle MD, Rita Spencer MD, Bárbara Olivarez MD, Hansa Loco MD,  Sam Reardon MD, Maximo Amanda MD, Amadou Carpenter DO, Naida Kraus MD,  Miguel Melton DO, Cholo Diaz MD, lJuis Michaud MD, Dante Hardy DO, Candy Reyna MD,  Giovanny Genao DO, Claribel King MD, Sabi Ken MD, Erum Gottlieb MD, Rehana Burdick MD,  Clark Soliz MD, Ector James MD, Ivonne Valladares MD, Abdi Unger DO, Nancy Nicholson MD,  Evangelista Maria MD, Shirley Waterhouse, CNP,  Malena Hayes, CNP, Donna Cedeno, CNP, Oswaldo Urrutia, CNP,  Armida Martin, DNP, Shayna Rodriguez, CNP, Yesenia Menchaca, CNP, Lita Garay, CNP, Sharmin Prince, CNP, Sylvia Harris, CNP, Murray Krueger PA-C, Ne Carnes, CNS, Holly Melara, CNP, Gloria Tate, CNP         [facility]   IN-PATIENT SERVICE   [facility]    HISTORY AND PHYSICAL EXAMINATION            Date:   9/1/2024  Patient name:  Sam Emerson  Date of admission:  9/1/2024  2:50 AM  MRN:   [id number]  Account:  [id number]  YOB: 1985  PCP:    Rosie Montes De Oca MD  Room:   0324/0324-01  Code Status:    Full Code      History Obtained From:     patient, spouse, electronic medical record    History of Present Illness:     Sam Emerson is a 39 y.o. Non- / non  male who presents with No chief complaint on file.   and is admitted to the hospital for the management of Cat bite of forearm, right, initial encounter.    Patient with significant past medical history of alcoholism, liver cirrhosis s/p TIPS admission for hepatic encephalopathy, follows up with GI, on lactulose hypertension and thrombocytopenia secondary to liver cirrhosis now presented initially to [facility] for right forearm pain.  Patient stated that he has a cat and a dog and was trying to keep them, at

## 2024-09-01 NOTE — CARE COORDINATION
Case Management Assessment  Initial Evaluation    Date/Time of Evaluation: 9/1/2024 12:38 PM  Assessment Completed by: Vicki Villa RN    If patient is discharged prior to next notation, then this note serves as note for discharge by case management.    Patient Name: Sam Emerson                   YOB: 1985  Diagnosis: Cat bite of forearm, right, initial encounter [S51.851A, W55.01XA]                   Date / Time: 9/1/2024  2:50 AM    Patient Admission Status: Inpatient   Readmission Risk (Low < 19, Mod (19-27), High > 27): Readmission Risk Score: 23.2    Current PCP: Rosie Montes De Oca MD  PCP verified by CM? (P) Yes    Chart Reviewed: Yes      History Provided by: (P) Patient  Patient Orientation: (P) Alert and Oriented    Patient Cognition: (P) Alert    Hospitalization in the last 30 days (Readmission):  No    If yes, Readmission Assessment in CM Navigator will be completed.    Advance Directives:      Code Status: Full Code   Patient's Primary Decision Maker is: (P) Legal Next of Kin    Primary Decision Maker: CHRISTOPHER GROSSMAN - Spouse - [phone]    Discharge Planning:    Patient lives with: (P) Spouse/Significant Other Type of Home: (P) House  Primary Care Giver: (P) Self  Patient Support Systems include: (P) Spouse/Significant Other, Children   Current Financial resources: (P) Medicaid  Current community resources:    Current services prior to admission: (P) None            Current DME:              Type of Home Care services:  (P) None    ADLS  Prior functional level: (P) Independent in ADLs/IADLs  Current functional level: (P) Independent in ADLs/IADLs    PT AM-PAC:   /24  OT AM-PAC:   /24    Family can provide assistance at DC: (P) Yes  Would you like Case Management to discuss the discharge plan with any other family members/significant others, and if so, who?    Plans to Return to Present Housing: (P) Yes  Other Identified Issues/Barriers to RETURNING to current housing:

## 2024-09-02 VITALS
DIASTOLIC BLOOD PRESSURE: 75 MMHG | TEMPERATURE: 97.8 F | HEART RATE: 55 BPM | SYSTOLIC BLOOD PRESSURE: 104 MMHG | BODY MASS INDEX: 26.86 KG/M2 | OXYGEN SATURATION: 98 % | RESPIRATION RATE: 16 BRPM | WEIGHT: 177.25 LBS | HEIGHT: 68 IN

## 2024-09-02 LAB
ALBUMIN SERPL-MCNC: 2.9 G/DL (ref 3.5–5.2)
ALBUMIN/GLOB SERPL: 1 {RATIO} (ref 1–2.5)
ALP SERPL-CCNC: 244 U/L (ref 40–129)
ALT SERPL-CCNC: 12 U/L (ref 10–50)
ANION GAP SERPL CALCULATED.3IONS-SCNC: 8 MMOL/L (ref 9–16)
AST SERPL-CCNC: 51 U/L (ref 10–50)
BASOPHILS # BLD: 0.07 K/UL (ref 0–0.2)
BASOPHILS NFR BLD: 1 % (ref 0–2)
BILIRUB SERPL-MCNC: 3.1 MG/DL (ref 0–1.2)
BUN SERPL-MCNC: 11 MG/DL (ref 6–20)
CALCIUM SERPL-MCNC: 7.9 MG/DL (ref 8.6–10.4)
CHLORIDE SERPL-SCNC: 112 MMOL/L (ref 98–107)
CO2 SERPL-SCNC: 17 MMOL/L (ref 20–31)
CREAT SERPL-MCNC: 0.8 MG/DL (ref 0.7–1.2)
EOSINOPHIL # BLD: 0.22 K/UL (ref 0–0.44)
EOSINOPHILS RELATIVE PERCENT: 3 % (ref 1–4)
ERYTHROCYTE [DISTWIDTH] IN BLOOD BY AUTOMATED COUNT: 18.1 % (ref 11.8–14.4)
GFR, ESTIMATED: >90 ML/MIN/1.73M2
GLUCOSE SERPL-MCNC: 110 MG/DL (ref 74–99)
HCT VFR BLD AUTO: 29.3 % (ref 40.7–50.3)
HGB BLD-MCNC: 9.5 G/DL (ref 13–17)
IMM GRANULOCYTES # BLD AUTO: 0 K/UL (ref 0–0.3)
IMM GRANULOCYTES NFR BLD: 0 %
INR PPP: 1.6
LACTIC ACID, WHOLE BLOOD: 1.1 MMOL/L (ref 0.7–2.1)
LYMPHOCYTES NFR BLD: 1.37 K/UL (ref 1.1–3.7)
LYMPHOCYTES RELATIVE PERCENT: 19 % (ref 24–43)
MCH RBC QN AUTO: 37 PG (ref 25.2–33.5)
MCHC RBC AUTO-ENTMCNC: 32.4 G/DL (ref 28.4–34.8)
MCV RBC AUTO: 114 FL (ref 82.6–102.9)
MONOCYTES NFR BLD: 0.5 K/UL (ref 0.1–1.2)
MONOCYTES NFR BLD: 7 % (ref 3–12)
MORPHOLOGY: ABNORMAL
MORPHOLOGY: ABNORMAL
NEUTROPHILS NFR BLD: 70 % (ref 36–65)
NEUTS SEG NFR BLD: 5.04 K/UL (ref 1.5–8.1)
NRBC BLD-RTO: 0 PER 100 WBC
PLATELET # BLD AUTO: ABNORMAL K/UL (ref 138–453)
PLATELET, FLUORESCENCE: 44 K/UL (ref 138–453)
PLATELETS.RETICULATED NFR BLD AUTO: 4.2 % (ref 1.1–10.3)
POTASSIUM SERPL-SCNC: 3.8 MMOL/L (ref 3.7–5.3)
PROT SERPL-MCNC: 6.4 G/DL (ref 6.6–8.7)
PROTHROMBIN TIME: 18.9 SEC (ref 11.7–14.9)
RBC # BLD AUTO: 2.57 M/UL (ref 4.21–5.77)
SODIUM SERPL-SCNC: 137 MMOL/L (ref 136–145)
WBC OTHER # BLD: 7.2 K/UL (ref 3.5–11.3)

## 2024-09-02 PROCEDURE — 6370000000 HC RX 637 (ALT 250 FOR IP): Performed by: STUDENT IN AN ORGANIZED HEALTH CARE EDUCATION/TRAINING PROGRAM

## 2024-09-02 PROCEDURE — 83605 ASSAY OF LACTIC ACID: CPT

## 2024-09-02 PROCEDURE — 6370000000 HC RX 637 (ALT 250 FOR IP): Performed by: NURSE PRACTITIONER

## 2024-09-02 PROCEDURE — 85025 COMPLETE CBC W/AUTO DIFF WBC: CPT

## 2024-09-02 PROCEDURE — 85610 PROTHROMBIN TIME: CPT

## 2024-09-02 PROCEDURE — 80053 COMPREHEN METABOLIC PANEL: CPT

## 2024-09-02 PROCEDURE — 6360000002 HC RX W HCPCS: Performed by: NURSE PRACTITIONER

## 2024-09-02 PROCEDURE — 2580000003 HC RX 258: Performed by: NURSE PRACTITIONER

## 2024-09-02 PROCEDURE — 36415 COLL VENOUS BLD VENIPUNCTURE: CPT

## 2024-09-02 PROCEDURE — 85055 RETICULATED PLATELET ASSAY: CPT

## 2024-09-02 RX ADMIN — PANTOPRAZOLE SODIUM 40 MG: 40 TABLET, DELAYED RELEASE ORAL at 07:50

## 2024-09-02 RX ADMIN — NADOLOL 20 MG: 20 TABLET ORAL at 08:36

## 2024-09-02 RX ADMIN — Medication 100 MG: at 08:36

## 2024-09-02 RX ADMIN — MIDODRINE HYDROCHLORIDE 2.5 MG: 5 TABLET ORAL at 08:37

## 2024-09-02 RX ADMIN — HYDROCODONE BITARTRATE AND ACETAMINOPHEN 2 TABLET: 5; 325 TABLET ORAL at 03:46

## 2024-09-02 RX ADMIN — ACETAMINOPHEN 650 MG: 325 TABLET ORAL at 09:04

## 2024-09-02 RX ADMIN — TIZANIDINE 2 MG: 2 TABLET ORAL at 00:54

## 2024-09-02 RX ADMIN — AMPICILLIN SODIUM AND SULBACTAM SODIUM 3000 MG: 2; 1 INJECTION, POWDER, FOR SOLUTION INTRAMUSCULAR; INTRAVENOUS at 05:58

## 2024-09-02 RX ADMIN — FOLIC ACID 1 MG: 1 TABLET ORAL at 08:37

## 2024-09-02 ASSESSMENT — PAIN DESCRIPTION - LOCATION
LOCATION: ARM

## 2024-09-02 ASSESSMENT — PAIN DESCRIPTION - ORIENTATION
ORIENTATION: RIGHT
ORIENTATION: RIGHT

## 2024-09-02 ASSESSMENT — PAIN SCALES - GENERAL
PAINLEVEL_OUTOF10: 8
PAINLEVEL_OUTOF10: 7
PAINLEVEL_OUTOF10: 4
PAINLEVEL_OUTOF10: 5
PAINLEVEL_OUTOF10: 2
PAINLEVEL_OUTOF10: 2

## 2024-09-02 ASSESSMENT — PAIN DESCRIPTION - DESCRIPTORS: DESCRIPTORS: DISCOMFORT

## 2024-09-02 NOTE — PROGRESS NOTES
Patient reporting that he would like to have his discharge paperwork because \" no one is taking my situation seriously.They all want to treat my potassium, my liver issues, and not my pain and the situation that I came in for.\" Writer explains that we take care of the whole patient and all of their conditions when they come to the hospital. Dr. Kirk notified that patient would like discharge paperwork. She reports that she is coming bedside. Writer updates patient and he says that he's not waiting. AMA paperwork given to patient.

## 2024-09-03 ENCOUNTER — CARE COORDINATION (OUTPATIENT)
Dept: CARE COORDINATION | Age: 39
End: 2024-09-03

## 2024-09-03 NOTE — CARE COORDINATION
Ambulatory Care Coordination Note     9/3/2024 9:59 AM     ACM outreach attempt by this ACM today to offer care management services. ACM was unable to reach the patient by telephone today; left voice message requesting a return phone call to this ACM.     ACM: BRANDI GREEN RN     Care Summary Note: PCP referral- ED follow up call attempted.    PCP/Specialist follow up:   Future Appointments         Provider Specialty Dept Phone    9/5/2024 9:15 AM (Arrive by 9:00 AM) [facility] CT RM 1 Radiology [phone]    9/17/2024 2:15 PM Rosie Montes De Oca MD Internal Medicine [phone]            Follow Up:   Plan for next ACM outreach in approximately 1 week to complete:  - outreach attempt to offer care management services.

## 2024-09-04 LAB
MICROORGANISM SPEC CULT: ABNORMAL
MICROORGANISM SPEC CULT: ABNORMAL
MICROORGANISM/AGENT SPEC: ABNORMAL
MICROORGANISM/AGENT SPEC: ABNORMAL
SPECIMEN DESCRIPTION: ABNORMAL

## 2024-09-04 NOTE — TELEPHONE ENCOUNTER
No need for eval. Patient has been off of oxygen for over a year and has no symptoms.  Needs order to take tanks out of home.   Will write up order and have provider sign upon return next week as there is no urgency.

## 2024-09-04 NOTE — TELEPHONE ENCOUNTER
Contacted patient, he states he was given it 2 years ago, and used it for about 3 months after COVID/bronchitis.    Okay to discontinue?

## 2024-09-05 ENCOUNTER — HOSPITAL ENCOUNTER (OUTPATIENT)
Dept: CT IMAGING | Age: 39
Discharge: HOME | End: 2024-09-07
Attending: INTERNAL MEDICINE
Payer: COMMERCIAL

## 2024-09-05 DIAGNOSIS — G89.29 CHRONIC RIGHT-SIDED LOW BACK PAIN WITHOUT SCIATICA: ICD-10-CM

## 2024-09-05 DIAGNOSIS — M54.50 CHRONIC RIGHT-SIDED LOW BACK PAIN WITHOUT SCIATICA: ICD-10-CM

## 2024-09-05 PROCEDURE — 72131 CT LUMBAR SPINE W/O DYE: CPT

## 2024-09-09 ENCOUNTER — TELEPHONE (OUTPATIENT)
Dept: INTERNAL MEDICINE CLINIC | Age: 39
End: 2024-09-09

## 2024-09-12 ENCOUNTER — HOSPITAL ENCOUNTER (EMERGENCY)
Age: 39
Discharge: HOME | End: 2024-09-12
Attending: EMERGENCY MEDICINE
Payer: COMMERCIAL

## 2024-09-12 ENCOUNTER — APPOINTMENT (OUTPATIENT)
Dept: GENERAL RADIOLOGY | Age: 39
End: 2024-09-12
Payer: COMMERCIAL

## 2024-09-12 VITALS
TEMPERATURE: 98.3 F | DIASTOLIC BLOOD PRESSURE: 90 MMHG | HEIGHT: 68 IN | WEIGHT: 177 LBS | RESPIRATION RATE: 12 BRPM | BODY MASS INDEX: 26.83 KG/M2 | OXYGEN SATURATION: 99 % | HEART RATE: 89 BPM | SYSTOLIC BLOOD PRESSURE: 142 MMHG

## 2024-09-12 DIAGNOSIS — F10.930 ALCOHOL WITHDRAWAL SYNDROME WITHOUT COMPLICATION (HCC): Primary | ICD-10-CM

## 2024-09-12 LAB
ALBUMIN SERPL-MCNC: 3.6 G/DL (ref 3.5–5.2)
ALP SERPL-CCNC: 332 U/L (ref 40–129)
ALT SERPL-CCNC: 40 U/L (ref 5–41)
AMMONIA PLAS-SCNC: 95 UMOL/L (ref 16–60)
AMPHET UR QL SCN: NEGATIVE
AST SERPL-CCNC: 199 U/L
BARBITURATES UR QL SCN: NEGATIVE
BASOPHILS # BLD: 0.1 K/UL (ref 0–0.2)
BASOPHILS NFR BLD: 1 % (ref 0–2)
BENZODIAZ UR QL: NEGATIVE
BILIRUB DIRECT SERPL-MCNC: 1.7 MG/DL
BILIRUB INDIRECT SERPL-MCNC: 3.1 MG/DL (ref 0–1)
BILIRUB SERPL-MCNC: 4.8 MG/DL (ref 0.3–1.2)
CANNABINOIDS UR QL SCN: POSITIVE
COCAINE UR QL SCN: NEGATIVE
EOSINOPHIL # BLD: 0.1 K/UL (ref 0–0.4)
EOSINOPHILS RELATIVE PERCENT: 1 % (ref 0–4)
ERYTHROCYTE [DISTWIDTH] IN BLOOD BY AUTOMATED COUNT: 18.5 % (ref 11.5–14.9)
ETHANOL PERCENT: 0.08 %
ETHANOLAMINE SERPL-MCNC: 78 MG/DL (ref 0–0.08)
FENTANYL UR QL: NEGATIVE
HCT VFR BLD AUTO: 31.7 % (ref 41–53)
HGB BLD-MCNC: 11 G/DL (ref 13.5–17.5)
LIPASE SERPL-CCNC: 46 U/L (ref 13–60)
LYMPHOCYTES NFR BLD: 0.7 K/UL (ref 1–4.8)
LYMPHOCYTES RELATIVE PERCENT: 7 % (ref 24–44)
MCH RBC QN AUTO: 37.9 PG (ref 26–34)
MCHC RBC AUTO-ENTMCNC: 34.9 G/DL (ref 31–37)
MCV RBC AUTO: 108.7 FL (ref 80–100)
METHADONE UR QL: NEGATIVE
MONOCYTES NFR BLD: 0.6 K/UL (ref 0.1–1.3)
MONOCYTES NFR BLD: 6 % (ref 1–7)
NEUTROPHILS NFR BLD: 85 % (ref 36–66)
NEUTS SEG NFR BLD: 9.3 K/UL (ref 1.3–9.1)
OPIATES UR QL SCN: NEGATIVE
OXYCODONE UR QL SCN: NEGATIVE
PCP UR QL SCN: NEGATIVE
PLATELET # BLD AUTO: 75 K/UL (ref 150–450)
PMV BLD AUTO: 7.3 FL (ref 6–12)
PROT SERPL-MCNC: 8.6 G/DL (ref 6.4–8.3)
RBC # BLD AUTO: 2.91 M/UL (ref 4.5–5.9)
TEST INFORMATION: ABNORMAL
TROPONIN I SERPL HS-MCNC: 14 NG/L (ref 0–22)
TROPONIN I SERPL HS-MCNC: 14 NG/L (ref 0–22)
WBC OTHER # BLD: 10.8 K/UL (ref 3.5–11)

## 2024-09-12 PROCEDURE — 96365 THER/PROPH/DIAG IV INF INIT: CPT

## 2024-09-12 PROCEDURE — 2500000003 HC RX 250 WO HCPCS

## 2024-09-12 PROCEDURE — G0480 DRUG TEST DEF 1-7 CLASSES: HCPCS

## 2024-09-12 PROCEDURE — 85025 COMPLETE CBC W/AUTO DIFF WBC: CPT

## 2024-09-12 PROCEDURE — 2580000003 HC RX 258

## 2024-09-12 PROCEDURE — 82140 ASSAY OF AMMONIA: CPT

## 2024-09-12 PROCEDURE — 96375 TX/PRO/DX INJ NEW DRUG ADDON: CPT

## 2024-09-12 PROCEDURE — 6360000002 HC RX W HCPCS

## 2024-09-12 PROCEDURE — 99285 EMERGENCY DEPT VISIT HI MDM: CPT

## 2024-09-12 PROCEDURE — 83690 ASSAY OF LIPASE: CPT

## 2024-09-12 PROCEDURE — 93005 ELECTROCARDIOGRAM TRACING: CPT

## 2024-09-12 PROCEDURE — 36415 COLL VENOUS BLD VENIPUNCTURE: CPT

## 2024-09-12 PROCEDURE — 80076 HEPATIC FUNCTION PANEL: CPT

## 2024-09-12 PROCEDURE — 84484 ASSAY OF TROPONIN QUANT: CPT

## 2024-09-12 PROCEDURE — 71046 X-RAY EXAM CHEST 2 VIEWS: CPT

## 2024-09-12 PROCEDURE — 80307 DRUG TEST PRSMV CHEM ANLYZR: CPT

## 2024-09-12 RX ORDER — LORAZEPAM 2 MG/ML
2 INJECTION INTRAMUSCULAR ONCE
Status: COMPLETED | OUTPATIENT
Start: 2024-09-12 | End: 2024-09-12

## 2024-09-12 RX ORDER — ONDANSETRON 2 MG/ML
4 INJECTION INTRAMUSCULAR; INTRAVENOUS ONCE
Status: COMPLETED | OUTPATIENT
Start: 2024-09-12 | End: 2024-09-12

## 2024-09-12 RX ADMIN — THIAMINE HYDROCHLORIDE: 100 INJECTION, SOLUTION INTRAMUSCULAR; INTRAVENOUS at 16:03

## 2024-09-12 RX ADMIN — ONDANSETRON 4 MG: 2 INJECTION INTRAMUSCULAR; INTRAVENOUS at 15:28

## 2024-09-12 RX ADMIN — LORAZEPAM 2 MG: 2 INJECTION INTRAMUSCULAR; INTRAVENOUS at 15:28

## 2024-09-12 ASSESSMENT — PAIN - FUNCTIONAL ASSESSMENT: PAIN_FUNCTIONAL_ASSESSMENT: 0-10

## 2024-09-12 ASSESSMENT — ENCOUNTER SYMPTOMS
SHORTNESS OF BREATH: 0
NAUSEA: 1
DIARRHEA: 1
VOMITING: 1
ABDOMINAL PAIN: 0

## 2024-09-12 ASSESSMENT — PAIN SCALES - GENERAL: PAINLEVEL_OUTOF10: 0

## 2024-09-14 LAB
EKG ATRIAL RATE: 82 BPM
EKG P AXIS: 36 DEGREES
EKG P-R INTERVAL: 134 MS
EKG Q-T INTERVAL: 374 MS
EKG QRS DURATION: 90 MS
EKG QTC CALCULATION (BAZETT): 436 MS
EKG R AXIS: 38 DEGREES
EKG T AXIS: 55 DEGREES
EKG VENTRICULAR RATE: 82 BPM

## 2024-09-14 PROCEDURE — 93010 ELECTROCARDIOGRAM REPORT: CPT | Performed by: INTERNAL MEDICINE

## 2024-09-17 ENCOUNTER — OFFICE VISIT (OUTPATIENT)
Dept: INTERNAL MEDICINE CLINIC | Age: 39
End: 2024-09-17
Payer: COMMERCIAL

## 2024-09-17 ENCOUNTER — TELEPHONE (OUTPATIENT)
Dept: INTERNAL MEDICINE CLINIC | Age: 39
End: 2024-09-17

## 2024-09-17 VITALS
SYSTOLIC BLOOD PRESSURE: 134 MMHG | HEIGHT: 68 IN | OXYGEN SATURATION: 98 % | BODY MASS INDEX: 26.67 KG/M2 | DIASTOLIC BLOOD PRESSURE: 84 MMHG | HEART RATE: 85 BPM | WEIGHT: 176 LBS

## 2024-09-17 DIAGNOSIS — S40.021A TRAUMATIC HEMATOMA OF RIGHT UPPER ARM, INITIAL ENCOUNTER: ICD-10-CM

## 2024-09-17 DIAGNOSIS — M54.50 CHRONIC RIGHT-SIDED LOW BACK PAIN WITHOUT SCIATICA: ICD-10-CM

## 2024-09-17 DIAGNOSIS — G89.29 CHRONIC RIGHT-SIDED LOW BACK PAIN WITHOUT SCIATICA: ICD-10-CM

## 2024-09-17 DIAGNOSIS — M54.50 CHRONIC BILATERAL LOW BACK PAIN WITHOUT SCIATICA: Primary | ICD-10-CM

## 2024-09-17 DIAGNOSIS — G89.29 CHRONIC BILATERAL LOW BACK PAIN WITHOUT SCIATICA: Primary | ICD-10-CM

## 2024-09-17 DIAGNOSIS — K70.31 ALCOHOLIC CIRRHOSIS OF LIVER WITH ASCITES (HCC): ICD-10-CM

## 2024-09-17 DIAGNOSIS — T14.8XXA ANIMAL BITE: ICD-10-CM

## 2024-09-17 PROCEDURE — 1111F DSCHRG MED/CURRENT MED MERGE: CPT | Performed by: INTERNAL MEDICINE

## 2024-09-17 PROCEDURE — G8427 DOCREV CUR MEDS BY ELIG CLIN: HCPCS | Performed by: INTERNAL MEDICINE

## 2024-09-17 PROCEDURE — 3075F SYST BP GE 130 - 139MM HG: CPT | Performed by: INTERNAL MEDICINE

## 2024-09-17 PROCEDURE — G8419 CALC BMI OUT NRM PARAM NOF/U: HCPCS | Performed by: INTERNAL MEDICINE

## 2024-09-17 PROCEDURE — 4004F PT TOBACCO SCREEN RCVD TLK: CPT | Performed by: INTERNAL MEDICINE

## 2024-09-17 PROCEDURE — 3079F DIAST BP 80-89 MM HG: CPT | Performed by: INTERNAL MEDICINE

## 2024-09-17 PROCEDURE — 99214 OFFICE O/P EST MOD 30 MIN: CPT | Performed by: INTERNAL MEDICINE

## 2024-09-17 RX ORDER — GABAPENTIN 100 MG/1
100 CAPSULE ORAL 2 TIMES DAILY
Qty: 30 CAPSULE | Refills: 3 | Status: SHIPPED | OUTPATIENT
Start: 2024-09-17 | End: 2025-01-15

## 2024-09-17 RX ORDER — TIZANIDINE 2 MG/1
2 TABLET ORAL 3 TIMES DAILY PRN
Qty: 30 TABLET | Refills: 0 | Status: SHIPPED | OUTPATIENT
Start: 2024-09-17 | End: 2024-10-17

## 2024-09-17 RX ORDER — ONDANSETRON 4 MG/1
4 TABLET, FILM COATED ORAL EVERY 8 HOURS PRN
Qty: 20 TABLET | Refills: 0 | Status: SHIPPED | OUTPATIENT
Start: 2024-09-17

## 2024-09-20 ENCOUNTER — TELEPHONE (OUTPATIENT)
Dept: INTERNAL MEDICINE CLINIC | Age: 39
End: 2024-09-20

## 2024-09-20 RX ORDER — DULOXETIN HYDROCHLORIDE 30 MG/1
30 CAPSULE, DELAYED RELEASE ORAL DAILY
Qty: 30 CAPSULE | Refills: 2 | Status: SHIPPED | OUTPATIENT
Start: 2024-09-20 | End: 2024-12-19

## 2024-09-30 ENCOUNTER — APPOINTMENT (OUTPATIENT)
Dept: CT IMAGING | Age: 39
End: 2024-09-30
Payer: COMMERCIAL

## 2024-09-30 ENCOUNTER — APPOINTMENT (OUTPATIENT)
Dept: GENERAL RADIOLOGY | Age: 39
End: 2024-09-30
Payer: COMMERCIAL

## 2024-09-30 ENCOUNTER — HOSPITAL ENCOUNTER (INPATIENT)
Age: 39
LOS: 1 days | Discharge: HOME | End: 2024-10-01
Attending: EMERGENCY MEDICINE | Admitting: INTERNAL MEDICINE
Payer: COMMERCIAL

## 2024-09-30 DIAGNOSIS — R11.2 NAUSEA AND VOMITING, UNSPECIFIED VOMITING TYPE: ICD-10-CM

## 2024-09-30 DIAGNOSIS — R07.9 CHEST PAIN, UNSPECIFIED TYPE: Primary | ICD-10-CM

## 2024-09-30 DIAGNOSIS — F10.930 ALCOHOL WITHDRAWAL SYNDROME WITHOUT COMPLICATION (HCC): ICD-10-CM

## 2024-09-30 DIAGNOSIS — E72.20 HYPERAMMONEMIA (HCC): ICD-10-CM

## 2024-09-30 PROBLEM — F10.931 ALCOHOL WITHDRAWAL DELIRIUM (HCC): Status: ACTIVE | Noted: 2024-09-30

## 2024-09-30 LAB
ALBUMIN SERPL-MCNC: 3.6 G/DL (ref 3.5–5.2)
ALP SERPL-CCNC: 377 U/L (ref 40–129)
ALT SERPL-CCNC: 36 U/L (ref 5–41)
AMMONIA PLAS-SCNC: 152 UMOL/L (ref 16–60)
ANION GAP SERPL CALCULATED.3IONS-SCNC: 12 MMOL/L (ref 9–17)
AST SERPL-CCNC: 189 U/L
BASOPHILS # BLD: 0.19 K/UL (ref 0–0.2)
BASOPHILS NFR BLD: 2 % (ref 0–2)
BILIRUB DIRECT SERPL-MCNC: 2.1 MG/DL
BILIRUB INDIRECT SERPL-MCNC: 3.4 MG/DL (ref 0–1)
BILIRUB SERPL-MCNC: 5.5 MG/DL (ref 0.3–1.2)
BUN SERPL-MCNC: 10 MG/DL (ref 6–20)
CALCIUM SERPL-MCNC: 8.8 MG/DL (ref 8.6–10.4)
CHLORIDE SERPL-SCNC: 101 MMOL/L (ref 98–107)
CO2 SERPL-SCNC: 22 MMOL/L (ref 20–31)
CREAT SERPL-MCNC: 0.8 MG/DL (ref 0.7–1.2)
EOSINOPHIL # BLD: 0.29 K/UL (ref 0–0.4)
EOSINOPHILS RELATIVE PERCENT: 3 % (ref 0–4)
ERYTHROCYTE [DISTWIDTH] IN BLOOD BY AUTOMATED COUNT: 19.1 % (ref 11.5–14.9)
ETHANOL PERCENT: 0.08 %
ETHANOLAMINE SERPL-MCNC: 79 MG/DL (ref 0–0.08)
GFR, ESTIMATED: >90 ML/MIN/1.73M2
GLUCOSE SERPL-MCNC: 119 MG/DL (ref 70–99)
HCT VFR BLD AUTO: 35.3 % (ref 41–53)
HGB BLD-MCNC: 12.4 G/DL (ref 13.5–17.5)
INR PPP: 1.4
LIPASE SERPL-CCNC: 32 U/L (ref 13–60)
LYMPHOCYTES NFR BLD: 1.54 K/UL (ref 1–4.8)
LYMPHOCYTES RELATIVE PERCENT: 16 % (ref 24–44)
MAGNESIUM SERPL-MCNC: 1.6 MG/DL (ref 1.6–2.6)
MCH RBC QN AUTO: 40.2 PG (ref 26–34)
MCHC RBC AUTO-ENTMCNC: 35.1 G/DL (ref 31–37)
MCV RBC AUTO: 114.5 FL (ref 80–100)
MONOCYTES NFR BLD: 0.58 K/UL (ref 0.1–1.3)
MONOCYTES NFR BLD: 6 % (ref 1–7)
MORPHOLOGY: ABNORMAL
MORPHOLOGY: ABNORMAL
NEUTROPHILS NFR BLD: 73 % (ref 36–66)
NEUTS SEG NFR BLD: 7 K/UL (ref 1.3–9.1)
PARTIAL THROMBOPLASTIN TIME: 35.6 SEC (ref 24–36)
PHOSPHATE SERPL-MCNC: 2.7 MG/DL (ref 2.5–4.5)
PLATELET # BLD AUTO: 73 K/UL (ref 150–450)
PMV BLD AUTO: 8.4 FL (ref 6–12)
POTASSIUM SERPL-SCNC: 3.9 MMOL/L (ref 3.7–5.3)
PROT SERPL-MCNC: 9.2 G/DL (ref 6.4–8.3)
PROTHROMBIN TIME: 17.3 SEC (ref 11.8–14.6)
RBC # BLD AUTO: 3.08 M/UL (ref 4.5–5.9)
SODIUM SERPL-SCNC: 135 MMOL/L (ref 135–144)
TROPONIN I SERPL HS-MCNC: 14 NG/L (ref 0–22)
TROPONIN I SERPL HS-MCNC: 16 NG/L (ref 0–22)
WBC OTHER # BLD: 9.6 K/UL (ref 3.5–11)

## 2024-09-30 PROCEDURE — 6360000002 HC RX W HCPCS: Performed by: INTERNAL MEDICINE

## 2024-09-30 PROCEDURE — 93005 ELECTROCARDIOGRAM TRACING: CPT | Performed by: EMERGENCY MEDICINE

## 2024-09-30 PROCEDURE — 99223 1ST HOSP IP/OBS HIGH 75: CPT | Performed by: INTERNAL MEDICINE

## 2024-09-30 PROCEDURE — 6370000000 HC RX 637 (ALT 250 FOR IP): Performed by: INTERNAL MEDICINE

## 2024-09-30 PROCEDURE — 83690 ASSAY OF LIPASE: CPT

## 2024-09-30 PROCEDURE — 85610 PROTHROMBIN TIME: CPT

## 2024-09-30 PROCEDURE — 84100 ASSAY OF PHOSPHORUS: CPT

## 2024-09-30 PROCEDURE — G0480 DRUG TEST DEF 1-7 CLASSES: HCPCS

## 2024-09-30 PROCEDURE — 71260 CT THORAX DX C+: CPT

## 2024-09-30 PROCEDURE — 2060000000 HC ICU INTERMEDIATE R&B

## 2024-09-30 PROCEDURE — 80076 HEPATIC FUNCTION PANEL: CPT

## 2024-09-30 PROCEDURE — 99285 EMERGENCY DEPT VISIT HI MDM: CPT

## 2024-09-30 PROCEDURE — 84484 ASSAY OF TROPONIN QUANT: CPT

## 2024-09-30 PROCEDURE — 71045 X-RAY EXAM CHEST 1 VIEW: CPT

## 2024-09-30 PROCEDURE — 85025 COMPLETE CBC W/AUTO DIFF WBC: CPT

## 2024-09-30 PROCEDURE — 83735 ASSAY OF MAGNESIUM: CPT

## 2024-09-30 PROCEDURE — 36415 COLL VENOUS BLD VENIPUNCTURE: CPT

## 2024-09-30 PROCEDURE — 85730 THROMBOPLASTIN TIME PARTIAL: CPT

## 2024-09-30 PROCEDURE — 80048 BASIC METABOLIC PNL TOTAL CA: CPT

## 2024-09-30 PROCEDURE — 2580000003 HC RX 258: Performed by: EMERGENCY MEDICINE

## 2024-09-30 PROCEDURE — 6370000000 HC RX 637 (ALT 250 FOR IP): Performed by: EMERGENCY MEDICINE

## 2024-09-30 PROCEDURE — 6360000004 HC RX CONTRAST MEDICATION: Performed by: EMERGENCY MEDICINE

## 2024-09-30 PROCEDURE — 96375 TX/PRO/DX INJ NEW DRUG ADDON: CPT

## 2024-09-30 PROCEDURE — 96374 THER/PROPH/DIAG INJ IV PUSH: CPT

## 2024-09-30 PROCEDURE — 82140 ASSAY OF AMMONIA: CPT

## 2024-09-30 PROCEDURE — 6360000002 HC RX W HCPCS: Performed by: EMERGENCY MEDICINE

## 2024-09-30 RX ORDER — LACTULOSE 10 G/15ML
20 SOLUTION ORAL ONCE
Status: COMPLETED | OUTPATIENT
Start: 2024-09-30 | End: 2024-09-30

## 2024-09-30 RX ORDER — ONDANSETRON 2 MG/ML
4 INJECTION INTRAMUSCULAR; INTRAVENOUS EVERY 6 HOURS PRN
Status: DISCONTINUED | OUTPATIENT
Start: 2024-09-30 | End: 2024-10-01 | Stop reason: HOSPADM

## 2024-09-30 RX ORDER — GAUZE BANDAGE 2" X 2"
100 BANDAGE TOPICAL DAILY
Status: DISCONTINUED | OUTPATIENT
Start: 2024-09-30 | End: 2024-10-01 | Stop reason: HOSPADM

## 2024-09-30 RX ORDER — DULOXETIN HYDROCHLORIDE 30 MG/1
30 CAPSULE, DELAYED RELEASE ORAL DAILY
Status: DISCONTINUED | OUTPATIENT
Start: 2024-09-30 | End: 2024-10-01 | Stop reason: HOSPADM

## 2024-09-30 RX ORDER — 0.9 % SODIUM CHLORIDE 0.9 %
100 INTRAVENOUS SOLUTION INTRAVENOUS ONCE
Status: COMPLETED | OUTPATIENT
Start: 2024-09-30 | End: 2024-09-30

## 2024-09-30 RX ORDER — SODIUM CHLORIDE 0.9 % (FLUSH) 0.9 %
5-40 SYRINGE (ML) INJECTION EVERY 12 HOURS SCHEDULED
Status: DISCONTINUED | OUTPATIENT
Start: 2024-09-30 | End: 2024-10-01 | Stop reason: HOSPADM

## 2024-09-30 RX ORDER — POLYETHYLENE GLYCOL 3350 17 G/17G
17 POWDER, FOR SOLUTION ORAL DAILY PRN
Status: DISCONTINUED | OUTPATIENT
Start: 2024-09-30 | End: 2024-10-01 | Stop reason: HOSPADM

## 2024-09-30 RX ORDER — 0.9 % SODIUM CHLORIDE 0.9 %
1000 INTRAVENOUS SOLUTION INTRAVENOUS ONCE
Status: COMPLETED | OUTPATIENT
Start: 2024-09-30 | End: 2024-09-30

## 2024-09-30 RX ORDER — SODIUM CHLORIDE 0.9 % (FLUSH) 0.9 %
5-40 SYRINGE (ML) INJECTION PRN
Status: DISCONTINUED | OUTPATIENT
Start: 2024-09-30 | End: 2024-10-01 | Stop reason: HOSPADM

## 2024-09-30 RX ORDER — LORAZEPAM 1 MG/1
3 TABLET ORAL
Status: DISCONTINUED | OUTPATIENT
Start: 2024-09-30 | End: 2024-10-01 | Stop reason: HOSPADM

## 2024-09-30 RX ORDER — GAUZE BANDAGE 2" X 2"
100 BANDAGE TOPICAL DAILY
Status: DISCONTINUED | OUTPATIENT
Start: 2024-09-30 | End: 2024-09-30 | Stop reason: SDUPTHER

## 2024-09-30 RX ORDER — LACTULOSE 10 G/15ML
20 SOLUTION ORAL EVERY 6 HOURS
Status: DISCONTINUED | OUTPATIENT
Start: 2024-09-30 | End: 2024-10-01 | Stop reason: HOSPADM

## 2024-09-30 RX ORDER — ENOXAPARIN SODIUM 100 MG/ML
40 INJECTION SUBCUTANEOUS DAILY
Status: DISCONTINUED | OUTPATIENT
Start: 2024-09-30 | End: 2024-10-01 | Stop reason: HOSPADM

## 2024-09-30 RX ORDER — ACETAMINOPHEN 325 MG/1
650 TABLET ORAL EVERY 6 HOURS PRN
Status: DISCONTINUED | OUTPATIENT
Start: 2024-09-30 | End: 2024-10-01 | Stop reason: HOSPADM

## 2024-09-30 RX ORDER — LORAZEPAM 2 MG/ML
3 INJECTION INTRAMUSCULAR
Status: DISCONTINUED | OUTPATIENT
Start: 2024-09-30 | End: 2024-10-01 | Stop reason: HOSPADM

## 2024-09-30 RX ORDER — MAGNESIUM SULFATE HEPTAHYDRATE 40 MG/ML
2000 INJECTION, SOLUTION INTRAVENOUS PRN
Status: DISCONTINUED | OUTPATIENT
Start: 2024-09-30 | End: 2024-10-01 | Stop reason: HOSPADM

## 2024-09-30 RX ORDER — GABAPENTIN 100 MG/1
100 CAPSULE ORAL 2 TIMES DAILY
Status: DISCONTINUED | OUTPATIENT
Start: 2024-09-30 | End: 2024-10-01

## 2024-09-30 RX ORDER — LORAZEPAM 1 MG/1
4 TABLET ORAL
Status: DISCONTINUED | OUTPATIENT
Start: 2024-09-30 | End: 2024-10-01 | Stop reason: HOSPADM

## 2024-09-30 RX ORDER — SODIUM CHLORIDE 0.9 % (FLUSH) 0.9 %
10 SYRINGE (ML) INJECTION PRN
Status: DISCONTINUED | OUTPATIENT
Start: 2024-09-30 | End: 2024-10-01 | Stop reason: HOSPADM

## 2024-09-30 RX ORDER — POTASSIUM CHLORIDE 7.45 MG/ML
10 INJECTION INTRAVENOUS PRN
Status: DISCONTINUED | OUTPATIENT
Start: 2024-09-30 | End: 2024-10-01 | Stop reason: HOSPADM

## 2024-09-30 RX ORDER — PANTOPRAZOLE SODIUM 40 MG/1
40 TABLET, DELAYED RELEASE ORAL
Status: DISCONTINUED | OUTPATIENT
Start: 2024-10-01 | End: 2024-10-01 | Stop reason: HOSPADM

## 2024-09-30 RX ORDER — CETIRIZINE HYDROCHLORIDE 10 MG/1
5 TABLET ORAL DAILY PRN
Status: DISCONTINUED | OUTPATIENT
Start: 2024-09-30 | End: 2024-10-01 | Stop reason: HOSPADM

## 2024-09-30 RX ORDER — LORAZEPAM 1 MG/1
1 TABLET ORAL
Status: DISCONTINUED | OUTPATIENT
Start: 2024-09-30 | End: 2024-10-01 | Stop reason: HOSPADM

## 2024-09-30 RX ORDER — MIDODRINE HYDROCHLORIDE 2.5 MG/1
2.5 TABLET ORAL
Status: DISCONTINUED | OUTPATIENT
Start: 2024-09-30 | End: 2024-10-01 | Stop reason: HOSPADM

## 2024-09-30 RX ORDER — LORAZEPAM 2 MG/ML
4 INJECTION INTRAMUSCULAR
Status: DISCONTINUED | OUTPATIENT
Start: 2024-09-30 | End: 2024-10-01 | Stop reason: HOSPADM

## 2024-09-30 RX ORDER — POTASSIUM CHLORIDE 1500 MG/1
40 TABLET, EXTENDED RELEASE ORAL PRN
Status: DISCONTINUED | OUTPATIENT
Start: 2024-09-30 | End: 2024-10-01 | Stop reason: HOSPADM

## 2024-09-30 RX ORDER — NADOLOL 20 MG/1
20 TABLET ORAL DAILY
Status: DISCONTINUED | OUTPATIENT
Start: 2024-09-30 | End: 2024-10-01 | Stop reason: HOSPADM

## 2024-09-30 RX ORDER — SODIUM CHLORIDE 9 MG/ML
INJECTION, SOLUTION INTRAVENOUS PRN
Status: DISCONTINUED | OUTPATIENT
Start: 2024-09-30 | End: 2024-10-01 | Stop reason: HOSPADM

## 2024-09-30 RX ORDER — SPIRONOLACTONE 50 MG/1
50 TABLET, FILM COATED ORAL DAILY
COMMUNITY

## 2024-09-30 RX ORDER — LORAZEPAM 2 MG/ML
1 INJECTION INTRAMUSCULAR
Status: DISCONTINUED | OUTPATIENT
Start: 2024-09-30 | End: 2024-10-01 | Stop reason: HOSPADM

## 2024-09-30 RX ORDER — IOPAMIDOL 755 MG/ML
75 INJECTION, SOLUTION INTRAVASCULAR
Status: COMPLETED | OUTPATIENT
Start: 2024-09-30 | End: 2024-09-30

## 2024-09-30 RX ORDER — POLYETHYLENE GLYCOL 3350 17 G/17G
17 POWDER, FOR SOLUTION ORAL DAILY PRN
COMMUNITY

## 2024-09-30 RX ORDER — ONDANSETRON 4 MG/1
4 TABLET, ORALLY DISINTEGRATING ORAL EVERY 8 HOURS PRN
Status: DISCONTINUED | OUTPATIENT
Start: 2024-09-30 | End: 2024-10-01 | Stop reason: HOSPADM

## 2024-09-30 RX ORDER — FUROSEMIDE 20 MG
20 TABLET ORAL DAILY
COMMUNITY

## 2024-09-30 RX ORDER — FOLIC ACID 1 MG/1
1 TABLET ORAL DAILY
Status: DISCONTINUED | OUTPATIENT
Start: 2024-09-30 | End: 2024-10-01 | Stop reason: HOSPADM

## 2024-09-30 RX ORDER — LORAZEPAM 1 MG/1
2 TABLET ORAL
Status: DISCONTINUED | OUTPATIENT
Start: 2024-09-30 | End: 2024-10-01 | Stop reason: HOSPADM

## 2024-09-30 RX ORDER — ONDANSETRON 2 MG/ML
4 INJECTION INTRAMUSCULAR; INTRAVENOUS ONCE
Status: COMPLETED | OUTPATIENT
Start: 2024-09-30 | End: 2024-09-30

## 2024-09-30 RX ORDER — LORAZEPAM 2 MG/ML
2 INJECTION INTRAMUSCULAR
Status: DISCONTINUED | OUTPATIENT
Start: 2024-09-30 | End: 2024-10-01 | Stop reason: HOSPADM

## 2024-09-30 RX ORDER — ACETAMINOPHEN 650 MG/1
650 SUPPOSITORY RECTAL EVERY 6 HOURS PRN
Status: DISCONTINUED | OUTPATIENT
Start: 2024-09-30 | End: 2024-10-01 | Stop reason: HOSPADM

## 2024-09-30 RX ADMIN — LORAZEPAM 1 MG: 2 INJECTION INTRAMUSCULAR; INTRAVENOUS at 15:48

## 2024-09-30 RX ADMIN — LACTULOSE 20 G: 20 SOLUTION ORAL at 11:11

## 2024-09-30 RX ADMIN — THIAMINE HCL TAB 100 MG 100 MG: 100 TAB at 08:42

## 2024-09-30 RX ADMIN — LORAZEPAM 1 MG: 2 INJECTION INTRAMUSCULAR; INTRAVENOUS at 20:39

## 2024-09-30 RX ADMIN — FOLIC ACID 1 MG: 1 TABLET ORAL at 17:43

## 2024-09-30 RX ADMIN — ONDANSETRON 4 MG: 2 INJECTION INTRAMUSCULAR; INTRAVENOUS at 20:39

## 2024-09-30 RX ADMIN — SODIUM CHLORIDE 100 ML: 9 INJECTION, SOLUTION INTRAVENOUS at 09:24

## 2024-09-30 RX ADMIN — SODIUM CHLORIDE 1000 ML: 9 INJECTION, SOLUTION INTRAVENOUS at 08:38

## 2024-09-30 RX ADMIN — LACTULOSE 20 G: 20 SOLUTION ORAL at 17:43

## 2024-09-30 RX ADMIN — MIDODRINE HYDROCHLORIDE 2.5 MG: 2.5 TABLET ORAL at 17:43

## 2024-09-30 RX ADMIN — GABAPENTIN 100 MG: 100 CAPSULE ORAL at 20:39

## 2024-09-30 RX ADMIN — DULOXETINE HYDROCHLORIDE 30 MG: 30 CAPSULE, DELAYED RELEASE ORAL at 17:43

## 2024-09-30 RX ADMIN — SODIUM CHLORIDE, PRESERVATIVE FREE 10 ML: 5 INJECTION INTRAVENOUS at 20:41

## 2024-09-30 RX ADMIN — ONDANSETRON 4 MG: 2 INJECTION, SOLUTION INTRAMUSCULAR; INTRAVENOUS at 08:35

## 2024-09-30 RX ADMIN — IOPAMIDOL 75 ML: 755 INJECTION, SOLUTION INTRAVENOUS at 09:23

## 2024-09-30 RX ADMIN — NADOLOL 20 MG: 20 TABLET ORAL at 18:28

## 2024-09-30 RX ADMIN — LORAZEPAM 2 MG: 2 INJECTION INTRAMUSCULAR; INTRAVENOUS at 17:41

## 2024-09-30 RX ADMIN — ONDANSETRON 4 MG: 2 INJECTION INTRAMUSCULAR; INTRAVENOUS at 15:47

## 2024-09-30 RX ADMIN — THIAMINE HCL TAB 100 MG 100 MG: 100 TAB at 17:43

## 2024-09-30 RX ADMIN — LORAZEPAM 2 MG: 2 INJECTION INTRAMUSCULAR; INTRAVENOUS at 23:57

## 2024-09-30 RX ADMIN — SODIUM CHLORIDE, PRESERVATIVE FREE 10 ML: 5 INJECTION INTRAVENOUS at 09:23

## 2024-09-30 RX ADMIN — SODIUM CHLORIDE, PRESERVATIVE FREE 10 ML: 5 INJECTION INTRAVENOUS at 17:43

## 2024-09-30 RX ADMIN — LORAZEPAM 2 MG: 2 INJECTION INTRAMUSCULAR; INTRAVENOUS at 08:35

## 2024-09-30 ASSESSMENT — ENCOUNTER SYMPTOMS
TROUBLE SWALLOWING: 0
SHORTNESS OF BREATH: 0
COUGH: 0
DIARRHEA: 0
COLOR CHANGE: 0
ABDOMINAL PAIN: 0
NAUSEA: 1
PHOTOPHOBIA: 0
VOMITING: 1

## 2024-09-30 ASSESSMENT — PAIN SCALES - GENERAL
PAINLEVEL_OUTOF10: 6
PAINLEVEL_OUTOF10: 6

## 2024-09-30 ASSESSMENT — PAIN - FUNCTIONAL ASSESSMENT: PAIN_FUNCTIONAL_ASSESSMENT: 0-10

## 2024-09-30 ASSESSMENT — HEART SCORE: ECG: NORMAL

## 2024-09-30 NOTE — ED NOTES
Report given to , RN from [facility] .   Report method by phone   The following was reviewed with receiving RN:   Current vital signs:  /68   Pulse 53   Temp 98.2 °F (36.8 °C) (Oral)   Resp 15   Ht 1.727 m (5' 8\")   Wt 80.3 kg (177 lb)   SpO2 98%   BMI 26.91 kg/m²                MEWS Score: 1     Any medication or safety alerts were reviewed. Any pending diagnostics and notifications were also reviewed, as well as any safety concerns or issues, abnormal labs, abnormal imaging, and abnormal assessment findings. Questions were answered.

## 2024-09-30 NOTE — ED PROVIDER NOTES
EMERGENCY DEPARTMENT ENCOUNTER    Pt Name: Sam Emerson  MRN: [id number]  Birthdate 1985  Date of evaluation: 9/30/24  CHIEF COMPLAINT       Chief Complaint   Patient presents with    Chest Pain    Emesis     HISTORY OF PRESENT ILLNESS   39-year-old male presenting to the ER complaining of nausea vomiting and chest pain.  Patient states he is a daily drinker, drinking up to 5 pints daily of alcohol.  Patient states his last drink was just around 24 hours ago.  Patient is shaking in the extremities and is complaining of the chest pain currently.    The history is provided by the patient.   Chest Pain  Pain location:  Substernal area  Pain quality: aching    Associated symptoms: nausea and vomiting    Associated symptoms: no abdominal pain, no cough, no dizziness, no dysphagia, no fatigue, no fever, no palpitations and no shortness of breath            REVIEW OF SYSTEMS     Review of Systems   Constitutional:  Negative for activity change, fatigue and fever.   HENT:  Negative for congestion, ear pain and trouble swallowing.    Eyes:  Negative for photophobia and visual disturbance.   Respiratory:  Negative for cough and shortness of breath.    Cardiovascular:  Positive for chest pain. Negative for palpitations.   Gastrointestinal:  Positive for nausea and vomiting. Negative for abdominal pain and diarrhea.   Genitourinary:  Negative for dysuria, flank pain and urgency.   Musculoskeletal:  Negative for arthralgias and myalgias.   Skin:  Negative for color change and rash.   Neurological:  Positive for tremors. Negative for dizziness and facial asymmetry.   Psychiatric/Behavioral:  Negative for agitation and behavioral problems.      PASTMEDICAL HISTORY     Past Medical History:   Diagnosis Date    Alcoholism (HCC)     pt drinks a fifth of whiskey daily    Anemia     Cirrhosis, alcoholic (HCC)     Pulmonary embolism (HCC)      Past Problem List  Patient Active Problem List   Diagnosis Code    ACS (acute coronary

## 2024-09-30 NOTE — H&P
[facility]   IN-PATIENT SERVICE   [facility]    HISTORY AND PHYSICAL EXAMINATION            Date:   9/30/2024  Patient name:  Sam Emerson  Date of admission:  9/30/2024  8:05 AM  MRN:   [id number]  Account:  [id number]  YOB: 1985  PCP:    Rosie Montes De Oca MD  Room:   05/05  Code Status:    Prior    Chief Complaint:     Chief Complaint   Patient presents with    Chest Pain    Emesis       History Obtained From:     patient, electronic medical record    History of Present Illness:     The patient is a 39 y.o.  Non- / non  male who presents with Chest Pain and Emesis   and he is admitted to the hospital for the management of alcohol withdrawal  Patient, has past medical history multiple medical problems which include chronic alcoholism, cephalopathy, status post TIPS, history of esophageal varices in the past on nonselective beta-blockers, thrombocytopenia, chronic back pain  Patient unfortunately has multiple hospitalization in the past with hepatic encephalopathy  Drinks 4 pints a day, last drink was yesterday at 7:30 AM,, blood alcohol level was 79   Patient, presented to emergency room with multiple complaints, symptoms started today morning which include dizziness, headache, chest pain, palpitation, generalized fatigue  He told me that the symptoms are typical of his alcohol withdrawal  He is trying to quit drinking last drink was yesterday  Patient, is not eating drinking very well, although he mentioned that he is taking his lactulose but no stools yesterday  Ammonia is 152  In ED, patient has 2 sets of troponins is flat, EKG seen suggestive sinus tachycardia  Past Medical History:     Past Medical History:   Diagnosis Date    Alcoholism (HCC)     pt drinks a fifth of whiskey daily    Anemia     Cirrhosis, alcoholic (HCC)     Pulmonary embolism (HCC)         Past Surgical History:     Past Surgical History:   Procedure Laterality Date    COLONOSCOPY

## 2024-10-01 ENCOUNTER — APPOINTMENT (OUTPATIENT)
Dept: ULTRASOUND IMAGING | Age: 39
End: 2024-10-01
Payer: COMMERCIAL

## 2024-10-01 VITALS
TEMPERATURE: 98.7 F | RESPIRATION RATE: 17 BRPM | HEIGHT: 68 IN | BODY MASS INDEX: 26.83 KG/M2 | HEART RATE: 63 BPM | OXYGEN SATURATION: 98 % | WEIGHT: 177 LBS | SYSTOLIC BLOOD PRESSURE: 132 MMHG | DIASTOLIC BLOOD PRESSURE: 82 MMHG

## 2024-10-01 PROBLEM — F10.930 ALCOHOL WITHDRAWAL SYNDROME WITHOUT COMPLICATION (HCC): Status: ACTIVE | Noted: 2024-10-01

## 2024-10-01 PROBLEM — R07.9 CHEST PAIN: Status: ACTIVE | Noted: 2024-10-01

## 2024-10-01 LAB
AFP SERPL-MCNC: <1.8 UG/L
ALBUMIN SERPL-MCNC: 3.3 G/DL (ref 3.5–5.2)
ALP SERPL-CCNC: 311 U/L (ref 40–129)
ALT SERPL-CCNC: 26 U/L (ref 5–41)
AMMONIA PLAS-SCNC: 118 UMOL/L (ref 16–60)
ANION GAP SERPL CALCULATED.3IONS-SCNC: 11 MMOL/L (ref 9–17)
AST SERPL-CCNC: 121 U/L
BASOPHILS # BLD: 0.14 K/UL (ref 0–0.2)
BASOPHILS NFR BLD: 2 % (ref 0–2)
BILIRUB DIRECT SERPL-MCNC: 3 MG/DL
BILIRUB INDIRECT SERPL-MCNC: 4.2 MG/DL (ref 0–1)
BILIRUB SERPL-MCNC: 7.2 MG/DL (ref 0.3–1.2)
BUN SERPL-MCNC: 12 MG/DL (ref 6–20)
CALCIUM SERPL-MCNC: 8.7 MG/DL (ref 8.6–10.4)
CHLORIDE SERPL-SCNC: 102 MMOL/L (ref 98–107)
CO2 SERPL-SCNC: 23 MMOL/L (ref 20–31)
CREAT SERPL-MCNC: 0.7 MG/DL (ref 0.7–1.2)
EKG ATRIAL RATE: 55 BPM
EKG P AXIS: 33 DEGREES
EKG P-R INTERVAL: 124 MS
EKG Q-T INTERVAL: 404 MS
EKG QRS DURATION: 94 MS
EKG QTC CALCULATION (BAZETT): 386 MS
EKG R AXIS: 16 DEGREES
EKG T AXIS: 46 DEGREES
EKG VENTRICULAR RATE: 55 BPM
EOSINOPHIL # BLD: 0.21 K/UL (ref 0–0.4)
EOSINOPHILS RELATIVE PERCENT: 3 % (ref 0–4)
ERYTHROCYTE [DISTWIDTH] IN BLOOD BY AUTOMATED COUNT: 19.3 % (ref 11.5–14.9)
FOLATE SERPL-MCNC: 7.2 NG/ML (ref 4.8–24.2)
GFR, ESTIMATED: >90 ML/MIN/1.73M2
GLUCOSE SERPL-MCNC: 95 MG/DL (ref 70–99)
HCT VFR BLD AUTO: 32.3 % (ref 41–53)
HGB BLD-MCNC: 11.2 G/DL (ref 13.5–17.5)
INR PPP: 1.5
IRON SATN MFR SERPL: ABNORMAL % (ref 20–55)
IRON SERPL-MCNC: 188 UG/DL (ref 61–157)
LYMPHOCYTES NFR BLD: 1.12 K/UL (ref 1–4.8)
LYMPHOCYTES RELATIVE PERCENT: 16 % (ref 24–44)
MCH RBC QN AUTO: 40 PG (ref 26–34)
MCHC RBC AUTO-ENTMCNC: 34.7 G/DL (ref 31–37)
MCV RBC AUTO: 115.1 FL (ref 80–100)
MONOCYTES NFR BLD: 0.56 K/UL (ref 0.1–1.3)
MONOCYTES NFR BLD: 8 % (ref 1–7)
MORPHOLOGY: ABNORMAL
MORPHOLOGY: ABNORMAL
NEUTROPHILS NFR BLD: 71 % (ref 36–66)
NEUTS SEG NFR BLD: 4.97 K/UL (ref 1.3–9.1)
PLATELET # BLD AUTO: 42 K/UL (ref 150–450)
PMV BLD AUTO: 8.6 FL (ref 6–12)
POTASSIUM SERPL-SCNC: 3.9 MMOL/L (ref 3.7–5.3)
PROT SERPL-MCNC: 8.4 G/DL (ref 6.4–8.3)
PROTHROMBIN TIME: 18.7 SEC (ref 11.8–14.6)
RBC # BLD AUTO: 2.8 M/UL (ref 4.5–5.9)
SODIUM SERPL-SCNC: 136 MMOL/L (ref 135–144)
TIBC SERPL-MCNC: ABNORMAL UG/DL (ref 250–450)
UNSATURATED IRON BINDING CAPACITY: <17 UG/DL (ref 112–347)
VIT B12 SERPL-MCNC: 1134 PG/ML (ref 232–1245)
WBC OTHER # BLD: 7 K/UL (ref 3.5–11)

## 2024-10-01 PROCEDURE — 83550 IRON BINDING TEST: CPT

## 2024-10-01 PROCEDURE — 80048 BASIC METABOLIC PNL TOTAL CA: CPT

## 2024-10-01 PROCEDURE — 82140 ASSAY OF AMMONIA: CPT

## 2024-10-01 PROCEDURE — 6370000000 HC RX 637 (ALT 250 FOR IP): Performed by: INTERNAL MEDICINE

## 2024-10-01 PROCEDURE — 83540 ASSAY OF IRON: CPT

## 2024-10-01 PROCEDURE — 82607 VITAMIN B-12: CPT

## 2024-10-01 PROCEDURE — 80076 HEPATIC FUNCTION PANEL: CPT

## 2024-10-01 PROCEDURE — 82746 ASSAY OF FOLIC ACID SERUM: CPT

## 2024-10-01 PROCEDURE — 99254 IP/OBS CNSLTJ NEW/EST MOD 60: CPT | Performed by: INTERNAL MEDICINE

## 2024-10-01 PROCEDURE — 36415 COLL VENOUS BLD VENIPUNCTURE: CPT

## 2024-10-01 PROCEDURE — 82105 ALPHA-FETOPROTEIN SERUM: CPT

## 2024-10-01 PROCEDURE — 85025 COMPLETE CBC W/AUTO DIFF WBC: CPT

## 2024-10-01 PROCEDURE — 99232 SBSQ HOSP IP/OBS MODERATE 35: CPT | Performed by: INTERNAL MEDICINE

## 2024-10-01 PROCEDURE — 93010 ELECTROCARDIOGRAM REPORT: CPT | Performed by: INTERNAL MEDICINE

## 2024-10-01 PROCEDURE — 2580000003 HC RX 258: Performed by: EMERGENCY MEDICINE

## 2024-10-01 PROCEDURE — 85610 PROTHROMBIN TIME: CPT

## 2024-10-01 PROCEDURE — 2580000003 HC RX 258: Performed by: INTERNAL MEDICINE

## 2024-10-01 PROCEDURE — 6360000002 HC RX W HCPCS: Performed by: INTERNAL MEDICINE

## 2024-10-01 RX ORDER — GABAPENTIN 100 MG/1
100 CAPSULE ORAL NIGHTLY
Status: DISCONTINUED | OUTPATIENT
Start: 2024-10-02 | End: 2024-10-01 | Stop reason: HOSPADM

## 2024-10-01 RX ADMIN — DULOXETINE HYDROCHLORIDE 30 MG: 30 CAPSULE, DELAYED RELEASE ORAL at 11:08

## 2024-10-01 RX ADMIN — ACETAMINOPHEN 650 MG: 325 TABLET ORAL at 00:40

## 2024-10-01 RX ADMIN — NADOLOL 20 MG: 20 TABLET ORAL at 11:13

## 2024-10-01 RX ADMIN — LACTULOSE 20 G: 20 SOLUTION ORAL at 05:58

## 2024-10-01 RX ADMIN — THIAMINE HCL TAB 100 MG 100 MG: 100 TAB at 11:08

## 2024-10-01 RX ADMIN — Medication 10 ML: at 12:34

## 2024-10-01 RX ADMIN — GABAPENTIN 100 MG: 100 CAPSULE ORAL at 11:08

## 2024-10-01 RX ADMIN — FOLIC ACID 1 MG: 1 TABLET ORAL at 11:08

## 2024-10-01 RX ADMIN — SODIUM CHLORIDE, PRESERVATIVE FREE 10 ML: 5 INJECTION INTRAVENOUS at 11:14

## 2024-10-01 RX ADMIN — LACTULOSE 20 G: 20 SOLUTION ORAL at 11:07

## 2024-10-01 RX ADMIN — PANTOPRAZOLE SODIUM 40 MG: 40 TABLET, DELAYED RELEASE ORAL at 05:58

## 2024-10-01 RX ADMIN — ONDANSETRON 4 MG: 2 INJECTION INTRAMUSCULAR; INTRAVENOUS at 02:03

## 2024-10-01 RX ADMIN — LORAZEPAM 2 MG: 2 INJECTION INTRAMUSCULAR; INTRAVENOUS at 05:59

## 2024-10-01 ASSESSMENT — PAIN SCALES - GENERAL
PAINLEVEL_OUTOF10: 3
PAINLEVEL_OUTOF10: 0

## 2024-10-01 ASSESSMENT — PAIN DESCRIPTION - DESCRIPTORS: DESCRIPTORS: ACHING

## 2024-10-01 ASSESSMENT — PAIN DESCRIPTION - LOCATION: LOCATION: HEAD

## 2024-10-01 NOTE — CARE COORDINATION
.Case Management Assessment  Initial Evaluation    Date/Time of Evaluation: 10/1/2024 12:49 PM  Assessment Completed by: Bernadine Pelayo RN    If patient is discharged prior to next notation, then this note serves as note for discharge by case management.    Patient Name: Sam Emerson                   YOB: 1985  Diagnosis: Alcohol withdrawal delirium (HCC) [F10.931]  Hyperammonemia (HCC) [E72.20]  Alcohol withdrawal syndrome without complication (HCC) [F10.930]  Chest pain, unspecified type [R07.9]  Nausea and vomiting, unspecified vomiting type [R11.2]                   Date / Time: 9/30/2024  8:05 AM    Patient Admission Status: Inpatient   Readmission Risk (Low < 19, Mod (19-27), High > 27): Readmission Risk Score: 28.7    Current PCP: Rosie Montes De Oca MD  PCP verified by CM? Yes    Chart Reviewed: Yes      History Provided by: Patient  Patient Orientation: Alert and Oriented    Patient Cognition: Alert    Hospitalization in the last 30 days (Readmission):  No    If yes, Readmission Assessment in  Navigator will be completed.    Advance Directives:      Code Status: Full Code   Patient's Primary Decision Maker is: Legal Next of Kin    Primary Decision Maker: CHRISTOPHER GROSSMAN - Spouse - [phone]    Discharge Planning:    Patient lives with: Spouse/Significant Other Type of Home: House  Primary Care Giver: Self  Patient Support Systems include: Spouse/Significant Other   Current Financial resources: Medicaid  Current community resources:    Current services prior to admission: None            Current DME:              Type of Home Care services:  None    ADLS  Prior functional level: Independent in ADLs/IADLs  Current functional level: Independent in ADLs/IADLs    PT AM-PAC:   /24  OT AM-PAC:   /24    Family can provide assistance at DC: Yes  Would you like Case Management to discuss the discharge plan with any other family members/significant others, and if so, who? No  Plans to Return to

## 2024-10-01 NOTE — PROGRESS NOTES
Alex Fairchild (EMILY) notified of consult.  
Comprehensive Nutrition Assessment    Type and Reason for Visit:  Initial, Positive Nutrition Screen (Poor appetite, intake, and wt loss.)    Nutrition Recommendations/Plan:   Continue current diet.  Provide Ensure Plus High Protein twice daily.     Malnutrition Assessment:  Malnutrition Status:  No malnutrition (10/01/24 1451)    Context:  Chronic Illness     Findings of the 6 clinical characteristics of malnutrition:  Energy Intake:  Mild decrease in energy intake (Comment) (Decreased appetite)  Weight Loss:  Mild weight loss (specify amount and time period) (9% wt loss in 10 months: from 12/23/2023 with 88.1 kg (Bed) to today's date with 80.3 kg (Stated))     Body Fat Loss:  No significant body fat loss     Muscle Mass Loss:  No significant muscle mass loss    Fluid Accumulation:  No significant fluid accumulation     Strength:  Not Performed    Nutrition Assessment:    Pt admitted for alcohol withdrawal delirium, with PMH of alcoholic liver cirrhosis, polysubstance abuse. Pt reports poor appetite; Ensure Plus High Protein twice daily to be ordered this date.    Nutrition Related Findings:    No edema. BM 9/30. Lactulose. Labs reviewed. Wound Type: None       Current Nutrition Intake & Therapies:    Average Meal Intake: 26-50%     ADULT DIET; Regular; Low Sodium (2 gm); 1500 ml  ADULT ORAL NUTRITION SUPPLEMENT; Breakfast, Dinner; Standard High Calorie/High Protein Oral Supplement    Anthropometric Measures:  Height: 172.7 cm (5' 7.99\")  Ideal Body Weight (IBW): 154 lbs (70 kg)    Admission Body Weight: 80.3 kg (177 lb 0.5 oz)  Current Body Weight: 80.3 kg (177 lb 0.5 oz), 115 % IBW. Weight Source: Stated  Current BMI (kg/m2): 26.9  Usual Body Weight: 88.1 kg (194 lb 3.6 oz) (12/6/2023 (~10mo.) Bed)  % Weight Change (Calculated): -8.9  Weight Adjustment For: No Adjustment                 BMI Categories: Overweight (BMI 25.0-29.9)    Estimated Daily Nutrient Needs:  Energy Requirements Based On: Formula  Weight 
Declined U/s - patient stated he was leaving  
ER called up and informed RN that the patient came in looking for his keys. RN took the keys down to find the patient. RN did tell patient he should not be driving as he was having difficulty walking. He assured RN that he was going down to wait for a ride. RN walked down with him and he attempted to get in his truck but was unable to start it. RN called security and house supervisor to come and talk with patient as he did not appear to be safe to drive.     RN called a cab for the patient and he was instructed to wait in the ER waiting room. RN also called and updated his wife at home.     Shantel schaffer RN called down to the ER to see if they could notify him that a cab was coming and he was not there. Writer called security to let them know that he was no longer in the waiting room. They stated that the were not watching him.     RN did call down to ER and the patient was not there. They were unsure if he had gotten into the cab or not. RN updated his wife.   
[facility]   OCCUPATIONAL THERAPY MISSED TREATMENT NOTE   INPATIENT   Date: 10/1/24  Patient Name: Sam Emerson       Room:   MRN: [id number]   Account #: [id number]    : 1985  (39 y.o.)  Gender: male                 REASON FOR MISSED TREATMENT:  Patient screened for occupational therapy this date    -    Writer introduced self and role of OT. Patient demonstrated independent functional mobility to/from doorway. Patient reports he has been up and to the bathroom on his own. Patient denies any concerns or deficits with self-care tasks. OT being discontinued with no acute OT needs at this time. Please re-order if there is a change in status.[id number]        Electronically signed by HARRIET Oneil on 10/1/24 at 2:15 PM EDT   
Patient arrived from ED via wheelchair. Patient acclimated to room and call light. Telemetry applied and strip printed. Bed in low locked position with 2 rails up. Seizure precautions initiated.   
Pharmacy Medication History Note      List of current medications patient is taking is complete.    Source of information: Patient, Sure Scripts, Care Everywhere, [facility]ARRS    Changes made to medication list:  Medications removed (include reason, ex. therapy complete or physician discontinued, noncompliance):  Naproxen 500 mg - Discontinued per Dr. Olvera    Medications flagged for provider review:  Nicotine patch - Patient reports not taking    Medications added/doses adjusted:  Potassium - Patient reports taking this  Spironolactone 50 mg - Patient reports taking this  Glycolax - Patient reports taking this  Furosemide 20 mg - Patient reports taking this     Other notes (ex. Recent course of antibiotics, Coumadin dosing):  Patient was recently seen in the ED 9/12 for alcohol withdrawal symptoms.    The patient received a short term supply of augmentin on 9/3 for a 10 day supply, quantity of 20.  Per OARRS report, gabapentin 100 mg filled 9/17 for a quantity of 30 for a 30 day supply. According to the progress note by Dr. Montes De Oca on 9/17, the gabapentin 100 mg dose was increased from once daily to twice daily for better pain relief. Following this, the patient was unable to tolerate the increased dose and became too sleepy. Telephone encounter with Dr. Montes De Oca on 9/20 informed patient to return to a once nightly dose.   Patient does report cannabis use.      Current Home Medication List at Time of Admission:  Prior to Admission medications    Medication Sig   spironolactone (ALDACTONE) 50 MG tablet Take 1 tablet by mouth daily   polyethylene glycol (GLYCOLAX) 17 g packet Take 1 packet by mouth daily as needed for Constipation   Potassium 99 MG TABS Take 1 tablet by mouth daily   DULoxetine (CYMBALTA) 30 MG extended release capsule Take 1 capsule by mouth daily   tiZANidine (ZANAFLEX) 2 MG tablet Take 1 tablet by mouth 3 times daily as needed (muscle pain)   ondansetron (ZOFRAN) 4 MG tablet Take 1 tablet by mouth 
Physical Therapy        Physical Therapy Cancel Note      DATE: 10/1/2024    NAME: Sam Emerson  MRN: [id number]   : 1985      Patient not seen this date for Physical Therapy due to:    Patient independent with functional mobility. Will defer PT evaluation at this time. Please reorder PT if future needs arise.       Electronically signed by Carlotta Michelle PT on 10/1/2024 at 3:02 PM     
Pt appeared to be very sleepy.  Accepted presence of  but did not have any requests.  advised spiritual care staff is available if needed.   09/30/24 1730   Encounter Summary   Encounter Overview/Reason Initial Encounter   Service Provided For Patient   Referral/Consult From Rounding   Support System Unknown   Last Encounter  09/30/24   Complexity of Encounter Low   Begin Time 1615   End Time  1617   Total Time Calculated 2 min   Assessment/Intervention/Outcome   Assessment Unable to assess   Intervention Sustaining Presence/Ministry of presence   Outcome Other (comment)  (visit as needed)       
Pt woke from nap and stated he was ready to go and didn't think he needed to be here anymore. Writer attempted to educate pt about concerns for leaving AMA. Pt was unconvinced and stated he was leaving. Dr Moncada was notified and okayed patient to leave AMA. Pt is alert and oriented. Pt denies chest pain and SOB. Pt is mildly unsteady while ambulating, but stated he is okay to go. AMA paperwork reviewed and signed.  
RN saw the patient fully dressed and walking toward the door to leave AMA. RN stopped patient and requested that he return to the room to have his IV removed. Patient returned to the room and IV was removed. Patient signed the AMA form. Dr. Moncada aware. RN walked the patient down to wait for his ride.   
last 72 hours.  No intake or output data in the 24 hours ending 10/01/24 1247    General Appearance:  alert, well appearing, and in no acute distress  Mental status: oriented to person, place, and time with normal affect  Head:  normocephalic, atraumatic.  Eye: Scleral icterus present  Ear: normal external ear, no discharge, hearing intact  Nose:  no drainage noted  Mouth: mucous membranes moist  Neck: supple, no carotid bruits, thyroid not palpable  Lungs: Bilateral equal air entry, clear to ausculation, no wheezing, rales or rhonchi, normal effort  Cardiovascular: normal rate, regular rhythm, no murmur, gallop, rub.  Abdomen: Soft, nontender, nondistended, normal bowel sounds, no hepatomegaly or splenomegaly  Neurologic: There are no new focal motor or sensory deficits, normal muscle tone and bulk, no abnormal sensation, normal speech, cranial nerves II through XII grossly intact  Skin: No gross lesions, rashes, bruising or bleeding on exposed skin area  Extremities:  peripheral pulses palpable, no pedal edema or calf pain with palpation  Psych: normal affect     Investigations:      Laboratory Testing:  Recent Results (from the past 24 hour(s))   Basic Metabolic Panel w/ Reflex to MG    Collection Time: 10/01/24  5:12 AM   Result Value Ref Range    Sodium 136 135 - 144 mmol/L    Potassium 3.9 3.7 - 5.3 mmol/L    Chloride 102 98 - 107 mmol/L    CO2 23 20 - 31 mmol/L    Anion Gap 11 9 - 17 mmol/L    Glucose 95 70 - 99 mg/dL    BUN 12 6 - 20 mg/dL    Creatinine 0.7 0.7 - 1.2 mg/dL    Est, Glom Filt Rate >90 >60 mL/min/1.73m2    Calcium 8.7 8.6 - 10.4 mg/dL   CBC with Auto Differential    Collection Time: 10/01/24  5:12 AM   Result Value Ref Range    WBC 7.0 3.5 - 11.0 k/uL    RBC 2.80 (L) 4.5 - 5.9 m/uL    Hemoglobin 11.2 (L) 13.5 - 17.5 g/dL    Hematocrit 32.3 (L) 41 - 53 %    .1 (H) 80 - 100 fL    MCH 40.0 (H) 26 - 34 pg    MCHC 34.7 31 - 37 g/dL    RDW 19.3 (H) 11.5 - 14.9 %    Platelets 42 (L) 150 - 450

## 2024-10-01 NOTE — PLAN OF CARE
Problem: Chronic Conditions and Co-morbidities  Goal: Patient's chronic conditions and co-morbidity symptoms are monitored and maintained or improved  Outcome: Progressing     Problem: Discharge Planning  Goal: Discharge to home or other facility with appropriate resources  Outcome: Progressing     Problem: Pain  Goal: Verbalizes/displays adequate comfort level or baseline comfort level  Outcome: Progressing     Problem: Safety - Adult  Goal: Free from fall injury  Outcome: Progressing     Problem: Neurosensory - Adult  Goal: Achieves stable or improved neurological status  Outcome: Progressing  Goal: Absence of seizures  Outcome: Progressing  Goal: Achieves maximal functionality and self care  Outcome: Progressing     Problem: Respiratory - Adult  Goal: Achieves optimal ventilation and oxygenation  Outcome: Progressing     Problem: Cardiovascular - Adult  Goal: Maintains optimal cardiac output and hemodynamic stability  Outcome: Progressing     Problem: Skin/Tissue Integrity - Adult  Goal: Skin integrity remains intact  Outcome: Progressing     Problem: Musculoskeletal - Adult  Goal: Return mobility to safest level of function  Outcome: Progressing     Problem: Gastrointestinal - Adult  Goal: Minimal or absence of nausea and vomiting  Outcome: Progressing     Problem: Genitourinary - Adult  Goal: Absence of urinary retention  Outcome: Progressing     Problem: Infection - Adult  Goal: Absence of infection at discharge  Outcome: Progressing     Problem: Metabolic/Fluid and Electrolytes - Adult  Goal: Electrolytes maintained within normal limits  Outcome: Progressing     Problem: Hematologic - Adult  Goal: Maintains hematologic stability  Outcome: Progressing

## 2024-10-01 NOTE — CONSULTS
hours.    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, EV s/p banding, ascites, encephalopathy, noncompliance  -No encephalopathy noted today  -Patient was intoxicated upon arrival  Alcohol misuse disorder-patient is a daily drinker  History of alcoholic hepatitis  History of alcoholic pancreatitis  History of noncompliance  History of SBP    MELD 3.0: 18 at 10/1/2024  1:02 PM  MELD-Na: 18 at 10/1/2024  1:02 PM  Calculated from:  Serum Creatinine: 0.7 mg/dL (Using min of 1 mg/dL) at 10/1/2024  5:12 AM  Serum Sodium: 136 mmol/L at 10/1/2024  5:12 AM  Total Bilirubin: 5.5 mg/dL at 9/30/2024  8:24 AM  Serum Albumin: 3.6 g/dL (Using max of 3.5 g/dL) at 9/30/2024  8:24 AM  INR(ratio): 1.5 at 10/1/2024  1:02 PM  Age at listing (hypothetical): 39 years  Sex: Male at 10/1/2024  1:02 PM    -Per chart review patient has been evaluated at [facility] for consideration of liver transplant but at that time he was still drinking as he is now on this admission.     Recommendations and plan:    LFTs do not follow pattern for alcoholic hepatitis.  Lipase was normal  Recommend continuing lactulose-titrate to 3-4 bowel movements daily  Xifaxan 400 mg 3 times daily for encephalopathy  Low-sodium high-protein diet with supplements 3 times daily  Daily weights  Aspiration and fall precautions  Daily labs including CBC CMP INR.  Will order stat LFTs today, AFP ordered  Liver ultrasound with Doppler to assess TIPS patency, assess for ascites,

## 2024-10-02 ENCOUNTER — CARE COORDINATION (OUTPATIENT)
Dept: CARE COORDINATION | Age: 39
End: 2024-10-02

## 2024-10-02 NOTE — CARE COORDINATION
Care Transitions Note    Initial Call #1 attempt - Call within 2 business days of discharge: Yes    Attempted to reach patient for transitions of care follow up.   Unable to reach patient.  Routed to PCP clinical pool for appt within 7 days - readmission    Outreach Attempts: #1  HIPAA compliant voicemail left for patient.     Patient: Sam Emerson      Patient : 1985   MRN: [id number]      Reason for Admission: ETOH delirium, chest pain, emesis  Discharge Date: 10/1/24    RURS: Readmission Risk Score: 29  -10/1 [facility]  readmission for  ETOH, hx hepatic encephalopathy, esophageal varices - started drinking again    Last Discharge Facility       Date Complaint Diagnosis Description Type Department Provider    24 Chest Pain; Emesis Chest pain, unspecified type ... ED to Hosp-Admission (Discharged) (ADMITTED) [facility] Jose Luis Dunne MD; Bennie Frederick...            Was this an external facility discharge? No    Follow Up Appointment:   Patient has hospital follow up appointment scheduled within 14 days of discharge.  Routed to see if can be seen sooner  Future Appointments         Provider Specialty Dept Phone    10/15/2024 2:15 PM Rosie Montes De Oca MD Internal Medicine [phone]            Plan for follow-up on next business day.      Beryl Ellis RN

## 2024-10-03 ENCOUNTER — CARE COORDINATION (OUTPATIENT)
Dept: CARE COORDINATION | Age: 39
End: 2024-10-03

## 2024-10-03 DIAGNOSIS — F10.930 ALCOHOL WITHDRAWAL SYNDROME WITHOUT COMPLICATION (HCC): Primary | ICD-10-CM

## 2024-10-03 DIAGNOSIS — K70.31 ALCOHOLIC CIRRHOSIS OF LIVER WITH ASCITES (HCC): ICD-10-CM

## 2024-10-03 RX ORDER — TIZANIDINE 2 MG/1
2 TABLET ORAL 3 TIMES DAILY PRN
Qty: 30 TABLET | Refills: 0 | Status: SHIPPED | OUTPATIENT
Start: 2024-10-03 | End: 2024-11-02

## 2024-10-03 NOTE — CARE COORDINATION
Care Transitions Note    Initial Call - Call within 2 business days of discharge: Yes  LVM on patient's contact #, but reached Sydney, spouse (HIPAA)    Patient Current Location:  Home: [address] [place] [address]    Care Transition Nurse contacted the spouse/partner  by telephone to perform post hospital discharge assessment, verified name and  as identifiers. Provided introduction to self, and explanation of the Care Transition Nurse role.     Patient: Sam Emerson                                     Patient : 1985   MRN: [id number]                               Reason for Admission: ETOH delirium, chest pain, emesis  Discharge Date: 10/1/24         RURS: Readmission Risk Score: 29  -10/1 [facility]  readmission for  ETOH, hx hepatic encephalopathy, esophageal varices - started drinking again         Last Discharge Facility       Date Complaint Diagnosis Description Type Department Provider    24 Chest Pain; Emesis Chest pain, unspecified type ... ED to Hosp-Admission (Discharged) (ADMITTED)  Jose Luis Dunne MD; Bennie Frederick...            Was this an external facility discharge? No    Additional needs identified to be addressed with provider   No needs identified    Routed to PCP office re: 10/15 appt and it was changed to HFU         Method of communication with provider:  Routed yesterday to see if appt could be moved up sooner and switched to HFU appt    Patients top risk factors for readmission: medical condition-ETOH, hx hepatic encephalopathy, esophageal varices    Interventions to address risk factors:   Review of patient management of conditions/medications: reviewed  Appt 10/15    Care Summary Note:   -10/1 [facility]  readmission for  ETOH, hx hepatic encephalopathy, esophageal varices - started drinking again.    Reached spouse, Sydney (HIPAA) after leaving  for patient.  She reports he is sleeping now, but seems to be doing better and if he is still having any

## 2024-10-04 NOTE — DISCHARGE SUMMARY
pulmonary vascularity is normal.  There is no pleural effusion or pneumothorax. Osseous structures grossly intact.     No significant abnormalities detected.     XR CHEST (2 VW)    Result Date: 9/12/2024  EXAMINATION: TWO XRAY VIEWS OF THE CHEST 9/12/2024 3:33 pm COMPARISON: Chest x-ray dated 01/30/2024. HISTORY: ORDERING SYSTEM PROVIDED HISTORY: Chest Pain TECHNOLOGIST PROVIDED HISTORY: Chest Pain Reason for Exam: Pt states chest pressure since he stopped drinking alcohol yesterday. FINDINGS: HEART/MEDIASTINUM: The cardiomediastinal silhouette is within normal limits. PLEURA/LUNGS: There are no focal consolidations or pleural effusions. There is no appreciable pneumothorax. BONES/SOFT TISSUE: No acute abnormality.     No radiographic evidence of acute pulmonary disease.     CT LUMBAR SPINE WO CONTRAST    Result Date: 9/9/2024  EXAMINATION: CT OF THE LUMBAR SPINE WITHOUT CONTRAST  9/5/2024 TECHNIQUE: CT of the lumbar spine was performed without the administration of intravenous contrast. Multiplanar reformatted images are provided for review. Adjustment of mA and/or kV according to patient size was utilized.  Automated exposure control, iterative reconstruction, and/or weight based adjustment of the mA/kV was utilized to reduce the radiation dose to as low as reasonably achievable. COMPARISON: CT abdomen and pelvis dated October 29, 2022; January 17, 2024, and more recent CT abdomen and pelvis dated July 22, 2024 HISTORY: ORDERING SYSTEM PROVIDED HISTORY: Chronic right-sided low back pain without sciatica TECHNOLOGIST PROVIDED HISTORY: Reason for Exam: Chronic right-sided low back pain without sciatica Additional signs and symptoms: pt states low back pain, follow up CT done 01/2024, scoliosisin lower back, low back pain FINDINGS: BONES/ALIGNMENT: Mild leftward curvature of the upper lumbar spine with Villalobos angle measuring 10 degrees, from the inferior endplate of T11 to the inferior endplate of L2.  Lumbar alignment

## 2024-10-07 ENCOUNTER — CARE COORDINATION (OUTPATIENT)
Dept: CARE COORDINATION | Age: 39
End: 2024-10-07

## 2024-10-07 NOTE — CARE COORDINATION
Ambulatory Care Coordination Note     10/7/2024 10:07 AM     ACM outreach attempt by this ACM today to offer care management services. ACM was unable to reach the patient by telephone today; left voice message requesting a return phone call to this ACM.     ACM: BRANDI GREEN RN     Care Summary Note: Included upcoming appointment reminder on VM.     PCP/Specialist follow up:   Future Appointments         Provider Specialty Dept Phone    10/15/2024 2:15 PM Rosie Montes De Oca MD Internal Medicine [phone]            Follow Up:   Plan for next ACM outreach in approximately  3-5 days  to complete:  - outreach attempt to offer care management services.

## 2024-10-10 ENCOUNTER — CARE COORDINATION (OUTPATIENT)
Dept: CARE COORDINATION | Age: 39
End: 2024-10-10

## 2024-10-10 NOTE — CARE COORDINATION
Ambulatory Care Coordination Note     10/10/2024 2:19 PM     ACM outreach attempt by this ACM today to offer care management services. ACM was unable to reach the patient by telephone today; left voice message requesting a return phone call to this ACM.     ACM: BRANDI GREEN RN     Care Summary Note: multiple attempts to reach patient. Will sign off at this time. If patient reaches out I will be happy to assist with any needs.     PCP/Specialist follow up:   Future Appointments         Provider Specialty Dept Phone    10/15/2024 2:15 PM Rosie Montes De Oca MD Internal Medicine [phone]            Follow Up:   No additional calls planned.

## 2024-10-15 ENCOUNTER — OFFICE VISIT (OUTPATIENT)
Dept: INTERNAL MEDICINE CLINIC | Age: 39
End: 2024-10-15
Payer: COMMERCIAL

## 2024-10-15 VITALS
BODY MASS INDEX: 25.9 KG/M2 | DIASTOLIC BLOOD PRESSURE: 60 MMHG | OXYGEN SATURATION: 99 % | SYSTOLIC BLOOD PRESSURE: 100 MMHG | HEART RATE: 70 BPM | WEIGHT: 165 LBS | HEIGHT: 67 IN

## 2024-10-15 DIAGNOSIS — Z09 HOSPITAL DISCHARGE FOLLOW-UP: ICD-10-CM

## 2024-10-15 DIAGNOSIS — K21.9 GASTROESOPHAGEAL REFLUX DISEASE, UNSPECIFIED WHETHER ESOPHAGITIS PRESENT: ICD-10-CM

## 2024-10-15 DIAGNOSIS — M54.50 CHRONIC RIGHT-SIDED LOW BACK PAIN WITHOUT SCIATICA: ICD-10-CM

## 2024-10-15 DIAGNOSIS — F10.10 ALCOHOL ABUSE: ICD-10-CM

## 2024-10-15 DIAGNOSIS — G89.29 CHRONIC BILATERAL LOW BACK PAIN WITHOUT SCIATICA: ICD-10-CM

## 2024-10-15 DIAGNOSIS — K70.31 ALCOHOLIC CIRRHOSIS OF LIVER WITH ASCITES (HCC): Primary | ICD-10-CM

## 2024-10-15 DIAGNOSIS — G89.29 CHRONIC RIGHT-SIDED LOW BACK PAIN WITHOUT SCIATICA: ICD-10-CM

## 2024-10-15 DIAGNOSIS — S40.021A TRAUMATIC HEMATOMA OF RIGHT UPPER ARM, INITIAL ENCOUNTER: ICD-10-CM

## 2024-10-15 DIAGNOSIS — M54.50 CHRONIC BILATERAL LOW BACK PAIN WITHOUT SCIATICA: ICD-10-CM

## 2024-10-15 DIAGNOSIS — D69.6 THROMBOCYTOPENIA (HCC): ICD-10-CM

## 2024-10-15 PROCEDURE — 99214 OFFICE O/P EST MOD 30 MIN: CPT | Performed by: INTERNAL MEDICINE

## 2024-10-15 PROCEDURE — 1111F DSCHRG MED/CURRENT MED MERGE: CPT | Performed by: INTERNAL MEDICINE

## 2024-10-15 RX ORDER — ONDANSETRON 4 MG/1
TABLET, ORALLY DISINTEGRATING ORAL
COMMUNITY
Start: 2024-10-06

## 2024-10-15 RX ORDER — TIZANIDINE 2 MG/1
2 TABLET ORAL EVERY 8 HOURS PRN
Qty: 30 TABLET | Refills: 0 | Status: SHIPPED | OUTPATIENT
Start: 2024-10-15 | End: 2024-11-14

## 2024-10-15 SDOH — ECONOMIC STABILITY: FOOD INSECURITY: WITHIN THE PAST 12 MONTHS, YOU WORRIED THAT YOUR FOOD WOULD RUN OUT BEFORE YOU GOT MONEY TO BUY MORE.: PATIENT DECLINED

## 2024-10-15 SDOH — ECONOMIC STABILITY: INCOME INSECURITY: HOW HARD IS IT FOR YOU TO PAY FOR THE VERY BASICS LIKE FOOD, HOUSING, MEDICAL CARE, AND HEATING?: PATIENT DECLINED

## 2024-10-15 SDOH — ECONOMIC STABILITY: FOOD INSECURITY: WITHIN THE PAST 12 MONTHS, THE FOOD YOU BOUGHT JUST DIDN'T LAST AND YOU DIDN'T HAVE MONEY TO GET MORE.: PATIENT DECLINED

## 2024-10-15 ASSESSMENT — PATIENT HEALTH QUESTIONNAIRE - PHQ9
1. LITTLE INTEREST OR PLEASURE IN DOING THINGS: NOT AT ALL
9. THOUGHTS THAT YOU WOULD BE BETTER OFF DEAD, OR OF HURTING YOURSELF: NOT AT ALL
8. MOVING OR SPEAKING SO SLOWLY THAT OTHER PEOPLE COULD HAVE NOTICED. OR THE OPPOSITE, BEING SO FIGETY OR RESTLESS THAT YOU HAVE BEEN MOVING AROUND A LOT MORE THAN USUAL: NOT AT ALL
10. IF YOU CHECKED OFF ANY PROBLEMS, HOW DIFFICULT HAVE THESE PROBLEMS MADE IT FOR YOU TO DO YOUR WORK, TAKE CARE OF THINGS AT HOME, OR GET ALONG WITH OTHER PEOPLE: NOT DIFFICULT AT ALL
6. FEELING BAD ABOUT YOURSELF - OR THAT YOU ARE A FAILURE OR HAVE LET YOURSELF OR YOUR FAMILY DOWN: NOT AT ALL
SUM OF ALL RESPONSES TO PHQ QUESTIONS 1-9: 0
2. FEELING DOWN, DEPRESSED OR HOPELESS: NOT AT ALL
SUM OF ALL RESPONSES TO PHQ QUESTIONS 1-9: 0
SUM OF ALL RESPONSES TO PHQ QUESTIONS 1-9: 0
7. TROUBLE CONCENTRATING ON THINGS, SUCH AS READING THE NEWSPAPER OR WATCHING TELEVISION: NOT AT ALL
5. POOR APPETITE OR OVEREATING: NOT AT ALL
3. TROUBLE FALLING OR STAYING ASLEEP: NOT AT ALL
4. FEELING TIRED OR HAVING LITTLE ENERGY: NOT AT ALL
SUM OF ALL RESPONSES TO PHQ QUESTIONS 1-9: 0
SUM OF ALL RESPONSES TO PHQ9 QUESTIONS 1 & 2: 0

## 2024-10-15 NOTE — PROGRESS NOTES
Subjective     Patient ID: Sam Emerson is a 39 y.o. male.    HPI  Review of Systems       Objective   Physical Exam       Assessment & Plan          Visit Information    Have you changed or started any medications since your last visit including any over-the-counter medicines, vitamins, or herbal medicines? no   Are you having any side effects from any of your medications? -  no  Have you stopped taking any of your medications? Is so, why? -  yes - dizzy spells     Have you seen any other physician or provider since your last visit? Yes - Records Obtained  Have you had any other diagnostic tests since your last visit? Yes - Records Obtained  Have you been seen in the emergency room and/or had an admission to a hospital since we last saw you? Yes - Records Obtained  Have you had your routine dental cleaning in the past 6 months? no    Have you activated your [employer] account? If not, what are your barriers? Yes     Patient Care Team:  Rosie Montes De Oca MD as PCP - General (Internal Medicine)  Rosie Montes De Oca MD as PCP - Empaneled Provider  Prince Gonsalez MD as Consulting Physician (Gastroenterology)    Medical History Review  Past Medical, Family, and Social History reviewed and does not contribute to the patient presenting condition    Health Maintenance   Topic Date Due    Pneumococcal 0-64 years Vaccine (1 of 2 - PCV) Never done    Varicella vaccine (1 of 2 - 13+ 2-dose series) Never done    HIV screen  Never done    Hepatitis A vaccine (1 of 2 - Risk 2-dose series) Never done    Hepatitis B vaccine (2 of 2 - CpG 2-dose series) 04/13/2023    Flu vaccine (1) Never done    COVID-19 Vaccine (3 - 2023-24 season) 09/01/2024    Depression Monitoring  01/24/2025    DTaP/Tdap/Td vaccine (3 - Td or Tdap) 08/31/2034    Hepatitis C screen  Completed    Hib vaccine  Aged Out    HPV vaccine  Aged Out    Polio vaccine  Aged Out    Meningococcal (ACWY) vaccine  Aged Out    Depression Screen  Discontinued    Diabetes 
2-LIZETTE) 0.3 MG/0.3ML SOAJ injection Inject 0.3 mLs into the muscle once for 1 dose Use as directed for allergic reaction 2 each 0    folic acid (FOLVITE) 1 MG tablet Take 1 tablet by mouth daily 30 tablet 3    Blood Pressure Monitoring (BP MONITOR-STETHOSCOPE) KIT 1 each by Does not apply route daily 1 kit 0        Medications patient taking as of now reconciled against medications ordered at time of hospital discharge: Yes    A comprehensive review of systems was negative except for what was noted in the HPI.    Objective:    /60 (Site: Left Upper Arm)   Pulse 70   Ht 1.702 m (5' 7\")   Wt 74.8 kg (165 lb)   SpO2 99%   BMI 25.84 kg/m²   General Appearance: alert and oriented to person, place and time, well developed and well- nourished, in no acute distress  Skin: warm and dry, no rash or erythema  Head: normocephalic and atraumatic  Eyes: pupils equal, round, and reactive to light, extraocular eye movements intact, conjunctivae normal  ENT: tympanic membrane, external ear and ear canal normal bilaterally, nose without deformity, nasal mucosa and turbinates normal without polyps  Neck: supple and non-tender without mass, no thyromegaly or thyroid nodules, no cervical lymphadenopathy  Pulmonary/Chest: clear to auscultation bilaterally- no wheezes, rales or rhonchi, normal air movement, no respiratory distress  Cardiovascular: normal rate, regular rhythm, normal S1 and S2, no murmurs, rubs, clicks, or gallops, distal pulses intact, no carotid bruits  Abdomen: soft, non-tender, non-distended, normal bowel sounds, no masses or organomegaly  Extremities: no cyanosis, clubbing or edema  Musculoskeletal: normal range of motion, no joint swelling, deformity or tenderness  Neurologic: reflexes normal and symmetric, no cranial nerve deficit, gait, coordination and speech normal      An electronic signature was used to authenticate this note.  --Rosie Montes De Oca MD

## 2024-10-15 NOTE — PLAN OF CARE
Health Maintenance       Lung Cancer Screening (Yearly)  Ordered on 4/9/2024    Shingles Vaccine (1 of 2)  Never done    Colorectal Cancer Screen (Colonoscopy - Every 10 Years)  Ordered on 12/19/2023    COVID-19 Vaccine (3 - 2023-24 season)  Overdue since 9/1/2024    Influenza Vaccine (1)  Order placed this encounter    Depression Screening (Yearly)  Due soon on 12/19/2024           Following review of the above:  Pended orders  Patient is not proceeding with: Colorectal Cancer Screening, COVID-19, and Shingles    Note: Refer to final orders and clinician documentation.        Problem: Discharge Planning  Goal: Discharge to home or other facility with appropriate resources  Outcome: Progressing     Problem: Safety - Adult  Goal: Free from fall injury  Outcome: Progressing     Problem: Self Harm/Suicidality  Goal: Will have no self-injury during hospital stay  Outcome: Progressing     Problem: Pain  Goal: Verbalizes/displays adequate comfort level or baseline comfort level  Outcome: Progressing     Problem: Skin/Tissue Integrity  Goal: Absence of new skin breakdown  Outcome: Progressing     Problem: Chronic Conditions and Co-morbidities  Goal: Patient's chronic conditions and co-morbidity symptoms are monitored and maintained or improved  Outcome: Progressing

## 2024-10-16 ENCOUNTER — CARE COORDINATION (OUTPATIENT)
Dept: CARE COORDINATION | Age: 39
End: 2024-10-16

## 2024-10-16 NOTE — CARE COORDINATION
Ambulatory Care Coordination Note     10/16/2024 12:24 PM     ACM outreach attempt by this ACM today to offer care management services. ACM was unable to reach the patient by telephone today; left voice message requesting a return phone call to this ACM.     ACM: BRANDI GREEN RN     Care Summary Note: Left detailed message regarding care coordination and things that I can assist with.     PCP/Specialist follow up:       Follow Up:   No follow up planned.

## 2024-12-09 DIAGNOSIS — K21.9 GASTROESOPHAGEAL REFLUX DISEASE, UNSPECIFIED WHETHER ESOPHAGITIS PRESENT: ICD-10-CM

## 2024-12-09 DIAGNOSIS — G89.29 CHRONIC RIGHT-SIDED LOW BACK PAIN WITHOUT SCIATICA: ICD-10-CM

## 2024-12-09 DIAGNOSIS — M54.50 CHRONIC RIGHT-SIDED LOW BACK PAIN WITHOUT SCIATICA: ICD-10-CM

## 2024-12-09 NOTE — TELEPHONE ENCOUNTER
Sam Emerson is calling to request a refill on the following medication(s):    Medication Request:  Requested Prescriptions     Pending Prescriptions Disp Refills    gabapentin (NEURONTIN) 100 MG capsule 30 capsule 3     Sig: Take 1 capsule by mouth in the morning and at bedtime for 120 days. Intended supply: 90 days    pantoprazole (PROTONIX) 40 MG tablet 30 tablet 4       Last Visit Date (If Applicable):  10/15/2024    Next Visit Date:    Visit date not found

## 2024-12-10 RX ORDER — GABAPENTIN 100 MG/1
100 CAPSULE ORAL 2 TIMES DAILY
Qty: 30 CAPSULE | Refills: 3 | Status: SHIPPED | OUTPATIENT
Start: 2024-12-10 | End: 2025-04-09

## 2024-12-10 RX ORDER — PANTOPRAZOLE SODIUM 40 MG/1
40 TABLET, DELAYED RELEASE ORAL DAILY
Qty: 30 TABLET | Refills: 4 | Status: SHIPPED | OUTPATIENT
Start: 2024-12-10

## 2024-12-23 RX ORDER — DULOXETIN HYDROCHLORIDE 30 MG/1
30 CAPSULE, DELAYED RELEASE ORAL DAILY
Qty: 30 CAPSULE | Refills: 2 | Status: SHIPPED | OUTPATIENT
Start: 2024-12-23 | End: 2025-03-23

## 2024-12-30 RX ORDER — NADOLOL 20 MG/1
20 TABLET ORAL DAILY
Qty: 30 TABLET | Refills: 3 | Status: SHIPPED | OUTPATIENT
Start: 2024-12-30

## 2024-12-30 RX ORDER — ONDANSETRON 4 MG/1
4 TABLET, FILM COATED ORAL EVERY 8 HOURS PRN
Qty: 20 TABLET | Refills: 0 | Status: SHIPPED | OUTPATIENT
Start: 2024-12-30

## 2025-01-05 NOTE — PROGRESS NOTES
Patient became agitated and stated that he was \"irate\". Was upset that he was not informed about the HIDA scan before being transported for the testing. Stated that he needed to go for a walk to cool down. Patent

## 2025-01-27 RX ORDER — TIZANIDINE 2 MG/1
TABLET ORAL
Qty: 30 TABLET | Refills: 0 | Status: SHIPPED | OUTPATIENT
Start: 2025-01-27

## 2025-02-11 ENCOUNTER — TELEPHONE (OUTPATIENT)
Dept: INTERNAL MEDICINE CLINIC | Age: 40
End: 2025-02-11

## 2025-02-11 NOTE — TELEPHONE ENCOUNTER
LV 10/24/2024    Patient was seen at [facility] (in the chart) today and was told to get Flonase OTC. Patient went to [facility] and it was over 30$. Patient does not have that kind of money and states with his insurance he would pay nothing.    Patient would like a script sent in for Flonase to [facility]    Please advise

## 2025-02-12 DIAGNOSIS — M54.50 CHRONIC RIGHT-SIDED LOW BACK PAIN WITHOUT SCIATICA: ICD-10-CM

## 2025-02-12 DIAGNOSIS — G89.29 CHRONIC RIGHT-SIDED LOW BACK PAIN WITHOUT SCIATICA: ICD-10-CM

## 2025-02-12 RX ORDER — GABAPENTIN 100 MG/1
100 CAPSULE ORAL 2 TIMES DAILY
Qty: 30 CAPSULE | Refills: 3 | OUTPATIENT
Start: 2025-02-12

## 2025-02-12 RX ORDER — FLUTICASONE PROPIONATE 50 MCG
2 SPRAY, SUSPENSION (ML) NASAL DAILY
Qty: 16 G | Refills: 0 | Status: SHIPPED | OUTPATIENT
Start: 2025-02-12

## 2025-03-05 ENCOUNTER — HOSPITAL ENCOUNTER (EMERGENCY)
Age: 40
Discharge: HOME | End: 2025-03-05
Attending: EMERGENCY MEDICINE
Payer: COMMERCIAL

## 2025-03-05 ENCOUNTER — APPOINTMENT (OUTPATIENT)
Dept: GENERAL RADIOLOGY | Age: 40
End: 2025-03-05
Payer: COMMERCIAL

## 2025-03-05 ENCOUNTER — APPOINTMENT (OUTPATIENT)
Dept: CT IMAGING | Age: 40
End: 2025-03-05
Payer: COMMERCIAL

## 2025-03-05 VITALS
OXYGEN SATURATION: 99 % | HEART RATE: 68 BPM | HEIGHT: 67 IN | WEIGHT: 180 LBS | BODY MASS INDEX: 28.25 KG/M2 | RESPIRATION RATE: 12 BRPM | SYSTOLIC BLOOD PRESSURE: 129 MMHG | DIASTOLIC BLOOD PRESSURE: 83 MMHG | TEMPERATURE: 98 F

## 2025-03-05 VITALS
SYSTOLIC BLOOD PRESSURE: 129 MMHG | WEIGHT: 180 LBS | TEMPERATURE: 97.6 F | RESPIRATION RATE: 18 BRPM | BODY MASS INDEX: 28.25 KG/M2 | HEART RATE: 68 BPM | DIASTOLIC BLOOD PRESSURE: 66 MMHG | HEIGHT: 67 IN | OXYGEN SATURATION: 97 %

## 2025-03-05 DIAGNOSIS — S02.602B OPEN FRACTURE OF LEFT SIDE OF MANDIBULAR BODY, INITIAL ENCOUNTER (HCC): ICD-10-CM

## 2025-03-05 DIAGNOSIS — S01.81XA CHIN LACERATION, INITIAL ENCOUNTER: Primary | ICD-10-CM

## 2025-03-05 DIAGNOSIS — S00.431A: Primary | ICD-10-CM

## 2025-03-05 LAB
ALBUMIN SERPL-MCNC: 3.3 G/DL (ref 3.5–5.2)
ALP SERPL-CCNC: 209 U/L (ref 40–129)
ALT SERPL-CCNC: 13 U/L (ref 10–50)
ANION GAP SERPL CALCULATED.3IONS-SCNC: 10 MMOL/L (ref 9–16)
AST SERPL-CCNC: 49 U/L (ref 10–50)
BASOPHILS # BLD: 0.1 K/UL (ref 0–0.2)
BASOPHILS NFR BLD: 2 % (ref 0–2)
BILIRUB SERPL-MCNC: 2.8 MG/DL (ref 0–1.2)
BUN SERPL-MCNC: 13 MG/DL (ref 6–20)
CALCIUM SERPL-MCNC: 9.1 MG/DL (ref 8.6–10.4)
CHLORIDE SERPL-SCNC: 108 MMOL/L (ref 98–107)
CO2 SERPL-SCNC: 21 MMOL/L (ref 20–31)
CREAT SERPL-MCNC: 1 MG/DL (ref 0.7–1.2)
EOSINOPHIL # BLD: 0.2 K/UL (ref 0–0.4)
EOSINOPHILS RELATIVE PERCENT: 4 % (ref 0–4)
ERYTHROCYTE [DISTWIDTH] IN BLOOD BY AUTOMATED COUNT: 17.3 % (ref 11.5–14.9)
ETHANOL PERCENT: 0.01 %
ETHANOLAMINE SERPL-MCNC: 11 MG/DL (ref 0–0.08)
GFR, ESTIMATED: >90 ML/MIN/1.73M2
GLUCOSE SERPL-MCNC: 115 MG/DL (ref 74–99)
HCT VFR BLD AUTO: 29.6 % (ref 41–53)
HGB BLD-MCNC: 10.2 G/DL (ref 13.5–17.5)
INR PPP: 1.3
LIPASE SERPL-CCNC: 82 U/L (ref 13–60)
LYMPHOCYTES NFR BLD: 1.6 K/UL (ref 1–4.8)
LYMPHOCYTES RELATIVE PERCENT: 29 % (ref 24–44)
MAGNESIUM SERPL-MCNC: 1.8 MG/DL (ref 1.6–2.6)
MCH RBC QN AUTO: 37.2 PG (ref 26–34)
MCHC RBC AUTO-ENTMCNC: 34.4 G/DL (ref 31–37)
MCV RBC AUTO: 108.2 FL (ref 80–100)
MONOCYTES NFR BLD: 0.4 K/UL (ref 0.1–1.3)
MONOCYTES NFR BLD: 7 % (ref 1–7)
NEUTROPHILS NFR BLD: 58 % (ref 36–66)
NEUTS SEG NFR BLD: 3.1 K/UL (ref 1.3–9.1)
PLATELET # BLD AUTO: 82 K/UL (ref 150–450)
PMV BLD AUTO: 8.2 FL (ref 6–12)
POTASSIUM SERPL-SCNC: 3.6 MMOL/L (ref 3.7–5.3)
PROT SERPL-MCNC: 8 G/DL (ref 6.6–8.7)
PROTHROMBIN TIME: 17.3 SEC (ref 11.8–14.6)
RBC # BLD AUTO: 2.73 M/UL (ref 4.5–5.9)
SODIUM SERPL-SCNC: 139 MMOL/L (ref 136–145)
TROPONIN I SERPL HS-MCNC: 9 NG/L (ref 0–22)
WBC OTHER # BLD: 5.4 K/UL (ref 3.5–11)

## 2025-03-05 PROCEDURE — 70480 CT ORBIT/EAR/FOSSA W/O DYE: CPT

## 2025-03-05 PROCEDURE — 99285 EMERGENCY DEPT VISIT HI MDM: CPT

## 2025-03-05 PROCEDURE — 72125 CT NECK SPINE W/O DYE: CPT

## 2025-03-05 PROCEDURE — 71045 X-RAY EXAM CHEST 1 VIEW: CPT

## 2025-03-05 PROCEDURE — 6360000002 HC RX W HCPCS: Performed by: EMERGENCY MEDICINE

## 2025-03-05 PROCEDURE — 93005 ELECTROCARDIOGRAM TRACING: CPT | Performed by: EMERGENCY MEDICINE

## 2025-03-05 PROCEDURE — 70450 CT HEAD/BRAIN W/O DYE: CPT

## 2025-03-05 PROCEDURE — 36415 COLL VENOUS BLD VENIPUNCTURE: CPT

## 2025-03-05 PROCEDURE — 12013 RPR F/E/E/N/L/M 2.6-5.0 CM: CPT

## 2025-03-05 PROCEDURE — 84484 ASSAY OF TROPONIN QUANT: CPT

## 2025-03-05 PROCEDURE — 85610 PROTHROMBIN TIME: CPT

## 2025-03-05 PROCEDURE — 6370000000 HC RX 637 (ALT 250 FOR IP): Performed by: EMERGENCY MEDICINE

## 2025-03-05 PROCEDURE — 96374 THER/PROPH/DIAG INJ IV PUSH: CPT

## 2025-03-05 PROCEDURE — 6370000000 HC RX 637 (ALT 250 FOR IP)

## 2025-03-05 PROCEDURE — 85025 COMPLETE CBC W/AUTO DIFF WBC: CPT

## 2025-03-05 PROCEDURE — 80053 COMPREHEN METABOLIC PANEL: CPT

## 2025-03-05 PROCEDURE — 83735 ASSAY OF MAGNESIUM: CPT

## 2025-03-05 PROCEDURE — 83690 ASSAY OF LIPASE: CPT

## 2025-03-05 PROCEDURE — 99284 EMERGENCY DEPT VISIT MOD MDM: CPT

## 2025-03-05 PROCEDURE — G0480 DRUG TEST DEF 1-7 CLASSES: HCPCS

## 2025-03-05 PROCEDURE — 70486 CT MAXILLOFACIAL W/O DYE: CPT

## 2025-03-05 RX ORDER — FENTANYL CITRATE 0.05 MG/ML
50 INJECTION, SOLUTION INTRAMUSCULAR; INTRAVENOUS ONCE
Status: COMPLETED | OUTPATIENT
Start: 2025-03-05 | End: 2025-03-05

## 2025-03-05 RX ORDER — LIDOCAINE HYDROCHLORIDE 10 MG/ML
5 INJECTION, SOLUTION INFILTRATION; PERINEURAL ONCE
Status: COMPLETED | OUTPATIENT
Start: 2025-03-05 | End: 2025-03-05

## 2025-03-05 RX ORDER — HYDROCODONE BITARTRATE AND ACETAMINOPHEN 5; 325 MG/1; MG/1
1 TABLET ORAL ONCE
Status: COMPLETED | OUTPATIENT
Start: 2025-03-05 | End: 2025-03-05

## 2025-03-05 RX ORDER — OXYCODONE HYDROCHLORIDE 5 MG/1
5 TABLET ORAL ONCE
Status: COMPLETED | OUTPATIENT
Start: 2025-03-05 | End: 2025-03-05

## 2025-03-05 RX ORDER — HYDROCODONE BITARTRATE AND ACETAMINOPHEN 5; 325 MG/1; MG/1
1 TABLET ORAL EVERY 6 HOURS PRN
Qty: 12 TABLET | Refills: 0 | Status: SHIPPED | OUTPATIENT
Start: 2025-03-05 | End: 2025-03-08

## 2025-03-05 RX ORDER — ONDANSETRON 4 MG/1
4 TABLET, ORALLY DISINTEGRATING ORAL ONCE
Status: COMPLETED | OUTPATIENT
Start: 2025-03-05 | End: 2025-03-05

## 2025-03-05 RX ADMIN — OXYCODONE HYDROCHLORIDE 5 MG: 5 TABLET ORAL at 18:09

## 2025-03-05 RX ADMIN — AMOXICILLIN AND CLAVULANATE POTASSIUM 1 TABLET: 875; 125 TABLET, FILM COATED ORAL at 14:45

## 2025-03-05 RX ADMIN — FENTANYL CITRATE 50 MCG: 0.05 INJECTION, SOLUTION INTRAMUSCULAR; INTRAVENOUS at 13:35

## 2025-03-05 RX ADMIN — ONDANSETRON 4 MG: 4 TABLET, ORALLY DISINTEGRATING ORAL at 18:08

## 2025-03-05 RX ADMIN — LIDOCAINE HYDROCHLORIDE 5 ML: 10 INJECTION, SOLUTION INFILTRATION; PERINEURAL at 14:25

## 2025-03-05 RX ADMIN — HYDROCODONE BITARTRATE AND ACETAMINOPHEN 1 TABLET: 5; 325 TABLET ORAL at 14:45

## 2025-03-05 ASSESSMENT — LIFESTYLE VARIABLES
HOW MANY STANDARD DRINKS CONTAINING ALCOHOL DO YOU HAVE ON A TYPICAL DAY: 10 OR MORE
HOW MANY STANDARD DRINKS CONTAINING ALCOHOL DO YOU HAVE ON A TYPICAL DAY: 10 OR MORE
HOW OFTEN DO YOU HAVE A DRINK CONTAINING ALCOHOL: 4 OR MORE TIMES A WEEK
HOW OFTEN DO YOU HAVE A DRINK CONTAINING ALCOHOL: 4 OR MORE TIMES A WEEK

## 2025-03-05 ASSESSMENT — PAIN SCALES - GENERAL
PAINLEVEL_OUTOF10: 4
PAINLEVEL_OUTOF10: 10
PAINLEVEL_OUTOF10: 4
PAINLEVEL_OUTOF10: 9
PAINLEVEL_OUTOF10: 10
PAINLEVEL_OUTOF10: 6

## 2025-03-05 ASSESSMENT — ENCOUNTER SYMPTOMS
EYE PAIN: 0
BACK PAIN: 0
COLOR CHANGE: 0
ABDOMINAL PAIN: 0
SHORTNESS OF BREATH: 0

## 2025-03-05 ASSESSMENT — PAIN - FUNCTIONAL ASSESSMENT
PAIN_FUNCTIONAL_ASSESSMENT: 0-10
PAIN_FUNCTIONAL_ASSESSMENT: 0-10

## 2025-03-05 ASSESSMENT — PAIN DESCRIPTION - LOCATION: LOCATION: JAW

## 2025-03-05 NOTE — ED TRIAGE NOTES
Mode of arrival (squad #, walk in, police, etc) : walk in        Chief complaint(s): Jaw pain, hearing loss        Arrival Note (brief scenario, treatment PTA, etc).: Pt states he was seen earlier for a laceration and broken jaw. Patient reports in the last 3 hours since he was discharged he is losing his hearing in his right ear and keeps bleeding into his mouth on the left side.        C= \"Have you ever felt that you should Cut down on your drinking?\"  No  A= \"Have people Annoyed you by criticizing your drinking?\"  No  G= \"Have you ever felt bad or Guilty about your drinking?\"  No  E= \"Have you ever had a drink as an Eye-opener first thing in the morning to steady your nerves or to help a hangover?\"  No      Deferred []      Reason for deferring: N/A    *If yes to two or more: probable alcohol abuse.*

## 2025-03-05 NOTE — ED PROVIDER NOTES
[facility] EMERGENCY DEPARTMENT  Emergency Department Encounter  Emergency Medicine Resident     Pt Name:Sam Emerson  MRN: [id number]  Birthdate 1985  Date of evaluation: 3/5/25  PCP:  Rosie Montes De Oca MD  Note Started: 6:06 PM EST      CHIEF COMPLAINT       Chief Complaint   Patient presents with    Jaw Pain     States he is bleeding on the left side of his mouth    Hearing Problem     Right ear over the last 3 hours       HISTORY OF PRESENT ILLNESS  (Location/Symptom, Timing/Onset, Context/Setting, Quality, Duration, Modifying Factors, Severity.)      Sam Emerson is a 40 y.o. male who presents with fall, known mandibular fracture.  While patient was waiting for his taxi to go home he began experiencing a sharp right-sided ear pain and loss of hearing of the ear.  He has been spitting up small amounts of blood.  He has associated nausea with no reported episodes of vomiting.  He denies new falls/traumatic injury.  He denies vertiginous symptoms or vision changes.  No focal neurological deficits.    PAST MEDICAL / SURGICAL / SOCIAL / FAMILY HISTORY      has a past medical history of Alcoholism (HCC), Anemia, Cirrhosis, alcoholic (HCC), and Pulmonary embolism (HCC).       has a past surgical history that includes Upper gastrointestinal endoscopy (N/A, 05/01/2022); Upper gastrointestinal endoscopy (N/A, 10/30/2022); Upper gastrointestinal endoscopy (N/A, 09/27/2023); Esophagogastroduodenoscopy (12/05/2023); IR TIPS INSERTION (12/05/2023); IR NONTUNNELED VASCULAR CATHETER > 5 YEARS (12/05/2023); Upper gastrointestinal endoscopy (N/A, 12/05/2023); Upper gastrointestinal endoscopy (N/A, 12/07/2023); Upper gastrointestinal endoscopy (12/07/2023); Upper gastrointestinal endoscopy (12/07/2023); Esophagogastroduodenoscopy (12/29/2023); Colonoscopy (12/30/2023); Upper gastrointestinal endoscopy (N/A, 12/29/2023); and Colonoscopy (N/A, 12/30/2023).      Social History     Socioeconomic History    Marital  likely from an isolated external auditory canal hematoma.  Will have patient follow-up with ENT as needed. [BG]   2034 Spoke with Dr. Deng, plastic surgery and updated on findings of hematoma of external auditory canal.  He would like me to provide information to patient for presentation at [facility] at 7 AM for surgery, n.p.o. at midnight, no nicotine alcohol use prior.  Discussed this with patient at bedside, he verbalized understanding all questions were answered [BG]      ED Course User Index  [BG] Sam Tello MD       PROCEDURES:  None    CONSULTS:  None      FINAL IMPRESSION      1. Traumatic hematoma of right ear, initial encounter          DISPOSITION / PLAN     DISPOSITION Decision To Discharge 03/05/2025 08:12:42 PM   DISPOSITION CONDITION Stable     PATIENT REFERRED TO:  Austin Gonzáles MD  [facility] [place] [address]  [phone]    Schedule an appointment as soon as possible for a visit   follow-up of hematoma right ear      DISCHARGE MEDICATIONS:  New Prescriptions    No medications on file       Sam Tello MD  Emergency Medicine Resident    (Please note that portions of thisnote were completed with a voice recognition program.  Efforts were made to edit the dictations but occasionally words are mis-transcribed.)

## 2025-03-05 NOTE — ED NOTES
Mode of arrival (squad #, walk in, police, etc) : Medic 9        Chief complaint(s): Fall, laceration        Arrival Note (brief scenario, treatment PTA, etc).: Pt arrived via EMS, pt was walking in a parking lot and had a syncopal episode, fell and hit his chin. The chin has a laceration that is actively bleeding upon arrival. Pt refused C-collar PTA. Pt is AO x4, vitals assessed, care ongoing.

## 2025-03-05 NOTE — ED NOTES
The following labs labeled with pt sticker and tubed to lab:     [x] Blue     [x] Lavender     [x] Green/yellow

## 2025-03-05 NOTE — ED NOTES
Mode of arrival (squad #, walk in, police, etc) : ems         Chief complaint(s): LOC, chin laceration         Arrival Note (brief scenario, treatment PTA, etc).: Pt states he was walking in the parking lot and started to get dizzy and fell. Pt states he hit his chin on the concrete. Pt refused C-collar for EMS.         C= \"Have you ever felt that you should Cut down on your drinking?\"  No  A= \"Have people Annoyed you by criticizing your drinking?\"  No  G= \"Have you ever felt bad or Guilty about your drinking?\"  No  E= \"Have you ever had a drink as an Eye-opener first thing in the morning to steady your nerves or to help a hangover?\"  No      Deferred []      Reason for deferring: N/A    *If yes to two or more: probable alcohol abuse.*

## 2025-03-05 NOTE — ED PROVIDER NOTES
intracranial process identified.      Comminuted fracture of the anterior left mandibular body. Oblique fracture of   the right and possibly involving the left mandibular ramus.      No acute osseous abnormality identified involving the cervical spine.      Mild spondylosis of the cervical spine.         CT FACIAL BONES WO CONTRAST   Final Result   No acute intracranial process identified.      Comminuted fracture of the anterior left mandibular body. Oblique fracture of   the right and possibly involving the left mandibular ramus.      No acute osseous abnormality identified involving the cervical spine.      Mild spondylosis of the cervical spine.         XR CHEST PORTABLE   Final Result   No acute cardiopulmonary process.             LABS: Lab orders shown below, the results are reviewed by myself, and all abnormals are listed below.  Labs Reviewed   CBC WITH AUTO DIFFERENTIAL - Abnormal; Notable for the following components:       Result Value    RBC 2.73 (*)     Hemoglobin 10.2 (*)     Hematocrit 29.6 (*)     .2 (*)     MCH 37.2 (*)     RDW 17.3 (*)     Platelets 82 (*)     All other components within normal limits   COMPREHENSIVE METABOLIC PANEL - Abnormal; Notable for the following components:    Potassium 3.6 (*)     Chloride 108 (*)     Glucose 115 (*)     Albumin 3.3 (*)     Total Bilirubin 2.8 (*)     Alkaline Phosphatase 209 (*)     All other components within normal limits   LIPASE - Abnormal; Notable for the following components:    Lipase 82 (*)     All other components within normal limits   PROTIME-INR - Abnormal; Notable for the following components:    Protime 17.3 (*)     All other components within normal limits   ETHANOL - Abnormal; Notable for the following components:    Ethanol Lvl 11 (*)     Ethanol percent 0.011 (*)     All other components within normal limits   MAGNESIUM   TROPONIN       Vitals Reviewed:    Vitals:    03/05/25 1238 03/05/25 1300 03/05/25 1330 03/05/25 1335   BP:  138/85 133/75 129/83    Pulse: 62 61 68    Resp: 16 10 16 12   Temp: 98 °F (36.7 °C)      TempSrc: Temporal      SpO2: 100% 100% 99%    Weight: 81.6 kg (180 lb)      Height: 1.702 m (5' 7\")        MEDICATIONS GIVEN TO PATIENT THIS ENCOUNTER:  Orders Placed This Encounter   Medications    fentaNYL (SUBLIMAZE) injection 50 mcg    lidocaine 1 % injection 5 mL    amoxicillin-clavulanate (AUGMENTIN) 875-125 MG per tablet 1 tablet     Order Specific Question:   Antimicrobial Indications     Answer:   Other     Order Specific Question:   Other Abx Indication     Answer:   Jaw fracture    HYDROcodone-acetaminophen (NORCO) 5-325 MG per tablet 1 tablet    HYDROcodone-acetaminophen (NORCO) 5-325 MG per tablet     Sig: Take 1 tablet by mouth every 6 hours as needed for Pain for up to 3 days. Intended supply: 3 days. Take lowest dose possible to manage pain Max Daily Amount: 4 tablets     Dispense:  12 tablet     Refill:  0    amoxicillin-clavulanate (AUGMENTIN) 875-125 MG per tablet     Sig: Take 1 tablet by mouth 2 times daily for 10 days     Dispense:  20 tablet     Refill:  0     DISCHARGE PRESCRIPTIONS:  Discharge Medication List as of 3/5/2025  2:39 PM        START taking these medications    Details   HYDROcodone-acetaminophen (NORCO) 5-325 MG per tablet Take 1 tablet by mouth every 6 hours as needed for Pain for up to 3 days. Intended supply: 3 days. Take lowest dose possible to manage pain Max Daily Amount: 4 tablets, Disp-12 tablet, R-0Normal      amoxicillin-clavulanate (AUGMENTIN) 875-125 MG per tablet Take 1 tablet by mouth 2 times daily for 10 days, Disp-20 tablet, R-0Normal           PHYSICIAN CONSULTS ORDERED THIS ENCOUNTER:  IP CONSULT TO PLASTIC SURGERY  FINAL IMPRESSION      1. Chin laceration, initial encounter    2. Open fracture of left side of mandibular body, initial encounter ([facility])          DISPOSITION/PLAN   DISPOSITION Decision To Discharge 03/05/2025 02:36:13 PM   DISPOSITION CONDITION Stable

## 2025-03-05 NOTE — DISCHARGE INSTRUCTIONS
Go to [facility] tomorrow for surgery, arrive at 8:45 AM surgery scheduled for 1045 go through entrance C, please nothing to eat or drink after midnight

## 2025-03-06 NOTE — DISCHARGE INSTRUCTIONS
There is a collection of blood (hematoma) in the right ear canal.  Recommend follow-up with otolaryngology, contact formation is provided below.     You are scheduled for surgery tomorrow.  Please present to [facility] at 7AM on 3/6.  Do not eat or drink anything after midnight on 3/5.  Please do not use nicotine or consume alcohol.

## 2025-03-06 NOTE — ED PROVIDER NOTES
EMERGENCY DEPARTMENT ENCOUNTER   ATTENDING ATTESTATION     Pt Name: Sam Emerson  MRN: [id number]  Birthdate 1985  Date of evaluation: 3/5/25       Sam Emerson is a 40 y.o. male who presents with Jaw Pain (States he is bleeding on the left side of his mouth) and Hearing Problem (Right ear over the last 3 hours)      MDM:   Patient resting comfortably.  Loss of hearing in his right ear with hemotympanum.  Checking CT IAC.  He is going to have mandible repair tomorrow by plastic surgeon.  He already has at all arranged.  He was seen here earlier.  Reviewed visit from earlier today.    Vitals:   Vitals:    03/05/25 1749 03/05/25 1915   BP: (!) 176/148 129/66   Pulse: 68    Resp: 18    Temp: 97.6 °F (36.4 °C)    TempSrc: Tympanic    SpO2: 100% 97%   Weight: 81.6 kg (180 lb)    Height: 1.702 m (5' 7\")          I personally saw and examined the patient. I have reviewed and agree with the resident's findings, including all diagnostic interpretations and treatment plan as written. I was present for the key portions of any procedures performed and the inclusive time noted for any critical care statement.    Jimenez Campos MD  Attending Emergency Physician            Jimenez Campos MD  03/05/25 1919    I think he has a hematoma in his external auditory canal that is been bleeding.  It is probably obstructing his external auditory canal.  It is probably related to the trauma to his mandible and face.  He is ambulating without ataxia.  Well-appearing, no distress.  GCS 15.  Reviewed his past medical records.  Vitals are stable.  Will discuss his presentation today with the plastic surgeon who is planning to do mandible repair tomorrow.  CT IAC was negative.  Likely discharge and maintain the plan of surgery tomorrow.  N.p.o. after midnight.  Patient is agreeable with this plan.       Jimenez Campos MD  03/05/25 2012

## 2025-03-07 LAB
EKG ATRIAL RATE: 55 BPM
EKG P AXIS: 49 DEGREES
EKG P-R INTERVAL: 146 MS
EKG Q-T INTERVAL: 420 MS
EKG QRS DURATION: 108 MS
EKG QTC CALCULATION (BAZETT): 401 MS
EKG R AXIS: 49 DEGREES
EKG T AXIS: 73 DEGREES
EKG VENTRICULAR RATE: 55 BPM

## 2025-03-10 ENCOUNTER — TELEPHONE (OUTPATIENT)
Dept: INTERNAL MEDICINE CLINIC | Age: 40
End: 2025-03-10

## 2025-03-10 NOTE — TELEPHONE ENCOUNTER
Please review hospital encounter, patient had a traumatic injury and recent ORIF of mandible.   Patient will need an appointment this week or next.

## 2025-03-10 NOTE — TELEPHONE ENCOUNTER
Patient is in need of a hospital follow up. Patient also did state that he will be out of pain medication by Monday and the hospital told him to follow up with his pcp to get refill of pain medication.    Please advise if this patient can be overbooked?

## 2025-03-11 ENCOUNTER — OFFICE VISIT (OUTPATIENT)
Dept: INTERNAL MEDICINE CLINIC | Age: 40
End: 2025-03-11
Payer: COMMERCIAL

## 2025-03-11 VITALS
BODY MASS INDEX: 25.9 KG/M2 | SYSTOLIC BLOOD PRESSURE: 134 MMHG | HEIGHT: 67 IN | DIASTOLIC BLOOD PRESSURE: 78 MMHG | HEART RATE: 73 BPM | OXYGEN SATURATION: 100 % | WEIGHT: 165 LBS

## 2025-03-11 DIAGNOSIS — S02.609D: ICD-10-CM

## 2025-03-11 DIAGNOSIS — R55 SYNCOPE, UNSPECIFIED SYNCOPE TYPE: Primary | ICD-10-CM

## 2025-03-11 DIAGNOSIS — Z09 HOSPITAL DISCHARGE FOLLOW-UP: ICD-10-CM

## 2025-03-11 DIAGNOSIS — H92.21 OTORRHAGIA OF RIGHT EAR: ICD-10-CM

## 2025-03-11 PROCEDURE — 3075F SYST BP GE 130 - 139MM HG: CPT

## 2025-03-11 PROCEDURE — 99214 OFFICE O/P EST MOD 30 MIN: CPT

## 2025-03-11 PROCEDURE — 1111F DSCHRG MED/CURRENT MED MERGE: CPT

## 2025-03-11 PROCEDURE — 3078F DIAST BP <80 MM HG: CPT

## 2025-03-11 PROCEDURE — 4004F PT TOBACCO SCREEN RCVD TLK: CPT

## 2025-03-11 PROCEDURE — G8427 DOCREV CUR MEDS BY ELIG CLIN: HCPCS

## 2025-03-11 PROCEDURE — G8419 CALC BMI OUT NRM PARAM NOF/U: HCPCS

## 2025-03-11 RX ORDER — DULOXETIN HYDROCHLORIDE 30 MG/1
30 CAPSULE, DELAYED RELEASE ORAL DAILY
Qty: 30 CAPSULE | Refills: 2 | Status: SHIPPED | OUTPATIENT
Start: 2025-03-11 | End: 2025-06-09

## 2025-03-11 RX ORDER — MUPIROCIN 20 MG/G
1 OINTMENT TOPICAL EVERY 8 HOURS
COMMUNITY
Start: 2024-09-02

## 2025-03-11 RX ORDER — CIPROFLOXACIN HYDROCHLORIDE 3.5 MG/ML
3 SOLUTION/ DROPS TOPICAL 2 TIMES DAILY
COMMUNITY
Start: 2025-03-08 | End: 2025-03-15

## 2025-03-11 RX ORDER — CHLORHEXIDINE GLUCONATE ORAL RINSE 1.2 MG/ML
15 SOLUTION DENTAL 3 TIMES DAILY
COMMUNITY
Start: 2025-03-08

## 2025-03-11 RX ORDER — LANOLIN ALCOHOL/MO/W.PET/CERES
100 CREAM (GRAM) TOPICAL DAILY
COMMUNITY
Start: 2025-01-31 | End: 2025-03-11 | Stop reason: CLARIF

## 2025-03-11 ASSESSMENT — PATIENT HEALTH QUESTIONNAIRE - PHQ9
5. POOR APPETITE OR OVEREATING: NOT AT ALL
9. THOUGHTS THAT YOU WOULD BE BETTER OFF DEAD, OR OF HURTING YOURSELF: NOT AT ALL
8. MOVING OR SPEAKING SO SLOWLY THAT OTHER PEOPLE COULD HAVE NOTICED. OR THE OPPOSITE, BEING SO FIGETY OR RESTLESS THAT YOU HAVE BEEN MOVING AROUND A LOT MORE THAN USUAL: NOT AT ALL
SUM OF ALL RESPONSES TO PHQ QUESTIONS 1-9: 0
1. LITTLE INTEREST OR PLEASURE IN DOING THINGS: NOT AT ALL
SUM OF ALL RESPONSES TO PHQ QUESTIONS 1-9: 0
4. FEELING TIRED OR HAVING LITTLE ENERGY: NOT AT ALL
SUM OF ALL RESPONSES TO PHQ QUESTIONS 1-9: 0
10. IF YOU CHECKED OFF ANY PROBLEMS, HOW DIFFICULT HAVE THESE PROBLEMS MADE IT FOR YOU TO DO YOUR WORK, TAKE CARE OF THINGS AT HOME, OR GET ALONG WITH OTHER PEOPLE: NOT DIFFICULT AT ALL
6. FEELING BAD ABOUT YOURSELF - OR THAT YOU ARE A FAILURE OR HAVE LET YOURSELF OR YOUR FAMILY DOWN: NOT AT ALL
3. TROUBLE FALLING OR STAYING ASLEEP: NOT AT ALL
7. TROUBLE CONCENTRATING ON THINGS, SUCH AS READING THE NEWSPAPER OR WATCHING TELEVISION: NOT AT ALL
2. FEELING DOWN, DEPRESSED OR HOPELESS: NOT AT ALL
SUM OF ALL RESPONSES TO PHQ QUESTIONS 1-9: 0

## 2025-03-11 ASSESSMENT — ENCOUNTER SYMPTOMS
ABDOMINAL PAIN: 0
ALLERGIC/IMMUNOLOGIC NEGATIVE: 1
VOMITING: 0
CONSTIPATION: 0
COUGH: 0
DIARRHEA: 0
BACK PAIN: 0
SHORTNESS OF BREATH: 0
NAUSEA: 0
WHEEZING: 0

## 2025-03-11 NOTE — PROGRESS NOTES
Post-Discharge Transitional Care Management Progress Note      Sam Emerson   YOB: 1985    Date of Office Visit:  3/11/2025  Date of Hospital Admission: 3/7/2025  Date of Hospital Discharge: 3/8/2025    Care management risk score Rising risk (score 2-5) and Complex Care (Scores >=6): No Risk Score On File     Non face to face  following discharge, date last encounter closed (first attempt may have been earlier): *No documented post hospital discharge outreach found in the last 14 days *No documented post hospital discharge outreach found in the last 14 days    Call initiated 2 business days of discharge: *No response recorded in the last 14 days    ASSESSMENT/PLAN:   Syncope, unspecified syncope type  -     Echo (TTE) complete (PRN contrast/bubble/strain/3D); Future  Open fracture of left side of mandible with routine healing, unspecified mandibular site, subsequent encounter  Otorrhagia of right ear  Hospital discharge follow-up  -     SC DISCHARGE MEDS RECONCILED W/ CURRENT OUTPATIENT MED LIST  Advised compliance to compression stockings.  Midodrine as needed.  Orthostatic hypotension precautions discussed with the patient.  Echocardiogram.  Keep appointment with ENT.  Wound healing well.    Medical Decision Making: moderate complexity  No follow-ups on file.         Subjective:   HPI:  Follow up of Hospital problems/diagnosis(es):    Inpatient course:   Discharge summary reviewed- see chart.  Had fall last Wednesday  After syncopal episode, patient was not wearing his compression stockings.  Patient states that it usually happens if he gets up too fast.    Left open mandibular fracture s/p ORIF.  Right ear bleeding, saw by ENT.  Stopped now.  He does have an appointment with ENT upcoming.  ENT 3 weeks    Interval history/Current status:     Patient Active Problem List   Diagnosis    ACS (acute coronary syndrome) (HCC)    Essential hypertension    Uncomplicated alcohol dependence (HCC)

## 2025-03-11 NOTE — PROGRESS NOTES
\"Have you been to the ER, urgent care clinic since your last visit?  Hospitalized since your last visit?\"    3/5-3/6 LOC chin laceration ORIF mandible hearing loss    “Have you seen or consulted any other health care providers outside our system since your last visit?”    NO

## 2025-03-29 NOTE — ED NOTES
Bed: 35  Expected date:   Expected time:   Means of arrival:   Comments:  OL   Mode of arrival (squad #, walk in, police, etc) : walk in         Chief complaint(s): SI, alcohol withdrawal, nausea and vomiting         Arrival Note (brief scenario, treatment PTA, etc). : Pt states drinking more than a 5th of alcohol per day such as vodka. Pt states having jaundice that started 5 days ago. Pt states his last drink was around 5:30am today. Pt has been having emesis and nausea. Pt denies any chest pain, palpitations, SOB. C= \"Have you ever felt that you should Cut down on your drinking? \"  Yes  A= \"Have people Annoyed you by criticizing your drinking? \"  Yes  G= \"Have you ever felt bad or Guilty about your drinking? \"  Yes  E= \"Have you ever had a drink as an Eye-opener first thing in the morning to steady your nerves or to help a hangover? \"  Yes      Deferred []      Reason for deferring: N/A    *If yes to two or more: probable alcohol abuse. Gonzalo Segura  02/18/23 1082

## 2025-03-31 RX ORDER — DULOXETIN HYDROCHLORIDE 30 MG/1
30 CAPSULE, DELAYED RELEASE ORAL DAILY
Qty: 30 CAPSULE | Refills: 2 | Status: SHIPPED | OUTPATIENT
Start: 2025-03-31 | End: 2025-06-29

## 2025-04-09 RX ORDER — MIDODRINE HYDROCHLORIDE 2.5 MG/1
TABLET ORAL
Qty: 90 TABLET | Refills: 3 | Status: SHIPPED | OUTPATIENT
Start: 2025-04-09

## 2025-04-16 ENCOUNTER — TELEPHONE (OUTPATIENT)
Dept: INTERNAL MEDICINE CLINIC | Age: 40
End: 2025-04-16

## 2025-04-28 RX ORDER — TIZANIDINE 2 MG/1
TABLET ORAL
Qty: 30 TABLET | Refills: 0 | Status: SHIPPED | OUTPATIENT
Start: 2025-04-28

## 2025-06-02 RX ORDER — TIZANIDINE 2 MG/1
TABLET ORAL
Qty: 30 TABLET | Refills: 0 | Status: SHIPPED | OUTPATIENT
Start: 2025-06-02

## 2025-06-19 RX ORDER — TIZANIDINE 2 MG/1
TABLET ORAL
Qty: 30 TABLET | Refills: 0 | Status: SHIPPED | OUTPATIENT
Start: 2025-06-19

## 2025-06-22 DIAGNOSIS — K21.9 GASTROESOPHAGEAL REFLUX DISEASE, UNSPECIFIED WHETHER ESOPHAGITIS PRESENT: ICD-10-CM

## 2025-06-23 RX ORDER — PANTOPRAZOLE SODIUM 40 MG/1
40 TABLET, DELAYED RELEASE ORAL DAILY
Qty: 30 TABLET | Refills: 4 | Status: SHIPPED | OUTPATIENT
Start: 2025-06-23

## 2025-07-09 ENCOUNTER — TELEPHONE (OUTPATIENT)
Dept: INTERNAL MEDICINE CLINIC | Age: 40
End: 2025-07-09

## 2025-07-16 ENCOUNTER — HOSPITAL ENCOUNTER (EMERGENCY)
Age: 40
Discharge: HOME | End: 2025-07-17
Attending: EMERGENCY MEDICINE
Payer: COMMERCIAL

## 2025-07-16 ENCOUNTER — APPOINTMENT (OUTPATIENT)
Dept: CT IMAGING | Age: 40
End: 2025-07-16
Payer: COMMERCIAL

## 2025-07-16 VITALS
TEMPERATURE: 98.1 F | SYSTOLIC BLOOD PRESSURE: 127 MMHG | HEART RATE: 71 BPM | RESPIRATION RATE: 16 BRPM | OXYGEN SATURATION: 98 % | DIASTOLIC BLOOD PRESSURE: 88 MMHG

## 2025-07-16 DIAGNOSIS — M54.2 NECK PAIN: ICD-10-CM

## 2025-07-16 DIAGNOSIS — K04.7 DENTAL ABSCESS: Primary | ICD-10-CM

## 2025-07-16 DIAGNOSIS — K08.89 PAIN, DENTAL: ICD-10-CM

## 2025-07-16 LAB
ALBUMIN SERPL-MCNC: 3.2 G/DL (ref 3.5–5.2)
ALBUMIN/GLOB SERPL: 0.6 {RATIO} (ref 1–2.5)
ALP SERPL-CCNC: 254 U/L (ref 40–129)
ALT SERPL-CCNC: 20 U/L (ref 10–50)
ANION GAP SERPL CALCULATED.3IONS-SCNC: 12 MMOL/L (ref 9–16)
AST SERPL-CCNC: 100 U/L (ref 10–50)
BASOPHILS # BLD: 0.12 K/UL (ref 0–0.2)
BASOPHILS NFR BLD: 2 % (ref 0–2)
BILIRUB SERPL-MCNC: 3.7 MG/DL (ref 0–1.2)
BUN SERPL-MCNC: 6 MG/DL (ref 6–20)
CALCIUM SERPL-MCNC: 8.4 MG/DL (ref 8.6–10.4)
CHLORIDE SERPL-SCNC: 105 MMOL/L (ref 98–107)
CO2 SERPL-SCNC: 23 MMOL/L (ref 20–31)
CREAT SERPL-MCNC: 1 MG/DL (ref 0.7–1.2)
EOSINOPHIL # BLD: 0.37 K/UL (ref 0–0.44)
EOSINOPHILS RELATIVE PERCENT: 7 % (ref 1–4)
ERYTHROCYTE [DISTWIDTH] IN BLOOD BY AUTOMATED COUNT: 19.3 % (ref 11.8–14.4)
GFR, ESTIMATED: >90 ML/MIN/1.73M2
GLUCOSE SERPL-MCNC: 99 MG/DL (ref 74–99)
HCT VFR BLD AUTO: 28.9 % (ref 40.7–50.3)
HGB BLD-MCNC: 9.1 G/DL (ref 13–17)
IMM GRANULOCYTES # BLD AUTO: <0.03 K/UL (ref 0–0.3)
IMM GRANULOCYTES NFR BLD: 0 %
INR PPP: 1.4
LACTIC ACID, WHOLE BLOOD: 1.6 MMOL/L (ref 0.7–2.1)
LYMPHOCYTES NFR BLD: 1.92 K/UL (ref 1.1–3.7)
LYMPHOCYTES RELATIVE PERCENT: 34 % (ref 24–43)
MCH RBC QN AUTO: 33.1 PG (ref 25.2–33.5)
MCHC RBC AUTO-ENTMCNC: 31.5 G/DL (ref 28.4–34.8)
MCV RBC AUTO: 105.1 FL (ref 82.6–102.9)
MONOCYTES NFR BLD: 0.65 K/UL (ref 0.1–1.2)
MONOCYTES NFR BLD: 11 % (ref 3–12)
NEUTROPHILS NFR BLD: 46 % (ref 36–65)
NEUTS SEG NFR BLD: 2.63 K/UL (ref 1.5–8.1)
NRBC BLD-RTO: 0 PER 100 WBC
PLATELET # BLD AUTO: 79 K/UL (ref 138–453)
PMV BLD AUTO: 9.7 FL (ref 8.1–13.5)
POTASSIUM SERPL-SCNC: 3.2 MMOL/L (ref 3.7–5.3)
PROT SERPL-MCNC: 8.2 G/DL (ref 6.6–8.7)
PROTHROMBIN TIME: 17.4 SEC (ref 11.7–14.9)
RBC # BLD AUTO: 2.75 M/UL (ref 4.21–5.77)
RBC # BLD: ABNORMAL 10*6/UL
RBC # BLD: ABNORMAL 10*6/UL
SODIUM SERPL-SCNC: 140 MMOL/L (ref 136–145)
WBC OTHER # BLD: 5.7 K/UL (ref 3.5–11.3)

## 2025-07-16 PROCEDURE — 96374 THER/PROPH/DIAG INJ IV PUSH: CPT | Performed by: EMERGENCY MEDICINE

## 2025-07-16 PROCEDURE — 2580000003 HC RX 258

## 2025-07-16 PROCEDURE — 70491 CT SOFT TISSUE NECK W/DYE: CPT

## 2025-07-16 PROCEDURE — 6360000002 HC RX W HCPCS

## 2025-07-16 PROCEDURE — 99285 EMERGENCY DEPT VISIT HI MDM: CPT | Performed by: EMERGENCY MEDICINE

## 2025-07-16 PROCEDURE — 87040 BLOOD CULTURE FOR BACTERIA: CPT

## 2025-07-16 PROCEDURE — 85025 COMPLETE CBC W/AUTO DIFF WBC: CPT

## 2025-07-16 PROCEDURE — 83605 ASSAY OF LACTIC ACID: CPT

## 2025-07-16 PROCEDURE — 6360000004 HC RX CONTRAST MEDICATION

## 2025-07-16 PROCEDURE — 96361 HYDRATE IV INFUSION ADD-ON: CPT | Performed by: EMERGENCY MEDICINE

## 2025-07-16 PROCEDURE — 85610 PROTHROMBIN TIME: CPT

## 2025-07-16 PROCEDURE — 70450 CT HEAD/BRAIN W/O DYE: CPT

## 2025-07-16 PROCEDURE — 80053 COMPREHEN METABOLIC PANEL: CPT

## 2025-07-16 RX ORDER — 0.9 % SODIUM CHLORIDE 0.9 %
1000 INTRAVENOUS SOLUTION INTRAVENOUS ONCE
Status: COMPLETED | OUTPATIENT
Start: 2025-07-16 | End: 2025-07-16

## 2025-07-16 RX ORDER — FAMOTIDINE 20 MG/1
20 TABLET, FILM COATED ORAL 2 TIMES DAILY
Status: DISCONTINUED | OUTPATIENT
Start: 2025-07-16 | End: 2025-07-16

## 2025-07-16 RX ORDER — IOPAMIDOL 755 MG/ML
75 INJECTION, SOLUTION INTRAVASCULAR
Status: COMPLETED | OUTPATIENT
Start: 2025-07-16 | End: 2025-07-16

## 2025-07-16 RX ORDER — NADOLOL 20 MG/1
20 TABLET ORAL DAILY
Qty: 55 TABLET | Refills: 0 | Status: SHIPPED | OUTPATIENT
Start: 2025-07-16

## 2025-07-16 RX ADMIN — SODIUM CHLORIDE 1000 ML: 9 INJECTION, SOLUTION INTRAVENOUS at 21:21

## 2025-07-16 RX ADMIN — HYDROMORPHONE HYDROCHLORIDE 0.5 MG: 1 INJECTION, SOLUTION INTRAMUSCULAR; INTRAVENOUS; SUBCUTANEOUS at 22:10

## 2025-07-16 RX ADMIN — IOPAMIDOL 75 ML: 755 INJECTION, SOLUTION INTRAVENOUS at 23:27

## 2025-07-16 ASSESSMENT — PAIN DESCRIPTION - LOCATION
LOCATION: FACE
LOCATION: JAW

## 2025-07-16 ASSESSMENT — ENCOUNTER SYMPTOMS
SORE THROAT: 1
STRIDOR: 0
DIARRHEA: 0
BLOOD IN STOOL: 0
SHORTNESS OF BREATH: 0
EYE DISCHARGE: 0
FACIAL SWELLING: 1
SINUS PRESSURE: 1
VOMITING: 0
CHOKING: 0
SINUS PAIN: 1
WHEEZING: 0
NAUSEA: 0
ABDOMINAL DISTENTION: 0
EYE PAIN: 0

## 2025-07-16 ASSESSMENT — PAIN DESCRIPTION - DESCRIPTORS
DESCRIPTORS: SORE;DISCOMFORT
DESCRIPTORS: ACHING

## 2025-07-16 ASSESSMENT — PAIN DESCRIPTION - ORIENTATION
ORIENTATION: LEFT
ORIENTATION: LEFT

## 2025-07-16 ASSESSMENT — PAIN - FUNCTIONAL ASSESSMENT: PAIN_FUNCTIONAL_ASSESSMENT: 0-10

## 2025-07-16 ASSESSMENT — PAIN SCALES - GENERAL
PAINLEVEL_OUTOF10: 10

## 2025-07-16 NOTE — ED NOTES
Patient presents to ED via ambulation through triage with complaints of jaw abscess.   Patient stated he fell face first on March 10th and broke his jaw.   Patien stated he went to [facility] first and they could not fix it the same day so he then went to [facility].   Patient stated he then had a plate put in at [place].   Plate is visible on gum line.   Patient has wound present on left side of exterior jaw with pus present.   Patient stated drainage started out red and then turned to \"chocolate milk\" colored.   Patient stated he has been on keflex for 3 months now, trying to clear the infection.   Patient in NAD, A/Ox4, call light within reach, bed in lowest position.

## 2025-07-17 PROBLEM — K04.7 DENTAL ABSCESS: Status: ACTIVE | Noted: 2025-07-17

## 2025-07-17 PROCEDURE — 6370000000 HC RX 637 (ALT 250 FOR IP)

## 2025-07-17 RX ORDER — LIDOCAINE HYDROCHLORIDE 20 MG/ML
15 SOLUTION OROPHARYNGEAL
Status: DISCONTINUED | OUTPATIENT
Start: 2025-07-17 | End: 2025-07-17 | Stop reason: HOSPADM

## 2025-07-17 RX ORDER — OXYCODONE HYDROCHLORIDE 5 MG/1
5 TABLET ORAL EVERY 6 HOURS PRN
Qty: 12 TABLET | Refills: 0 | Status: SHIPPED | OUTPATIENT
Start: 2025-07-17 | End: 2025-07-20

## 2025-07-17 RX ORDER — POTASSIUM CHLORIDE 1500 MG/1
40 TABLET, EXTENDED RELEASE ORAL ONCE
Status: COMPLETED | OUTPATIENT
Start: 2025-07-17 | End: 2025-07-17

## 2025-07-17 RX ADMIN — POTASSIUM CHLORIDE 40 MEQ: 1500 TABLET, EXTENDED RELEASE ORAL at 00:37

## 2025-07-17 RX ADMIN — LIDOCAINE HYDROCHLORIDE 15 ML: 20 SOLUTION ORAL at 02:03

## 2025-07-17 NOTE — ED PROVIDER NOTES
[facility] EMERGENCY DEPARTMENT     Emergency Department     Faculty Attestation        I performed a history and physical examination of the patient and discussed management with the resident. I reviewed the resident’s note and agree with the documented findings and plan of care. Any areas of disagreement are noted on the chart. I was personally present for the key portions of any procedures. I have documented in the chart those procedures where I was not present during the key portions. I have reviewed the emergency nurses triage note. I agree with the chief complaint, past medical history, past surgical history, allergies, medications, social and family history as documented unless otherwise noted below.    For mid-level providers such as nurse practitioners as well as physicians assistants:    I have personally seen and evaluated the patient.    I find the patient's history and physical exam are consistent with NP/PA documentation.  I agree with the care provided, treatment rendered, disposition, & follow-up plan.     Additional findings are as noted.    Vital Signs: /88   Pulse 71   Temp 98.1 °F (36.7 °C)   Resp 16   SpO2 98%   PCP:  Rosie Montes De Oca MD  Note Started: 8:31 PM EDT    Pertinent Comments:     Patient complaint of left jaw pain.  He had a open mandible fracture on the left side that was repaired at [facility].  This was done by Dr. Christiansen.  Unfortunately, patient has been noncompliant with any sort of follow-up.  He complains of pain to the left side of his jaw.  He states when he coughs or sneezes possible she would have a track on the left side of his face.  He also states he feels cracking sounds when he chews and when he turns his head or neck.  He denies any fevers or chills or systemic symptoms he continues to drink alcohol.  Exam he is afebrile nontoxic there is no submandibular fullness or swellings no trismus normal voice handling

## 2025-07-17 NOTE — ED PROVIDER NOTES
[facility] EMERGENCY DEPARTMENT  Emergency Department Encounter  Emergency Medicine Resident     Pt Name:Sam Emerson  MRN: [id number]  Birthdate 1985  Date of evaluation: 7/16/25  PCP:  Rosie Montes De Oca MD  Note Started: 10:28 PM EDT      CHIEF COMPLAINT       Chief Complaint   Patient presents with    Abscess       HISTORY OF PRESENT ILLNESS  (Location/Symptom, Timing/Onset, Context/Setting, Quality, Duration, Modifying Factors, Severity.)      Sam Emerson is a 40 y.o. male with alcoholic cirrhosis who presents with left jaw pain.  The patient has a long history following a open mandible fracture that was surgically repaired with plates in March.  Since then he has been on and off Keflex finishing his third dose 2 days ago.  He claims to not have had any sort of in person follow-up.  Today during a barbecue he reports sneezing and an abscess on his left jaw expulsed white pus onto his arm resulting in increased pain in his jaw and face region.  He also reports that he has been able to see the plates and screws in his jaw for a month and a half as the gums receded on the left side.  The patient also shows me when he plugs his nose he is able to blow air through the abscess opening on his left jaw.  1 week ago he had an instance of nasal congestion where he had extreme pressure around his left eye and frontal sinus. Claims also to have bloody nasal discharge at this time.    He also reports crackling and popping in his neck when he chews and/or rotates his head.    He endorses \"burning up\" but denies any measured temperature, denies chills, denies nausea, lightheadedness, dizziness.  Does endorse neck pain, headache, light sensitivity.    The patient endorses drinking alcohol, despite liver cirrhosis, to cope with the facial/jaw pain stating that he only drinks 2-24 ounce beers daily as that is the only thing that helps his comfort.        PAST MEDICAL / SURGICAL / SOCIAL / FAMILY HISTORY

## 2025-07-17 NOTE — DISCHARGE INSTRUCTIONS
Today you are seen for dental abscess. Laboratory investigations imaging of your head and neck were completed.  We consulted the [facility] ENT group who suggested that you follow-up immediately in the morning with the surgeon that performed your original fracture repair, Dr. Coyle.  In the meantime we prescribed an antibiotic called Augmentin, take this for 7 days, 875-125 mg twice daily.  We also prescribed Roxicodone for pain control.  Please go back to the hospital if you have any episodes of loss of consciousness, worsening pain, fevers, chills, lightheadedness, dizziness, difficulty breathing or worsening of condition.

## 2025-07-17 NOTE — ED PROVIDER NOTES
[facility]   Emergency Department  Emergency Medicine Attending Sign-out   Note started: 1:29 AM EDT    Care of Sam Emerson was assumed from previous attending  *** at *** and is being seen for Abscess  .  The patient's initial evaluation and plan have been discussed with the prior provider who initially evaluated the patient.     Attestation  I was available and discussed any additional care issues that arose and coordinated the management plans with the resident(s) caring for the patient during my duty period. Any areas of disagreement with resident's documentation of care or procedures are noted on the chart. I was personally present for the key portions of any/all procedures, during my duty period. I have documented in the chart those procedures where I was not present during the key portions.     BRIEF PATIENT SUMMARY/MDM COURSE PER INITIAL PROVIDER:   RECENT VITALS:     Temp: 98.1 °F (36.7 °C),  Pulse: 71, Respirations: 16, BP: 127/88, SpO2: 98 %    This patient is a 40 y.o. Male with ***    DIAGNOSTICS/MEDICATIONS:     MEDICATIONS GIVEN:  ED Medication Orders (From admission, onward)      Start Ordered     Status Ordering Provider    07/17/25 0030 07/17/25 0028  potassium chloride (KLOR-CON M) extended release tablet 40 mEq  ONCE         Last MAR action: Given - by CAIN BEARD on 07/17/25 at 0037 FADORY BYRD    07/16/25 2313 07/16/25 2313  iopamidol (ISOVUE-370) 76 % injection 75 mL  IMG ONCE PRN         Last MAR action: Given - by GISSELLE LEVINE on 07/16/25 at 2327 FAUBIONDORY    07/16/25 2215 07/16/25 2202  HYDROmorphone (DILAUDID) injection 0.5 mg  ONCE         Last MAR action: Given - by KRISTAL BRAVO on 07/16/25 at 2210 FADORY BYRD    07/16/25 2100 07/16/25 2048  sodium chloride 0.9 % bolus 1,000 mL  ONCE         Last MAR action: Stopped - by KRISTAL BRAVO on 07/16/25 at 2233 FAMANDOION DORY W            LABS    Labs Reviewed   PROTIME-INR - Abnormal;

## 2025-07-22 ENCOUNTER — APPOINTMENT (OUTPATIENT)
Dept: CT IMAGING | Age: 40
End: 2025-07-22
Payer: COMMERCIAL

## 2025-07-22 ENCOUNTER — HOSPITAL ENCOUNTER (EMERGENCY)
Age: 40
Discharge: HOME | End: 2025-07-22
Attending: EMERGENCY MEDICINE
Payer: COMMERCIAL

## 2025-07-22 ENCOUNTER — APPOINTMENT (OUTPATIENT)
Dept: GENERAL RADIOLOGY | Age: 40
End: 2025-07-22
Payer: COMMERCIAL

## 2025-07-22 VITALS
TEMPERATURE: 98.2 F | DIASTOLIC BLOOD PRESSURE: 96 MMHG | BODY MASS INDEX: 22.73 KG/M2 | HEIGHT: 68 IN | SYSTOLIC BLOOD PRESSURE: 149 MMHG | HEART RATE: 71 BPM | RESPIRATION RATE: 16 BRPM | WEIGHT: 150 LBS | OXYGEN SATURATION: 100 %
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
## 2025-07-22 DIAGNOSIS — S40.011A CONTUSION OF MULTIPLE SITES OF RIGHT SHOULDER AND UPPER ARM, INITIAL ENCOUNTER: ICD-10-CM

## 2025-07-22 DIAGNOSIS — S40.021A CONTUSION OF MULTIPLE SITES OF RIGHT SHOULDER AND UPPER ARM, INITIAL ENCOUNTER: ICD-10-CM

## 2025-07-22 LAB
ALBUMIN SERPL-MCNC: 3 G/DL (ref 3.5–5.2)
ALP SERPL-CCNC: 236 U/L (ref 40–129)
ALT SERPL-CCNC: 22 U/L (ref 10–50)
ANION GAP SERPL CALCULATED.3IONS-SCNC: 14 MMOL/L (ref 9–16)
AST SERPL-CCNC: 92 U/L (ref 10–50)
BASOPHILS # BLD: 0.05 K/UL (ref 0–0.2)
BASOPHILS NFR BLD: 1 % (ref 0–2)
BILIRUB DIRECT SERPL-MCNC: 2.2 MG/DL (ref 0–0.3)
BILIRUB INDIRECT SERPL-MCNC: 3.4 MG/DL (ref 0–1)
BILIRUB SERPL-MCNC: 5.6 MG/DL (ref 0–1.2)
BUN SERPL-MCNC: 5 MG/DL (ref 6–20)
CALCIUM SERPL-MCNC: 8.8 MG/DL (ref 8.6–10.4)
CHLORIDE SERPL-SCNC: 106 MMOL/L (ref 98–107)
CO2 SERPL-SCNC: 20 MMOL/L (ref 20–31)
CREAT SERPL-MCNC: 1.1 MG/DL (ref 0.7–1.2)
EOSINOPHIL # BLD: 0.27 K/UL (ref 0–0.4)
EOSINOPHILS RELATIVE PERCENT: 6 % (ref 0–4)
ERYTHROCYTE [DISTWIDTH] IN BLOOD BY AUTOMATED COUNT: 18.5 % (ref 11.5–14.9)
GFR, ESTIMATED: 88 ML/MIN/1.73M2
GLUCOSE SERPL-MCNC: 92 MG/DL (ref 74–99)
HCT VFR BLD AUTO: 26.7 % (ref 41–53)
HGB BLD-MCNC: 8.8 G/DL (ref 13.5–17.5)
IMM GRANULOCYTES # BLD AUTO: 0 K/UL (ref 0–0.3)
IMM GRANULOCYTES NFR BLD: 0 %
INR PPP: 1.4
LIPASE SERPL-CCNC: 79 U/L (ref 13–60)
LYMPHOCYTES NFR BLD: 1.17 K/UL (ref 1–4.8)
LYMPHOCYTES RELATIVE PERCENT: 26 % (ref 24–44)
MCH RBC QN AUTO: 33.8 PG (ref 26–34)
MCHC RBC AUTO-ENTMCNC: 33 G/DL (ref 31–37)
MCV RBC AUTO: 102.7 FL (ref 80–100)
MONOCYTES NFR BLD: 0.41 K/UL (ref 0.1–1.3)
MONOCYTES NFR BLD: 9 % (ref 1–7)
MORPHOLOGY: ABNORMAL
NEUTROPHILS NFR BLD: 58 % (ref 36–66)
NEUTS SEG NFR BLD: 2.6 K/UL (ref 1.3–9.1)
NRBC BLD-RTO: 0 PER 100 WBC
PLATELET # BLD AUTO: 59 K/UL (ref 150–450)
PMV BLD AUTO: 11 FL (ref 8–13.5)
POTASSIUM SERPL-SCNC: 3 MMOL/L (ref 3.7–5.3)
PROT SERPL-MCNC: 7.3 G/DL (ref 6.6–8.7)
PROTHROMBIN TIME: 18.7 SEC (ref 11.8–14.6)
RBC # BLD AUTO: 2.6 M/UL (ref 4.21–5.77)
SODIUM SERPL-SCNC: 140 MMOL/L (ref 136–145)
TROPONIN I SERPL HS-MCNC: 22 NG/L (ref 0–22)
WBC OTHER # BLD: 4.5 K/UL (ref 3.5–11)

## 2025-07-22 PROCEDURE — 80076 HEPATIC FUNCTION PANEL: CPT

## 2025-07-22 PROCEDURE — 84484 ASSAY OF TROPONIN QUANT: CPT

## 2025-07-22 PROCEDURE — 6370000000 HC RX 637 (ALT 250 FOR IP)

## 2025-07-22 PROCEDURE — 85610 PROTHROMBIN TIME: CPT

## 2025-07-22 PROCEDURE — 99285 EMERGENCY DEPT VISIT HI MDM: CPT

## 2025-07-22 PROCEDURE — 85025 COMPLETE CBC W/AUTO DIFF WBC: CPT

## 2025-07-22 PROCEDURE — 6360000004 HC RX CONTRAST MEDICATION

## 2025-07-22 PROCEDURE — 83690 ASSAY OF LIPASE: CPT

## 2025-07-22 PROCEDURE — 71046 X-RAY EXAM CHEST 2 VIEWS: CPT

## 2025-07-22 PROCEDURE — 2500000003 HC RX 250 WO HCPCS

## 2025-07-22 PROCEDURE — 36415 COLL VENOUS BLD VENIPUNCTURE: CPT

## 2025-07-22 PROCEDURE — 71260 CT THORAX DX C+: CPT

## 2025-07-22 PROCEDURE — 2580000003 HC RX 258

## 2025-07-22 PROCEDURE — 80048 BASIC METABOLIC PNL TOTAL CA: CPT

## 2025-07-22 RX ORDER — IOPAMIDOL 755 MG/ML
100 INJECTION, SOLUTION INTRAVASCULAR
Status: COMPLETED | OUTPATIENT
Start: 2025-07-22 | End: 2025-07-22

## 2025-07-22 RX ORDER — SODIUM CHLORIDE 0.9 % (FLUSH) 0.9 %
10 SYRINGE (ML) INJECTION PRN
Status: DISCONTINUED | OUTPATIENT
Start: 2025-07-22 | End: 2025-07-22 | Stop reason: HOSPADM

## 2025-07-22 RX ORDER — 0.9 % SODIUM CHLORIDE 0.9 %
100 INTRAVENOUS SOLUTION INTRAVENOUS ONCE
Status: COMPLETED | OUTPATIENT
Start: 2025-07-22 | End: 2025-07-22

## 2025-07-22 RX ORDER — METHOCARBAMOL 750 MG/1
750 TABLET, FILM COATED ORAL 4 TIMES DAILY
Qty: 20 TABLET | Refills: 0 | Status: SHIPPED | OUTPATIENT
Start: 2025-07-22 | End: 2025-07-25

## 2025-07-22 RX ORDER — OXYCODONE HYDROCHLORIDE 5 MG/1
5 TABLET ORAL ONCE
Refills: 0 | Status: COMPLETED | OUTPATIENT
Start: 2025-07-22 | End: 2025-07-22

## 2025-07-22 RX ADMIN — IOPAMIDOL 100 ML: 755 INJECTION, SOLUTION INTRAVENOUS at 20:30

## 2025-07-22 RX ADMIN — SODIUM CHLORIDE, PRESERVATIVE FREE 10 ML: 5 INJECTION INTRAVENOUS at 20:30

## 2025-07-22 RX ADMIN — SODIUM CHLORIDE 100 ML: 9 INJECTION, SOLUTION INTRAVENOUS at 20:30

## 2025-07-22 RX ADMIN — OXYCODONE HYDROCHLORIDE 5 MG: 5 TABLET ORAL at 19:24

## 2025-07-22 ASSESSMENT — PAIN DESCRIPTION - ORIENTATION
ORIENTATION: RIGHT

## 2025-07-22 ASSESSMENT — ENCOUNTER SYMPTOMS
ABDOMINAL PAIN: 1
DIARRHEA: 0
VOMITING: 0
SHORTNESS OF BREATH: 1
NAUSEA: 0

## 2025-07-22 ASSESSMENT — PAIN DESCRIPTION - LOCATION
LOCATION: RIB CAGE

## 2025-07-22 ASSESSMENT — LIFESTYLE VARIABLES
HOW OFTEN DO YOU HAVE A DRINK CONTAINING ALCOHOL: 4 OR MORE TIMES A WEEK
HOW MANY STANDARD DRINKS CONTAINING ALCOHOL DO YOU HAVE ON A TYPICAL DAY: 1 OR 2

## 2025-07-22 ASSESSMENT — PAIN DESCRIPTION - DESCRIPTORS
DESCRIPTORS: ACHING
DESCRIPTORS: ACHING

## 2025-07-22 ASSESSMENT — PAIN SCALES - GENERAL
PAINLEVEL_OUTOF10: 6
PAINLEVEL_OUTOF10: 6
PAINLEVEL_OUTOF10: 10

## 2025-07-22 NOTE — ED PROVIDER NOTES
[facility] EMERGENCY DEPARTMENT  Emergency Department Encounter  Emergency Medicine Resident     Pt Name:Sam Emerson  MRN: [id number]  Birthdate 1985  Date of evaluation: 7/22/25  PCP:  Rosie Montes De Oca MD  Note Started: 7:03 PM EDT      CHIEF COMPLAINT       Chief Complaint   Patient presents with    Fall     Pt fell Sunday on a concrete stoop. No LOC, did not hit head.    Loss of Consciousness     Episode of syncope today. Pt was walking in yard when he started to black out.    Rib Pain (injury)    Neck Pain       HISTORY OF PRESENT ILLNESS  (Location/Symptom, Timing/Onset, Context/Setting, Quality, Duration, Modifying Factors, Severity.)      Sam Emerson is a 40 y.o. male who presents to the ED with c/o right chest pain and shortness of breath.  Patient states he fell 2 days ago and landed on a concrete step.  Since then, he has had right-sided chest pain but that it was just due to bruising.  Patient has been completing his daily activities with mild achy pain.  Today, he notes worsening right-sided chest pain with associated shortness of breath.  He states he did have a near syncopal episode earlier today when he was walking, so he came to the ED for evaluation.  Patient is concerned that he may have injured his liver during the fall.  He does have notable bruising which he states is common since his TIPS procedure.  Patient denies any fever, cough, nausea/vomiting/diarrhea, back pain.    PAST MEDICAL / SURGICAL / SOCIAL / FAMILY HISTORY      has a past medical history of Alcoholism (HCC), Anemia, Cirrhosis, alcoholic (HCC), and Pulmonary embolism (HCC).     has a past surgical history that includes Upper gastrointestinal endoscopy (N/A, 05/01/2022); Upper gastrointestinal endoscopy (N/A, 10/30/2022); Upper gastrointestinal endoscopy (N/A, 09/27/2023); Esophagogastroduodenoscopy (12/05/2023); IR TIPS INSERTION (12/05/2023); IR NONTUNNELED VASCULAR CATHETER > 5 YEARS (12/05/2023); Upper
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
## 2025-07-23 NOTE — ED NOTES
SW met with pt at beside, per request of RN. Pt provided with basic need resources. Pt accepting of these resources.

## 2025-07-23 NOTE — ED PROVIDER NOTES
EMERGENCY DEPARTMENT ENCOUNTER   ATTENDING ATTESTATION     Pt Name: Sam Emerson  MRN: [id number]  Birthdate 1985  Date of evaluation: 7/22/25       Sam Emerson is a 40 y.o. male who presents with Fall (Pt fell Sunday on a concrete stoop. No LOC, did not hit head.), Loss of Consciousness (Episode of syncope today. Pt was walking in yard when he started to black out.), Rib Pain (injury), and Neck Pain      MDM:   Patient states he missed a step on Sunday and hit his back.  No LOC did not hit his head no neck pain or spine pain.  He is having some pain on his ribs on the right side with a lot of bruising.  Is get a history of cirrhosis.  Some right flank pain.    He has some ecchymosis on his back.  No midline CTL spine tenderness.  Well-appearing no distress.  Do not suspect spine fracture.  Checking CT chest abdomen pelvis for blunt organ injury.  Do not suspect cord injury or compression.  GCS 15  Strength in arms 5/5  Strength in legs 5/5  Sensation intact bilateral arms and legs  No cranial nerve deficits identified  No facial droop  No dysarthria or aphasia  No gaze preference or nystagmus  Visual fields intact  No ataxia in arms or legs      Vitals Reviewed:    Vitals:    07/22/25 1846 07/22/25 1924   BP: 123/79    Pulse: 77    Resp: 18 18   Temp: 98.2 °F (36.8 °C)    TempSrc: Oral    SpO2: 98%    Weight: 68 kg (150 lb)    Height: 1.727 m (5' 8\")        Initial Pain Score: Pain Level: 10 (07/22/25 1924)  Last Pain Score: Pain Level: 10 (07/22/25 1924)      I personally saw and examined the patient. I have reviewed and agree with the resident's findings including all diagnostic interpretations, and treatment plans as written. I was present for the key portions of any procedures performed.    Jimenez Campos MD  Attending Emergency Physician            Jimenez Campos MD  07/22/25 2018

## 2025-07-23 NOTE — ED NOTES
Pt states that he has a friend picking him up and he is staying with this friend tonight. Pt given turkey sandwich and snacks upon discharge.

## 2025-07-23 NOTE — DISCHARGE INSTRUCTIONS
You were evaluated in the ER after your fall.  At this time, your imaging is negative for acute abnormality, there is no injury to your ribs, lungs, or liver.  Your blood work is similar to your baseline.  You were given IV fluids and oxycodone for symptom relief.  Please follow-up with your primary care provider for reevaluation if your symptoms persist.    Return to the ER immediately if you have difficulty breathing, chest pain, new or worsening abdominal pain, nausea/vomiting, or any other concerning symptoms.

## 2025-07-25 RX ORDER — TIZANIDINE 2 MG/1
TABLET ORAL
Qty: 30 TABLET | Refills: 0 | Status: SHIPPED | OUTPATIENT
Start: 2025-07-25

## 2025-07-29 ENCOUNTER — TELEPHONE (OUTPATIENT)
Dept: INTERNAL MEDICINE CLINIC | Age: 40
End: 2025-07-29

## 2025-08-08 ENCOUNTER — OFFICE VISIT (OUTPATIENT)
Dept: INTERNAL MEDICINE CLINIC | Age: 40
End: 2025-08-08
Payer: COMMERCIAL

## 2025-08-08 ENCOUNTER — HOSPITAL ENCOUNTER (OUTPATIENT)
Age: 40
Setting detail: SPECIMEN
Discharge: HOME | End: 2025-08-08

## 2025-08-08 VITALS
WEIGHT: 164.6 LBS | HEIGHT: 68 IN | OXYGEN SATURATION: 98 % | HEART RATE: 81 BPM | DIASTOLIC BLOOD PRESSURE: 80 MMHG | BODY MASS INDEX: 24.95 KG/M2 | SYSTOLIC BLOOD PRESSURE: 130 MMHG

## 2025-08-08 DIAGNOSIS — K70.31 ALCOHOLIC CIRRHOSIS OF LIVER WITH ASCITES (HCC): ICD-10-CM

## 2025-08-08 DIAGNOSIS — D69.6 THROMBOCYTOPENIA: ICD-10-CM

## 2025-08-08 DIAGNOSIS — S02.609D: Primary | ICD-10-CM

## 2025-08-08 DIAGNOSIS — R04.0 EPISTAXIS: ICD-10-CM

## 2025-08-08 LAB
BASOPHILS # BLD: 0.16 K/UL (ref 0–0.2)
BASOPHILS NFR BLD: 2 % (ref 0–2)
EOSINOPHIL # BLD: 0.32 K/UL (ref 0–0.44)
EOSINOPHILS RELATIVE PERCENT: 4 % (ref 1–4)
ERYTHROCYTE [DISTWIDTH] IN BLOOD BY AUTOMATED COUNT: 20.6 % (ref 11.8–14.4)
HCT VFR BLD AUTO: 25.8 % (ref 40.7–50.3)
HGB BLD-MCNC: 8.5 G/DL (ref 13–17)
IMM GRANULOCYTES # BLD AUTO: 0.08 K/UL (ref 0–0.3)
IMM GRANULOCYTES NFR BLD: 1 %
LYMPHOCYTES NFR BLD: 1.66 K/UL (ref 1.1–3.7)
LYMPHOCYTES RELATIVE PERCENT: 21 % (ref 24–43)
MCH RBC QN AUTO: 35.3 PG (ref 25.2–33.5)
MCHC RBC AUTO-ENTMCNC: 32.9 G/DL (ref 28.4–34.8)
MCV RBC AUTO: 107.1 FL (ref 82.6–102.9)
MONOCYTES NFR BLD: 0.63 K/UL (ref 0.1–1.2)
MONOCYTES NFR BLD: 8 % (ref 3–12)
MORPHOLOGY: ABNORMAL
NEUTROPHILS NFR BLD: 64 % (ref 36–65)
NEUTS SEG NFR BLD: 5.05 K/UL (ref 1.5–8.1)
NRBC BLD-RTO: 0 PER 100 WBC
PLATELET # BLD AUTO: ABNORMAL K/UL (ref 138–453)
PLATELET, FLUORESCENCE: 59 K/UL (ref 138–453)
PLATELETS.RETICULATED NFR BLD AUTO: 2.7 % (ref 1.1–10.3)
RBC # BLD AUTO: 2.41 M/UL (ref 4.21–5.77)
WBC OTHER # BLD: 7.9 K/UL (ref 3.5–11.3)

## 2025-08-08 PROCEDURE — 99214 OFFICE O/P EST MOD 30 MIN: CPT | Performed by: STUDENT IN AN ORGANIZED HEALTH CARE EDUCATION/TRAINING PROGRAM

## 2025-08-08 PROCEDURE — 3079F DIAST BP 80-89 MM HG: CPT | Performed by: STUDENT IN AN ORGANIZED HEALTH CARE EDUCATION/TRAINING PROGRAM

## 2025-08-08 PROCEDURE — G8427 DOCREV CUR MEDS BY ELIG CLIN: HCPCS | Performed by: STUDENT IN AN ORGANIZED HEALTH CARE EDUCATION/TRAINING PROGRAM

## 2025-08-08 PROCEDURE — G8419 CALC BMI OUT NRM PARAM NOF/U: HCPCS | Performed by: STUDENT IN AN ORGANIZED HEALTH CARE EDUCATION/TRAINING PROGRAM

## 2025-08-08 PROCEDURE — 3075F SYST BP GE 130 - 139MM HG: CPT | Performed by: STUDENT IN AN ORGANIZED HEALTH CARE EDUCATION/TRAINING PROGRAM

## 2025-08-08 PROCEDURE — 4004F PT TOBACCO SCREEN RCVD TLK: CPT | Performed by: STUDENT IN AN ORGANIZED HEALTH CARE EDUCATION/TRAINING PROGRAM

## 2025-08-08 RX ORDER — OXYCODONE HYDROCHLORIDE 5 MG/1
5 TABLET ORAL EVERY 8 HOURS PRN
Qty: 9 TABLET | Refills: 0 | Status: SHIPPED | OUTPATIENT
Start: 2025-08-08 | End: 2025-08-11

## 2025-08-08 RX ORDER — POTASSIUM CHLORIDE 750 MG/1
10 TABLET, EXTENDED RELEASE ORAL DAILY
Qty: 90 TABLET | Refills: 1 | Status: SHIPPED | OUTPATIENT
Start: 2025-08-08

## 2025-08-08 RX ORDER — ECHINACEA PURPUREA EXTRACT 125 MG
1 TABLET ORAL PRN
Qty: 1 EACH | Refills: 3 | Status: SHIPPED | OUTPATIENT
Start: 2025-08-08

## 2025-08-08 ASSESSMENT — ENCOUNTER SYMPTOMS
WHEEZING: 0
ABDOMINAL PAIN: 0
CONSTIPATION: 0
SHORTNESS OF BREATH: 0
COUGH: 0
DIARRHEA: 0

## 2025-08-13 RX ORDER — THIAMINE MONONITRATE (VIT B1) 100 MG
100 TABLET ORAL DAILY
Qty: 60 TABLET | Refills: 0 | Status: SHIPPED | OUTPATIENT
Start: 2025-08-13

## 2025-08-15 ENCOUNTER — TELEPHONE (OUTPATIENT)
Dept: INTERNAL MEDICINE CLINIC | Age: 40
End: 2025-08-15

## 2025-08-22 ENCOUNTER — TRANSCRIBE ORDERS (OUTPATIENT)
Dept: ADMINISTRATIVE | Age: 40
End: 2025-08-22

## 2025-08-22 DIAGNOSIS — M27.2 ABSCESS OF JAW: Primary | ICD-10-CM

## 2025-08-22 DIAGNOSIS — Z87.81 FRACTURE, HEALED: ICD-10-CM

## 2025-08-26 DIAGNOSIS — G89.29 CHRONIC RIGHT-SIDED LOW BACK PAIN WITHOUT SCIATICA: ICD-10-CM

## 2025-08-26 DIAGNOSIS — M54.50 CHRONIC RIGHT-SIDED LOW BACK PAIN WITHOUT SCIATICA: ICD-10-CM

## 2025-08-26 RX ORDER — GABAPENTIN 100 MG/1
100 CAPSULE ORAL 2 TIMES DAILY
Qty: 30 CAPSULE | Refills: 3 | Status: SHIPPED | OUTPATIENT
Start: 2025-08-26 | End: 2025-12-24

## 2025-09-05 ENCOUNTER — TELEPHONE (OUTPATIENT)
Dept: INTERNAL MEDICINE CLINIC | Age: 40
End: 2025-09-05

## (undated) PROCEDURE — 0W9G3ZZ DRAINAGE OF PERITONEAL CAVITY, PERCUTANEOUS APPROACH: ICD-10-PCS

## (undated) DEVICE — BITEBLOCK 54FR W/ DENT RIM BLOX

## (undated) DEVICE — ENDO KIT W/SYRINGE: Brand: MEDLINE INDUSTRIES, INC.

## (undated) DEVICE — GLOVE ORTHO 8   MSG9480

## (undated) DEVICE — DEFENDO AIR WATER SUCTION AND BIOPSY VALVE KIT FOR  OLYMPUS: Brand: DEFENDO AIR/WATER/SUCTION AND BIOPSY VALVE

## (undated) DEVICE — SINGLE-USE POLYPECTOMY SNARE: Brand: CAPTIVATOR

## (undated) DEVICE — RETRIEVER ENDOSCP L230CM DIA2.5MM NET W3XL5.5CM MIN WRK CHN

## (undated) DEVICE — Z DUPLICATE USE 2360676 RETRIEVER ENDOSCP UNIV 4X5.5 CM 2.5 MMX230 CM PLAT ROTH NET

## (undated) DEVICE — LIGATOR ENDOSCP L2.8MM DIA8.6-11.5MM MULT BND SPEEDBAND

## (undated) DEVICE — SIX SHOOTER SAEED MULTI-BAND LIGATOR: Brand: SAEED

## (undated) DEVICE — STRAP ARMBRD W1.5XL32IN FOAM STR YET SFT W/ HK AND LOOP

## (undated) DEVICE — GLOVE ORANGE PI 7 1/2   MSG9075

## (undated) DEVICE — SINGLE-USE POLYPECTOMY SNARE: Brand: CAPTIFLEX

## (undated) DEVICE — MULTIPLE BAND LIGATOR: Brand: SPEEDBAND SUPERVIEW SUPER 7

## (undated) DEVICE — Device: Brand: DISPOSABLE ELECTROSURGICAL SNARE

## (undated) DEVICE — GLOVE ORANGE PI 7   MSG9070

## (undated) DEVICE — Device